# Patient Record
Sex: MALE | Race: BLACK OR AFRICAN AMERICAN | NOT HISPANIC OR LATINO | Employment: OTHER | URBAN - METROPOLITAN AREA
[De-identification: names, ages, dates, MRNs, and addresses within clinical notes are randomized per-mention and may not be internally consistent; named-entity substitution may affect disease eponyms.]

---

## 2018-07-17 DIAGNOSIS — I80.9 PHLEBITIS AND THROMBOPHLEBITIS OF UNSPECIFIED SITE: ICD-10-CM

## 2018-07-17 DIAGNOSIS — I73.9 PERIPHERAL VASCULAR DISEASE, UNSPECIFIED: Primary | ICD-10-CM

## 2018-07-23 ENCOUNTER — HOSPITAL ENCOUNTER (OUTPATIENT)
Dept: RADIOLOGY | Facility: HOSPITAL | Age: 73
Discharge: HOME | End: 2018-07-23
Attending: INTERNAL MEDICINE
Payer: COMMERCIAL

## 2018-07-23 DIAGNOSIS — I73.9 PERIPHERAL VASCULAR DISEASE, UNSPECIFIED: ICD-10-CM

## 2018-07-23 DIAGNOSIS — I80.9 PHLEBITIS AND THROMBOPHLEBITIS OF UNSPECIFIED SITE: ICD-10-CM

## 2018-07-23 PROCEDURE — 93926 LOWER EXTREMITY STUDY: CPT | Mod: TC,PO

## 2018-07-23 PROCEDURE — 93971 EXTREMITY STUDY: CPT | Mod: TC,PO

## 2019-07-09 ENCOUNTER — HOSPITAL ENCOUNTER (INPATIENT)
Facility: HOSPITAL | Age: 74
LOS: 8 days | Discharge: ANOTHER HOSPITAL | DRG: 286 | End: 2019-07-17
Attending: INTERNAL MEDICINE | Admitting: INTERNAL MEDICINE
Payer: MEDICARE

## 2019-07-09 ENCOUNTER — OUTSIDE PLACE OF SERVICE (OUTPATIENT)
Dept: CARDIOLOGY | Facility: CLINIC | Age: 74
End: 2019-07-09
Payer: MEDICARE

## 2019-07-09 ENCOUNTER — TELEPHONE (OUTPATIENT)
Dept: CARDIOLOGY | Facility: CLINIC | Age: 74
End: 2019-07-09

## 2019-07-09 DIAGNOSIS — I44.2 COMPLETE HEART BLOCK: ICD-10-CM

## 2019-07-09 DIAGNOSIS — I50.21 ACUTE SYSTOLIC HEART FAILURE: Primary | ICD-10-CM

## 2019-07-09 DIAGNOSIS — I47.20 VENTRICULAR TACHYCARDIA: ICD-10-CM

## 2019-07-09 LAB
AORTIC ROOT ANNULUS: 3.69 CM
AV INDEX (PROSTH): 0.62
AV MEAN GRADIENT: 3 MMHG
AV PEAK GRADIENT: 6 MMHG
AV VALVE AREA: 2.44 CM2
AV VELOCITY RATIO: 0.71
BSA FOR ECHO PROCEDURE: 2.75 M2
CV ECHO LV RWT: 0.51 CM
DOP CALC AO PEAK VEL: 1.22 M/S
DOP CALC AO VTI: 24.42 CM
DOP CALC LVOT AREA: 3.9 CM2
DOP CALC LVOT DIAMETER: 2.24 CM
DOP CALC LVOT PEAK VEL: 0.87 M/S
DOP CALC LVOT STROKE VOLUME: 59.55 CM3
DOP CALCLVOT PEAK VEL VTI: 15.12 CM
E WAVE DECELERATION TIME: 114.81 MSEC
E/A RATIO: 2.24
ECHO LV POSTERIOR WALL: 1.4 CM (ref 0.6–1.1)
FRACTIONAL SHORTENING: 23 % (ref 28–44)
INTERVENTRICULAR SEPTUM: 1.66 CM (ref 0.6–1.1)
LA MAJOR: 6.46 CM
LA MINOR: 6.9 CM
LA WIDTH: 4.23 CM
LEFT ATRIUM SIZE: 4.67 CM
LEFT ATRIUM VOLUME INDEX: 42.4 ML/M2
LEFT ATRIUM VOLUME: 112.04 CM3
LEFT INTERNAL DIMENSION IN SYSTOLE: 4.2 CM (ref 2.1–4)
LEFT VENTRICLE DIASTOLIC VOLUME INDEX: 55.63 ML/M2
LEFT VENTRICLE DIASTOLIC VOLUME: 147.08 ML
LEFT VENTRICLE MASS INDEX: 145 G/M2
LEFT VENTRICLE SYSTOLIC VOLUME INDEX: 29.7 ML/M2
LEFT VENTRICLE SYSTOLIC VOLUME: 78.58 ML
LEFT VENTRICULAR INTERNAL DIMENSION IN DIASTOLE: 5.49 CM (ref 3.5–6)
LEFT VENTRICULAR MASS: 382.95 G
MV PEAK A VEL: 0.37 M/S
MV PEAK E VEL: 0.83 M/S
POCT GLUCOSE: 213 MG/DL (ref 70–110)
POCT GLUCOSE: 231 MG/DL (ref 70–110)
POCT GLUCOSE: 248 MG/DL (ref 70–110)
PULM VEIN S/D RATIO: 1.25
PV PEAK D VEL: 0.36 M/S
PV PEAK S VEL: 0.45 M/S
PV PEAK VELOCITY: 1.07 CM/S
RA MAJOR: 5.87 CM
RA PRESSURE: 3 MMHG
RIGHT VENTRICULAR END-DIASTOLIC DIMENSION: 3.61 CM

## 2019-07-09 PROCEDURE — C1894 INTRO/SHEATH, NON-LASER: HCPCS | Performed by: INTERNAL MEDICINE

## 2019-07-09 PROCEDURE — 33210 INSERT ELECTRD/PM CATH SNGL: CPT | Mod: ,,, | Performed by: INTERNAL MEDICINE

## 2019-07-09 PROCEDURE — 33210 INSERT ELECTRD/PM CATH SNGL: CPT | Performed by: INTERNAL MEDICINE

## 2019-07-09 PROCEDURE — 27000221 HC OXYGEN, UP TO 24 HOURS

## 2019-07-09 PROCEDURE — 93005 ELECTROCARDIOGRAM TRACING: CPT

## 2019-07-09 PROCEDURE — 99223 PR INITIAL HOSPITAL CARE,LEVL III: ICD-10-PCS | Mod: ,,, | Performed by: INTERNAL MEDICINE

## 2019-07-09 PROCEDURE — 99152 MOD SED SAME PHYS/QHP 5/>YRS: CPT | Performed by: INTERNAL MEDICINE

## 2019-07-09 PROCEDURE — 63600175 PHARM REV CODE 636 W HCPCS: Performed by: INTERNAL MEDICINE

## 2019-07-09 PROCEDURE — 94761 N-INVAS EAR/PLS OXIMETRY MLT: CPT

## 2019-07-09 PROCEDURE — 63600175 PHARM REV CODE 636 W HCPCS: Performed by: NURSE PRACTITIONER

## 2019-07-09 PROCEDURE — 93010 ELECTROCARDIOGRAM REPORT: CPT | Mod: ,,, | Performed by: INTERNAL MEDICINE

## 2019-07-09 PROCEDURE — 93010 PR ELECTROCARDIOGRAM REPORT: ICD-10-PCS | Mod: ,,, | Performed by: INTERNAL MEDICINE

## 2019-07-09 PROCEDURE — 99223 1ST HOSP IP/OBS HIGH 75: CPT | Mod: ,,, | Performed by: INTERNAL MEDICINE

## 2019-07-09 PROCEDURE — 25000003 PHARM REV CODE 250: Performed by: NURSE PRACTITIONER

## 2019-07-09 PROCEDURE — 20000000 HC ICU ROOM

## 2019-07-09 PROCEDURE — 33210 PR INSER HEART TEMP PACER ONE CHMBR: ICD-10-PCS | Mod: ,,, | Performed by: INTERNAL MEDICINE

## 2019-07-09 RX ORDER — ACETAMINOPHEN 325 MG/1
650 TABLET ORAL EVERY 4 HOURS PRN
Status: DISCONTINUED | OUTPATIENT
Start: 2019-07-09 | End: 2019-07-17 | Stop reason: HOSPADM

## 2019-07-09 RX ORDER — COLCHICINE 0.6 MG/1
0.6 TABLET ORAL DAILY
Status: DISCONTINUED | OUTPATIENT
Start: 2019-07-09 | End: 2019-07-17 | Stop reason: HOSPADM

## 2019-07-09 RX ORDER — RAMELTEON 8 MG/1
8 TABLET ORAL NIGHTLY PRN
Status: DISCONTINUED | OUTPATIENT
Start: 2019-07-09 | End: 2019-07-17 | Stop reason: HOSPADM

## 2019-07-09 RX ORDER — ALBUTEROL SULFATE 90 UG/1
2 AEROSOL, METERED RESPIRATORY (INHALATION) EVERY 4 HOURS PRN
Status: DISCONTINUED | OUTPATIENT
Start: 2019-07-09 | End: 2019-07-17 | Stop reason: HOSPADM

## 2019-07-09 RX ORDER — FUROSEMIDE 40 MG/1
40 TABLET ORAL DAILY
Status: DISCONTINUED | OUTPATIENT
Start: 2019-07-09 | End: 2019-07-11

## 2019-07-09 RX ORDER — MAGNESIUM SULFATE HEPTAHYDRATE 40 MG/ML
2 INJECTION, SOLUTION INTRAVENOUS ONCE
Status: COMPLETED | OUTPATIENT
Start: 2019-07-09 | End: 2019-07-09

## 2019-07-09 RX ORDER — ATORVASTATIN CALCIUM 40 MG/1
80 TABLET, FILM COATED ORAL DAILY
Status: DISCONTINUED | OUTPATIENT
Start: 2019-07-09 | End: 2019-07-17 | Stop reason: HOSPADM

## 2019-07-09 RX ORDER — ALLOPURINOL 300 MG/1
300 TABLET ORAL DAILY
Status: DISCONTINUED | OUTPATIENT
Start: 2019-07-09 | End: 2019-07-17 | Stop reason: HOSPADM

## 2019-07-09 RX ORDER — IBUPROFEN 200 MG
16 TABLET ORAL
Status: DISCONTINUED | OUTPATIENT
Start: 2019-07-09 | End: 2019-07-17 | Stop reason: HOSPADM

## 2019-07-09 RX ORDER — DEXTROSE 50 % IN WATER (D50W) INTRAVENOUS SYRINGE
25
Status: DISCONTINUED | OUTPATIENT
Start: 2019-07-09 | End: 2019-07-17 | Stop reason: HOSPADM

## 2019-07-09 RX ORDER — GLUCAGON 1 MG
1 KIT INJECTION
Status: DISCONTINUED | OUTPATIENT
Start: 2019-07-09 | End: 2019-07-17 | Stop reason: HOSPADM

## 2019-07-09 RX ORDER — TRAMADOL HYDROCHLORIDE 50 MG/1
50 TABLET ORAL EVERY 6 HOURS PRN
Status: DISCONTINUED | OUTPATIENT
Start: 2019-07-09 | End: 2019-07-17 | Stop reason: HOSPADM

## 2019-07-09 RX ORDER — LOPERAMIDE HYDROCHLORIDE 2 MG/1
2 CAPSULE ORAL
Status: DISCONTINUED | OUTPATIENT
Start: 2019-07-09 | End: 2019-07-17 | Stop reason: HOSPADM

## 2019-07-09 RX ORDER — HYDRALAZINE HYDROCHLORIDE 20 MG/ML
10 INJECTION INTRAMUSCULAR; INTRAVENOUS EVERY 6 HOURS PRN
Status: DISCONTINUED | OUTPATIENT
Start: 2019-07-09 | End: 2019-07-16 | Stop reason: SDUPTHER

## 2019-07-09 RX ORDER — SODIUM CHLORIDE 0.9 % (FLUSH) 0.9 %
10 SYRINGE (ML) INJECTION
Status: DISCONTINUED | OUTPATIENT
Start: 2019-07-09 | End: 2019-07-17 | Stop reason: HOSPADM

## 2019-07-09 RX ORDER — MIDAZOLAM HYDROCHLORIDE 1 MG/ML
INJECTION INTRAMUSCULAR; INTRAVENOUS
Status: DISCONTINUED | OUTPATIENT
Start: 2019-07-09 | End: 2019-07-09 | Stop reason: HOSPADM

## 2019-07-09 RX ORDER — ONDANSETRON 8 MG/1
8 TABLET, ORALLY DISINTEGRATING ORAL EVERY 8 HOURS PRN
Status: DISCONTINUED | OUTPATIENT
Start: 2019-07-09 | End: 2019-07-17 | Stop reason: HOSPADM

## 2019-07-09 RX ORDER — POLYETHYLENE GLYCOL 3350 17 G/17G
17 POWDER, FOR SOLUTION ORAL 2 TIMES DAILY PRN
Status: DISCONTINUED | OUTPATIENT
Start: 2019-07-09 | End: 2019-07-17 | Stop reason: HOSPADM

## 2019-07-09 RX ORDER — LISINOPRIL 20 MG/1
40 TABLET ORAL DAILY
Status: DISCONTINUED | OUTPATIENT
Start: 2019-07-09 | End: 2019-07-11

## 2019-07-09 RX ORDER — ASPIRIN 81 MG/1
81 TABLET ORAL DAILY
Status: DISCONTINUED | OUTPATIENT
Start: 2019-07-09 | End: 2019-07-17 | Stop reason: HOSPADM

## 2019-07-09 RX ORDER — INSULIN ASPART 100 [IU]/ML
0-5 INJECTION, SOLUTION INTRAVENOUS; SUBCUTANEOUS
Status: DISCONTINUED | OUTPATIENT
Start: 2019-07-09 | End: 2019-07-09

## 2019-07-09 RX ORDER — DEXTROSE 50 % IN WATER (D50W) INTRAVENOUS SYRINGE
12.5
Status: DISCONTINUED | OUTPATIENT
Start: 2019-07-09 | End: 2019-07-17 | Stop reason: HOSPADM

## 2019-07-09 RX ORDER — FENTANYL CITRATE 50 UG/ML
INJECTION, SOLUTION INTRAMUSCULAR; INTRAVENOUS
Status: DISCONTINUED | OUTPATIENT
Start: 2019-07-09 | End: 2019-07-09 | Stop reason: HOSPADM

## 2019-07-09 RX ORDER — PANTOPRAZOLE SODIUM 40 MG/1
40 TABLET, DELAYED RELEASE ORAL DAILY
Status: DISCONTINUED | OUTPATIENT
Start: 2019-07-09 | End: 2019-07-17 | Stop reason: HOSPADM

## 2019-07-09 RX ORDER — HEPARIN SODIUM 200 [USP'U]/100ML
INJECTION, SOLUTION INTRAVENOUS
Status: DISCONTINUED | OUTPATIENT
Start: 2019-07-09 | End: 2019-07-09

## 2019-07-09 RX ORDER — IBUPROFEN 200 MG
24 TABLET ORAL
Status: DISCONTINUED | OUTPATIENT
Start: 2019-07-09 | End: 2019-07-17 | Stop reason: HOSPADM

## 2019-07-09 RX ORDER — INSULIN ASPART 100 [IU]/ML
0-5 INJECTION, SOLUTION INTRAVENOUS; SUBCUTANEOUS
Status: DISCONTINUED | OUTPATIENT
Start: 2019-07-09 | End: 2019-07-17 | Stop reason: HOSPADM

## 2019-07-09 RX ADMIN — ALLOPURINOL 300 MG: 300 TABLET ORAL at 04:07

## 2019-07-09 RX ADMIN — PANTOPRAZOLE SODIUM 40 MG: 40 TABLET, DELAYED RELEASE ORAL at 04:07

## 2019-07-09 RX ADMIN — HYDRALAZINE HYDROCHLORIDE 10 MG: 20 INJECTION INTRAMUSCULAR; INTRAVENOUS at 10:07

## 2019-07-09 RX ADMIN — INSULIN ASPART 2 UNITS: 100 INJECTION, SOLUTION INTRAVENOUS; SUBCUTANEOUS at 04:07

## 2019-07-09 RX ADMIN — LISINOPRIL 40 MG: 20 TABLET ORAL at 04:07

## 2019-07-09 RX ADMIN — FUROSEMIDE 40 MG: 40 TABLET ORAL at 04:07

## 2019-07-09 RX ADMIN — ATORVASTATIN CALCIUM 80 MG: 40 TABLET, FILM COATED ORAL at 04:07

## 2019-07-09 RX ADMIN — ASPIRIN 81 MG: 81 TABLET, COATED ORAL at 04:07

## 2019-07-09 RX ADMIN — COLCHICINE 0.6 MG: 0.6 TABLET, FILM COATED ORAL at 04:07

## 2019-07-09 RX ADMIN — MAGNESIUM SULFATE IN WATER 2 G: 40 INJECTION, SOLUTION INTRAVENOUS at 01:07

## 2019-07-09 RX ADMIN — INSULIN ASPART 1 UNITS: 100 INJECTION, SOLUTION INTRAVENOUS; SUBCUTANEOUS at 10:07

## 2019-07-09 NOTE — LETTER
July 10, 2019         [address] [place] [address] [place]iki TRUJ[place]O [address]  Phone: [phone]  Fax: [phone]       Patient: Houston Benjamin   YOB: 1945  Date of Visit: 7/09/19    To Whom It May Concern:    Renita Benjamin,  of Paulette Benjamin, is currently hospitalized in the Intensive Care Unit at [facility]. Paulette is unable to leave her  at this time. Her reservation ID# is [id number].  If you have any questions or concerns, or if I can be of further assistance, please do not hesitate to contact me.    Sincerely,        Meenakshi Hernandez RN    [facility]

## 2019-07-09 NOTE — INTERVAL H&P NOTE
The patient has been examined and the H&P has been reviewed:        Anesthesia/Surgery risks, benefits and alternative options discussed and understood by patient/family.          Active Hospital Problems    Diagnosis  POA    *Complete heart block [I44.2]  Yes    Morbid obesity [E66.01]  Yes    Hypertension [I10]  Yes    Diastolic dysfunction [I51.9]  Yes    Sleep apnea [G47.30]  Yes    Hypervolemia [E87.70]  Yes      Resolved Hospital Problems   No resolved problems to display.

## 2019-07-09 NOTE — Clinical Note
Catheter is repositioned to the ostium   right coronary artery. Angiography performed of the right coronary arteries in multiple views. Angiography performed via hand injection with 9 mL of contrast.

## 2019-07-09 NOTE — PLAN OF CARE
Pt arrived via ambulance to room 257 HR 27 on monitor. Pt AAOx4 denies any lightheadedness or dizziness, cp.. Pt only complaint at this time is he feels sob sat 97% on RR 22 fine crackles and diminished breath sounds post bases, ap 25,complete heart block  Manual bp 152/46.pt laying comfortably in bed ,  nad noted, pacer pads p[laced on pt,  called and notified. No new orders given at this time. Care is on going will continue to monitor ,pt's insulin  Pump, in place, can not tell me basal rate, told we will  Discontinue pump and start sliding scale,

## 2019-07-09 NOTE — BRIEF OP NOTE
S/p R IJ Temporary pacemaker for CHB          Monitor overnight  Further evaluation and treatment to follow

## 2019-07-09 NOTE — Clinical Note
The sheath is sutured in place. biopatch placed at incision site, site dress with Tegaderm and gauze.

## 2019-07-09 NOTE — TELEPHONE ENCOUNTER
[facility] called requesting call back to get pt transferred here     Pt came in with complaints of feeling lightheaded and has complete heart block     [phone]

## 2019-07-09 NOTE — H&P
[facility]               H&P      Reason for Admission:        Assessment:  1. CHB  2. Chronic Diastolic CHF-stable  3. HTN- controlled        Plan:    Temporary pacemaker emergently  2d echo complete  CMP  Plan for Permanent Pacemaker or AICD when ef known and electrolytes optimize.   Magnesium 2 g IV    Milagros Gross MD  Cardiology Service      HPI: 74 y/o AA male with PMH of HTN, CHF and DM present with weakness, fatigue and dyspnea. He was found to be in CHB. No chest pain. No history of MI. No recent illness.   He last took coreg last night.   Mag 1.2.   HR currently in 20s          Review of Systems -Except for what was mentioned above in HPI  Constitution: Negative.   HENT: Negative.   Eyes: Negative.   Cardiovascular: Negative.   Respiratory: Negative.   Endocrine: Negative.   Hematologic/Lymphatic: Negative.   Skin: Negative.   Musculoskeletal: Negative.   Gastrointestinal: Negative.           No past surgical history on file.    Past Medical History:   Diagnosis Date    CHF (congestive heart failure)     Coronary artery disease     Diabetes mellitus     Hypertension         reports that he has quit smoking. He does not have any smokeless tobacco history on file. He reports that he drinks alcohol. He reports that he does not use drugs.     No family history on file.     Review of patient's allergies indicates:  No Known Allergies    Current Discharge Medication List      CONTINUE these medications which have NOT CHANGED    Details   ALBUTEROL SULFATE (PROVENTIL HFA INHL) Inhale into the lungs.      allopurinol (ZYLOPRIM) 300 MG tablet Take 300 mg by mouth once daily.      aspirin (ECOTRIN) 81 MG EC tablet Take 1 tablet (81 mg total) by mouth once daily.  Refills: 0      atorvastatin (LIPITOR) 80 MG tablet Take 1 tablet (80 mg total) by mouth once daily.  Qty: 90 tablet, Refills: 3      carvedilol (COREG) 12.5 MG tablet Take 1 tablet (12.5 mg total) by mouth 2 (two) times daily.  Qty: 60  tablet, Refills: 11      colchicine 0.6 mg tablet Take 0.6 mg by mouth once daily.      furosemide (LASIX) 40 MG tablet Take 1 tablet (40 mg total) by mouth once daily.  Qty: 30 tablet, Refills: 11      indomethacin (INDOCIN) 50 MG capsule Take 50 mg by mouth 2 (two) times daily with meals.      insulin NPH-insulin regular, 70/30, (NOVOLIN 70/30) 100 unit/mL (70-30) injection Inject into the skin 3 (three) times daily.      lisinopril (PRINIVIL,ZESTRIL) 40 MG tablet Take 1 tablet (40 mg total) by mouth once daily.  Qty: 90 tablet, Refills: 3      spironolactone (ALDACTONE) 25 MG tablet Take 1 tablet (25 mg total) by mouth once daily.  Qty: 30 tablet, Refills: 11                         Vitals:    07/09/19 1245   BP: (!) 152/46   BP Location: Right arm   Pulse: (!) 26   Resp: (!) 36   Temp: 98 °F (36.7 °C)   TempSrc: Oral   SpO2: 97%         Physical Examination:  General Appearance: No acute distress  Mental: Alert to person, time and place  HEENT: Perrl  Chest: No pain with palpitations, no chest deformities  Heart: bradycardia, no murmurs, no gallops or rubs  Lung: CTAB  ABDOMEN: Soft, nontender, nondistended with good bowel sounds heard. No mass or hepatosplenomegaly  EXTREMITIES: Without cyanosis, clubbing or edema.   NEUROLOGICAL: Gross nonfocal. Skin: Warm and dry without any rash. There is no costovertebral angle tenderness.   Skin: no rashes lesions or ulcers    Lab Results   Component Value Date     09/23/2015    K 3.7 09/23/2015    CL 99 09/23/2015    CO2 33 (H) 09/23/2015    BUN 19 09/23/2015    CREATININE 1.4 09/23/2015     (H) 09/23/2015    HGBA1C 6.5 (H) 09/19/2015    MG 1.5 (L) 09/22/2015    AST 34 09/18/2015    AST 34 09/18/2015    ALT 41 09/18/2015    ALT 41 09/18/2015    ALBUMIN 3.2 (L) 09/18/2015    ALBUMIN 3.2 (L) 09/18/2015    PROT 6.5 09/18/2015    PROT 6.5 09/18/2015    BILITOT 0.3 09/18/2015    BILITOT 0.3 09/18/2015    WBC 7.24 09/19/2015    HGB 12.5 (L) 09/19/2015    HCT 38.2  (L) 09/19/2015    MCV 92 09/19/2015     09/19/2015    INR 1.0 09/18/2015    TSH 1.485 09/19/2015       No results for input(s): CPK, CPKMB, TROPONINI, MB in the last 24 hours.    No results for input(s): CPK, CPKMB, TROPONINI, MB in the last 168 hours.      Lab Results   Component Value Date    TSH 1.485 09/19/2015       Lab Results   Component Value Date    HGBA1C 6.5 (H) 09/19/2015               Images and labs reviewed    ECG: CHB

## 2019-07-09 NOTE — Clinical Note
The site was marked. Prepped: right neck. Prepped with: ChloraPrep. The site was clipped. The patient was draped.

## 2019-07-09 NOTE — Clinical Note
Catheter is inserted into the ostium   left main. Angiography performed of the left coronary arteries in multiple views. Angiography performed via hand injection with 20 mL of contrast.

## 2019-07-09 NOTE — Clinical Note
29 ml injected throughout the case. 121 mL total wasted during the case. 150 mL total used in the case.

## 2019-07-09 NOTE — PLAN OF CARE
Back from cath lab,awake and alert, denies chest pain or sob, diminished post bases, fine crackles only in rt post base now, states feeling much better,temp pacemaker to rt ij, rate 80 ma at 20 ma vent 10, 100% vent paced, dressing gauze and tegraderm, dry and intact with biopatch in place, instructed to lay flat, do not move rt arm, excessively,do not move head too much, just lay still, for now,

## 2019-07-09 NOTE — HPI
Assessment:  1. CHB  2. Chronic Diastolic CHF-stable  3. HTN- controlled     Plan:   Temporary pacemaker emergently  2d echo complete  CMP  Plan for Permanent Pacemaker or AICD when ef known and electrolytes optimize.   Magnesium 2 g IV     Milagros Gross MD  Cardiology Service     HPI: 72 y/o AA male with PMH of HTN, CHF and DM present with weakness, fatigue and dyspnea. He was found to be in CHB. No chest pain. No history of MI. No recent illness.   He last took coreg last night.   Mag 1.2.   HR currently in 20s

## 2019-07-10 LAB
ALBUMIN SERPL BCP-MCNC: 2.9 G/DL (ref 3.5–5.2)
ALP SERPL-CCNC: 179 U/L (ref 55–135)
ALT SERPL W/O P-5'-P-CCNC: 52 U/L (ref 10–44)
ANION GAP SERPL CALC-SCNC: 8 MMOL/L (ref 8–16)
AST SERPL-CCNC: 23 U/L (ref 10–40)
BASOPHILS # BLD AUTO: 0 K/UL (ref 0–0.2)
BASOPHILS NFR BLD: 0 % (ref 0–1.9)
BILIRUB SERPL-MCNC: 0.9 MG/DL (ref 0.1–1)
BUN SERPL-MCNC: 37 MG/DL (ref 8–23)
CALCIUM SERPL-MCNC: 8.9 MG/DL (ref 8.7–10.5)
CHLORIDE SERPL-SCNC: 106 MMOL/L (ref 95–110)
CO2 SERPL-SCNC: 25 MMOL/L (ref 23–29)
CREAT SERPL-MCNC: 1.8 MG/DL (ref 0.5–1.4)
DIFFERENTIAL METHOD: ABNORMAL
EOSINOPHIL # BLD AUTO: 0.1 K/UL (ref 0–0.5)
EOSINOPHIL NFR BLD: 0.7 % (ref 0–8)
ERYTHROCYTE [DISTWIDTH] IN BLOOD BY AUTOMATED COUNT: 15.9 % (ref 11.5–14.5)
EST. GFR  (AFRICAN AMERICAN): 43 ML/MIN/1.73 M^2
EST. GFR  (NON AFRICAN AMERICAN): 37 ML/MIN/1.73 M^2
GLUCOSE SERPL-MCNC: 216 MG/DL (ref 70–110)
HCT VFR BLD AUTO: 36.2 % (ref 40–54)
HGB BLD-MCNC: 11.4 G/DL (ref 14–18)
LYMPHOCYTES # BLD AUTO: 2.1 K/UL (ref 1–4.8)
LYMPHOCYTES NFR BLD: 24.7 % (ref 18–48)
MAGNESIUM SERPL-MCNC: 1.1 MG/DL (ref 1.6–2.6)
MCH RBC QN AUTO: 29.6 PG (ref 27–31)
MCHC RBC AUTO-ENTMCNC: 31.5 G/DL (ref 32–36)
MCV RBC AUTO: 94 FL (ref 82–98)
MONOCYTES # BLD AUTO: 0.6 K/UL (ref 0.3–1)
MONOCYTES NFR BLD: 7.4 % (ref 4–15)
NEUTROPHILS # BLD AUTO: 5.7 K/UL (ref 1.8–7.7)
NEUTROPHILS NFR BLD: 67.2 % (ref 38–73)
PLATELET # BLD AUTO: 143 K/UL (ref 150–350)
PMV BLD AUTO: 10.9 FL (ref 9.2–12.9)
POCT GLUCOSE: 223 MG/DL (ref 70–110)
POCT GLUCOSE: 241 MG/DL (ref 70–110)
POCT GLUCOSE: 291 MG/DL (ref 70–110)
POCT GLUCOSE: 303 MG/DL (ref 70–110)
POTASSIUM SERPL-SCNC: 4.2 MMOL/L (ref 3.5–5.1)
PROT SERPL-MCNC: 5.8 G/DL (ref 6–8.4)
RBC # BLD AUTO: 3.85 M/UL (ref 4.6–6.2)
SODIUM SERPL-SCNC: 139 MMOL/L (ref 136–145)
TROPONIN I SERPL DL<=0.01 NG/ML-MCNC: 0.39 NG/ML (ref 0–0.03)
WBC # BLD AUTO: 8.46 K/UL (ref 3.9–12.7)

## 2019-07-10 PROCEDURE — 63600175 PHARM REV CODE 636 W HCPCS: Performed by: INTERNAL MEDICINE

## 2019-07-10 PROCEDURE — 84484 ASSAY OF TROPONIN QUANT: CPT

## 2019-07-10 PROCEDURE — 93005 ELECTROCARDIOGRAM TRACING: CPT

## 2019-07-10 PROCEDURE — 27000221 HC OXYGEN, UP TO 24 HOURS

## 2019-07-10 PROCEDURE — 83735 ASSAY OF MAGNESIUM: CPT

## 2019-07-10 PROCEDURE — 94761 N-INVAS EAR/PLS OXIMETRY MLT: CPT

## 2019-07-10 PROCEDURE — 20000000 HC ICU ROOM

## 2019-07-10 PROCEDURE — 36415 COLL VENOUS BLD VENIPUNCTURE: CPT

## 2019-07-10 PROCEDURE — 85025 COMPLETE CBC W/AUTO DIFF WBC: CPT

## 2019-07-10 PROCEDURE — 63600175 PHARM REV CODE 636 W HCPCS: Performed by: NURSE PRACTITIONER

## 2019-07-10 PROCEDURE — 80053 COMPREHEN METABOLIC PANEL: CPT

## 2019-07-10 PROCEDURE — 25000003 PHARM REV CODE 250: Performed by: NURSE PRACTITIONER

## 2019-07-10 RX ORDER — MAGNESIUM SULFATE HEPTAHYDRATE 40 MG/ML
2 INJECTION, SOLUTION INTRAVENOUS ONCE
Status: COMPLETED | OUTPATIENT
Start: 2019-07-10 | End: 2019-07-10

## 2019-07-10 RX ORDER — DIPHENHYDRAMINE HCL 50 MG
50 CAPSULE ORAL ONCE
Status: DISCONTINUED | OUTPATIENT
Start: 2019-07-11 | End: 2019-07-11

## 2019-07-10 RX ORDER — MAGNESIUM SULFATE HEPTAHYDRATE 40 MG/ML
2 INJECTION, SOLUTION INTRAVENOUS
Status: COMPLETED | OUTPATIENT
Start: 2019-07-10 | End: 2019-07-10

## 2019-07-10 RX ORDER — DIPHENHYDRAMINE HCL 50 MG
50 CAPSULE ORAL ONCE
Status: DISCONTINUED | OUTPATIENT
Start: 2019-07-10 | End: 2019-07-11

## 2019-07-10 RX ADMIN — AMIODARONE HYDROCHLORIDE 0.5 MG/MIN: 1.8 INJECTION, SOLUTION INTRAVENOUS at 12:07

## 2019-07-10 RX ADMIN — AMIODARONE HYDROCHLORIDE 150 MG: 1.5 INJECTION, SOLUTION INTRAVENOUS at 06:07

## 2019-07-10 RX ADMIN — LISINOPRIL 40 MG: 20 TABLET ORAL at 09:07

## 2019-07-10 RX ADMIN — ASPIRIN 81 MG: 81 TABLET, COATED ORAL at 09:07

## 2019-07-10 RX ADMIN — AMIODARONE HYDROCHLORIDE 1 MG/MIN: 1.8 INJECTION, SOLUTION INTRAVENOUS at 06:07

## 2019-07-10 RX ADMIN — INSULIN ASPART 2 UNITS: 100 INJECTION, SOLUTION INTRAVENOUS; SUBCUTANEOUS at 06:07

## 2019-07-10 RX ADMIN — ATORVASTATIN CALCIUM 80 MG: 40 TABLET, FILM COATED ORAL at 09:07

## 2019-07-10 RX ADMIN — INSULIN ASPART 1 UNITS: 100 INJECTION, SOLUTION INTRAVENOUS; SUBCUTANEOUS at 10:07

## 2019-07-10 RX ADMIN — FUROSEMIDE 40 MG: 40 TABLET ORAL at 09:07

## 2019-07-10 RX ADMIN — MAGNESIUM SULFATE IN WATER 2 G: 40 INJECTION, SOLUTION INTRAVENOUS at 06:07

## 2019-07-10 RX ADMIN — INSULIN ASPART 2 UNITS: 100 INJECTION, SOLUTION INTRAVENOUS; SUBCUTANEOUS at 12:07

## 2019-07-10 RX ADMIN — PANTOPRAZOLE SODIUM 40 MG: 40 TABLET, DELAYED RELEASE ORAL at 09:07

## 2019-07-10 RX ADMIN — MAGNESIUM SULFATE IN WATER 2 G: 40 INJECTION, SOLUTION INTRAVENOUS at 10:07

## 2019-07-10 RX ADMIN — INSULIN ASPART 4 UNITS: 100 INJECTION, SOLUTION INTRAVENOUS; SUBCUTANEOUS at 04:07

## 2019-07-10 RX ADMIN — LOPERAMIDE HYDROCHLORIDE 2 MG: 2 CAPSULE ORAL at 05:07

## 2019-07-10 RX ADMIN — ALLOPURINOL 300 MG: 300 TABLET ORAL at 09:07

## 2019-07-10 RX ADMIN — AMIODARONE HYDROCHLORIDE 0.5 MG/MIN: 1.8 INJECTION, SOLUTION INTRAVENOUS at 11:07

## 2019-07-10 RX ADMIN — MAGNESIUM SULFATE IN WATER 2 G: 40 INJECTION, SOLUTION INTRAVENOUS at 12:07

## 2019-07-10 NOTE — PLAN OF CARE
Problem: Adult Inpatient Plan of Care  Goal: Plan of Care Review  Outcome: Ongoing (interventions implemented as appropriate)  Pt had temp pacemaker placed  ,rate 80 ,100% paced, feeling better,less sob, hope that once coreg of out system, his block will be corrected,

## 2019-07-10 NOTE — PLAN OF CARE
Noted order for cath today, but pt ate breakfast, finished about 0830, davey gavin, left a message on phone        1010 spoke with cath lab about his eating, she will check , but procedure will  Need to be done much later this afternoon,         1115 procedure cancelled and will need to be done in am

## 2019-07-10 NOTE — CONSULTS
Food & Nutrition  Education    Diet Education: Diabetic, heart disease  Time Spent: 15 min  Learners: Patient      Nutrition Education provided with handouts:   Heart Healthy Consistent Carbohydrate Nutrition Therapy    Comments:  Educated pt on making healthy choices at meal time. Choosing healthy unsaturated fats and consuming a moderate amount of carbohydrates.  All questions and concerns answered. Dietitian's contact information provided.       Please Re-consult as needed      Thanks!  Charles Mirza RDN

## 2019-07-10 NOTE — PLAN OF CARE
Again co of stomach, discomfort, very very active bowel sounds, placed on bedpan again, passed mucoid yellow green stool,stomach feels gripey

## 2019-07-10 NOTE — NURSING
2121-Notified Dr. Gross of SBP ranging from 160s-200s; see new order  0445- Dr. Gross notified of persistent long runs of VT noted on monitor; Pt asymptomatic, AAOx4, absent from any chest pain/feelings of discomfort; suggested amiodarone; STAT EKG ordered   0515- phlebotomy notified to draw Pt's lab work at this time, as he is an early morning lab draw (due at 0400)

## 2019-07-10 NOTE — PLAN OF CARE
Pt awake and alert, no co of sob, chest pain, although, remains exertional sob,paced rhythm 80, transvenous pacemaker rate 80 ma at 10, ma v 10, diminished post bases

## 2019-07-10 NOTE — PLAN OF CARE
Pt without co, pt's bp up 180's/100  rec lisinopril 40 mgm at 1630,pt without co , talking and laughing ,

## 2019-07-10 NOTE — PLAN OF CARE
Problem: Fall Injury Risk  Goal: Absence of Fall and Fall-Related Injury  Outcome: Ongoing (interventions implemented as appropriate)  Intervention: Promote Injury-Free Environment     07/09/19 2305 07/10/19 0105   Optimize Balance and Safe Activity   Safety Promotion/Fall Prevention  --  assistive device/personal item within reach;bed alarm set;Fall Risk reviewed with patient/family;Fall Risk signage in place;commode/urinal/bedpan at bedside;lighting adjusted;medications reviewed;muscle strengthening facilitated;nonskid shoes/socks when out of bed;instructed to call staff for mobility;side rails raised x 3   Optimize [place] and Functional Mobility   Environmental Safety Modification assistive device/personal items within reach;clutter free environment maintained;lighting adjusted;room organization consistent  --          Problem: Cardiac Output Decreased  Goal: Effective Cardiac Output  Outcome: Ongoing (interventions implemented as appropriate)  Intervention: Optimize Cardiac Output     07/09/19 1905 07/09/19 2305 07/10/19 0105   Prevent or Manage Embolism   VTE Prevention/Management  --  ROM (active) performed;bleeding risk assessed;bleeding precautions maintained  --    Prevent Additional Skin Injury   Head of Bed (HOB)  --   --  HOB at 30-45 degrees   Monitor/Manage Chemotherapy Gastrointestinal Effects   Environmental Support calm environment promoted  --   --          Problem: Fluid Volume Excess  Goal: Fluid Balance  Outcome: Ongoing (interventions implemented as appropriate)  Intervention: Monitor and Manage Hypervolemia     07/09/19 2305   Monitor and Manage Hypervolemia   Skin Protection adhesive use limited;tubing/devices free from skin contact;transparent dressing maintained;incontinence pads utilized

## 2019-07-10 NOTE — PLAN OF CARE
Pt awake and alert, no co of chest pain or sob, although sob with exertion noted, paced rhythm 80 ma at  10 ma vent 10 less edematous, but still with edema legs, and hips josé luis, had diarrhea last pm, may have been colchicine, given at 1600 yest, no abd pain, abd is obese, ajay firm, bowel sounds active , no nausea, feels like he has a lot of gas,

## 2019-07-10 NOTE — HOSPITAL COURSE
07/09/2019 S/p R IJ Temporary pacemaker for CH, rate 80bpm. BB on hold.  07/10/2019 Patient with frequent runs of VT overnight. TNI pending. NPO for LHC today. Amio infusing. LVEF 30% with RV dysfunction and global hypokinesis.  LHC deferred to 07/11 2/2 patient was given a diet and consumed the entire meal.   07/11/2019 LHC cancelled for today, renal function worsening. Holding nephrotoxic agents and Lasix. Remains in CHB with runs of SVT (chronic RBB that resembles VT) and short runs of nonsustained VT. Amio continued. NPO p MN for potential LHC in AM.   07/12/2019 Patient remains in CHB, Hemodynamically stable with TVP. No CV complaints. Amiodarone converted to oral. No further VT. Renal function stable. Defer LHC to Monday to give kidneys time to recover.   7/13/2019: seen with family at the bedside. No acute issues. He will have LHC (7/15/2019) and either PPM or ICD depending on coronary anatomy.   7/14/219: seen this am at the bedside with family. No acute cardiac issues. Pacing as set with TVP. Cr higher at 2.1. + clear stool without abdominal pain.   07/15/2019 Hemodynamically stable, remains pacer dependent. Renal function stable- Cr 2.2, Gentle hydration with IVF. NPO p MN for LHC.  07/16/2019 S/p LHC                Minimal contrast use              Luminal irregularities              LVEDP 12 mmHg   Plan:   Proceed with Bi ventricular ICD  07/17/2019 DVT to LUE despite Lovenox dosed for DVT prophylaxis. GI consulted for persistent abdominal distension and loose stool with possible ileus vs partial obstruction on imaging. +BS. Patient is hemodynamically stable with TVP set at 80 BPM. CHB remains. Patient remains on oral Amiodarone with intermittent VT- asymptomatic.   Patient transferred to [facility] for BiVICD implantation.

## 2019-07-10 NOTE — PLAN OF CARE
Pt continues in paced rhythm, had watery stool, cleaned, and linen changed , voided accidentally in bed,noted with movement inc resp , sob, lungs remain, unchanged, diminished, no crackles

## 2019-07-10 NOTE — PLAN OF CARE
Pt transferred from [facility] for due to CHB and required TVP placement.  Pt state he lives with his spouse and independent with all adls with use of cane; no hh. Pt's spouse at bedside and informed Tn she can provide help at home as needed and transportation upon d/c. DR. Cody is pt;s cardiologist.  TN gave pt d/c brochure and card and encouraged to call for any needs/concerns.     07/10/19 1041   Discharge Assessment   Assessment Type Discharge Planning Assessment   Confirmed/corrected address and phone number on facesheet? Yes   Assessment information obtained from? Patient   Expected Length of Stay (days) 2   Communicated expected length of stay with patient/caregiver yes   Prior to hospitilization cognitive status: Alert/Oriented   Prior to hospitalization functional status: Independent   Current cognitive status: Alert/Oriented   Current Functional Status: Needs Assistance   Facility Arrived From: [facility]   Lives With spouse   Able to Return to Prior Arrangements yes   Is patient able to care for self after discharge? Yes   Who are your caregiver(s) and their phone number(s)? Hue (spouse) [phone]   Patient's perception of discharge disposition home or selfcare   Readmission Within the Last 30 Days no previous admission in last 30 days   Patient currently being followed by outpatient case management? No   Patient currently receives any other outside agency services? No   Equipment Currently Used at Home cane, straight   Do you have any problems affording any of your prescribed medications? No   Is the patient taking medications as prescribed? yes   Does the patient have transportation home? Yes   Transportation Anticipated family or friend will provide   Does the patient receive services at the [facility]mad[facility] Clinic? No   Discharge Plan A Home with family   Discharge Plan B Home Health   DME Needed Upon Discharge  none   Patient/Family in Agreement with Plan yes

## 2019-07-10 NOTE — PLAN OF CARE
Co of abd discomfort, very active bowel sounds,feels uncomfortable, uncomfortable that he has had 2 loose stools today, although very small , abd is semi firm, active, passing gas , gave a immodium, to see if this will help settle gas

## 2019-07-11 PROBLEM — I47.20 VT (VENTRICULAR TACHYCARDIA): Status: ACTIVE | Noted: 2019-07-11

## 2019-07-11 PROBLEM — I50.21 ACUTE SYSTOLIC HEART FAILURE: Status: ACTIVE | Noted: 2019-07-11

## 2019-07-11 PROBLEM — N17.9 AKI (ACUTE KIDNEY INJURY): Status: ACTIVE | Noted: 2019-07-11

## 2019-07-11 LAB
ANION GAP SERPL CALC-SCNC: 7 MMOL/L (ref 8–16)
BASOPHILS # BLD AUTO: 0.01 K/UL (ref 0–0.2)
BASOPHILS NFR BLD: 0.1 % (ref 0–1.9)
BUN SERPL-MCNC: 34 MG/DL (ref 8–23)
CALCIUM SERPL-MCNC: 8.7 MG/DL (ref 8.7–10.5)
CHLORIDE SERPL-SCNC: 105 MMOL/L (ref 95–110)
CO2 SERPL-SCNC: 26 MMOL/L (ref 23–29)
CREAT SERPL-MCNC: 1.9 MG/DL (ref 0.5–1.4)
DIFFERENTIAL METHOD: ABNORMAL
EOSINOPHIL # BLD AUTO: 0.1 K/UL (ref 0–0.5)
EOSINOPHIL NFR BLD: 1.4 % (ref 0–8)
ERYTHROCYTE [DISTWIDTH] IN BLOOD BY AUTOMATED COUNT: 15.7 % (ref 11.5–14.5)
EST. GFR  (AFRICAN AMERICAN): 40 ML/MIN/1.73 M^2
EST. GFR  (NON AFRICAN AMERICAN): 35 ML/MIN/1.73 M^2
GLUCOSE SERPL-MCNC: 246 MG/DL (ref 70–110)
HCT VFR BLD AUTO: 37.4 % (ref 40–54)
HGB BLD-MCNC: 11.9 G/DL (ref 14–18)
LYMPHOCYTES # BLD AUTO: 1.5 K/UL (ref 1–4.8)
LYMPHOCYTES NFR BLD: 19.6 % (ref 18–48)
MAGNESIUM SERPL-MCNC: 1.7 MG/DL (ref 1.6–2.6)
MCH RBC QN AUTO: 30.1 PG (ref 27–31)
MCHC RBC AUTO-ENTMCNC: 31.8 G/DL (ref 32–36)
MCV RBC AUTO: 95 FL (ref 82–98)
MONOCYTES # BLD AUTO: 0.7 K/UL (ref 0.3–1)
MONOCYTES NFR BLD: 9.5 % (ref 4–15)
NEUTROPHILS # BLD AUTO: 5.3 K/UL (ref 1.8–7.7)
NEUTROPHILS NFR BLD: 69.4 % (ref 38–73)
PHOSPHATE SERPL-MCNC: 3.4 MG/DL (ref 2.7–4.5)
PLATELET # BLD AUTO: 140 K/UL (ref 150–350)
PMV BLD AUTO: 11.4 FL (ref 9.2–12.9)
POCT GLUCOSE: 199 MG/DL (ref 70–110)
POCT GLUCOSE: 226 MG/DL (ref 70–110)
POCT GLUCOSE: 233 MG/DL (ref 70–110)
POCT GLUCOSE: 244 MG/DL (ref 70–110)
POCT GLUCOSE: 248 MG/DL (ref 70–110)
POTASSIUM SERPL-SCNC: 4.2 MMOL/L (ref 3.5–5.1)
RBC # BLD AUTO: 3.95 M/UL (ref 4.6–6.2)
SODIUM SERPL-SCNC: 138 MMOL/L (ref 136–145)
WBC # BLD AUTO: 7.67 K/UL (ref 3.9–12.7)

## 2019-07-11 PROCEDURE — 94761 N-INVAS EAR/PLS OXIMETRY MLT: CPT

## 2019-07-11 PROCEDURE — 84100 ASSAY OF PHOSPHORUS: CPT

## 2019-07-11 PROCEDURE — 63600175 PHARM REV CODE 636 W HCPCS: Performed by: NURSE PRACTITIONER

## 2019-07-11 PROCEDURE — 99233 SBSQ HOSP IP/OBS HIGH 50: CPT | Mod: ,,, | Performed by: INTERNAL MEDICINE

## 2019-07-11 PROCEDURE — 25000003 PHARM REV CODE 250: Performed by: NURSE PRACTITIONER

## 2019-07-11 PROCEDURE — 99900035 HC TECH TIME PER 15 MIN (STAT)

## 2019-07-11 PROCEDURE — 20000000 HC ICU ROOM

## 2019-07-11 PROCEDURE — 85025 COMPLETE CBC W/AUTO DIFF WBC: CPT

## 2019-07-11 PROCEDURE — 83735 ASSAY OF MAGNESIUM: CPT

## 2019-07-11 PROCEDURE — 36415 COLL VENOUS BLD VENIPUNCTURE: CPT

## 2019-07-11 PROCEDURE — 99233 PR SUBSEQUENT HOSPITAL CARE,LEVL III: ICD-10-PCS | Mod: ,,, | Performed by: INTERNAL MEDICINE

## 2019-07-11 PROCEDURE — 80048 BASIC METABOLIC PNL TOTAL CA: CPT

## 2019-07-11 PROCEDURE — 63600175 PHARM REV CODE 636 W HCPCS: Performed by: INTERNAL MEDICINE

## 2019-07-11 RX ORDER — DIPHENHYDRAMINE HCL 25 MG
25 CAPSULE ORAL EVERY 6 HOURS PRN
Status: DISCONTINUED | OUTPATIENT
Start: 2019-07-11 | End: 2019-07-17 | Stop reason: HOSPADM

## 2019-07-11 RX ORDER — MAGNESIUM SULFATE HEPTAHYDRATE 40 MG/ML
2 INJECTION, SOLUTION INTRAVENOUS ONCE
Status: COMPLETED | OUTPATIENT
Start: 2019-07-11 | End: 2019-07-11

## 2019-07-11 RX ORDER — DIPHENHYDRAMINE HCL 50 MG
50 CAPSULE ORAL ONCE
Status: DISCONTINUED | OUTPATIENT
Start: 2019-07-11 | End: 2019-07-11

## 2019-07-11 RX ADMIN — MAGNESIUM SULFATE IN WATER 2 G: 40 INJECTION, SOLUTION INTRAVENOUS at 09:07

## 2019-07-11 RX ADMIN — INSULIN ASPART 2 UNITS: 100 INJECTION, SOLUTION INTRAVENOUS; SUBCUTANEOUS at 10:07

## 2019-07-11 RX ADMIN — INSULIN ASPART 2 UNITS: 100 INJECTION, SOLUTION INTRAVENOUS; SUBCUTANEOUS at 06:07

## 2019-07-11 RX ADMIN — COLCHICINE 0.6 MG: 0.6 TABLET, FILM COATED ORAL at 08:07

## 2019-07-11 RX ADMIN — ATORVASTATIN CALCIUM 80 MG: 40 TABLET, FILM COATED ORAL at 08:07

## 2019-07-11 RX ADMIN — ALLOPURINOL 300 MG: 300 TABLET ORAL at 08:07

## 2019-07-11 RX ADMIN — PANTOPRAZOLE SODIUM 40 MG: 40 TABLET, DELAYED RELEASE ORAL at 08:07

## 2019-07-11 RX ADMIN — ASPIRIN 81 MG: 81 TABLET, COATED ORAL at 08:07

## 2019-07-11 RX ADMIN — AMIODARONE HYDROCHLORIDE 0.5 MG/MIN: 1.8 INJECTION, SOLUTION INTRAVENOUS at 11:07

## 2019-07-11 RX ADMIN — AMIODARONE HYDROCHLORIDE 0.5 MG/MIN: 1.8 INJECTION, SOLUTION INTRAVENOUS at 12:07

## 2019-07-11 RX ADMIN — INSULIN ASPART 2 UNITS: 100 INJECTION, SOLUTION INTRAVENOUS; SUBCUTANEOUS at 05:07

## 2019-07-11 NOTE — ASSESSMENT & PLAN NOTE
TVP placed emergently on admission, HR 20   AV silvia blockers on hold  Concerned about ischemic etiology as patient has frequent VT since admission   LVEF is down to 30%   LHC pending stabilization of renal function, NPO p MN for potential LHC   Will likely need ICD

## 2019-07-11 NOTE — PROGRESS NOTES
[facility]  Cardiology  Progress Note    Patient Name: Houston Jc  MRN: [id number]  Admission Date: 7/9/2019  Hospital Length of Stay: 2 days  Code Status: Full Code   Attending Physician: Milagros Gross MD   Primary Care Physician: Zak Hamilton MD  Expected Discharge Date:   Principal Problem:Complete heart block    Subjective:     Hospital Course:   07/09/2019 S/p R IJ Temporary pacemaker for CH, rate 80bpm. BB on hold.  07/10/2019 Patient with frequent runs of VT overnight. TNI pending. NPO for LHC today. Amio infusing. LVEF 30% with RV dysfunction and global hypokinesis.  LHC deferred to 07/11 2/2 patient was given a diet and consumed the entire meal.   07/11/2019 LHC cancelled for today, renal function worsening. Holding nephrotoxic agents and Lasix. Remains in CHB with runs of SVT (chronic RBB that resembles VT) and short runs of nonsustained VT. Amio continued. NPO p MN for potential LHC in AM.       Review of Systems   Constitution: Negative for diaphoresis and malaise/fatigue.   Cardiovascular: Negative for chest pain, irregular heartbeat, leg swelling, near-syncope, orthopnea, palpitations, paroxysmal nocturnal dyspnea and syncope.   Respiratory: Negative for cough, shortness of breath and wheezing.    Neurological: Negative for dizziness and light-headedness.     Objective:     Vital Signs (Most Recent):  Temp: 97.5 °F (36.4 °C) (07/11/19 0703)  Pulse: 79 (07/11/19 0730)  Resp: 18 (07/11/19 0730)  BP: (!) 112/58 (07/11/19 0730)  SpO2: 100 % (07/11/19 0730) Vital Signs (24h Range):  Temp:  [97.5 °F (36.4 °C)-98.1 °F (36.7 °C)] 97.5 °F (36.4 °C)  Pulse:  [79-80] 79  Resp:  [2-36] 18  SpO2:  [88 %-100 %] 100 %  BP: (104-178)/(55-94) 112/58     Weight: (!) 146 kg (321 lb 14 oz)  Body mass index is 42.47 kg/m².     SpO2: 100 %  O2 Device (Oxygen Therapy): nasal cannula      Intake/Output Summary (Last 24 hours) at 7/11/2019 0923  Last data filed at 7/11/2019 0505  Gross per 24 hour    Intake 772.21 ml   Output 1501 ml   Net -728.79 ml       Lines/Drains/Airways     Central Venous Catheter Line                 Introducer 07/09/19 1410 right internal jugular 1 day          Line                 Pacer Wires 07/09/19 1415 1 day          Peripheral Intravenous Line                 Peripheral IV - Single Lumen 07/09/19 20 G Right Antecubital 2 days         Peripheral IV - Single Lumen 07/10/19 1627 20 G Left Antecubital less than 1 day                Physical Exam   Constitutional: He is oriented to person, place, and time. He appears well-developed and well-nourished. No distress.   HENT:   Head: Atraumatic.   Neck: No JVD present.   Cardiovascular: Normal rate and regular rhythm. Frequent extrasystoles are present. Exam reveals no gallop and no friction rub.   No murmur heard.  Pulmonary/Chest: Effort normal and breath sounds normal. He has no rales.   Abdominal: Soft. Bowel sounds are normal.   Musculoskeletal: He exhibits no edema.   Neurological: He is alert and oriented to person, place, and time.   Skin: Skin is warm and dry.       Significant Labs:   BMP:   Recent Labs   Lab 07/10/19  0522 07/11/19  0501   * 246*    138   K 4.2 4.2    105   CO2 25 26   BUN 37* 34*   CREATININE 1.8* 1.9*   CALCIUM 8.9 8.7   MG 1.1* 1.7   , CMP   Recent Labs   Lab 07/10/19  0522 07/11/19  0501    138   K 4.2 4.2    105   CO2 25 26   * 246*   BUN 37* 34*   CREATININE 1.8* 1.9*   CALCIUM 8.9 8.7   PROT 5.8*  --    ALBUMIN 2.9*  --    BILITOT 0.9  --    ALKPHOS 179*  --    AST 23  --    ALT 52*  --    ANIONGAP 8 7*   ESTGFRAFRICA 43* 40*   EGFRNONAA 37* 35*   , CBC   Recent Labs   Lab 07/10/19  0522 07/11/19  0501   WBC 8.46 7.67   HGB 11.4* 11.9*   HCT 36.2* 37.4*   * 140*   , INR No results for input(s): INR, PROTIME in the last 48 hours., Lipid Panel No results for input(s): CHOL, HDL, LDLCALC, TRIG, CHOLHDL in the last 48 hours., Troponin   Recent Labs   Lab  07/10/19  0945   TROPONINI 0.393*    and All pertinent lab results from the last 24 hours have been reviewed.    Significant Imaging: Echocardiogram:   2D echo with color flow doppler:   Results for orders placed or performed during the hospital encounter of 09/18/15   2D echo with color flow doppler   Result Value Ref Range    QEF 50 55 - 65    Diastolic Dysfunction Yes (A)     Est. PA Systolic Pressure 8.57     Mitral Valve Mobility NORMAL     Tricuspid Valve Regurgitation TRIVIAL     Narrative    TEST DESCRIPTION   Technical Quality: This is a technically adequate study.     Aorta: The aortic root is normal in size, measuring 3.9 cm at sinotubular junction.     Left Atrium: The left atrial volume index is severely enlarged, measuring 49.98 cc/m2.     Left Ventricle: The left ventricle is normal in size, with an end-diastolic diameter of 6.2 cm, and an end-systolic diameter of 4.5 cm. LV wall thickness is normal, with the septum measuring 1.5 cm and the posterior wall measuring 1.7 cm across. Relative   wall thickness was increased at 0.55, and the LV mass index was increased at 229.0 g/m2 consistent with concentric left ventricular hypertrophy. Global left ventricular systolic function appears low normal to mildly depressed. Visually estimated   ejection fraction is 50-55%. The LV Doppler derived stroke volume equals 52.0 ccs.   The E/e'(lat) is 9.  This along with the following abnormalities (ERIC = 49.98 and LVMI = 229.00) suggests significant diastolic dysfunction.     Right Atrium: The right atrium is normal in size, measuring 5.3 cm in length in the apical view.     Right Ventricle: The right ventricle is normal in size measuring 3.0 cm at the base in the apical right ventricle-focused view. Global right ventricular systolic function appears normal. The estimated PA systolic pressure is 9 mmHg.     Aortic Valve:  The aortic valve is normal in structure with normal leaflet mobility. The aortic valve is  tri-leaflet in structure.     Mitral Valve:  The mitral valve is normal in structure with normal leaflet mobility.     Tricuspid Valve:  The tricuspid valve is normal in structure with normal leaflet mobility. There is trivial tricuspid regurgitation.     Pulmonary Valve:  The pulmonic valve is not well seen.     IVC: IVC is normal in size and collapses > 50% with a sniff, suggesting normal right atrial pressure of 3 mmHg.     Atrial Septum: The atrial septum is intact.     Intracavitary: There is no evidence of pericardial effusion, intracavity mass, thrombi, or vegetation.         CONCLUSIONS     1 - Low normal to mildly depressed left ventricular systolic function (EF 50-55%).     2 - Concentric hypertrophy.     3 - Severe left atrial enlargement.     4 - Left ventricular diastolic dysfunction.     5 - Normal right ventricular systolic function .     6 - The estimated PA systolic pressure is 9 mmHg.         This document has been electronically    SIGNED BY: Milagros Gross MD On: 09/18/2015 16:15    and Transthoracic echo (TTE) complete (Cupid Only):   Results for orders placed or performed during the hospital encounter of 07/09/19   Transthoracic echo (TTE) 2D with Color Flow   Result Value Ref Range    AV mean gradient 3 mmHg    Ao peak son 1.22 m/s    Ao VTI 24.42 cm    IVS 1.66 (A) 0.6 - 1.1 cm    LA size 4.67 cm    Left Atrium Major Axis 6.46 cm    Left Atrium Minor Axis 6.90 cm    LVIDD 5.49 3.5 - 6.0 cm    LVIDS 4.20 (A) 2.1 - 4.0 cm    LVOT diameter 2.24 cm    LVOT peak VTI 15.12 cm    PW 1.40 (A) 0.6 - 1.1 cm    MV Peak A Son 0.37 m/s    E wave decelartion time 114.81 msec    MV Peak E Son 0.83 m/s    PV Peak D Son 0.36 m/s    PV Peak S Son 0.45 m/s    RA Major Axis 5.87 cm    RVDD 3.61 cm    LA WIDTH 4.23 cm    Ao root annulus 3.69 cm    PV PEAK VELOCITY 1.07 cm/s    LV Diastolic Volume 147.08 mL    LV Systolic Volume 78.58 mL    LVOT peak son 0.87 m/s    FS 23 %    LA volume 112.04 cm3    LV mass 382.95  g    Left Ventricle Relative Wall Thickness 0.51 cm    AV valve area 2.44 cm2    AV Velocity Ratio 0.71     AV index (prosthetic) 0.62     E/A ratio 2.24     Pulm vein S/D ratio 1.25     LVOT area 3.9 cm2    LVOT stroke volume 59.55 cm3    AV peak gradient 6 mmHg    BSA 2.75 m2    LV Systolic Volume Index 29.7 mL/m2    LV Diastolic Volume Index 55.63 mL/m2    LA Volume Index 42.4 mL/m2    LV Mass Index 145 g/m2    Right Atrial Pressure (from IVC) 3 mmHg    Narrative    · Moderately decreased left ventricular systolic function. The estimated   ejection fraction is 30%  · Grade III (severe) left ventricular diastolic dysfunction consistent   with restrictive physiology.  · Moderate left atrial enlargement.  · Low normal right ventricular systolic function.  · Normal central venous pressure (3 mm Hg).        Assessment and Plan:     Brief HPI: Patient seen this morning on rounds without CV complaint. Updated on POC and understanding was verbalized.     * Complete heart block  TVP placed emergently on admission, HR 20   AV silvia blockers on hold  Concerned about ischemic etiology as patient has frequent VT since admission   LVEF is down to 30%   LHC pending stabilization of renal function, NPO p MN for potential LHC   Will likely need ICD     Acute systolic heart failure  · Moderately decreased left ventricular systolic function. The estimated ejection fraction is 30%  · Grade III (severe) left ventricular diastolic dysfunction consistent with restrictive physiology.  · Moderate left atrial enlargement.  · Low normal right ventricular systolic function.  · Normal central venous pressure (3 mm Hg).    Newly depressed LVEF, volume overloaded on admission, diuresed and now euvolemic    BB on hold 2/2 CHB- washout complete 07/12/2019  ACEI and Lasix on hold 2/2 CLEMENCIA      CLEMENCIA (acute kidney injury)  Baseline Cr 1.4-1.5   CLEMENCIA likely 2/2 hypoperfusion with severe bradycardia (20 bpm) CHB  Present Cr 1.9  Holding ACEI, Lasix and  Aldactone    VT (ventricular tachycardia)  Concerning for ischemic etiology, frequent runs up to 30 beats, asymptomatic   BB on hold given CHB  Amiodarone Load and infusion initiated   [facility] on hold 07/11 given worsening renal function      Hypertension  Holding home BB, ACEI, Aldactone given renal function'  Hydralazine PRN SBP >160  BP 100s-120s        VTE Risk Mitigation (From admission, onward)    None          Bala Carvajal, BEBE  Cardiology  [facility]

## 2019-07-11 NOTE — PROGRESS NOTES
[facility]  Cardiology  Progress Note    Patient Name: Houston Jc  MRN: [id number]  Admission Date: 7/9/2019  Hospital Length of Stay: 2 days  Code Status: Full Code   Attending Physician: Milagros Gross MD   Primary Care Physician: Zak Hamilton MD  Expected Discharge Date:   Principal Problem:Complete heart block    Subjective:     Hospital Course:   07/09/2019 S/p R IJ Temporary pacemaker for CH, rate 80bpm. BB on hold.  07/10/2019 Patient with frequent runs of VT overnight. TNI pending. NPO for C today. Amio infusing. LVEF 30% with RV dysfunction and global hypokinesis.  LHC deferred to 07/11 2/2 patient was given a diet and consumed the entire meal.         No new subjective & objective note has been filed under this hospital service since the last note was generated.    Assessment and Plan:     Brief HPI: NPO for [facility] today     * Complete heart block  TVP placed emergently on admission, HR 20   AV silvia blockers on hold  Concerned about ischemic etiology as patient has frequent VT since admission   LVEF is down to 30%   LHC pending   Will likely need ICD     CLEMENCIA (acute kidney injury)  Baseline Cr 1.4-1.5   CLEMENCIA likely 2/2 hypoperfusion   Present Cr 1.9  Holding ACEI, Lasix and Aldactone    VT (ventricular tachycardia)  Concerning for ischemic etiology, frequent runs up to 30 beats, asymptomatic   BB on hold given CHB  Amiodarone Load and infusion initiated   LH today     Hypertension  Holding home BB, ACEI, Aldactone given renal function'  Hydralazine PRN SBP >160  BP 100s-120s        VTE Risk Mitigation (From admission, onward)    None          Bala Carvajal NP  Cardiology  [facility]

## 2019-07-11 NOTE — ASSESSMENT & PLAN NOTE
Concerning for ischemic etiology, frequent runs up to 30 beats, asymptomatic   BB on hold given CHB  Amiodarone Load and infusion initiated   LHC on hold 07/11 given worsening renal function

## 2019-07-11 NOTE — PLAN OF CARE
Problem: Fall Injury Risk  Goal: Absence of Fall and Fall-Related Injury  Outcome: Ongoing (interventions implemented as appropriate)  Intervention: Promote Injury-Free Environment     07/10/19 2305 07/11/19 0105   Optimize Balance and Safe Activity   Safety Promotion/Fall Prevention  --  assistive device/personal item within reach;bed alarm set;Fall Risk reviewed with patient/family;Fall Risk signage in place;commode/urinal/bedpan at bedside;lighting adjusted;medications reviewed;muscle strengthening facilitated;nonskid shoes/socks when out of bed;instructed to call staff for mobility;side rails raised x 3   Optimize [place] and Functional Mobility   Environmental Safety Modification assistive device/personal items within reach;clutter free environment maintained;lighting adjusted;room organization consistent  --          Problem: Diabetes Comorbidity  Goal: Blood Glucose Level Within Desired Range  Outcome: Ongoing (interventions implemented as appropriate)  Intervention: Maintain Glycemic Control     07/10/19 0730   Monitor and Manage Ketoacidosis   Glycemic Management blood glucose monitoring         Problem: Fluid Volume Excess  Goal: Fluid Balance  Outcome: Ongoing (interventions implemented as appropriate)  Intervention: Monitor and Manage Hypervolemia     07/10/19 2305   Monitor and Manage Hypervolemia   Skin Protection adhesive use limited;tubing/devices free from skin contact;transparent dressing maintained;incontinence pads utilized

## 2019-07-11 NOTE — ASSESSMENT & PLAN NOTE
TVP placed emergently on admission, HR 20   AV silvia blockers on hold  Concerned about ischemic etiology as patient has frequent VT since admission   LVEF is down to 30%   C pending   Will likely need ICD

## 2019-07-11 NOTE — ASSESSMENT & PLAN NOTE
· Moderately decreased left ventricular systolic function. The estimated ejection fraction is 30%  · Grade III (severe) left ventricular diastolic dysfunction consistent with restrictive physiology.  · Moderate left atrial enlargement.  · Low normal right ventricular systolic function.  · Normal central venous pressure (3 mm Hg).    Newly depressed LVEF, volume overloaded on admission, diuresed and now euvolemic    BB on hold 2/2 CHB- washout complete 07/12/2019  ACEI and Lasix on hold 2/2 CLEMENCIA

## 2019-07-11 NOTE — SUBJECTIVE & OBJECTIVE
Review of Systems   Constitution: Negative for diaphoresis and malaise/fatigue.   Cardiovascular: Negative for chest pain, irregular heartbeat, leg swelling, near-syncope, orthopnea, palpitations, paroxysmal nocturnal dyspnea and syncope.   Respiratory: Negative for cough, shortness of breath and wheezing.    Neurological: Negative for dizziness and light-headedness.     Objective:     Vital Signs (Most Recent):  Temp: 97.5 °F (36.4 °C) (07/11/19 0703)  Pulse: 79 (07/11/19 0730)  Resp: 18 (07/11/19 0730)  BP: (!) 112/58 (07/11/19 0730)  SpO2: 100 % (07/11/19 0730) Vital Signs (24h Range):  Temp:  [97.5 °F (36.4 °C)-98.1 °F (36.7 °C)] 97.5 °F (36.4 °C)  Pulse:  [79-80] 79  Resp:  [2-36] 18  SpO2:  [88 %-100 %] 100 %  BP: (104-178)/(55-94) 112/58     Weight: (!) 146 kg (321 lb 14 oz)  Body mass index is 42.47 kg/m².     SpO2: 100 %  O2 Device (Oxygen Therapy): nasal cannula      Intake/Output Summary (Last 24 hours) at 7/11/2019 0958  Last data filed at 7/11/2019 0505  Gross per 24 hour   Intake 772.21 ml   Output 1501 ml   Net -728.79 ml       Lines/Drains/Airways     Central Venous Catheter Line                 Introducer 07/09/19 1410 right internal jugular 1 day          Line                 Pacer Wires 07/09/19 1415 1 day          Peripheral Intravenous Line                 Peripheral IV - Single Lumen 07/09/19 20 G Right Antecubital 2 days         Peripheral IV - Single Lumen 07/10/19 1627 20 G Left Antecubital less than 1 day                Physical Exam   Constitutional: He is oriented to person, place, and time. He appears well-developed and well-nourished. No distress.   HENT:   Head: Atraumatic.   Neck: No JVD present.   Cardiovascular: Normal rate and regular rhythm. Frequent extrasystoles are present. Exam reveals no gallop and no friction rub.   No murmur heard.  Pulmonary/Chest: Effort normal and breath sounds normal. He has no rales.   Abdominal: Soft. Bowel sounds are normal.   Musculoskeletal: He  exhibits no edema.   Neurological: He is alert and oriented to person, place, and time.   Skin: Skin is warm and dry.       Significant Labs:   BMP:   Recent Labs   Lab 07/10/19  0522 07/11/19  0501   * 246*    138   K 4.2 4.2    105   CO2 25 26   BUN 37* 34*   CREATININE 1.8* 1.9*   CALCIUM 8.9 8.7   MG 1.1* 1.7   , CMP   Recent Labs   Lab 07/10/19  0522 07/11/19  0501    138   K 4.2 4.2    105   CO2 25 26   * 246*   BUN 37* 34*   CREATININE 1.8* 1.9*   CALCIUM 8.9 8.7   PROT 5.8*  --    ALBUMIN 2.9*  --    BILITOT 0.9  --    ALKPHOS 179*  --    AST 23  --    ALT 52*  --    ANIONGAP 8 7*   ESTGFRAFRICA 43* 40*   EGFRNONAA 37* 35*   , CBC   Recent Labs   Lab 07/10/19  0522 07/11/19  0501   WBC 8.46 7.67   HGB 11.4* 11.9*   HCT 36.2* 37.4*   * 140*   , INR No results for input(s): INR, PROTIME in the last 48 hours., Lipid Panel No results for input(s): CHOL, HDL, LDLCALC, TRIG, CHOLHDL in the last 48 hours., Troponin   Recent Labs   Lab 07/10/19  0945   TROPONINI 0.393*    and All pertinent lab results from the last 24 hours have been reviewed.    Significant Imaging: Echocardiogram:   2D echo with color flow doppler:   Results for orders placed or performed during the hospital encounter of 09/18/15   2D echo with color flow doppler   Result Value Ref Range    QEF 50 55 - 65    Diastolic Dysfunction Yes (A)     Est. PA Systolic Pressure 8.57     Mitral Valve Mobility NORMAL     Tricuspid Valve Regurgitation TRIVIAL     Narrative    TEST DESCRIPTION   Technical Quality: This is a technically adequate study.     Aorta: The aortic root is normal in size, measuring 3.9 cm at sinotubular junction.     Left Atrium: The left atrial volume index is severely enlarged, measuring 49.98 cc/m2.     Left Ventricle: The left ventricle is normal in size, with an end-diastolic diameter of 6.2 cm, and an end-systolic diameter of 4.5 cm. LV wall thickness is normal, with the septum measuring  1.5 cm and the posterior wall measuring 1.7 cm across. Relative   wall thickness was increased at 0.55, and the LV mass index was increased at 229.0 g/m2 consistent with concentric left ventricular hypertrophy. Global left ventricular systolic function appears low normal to mildly depressed. Visually estimated   ejection fraction is 50-55%. The LV Doppler derived stroke volume equals 52.0 ccs.   The E/e'(lat) is 9.  This along with the following abnormalities (ERIC = 49.98 and LVMI = 229.00) suggests significant diastolic dysfunction.     Right Atrium: The right atrium is normal in size, measuring 5.3 cm in length in the apical view.     Right Ventricle: The right ventricle is normal in size measuring 3.0 cm at the base in the apical right ventricle-focused view. Global right ventricular systolic function appears normal. The estimated PA systolic pressure is 9 mmHg.     Aortic Valve:  The aortic valve is normal in structure with normal leaflet mobility. The aortic valve is tri-leaflet in structure.     Mitral Valve:  The mitral valve is normal in structure with normal leaflet mobility.     Tricuspid Valve:  The tricuspid valve is normal in structure with normal leaflet mobility. There is trivial tricuspid regurgitation.     Pulmonary Valve:  The pulmonic valve is not well seen.     IVC: IVC is normal in size and collapses > 50% with a sniff, suggesting normal right atrial pressure of 3 mmHg.     Atrial Septum: The atrial septum is intact.     Intracavitary: There is no evidence of pericardial effusion, intracavity mass, thrombi, or vegetation.         CONCLUSIONS     1 - Low normal to mildly depressed left ventricular systolic function (EF 50-55%).     2 - Concentric hypertrophy.     3 - Severe left atrial enlargement.     4 - Left ventricular diastolic dysfunction.     5 - Normal right ventricular systolic function .     6 - The estimated PA systolic pressure is 9 mmHg.         This document has been electronically     SIGNED BY: Milagros Gross MD On: 09/18/2015 16:15    and Transthoracic echo (TTE) complete (Cupid Only):   Results for orders placed or performed during the hospital encounter of 07/09/19   Transthoracic echo (TTE) 2D with Color Flow   Result Value Ref Range    AV mean gradient 3 mmHg    Ao peak son 1.22 m/s    Ao VTI 24.42 cm    IVS 1.66 (A) 0.6 - 1.1 cm    LA size 4.67 cm    Left Atrium Major Axis 6.46 cm    Left Atrium Minor Axis 6.90 cm    LVIDD 5.49 3.5 - 6.0 cm    LVIDS 4.20 (A) 2.1 - 4.0 cm    LVOT diameter 2.24 cm    LVOT peak VTI 15.12 cm    PW 1.40 (A) 0.6 - 1.1 cm    MV Peak A Son 0.37 m/s    E wave decelartion time 114.81 msec    MV Peak E Son 0.83 m/s    PV Peak D Son 0.36 m/s    PV Peak S Son 0.45 m/s    RA Major Axis 5.87 cm    RVDD 3.61 cm    LA WIDTH 4.23 cm    Ao root annulus 3.69 cm    PV PEAK VELOCITY 1.07 cm/s    LV Diastolic Volume 147.08 mL    LV Systolic Volume 78.58 mL    LVOT peak son 0.87 m/s    FS 23 %    LA volume 112.04 cm3    LV mass 382.95 g    Left Ventricle Relative Wall Thickness 0.51 cm    AV valve area 2.44 cm2    AV Velocity Ratio 0.71     AV index (prosthetic) 0.62     E/A ratio 2.24     Pulm vein S/D ratio 1.25     LVOT area 3.9 cm2    LVOT stroke volume 59.55 cm3    AV peak gradient 6 mmHg    BSA 2.75 m2    LV Systolic Volume Index 29.7 mL/m2    LV Diastolic Volume Index 55.63 mL/m2    LA Volume Index 42.4 mL/m2    LV Mass Index 145 g/m2    Right Atrial Pressure (from IVC) 3 mmHg    Narrative    · Moderately decreased left ventricular systolic function. The estimated   ejection fraction is 30%  · Grade III (severe) left ventricular diastolic dysfunction consistent   with restrictive physiology.  · Moderate left atrial enlargement.  · Low normal right ventricular systolic function.  · Normal central venous pressure (3 mm Hg).

## 2019-07-11 NOTE — NURSING
0306- 15 beat run of VT noted on monitor; Pt asymptomatic  0312- Dr. Gross notified of event; suggested orders for labs, including magnesium and phosphorous  0612- Dr. Gross notified of Magnesium 1.7

## 2019-07-11 NOTE — ASSESSMENT & PLAN NOTE
Concerning for ischemic etiology, frequent runs up to 30 beats, asymptomatic   BB on hold given CHB  Amiodarone Load and infusion initiated   LHC today

## 2019-07-11 NOTE — ASSESSMENT & PLAN NOTE
Baseline Cr 1.4-1.5   CLEMENCIA likely 2/2 hypoperfusion with severe bradycardia (20 bpm) CHB  Present Cr 1.9  Holding ACEI, Lasix and Aldactone

## 2019-07-11 NOTE — ASSESSMENT & PLAN NOTE
Baseline Cr 1.4-1.5   CLEMENCIA likely 2/2 hypoperfusion   Present Cr 1.9  Holding ACEI, Lasix and Aldactone

## 2019-07-12 DIAGNOSIS — R07.9 CHEST PAIN, UNSPECIFIED TYPE: Primary | ICD-10-CM

## 2019-07-12 LAB
ALBUMIN SERPL BCP-MCNC: 2.8 G/DL (ref 3.5–5.2)
ALP SERPL-CCNC: 183 U/L (ref 55–135)
ALT SERPL W/O P-5'-P-CCNC: 31 U/L (ref 10–44)
ANION GAP SERPL CALC-SCNC: 7 MMOL/L (ref 8–16)
AST SERPL-CCNC: 15 U/L (ref 10–40)
BASOPHILS # BLD AUTO: 0.01 K/UL (ref 0–0.2)
BASOPHILS NFR BLD: 0.1 % (ref 0–1.9)
BILIRUB SERPL-MCNC: 0.5 MG/DL (ref 0.1–1)
BUN SERPL-MCNC: 35 MG/DL (ref 8–23)
C DIFF GDH STL QL: NEGATIVE
C DIFF TOX A+B STL QL IA: NEGATIVE
CALCIUM SERPL-MCNC: 8.9 MG/DL (ref 8.7–10.5)
CHLORIDE SERPL-SCNC: 104 MMOL/L (ref 95–110)
CO2 SERPL-SCNC: 27 MMOL/L (ref 23–29)
CREAT SERPL-MCNC: 1.9 MG/DL (ref 0.5–1.4)
DIFFERENTIAL METHOD: ABNORMAL
EOSINOPHIL # BLD AUTO: 0.1 K/UL (ref 0–0.5)
EOSINOPHIL NFR BLD: 1.3 % (ref 0–8)
ERYTHROCYTE [DISTWIDTH] IN BLOOD BY AUTOMATED COUNT: 15.5 % (ref 11.5–14.5)
EST. GFR  (AFRICAN AMERICAN): 40 ML/MIN/1.73 M^2
EST. GFR  (NON AFRICAN AMERICAN): 35 ML/MIN/1.73 M^2
GLUCOSE SERPL-MCNC: 216 MG/DL (ref 70–110)
HCT VFR BLD AUTO: 38.7 % (ref 40–54)
HGB BLD-MCNC: 12.4 G/DL (ref 14–18)
LYMPHOCYTES # BLD AUTO: 1.9 K/UL (ref 1–4.8)
LYMPHOCYTES NFR BLD: 23.3 % (ref 18–48)
MAGNESIUM SERPL-MCNC: 1.6 MG/DL (ref 1.6–2.6)
MCH RBC QN AUTO: 30.1 PG (ref 27–31)
MCHC RBC AUTO-ENTMCNC: 32 G/DL (ref 32–36)
MCV RBC AUTO: 94 FL (ref 82–98)
MONOCYTES # BLD AUTO: 0.5 K/UL (ref 0.3–1)
MONOCYTES NFR BLD: 6.8 % (ref 4–15)
NEUTROPHILS # BLD AUTO: 5.4 K/UL (ref 1.8–7.7)
NEUTROPHILS NFR BLD: 68.5 % (ref 38–73)
PLATELET # BLD AUTO: 136 K/UL (ref 150–350)
PMV BLD AUTO: 11.1 FL (ref 9.2–12.9)
POCT GLUCOSE: 189 MG/DL (ref 70–110)
POCT GLUCOSE: 211 MG/DL (ref 70–110)
POCT GLUCOSE: 224 MG/DL (ref 70–110)
POCT GLUCOSE: 235 MG/DL (ref 70–110)
POTASSIUM SERPL-SCNC: 4.1 MMOL/L (ref 3.5–5.1)
PROT SERPL-MCNC: 6 G/DL (ref 6–8.4)
RBC # BLD AUTO: 4.12 M/UL (ref 4.6–6.2)
SODIUM SERPL-SCNC: 138 MMOL/L (ref 136–145)
WBC # BLD AUTO: 7.94 K/UL (ref 3.9–12.7)

## 2019-07-12 PROCEDURE — 36415 COLL VENOUS BLD VENIPUNCTURE: CPT

## 2019-07-12 PROCEDURE — 85025 COMPLETE CBC W/AUTO DIFF WBC: CPT

## 2019-07-12 PROCEDURE — 94761 N-INVAS EAR/PLS OXIMETRY MLT: CPT

## 2019-07-12 PROCEDURE — 25000003 PHARM REV CODE 250: Performed by: NURSE PRACTITIONER

## 2019-07-12 PROCEDURE — 87449 NOS EACH ORGANISM AG IA: CPT

## 2019-07-12 PROCEDURE — 99291 PR CRITICAL CARE, E/M 30-74 MINUTES: ICD-10-PCS | Mod: ,,, | Performed by: INTERNAL MEDICINE

## 2019-07-12 PROCEDURE — 20000000 HC ICU ROOM

## 2019-07-12 PROCEDURE — 63600175 PHARM REV CODE 636 W HCPCS: Performed by: NURSE PRACTITIONER

## 2019-07-12 PROCEDURE — 83735 ASSAY OF MAGNESIUM: CPT

## 2019-07-12 PROCEDURE — 99291 CRITICAL CARE FIRST HOUR: CPT | Mod: ,,, | Performed by: INTERNAL MEDICINE

## 2019-07-12 PROCEDURE — 27000221 HC OXYGEN, UP TO 24 HOURS

## 2019-07-12 PROCEDURE — 80053 COMPREHEN METABOLIC PANEL: CPT

## 2019-07-12 RX ORDER — ENOXAPARIN SODIUM 100 MG/ML
40 INJECTION SUBCUTANEOUS EVERY 24 HOURS
Status: DISCONTINUED | OUTPATIENT
Start: 2019-07-12 | End: 2019-07-17 | Stop reason: HOSPADM

## 2019-07-12 RX ORDER — AMIODARONE HYDROCHLORIDE 200 MG/1
400 TABLET ORAL 2 TIMES DAILY
Status: DISCONTINUED | OUTPATIENT
Start: 2019-07-12 | End: 2019-07-17 | Stop reason: HOSPADM

## 2019-07-12 RX ADMIN — ALLOPURINOL 300 MG: 300 TABLET ORAL at 08:07

## 2019-07-12 RX ADMIN — AMIODARONE HYDROCHLORIDE 400 MG: 200 TABLET ORAL at 09:07

## 2019-07-12 RX ADMIN — ASPIRIN 81 MG: 81 TABLET, COATED ORAL at 08:07

## 2019-07-12 RX ADMIN — ENOXAPARIN SODIUM 40 MG: 100 INJECTION SUBCUTANEOUS at 09:07

## 2019-07-12 RX ADMIN — INSULIN ASPART 2 UNITS: 100 INJECTION, SOLUTION INTRAVENOUS; SUBCUTANEOUS at 08:07

## 2019-07-12 RX ADMIN — PANTOPRAZOLE SODIUM 40 MG: 40 TABLET, DELAYED RELEASE ORAL at 08:07

## 2019-07-12 RX ADMIN — INSULIN ASPART 2 UNITS: 100 INJECTION, SOLUTION INTRAVENOUS; SUBCUTANEOUS at 11:07

## 2019-07-12 RX ADMIN — ATORVASTATIN CALCIUM 80 MG: 40 TABLET, FILM COATED ORAL at 08:07

## 2019-07-12 RX ADMIN — INSULIN ASPART 1 UNITS: 100 INJECTION, SOLUTION INTRAVENOUS; SUBCUTANEOUS at 09:07

## 2019-07-12 RX ADMIN — COLCHICINE 0.6 MG: 0.6 TABLET, FILM COATED ORAL at 08:07

## 2019-07-12 NOTE — SUBJECTIVE & OBJECTIVE
Review of Systems   Constitution: Negative for diaphoresis and malaise/fatigue.   Cardiovascular: Negative for chest pain, irregular heartbeat, leg swelling, near-syncope, orthopnea, palpitations, paroxysmal nocturnal dyspnea and syncope.   Respiratory: Negative for cough, shortness of breath and wheezing.    Neurological: Negative for dizziness and light-headedness.     Objective:     Vital Signs (Most Recent):  Temp: 98.1 °F (36.7 °C) (07/12/19 0703)  Pulse: 80 (07/12/19 0830)  Resp: (!) 23 (07/12/19 0830)  BP: 136/68 (07/12/19 0830)  SpO2: 96 % (07/12/19 0830) Vital Signs (24h Range):  Temp:  [98.1 °F (36.7 °C)-98.3 °F (36.8 °C)] 98.1 °F (36.7 °C)  Pulse:  [79-80] 80  Resp:  [13-37] 23  SpO2:  [90 %-100 %] 96 %  BP: (102-172)/(56-85) 136/68     Weight: (!) 146 kg (321 lb 14 oz)  Body mass index is 42.47 kg/m².     SpO2: 96 %  O2 Device (Oxygen Therapy): nasal cannula      Intake/Output Summary (Last 24 hours) at 7/12/2019 0854  Last data filed at 7/12/2019 0840  Gross per 24 hour   Intake 1226.2 ml   Output 825 ml   Net 401.2 ml       Lines/Drains/Airways     Central Venous Catheter Line                 Introducer 07/09/19 1410 right internal jugular 2 days          Line                 Pacer Wires 07/09/19 1415 2 days          Peripheral Intravenous Line                 Peripheral IV - Single Lumen 07/09/19 20 G Right Antecubital 3 days         Peripheral IV - Single Lumen 07/10/19 1627 20 G Left Antecubital 1 day                Physical Exam   Constitutional: He is oriented to person, place, and time. He appears well-developed and well-nourished. No distress.   HENT:   Head: Atraumatic.   Neck: No JVD present.   Cardiovascular: Normal rate and regular rhythm. Frequent extrasystoles are present. Exam reveals no gallop and no friction rub.   No murmur heard.  Pulmonary/Chest: Effort normal and breath sounds normal. He has no rales.   Abdominal: Soft. Bowel sounds are normal.   Musculoskeletal: He exhibits no  edema.   Neurological: He is alert and oriented to person, place, and time.   Skin: Skin is warm and dry.       Significant Labs:   BMP:   Recent Labs   Lab 07/11/19  0501 07/12/19  0319   * 216*    138   K 4.2 4.1    104   CO2 26 27   BUN 34* 35*   CREATININE 1.9* 1.9*   CALCIUM 8.7 8.9   MG 1.7 1.6   , CMP   Recent Labs   Lab 07/11/19  0501 07/12/19  0319    138   K 4.2 4.1    104   CO2 26 27   * 216*   BUN 34* 35*   CREATININE 1.9* 1.9*   CALCIUM 8.7 8.9   PROT  --  6.0   ALBUMIN  --  2.8*   BILITOT  --  0.5   ALKPHOS  --  183*   AST  --  15   ALT  --  31   ANIONGAP 7* 7*   ESTGFRAFRICA 40* 40*   EGFRNONAA 35* 35*   , CBC   Recent Labs   Lab 07/11/19  0501 07/12/19  0319   WBC 7.67 7.94   HGB 11.9* 12.4*   HCT 37.4* 38.7*   * 136*   , INR No results for input(s): INR, PROTIME in the last 48 hours., Lipid Panel No results for input(s): CHOL, HDL, LDLCALC, TRIG, CHOLHDL in the last 48 hours., Troponin   Recent Labs   Lab 07/10/19  0945   TROPONINI 0.393*    and All pertinent lab results from the last 24 hours have been reviewed.    Significant Imaging: Echocardiogram:   2D echo with color flow doppler:   Results for orders placed or performed during the hospital encounter of 09/18/15   2D echo with color flow doppler   Result Value Ref Range    QEF 50 55 - 65    Diastolic Dysfunction Yes (A)     Est. PA Systolic Pressure 8.57     Mitral Valve Mobility NORMAL     Tricuspid Valve Regurgitation TRIVIAL     Narrative    TEST DESCRIPTION   Technical Quality: This is a technically adequate study.     Aorta: The aortic root is normal in size, measuring 3.9 cm at sinotubular junction.     Left Atrium: The left atrial volume index is severely enlarged, measuring 49.98 cc/m2.     Left Ventricle: The left ventricle is normal in size, with an end-diastolic diameter of 6.2 cm, and an end-systolic diameter of 4.5 cm. LV wall thickness is normal, with the septum measuring 1.5 cm and  the posterior wall measuring 1.7 cm across. Relative   wall thickness was increased at 0.55, and the LV mass index was increased at 229.0 g/m2 consistent with concentric left ventricular hypertrophy. Global left ventricular systolic function appears low normal to mildly depressed. Visually estimated   ejection fraction is 50-55%. The LV Doppler derived stroke volume equals 52.0 ccs.   The E/e'(lat) is 9.  This along with the following abnormalities (ERIC = 49.98 and LVMI = 229.00) suggests significant diastolic dysfunction.     Right Atrium: The right atrium is normal in size, measuring 5.3 cm in length in the apical view.     Right Ventricle: The right ventricle is normal in size measuring 3.0 cm at the base in the apical right ventricle-focused view. Global right ventricular systolic function appears normal. The estimated PA systolic pressure is 9 mmHg.     Aortic Valve:  The aortic valve is normal in structure with normal leaflet mobility. The aortic valve is tri-leaflet in structure.     Mitral Valve:  The mitral valve is normal in structure with normal leaflet mobility.     Tricuspid Valve:  The tricuspid valve is normal in structure with normal leaflet mobility. There is trivial tricuspid regurgitation.     Pulmonary Valve:  The pulmonic valve is not well seen.     IVC: IVC is normal in size and collapses > 50% with a sniff, suggesting normal right atrial pressure of 3 mmHg.     Atrial Septum: The atrial septum is intact.     Intracavitary: There is no evidence of pericardial effusion, intracavity mass, thrombi, or vegetation.         CONCLUSIONS     1 - Low normal to mildly depressed left ventricular systolic function (EF 50-55%).     2 - Concentric hypertrophy.     3 - Severe left atrial enlargement.     4 - Left ventricular diastolic dysfunction.     5 - Normal right ventricular systolic function .     6 - The estimated PA systolic pressure is 9 mmHg.         This document has been electronically    SIGNED  BY: Milagros Gross MD On: 09/18/2015 16:15    and Transthoracic echo (TTE) complete (Cupid Only):   Results for orders placed or performed during the hospital encounter of 07/09/19   Transthoracic echo (TTE) 2D with Color Flow   Result Value Ref Range    AV mean gradient 3 mmHg    Ao peak son 1.22 m/s    Ao VTI 24.42 cm    IVS 1.66 (A) 0.6 - 1.1 cm    LA size 4.67 cm    Left Atrium Major Axis 6.46 cm    Left Atrium Minor Axis 6.90 cm    LVIDD 5.49 3.5 - 6.0 cm    LVIDS 4.20 (A) 2.1 - 4.0 cm    LVOT diameter 2.24 cm    LVOT peak VTI 15.12 cm    PW 1.40 (A) 0.6 - 1.1 cm    MV Peak A Son 0.37 m/s    E wave decelartion time 114.81 msec    MV Peak E Son 0.83 m/s    PV Peak D Son 0.36 m/s    PV Peak S Son 0.45 m/s    RA Major Axis 5.87 cm    RVDD 3.61 cm    LA WIDTH 4.23 cm    Ao root annulus 3.69 cm    PV PEAK VELOCITY 1.07 cm/s    LV Diastolic Volume 147.08 mL    LV Systolic Volume 78.58 mL    LVOT peak son 0.87 m/s    FS 23 %    LA volume 112.04 cm3    LV mass 382.95 g    Left Ventricle Relative Wall Thickness 0.51 cm    AV valve area 2.44 cm2    AV Velocity Ratio 0.71     AV index (prosthetic) 0.62     E/A ratio 2.24     Pulm vein S/D ratio 1.25     LVOT area 3.9 cm2    LVOT stroke volume 59.55 cm3    AV peak gradient 6 mmHg    BSA 2.75 m2    LV Systolic Volume Index 29.7 mL/m2    LV Diastolic Volume Index 55.63 mL/m2    LA Volume Index 42.4 mL/m2    LV Mass Index 145 g/m2    Right Atrial Pressure (from IVC) 3 mmHg    Narrative    · Moderately decreased left ventricular systolic function. The estimated   ejection fraction is 30%  · Grade III (severe) left ventricular diastolic dysfunction consistent   with restrictive physiology.  · Moderate left atrial enlargement.  · Low normal right ventricular systolic function.  · Normal central venous pressure (3 mm Hg).

## 2019-07-12 NOTE — PLAN OF CARE
Problem: Adult Inpatient Plan of Care  Goal: Plan of Care Review  Pt remains aaox and NAD noted, denies sob/pain/dizziness and n/v. Wife remains at bedside. Pt remains being V-paced; lupe MATHEWL. VSS see flow sheet, No requests at time, safety maintained, will continue to monitor.

## 2019-07-12 NOTE — NURSING
CASE MANAGEMENT NOTE  Patient remains in the Hospital, LOS 3 days in ICU for critical care. Discharge Planning Pending at this time.

## 2019-07-12 NOTE — ASSESSMENT & PLAN NOTE
Concerning for ischemic etiology, frequent runs up to 30 beats, asymptomatic   BB on hold given CHB  Amiodarone converted to oral   LHC planned for 07/15

## 2019-07-12 NOTE — PLAN OF CARE
Problem: Adult Inpatient Plan of Care  Goal: Plan of Care Review  Outcome: Ongoing (interventions implemented as appropriate)  No acute events overnight. VSS. Tele V-paced at 80 with 10mA. No ectopy overnight. Denies SOB and CP. SATS % on 2L per NC. Voiding per urinal. Clear watery stools x2.

## 2019-07-12 NOTE — PROGRESS NOTES
[facility]  Cardiology  Progress Note    Patient Name: Houston Jc  MRN: [id number]  Admission Date: 7/9/2019  Hospital Length of Stay: 3 days  Code Status: Full Code   Attending Physician: Milagros Gross MD   Primary Care Physician: Zak Hamilton MD  Expected Discharge Date:   Principal Problem:Complete heart block    Subjective:     Hospital Course:   07/09/2019 S/p R IJ Temporary pacemaker for CH, rate 80bpm. BB on hold.  07/10/2019 Patient with frequent runs of VT overnight. TNI pending. NPO for LHC today. Amio infusing. LVEF 30% with RV dysfunction and global hypokinesis.  LHC deferred to 07/11 2/2 patient was given a diet and consumed the entire meal.   07/11/2019 LHC cancelled for today, renal function worsening. Holding nephrotoxic agents and Lasix. Remains in CHB with runs of SVT (chronic RBB that resembles VT) and short runs of nonsustained VT. Amio continued. NPO p MN for potential LHC in AM.   07/12/2019 Patient remains in CHB, Hemodynamically stable with TVP. No CV complaints. Amiodarone converted to oral. No further VT. Renal function stable. Defer LHC to Monday to give kidneys time to recover.       Review of Systems   Constitution: Negative for diaphoresis and malaise/fatigue.   Cardiovascular: Negative for chest pain, irregular heartbeat, leg swelling, near-syncope, orthopnea, palpitations, paroxysmal nocturnal dyspnea and syncope.   Respiratory: Negative for cough, shortness of breath and wheezing.    Neurological: Negative for dizziness and light-headedness.     Objective:     Vital Signs (Most Recent):  Temp: 98.1 °F (36.7 °C) (07/12/19 0703)  Pulse: 80 (07/12/19 0830)  Resp: (!) 23 (07/12/19 0830)  BP: 136/68 (07/12/19 0830)  SpO2: 96 % (07/12/19 0830) Vital Signs (24h Range):  Temp:  [98.1 °F (36.7 °C)-98.3 °F (36.8 °C)] 98.1 °F (36.7 °C)  Pulse:  [79-80] 80  Resp:  [13-37] 23  SpO2:  [90 %-100 %] 96 %  BP: (102-172)/(56-85) 136/68     Weight: (!) 146 kg (321 lb 14  oz)  Body mass index is 42.47 kg/m².     SpO2: 96 %  O2 Device (Oxygen Therapy): nasal cannula      Intake/Output Summary (Last 24 hours) at 7/12/2019 0854  Last data filed at 7/12/2019 0840  Gross per 24 hour   Intake 1226.2 ml   Output 825 ml   Net 401.2 ml       Lines/Drains/Airways     Central Venous Catheter Line                 Introducer 07/09/19 1410 right internal jugular 2 days          Line                 Pacer Wires 07/09/19 1415 2 days          Peripheral Intravenous Line                 Peripheral IV - Single Lumen 07/09/19 20 G Right Antecubital 3 days         Peripheral IV - Single Lumen 07/10/19 1627 20 G Left Antecubital 1 day                Physical Exam   Constitutional: He is oriented to person, place, and time. He appears well-developed and well-nourished. No distress.   HENT:   Head: Atraumatic.   Neck: No JVD present.   Cardiovascular: Normal rate and regular rhythm. Frequent extrasystoles are present. Exam reveals no gallop and no friction rub.   No murmur heard.  Pulmonary/Chest: Effort normal and breath sounds normal. He has no rales.   Abdominal: Soft. Bowel sounds are normal.   Musculoskeletal: He exhibits no edema.   Neurological: He is alert and oriented to person, place, and time.   Skin: Skin is warm and dry.       Significant Labs:   BMP:   Recent Labs   Lab 07/11/19  0501 07/12/19 0319   * 216*    138   K 4.2 4.1    104   CO2 26 27   BUN 34* 35*   CREATININE 1.9* 1.9*   CALCIUM 8.7 8.9   MG 1.7 1.6   , CMP   Recent Labs   Lab 07/11/19  0501 07/12/19 0319    138   K 4.2 4.1    104   CO2 26 27   * 216*   BUN 34* 35*   CREATININE 1.9* 1.9*   CALCIUM 8.7 8.9   PROT  --  6.0   ALBUMIN  --  2.8*   BILITOT  --  0.5   ALKPHOS  --  183*   AST  --  15   ALT  --  31   ANIONGAP 7* 7*   ESTGFRAFRICA 40* 40*   EGFRNONAA 35* 35*   , CBC   Recent Labs   Lab 07/11/19  0501 07/12/19 0319   WBC 7.67 7.94   HGB 11.9* 12.4*   HCT 37.4* 38.7*   * 136*   ,  INR No results for input(s): INR, PROTIME in the last 48 hours., Lipid Panel No results for input(s): CHOL, HDL, LDLCALC, TRIG, CHOLHDL in the last 48 hours., Troponin   Recent Labs   Lab 07/10/19  0945   TROPONINI 0.393*    and All pertinent lab results from the last 24 hours have been reviewed.    Significant Imaging: Echocardiogram:   2D echo with color flow doppler:   Results for orders placed or performed during the hospital encounter of 09/18/15   2D echo with color flow doppler   Result Value Ref Range    QEF 50 55 - 65    Diastolic Dysfunction Yes (A)     Est. PA Systolic Pressure 8.57     Mitral Valve Mobility NORMAL     Tricuspid Valve Regurgitation TRIVIAL     Narrative    TEST DESCRIPTION   Technical Quality: This is a technically adequate study.     Aorta: The aortic root is normal in size, measuring 3.9 cm at sinotubular junction.     Left Atrium: The left atrial volume index is severely enlarged, measuring 49.98 cc/m2.     Left Ventricle: The left ventricle is normal in size, with an end-diastolic diameter of 6.2 cm, and an end-systolic diameter of 4.5 cm. LV wall thickness is normal, with the septum measuring 1.5 cm and the posterior wall measuring 1.7 cm across. Relative   wall thickness was increased at 0.55, and the LV mass index was increased at 229.0 g/m2 consistent with concentric left ventricular hypertrophy. Global left ventricular systolic function appears low normal to mildly depressed. Visually estimated   ejection fraction is 50-55%. The LV Doppler derived stroke volume equals 52.0 ccs.   The E/e'(lat) is 9.  This along with the following abnormalities (ERIC = 49.98 and LVMI = 229.00) suggests significant diastolic dysfunction.     Right Atrium: The right atrium is normal in size, measuring 5.3 cm in length in the apical view.     Right Ventricle: The right ventricle is normal in size measuring 3.0 cm at the base in the apical right ventricle-focused view. Global right ventricular  systolic function appears normal. The estimated PA systolic pressure is 9 mmHg.     Aortic Valve:  The aortic valve is normal in structure with normal leaflet mobility. The aortic valve is tri-leaflet in structure.     Mitral Valve:  The mitral valve is normal in structure with normal leaflet mobility.     Tricuspid Valve:  The tricuspid valve is normal in structure with normal leaflet mobility. There is trivial tricuspid regurgitation.     Pulmonary Valve:  The pulmonic valve is not well seen.     IVC: IVC is normal in size and collapses > 50% with a sniff, suggesting normal right atrial pressure of 3 mmHg.     Atrial Septum: The atrial septum is intact.     Intracavitary: There is no evidence of pericardial effusion, intracavity mass, thrombi, or vegetation.         CONCLUSIONS     1 - Low normal to mildly depressed left ventricular systolic function (EF 50-55%).     2 - Concentric hypertrophy.     3 - Severe left atrial enlargement.     4 - Left ventricular diastolic dysfunction.     5 - Normal right ventricular systolic function .     6 - The estimated PA systolic pressure is 9 mmHg.         This document has been electronically    SIGNED BY: Milagros Gross MD On: 09/18/2015 16:15    and Transthoracic echo (TTE) complete (Cupid Only):   Results for orders placed or performed during the hospital encounter of 07/09/19   Transthoracic echo (TTE) 2D with Color Flow   Result Value Ref Range    AV mean gradient 3 mmHg    Ao peak son 1.22 m/s    Ao VTI 24.42 cm    IVS 1.66 (A) 0.6 - 1.1 cm    LA size 4.67 cm    Left Atrium Major Axis 6.46 cm    Left Atrium Minor Axis 6.90 cm    LVIDD 5.49 3.5 - 6.0 cm    LVIDS 4.20 (A) 2.1 - 4.0 cm    LVOT diameter 2.24 cm    LVOT peak VTI 15.12 cm    PW 1.40 (A) 0.6 - 1.1 cm    MV Peak A Son 0.37 m/s    E wave decelartion time 114.81 msec    MV Peak E Son 0.83 m/s    PV Peak D Son 0.36 m/s    PV Peak S Son 0.45 m/s    RA Major Axis 5.87 cm    RVDD 3.61 cm    LA WIDTH 4.23 cm    Ao root  annulus 3.69 cm    PV PEAK VELOCITY 1.07 cm/s    LV Diastolic Volume 147.08 mL    LV Systolic Volume 78.58 mL    LVOT peak nghia 0.87 m/s    FS 23 %    LA volume 112.04 cm3    LV mass 382.95 g    Left Ventricle Relative Wall Thickness 0.51 cm    AV valve area 2.44 cm2    AV Velocity Ratio 0.71     AV index (prosthetic) 0.62     E/A ratio 2.24     Pulm vein S/D ratio 1.25     LVOT area 3.9 cm2    LVOT stroke volume 59.55 cm3    AV peak gradient 6 mmHg    BSA 2.75 m2    LV Systolic Volume Index 29.7 mL/m2    LV Diastolic Volume Index 55.63 mL/m2    LA Volume Index 42.4 mL/m2    LV Mass Index 145 g/m2    Right Atrial Pressure (from IVC) 3 mmHg    Narrative    · Moderately decreased left ventricular systolic function. The estimated   ejection fraction is 30%  · Grade III (severe) left ventricular diastolic dysfunction consistent   with restrictive physiology.  · Moderate left atrial enlargement.  · Low normal right ventricular systolic function.  · Normal central venous pressure (3 mm Hg).        Assessment and Plan:     Brief HPI: Patient seen this morning on rounds without CV complaint. POC discussed with understanding verbalized. Hemodynamically stable with TVP- rate set at 80 bpm    * Complete heart block  TVP placed emergently on admission, HR 20   AV silvia blockers on hold  Concerned about ischemic etiology as patient has frequent VT since admission   LVEF is down to 30%   [facility] pending stabilization of renal function, Planned for 07/15/2019  Will likely need ICD after [facility]     Acute systolic heart failure  · Moderately decreased left ventricular systolic function. The estimated ejection fraction is 30%  · Grade III (severe) left ventricular diastolic dysfunction consistent with restrictive physiology.  · Moderate left atrial enlargement.  · Low normal right ventricular systolic function.  · Normal central venous pressure (3 mm Hg).    Newly depressed LVEF, volume overloaded on admission, diuresed and now  euvolemic    BB on hold 2/2 CHB- washout complete 07/12/2019  ACEI and Lasix on hold 2/2 CLEMENCIA      CLEMENCIA (acute kidney injury)  Baseline Cr 1.4-1.5   CLEMENCIA likely 2/2 hypoperfusion with severe bradycardia (20 bpm) CHB  Present Cr 1.9  Holding ACEI, Lasix and Aldactone    VT (ventricular tachycardia)  Concerning for ischemic etiology, frequent runs up to 30 beats, asymptomatic   BB on hold given CHB  Amiodarone converted to oral   LHC planned for 07/15     Hypertension  Holding home BB, ACEI, Aldactone given renal function  Hydralazine PRN SBP >160  BP 100s-120s        VTE Risk Mitigation (From admission, onward)    None          Bala Carvajal NP  Cardiology  [facility]

## 2019-07-12 NOTE — ASSESSMENT & PLAN NOTE
TVP placed emergently on admission, HR 20   AV silvia blockers on hold  Concerned about ischemic etiology as patient has frequent VT since admission   LVEF is down to 30%   [facility] pending stabilization of renal function, Planned for 07/15/2019  Will likely need ICD after [facility]

## 2019-07-13 LAB
ANION GAP SERPL CALC-SCNC: 12 MMOL/L (ref 8–16)
BUN SERPL-MCNC: 42 MG/DL (ref 8–23)
CALCIUM SERPL-MCNC: 9 MG/DL (ref 8.7–10.5)
CHLORIDE SERPL-SCNC: 105 MMOL/L (ref 95–110)
CHOLEST SERPL-MCNC: 82 MG/DL (ref 120–199)
CHOLEST/HDLC SERPL: 3 {RATIO} (ref 2–5)
CO2 SERPL-SCNC: 21 MMOL/L (ref 23–29)
CREAT SERPL-MCNC: 2.1 MG/DL (ref 0.5–1.4)
EST. GFR  (AFRICAN AMERICAN): 36 ML/MIN/1.73 M^2
EST. GFR  (NON AFRICAN AMERICAN): 31 ML/MIN/1.73 M^2
GLUCOSE SERPL-MCNC: 206 MG/DL (ref 70–110)
HDLC SERPL-MCNC: 27 MG/DL (ref 40–75)
HDLC SERPL: 32.9 % (ref 20–50)
LDLC SERPL CALC-MCNC: 38 MG/DL (ref 63–159)
MAGNESIUM SERPL-MCNC: 1.8 MG/DL (ref 1.6–2.6)
NONHDLC SERPL-MCNC: 55 MG/DL
POCT GLUCOSE: 208 MG/DL (ref 70–110)
POCT GLUCOSE: 214 MG/DL (ref 70–110)
POCT GLUCOSE: 231 MG/DL (ref 70–110)
POCT GLUCOSE: 286 MG/DL (ref 70–110)
POTASSIUM SERPL-SCNC: 4.3 MMOL/L (ref 3.5–5.1)
SODIUM SERPL-SCNC: 138 MMOL/L (ref 136–145)
TRIGL SERPL-MCNC: 85 MG/DL (ref 30–150)

## 2019-07-13 PROCEDURE — 94761 N-INVAS EAR/PLS OXIMETRY MLT: CPT

## 2019-07-13 PROCEDURE — 99291 PR CRITICAL CARE, E/M 30-74 MINUTES: ICD-10-PCS | Mod: ,,, | Performed by: INTERNAL MEDICINE

## 2019-07-13 PROCEDURE — 20000000 HC ICU ROOM

## 2019-07-13 PROCEDURE — 99291 CRITICAL CARE FIRST HOUR: CPT | Mod: ,,, | Performed by: INTERNAL MEDICINE

## 2019-07-13 PROCEDURE — 80061 LIPID PANEL: CPT

## 2019-07-13 PROCEDURE — 63600175 PHARM REV CODE 636 W HCPCS: Performed by: NURSE PRACTITIONER

## 2019-07-13 PROCEDURE — 25000003 PHARM REV CODE 250: Performed by: INTERNAL MEDICINE

## 2019-07-13 PROCEDURE — 25000003 PHARM REV CODE 250: Performed by: NURSE PRACTITIONER

## 2019-07-13 PROCEDURE — 80048 BASIC METABOLIC PNL TOTAL CA: CPT

## 2019-07-13 PROCEDURE — 27000221 HC OXYGEN, UP TO 24 HOURS

## 2019-07-13 PROCEDURE — 83735 ASSAY OF MAGNESIUM: CPT

## 2019-07-13 RX ORDER — HYDRALAZINE HYDROCHLORIDE 20 MG/ML
10 INJECTION INTRAMUSCULAR; INTRAVENOUS EVERY 8 HOURS PRN
Status: DISCONTINUED | OUTPATIENT
Start: 2019-07-13 | End: 2019-07-17 | Stop reason: HOSPADM

## 2019-07-13 RX ORDER — HYDRALAZINE HYDROCHLORIDE 25 MG/1
25 TABLET, FILM COATED ORAL EVERY 8 HOURS
Status: DISCONTINUED | OUTPATIENT
Start: 2019-07-13 | End: 2019-07-17 | Stop reason: HOSPADM

## 2019-07-13 RX ADMIN — INSULIN ASPART 1 UNITS: 100 INJECTION, SOLUTION INTRAVENOUS; SUBCUTANEOUS at 09:07

## 2019-07-13 RX ADMIN — ALLOPURINOL 300 MG: 300 TABLET ORAL at 09:07

## 2019-07-13 RX ADMIN — HYDRALAZINE HYDROCHLORIDE 25 MG: 25 TABLET, FILM COATED ORAL at 09:07

## 2019-07-13 RX ADMIN — ASPIRIN 81 MG: 81 TABLET, COATED ORAL at 09:07

## 2019-07-13 RX ADMIN — HYDRALAZINE HYDROCHLORIDE 25 MG: 25 TABLET, FILM COATED ORAL at 02:07

## 2019-07-13 RX ADMIN — COLCHICINE 0.6 MG: 0.6 TABLET, FILM COATED ORAL at 09:07

## 2019-07-13 RX ADMIN — AMIODARONE HYDROCHLORIDE 400 MG: 200 TABLET ORAL at 09:07

## 2019-07-13 RX ADMIN — INSULIN ASPART 2 UNITS: 100 INJECTION, SOLUTION INTRAVENOUS; SUBCUTANEOUS at 06:07

## 2019-07-13 RX ADMIN — PANTOPRAZOLE SODIUM 40 MG: 40 TABLET, DELAYED RELEASE ORAL at 09:07

## 2019-07-13 RX ADMIN — INSULIN ASPART 2 UNITS: 100 INJECTION, SOLUTION INTRAVENOUS; SUBCUTANEOUS at 04:07

## 2019-07-13 RX ADMIN — HYDRALAZINE HYDROCHLORIDE 25 MG: 25 TABLET, FILM COATED ORAL at 06:07

## 2019-07-13 RX ADMIN — INSULIN ASPART 2 UNITS: 100 INJECTION, SOLUTION INTRAVENOUS; SUBCUTANEOUS at 11:07

## 2019-07-13 RX ADMIN — ATORVASTATIN CALCIUM 80 MG: 40 TABLET, FILM COATED ORAL at 09:07

## 2019-07-13 RX ADMIN — ENOXAPARIN SODIUM 40 MG: 100 INJECTION SUBCUTANEOUS at 05:07

## 2019-07-13 NOTE — PROGRESS NOTES
[facility]  Cardiology  Progress Note    Patient Name: [place] Jc  MRN: [id number]  Admission Date: 7/9/2019  Hospital Length of Stay: 4 days  Code Status: Full Code   Attending Physician: Milagros Gross MD   Primary Care Physician: Zak Hamilton MD  Expected Discharge Date:   Principal Problem:Complete heart block    Subjective:     Hospital Course:   07/09/2019 S/p R IJ Temporary pacemaker for CH, rate 80bpm. BB on hold.  07/10/2019 Patient with frequent runs of VT overnight. TNI pending. NPO for LHC today. Amio infusing. LVEF 30% with RV dysfunction and global hypokinesis.  LHC deferred to 07/11 2/2 patient was given a diet and consumed the entire meal.   07/11/2019 LHC cancelled for today, renal function worsening. Holding nephrotoxic agents and Lasix. Remains in CHB with runs of SVT (chronic RBB that resembles VT) and short runs of nonsustained VT. Amio continued. NPO p MN for potential LHC in AM.   07/12/2019 Patient remains in CHB, Hemodynamically stable with TVP. No CV complaints. Amiodarone converted to oral. No further VT. Renal function stable. Defer LHC to Monday to give kidneys time to recover.   7/13/2019: seen with family at the bedside. No acute issues. He will have LHC (7/15/2019) and either PPM or ICD depending on coronary anatomy.       Interval History:     Review of Systems   Constitution: Negative for diaphoresis, night sweats, weight gain and weight loss.   HENT: Negative for congestion.    Eyes: Negative for blurred vision, discharge and double vision.   Cardiovascular: Negative for chest pain, claudication, cyanosis, dyspnea on exertion, irregular heartbeat, leg swelling, near-syncope, orthopnea, palpitations, paroxysmal nocturnal dyspnea and syncope.   Respiratory: Negative for cough, shortness of breath and wheezing.    Endocrine: Negative for cold intolerance, heat intolerance and polyphagia.   Hematologic/Lymphatic: Negative for adenopathy and bleeding problem.  Does not bruise/bleed easily.   Skin: Negative for dry skin and nail changes.   Musculoskeletal: Negative for arthritis, back pain, falls, joint pain, myalgias and neck pain.   Gastrointestinal: Negative for bloating, abdominal pain, change in bowel habit and constipation.   Genitourinary: Negative for bladder incontinence, dysuria, flank pain, genital sores and missed menses.   Neurological: Negative for aphonia, brief paralysis, difficulty with concentration, dizziness and weakness.   Psychiatric/Behavioral: Negative for altered mental status and memory loss. The patient does not have insomnia.    Allergic/Immunologic: Negative for environmental allergies.     Objective:     Vital Signs (Most Recent):  Temp: 98.3 °F (36.8 °C) (07/12/19 2315)  Pulse: 79 (07/13/19 0100)  Resp: (!) 22 (07/13/19 0100)  BP: (!) 187/82 (07/13/19 0100)  SpO2: (!) 93 % (07/13/19 0100) Vital Signs (24h Range):  Temp:  [97.6 °F (36.4 °C)-98.3 °F (36.8 °C)] 98.3 °F (36.8 °C)  Pulse:  [79-80] 79  Resp:  [15-36] 22  SpO2:  [93 %-100 %] 93 %  BP: (110-187)/() 187/82     Weight: (!) 146 kg (321 lb 14 oz)  Body mass index is 42.47 kg/m².     SpO2: (!) 93 %  O2 Device (Oxygen Therapy): room air      Intake/Output Summary (Last 24 hours) at 7/13/2019 0214  Last data filed at 7/12/2019 1723  Gross per 24 hour   Intake 849.47 ml   Output 726 ml   Net 123.47 ml       Lines/Drains/Airways     Central Venous Catheter Line                 Introducer 07/09/19 1410 right internal jugular 3 days          Line                 Pacer Wires 07/09/19 1415 3 days          Peripheral Intravenous Line                 Peripheral IV - Single Lumen 07/09/19 20 G Right Antecubital 4 days         Peripheral IV - Single Lumen 07/10/19 1627 20 G Left Antecubital 2 days                Physical Exam   Constitutional: He is oriented to person, place, and time. He appears well-developed and well-nourished. He is not intubated.   HENT:   Head: Normocephalic and  atraumatic.   Right Ear: External ear normal.   Left Ear: External ear normal.   Mouth/Throat: Oropharynx is clear and moist.   Eyes: Pupils are equal, round, and reactive to light. Conjunctivae and EOM are normal. Right eye exhibits no discharge. Left eye exhibits no discharge. No scleral icterus.   Neck: Normal range of motion. Neck supple. Normal carotid pulses, no hepatojugular reflux and no JVD present. Carotid bruit is not present. No tracheal deviation present. No thyromegaly present.   Cardiovascular: Normal rate, regular rhythm, S1 normal and S2 normal.  No extrasystoles are present. PMI is not displaced. Exam reveals no gallop, no S3, no distant heart sounds, no friction rub and no midsystolic click.   No murmur heard.  Pulses:       Carotid pulses are 2+ on the right side, and 2+ on the left side.       Radial pulses are 2+ on the right side, and 2+ on the left side.        Femoral pulses are 2+ on the right side, and 2+ on the left side.       Popliteal pulses are 2+ on the right side, and 2+ on the left side.        Dorsalis pedis pulses are 2+ on the right side, and 2+ on the left side.        Posterior tibial pulses are 2+ on the right side, and 2+ on the left side.   Pulmonary/Chest: Effort normal and breath sounds normal. No accessory muscle usage or stridor. No apnea, no tachypnea and no bradypnea. He is not intubated. No respiratory distress. He has no decreased breath sounds. He has no wheezes. He has no rales. He exhibits no tenderness and no bony tenderness.   Abdominal: He exhibits no distension, no pulsatile liver, no abdominal bruit, no ascites, no pulsatile midline mass and no mass. There is no tenderness. There is no rebound and no guarding.   Musculoskeletal: Normal range of motion. He exhibits no edema or tenderness.   Lymphadenopathy:     He has no cervical adenopathy.   Neurological: He is alert and oriented to person, place, and time. He has normal reflexes. No cranial nerve deficit.  Coordination normal.   Skin: Skin is warm. No rash noted. No erythema. No pallor.   Psychiatric: He has a normal mood and affect. His behavior is normal. Judgment and thought content normal.       Significant Labs:    LABS  CBC  Recent Labs   Lab 07/10/19  0522 07/11/19  0501 07/12/19  0319   WBC 8.46 7.67 7.94   RBC 3.85* 3.95* 4.12*   HGB 11.4* 11.9* 12.4*   HCT 36.2* 37.4* 38.7*   * 140* 136*   MCV 94 95 94   MCH 29.6 30.1 30.1   MCHC 31.5* 31.8* 32.0     BMP  Recent Labs   Lab 07/10/19  0522 07/11/19  0501 07/12/19  0319    138 138   K 4.2 4.2 4.1   CO2 25 26 27    105 104   BUN 37* 34* 35*   CREATININE 1.8* 1.9* 1.9*   * 246* 216*       POCT-Glucose  POCT Glucose   Date Value Ref Range Status   07/12/2019 235 (H) 70 - 110 mg/dL Final   07/12/2019 189 (H) 70 - 110 mg/dL Final   07/12/2019 224 (H) 70 - 110 mg/dL Final   07/12/2019 211 (H) 70 - 110 mg/dL Final   07/11/2019 244 (H) 70 - 110 mg/dL Final   07/11/2019 226 (H) 70 - 110 mg/dL Final   07/11/2019 199 (H) 70 - 110 mg/dL Final   07/11/2019 233 (H) 70 - 110 mg/dL Final   07/11/2019 248 (H) 70 - 110 mg/dL Final   07/10/2019 291 (H) 70 - 110 mg/dL Final   07/10/2019 303 (H) 70 - 110 mg/dL Final   07/10/2019 241 (H) 70 - 110 mg/dL Final   07/10/2019 223 (H) 70 - 110 mg/dL Final       Recent Labs   Lab 07/10/19  0522 07/11/19  0501 07/12/19  0319   CALCIUM 8.9 8.7 8.9   MG 1.1* 1.7 1.6   PHOS  --  3.4  --      LFT  Recent Labs   Lab 07/10/19  0522 07/12/19  0319   PROT 5.8* 6.0   ALBUMIN 2.9* 2.8*   BILITOT 0.9 0.5   AST 23 15   ALKPHOS 179* 183*   ALT 52* 31     GFR     COAGS  No results for input(s): PT, INR, APTT in the last 168 hours.  CE  Recent Labs   Lab 07/10/19  0945   TROPONINI 0.393*     ABGs  No results for input(s): PH, PCO2, PO2, HCO3, POCSATURATED, BE in the last 24 hours.  BNP  No results for input(s): BNP in the last 168 hours.    LAST HbA1c  Lab Results   Component Value Date    HGBA1C 6.5 (H) 09/19/2015        Significant Imaging:             Assessment and Plan:     Brief HPI:     * Complete heart block  TVP placed emergently on admission, HR 20   AV silvia blockers on hold  Concerned about ischemic etiology as patient has frequent VT since admission   LVEF is down to 30%   [facility] pending stabilization of renal function, Planned for 07/15/2019  Will likely need ICD after [facility]     Acute systolic heart failure  · Moderately decreased left ventricular systolic function. The estimated ejection fraction is 30%  · Grade III (severe) left ventricular diastolic dysfunction consistent with restrictive physiology.  · Moderate left atrial enlargement.  · Low normal right ventricular systolic function.  · Normal central venous pressure (3 mm Hg).    Newly depressed LVEF, volume overloaded on admission, diuresed and now euvolemic    BB on hold 2/2 CHB- washout complete 07/12/2019  ACEI and Lasix on hold 2/2 CLEMENCIA      CLEMENCIA (acute kidney injury)  Baseline Cr 1.4-1.5   CLEMENCIA likely 2/2 hypoperfusion with severe bradycardia (20 bpm) CHB  Present Cr 1.9  Holding ACEI, Lasix and Aldactone    VT (ventricular tachycardia)  Concerning for ischemic etiology, frequent runs up to 30 beats, asymptomatic   BB on hold given CHB  Amiodarone converted to oral   [facility] planned for 07/15     Morbid obesity      Weight loss      Hypertension  Holding home BB, ACEI, Aldactone given renal function  Hydralazine PRN SBP >160  BP 100s-120s        VTE Risk Mitigation (From admission, onward)        Ordered     enoxaparin injection 40 mg  Daily      07/12/19 5931          Dejuan Cody MD  Cardiology  [facility]

## 2019-07-13 NOTE — ASSESSMENT & PLAN NOTE
TVP placed emergently on admission, HR 20   AV silvia blockers on hold  Concerned about ischemic etiology as patient has frequent VT since admission   LVEF is down to 30%   [facility] pending stabilization of renal function, Planned for 07/15/2019  Will likely need ICD after [facility]

## 2019-07-13 NOTE — PLAN OF CARE
Problem: Adult Inpatient Plan of Care  Goal: Plan of Care Review  Outcome: Ongoing (interventions implemented as appropriate)  Patient on documented O2. No apparent respiratory distress noted.  Will continue to monitor.

## 2019-07-13 NOTE — PLAN OF CARE
Problem: Adult Inpatient Plan of Care  Goal: Plan of Care Review  Outcome: Ongoing (interventions implemented as appropriate)       07/13/19 0310   Plan of Care Review   Plan of Care Reviewed With Patient;spouse  Safety: call light within reach, pt. Oriented to room & instructed how to notify nurse if assistance is needed, current questions/concerns addressed, bed in lowest position with wheels locked & side rails up x3. Pt. Educated regarding fall prevention and taking appropriate action to prevent falls. Activity: patient repositions independently, encouraged activity, encouraged ROM, encouraged independency. Neurological: oriented x4 Respiratory: room air when awake but put on 1L NC when asleep, O2 sat WNL, no acute changes Cardiac: HR stable, BP normotensive, afebrile this shift. Intake/Output: voids per urinal, clear mu urine, x1 BM this shift; stool is loose, clear & watery. Concerned about stool appearance & firm, distended abdomen. Will pass onto day-shift nurse to recommend a possible KUB. Diet: ADA diet, adequate intake Pain: controlled with PRN medication Skin: intact Plan: all questions/concerns were addressed, verbal education provided, meds reviewed, labs and vitals are being monitored.   Progress improving         Problem: Skin Injury Risk Increased  Goal: Skin Health and Integrity  Outcome: Ongoing (interventions implemented as appropriate)  Intervention: Optimize Skin Protection       07/13/19 0310   Prevent Additional Skin Injury   Head of Bed (HOB) HOB at 30-45 degrees   Pressure Reduction Devices specialty bed utilized   Pressure Reduction Techniques frequent weight shift encouraged;heels elevated off bed   Monitor and Manage Hypervolemia   Skin Protection adhesive use limited;incontinence pads utilized;tubing/devices free from skin contact            Problem: Infection  Goal: Infection Symptom Resolution  Outcome: Ongoing (interventions implemented as appropriate)  Intervention: Prevent or  Manage Infection       07/13/19 0310   Manage Diarrhea   Isolation Precautions protective environment maintained   Prevent or Manage Infection   Infection Management aseptic technique maintained            Problem: Fall Injury Risk  Goal: Absence of Fall and Fall-Related Injury  Outcome: Ongoing (interventions implemented as appropriate)  Intervention: Identify and Manage Contributors to Fall Injury Risk       07/13/19 0310   Manage Acute Allergic Reaction   Medication Review/Management medications reviewed   Identify and Manage Contributors to Fall Injury Risk   Self-Care Promotion BADL personal objects within reach      Intervention: Promote Injury-Free Environment       07/12/19 1905 07/13/19 0105   Optimize Balance and Safe Activity   Safety Promotion/Fall Prevention  --  assistive device/personal item within reach;lighting adjusted;medications reviewed;nonskid shoes/socks when out of bed;side rails raised x 3   Optimize [place] and Functional Mobility   Environmental Safety Modification assistive device/personal items within reach;clutter free environment maintained;lighting adjusted;room organization consistent  --             Problem: Breathing Pattern Ineffective  Goal: Effective Breathing Pattern  Outcome: Ongoing (interventions implemented as appropriate)  Intervention: Promote Improved Breathing Pattern       07/13/19 0310   Prevent Additional Skin Injury   Head of Bed (HOB) HOB at 30-45 degrees   Support Asthma Symptom Control   Airway/Ventilation Management airway patency maintained   Promote Airway Secretion Clearance   Breathing Techniques/Airway Clearance deep/controlled cough encouraged   Promote Anxiety Reduction   Supportive Measures active listening utilized            Problem: Cardiac Output Decreased  Goal: Effective Cardiac Output  Outcome: Ongoing (interventions implemented as appropriate)  Intervention: Optimize Cardiac Output       07/13/19 0310   Prevent Additional Skin Injury   Head  of Bed (HOB) HOB at 30-45 degrees   Manage Acute Allergic Reaction   Stabilization Measures legs elevated   Monitor/Manage Chemotherapy Gastrointestinal Effects   Environmental Support calm environment promoted   Prevent or Manage Embolism   VTE Prevention/Management remove, assess skin and reapply sequential compression device;bleeding precautions maintained            Problem: Diabetes Comorbidity  Goal: Blood Glucose Level Within Desired Range  Outcome: Ongoing (interventions implemented as appropriate)  Intervention: Maintain Glycemic Control       07/13/19 0310   Monitor and Manage Ketoacidosis   Glycemic Management blood glucose monitoring            Problem: Fluid Volume Excess  Goal: Fluid Balance  Outcome: Ongoing (interventions implemented as appropriate)  Intervention: Monitor and Manage Hypervolemia       07/13/19 0310   Monitor and Manage Hypervolemia   Skin Protection adhesive use limited;incontinence pads utilized;tubing/devices free from skin contact

## 2019-07-13 NOTE — PLAN OF CARE
Problem: Adult Inpatient Plan of Care  Goal: Plan of Care Review  Outcome: Ongoing (interventions implemented as appropriate)  Mr. Jc, will call for assistance when needed, call light within reach, bed in low position, bed alarm on, wheels locked.skid resistance socks on, he will be offered assistance every hour to prevent an injury from a fall. He will  be encouraged and assisted to turn , heels floated off the mattress, to prevent pressure areas to the skin. Planned pacemaker implant or AICD implant on Monday.

## 2019-07-13 NOTE — SUBJECTIVE & OBJECTIVE
Interval History:     Review of Systems   Constitution: Negative for diaphoresis, night sweats, weight gain and weight loss.   HENT: Negative for congestion.    Eyes: Negative for blurred vision, discharge and double vision.   Cardiovascular: Negative for chest pain, claudication, cyanosis, dyspnea on exertion, irregular heartbeat, leg swelling, near-syncope, orthopnea, palpitations, paroxysmal nocturnal dyspnea and syncope.   Respiratory: Negative for cough, shortness of breath and wheezing.    Endocrine: Negative for cold intolerance, heat intolerance and polyphagia.   Hematologic/Lymphatic: Negative for adenopathy and bleeding problem. Does not bruise/bleed easily.   Skin: Negative for dry skin and nail changes.   Musculoskeletal: Negative for arthritis, back pain, falls, joint pain, myalgias and neck pain.   Gastrointestinal: Negative for bloating, abdominal pain, change in bowel habit and constipation.   Genitourinary: Negative for bladder incontinence, dysuria, flank pain, genital sores and missed menses.   Neurological: Negative for aphonia, brief paralysis, difficulty with concentration, dizziness and weakness.   Psychiatric/Behavioral: Negative for altered mental status and memory loss. The patient does not have insomnia.    Allergic/Immunologic: Negative for environmental allergies.     Objective:     Vital Signs (Most Recent):  Temp: 98.3 °F (36.8 °C) (07/12/19 2315)  Pulse: 79 (07/13/19 0100)  Resp: (!) 22 (07/13/19 0100)  BP: (!) 187/82 (07/13/19 0100)  SpO2: (!) 93 % (07/13/19 0100) Vital Signs (24h Range):  Temp:  [97.6 °F (36.4 °C)-98.3 °F (36.8 °C)] 98.3 °F (36.8 °C)  Pulse:  [79-80] 79  Resp:  [15-36] 22  SpO2:  [93 %-100 %] 93 %  BP: (110-187)/() 187/82     Weight: (!) 146 kg (321 lb 14 oz)  Body mass index is 42.47 kg/m².     SpO2: (!) 93 %  O2 Device (Oxygen Therapy): room air      Intake/Output Summary (Last 24 hours) at 7/13/2019 0214  Last data filed at 7/12/2019 1723  Gross per 24 hour    Intake 849.47 ml   Output 726 ml   Net 123.47 ml       Lines/Drains/Airways     Central Venous Catheter Line                 Introducer 07/09/19 1410 right internal jugular 3 days          Line                 Pacer Wires 07/09/19 1415 3 days          Peripheral Intravenous Line                 Peripheral IV - Single Lumen 07/09/19 20 G Right Antecubital 4 days         Peripheral IV - Single Lumen 07/10/19 1627 20 G Left Antecubital 2 days                Physical Exam   Constitutional: He is oriented to person, place, and time. He appears well-developed and well-nourished. He is not intubated.   HENT:   Head: Normocephalic and atraumatic.   Right Ear: External ear normal.   Left Ear: External ear normal.   Mouth/Throat: Oropharynx is clear and moist.   Eyes: Pupils are equal, round, and reactive to light. Conjunctivae and EOM are normal. Right eye exhibits no discharge. Left eye exhibits no discharge. No scleral icterus.   Neck: Normal range of motion. Neck supple. Normal carotid pulses, no hepatojugular reflux and no JVD present. Carotid bruit is not present. No tracheal deviation present. No thyromegaly present.   Cardiovascular: Normal rate, regular rhythm, S1 normal and S2 normal.  No extrasystoles are present. PMI is not displaced. Exam reveals no gallop, no S3, no distant heart sounds, no friction rub and no midsystolic click.   No murmur heard.  Pulses:       Carotid pulses are 2+ on the right side, and 2+ on the left side.       Radial pulses are 2+ on the right side, and 2+ on the left side.        Femoral pulses are 2+ on the right side, and 2+ on the left side.       Popliteal pulses are 2+ on the right side, and 2+ on the left side.        Dorsalis pedis pulses are 2+ on the right side, and 2+ on the left side.        Posterior tibial pulses are 2+ on the right side, and 2+ on the left side.   Pulmonary/Chest: Effort normal and breath sounds normal. No accessory muscle usage or stridor. No apnea, no  tachypnea and no bradypnea. He is not intubated. No respiratory distress. He has no decreased breath sounds. He has no wheezes. He has no rales. He exhibits no tenderness and no bony tenderness.   Abdominal: He exhibits no distension, no pulsatile liver, no abdominal bruit, no ascites, no pulsatile midline mass and no mass. There is no tenderness. There is no rebound and no guarding.   Musculoskeletal: Normal range of motion. He exhibits no edema or tenderness.   Lymphadenopathy:     He has no cervical adenopathy.   Neurological: He is alert and oriented to person, place, and time. He has normal reflexes. No cranial nerve deficit. Coordination normal.   Skin: Skin is warm. No rash noted. No erythema. No pallor.   Psychiatric: He has a normal mood and affect. His behavior is normal. Judgment and thought content normal.       Significant Labs:    LABS  CBC  Recent Labs   Lab 07/10/19  0522 07/11/19  0501 07/12/19 0319   WBC 8.46 7.67 7.94   RBC 3.85* 3.95* 4.12*   HGB 11.4* 11.9* 12.4*   HCT 36.2* 37.4* 38.7*   * 140* 136*   MCV 94 95 94   MCH 29.6 30.1 30.1   MCHC 31.5* 31.8* 32.0     BMP  Recent Labs   Lab 07/10/19  0522 07/11/19  0501 07/12/19 0319    138 138   K 4.2 4.2 4.1   CO2 25 26 27    105 104   BUN 37* 34* 35*   CREATININE 1.8* 1.9* 1.9*   * 246* 216*       POCT-Glucose  POCT Glucose   Date Value Ref Range Status   07/12/2019 235 (H) 70 - 110 mg/dL Final   07/12/2019 189 (H) 70 - 110 mg/dL Final   07/12/2019 224 (H) 70 - 110 mg/dL Final   07/12/2019 211 (H) 70 - 110 mg/dL Final   07/11/2019 244 (H) 70 - 110 mg/dL Final   07/11/2019 226 (H) 70 - 110 mg/dL Final   07/11/2019 199 (H) 70 - 110 mg/dL Final   07/11/2019 233 (H) 70 - 110 mg/dL Final   07/11/2019 248 (H) 70 - 110 mg/dL Final   07/10/2019 291 (H) 70 - 110 mg/dL Final   07/10/2019 303 (H) 70 - 110 mg/dL Final   07/10/2019 241 (H) 70 - 110 mg/dL Final   07/10/2019 223 (H) 70 - 110 mg/dL Final       Recent Labs   Lab  07/10/19  0522 07/11/19  0501 07/12/19  0319   CALCIUM 8.9 8.7 8.9   MG 1.1* 1.7 1.6   PHOS  --  3.4  --      LFT  Recent Labs   Lab 07/10/19  0522 07/12/19  0319   PROT 5.8* 6.0   ALBUMIN 2.9* 2.8*   BILITOT 0.9 0.5   AST 23 15   ALKPHOS 179* 183*   ALT 52* 31     GFR     COAGS  No results for input(s): PT, INR, APTT in the last 168 hours.  CE  Recent Labs   Lab 07/10/19  0945   TROPONINI 0.393*     ABGs  No results for input(s): PH, PCO2, PO2, HCO3, POCSATURATED, BE in the last 24 hours.  BNP  No results for input(s): BNP in the last 168 hours.    LAST HbA1c  Lab Results   Component Value Date    HGBA1C 6.5 (H) 09/19/2015       Significant Imaging:

## 2019-07-14 LAB
ANION GAP SERPL CALC-SCNC: 9 MMOL/L (ref 8–16)
BUN SERPL-MCNC: 45 MG/DL (ref 8–23)
CALCIUM SERPL-MCNC: 8.8 MG/DL (ref 8.7–10.5)
CHLORIDE SERPL-SCNC: 104 MMOL/L (ref 95–110)
CO2 SERPL-SCNC: 24 MMOL/L (ref 23–29)
CREAT SERPL-MCNC: 2.2 MG/DL (ref 0.5–1.4)
EST. GFR  (AFRICAN AMERICAN): 34 ML/MIN/1.73 M^2
EST. GFR  (NON AFRICAN AMERICAN): 29 ML/MIN/1.73 M^2
GLUCOSE SERPL-MCNC: 218 MG/DL (ref 70–110)
MAGNESIUM SERPL-MCNC: 1.5 MG/DL (ref 1.6–2.6)
POCT GLUCOSE: 232 MG/DL (ref 70–110)
POCT GLUCOSE: 242 MG/DL (ref 70–110)
POCT GLUCOSE: 252 MG/DL (ref 70–110)
POCT GLUCOSE: 336 MG/DL (ref 70–110)
POTASSIUM SERPL-SCNC: 4.3 MMOL/L (ref 3.5–5.1)
SODIUM SERPL-SCNC: 137 MMOL/L (ref 136–145)

## 2019-07-14 PROCEDURE — 27000221 HC OXYGEN, UP TO 24 HOURS

## 2019-07-14 PROCEDURE — 25000003 PHARM REV CODE 250: Performed by: NURSE PRACTITIONER

## 2019-07-14 PROCEDURE — 20000000 HC ICU ROOM

## 2019-07-14 PROCEDURE — 63600175 PHARM REV CODE 636 W HCPCS: Performed by: NURSE PRACTITIONER

## 2019-07-14 PROCEDURE — 99291 CRITICAL CARE FIRST HOUR: CPT | Mod: ,,, | Performed by: INTERNAL MEDICINE

## 2019-07-14 PROCEDURE — 94761 N-INVAS EAR/PLS OXIMETRY MLT: CPT

## 2019-07-14 PROCEDURE — 25000003 PHARM REV CODE 250: Performed by: INTERNAL MEDICINE

## 2019-07-14 PROCEDURE — 99291 PR CRITICAL CARE, E/M 30-74 MINUTES: ICD-10-PCS | Mod: ,,, | Performed by: INTERNAL MEDICINE

## 2019-07-14 PROCEDURE — 83735 ASSAY OF MAGNESIUM: CPT

## 2019-07-14 PROCEDURE — 36415 COLL VENOUS BLD VENIPUNCTURE: CPT

## 2019-07-14 PROCEDURE — 80048 BASIC METABOLIC PNL TOTAL CA: CPT

## 2019-07-14 RX ADMIN — COLCHICINE 0.6 MG: 0.6 TABLET, FILM COATED ORAL at 09:07

## 2019-07-14 RX ADMIN — AMIODARONE HYDROCHLORIDE 400 MG: 200 TABLET ORAL at 09:07

## 2019-07-14 RX ADMIN — HYDRALAZINE HYDROCHLORIDE 25 MG: 25 TABLET, FILM COATED ORAL at 02:07

## 2019-07-14 RX ADMIN — INSULIN ASPART 2 UNITS: 100 INJECTION, SOLUTION INTRAVENOUS; SUBCUTANEOUS at 05:07

## 2019-07-14 RX ADMIN — INSULIN ASPART 4 UNITS: 100 INJECTION, SOLUTION INTRAVENOUS; SUBCUTANEOUS at 11:07

## 2019-07-14 RX ADMIN — ALLOPURINOL 300 MG: 300 TABLET ORAL at 09:07

## 2019-07-14 RX ADMIN — INSULIN ASPART 1 UNITS: 100 INJECTION, SOLUTION INTRAVENOUS; SUBCUTANEOUS at 09:07

## 2019-07-14 RX ADMIN — PANTOPRAZOLE SODIUM 40 MG: 40 TABLET, DELAYED RELEASE ORAL at 09:07

## 2019-07-14 RX ADMIN — ASPIRIN 81 MG: 81 TABLET, COATED ORAL at 09:07

## 2019-07-14 RX ADMIN — HYDRALAZINE HYDROCHLORIDE 25 MG: 25 TABLET, FILM COATED ORAL at 05:07

## 2019-07-14 RX ADMIN — HYDRALAZINE HYDROCHLORIDE 25 MG: 25 TABLET, FILM COATED ORAL at 09:07

## 2019-07-14 RX ADMIN — ATORVASTATIN CALCIUM 80 MG: 40 TABLET, FILM COATED ORAL at 09:07

## 2019-07-14 RX ADMIN — ENOXAPARIN SODIUM 40 MG: 100 INJECTION SUBCUTANEOUS at 05:07

## 2019-07-14 RX ADMIN — INSULIN ASPART 3 UNITS: 100 INJECTION, SOLUTION INTRAVENOUS; SUBCUTANEOUS at 08:07

## 2019-07-14 NOTE — SUBJECTIVE & OBJECTIVE
Interval History:   Review of Systems   Constitution: Negative for diaphoresis, night sweats, weight gain and weight loss.   HENT: Negative for congestion.    Eyes: Negative for blurred vision, discharge and double vision.   Cardiovascular: Negative for chest pain, claudication, cyanosis, dyspnea on exertion, irregular heartbeat, leg swelling, near-syncope, orthopnea, palpitations, paroxysmal nocturnal dyspnea and syncope.   Respiratory: Negative for cough, shortness of breath and wheezing.    Endocrine: Negative for cold intolerance, heat intolerance and polyphagia.   Hematologic/Lymphatic: Negative for adenopathy and bleeding problem. Does not bruise/bleed easily.   Skin: Negative for dry skin and nail changes.   Musculoskeletal: Negative for arthritis, back pain, falls, joint pain, myalgias and neck pain.   Gastrointestinal: Negative for bloating, abdominal pain, change in bowel habit and constipation.   Genitourinary: Negative for bladder incontinence, dysuria, flank pain, genital sores and missed menses.   Neurological: Negative for aphonia, brief paralysis, difficulty with concentration, dizziness and weakness.   Psychiatric/Behavioral: Negative for altered mental status and memory loss. The patient does not have insomnia.    Allergic/Immunologic: Negative for environmental allergies.     Objective:     Vital Signs (Most Recent):  Temp: 97.8 °F (36.6 °C) (07/14/19 1100)  Pulse: 79 (07/14/19 1230)  Resp: (!) 41 (07/14/19 1230)  BP: (!) 146/71 (07/14/19 1230)  SpO2: 97 % (07/14/19 1230) Vital Signs (24h Range):  Temp:  [97.4 °F (36.3 °C)-98.8 °F (37.1 °C)] 97.8 °F (36.6 °C)  Pulse:  [79-80] 79  Resp:  [14-41] 41  SpO2:  [90 %-100 %] 97 %  BP: (108-169)/(56-88) 146/71     Weight: (!) 146 kg (321 lb 14 oz)  Body mass index is 42.47 kg/m².     SpO2: 97 %  O2 Device (Oxygen Therapy): room air      Intake/Output Summary (Last 24 hours) at 7/14/2019 1338  Last data filed at 7/14/2019 0930  Gross per 24 hour   Intake  570 ml   Output 425 ml   Net 145 ml       Lines/Drains/Airways     Central Venous Catheter Line                 Introducer 07/09/19 1410 right internal jugular 4 days          Line                 Pacer Wires 07/09/19 1415 4 days          Peripheral Intravenous Line                 Peripheral IV - Single Lumen 07/09/19 20 G Right Antecubital 5 days         Peripheral IV - Single Lumen 07/10/19 1627 20 G Left Antecubital 3 days                Physical Exam   Constitutional: He is oriented to person, place, and time. He appears well-developed and well-nourished. He is not intubated.   HENT:   Head: Normocephalic and atraumatic.   Right Ear: External ear normal.   Left Ear: External ear normal.   Mouth/Throat: Oropharynx is clear and moist.   Eyes: Pupils are equal, round, and reactive to light. Conjunctivae and EOM are normal. Right eye exhibits no discharge. Left eye exhibits no discharge. No scleral icterus.   Neck: Normal range of motion. Neck supple. Normal carotid pulses, no hepatojugular reflux and no JVD present. Carotid bruit is not present. No tracheal deviation present. No thyromegaly present.   Cardiovascular: Normal rate, regular rhythm, S1 normal and S2 normal.  No extrasystoles are present. PMI is not displaced. Exam reveals no gallop, no S3, no distant heart sounds, no friction rub and no midsystolic click.   No murmur heard.  Pulses:       Carotid pulses are 2+ on the right side, and 2+ on the left side.       Radial pulses are 2+ on the right side, and 2+ on the left side.        Femoral pulses are 2+ on the right side, and 2+ on the left side.       Popliteal pulses are 2+ on the right side, and 2+ on the left side.        Dorsalis pedis pulses are 2+ on the right side, and 2+ on the left side.        Posterior tibial pulses are 2+ on the right side, and 2+ on the left side.   Pulmonary/Chest: Effort normal and breath sounds normal. No accessory muscle usage or stridor. No apnea, no tachypnea and no  bradypnea. He is not intubated. No respiratory distress. He has no decreased breath sounds. He has no wheezes. He has no rales. He exhibits no tenderness and no bony tenderness.   Abdominal: He exhibits no distension, no pulsatile liver, no abdominal bruit, no ascites, no pulsatile midline mass and no mass. There is no tenderness. There is no rebound and no guarding.   Musculoskeletal: Normal range of motion. He exhibits no edema or tenderness.   Lymphadenopathy:     He has no cervical adenopathy.   Neurological: He is alert and oriented to person, place, and time. He has normal reflexes. No cranial nerve deficit. Coordination normal.   Skin: Skin is warm. No rash noted. No erythema. No pallor.   Psychiatric: He has a normal mood and affect. His behavior is normal. Judgment and thought content normal.       Significant Labs:     LABS  CBC  Recent Labs   Lab 07/10/19  0522 07/11/19  0501 07/12/19  0319   WBC 8.46 7.67 7.94   RBC 3.85* 3.95* 4.12*   HGB 11.4* 11.9* 12.4*   HCT 36.2* 37.4* 38.7*   * 140* 136*   MCV 94 95 94   MCH 29.6 30.1 30.1   MCHC 31.5* 31.8* 32.0     BMP  Recent Labs   Lab 07/12/19  0319 07/13/19  0411 07/14/19  0631    138 137   K 4.1 4.3 4.3   CO2 27 21* 24    105 104   BUN 35* 42* 45*   CREATININE 1.9* 2.1* 2.2*   * 206* 218*       POCT-Glucose  POCT Glucose   Date Value Ref Range Status   07/14/2019 336 (H) 70 - 110 mg/dL Final   07/14/2019 252 (H) 70 - 110 mg/dL Final   07/13/2019 286 (H) 70 - 110 mg/dL Final   07/13/2019 208 (H) 70 - 110 mg/dL Final   07/13/2019 231 (H) 70 - 110 mg/dL Final   07/13/2019 214 (H) 70 - 110 mg/dL Final   07/12/2019 235 (H) 70 - 110 mg/dL Final   07/12/2019 189 (H) 70 - 110 mg/dL Final   07/12/2019 224 (H) 70 - 110 mg/dL Final   07/12/2019 211 (H) 70 - 110 mg/dL Final   07/11/2019 244 (H) 70 - 110 mg/dL Final   07/11/2019 226 (H) 70 - 110 mg/dL Final       Recent Labs   Lab 07/11/19  0501 07/12/19  0319 07/13/19  0411 07/14/19  0631    CALCIUM 8.7 8.9 9.0 8.8   MG 1.7 1.6 1.8 1.5*   PHOS 3.4  --   --   --      LFT  Recent Labs   Lab 07/10/19  0522 07/12/19  0319   PROT 5.8* 6.0   ALBUMIN 2.9* 2.8*   BILITOT 0.9 0.5   AST 23 15   ALKPHOS 179* 183*   ALT 52* 31     GFR     COAGS  No results for input(s): PT, INR, APTT in the last 168 hours.  CE  Recent Labs   Lab 07/10/19  0945   TROPONINI 0.393*       LAST HbA1c  Lab Results   Component Value Date    HGBA1C 6.5 (H) 09/19/2015       Lipid panel  Lab Results   Component Value Date    CHOL 82 (L) 07/13/2019     Lab Results   Component Value Date    HDL 27 (L) 07/13/2019     Lab Results   Component Value Date    LDLCALC 38.0 (L) 07/13/2019     Lab Results   Component Value Date    TRIG 85 07/13/2019     Lab Results   Component Value Date    CHOLHDL 32.9 07/13/2019          Significant Imaging:

## 2019-07-14 NOTE — ASSESSMENT & PLAN NOTE
Baseline Cr 1.4-1.5   CLEMENCIA likely 2/2 hypoperfusion with severe bradycardia (20 bpm) CHB  Present Cr 2.1  Holding ACEI, Lasix and Aldactone

## 2019-07-14 NOTE — ASSESSMENT & PLAN NOTE
TVP placed emergently on admission, HR 20   AV silvia blockers on hold  Concerned about ischemic etiology as patient has frequent VT since admission   LVEF is down to 30%   [facility] pending stabilization of renal function,   Will likely need ICD after [facility]

## 2019-07-14 NOTE — PLAN OF CARE
Problem: Adult Inpatient Plan of Care  Goal: Plan of Care Review  Patient on oxygen with documented flow.  Will attempt to wean per O2 order protocol.

## 2019-07-14 NOTE — ASSESSMENT & PLAN NOTE
Concerning for ischemic etiology, frequent runs up to 30 beats, asymptomatic   BB on hold given CHB  Amiodarone converted to oral   LHC planned after his renal function returns to baseline

## 2019-07-14 NOTE — PLAN OF CARE
Pt VSS, afebrile. Wife at bedside. TVP in place and capturing and sensing. No problems identified. Pt comfortable. Pt did have 2 liquid BM over night. Urinating well. Pt awaiting pacemaker on Monday. Call light within reach. Bed in lowest position. Bed alarm set. Pt watching news with wife. Will continue to monitor.

## 2019-07-14 NOTE — PROGRESS NOTES
[facility]  Cardiology  Progress Note    Patient Name: Houston Jc  MRN: [id number]  Admission Date: 7/9/2019  Hospital Length of Stay: 5 days  Code Status: Full Code   Attending Physician: Milagros Gross MD   Primary Care Physician: Zak Hamilton MD  Expected Discharge Date:   Principal Problem:Complete heart block    Subjective:     Hospital Course:   07/09/2019 S/p R IJ Temporary pacemaker for CH, rate 80bpm. BB on hold.  07/10/2019 Patient with frequent runs of VT overnight. TNI pending. NPO for LHC today. Amio infusing. LVEF 30% with RV dysfunction and global hypokinesis.  LHC deferred to 07/11 2/2 patient was given a diet and consumed the entire meal.   07/11/2019 LHC cancelled for today, renal function worsening. Holding nephrotoxic agents and Lasix. Remains in CHB with runs of SVT (chronic RBB that resembles VT) and short runs of nonsustained VT. Amio continued. NPO p MN for potential LHC in AM.   07/12/2019 Patient remains in CHB, Hemodynamically stable with TVP. No CV complaints. Amiodarone converted to oral. No further VT. Renal function stable. Defer LHC to Monday to give kidneys time to recover.   7/13/2019: seen with family at the bedside. No acute issues. He will have LHC (7/15/2019) and either PPM or ICD depending on coronary anatomy.   1/14/219: seen this am at the bedside with family. No acute cardiac issues. Pacing as set with TVP. Cr higher at 2.1. + clear stool without abdominal pain.     Interval History:   Review of Systems   Constitution: Negative for diaphoresis, night sweats, weight gain and weight loss.   HENT: Negative for congestion.    Eyes: Negative for blurred vision, discharge and double vision.   Cardiovascular: Negative for chest pain, claudication, cyanosis, dyspnea on exertion, irregular heartbeat, leg swelling, near-syncope, orthopnea, palpitations, paroxysmal nocturnal dyspnea and syncope.   Respiratory: Negative for cough, shortness of breath and  wheezing.    Endocrine: Negative for cold intolerance, heat intolerance and polyphagia.   Hematologic/Lymphatic: Negative for adenopathy and bleeding problem. Does not bruise/bleed easily.   Skin: Negative for dry skin and nail changes.   Musculoskeletal: Negative for arthritis, back pain, falls, joint pain, myalgias and neck pain.   Gastrointestinal: Negative for bloating, abdominal pain, change in bowel habit and constipation.   Genitourinary: Negative for bladder incontinence, dysuria, flank pain, genital sores and missed menses.   Neurological: Negative for aphonia, brief paralysis, difficulty with concentration, dizziness and weakness.   Psychiatric/Behavioral: Negative for altered mental status and memory loss. The patient does not have insomnia.    Allergic/Immunologic: Negative for environmental allergies.     Objective:     Vital Signs (Most Recent):  Temp: 97.8 °F (36.6 °C) (07/14/19 1100)  Pulse: 79 (07/14/19 1230)  Resp: (!) 41 (07/14/19 1230)  BP: (!) 146/71 (07/14/19 1230)  SpO2: 97 % (07/14/19 1230) Vital Signs (24h Range):  Temp:  [97.4 °F (36.3 °C)-98.8 °F (37.1 °C)] 97.8 °F (36.6 °C)  Pulse:  [79-80] 79  Resp:  [14-41] 41  SpO2:  [90 %-100 %] 97 %  BP: (108-169)/(56-88) 146/71     Weight: (!) 146 kg (321 lb 14 oz)  Body mass index is 42.47 kg/m².     SpO2: 97 %  O2 Device (Oxygen Therapy): room air      Intake/Output Summary (Last 24 hours) at 7/14/2019 1338  Last data filed at 7/14/2019 0930  Gross per 24 hour   Intake 570 ml   Output 425 ml   Net 145 ml       Lines/Drains/Airways     Central Venous Catheter Line                 Introducer 07/09/19 1410 right internal jugular 4 days          Line                 Pacer Wires 07/09/19 1415 4 days          Peripheral Intravenous Line                 Peripheral IV - Single Lumen 07/09/19 20 G Right Antecubital 5 days         Peripheral IV - Single Lumen 07/10/19 1627 20 G Left Antecubital 3 days                Physical Exam   Constitutional: He is  oriented to person, place, and time. He appears well-developed and well-nourished. He is not intubated.   HENT:   Head: Normocephalic and atraumatic.   Right Ear: External ear normal.   Left Ear: External ear normal.   Mouth/Throat: Oropharynx is clear and moist.   Eyes: Pupils are equal, round, and reactive to light. Conjunctivae and EOM are normal. Right eye exhibits no discharge. Left eye exhibits no discharge. No scleral icterus.   Neck: Normal range of motion. Neck supple. Normal carotid pulses, no hepatojugular reflux and no JVD present. Carotid bruit is not present. No tracheal deviation present. No thyromegaly present.   Cardiovascular: Normal rate, regular rhythm, S1 normal and S2 normal.  No extrasystoles are present. PMI is not displaced. Exam reveals no gallop, no S3, no distant heart sounds, no friction rub and no midsystolic click.   No murmur heard.  Pulses:       Carotid pulses are 2+ on the right side, and 2+ on the left side.       Radial pulses are 2+ on the right side, and 2+ on the left side.        Femoral pulses are 2+ on the right side, and 2+ on the left side.       Popliteal pulses are 2+ on the right side, and 2+ on the left side.        Dorsalis pedis pulses are 2+ on the right side, and 2+ on the left side.        Posterior tibial pulses are 2+ on the right side, and 2+ on the left side.   Pulmonary/Chest: Effort normal and breath sounds normal. No accessory muscle usage or stridor. No apnea, no tachypnea and no bradypnea. He is not intubated. No respiratory distress. He has no decreased breath sounds. He has no wheezes. He has no rales. He exhibits no tenderness and no bony tenderness.   Abdominal: He exhibits no distension, no pulsatile liver, no abdominal bruit, no ascites, no pulsatile midline mass and no mass. There is no tenderness. There is no rebound and no guarding.   Musculoskeletal: Normal range of motion. He exhibits no edema or tenderness.   Lymphadenopathy:     He has no  cervical adenopathy.   Neurological: He is alert and oriented to person, place, and time. He has normal reflexes. No cranial nerve deficit. Coordination normal.   Skin: Skin is warm. No rash noted. No erythema. No pallor.   Psychiatric: He has a normal mood and affect. His behavior is normal. Judgment and thought content normal.       Significant Labs:     LABS  CBC  Recent Labs   Lab 07/10/19  0522 07/11/19  0501 07/12/19  0319   WBC 8.46 7.67 7.94   RBC 3.85* 3.95* 4.12*   HGB 11.4* 11.9* 12.4*   HCT 36.2* 37.4* 38.7*   * 140* 136*   MCV 94 95 94   MCH 29.6 30.1 30.1   MCHC 31.5* 31.8* 32.0     BMP  Recent Labs   Lab 07/12/19 0319 07/13/19 0411 07/14/19  0631    138 137   K 4.1 4.3 4.3   CO2 27 21* 24    105 104   BUN 35* 42* 45*   CREATININE 1.9* 2.1* 2.2*   * 206* 218*       POCT-Glucose  POCT Glucose   Date Value Ref Range Status   07/14/2019 336 (H) 70 - 110 mg/dL Final   07/14/2019 252 (H) 70 - 110 mg/dL Final   07/13/2019 286 (H) 70 - 110 mg/dL Final   07/13/2019 208 (H) 70 - 110 mg/dL Final   07/13/2019 231 (H) 70 - 110 mg/dL Final   07/13/2019 214 (H) 70 - 110 mg/dL Final   07/12/2019 235 (H) 70 - 110 mg/dL Final   07/12/2019 189 (H) 70 - 110 mg/dL Final   07/12/2019 224 (H) 70 - 110 mg/dL Final   07/12/2019 211 (H) 70 - 110 mg/dL Final   07/11/2019 244 (H) 70 - 110 mg/dL Final   07/11/2019 226 (H) 70 - 110 mg/dL Final       Recent Labs   Lab 07/11/19  0501 07/12/19 0319 07/13/19 0411 07/14/19  0631   CALCIUM 8.7 8.9 9.0 8.8   MG 1.7 1.6 1.8 1.5*   PHOS 3.4  --   --   --      LFT  Recent Labs   Lab 07/10/19  0522 07/12/19  0319   PROT 5.8* 6.0   ALBUMIN 2.9* 2.8*   BILITOT 0.9 0.5   AST 23 15   ALKPHOS 179* 183*   ALT 52* 31     GFR     COAGS  No results for input(s): PT, INR, APTT in the last 168 hours.  CE  Recent Labs   Lab 07/10/19  0945   TROPONINI 0.393*       LAST HbA1c  Lab Results   Component Value Date    HGBA1C 6.5 (H) 09/19/2015       Lipid panel  Lab Results    Component Value Date    CHOL 82 (L) 07/13/2019     Lab Results   Component Value Date    HDL 27 (L) 07/13/2019     Lab Results   Component Value Date    LDLCALC 38.0 (L) 07/13/2019     Lab Results   Component Value Date    TRIG 85 07/13/2019     Lab Results   Component Value Date    CHOLHDL 32.9 07/13/2019          Significant Imaging:             Assessment and Plan:     Brief HPI:     * Complete heart block  TVP placed emergently on admission, HR 20   AV silvia blockers on hold  Concerned about ischemic etiology as patient has frequent VT since admission   LVEF is down to 30%   [facility] pending stabilization of renal function,   Will likely need ICD after [facility]     Acute systolic heart failure  · Moderately decreased left ventricular systolic function. The estimated ejection fraction is 30%  · Grade III (severe) left ventricular diastolic dysfunction consistent with restrictive physiology.  · Moderate left atrial enlargement.  · Low normal right ventricular systolic function.  · Normal central venous pressure (3 mm Hg).    Newly depressed LVEF, volume overloaded on admission, diuresed and now euvolemic    BB on hold 2/2 CHB- washout complete 07/12/2019  ACEI and Lasix on hold 2/2 CLEMENCIA      CLEMENCIA (acute kidney injury)  Baseline Cr 1.4-1.5   CLEMENCIA likely 2/2 hypoperfusion with severe bradycardia (20 bpm) CHB  Present Cr 2.1  Holding ACEI, Lasix and Aldactone    VT (ventricular tachycardia)  Concerning for ischemic etiology, frequent runs up to 30 beats, asymptomatic   BB on hold given CHB  Amiodarone converted to oral   [facility] planned after his renal function returns to baseline    Morbid obesity      Weight loss      Hypertension  Holding home BB, ACEI, Aldactone given renal function  Hydralazine PRN SBP >160  BP 100s-120s        VTE Risk Mitigation (From admission, onward)        Ordered     enoxaparin injection 40 mg  Daily      07/12/19 0927          Dejuan Cody MD  Cardiology  [facility]

## 2019-07-14 NOTE — PLAN OF CARE
Problem: Adult Inpatient Plan of Care  Goal: Plan of Care Review  Outcome: Ongoing (interventions implemented as appropriate)  Mr. Jc, will call for assistance when needed, bed in low position, wheels locked, call light within reach, skid resistance socks on, bed alarm on, he will be offered assistance every hour to prevent injury from a fall. He will be assisted and encouraged to shift his weight and turn every 2 hours, heels floated off the mattress, foam dressing to sacrum, to prevent pressure areas to the skin, his cardiac cath / pacemaker implant has been postponed for tomorrow due to impaired kidney function. The transvenous pacemaker will continued he will be able to go to the cath lab.

## 2019-07-15 LAB
ANION GAP SERPL CALC-SCNC: 10 MMOL/L (ref 8–16)
ANION GAP SERPL CALC-SCNC: 8 MMOL/L (ref 8–16)
BUN SERPL-MCNC: 41 MG/DL (ref 8–23)
BUN SERPL-MCNC: 44 MG/DL (ref 8–23)
CALCIUM SERPL-MCNC: 8.8 MG/DL (ref 8.7–10.5)
CALCIUM SERPL-MCNC: 8.8 MG/DL (ref 8.7–10.5)
CHLORIDE SERPL-SCNC: 104 MMOL/L (ref 95–110)
CHLORIDE SERPL-SCNC: 104 MMOL/L (ref 95–110)
CO2 SERPL-SCNC: 24 MMOL/L (ref 23–29)
CO2 SERPL-SCNC: 25 MMOL/L (ref 23–29)
CREAT SERPL-MCNC: 2.1 MG/DL (ref 0.5–1.4)
CREAT SERPL-MCNC: 2.2 MG/DL (ref 0.5–1.4)
EST. GFR  (AFRICAN AMERICAN): 34 ML/MIN/1.73 M^2
EST. GFR  (AFRICAN AMERICAN): 36 ML/MIN/1.73 M^2
EST. GFR  (NON AFRICAN AMERICAN): 29 ML/MIN/1.73 M^2
EST. GFR  (NON AFRICAN AMERICAN): 31 ML/MIN/1.73 M^2
GLUCOSE SERPL-MCNC: 224 MG/DL (ref 70–110)
GLUCOSE SERPL-MCNC: 244 MG/DL (ref 70–110)
MAGNESIUM SERPL-MCNC: 1.5 MG/DL (ref 1.6–2.6)
MAGNESIUM SERPL-MCNC: 1.7 MG/DL (ref 1.6–2.6)
POCT GLUCOSE: 184 MG/DL (ref 70–110)
POCT GLUCOSE: 231 MG/DL (ref 70–110)
POCT GLUCOSE: 240 MG/DL (ref 70–110)
POCT GLUCOSE: 247 MG/DL (ref 70–110)
POTASSIUM SERPL-SCNC: 4 MMOL/L (ref 3.5–5.1)
POTASSIUM SERPL-SCNC: 4.2 MMOL/L (ref 3.5–5.1)
SODIUM SERPL-SCNC: 137 MMOL/L (ref 136–145)
SODIUM SERPL-SCNC: 138 MMOL/L (ref 136–145)

## 2019-07-15 PROCEDURE — 25000003 PHARM REV CODE 250: Performed by: NURSE PRACTITIONER

## 2019-07-15 PROCEDURE — 99291 CRITICAL CARE FIRST HOUR: CPT | Mod: ,,, | Performed by: INTERNAL MEDICINE

## 2019-07-15 PROCEDURE — 25000003 PHARM REV CODE 250: Performed by: INTERNAL MEDICINE

## 2019-07-15 PROCEDURE — 63600175 PHARM REV CODE 636 W HCPCS: Performed by: NURSE PRACTITIONER

## 2019-07-15 PROCEDURE — 83735 ASSAY OF MAGNESIUM: CPT

## 2019-07-15 PROCEDURE — 80048 BASIC METABOLIC PNL TOTAL CA: CPT | Mod: 91

## 2019-07-15 PROCEDURE — 20000000 HC ICU ROOM

## 2019-07-15 PROCEDURE — 99291 PR CRITICAL CARE, E/M 30-74 MINUTES: ICD-10-PCS | Mod: ,,, | Performed by: INTERNAL MEDICINE

## 2019-07-15 PROCEDURE — 36415 COLL VENOUS BLD VENIPUNCTURE: CPT

## 2019-07-15 PROCEDURE — 80048 BASIC METABOLIC PNL TOTAL CA: CPT

## 2019-07-15 RX ORDER — MAGNESIUM SULFATE HEPTAHYDRATE 40 MG/ML
2 INJECTION, SOLUTION INTRAVENOUS ONCE
Status: COMPLETED | OUTPATIENT
Start: 2019-07-15 | End: 2019-07-15

## 2019-07-15 RX ORDER — SODIUM CHLORIDE 9 MG/ML
INJECTION, SOLUTION INTRAVENOUS CONTINUOUS
Status: DISCONTINUED | OUTPATIENT
Start: 2019-07-15 | End: 2019-07-17 | Stop reason: HOSPADM

## 2019-07-15 RX ADMIN — ASPIRIN 81 MG: 81 TABLET, COATED ORAL at 08:07

## 2019-07-15 RX ADMIN — AMIODARONE HYDROCHLORIDE 400 MG: 200 TABLET ORAL at 08:07

## 2019-07-15 RX ADMIN — INSULIN ASPART 2 UNITS: 100 INJECTION, SOLUTION INTRAVENOUS; SUBCUTANEOUS at 05:07

## 2019-07-15 RX ADMIN — HYDRALAZINE HYDROCHLORIDE 25 MG: 25 TABLET, FILM COATED ORAL at 05:07

## 2019-07-15 RX ADMIN — ALLOPURINOL 300 MG: 300 TABLET ORAL at 08:07

## 2019-07-15 RX ADMIN — MAGNESIUM SULFATE IN WATER 2 G: 40 INJECTION, SOLUTION INTRAVENOUS at 08:07

## 2019-07-15 RX ADMIN — DIPHENHYDRAMINE HYDROCHLORIDE 25 MG: 25 CAPSULE ORAL at 08:07

## 2019-07-15 RX ADMIN — INSULIN ASPART 1 UNITS: 100 INJECTION, SOLUTION INTRAVENOUS; SUBCUTANEOUS at 11:07

## 2019-07-15 RX ADMIN — POLYETHYLENE GLYCOL 3350 17 G: 17 POWDER, FOR SOLUTION ORAL at 02:07

## 2019-07-15 RX ADMIN — COLCHICINE 0.6 MG: 0.6 TABLET, FILM COATED ORAL at 08:07

## 2019-07-15 RX ADMIN — INSULIN ASPART 2 UNITS: 100 INJECTION, SOLUTION INTRAVENOUS; SUBCUTANEOUS at 08:07

## 2019-07-15 RX ADMIN — HYDRALAZINE HYDROCHLORIDE 25 MG: 25 TABLET, FILM COATED ORAL at 02:07

## 2019-07-15 RX ADMIN — ATORVASTATIN CALCIUM 80 MG: 40 TABLET, FILM COATED ORAL at 08:07

## 2019-07-15 RX ADMIN — ENOXAPARIN SODIUM 40 MG: 100 INJECTION SUBCUTANEOUS at 05:07

## 2019-07-15 RX ADMIN — HYDRALAZINE HYDROCHLORIDE 25 MG: 25 TABLET, FILM COATED ORAL at 08:07

## 2019-07-15 RX ADMIN — PANTOPRAZOLE SODIUM 40 MG: 40 TABLET, DELAYED RELEASE ORAL at 08:07

## 2019-07-15 RX ADMIN — RAMELTEON 8 MG: 8 TABLET, FILM COATED ORAL at 08:07

## 2019-07-15 NOTE — ASSESSMENT & PLAN NOTE
Baseline Cr 1.4-1.5   CLEMENCIA likely 2/2 hypoperfusion with severe bradycardia (20 bpm) CHB  Present Cr 2.2  Holding ACEI, Lasix and Aldactone

## 2019-07-15 NOTE — PLAN OF CARE
Problem: Adult Inpatient Plan of Care  Goal: Plan of Care Review  Outcome: Ongoing (interventions implemented as appropriate)  Pt AAOx4, spirits down more this shift when pacemaker placement was pushed back again. VSS, no acute events. MIVF started. TVP with good capture, additional battery at bedside. Abd more firm than pt's normal, with clear mucusy BMs; KUB done.  Miralax given. Pt had 1 brown watery BM. Refer to flowsheets for vitals, gtts, and assessments. WCTM.

## 2019-07-15 NOTE — PROGRESS NOTES
[facility]  Cardiology  Progress Note    Patient Name: Houston Jc  MRN: [id number]  Admission Date: 7/9/2019  Hospital Length of Stay: 6 days  Code Status: Full Code   Attending Physician: Milagros Gross MD   Primary Care Physician: Zak Hamilton MD  Expected Discharge Date:   Principal Problem:Complete heart block    Subjective:     Hospital Course:   07/09/2019 S/p R IJ Temporary pacemaker for CH, rate 80bpm. BB on hold.  07/10/2019 Patient with frequent runs of VT overnight. TNI pending. NPO for LHC today. Amio infusing. LVEF 30% with RV dysfunction and global hypokinesis.  LHC deferred to 07/11 2/2 patient was given a diet and consumed the entire meal.   07/11/2019 LHC cancelled for today, renal function worsening. Holding nephrotoxic agents and Lasix. Remains in CHB with runs of SVT (chronic RBB that resembles VT) and short runs of nonsustained VT. Amio continued. NPO p MN for potential LHC in AM.   07/12/2019 Patient remains in CHB, Hemodynamically stable with TVP. No CV complaints. Amiodarone converted to oral. No further VT. Renal function stable. Defer LHC to Monday to give kidneys time to recover.   7/13/2019: seen with family at the bedside. No acute issues. He will have LHC (7/15/2019) and either PPM or ICD depending on coronary anatomy.   7/14/219: seen this am at the bedside with family. No acute cardiac issues. Pacing as set with TVP. Cr higher at 2.1. + clear stool without abdominal pain.   07/15/2019 Hemodynamically stable, remains pacer dependent. Renal function stable- Cr 2.2, Gentle hydration with IVF. NPO p MN for LHC.    Interval History:   Review of Systems   Constitution: Negative for diaphoresis, night sweats, weight gain and weight loss.   HENT: Negative for congestion.    Eyes: Negative for blurred vision, discharge and double vision.   Cardiovascular: Negative for chest pain, claudication, cyanosis, dyspnea on exertion, irregular heartbeat, leg swelling,  near-syncope, orthopnea, palpitations, paroxysmal nocturnal dyspnea and syncope.   Respiratory: Negative for cough, shortness of breath and wheezing.    Endocrine: Negative for cold intolerance, heat intolerance and polyphagia.   Hematologic/Lymphatic: Negative for adenopathy and bleeding problem. Does not bruise/bleed easily.   Skin: Negative for dry skin and nail changes.   Musculoskeletal: Negative for arthritis, back pain, falls, joint pain, myalgias and neck pain.   Gastrointestinal: Negative for bloating, abdominal pain, change in bowel habit and constipation.   Genitourinary: Negative for bladder incontinence, dysuria, flank pain, genital sores and missed menses.   Neurological: Negative for aphonia, brief paralysis, difficulty with concentration, dizziness and weakness.   Psychiatric/Behavioral: Negative for altered mental status and memory loss. The patient does not have insomnia.    Allergic/Immunologic: Negative for environmental allergies.     Objective:     Vital Signs (Most Recent):  Temp: 98.1 °F (36.7 °C) (07/15/19 0703)  Pulse: 79 (07/15/19 0830)  Resp: (!) 24 (07/15/19 0830)  BP: (!) 145/81 (07/15/19 0830)  SpO2: 97 % (07/15/19 0830) Vital Signs (24h Range):  Temp:  [97.8 °F (36.6 °C)-98.2 °F (36.8 °C)] 98.1 °F (36.7 °C)  Pulse:  [79-80] 79  Resp:  [13-41] 24  SpO2:  [93 %-100 %] 97 %  BP: (117-169)/() 145/81     Weight: (!) 146 kg (321 lb 14 oz)  Body mass index is 42.47 kg/m².     SpO2: 97 %  O2 Device (Oxygen Therapy): room air      Intake/Output Summary (Last 24 hours) at 7/15/2019 0954  Last data filed at 7/15/2019 0600  Gross per 24 hour   Intake 120 ml   Output 500 ml   Net -380 ml       Lines/Drains/Airways     Central Venous Catheter Line                 Introducer 07/09/19 1410 right internal jugular 5 days          Line                 Pacer Wires 07/09/19 1415 5 days          Peripheral Intravenous Line                 Peripheral IV - Single Lumen 07/09/19 20 G Right Antecubital 6  days         Peripheral IV - Single Lumen 07/10/19 1627 20 G Left Antecubital 4 days                Physical Exam   Constitutional: He is oriented to person, place, and time. He appears well-developed and well-nourished. He is not intubated.   HENT:   Head: Normocephalic and atraumatic.   Right Ear: External ear normal.   Left Ear: External ear normal.   Mouth/Throat: Oropharynx is clear and moist.   Eyes: Pupils are equal, round, and reactive to light. Conjunctivae and EOM are normal. Right eye exhibits no discharge. Left eye exhibits no discharge. No scleral icterus.   Neck: Normal range of motion. Neck supple. Normal carotid pulses, no hepatojugular reflux and no JVD present. Carotid bruit is not present. No tracheal deviation present. No thyromegaly present.   Cardiovascular: Normal rate, regular rhythm, S1 normal and S2 normal.  No extrasystoles are present. PMI is not displaced. Exam reveals no gallop, no S3, no distant heart sounds, no friction rub and no midsystolic click.   No murmur heard.  Pulses:       Carotid pulses are 2+ on the right side, and 2+ on the left side.       Radial pulses are 2+ on the right side, and 2+ on the left side.        Femoral pulses are 2+ on the right side, and 2+ on the left side.       Popliteal pulses are 2+ on the right side, and 2+ on the left side.        Dorsalis pedis pulses are 2+ on the right side, and 2+ on the left side.        Posterior tibial pulses are 2+ on the right side, and 2+ on the left side.   Pulmonary/Chest: Effort normal and breath sounds normal. No accessory muscle usage or stridor. No apnea, no tachypnea and no bradypnea. He is not intubated. No respiratory distress. He has no decreased breath sounds. He has no wheezes. He has no rales. He exhibits no tenderness and no bony tenderness.   Abdominal: He exhibits no distension, no pulsatile liver, no abdominal bruit, no ascites, no pulsatile midline mass and no mass. There is no tenderness. There is no  rebound and no guarding.   Musculoskeletal: Normal range of motion. He exhibits no edema or tenderness.   Lymphadenopathy:     He has no cervical adenopathy.   Neurological: He is alert and oriented to person, place, and time. He has normal reflexes. No cranial nerve deficit. Coordination normal.   Skin: Skin is warm. No rash noted. No erythema. No pallor.   Psychiatric: He has a normal mood and affect. His behavior is normal. Judgment and thought content normal.       Significant Labs:     LABS  CBC  Recent Labs   Lab 07/10/19  0522 07/11/19  0501 07/12/19  0319   WBC 8.46 7.67 7.94   RBC 3.85* 3.95* 4.12*   HGB 11.4* 11.9* 12.4*   HCT 36.2* 37.4* 38.7*   * 140* 136*   MCV 94 95 94   MCH 29.6 30.1 30.1   MCHC 31.5* 31.8* 32.0     BMP  Recent Labs   Lab 07/13/19  0411 07/14/19  0631 07/15/19  0421    137 137   K 4.3 4.3 4.0   CO2 21* 24 25    104 104   BUN 42* 45* 44*   CREATININE 2.1* 2.2* 2.2*   * 218* 224*       POCT-Glucose  POCT Glucose   Date Value Ref Range Status   07/15/2019 240 (H) 70 - 110 mg/dL Final   07/14/2019 242 (H) 70 - 110 mg/dL Final   07/14/2019 232 (H) 70 - 110 mg/dL Final   07/14/2019 336 (H) 70 - 110 mg/dL Final   07/14/2019 252 (H) 70 - 110 mg/dL Final   07/13/2019 286 (H) 70 - 110 mg/dL Final   07/13/2019 208 (H) 70 - 110 mg/dL Final   07/13/2019 231 (H) 70 - 110 mg/dL Final   07/13/2019 214 (H) 70 - 110 mg/dL Final   07/12/2019 235 (H) 70 - 110 mg/dL Final   07/12/2019 189 (H) 70 - 110 mg/dL Final   07/12/2019 224 (H) 70 - 110 mg/dL Final       Recent Labs   Lab 07/11/19  0501  07/13/19  0411 07/14/19  0631 07/15/19  0421   CALCIUM 8.7   < > 9.0 8.8 8.8   MG 1.7   < > 1.8 1.5* 1.5*   PHOS 3.4  --   --   --   --     < > = values in this interval not displayed.     LFT  Recent Labs   Lab 07/10/19  0522 07/12/19  0319   PROT 5.8* 6.0   ALBUMIN 2.9* 2.8*   BILITOT 0.9 0.5   AST 23 15   ALKPHOS 179* 183*   ALT 52* 31     GFR     COAGS  No results for input(s): PT, INR,  APTT in the last 168 hours.  CE  Recent Labs   Lab 07/10/19  0945   TROPONINI 0.393*       LAST HbA1c  Lab Results   Component Value Date    HGBA1C 6.5 (H) 09/19/2015       Lipid panel  Lab Results   Component Value Date    CHOL 82 (L) 07/13/2019     Lab Results   Component Value Date    HDL 27 (L) 07/13/2019     Lab Results   Component Value Date    LDLCALC 38.0 (L) 07/13/2019     Lab Results   Component Value Date    TRIG 85 07/13/2019     Lab Results   Component Value Date    CHOLHDL 32.9 07/13/2019          Significant Imaging:             Assessment and Plan:     Brief HPI: Patient seen this morning on rounds without CV complaint. Remains pacer dependent.     * Complete heart block  TVP placed emergently on admission, HR 20   AV silvia blockers on hold  Concerned about ischemic etiology as patient has frequent VT since admission   LVEF is down to 30%   [facility] pending stabilization of renal function, Will likely need ICD after [facility]     Acute systolic heart failure  · Moderately decreased left ventricular systolic function. The estimated ejection fraction is 30%  · Grade III (severe) left ventricular diastolic dysfunction consistent with restrictive physiology.  · Moderate left atrial enlargement.  · Low normal right ventricular systolic function.  · Normal central venous pressure (3 mm Hg).    Newly depressed LVEF, volume overloaded on admission, diuresed and now euvolemic    BB on hold 2/2 CHB- washout complete 07/12/2019  ACEI and Lasix on hold 2/2 CLEMENCIA      CLEMENCIA (acute kidney injury)  Baseline Cr 1.4-1.5   CLEMENCIA likely 2/2 hypoperfusion with severe bradycardia (20 bpm) CHB  Present Cr 2.2  Holding ACEI, Lasix and Aldactone    VT (ventricular tachycardia)  Concerning for ischemic etiology, frequent runs up to 30 beats, asymptomatic   BB on hold given CHB  Amiodarone  [facility] planned once renal function stabilizes     Morbid obesity  Weight loss      Hypertension  Holding home BB, ACEI, Aldactone given renal  function  Hydralazine PRN SBP >160          VTE Risk Mitigation (From admission, onward)        Ordered     enoxaparin injection 40 mg  Daily      07/12/19 3320          Bala Carvajal NP  Cardiology  [facility]

## 2019-07-15 NOTE — PLAN OF CARE
Problem: Adult Inpatient Plan of Care  Goal: Plan of Care Review  Outcome: Ongoing (interventions implemented as appropriate)     07/15/19 4070   Plan of Care Review   Plan of Care Reviewed With patient;spouse   Progress no change   Patient resting in bed with wife at bedside. No acute events overnight. TVP remains in place. 100% paced at 80 bpm. Site asymptomatic. Vitals stable. Continuous ICU monitoring maintained.

## 2019-07-15 NOTE — SUBJECTIVE & OBJECTIVE
Interval History:   Review of Systems   Constitution: Negative for diaphoresis, night sweats, weight gain and weight loss.   HENT: Negative for congestion.    Eyes: Negative for blurred vision, discharge and double vision.   Cardiovascular: Negative for chest pain, claudication, cyanosis, dyspnea on exertion, irregular heartbeat, leg swelling, near-syncope, orthopnea, palpitations, paroxysmal nocturnal dyspnea and syncope.   Respiratory: Negative for cough, shortness of breath and wheezing.    Endocrine: Negative for cold intolerance, heat intolerance and polyphagia.   Hematologic/Lymphatic: Negative for adenopathy and bleeding problem. Does not bruise/bleed easily.   Skin: Negative for dry skin and nail changes.   Musculoskeletal: Negative for arthritis, back pain, falls, joint pain, myalgias and neck pain.   Gastrointestinal: Negative for bloating, abdominal pain, change in bowel habit and constipation.   Genitourinary: Negative for bladder incontinence, dysuria, flank pain, genital sores and missed menses.   Neurological: Negative for aphonia, brief paralysis, difficulty with concentration, dizziness and weakness.   Psychiatric/Behavioral: Negative for altered mental status and memory loss. The patient does not have insomnia.    Allergic/Immunologic: Negative for environmental allergies.     Objective:     Vital Signs (Most Recent):  Temp: 98.1 °F (36.7 °C) (07/15/19 0703)  Pulse: 79 (07/15/19 0830)  Resp: (!) 24 (07/15/19 0830)  BP: (!) 145/81 (07/15/19 0830)  SpO2: 97 % (07/15/19 0830) Vital Signs (24h Range):  Temp:  [97.8 °F (36.6 °C)-98.2 °F (36.8 °C)] 98.1 °F (36.7 °C)  Pulse:  [79-80] 79  Resp:  [13-41] 24  SpO2:  [93 %-100 %] 97 %  BP: (117-169)/() 145/81     Weight: (!) 146 kg (321 lb 14 oz)  Body mass index is 42.47 kg/m².     SpO2: 97 %  O2 Device (Oxygen Therapy): room air      Intake/Output Summary (Last 24 hours) at 7/15/2019 0954  Last data filed at 7/15/2019 0600  Gross per 24 hour   Intake  120 ml   Output 500 ml   Net -380 ml       Lines/Drains/Airways     Central Venous Catheter Line                 Introducer 07/09/19 1410 right internal jugular 5 days          Line                 Pacer Wires 07/09/19 1415 5 days          Peripheral Intravenous Line                 Peripheral IV - Single Lumen 07/09/19 20 G Right Antecubital 6 days         Peripheral IV - Single Lumen 07/10/19 1627 20 G Left Antecubital 4 days                Physical Exam   Constitutional: He is oriented to person, place, and time. He appears well-developed and well-nourished. He is not intubated.   HENT:   Head: Normocephalic and atraumatic.   Right Ear: External ear normal.   Left Ear: External ear normal.   Mouth/Throat: Oropharynx is clear and moist.   Eyes: Pupils are equal, round, and reactive to light. Conjunctivae and EOM are normal. Right eye exhibits no discharge. Left eye exhibits no discharge. No scleral icterus.   Neck: Normal range of motion. Neck supple. Normal carotid pulses, no hepatojugular reflux and no JVD present. Carotid bruit is not present. No tracheal deviation present. No thyromegaly present.   Cardiovascular: Normal rate, regular rhythm, S1 normal and S2 normal.  No extrasystoles are present. PMI is not displaced. Exam reveals no gallop, no S3, no distant heart sounds, no friction rub and no midsystolic click.   No murmur heard.  Pulses:       Carotid pulses are 2+ on the right side, and 2+ on the left side.       Radial pulses are 2+ on the right side, and 2+ on the left side.        Femoral pulses are 2+ on the right side, and 2+ on the left side.       Popliteal pulses are 2+ on the right side, and 2+ on the left side.        Dorsalis pedis pulses are 2+ on the right side, and 2+ on the left side.        Posterior tibial pulses are 2+ on the right side, and 2+ on the left side.   Pulmonary/Chest: Effort normal and breath sounds normal. No accessory muscle usage or stridor. No apnea, no tachypnea and no  bradypnea. He is not intubated. No respiratory distress. He has no decreased breath sounds. He has no wheezes. He has no rales. He exhibits no tenderness and no bony tenderness.   Abdominal: He exhibits no distension, no pulsatile liver, no abdominal bruit, no ascites, no pulsatile midline mass and no mass. There is no tenderness. There is no rebound and no guarding.   Musculoskeletal: Normal range of motion. He exhibits no edema or tenderness.   Lymphadenopathy:     He has no cervical adenopathy.   Neurological: He is alert and oriented to person, place, and time. He has normal reflexes. No cranial nerve deficit. Coordination normal.   Skin: Skin is warm. No rash noted. No erythema. No pallor.   Psychiatric: He has a normal mood and affect. His behavior is normal. Judgment and thought content normal.       Significant Labs:     LABS  CBC  Recent Labs   Lab 07/10/19  0522 07/11/19  0501 07/12/19  0319   WBC 8.46 7.67 7.94   RBC 3.85* 3.95* 4.12*   HGB 11.4* 11.9* 12.4*   HCT 36.2* 37.4* 38.7*   * 140* 136*   MCV 94 95 94   MCH 29.6 30.1 30.1   MCHC 31.5* 31.8* 32.0     BMP  Recent Labs   Lab 07/13/19  0411 07/14/19  0631 07/15/19  0421    137 137   K 4.3 4.3 4.0   CO2 21* 24 25    104 104   BUN 42* 45* 44*   CREATININE 2.1* 2.2* 2.2*   * 218* 224*       POCT-Glucose  POCT Glucose   Date Value Ref Range Status   07/15/2019 240 (H) 70 - 110 mg/dL Final   07/14/2019 242 (H) 70 - 110 mg/dL Final   07/14/2019 232 (H) 70 - 110 mg/dL Final   07/14/2019 336 (H) 70 - 110 mg/dL Final   07/14/2019 252 (H) 70 - 110 mg/dL Final   07/13/2019 286 (H) 70 - 110 mg/dL Final   07/13/2019 208 (H) 70 - 110 mg/dL Final   07/13/2019 231 (H) 70 - 110 mg/dL Final   07/13/2019 214 (H) 70 - 110 mg/dL Final   07/12/2019 235 (H) 70 - 110 mg/dL Final   07/12/2019 189 (H) 70 - 110 mg/dL Final   07/12/2019 224 (H) 70 - 110 mg/dL Final       Recent Labs   Lab 07/11/19  0501  07/13/19  0411 07/14/19  0631 07/15/19  0421    CALCIUM 8.7   < > 9.0 8.8 8.8   MG 1.7   < > 1.8 1.5* 1.5*   PHOS 3.4  --   --   --   --     < > = values in this interval not displayed.     LFT  Recent Labs   Lab 07/10/19  0522 07/12/19  0319   PROT 5.8* 6.0   ALBUMIN 2.9* 2.8*   BILITOT 0.9 0.5   AST 23 15   ALKPHOS 179* 183*   ALT 52* 31     GFR     COAGS  No results for input(s): PT, INR, APTT in the last 168 hours.  CE  Recent Labs   Lab 07/10/19  0945   TROPONINI 0.393*       LAST HbA1c  Lab Results   Component Value Date    HGBA1C 6.5 (H) 09/19/2015       Lipid panel  Lab Results   Component Value Date    CHOL 82 (L) 07/13/2019     Lab Results   Component Value Date    HDL 27 (L) 07/13/2019     Lab Results   Component Value Date    LDLCALC 38.0 (L) 07/13/2019     Lab Results   Component Value Date    TRIG 85 07/13/2019     Lab Results   Component Value Date    CHOLHDL 32.9 07/13/2019          Significant Imaging:

## 2019-07-15 NOTE — ASSESSMENT & PLAN NOTE
Concerning for ischemic etiology, frequent runs up to 30 beats, asymptomatic   BB on hold given CHB  Amiodarone  LHC planned once renal function stabilizes

## 2019-07-15 NOTE — ASSESSMENT & PLAN NOTE
TVP placed emergently on admission, HR 20   AV silvia blockers on hold  Concerned about ischemic etiology as patient has frequent VT since admission   LVEF is down to 30%   [facility] pending stabilization of renal function, Will likely need ICD after [facility]

## 2019-07-16 LAB
ANION GAP SERPL CALC-SCNC: 9 MMOL/L (ref 8–16)
BUN SERPL-MCNC: 40 MG/DL (ref 8–23)
CALCIUM SERPL-MCNC: 8.4 MG/DL (ref 8.7–10.5)
CHLORIDE SERPL-SCNC: 105 MMOL/L (ref 95–110)
CO2 SERPL-SCNC: 24 MMOL/L (ref 23–29)
CREAT SERPL-MCNC: 2 MG/DL (ref 0.5–1.4)
EST. GFR  (AFRICAN AMERICAN): 38 ML/MIN/1.73 M^2
EST. GFR  (NON AFRICAN AMERICAN): 33 ML/MIN/1.73 M^2
GLUCOSE SERPL-MCNC: 179 MG/DL (ref 70–110)
MAGNESIUM SERPL-MCNC: 1.7 MG/DL (ref 1.6–2.6)
POCT GLUCOSE: 163 MG/DL (ref 70–110)
POCT GLUCOSE: 166 MG/DL (ref 70–110)
POCT GLUCOSE: 171 MG/DL (ref 70–110)
POTASSIUM SERPL-SCNC: 3.9 MMOL/L (ref 3.5–5.1)
SODIUM SERPL-SCNC: 138 MMOL/L (ref 136–145)

## 2019-07-16 PROCEDURE — 93458 L HRT ARTERY/VENTRICLE ANGIO: CPT | Mod: 26,,, | Performed by: INTERNAL MEDICINE

## 2019-07-16 PROCEDURE — 93458 L HRT ARTERY/VENTRICLE ANGIO: CPT | Performed by: INTERNAL MEDICINE

## 2019-07-16 PROCEDURE — C1887 CATHETER, GUIDING: HCPCS | Performed by: INTERNAL MEDICINE

## 2019-07-16 PROCEDURE — 80048 BASIC METABOLIC PNL TOTAL CA: CPT

## 2019-07-16 PROCEDURE — 93458 PR CATH PLACE/CORON ANGIO, IMG SUPER/INTERP,W LEFT HEART VENTRICULOGRAPHY: ICD-10-PCS | Mod: 26,,, | Performed by: INTERNAL MEDICINE

## 2019-07-16 PROCEDURE — 20000000 HC ICU ROOM

## 2019-07-16 PROCEDURE — 25000003 PHARM REV CODE 250: Performed by: INTERNAL MEDICINE

## 2019-07-16 PROCEDURE — 63600175 PHARM REV CODE 636 W HCPCS: Performed by: INTERNAL MEDICINE

## 2019-07-16 PROCEDURE — 99152 PR MOD CONSCIOUS SEDATION, SAME PHYS, 5+ YRS, FIRST 15 MIN: ICD-10-PCS | Mod: ,,, | Performed by: INTERNAL MEDICINE

## 2019-07-16 PROCEDURE — 99152 MOD SED SAME PHYS/QHP 5/>YRS: CPT | Mod: ,,, | Performed by: INTERNAL MEDICINE

## 2019-07-16 PROCEDURE — 25000003 PHARM REV CODE 250: Performed by: NURSE PRACTITIONER

## 2019-07-16 PROCEDURE — 94761 N-INVAS EAR/PLS OXIMETRY MLT: CPT

## 2019-07-16 PROCEDURE — 83735 ASSAY OF MAGNESIUM: CPT

## 2019-07-16 PROCEDURE — C1769 GUIDE WIRE: HCPCS | Performed by: INTERNAL MEDICINE

## 2019-07-16 PROCEDURE — 63600175 PHARM REV CODE 636 W HCPCS: Performed by: NURSE PRACTITIONER

## 2019-07-16 PROCEDURE — 36415 COLL VENOUS BLD VENIPUNCTURE: CPT

## 2019-07-16 PROCEDURE — C1894 INTRO/SHEATH, NON-LASER: HCPCS | Performed by: INTERNAL MEDICINE

## 2019-07-16 PROCEDURE — 25500020 PHARM REV CODE 255: Performed by: INTERNAL MEDICINE

## 2019-07-16 RX ORDER — HEPARIN SODIUM 1000 [USP'U]/ML
INJECTION, SOLUTION INTRAVENOUS; SUBCUTANEOUS
Status: DISCONTINUED | OUTPATIENT
Start: 2019-07-16 | End: 2019-07-16 | Stop reason: HOSPADM

## 2019-07-16 RX ORDER — MIDAZOLAM HYDROCHLORIDE 1 MG/ML
INJECTION, SOLUTION INTRAMUSCULAR; INTRAVENOUS
Status: DISCONTINUED | OUTPATIENT
Start: 2019-07-16 | End: 2019-07-16 | Stop reason: HOSPADM

## 2019-07-16 RX ORDER — IODIXANOL 320 MG/ML
INJECTION, SOLUTION INTRAVASCULAR
Status: DISCONTINUED | OUTPATIENT
Start: 2019-07-16 | End: 2019-07-16 | Stop reason: HOSPADM

## 2019-07-16 RX ORDER — FENTANYL CITRATE 50 UG/ML
INJECTION, SOLUTION INTRAMUSCULAR; INTRAVENOUS
Status: DISCONTINUED | OUTPATIENT
Start: 2019-07-16 | End: 2019-07-16 | Stop reason: HOSPADM

## 2019-07-16 RX ORDER — HEPARIN SODIUM 200 [USP'U]/100ML
INJECTION, SOLUTION INTRAVENOUS
Status: DISCONTINUED | OUTPATIENT
Start: 2019-07-16 | End: 2019-07-17 | Stop reason: HOSPADM

## 2019-07-16 RX ORDER — SODIUM CHLORIDE 9 MG/ML
INJECTION, SOLUTION INTRAVENOUS
Status: DISCONTINUED | OUTPATIENT
Start: 2019-07-16 | End: 2019-07-17 | Stop reason: HOSPADM

## 2019-07-16 RX ORDER — DIPHENHYDRAMINE HCL 50 MG
50 CAPSULE ORAL ONCE
Status: DISCONTINUED | OUTPATIENT
Start: 2019-07-16 | End: 2019-07-16 | Stop reason: HOSPADM

## 2019-07-16 RX ORDER — VERAPAMIL HYDROCHLORIDE 2.5 MG/ML
INJECTION, SOLUTION INTRAVENOUS
Status: DISCONTINUED | OUTPATIENT
Start: 2019-07-16 | End: 2019-07-16 | Stop reason: HOSPADM

## 2019-07-16 RX ADMIN — AMIODARONE HYDROCHLORIDE 400 MG: 200 TABLET ORAL at 09:07

## 2019-07-16 RX ADMIN — HYDRALAZINE HYDROCHLORIDE 25 MG: 25 TABLET, FILM COATED ORAL at 02:07

## 2019-07-16 RX ADMIN — ASPIRIN 81 MG: 81 TABLET, COATED ORAL at 08:07

## 2019-07-16 RX ADMIN — HYDRALAZINE HYDROCHLORIDE 25 MG: 25 TABLET, FILM COATED ORAL at 09:07

## 2019-07-16 RX ADMIN — ALLOPURINOL 300 MG: 300 TABLET ORAL at 08:07

## 2019-07-16 RX ADMIN — PANTOPRAZOLE SODIUM 40 MG: 40 TABLET, DELAYED RELEASE ORAL at 08:07

## 2019-07-16 RX ADMIN — SODIUM CHLORIDE: 0.9 INJECTION, SOLUTION INTRAVENOUS at 09:07

## 2019-07-16 RX ADMIN — ENOXAPARIN SODIUM 40 MG: 100 INJECTION SUBCUTANEOUS at 05:07

## 2019-07-16 RX ADMIN — COLCHICINE 0.6 MG: 0.6 TABLET, FILM COATED ORAL at 08:07

## 2019-07-16 RX ADMIN — AMIODARONE HYDROCHLORIDE 400 MG: 200 TABLET ORAL at 08:07

## 2019-07-16 RX ADMIN — ATORVASTATIN CALCIUM 80 MG: 40 TABLET, FILM COATED ORAL at 08:07

## 2019-07-16 RX ADMIN — SODIUM CHLORIDE 438 ML: 0.9 INJECTION, SOLUTION INTRAVENOUS at 05:07

## 2019-07-16 NOTE — PROGRESS NOTES
Dr. Adam at bedside to address patency issue with TVP catheter. Catheter now patent and fluids infusing. Dr. Adam aware of left arm swelling and pain. IV site removed in cath lab. Warm pack to site and arm elevated. KUB also ordered for abd distension. Pt AAOx4, denies any other pain or complaints. VSS. WCTM.

## 2019-07-16 NOTE — BRIEF OP NOTE
S/p LHC      Minimal contrast use   Luminal irregularities   LVEDP 12 mmHg      Plan:      Proceed with Bi ventricular ICD      Vitals:    07/16/19 1400 07/16/19 1430 07/16/19 1500 07/16/19 1530   BP: (!) 147/82 137/69 (!) 142/76 (!) 148/79   Pulse: 79 79 79 79   Resp: (!) 25 (!) 31 (!) 23 (!) 22   Temp:       TempSrc:       SpO2: 99% 96% 99% 98%   Weight:       Height:             LABS  CBC  Recent Labs   Lab 07/10/19  0522 07/11/19  0501 07/12/19  0319   WBC 8.46 7.67 7.94   RBC 3.85* 3.95* 4.12*   HGB 11.4* 11.9* 12.4*   HCT 36.2* 37.4* 38.7*   * 140* 136*   MCV 94 95 94   MCH 29.6 30.1 30.1   MCHC 31.5* 31.8* 32.0     BMP  Recent Labs   Lab 07/15/19  0421 07/15/19  1742 07/16/19  0357    138 138   K 4.0 4.2 3.9   CO2 25 24 24    104 105   BUN 44* 41* 40*   CREATININE 2.2* 2.1* 2.0*   * 244* 179*       POCT-Glucose  POCT Glucose   Date Value Ref Range Status   07/16/2019 163 (H) 70 - 110 mg/dL Final   07/16/2019 166 (H) 70 - 110 mg/dL Final   07/15/2019 247 (H) 70 - 110 mg/dL Final   07/15/2019 231 (H) 70 - 110 mg/dL Final   07/15/2019 184 (H) 70 - 110 mg/dL Final   07/15/2019 240 (H) 70 - 110 mg/dL Final   07/14/2019 242 (H) 70 - 110 mg/dL Final   07/14/2019 232 (H) 70 - 110 mg/dL Final   07/14/2019 336 (H) 70 - 110 mg/dL Final   07/14/2019 252 (H) 70 - 110 mg/dL Final   07/13/2019 286 (H) 70 - 110 mg/dL Final       Recent Labs   Lab 07/11/19  0501  07/15/19  0421 07/15/19  1742 07/16/19  0357   CALCIUM 8.7   < > 8.8 8.8 8.4*   MG 1.7   < > 1.5* 1.7 1.7   PHOS 3.4  --   --   --   --     < > = values in this interval not displayed.     LFT  Recent Labs   Lab 07/10/19  0522 07/12/19  0319   PROT 5.8* 6.0   ALBUMIN 2.9* 2.8*   BILITOT 0.9 0.5   AST 23 15   ALKPHOS 179* 183*   ALT 52* 31     GFR     COAGS  No results for input(s): PT, INR, APTT in the last 168 hours.  CE  Recent Labs   Lab 07/10/19  0945   TROPONINI 0.393*     ABGs  No results for input(s): PH, PCO2, PO2, HCO3,  POCSATURATED, BE in the last 24 hours.  BNP  No results for input(s): BNP in the last 168 hours.    LAST HbA1c  Lab Results   Component Value Date    HGBA1C 6.5 (H) 09/19/2015       Lipid panel  Lab Results   Component Value Date    CHOL 82 (L) 07/13/2019     Lab Results   Component Value Date    HDL 27 (L) 07/13/2019     Lab Results   Component Value Date    LDLCALC 38.0 (L) 07/13/2019     Lab Results   Component Value Date    TRIG 85 07/13/2019     Lab Results   Component Value Date    CHOLHDL 32.9 07/13/2019            Adjust CHF medications  Statin therapy  Beta-blocker after ICD placement  [facility] after stabilization of renal function

## 2019-07-16 NOTE — PROGRESS NOTES
Dr. Adam notified that [employer] stated test was positive for thrombus of cephalic vein.

## 2019-07-16 NOTE — PLAN OF CARE
Problem: Adult Inpatient Plan of Care  Goal: Plan of Care Review  Outcome: Ongoing (interventions implemented as appropriate)  Pt NPO after midnight for possible Cath lab this a.m.; NS at 125 cc/hr infusing; creat trending down last 2.1; A x O x 4, MAEW; transvenously V paced 100 % at 80/10; gen trace edema;BPs 140s, room air sats > 95 %, lungs clear, voids per urinal, miralax given during the day x 1 - 2 BMs this shift , one liquid brown, the most recent clear liquid; instructed on POC and progression toward goals and d/c planning; continue to monitor.

## 2019-07-16 NOTE — NURSING
Pt received from ICU. AAOx4. Dr. Adam at , consents signed. Right IJ TVP dressing loose, re-enforced with tegaderm. Left AC PIV hard, swollen, painful, with palpable cord noted. IV d/jennifer. Right AC PIV leaking when flushed. IV dressing removed. IV manipulated and IV re-dressed. Flushes appropriately.

## 2019-07-17 ENCOUNTER — HOSPITAL ENCOUNTER (INPATIENT)
Facility: HOSPITAL | Age: 74
LOS: 5 days | Discharge: HOME HEALTH | DRG: 226 | End: 2019-07-22
Attending: INTERNAL MEDICINE | Admitting: INTERNAL MEDICINE
Payer: MEDICARE

## 2019-07-17 VITALS
TEMPERATURE: 98 F | OXYGEN SATURATION: 98 % | BODY MASS INDEX: 41.75 KG/M2 | RESPIRATION RATE: 40 BRPM | HEART RATE: 81 BPM | SYSTOLIC BLOOD PRESSURE: 144 MMHG | DIASTOLIC BLOOD PRESSURE: 81 MMHG | HEIGHT: 73 IN | WEIGHT: 315 LBS

## 2019-07-17 DIAGNOSIS — R53.81 DEBILITY: Primary | ICD-10-CM

## 2019-07-17 DIAGNOSIS — E66.01 MORBID OBESITY: ICD-10-CM

## 2019-07-17 DIAGNOSIS — I44.2 COMPLETE HEART BLOCK: ICD-10-CM

## 2019-07-17 DIAGNOSIS — I44.2 THIRD DEGREE HEART BLOCK: ICD-10-CM

## 2019-07-17 DIAGNOSIS — I49.9 ARRHYTHMIA: ICD-10-CM

## 2019-07-17 DIAGNOSIS — N17.9 AKI (ACUTE KIDNEY INJURY): ICD-10-CM

## 2019-07-17 PROBLEM — I82.622 ACUTE DEEP VEIN THROMBOSIS (DVT) OF LEFT UPPER EXTREMITY: Status: ACTIVE | Noted: 2019-07-17

## 2019-07-17 PROBLEM — R14.0 ABDOMINAL DISTENSION: Status: ACTIVE | Noted: 2019-07-17

## 2019-07-17 LAB
ANION GAP SERPL CALC-SCNC: 8 MMOL/L (ref 8–16)
APTT BLDCRRT: 30.2 SEC (ref 21–32)
APTT BLDCRRT: 48.5 SEC (ref 21–32)
APTT BLDCRRT: >150 SEC (ref 21–32)
BASOPHILS # BLD AUTO: 0.02 K/UL (ref 0–0.2)
BASOPHILS # BLD AUTO: 0.03 K/UL (ref 0–0.2)
BASOPHILS NFR BLD: 0.4 % (ref 0–1.9)
BASOPHILS NFR BLD: 0.5 % (ref 0–1.9)
BNP SERPL-MCNC: 1142 PG/ML (ref 0–99)
BUN SERPL-MCNC: 38 MG/DL (ref 8–23)
CALCIUM SERPL-MCNC: 8.7 MG/DL (ref 8.7–10.5)
CHLORIDE SERPL-SCNC: 108 MMOL/L (ref 95–110)
CO2 SERPL-SCNC: 23 MMOL/L (ref 23–29)
CREAT SERPL-MCNC: 2 MG/DL (ref 0.5–1.4)
DIFFERENTIAL METHOD: ABNORMAL
DIFFERENTIAL METHOD: ABNORMAL
EOSINOPHIL # BLD AUTO: 0.2 K/UL (ref 0–0.5)
EOSINOPHIL # BLD AUTO: 0.2 K/UL (ref 0–0.5)
EOSINOPHIL NFR BLD: 2.5 % (ref 0–8)
EOSINOPHIL NFR BLD: 2.8 % (ref 0–8)
ERYTHROCYTE [DISTWIDTH] IN BLOOD BY AUTOMATED COUNT: 14.8 % (ref 11.5–14.5)
ERYTHROCYTE [DISTWIDTH] IN BLOOD BY AUTOMATED COUNT: 15.1 % (ref 11.5–14.5)
EST. GFR  (AFRICAN AMERICAN): 38 ML/MIN/1.73 M^2
EST. GFR  (NON AFRICAN AMERICAN): 33 ML/MIN/1.73 M^2
ESTIMATED AVG GLUCOSE: 177 MG/DL (ref 68–131)
GLUCOSE SERPL-MCNC: 175 MG/DL (ref 70–110)
HBA1C MFR BLD HPLC: 7.8 % (ref 4–5.6)
HCT VFR BLD AUTO: 36.3 % (ref 40–54)
HCT VFR BLD AUTO: 37.1 % (ref 40–54)
HGB BLD-MCNC: 11.3 G/DL (ref 14–18)
HGB BLD-MCNC: 11.7 G/DL (ref 14–18)
IMM GRANULOCYTES # BLD AUTO: 0.02 K/UL (ref 0–0.04)
IMM GRANULOCYTES NFR BLD AUTO: 0.3 % (ref 0–0.5)
INR PPP: 1 (ref 0.8–1.2)
LYMPHOCYTES # BLD AUTO: 1.1 K/UL (ref 1–4.8)
LYMPHOCYTES # BLD AUTO: 1.2 K/UL (ref 1–4.8)
LYMPHOCYTES NFR BLD: 18.1 % (ref 18–48)
LYMPHOCYTES NFR BLD: 20.4 % (ref 18–48)
MAGNESIUM SERPL-MCNC: 1.6 MG/DL (ref 1.6–2.6)
MCH RBC QN AUTO: 30.1 PG (ref 27–31)
MCH RBC QN AUTO: 30.4 PG (ref 27–31)
MCHC RBC AUTO-ENTMCNC: 31.1 G/DL (ref 32–36)
MCHC RBC AUTO-ENTMCNC: 31.5 G/DL (ref 32–36)
MCV RBC AUTO: 95 FL (ref 82–98)
MCV RBC AUTO: 98 FL (ref 82–98)
MONOCYTES # BLD AUTO: 0.5 K/UL (ref 0.3–1)
MONOCYTES # BLD AUTO: 0.5 K/UL (ref 0.3–1)
MONOCYTES NFR BLD: 8.3 % (ref 4–15)
MONOCYTES NFR BLD: 8.6 % (ref 4–15)
NEUTROPHILS # BLD AUTO: 3.8 K/UL (ref 1.8–7.7)
NEUTROPHILS # BLD AUTO: 4.2 K/UL (ref 1.8–7.7)
NEUTROPHILS NFR BLD: 68.1 % (ref 38–73)
NEUTROPHILS NFR BLD: 70 % (ref 38–73)
NRBC BLD-RTO: 0 /100 WBC
PLATELET # BLD AUTO: 154 K/UL (ref 150–350)
PLATELET # BLD AUTO: 171 K/UL (ref 150–350)
PMV BLD AUTO: 12.4 FL (ref 9.2–12.9)
PMV BLD AUTO: 12.5 FL (ref 9.2–12.9)
POCT GLUCOSE: 178 MG/DL (ref 70–110)
POCT GLUCOSE: 178 MG/DL (ref 70–110)
POCT GLUCOSE: 196 MG/DL (ref 70–110)
POTASSIUM SERPL-SCNC: 4.1 MMOL/L (ref 3.5–5.1)
PROTHROMBIN TIME: 10.6 SEC (ref 9–12.5)
RBC # BLD AUTO: 3.72 M/UL (ref 4.6–6.2)
RBC # BLD AUTO: 3.89 M/UL (ref 4.6–6.2)
SODIUM SERPL-SCNC: 139 MMOL/L (ref 136–145)
WBC # BLD AUTO: 5.65 K/UL (ref 3.9–12.7)
WBC # BLD AUTO: 6.03 K/UL (ref 3.9–12.7)

## 2019-07-17 PROCEDURE — 94761 N-INVAS EAR/PLS OXIMETRY MLT: CPT

## 2019-07-17 PROCEDURE — 93005 ELECTROCARDIOGRAM TRACING: CPT

## 2019-07-17 PROCEDURE — 87205 SMEAR GRAM STAIN: CPT

## 2019-07-17 PROCEDURE — 36415 COLL VENOUS BLD VENIPUNCTURE: CPT

## 2019-07-17 PROCEDURE — 85730 THROMBOPLASTIN TIME PARTIAL: CPT

## 2019-07-17 PROCEDURE — 85730 THROMBOPLASTIN TIME PARTIAL: CPT | Mod: 91

## 2019-07-17 PROCEDURE — 93010 EKG 12-LEAD: ICD-10-PCS | Mod: ,,, | Performed by: INTERNAL MEDICINE

## 2019-07-17 PROCEDURE — 99222 PR INITIAL HOSPITAL CARE,LEVL II: ICD-10-PCS | Mod: ,,, | Performed by: INTERNAL MEDICINE

## 2019-07-17 PROCEDURE — 99222 1ST HOSP IP/OBS MODERATE 55: CPT | Mod: ,,, | Performed by: INTERNAL MEDICINE

## 2019-07-17 PROCEDURE — 63600175 PHARM REV CODE 636 W HCPCS: Performed by: NURSE PRACTITIONER

## 2019-07-17 PROCEDURE — 84100 ASSAY OF PHOSPHORUS: CPT

## 2019-07-17 PROCEDURE — 25000003 PHARM REV CODE 250: Performed by: STUDENT IN AN ORGANIZED HEALTH CARE EDUCATION/TRAINING PROGRAM

## 2019-07-17 PROCEDURE — 85025 COMPLETE CBC W/AUTO DIFF WBC: CPT | Mod: 91

## 2019-07-17 PROCEDURE — 85610 PROTHROMBIN TIME: CPT

## 2019-07-17 PROCEDURE — 83735 ASSAY OF MAGNESIUM: CPT

## 2019-07-17 PROCEDURE — 93010 ELECTROCARDIOGRAM REPORT: CPT | Mod: ,,, | Performed by: INTERNAL MEDICINE

## 2019-07-17 PROCEDURE — 99238 HOSP IP/OBS DSCHRG MGMT 30/<: CPT | Mod: ,,, | Performed by: INTERNAL MEDICINE

## 2019-07-17 PROCEDURE — 20000000 HC ICU ROOM

## 2019-07-17 PROCEDURE — 80053 COMPREHEN METABOLIC PANEL: CPT

## 2019-07-17 PROCEDURE — 83036 HEMOGLOBIN GLYCOSYLATED A1C: CPT

## 2019-07-17 PROCEDURE — 87070 CULTURE OTHR SPECIMN AEROBIC: CPT

## 2019-07-17 PROCEDURE — 80048 BASIC METABOLIC PNL TOTAL CA: CPT

## 2019-07-17 PROCEDURE — 25000003 PHARM REV CODE 250: Performed by: NURSE PRACTITIONER

## 2019-07-17 PROCEDURE — 27000221 HC OXYGEN, UP TO 24 HOURS

## 2019-07-17 PROCEDURE — 25000003 PHARM REV CODE 250: Performed by: INTERNAL MEDICINE

## 2019-07-17 PROCEDURE — 87040 BLOOD CULTURE FOR BACTERIA: CPT | Mod: 59

## 2019-07-17 PROCEDURE — 99238 PR HOSPITAL DISCHARGE DAY,<30 MIN: ICD-10-PCS | Mod: ,,, | Performed by: INTERNAL MEDICINE

## 2019-07-17 PROCEDURE — 87077 CULTURE AEROBIC IDENTIFY: CPT

## 2019-07-17 PROCEDURE — 83880 ASSAY OF NATRIURETIC PEPTIDE: CPT

## 2019-07-17 PROCEDURE — 87186 SC STD MICRODIL/AGAR DIL: CPT

## 2019-07-17 PROCEDURE — 81003 URINALYSIS AUTO W/O SCOPE: CPT

## 2019-07-17 PROCEDURE — 85025 COMPLETE CBC W/AUTO DIFF WBC: CPT

## 2019-07-17 PROCEDURE — 83735 ASSAY OF MAGNESIUM: CPT | Mod: 91

## 2019-07-17 RX ORDER — GLUCAGON 1 MG
1 KIT INJECTION
Status: DISCONTINUED | OUTPATIENT
Start: 2019-07-17 | End: 2019-07-22 | Stop reason: HOSPADM

## 2019-07-17 RX ORDER — ALLOPURINOL 300 MG/1
300 TABLET ORAL DAILY
Status: CANCELLED | OUTPATIENT
Start: 2019-07-18

## 2019-07-17 RX ORDER — GLUCAGON 1 MG
1 KIT INJECTION
Status: CANCELLED | OUTPATIENT
Start: 2019-07-17

## 2019-07-17 RX ORDER — SODIUM CHLORIDE 0.9 % (FLUSH) 0.9 %
3 SYRINGE (ML) INJECTION EVERY 8 HOURS
Status: DISCONTINUED | OUTPATIENT
Start: 2019-07-17 | End: 2019-07-22 | Stop reason: HOSPADM

## 2019-07-17 RX ORDER — ATORVASTATIN CALCIUM 20 MG/1
80 TABLET, FILM COATED ORAL DAILY
Status: DISCONTINUED | OUTPATIENT
Start: 2019-07-18 | End: 2019-07-20

## 2019-07-17 RX ORDER — IBUPROFEN 200 MG
16 TABLET ORAL
Status: DISCONTINUED | OUTPATIENT
Start: 2019-07-17 | End: 2019-07-22 | Stop reason: HOSPADM

## 2019-07-17 RX ORDER — RAMELTEON 8 MG/1
8 TABLET ORAL NIGHTLY PRN
Status: CANCELLED | OUTPATIENT
Start: 2019-07-17

## 2019-07-17 RX ORDER — IBUPROFEN 200 MG
24 TABLET ORAL
Status: CANCELLED | OUTPATIENT
Start: 2019-07-17

## 2019-07-17 RX ORDER — COLCHICINE 0.6 MG/1
0.6 TABLET ORAL DAILY
Status: DISCONTINUED | OUTPATIENT
Start: 2019-07-18 | End: 2019-07-19

## 2019-07-17 RX ORDER — HYDRALAZINE HYDROCHLORIDE 25 MG/1
25 TABLET, FILM COATED ORAL EVERY 8 HOURS
Status: CANCELLED | OUTPATIENT
Start: 2019-07-17

## 2019-07-17 RX ORDER — DIPHENHYDRAMINE HCL 25 MG
25 CAPSULE ORAL EVERY 6 HOURS PRN
Status: DISCONTINUED | OUTPATIENT
Start: 2019-07-17 | End: 2019-07-22 | Stop reason: HOSPADM

## 2019-07-17 RX ORDER — POLYETHYLENE GLYCOL 3350 17 G/17G
17 POWDER, FOR SOLUTION ORAL 2 TIMES DAILY PRN
Status: DISCONTINUED | OUTPATIENT
Start: 2019-07-17 | End: 2019-07-22 | Stop reason: HOSPADM

## 2019-07-17 RX ORDER — DEXTROSE 50 % IN WATER (D50W) INTRAVENOUS SYRINGE
12.5
Status: CANCELLED | OUTPATIENT
Start: 2019-07-17

## 2019-07-17 RX ORDER — INSULIN ASPART 100 [IU]/ML
0-5 INJECTION, SOLUTION INTRAVENOUS; SUBCUTANEOUS
Status: CANCELLED | OUTPATIENT
Start: 2019-07-17

## 2019-07-17 RX ORDER — SODIUM CHLORIDE 0.9 % (FLUSH) 0.9 %
10 SYRINGE (ML) INJECTION
Status: CANCELLED | OUTPATIENT
Start: 2019-07-17

## 2019-07-17 RX ORDER — HEPARIN SODIUM,PORCINE/D5W 25000/250
18 INTRAVENOUS SOLUTION INTRAVENOUS CONTINUOUS
Status: CANCELLED | OUTPATIENT
Start: 2019-07-17

## 2019-07-17 RX ORDER — ALBUTEROL SULFATE 90 UG/1
2 AEROSOL, METERED RESPIRATORY (INHALATION) EVERY 4 HOURS PRN
Status: CANCELLED | OUTPATIENT
Start: 2019-07-17

## 2019-07-17 RX ORDER — ALLOPURINOL 300 MG/1
300 TABLET ORAL DAILY
Status: DISCONTINUED | OUTPATIENT
Start: 2019-07-18 | End: 2019-07-22 | Stop reason: HOSPADM

## 2019-07-17 RX ORDER — SODIUM CHLORIDE 9 MG/ML
INJECTION, SOLUTION INTRAVENOUS CONTINUOUS
Status: DISCONTINUED | OUTPATIENT
Start: 2019-07-17 | End: 2019-07-17

## 2019-07-17 RX ORDER — ASPIRIN 81 MG/1
81 TABLET ORAL DAILY
Status: CANCELLED | OUTPATIENT
Start: 2019-07-18

## 2019-07-17 RX ORDER — HEPARIN SODIUM,PORCINE/D5W 25000/250
18 INTRAVENOUS SOLUTION INTRAVENOUS CONTINUOUS
Status: DISCONTINUED | OUTPATIENT
Start: 2019-07-17 | End: 2019-07-17 | Stop reason: HOSPADM

## 2019-07-17 RX ORDER — AMIODARONE HYDROCHLORIDE 200 MG/1
400 TABLET ORAL 2 TIMES DAILY
Status: CANCELLED | OUTPATIENT
Start: 2019-07-17

## 2019-07-17 RX ORDER — HYDRALAZINE HYDROCHLORIDE 20 MG/ML
10 INJECTION INTRAMUSCULAR; INTRAVENOUS EVERY 8 HOURS PRN
Status: DISCONTINUED | OUTPATIENT
Start: 2019-07-17 | End: 2019-07-22 | Stop reason: HOSPADM

## 2019-07-17 RX ORDER — HEPARIN SODIUM,PORCINE/D5W 25000/250
18 INTRAVENOUS SOLUTION INTRAVENOUS CONTINUOUS
Status: DISCONTINUED | OUTPATIENT
Start: 2019-07-17 | End: 2019-07-18

## 2019-07-17 RX ORDER — DIPHENHYDRAMINE HCL 25 MG
25 CAPSULE ORAL EVERY 6 HOURS PRN
Status: CANCELLED | OUTPATIENT
Start: 2019-07-17

## 2019-07-17 RX ORDER — ONDANSETRON 8 MG/1
8 TABLET, ORALLY DISINTEGRATING ORAL EVERY 8 HOURS PRN
Status: CANCELLED | OUTPATIENT
Start: 2019-07-17

## 2019-07-17 RX ORDER — COLCHICINE 0.6 MG/1
0.6 TABLET ORAL DAILY
Status: CANCELLED | OUTPATIENT
Start: 2019-07-18

## 2019-07-17 RX ORDER — INSULIN ASPART 100 [IU]/ML
0-5 INJECTION, SOLUTION INTRAVENOUS; SUBCUTANEOUS
Status: DISCONTINUED | OUTPATIENT
Start: 2019-07-17 | End: 2019-07-18

## 2019-07-17 RX ORDER — ATORVASTATIN CALCIUM 40 MG/1
80 TABLET, FILM COATED ORAL DAILY
Status: CANCELLED | OUTPATIENT
Start: 2019-07-18

## 2019-07-17 RX ORDER — ACETAMINOPHEN 325 MG/1
650 TABLET ORAL EVERY 4 HOURS PRN
Status: CANCELLED | OUTPATIENT
Start: 2019-07-17

## 2019-07-17 RX ORDER — DEXTROSE 50 % IN WATER (D50W) INTRAVENOUS SYRINGE
12.5
Status: DISCONTINUED | OUTPATIENT
Start: 2019-07-17 | End: 2019-07-17

## 2019-07-17 RX ORDER — IBUPROFEN 200 MG
24 TABLET ORAL
Status: DISCONTINUED | OUTPATIENT
Start: 2019-07-17 | End: 2019-07-22 | Stop reason: HOSPADM

## 2019-07-17 RX ORDER — LOPERAMIDE HYDROCHLORIDE 2 MG/1
2 CAPSULE ORAL
Status: CANCELLED | OUTPATIENT
Start: 2019-07-17

## 2019-07-17 RX ORDER — TRAMADOL HYDROCHLORIDE 50 MG/1
50 TABLET ORAL EVERY 6 HOURS PRN
Status: CANCELLED | OUTPATIENT
Start: 2019-07-17

## 2019-07-17 RX ORDER — ONDANSETRON 8 MG/1
8 TABLET, ORALLY DISINTEGRATING ORAL EVERY 8 HOURS PRN
Status: DISCONTINUED | OUTPATIENT
Start: 2019-07-17 | End: 2019-07-22 | Stop reason: HOSPADM

## 2019-07-17 RX ORDER — DEXTROSE 50 % IN WATER (D50W) INTRAVENOUS SYRINGE
25
Status: CANCELLED | OUTPATIENT
Start: 2019-07-17

## 2019-07-17 RX ORDER — SODIUM CHLORIDE 9 MG/ML
INJECTION, SOLUTION INTRAVENOUS CONTINUOUS
Status: CANCELLED | OUTPATIENT
Start: 2019-07-17

## 2019-07-17 RX ORDER — IBUPROFEN 200 MG
16 TABLET ORAL
Status: CANCELLED | OUTPATIENT
Start: 2019-07-17

## 2019-07-17 RX ORDER — SODIUM CHLORIDE 9 MG/ML
INJECTION, SOLUTION INTRAVENOUS CONTINUOUS
Status: DISCONTINUED | OUTPATIENT
Start: 2019-07-17 | End: 2019-07-18

## 2019-07-17 RX ORDER — ALBUTEROL SULFATE 90 UG/1
2 AEROSOL, METERED RESPIRATORY (INHALATION) EVERY 4 HOURS PRN
Status: DISCONTINUED | OUTPATIENT
Start: 2019-07-17 | End: 2019-07-22 | Stop reason: HOSPADM

## 2019-07-17 RX ORDER — AMOXICILLIN 250 MG
1 CAPSULE ORAL DAILY PRN
Status: DISCONTINUED | OUTPATIENT
Start: 2019-07-17 | End: 2019-07-22 | Stop reason: HOSPADM

## 2019-07-17 RX ORDER — PANTOPRAZOLE SODIUM 40 MG/1
40 TABLET, DELAYED RELEASE ORAL DAILY
Status: CANCELLED | OUTPATIENT
Start: 2019-07-18

## 2019-07-17 RX ORDER — HYDRALAZINE HYDROCHLORIDE 20 MG/ML
10 INJECTION INTRAMUSCULAR; INTRAVENOUS EVERY 8 HOURS PRN
Status: CANCELLED | OUTPATIENT
Start: 2019-07-17

## 2019-07-17 RX ORDER — ACETAMINOPHEN 500 MG
500 TABLET ORAL EVERY 6 HOURS PRN
Status: DISCONTINUED | OUTPATIENT
Start: 2019-07-17 | End: 2019-07-20

## 2019-07-17 RX ORDER — POLYETHYLENE GLYCOL 3350 17 G/17G
17 POWDER, FOR SOLUTION ORAL 2 TIMES DAILY PRN
Status: CANCELLED | OUTPATIENT
Start: 2019-07-17

## 2019-07-17 RX ORDER — DEXTROSE 50 % IN WATER (D50W) INTRAVENOUS SYRINGE
25
Status: DISCONTINUED | OUTPATIENT
Start: 2019-07-17 | End: 2019-07-17

## 2019-07-17 RX ORDER — AMIODARONE HYDROCHLORIDE 200 MG/1
400 TABLET ORAL 2 TIMES DAILY
Status: DISCONTINUED | OUTPATIENT
Start: 2019-07-17 | End: 2019-07-22 | Stop reason: HOSPADM

## 2019-07-17 RX ADMIN — AMIODARONE HYDROCHLORIDE 400 MG: 200 TABLET ORAL at 08:07

## 2019-07-17 RX ADMIN — SODIUM CHLORIDE: 0.9 INJECTION, SOLUTION INTRAVENOUS at 04:07

## 2019-07-17 RX ADMIN — HEPARIN SODIUM AND DEXTROSE 14 UNITS/KG/HR: 10000; 5 INJECTION INTRAVENOUS at 09:07

## 2019-07-17 RX ADMIN — HYDRALAZINE HYDROCHLORIDE 25 MG: 25 TABLET, FILM COATED ORAL at 06:07

## 2019-07-17 RX ADMIN — COLCHICINE 0.6 MG: 0.6 TABLET, FILM COATED ORAL at 08:07

## 2019-07-17 RX ADMIN — ASPIRIN 81 MG: 81 TABLET, COATED ORAL at 08:07

## 2019-07-17 RX ADMIN — SODIUM CHLORIDE: 0.9 INJECTION, SOLUTION INTRAVENOUS at 09:07

## 2019-07-17 RX ADMIN — SODIUM CHLORIDE: 0.9 INJECTION, SOLUTION INTRAVENOUS at 06:07

## 2019-07-17 RX ADMIN — HYDRALAZINE HYDROCHLORIDE 25 MG: 25 TABLET, FILM COATED ORAL at 02:07

## 2019-07-17 RX ADMIN — HYDRALAZINE HYDROCHLORIDE 10 MG: 20 INJECTION INTRAMUSCULAR; INTRAVENOUS at 09:07

## 2019-07-17 RX ADMIN — ALLOPURINOL 300 MG: 300 TABLET ORAL at 08:07

## 2019-07-17 RX ADMIN — HEPARIN SODIUM AND DEXTROSE 18 UNITS/KG/HR: 10000; 5 INJECTION INTRAVENOUS at 09:07

## 2019-07-17 RX ADMIN — AMIODARONE HYDROCHLORIDE 400 MG: 200 TABLET ORAL at 09:07

## 2019-07-17 RX ADMIN — HEPARIN SODIUM AND DEXTROSE 18 UNITS/KG/HR: 10000; 5 INJECTION INTRAVENOUS at 02:07

## 2019-07-17 RX ADMIN — PANTOPRAZOLE SODIUM 40 MG: 40 TABLET, DELAYED RELEASE ORAL at 08:07

## 2019-07-17 RX ADMIN — ATORVASTATIN CALCIUM 80 MG: 40 TABLET, FILM COATED ORAL at 08:07

## 2019-07-17 NOTE — CONSULTS
[facility]  Gastroenterology  Consult Note    Patient Name: Houston Jc  MRN: [id number]  Admission Date: 7/9/2019  Hospital Length of Stay: 8 days  Code Status: Full Code   Attending Provider: Milagros Gross MD   Consulting Provider: jB Sanchez MD  Primary Care Physician: Zak Hamilton MD  Principal Problem:Complete heart block    Inpatient consult to [facility]  Consult performed by: Bj Sanchez MD  Consult ordered by: Bala Carvajal NP  Reason for consult: Abdominal distention, mucous in stool        Subjective:     HPI:  This is a 73-year-old male with a history of hypertension, diabetes, coronary artery disease and CHF here complaining of weakness and shortness of breath noted to have severe bradycardia, workup consistent with complete heart block status post temporary transvenous pacemaker.  He has noted to have abdominal distention in addition to some change in his bowel habits.  He has never had an upper endoscopy or colonoscopy, family is present to give additional history.  He noted some clear, mucoid stools over the last several days.  Some increased abdominal distention.  The patient notes that he is obese but states he feels more distended.  No abdominal pain per se, no nausea or vomiting.  No hematochezia. No other exacerbating or relieving factors or other associated symptoms.    The following portions of the patient's history were reviewed and updated as appropriate: allergies, current medications, past family history, past medical history, past social history, past surgical history and problem list.    (Portions of this note were dictated using voice recognition software and may contain dictation related errors in spelling/grammar/syntax not found on text review)    Past Medical History:   Diagnosis Date    CHF (congestive heart failure)     Coronary artery disease     Diabetes mellitus     Hypertension        Past Surgical History:    Procedure Laterality Date    Insertion, Pacemaker, Temporary Transvenous N/A 7/9/2019    Performed by Dejuan Cody MD at [facility] CATH LAB/EP       Review of patient's allergies indicates:  No Known Allergies  Family History     None        Tobacco Use    Smoking status: Former Smoker   Substance and Sexual Activity    Alcohol use: Yes     Comment: social    Drug use: No    Sexual activity: Not on file     Review of Systems   Constitutional: Positive for activity change and fatigue. Negative for chills and fever.   HENT: Negative for postnasal drip and trouble swallowing.    Eyes: Negative for pain and visual disturbance.   Respiratory: Positive for shortness of breath. Negative for cough and choking.    Cardiovascular: Negative for chest pain and leg swelling.   Gastrointestinal: Positive for abdominal distention. Negative for anal bleeding, blood in stool, constipation, diarrhea, rectal pain and vomiting.   Endocrine: Negative for cold intolerance and heat intolerance.   Genitourinary: Negative for difficulty urinating and hematuria.   Neurological: Positive for dizziness and light-headedness. Negative for numbness.   Hematological: Negative for adenopathy. Does not bruise/bleed easily.   Psychiatric/Behavioral: Negative for agitation and confusion.     Objective:     Vital Signs (Most Recent):  Temp: 98 °F (36.7 °C) (07/17/19 0703)  Pulse: 80 (07/17/19 0703)  Resp: (!) 36 (07/17/19 0703)  BP: (!) 111/53 (07/17/19 0703)  SpO2: 100 % (07/17/19 0703) Vital Signs (24h Range):  Temp:  [97.3 °F (36.3 °C)-98.5 °F (36.9 °C)] 98 °F (36.7 °C)  Pulse:  [79-81] 80  Resp:  [20-37] 36  SpO2:  [90 %-100 %] 100 %  BP: (103-163)/(53-85) 111/53     Weight: (!) 146 kg (321 lb 14 oz) (07/11/19 0505)  Body mass index is 42.47 kg/m².      Intake/Output Summary (Last 24 hours) at 7/17/2019 1040  Last data filed at 7/17/2019 0609  Gross per 24 hour   Intake 4078 ml   Output 575 ml   Net 3503 ml       Lines/Drains/Airways      Central Venous Catheter Line                 Introducer 07/09/19 1410 right internal jugular 7 days          Line                 Pacer Wires 07/09/19 1415 7 days          Peripheral Intravenous Line                 Peripheral IV - Single Lumen 07/09/19 20 G Right Antecubital 8 days                Physical Exam   Constitutional: He is oriented to person, place, and time. He appears well-developed and well-nourished. No distress.   HENT:   Head: Normocephalic and atraumatic.   Eyes: Conjunctivae are normal. No scleral icterus.   Neck: No tracheal deviation present. No thyromegaly present.   Cardiovascular: Normal rate and normal heart sounds. Exam reveals no gallop and no friction rub.   Pulmonary/Chest: Effort normal and breath sounds normal. He has no wheezes. He has no rales.   Abdominal: Soft. Bowel sounds are normal. He exhibits distension. There is no tenderness. There is no rebound and no guarding.   Tympanic   Musculoskeletal: Normal range of motion. He exhibits no edema or tenderness.   Neurological: He is alert and oriented to person, place, and time.   Psychiatric: He has a normal mood and affect. His behavior is normal.   Nursing note and vitals reviewed.      Significant Labs:  CBC:   Recent Labs   Lab 07/17/19  0503   WBC 5.65   HGB 11.7*   HCT 37.1*        CMP:   Recent Labs   Lab 07/17/19  0503   *   CALCIUM 8.7      K 4.1   CO2 23      BUN 38*   CREATININE 2.0*       Significant Imaging:  Imaging results within the past 24 hours have been reviewed.    Assessment/Plan:     Abdominal distension  - suspect ileus, abdominal exam is soft, no acute abdomen  - still with bowel sounds and bowel function  - likely degree of hypoperfusion as well from persistent bradycardia  - monitor abd exam  - d/w family as well  - no contraindication to transfer from GI standpoint        Thank you for your consult. I will follow-up with patient. Please contact us if you have any additional  questions.    Bj Sanchez MD  Gastroenterology  [facility]

## 2019-07-17 NOTE — NURSING
Right radial vas band air taken out as ordered. Vas band completely off at 2130. No signs of bleeding or hematoma noted. Pt instructed to call immediately if any bleeding from site is noted.     Spoke with Dr. Cody about concerns of pt having partial bowel obstruction. Impression from [facility] done on 7/15 and 7/16 read to MD. Pt did have one brown liquid stool tonight so far. Pt reports eating 50% or less of meals. No formed stool since last Tuesday reported per patient and spouse. Abd distended. Bowel sounds hyperactive. MD will consult GI in the morning. Transfer to [facility] for dual pacemaker/defrilator will be on hold till tomorrow. Pt and spouse informed of POC and of DVT in left arm. No more questions from pt and family at this time. Will continue to monitor.

## 2019-07-17 NOTE — ASSESSMENT & PLAN NOTE
TVP placed emergently on admission, HR 20   AV silvia blockers held  frequent VT since admission - Loaded with Amio and transitioned to oral  LVEF is down to 30%   [facility] with LI  Transfer to main campus for BiVICD implantation

## 2019-07-17 NOTE — ASSESSMENT & PLAN NOTE
DVT despite prophylaxis with Lovenox at old IV site  Arm is edematous and tender to palpation    Warm compresses   Heparin gtt for now

## 2019-07-17 NOTE — PROGRESS NOTES
[facility]  Cardiology  Progress Note    Patient Name: Houston Jc  MRN: [id number]  Admission Date: 7/9/2019  Hospital Length of Stay: 8 days  Code Status: Full Code   Attending Physician: Milagros Gross MD   Primary Care Physician: Zak Hamilton MD  Expected Discharge Date:   Principal Problem:Complete heart block    Subjective:     Hospital Course:   07/09/2019 S/p R IJ Temporary pacemaker for CH, rate 80bpm. BB on hold.  07/10/2019 Patient with frequent runs of VT overnight. TNI pending. NPO for LHC today. Amio infusing. LVEF 30% with RV dysfunction and global hypokinesis.  LHC deferred to 07/11 2/2 patient was given a diet and consumed the entire meal.   07/11/2019 LHC cancelled for today, renal function worsening. Holding nephrotoxic agents and Lasix. Remains in CHB with runs of SVT (chronic RBB that resembles VT) and short runs of nonsustained VT. Amio continued. NPO p MN for potential LHC in AM.   07/12/2019 Patient remains in CHB, Hemodynamically stable with TVP. No CV complaints. Amiodarone converted to oral. No further VT. Renal function stable. Defer LHC to Monday to give kidneys time to recover.   7/13/2019: seen with family at the bedside. No acute issues. He will have LHC (7/15/2019) and either PPM or ICD depending on coronary anatomy.   7/14/219: seen this am at the bedside with family. No acute cardiac issues. Pacing as set with TVP. Cr higher at 2.1. + clear stool without abdominal pain.   07/15/2019 Hemodynamically stable, remains pacer dependent. Renal function stable- Cr 2.2, Gentle hydration with IVF. NPO p MN for LHC.  07/16/2019 S/p LHC                Minimal contrast use              Luminal irregularities              LVEDP 12 mmHg   Plan:   Proceed with Bi ventricular ICD  07/18/2019 DVT to LUE despite Lovenox dosed for DVT prophylaxis. GI consulted for persistent abdominal distension and loose stool with possible ileus vs partial obstruction on imaging. +BS.  Patient is hemodynamically stable with TVP set at 80 BPM. CHB remains. Patient remains on oral Amiodarone with intermittent VT- asymptomatic.     Interval History:   Review of Systems   Constitution: Negative for diaphoresis, night sweats, weight gain and weight loss.   HENT: Negative for congestion.    Cardiovascular: Negative for chest pain, claudication, cyanosis, dyspnea on exertion, irregular heartbeat, leg swelling, near-syncope, orthopnea, palpitations, paroxysmal nocturnal dyspnea and syncope.   Respiratory: Negative for cough, shortness of breath and wheezing.    Endocrine: Negative for cold intolerance, heat intolerance and polyphagia.   Hematologic/Lymphatic: Negative for adenopathy and bleeding problem. Does not bruise/bleed easily.   Skin: Negative for dry skin and nail changes.   Musculoskeletal: Negative for arthritis, back pain, falls, joint pain, myalgias and neck pain.   Gastrointestinal: Positive for bloating and change in bowel habit. Negative for abdominal pain.   Genitourinary: Negative for bladder incontinence.   Neurological: Negative for aphonia, brief paralysis, difficulty with concentration, dizziness and weakness.   Psychiatric/Behavioral: Negative for altered mental status and memory loss. The patient does not have insomnia.    Allergic/Immunologic: Negative for environmental allergies.     Objective:     Vital Signs (Most Recent):  Temp: 98 °F (36.7 °C) (07/17/19 0703)  Pulse: 80 (07/17/19 0703)  Resp: (!) 36 (07/17/19 0703)  BP: (!) 111/53 (07/17/19 0703)  SpO2: 100 % (07/17/19 0703) Vital Signs (24h Range):  Temp:  [97.3 °F (36.3 °C)-98.5 °F (36.9 °C)] 98 °F (36.7 °C)  Pulse:  [79-81] 80  Resp:  [20-37] 36  SpO2:  [90 %-100 %] 100 %  BP: (103-163)/(53-85) 111/53     Weight: (!) 146 kg (321 lb 14 oz)  Body mass index is 42.47 kg/m².     SpO2: 100 %  O2 Device (Oxygen Therapy): nasal cannula(applied)      Intake/Output Summary (Last 24 hours) at 7/17/2019 105  Last data filed at  7/17/2019 0609  Gross per 24 hour   Intake 4078 ml   Output 575 ml   Net 3503 ml       Lines/Drains/Airways     Central Venous Catheter Line                 Introducer 07/09/19 1410 right internal jugular 7 days          Line                 Pacer Wires 07/09/19 1415 7 days          Peripheral Intravenous Line                 Peripheral IV - Single Lumen 07/09/19 20 G Right Antecubital 8 days                Physical Exam   Constitutional: He is oriented to person, place, and time. He appears well-developed and well-nourished. He is not intubated.   HENT:   Head: Normocephalic and atraumatic.   Right Ear: External ear normal.   Left Ear: External ear normal.   Mouth/Throat: Oropharynx is clear and moist.   Eyes: Pupils are equal, round, and reactive to light. Conjunctivae and EOM are normal. Right eye exhibits no discharge. Left eye exhibits no discharge. No scleral icterus.   Neck: Normal range of motion. Neck supple. Normal carotid pulses, no hepatojugular reflux and no JVD present. Carotid bruit is not present. No tracheal deviation present. No thyromegaly present.   Cardiovascular: Normal rate, regular rhythm, S1 normal and S2 normal.  No extrasystoles are present. PMI is not displaced. Exam reveals no gallop, no S3, no distant heart sounds, no friction rub and no midsystolic click.   No murmur heard.  Pulses:       Carotid pulses are 2+ on the right side, and 2+ on the left side.       Radial pulses are 2+ on the right side, and 2+ on the left side.        Femoral pulses are 2+ on the right side, and 2+ on the left side.       Popliteal pulses are 2+ on the right side, and 2+ on the left side.        Dorsalis pedis pulses are 2+ on the right side, and 2+ on the left side.        Posterior tibial pulses are 2+ on the right side, and 2+ on the left side.   Pulmonary/Chest: Effort normal and breath sounds normal. No accessory muscle usage or stridor. No apnea, no tachypnea and no bradypnea. He is not intubated. No  respiratory distress. He has no decreased breath sounds. He has no wheezes. He has no rales. He exhibits no tenderness and no bony tenderness.   Abdominal: He exhibits no distension, no pulsatile liver, no abdominal bruit, no ascites, no pulsatile midline mass and no mass. There is no tenderness. There is no rebound and no guarding.   Musculoskeletal: Normal range of motion. He exhibits no edema or tenderness.   Lymphadenopathy:     He has no cervical adenopathy.   Neurological: He is alert and oriented to person, place, and time. He has normal reflexes. No cranial nerve deficit. Coordination normal.   Skin: Skin is warm. No rash noted. No erythema. No pallor.   Psychiatric: He has a normal mood and affect. His behavior is normal. Judgment and thought content normal.       Significant Labs:     LABS  CBC  Recent Labs   Lab 07/11/19  0501 07/12/19  0319 07/17/19  0503   WBC 7.67 7.94 5.65   RBC 3.95* 4.12* 3.89*   HGB 11.9* 12.4* 11.7*   HCT 37.4* 38.7* 37.1*   * 136* 171   MCV 95 94 95   MCH 30.1 30.1 30.1   MCHC 31.8* 32.0 31.5*     BMP  Recent Labs   Lab 07/15/19  1742 07/16/19  0357 07/17/19  0503    138 139   K 4.2 3.9 4.1   CO2 24 24 23    105 108   BUN 41* 40* 38*   CREATININE 2.1* 2.0* 2.0*   * 179* 175*       POCT-Glucose  POCT Glucose   Date Value Ref Range Status   07/17/2019 178 (H) 70 - 110 mg/dL Final   07/16/2019 171 (H) 70 - 110 mg/dL Final   07/16/2019 163 (H) 70 - 110 mg/dL Final   07/16/2019 166 (H) 70 - 110 mg/dL Final   07/15/2019 247 (H) 70 - 110 mg/dL Final   07/15/2019 231 (H) 70 - 110 mg/dL Final   07/15/2019 184 (H) 70 - 110 mg/dL Final   07/15/2019 240 (H) 70 - 110 mg/dL Final   07/14/2019 242 (H) 70 - 110 mg/dL Final   07/14/2019 232 (H) 70 - 110 mg/dL Final   07/14/2019 336 (H) 70 - 110 mg/dL Final       Recent Labs   Lab 07/11/19  0501  07/15/19  1742 07/16/19  0357 07/17/19  0503   CALCIUM 8.7   < > 8.8 8.4* 8.7   MG 1.7   < > 1.7 1.7 1.6   PHOS 3.4  --   --    --   --     < > = values in this interval not displayed.     LFT  Recent Labs   Lab 07/12/19  0319   PROT 6.0   ALBUMIN 2.8*   BILITOT 0.5   AST 15   ALKPHOS 183*   ALT 31     GFR     COAGS  Recent Labs   Lab 07/17/19  0503   INR 1.0   APTT 30.2     CE  No results for input(s): TROPONINI, CKTOTAL, CKMB in the last 168 hours.    LAST HbA1c  Lab Results   Component Value Date    HGBA1C 6.5 (H) 09/19/2015       Lipid panel  Lab Results   Component Value Date    CHOL 82 (L) 07/13/2019     Lab Results   Component Value Date    HDL 27 (L) 07/13/2019     Lab Results   Component Value Date    LDLCALC 38.0 (L) 07/13/2019     Lab Results   Component Value Date    TRIG 85 07/13/2019     Lab Results   Component Value Date    CHOLHDL 32.9 07/13/2019          Significant Imaging:             Assessment and Plan:     Brief HPI: Patient seen this morning on rounds without CV complaint. POC discussed for transfer to  once evaluated by GI. Heparin gtt initiated for LUE DVT.     * Complete heart block  TVP placed emergently on admission, HR 20   AV silvia blockers held  frequent VT since admission - Loaded with Amio and transitioned to oral  LVEF is down to 30%   [facility] with LI  Transfer to [facility] for BiVICD implantation       Acute deep vein thrombosis (DVT) of left upper extremity  DVT despite prophylaxis with Lovenox at old IV site  Arm is edematous and tender to palpation    Warm compresses   Heparin gtt for now     Abdominal distension  Patient with reports of abnormal stooling - clear mucus last week  + BS  Imaging and exam consistent ileus likely due to hypoperfusion with CHB. Patient had recurrent syncope for 8 days PTA and on arrival HR 20s  Tolerating diet and liquids  GI consulted - appreciate recs     Acute systolic heart failure  · Moderately decreased left ventricular systolic function. The estimated ejection fraction is 30%  · Grade III (severe) left ventricular diastolic dysfunction consistent with restrictive  physiology.  · Moderate left atrial enlargement.  · Low normal right ventricular systolic function.  · Normal central venous pressure (3 mm Hg).    Newly depressed LVEF, volume overloaded on admission, diuresed and now euvolemic    BB on hold 2/2 CHB- washout complete 07/12/2019  ACEI and Lasix on hold 2/2 CLEMENCIA      CLEMENCIA (acute kidney injury)  Baseline Cr 1.4-1.5   CLEMENCIA likely 2/2 hypoperfusion with severe bradycardia (20 bpm) CHB  Holding ACEI, Lasix and Aldactone        VT (ventricular tachycardia)  Nonischemic per [facility]- frequent runs up to 30 beats, asymptomatic   BB on hold given CHB  Amiodarone  BiVICD planned     Morbid obesity  Weight loss      Hypertension  Holding home  ACEI, Aldactone given renal function   Holding BB 2/2 [facility]  Hydralazine PRN SBP >160        VTE Risk Mitigation (From admission, onward)        Ordered     heparin 25,000 units in dextrose 5% 250 mL (100 units/mL) infusion HIGH INTENSITY nomogram - [facility]  Continuous      07/17/19 0715     heparin 25,000 units in dextrose 5% (100 units/ml) IV bolus from bag - ADDITIONAL PRN BOLUS - 60 units/kg  As needed (PRN)      07/17/19 0715     heparin 25,000 units in dextrose 5% (100 units/ml) IV bolus from bag - ADDITIONAL PRN BOLUS - 30 units/kg  As needed (PRN)      07/17/19 0715     heparin infusion 1,000 units/500 ml in 0.9% NaCl (pressure line flush)  Intra-op continuous PRN      07/16/19 0288     enoxaparin injection 40 mg  Daily      07/12/19 0954          Bala Carvajal NP  Cardiology  [facility]

## 2019-07-17 NOTE — SUBJECTIVE & OBJECTIVE
Interval History:   Review of Systems   Constitution: Negative for diaphoresis, night sweats, weight gain and weight loss.   HENT: Negative for congestion.    Cardiovascular: Negative for chest pain, claudication, cyanosis, dyspnea on exertion, irregular heartbeat, leg swelling, near-syncope, orthopnea, palpitations, paroxysmal nocturnal dyspnea and syncope.   Respiratory: Negative for cough, shortness of breath and wheezing.    Endocrine: Negative for cold intolerance, heat intolerance and polyphagia.   Hematologic/Lymphatic: Negative for adenopathy and bleeding problem. Does not bruise/bleed easily.   Skin: Negative for dry skin and nail changes.   Musculoskeletal: Negative for arthritis, back pain, falls, joint pain, myalgias and neck pain.   Gastrointestinal: Positive for bloating and change in bowel habit. Negative for abdominal pain.   Genitourinary: Negative for bladder incontinence.   Neurological: Negative for aphonia, brief paralysis, difficulty with concentration, dizziness and weakness.   Psychiatric/Behavioral: Negative for altered mental status and memory loss. The patient does not have insomnia.    Allergic/Immunologic: Negative for environmental allergies.     Objective:     Vital Signs (Most Recent):  Temp: 98 °F (36.7 °C) (07/17/19 0703)  Pulse: 80 (07/17/19 0703)  Resp: (!) 36 (07/17/19 0703)  BP: (!) 111/53 (07/17/19 0703)  SpO2: 100 % (07/17/19 0703) Vital Signs (24h Range):  Temp:  [97.3 °F (36.3 °C)-98.5 °F (36.9 °C)] 98 °F (36.7 °C)  Pulse:  [79-81] 80  Resp:  [20-37] 36  SpO2:  [90 %-100 %] 100 %  BP: (103-163)/(53-85) 111/53     Weight: (!) 146 kg (321 lb 14 oz)  Body mass index is 42.47 kg/m².     SpO2: 100 %  O2 Device (Oxygen Therapy): nasal cannula(applied)      Intake/Output Summary (Last 24 hours) at 7/17/2019 1055  Last data filed at 7/17/2019 0609  Gross per 24 hour   Intake 4078 ml   Output 575 ml   Net 3503 ml       Lines/Drains/Airways     Central Venous Catheter Line                  Introducer 07/09/19 1410 right internal jugular 7 days          Line                 Pacer Wires 07/09/19 1415 7 days          Peripheral Intravenous Line                 Peripheral IV - Single Lumen 07/09/19 20 G Right Antecubital 8 days                Physical Exam   Constitutional: He is oriented to person, place, and time. He appears well-developed and well-nourished. He is not intubated.   HENT:   Head: Normocephalic and atraumatic.   Right Ear: External ear normal.   Left Ear: External ear normal.   Mouth/Throat: Oropharynx is clear and moist.   Eyes: Pupils are equal, round, and reactive to light. Conjunctivae and EOM are normal. Right eye exhibits no discharge. Left eye exhibits no discharge. No scleral icterus.   Neck: Normal range of motion. Neck supple. Normal carotid pulses, no hepatojugular reflux and no JVD present. Carotid bruit is not present. No tracheal deviation present. No thyromegaly present.   Cardiovascular: Normal rate, regular rhythm, S1 normal and S2 normal.  No extrasystoles are present. PMI is not displaced. Exam reveals no gallop, no S3, no distant heart sounds, no friction rub and no midsystolic click.   No murmur heard.  Pulses:       Carotid pulses are 2+ on the right side, and 2+ on the left side.       Radial pulses are 2+ on the right side, and 2+ on the left side.        Femoral pulses are 2+ on the right side, and 2+ on the left side.       Popliteal pulses are 2+ on the right side, and 2+ on the left side.        Dorsalis pedis pulses are 2+ on the right side, and 2+ on the left side.        Posterior tibial pulses are 2+ on the right side, and 2+ on the left side.   Pulmonary/Chest: Effort normal and breath sounds normal. No accessory muscle usage or stridor. No apnea, no tachypnea and no bradypnea. He is not intubated. No respiratory distress. He has no decreased breath sounds. He has no wheezes. He has no rales. He exhibits no tenderness and no bony tenderness.    Abdominal: He exhibits no distension, no pulsatile liver, no abdominal bruit, no ascites, no pulsatile midline mass and no mass. There is no tenderness. There is no rebound and no guarding.   Musculoskeletal: Normal range of motion. He exhibits no edema or tenderness.   Lymphadenopathy:     He has no cervical adenopathy.   Neurological: He is alert and oriented to person, place, and time. He has normal reflexes. No cranial nerve deficit. Coordination normal.   Skin: Skin is warm. No rash noted. No erythema. No pallor.   Psychiatric: He has a normal mood and affect. His behavior is normal. Judgment and thought content normal.       Significant Labs:     LABS  CBC  Recent Labs   Lab 07/11/19  0501 07/12/19  0319 07/17/19  0503   WBC 7.67 7.94 5.65   RBC 3.95* 4.12* 3.89*   HGB 11.9* 12.4* 11.7*   HCT 37.4* 38.7* 37.1*   * 136* 171   MCV 95 94 95   MCH 30.1 30.1 30.1   MCHC 31.8* 32.0 31.5*     BMP  Recent Labs   Lab 07/15/19  1742 07/16/19  0357 07/17/19  0503    138 139   K 4.2 3.9 4.1   CO2 24 24 23    105 108   BUN 41* 40* 38*   CREATININE 2.1* 2.0* 2.0*   * 179* 175*       POCT-Glucose  POCT Glucose   Date Value Ref Range Status   07/17/2019 178 (H) 70 - 110 mg/dL Final   07/16/2019 171 (H) 70 - 110 mg/dL Final   07/16/2019 163 (H) 70 - 110 mg/dL Final   07/16/2019 166 (H) 70 - 110 mg/dL Final   07/15/2019 247 (H) 70 - 110 mg/dL Final   07/15/2019 231 (H) 70 - 110 mg/dL Final   07/15/2019 184 (H) 70 - 110 mg/dL Final   07/15/2019 240 (H) 70 - 110 mg/dL Final   07/14/2019 242 (H) 70 - 110 mg/dL Final   07/14/2019 232 (H) 70 - 110 mg/dL Final   07/14/2019 336 (H) 70 - 110 mg/dL Final       Recent Labs   Lab 07/11/19  0501  07/15/19  1742 07/16/19  0357 07/17/19  0503   CALCIUM 8.7   < > 8.8 8.4* 8.7   MG 1.7   < > 1.7 1.7 1.6   PHOS 3.4  --   --   --   --     < > = values in this interval not displayed.     LFT  Recent Labs   Lab 07/12/19  0319   PROT 6.0   ALBUMIN 2.8*   BILITOT 0.5    AST 15   ALKPHOS 183*   ALT 31     GFR     COAGS  Recent Labs   Lab 07/17/19  0503   INR 1.0   APTT 30.2     CE  No results for input(s): TROPONINI, CKTOTAL, CKMB in the last 168 hours.    LAST HbA1c  Lab Results   Component Value Date    HGBA1C 6.5 (H) 09/19/2015       Lipid panel  Lab Results   Component Value Date    CHOL 82 (L) 07/13/2019     Lab Results   Component Value Date    HDL 27 (L) 07/13/2019     Lab Results   Component Value Date    LDLCALC 38.0 (L) 07/13/2019     Lab Results   Component Value Date    TRIG 85 07/13/2019     Lab Results   Component Value Date    CHOLHDL 32.9 07/13/2019          Significant Imaging:

## 2019-07-17 NOTE — Clinical Note
Heels floated. Head, knees, and arms padded with foam cushion. Extra neck support in place. Safety strap and warming blanket applied.

## 2019-07-17 NOTE — PLAN OF CARE
ptt per lab greater than 150 heparin turned off,for 2 hrs , iv that the heparin and normal saline was in was out, fluid on bed, gown, discontinued the iv, will need new iv

## 2019-07-17 NOTE — Clinical Note
A venogram was performed in the left cephalic vein. The vessel was injected with hand injection  with 10 mL of contrast.

## 2019-07-17 NOTE — ASSESSMENT & PLAN NOTE
Holding home  ACEI, Aldactone given renal function   Holding BB 2/2 CHB  Hydralazine PRN SBP >160

## 2019-07-17 NOTE — ASSESSMENT & PLAN NOTE
- suspect ileus, abdominal exam is soft, no acute abdomen  - still with bowel sounds and bowel function  - likely degree of hypoperfusion as well from persistent bradycardia  - monitor abd exam  - d/w family as well  - no contraindication to transfer from GI standpoint

## 2019-07-17 NOTE — SUBJECTIVE & OBJECTIVE
Past Medical History:   Diagnosis Date    CHF (congestive heart failure)     Coronary artery disease     Diabetes mellitus     Hypertension        Past Surgical History:   Procedure Laterality Date    Insertion, Pacemaker, Temporary Transvenous N/A 7/9/2019    Performed by Dejuan Cody MD at [facility] CATH LAB/EP       Review of patient's allergies indicates:  No Known Allergies  Family History     None        Tobacco Use    Smoking status: Former Smoker   Substance and Sexual Activity    Alcohol use: Yes     Comment: social    Drug use: No    Sexual activity: Not on file     Review of Systems   Constitutional: Positive for activity change and fatigue. Negative for chills and fever.   HENT: Negative for postnasal drip and trouble swallowing.    Eyes: Negative for pain and visual disturbance.   Respiratory: Positive for shortness of breath. Negative for cough and choking.    Cardiovascular: Negative for chest pain and leg swelling.   Gastrointestinal: Positive for abdominal distention. Negative for anal bleeding, blood in stool, constipation, diarrhea, rectal pain and vomiting.   Endocrine: Negative for cold intolerance and heat intolerance.   Genitourinary: Negative for difficulty urinating and hematuria.   Neurological: Positive for dizziness and light-headedness. Negative for numbness.   Hematological: Negative for adenopathy. Does not bruise/bleed easily.   Psychiatric/Behavioral: Negative for agitation and confusion.     Objective:     Vital Signs (Most Recent):  Temp: 98 °F (36.7 °C) (07/17/19 0703)  Pulse: 80 (07/17/19 0703)  Resp: (!) 36 (07/17/19 0703)  BP: (!) 111/53 (07/17/19 0703)  SpO2: 100 % (07/17/19 0703) Vital Signs (24h Range):  Temp:  [97.3 °F (36.3 °C)-98.5 °F (36.9 °C)] 98 °F (36.7 °C)  Pulse:  [79-81] 80  Resp:  [20-37] 36  SpO2:  [90 %-100 %] 100 %  BP: (103-163)/(53-85) 111/53     Weight: (!) 146 kg (321 lb 14 oz) (07/11/19 0505)  Body mass index is 42.47 kg/m².      Intake/Output  Summary (Last 24 hours) at 7/17/2019 1040  Last data filed at 7/17/2019 0609  Gross per 24 hour   Intake 4078 ml   Output 575 ml   Net 3503 ml       Lines/Drains/Airways     Central Venous Catheter Line                 Introducer 07/09/19 1410 right internal jugular 7 days          Line                 Pacer Wires 07/09/19 1415 7 days          Peripheral Intravenous Line                 Peripheral IV - Single Lumen 07/09/19 20 G Right Antecubital 8 days                Physical Exam   Constitutional: He is oriented to person, place, and time. He appears well-developed and well-nourished. No distress.   HENT:   Head: Normocephalic and atraumatic.   Eyes: Conjunctivae are normal. No scleral icterus.   Neck: No tracheal deviation present. No thyromegaly present.   Cardiovascular: Normal rate and normal heart sounds. Exam reveals no gallop and no friction rub.   Pulmonary/Chest: Effort normal and breath sounds normal. He has no wheezes. He has no rales.   Abdominal: Soft. Bowel sounds are normal. He exhibits distension. There is no tenderness. There is no rebound and no guarding.   Tympanic   Musculoskeletal: Normal range of motion. He exhibits no edema or tenderness.   Neurological: He is alert and oriented to person, place, and time.   Psychiatric: He has a normal mood and affect. His behavior is normal.   Nursing note and vitals reviewed.      Significant Labs:  CBC:   Recent Labs   Lab 07/17/19  0503   WBC 5.65   HGB 11.7*   HCT 37.1*        CMP:   Recent Labs   Lab 07/17/19  0503   *   CALCIUM 8.7      K 4.1   CO2 23      BUN 38*   CREATININE 2.0*       Significant Imaging:  Imaging results within the past 24 hours have been reviewed.

## 2019-07-17 NOTE — ASSESSMENT & PLAN NOTE
Nonischemic per LHC- frequent runs up to 30 beats, asymptomatic   BB on hold given CHB  Amiodarone  BiVICD planned

## 2019-07-17 NOTE — Clinical Note
Delay: extended prep time due to ICU patient needs and comfort of pt laying flat, EKG reading issues.

## 2019-07-17 NOTE — ASSESSMENT & PLAN NOTE
Baseline Cr 1.4-1.5   CLEMENCIA likely 2/2 hypoperfusion with severe bradycardia (20 bpm) CHB  Holding ACEI, Lasix and Aldactone

## 2019-07-17 NOTE — HPI
This is a 73-year-old male with a history of hypertension, diabetes, coronary artery disease and CHF here complaining of weakness and shortness of breath noted to have severe bradycardia, workup consistent with complete heart block status post temporary transvenous pacemaker.  He has noted to have abdominal distention in addition to some change in his bowel habits.  He has never had an upper endoscopy or colonoscopy, family is present to give additional history.  He noted some clear, mucoid stools over the last several days.  Some increased abdominal distention.  The patient notes that he is obese but states he feels more distended.  No abdominal pain per se, no nausea or vomiting.  No hematochezia. No other exacerbating or relieving factors or other associated symptoms.    The following portions of the patient's history were reviewed and updated as appropriate: allergies, current medications, past family history, past medical history, past social history, past surgical history and problem list.    (Portions of this note were dictated using voice recognition software and may contain dictation related errors in spelling/grammar/syntax not found on text review)

## 2019-07-17 NOTE — ASSESSMENT & PLAN NOTE
Patient with reports of abnormal stooling - clear mucus last week  + BS  Imaging and exam consistent ileus likely due to hypoperfusion with CHB. Patient had recurrent syncope for 8 days PTA and on arrival HR 20s  Tolerating diet and liquids  GI consulted - appreciate recs

## 2019-07-18 ENCOUNTER — ANESTHESIA EVENT (OUTPATIENT)
Dept: MEDSURG UNIT | Facility: HOSPITAL | Age: 74
DRG: 226 | End: 2019-07-18
Payer: MEDICARE

## 2019-07-18 ENCOUNTER — ANESTHESIA (OUTPATIENT)
Dept: MEDSURG UNIT | Facility: HOSPITAL | Age: 74
DRG: 226 | End: 2019-07-18
Payer: MEDICARE

## 2019-07-18 DIAGNOSIS — Z95.810 ICD (IMPLANTABLE CARDIOVERTER-DEFIBRILLATOR) IN PLACE: Primary | ICD-10-CM

## 2019-07-18 DIAGNOSIS — I44.2 COMPLETE HEART BLOCK: ICD-10-CM

## 2019-07-18 PROBLEM — M10.9 GOUT: Status: ACTIVE | Noted: 2019-07-18

## 2019-07-18 LAB
ABO + RH BLD: NORMAL
ALBUMIN SERPL BCP-MCNC: 2.7 G/DL (ref 3.5–5.2)
ALBUMIN SERPL BCP-MCNC: 2.7 G/DL (ref 3.5–5.2)
ALP SERPL-CCNC: 151 U/L (ref 55–135)
ALP SERPL-CCNC: 156 U/L (ref 55–135)
ALT SERPL W/O P-5'-P-CCNC: 54 U/L (ref 10–44)
ALT SERPL W/O P-5'-P-CCNC: 58 U/L (ref 10–44)
ANION GAP SERPL CALC-SCNC: 11 MMOL/L (ref 8–16)
ANION GAP SERPL CALC-SCNC: 8 MMOL/L (ref 8–16)
APTT BLDCRRT: 51.8 SEC (ref 21–32)
AST SERPL-CCNC: 56 U/L (ref 10–40)
AST SERPL-CCNC: 59 U/L (ref 10–40)
BASOPHILS # BLD AUTO: 0.01 K/UL (ref 0–0.2)
BASOPHILS NFR BLD: 0.1 % (ref 0–1.9)
BILIRUB SERPL-MCNC: 0.5 MG/DL (ref 0.1–1)
BILIRUB SERPL-MCNC: 0.5 MG/DL (ref 0.1–1)
BILIRUB UR QL STRIP: NEGATIVE
BLD GP AB SCN CELLS X3 SERPL QL: NORMAL
BUN SERPL-MCNC: 34 MG/DL (ref 8–23)
BUN SERPL-MCNC: 36 MG/DL (ref 8–23)
CALCIUM SERPL-MCNC: 8.1 MG/DL (ref 8.7–10.5)
CALCIUM SERPL-MCNC: 8.3 MG/DL (ref 8.7–10.5)
CHLORIDE SERPL-SCNC: 108 MMOL/L (ref 95–110)
CHLORIDE SERPL-SCNC: 109 MMOL/L (ref 95–110)
CLARITY UR REFRACT.AUTO: CLEAR
CO2 SERPL-SCNC: 18 MMOL/L (ref 23–29)
CO2 SERPL-SCNC: 22 MMOL/L (ref 23–29)
COLOR UR AUTO: YELLOW
CREAT SERPL-MCNC: 1.8 MG/DL (ref 0.5–1.4)
CREAT SERPL-MCNC: 1.9 MG/DL (ref 0.5–1.4)
DIFFERENTIAL METHOD: ABNORMAL
EOSINOPHIL # BLD AUTO: 0.1 K/UL (ref 0–0.5)
EOSINOPHIL NFR BLD: 1.8 % (ref 0–8)
ERYTHROCYTE [DISTWIDTH] IN BLOOD BY AUTOMATED COUNT: 14.9 % (ref 11.5–14.5)
EST. GFR  (AFRICAN AMERICAN): 40.1 ML/MIN/1.73 M^2
EST. GFR  (AFRICAN AMERICAN): 42.8 ML/MIN/1.73 M^2
EST. GFR  (NON AFRICAN AMERICAN): 34.7 ML/MIN/1.73 M^2
EST. GFR  (NON AFRICAN AMERICAN): 37 ML/MIN/1.73 M^2
GLUCOSE SERPL-MCNC: 196 MG/DL (ref 70–110)
GLUCOSE SERPL-MCNC: 209 MG/DL (ref 70–110)
GLUCOSE UR QL STRIP: ABNORMAL
HCT VFR BLD AUTO: 36 % (ref 40–54)
HGB BLD-MCNC: 11.4 G/DL (ref 14–18)
HGB UR QL STRIP: NEGATIVE
IMM GRANULOCYTES # BLD AUTO: 0.02 K/UL (ref 0–0.04)
IMM GRANULOCYTES NFR BLD AUTO: 0.3 % (ref 0–0.5)
INR PPP: 1 (ref 0.8–1.2)
KETONES UR QL STRIP: NEGATIVE
LEUKOCYTE ESTERASE UR QL STRIP: NEGATIVE
LYMPHOCYTES # BLD AUTO: 1.7 K/UL (ref 1–4.8)
LYMPHOCYTES NFR BLD: 21.5 % (ref 18–48)
MAGNESIUM SERPL-MCNC: 1.7 MG/DL (ref 1.6–2.6)
MAGNESIUM SERPL-MCNC: 1.7 MG/DL (ref 1.6–2.6)
MCH RBC QN AUTO: 30.2 PG (ref 27–31)
MCHC RBC AUTO-ENTMCNC: 31.7 G/DL (ref 32–36)
MCV RBC AUTO: 96 FL (ref 82–98)
MONOCYTES # BLD AUTO: 0.7 K/UL (ref 0.3–1)
MONOCYTES NFR BLD: 9.4 % (ref 4–15)
NEUTROPHILS # BLD AUTO: 5.2 K/UL (ref 1.8–7.7)
NEUTROPHILS NFR BLD: 66.9 % (ref 38–73)
NITRITE UR QL STRIP: NEGATIVE
NRBC BLD-RTO: 0 /100 WBC
PH UR STRIP: 5 [PH] (ref 5–8)
PHOSPHATE SERPL-MCNC: 2.5 MG/DL (ref 2.7–4.5)
PHOSPHATE SERPL-MCNC: 2.6 MG/DL (ref 2.7–4.5)
PLATELET # BLD AUTO: 162 K/UL (ref 150–350)
PMV BLD AUTO: 12.5 FL (ref 9.2–12.9)
POCT GLUCOSE: 175 MG/DL (ref 70–110)
POCT GLUCOSE: 183 MG/DL (ref 70–110)
POCT GLUCOSE: 202 MG/DL (ref 70–110)
POCT GLUCOSE: 206 MG/DL (ref 70–110)
POCT GLUCOSE: 218 MG/DL (ref 70–110)
POTASSIUM SERPL-SCNC: 3.9 MMOL/L (ref 3.5–5.1)
POTASSIUM SERPL-SCNC: 3.9 MMOL/L (ref 3.5–5.1)
PROT SERPL-MCNC: 5.6 G/DL (ref 6–8.4)
PROT SERPL-MCNC: 5.7 G/DL (ref 6–8.4)
PROT UR QL STRIP: NEGATIVE
PROTHROMBIN TIME: 10.5 SEC (ref 9–12.5)
RBC # BLD AUTO: 3.77 M/UL (ref 4.6–6.2)
SODIUM SERPL-SCNC: 137 MMOL/L (ref 136–145)
SODIUM SERPL-SCNC: 139 MMOL/L (ref 136–145)
SP GR UR STRIP: 1.02 (ref 1–1.03)
URN SPEC COLLECT METH UR: ABNORMAL
WBC # BLD AUTO: 7.77 K/UL (ref 3.9–12.7)

## 2019-07-18 PROCEDURE — 80053 COMPREHEN METABOLIC PANEL: CPT

## 2019-07-18 PROCEDURE — D9220A PRA ANESTHESIA: ICD-10-PCS | Mod: CRNA,,, | Performed by: NURSE ANESTHETIST, CERTIFIED REGISTERED

## 2019-07-18 PROCEDURE — 99223 PR INITIAL HOSPITAL CARE,LEVL III: ICD-10-PCS | Mod: AI,GC,, | Performed by: INTERNAL MEDICINE

## 2019-07-18 PROCEDURE — 99223 1ST HOSP IP/OBS HIGH 75: CPT | Mod: ,,, | Performed by: INTERNAL MEDICINE

## 2019-07-18 PROCEDURE — 93010 EKG 12-LEAD: ICD-10-PCS | Mod: ,,, | Performed by: INTERNAL MEDICINE

## 2019-07-18 PROCEDURE — 63600175 PHARM REV CODE 636 W HCPCS: Performed by: NURSE ANESTHETIST, CERTIFIED REGISTERED

## 2019-07-18 PROCEDURE — 25000003 PHARM REV CODE 250: Performed by: INTERNAL MEDICINE

## 2019-07-18 PROCEDURE — 33249 INSJ/RPLCMT DEFIB W/LEAD(S): CPT | Performed by: INTERNAL MEDICINE

## 2019-07-18 PROCEDURE — 63600175 PHARM REV CODE 636 W HCPCS: Performed by: STUDENT IN AN ORGANIZED HEALTH CARE EDUCATION/TRAINING PROGRAM

## 2019-07-18 PROCEDURE — 25000003 PHARM REV CODE 250: Performed by: STUDENT IN AN ORGANIZED HEALTH CARE EDUCATION/TRAINING PROGRAM

## 2019-07-18 PROCEDURE — 25000003 PHARM REV CODE 250: Performed by: NURSE PRACTITIONER

## 2019-07-18 PROCEDURE — 25000003 PHARM REV CODE 250: Performed by: NURSE ANESTHETIST, CERTIFIED REGISTERED

## 2019-07-18 PROCEDURE — 99223 1ST HOSP IP/OBS HIGH 75: CPT | Mod: AI,GC,, | Performed by: INTERNAL MEDICINE

## 2019-07-18 PROCEDURE — 93005 ELECTROCARDIOGRAM TRACING: CPT

## 2019-07-18 PROCEDURE — 25500020 PHARM REV CODE 255: Performed by: NURSE ANESTHETIST, CERTIFIED REGISTERED

## 2019-07-18 PROCEDURE — C1894 INTRO/SHEATH, NON-LASER: HCPCS | Performed by: INTERNAL MEDICINE

## 2019-07-18 PROCEDURE — 37000008 HC ANESTHESIA 1ST 15 MINUTES: Performed by: INTERNAL MEDICINE

## 2019-07-18 PROCEDURE — 83735 ASSAY OF MAGNESIUM: CPT

## 2019-07-18 PROCEDURE — D9220A PRA ANESTHESIA: Mod: ANES,,, | Performed by: ANESTHESIOLOGY

## 2019-07-18 PROCEDURE — 33249 PR ICD INSERT SNGL/DUAL W/LEADS: ICD-10-PCS | Mod: ,,, | Performed by: INTERNAL MEDICINE

## 2019-07-18 PROCEDURE — 63600175 PHARM REV CODE 636 W HCPCS: Performed by: INTERNAL MEDICINE

## 2019-07-18 PROCEDURE — 94761 N-INVAS EAR/PLS OXIMETRY MLT: CPT

## 2019-07-18 PROCEDURE — 84100 ASSAY OF PHOSPHORUS: CPT

## 2019-07-18 PROCEDURE — 33249 INSJ/RPLCMT DEFIB W/LEAD(S): CPT | Mod: ,,, | Performed by: INTERNAL MEDICINE

## 2019-07-18 PROCEDURE — 93010 ELECTROCARDIOGRAM REPORT: CPT | Mod: ,,, | Performed by: INTERNAL MEDICINE

## 2019-07-18 PROCEDURE — 37000009 HC ANESTHESIA EA ADD 15 MINS: Performed by: INTERNAL MEDICINE

## 2019-07-18 PROCEDURE — 25000003 PHARM REV CODE 250

## 2019-07-18 PROCEDURE — C1769 GUIDE WIRE: HCPCS | Performed by: INTERNAL MEDICINE

## 2019-07-18 PROCEDURE — 86901 BLOOD TYPING SEROLOGIC RH(D): CPT

## 2019-07-18 PROCEDURE — 20000000 HC ICU ROOM

## 2019-07-18 PROCEDURE — 99223 PR INITIAL HOSPITAL CARE,LEVL III: ICD-10-PCS | Mod: ,,, | Performed by: INTERNAL MEDICINE

## 2019-07-18 PROCEDURE — C1900 LEAD, CORONARY VENOUS: HCPCS | Performed by: INTERNAL MEDICINE

## 2019-07-18 PROCEDURE — 85610 PROTHROMBIN TIME: CPT

## 2019-07-18 PROCEDURE — C1725 CATH, TRANSLUMIN NON-LASER: HCPCS | Performed by: INTERNAL MEDICINE

## 2019-07-18 PROCEDURE — C1730 CATH, EP, 19 OR FEW ELECT: HCPCS | Performed by: INTERNAL MEDICINE

## 2019-07-18 PROCEDURE — C1893 INTRO/SHEATH, FIXED,NON-PEEL: HCPCS | Performed by: INTERNAL MEDICINE

## 2019-07-18 PROCEDURE — A4216 STERILE WATER/SALINE, 10 ML: HCPCS | Performed by: STUDENT IN AN ORGANIZED HEALTH CARE EDUCATION/TRAINING PROGRAM

## 2019-07-18 PROCEDURE — C1883 ADAPT/EXT, PACING/NEURO LEAD: HCPCS | Performed by: INTERNAL MEDICINE

## 2019-07-18 PROCEDURE — D9220A PRA ANESTHESIA: Mod: CRNA,,, | Performed by: NURSE ANESTHETIST, CERTIFIED REGISTERED

## 2019-07-18 PROCEDURE — D9220A PRA ANESTHESIA: ICD-10-PCS | Mod: ANES,,, | Performed by: ANESTHESIOLOGY

## 2019-07-18 PROCEDURE — C1882 AICD, OTHER THAN SING/DUAL: HCPCS | Performed by: INTERNAL MEDICINE

## 2019-07-18 PROCEDURE — 85025 COMPLETE CBC W/AUTO DIFF WBC: CPT

## 2019-07-18 PROCEDURE — C1777 LEAD, AICD, ENDO SINGLE COIL: HCPCS | Performed by: INTERNAL MEDICINE

## 2019-07-18 PROCEDURE — 85730 THROMBOPLASTIN TIME PARTIAL: CPT

## 2019-07-18 PROCEDURE — 63600175 PHARM REV CODE 636 W HCPCS: Performed by: NURSE PRACTITIONER

## 2019-07-18 DEVICE — PACING LEAD
Type: IMPLANTABLE DEVICE | Site: HEART | Status: FUNCTIONAL
Brand: TENDRIL MRI™

## 2019-07-18 DEVICE — DEFIBRILLATION LEAD
Type: IMPLANTABLE DEVICE | Site: HEART | Status: FUNCTIONAL
Brand: DURATA™

## 2019-07-18 DEVICE — CARDIAC RESYNCHRONIZATION DEVICE, TIERED-THERAPY CARDIOVERTER/DEFIBRILLATOR VVED DDDRV
Type: IMPLANTABLE DEVICE | Site: HEART | Status: FUNCTIONAL
Brand: UNIFY ASSURA™

## 2019-07-18 RX ORDER — FENTANYL CITRATE 50 UG/ML
25 INJECTION, SOLUTION INTRAMUSCULAR; INTRAVENOUS EVERY 5 MIN PRN
Status: CANCELLED | OUTPATIENT
Start: 2019-07-18

## 2019-07-18 RX ORDER — LIDOCAINE HCL/PF 100 MG/5ML
SYRINGE (ML) INTRAVENOUS
Status: DISCONTINUED | OUTPATIENT
Start: 2019-07-18 | End: 2019-07-18

## 2019-07-18 RX ORDER — HYDROMORPHONE HYDROCHLORIDE 1 MG/ML
0.2 INJECTION, SOLUTION INTRAMUSCULAR; INTRAVENOUS; SUBCUTANEOUS EVERY 5 MIN PRN
Status: CANCELLED | OUTPATIENT
Start: 2019-07-18

## 2019-07-18 RX ORDER — SODIUM,POTASSIUM PHOSPHATES 280-250MG
2 POWDER IN PACKET (EA) ORAL ONCE
Status: COMPLETED | OUTPATIENT
Start: 2019-07-18 | End: 2019-07-18

## 2019-07-18 RX ORDER — PROPOFOL 10 MG/ML
VIAL (ML) INTRAVENOUS CONTINUOUS PRN
Status: DISCONTINUED | OUTPATIENT
Start: 2019-07-18 | End: 2019-07-18

## 2019-07-18 RX ORDER — IODIXANOL 320 MG/ML
INJECTION, SOLUTION INTRAVASCULAR
Status: DISCONTINUED | OUTPATIENT
Start: 2019-07-18 | End: 2019-07-18

## 2019-07-18 RX ORDER — LANOLIN ALCOHOL/MO/W.PET/CERES
800 CREAM (GRAM) TOPICAL ONCE
Status: COMPLETED | OUTPATIENT
Start: 2019-07-18 | End: 2019-07-18

## 2019-07-18 RX ORDER — BENZONATATE 100 MG/1
100 CAPSULE ORAL 3 TIMES DAILY PRN
Status: DISCONTINUED | OUTPATIENT
Start: 2019-07-18 | End: 2019-07-22 | Stop reason: HOSPADM

## 2019-07-18 RX ORDER — CEFAZOLIN SODIUM 1 G/3ML
2 INJECTION, POWDER, FOR SOLUTION INTRAMUSCULAR; INTRAVENOUS
Status: COMPLETED | OUTPATIENT
Start: 2019-07-18 | End: 2019-07-19

## 2019-07-18 RX ORDER — FUROSEMIDE 10 MG/ML
40 INJECTION INTRAMUSCULAR; INTRAVENOUS 2 TIMES DAILY
Status: DISCONTINUED | OUTPATIENT
Start: 2019-07-18 | End: 2019-07-20

## 2019-07-18 RX ORDER — PHENYLEPHRINE HYDROCHLORIDE 10 MG/ML
INJECTION INTRAVENOUS
Status: DISCONTINUED | OUTPATIENT
Start: 2019-07-18 | End: 2019-07-18

## 2019-07-18 RX ORDER — INSULIN ASPART 100 [IU]/ML
0-5 INJECTION, SOLUTION INTRAVENOUS; SUBCUTANEOUS EVERY 6 HOURS
Status: DISCONTINUED | OUTPATIENT
Start: 2019-07-18 | End: 2019-07-22 | Stop reason: HOSPADM

## 2019-07-18 RX ORDER — ASPIRIN 81 MG/1
81 TABLET ORAL DAILY
Status: DISCONTINUED | OUTPATIENT
Start: 2019-07-18 | End: 2019-07-22 | Stop reason: HOSPADM

## 2019-07-18 RX ORDER — PROPOFOL 10 MG/ML
VIAL (ML) INTRAVENOUS
Status: DISCONTINUED | OUTPATIENT
Start: 2019-07-18 | End: 2019-07-18

## 2019-07-18 RX ORDER — VANCOMYCIN HYDROCHLORIDE 1 G/20ML
INJECTION, POWDER, LYOPHILIZED, FOR SOLUTION INTRAVENOUS
Status: DISCONTINUED | OUTPATIENT
Start: 2019-07-18 | End: 2019-07-22 | Stop reason: HOSPADM

## 2019-07-18 RX ORDER — SODIUM CHLORIDE 9 MG/ML
INJECTION, SOLUTION INTRAVENOUS CONTINUOUS PRN
Status: DISCONTINUED | OUTPATIENT
Start: 2019-07-18 | End: 2019-07-18

## 2019-07-18 RX ORDER — LIDOCAINE HYDROCHLORIDE 20 MG/ML
INJECTION, SOLUTION INFILTRATION; PERINEURAL
Status: DISCONTINUED | OUTPATIENT
Start: 2019-07-18 | End: 2019-07-22 | Stop reason: HOSPADM

## 2019-07-18 RX ORDER — MIDAZOLAM HYDROCHLORIDE 1 MG/ML
INJECTION, SOLUTION INTRAMUSCULAR; INTRAVENOUS
Status: DISCONTINUED | OUTPATIENT
Start: 2019-07-18 | End: 2019-07-18

## 2019-07-18 RX ORDER — BUPIVACAINE HYDROCHLORIDE 2.5 MG/ML
INJECTION, SOLUTION EPIDURAL; INFILTRATION; INTRACAUDAL
Status: DISCONTINUED | OUTPATIENT
Start: 2019-07-18 | End: 2019-07-22 | Stop reason: HOSPADM

## 2019-07-18 RX ORDER — DIPHENHYDRAMINE HYDROCHLORIDE 50 MG/ML
25 INJECTION INTRAMUSCULAR; INTRAVENOUS EVERY 6 HOURS PRN
Status: CANCELLED | OUTPATIENT
Start: 2019-07-18

## 2019-07-18 RX ORDER — CEFAZOLIN SODIUM 1 G/3ML
2 INJECTION, POWDER, FOR SOLUTION INTRAMUSCULAR; INTRAVENOUS
Status: CANCELLED | OUTPATIENT
Start: 2019-07-18

## 2019-07-18 RX ORDER — KETAMINE HYDROCHLORIDE 10 MG/ML
INJECTION, SOLUTION INTRAMUSCULAR; INTRAVENOUS
Status: DISCONTINUED | OUTPATIENT
Start: 2019-07-18 | End: 2019-07-18

## 2019-07-18 RX ORDER — EPHEDRINE SULFATE 50 MG/ML
INJECTION, SOLUTION INTRAVENOUS
Status: DISCONTINUED | OUTPATIENT
Start: 2019-07-18 | End: 2019-07-18

## 2019-07-18 RX ORDER — CEFAZOLIN SODIUM 1 G/3ML
2 INJECTION, POWDER, FOR SOLUTION INTRAMUSCULAR; INTRAVENOUS
Status: DISCONTINUED | OUTPATIENT
Start: 2019-07-18 | End: 2019-07-22 | Stop reason: HOSPADM

## 2019-07-18 RX ORDER — GLYCOPYRROLATE 0.2 MG/ML
INJECTION INTRAMUSCULAR; INTRAVENOUS
Status: DISCONTINUED | OUTPATIENT
Start: 2019-07-18 | End: 2019-07-18

## 2019-07-18 RX ADMIN — EPHEDRINE SULFATE 10 MG: 50 INJECTION, SOLUTION INTRAMUSCULAR; INTRAVENOUS; SUBCUTANEOUS at 01:07

## 2019-07-18 RX ADMIN — SODIUM CHLORIDE: 0.9 INJECTION, SOLUTION INTRAVENOUS at 12:07

## 2019-07-18 RX ADMIN — GLYCOPYRROLATE 0.1 MG: 0.2 INJECTION, SOLUTION INTRAMUSCULAR; INTRAVENOUS at 01:07

## 2019-07-18 RX ADMIN — PHENYLEPHRINE HYDROCHLORIDE 200 MCG: 10 INJECTION INTRAVENOUS at 01:07

## 2019-07-18 RX ADMIN — PROPOFOL 30 MG: 10 INJECTION, EMULSION INTRAVENOUS at 01:07

## 2019-07-18 RX ADMIN — Medication 3 ML: at 09:07

## 2019-07-18 RX ADMIN — PHENYLEPHRINE HYDROCHLORIDE 200 MCG: 10 INJECTION INTRAVENOUS at 02:07

## 2019-07-18 RX ADMIN — MIDAZOLAM HYDROCHLORIDE 2 MG: 1 INJECTION, SOLUTION INTRAMUSCULAR; INTRAVENOUS at 12:07

## 2019-07-18 RX ADMIN — PHENYLEPHRINE HYDROCHLORIDE 200 MCG: 10 INJECTION INTRAVENOUS at 03:07

## 2019-07-18 RX ADMIN — KETAMINE HYDROCHLORIDE 10 MG: 10 INJECTION, SOLUTION INTRAMUSCULAR; INTRAVENOUS at 01:07

## 2019-07-18 RX ADMIN — POTASSIUM & SODIUM PHOSPHATES POWDER PACK 280-160-250 MG 2 PACKET: 280-160-250 PACK at 11:07

## 2019-07-18 RX ADMIN — COLCHICINE 0.6 MG: 0.6 TABLET, FILM COATED ORAL at 09:07

## 2019-07-18 RX ADMIN — ALLOPURINOL 300 MG: 300 TABLET ORAL at 09:07

## 2019-07-18 RX ADMIN — PROPOFOL 50 MCG/KG/MIN: 10 INJECTION, EMULSION INTRAVENOUS at 12:07

## 2019-07-18 RX ADMIN — PHENYLEPHRINE HYDROCHLORIDE 100 MCG: 10 INJECTION INTRAVENOUS at 01:07

## 2019-07-18 RX ADMIN — LIDOCAINE HYDROCHLORIDE 80 MG: 20 INJECTION, SOLUTION INTRAVENOUS at 12:07

## 2019-07-18 RX ADMIN — EPHEDRINE SULFATE 5 MG: 50 INJECTION, SOLUTION INTRAMUSCULAR; INTRAVENOUS; SUBCUTANEOUS at 01:07

## 2019-07-18 RX ADMIN — INSULIN ASPART 2 UNITS: 100 INJECTION, SOLUTION INTRAVENOUS; SUBCUTANEOUS at 12:07

## 2019-07-18 RX ADMIN — FUROSEMIDE 40 MG: 10 INJECTION, SOLUTION INTRAMUSCULAR; INTRAVENOUS at 05:07

## 2019-07-18 RX ADMIN — EPHEDRINE SULFATE 5 MG: 50 INJECTION, SOLUTION INTRAMUSCULAR; INTRAVENOUS; SUBCUTANEOUS at 02:07

## 2019-07-18 RX ADMIN — CEFAZOLIN 2 G: 1 INJECTION, POWDER, FOR SOLUTION INTRAMUSCULAR; INTRAVENOUS at 09:07

## 2019-07-18 RX ADMIN — CEFAZOLIN 3 G: 330 INJECTION, POWDER, FOR SOLUTION INTRAMUSCULAR; INTRAVENOUS at 12:07

## 2019-07-18 RX ADMIN — INSULIN ASPART 2 UNITS: 100 INJECTION, SOLUTION INTRAVENOUS; SUBCUTANEOUS at 06:07

## 2019-07-18 RX ADMIN — PHENYLEPHRINE HYDROCHLORIDE 100 MCG: 10 INJECTION INTRAVENOUS at 02:07

## 2019-07-18 RX ADMIN — AMIODARONE HYDROCHLORIDE 400 MG: 200 TABLET ORAL at 09:07

## 2019-07-18 RX ADMIN — EPHEDRINE SULFATE 10 MG: 50 INJECTION, SOLUTION INTRAMUSCULAR; INTRAVENOUS; SUBCUTANEOUS at 02:07

## 2019-07-18 RX ADMIN — BENZONATATE 100 MG: 100 CAPSULE ORAL at 11:07

## 2019-07-18 RX ADMIN — MAGNESIUM OXIDE TAB 400 MG (241.3 MG ELEMENTAL MG) 800 MG: 400 (241.3 MG) TAB at 02:07

## 2019-07-18 RX ADMIN — INSULIN ASPART 1 UNITS: 100 INJECTION, SOLUTION INTRAVENOUS; SUBCUTANEOUS at 12:07

## 2019-07-18 RX ADMIN — LOSARTAN POTASSIUM 25 MG: 25 TABLET, FILM COATED ORAL at 11:07

## 2019-07-18 RX ADMIN — IODIXANOL 10 ML: 320 INJECTION, SOLUTION INTRAVASCULAR at 02:07

## 2019-07-18 RX ADMIN — ATORVASTATIN CALCIUM 80 MG: 20 TABLET, FILM COATED ORAL at 09:07

## 2019-07-18 NOTE — CONSULTS
[facility]  Cardiology  Consult Note    Patient Name: Houston Jc  MRN: [id number]  Admission Date: 7/17/2019  Hospital Length of Stay: 1 days  Code Status: Full Code   Attending Provider: Pao Hough MD   Consulting Provider: Della Wells MD  Primary Care Physician: Zak Hamilton MD  Principal Problem:Complete heart block    Patient information was obtained from patient, past medical records and ER records.     Inpatient consult to Electrophysiology  Consult performed by: Della Wells MD  Consult ordered by: Della Wells MD        Subjective:     Chief Complaint:  weakness     HPI:   Houston Jc is a 73 y.o. M with HTN, CHF (EF 30% G3DD) and DM who initially presented to OSH with 3-week history of weakness, fatigue and dyspnea. He was not have to have complete heart block with HR in 20s. A TVP was placed 7/9/19 and LHC done 7/16/19 to rule out ischemic etiology of complete heart block. He is being transferred to [facility] for BiV ICD placement.  Of note, pt had frequent runs of VT on 7/11/19 and is currently on PO amiodarone. On 7/17/19, he was noted to have a DVT to LUE for which heparin gtt was started.     He denies any chest pain, shortness of breath, or palpitations.The 3-week history of light headedness and syncopal episodes results in numerous falls over the last 2 weeks. Finally, his family member urged him to go to the ER.    Past Medical History:   Diagnosis Date    CHF (congestive heart failure)     Coronary artery disease     Diabetes mellitus     Hypertension        Past Surgical History:   Procedure Laterality Date    Insertion, Pacemaker, Temporary Transvenous N/A 7/9/2019    Performed by Dejuan Cody MD at [facility] CATH LAB/EP       Review of patient's allergies indicates:  No Known Allergies    Current Facility-Administered Medications on File Prior to Encounter   Medication    [COMPLETED] heparin 25,000 units in dextrose 5% (100 units/ml) IV bolus from bag  INITIAL BOLUS    [DISCONTINUED] 0.9%  NaCl infusion    [DISCONTINUED] 0.9%  NaCl infusion    [DISCONTINUED] acetaminophen tablet 650 mg    [DISCONTINUED] albuterol inhaler 2 puff    [DISCONTINUED] allopurinol tablet 300 mg    [DISCONTINUED] amiodarone tablet 400 mg    [DISCONTINUED] aspirin EC tablet 81 mg    [DISCONTINUED] atorvastatin tablet 80 mg    [DISCONTINUED] colchicine tablet 0.6 mg    [DISCONTINUED] dextrose 50 % in water (D50W) injection 12.5 g    [DISCONTINUED] dextrose 50 % in water (D50W) injection 25 g    [DISCONTINUED] diphenhydrAMINE capsule 25 mg    [DISCONTINUED] enoxaparin injection 40 mg    [DISCONTINUED] glucagon (human recombinant) injection 1 mg    [DISCONTINUED] glucose chewable tablet 16 g    [DISCONTINUED] glucose chewable tablet 24 g    [DISCONTINUED] heparin 25,000 units in dextrose 5% (100 units/ml) IV bolus from bag - ADDITIONAL PRN BOLUS - 30 units/kg    [DISCONTINUED] heparin 25,000 units in dextrose 5% (100 units/ml) IV bolus from bag - ADDITIONAL PRN BOLUS - 60 units/kg    [DISCONTINUED] heparin 25,000 units in dextrose 5% 250 mL (100 units/mL) infusion HIGH INTENSITY nomogram - [facility]    [DISCONTINUED] heparin infusion 1,000 units/500 ml in 0.9% NaCl (pressure line flush)    [DISCONTINUED] hydrALAZINE injection 10 mg    [DISCONTINUED] hydrALAZINE tablet 25 mg    [DISCONTINUED] insulin aspart U-100 pen 0-5 Units    [DISCONTINUED] loperamide capsule 2 mg    [DISCONTINUED] ondansetron disintegrating tablet 8 mg    [DISCONTINUED] pantoprazole EC tablet 40 mg    [DISCONTINUED] polyethylene glycol packet 17 g    [DISCONTINUED] ramelteon tablet 8 mg    [DISCONTINUED] sodium chloride 0.9% flush 10 mL    [DISCONTINUED] traMADol tablet 50 mg     Current Outpatient Medications on File Prior to Encounter   Medication Sig    ALBUTEROL SULFATE (PROVENTIL HFA INHL) Inhale into the lungs.    allopurinol (ZYLOPRIM) 300 MG tablet Take 300 mg by mouth once daily.     aspirin (ECOTRIN) 81 MG EC tablet Take 1 tablet (81 mg total) by mouth once daily.    atorvastatin (LIPITOR) 80 MG tablet Take 1 tablet (80 mg total) by mouth once daily.    carvedilol (COREG) 12.5 MG tablet Take 1 tablet (12.5 mg total) by mouth 2 (two) times daily.    colchicine 0.6 mg tablet Take 0.6 mg by mouth once daily.    furosemide (LASIX) 40 MG tablet Take 1 tablet (40 mg total) by mouth once daily.    indomethacin (INDOCIN) 50 MG capsule Take 50 mg by mouth 2 (two) times daily with meals.    insulin NPH-insulin regular, 70/30, (NOVOLIN 70/30) 100 unit/mL (70-30) injection Inject into the skin 3 (three) times daily.    lisinopril (PRINIVIL,ZESTRIL) 40 MG tablet Take 1 tablet (40 mg total) by mouth once daily.     Family History     None        Tobacco Use    Smoking status: Former Smoker   Substance and Sexual Activity    Alcohol use: Yes     Comment: social    Drug use: No    Sexual activity: Not on file     Review of Systems   Constitution: Negative for chills, decreased appetite and fever.   Cardiovascular: Negative for chest pain.   Respiratory: Negative for shortness of breath.    Gastrointestinal: Negative for diarrhea.   Neurological: Negative for weakness.   Psychiatric/Behavioral: Negative for altered mental status.     Objective:     Vital Signs (Most Recent):  Temp: 98.1 °F (36.7 °C) (07/18/19 0300)  Pulse: 80 (07/18/19 0500)  Resp: 18 (07/18/19 0500)  BP: (!) 153/69 (07/18/19 0500)  SpO2: 99 % (07/18/19 0500) Vital Signs (24h Range):  Temp:  [98 °F (36.7 °C)-98.9 °F (37.2 °C)] 98.1 °F (36.7 °C)  Pulse:  [79-81] 80  Resp:  [17-48] 18  SpO2:  [94 %-100 %] 99 %  BP: (107-175)/(53-90) 153/69     Weight: (!) 150.7 kg (332 lb 3.7 oz)  Body mass index is 43.83 kg/m².    SpO2: 99 %  O2 Device (Oxygen Therapy): room air      Intake/Output Summary (Last 24 hours) at 7/18/2019 0643  Last data filed at 7/18/2019 0500  Gross per 24 hour   Intake 329.2 ml   Output 200 ml   Net 129.2 ml        Lines/Drains/Airways     Central Venous Catheter Line                 Introducer 07/09/19 1410 right internal jugular 8 days          Line                 Pacer Wires 07/09/19 1415 8 days          Peripheral Intravenous Line                 Peripheral IV - Single Lumen 07/17/19 1830 20 G Right Wrist less than 1 day                Physical Exam   Constitutional: He is oriented to person, place, and time. He appears well-developed and well-nourished.   Exam limited due to body habitus   HENT:   Head: Normocephalic and atraumatic.   Neck: No JVD present.   Cardiovascular: Normal rate and regular rhythm.   Pulmonary/Chest: No respiratory distress.   Abdominal: Soft. Bowel sounds are normal. He exhibits distension. There is no tenderness.   Musculoskeletal: He exhibits edema.   Neurological: He is alert and oriented to person, place, and time.   Skin: Skin is warm and dry.   Psychiatric: He has a normal mood and affect. His behavior is normal. Judgment and thought content normal.   Vitals reviewed.    Significant Labs:   CMP   Recent Labs   Lab 07/17/19  0503 07/17/19 2204 07/18/19  0219    137 139   K 4.1 3.9 3.9    108 109   CO2 23 18* 22*   * 209* 196*   BUN 38* 36* 34*   CREATININE 2.0* 1.9* 1.8*   CALCIUM 8.7 8.3* 8.1*   PROT  --  5.6* 5.7*   ALBUMIN  --  2.7* 2.7*   BILITOT  --  0.5 0.5   ALKPHOS  --  151* 156*   AST  --  56* 59*   ALT  --  54* 58*   ANIONGAP 8 11 8   ESTGFRAFRICA 38* 40.1* 42.8*   EGFRNONAA 33* 34.7* 37.0*    and CBC   Recent Labs   Lab 07/17/19  0503 07/17/19  2204 07/18/19  0219   WBC 5.65 6.03 7.77   HGB 11.7* 11.3* 11.4*   HCT 37.1* 36.3* 36.0*    154 162     Assessment and Plan:     * Complete heart block  H/o VT - continue amiodarone 400 mg BID  - currently TVP in place and capturing appropriately  - plan for BiV ICD placement today  - in the meantime, continue tele monitoring  - monitor electrolytes, replace to keep K > 4, Mg > 2    VTE Risk Mitigation (From  admission, onward)        Ordered     heparin 25,000 units in dextrose 5% 250 mL (100 units/mL) infusion HIGH INTENSITY nomogram - [facility]  Continuous      07/17/19 2110     heparin 25,000 units in dextrose 5% (100 units/ml) IV bolus from bag - ADDITIONAL PRN BOLUS - 30 units/kg  As needed (PRN)      07/17/19 2110     heparin 25,000 units in dextrose 5% (100 units/ml) IV bolus from bag - ADDITIONAL PRN BOLUS - 60 units/kg  As needed (PRN)      07/17/19 2110     Place sequential compression device  Until discontinued      07/17/19 2105     IP VTE HIGH RISK PATIENT  Once      07/17/19 2105        Plan of care discussed with EP fellow, Dr. Redd.    Della Wells MD  Cardiology   [facility]

## 2019-07-18 NOTE — ANESTHESIA PREPROCEDURE EVALUATION
07/18/2019  Houston Jc is a 73 y.o., male.  Patient Active Problem List   Diagnosis    Swelling of lower extremity    Hypervolemia    Hypertension    Diastolic dysfunction    Sleep apnea    Morbid obesity    Complete heart block    VT (ventricular tachycardia)    CLEMENCIA (acute kidney injury)    Acute systolic heart failure    Abdominal distension    Acute deep vein thrombosis (DVT) of left upper extremity    Debility    Gout         Anesthesia Evaluation         Review of Systems      Physical Exam  General:  Well nourished, Obesity    Airway/Jaw/Neck:  Airway Findings: Mouth Opening: Normal Tongue: Normal  General Airway Assessment: Adult  Mallampati: II  Improves to II with phonation.  TM Distance: Normal, at least 6 cm      Dental:  Dental Findings: In tact   Chest/Lungs:  Chest/Lungs Findings: Clear to auscultation     Heart/Vascular:  Heart Findings: Rate: Normal  Rhythm: Regular Rhythm  Sounds: Normal        Mental Status:  Mental Status Findings:  Cooperative, Alert and Oriented         Anesthesia Plan  Type of Anesthesia, risks & benefits discussed:  Anesthesia Type:  MAC  Patient's Preference: Sedation  Intra-op Monitoring Plan: standard ASA monitors  Intra-op Monitoring Plan Comments:   Post Op Pain Control Plan: per primary service following discharge from PACU  Post Op Pain Control Plan Comments:   Induction:   IV  Beta Blocker:  Patient is not currently on a Beta-Blocker (No further documentation required).       Informed Consent: Patient understands risks and agrees with Anesthesia plan.  Questions answered.   ASA Score: 4     Day of Surgery Review of History & Physical:    H&P update referred to the surgeon.     Anesthesia Plan Notes: Sedation plan discussed.          Ready For Surgery From Anesthesia Perspective.

## 2019-07-18 NOTE — NURSING
[facility] Connected to bedside monitor - cardiac monitoring and continuous pulse oximetry applied. Call bell within reach, side rails raised x 2, bed locked and in lowest position. Primary service notified of patient arrival. Patient in no acute distress. Will continue to monitor.     You are doing well.  The current medical regimen is effective;  continue present plan and medications.  Recheck in 4 months.

## 2019-07-18 NOTE — H&P
[facility]  Cardiology  History and Physical     Patient Name: Houston Jc  MRN: [id number]  Admission Date: 7/17/2019  Code Status: Full Code   Attending Provider: Pao Hough MD   Primary Care Physician: Zak Hamilton MD  Principal Problem:Complete heart block    Patient information was obtained from patient, past medical records and ER records.     Subjective:     Chief Complaint:  dizziness    HPI:  Houston Jc is a 73 y.o. M with HTN, CHF (EF 30% G3DD) and DM who initially presented to OSH with 3-week history of weakness, fatigue and dyspnea. He was not have to have complete heart block with HR in 20s. A TVP was placed 7/9/19 and LHC done 7/16/19 to rule out ischemic etiology of complete heart block. He is being transferred to [facility] for BiV ICD placement.  Of note, pt had frequent runs of VT on 7/11/19 and is currently on PO amiodarone. On 7/17/19, he was noted to have a DVT to LUE for which heparin gtt was started.    He denies any chest pain, shortness of breath, or palpitations.The 3-week history of light headedness and syncopal episodes results in numerous falls over the last 2 weeks. Finally, his family member urged him to go to the ER.     Past Medical History:   Diagnosis Date    CHF (congestive heart failure)     Coronary artery disease     Diabetes mellitus     Hypertension        Past Surgical History:   Procedure Laterality Date    Insertion, Pacemaker, Temporary Transvenous N/A 7/9/2019    Performed by Dejuan Cody MD at [facility] CATH LAB/EP       Review of patient's allergies indicates:  No Known Allergies    Current Facility-Administered Medications on File Prior to Encounter   Medication    [COMPLETED] heparin 25,000 units in dextrose 5% (100 units/ml) IV bolus from bag INITIAL BOLUS    [DISCONTINUED] 0.9%  NaCl infusion    [DISCONTINUED] 0.9%  NaCl infusion    [DISCONTINUED] acetaminophen tablet 650 mg    [DISCONTINUED] albuterol inhaler 2 puff     [DISCONTINUED] allopurinol tablet 300 mg    [DISCONTINUED] amiodarone tablet 400 mg    [DISCONTINUED] aspirin EC tablet 81 mg    [DISCONTINUED] atorvastatin tablet 80 mg    [DISCONTINUED] colchicine tablet 0.6 mg    [DISCONTINUED] dextrose 50 % in water (D50W) injection 12.5 g    [DISCONTINUED] dextrose 50 % in water (D50W) injection 25 g    [DISCONTINUED] diphenhydrAMINE capsule 25 mg    [DISCONTINUED] enoxaparin injection 40 mg    [DISCONTINUED] glucagon (human recombinant) injection 1 mg    [DISCONTINUED] glucose chewable tablet 16 g    [DISCONTINUED] glucose chewable tablet 24 g    [DISCONTINUED] heparin 25,000 units in dextrose 5% (100 units/ml) IV bolus from bag - ADDITIONAL PRN BOLUS - 30 units/kg    [DISCONTINUED] heparin 25,000 units in dextrose 5% (100 units/ml) IV bolus from bag - ADDITIONAL PRN BOLUS - 60 units/kg    [DISCONTINUED] heparin 25,000 units in dextrose 5% 250 mL (100 units/mL) infusion HIGH INTENSITY nomogram - [facility]    [DISCONTINUED] heparin infusion 1,000 units/500 ml in 0.9% NaCl (pressure line flush)    [DISCONTINUED] hydrALAZINE injection 10 mg    [DISCONTINUED] hydrALAZINE tablet 25 mg    [DISCONTINUED] insulin aspart U-100 pen 0-5 Units    [DISCONTINUED] loperamide capsule 2 mg    [DISCONTINUED] ondansetron disintegrating tablet 8 mg    [DISCONTINUED] pantoprazole EC tablet 40 mg    [DISCONTINUED] polyethylene glycol packet 17 g    [DISCONTINUED] ramelteon tablet 8 mg    [DISCONTINUED] sodium chloride 0.9% flush 10 mL    [DISCONTINUED] traMADol tablet 50 mg     Current Outpatient Medications on File Prior to Encounter   Medication Sig    ALBUTEROL SULFATE (PROVENTIL HFA INHL) Inhale into the lungs.    allopurinol (ZYLOPRIM) 300 MG tablet Take 300 mg by mouth once daily.    aspirin (ECOTRIN) 81 MG EC tablet Take 1 tablet (81 mg total) by mouth once daily.    atorvastatin (LIPITOR) 80 MG tablet Take 1 tablet (80 mg total) by mouth once daily.    carvedilol  (COREG) 12.5 MG tablet Take 1 tablet (12.5 mg total) by mouth 2 (two) times daily.    colchicine 0.6 mg tablet Take 0.6 mg by mouth once daily.    furosemide (LASIX) 40 MG tablet Take 1 tablet (40 mg total) by mouth once daily.    indomethacin (INDOCIN) 50 MG capsule Take 50 mg by mouth 2 (two) times daily with meals.    insulin NPH-insulin regular, 70/30, (NOVOLIN 70/30) 100 unit/mL (70-30) injection Inject into the skin 3 (three) times daily.    lisinopril (PRINIVIL,ZESTRIL) 40 MG tablet Take 1 tablet (40 mg total) by mouth once daily.     Family History     None        Tobacco Use    Smoking status: Former Smoker   Substance and Sexual Activity    Alcohol use: Yes     Comment: social    Drug use: No    Sexual activity: Not on file     Review of Systems   Constitution: Negative for chills, decreased appetite and fever.   Cardiovascular: Negative for chest pain.   Respiratory: Negative for shortness of breath.    Gastrointestinal: Positive for diarrhea.   Neurological: Negative for weakness.   Psychiatric/Behavioral: Negative for altered mental status.     Objective:     Vital Signs (Most Recent):  Temp: 98.9 °F (37.2 °C) (07/18/19 0019)  Pulse: 80 (07/18/19 0019)  Resp: 18 (07/18/19 0019)  BP: (!) 167/72 (07/18/19 0019)  SpO2: (!) 94 % (07/18/19 0000) Vital Signs (24h Range):  Temp:  [98 °F (36.7 °C)-98.9 °F (37.2 °C)] 98.9 °F (37.2 °C)  Pulse:  [79-81] 80  Resp:  [17-48] 18  SpO2:  [94 %-100 %] 94 %  BP: (107-175)/(53-90) 167/72     Weight: (!) 150.7 kg (332 lb 3.7 oz)  Body mass index is 43.83 kg/m².    SpO2: (!) 94 %  O2 Device (Oxygen Therapy): room air      Intake/Output Summary (Last 24 hours) at 7/18/2019 0336  Last data filed at 7/18/2019 0000  Gross per 24 hour   Intake 204.7 ml   Output 100 ml   Net 104.7 ml       Lines/Drains/Airways     Central Venous Catheter Line                 Introducer 07/09/19 1410 right internal jugular 8 days          Line                 Pacer Wires 07/09/19 1415 8  days          Peripheral Intravenous Line                 Peripheral IV - Single Lumen 07/17/19 1830 20 G Right Wrist less than 1 day                Physical Exam   Constitutional: He is oriented to person, place, and time. He appears well-developed and well-nourished.   Exam limited due to body habitus   HENT:   Head: Normocephalic and atraumatic.   Neck: No JVD present.   Cardiovascular: Normal rate and regular rhythm.   Pulmonary/Chest: No respiratory distress.   Abdominal: Soft. Bowel sounds are normal. He exhibits distension. There is no tenderness.   Neurological: He is alert and oriented to person, place, and time.   Skin: Skin is warm and dry.   Psychiatric: He has a normal mood and affect. His behavior is normal. Judgment and thought content normal.   Vitals reviewed.    Significant Labs:   CMP   Recent Labs   Lab 07/17/19  0503 07/17/19 2204 07/18/19 0219    137 139   K 4.1 3.9 3.9    108 109   CO2 23 18* 22*   * 209* 196*   BUN 38* 36* 34*   CREATININE 2.0* 1.9* 1.8*   CALCIUM 8.7 8.3* 8.1*   PROT  --  5.6* 5.7*   ALBUMIN  --  2.7* 2.7*   BILITOT  --  0.5 0.5   ALKPHOS  --  151* 156*   AST  --  56* 59*   ALT  --  54* 58*   ANIONGAP 8 11 8   ESTGFRAFRICA 38* 40.1* 42.8*   EGFRNONAA 33* 34.7* 37.0*    and CBC   Recent Labs   Lab 07/17/19  0503 07/17/19 2204 07/18/19 0219   WBC 5.65 6.03 7.77   HGB 11.7* 11.3* 11.4*   HCT 37.1* 36.3* 36.0*    154 162       Significant Imaging: Echocardiogram:   Results for orders placed or performed during the hospital encounter of 07/09/19   Transthoracic echo (TTE) 2D with Color Flow   Result Value Ref Range    AV mean gradient 3 mmHg    Ao peak son 1.22 m/s    Ao VTI 24.42 cm    IVS 1.66 (A) 0.6 - 1.1 cm    LA size 4.67 cm    Left Atrium Major Axis 6.46 cm    Left Atrium Minor Axis 6.90 cm    LVIDD 5.49 3.5 - 6.0 cm    LVIDS 4.20 (A) 2.1 - 4.0 cm    LVOT diameter 2.24 cm    LVOT peak VTI 15.12 cm    PW 1.40 (A) 0.6 - 1.1 cm    MV Peak A Son  0.37 m/s    E wave decelartion time 114.81 msec    MV Peak E Son 0.83 m/s    PV Peak D Son 0.36 m/s    PV Peak S Son 0.45 m/s    RA Major Axis 5.87 cm    RVDD 3.61 cm    LA WIDTH 4.23 cm    Ao root annulus 3.69 cm    PV PEAK VELOCITY 1.07 cm/s    LV Diastolic Volume 147.08 mL    LV Systolic Volume 78.58 mL    LVOT peak son 0.87 m/s    FS 23 %    LA volume 112.04 cm3    LV mass 382.95 g    Left Ventricle Relative Wall Thickness 0.51 cm    AV valve area 2.44 cm2    AV Velocity Ratio 0.71     AV index (prosthetic) 0.62     E/A ratio 2.24     Pulm vein S/D ratio 1.25     LVOT area 3.9 cm2    LVOT stroke volume 59.55 cm3    AV peak gradient 6 mmHg    BSA 2.75 m2    LV Systolic Volume Index 29.7 mL/m2    LV Diastolic Volume Index 55.63 mL/m2    LA Volume Index 42.4 mL/m2    LV Mass Index 145 g/m2    Right Atrial Pressure (from IVC) 3 mmHg    Narrative    · Moderately decreased left ventricular systolic function. The estimated   ejection fraction is 30%  · Grade III (severe) left ventricular diastolic dysfunction consistent   with restrictive physiology.  · Moderate left atrial enlargement.  · Low normal right ventricular systolic function.  · Normal central venous pressure (3 mm Hg).        Assessment and Plan:     * Complete heart block     H/o VT - continue amiodarone 400 mg BID  - TVP in place and capturing appropriately  - EP consult in AM for BiV ICD/PPM placemaker placement  - in the meantime, continue tele monitoring  - monitor electrolytes, replace to keep K > 4, Mg > 2    Acute deep vein thrombosis (DVT) of left upper extremity  - on heparin drip; continue    Acute systolic heart failure  Likely NICM given nonobstructive CAD on C 7/16/19  - TTE 7/9/19: EF 30% with grade 3 diastolic dysfunction on prior ECHO  - has elevated BNP (1142) with scrotal edema; would consider lasix once ICD/PPM palced  - strict in & out  - daily weights  - would consider GDMT (BB, ACEi/ARB, aldactone as able)  - aspirin 81mg daily  -  atorvastatin 80mg daily    CLEMENCIA (acute kidney injury)  - presented with Cr 1.8, which may be new baseline   - avoid nephrotoxins and renally dose meds  - monitor UOP    Type 2 Diabetes Mellitus  - A1c 7.1  - hold NPH 70/30 for now; POCT q6h while NPO with LD SSI  - restart diabetic diet and aspart TIDWM when patient is able to eat     Gout  - continue allopurinol and colchicine    Sleep apnea  - will likely need polysomnogram and CPAP at home.    Debility  - PT/OT eval for fall risk assessment  - fall precautions in place      VTE Risk Mitigation (From admission, onward)        Ordered     heparin 25,000 units in dextrose 5% 250 mL (100 units/mL) infusion HIGH INTENSITY nomogram - [facility]  Continuous      07/17/19 2110     heparin 25,000 units in dextrose 5% (100 units/ml) IV bolus from bag - ADDITIONAL PRN BOLUS - 30 units/kg  As needed (PRN)      07/17/19 2110     heparin 25,000 units in dextrose 5% (100 units/ml) IV bolus from bag - ADDITIONAL PRN BOLUS - 60 units/kg  As needed (PRN)      07/17/19 2110     Place sequential compression device  Until discontinued      07/17/19 2105     IP VTE HIGH RISK PATIENT  Once      07/17/19 2105          Della Wells MD  Cardiology   [facility]

## 2019-07-18 NOTE — ASSESSMENT & PLAN NOTE
H/o VT - continue amiodarone 400 mg BID  - TVP in place and capturing appropriately  - plan for BiV ICD placement today  - in the meantime, continue tele monitoring  - monitor electrolytes, replace to keep K > 4, Mg > 2

## 2019-07-18 NOTE — DISCHARGE SUMMARY
[facility]  Cardiology  Discharge Summary      Patient Name: Houston Jc  MRN: [id number]  Admission Date: 7/9/2019  Hospital Length of Stay: 8 days  Discharge Date and Time: 7/17/2019  8:00 PM  Attending Physician: No att. providers found    Discharging Provider: Bala Carvajal NP  Primary Care Physician: Zak Hamilton MD    HPI:   Assessment:  1. CHB  2. Chronic Diastolic CHF-stable  3. HTN- controlled     Plan:   Temporary pacemaker emergently  2d echo complete  CMP  Plan for Permanent Pacemaker or AICD when ef known and electrolytes optimize.   Magnesium 2 g IV     Milagros Gross MD  Cardiology Service     HPI: 74 y/o AA male with PMH of HTN, CHF and DM present with weakness, fatigue and dyspnea. He was found to be in CHB. No chest pain. No history of MI. No recent illness.   He last took coreg last night.   Mag 1.2.   HR currently in 20s       Procedure(s) (LRB):  Left heart cath (N/A)     Indwelling Lines/Drains at time of discharge:  Lines/Drains/Airways     Central Venous Catheter Line                 Introducer 07/09/19 1410 right internal jugular 8 days          Line                 Pacer Wires 07/09/19 1415 8 days                Hospital Course:  07/09/2019 S/p R IJ Temporary pacemaker for CH, rate 80bpm. BB on hold.  07/10/2019 Patient with frequent runs of VT overnight. TNI pending. NPO for LHC today. Amio infusing. LVEF 30% with RV dysfunction and global hypokinesis.  LHC deferred to 07/11 2/2 patient was given a diet and consumed the entire meal.   07/11/2019 LHC cancelled for today, renal function worsening. Holding nephrotoxic agents and Lasix. Remains in CHB with runs of SVT (chronic RBB that resembles VT) and short runs of nonsustained VT. Amio continued. NPO p MN for potential LHC in AM.   07/12/2019 Patient remains in CHB, Hemodynamically stable with TVP. No CV complaints. Amiodarone converted to oral. No further VT. Renal function stable. Defer LHC to Monday to give  kidneys time to recover.   7/13/2019: seen with family at the bedside. No acute issues. He will have LHC (7/15/2019) and either PPM or ICD depending on coronary anatomy.   7/14/219: seen this am at the bedside with family. No acute cardiac issues. Pacing as set with TVP. Cr higher at 2.1. + clear stool without abdominal pain.   07/15/2019 Hemodynamically stable, remains pacer dependent. Renal function stable- Cr 2.2, Gentle hydration with IVF. NPO p MN for LHC.  07/16/2019 S/p LHC                Minimal contrast use              Luminal irregularities              LVEDP 12 mmHg   Plan:   Proceed with Bi ventricular ICD  07/17/2019 DVT to LUE despite Lovenox dosed for DVT prophylaxis. GI consulted for persistent abdominal distension and loose stool with possible ileus vs partial obstruction on imaging. +BS. Patient is hemodynamically stable with TVP set at 80 BPM. CHB remains. Patient remains on oral Amiodarone with intermittent VT- asymptomatic.   Patient transferred to [facility] for BiVICD implantation.     Consults:   Consults (From admission, onward)        Status Ordering Provider     [facility]  Once     Provider:  (Not yet assigned)    Completed EZEQUIEL BHATIA     Inpatient consult to Registered Dietitian/Nutritionist  Once     Provider:  (Not yet assigned)    Completed DEVONTE ALY          Significant Diagnostic Studies: Labs:   BMP:   Recent Labs   Lab 07/17/19  0503 07/17/19  2204 07/18/19 0219   * 209* 196*    137 139   K 4.1 3.9 3.9    108 109   CO2 23 18* 22*   BUN 38* 36* 34*   CREATININE 2.0* 1.9* 1.8*   CALCIUM 8.7 8.3* 8.1*   MG 1.6 1.7 1.7   , CMP   Recent Labs   Lab 07/17/19  0503 07/17/19  2204 07/18/19  0219    137 139   K 4.1 3.9 3.9    108 109   CO2 23 18* 22*   * 209* 196*   BUN 38* 36* 34*   CREATININE 2.0* 1.9* 1.8*   CALCIUM 8.7 8.3* 8.1*   PROT  --  5.6* 5.7*   ALBUMIN  --  2.7* 2.7*   BILITOT  --  0.5  0.5   ALKPHOS  --  151* 156*   AST  --  56* 59*   ALT  --  54* 58*   ANIONGAP 8 11 8   ESTGFRAFRICA 38* 40.1* 42.8*   EGFRNONAA 33* 34.7* 37.0*   , CBC   Recent Labs   Lab 07/17/19  0503 07/17/19  2204 07/18/19  0219   WBC 5.65 6.03 7.77   HGB 11.7* 11.3* 11.4*   HCT 37.1* 36.3* 36.0*    154 162   , INR   Lab Results   Component Value Date    INR 1.0 07/18/2019    INR 1.0 07/17/2019    INR 1.0 09/18/2015   , Lipid Panel   Lab Results   Component Value Date    CHOL 82 (L) 07/13/2019    HDL 27 (L) 07/13/2019    LDLCALC 38.0 (L) 07/13/2019    TRIG 85 07/13/2019    CHOLHDL 32.9 07/13/2019   , Troponin No results for input(s): TROPONINI in the last 168 hours., A1C:   Recent Labs   Lab 07/17/19 2204   HGBA1C 7.8*    and All labs within the past 24 hours have been reviewed  Cardiac Graphics: Echocardiogram:   2D echo with color flow doppler:   Results for orders placed or performed during the hospital encounter of 09/18/15   2D echo with color flow doppler   Result Value Ref Range    QEF 50 55 - 65    Diastolic Dysfunction Yes (A)     Est. PA Systolic Pressure 8.57     Mitral Valve Mobility NORMAL     Tricuspid Valve Regurgitation TRIVIAL     Narrative    TEST DESCRIPTION   Technical Quality: This is a technically adequate study.     Aorta: The aortic root is normal in size, measuring 3.9 cm at sinotubular junction.     Left Atrium: The left atrial volume index is severely enlarged, measuring 49.98 cc/m2.     Left Ventricle: The left ventricle is normal in size, with an end-diastolic diameter of 6.2 cm, and an end-systolic diameter of 4.5 cm. LV wall thickness is normal, with the septum measuring 1.5 cm and the posterior wall measuring 1.7 cm across. Relative   wall thickness was increased at 0.55, and the LV mass index was increased at 229.0 g/m2 consistent with concentric left ventricular hypertrophy. Global left ventricular systolic function appears low normal to mildly depressed. Visually estimated   ejection  fraction is 50-55%. The LV Doppler derived stroke volume equals 52.0 ccs.   The E/e'(lat) is 9.  This along with the following abnormalities (ERIC = 49.98 and LVMI = 229.00) suggests significant diastolic dysfunction.     Right Atrium: The right atrium is normal in size, measuring 5.3 cm in length in the apical view.     Right Ventricle: The right ventricle is normal in size measuring 3.0 cm at the base in the apical right ventricle-focused view. Global right ventricular systolic function appears normal. The estimated PA systolic pressure is 9 mmHg.     Aortic Valve:  The aortic valve is normal in structure with normal leaflet mobility. The aortic valve is tri-leaflet in structure.     Mitral Valve:  The mitral valve is normal in structure with normal leaflet mobility.     Tricuspid Valve:  The tricuspid valve is normal in structure with normal leaflet mobility. There is trivial tricuspid regurgitation.     Pulmonary Valve:  The pulmonic valve is not well seen.     IVC: IVC is normal in size and collapses > 50% with a sniff, suggesting normal right atrial pressure of 3 mmHg.     Atrial Septum: The atrial septum is intact.     Intracavitary: There is no evidence of pericardial effusion, intracavity mass, thrombi, or vegetation.         CONCLUSIONS     1 - Low normal to mildly depressed left ventricular systolic function (EF 50-55%).     2 - Concentric hypertrophy.     3 - Severe left atrial enlargement.     4 - Left ventricular diastolic dysfunction.     5 - Normal right ventricular systolic function .     6 - The estimated PA systolic pressure is 9 mmHg.         This document has been electronically    SIGNED BY: Milagros Gross MD On: 09/18/2015 16:15    and Transthoracic echo (TTE) complete (Cupid Only):   Results for orders placed or performed during the hospital encounter of 07/09/19   Transthoracic echo (TTE) 2D with Color Flow   Result Value Ref Range    AV mean gradient 3 mmHg    Ao peak nghia 1.22 m/s    Ao VTI  24.42 cm    IVS 1.66 (A) 0.6 - 1.1 cm    LA size 4.67 cm    Left Atrium Major Axis 6.46 cm    Left Atrium Minor Axis 6.90 cm    LVIDD 5.49 3.5 - 6.0 cm    LVIDS 4.20 (A) 2.1 - 4.0 cm    LVOT diameter 2.24 cm    LVOT peak VTI 15.12 cm    PW 1.40 (A) 0.6 - 1.1 cm    MV Peak A Son 0.37 m/s    E wave decelartion time 114.81 msec    MV Peak E Son 0.83 m/s    PV Peak D Son 0.36 m/s    PV Peak S Son 0.45 m/s    RA Major Axis 5.87 cm    RVDD 3.61 cm    LA WIDTH 4.23 cm    Ao root annulus 3.69 cm    PV PEAK VELOCITY 1.07 cm/s    LV Diastolic Volume 147.08 mL    LV Systolic Volume 78.58 mL    LVOT peak son 0.87 m/s    FS 23 %    LA volume 112.04 cm3    LV mass 382.95 g    Left Ventricle Relative Wall Thickness 0.51 cm    AV valve area 2.44 cm2    AV Velocity Ratio 0.71     AV index (prosthetic) 0.62     E/A ratio 2.24     Pulm vein S/D ratio 1.25     LVOT area 3.9 cm2    LVOT stroke volume 59.55 cm3    AV peak gradient 6 mmHg    BSA 2.75 m2    LV Systolic Volume Index 29.7 mL/m2    LV Diastolic Volume Index 55.63 mL/m2    LA Volume Index 42.4 mL/m2    LV Mass Index 145 g/m2    Right Atrial Pressure (from IVC) 3 mmHg    Narrative    · Moderately decreased left ventricular systolic function. The estimated   ejection fraction is 30%  · Grade III (severe) left ventricular diastolic dysfunction consistent   with restrictive physiology.  · Moderate left atrial enlargement.  · Low normal right ventricular systolic function.  · Normal central venous pressure (3 mm Hg).          Pending Diagnostic Studies:     None          Final Active Diagnoses:    Diagnosis Date Noted POA    PRINCIPAL PROBLEM:  Complete heart block [I44.2] 07/09/2019 Yes    Abdominal distension [R14.0] 07/17/2019 Yes    Acute deep vein thrombosis (DVT) of left upper extremity [I82.622] 07/17/2019 No    VT (ventricular tachycardia) [I47.2] 07/11/2019 Yes    CLEMENCIA (acute kidney injury) [N17.9] 07/11/2019 Yes    Acute systolic heart failure [I50.21] 07/11/2019  Unknown    Morbid obesity [E66.01] 10/08/2015 Yes    Hypertension [I10] 09/21/2015 Yes      Problems Resolved During this Admission:     No new Assessment & Plan notes have been filed under this hospital service since the last note was generated.  Service: Cardiology      Discharged Condition: good    Disposition: Home or Self Care    Follow Up:    Patient Instructions:   No discharge procedures on file.  Medications:  Reconciled Home Medications:      Medication List      ASK your doctor about these medications    allopurinol 300 MG tablet  Commonly known as:  ZYLOPRIM  Take 300 mg by mouth once daily.     aspirin 81 MG EC tablet  Commonly known as:  ECOTRIN  Take 1 tablet (81 mg total) by mouth once daily.     atorvastatin 80 MG tablet  Commonly known as:  LIPITOR  Take 1 tablet (80 mg total) by mouth once daily.     carvedilol 12.5 MG tablet  Commonly known as:  COREG  Take 1 tablet (12.5 mg total) by mouth 2 (two) times daily.     colchicine 0.6 mg tablet  Commonly known as:  COLCRYS  Take 0.6 mg by mouth once daily.     furosemide 40 MG tablet  Commonly known as:  LASIX  Take 1 tablet (40 mg total) by mouth once daily.     indomethacin 50 MG capsule  Commonly known as:  INDOCIN  Take 50 mg by mouth 2 (two) times daily with meals.     insulin NPH-insulin regular (70/30) 100 unit/mL (70-30) injection  Commonly known as:  NOVOLIN 70/30  Inject into the skin 3 (three) times daily.     lisinopril 40 MG tablet  Commonly known as:  PRINIVIL,ZESTRIL  Take 1 tablet (40 mg total) by mouth once daily.     PROVENTIL HFA INHL  Inhale into the lungs.            Time spent on the discharge of patient: 30 minutes    Bala Carvajal NP  Cardiology  [facility]

## 2019-07-18 NOTE — SUBJECTIVE & OBJECTIVE
Past Medical History:   Diagnosis Date    CHF (congestive heart failure)     Coronary artery disease     Diabetes mellitus     Hypertension        Past Surgical History:   Procedure Laterality Date    Insertion, Pacemaker, Temporary Transvenous N/A 7/9/2019    Performed by Dejuan Cody MD at [facility] CATH LAB/EP       Review of patient's allergies indicates:  No Known Allergies    Current Facility-Administered Medications on File Prior to Encounter   Medication    [COMPLETED] heparin 25,000 units in dextrose 5% (100 units/ml) IV bolus from bag INITIAL BOLUS    [DISCONTINUED] 0.9%  NaCl infusion    [DISCONTINUED] 0.9%  NaCl infusion    [DISCONTINUED] acetaminophen tablet 650 mg    [DISCONTINUED] albuterol inhaler 2 puff    [DISCONTINUED] allopurinol tablet 300 mg    [DISCONTINUED] amiodarone tablet 400 mg    [DISCONTINUED] aspirin EC tablet 81 mg    [DISCONTINUED] atorvastatin tablet 80 mg    [DISCONTINUED] colchicine tablet 0.6 mg    [DISCONTINUED] dextrose 50 % in water (D50W) injection 12.5 g    [DISCONTINUED] dextrose 50 % in water (D50W) injection 25 g    [DISCONTINUED] diphenhydrAMINE capsule 25 mg    [DISCONTINUED] enoxaparin injection 40 mg    [DISCONTINUED] glucagon (human recombinant) injection 1 mg    [DISCONTINUED] glucose chewable tablet 16 g    [DISCONTINUED] glucose chewable tablet 24 g    [DISCONTINUED] heparin 25,000 units in dextrose 5% (100 units/ml) IV bolus from bag - ADDITIONAL PRN BOLUS - 30 units/kg    [DISCONTINUED] heparin 25,000 units in dextrose 5% (100 units/ml) IV bolus from bag - ADDITIONAL PRN BOLUS - 60 units/kg    [DISCONTINUED] heparin 25,000 units in dextrose 5% 250 mL (100 units/mL) infusion HIGH INTENSITY nomogram - [facility]    [DISCONTINUED] heparin infusion 1,000 units/500 ml in 0.9% NaCl (pressure line flush)    [DISCONTINUED] hydrALAZINE injection 10 mg    [DISCONTINUED] hydrALAZINE tablet 25 mg    [DISCONTINUED] insulin aspart U-100 pen 0-5 Units     [DISCONTINUED] loperamide capsule 2 mg    [DISCONTINUED] ondansetron disintegrating tablet 8 mg    [DISCONTINUED] pantoprazole EC tablet 40 mg    [DISCONTINUED] polyethylene glycol packet 17 g    [DISCONTINUED] ramelteon tablet 8 mg    [DISCONTINUED] sodium chloride 0.9% flush 10 mL    [DISCONTINUED] traMADol tablet 50 mg     Current Outpatient Medications on File Prior to Encounter   Medication Sig    ALBUTEROL SULFATE (PROVENTIL HFA INHL) Inhale into the lungs.    allopurinol (ZYLOPRIM) 300 MG tablet Take 300 mg by mouth once daily.    aspirin (ECOTRIN) 81 MG EC tablet Take 1 tablet (81 mg total) by mouth once daily.    atorvastatin (LIPITOR) 80 MG tablet Take 1 tablet (80 mg total) by mouth once daily.    carvedilol (COREG) 12.5 MG tablet Take 1 tablet (12.5 mg total) by mouth 2 (two) times daily.    colchicine 0.6 mg tablet Take 0.6 mg by mouth once daily.    furosemide (LASIX) 40 MG tablet Take 1 tablet (40 mg total) by mouth once daily.    indomethacin (INDOCIN) 50 MG capsule Take 50 mg by mouth 2 (two) times daily with meals.    insulin NPH-insulin regular, 70/30, (NOVOLIN 70/30) 100 unit/mL (70-30) injection Inject into the skin 3 (three) times daily.    lisinopril (PRINIVIL,ZESTRIL) 40 MG tablet Take 1 tablet (40 mg total) by mouth once daily.     Family History     None        Tobacco Use    Smoking status: Former Smoker   Substance and Sexual Activity    Alcohol use: Yes     Comment: social    Drug use: No    Sexual activity: Not on file     Review of Systems   Constitution: Negative for chills, decreased appetite and fever.   Cardiovascular: Negative for chest pain.   Respiratory: Negative for shortness of breath.    Gastrointestinal: Negative for diarrhea.   Neurological: Negative for weakness.   Psychiatric/Behavioral: Negative for altered mental status.     Objective:     Vital Signs (Most Recent):  Temp: 98.1 °F (36.7 °C) (07/18/19 0300)  Pulse: 80 (07/18/19 0500)  Resp: 18  (07/18/19 0500)  BP: (!) 153/69 (07/18/19 0500)  SpO2: 99 % (07/18/19 0500) Vital Signs (24h Range):  Temp:  [98 °F (36.7 °C)-98.9 °F (37.2 °C)] 98.1 °F (36.7 °C)  Pulse:  [79-81] 80  Resp:  [17-48] 18  SpO2:  [94 %-100 %] 99 %  BP: (107-175)/(53-90) 153/69     Weight: (!) 150.7 kg (332 lb 3.7 oz)  Body mass index is 43.83 kg/m².    SpO2: 99 %  O2 Device (Oxygen Therapy): room air      Intake/Output Summary (Last 24 hours) at 7/18/2019 0643  Last data filed at 7/18/2019 0500  Gross per 24 hour   Intake 329.2 ml   Output 200 ml   Net 129.2 ml       Lines/Drains/Airways     Central Venous Catheter Line                 Introducer 07/09/19 1410 right internal jugular 8 days          Line                 Pacer Wires 07/09/19 1415 8 days          Peripheral Intravenous Line                 Peripheral IV - Single Lumen 07/17/19 1830 20 G Right Wrist less than 1 day                Physical Exam   Constitutional: He is oriented to person, place, and time. He appears well-developed and well-nourished.   Exam limited due to body habitus   HENT:   Head: Normocephalic and atraumatic.   Neck: No JVD present.   Cardiovascular: Normal rate and regular rhythm.   Pulmonary/Chest: No respiratory distress.   Abdominal: Soft. Bowel sounds are normal. He exhibits distension. There is no tenderness.   Musculoskeletal: He exhibits edema.   Neurological: He is alert and oriented to person, place, and time.   Skin: Skin is warm and dry.   Psychiatric: He has a normal mood and affect. His behavior is normal. Judgment and thought content normal.   Vitals reviewed.    Significant Labs:   CMP   Recent Labs   Lab 07/17/19  0503 07/17/19  2204 07/18/19  0219    137 139   K 4.1 3.9 3.9    108 109   CO2 23 18* 22*   * 209* 196*   BUN 38* 36* 34*   CREATININE 2.0* 1.9* 1.8*   CALCIUM 8.7 8.3* 8.1*   PROT  --  5.6* 5.7*   ALBUMIN  --  2.7* 2.7*   BILITOT  --  0.5 0.5   ALKPHOS  --  151* 156*   AST  --  56* 59*   ALT  --  54* 58*    ANIONGAP 8 11 8   ESTGFRAFRICA 38* 40.1* 42.8*   EGFRNONAA 33* 34.7* 37.0*    and CBC   Recent Labs   Lab 07/17/19  0503 07/17/19  2204 07/18/19  0219   WBC 5.65 6.03 7.77   HGB 11.7* 11.3* 11.4*   HCT 37.1* 36.3* 36.0*    154 162       Significant Imaging:

## 2019-07-18 NOTE — NURSING TRANSFER
Nursing Transfer Note      7/17/2019     Transfer to [facility] at [facility]    Transfer via ambulance    Transfer with cardiac monitoring    Transported by EMS personnel    Medicines sent: N/A    Chart send with patient: Yes    Notified: spouse at bedside. [facility] updated on pt leaving. Heparin re-started at 14 units/kg/hr per nomogram.     Patient reassessed at: n/a     Upon arrival to floor: n/a

## 2019-07-18 NOTE — BRIEF OP NOTE
: Arturo Bay MD    Post-operative Diagnosis: Cardiomyopathy EF 30% and complete heart block dependent on temporary transvenous pacing.    Procedure Performed: dual chamber ICD implant    Description of Procedure: The patient was brought to the EP lab in the fasting state. Prepped and draped in sterile fashion. Safety timeout was performed. Sedation administered by anesthesia staff. Selective venogram of the left axillary and cephalic veins performed via left ac IV. Lidocaine used for local anesthetic. Fluoroscopic guided axillary access utilized. Guide/J Wire advanced and confirmed in IVC. Incision made. Blunt and electrocautery dissection performed. Pocket made and washed with antibiotic solution. Peel away sheath advanced over J wire. ICD lead advanced into RV after confirming in the IVC first under fluoroscopy. R waves and injury pattern adequate. Lead fixed into place and sutured in place. RV lead advanced with confirmation in the IVC. After p waves confirmed adequate, lead actively fixed into place. Attempt at CS lead placed. However, CS anatomy not favorable with few lateral/anterolateral branches. Inadequate capture and signals when cs lead tested. In order to provide some narrowing of qrs, a high septal RV pacing lead was placed. Leads connected to generator. Generator placed into pocket and washed with antibiotic solution. Deep layer closed with interrupted 3-0 suture. Superficial layer closed with running 4-0 suture. Skin closed with Dermabond. Meplex dressing to be placed after dermabond has dried.     EBL: <10 mL    Specimens Removed: None  Complications: no immediate    1. Ancef 1 gram q8 hours x 2 doses (ordered)  2. Sling to left arm - wear for 48 hours, then only at night for 6 weeks.  3. NO HEPARIN PRODUCTS  4. Keflex 500 mg TID for 5 days at discharge (or after the 2 doses of IV antibiotics if still in the hospital)  5. No lifting left elbow above shoulder height  6. No lifting  over 5 pounds  7. No driving for 1 week and for 4 weeks if patient uses left arm to make turns  8. Dressing will be removed in AM by EP  9. Chest Xray (ordered)  10. Follow up in device clinic in 1 week for device and wound checks.     Dr Bay, the attending electrophysiologist, was present for the entirety of this procedure.    Ashwin Redd MD PGY7

## 2019-07-18 NOTE — NURSING
Patient arrived with introducer dressed with gauze and tegaderm, dated from 8 days prior. Dressing changed with sterile dressing kit and biopatch in place.

## 2019-07-18 NOTE — PLAN OF CARE
Problem: Adult Inpatient Plan of Care  Goal: Plan of Care Review  No acute events throughout shift, VS and assessment per flow sheet, patient progressing towards goals as tolerated, plan of care reviewed with Houston Jc and family, all concerns addressed, will continue to monitor.

## 2019-07-18 NOTE — TRANSFER OF CARE
"Anesthesia Transfer of Care Note    Patient: Houston Jc    Procedure(s) Performed: Procedure(s) (LRB):  INSERTION, ICD, BIVENTRICULAR (Left)    Patient location: ICU    Anesthesia Type: general    Transport from OR: Transported from OR on 2-3 L/min O2 by NC with adequate spontaneous ventilation    Post pain: adequate analgesia    Post assessment: no apparent anesthetic complications    Post vital signs: stable    Level of consciousness: awake and alert    Nausea/Vomiting: no nausea/vomiting    Complications: none    Transfer of care protocol was followedComments: HR 60, NIBP 104/59, O2 sat 97% on 4L/min NC      Last vitals:   Visit Vitals  BP (!) 162/75 (BP Location: Right arm, Patient Position: Lying)   Pulse 80   Temp 36.6 °C (97.9 °F) (Oral)   Resp (!) 22   Ht 6' 1" (1.854 m)   Wt (!) 150.7 kg (332 lb 3.7 oz)   SpO2 99%   BMI 43.83 kg/m²     "

## 2019-07-18 NOTE — PT/OT/SLP PROGRESS
Physical Therapy      Patient Name:  Houston Jc   MRN:  [id number]    Patient not seen today. Pt out of room for procedure. Will follow-up when appropriate.    Shannen Echeverria, PT, DPT  7/18/2019  [phone]

## 2019-07-18 NOTE — ASSESSMENT & PLAN NOTE
Ischemic cardiomyopathy  - h/o  EF 30% with grade 3 diastolic dysfunction on prior ECHO  - appears euvolemic at this time; would reassess need for lasix daily  - strict in & out  - daily weights

## 2019-07-18 NOTE — ASSESSMENT & PLAN NOTE
- TVP in place and capturing appropriately  - EP consult in AM for BiV ICD/PPM placemaker placement  - in the meantime, continue tele monitoring  - monitor electrolytes, replace to keep K > 4, Mg > 2

## 2019-07-18 NOTE — PLAN OF CARE
Problem: Adult Inpatient Plan of Care  Goal: Plan of Care Review  Outcome: Ongoing (interventions implemented as appropriate)  No acute events throughout shift, VS and assessment per flow sheet, patient progressing towards goals as tolerated. Patient AAOX3, remained in 100% paced rhythm. Desaturates some when sleeping, placed on 2L. Voided ~300 cc, 1 small BM. Remained afebrile and NPO. Plan of care reviewed with Houston Jc and family, all concerns addressed, will continue to monitor.

## 2019-07-18 NOTE — SUBJECTIVE & OBJECTIVE
Past Medical History:   Diagnosis Date    CHF (congestive heart failure)     Coronary artery disease     Diabetes mellitus     Hypertension        Past Surgical History:   Procedure Laterality Date    Insertion, Pacemaker, Temporary Transvenous N/A 7/9/2019    Performed by Dejuan Cody MD at [facility] CATH LAB/EP       Review of patient's allergies indicates:  No Known Allergies    Current Facility-Administered Medications on File Prior to Encounter   Medication    [COMPLETED] heparin 25,000 units in dextrose 5% (100 units/ml) IV bolus from bag INITIAL BOLUS    [DISCONTINUED] 0.9%  NaCl infusion    [DISCONTINUED] 0.9%  NaCl infusion    [DISCONTINUED] acetaminophen tablet 650 mg    [DISCONTINUED] albuterol inhaler 2 puff    [DISCONTINUED] allopurinol tablet 300 mg    [DISCONTINUED] amiodarone tablet 400 mg    [DISCONTINUED] aspirin EC tablet 81 mg    [DISCONTINUED] atorvastatin tablet 80 mg    [DISCONTINUED] colchicine tablet 0.6 mg    [DISCONTINUED] dextrose 50 % in water (D50W) injection 12.5 g    [DISCONTINUED] dextrose 50 % in water (D50W) injection 25 g    [DISCONTINUED] diphenhydrAMINE capsule 25 mg    [DISCONTINUED] enoxaparin injection 40 mg    [DISCONTINUED] glucagon (human recombinant) injection 1 mg    [DISCONTINUED] glucose chewable tablet 16 g    [DISCONTINUED] glucose chewable tablet 24 g    [DISCONTINUED] heparin 25,000 units in dextrose 5% (100 units/ml) IV bolus from bag - ADDITIONAL PRN BOLUS - 30 units/kg    [DISCONTINUED] heparin 25,000 units in dextrose 5% (100 units/ml) IV bolus from bag - ADDITIONAL PRN BOLUS - 60 units/kg    [DISCONTINUED] heparin 25,000 units in dextrose 5% 250 mL (100 units/mL) infusion HIGH INTENSITY nomogram - [facility]    [DISCONTINUED] heparin infusion 1,000 units/500 ml in 0.9% NaCl (pressure line flush)    [DISCONTINUED] hydrALAZINE injection 10 mg    [DISCONTINUED] hydrALAZINE tablet 25 mg    [DISCONTINUED] insulin aspart U-100 pen 0-5 Units     [DISCONTINUED] loperamide capsule 2 mg    [DISCONTINUED] ondansetron disintegrating tablet 8 mg    [DISCONTINUED] pantoprazole EC tablet 40 mg    [DISCONTINUED] polyethylene glycol packet 17 g    [DISCONTINUED] ramelteon tablet 8 mg    [DISCONTINUED] sodium chloride 0.9% flush 10 mL    [DISCONTINUED] traMADol tablet 50 mg     Current Outpatient Medications on File Prior to Encounter   Medication Sig    ALBUTEROL SULFATE (PROVENTIL HFA INHL) Inhale into the lungs.    allopurinol (ZYLOPRIM) 300 MG tablet Take 300 mg by mouth once daily.    aspirin (ECOTRIN) 81 MG EC tablet Take 1 tablet (81 mg total) by mouth once daily.    atorvastatin (LIPITOR) 80 MG tablet Take 1 tablet (80 mg total) by mouth once daily.    carvedilol (COREG) 12.5 MG tablet Take 1 tablet (12.5 mg total) by mouth 2 (two) times daily.    colchicine 0.6 mg tablet Take 0.6 mg by mouth once daily.    furosemide (LASIX) 40 MG tablet Take 1 tablet (40 mg total) by mouth once daily.    indomethacin (INDOCIN) 50 MG capsule Take 50 mg by mouth 2 (two) times daily with meals.    insulin NPH-insulin regular, 70/30, (NOVOLIN 70/30) 100 unit/mL (70-30) injection Inject into the skin 3 (three) times daily.    lisinopril (PRINIVIL,ZESTRIL) 40 MG tablet Take 1 tablet (40 mg total) by mouth once daily.     Family History     None        Tobacco Use    Smoking status: Former Smoker   Substance and Sexual Activity    Alcohol use: Yes     Comment: social    Drug use: No    Sexual activity: Not on file     Review of Systems   Constitution: Negative for chills, decreased appetite and fever.   Cardiovascular: Negative for chest pain.   Respiratory: Negative for shortness of breath.    Gastrointestinal: Positive for diarrhea.   Neurological: Negative for weakness.   Psychiatric/Behavioral: Negative for altered mental status.     Objective:     Vital Signs (Most Recent):  Temp: 98.9 °F (37.2 °C) (07/18/19 0019)  Pulse: 80 (07/18/19 0019)  Resp: 18  (07/18/19 0019)  BP: (!) 167/72 (07/18/19 0019)  SpO2: (!) 94 % (07/18/19 0000) Vital Signs (24h Range):  Temp:  [98 °F (36.7 °C)-98.9 °F (37.2 °C)] 98.9 °F (37.2 °C)  Pulse:  [79-81] 80  Resp:  [17-48] 18  SpO2:  [94 %-100 %] 94 %  BP: (107-175)/(53-90) 167/72     Weight: (!) 150.7 kg (332 lb 3.7 oz)  Body mass index is 43.83 kg/m².    SpO2: (!) 94 %  O2 Device (Oxygen Therapy): room air      Intake/Output Summary (Last 24 hours) at 7/18/2019 0336  Last data filed at 7/18/2019 0000  Gross per 24 hour   Intake 204.7 ml   Output 100 ml   Net 104.7 ml       Lines/Drains/Airways     Central Venous Catheter Line                 Introducer 07/09/19 1410 right internal jugular 8 days          Line                 Pacer Wires 07/09/19 1415 8 days          Peripheral Intravenous Line                 Peripheral IV - Single Lumen 07/17/19 1830 20 G Right Wrist less than 1 day                Physical Exam   Constitutional: He is oriented to person, place, and time. He appears well-developed and well-nourished.   Exam limited due to body habitus   HENT:   Head: Normocephalic and atraumatic.   Neck: No JVD present.   Cardiovascular: Normal rate and regular rhythm.   Pulmonary/Chest: No respiratory distress.   Abdominal: Soft. Bowel sounds are normal. He exhibits distension. There is no tenderness.   Neurological: He is alert and oriented to person, place, and time.   Skin: Skin is warm and dry.   Psychiatric: He has a normal mood and affect. His behavior is normal. Judgment and thought content normal.   Vitals reviewed.    Significant Labs:   CMP   Recent Labs   Lab 07/17/19  0503 07/17/19  2204 07/18/19  0219    137 139   K 4.1 3.9 3.9    108 109   CO2 23 18* 22*   * 209* 196*   BUN 38* 36* 34*   CREATININE 2.0* 1.9* 1.8*   CALCIUM 8.7 8.3* 8.1*   PROT  --  5.6* 5.7*   ALBUMIN  --  2.7* 2.7*   BILITOT  --  0.5 0.5   ALKPHOS  --  151* 156*   AST  --  56* 59*   ALT  --  54* 58*   ANIONGAP 8 11 8   ESTGFRAFRICA  38* 40.1* 42.8*   EGFRNONAA 33* 34.7* 37.0*    and CBC   Recent Labs   Lab 07/17/19  0503 07/17/19  2204 07/18/19  0219   WBC 5.65 6.03 7.77   HGB 11.7* 11.3* 11.4*   HCT 37.1* 36.3* 36.0*    154 162       Significant Imaging: Echocardiogram:   Results for orders placed or performed during the hospital encounter of 07/09/19   Transthoracic echo (TTE) 2D with Color Flow   Result Value Ref Range    AV mean gradient 3 mmHg    Ao peak son 1.22 m/s    Ao VTI 24.42 cm    IVS 1.66 (A) 0.6 - 1.1 cm    LA size 4.67 cm    Left Atrium Major Axis 6.46 cm    Left Atrium Minor Axis 6.90 cm    LVIDD 5.49 3.5 - 6.0 cm    LVIDS 4.20 (A) 2.1 - 4.0 cm    LVOT diameter 2.24 cm    LVOT peak VTI 15.12 cm    PW 1.40 (A) 0.6 - 1.1 cm    MV Peak A Son 0.37 m/s    E wave decelartion time 114.81 msec    MV Peak E Son 0.83 m/s    PV Peak D Son 0.36 m/s    PV Peak S Son 0.45 m/s    RA Major Axis 5.87 cm    RVDD 3.61 cm    LA WIDTH 4.23 cm    Ao root annulus 3.69 cm    PV PEAK VELOCITY 1.07 cm/s    LV Diastolic Volume 147.08 mL    LV Systolic Volume 78.58 mL    LVOT peak son 0.87 m/s    FS 23 %    LA volume 112.04 cm3    LV mass 382.95 g    Left Ventricle Relative Wall Thickness 0.51 cm    AV valve area 2.44 cm2    AV Velocity Ratio 0.71     AV index (prosthetic) 0.62     E/A ratio 2.24     Pulm vein S/D ratio 1.25     LVOT area 3.9 cm2    LVOT stroke volume 59.55 cm3    AV peak gradient 6 mmHg    BSA 2.75 m2    LV Systolic Volume Index 29.7 mL/m2    LV Diastolic Volume Index 55.63 mL/m2    LA Volume Index 42.4 mL/m2    LV Mass Index 145 g/m2    Right Atrial Pressure (from IVC) 3 mmHg    Narrative    · Moderately decreased left ventricular systolic function. The estimated   ejection fraction is 30%  · Grade III (severe) left ventricular diastolic dysfunction consistent   with restrictive physiology.  · Moderate left atrial enlargement.  · Low normal right ventricular systolic function.  · Normal central venous pressure (3 mm Hg).

## 2019-07-18 NOTE — HPI
Houston Jc is a 73 y.o. M with HTN, CHF (EF 30% G3DD) and DM who initially presented to OSH with 3-week history of weakness, fatigue and dyspnea. He was not have to have complete heart block with HR in 20s. A TVP was placed 7/9/19 and LHC done 7/16/19 to rule out ischemic etiology of complete heart block. He is being transferred to [facility] for BiV ICD placement.  Of note, pt had frequent runs of VT on 7/11/19 and is currently on PO amiodarone. On 7/17/19, he was noted to have a DVT to E for which heparin gtt was started.     He denies any chest pain, shortness of breath, or palpitations.The 3-week history of light headedness and syncopal episodes results in numerous falls over the last 2 weeks. Finally, his family member urged him to go to the ER.

## 2019-07-19 PROBLEM — Z95.0 STATUS POST BIVENTRICULAR CARDIAC PACEMAKER INSERTION: Status: ACTIVE | Noted: 2019-07-19

## 2019-07-19 PROBLEM — I44.2 THIRD DEGREE HEART BLOCK: Status: ACTIVE | Noted: 2019-07-19

## 2019-07-19 LAB
ALBUMIN SERPL BCP-MCNC: 2.7 G/DL (ref 3.5–5.2)
ALP SERPL-CCNC: 160 U/L (ref 55–135)
ALT SERPL W/O P-5'-P-CCNC: 65 U/L (ref 10–44)
ANION GAP SERPL CALC-SCNC: 11 MMOL/L (ref 8–16)
ANION GAP SERPL CALC-SCNC: 7 MMOL/L (ref 8–16)
APTT BLDCRRT: 22.9 SEC (ref 21–32)
AST SERPL-CCNC: 72 U/L (ref 10–40)
BASOPHILS # BLD AUTO: 0.01 K/UL (ref 0–0.2)
BASOPHILS NFR BLD: 0.1 % (ref 0–1.9)
BILIRUB SERPL-MCNC: 0.3 MG/DL (ref 0.1–1)
BUN SERPL-MCNC: 35 MG/DL (ref 8–23)
BUN SERPL-MCNC: 36 MG/DL (ref 8–23)
CALCIUM SERPL-MCNC: 8.3 MG/DL (ref 8.7–10.5)
CALCIUM SERPL-MCNC: 9 MG/DL (ref 8.7–10.5)
CHLORIDE SERPL-SCNC: 107 MMOL/L (ref 95–110)
CHLORIDE SERPL-SCNC: 108 MMOL/L (ref 95–110)
CO2 SERPL-SCNC: 19 MMOL/L (ref 23–29)
CO2 SERPL-SCNC: 24 MMOL/L (ref 23–29)
CREAT SERPL-MCNC: 2.4 MG/DL (ref 0.5–1.4)
CREAT SERPL-MCNC: 2.5 MG/DL (ref 0.5–1.4)
CREAT UR-MCNC: 168 MG/DL (ref 23–375)
DIFFERENTIAL METHOD: ABNORMAL
EOSINOPHIL # BLD AUTO: 0.2 K/UL (ref 0–0.5)
EOSINOPHIL NFR BLD: 2.3 % (ref 0–8)
ERYTHROCYTE [DISTWIDTH] IN BLOOD BY AUTOMATED COUNT: 14.9 % (ref 11.5–14.5)
EST. GFR  (AFRICAN AMERICAN): 28.8 ML/MIN/1.73 M^2
EST. GFR  (AFRICAN AMERICAN): 30.2 ML/MIN/1.73 M^2
EST. GFR  (NON AFRICAN AMERICAN): 24.9 ML/MIN/1.73 M^2
EST. GFR  (NON AFRICAN AMERICAN): 26.2 ML/MIN/1.73 M^2
GLUCOSE SERPL-MCNC: 161 MG/DL (ref 70–110)
GLUCOSE SERPL-MCNC: 191 MG/DL (ref 70–110)
HCT VFR BLD AUTO: 35.7 % (ref 40–54)
HGB BLD-MCNC: 10.8 G/DL (ref 14–18)
IMM GRANULOCYTES # BLD AUTO: 0.02 K/UL (ref 0–0.04)
IMM GRANULOCYTES NFR BLD AUTO: 0.3 % (ref 0–0.5)
LYMPHOCYTES # BLD AUTO: 0.8 K/UL (ref 1–4.8)
LYMPHOCYTES NFR BLD: 11.8 % (ref 18–48)
MAGNESIUM SERPL-MCNC: 1.5 MG/DL (ref 1.6–2.6)
MAGNESIUM SERPL-MCNC: 1.9 MG/DL (ref 1.6–2.6)
MCH RBC QN AUTO: 29.9 PG (ref 27–31)
MCHC RBC AUTO-ENTMCNC: 30.3 G/DL (ref 32–36)
MCV RBC AUTO: 99 FL (ref 82–98)
MONOCYTES # BLD AUTO: 0.7 K/UL (ref 0.3–1)
MONOCYTES NFR BLD: 9.5 % (ref 4–15)
NEUTROPHILS # BLD AUTO: 5.3 K/UL (ref 1.8–7.7)
NEUTROPHILS NFR BLD: 76 % (ref 38–73)
NRBC BLD-RTO: 0 /100 WBC
PHOSPHATE SERPL-MCNC: 3.9 MG/DL (ref 2.7–4.5)
PLATELET # BLD AUTO: 150 K/UL (ref 150–350)
PMV BLD AUTO: 12.5 FL (ref 9.2–12.9)
POCT GLUCOSE: 173 MG/DL (ref 70–110)
POCT GLUCOSE: 189 MG/DL (ref 70–110)
POCT GLUCOSE: 199 MG/DL (ref 70–110)
POCT GLUCOSE: 224 MG/DL (ref 70–110)
POTASSIUM SERPL-SCNC: 3.8 MMOL/L (ref 3.5–5.1)
POTASSIUM SERPL-SCNC: 4 MMOL/L (ref 3.5–5.1)
PROT SERPL-MCNC: 5.5 G/DL (ref 6–8.4)
RBC # BLD AUTO: 3.61 M/UL (ref 4.6–6.2)
SODIUM SERPL-SCNC: 138 MMOL/L (ref 136–145)
SODIUM SERPL-SCNC: 138 MMOL/L (ref 136–145)
UUN UR-MCNC: 538 MG/DL (ref 140–1050)
WBC # BLD AUTO: 6.94 K/UL (ref 3.9–12.7)

## 2019-07-19 PROCEDURE — 63600175 PHARM REV CODE 636 W HCPCS: Performed by: STUDENT IN AN ORGANIZED HEALTH CARE EDUCATION/TRAINING PROGRAM

## 2019-07-19 PROCEDURE — 80048 BASIC METABOLIC PNL TOTAL CA: CPT

## 2019-07-19 PROCEDURE — 80053 COMPREHEN METABOLIC PANEL: CPT

## 2019-07-19 PROCEDURE — 25000003 PHARM REV CODE 250: Performed by: INTERNAL MEDICINE

## 2019-07-19 PROCEDURE — 84100 ASSAY OF PHOSPHORUS: CPT

## 2019-07-19 PROCEDURE — 83735 ASSAY OF MAGNESIUM: CPT

## 2019-07-19 PROCEDURE — 85025 COMPLETE CBC W/AUTO DIFF WBC: CPT

## 2019-07-19 PROCEDURE — 25000003 PHARM REV CODE 250

## 2019-07-19 PROCEDURE — 85730 THROMBOPLASTIN TIME PARTIAL: CPT

## 2019-07-19 PROCEDURE — 83735 ASSAY OF MAGNESIUM: CPT | Mod: 91

## 2019-07-19 PROCEDURE — 25000003 PHARM REV CODE 250: Performed by: STUDENT IN AN ORGANIZED HEALTH CARE EDUCATION/TRAINING PROGRAM

## 2019-07-19 PROCEDURE — A4216 STERILE WATER/SALINE, 10 ML: HCPCS | Performed by: STUDENT IN AN ORGANIZED HEALTH CARE EDUCATION/TRAINING PROGRAM

## 2019-07-19 PROCEDURE — 82570 ASSAY OF URINE CREATININE: CPT

## 2019-07-19 PROCEDURE — 97161 PT EVAL LOW COMPLEX 20 MIN: CPT

## 2019-07-19 PROCEDURE — 84540 ASSAY OF URINE/UREA-N: CPT

## 2019-07-19 PROCEDURE — 20600001 HC STEP DOWN PRIVATE ROOM

## 2019-07-19 PROCEDURE — 63600175 PHARM REV CODE 636 W HCPCS: Performed by: INTERNAL MEDICINE

## 2019-07-19 PROCEDURE — 25000003 PHARM REV CODE 250: Performed by: NURSE PRACTITIONER

## 2019-07-19 PROCEDURE — 97165 OT EVAL LOW COMPLEX 30 MIN: CPT

## 2019-07-19 PROCEDURE — 99233 SBSQ HOSP IP/OBS HIGH 50: CPT | Mod: GC,,, | Performed by: INTERNAL MEDICINE

## 2019-07-19 PROCEDURE — 99233 PR SUBSEQUENT HOSPITAL CARE,LEVL III: ICD-10-PCS | Mod: GC,,, | Performed by: INTERNAL MEDICINE

## 2019-07-19 PROCEDURE — 94761 N-INVAS EAR/PLS OXIMETRY MLT: CPT

## 2019-07-19 PROCEDURE — 63600175 PHARM REV CODE 636 W HCPCS: Performed by: NURSE PRACTITIONER

## 2019-07-19 RX ORDER — CEPHALEXIN 500 MG/1
500 CAPSULE ORAL EVERY 8 HOURS
Qty: 15 CAPSULE | Refills: 0 | Status: SHIPPED | OUTPATIENT
Start: 2019-07-19 | End: 2019-07-22 | Stop reason: HOSPADM

## 2019-07-19 RX ORDER — CEPHALEXIN 250 MG/1
500 CAPSULE ORAL EVERY 8 HOURS
Status: DISCONTINUED | OUTPATIENT
Start: 2019-07-19 | End: 2019-07-22

## 2019-07-19 RX ORDER — LOSARTAN POTASSIUM 25 MG/1
25 TABLET ORAL DAILY
Qty: 90 TABLET | Refills: 3 | Status: SHIPPED | OUTPATIENT
Start: 2019-07-20 | End: 2020-05-07 | Stop reason: SDUPTHER

## 2019-07-19 RX ORDER — CEPHALEXIN 500 MG/1
500 CAPSULE ORAL EVERY 8 HOURS
Qty: 15 CAPSULE | Refills: 0 | Status: SHIPPED | OUTPATIENT
Start: 2019-07-19 | End: 2019-07-19

## 2019-07-19 RX ADMIN — FUROSEMIDE 40 MG: 10 INJECTION, SOLUTION INTRAMUSCULAR; INTRAVENOUS at 05:07

## 2019-07-19 RX ADMIN — ATORVASTATIN CALCIUM 80 MG: 20 TABLET, FILM COATED ORAL at 09:07

## 2019-07-19 RX ADMIN — Medication 3 ML: at 05:07

## 2019-07-19 RX ADMIN — AMIODARONE HYDROCHLORIDE 400 MG: 200 TABLET ORAL at 09:07

## 2019-07-19 RX ADMIN — INSULIN ASPART 2 UNITS: 100 INJECTION, SOLUTION INTRAVENOUS; SUBCUTANEOUS at 11:07

## 2019-07-19 RX ADMIN — BENZONATATE 100 MG: 100 CAPSULE ORAL at 11:07

## 2019-07-19 RX ADMIN — BACITRACIN ZINC NEOMYCIN SULFATE POLYMYXIN B SULFATE: 400; 3.5; 5 OINTMENT TOPICAL at 02:07

## 2019-07-19 RX ADMIN — ASPIRIN 81 MG: 81 TABLET, COATED ORAL at 09:07

## 2019-07-19 RX ADMIN — ALLOPURINOL 300 MG: 300 TABLET ORAL at 09:07

## 2019-07-19 RX ADMIN — FUROSEMIDE 40 MG: 10 INJECTION, SOLUTION INTRAMUSCULAR; INTRAVENOUS at 09:07

## 2019-07-19 RX ADMIN — LOSARTAN POTASSIUM 25 MG: 25 TABLET, FILM COATED ORAL at 09:07

## 2019-07-19 RX ADMIN — HYDRALAZINE HYDROCHLORIDE 10 MG: 20 INJECTION INTRAMUSCULAR; INTRAVENOUS at 05:07

## 2019-07-19 RX ADMIN — CEPHALEXIN 500 MG: 500 CAPSULE ORAL at 09:07

## 2019-07-19 RX ADMIN — CEFAZOLIN 2 G: 1 INJECTION, POWDER, FOR SOLUTION INTRAMUSCULAR; INTRAVENOUS at 06:07

## 2019-07-19 RX ADMIN — COLCHICINE 0.6 MG: 0.6 TABLET, FILM COATED ORAL at 09:07

## 2019-07-19 RX ADMIN — CEPHALEXIN 500 MG: 500 CAPSULE ORAL at 03:07

## 2019-07-19 RX ADMIN — MAGNESIUM SULFATE HEPTAHYDRATE 3 G: 500 INJECTION, SOLUTION INTRAMUSCULAR; INTRAVENOUS at 05:07

## 2019-07-19 NOTE — PLAN OF CARE
[facility] home [facility] unable to accept pt due to service area.  Referral sent to Prashant garrido via [facility].  Will await hh orders.     07/19/19 9790   Post-Acute Status   Post-Acute Authorization Home Health/Hospice   Home Health/Hospice Status Referrals Sent

## 2019-07-19 NOTE — PT/OT/SLP EVAL
"Occupational Therapy   Evaluation    Name: Houston Jc  MRN: [id number]  Admitting Diagnosis:  Complete heart block 1 Day Post-Op s/p ICD    Recommendations:     Discharge Recommendations:    Discharge Equipment Recommendations:  none  Barriers to discharge:  None    Assessment:     Houston Jc is a 73 y.o. male with a medical diagnosis of Complete heart block.  He presents s/p ICD with L UE sling/swathe and ICD precautions affecting ADL (I). Performance deficits affecting function: weakness, gait instability, decreased upper extremity function, impaired balance, impaired endurance, impaired self care skills, impaired functional mobilty, impaired cardiopulmonary response to activity, decreased ROM.      Rehab Prognosis: Good; patient would benefit from acute skilled OT services to address these deficits and reach maximum level of function.       Plan:     Patient to be seen 2 x/week to address the above listed problems via self-care/home management, therapeutic activities, therapeutic exercises  · Plan of Care Expires: 08/18/19  · Plan of Care Reviewed with: patient    Subjective     Chief Complaint: "I'm not used to this."  Patient/Family Comments/goals: to get better and go home    Occupational Profile:  Living Environment: lives with spouse, son and granddtr in [facility] with no [place]  Previous level of function: (I) with ADL and ambulation  Roles and Routines: retired; drives; enjoys going to the [employer]  Equipment Used at Home:  none  Assistance upon Discharge: spouse can assist    Pain/Comfort:  · Pain Rating 1: 7/10  · Location - Side 1: Left  · Location - Orientation 1: generalized  · Location 1: arm  · Pain Addressed 1: Reposition, Distraction  · Pain Rating Post-Intervention 1: 7/10    Patients cultural, spiritual, Scientology conflicts given the current situation: no    Objective:     Communicated with: RN prior to session.  Patient found supine with blood pressure cuff, peripheral IV, telemetry, pulse ox " (continuous), oxygen upon OT entry to room.    General Precautions: Standard, fall(L UE ICD precautions)   Orthopedic Precautions:    Braces: Sling and swathe     Occupational Performance:    Bed Mobility:    · Minimal A for trunk elevation    Functional Mobility/Transfers:  · Patient completed Sit <> Stand Transfer with contact guard assistance  with  no assistive device   · Patient completed Bed <> Chair Transfer using Step Transfer technique with contact guard assistance with no assistive device  · Functional Mobility: CGA x 6 feet to chair; pt declined further mobility 2* fatigue    Activities of Daily Living:  · Feeding:  pt was being fed by PCT upon OT arrival    · Grooming: moderate assistance    · Upper Body Dressing: maximal assistance    · Lower Body Dressing: total assistance    · Toileting: moderate assistance      Cognitive/Visual Perceptual:  Oriented to: Person, Place, Time and Situation  Follows Commands/attention: Follows multistep  commands  Communication: clear/fluent  Memory:  No Deficits noted  Safety awareness/insight to disability: intact  Coping skills/emotional control: Appropriate to situation    Physical Exam:  Postural examination/scapula alignment:    -       No postural abnormalities identified  Sensation:    -       Intact  Upper Extremity Range of Motion:     -       Right Upper Extremity: WFL  -       Left Upper Extremity: WFL for wrist and hand; NT otherwise 2* immobilized in sling/swathe 2* ICD precautions  Upper Extremity Strength:    -       Right Upper Extremity: WFL  -       Left Upper Extremity: NT   Strength:    -       Right Upper Extremity: WFL  -       Left Upper Extremity: WFL  Fine Motor Coordination:    -       Intact  Gross motor coordination:   WFL      AMPAC 6 Click ADL:  AMPAC Total Score: 11    Treatment & Education:  Pt ed on OT POC  Pt ed on ICD precautions including not removing sling x 24 hours; no pushing/pulling/lifting > 5#; no raising L UE above  shoulder  height  Education:    Patient left up in chair with all lines intact, call button in reach and RN notified    GOALS:   Multidisciplinary Problems     Occupational Therapy Goals     Not on file          Multidisciplinary Problems (Resolved)        Problem: Occupational Therapy Goal    Goal Priority Disciplines Outcome Interventions   Occupational Therapy Goal   (Resolved)     OT, PT/OT Outcome(s) achieved    Description:  Goals to be met by: 7/26/19     Patient will increase functional independence with ADLs by performing:    Feeding with [place].  UE Dressing with Modified [place].  LE Dressing with Contact Guard Assistance.  Grooming while standing at sink with Supervision.  Toileting from toilet with Supervision for hygiene and clothing management.   Toilet transfer to toilet with Supervision.                      History:     Past Medical History:   Diagnosis Date    CHF (congestive heart failure)     Coronary artery disease     Diabetes mellitus     Hypertension        Past Surgical History:   Procedure Laterality Date    INSERTION, ICD, BIVENTRICULAR Left 7/18/2019    Performed by Arturo [facility] MD at [facility] EP LAB    Insertion, Pacemaker, Temporary Transvenous N/A 7/9/2019    Performed by Dejuan Cody [facility] at [facility] CATH LAB/EP    Left heart cath N/A 7/16/2019    Performed by Dejuan Cody MD at [facility] CATH LAB/EP       Time Tracking:     OT Date of Treatment: 07/19/19  OT Start Time: 0818  OT Stop Time: 0832  OT Total Time (min): 14 min    Billable Minutes:Evaluation 14    SHILA Kerr  7/19/2019

## 2019-07-19 NOTE — PLAN OF CARE
Problem: Occupational Therapy Goal  Goal: Occupational Therapy Goal  Goals to be met by: 7/26/19     Patient will increase functional independence with ADLs by performing:    Feeding with [place].  UE Dressing with Modified [place].  LE Dressing with Contact Guard Assistance.  Grooming while standing at sink with Supervision.  Toileting from toilet with Supervision for hygiene and clothing management.   Toilet transfer to toilet with Supervision.    Outcome: Ongoing (interventions implemented as appropriate)  OT eval completed, and above goals established. SHILA Kerr  7/19/2019

## 2019-07-19 NOTE — ASSESSMENT & PLAN NOTE
Ischemic cardiomyopathy  - h/o  EF 30% with grade 3 diastolic dysfunction on prior ECHO  - strict in & out  - daily weights  - furosemide injection 40 mg 40 mg, Intravenous, 2 times daily

## 2019-07-19 NOTE — PLAN OF CARE
Problem: Physical Therapy Goal  Goal: Physical Therapy Goal  Goals to be met by: 2019    Patient will increase functional independence with mobility by performin. Supine to sit with Contact Guard Assistance - not met  2. Sit to stand transfer with Supervision - not met  3. Gait  x 200 feet with Supervision - not met  4. Stand for 3 minutes with Supervision to increase activity tolerance - not met    Outcome: Ongoing (interventions implemented as appropriate)  Eval completed and goals appropriate

## 2019-07-19 NOTE — DISCHARGE INSTRUCTIONS
1. Sling to left arm - wear for 48 hours, then only at night for 6 weeks.  2. NO HEPARIN PRODUCTS  3. Keflex 500 mg TID for 5 days at discharge   4. No lifting left elbow above shoulder height  5. No lifting over 5 pounds  6. No driving for 1 week and for 4 weeks if patient uses left arm to make turns  7. Patient may shower in 24 hours, do not let beam of shower hit site directly and no scrubbing in area  8. Follow up in device clinic in 1 week and with Dr Bay in 4-6 weeks

## 2019-07-19 NOTE — ANESTHESIA POSTPROCEDURE EVALUATION
Anesthesia Post Evaluation    Patient: Houston Jc    Procedure(s) Performed: Procedure(s) (LRB):  INSERTION, ICD, BIVENTRICULAR (Left)    Final Anesthesia Type: general  Patient location during evaluation: PACU  Patient participation: Yes- Able to Participate  Level of consciousness: awake and alert and oriented  Post-procedure vital signs: reviewed and stable  Pain management: adequate  Airway patency: patent  PONV status at discharge: No PONV  Anesthetic complications: no      Cardiovascular status: blood pressure returned to baseline and hemodynamically stable  Respiratory status: unassisted  Hydration status: euvolemic  Follow-up not needed.          Vitals Value Taken Time   /66 7/19/2019  7:03 AM   Temp 36.6 °C (97.9 °F) 7/19/2019  3:00 AM   Pulse 60 7/19/2019  7:42 AM   Resp 28 7/19/2019  7:42 AM   SpO2 95 % 7/19/2019  7:42 AM   Vitals shown include unvalidated device data.      No case tracking events are documented in the log.      Pain/Nelda Score: No data recorded

## 2019-07-19 NOTE — NURSING TRANSFER
Nursing Transfer Note      7/19/2019     Transfer To: [id number] from [id number]    Transfer via wheelchair    Transfer with cardiac monitoring; ambu bag    Transported by [facility]     Medicines sent: rapid acting insulin pen    Chart send with patient: Yes    Notified: spouse    Patient reassessed at: 1710 07/19/2019    Upon arrival to floor: cardiac monitor applied, patient oriented to room, call bell in reach and bed in lowest position

## 2019-07-19 NOTE — ASSESSMENT & PLAN NOTE
Post-op Day 1  -Rhythmia control   - amiodarone tablet 400 mg 400 mg, Oral, 2 times daily  - Abx prophylaxis (total of 5 days):   - cephALEXin capsule 500 mg 500 mg, Oral, Every 8 hours (Day 1/5)   - neomycin-bacitracin-polymyxin ointment Topical (Top), Daily

## 2019-07-19 NOTE — PROGRESS NOTES
Electrophysiology Progress Note     Subjective:  No fever or chills. No significant pain at device site.    Past Medical History:   Diagnosis Date    CHF (congestive heart failure)     Coronary artery disease     Diabetes mellitus     Hypertension         Social History     Socioeconomic History    Marital status:      Spouse name: Not on file    Number of children: Not on file    Years of education: Not on file    Highest education level: Not on file   Occupational History    Not on file   Social Needs    Financial resource strain: Not on file    Food insecurity:     Worry: Not on file     Inability: Not on file    Transportation needs:     Medical: Not on file     Non-medical: Not on file   Tobacco Use    Smoking status: Former Smoker   Substance and Sexual Activity    Alcohol use: Yes     Comment: social    Drug use: No    Sexual activity: Not on file   Lifestyle    Physical activity:     Days per week: Not on file     Minutes per session: Not on file    Stress: Not on file   Relationships    Social connections:     Talks on phone: Not on file     Gets together: Not on file     Attends Sabianist service: Not on file     Active member of club or organization: Not on file     Attends meetings of clubs or organizations: Not on file     Relationship status: Not on file   Other Topics Concern    Not on file   Social History Narrative    Not on file      History reviewed. No pertinent family history.     Current Facility-Administered Medications   Medication Dose Route Frequency Provider Last Rate Last Dose    acetaminophen tablet 500 mg  500 mg Oral Q6H PRN Della Wells MD        albuterol inhaler 2 puff  2 puff Inhalation Q4H PRN Bala Carvajal NP        allopurinol tablet 300 mg  300 mg Oral Daily Bala Carvajal NP   300 mg at 07/19/19 0900    amiodarone tablet 400 mg  400 mg Oral BID Della Wells MD   400 mg at 07/19/19 0900    aspirin EC tablet 81 mg  81 mg Oral Daily  Della Wells MD   81 mg at 07/19/19 0900    atorvastatin tablet 80 mg  80 mg Oral Daily Bala Carvajal NP   80 mg at 07/19/19 0900    benzonatate capsule 100 mg  100 mg Oral TID PRN Masoud Albarado MD   100 mg at 07/19/19 1113    bupivacaine (PF) 0.25% (2.5 mg/ml) injection    PRN Arturo Bay MD   10 mL at 07/18/19 1341    ceFAZolin injection 2 g  2 g Intravenous On Call Procedure Ashwin Redd MD   3 g at 07/18/19 1257    colchicine tablet 0.6 mg  0.6 mg Oral Daily Bala Carvajal NP   0.6 mg at 07/19/19 0900    dextrose 10% (D10W) Bolus  12.5 g Intravenous PRN Pao Hough MD        dextrose 10% (D10W) Bolus  25 g Intravenous PRN Pao Hough MD        diphenhydrAMINE capsule 25 mg  25 mg Oral Q6H PRN Bala Carvajal NP        furosemide injection 40 mg  40 mg Intravenous BID Darwin Ny MD   40 mg at 07/19/19 0901    glucagon (human recombinant) injection 1 mg  1 mg Intramuscular PRN Bala Carvajal NP        glucose chewable tablet 16 g  16 g Oral PRN Bala Carvajal NP        glucose chewable tablet 24 g  24 g Oral PRN Bala Carvajal NP        hydrALAZINE injection 10 mg  10 mg Intravenous Q8H PRN Bala Carvajal NP   10 mg at 07/17/19 2137    insulin aspart U-100 pen 0-5 Units  0-5 Units Subcutaneous Q6H Della Wells MD   2 Units at 07/19/19 1139    lidocaine HCL 20 mg/ml (2%) injection    PRN Arturo Bay MD   10 mL at 07/18/19 1341    losartan tablet 25 mg  25 mg Oral Daily Darwin Ny MD   25 mg at 07/19/19 0900    neomycin-bacitracin-polymyxin ointment   Topical (Top) Daily Ashwin Redd MD        ondansetron disintegrating tablet 8 mg  8 mg Oral Q8H PRN Della Wells MD        polyethylene glycol packet 17 g  17 g Oral BID PRN Bala Carvajal, NP        senna-docusate 8.6-50 mg per tablet 1 tablet  1 tablet Oral Daily PRN Della Wells MD        sodium chloride 0.9% flush 3 mL  3 mL Intravenous Q8H  Della Wells MD   3 mL at 07/19/19 0520    vancomycin injection    PRN Arturo Bay MD   1,000 mg at 07/18/19 1332      Constitutional: (-)fevers, (-)chills, (-)night sweats  HEENT: (-) headaches  Cardiovascular: (-)chest pain  Respiratory: (-)shortness of breath  Gastrointestinal: (-)abdominal pain     Musculoskeletal: (-)arthralgias  Neurologic: (-)parasthesias    Vitals:    07/19/19 1000 07/19/19 1100 07/19/19 1215 07/19/19 1300   BP: (!) 159/67 (!) 165/71 (!) 146/67 (!) 161/70   BP Location: Right arm Right arm Right arm Right arm   Patient Position: Lying Sitting Sitting Sitting   Pulse: 63 63 65 60   Resp: (!) 26 (!) 24 (!) 23 20   Temp:  97.7 °F (36.5 °C)     TempSrc:  Oral     SpO2: (!) 94% 95% 98% 98%   Weight:       Height:         Physical Exam:   Gen: no acute distress, pleasant patient answering questions appropriately  HEENT: extra-ocular muscles intact, normocephalic-atraumatic  CVS: regular rate and rhythm, S1S2 normal,  CHEST: clear to auscultation bilaterally, no wheezes/rales/ronchi  ABD: Soft  EXT: +edema  NEURO: awake, alert, and oriented   Skin: rash on rt chest    Review of Labs:     Recent Labs   Lab 07/19/19  0341   WBC 6.94   RBC 3.61*   HGB 10.8*   HCT 35.7*      MCV 99*   MCH 29.9   MCHC 30.3*     Recent Labs   Lab 07/19/19  0341   CALCIUM 8.3*   PROT 5.5*      K 4.0   CO2 19*      BUN 35*   CREATININE 2.4*   ALKPHOS 160*   ALT 65*   AST 72*   BILITOT 0.3       Most recent ECG  paced    Assessment/Plan:  Houston Jc is a 73 y.o. male with cardiomyopathy EF 30% and complete heart block s/p dual chamber icd on 7/18/19.   Dual chamber ICD ([place]) on 7/18/19. Has RA lead, high voltage/defibrillation RV lead, and high septal RV pacing lead in order to provide more narrow pacing. Unfortunately, he did not have sufficient CS branches to allow for adequate/appropriate LV/CS lead positioning so LV lead could not be placed.  Site on left upper chest without hematoma,  oozing, draining, or bleeding today.     1. Sling to left arm - wear for 48 hours, then only at night for 6 weeks.  2. NO HEPARIN PRODUCTS  3. Keflex 500 mg TID for 5 days at discharge   4. No lifting left elbow above shoulder height  5. No lifting over 5 pounds  6. No driving for 1 week and for 4 weeks if patient uses left arm to make turns  7. Patient may shower in 24 hours, do not let beam of shower hit site directly and no scrubbing in area  8. Follow up in device clinic in 1 week and with Dr Bay in 4-6 weeks    Ashwin Redd MD PGY7

## 2019-07-19 NOTE — PLAN OF CARE
[facility]    HOME HEALTH ORDERS  FACE TO FACE ENCOUNTER    Patient Name: Houston Jc  YOB: 1945    PCP: Zak Hamilton MD   PCP Address: [address] [place] [address] [place] / [place] [address]  PCP Phone Number: [phone]  PCP Fax: [phone]    Encounter Date: 07/19/2019    Admit to Home Health    Diagnoses:  Active Hospital Problems    Diagnosis  POA    *Complete heart block [I44.2]  Yes    Gout [M10.9]  Yes    Acute deep vein thrombosis (DVT) of left upper extremity [I82.622]  Yes    Debility [R53.81]  Yes    CLEMENCIA (acute kidney injury) [N17.9]  Yes    Acute systolic heart failure [I50.21]  Yes    Sleep apnea [G47.30]  Yes      Resolved Hospital Problems   No resolved problems to display.       Future Appointments   Date Time Provider Department Center   7/24/2019 10:20 AM PACEMAKER, ICD [facility]   8/27/2019  8:00 AM HOME MONITOR DEVICE CHECK, [facility] [place] [facility]           I have seen and examined this patient face to face today. My clinical findings that support the need for the home health skilled services and home bound status are the following:  Weakness/numbness causing balance and gait disturbance due to Weakness/Debility and Surgery making it taxing to leave home.  Requiring assistive device to leave home due to unsteady gait caused by  Weakness/Debility and Surgery.  Medical restrictions requiring assistance of another human to leave home due to  Decreased range of motions in extremities, Post surgery monitoring and Weight bearing restricted.    Allergies:Review of patient's allergies indicates:  No Known Allergies    Diet: cardiac diet and diabetic diet: 2000 calorie    Activities: activity as tolerated with noted exceptions:   Sling to left arm - wear for 48 hours, then only at night for 6 weeks.  No lifting left elbow above shoulder height  No lifting over 5 pounds  No driving for 1 week and for 4 weeks if patient uses left arm to make  turns  Patient may shower in 24 hours, do not let beam of shower hit site directly and no scrubbing in area    Nursing:   SN to complete comprehensive assessment including routine vital signs. Instruct on disease process and s/s of complications to report to MD. Review/verify medication list sent home with the patient at time of discharge  and instruct patient/caregiver as needed. Frequency may be adjusted depending on start of care date.    Notify MD if SBP > 160 or < 90; DBP > 90 or < 50; HR > 120 or < 50; Temp > 101      CONSULTS:    Physical Therapy to evaluate and treat. Evaluate for home safety and equipment needs; Establish/upgrade home exercise program. Perform / instruct on therapeutic exercises, gait training, transfer training, and Range of Motion.  Occupational Therapy to evaluate and treat. Evaluate home environment for safety and equipment needs. Perform/Instruct on transfers, ADL training, ROM, and therapeutic exercises.    MISCELLANEOUS CARE:  Diabetic Care:   SN to perform and educate Diabetic management with blood glucose monitoring: and Fingerstick blood sugar AC and HS    WOUND CARE ORDERS  n/a      Medications: Review discharge medications with patient and family and provide education.      Current Discharge Medication List      START taking these medications    Details   cephALEXin (KEFLEX) 500 MG capsule Take 1 capsule (500 mg total) by mouth every 8 (eight) hours. for 5 days  Qty: 15 capsule, Refills: 0      losartan (COZAAR) 25 MG tablet Take 1 tablet (25 mg total) by mouth once daily.  Qty: 90 tablet, Refills: 3         CONTINUE these medications which have NOT CHANGED    Details   ALBUTEROL SULFATE (PROVENTIL HFA INHL) Inhale into the lungs.      allopurinol (ZYLOPRIM) 300 MG tablet Take 300 mg by mouth once daily.      aspirin (ECOTRIN) 81 MG EC tablet Take 1 tablet (81 mg total) by mouth once daily.  Refills: 0      atorvastatin (LIPITOR) 80 MG tablet Take 1 tablet (80 mg total) by mouth  once daily.  Qty: 90 tablet, Refills: 3      carvedilol (COREG) 12.5 MG tablet Take 1 tablet (12.5 mg total) by mouth 2 (two) times daily.  Qty: 60 tablet, Refills: 11      colchicine 0.6 mg tablet Take 0.6 mg by mouth once daily.      furosemide (LASIX) 40 MG tablet Take 1 tablet (40 mg total) by mouth once daily.  Qty: 30 tablet, Refills: 11      insulin NPH-insulin regular, 70/30, (NOVOLIN 70/30) 100 unit/mL (70-30) injection Inject into the skin 3 (three) times daily.         STOP taking these medications       indomethacin (INDOCIN) 50 MG capsule Comments:   Reason for Stopping:         lisinopril (PRINIVIL,ZESTRIL) 40 MG tablet Comments:   Reason for Stopping:               I certify that this patient is confined to his home and needs intermittent skilled nursing care, physical therapy and speech therapy.    Darwin Ny MD  Internal Medicine PGY2  [facility]

## 2019-07-19 NOTE — SUBJECTIVE & OBJECTIVE
Interval History: NAEO. Patient sit bedside in chair comfortably this morning without complaints. Pain well controlled. Denies N/V, CP, SOB. States he has been having 1-2 loose stools per day starting prior to admission. Denies watery, bloody, mucousy, and black/light tan colored stools.     Review of Systems   Constitution: Negative for chills, decreased appetite and fever.   Cardiovascular: Negative for chest pain.   Respiratory: Negative for shortness of breath.    Gastrointestinal: Positive for diarrhea.   Neurological: Negative for weakness.   Psychiatric/Behavioral: Negative for altered mental status.     Objective:     Vital Signs (Most Recent):  Temp: 98.8 °F (37.1 °C) (07/19/19 1500)  Pulse: 60 (07/19/19 1500)  Resp: (!) 22 (07/19/19 1500)  BP: (!) 149/68 (07/19/19 1500)  SpO2: 98 % (07/19/19 1500) Vital Signs (24h Range):  Temp:  [97.5 °F (36.4 °C)-98.8 °F (37.1 °C)] 98.8 °F (37.1 °C)  Pulse:  [60-65] 60  Resp:  [15-37] 22  SpO2:  [94 %-100 %] 98 %  BP: (124-165)/(56-71) 149/68     Weight: (!) 150.9 kg (332 lb 10.8 oz)  Body mass index is 43.89 kg/m².     SpO2: 98 %  O2 Device (Oxygen Therapy): room air      Intake/Output Summary (Last 24 hours) at 7/19/2019 1522  Last data filed at 7/19/2019 1500  Gross per 24 hour   Intake 635 ml   Output 1125 ml   Net -490 ml       Lines/Drains/Airways     Peripheral Intravenous Line                 Peripheral IV - Single Lumen 07/18/19 1031 20 G Distal;Left;Posterior Wrist 1 day                Physical Exam   Constitutional: He is oriented to person, place, and time. He appears well-developed and well-nourished. No distress.   HENT:   Head: Normocephalic and atraumatic.   Eyes: EOM are normal.   Neck: Normal range of motion. No JVD present.   Cardiovascular: Normal rate and regular rhythm.   Pulmonary/Chest: Effort normal and breath sounds normal. No respiratory distress. He has no wheezes. He has no rales.   Abdominal: Soft. Bowel sounds are normal. He exhibits  distension. There is no tenderness. There is no guarding.   Neurological: He is alert and oriented to person, place, and time.   Skin: Skin is warm and dry. He is not diaphoretic.   Psychiatric: He has a normal mood and affect. His behavior is normal. Judgment and thought content normal.   Vitals reviewed.      Significant Labs: All pertinent lab results from the last 24 hours have been reviewed.  Recent Labs   Lab 07/17/19  2204 07/18/19  0219 07/19/19  0341   WBC 6.03 7.77 6.94   HGB 11.3* 11.4* 10.8*   HCT 36.3* 36.0* 35.7*    162 150   MCV 98 96 99*   RDW 14.8* 14.9* 14.9*    139 138   K 3.9 3.9 4.0    109 108   CO2 18* 22* 19*   BUN 36* 34* 35*   CREATININE 1.9* 1.8* 2.4*   * 196* 161*   PROT 5.6* 5.7* 5.5*   ALBUMIN 2.7* 2.7* 2.7*   BILITOT 0.5 0.5 0.3   AST 56* 59* 72*   ALKPHOS 151* 156* 160*   ALT 54* 58* 65*       Significant Imaging: Reviewed   07/18/2019 CXR: Minimal atelectasis.  No edema or pneumothorax.  New cardiac pacing device.

## 2019-07-19 NOTE — ASSESSMENT & PLAN NOTE
Patient with CLEMENCIA likely d/t IVVD vs decreased renal blood flow  Which is currently stable. Labs reviewed- BMP with Estimated Creatinine Clearance: 43.2 mL/min (A) (based on SCr of 2.4 mg/dL (H)). according to latest data. Monitor UOP and serial BMP and adjust therapy as needed. Avoid nephrotoxins and renally dose meds for GFR listed above.

## 2019-07-19 NOTE — PLAN OF CARE
Problem: Adult Inpatient Plan of Care  Goal: Plan of Care Review  Outcome: Ongoing (interventions implemented as appropriate)  No acute events throughout day. Pt AAO x4, follows commands, aefebrile. PPM, 100% paced. -150s, RA, O2 sats >94%. BM x 1, clear liquid diet, voids spontaneously via urinal, total UOP 575cc. Complete CHG bath given 7/19/19 @ 0200. Patient progressing towards goals as tolerated, plan of care communicated and reviewed with Houston Jc and family. All concerns addressed. Will continue to monitor.

## 2019-07-19 NOTE — ASSESSMENT & PLAN NOTE
H/o VT - continue amiodarone 400 mg BID  - BiV ICD placed 7/18  - Remains stable on tele monitoring  - Will continue to monitor electrolytes, replace to keep K > 4, Mg > 2

## 2019-07-19 NOTE — PHYSICIAN QUERY
PT Name: Houston Jc  MR #: [id number]     Physician Query Form - Cardiomyopathy Clarification     CDS/: Iza Pham RN             Contact information: [phone]  This form is a permanent document in the medical record.     Query Date: July 19, 2019    By submitting this query, we are merely seeking further clarification of documentation to reflect the severity of illness of your patient. Please utilize your independent clinical judgment when addressing the question(s) below.    The Medical record reflects the following:     Indicators   Supporting Clinical Findings Location in Medical Record   x Cardiomyopathy, NICM, CM or similar term documented  Acute systolic heart failure    Likely NICM given nonobstructive CAD on [facility] 7/16/19       73M with new cardiomyopathy, complete heart block, and VT. Suspicious for cardiac sarcoidosis.      History of nonischemic cardiomyopathy, complete heart block, and NSVT.   H&P 7/18       Cardiology         Consult 7/18      Cardiology      Electrophysiology Procedure       7/18     x Acute/Chronic Illness Complete heart block    Acute deep vein thrombosis (DVT) of left upper extremity  Acute systolic heart failure    CLEMENCIA (acute kidney injury)    Type 2 Diabetes Mellitus    Gout    H&P 7/8       Cardiology    Echo, Radiology, and/or Heart Cath Findings      BiPAP/Intubation/Supplemental O2     x SOB, GALLARDO, Fatigue, Dizziness, LE Edema, Cyanosis, Chest Pain, Pulmonary HTN, Respiratory Distress, Hypoxia, etc. presented to OSH with 3-week history of weakness, fatigue and dyspnea.   Respiratory: Negative for shortness of breath.       H&P 7/8       Cardiology    Treatment                                 Medication      Other       Doctor, please specify the type of cardiomyopathy:    [xxxx   ] Non-ischemic Dilated (congestive)    [   ] Non-ischemic Restrictive    [   ] Cardiomyopathy, type unspecified or unknown   [   ] Other type of cardiomyopathy (please specify):    [    ]  Clinically Undetermined       Please document in your progress notes daily for the duration of treatment until resolved, and include in your discharge summary.

## 2019-07-19 NOTE — PLAN OF CARE
Home health referral has been sent to [facility] via [facility].     07/19/19 1220   Post-Acute Status   Post-Acute Authorization Home Health/Hospice   Home Health/Hospice Status Referrals Sent

## 2019-07-19 NOTE — PT/OT/SLP EVAL
Physical Therapy Evaluation    Patient Name:  Houston Jc   MRN:  [id number]    Recommendations:     Discharge Recommendations:  home health PT   Discharge Equipment Recommendations: none   Barriers to discharge: None    Assessment:     Houston Jc is a 73 y.o. male admitted with a medical diagnosis of Complete heart block.  He presents with the following impairments/functional limitations:  impaired endurance, impaired cardiopulmonary response to activity, impaired functional mobilty, pain, decreased upper extremity function. Pt with BiV ICD insertion on L side on 7/18. Pt found with sling and swathe already donned. Pt able to sit EOB with min A and transfer to the chair with CGA. Pt SOB but SpO2 remained WFL. IV site at R hand leaking. RN notified and addressed situation.     Pt safe for mobility with nursing. One person assist. Pt does not use an assistive device     Rehab Prognosis: Good; patient would benefit from acute skilled PT services to address these deficits and reach maximum level of function.    Recent Surgery: Procedure(s) (LRB):  INSERTION, ICD, BIVENTRICULAR (Left) 1 Day Post-Op    Plan:     During this hospitalization, patient to be seen 4 x/week to address the identified rehab impairments via gait training, therapeutic activities, therapeutic exercises, neuromuscular re-education and progress toward the following goals:    · Plan of Care Expires:  08/18/19    Subjective     Chief Complaint: pain   Patient/Family Comments/goals: to get better and return home   Pain/Comfort:  · Pain Rating 1: 7/10(L UE)  · Pain Addressed 1: Reposition, Distraction  · Pain Rating Post-Intervention 1: 7/10    Patients cultural, spiritual, Holiness conflicts given the current situation: no    Living Environment:  Pt lives with wife, son, and granddaughter in a [facility] with no MACARIO   Prior to admission, patients level of function was indep with ambulation and ADLs.  Equipment used at home: none.  DME owned (not currently  used): none.  Upon discharge, patient will have assistance from family.    Objective:     Communicated with RN prior to session.  Patient found HOB elevated with telemetry, pulse ox (continuous), blood pressure cuff, peripheral IV  upon PT entry to room.    General Precautions: Standard, fall(ICD insertion L side)   Orthopedic Precautions:N/A   Braces: Sling and swathe     Exams:  · Cognitive Exam:    · AAOx4  · Follows multistep commands  · Communication clear/fluent   · RLE ROM: WFL  · RLE Strength: WFL  · LLE ROM: WFL  · LLE Strength: WFL    Functional Mobility:  · Bed Mobility:     · Scooting: contact guard assistance; increased time   · Supine to Sit: minimum assistance with trunk   · Transfers:     · Sit to Stand:  contact guard assistance with no AD from EOB   · Gait: 6ft with CGA to chair  · No LOB  · SOB present; SpO2 WFL       Therapeutic Activities and Exercises:  Educated pt on PT role/POC  Educated pt on importance of OOB activity and daily ambulation   Educated pt on ICD precautions   Pt verbalized understanding     T/f to chair to increase tolerance to OOB activity and to create optimal positioning for lung expansion     AM-PAC 6 CLICK MOBILITY  Total Score:17     Patient left up in chair with all lines intact, call button in reach and RN notified.    GOALS:   Multidisciplinary Problems     Physical Therapy Goals        Problem: Physical Therapy Goal    Goal Priority Disciplines Outcome Goal Variances Interventions   Physical Therapy Goal     PT, PT/OT Ongoing (interventions implemented as appropriate)     Description:  Goals to be met by: 2019    Patient will increase functional independence with mobility by performin. Supine to sit with Contact Guard Assistance - not met  2. Sit to stand transfer with Supervision - not met  3. Gait  x 200 feet with Supervision - not met  4. Stand for 3 minutes with Supervision to increase activity tolerance - not met                      History:     Past  Medical History:   Diagnosis Date    CHF (congestive heart failure)     Coronary artery disease     Diabetes mellitus     Hypertension        Past Surgical History:   Procedure Laterality Date    INSERTION, ICD, BIVENTRICULAR Left 7/18/2019    Performed by Arturo Bay MD at [facility] EP LAB    Insertion, Pacemaker, Temporary Transvenous N/A 7/9/2019    Performed by Dejuan Cody MD at [facility] CATH LAB/EP       Time Tracking:     PT Received On: 07/19/19  PT Start Time: 0821     PT Stop Time: 0836  PT Total Time (min): 15 min     Billable Minutes: Evaluation 15    Shannen Echeverria, PT, DPT  7/19/2019  [phone]

## 2019-07-19 NOTE — PHYSICIAN QUERY
PT Name: Houston Jc  MR #: [id number]     PHYSICIAN QUERY -  ELECTROLYTE CLARIFICATION      CDS/: Iza Pham               Contact information:  This form is a permanent document in the medical record.     Query Date: July 19, 2019    By submitting this query, we are merely seeking further clarification of documentation to reflect the severity of illness of your patient. Please utilize your independent clinical judgment when addressing the question(s) below.    The Medical record reflects the following:     Indicators   Supporting Clinical Findings Location in Medical Record   x Lab Value(s) Phosphorus  2.5-> 2.6-> 3.9    Magnesium 1.7-> 1.5 Lab 7/17, 7/18, 7/19    Lab 7/18, 7/19     x Treatment                                 Medication Potassium, sodium phosphates 2 packets oral     Magnesium sulfate IVPB  MAR 7/18    MAR 7/19    Other       Doctor, please specify the diagnosis or diagnoses that correspond(s) to the above indicators.     Joey all that apply:    [  xxxx ] Hypomagnesemia   [  xxx ] Hypophosphatemia   [   ] Other electrolyte disturbance (please specify): _______   [   ]  Clinically Undetermined       Please document in your progress notes daily for the duration of treatment until resolved, and include in your discharge summary.

## 2019-07-19 NOTE — PROGRESS NOTES
[facility] [address]  Cardiology  Progress Note    Patient Name: Houston Jc  MRN: [id number]  Admission Date: 7/17/2019  Hospital Length of Stay: 2 days  Code Status: Full Code   Attending Physician: Juan Licea MD   Primary Care Physician: Zak Hamilton MD  Expected Discharge Date: 7/19/2019  Principal Problem:Complete heart block    Subjective:     Hospital Course:   No notes on file    Interval History: NAEO. Patient sit bedside in chair comfortably this morning without complaints. Pain well controlled. Denies N/V, CP, SOB. States he has been having 1-2 loose stools per day starting prior to admission. Denies watery, bloody, mucousy, and black/light tan colored stools.     Review of Systems   Constitution: Negative for chills, decreased appetite and fever.   Cardiovascular: Negative for chest pain.   Respiratory: Negative for shortness of breath.    Gastrointestinal: Positive for diarrhea.   Neurological: Negative for weakness.   Psychiatric/Behavioral: Negative for altered mental status.     Objective:     Vital Signs (Most Recent):  Temp: 98.8 °F (37.1 °C) (07/19/19 1500)  Pulse: 60 (07/19/19 1500)  Resp: (!) 22 (07/19/19 1500)  BP: (!) 149/68 (07/19/19 1500)  SpO2: 98 % (07/19/19 1500) Vital Signs (24h Range):  Temp:  [97.5 °F (36.4 °C)-98.8 °F (37.1 °C)] 98.8 °F (37.1 °C)  Pulse:  [60-65] 60  Resp:  [15-37] 22  SpO2:  [94 %-100 %] 98 %  BP: (124-165)/(56-71) 149/68     Weight: (!) 150.9 kg (332 lb 10.8 oz)  Body mass index is 43.89 kg/m².     SpO2: 98 %  O2 Device (Oxygen Therapy): room air      Intake/Output Summary (Last 24 hours) at 7/19/2019 1522  Last data filed at 7/19/2019 1500  Gross per 24 hour   Intake 635 ml   Output 1125 ml   Net -490 ml       Lines/Drains/Airways     Peripheral Intravenous Line                 Peripheral IV - Single Lumen 07/18/19 1031 20 G Distal;Left;Posterior Wrist 1 day                Physical Exam   Constitutional: He is oriented to person, place, and  time. He appears well-developed and well-nourished. No distress.   HENT:   Head: Normocephalic and atraumatic.   Eyes: EOM are normal.   Neck: Normal range of motion. No JVD present.   Cardiovascular: Normal rate and regular rhythm.   Pulmonary/Chest: Effort normal and breath sounds normal. No respiratory distress. He has no wheezes. He has no rales.   Abdominal: Soft. Bowel sounds are normal. He exhibits distension. There is no tenderness. There is no guarding.   Neurological: He is alert and oriented to person, place, and time.   Skin: Skin is warm and dry. He is not diaphoretic.   Psychiatric: He has a normal mood and affect. His behavior is normal. Judgment and thought content normal.   Vitals reviewed.      Significant Labs: All pertinent lab results from the last 24 hours have been reviewed.  Recent Labs   Lab 07/17/19  2204 07/18/19  0219 07/19/19  0341   WBC 6.03 7.77 6.94   HGB 11.3* 11.4* 10.8*   HCT 36.3* 36.0* 35.7*    162 150   MCV 98 96 99*   RDW 14.8* 14.9* 14.9*    139 138   K 3.9 3.9 4.0    109 108   CO2 18* 22* 19*   BUN 36* 34* 35*   CREATININE 1.9* 1.8* 2.4*   * 196* 161*   PROT 5.6* 5.7* 5.5*   ALBUMIN 2.7* 2.7* 2.7*   BILITOT 0.5 0.5 0.3   AST 56* 59* 72*   ALKPHOS 151* 156* 160*   ALT 54* 58* 65*       Significant Imaging: Reviewed   07/18/2019 CXR: Minimal atelectasis.  No edema or pneumothorax.  New cardiac pacing device.    Assessment and Plan:     * Complete heart block  H/o VT - continue amiodarone 400 mg BID  - BiV ICD placed 7/18  - Remains stable on tele monitoring  - Will continue to monitor electrolytes, replace to keep K > 4, Mg > 2      Status post biventricular cardiac pacemaker insertion  Post-op Day 1  -Rhythmia control   - amiodarone tablet 400 mg 400 mg, Oral, 2 times daily  - Abx prophylaxis (total of 5 days):   - cephALEXin capsule 500 mg 500 mg, Oral, Every 8 hours (Day 1/5)   - neomycin-bacitracin-polymyxin ointment Topical (Top), Daily         Gout  - Continue home allopurinol  - Holding Colchicine    Debility  - Fall precautions in place  - PT/OT evaluated and recommending D/C to home with home health      Acute systolic heart failure  Ischemic cardiomyopathy  - h/o  EF 30% with grade 3 diastolic dysfunction on prior ECHO  - strict in & out  - daily weights  - furosemide injection 40 mg 40 mg, Intravenous, 2 times daily    CLEMENCIA (acute kidney injury)  Patient with CLEMENCIA likely d/t IVVD vs decreased renal blood flow  Which is currently stable. Labs reviewed- BMP with Estimated Creatinine Clearance: 43.2 mL/min (A) (based on SCr of 2.4 mg/dL (H)). according to latest data. Monitor UOP and serial BMP and adjust therapy as needed. Avoid nephrotoxins and renally dose meds for GFR listed above.      Sleep apnea  - Will likely need polysomnogram and CPAP at home.  - Instructed to follow-up with PCP        VTE Risk Mitigation (From admission, onward)        Ordered     Place sequential compression device  Until discontinued      07/17/19 2105     IP VTE HIGH RISK PATIENT  Once      07/17/19 2105          Will Calabrese MD  Cardiology  [facility]

## 2019-07-20 PROBLEM — R74.8 ABNORMAL TRANSAMINASES: Status: ACTIVE | Noted: 2019-07-20

## 2019-07-20 LAB
ALBUMIN SERPL BCP-MCNC: 3 G/DL (ref 3.5–5.2)
ALP SERPL-CCNC: 201 U/L (ref 55–135)
ALT SERPL W/O P-5'-P-CCNC: 69 U/L (ref 10–44)
ANION GAP SERPL CALC-SCNC: 12 MMOL/L (ref 8–16)
AST SERPL-CCNC: 110 U/L (ref 10–40)
BASOPHILS # BLD AUTO: 0.01 K/UL (ref 0–0.2)
BASOPHILS NFR BLD: 0.1 % (ref 0–1.9)
BILIRUB SERPL-MCNC: 0.5 MG/DL (ref 0.1–1)
BILIRUB UR QL STRIP: NEGATIVE
BUN SERPL-MCNC: 37 MG/DL (ref 8–23)
CALCIUM SERPL-MCNC: 8.8 MG/DL (ref 8.7–10.5)
CHLORIDE SERPL-SCNC: 106 MMOL/L (ref 95–110)
CLARITY UR REFRACT.AUTO: CLEAR
CO2 SERPL-SCNC: 22 MMOL/L (ref 23–29)
COLOR UR AUTO: YELLOW
CREAT SERPL-MCNC: 2.4 MG/DL (ref 0.5–1.4)
DIFFERENTIAL METHOD: ABNORMAL
EOSINOPHIL # BLD AUTO: 0.1 K/UL (ref 0–0.5)
EOSINOPHIL NFR BLD: 1.1 % (ref 0–8)
ERYTHROCYTE [DISTWIDTH] IN BLOOD BY AUTOMATED COUNT: 14.8 % (ref 11.5–14.5)
EST. GFR  (AFRICAN AMERICAN): 30.2 ML/MIN/1.73 M^2
EST. GFR  (NON AFRICAN AMERICAN): 26.2 ML/MIN/1.73 M^2
GLUCOSE SERPL-MCNC: 170 MG/DL (ref 70–110)
GLUCOSE UR QL STRIP: NEGATIVE
HCT VFR BLD AUTO: 37.2 % (ref 40–54)
HGB BLD-MCNC: 11.6 G/DL (ref 14–18)
HGB UR QL STRIP: ABNORMAL
HYALINE CASTS UR QL AUTO: 1 /LPF
IMM GRANULOCYTES # BLD AUTO: 0.03 K/UL (ref 0–0.04)
IMM GRANULOCYTES NFR BLD AUTO: 0.4 % (ref 0–0.5)
KETONES UR QL STRIP: NEGATIVE
LEUKOCYTE ESTERASE UR QL STRIP: NEGATIVE
LYMPHOCYTES # BLD AUTO: 1.6 K/UL (ref 1–4.8)
LYMPHOCYTES NFR BLD: 19.4 % (ref 18–48)
MAGNESIUM SERPL-MCNC: 1.7 MG/DL (ref 1.6–2.6)
MCH RBC QN AUTO: 30.1 PG (ref 27–31)
MCHC RBC AUTO-ENTMCNC: 31.2 G/DL (ref 32–36)
MCV RBC AUTO: 96 FL (ref 82–98)
MICROSCOPIC COMMENT: NORMAL
MONOCYTES # BLD AUTO: 0.7 K/UL (ref 0.3–1)
MONOCYTES NFR BLD: 8.8 % (ref 4–15)
NEUTROPHILS # BLD AUTO: 5.7 K/UL (ref 1.8–7.7)
NEUTROPHILS NFR BLD: 70.2 % (ref 38–73)
NITRITE UR QL STRIP: NEGATIVE
NRBC BLD-RTO: 0 /100 WBC
PH UR STRIP: 5 [PH] (ref 5–8)
PHOSPHATE SERPL-MCNC: 3.5 MG/DL (ref 2.7–4.5)
PLATELET # BLD AUTO: 179 K/UL (ref 150–350)
PMV BLD AUTO: 12.7 FL (ref 9.2–12.9)
POCT GLUCOSE: 184 MG/DL (ref 70–110)
POCT GLUCOSE: 192 MG/DL (ref 70–110)
POCT GLUCOSE: 241 MG/DL (ref 70–110)
POCT GLUCOSE: 256 MG/DL (ref 70–110)
POTASSIUM SERPL-SCNC: 3.6 MMOL/L (ref 3.5–5.1)
PROT SERPL-MCNC: 6.2 G/DL (ref 6–8.4)
PROT UR QL STRIP: NEGATIVE
RBC # BLD AUTO: 3.86 M/UL (ref 4.6–6.2)
RBC #/AREA URNS AUTO: 1 /HPF (ref 0–4)
SODIUM SERPL-SCNC: 140 MMOL/L (ref 136–145)
SP GR UR STRIP: 1.02 (ref 1–1.03)
SQUAMOUS #/AREA URNS AUTO: 1 /HPF
URN SPEC COLLECT METH UR: ABNORMAL
WBC # BLD AUTO: 8.06 K/UL (ref 3.9–12.7)
WBC #/AREA URNS AUTO: 2 /HPF (ref 0–5)

## 2019-07-20 PROCEDURE — 84100 ASSAY OF PHOSPHORUS: CPT

## 2019-07-20 PROCEDURE — A4216 STERILE WATER/SALINE, 10 ML: HCPCS | Performed by: STUDENT IN AN ORGANIZED HEALTH CARE EDUCATION/TRAINING PROGRAM

## 2019-07-20 PROCEDURE — 25000003 PHARM REV CODE 250: Performed by: STUDENT IN AN ORGANIZED HEALTH CARE EDUCATION/TRAINING PROGRAM

## 2019-07-20 PROCEDURE — 81001 URINALYSIS AUTO W/SCOPE: CPT

## 2019-07-20 PROCEDURE — 20600001 HC STEP DOWN PRIVATE ROOM

## 2019-07-20 PROCEDURE — 80053 COMPREHEN METABOLIC PANEL: CPT

## 2019-07-20 PROCEDURE — 99233 PR SUBSEQUENT HOSPITAL CARE,LEVL III: ICD-10-PCS | Mod: GC,,, | Performed by: INTERNAL MEDICINE

## 2019-07-20 PROCEDURE — 99233 SBSQ HOSP IP/OBS HIGH 50: CPT | Mod: GC,,, | Performed by: INTERNAL MEDICINE

## 2019-07-20 PROCEDURE — 63600175 PHARM REV CODE 636 W HCPCS: Performed by: STUDENT IN AN ORGANIZED HEALTH CARE EDUCATION/TRAINING PROGRAM

## 2019-07-20 PROCEDURE — 85025 COMPLETE CBC W/AUTO DIFF WBC: CPT

## 2019-07-20 PROCEDURE — 36415 COLL VENOUS BLD VENIPUNCTURE: CPT

## 2019-07-20 PROCEDURE — 83735 ASSAY OF MAGNESIUM: CPT

## 2019-07-20 PROCEDURE — 25000003 PHARM REV CODE 250: Performed by: NURSE PRACTITIONER

## 2019-07-20 RX ORDER — FUROSEMIDE 10 MG/ML
INJECTION INTRAMUSCULAR; INTRAVENOUS
Status: DISPENSED
Start: 2019-07-20 | End: 2019-07-21

## 2019-07-20 RX ORDER — POTASSIUM CHLORIDE 20 MEQ/1
40 TABLET, EXTENDED RELEASE ORAL ONCE
Status: COMPLETED | OUTPATIENT
Start: 2019-07-20 | End: 2019-07-20

## 2019-07-20 RX ORDER — MAGNESIUM SULFATE HEPTAHYDRATE 40 MG/ML
2 INJECTION, SOLUTION INTRAVENOUS ONCE
Status: COMPLETED | OUTPATIENT
Start: 2019-07-20 | End: 2019-07-20

## 2019-07-20 RX ADMIN — MAGNESIUM SULFATE IN WATER 2 G: 40 INJECTION, SOLUTION INTRAVENOUS at 09:07

## 2019-07-20 RX ADMIN — INSULIN ASPART 1 UNITS: 100 INJECTION, SOLUTION INTRAVENOUS; SUBCUTANEOUS at 09:07

## 2019-07-20 RX ADMIN — CEPHALEXIN 500 MG: 500 CAPSULE ORAL at 06:07

## 2019-07-20 RX ADMIN — CEPHALEXIN 500 MG: 500 CAPSULE ORAL at 03:07

## 2019-07-20 RX ADMIN — AMIODARONE HYDROCHLORIDE 400 MG: 200 TABLET ORAL at 09:07

## 2019-07-20 RX ADMIN — ATORVASTATIN CALCIUM 80 MG: 20 TABLET, FILM COATED ORAL at 09:07

## 2019-07-20 RX ADMIN — ASPIRIN 81 MG: 81 TABLET, COATED ORAL at 09:07

## 2019-07-20 RX ADMIN — Medication 3 ML: at 01:07

## 2019-07-20 RX ADMIN — LOSARTAN POTASSIUM 25 MG: 25 TABLET, FILM COATED ORAL at 09:07

## 2019-07-20 RX ADMIN — INSULIN ASPART 2 UNITS: 100 INJECTION, SOLUTION INTRAVENOUS; SUBCUTANEOUS at 03:07

## 2019-07-20 RX ADMIN — POTASSIUM CHLORIDE 40 MEQ: 1500 TABLET, EXTENDED RELEASE ORAL at 09:07

## 2019-07-20 RX ADMIN — ALLOPURINOL 300 MG: 300 TABLET ORAL at 09:07

## 2019-07-20 RX ADMIN — CEPHALEXIN 500 MG: 500 CAPSULE ORAL at 09:07

## 2019-07-20 RX ADMIN — Medication 3 ML: at 09:07

## 2019-07-20 NOTE — PROGRESS NOTES
[facility]  Cardiology  Progress Note    Patient Name: Houston Jc  MRN: [id number]  Admission Date: 7/17/2019  Hospital Length of Stay: 3 days  Code Status: Full Code   Attending Physician: Juan Licea MD   Primary Care Physician: Zak Hamilton MD  Expected Discharge Date: 7/19/2019  Principal Problem:Complete heart block    Subjective:     Hospital Course:   7/19: Discharge held for worsening CLEMENCIA      Interval History: NAEO. Patient without complaints this morning. Desires to go home, but after speaking with him, he understands why his discharge was held up. Denies CP, SOB, N/V, still endorses the same 1-2 loose stools daily.      Review of Systems   Constitution: Negative for chills, decreased appetite and fever.   Cardiovascular: Negative for chest pain.   Respiratory: Negative for shortness of breath.    Gastrointestinal: Positive for diarrhea.   Neurological: Negative for weakness.   Psychiatric/Behavioral: Negative for altered mental status.     Objective:     Vital Signs (Most Recent):  Temp: 97.9 °F (36.6 °C) (07/20/19 1200)  Pulse: 65 (07/20/19 1200)  Resp: 20 (07/20/19 0756)  BP: (!) 150/67 (07/20/19 1200)  SpO2: (!) 93 % (07/20/19 1200) Vital Signs (24h Range):  Temp:  [97.6 °F (36.4 °C)-98.8 °F (37.1 °C)] 97.9 °F (36.6 °C)  Pulse:  [60-76] 65  Resp:  [16-26] 20  SpO2:  [93 %-99 %] 93 %  BP: (145-172)/(65-76) 150/67     Weight: (!) 150.9 kg (332 lb 10.8 oz)  Body mass index is 43.89 kg/m².     SpO2: (!) 93 %  O2 Device (Oxygen Therapy): room air      Intake/Output Summary (Last 24 hours) at 7/20/2019 1255  Last data filed at 7/20/2019 0631  Gross per 24 hour   Intake 15 ml   Output 500 ml   Net -485 ml       Lines/Drains/Airways     Peripheral Intravenous Line                 Peripheral IV - Single Lumen 07/18/19 1031 20 G Distal;Left;Posterior Wrist 2 days                Physical Exam   Constitutional: He is oriented to person, place, and time. He appears well-developed  and well-nourished. No distress.   HENT:   Head: Normocephalic and atraumatic.   Eyes: EOM are normal.   Neck: Normal range of motion. No JVD present.   Cardiovascular: Normal rate and regular rhythm.   Pulmonary/Chest: Effort normal and breath sounds normal. No respiratory distress. He has no wheezes. He has no rales.   Abdominal: Soft. Bowel sounds are normal. He exhibits distension. There is no tenderness. There is no guarding.   Neurological: He is alert and oriented to person, place, and time.   Skin: Skin is warm and dry. He is not diaphoretic.   Psychiatric: He has a normal mood and affect. His behavior is normal. Judgment and thought content normal.   Vitals reviewed.      Significant Labs: All pertinent lab results from the last 24 hours have been reviewed.    Significant Imaging: Reviewed    Assessment and Plan:     * Complete heart block  H/o VT - continue amiodarone 400 mg BID  - BiV ICD placed 7/18  - Remains stable  - Will continue to monitor electrolytes, replace to keep K > 4, Mg > 2      Abnormal transaminases  Likely transient and 2/2 IVVD.   -Limited echo of IVC ordered to assess volume status  -Holding statin  -Will continue to monitor    Status post biventricular cardiac pacemaker insertion  Post-op Day 2  -Rhythmia control   - amiodarone tablet 400 mg 400 mg, Oral, 2 times daily  - Abx prophylaxis (total of 5 days):   - cephALEXin capsule 500 mg 500 mg, Oral, Every 8 hours (Day 1/5)   - neomycin-bacitracin-polymyxin ointment Topical (Top), Daily        Gout  - Continue home allopurinol  - Holding Colchicine    Debility  - Fall precautions in place  - PT/OT evaluated and recommending D/C to home with home health      Acute systolic heart failure  Ischemic cardiomyopathy  - h/o  EF 30% with grade 3 diastolic dysfunction on prior ECHO  - strict in & out  - daily weights      CLEMENICA (acute kidney injury)  Patient with CLEMENCIA likely d/t IVVD vs decreased renal blood flow.  Which is currently stable. Labs  reviewed- BMP with Estimated Creatinine Clearance: 43.2 mL/min (A) (based on SCr of 2.4 mg/dL (H)). according to latest data.   -Holding Losartan and Lasix  -Urine work-up suggest pre-renal cause  -Ordered IVC echo to assess CVP/volume status  -Monitor UOP and serial BMP and adjust therapy as needed.   -Avoid nephrotoxins and renally dose meds for GFR listed above.      Sleep apnea  - Will likely need polysomnogram and CPAP at home.  - Instructed to follow-up with PCP        VTE Risk Mitigation (From admission, onward)        Ordered     Place sequential compression device  Until discontinued      07/17/19 2105     IP VTE HIGH RISK PATIENT  Once      07/17/19 2105          Will Calabrese MD  Cardiology  [facility]

## 2019-07-20 NOTE — PLAN OF CARE
Problem: Adult Inpatient Plan of Care  Goal: Plan of Care Review  Outcome: Ongoing (interventions implemented as appropriate)  PT remained free from falls/trauma/injuries. No complaints of CP/SOB/discomfort. Sling to remain in place until later today from BiV pacemaker placed 7/18. Scrotal swelling noted; assistance urinating provided. BGC every 4 hours in place; no insulin needed. Being diuresed with lasix ivp; diuresing well. Retroperitoneal US completed 7/19; awaiting results. Possible D/C soon home with HH. Monitoring kidney function; BUN 36 and Cr 2.5. PRN Hydralazine for SBP >160. VSS. Fall bundle in place. POC explained, no questions at this time. Pt tolerating care.

## 2019-07-20 NOTE — ASSESSMENT & PLAN NOTE
H/o VT - continue amiodarone 400 mg BID  - BiV ICD placed 7/18  - Remains stable  - Will continue to monitor electrolytes, replace to keep K > 4, Mg > 2

## 2019-07-20 NOTE — ASSESSMENT & PLAN NOTE
Ischemic cardiomyopathy  - h/o  EF 30% with grade 3 diastolic dysfunction on prior ECHO  - strict in & out  - daily weights

## 2019-07-20 NOTE — SUBJECTIVE & OBJECTIVE
Interval History: NAEO. Patient without complaints this morning. Desires to go home, but after speaking with him, he understands why his discharge was held up. Denies CP, SOB, N/V, still endorses the same 1-2 loose stools daily.      Review of Systems   Constitution: Negative for chills, decreased appetite and fever.   Cardiovascular: Negative for chest pain.   Respiratory: Negative for shortness of breath.    Gastrointestinal: Positive for diarrhea.   Neurological: Negative for weakness.   Psychiatric/Behavioral: Negative for altered mental status.     Objective:     Vital Signs (Most Recent):  Temp: 97.9 °F (36.6 °C) (07/20/19 1200)  Pulse: 65 (07/20/19 1200)  Resp: 20 (07/20/19 0756)  BP: (!) 150/67 (07/20/19 1200)  SpO2: (!) 93 % (07/20/19 1200) Vital Signs (24h Range):  Temp:  [97.6 °F (36.4 °C)-98.8 °F (37.1 °C)] 97.9 °F (36.6 °C)  Pulse:  [60-76] 65  Resp:  [16-26] 20  SpO2:  [93 %-99 %] 93 %  BP: (145-172)/(65-76) 150/67     Weight: (!) 150.9 kg (332 lb 10.8 oz)  Body mass index is 43.89 kg/m².     SpO2: (!) 93 %  O2 Device (Oxygen Therapy): room air      Intake/Output Summary (Last 24 hours) at 7/20/2019 1255  Last data filed at 7/20/2019 0631  Gross per 24 hour   Intake 15 ml   Output 500 ml   Net -485 ml       Lines/Drains/Airways     Peripheral Intravenous Line                 Peripheral IV - Single Lumen 07/18/19 1031 20 G Distal;Left;Posterior Wrist 2 days                Physical Exam   Constitutional: He is oriented to person, place, and time. He appears well-developed and well-nourished. No distress.   HENT:   Head: Normocephalic and atraumatic.   Eyes: EOM are normal.   Neck: Normal range of motion. No JVD present.   Cardiovascular: Normal rate and regular rhythm.   Pulmonary/Chest: Effort normal and breath sounds normal. No respiratory distress. He has no wheezes. He has no rales.   Abdominal: Soft. Bowel sounds are normal. He exhibits distension. There is no tenderness. There is no guarding.    Neurological: He is alert and oriented to person, place, and time.   Skin: Skin is warm and dry. He is not diaphoretic.   Psychiatric: He has a normal mood and affect. His behavior is normal. Judgment and thought content normal.   Vitals reviewed.      Significant Labs: All pertinent lab results from the last 24 hours have been reviewed.    Significant Imaging: Reviewed

## 2019-07-20 NOTE — NURSING TRANSFER
Nursing Transfer Note      7/19/2019     Transfer To: Ultrasound    Transfer via stretcher    Transfer with cardiac monitoring    Transported by Joey Carter sent: none    Fall risk and Limb alert placed.

## 2019-07-20 NOTE — ASSESSMENT & PLAN NOTE
Patient with CLEMENCIA likely d/t IVVD vs decreased renal blood flow.  Which is currently stable. Labs reviewed- BMP with Estimated Creatinine Clearance: 43.2 mL/min (A) (based on SCr of 2.4 mg/dL (H)). according to latest data.   -Holding Losartan and Lasix  -Urine work-up suggest pre-renal cause  -Ordered IVC echo to assess CVP/volume status  -Monitor UOP and serial BMP and adjust therapy as needed.   -Avoid nephrotoxins and renally dose meds for GFR listed above.

## 2019-07-20 NOTE — ASSESSMENT & PLAN NOTE
Post-op Day 2  -Rhythmia control   - amiodarone tablet 400 mg 400 mg, Oral, 2 times daily  - Abx prophylaxis (total of 5 days):   - cephALEXin capsule 500 mg 500 mg, Oral, Every 8 hours (Day 1/5)   - neomycin-bacitracin-polymyxin ointment Topical (Top), Daily

## 2019-07-20 NOTE — ASSESSMENT & PLAN NOTE
Likely transient and 2/2 IVVD.   -Limited echo of IVC ordered to assess volume status  -Holding statin  -Will continue to monitor

## 2019-07-20 NOTE — HOSPITAL COURSE
Patient admitted to CCU for monitoring with TVP placed pending ICD placement. Patient s/p Dual chamber ICD ([employer]) on 7/18/19, healing well. Presented with Cr 1.8, however trended up to 2.4 on 7/19/19. Renal U/S notable for bilateral CKD, FEUrea showing prerenal etiology, hyaline casts in urine. Diureses started on 7/21/19 as patient overloaded and possibly cardiorenal syndrome.

## 2019-07-21 PROBLEM — I13.10 CARDIORENAL SYNDROME: Status: ACTIVE | Noted: 2019-07-11

## 2019-07-21 LAB
ALBUMIN SERPL BCP-MCNC: 2.8 G/DL (ref 3.5–5.2)
ALP SERPL-CCNC: 198 U/L (ref 55–135)
ALT SERPL W/O P-5'-P-CCNC: 76 U/L (ref 10–44)
ANION GAP SERPL CALC-SCNC: 10 MMOL/L (ref 8–16)
ANION GAP SERPL CALC-SCNC: 11 MMOL/L (ref 8–16)
ASCENDING AORTA: 3.65 CM
AST SERPL-CCNC: 104 U/L (ref 10–40)
AV INDEX (PROSTH): 0.72
AV MEAN GRADIENT: 4 MMHG
AV PEAK GRADIENT: 6 MMHG
AV VALVE AREA: 2.9 CM2
AV VELOCITY RATIO: 0.71
BASOPHILS # BLD AUTO: 0.02 K/UL (ref 0–0.2)
BASOPHILS NFR BLD: 0.3 % (ref 0–1.9)
BILIRUB SERPL-MCNC: 0.5 MG/DL (ref 0.1–1)
BSA FOR ECHO PROCEDURE: 2.79 M2
BUN SERPL-MCNC: 37 MG/DL (ref 8–23)
BUN SERPL-MCNC: 37 MG/DL (ref 8–23)
CALCIUM SERPL-MCNC: 8.8 MG/DL (ref 8.7–10.5)
CALCIUM SERPL-MCNC: 8.9 MG/DL (ref 8.7–10.5)
CHLORIDE SERPL-SCNC: 106 MMOL/L (ref 95–110)
CHLORIDE SERPL-SCNC: 107 MMOL/L (ref 95–110)
CO2 SERPL-SCNC: 20 MMOL/L (ref 23–29)
CO2 SERPL-SCNC: 21 MMOL/L (ref 23–29)
CREAT SERPL-MCNC: 2.3 MG/DL (ref 0.5–1.4)
CREAT SERPL-MCNC: 2.3 MG/DL (ref 0.5–1.4)
CV ECHO LV RWT: 0.36 CM
DIFFERENTIAL METHOD: ABNORMAL
DOP CALC AO PEAK VEL: 1.18 M/S
DOP CALC AO VTI: 25.46 CM
DOP CALC LVOT AREA: 4 CM2
DOP CALC LVOT DIAMETER: 2.27 CM
DOP CALC LVOT PEAK VEL: 0.84 M/S
DOP CALC LVOT STROKE VOLUME: 73.82 CM3
DOP CALCLVOT PEAK VEL VTI: 18.25 CM
E WAVE DECELERATION TIME: 173.33 MSEC
E/A RATIO: 1.42
E/E' RATIO: 10.57 M/S
ECHO LV POSTERIOR WALL: 1.25 CM (ref 0.6–1.1)
EOSINOPHIL # BLD AUTO: 0.2 K/UL (ref 0–0.5)
EOSINOPHIL NFR BLD: 2.6 % (ref 0–8)
ERYTHROCYTE [DISTWIDTH] IN BLOOD BY AUTOMATED COUNT: 14.8 % (ref 11.5–14.5)
EST. GFR  (AFRICAN AMERICAN): 31.8 ML/MIN/1.73 M^2
EST. GFR  (AFRICAN AMERICAN): 31.8 ML/MIN/1.73 M^2
EST. GFR  (NON AFRICAN AMERICAN): 27.5 ML/MIN/1.73 M^2
EST. GFR  (NON AFRICAN AMERICAN): 27.5 ML/MIN/1.73 M^2
FRACTIONAL SHORTENING: 14 % (ref 28–44)
GLUCOSE SERPL-MCNC: 201 MG/DL (ref 70–110)
GLUCOSE SERPL-MCNC: 277 MG/DL (ref 70–110)
HCT VFR BLD AUTO: 35 % (ref 40–54)
HGB BLD-MCNC: 11.4 G/DL (ref 14–18)
IMM GRANULOCYTES # BLD AUTO: 0.04 K/UL (ref 0–0.04)
IMM GRANULOCYTES NFR BLD AUTO: 0.6 % (ref 0–0.5)
INTERVENTRICULAR SEPTUM: 1.27 CM (ref 0.6–1.1)
IVRT: 0.11 MSEC
LA MAJOR: 7.33 CM
LA MINOR: 7.36 CM
LA WIDTH: 5.56 CM
LEFT ATRIUM SIZE: 5.09 CM
LEFT ATRIUM VOLUME INDEX: 66.5 ML/M2
LEFT ATRIUM VOLUME: 176.69 CM3
LEFT INTERNAL DIMENSION IN SYSTOLE: 6 CM (ref 2.1–4)
LEFT VENTRICLE DIASTOLIC VOLUME INDEX: 71.69 ML/M2
LEFT VENTRICLE DIASTOLIC VOLUME: 190.35 ML
LEFT VENTRICLE MASS INDEX: 163 G/M2
LEFT VENTRICLE SYSTOLIC VOLUME INDEX: 34.5 ML/M2
LEFT VENTRICLE SYSTOLIC VOLUME: 91.48 ML
LEFT VENTRICULAR INTERNAL DIMENSION IN DIASTOLE: 7 CM (ref 3.5–6)
LEFT VENTRICULAR MASS: 433.07 G
LV LATERAL E/E' RATIO: 8.22 M/S
LV SEPTAL E/E' RATIO: 14.8 M/S
LYMPHOCYTES # BLD AUTO: 1.3 K/UL (ref 1–4.8)
LYMPHOCYTES NFR BLD: 18.9 % (ref 18–48)
MAGNESIUM SERPL-MCNC: 2.1 MG/DL (ref 1.6–2.6)
MCH RBC QN AUTO: 30.7 PG (ref 27–31)
MCHC RBC AUTO-ENTMCNC: 32.6 G/DL (ref 32–36)
MCV RBC AUTO: 94 FL (ref 82–98)
MONOCYTES # BLD AUTO: 0.8 K/UL (ref 0.3–1)
MONOCYTES NFR BLD: 11.3 % (ref 4–15)
MV PEAK A VEL: 0.52 M/S
MV PEAK E VEL: 0.74 M/S
NEUTROPHILS # BLD AUTO: 4.6 K/UL (ref 1.8–7.7)
NEUTROPHILS NFR BLD: 66.3 % (ref 38–73)
NRBC BLD-RTO: 0 /100 WBC
PHOSPHATE SERPL-MCNC: 3 MG/DL (ref 2.7–4.5)
PISA TR MAX VEL: 3.42 M/S
PLATELET # BLD AUTO: 161 K/UL (ref 150–350)
PMV BLD AUTO: 12.9 FL (ref 9.2–12.9)
POCT GLUCOSE: 202 MG/DL (ref 70–110)
POCT GLUCOSE: 224 MG/DL (ref 70–110)
POCT GLUCOSE: 234 MG/DL (ref 70–110)
POCT GLUCOSE: 252 MG/DL (ref 70–110)
POCT GLUCOSE: 260 MG/DL (ref 70–110)
POTASSIUM SERPL-SCNC: 3.7 MMOL/L (ref 3.5–5.1)
POTASSIUM SERPL-SCNC: 4 MMOL/L (ref 3.5–5.1)
PROT SERPL-MCNC: 6.1 G/DL (ref 6–8.4)
RA MAJOR: 6.32 CM
RA PRESSURE: 3 MMHG
RA WIDTH: 4.27 CM
RBC # BLD AUTO: 3.71 M/UL (ref 4.6–6.2)
RIGHT VENTRICULAR END-DIASTOLIC DIMENSION: 4.13 CM
RV TISSUE DOPPLER FREE WALL SYSTOLIC VELOCITY 1 (APICAL 4 CHAMBER VIEW): 10.12 CM/S
SINUS: 3.71 CM
SODIUM SERPL-SCNC: 137 MMOL/L (ref 136–145)
SODIUM SERPL-SCNC: 138 MMOL/L (ref 136–145)
STJ: 3.22 CM
TDI LATERAL: 0.09 M/S
TDI SEPTAL: 0.05 M/S
TDI: 0.07 M/S
TR MAX PG: 47 MMHG
TRICUSPID ANNULAR PLANE SYSTOLIC EXCURSION: 1.75 CM
TV REST PULMONARY ARTERY PRESSURE: 50 MMHG
WBC # BLD AUTO: 6.99 K/UL (ref 3.9–12.7)

## 2019-07-21 PROCEDURE — 25000003 PHARM REV CODE 250: Performed by: STUDENT IN AN ORGANIZED HEALTH CARE EDUCATION/TRAINING PROGRAM

## 2019-07-21 PROCEDURE — 20600001 HC STEP DOWN PRIVATE ROOM

## 2019-07-21 PROCEDURE — A4216 STERILE WATER/SALINE, 10 ML: HCPCS | Performed by: STUDENT IN AN ORGANIZED HEALTH CARE EDUCATION/TRAINING PROGRAM

## 2019-07-21 PROCEDURE — 63600175 PHARM REV CODE 636 W HCPCS: Performed by: INTERNAL MEDICINE

## 2019-07-21 PROCEDURE — 99233 PR SUBSEQUENT HOSPITAL CARE,LEVL III: ICD-10-PCS | Mod: GC,,, | Performed by: INTERNAL MEDICINE

## 2019-07-21 PROCEDURE — 84100 ASSAY OF PHOSPHORUS: CPT

## 2019-07-21 PROCEDURE — 85025 COMPLETE CBC W/AUTO DIFF WBC: CPT

## 2019-07-21 PROCEDURE — 25000003 PHARM REV CODE 250: Performed by: NURSE PRACTITIONER

## 2019-07-21 PROCEDURE — 83735 ASSAY OF MAGNESIUM: CPT

## 2019-07-21 PROCEDURE — 36415 COLL VENOUS BLD VENIPUNCTURE: CPT

## 2019-07-21 PROCEDURE — 80053 COMPREHEN METABOLIC PANEL: CPT

## 2019-07-21 PROCEDURE — 63600175 PHARM REV CODE 636 W HCPCS: Performed by: NURSE PRACTITIONER

## 2019-07-21 PROCEDURE — 80048 BASIC METABOLIC PNL TOTAL CA: CPT

## 2019-07-21 PROCEDURE — 99233 SBSQ HOSP IP/OBS HIGH 50: CPT | Mod: GC,,, | Performed by: INTERNAL MEDICINE

## 2019-07-21 RX ORDER — POTASSIUM CHLORIDE 20 MEQ/1
40 TABLET, EXTENDED RELEASE ORAL ONCE
Status: COMPLETED | OUTPATIENT
Start: 2019-07-21 | End: 2019-07-21

## 2019-07-21 RX ORDER — FUROSEMIDE 40 MG/1
40 TABLET ORAL DAILY
Status: DISCONTINUED | OUTPATIENT
Start: 2019-07-21 | End: 2019-07-21

## 2019-07-21 RX ORDER — FUROSEMIDE 10 MG/ML
40 INJECTION INTRAMUSCULAR; INTRAVENOUS 2 TIMES DAILY
Status: DISCONTINUED | OUTPATIENT
Start: 2019-07-21 | End: 2019-07-22

## 2019-07-21 RX ADMIN — FUROSEMIDE 40 MG: 10 INJECTION, SOLUTION INTRAMUSCULAR; INTRAVENOUS at 11:07

## 2019-07-21 RX ADMIN — INSULIN ASPART 1 UNITS: 100 INJECTION, SOLUTION INTRAVENOUS; SUBCUTANEOUS at 08:07

## 2019-07-21 RX ADMIN — INSULIN ASPART 3 UNITS: 100 INJECTION, SOLUTION INTRAVENOUS; SUBCUTANEOUS at 11:07

## 2019-07-21 RX ADMIN — HYDRALAZINE HYDROCHLORIDE 10 MG: 20 INJECTION INTRAMUSCULAR; INTRAVENOUS at 07:07

## 2019-07-21 RX ADMIN — INSULIN ASPART 2 UNITS: 100 INJECTION, SOLUTION INTRAVENOUS; SUBCUTANEOUS at 08:07

## 2019-07-21 RX ADMIN — INSULIN ASPART 2 UNITS: 100 INJECTION, SOLUTION INTRAVENOUS; SUBCUTANEOUS at 04:07

## 2019-07-21 RX ADMIN — CEPHALEXIN 500 MG: 500 CAPSULE ORAL at 06:07

## 2019-07-21 RX ADMIN — ASPIRIN 81 MG: 81 TABLET, COATED ORAL at 08:07

## 2019-07-21 RX ADMIN — FUROSEMIDE 40 MG: 10 INJECTION, SOLUTION INTRAMUSCULAR; INTRAVENOUS at 05:07

## 2019-07-21 RX ADMIN — CEPHALEXIN 500 MG: 500 CAPSULE ORAL at 08:07

## 2019-07-21 RX ADMIN — BACITRACIN ZINC NEOMYCIN SULFATE POLYMYXIN B SULFATE: 400; 3.5; 5 OINTMENT TOPICAL at 01:07

## 2019-07-21 RX ADMIN — INSULIN ASPART 2 UNITS: 100 INJECTION, SOLUTION INTRAVENOUS; SUBCUTANEOUS at 06:07

## 2019-07-21 RX ADMIN — AMIODARONE HYDROCHLORIDE 400 MG: 200 TABLET ORAL at 08:07

## 2019-07-21 RX ADMIN — POTASSIUM CHLORIDE 40 MEQ: 1500 TABLET, EXTENDED RELEASE ORAL at 04:07

## 2019-07-21 RX ADMIN — ALLOPURINOL 300 MG: 300 TABLET ORAL at 08:07

## 2019-07-21 RX ADMIN — Medication 3 ML: at 04:07

## 2019-07-21 RX ADMIN — CEPHALEXIN 500 MG: 500 CAPSULE ORAL at 01:07

## 2019-07-21 RX ADMIN — Medication 3 ML: at 06:07

## 2019-07-21 NOTE — NURSING
Removed left chest dressing, applied Triple Antibiotic Ointment to skin tear, and reapplied foam dressing.  No drainage, no odor noted.  Edges are approximated.

## 2019-07-21 NOTE — PROGRESS NOTES
07/21/19 0735   Vital Signs   Temp 98 °F (36.7 °C)   Temp src Oral   Pulse 61   Heart Rate Source Monitor   Resp 20   SpO2 (!) 94 %   Pulse Oximetry Type Intermittent   O2 Device (Oxygen Therapy) room air   BP (!) 186/80   MAP (mmHg) 115   BP Location Right arm   BP Method Automatic   Patient Position Lying   administered Hydralazine 10 mg IVP at 0754 hours per orders SBP > 160.  Patient is asymptomatic, denies chest pain and SOB.

## 2019-07-21 NOTE — ASSESSMENT & PLAN NOTE
Post-op Day 2  -Arhythmia control   - amiodarone tablet 400 mg 400 mg, Oral, 2 times daily  - Abx prophylaxis (total of 5 days):   - cephALEXin capsule 500 mg 500 mg, Oral, Every 8 hours (Day 1/5)   - neomycin-bacitracin-polymyxin ointment Topical (Top), Daily

## 2019-07-21 NOTE — ASSESSMENT & PLAN NOTE
H/o VT - continue amiodarone 400 mg BID  - BiV ICD placed 7/18  - Will continue to monitor electrolytes, replace to keep K > 4, Mg > 2  - Patient ok to d/c from EP perspective, follow up arranged in EP clinic  - Instruction given on weight and activity restrictions

## 2019-07-21 NOTE — ASSESSMENT & PLAN NOTE
Ischemic cardiomyopathy  - h/o  EF 30% with grade 3 diastolic dysfunction on prior ECHO  - strict in & out  - daily weights  - Lasix 40mg IV bid at this time

## 2019-07-21 NOTE — ASSESSMENT & PLAN NOTE
Likely transient and 2/2 IVVD vs. Congestive hepatopathy  -trending down, will diurese today and assess for improvement  -Holding statin  -Will continue to monitor  - Will consider RUQ U/S

## 2019-07-21 NOTE — SUBJECTIVE & OBJECTIVE
Interval History: No acute events overnight, patient breathing well on room air however with peripheral edema. No pain at pacer site, healing well. Good urine output, not other complaints at this time.    ROS  Objective:     Vital Signs (Most Recent):  Temp: 97.9 °F (36.6 °C) (07/21/19 1108)  Pulse: (ECHO at bedside) (07/21/19 1200)  Resp: 20 (07/21/19 1108)  BP: (!) 150/66 (07/21/19 1108)  SpO2: 95 % (07/21/19 1108) Vital Signs (24h Range):  Temp:  [97.9 °F (36.6 °C)-98.5 °F (36.9 °C)] 97.9 °F (36.6 °C)  Pulse:  [60-73] 60  Resp:  [20] 20  SpO2:  [91 %-95 %] 95 %  BP: (134-186)/(62-80) 150/66     Weight: (!) 149.1 kg (328 lb 9.6 oz)  Body mass index is 43.35 kg/m².     SpO2: 95 %  O2 Device (Oxygen Therapy): room air      Intake/Output Summary (Last 24 hours) at 7/21/2019 1318  Last data filed at 7/21/2019 1203  Gross per 24 hour   Intake 423 ml   Output 976 ml   Net -553 ml       Lines/Drains/Airways     Peripheral Intravenous Line                 Peripheral IV - Single Lumen 07/21/19 1241 22 G Anterior;Distal;Right Upper Arm less than 1 day                Physical Exam   Constitutional: He is oriented to person, place, and time. He appears well-developed and well-nourished. No distress.   HENT:   Head: Normocephalic and atraumatic.   Eyes: EOM are normal.   Neck: Normal range of motion. No JVD present.   Cardiovascular: Normal rate and regular rhythm.   Pulmonary/Chest: Effort normal and breath sounds normal. No respiratory distress. He has no wheezes. He has no rales.   Abdominal: Soft. Bowel sounds are normal. He exhibits no distension. There is no tenderness. There is no guarding.   Musculoskeletal: He exhibits edema (2+ pitting to the knee bilaterally).   Neurological: He is alert and oriented to person, place, and time.   Skin: Skin is warm and dry. He is not diaphoretic.   Psychiatric: He has a normal mood and affect. His behavior is normal. Judgment and thought content normal.   Vitals  reviewed.      Significant Labs:   CMP   Recent Labs   Lab 07/19/19  1640 07/20/19  0414 07/21/19  0407    140 137   K 3.8 3.6 4.0    106 106   CO2 24 22* 21*   * 170* 201*   BUN 36* 37* 37*   CREATININE 2.5* 2.4* 2.3*   CALCIUM 9.0 8.8 8.8   PROT  --  6.2 6.1   ALBUMIN  --  3.0* 2.8*   BILITOT  --  0.5 0.5   ALKPHOS  --  201* 198*   AST  --  110* 104*   ALT  --  69* 76*   ANIONGAP 7* 12 10   ESTGFRAFRICA 28.8* 30.2* 31.8*   EGFRNONAA 24.9* 26.2* 27.5*   , CBC   Recent Labs   Lab 07/20/19  0414 07/21/19  0407   WBC 8.06 6.99   HGB 11.6* 11.4*   HCT 37.2* 35.0*    161    and All pertinent lab results from the last 24 hours have been reviewed.    Significant Imaging: I have reviewed all pertinent imaging from the past 24 hours     EXAMINATION:  US RETROPERITONEAL COMPLETE    CLINICAL HISTORY:  paul;    TECHNIQUE:  Ultrasound of the kidneys and urinary bladder was performed including color flow and Doppler evaluation of the kidneys.    COMPARISON:  None.    FINDINGS:  Limited exam due to patient body habitus.    Right kidney: Normal in length, measuring 10.8 cm. Mild diffuse cortical thinning with increased cortical echogenicity and slight loss of corticomedullary distinction. Resistive index measures 0.86.  1.8 x 1 4 x 1.9 cyst in the upper pole.  No renal stone. No hydronephrosis.    Left kidney: Normal in length, measuring 10.3 cm. Mild diffuse cortical thinning with increased cortical echogenicity and slight loss of corticomedullary distinction. Resistive index measures 1.0.  No mass. No renal stone. No hydronephrosis.    The bladder is distended at the time of scanning and has an unremarkable appearance.      Impression       Bilateral chronic kidney disease without hydronephrosis.    Simple cyst in the right kidney.    Electronically signed by resident: Gregor Leroy  Date: 07/19/2019  Time: 19:30    Electronically signed by: Rodolfo Bliss MD  Date: 07/19/2019  Time: 19:50

## 2019-07-21 NOTE — PLAN OF CARE
Problem: Adult Inpatient Plan of Care  Goal: Plan of Care Review  Outcome: Ongoing (interventions implemented as appropriate)  Reviewed plan of care with patient.  Patient is alert, oriented x 4, assist x 1, and runs Paced on the monitor.  Cephalexin 500 mg oral tab q8h infectious prophylaxis.  Hydralazine 10 mg IVP SBP > 160, last given at 0754 hours.  BUN/Cr+ 37/2.3.  TTE, 2D in progress.  Blood glucose monitoring will be completed 4 times daily before meals and at bedtime.  Sling in place S/P Biv ICD implant.  Incision chest left transverse, open to air, secured dermabond.  Scrotal swelling noted.  Daily weights.  Patient has remained free of falls/trauma/injury by using appropriate lighting, nonskid socks, by keeping area free of debris, call light within reach, and frequent rounding of staff.  Patient and family verbalized understanding of all instructions.

## 2019-07-21 NOTE — PROGRESS NOTES
[facility]  Cardiology  Progress Note    Patient Name: Houston Jc  MRN: [id number]  Admission Date: 7/17/2019  Hospital Length of Stay: 4 days  Code Status: Full Code   Attending Physician: Juan Licea MD   Primary Care Physician: Zak Hamilton MD  Expected Discharge Date: 7/19/2019  Principal Problem:Complete heart block    Subjective:     Hospital Course:   Patient admitted to CCU for monitoring with TVP placed pending ICD placement. Patient s/p Dual chamber ICD ([employer]) on 7/18/19, healing well. Presented with Cr 1.8, however trended up to 2.4 on 7/19/19. Renal U/S notable for bilateral CKD, FEUrea showing prerenal etiology, hyaline casts in urine. Diureses started on 7/21/19 as patient overloaded and possibly cardiorenal syndrome.    Interval History: No acute events overnight, patient breathing well on room air however with peripheral edema. No pain at pacer site, healing well. Good urine output, not other complaints at this time.    ROS  Objective:     Vital Signs (Most Recent):  Temp: 97.9 °F (36.6 °C) (07/21/19 1108)  Pulse: (ECHO at bedside) (07/21/19 1200)  Resp: 20 (07/21/19 1108)  BP: (!) 150/66 (07/21/19 1108)  SpO2: 95 % (07/21/19 1108) Vital Signs (24h Range):  Temp:  [97.9 °F (36.6 °C)-98.5 °F (36.9 °C)] 97.9 °F (36.6 °C)  Pulse:  [60-73] 60  Resp:  [20] 20  SpO2:  [91 %-95 %] 95 %  BP: (134-186)/(62-80) 150/66     Weight: (!) 149.1 kg (328 lb 9.6 oz)  Body mass index is 43.35 kg/m².     SpO2: 95 %  O2 Device (Oxygen Therapy): room air      Intake/Output Summary (Last 24 hours) at 7/21/2019 1318  Last data filed at 7/21/2019 1203  Gross per 24 hour   Intake 423 ml   Output 976 ml   Net -553 ml       Lines/Drains/Airways     Peripheral Intravenous Line                 Peripheral IV - Single Lumen 07/21/19 1241 22 G Anterior;Distal;Right Upper Arm less than 1 day                Physical Exam   Constitutional: He is oriented to person, place, and time. He appears  well-developed and well-nourished. No distress.   HENT:   Head: Normocephalic and atraumatic.   Eyes: EOM are normal.   Neck: Normal range of motion. No JVD present.   Cardiovascular: Normal rate and regular rhythm.   Pulmonary/Chest: Effort normal and breath sounds normal. No respiratory distress. He has no wheezes. He has no rales.   Abdominal: Soft. Bowel sounds are normal. He exhibits no distension. There is no tenderness. There is no guarding.   Musculoskeletal: He exhibits edema (2+ pitting to the knee bilaterally).   Neurological: He is alert and oriented to person, place, and time.   Skin: Skin is warm and dry. He is not diaphoretic.   Psychiatric: He has a normal mood and affect. His behavior is normal. Judgment and thought content normal.   Vitals reviewed.      Significant Labs:   CMP   Recent Labs   Lab 07/19/19  1640 07/20/19 0414 07/21/19 0407    140 137   K 3.8 3.6 4.0    106 106   CO2 24 22* 21*   * 170* 201*   BUN 36* 37* 37*   CREATININE 2.5* 2.4* 2.3*   CALCIUM 9.0 8.8 8.8   PROT  --  6.2 6.1   ALBUMIN  --  3.0* 2.8*   BILITOT  --  0.5 0.5   ALKPHOS  --  201* 198*   AST  --  110* 104*   ALT  --  69* 76*   ANIONGAP 7* 12 10   ESTGFRAFRICA 28.8* 30.2* 31.8*   EGFRNONAA 24.9* 26.2* 27.5*   , CBC   Recent Labs   Lab 07/20/19  0414 07/21/19  0407   WBC 8.06 6.99   HGB 11.6* 11.4*   HCT 37.2* 35.0*    161    and All pertinent lab results from the last 24 hours have been reviewed.    Significant Imaging: I have reviewed all pertinent imaging from the past 24 hours     EXAMINATION:  US RETROPERITONEAL COMPLETE    CLINICAL HISTORY:  paul;    TECHNIQUE:  Ultrasound of the kidneys and urinary bladder was performed including color flow and Doppler evaluation of the kidneys.    COMPARISON:  None.    FINDINGS:  Limited exam due to patient body habitus.    Right kidney: Normal in length, measuring 10.8 cm. Mild diffuse cortical thinning with increased cortical echogenicity and slight  loss of corticomedullary distinction. Resistive index measures 0.86.  1.8 x 1 4 x 1.9 cyst in the upper pole.  No renal stone. No hydronephrosis.    Left kidney: Normal in length, measuring 10.3 cm. Mild diffuse cortical thinning with increased cortical echogenicity and slight loss of corticomedullary distinction. Resistive index measures 1.0.  No mass. No renal stone. No hydronephrosis.    The bladder is distended at the time of scanning and has an unremarkable appearance.      Impression       Bilateral chronic kidney disease without hydronephrosis.    Simple cyst in the right kidney.    Electronically signed by resident: Gregor Leroy  Date: 07/19/2019  Time: 19:30    Electronically signed by: Rodolfo Bliss MD  Date: 07/19/2019  Time: 19:50         Assessment and Plan:     * Complete heart block  H/o VT - continue amiodarone 400 mg BID  - BiV ICD placed 7/18  - Will continue to monitor electrolytes, replace to keep K > 4, Mg > 2  - Patient ok to d/c from EP perspective, follow up arranged in EP clinic  - Instruction given on weight and activity restrictions    Abnormal transaminases  Likely transient and 2/2 IVVD vs. Congestive hepatopathy  -trending down, will diurese today and assess for improvement  -Holding statin  -Will continue to monitor  - Will consider RUQ U/S    Status post biventricular cardiac pacemaker insertion  Post-op Day 2  -Arhythmia control   - amiodarone tablet 400 mg 400 mg, Oral, 2 times daily  - Abx prophylaxis (total of 5 days):   - cephALEXin capsule 500 mg 500 mg, Oral, Every 8 hours (Day 1/5)   - neomycin-bacitracin-polymyxin ointment Topical (Top), Daily        Gout  - Continue home allopurinol  - Holding Colchicine    Debility  - Fall precautions in place  - PT/OT evaluated and recommending D/C to home with home health      Acute systolic heart failure  Ischemic cardiomyopathy  - h/o  EF 30% with grade 3 diastolic dysfunction on prior ECHO  - strict in & out  - daily weights  - Lasix  40mg IV bid at this time      CLEMENCIA (acute kidney injury)  Patient with CLEMENCIA likely d/t IVVD vs decreased renal blood flow. FEUrea consistent with pre-renal etiology. Renal ultrasound showing bilateral chronic kidney disease, unknown baseline, however appears 1.8 may in fact be the patient's baseline and this is a superimposed CLEMENCIA on that. Still with good urine output. UA only with hyaline casts.    Overloaded on exam, also with elevation in transaminases on background of HFpEF, suggesting possible congestion.     - Giving lasix as could be cardiorenal, if Cr does not improve with diuresis (had fluid challenge on admit) will consult nephrology in AM  -Holding Losartan  -Monitor UOP and serial BMP and adjust therapy as needed.   -Avoid nephrotoxins and renally dose meds     Sleep apnea  - Will likely need polysomnogram and CPAP at home.  - Instructed to follow-up with PCP        VTE Risk Mitigation (From admission, onward)        Ordered     Place sequential compression device  Until discontinued      07/17/19 2105     IP VTE HIGH RISK PATIENT  Once      07/17/19 2105          Darwin Ny MD  Cardiology  [facility]

## 2019-07-21 NOTE — ASSESSMENT & PLAN NOTE
Patient with CLEMENCIA likely d/t IVVD vs decreased renal blood flow. FEUrea consistent with pre-renal etiology. Renal ultrasound showing bilateral chronic kidney disease, unknown baseline, however appears 1.8 may in fact be the patient's baseline and this is a superimposed CLEMENCIA on that. Still with good urine output. UA only with hyaline casts.    Overloaded on exam, also with elevation in transaminases on background of HFpEF, suggesting possible congestion.     - Giving lasix as could be cardiorenal, if Cr does not improve with diuresis (had fluid challenge on admit) will consult nephrology in AM  -Holding Losartan  -Monitor UOP and serial BMP and adjust therapy as needed.   -Avoid nephrotoxins and renally dose meds

## 2019-07-22 VITALS
HEART RATE: 60 BPM | RESPIRATION RATE: 17 BRPM | TEMPERATURE: 98 F | BODY MASS INDEX: 41.75 KG/M2 | HEIGHT: 73 IN | SYSTOLIC BLOOD PRESSURE: 149 MMHG | WEIGHT: 315 LBS | OXYGEN SATURATION: 96 % | DIASTOLIC BLOOD PRESSURE: 70 MMHG

## 2019-07-22 LAB
ALBUMIN SERPL BCP-MCNC: 2.8 G/DL (ref 3.5–5.2)
ALP SERPL-CCNC: 184 U/L (ref 55–135)
ALT SERPL W/O P-5'-P-CCNC: 60 U/L (ref 10–44)
ANION GAP SERPL CALC-SCNC: 11 MMOL/L (ref 8–16)
AST SERPL-CCNC: 60 U/L (ref 10–40)
BACTERIA BLD CULT: NORMAL
BACTERIA BLD CULT: NORMAL
BACTERIA SPEC AEROBE CULT: ABNORMAL
BASOPHILS # BLD AUTO: 0.02 K/UL (ref 0–0.2)
BASOPHILS NFR BLD: 0.3 % (ref 0–1.9)
BILIRUB SERPL-MCNC: 0.5 MG/DL (ref 0.1–1)
BUN SERPL-MCNC: 37 MG/DL (ref 8–23)
CALCIUM SERPL-MCNC: 9.1 MG/DL (ref 8.7–10.5)
CHLORIDE SERPL-SCNC: 108 MMOL/L (ref 95–110)
CO2 SERPL-SCNC: 22 MMOL/L (ref 23–29)
CREAT SERPL-MCNC: 2.3 MG/DL (ref 0.5–1.4)
DIFFERENTIAL METHOD: ABNORMAL
EOSINOPHIL # BLD AUTO: 0.2 K/UL (ref 0–0.5)
EOSINOPHIL NFR BLD: 2.3 % (ref 0–8)
ERYTHROCYTE [DISTWIDTH] IN BLOOD BY AUTOMATED COUNT: 14.9 % (ref 11.5–14.5)
EST. GFR  (AFRICAN AMERICAN): 31.8 ML/MIN/1.73 M^2
EST. GFR  (NON AFRICAN AMERICAN): 27.5 ML/MIN/1.73 M^2
GLUCOSE SERPL-MCNC: 228 MG/DL (ref 70–110)
GRAM STN SPEC: ABNORMAL
HCT VFR BLD AUTO: 34.5 % (ref 40–54)
HGB BLD-MCNC: 10.8 G/DL (ref 14–18)
IMM GRANULOCYTES # BLD AUTO: 0.02 K/UL (ref 0–0.04)
IMM GRANULOCYTES NFR BLD AUTO: 0.3 % (ref 0–0.5)
LYMPHOCYTES # BLD AUTO: 1.1 K/UL (ref 1–4.8)
LYMPHOCYTES NFR BLD: 15.3 % (ref 18–48)
MAGNESIUM SERPL-MCNC: 1.8 MG/DL (ref 1.6–2.6)
MCH RBC QN AUTO: 29.9 PG (ref 27–31)
MCHC RBC AUTO-ENTMCNC: 31.3 G/DL (ref 32–36)
MCV RBC AUTO: 96 FL (ref 82–98)
MONOCYTES # BLD AUTO: 0.8 K/UL (ref 0.3–1)
MONOCYTES NFR BLD: 11.3 % (ref 4–15)
NEUTROPHILS # BLD AUTO: 4.9 K/UL (ref 1.8–7.7)
NEUTROPHILS NFR BLD: 70.5 % (ref 38–73)
NRBC BLD-RTO: 0 /100 WBC
PHOSPHATE SERPL-MCNC: 2.7 MG/DL (ref 2.7–4.5)
PLATELET # BLD AUTO: 162 K/UL (ref 150–350)
PMV BLD AUTO: 12.6 FL (ref 9.2–12.9)
POCT GLUCOSE: 244 MG/DL (ref 70–110)
POCT GLUCOSE: 287 MG/DL (ref 70–110)
POTASSIUM SERPL-SCNC: 4.1 MMOL/L (ref 3.5–5.1)
PROT SERPL-MCNC: 5.9 G/DL (ref 6–8.4)
RBC # BLD AUTO: 3.61 M/UL (ref 4.6–6.2)
SODIUM SERPL-SCNC: 141 MMOL/L (ref 136–145)
WBC # BLD AUTO: 6.92 K/UL (ref 3.9–12.7)

## 2019-07-22 PROCEDURE — 80053 COMPREHEN METABOLIC PANEL: CPT

## 2019-07-22 PROCEDURE — 63600175 PHARM REV CODE 636 W HCPCS: Performed by: STUDENT IN AN ORGANIZED HEALTH CARE EDUCATION/TRAINING PROGRAM

## 2019-07-22 PROCEDURE — 85025 COMPLETE CBC W/AUTO DIFF WBC: CPT

## 2019-07-22 PROCEDURE — 25000003 PHARM REV CODE 250: Performed by: STUDENT IN AN ORGANIZED HEALTH CARE EDUCATION/TRAINING PROGRAM

## 2019-07-22 PROCEDURE — 84100 ASSAY OF PHOSPHORUS: CPT

## 2019-07-22 PROCEDURE — 99233 SBSQ HOSP IP/OBS HIGH 50: CPT | Mod: GC,,, | Performed by: INTERNAL MEDICINE

## 2019-07-22 PROCEDURE — 99233 PR SUBSEQUENT HOSPITAL CARE,LEVL III: ICD-10-PCS | Mod: GC,,, | Performed by: INTERNAL MEDICINE

## 2019-07-22 PROCEDURE — 25000003 PHARM REV CODE 250: Performed by: NURSE PRACTITIONER

## 2019-07-22 PROCEDURE — 83735 ASSAY OF MAGNESIUM: CPT

## 2019-07-22 PROCEDURE — 36415 COLL VENOUS BLD VENIPUNCTURE: CPT

## 2019-07-22 RX ORDER — FUROSEMIDE 40 MG/1
40 TABLET ORAL 2 TIMES DAILY
Qty: 60 TABLET | Refills: 2 | Status: SHIPPED | OUTPATIENT
Start: 2019-07-22 | End: 2019-08-07

## 2019-07-22 RX ORDER — CEPHALEXIN 250 MG/1
500 CAPSULE ORAL EVERY 12 HOURS
Status: DISCONTINUED | OUTPATIENT
Start: 2019-07-22 | End: 2019-07-22 | Stop reason: HOSPADM

## 2019-07-22 RX ORDER — AMIODARONE HYDROCHLORIDE 400 MG/1
400 TABLET ORAL 2 TIMES DAILY
Qty: 30 TABLET | Refills: 1 | Status: SHIPPED | OUTPATIENT
Start: 2019-07-22 | End: 2019-07-22

## 2019-07-22 RX ORDER — CEPHALEXIN 500 MG/1
500 CAPSULE ORAL EVERY 8 HOURS
Qty: 6 CAPSULE | Refills: 0 | Status: SHIPPED | OUTPATIENT
Start: 2019-07-22 | End: 2019-07-24

## 2019-07-22 RX ORDER — CEPHALEXIN 500 MG/1
500 CAPSULE ORAL EVERY 12 HOURS
Qty: 6 CAPSULE | Refills: 0 | Status: SHIPPED | OUTPATIENT
Start: 2019-07-22 | End: 2019-07-22

## 2019-07-22 RX ORDER — FUROSEMIDE 40 MG/1
40 TABLET ORAL DAILY
Status: DISCONTINUED | OUTPATIENT
Start: 2019-07-22 | End: 2019-07-22 | Stop reason: HOSPADM

## 2019-07-22 RX ORDER — MAGNESIUM SULFATE HEPTAHYDRATE 40 MG/ML
2 INJECTION, SOLUTION INTRAVENOUS ONCE
Status: COMPLETED | OUTPATIENT
Start: 2019-07-22 | End: 2019-07-22

## 2019-07-22 RX ORDER — CEPHALEXIN 250 MG/1
500 CAPSULE ORAL EVERY 12 HOURS
Status: DISCONTINUED | OUTPATIENT
Start: 2019-07-22 | End: 2019-07-22

## 2019-07-22 RX ORDER — AMIODARONE HYDROCHLORIDE 200 MG/1
400 TABLET ORAL 2 TIMES DAILY
Qty: 80 TABLET | Refills: 1 | Status: SHIPPED | OUTPATIENT
Start: 2019-07-22 | End: 2019-11-08

## 2019-07-22 RX ADMIN — MAGNESIUM SULFATE IN WATER 2 G: 40 INJECTION, SOLUTION INTRAVENOUS at 10:07

## 2019-07-22 RX ADMIN — INSULIN ASPART 3 UNITS: 100 INJECTION, SOLUTION INTRAVENOUS; SUBCUTANEOUS at 12:07

## 2019-07-22 RX ADMIN — ALLOPURINOL 300 MG: 300 TABLET ORAL at 10:07

## 2019-07-22 RX ADMIN — INSULIN ASPART 1 UNITS: 100 INJECTION, SOLUTION INTRAVENOUS; SUBCUTANEOUS at 05:07

## 2019-07-22 RX ADMIN — ASPIRIN 81 MG: 81 TABLET, COATED ORAL at 10:07

## 2019-07-22 RX ADMIN — CEPHALEXIN 500 MG: 500 CAPSULE ORAL at 05:07

## 2019-07-22 RX ADMIN — BACITRACIN ZINC NEOMYCIN SULFATE POLYMYXIN B SULFATE: 400; 3.5; 5 OINTMENT TOPICAL at 10:07

## 2019-07-22 RX ADMIN — AMIODARONE HYDROCHLORIDE 400 MG: 200 TABLET ORAL at 10:07

## 2019-07-22 NOTE — PLAN OF CARE
Referral sent via [facility] to [facility] at [phone].     07/22/19 112   Post-Acute Status   Post-Acute Authorization Home Health/Hospice   Home Health/Hospice Status Referrals Sent

## 2019-07-22 NOTE — DISCHARGE SUMMARY
[facility]  Cardiology  Discharge Summary      Patient Name: Houston Jc  MRN: [id number]  Admission Date: 7/17/2019  Hospital Length of Stay: 5 days  Discharge Date and Time: 7/22/2019  2:32 PM  Attending Physician: Dr. Licea  Discharging Provider: Darwin Ny MD  Primary Care Physician: Zak Hamilton MD    HPI:   Houston Jc is a 73 y.o. M with HTN, CHF (EF 30% G3DD) and DM who initially presented to OSH with 3-week history of weakness, fatigue and dyspnea. He was not have to have complete heart block with HR in 20s. A TVP was placed 7/9/19 and LHC done 7/16/19 to rule out ischemic etiology of complete heart block. He is being transferred to [facility] for BiV ICD placement.  Of note, pt had frequent runs of VT on 7/11/19 and is currently on PO amiodarone. On 7/17/19, he was noted to have a DVT to LUE for which heparin gtt was started.     He denies any chest pain, shortness of breath, or palpitations.The 3-week history of light headedness and syncopal episodes results in numerous falls over the last 2 weeks. Finally, his family member urged him to go to the ER.    Procedure(s) (LRB):  INSERTION, ICD, BIVENTRICULAR (Left)     Indwelling Lines/Drains at time of discharge:  Lines/Drains/Airways          None          Hospital Course:  Patient admitted to CCU for monitoring with TVP placed pending ICD placement. Patient s/p Dual chamber ICD ([employer]) on 7/18/19, healing well. Presented with Cr 1.8, however trended up to 2.4 on 7/19/19. Renal U/S notable for bilateral CKD, FEUrea showing prerenal etiology, hyaline casts in urine. Diureses started on 7/21/19 as patient overloaded and possibly cardiorenal syndrome however patient's Cr did not improve with diuresis, stable at 2.3. Patient likely with CKD given U/S findings, no sediment on UA however still possible this is a lagging ATN secondary to hypoperfusion following prolonged bradycardia with his 3rd degree heart block. Patient making  adequate urine, breathing room air. Will discharge patient with follow-up with nephrology (patient to be called to make an appointment) as well as close follow up in cardiology clinic. Patient also following up in device clinic, d/c'ed with amiodarone which can be further managed by EP. All instructions given to patient and patient voiced understanding with instructions. Discharged in stable condition with follow-up scheduled.    Physical Exam   Constitutional: He is oriented to person, place, and time. He appears well-developed and well-nourished. No distress.   HENT:   Head: Normocephalic and atraumatic.   Eyes: EOM are normal.   Neck: Normal range of motion. No JVD present.   Cardiovascular: Normal rate and regular rhythm.   Pulmonary/Chest: Effort normal and breath sounds normal. No respiratory distress. He has no wheezes. He has no rales.   Abdominal: Soft. Bowel sounds are normal. He exhibits no distension. There is no tenderness. There is no guarding.   Musculoskeletal: He exhibits edema (1+ pitting to the mid shin bilaterally).   Neurological: He is alert and oriented to person, place, and time.   Skin: Skin is warm and dry. He is not diaphoretic.   Psychiatric: He has a normal mood and affect. His behavior is normal. Judgment and thought content normal.   Vitals reviewed.    Consults:   Consults (From admission, onward)        Status Ordering Provider     Inpatient consult to Electrophysiology  Once     Provider:  (Not yet assigned)    Completed NENA ASHLEY          Significant Diagnostic Studies: Labs:   CMP   Recent Labs   Lab 07/21/19  0407 07/21/19  1338 07/22/19  0421    138 141   K 4.0 3.7 4.1    107 108   CO2 21* 20* 22*   * 277* 228*   BUN 37* 37* 37*   CREATININE 2.3* 2.3* 2.3*   CALCIUM 8.8 8.9 9.1   PROT 6.1  --  5.9*   ALBUMIN 2.8*  --  2.8*   BILITOT 0.5  --  0.5   ALKPHOS 198*  --  184*   *  --  60*   ALT 76*  --  60*   ANIONGAP 10 11 11   ESTGFRAFRICA 31.8* 31.8*  31.8*   EGFRNONAA 27.5* 27.5* 27.5*   , CBC   Recent Labs   Lab 07/21/19  0407 07/22/19  0421   WBC 6.99 6.92   HGB 11.4* 10.8*   HCT 35.0* 34.5*    162    and All labs within the past 24 hours have been reviewed    Pending Diagnostic Studies:     None          Final Active Diagnoses:    Diagnosis Date Noted POA    PRINCIPAL PROBLEM:  Complete heart block [I44.2] 07/09/2019 Yes    Abnormal transaminases [R74.8] 07/20/2019 No    Status post biventricular cardiac pacemaker insertion [Z95.0] 07/19/2019 No    Third degree heart block [I44.2] 07/19/2019 Yes    Gout [M10.9] 07/18/2019 Yes    Debility [R53.81] 07/17/2019 Yes    Cardiorenal syndrome [I13.10] 07/11/2019 Yes    Acute systolic heart failure [I50.21] 07/11/2019 Yes    Sleep apnea [G47.30] 09/21/2015 Yes      Problems Resolved During this Admission:     * Complete heart block  H/o VT - continue amiodarone 400 mg BID for 10 days then transition to 400mg QD  - BiV ICD placed 7/18  - Will continue to monitor electrolytes, replace to keep K > 4, Mg > 2  - Patient ok to d/c from EP perspective, follow up arranged in EP clinic  - Instruction given on weight and activity restrictions    Abnormal transaminases  Likely transient and 2/2 IVVD vs. Congestive hepatopathy  - trended down, possible congestion vs. Mild ischemia following prolonged bradycardia with 3rd degree heart block  - to follow up with PCP    Debility  D/c'ed home with home health      Cardiorenal syndrome  Patient with CLEMENCIA likely d/t IVVD vs decreased renal blood flow. FEUrea consistent with pre-renal etiology. Renal ultrasound showing bilateral chronic kidney disease, unknown baseline, however appears 1.8 may in fact be the patient's baseline and this is a superimposed CLEMENCIA on that. Still with good urine output. UA only with hyaline casts.    Diuresed with lasix however no improvement in CLEMENCIA. Likely this is acute worsening on chronic CKD with baseline close to 2.0. Has been stable now for  "past 48 hours. Also possibly lagging ATN from initial hypoperfusion from very low escape rhythm and heart block.    - Patient discharging with home health, to have repeat BMP within 1 week of discharged faxed to PCP  -Discharging on home Losartan  - Referral to nephrology on discharge, to be called to schedule appointment    Sleep apnea  - Will likely need polysomnogram and CPAP at home.  - Instructed to follow-up with PCP        Discharged Condition: fair    Disposition: Home-Health Care c    Follow Up:    Patient Instructions:      WHEELCHAIR FOR HOME USE     Order Specific Question Answer Comments   Hours in W/C per day: 6    Type of Wheelchair: Standard    Size(Width): 24"    Leg Support: STD footrests    Lap Belt: Velcro    Cushion: Basic    Height: 6' 1" (1.854 m)    Weight: 150.9 kg (332 lb 10.8 oz)    Does patient have medical equipment at home? none    Length of need (1-99 months): 99    Please check all that apply: Caregiver is capable and willing to operate wheelchair safely.    Please check all that apply: Patient mobility limitations cannot be sufficiently resolved by the use of other ambulatory therapies.    Vendor: [facility] deliver today    Expected Date of Delivery: 7/19/2019      Medications:  Reconciled Home Medications:      Medication List      START taking these medications    amiodarone 200 MG Tab  Commonly known as:  PACERONE  Take 2 tablet (400 mg total) by mouth 2 (two) times daily for 10 days. Afterwards take 2 tablet by mouth (400mg total) until you follow up with your cardiologist.     cephALEXin 500 MG capsule  Commonly known as:  KEFLEX  Take 1 capsule (500 mg total) by mouth every 8 (eight) hours. for 2 days     losartan 25 MG tablet  Commonly known as:  COZAAR  Take 1 tablet (25 mg total) by mouth once daily.        CHANGE how you take these medications    furosemide 40 MG tablet  Commonly known as:  LASIX  Take 1 tablet (40 mg total) by mouth 2 (two) times daily. Until follow up " with cardiology or PCP  What changed:    · when to take this  · additional instructions        CONTINUE taking these medications    allopurinol 300 MG tablet  Commonly known as:  ZYLOPRIM  Take 300 mg by mouth once daily.     aspirin 81 MG EC tablet  Commonly known as:  ECOTRIN  Take 1 tablet (81 mg total) by mouth once daily.     atorvastatin 80 MG tablet  Commonly known as:  LIPITOR  Take 1 tablet (80 mg total) by mouth once daily.     carvedilol 12.5 MG tablet  Commonly known as:  COREG  Take 1 tablet (12.5 mg total) by mouth 2 (two) times daily.     colchicine 0.6 mg tablet  Commonly known as:  COLCRYS  Take 0.6 mg by mouth once daily.     insulin NPH-insulin regular (70/30) 100 unit/mL (70-30) injection  Commonly known as:  NOVOLIN 70/30  Inject into the skin 3 (three) times daily.     PROVENTIL HFA INHL  Inhale into the lungs.        STOP taking these medications    indomethacin 50 MG capsule  Commonly known as:  INDOCIN     lisinopril 40 MG tablet  Commonly known as:  DENEEN BENNETT            Time spent on the discharge of patient: 45 minutes    Darwin Ny MD  Cardiology  [facility]

## 2019-07-22 NOTE — PLAN OF CARE
[facility]    HOME HEALTH ORDERS  FACE TO FACE ENCOUNTER    Patient Name: [place] Jc  YOB: 1945    PCP: Zak Hamilton MD   PCP Address: [address] [place] [address] [place] / ALAN [id number]  PCP Phone Number: [phone]  PCP Fax: [phone]    Encounter Date: 07/22/2019    Admit to Home Health    Diagnoses:  Active Hospital Problems    Diagnosis  POA    *Complete heart block [I44.2]  Yes    Abnormal transaminases [R74.8]  No    Status post biventricular cardiac pacemaker insertion [Z95.0]  No    Third degree heart block [I44.2]  Yes    Gout [M10.9]  Yes    Debility [R53.81]  Yes    Cardiorenal syndrome [I13.10]  Yes    Acute systolic heart failure [I50.21]  Yes    Sleep apnea [G47.30]  Yes      Resolved Hospital Problems   No resolved problems to display.       Future Appointments   Date Time Provider Department Center   7/24/2019 10:20 AM PACEMAKER, ICD [facility]   8/27/2019  8:00 [facility]           I have seen and examined this patient face to face today. My clinical findings that support the need for the home health skilled services and home bound status are the following:  Weakness/numbness causing balance and gait disturbance due to Heart Failure, Weakness/Debility and Surgery making it taxing to leave home.    Allergies:Review of patient's allergies indicates:  No Known Allergies    Diet: cardiac diet, diabetic diet: 1800 calorie and renal diet    Activities: activity as tolerated with following restrictions  1. Sling to left arm - wear for 48 hours, then only at night for 6 weeks.  2. NO HEPARIN PRODUCTS  3. Keflex 500 mg TID for 5 days at discharge   4. No lifting left elbow above shoulder height  5. No lifting over 5 pounds  6. No driving for 1 week and for 4 weeks if patient uses left arm to make turns  7. Patient may shower in 24 hours, do not let beam of shower hit site directly and no scrubbing in  area  8. Follow up in device clinic in 1 week and with Dr Bay in 4-6 weeks    Nursing:   SN to complete comprehensive assessment including routine vital signs. Instruct on disease process and s/s of complications to report to MD. Review/verify medication list sent home with the patient at time of discharge  and instruct patient/caregiver as needed. Frequency may be adjusted depending on start of care date.    Notify MD if SBP > 160 or < 90; DBP > 90 or < 50; HR > 120 or < 50; Temp > 101    Labs:  BMP Friday 7/26/19 to be faxed to patient's PCP    CONSULTS:    Physical Therapy to evaluate and treat. Evaluate for home safety and equipment needs; Establish/upgrade home exercise program. Perform / instruct on therapeutic exercises, gait training, transfer training, and Range of Motion.  Occupational Therapy to evaluate and treat. Evaluate home environment for safety and equipment needs. Perform/Instruct on transfers, ADL training, ROM, and therapeutic exercises.   to evaluate for community resources/long-range planning.    MISCELLANEOUS CARE:  Heart Failure:      SN to instruct on the following:    Instruct on the definition of CHF.   Instruct on the signs/sympoms of CHF to be reported.   Instruct on and monitor daily weights.   Instruct on factors that cause exacerbation.   Instruct on action, dose, schedule, and side effects of medications.   Instruct on diet as prescribed.   Instruct on activity allowed.   Instruct on life-style modifications for life long management of CHF   SN to assess compliance with daily weights, diet, medications, fluid retention,    safety precautions, activities permitted and life-style modifications.   Additional 1-2 SN visits per week as needed for signs and symptoms     of CHF exacerbation.      For Weight Gain > 2-3 lbs in 1 day or 4-6 lbs over 1 week notify PCP:  Increase Lasix (oral diuretic) dose to 1 pill twice a day for 5 days temporarily  Obtain BMP lab test in 3  days  If weight does not decrease by 5 lbs after 5 days of increased diuretic usage: Notify PCP.    WOUND CARE ORDERS  None      Medications: Review discharge medications with patient and family and provide education.      Current Discharge Medication List      START taking these medications    Details   amiodarone (PACERONE) 400 MG tablet Take 1 tablet (400 mg total) by mouth 2 (two) times daily. Take 1 tablet (400 mg total) by mouth 2 (two) times daily for 10 days. Afterwards take 1 tablet by mouth (400mg total) until you follow up with your cardiologist.  Qty: 30 tablet, Refills: 1      cephALEXin (KEFLEX) 500 MG capsule Take 1 capsule (500 mg total) by mouth every 8 (eight) hours. for 2 days  Qty: 6 capsule, Refills: 0      losartan (COZAAR) 25 MG tablet Take 1 tablet (25 mg total) by mouth once daily.  Qty: 90 tablet, Refills: 3         CONTINUE these medications which have CHANGED    Details   furosemide (LASIX) 40 MG tablet Take 1 tablet (40 mg total) by mouth 2 (two) times daily. Until follow up with cardiology or PCP  Qty: 60 tablet, Refills: 2         CONTINUE these medications which have NOT CHANGED    Details   ALBUTEROL SULFATE (PROVENTIL HFA INHL) Inhale into the lungs.      allopurinol (ZYLOPRIM) 300 MG tablet Take 300 mg by mouth once daily.      aspirin (ECOTRIN) 81 MG EC tablet Take 1 tablet (81 mg total) by mouth once daily.  Refills: 0      atorvastatin (LIPITOR) 80 MG tablet Take 1 tablet (80 mg total) by mouth once daily.  Qty: 90 tablet, Refills: 3      carvedilol (COREG) 12.5 MG tablet Take 1 tablet (12.5 mg total) by mouth 2 (two) times daily.  Qty: 60 tablet, Refills: 11      colchicine 0.6 mg tablet Take 0.6 mg by mouth once daily.      insulin NPH-insulin regular, 70/30, (NOVOLIN 70/30) 100 unit/mL (70-30) injection Inject into the skin 3 (three) times daily.         STOP taking these medications       indomethacin (INDOCIN) 50 MG capsule Comments:   Reason for Stopping:          lisinopril (PRINIVIL,ZESTRIL) 40 MG tablet Comments:   Reason for Stopping:               I certify that this patient is confined to his home and needs intermittent skilled nursing care, physical therapy and occupational therapy.

## 2019-07-22 NOTE — ASSESSMENT & PLAN NOTE
Likely transient and 2/2 IVVD vs. Congestive hepatopathy  - trended down, possible congestion vs. Mild ischemia following prolonged bradycardia with 3rd degree heart block  - to follow up with PCP

## 2019-07-22 NOTE — PROGRESS NOTES
Patient is ready for discharge. Patient stable alert and oriented. IV removed. No complaints of pain. Discussed discharge plan. Reviewed medications and side effects, appointments, and answered questions with patient and family.Pt received meds from pharmacy before D/C. Educated pt and pt's wife how to apply arm sling.

## 2019-07-22 NOTE — ASSESSMENT & PLAN NOTE
Patient with CLEMENCIA likely d/t IVVD vs decreased renal blood flow. FEUrea consistent with pre-renal etiology. Renal ultrasound showing bilateral chronic kidney disease, unknown baseline, however appears 1.8 may in fact be the patient's baseline and this is a superimposed CLEMENCIA on that. Still with good urine output. UA only with hyaline casts.    Diuresed with lasix however no improvement in CLEMENCIA. Likely this is acute worsening on chronic CKD with baseline close to 2.0. Has been stable now for past 48 hours. Also possibly lagging ATN from initial hypoperfusion from very low escape rhythm and heart block.    - Patient discharging with home health, to have repeat BMP within 1 week of discharged faxed to PCP  -Discharging on home Losartan  - Referral to nephrology on discharge, to be called to schedule appointment

## 2019-07-22 NOTE — ASSESSMENT & PLAN NOTE
H/o VT - continue amiodarone 400 mg BID for 10 days then transition to 400mg QD  - BiV ICD placed 7/18  - Will continue to monitor electrolytes, replace to keep K > 4, Mg > 2  - Patient ok to d/c from EP perspective, follow up arranged in EP clinic  - Instruction given on weight and activity restrictions

## 2019-07-22 NOTE — PLAN OF CARE
Problem: Adult Inpatient Plan of Care  Goal: Plan of Care Review  Outcome: Ongoing (interventions implemented as appropriate)  Pt has call bell in reach, non slip socks on, and bedrails up x2. Pt using urinal and bedpan. Pt on tele monitoring. Pt encouraged to wash hands. Pt has accuchecks ac/hs.

## 2019-07-22 NOTE — PHYSICIAN QUERY
PT Name: [place] Jc  MR #: [id number]  Physician Query Form - Renal Condition Clarification     CDS/: Iza Pham RN              Contact information: [phone]  This form is a permanent document in the medical record.     QueryDate: July 22, 2019    By submitting this query, we are merely seeking further clarification of documentation. Please utilize your independent clinical judgment when addressing the question(s) below.    The Medical record contains the following:   Indicator Supporting Clinical Findings Location in Medical Record    Kidney (Renal) Insufficiency     x Kidney (Renal) Failure / Injury  7/19: Discharge held for worsening CLEMENCIA        Progress Note 7/20      Cardiology    Nephrotoxic Agents     x BUN/Creatinine GFR BUN           34 ->36 -> 37-> 37  -> 37  Creatinine 1.8-> 2.5-> 2.4-> 2.3-> 2.3  GFR        42.8->28.8->30.2->27.5->27.5    Lab 7/18, 7/19, 7/20, 7/21, 7/22     x Urine: Casts         Eosinophils Hyaline Casts, UA    1 Urinalysis 7/20    Dehydration      Nausea/Vomiting      Dialysis/CRRT      Treatment:     x Other:   Patient with CLEMENCIA likely d/t IVVD vs decreased renal blood flow  Which is currently stable. Labs reviewed- BMP with Estimated Creatinine Clearance: 43.2 mL/min (A) (based on  SCr of 2.4 mg/dL (H)). according to latest data. Monitor UOP and serial BMP and adjust therapy as needed. Avoid nephrotoxins and renally dose meds for GFR listed above   Progress Note 7/19       Cardiology   Acute Kidney Injury / Acute Renal Failure has different defining criteria. A generally accepted guideline  is:   A greater than 100% (2X) rise in serum creatinine from baseline* occurring during the course of a single hospital stay.   *Baseline as determined by the providers judgment and consideration of previous lab values and other documentation, if available.    A diagnosis of Acute Kidney Injury/ Acute Renal Failure should incorporate abnormal labs and clinical findings that are  clinically significant      References: 1. Saundra et al. Acute renal failure-definition, outcome measures, animal models, fluid therapy and information technology needs: the Second International Consensus Conference of the Acute Dialysis Quality Initiative (ADQI) Group. Crit Care 2004; 8:B204; 2. Kaitlyn et al. Acute Kidney Injury Network: report of an initiative to improve outcomes in acute kidney injury. Crit Care 2007; 11:R31; 3. Kidney Disease: Improving Global Outcomes (KDIGO). Acute Kidney Injury Work Group. KDIGO clinical practice guidelines for acute kidney injury. Kidney Int Suppl 2012; 2:1.    The clinical guidelines noted below is only a system guideline, it does not replace the providers clinical judgment.    Provider, please specify the diagnosis or diagnoses associated with above clinical findings.        Doctor, please further specify the CLEMENCIA.    [   ] Acute Kidney Failure/Injury with Tubular Necrosis  Damage to the tubule cells of the kidney. Common triggers: shock, hypotension, IV contrast, rhabdomyolysis, medications   [   ] Other Acute Kidney Failure/Injury (please specify): ____________     [   ] Unspecified Acute Kidney Failure/Injury      [   ] Other (please specify): _________________________________   [ x  ]  Clinically Undetermined  No clear evidence of acute tubular necrosis.  Hyaline casts non-specific.  Increased creatinine may be due to HF with no intrinsic injury to the kidney.       Please document in your progress notes daily for the duration of treatment until resolved and include in your discharge summary.

## 2019-07-22 NOTE — PLAN OF CARE
Problem: Adult Inpatient Plan of Care  Goal: Patient-Specific Goal (Individualization)  Outcome: Ongoing (interventions implemented as appropriate)  Plan of care discussed with patient. Patient is free of fall/trauma/injury. Denies CP, SOB, or pain/discomfort. BG monitored per order. Mg replaced. Pt to D/C once received meds from pharmacy. LCW PPm site CDI, no hematoma. All questions addressed. Will continue to monitor.

## 2019-07-23 PROCEDURE — G0180 MD CERTIFICATION HHA PATIENT: HCPCS | Mod: ,,, | Performed by: INTERNAL MEDICINE

## 2019-07-23 PROCEDURE — G0180 PR HOME HEALTH MD CERTIFICATION: ICD-10-PCS | Mod: ,,, | Performed by: INTERNAL MEDICINE

## 2019-07-23 NOTE — PHYSICIAN QUERY
PT Name: [place] Jc  MR #: [id number]  Physician Query Form - CKD Clarification     CDS/: Iza Pham RN             Contact information: [phone]  This form is a permanent document in the medical record.     Query Date: July 23, 2019    By submitting this query, we are merely seeking further clarification of documentation. Please utilize your independent clinical judgment when addressing the question(s) below.    The Medical record contains the following:     Indicators   Supporting Clinical Findings   Location in Medical Record   x CKD or Chronic Kidney (Renal) Failure / Disease    Cardiorenal syndrome    Patient with CLEMENCIA likely d/t IVVD vs decreased renal blood flow. FEUrea consistent with pre-renal etiology. Renal ultrasound showing bilateral chronic kidney disease, unknown  baseline, however appears 1.8 may in fact be the patient's baseline and this is a superimposed CLEMENCIA on that. Still with good urine output. UA only with hyaline casts.        Diuresed with lasix however no improvement in CLEMENCIA. Likely this is acute worsening on chronic CKD with baseline close to 2.0. Has been stable now for past 48 hours. Also possibly lagging ATN from initial hypoperfusion from very low escape rhythm and heart  block.    Discharge Summary 7/22   x BUN/Creatinine                          GFR BUN      38--> 34--> 36--> 37--> 37--> 37    Cr        2.0-> 1.8-> 2.5-> 2.4-> 2.3-> 2.3    GFR    38-->42.8->28.8->30.2->31.8->31.8   Lab 7/17, 7/18, 7/19, 7/20, 7/21, 7/22      Dehydration      Nausea / Vomiting      Dialysis / CRRT      Medication      Treatment      Other Chronic Conditions      Other       Doctorr, please further specify the stage of CKD.    [   ] Chronic Kidney Disease (CKD) (please specify stage* below)      National Kidney foundation Definitions     Stage Description eGFR (mL/min)   [   ]    I Slight kidney damage with normal or increased filtration 90+   [   ]   II Mildly reduced kidney function 60-89    [ XXX  ]    III Moderately reduced kidney function 30-59   [   ] Other (please specify): ____________    [   ]  Clinically Undetermined          Please document in your progress notes daily for the duration of treatment until resolved and include in your discharge summary.

## 2019-07-23 NOTE — PT/OT/SLP DISCHARGE
Occupational Therapy Discharge Summary    [place] Jc  MRN: [id number]   Principal Problem: Complete heart block      Patient Discharged from acute Occupational Therapy on 7/23/19.      Assessment:      Patient was discharged unexpectedly.  Information required to complete an accurate discharge summary is unknown.  Refer to therapy initial evaluation and last progress note for initial and most recent functional status and goal achievement.  Recommendations made may be found in medical record.    Objective:     GOALS:   Multidisciplinary Problems     Occupational Therapy Goals     Not on file          Multidisciplinary Problems (Resolved)        Problem: Occupational Therapy Goal    Goal Priority Disciplines Outcome Interventions   Occupational Therapy Goal   (Resolved)     OT, PT/OT Outcome(s) achieved    Description:  Goals to be met by: 7/26/19     Patient will increase functional independence with ADLs by performing:    Feeding with [place].  UE Dressing with Modified [place]man.  LE Dressing with Contact Guard Assistance.  Grooming while standing at sink with Supervision.  Toileting from toilet with Supervision for hygiene and clothing management.   Toilet transfer to toilet with Supervision.                      Reasons for Discontinuation of Therapy Services  Transfer to alternate level of care.      Plan:     Patient Discharged to: Home with Home Health Service    SHILA Kerr  7/23/2019

## 2019-07-24 ENCOUNTER — PATIENT OUTREACH (OUTPATIENT)
Dept: ADMINISTRATIVE | Facility: CLINIC | Age: 74
End: 2019-07-24

## 2019-07-24 ENCOUNTER — CLINICAL SUPPORT (OUTPATIENT)
Dept: CARDIOLOGY | Facility: HOSPITAL | Age: 74
End: 2019-07-24
Attending: INTERNAL MEDICINE
Payer: MEDICARE

## 2019-07-24 DIAGNOSIS — I44.2 COMPLETE HEART BLOCK: ICD-10-CM

## 2019-07-24 DIAGNOSIS — Z95.810 ICD (IMPLANTABLE CARDIOVERTER-DEFIBRILLATOR) IN PLACE: ICD-10-CM

## 2019-07-24 PROCEDURE — 93284 PRGRMG EVAL IMPLANTABLE DFB: CPT

## 2019-07-24 NOTE — PATIENT INSTRUCTIONS
Understanding Third-Degree Heart Block    Heart block is a condition in which the electrical wiring system of the heart does not work properly.  Sometimes it can result in a slow heartbeat that is either regular or irregular. This may cause symptoms.     With third-degree heart block, electrical signals are not relayed from the upper chambers of the heart (atria) to the lower chambers (ventricles). The signaling system in the lower chambers may take over as a backup, but this does not work well. People with third-degree heart block usually have a very slow heartbeat. Their heart does not do a good job of sending blood throughout the body. They usually have symptoms.  Third-degree heart block is sometimes called complete heart block.  What causes third-degree heart block?  Third-degree heart block may be caused by:  · Damage to the heart from surgery  · Damage to the heart muscle from a heart attack  · Other types of heart disease that result in heart muscle damage  · Heart valve disease  · Other diseases, including rheumatic fever and sarcoidosis  · Some medicines  In addition, some babies are born with heart block. Heart block may also run in families.  What are the symptoms of third-degree heart block?  Symptoms of third-degree heart block may include:  · Chest pain  · Lightheadedness, faintness, or dizziness  · Feeling tired  · Shortness of breath  How is third-degree heart block treated?  Third-degree heart block is a serious condition that needs to be treated right away. Treatments for third-degree heart block include:  · Taking medicines to increase the heart rate for the short-term (acutely)  · Stopping medicines, if they are causing the heart block  · Getting a pacemaker  What are the complications of third-degree heart block?  Third-degree heart block may cause a sudden loss of consciousness. It also may cause the heart to suddenly stop beating (sudden cardiac arrest).  When should I call my healthcare  provider?  Call your healthcare provider right away if you have any of these:  · Unusual tiredness  · Shortness of breath  · Chest pain  · Weakness, dizziness, or fainting  · Unusual drowsiness or confusion  · Pain that gets worse  · Symptoms that dont get better with treatment, or symptoms that get worse  · New symptoms   Date Last Reviewed: 5/1/2016  © 0868-8925 [employer]. [address] [place] [address], [place] [address]. All rights reserved. This information is not intended as a substitute for professional medical care. Always follow your healthcare professional's instructions.

## 2019-07-24 NOTE — PROGRESS NOTES
C3 nurse attempted to contact patient. The following occurred:   C3 nurse attempted to contact [place] Jc for a TCC post hospital discharge follow up call. The patient is unable to conduct the call @ this time. The patient requested a callback.    The patient does not have a scheduled HOSFU appointment within 7-14 days post hospital discharge date 07/24/19. Message sent to Physician staff to assist with HOSFU appointment scheduling.

## 2019-08-02 ENCOUNTER — EXTERNAL HOME HEALTH (OUTPATIENT)
Dept: HOME HEALTH SERVICES | Facility: HOSPITAL | Age: 74
End: 2019-08-02
Payer: MEDICARE

## 2019-08-06 ENCOUNTER — TELEPHONE (OUTPATIENT)
Dept: CARDIOLOGY | Facility: CLINIC | Age: 74
End: 2019-08-06

## 2019-08-06 NOTE — TELEPHONE ENCOUNTER
As per Dr. Albarado patient to be evaluated at follow up tomorrow. Continue with current regimen. Advised to call back if having symptoms of shortness of breath or chest discomfort. Message left with directives to nurse Tanja Almaguer

## 2019-08-06 NOTE — TELEPHONE ENCOUNTER
Discharged from hospital 7/22. First cardiology appt post hospital f/u scheduled for tomorrow with Dr. Albarado.    Tanja Almaguer home nurse calling to report 5lb weight gain  In last week with weight today 334.6 with LE edema +2/3 bilat, denies shortness of breath or chest discomfort, reports lungs clear upon auscultation.   Currently taking Lasix 40mg BID  [phone] call back#    Message routed for review to Dr. Albarado

## 2019-08-07 ENCOUNTER — LAB VISIT (OUTPATIENT)
Dept: LAB | Facility: HOSPITAL | Age: 74
End: 2019-08-07
Payer: MEDICARE

## 2019-08-07 ENCOUNTER — OFFICE VISIT (OUTPATIENT)
Dept: CARDIOLOGY | Facility: CLINIC | Age: 74
End: 2019-08-07
Payer: MEDICARE

## 2019-08-07 VITALS
WEIGHT: 315 LBS | HEART RATE: 60 BPM | HEIGHT: 74 IN | SYSTOLIC BLOOD PRESSURE: 131 MMHG | BODY MASS INDEX: 40.43 KG/M2 | OXYGEN SATURATION: 97 % | DIASTOLIC BLOOD PRESSURE: 62 MMHG

## 2019-08-07 DIAGNOSIS — N18.30 STAGE 3 CHRONIC KIDNEY DISEASE: ICD-10-CM

## 2019-08-07 DIAGNOSIS — I50.43 ACUTE ON CHRONIC HEART FAILURE WITH REDUCED EJECTION FRACTION AND DIASTOLIC DYSFUNCTION: ICD-10-CM

## 2019-08-07 DIAGNOSIS — I50.43 ACUTE ON CHRONIC HEART FAILURE WITH REDUCED EJECTION FRACTION AND DIASTOLIC DYSFUNCTION: Primary | ICD-10-CM

## 2019-08-07 DIAGNOSIS — R26.81 GAIT INSTABILITY: ICD-10-CM

## 2019-08-07 DIAGNOSIS — R33.9 URINARY RETENTION: ICD-10-CM

## 2019-08-07 LAB
ANION GAP SERPL CALC-SCNC: 10 MMOL/L (ref 8–16)
BUN SERPL-MCNC: 29 MG/DL (ref 8–23)
CALCIUM SERPL-MCNC: 8.4 MG/DL (ref 8.7–10.5)
CHLORIDE SERPL-SCNC: 104 MMOL/L (ref 95–110)
CO2 SERPL-SCNC: 32 MMOL/L (ref 23–29)
CREAT SERPL-MCNC: 2.2 MG/DL (ref 0.5–1.4)
EST. GFR  (AFRICAN AMERICAN): 33.6 ML/MIN/1.73 M^2
EST. GFR  (NON AFRICAN AMERICAN): 29.1 ML/MIN/1.73 M^2
GLUCOSE SERPL-MCNC: 88 MG/DL (ref 70–110)
POTASSIUM SERPL-SCNC: 4 MMOL/L (ref 3.5–5.1)
SODIUM SERPL-SCNC: 146 MMOL/L (ref 136–145)

## 2019-08-07 PROCEDURE — 36415 COLL VENOUS BLD VENIPUNCTURE: CPT

## 2019-08-07 PROCEDURE — 3075F SYST BP GE 130 - 139MM HG: CPT | Mod: CPTII,GC,S$GLB, | Performed by: INTERNAL MEDICINE

## 2019-08-07 PROCEDURE — 99999 PR PBB SHADOW E&M-EST. PATIENT-LVL V: CPT | Mod: PBBFAC,GC,,

## 2019-08-07 PROCEDURE — 3078F DIAST BP <80 MM HG: CPT | Mod: CPTII,GC,S$GLB, | Performed by: INTERNAL MEDICINE

## 2019-08-07 PROCEDURE — 99999 PR PBB SHADOW E&M-EST. PATIENT-LVL V: ICD-10-PCS | Mod: PBBFAC,GC,,

## 2019-08-07 PROCEDURE — 96372 THER/PROPH/DIAG INJ SC/IM: CPT | Mod: S$GLB,,, | Performed by: INTERNAL MEDICINE

## 2019-08-07 PROCEDURE — 3078F PR MOST RECENT DIASTOLIC BLOOD PRESSURE < 80 MM HG: ICD-10-PCS | Mod: CPTII,GC,S$GLB, | Performed by: INTERNAL MEDICINE

## 2019-08-07 PROCEDURE — 99215 OFFICE O/P EST HI 40 MIN: CPT | Mod: 25,GC,S$GLB, | Performed by: INTERNAL MEDICINE

## 2019-08-07 PROCEDURE — 99215 PR OFFICE/OUTPT VISIT, EST, LEVL V, 40-54 MIN: ICD-10-PCS | Mod: 25,GC,S$GLB, | Performed by: INTERNAL MEDICINE

## 2019-08-07 PROCEDURE — 96372 PR INJECTION,THERAP/PROPH/DIAG2ST, IM OR SUBCUT: ICD-10-PCS | Mod: S$GLB,,, | Performed by: INTERNAL MEDICINE

## 2019-08-07 PROCEDURE — 3075F PR MOST RECENT SYSTOLIC BLOOD PRESS GE 130-139MM HG: ICD-10-PCS | Mod: CPTII,GC,S$GLB, | Performed by: INTERNAL MEDICINE

## 2019-08-07 PROCEDURE — 80048 BASIC METABOLIC PNL TOTAL CA: CPT

## 2019-08-07 RX ORDER — FUROSEMIDE 40 MG/1
120 TABLET ORAL 2 TIMES DAILY
Qty: 60 TABLET | Refills: 2 | Status: SHIPPED | OUTPATIENT
Start: 2019-08-07 | End: 2019-08-07

## 2019-08-07 RX ORDER — TAMSULOSIN HYDROCHLORIDE 0.4 MG/1
0.4 CAPSULE ORAL DAILY
Qty: 30 CAPSULE | Refills: 11 | Status: SHIPPED | OUTPATIENT
Start: 2019-08-07 | End: 2020-09-04 | Stop reason: SDUPTHER

## 2019-08-07 RX ORDER — ALBUTEROL SULFATE 90 UG/1
1 AEROSOL, METERED RESPIRATORY (INHALATION) EVERY 4 HOURS PRN
COMMUNITY
Start: 2019-06-25

## 2019-08-07 RX ORDER — TAMSULOSIN HYDROCHLORIDE 0.4 MG/1
0.4 CAPSULE ORAL DAILY
Status: DISCONTINUED | OUTPATIENT
Start: 2019-08-07 | End: 2019-08-07

## 2019-08-07 RX ORDER — FINASTERIDE 5 MG/1
5 TABLET, FILM COATED ORAL DAILY
Qty: 30 TABLET | Refills: 11 | Status: SHIPPED | OUTPATIENT
Start: 2019-08-07 | End: 2020-09-04 | Stop reason: SDUPTHER

## 2019-08-07 RX ORDER — TORSEMIDE 20 MG/1
40 TABLET ORAL 2 TIMES DAILY
Qty: 120 TABLET | Refills: 11 | Status: SHIPPED | OUTPATIENT
Start: 2019-08-07 | End: 2019-09-18

## 2019-08-07 RX ORDER — FUROSEMIDE 10 MG/ML
80 INJECTION INTRAMUSCULAR; INTRAVENOUS
Status: COMPLETED | OUTPATIENT
Start: 2019-08-07 | End: 2019-08-07

## 2019-08-07 RX ADMIN — FUROSEMIDE 80 MG: 10 INJECTION INTRAMUSCULAR; INTRAVENOUS at 02:08

## 2019-08-07 NOTE — PATIENT INSTRUCTIONS
FOR YOUR HEART DYSFUNCTION: This is the result of the heart not working efficiently, and fluid building up in the body (legs, belly, and lungs)  1. To control symptoms of leg swelling, the goal will be to get to your DRY WEIGHT (~309 lbs).    2. After, check your weight every morning, first thing.         a. If your weight is increasing take a double dose of torsemide prescribed, until it is dropping gradually        b. if the higher dose does not reduce your weight (you are not peeing a lot after taking), then please call the clinic at [phone]    3. Eat a LOW-SALT diet, less than 2000-3000mg of salt per day. Check any food packaging to keep track of your salt, avoid eating fast food whenever possible    4. Keep track of fluid intake, try not to exceed 1500mL, or approximately 2 quarts of fluid daily, if you are gaining weight, reduce this to 1000mL or 1-1.5 quarts. Remember that fluids include water, soda, tea, coffee, soups.    5. Please exercise at least 30 minutes per day 5 times per week, this makes your heart stronger, and follow up with cardiac rehabilitation    6. Follow up with me in 2-3 weeks, if you have not received a call, please call the clinic with an update    7. Please get labs today and have your home nurse draw them next Tuesday

## 2019-08-07 NOTE — PROGRESS NOTES
Cardiology Clinic Note  Reason for Visit: Hospital follow-up    HPI:   Houston Jc is a 73 y.o. M with HTN, CHF (EF 30% G3DD), LUE DVT, and DM who was admitted with HF in the setting of complete heart block with HR in 20s in mid-July at OSH and transferred to [facility] for further management. He received a dual chamber ICD ([employer]) on 7/18/19. During the admission he had a post-CHF ATN vs CKD and was discharged with nephrology and cardiology follow-up.    As an outpatient he was doing well until the last few days when he noticed a 5-lb weight hieu. Since his discharge the weight gain is actually 14 lbs, and from his dry weight (309 lbs at home) he is up 35lbs. He has been poorly compliant with fluid restriction. He has a weak stream and is not peeing within 1 hour of taking his diuretic, suggesting that it is not working well.     He has dyspnea on exertion, but no orthopnea.    ROS:    Constitution: Negative for fever, chills, weight loss or gain.   HENT: Negative for sore throat, rhinorrhea, or headache.  Eyes: Negative for blurred or double vision.   Cardiovascular: See above  Pulmonary: Negative for SOB   Gastrointestinal: Negative for abdominal pain, nausea, vomiting, or diarrhea.   : Negative for dysuria.   Neurological: Negative for focal weakness or sensory changes.  PMH:     Past Medical History:   Diagnosis Date    CHF (congestive heart failure)     Coronary artery disease     Diabetes mellitus     Hypertension      Past Surgical History:   Procedure Laterality Date    INSERTION, ICD, BIVENTRICULAR Left 7/18/2019    [facility] EP [facility]    Insertion, Pacemaker, Temporary Transvenous N/A 7/9/2019    [facility] CATH [facility]/EP    Left heart cath N/A 7/16/2019    Performed by [facility] at [facility] CATH LAB/EP     Allergies:   Review of patient's allergies indicates:  No Known Allergies  Medications:     Current Outpatient Medications on File  "Prior to Visit   Medication Sig Dispense Refill    allopurinol (ZYLOPRIM) 300 MG tablet Take 300 mg by mouth once daily.      amiodarone (PACERONE) 200 MG Tab Take 2 tablet (400 mg total) by mouth 2 (two) times daily for 10 days. Afterwards take 2 tablet by mouth (400mg total) until you follow up with your cardiologist. 80 tablet 1    furosemide (LASIX) 40 MG tablet Take 1 tablet (40 mg total) by mouth 2 (two) times daily. Until follow up with cardiology or PCP 60 tablet 2    insulin NPH-insulin regular, 70/30, (NOVOLIN 70/30) 100 unit/mL (70-30) injection Inject into the skin 3 (three) times daily.      losartan (COZAAR) 25 MG tablet Take 1 tablet (25 mg total) by mouth once daily. 90 tablet 3    VENTOLIN HFA 90 mcg/actuation inhaler Inhale 1 puff into the lungs every 4 (four) hours as needed.       aspirin (ECOTRIN) 81 MG EC tablet Take 1 tablet (81 mg total) by mouth once daily.  0    atorvastatin (LIPITOR) 80 MG tablet Take 1 tablet (80 mg total) by mouth once daily. 90 tablet 3    carvedilol (COREG) 12.5 MG tablet Take 1 tablet (12.5 mg total) by mouth 2 (two) times daily. 60 tablet 11    [DISCONTINUED] ALBUTEROL SULFATE (PROVENTIL HFA INHL) Inhale into the lungs.       No current facility-administered medications on file prior to visit.      Social History:     Social History     Tobacco Use    Smoking status: Former Smoker    Smokeless tobacco: Never Used   Substance Use Topics    Alcohol use: Yes     Comment: social     Family History:     Family History   Problem Relation Age of Onset    Heart attack Father      Physical Exam:   /62 (BP Location: Left arm, Patient Position: Sitting, BP Method: X-Large (Automatic))   Pulse 60   Ht 6' 1.5" (1.867 m)   Wt (!) 154.4 kg (340 lb 6.2 oz)   SpO2 97%   BMI 44.30 kg/m²    Constitutional: No distress, obese, conversant  HEENT: Sclera anicteric, PERRLA, EOMI  Neck: JVP at bottom 1/3 of neck when sitting up, no masses, good movement  CV: RRR, S1 " and S2 normal, no additional heart sounds or murmurs. Pulses 2+ and equal bilaterally in radial arteries.  Pulm: Clear to auscultation bilaterally with symmetrical expansion.  Extremities: Both extremities intact and grossly normal, skin is warm. Tense edema, 4+ below the knees  Skin: No ecchymosis, erythema, or ulcers  Psych: AOx3, appropriate affect  Neuro: CNII-XII intact, no focal deficits      Labs:     Lab Results   Component Value Date     07/22/2019    K 4.1 07/22/2019     07/22/2019    CO2 22 (L) 07/22/2019    BUN 37 (H) 07/22/2019    CREATININE 2.3 (H) 07/22/2019    ANIONGAP 11 07/22/2019     Lab Results   Component Value Date    HGBA1C 7.8 (H) 07/17/2019     Lab Results   Component Value Date    BNP 1,142 (H) 07/17/2019     (H) 09/18/2015    Lab Results   Component Value Date    WBC 6.92 07/22/2019    HGB 10.8 (L) 07/22/2019    HCT 34.5 (L) 07/22/2019     07/22/2019    GRAN 4.9 07/22/2019    GRAN 70.5 07/22/2019     Lab Results   Component Value Date    CHOL 82 (L) 07/13/2019    HDL 27 (L) 07/13/2019    LDLCALC 38.0 (L) 07/13/2019    TRIG 85 07/13/2019          Imaging:    ECHO:  · Moderately decreased left ventricular systolic function. The estimated ejection fraction is 30%  · Eccentric left ventricular hypertrophy.  · Global hypokinetic wall motion.  · Grade II (moderate) left ventricular diastolic dysfunction consistent with pseudonormalization.  · Elevated left atrial pressure.  · Mildly reduced right ventricular systolic function.  · Biatrial enlargement.  · Mild right ventricular enlargement.  · Mild tricuspid regurgitation.  · The estimated PA systolic pressure is 50 mm Hg  · Pulmonary hypertension present.  Normal central venous pressure (3 mm Hg).     Assessment:   1. Acute on chronic heart failure in the setting of NICM EF 30%, now s/p [employer] DC ICD on 07/18/19  2. Urinary retention - may be contributing to diuretic ineffectiveness  3. CKD 3 (?obstructive)  4.  Bilateral lower extremity edema -- due to above  5. History of VT -- controlled on amio    Plan:   1. Lasix 80mg IV in the clinic  2. Change lasix to 40mg PO BID torsemide with BMP today and in a week  3. Started flomax and finasteride  4. Urology referral for urinary retention  5. Close follow-up for response, if not improving, will need inpatient diuresis    Signed:  Masoud Albarado MD  Cardiology Fellow  Pager - [phone]  8/7/2019 1:05 PM

## 2019-08-08 DIAGNOSIS — I48.0 PAF (PAROXYSMAL ATRIAL FIBRILLATION): Primary | ICD-10-CM

## 2019-08-14 ENCOUNTER — TELEPHONE (OUTPATIENT)
Dept: CARDIOLOGY | Facility: CLINIC | Age: 74
End: 2019-08-14

## 2019-08-14 NOTE — TELEPHONE ENCOUNTER
08/14/2019   3:44 PM    Called to follow-up and see how he was doing. His weight is down to 324, he is urinating less frequently and with stronger stream with the flomax, and his leg swelling has improved.     His nurse has not drawn repeat labs yet, will look for those results, based on his response to therapy, no need to go to the hospital. At this time

## 2019-08-19 ENCOUNTER — OFFICE VISIT (OUTPATIENT)
Dept: UROLOGY | Facility: CLINIC | Age: 74
End: 2019-08-19
Payer: MEDICARE

## 2019-08-19 VITALS
HEIGHT: 74 IN | DIASTOLIC BLOOD PRESSURE: 62 MMHG | BODY MASS INDEX: 40.43 KG/M2 | HEART RATE: 60 BPM | SYSTOLIC BLOOD PRESSURE: 122 MMHG | WEIGHT: 315 LBS

## 2019-08-19 DIAGNOSIS — R35.0 URINARY FREQUENCY: ICD-10-CM

## 2019-08-19 DIAGNOSIS — N40.1 BPH WITH URINARY OBSTRUCTION: Primary | ICD-10-CM

## 2019-08-19 DIAGNOSIS — N13.8 BPH WITH URINARY OBSTRUCTION: Primary | ICD-10-CM

## 2019-08-19 DIAGNOSIS — M79.89 SWELLING OF BOTH LOWER EXTREMITIES: ICD-10-CM

## 2019-08-19 DIAGNOSIS — R35.1 NOCTURIA: ICD-10-CM

## 2019-08-19 DIAGNOSIS — R39.198 DIFFICULTY URINATING: ICD-10-CM

## 2019-08-19 LAB
BILIRUB SERPL-MCNC: NORMAL MG/DL
BLOOD URINE, POC: NORMAL
COLOR, POC UA: YELLOW
GLUCOSE UR QL STRIP: NORMAL
KETONES UR QL STRIP: NORMAL
LEUKOCYTE ESTERASE URINE, POC: NORMAL
NITRITE, POC UA: NORMAL
PH, POC UA: 6
POC RESIDUAL URINE VOLUME: 17 ML (ref 0–100)
PROTEIN, POC: NORMAL
SPECIFIC GRAVITY, POC UA: 1.01
UROBILINOGEN, POC UA: NORMAL

## 2019-08-19 PROCEDURE — 51798 US URINE CAPACITY MEASURE: CPT | Mod: S$GLB,,, | Performed by: NURSE PRACTITIONER

## 2019-08-19 PROCEDURE — 81002 URINALYSIS NONAUTO W/O SCOPE: CPT | Mod: S$GLB,,, | Performed by: NURSE PRACTITIONER

## 2019-08-19 PROCEDURE — 3074F PR MOST RECENT SYSTOLIC BLOOD PRESSURE < 130 MM HG: ICD-10-PCS | Mod: CPTII,S$GLB,, | Performed by: NURSE PRACTITIONER

## 2019-08-19 PROCEDURE — 99203 OFFICE O/P NEW LOW 30 MIN: CPT | Mod: 25,S$GLB,, | Performed by: NURSE PRACTITIONER

## 2019-08-19 PROCEDURE — 1101F PR PT FALLS ASSESS DOC 0-1 FALLS W/OUT INJ PAST YR: ICD-10-PCS | Mod: CPTII,S$GLB,, | Performed by: NURSE PRACTITIONER

## 2019-08-19 PROCEDURE — 99999 PR PBB SHADOW E&M-EST. PATIENT-LVL V: ICD-10-PCS | Mod: PBBFAC,,, | Performed by: NURSE PRACTITIONER

## 2019-08-19 PROCEDURE — 1101F PT FALLS ASSESS-DOCD LE1/YR: CPT | Mod: CPTII,S$GLB,, | Performed by: NURSE PRACTITIONER

## 2019-08-19 PROCEDURE — 3074F SYST BP LT 130 MM HG: CPT | Mod: CPTII,S$GLB,, | Performed by: NURSE PRACTITIONER

## 2019-08-19 PROCEDURE — 99999 PR PBB SHADOW E&M-EST. PATIENT-LVL V: CPT | Mod: PBBFAC,,, | Performed by: NURSE PRACTITIONER

## 2019-08-19 PROCEDURE — 81002 POCT URINE DIPSTICK WITHOUT MICROSCOPE: ICD-10-PCS | Mod: S$GLB,,, | Performed by: NURSE PRACTITIONER

## 2019-08-19 PROCEDURE — 3078F DIAST BP <80 MM HG: CPT | Mod: CPTII,S$GLB,, | Performed by: NURSE PRACTITIONER

## 2019-08-19 PROCEDURE — 99203 PR OFFICE/OUTPT VISIT, NEW, LEVL III, 30-44 MIN: ICD-10-PCS | Mod: 25,S$GLB,, | Performed by: NURSE PRACTITIONER

## 2019-08-19 PROCEDURE — 51798 PR MEAS,POST-VOID RES,US,NON-IMAGING: ICD-10-PCS | Mod: S$GLB,,, | Performed by: NURSE PRACTITIONER

## 2019-08-19 PROCEDURE — 3078F PR MOST RECENT DIASTOLIC BLOOD PRESSURE < 80 MM HG: ICD-10-PCS | Mod: CPTII,S$GLB,, | Performed by: NURSE PRACTITIONER

## 2019-08-19 RX ORDER — ERGOCALCIFEROL 1.25 MG/1
50000 CAPSULE ORAL
COMMUNITY
End: 2019-09-18

## 2019-08-19 RX ORDER — SILDENAFIL 100 MG/1
100 TABLET, FILM COATED ORAL WEEKLY
COMMUNITY
End: 2021-07-07

## 2019-08-19 NOTE — PATIENT INSTRUCTIONS
BPH (Enlarged Prostate)  The prostate is a gland at the base of the bladder. As some men get older, the prostate may get bigger in size. This problem is called benign prostatic hyperplasia (BPH). BPH puts pressure on the urethra. This is the tube that carries urine from the bladder to the penis. It may interfere with the flow of urine. It may also keep the bladder from emptying fully.    Symptoms of BPH include trouble starting urination and feeling as though the bladder isnt emptying all the way. It also includes a weak urine stream, dribbling and leaking of urine, and frequent and urgent urination (especially at night). BPH can increase the risk of urinary infections. It can also block off urine flow completely. If this occurs, a thin tube (catheter) may be passed into the bladder to help drain urine.  If symptoms are mild, no treatment may be needed right now. If symptoms are more severe, treatment is likely needed. The goal of treatment is to improve urine flow and reduce symptoms. Treatments can include medicine and procedures. Your healthcare provider will discuss treatment options with you as needed.  Home care  The following guidelines will help you care for yourself at home:  · Urinate as soon as you feel the urge. Don't try to hold your urine.  · Don't limit your fluid intake during the day. Drink 6 to 8 glasses of water or liquids a day. This prevents bacteria from building up in the bladder.  · Avoid drinking fluids after dinner to help reduce urination during the night.  · Avoid medicines that can worsen your symptoms. These include certain cold and allergy medicines and antidepressants. Diuretics used for high blood pressure can also worsen symptoms. Talk to your doctor about the medicines you take. Other choices may work better for you.  Prostate cancer screening  BPH does not increase the risk of prostate cancer. But because prostate cancer is a common cancer in men, screening is sometimes  recommended. This may help detect the cancer in its early stages when treatment is most effective. Factors that can increase the risk of prostate cancer include being -American or having a father or brother who had prostate cancer. A high-fat diet may also increase the risk of prostate cancer. Talk to your healthcare provider to see whether you should be screened for prostate cancer.  Follow-up care  Follow up with your healthcare provider, or as advised  To learn more, go to:  · National Kidney & Urologic Diseases Information Clearinghouse  kidney.niddk.nih.gov, 547.859.6886  When to seek medical advice  Call your healthcare provider right away if any of these occur:  · Fever of 100.4°F (38.0°C) or higher, or as advised  · Unable to pass urine for 8 hours  · Increasing pressure or pain in your bladder (lower abdomen)  · Blood in the urine  · Increasing low back pain, not related to injury  · Symptoms of urinary infection (increased urge to urinate, burning when passing urine, foul-smelling urine)  Date Last Reviewed: 7/1/2016  © 8891-6515 [employer]. [address] [place] [address]. All rights reserved. This information is not intended as a substitute for professional medical care. Always follow your healthcare professional's instructions.        Prostate Problems and Related Urinary Symptoms    Many men have problems with the prostate at some time in their lives. The prostate gland is part of the male reproductive system. Its located just below the bladder. The prostate surrounds the urethra (the tube that carries urine and semen out of the body). The main function of the prostate gland is to add fluid to the semen. When problems occur in the prostate, the bladder and urethra are often affected as well. The most common prostate problems are described below.  BPH  BPH (benign prostatic hyperplasia) develops when changing hormone levels cause the prostate to grow larger. This often  starts around age 50. Excess tissue can block the urethra, making it harder for urine to flow. The enlarged prostate can also press on the bladder, so you may need to urinate more often. Other symptoms include straining during urination, a weak urine stream, urinating more at night, incontinence, dribbling at the end of urination, and feeling that the bladder isnt emptying all the way. Note that BPH is not cancer and does not cause cancer.  How BPH affects the bladder  Pushing to urinate through a narrowed urethra can cause the bladder walls to thicken or stretch out of shape. A stretched bladder may have problems emptying all the way. If urine stays in the bladder longer than it should, you may develop an infection or bladder stones. Also, the kidneys cant drain properly into a bladder that doesnt empty completely. This can lead to kidney failure. Pressure from urine buildup can also cause leaking of urine (called overflow incontinence).  Other prostate problems  · Prostatitis is an infection or inflammation that causes the prostate to become painful and swollen. The swelling narrows the urethra and can block the bladder neck. Prostatitis can cause a burning sensation during urination. You may also feel pressure or pain in the genital area. In some cases, prostatitis can cause fever and chills, and can make you very sick.  · Cancer occurs when abnormal cells form a tumor (a lump of cells that grow uncontrolled). Some tumors can be felt during a physical exam, others cant. Prostate cancer often causes no symptoms at all, especially in its early stages. Prostate symptoms are more likely to be caused by a problem that is NOT cancer.   Date Last Reviewed: 1/1/2017 © 2000-2017 [employer]. [address] [place] [address], [place] [address]. All rights reserved. This information is not intended as a substitute for professional medical care. Always follow your healthcare professional's instructions.

## 2019-08-19 NOTE — LETTER
August 19, 2019      Masoud Albarado MD  [address]  [facility] [address]           [facility] - Urology 4th Floor  [address]  [facility] [address]  Phone: [phone]          Patient: Houston Jc   MR Number: [id number]   YOB: 1945   Date of Visit: 8/19/2019       Dear Dr. Masoud Albarado:    Thank you for referring Houston Jc to me for evaluation. Attached you will find relevant portions of my assessment and plan of care.    If you have questions, please do not hesitate to call me. I look forward to following Houston Jc along with you.    Sincerely,    Sirena Jordan, NP    Enclosure  CC:  No Recipients    If you would like to receive this communication electronically, please contact [email or web address] or [phone] to request more information on [email or web address] Link access.    For providers and/or their staff who would like to refer a patient to [facility], please contact us through our one-stop-shop provider referral line, [facility] , at [phone].    If you feel you have received this communication in error or would no longer like to receive these types of communications, please e-mail [email or web address]

## 2019-08-20 ENCOUNTER — PATIENT MESSAGE (OUTPATIENT)
Dept: CARDIOLOGY | Facility: HOSPITAL | Age: 74
End: 2019-08-20

## 2019-08-21 ENCOUNTER — PATIENT MESSAGE (OUTPATIENT)
Dept: CARDIOLOGY | Facility: CLINIC | Age: 74
End: 2019-08-21

## 2019-08-22 ENCOUNTER — TELEPHONE (OUTPATIENT)
Dept: CARDIOLOGY | Facility: CLINIC | Age: 74
End: 2019-08-22

## 2019-08-22 NOTE — TELEPHONE ENCOUNTER
I called Mr. Jc back and explain to him that I faxed his Rx to the DME department so it can be delivered to his home as he wanted.    Cristina

## 2019-08-28 ENCOUNTER — TELEPHONE (OUTPATIENT)
Dept: CARDIOLOGY | Facility: HOSPITAL | Age: 74
End: 2019-08-28

## 2019-08-28 NOTE — TELEPHONE ENCOUNTER
Returned a call to pt's wife Hue this afternoon who stated she received a message stating the pt's remote ICD home monitor was not connected.  Unfortunately, at the time I returned the call their Electricity had just gone out.  Patient's monitor can not work without being connected in a workable electrical outlet.  Patient's wife was instructed to contact [employer] for assistance once the electricity has come back on as to the wife was unsuccessful in sending a manual transmission.  Patient's wife verbalized understanding to all of the above and acknowledged she has the # for [employer].

## 2019-09-18 ENCOUNTER — OFFICE VISIT (OUTPATIENT)
Dept: CARDIOLOGY | Facility: CLINIC | Age: 74
End: 2019-09-18
Payer: MEDICARE

## 2019-09-18 VITALS
WEIGHT: 315 LBS | HEART RATE: 60 BPM | DIASTOLIC BLOOD PRESSURE: 55 MMHG | BODY MASS INDEX: 40.43 KG/M2 | HEIGHT: 74 IN | SYSTOLIC BLOOD PRESSURE: 114 MMHG

## 2019-09-18 DIAGNOSIS — I50.43 ACUTE ON CHRONIC HEART FAILURE WITH REDUCED EJECTION FRACTION AND DIASTOLIC DYSFUNCTION: Primary | ICD-10-CM

## 2019-09-18 PROCEDURE — 1101F PR PT FALLS ASSESS DOC 0-1 FALLS W/OUT INJ PAST YR: ICD-10-PCS | Mod: CPTII,S$GLB,, | Performed by: INTERNAL MEDICINE

## 2019-09-18 PROCEDURE — 1159F MED LIST DOCD IN RCRD: CPT | Mod: S$GLB,,, | Performed by: INTERNAL MEDICINE

## 2019-09-18 PROCEDURE — 3078F DIAST BP <80 MM HG: CPT | Mod: CPTII,S$GLB,, | Performed by: INTERNAL MEDICINE

## 2019-09-18 PROCEDURE — 1126F AMNT PAIN NOTED NONE PRSNT: CPT | Mod: S$GLB,,, | Performed by: INTERNAL MEDICINE

## 2019-09-18 PROCEDURE — 3074F SYST BP LT 130 MM HG: CPT | Mod: CPTII,S$GLB,, | Performed by: INTERNAL MEDICINE

## 2019-09-18 PROCEDURE — 3074F PR MOST RECENT SYSTOLIC BLOOD PRESSURE < 130 MM HG: ICD-10-PCS | Mod: CPTII,S$GLB,, | Performed by: INTERNAL MEDICINE

## 2019-09-18 PROCEDURE — 99999 PR PBB SHADOW E&M-EST. PATIENT-LVL IV: ICD-10-PCS | Mod: PBBFAC,,, | Performed by: INTERNAL MEDICINE

## 2019-09-18 PROCEDURE — 99213 PR OFFICE/OUTPT VISIT, EST, LEVL III, 20-29 MIN: ICD-10-PCS | Mod: S$GLB,,, | Performed by: INTERNAL MEDICINE

## 2019-09-18 PROCEDURE — 1126F PR PAIN SEVERITY QUANTIFIED, NO PAIN PRESENT: ICD-10-PCS | Mod: S$GLB,,, | Performed by: INTERNAL MEDICINE

## 2019-09-18 PROCEDURE — 1159F PR MEDICATION LIST DOCUMENTED IN MEDICAL RECORD: ICD-10-PCS | Mod: S$GLB,,, | Performed by: INTERNAL MEDICINE

## 2019-09-18 PROCEDURE — 99213 OFFICE O/P EST LOW 20 MIN: CPT | Mod: S$GLB,,, | Performed by: INTERNAL MEDICINE

## 2019-09-18 PROCEDURE — 3078F PR MOST RECENT DIASTOLIC BLOOD PRESSURE < 80 MM HG: ICD-10-PCS | Mod: CPTII,S$GLB,, | Performed by: INTERNAL MEDICINE

## 2019-09-18 PROCEDURE — 1101F PT FALLS ASSESS-DOCD LE1/YR: CPT | Mod: CPTII,S$GLB,, | Performed by: INTERNAL MEDICINE

## 2019-09-18 PROCEDURE — 99999 PR PBB SHADOW E&M-EST. PATIENT-LVL IV: CPT | Mod: PBBFAC,,, | Performed by: INTERNAL MEDICINE

## 2019-09-18 RX ORDER — ACETAMINOPHEN 500 MG
1 TABLET ORAL DAILY
COMMUNITY

## 2019-09-18 RX ORDER — INSULIN ASPART 100 [IU]/ML
4-8 INJECTION, SOLUTION INTRAVENOUS; SUBCUTANEOUS
COMMUNITY

## 2019-09-18 RX ORDER — TORSEMIDE 20 MG/1
40 TABLET ORAL DAILY
Qty: 60 TABLET | Refills: 11 | Status: SHIPPED | OUTPATIENT
Start: 2019-09-18 | End: 2021-05-07 | Stop reason: SDUPTHER

## 2019-09-18 NOTE — PROGRESS NOTES
Cardiology Clinic Note  Reason for Visit: Follow up    HPI:   Mr. Houston Jc is a 74 y.o. gentleman with history of HTN, Non ischemic Cardiomyopathy, CHF (EF 30% grade 3 diastolic dysfunction), Complete heart block, CRT-D placement 7/18/2019, LUE DVT, and DM who presents for follow up, last seen in clinic 8/7/2019 when he was found to have volume overload and his diuretic regimen was changed to Torsemide 40 mg BID from lasix 40 mg po BID. He was also started on flomax and finasteride for BPH symptoms and referred to urology. He reports improvement in his breathing, denies any PND, sleeps on 2 pillows for comfort. He gets short of breath when he tries to do something really fast but day to day activities give him no issues.     Outside labs done on 9/16 show BUN/crt 62/3.5 and potassium 4.1. He denies any dizziness,syncope, chest pain.       ROS:    Review of Systems   Constitution: Negative for decreased appetite and fever.   HENT: Negative for hoarse voice and nosebleeds.    Eyes: Negative for blurred vision and photophobia.   Cardiovascular: Negative for chest pain, dyspnea on exertion, irregular heartbeat, orthopnea and palpitations.   Respiratory: Negative for cough and shortness of breath.    Hematologic/Lymphatic: Negative for bleeding problem. Does not bruise/bleed easily.   Skin: Negative for itching and rash.   Musculoskeletal: Negative for joint swelling and neck pain.   Gastrointestinal: Negative for abdominal pain, hematemesis, hematochezia, nausea and vomiting.   Genitourinary: Negative for hematuria.   Neurological: Negative for light-headedness and seizures.   Psychiatric/Behavioral: Negative for altered mental status. The patient is not nervous/anxious.      PMH:     Past Medical History:   Diagnosis Date    CHF (congestive heart failure)     Coronary artery disease     Diabetes mellitus     Hypertension      Past Surgical History:   Procedure Laterality Date    INSERTION, ICD,  BIVENTRICULAR Left 7/18/2019    Performed by [facility] at [facility] EP LAB    Insertion, Pacemaker, Temporary Transvenous N/A 7/9/2019    Performed by [facility] at [facility] CATH LAB/EP    Left heart cath N/A 7/16/2019    Performed by Dejuan Cody MD at [facility] CATH LAB/EP     Allergies:   Review of patient's allergies indicates:  No Known Allergies  Medications:     Current Outpatient Medications on File Prior to Visit   Medication Sig Dispense Refill    allopurinol (ZYLOPRIM) 300 MG tablet Take 300 mg by mouth once daily.      alogliptin benzoate (ALOGLIPTIN ORAL) Take 25 mg by mouth once daily.      amiodarone (PACERONE) 200 MG Tab Take 2 tablet (400 mg total) by mouth 2 (two) times daily for 10 days. Afterwards take 2 tablet by mouth (400mg total) until you follow up with your cardiologist. 80 tablet 1    aspirin (ECOTRIN) 81 MG EC tablet Take 1 tablet (81 mg total) by mouth once daily.  0    atorvastatin (LIPITOR) 80 MG tablet Take 1 tablet (80 mg total) by mouth once daily. 90 tablet 3    carvedilol (COREG) 12.5 MG tablet Take 1 tablet (12.5 mg total) by mouth 2 (two) times daily. 60 tablet 11    ergocalciferol (ERGOCALCIFEROL) 50,000 unit Cap Take 50,000 Units by mouth every 7 days.      finasteride (PROSCAR) 5 mg tablet Take 1 tablet (5 mg total) by mouth once daily. 30 tablet 11    insulin NPH-insulin regular, 70/30, (NOVOLIN 70/30) 100 unit/mL (70-30) injection Inject into the skin 3 (three) times daily.      losartan (COZAAR) 25 MG tablet Take 1 tablet (25 mg total) by mouth once daily. 90 tablet 3    magnesium oxide (MAG-OXIDE ORAL) Take 420 mg by mouth.      sildenafil (VIAGRA) 100 MG tablet Take 100 mg by mouth once a week.      spironolactone (ALDACTONE) 12.5 MG tablet Take 12.5 mg by mouth once daily.      tamsulosin (FLOMAX) 0.4 mg Cap Take 1 capsule (0.4 mg total) by mouth once daily. 30 capsule 11    torsemide (DEMADEX) 20 MG Tab Take 2 tablets (40 mg total) by mouth 2  "(two) times daily. 120 tablet 11    VENTOLIN HFA 90 mcg/actuation inhaler Inhale 1 puff into the lungs every 4 (four) hours as needed.       walker Misc 5" wheel rolling walker 1 each 1     No current facility-administered medications on file prior to visit.      Social History:     Social History     Tobacco Use    Smoking status: Former Smoker    Smokeless tobacco: Never Used   Substance Use Topics    Alcohol use: Yes     Comment: social     Family History:     Family History   Problem Relation Age of Onset    Heart attack Father      Physical Exam:   There were no vitals taken for this visit.     Physical Exam  General: alert, awake and oriented x 3  Eyes:PERRL.   Neck:no JVD   Lungs:  clear to auscultation bilaterally   Cardiovascular: Heart: regular rate and rhythm, S1, S2 normal, no murmur, click, rub or gallop.   Chest Wall: no tenderness.   Pulses-2+ radial, 1 +  DP, PT b/l  Extremities: no cyanosis trace edema.   Abdomen/Rectal: Abdomen: soft, non-tender non-distented; bowel sounds normal  Neurologic: Normal strength and tone. No focal numbness or weakness  Labs:     Lab Results   Component Value Date     (H) 08/07/2019    K 4.0 08/07/2019     08/07/2019    CO2 32 (H) 08/07/2019    BUN 29 (H) 08/07/2019    CREATININE 2.2 (H) 08/07/2019    ANIONGAP 10 08/07/2019     Lab Results   Component Value Date    HGBA1C 7.8 (H) 07/17/2019     Lab Results   Component Value Date    BNP 1,142 (H) 07/17/2019     (H) 09/18/2015    Lab Results   Component Value Date    WBC 6.92 07/22/2019    HGB 10.8 (L) 07/22/2019    HCT 34.5 (L) 07/22/2019     07/22/2019    GRAN 4.9 07/22/2019    GRAN 70.5 07/22/2019     Lab Results   Component Value Date    CHOL 82 (L) 07/13/2019    HDL 27 (L) 07/13/2019    LDLCALC 38.0 (L) 07/13/2019    TRIG 85 07/13/2019            Assessment/plan:   1. Acute on chronic heart failure in the setting of NICM EF 30%, now s/p [employer] CRT-D on 07/18/19  Non ischemic " cardiomyopathy  Decrease Torsemide to 40 mg po Daily  BMP in 1 week  Continue ARB, spironolactone and Coreg   Diet and fluid restriction emphasized  Daily weights    2. CLEMENCIA on CKD 3 likely pre-renal due to over diuresis   BMP in 1 week  Decrease Torsemide as above    3. Urinary retention  Appreciate urology's assistance  Continue finasteride and flomax    4. History of VT -- controlled on amio  Next appointment with arrhythmia clinic 9/8        Follow up in 2 months    ROCIO ASHLEY  CARDIOLOGY FELLOW, PGY 5  [phone]

## 2019-09-26 ENCOUNTER — TELEPHONE (OUTPATIENT)
Dept: CARDIOLOGY | Facility: CLINIC | Age: 74
End: 2019-09-26

## 2019-09-26 NOTE — TELEPHONE ENCOUNTER
Called patient and informed him the result of his bloodwork which shows improvement in his creatinine from an outside lab from 3.5 to 2.. However still not at baseline-[facility]-asked patient to fax reports here and continue current medical regimen.

## 2019-11-06 PROBLEM — Z95.810 CARDIAC RESYNCHRONIZATION THERAPY DEFIBRILLATOR (CRT-D) IN PLACE: Status: ACTIVE | Noted: 2019-07-19

## 2019-11-06 PROBLEM — I42.8 NICM (NONISCHEMIC CARDIOMYOPATHY): Status: ACTIVE | Noted: 2019-11-06

## 2019-11-06 NOTE — PROGRESS NOTES
Mr. Jc is a patient of Dr. Bay      Subjective:   Patient ID:  Houston Jc is a 74 y.o. male who presents for follow-up of Cardiomyopathy  .     HPI:    Mr. Jc is a 74 y.o. male with HTN, CHF, DM, CHB, NICM here for follow up after CRT-D implantation.     Background:    Cardiologist: Dejuan Cody MD    75 y/o AA male with PMH of HTN, CHF and DM presented to hospital with weakness, fatigue and dyspnea. He was found to be in CHB. No chest pain. No history of MI. No recent illness.   07/09/2019 S/p R IJ Temporary pacemaker for CH, rate 80bpm. BB on hold.  07/10/2019 Patient with frequent runs of VT overnight. TNI pending. NPO for LHC today. Amio infusing. LVEF 30% with RV dysfunction and global hypokinesis.  07/11/2019 Remains in CHB with runs of SVT (chronic RBB that resembles VT) and short runs of nonsustained VT. Amio continued.   07/12/2019 Patient remains in CHB, Hemodynamically stable with TVP. No CV complaints. Amiodarone converted to oral. No further VT.   07/16/2019 S/p LHC, which found luminal irregularities. Plan: Proceed with Bi ventricular ICD  07/17/2019 DVT to LUE despite Lovenox dosed for DVT prophylaxis. Patient transferred to [facility] for BiVICD implantation.   7/18/2019: Dr. Bay successfully implanted CRT-D device, with RV septal lead placed in LV lead port. Was discharged on PO amiodarone.     Update (11/08/2019):    Today he says he feels better since getting his device  - weakness has resolved. Blurry vision has resolved. Less GALLARDO, although he does have this some days. Denies CP, palpitations, LH, syncope. He says he has significantly more energy. He has some soreness around his device site when he sleeps on it at night.    He is currently on amiodarone 200mg AM and 400mg PM. Creatinine 2.9 9/2019. LFTs are mildly elevated 7/2019. Needs updated TSH. No PFTs on file. He gets annual eye exams.     Device Interrogation (11/8/2019) reveals an intrinsic SR with CHB, no escape, with  stable lead and device function. No arrhythmias or treated episodes were noted. He paces 97% in the RA and 100% in the BiV. RV septal lead in LV port. Estimated battery longevity 6.5-6.9 years.     I have personally reviewed the patient's EKG today, which shows APVP at 60bpm. AL interval is 190. QRS is 162. QT is 558. No significant change from prior EKGs during hospitalization.    Recent Cardiac Tests:    2D Echo (7/21/2019):  · Moderately decreased left ventricular systolic function. The estimated ejection fraction is 30%  · Eccentric left ventricular hypertrophy.  · Global hypokinetic wall motion.  · Grade II (moderate) left ventricular diastolic dysfunction consistent with pseudonormalization.  · Elevated left atrial pressure.  · Mildly reduced right ventricular systolic function.  · Biatrial enlargement.  · Mild right ventricular enlargement.  · Mild tricuspid regurgitation.  · The estimated PA systolic pressure is 50 mm Hg  · Pulmonary hypertension present.  · Normal central venous pressure (3 mm Hg).    Current Outpatient Medications   Medication Sig    allopurinol (ZYLOPRIM) 300 MG tablet Take 300 mg by mouth once daily.    alogliptin benzoate (ALOGLIPTIN ORAL) Take 25 mg by mouth once daily.    amiodarone (PACERONE) 200 MG Tab Take 2 tablet (400 mg total) by mouth 2 (two) times daily for 10 days. Afterwards take 2 tablet by mouth (400mg total) until you follow up with your cardiologist. Patient taking 200mg/400mg    aspirin (ECOTRIN) 81 MG EC tablet Take 1 tablet (81 mg total) by mouth once daily.    atorvastatin (LIPITOR) 80 MG tablet Take 1 tablet (80 mg total) by mouth once daily.    carvedilol (COREG) 12.5 MG tablet Take 1 tablet (12.5 mg total) by mouth 2 (two) times daily.    cholecalciferol, vitamin D3, (VITAMIN D3) 2,000 unit Cap Take 1 capsule by mouth once daily.    finasteride (PROSCAR) 5 mg tablet Take 1 tablet (5 mg total) by mouth once daily.    insulin aspart U-100 (NOVOLOG) 100  "unit/mL injection Inject into the skin 3 (three) times daily before meals. PUMP    losartan (COZAAR) 25 MG tablet Take 1 tablet (25 mg total) by mouth once daily.    magnesium oxide (MAG-OXIDE ORAL) Take 420 mg by mouth.    potassium chloride (K-TAB) 20 mEq Take by mouth once daily.    sildenafil (VIAGRA) 100 MG tablet Take 100 mg by mouth once a week.    spironolactone (ALDACTONE) 12.5 MG tablet Take 12.5 mg by mouth once daily.    tamsulosin (FLOMAX) 0.4 mg Cap Take 1 capsule (0.4 mg total) by mouth once daily.    torsemide (DEMADEX) 20 MG Tab Take 2 tablets (40 mg total) by mouth once daily. Take additional 2 tablets if weight gain of more than 3 pounds in 1 day    VENTOLIN HFA 90 mcg/actuation inhaler Inhale 1 puff into the lungs every 4 (four) hours as needed.     walker Misc 5" wheel rolling walker     No current facility-administered medications for this visit.      Review of Systems   Constitution: Negative for malaise/fatigue.   Cardiovascular: Negative for chest pain, dyspnea on exertion, irregular heartbeat, leg swelling and palpitations.   Respiratory: Negative for shortness of breath.    Hematologic/Lymphatic: Negative for bleeding problem.   Skin: Negative for rash.   Musculoskeletal: Negative for myalgias.   Gastrointestinal: Negative for hematemesis, hematochezia and nausea.   Genitourinary: Negative for hematuria.   Neurological: Negative for light-headedness.   Psychiatric/Behavioral: Negative for altered mental status.   Allergic/Immunologic: Negative for persistent infections.     Objective:        /60   Pulse 60   Ht 6' 2" (1.88 m)   Wt (!) 141 kg (310 lb 13.6 oz)   BMI 39.91 kg/m²     Physical Exam   Constitutional: He is oriented to person, place, and time. He appears well-developed and well-nourished.   HENT:   Head: Normocephalic.   Nose: Nose normal.   Eyes: Pupils are equal, round, and reactive to light.   Cardiovascular: Normal rate, regular rhythm, S1 normal and S2 " normal.   No murmur heard.  Pulses:       Radial pulses are 2+ on the right side, and 2+ on the left side.   Pulmonary/Chest: Breath sounds normal. No respiratory distress.   Device to LUCW. Incision well healed and pocket in good repair.   Abdominal: Normal appearance.   Musculoskeletal: Normal range of motion. He exhibits no edema.   Neurological: He is alert and oriented to person, place, and time.   Skin: Skin is warm and dry. No erythema.   Psychiatric: He has a normal mood and affect. His speech is normal and behavior is normal.   Nursing note and vitals reviewed.    Lab Results   Component Value Date     09/25/2019    K 4.4 09/25/2019    MG 1.8 07/22/2019    BUN 56 (H) 09/25/2019    CREATININE 2.92 (H) 09/25/2019    ALT 60 (H) 07/22/2019    AST 60 (H) 07/22/2019    HGB 10.8 (L) 07/22/2019    HCT 34.5 (L) 07/22/2019    TSH 1.485 09/19/2015    LDLCALC 38.0 (L) 07/13/2019       Recent Labs   Lab 07/17/19  0503 07/18/19  0219   INR 1.0 1.0       Assessment:     1. Cardiac resynchronization therapy defibrillator (CRT-D) in place    2. Complete heart block    3. VT (ventricular tachycardia)    4. Morbid obesity    5. Obstructive sleep apnea syndrome    6. NICM (nonischemic cardiomyopathy)    7. At risk for amiodarone toxicity with long term use      Plan:     In summary, Mr. Jc is a 74 y.o. male with HTN, CHF, DM, CHB, NICM here for follow up after CRT-D implantation.   He is now ~3.5 months since device implantation. Mr. Jc is doing well from a device perspective with stable lead and device function. 100% V pacing (RV septal lead in LV port). No arrhythmia noted. On amiodarone for VT observed on telemetry while in hospital. No arrhythmias on device. He is currently taking 200mg AM and 400mg PM. Will reduce to 200mg BID. Echo in 6 months to evaluate LV function.    Reduce amiodarone to 200mg BID.  Needs TSH. Baseline PFTs. Repeat LFTs  Repeat echo in 6 mo.   Continue current medication regimen and  device settings.   Follow up in device clinic as scheduled.   Follow up in EP clinic in 6 months, sooner as needed.     *A copy of this note has been sent to Dr. Bay*    Follow up in about 6 months (around 5/8/2020).    ------------------------------------------------------------------    LOUISA Preston, NP-C  Cardiac Electrophysiology

## 2019-11-08 ENCOUNTER — CLINICAL SUPPORT (OUTPATIENT)
Dept: CARDIOLOGY | Facility: HOSPITAL | Age: 74
End: 2019-11-08
Attending: INTERNAL MEDICINE
Payer: MEDICARE

## 2019-11-08 ENCOUNTER — OFFICE VISIT (OUTPATIENT)
Dept: CARDIOLOGY | Facility: CLINIC | Age: 74
End: 2019-11-08
Payer: MEDICARE

## 2019-11-08 ENCOUNTER — HOSPITAL ENCOUNTER (OUTPATIENT)
Dept: CARDIOLOGY | Facility: CLINIC | Age: 74
Discharge: HOME | End: 2019-11-08
Attending: INTERNAL MEDICINE
Payer: MEDICARE

## 2019-11-08 ENCOUNTER — OFFICE VISIT (OUTPATIENT)
Dept: ELECTROPHYSIOLOGY | Facility: CLINIC | Age: 74
End: 2019-11-08
Attending: INTERNAL MEDICINE
Payer: MEDICARE

## 2019-11-08 VITALS
WEIGHT: 315 LBS | HEART RATE: 60 BPM | OXYGEN SATURATION: 98 % | SYSTOLIC BLOOD PRESSURE: 143 MMHG | HEIGHT: 72 IN | BODY MASS INDEX: 42.66 KG/M2 | DIASTOLIC BLOOD PRESSURE: 71 MMHG

## 2019-11-08 VITALS
DIASTOLIC BLOOD PRESSURE: 60 MMHG | WEIGHT: 310.88 LBS | SYSTOLIC BLOOD PRESSURE: 112 MMHG | BODY MASS INDEX: 39.9 KG/M2 | HEIGHT: 74 IN | HEART RATE: 60 BPM

## 2019-11-08 DIAGNOSIS — I44.2 COMPLETE HEART BLOCK: ICD-10-CM

## 2019-11-08 DIAGNOSIS — I47.20 VT (VENTRICULAR TACHYCARDIA): ICD-10-CM

## 2019-11-08 DIAGNOSIS — G47.33 OBSTRUCTIVE SLEEP APNEA SYNDROME: ICD-10-CM

## 2019-11-08 DIAGNOSIS — I50.43 ACUTE ON CHRONIC HEART FAILURE WITH REDUCED EJECTION FRACTION AND DIASTOLIC DYSFUNCTION: Primary | ICD-10-CM

## 2019-11-08 DIAGNOSIS — Z91.89 AT RISK FOR AMIODARONE TOXICITY WITH LONG TERM USE: ICD-10-CM

## 2019-11-08 DIAGNOSIS — I42.8 NICM (NONISCHEMIC CARDIOMYOPATHY): ICD-10-CM

## 2019-11-08 DIAGNOSIS — Z79.899 AT RISK FOR AMIODARONE TOXICITY WITH LONG TERM USE: ICD-10-CM

## 2019-11-08 DIAGNOSIS — I48.0 PAF (PAROXYSMAL ATRIAL FIBRILLATION): ICD-10-CM

## 2019-11-08 DIAGNOSIS — Z95.810 CARDIAC RESYNCHRONIZATION THERAPY DEFIBRILLATOR (CRT-D) IN PLACE: Primary | ICD-10-CM

## 2019-11-08 DIAGNOSIS — E66.01 MORBID OBESITY: ICD-10-CM

## 2019-11-08 PROCEDURE — 3074F SYST BP LT 130 MM HG: CPT | Mod: CPTII,S$GLB,, | Performed by: NURSE PRACTITIONER

## 2019-11-08 PROCEDURE — 1101F PR PT FALLS ASSESS DOC 0-1 FALLS W/OUT INJ PAST YR: ICD-10-PCS | Mod: CPTII,S$GLB,, | Performed by: NURSE PRACTITIONER

## 2019-11-08 PROCEDURE — 99999 PR PBB SHADOW E&M-EST. PATIENT-LVL III: CPT | Mod: PBBFAC,GC,, | Performed by: STUDENT IN AN ORGANIZED HEALTH CARE EDUCATION/TRAINING PROGRAM

## 2019-11-08 PROCEDURE — 99214 OFFICE O/P EST MOD 30 MIN: CPT | Mod: S$GLB,,, | Performed by: NURSE PRACTITIONER

## 2019-11-08 PROCEDURE — 99213 OFFICE O/P EST LOW 20 MIN: CPT | Mod: PBBFAC,25 | Performed by: STUDENT IN AN ORGANIZED HEALTH CARE EDUCATION/TRAINING PROGRAM

## 2019-11-08 PROCEDURE — 3078F PR MOST RECENT DIASTOLIC BLOOD PRESSURE < 80 MM HG: ICD-10-PCS | Mod: CPTII,S$GLB,, | Performed by: NURSE PRACTITIONER

## 2019-11-08 PROCEDURE — 93005 ELECTROCARDIOGRAM TRACING: CPT | Mod: S$GLB,,, | Performed by: INTERNAL MEDICINE

## 2019-11-08 PROCEDURE — 93005 ELECTROCARDIOGRAM TRACING: CPT | Mod: PBBFAC | Performed by: INTERNAL MEDICINE

## 2019-11-08 PROCEDURE — 99213 PR OFFICE/OUTPT VISIT, EST, LEVL III, 20-29 MIN: ICD-10-PCS | Mod: 25,GC,S$GLB, | Performed by: STUDENT IN AN ORGANIZED HEALTH CARE EDUCATION/TRAINING PROGRAM

## 2019-11-08 PROCEDURE — 99999 PR PBB SHADOW E&M-EST. PATIENT-LVL III: CPT | Mod: PBBFAC,,, | Performed by: NURSE PRACTITIONER

## 2019-11-08 PROCEDURE — 93010 ELECTROCARDIOGRAM REPORT: CPT | Mod: S$GLB,,, | Performed by: INTERNAL MEDICINE

## 2019-11-08 PROCEDURE — 99999 PR PBB SHADOW E&M-EST. PATIENT-LVL III: ICD-10-PCS | Mod: PBBFAC,,, | Performed by: NURSE PRACTITIONER

## 2019-11-08 PROCEDURE — 3078F DIAST BP <80 MM HG: CPT | Mod: CPTII,S$GLB,, | Performed by: NURSE PRACTITIONER

## 2019-11-08 PROCEDURE — 93010 RHYTHM STRIP: ICD-10-PCS | Mod: S$GLB,,, | Performed by: INTERNAL MEDICINE

## 2019-11-08 PROCEDURE — 3074F PR MOST RECENT SYSTOLIC BLOOD PRESSURE < 130 MM HG: ICD-10-PCS | Mod: CPTII,S$GLB,, | Performed by: NURSE PRACTITIONER

## 2019-11-08 PROCEDURE — 99213 OFFICE O/P EST LOW 20 MIN: CPT | Mod: 25,GC,S$GLB, | Performed by: STUDENT IN AN ORGANIZED HEALTH CARE EDUCATION/TRAINING PROGRAM

## 2019-11-08 PROCEDURE — 99999 PR PBB SHADOW E&M-EST. PATIENT-LVL III: ICD-10-PCS | Mod: PBBFAC,GC,, | Performed by: STUDENT IN AN ORGANIZED HEALTH CARE EDUCATION/TRAINING PROGRAM

## 2019-11-08 PROCEDURE — 99214 PR OFFICE/OUTPT VISIT, EST, LEVL IV, 30-39 MIN: ICD-10-PCS | Mod: S$GLB,,, | Performed by: NURSE PRACTITIONER

## 2019-11-08 PROCEDURE — 1101F PT FALLS ASSESS-DOCD LE1/YR: CPT | Mod: CPTII,S$GLB,, | Performed by: NURSE PRACTITIONER

## 2019-11-08 PROCEDURE — 93005 RHYTHM STRIP: ICD-10-PCS | Mod: S$GLB,,, | Performed by: INTERNAL MEDICINE

## 2019-11-08 PROCEDURE — 93284 PRGRMG EVAL IMPLANTABLE DFB: CPT

## 2019-11-08 RX ORDER — POTASSIUM CHLORIDE 1500 MG/1
TABLET, EXTENDED RELEASE ORAL DAILY
COMMUNITY
End: 2021-07-07

## 2019-11-08 RX ORDER — AMIODARONE HYDROCHLORIDE 200 MG/1
200 TABLET ORAL 2 TIMES DAILY
Qty: 60 TABLET | Refills: 11 | Status: SHIPPED | OUTPATIENT
Start: 2019-11-08 | End: 2020-05-01 | Stop reason: SDUPTHER

## 2019-11-08 NOTE — PROGRESS NOTES
Cardiology Clinic Note  Reason for Visit: Follow up    HPI:   Mr. Houston Jc is a 74 y.o. gentleman with history of HTN, Non ischemic Cardiomyopathy, CHF (EF 30% grade 3 diastolic dysfunction), Complete heart block, CRT-D placement 7/18/2019, LUE DVT, and DM who presents for follow up, last seen in clinic 9/25/2019 when his  diuretic regimen was changed to Torsemide 40 mg BID -> torsemide 40 mg daily.    He reports improvement in his breathing, denies any PND, sleeps on 2 pillows for comfort. Does sometimes get short of breath in the morning when he gets up and moves from the bedroom to the TV, but reports improvement from prior. Weight was previously 340lbs, but stable 310s-320s. Has not required taking an extra dose of Torsemide.        ROS:    Review of Systems   Constitution: Positive for weight loss. Negative for decreased appetite, fever and weight gain.   HENT: Negative for hoarse voice and nosebleeds.    Eyes: Negative for blurred vision and photophobia.   Cardiovascular: Negative for chest pain, dyspnea on exertion, irregular heartbeat, orthopnea and palpitations.   Respiratory: Negative for cough and shortness of breath.    Hematologic/Lymphatic: Negative for bleeding problem. Does not bruise/bleed easily.   Skin: Negative for itching and rash.   Musculoskeletal: Negative for joint swelling and neck pain.   Gastrointestinal: Negative for abdominal pain, hematemesis, hematochezia, nausea and vomiting.   Genitourinary: Negative for hematuria.   Neurological: Negative for light-headedness and seizures.   Psychiatric/Behavioral: Negative for altered mental status. The patient is not nervous/anxious.      PMH:     Past Medical History:   Diagnosis Date    CHF (congestive heart failure)     Coronary artery disease     Diabetes mellitus     Hypertension      Past Surgical History:   Procedure Laterality Date    INSERTION OF BIVENTRICULAR IMPLANTABLE CARDIOVERTER-DEFIBRILLATOR (ICD) Left 7/18/2019     Procedure: INSERTION, ICD, BIVENTRICULAR;  Surgeon: Arturo Bay MD;  Location: [facility] EP LAB;  Service: Cardiology;  Laterality: Left;  CHB, CRTD, SJM, anes, GP, [id number]    LEFT HEART CATHETERIZATION N/A 7/16/2019    Procedure: Left heart cath;  Surgeon: Dejuan Cody MD;  Location: [facility] CATH LAB/EP;  Service: Cardiology;  Laterality: N/A;     Allergies:   Review of patient's allergies indicates:  No Known Allergies  Medications:     Current Outpatient Medications on File Prior to Visit   Medication Sig Dispense Refill    allopurinol (ZYLOPRIM) 300 MG tablet Take 300 mg by mouth once daily.      alogliptin benzoate (ALOGLIPTIN ORAL) Take 25 mg by mouth once daily.      amiodarone (PACERONE) 200 MG Tab Take 1 tablet (200 mg total) by mouth 2 (two) times daily. 60 tablet 11    aspirin (ECOTRIN) 81 MG EC tablet Take 1 tablet (81 mg total) by mouth once daily.  0    atorvastatin (LIPITOR) 80 MG tablet Take 1 tablet (80 mg total) by mouth once daily. 90 tablet 3    carvedilol (COREG) 12.5 MG tablet Take 1 tablet (12.5 mg total) by mouth 2 (two) times daily. 60 tablet 11    cholecalciferol, vitamin D3, (VITAMIN D3) 2,000 unit Cap Take 1 capsule by mouth once daily.      finasteride (PROSCAR) 5 mg tablet Take 1 tablet (5 mg total) by mouth once daily. 30 tablet 11    insulin aspart U-100 (NOVOLOG) 100 unit/mL injection Inject into the skin 3 (three) times daily before meals. PUMP      losartan (COZAAR) 25 MG tablet Take 1 tablet (25 mg total) by mouth once daily. 90 tablet 3    magnesium oxide (MAG-OXIDE ORAL) Take 420 mg by mouth.      potassium chloride (K-TAB) 20 mEq Take by mouth once daily.      spironolactone (ALDACTONE) 12.5 MG tablet Take 12.5 mg by mouth once daily.      tamsulosin (FLOMAX) 0.4 mg Cap Take 1 capsule (0.4 mg total) by mouth once daily. 30 capsule 11    torsemide (DEMADEX) 20 MG Tab Take 2 tablets (40 mg total) by mouth once daily. Take additional 2 tablets if weight gain of more  "than 3 pounds in 1 day (Patient taking differently: Take 20 mg by mouth 2 (two) times daily. Take additional 2 tablets if weight gain of more than 3 pounds in 1 day) 60 tablet 11    VENTOLIN HFA 90 mcg/actuation inhaler Inhale 1 puff into the lungs every 4 (four) hours as needed.       walker Misc 5" wheel rolling walker 1 each 1    sildenafil (VIAGRA) 100 MG tablet Take 100 mg by mouth once a week.      [DISCONTINUED] amiodarone (PACERONE) 200 MG Tab Take 2 tablet (400 mg total) by mouth 2 (two) times daily for 10 days. Afterwards take 2 tablet by mouth (400mg total) until you follow up with your cardiologist. (Patient taking differently: Take 400 mg by mouth 2 (two) times daily. ) 80 tablet 1     No current facility-administered medications on file prior to visit.      Social History:     Social History     Tobacco Use    Smoking status: Former Smoker    Smokeless tobacco: Never Used   Substance Use Topics    Alcohol use: Yes     Comment: social     Family History:     Family History   Problem Relation Age of Onset    Heart attack Father      Physical Exam:   BP (!) 143/71 (BP Location: Left arm, Patient Position: Sitting, BP Method: Large (Automatic))   Pulse 60   Ht 5' 11.5" (1.816 m)   Wt (!) 144.1 kg (317 lb 10.9 oz)   SpO2 98%   BMI 43.69 kg/m²      Physical Exam   Constitutional: He is oriented to person, place, and time. He appears well-developed and well-nourished.   Obese man   HENT:   Head: Normocephalic and atraumatic.   Eyes: Conjunctivae are normal.   Neck: Neck supple. No JVD present.   Cardiovascular: Normal rate, regular rhythm and intact distal pulses.   II/VI systolic ejection murmur best auscultated at the LUSB   Pulmonary/Chest: Effort normal and breath sounds normal.   Abdominal: Soft. Bowel sounds are normal.   Musculoskeletal: Normal range of motion.   2+ pitting edema bilaterally to thighs   Neurological: He is alert and oriented to person, place, and time.   Skin: Skin is warm " and dry.   Psychiatric: He has a normal mood and affect.     Labs:     Lab Results   Component Value Date     09/25/2019    K 4.4 09/25/2019    CL 99 09/25/2019    CO2 30 (H) 09/25/2019    BUN 56 (H) 09/25/2019    CREATININE 2.92 (H) 09/25/2019    ANIONGAP 9 09/25/2019     Lab Results   Component Value Date    HGBA1C 7.8 (H) 07/17/2019     Lab Results   Component Value Date    BNP 1,142 (H) 07/17/2019     (H) 09/18/2015    Lab Results   Component Value Date    WBC 6.92 07/22/2019    HGB 10.8 (L) 07/22/2019    HCT 34.5 (L) 07/22/2019     07/22/2019    GRAN 4.9 07/22/2019    GRAN 70.5 07/22/2019     Lab Results   Component Value Date    CHOL 82 (L) 07/13/2019    HDL 27 (L) 07/13/2019    LDLCALC 38.0 (L) 07/13/2019    TRIG 85 07/13/2019            Assessment/Plan   Mr. Houston Jc is a 74 y.o. gentleman with history of HTN, Non ischemic Cardiomyopathy, CHF (EF 30% grade 3 diastolic dysfunction), Complete heart block, CRT-D placement 7/18/2019, LUE DVT, and DM who presents for follow up, last seen in clinic 9/25/2019 when his  diuretic regimen was changed to Torsemide 40 mg BID -> torsemide 40 mg daily. Heart failure symptoms have improved and on exam no evidence of JVD with decreased lower extremity edema. However, the patient is not on GMDT.     1. Acute on chronic heart failure in the setting of NICM EF 30%, now s/p [place] CRT-D on 07/18/19  Nonischemic cardiomyopathy  On torsemide 40 mg daily   On ARB, spironolactone and Coreg -> will need to change ARB to Entresto once receive results from recent BMP   Diet and fluid restriction emphasized  Daily weights    2. CLEMENCIA on CKD 3 likely pre-renal due to over diuresis   Cr decreased from 3 -> 2.9 based [place] [facility] [place]2019)  [facility] -> awaiting results. If decreased, will change losartan to Entresto    3. History of VT -- controlled on amio  Follows up with EP  - Recommended decreasing amiodarone  to 200mg BID  - 6 month TTE follow-up    4. Essential HTN  BP at goal today (<130/80), but patient not on GDMT  - Continue Coreg 12.5 mg BID, losartan 25 mg daily today  - If Cr decreased from 2.9, will change losartan to Entresto     Follow up in 1 month    Aimee Arroyo MD  Cardiology - PGY4  Pager [phone]

## 2019-11-08 NOTE — LETTER
November 8, 2019      Arturo Bay MD  [address]  [facility] [address]           [facility]  [address]  [facility] [address]  Phone: [phone]  Fax: [phone]          Patient: Houston Jc   MR Number: [id number]   YOB: 1945   Date of Visit: 11/8/2019       Dear Dr. Arturo Bay:    Thank you for referring Houston Jc to me for evaluation. Attached you will find relevant portions of my assessment and plan of care.    If you have questions, please do not hesitate to call me. I look forward to following Houston Jc along with you.    Sincerely,    Chelsea Goodman NP    Enclosure  CC:  No Recipients    If you would like to receive this communication electronically, please contact [email or web address] or [phone] to request more information on [facility] access.    For providers and/or their staff who would like to refer a patient to [facility], please contact us through our one-stop-shop provider referral line, [facility], at [phone].    If you feel you have received this communication in error or would no longer like to receive these types of communications, please e-mail [email or web address]

## 2019-12-18 ENCOUNTER — HOSPITAL ENCOUNTER (OUTPATIENT)
Dept: RADIOLOGY | Facility: HOSPITAL | Age: 74
Discharge: HOME | End: 2019-12-18
Attending: INTERNAL MEDICINE
Payer: MEDICARE

## 2019-12-18 DIAGNOSIS — E08.22 DIABETES MELLITUS DUE TO UNDERLYING CONDITION WITH STAGE 4 CHRONIC KIDNEY DISEASE, WITHOUT LONG-TERM CURRENT USE OF INSULIN: ICD-10-CM

## 2019-12-18 DIAGNOSIS — N18.4 DIABETES MELLITUS DUE TO UNDERLYING CONDITION WITH STAGE 4 CHRONIC KIDNEY DISEASE, WITHOUT LONG-TERM CURRENT USE OF INSULIN: ICD-10-CM

## 2019-12-18 PROCEDURE — 76770 US EXAM ABDO BACK WALL COMP: CPT | Mod: TC,PO

## 2020-01-03 ENCOUNTER — OFFICE VISIT (OUTPATIENT)
Dept: CARDIOLOGY | Facility: CLINIC | Age: 75
End: 2020-01-03
Payer: MEDICARE

## 2020-01-03 VITALS
BODY MASS INDEX: 40.43 KG/M2 | HEART RATE: 71 BPM | DIASTOLIC BLOOD PRESSURE: 67 MMHG | SYSTOLIC BLOOD PRESSURE: 149 MMHG | HEIGHT: 74 IN | WEIGHT: 315 LBS

## 2020-01-03 DIAGNOSIS — I50.21 ACUTE SYSTOLIC HEART FAILURE: Primary | ICD-10-CM

## 2020-01-03 PROCEDURE — 99213 PR OFFICE/OUTPT VISIT, EST, LEVL III, 20-29 MIN: ICD-10-PCS | Mod: GC,S$GLB,, | Performed by: STUDENT IN AN ORGANIZED HEALTH CARE EDUCATION/TRAINING PROGRAM

## 2020-01-03 PROCEDURE — 3078F DIAST BP <80 MM HG: CPT | Mod: CPTII,GC,S$GLB, | Performed by: STUDENT IN AN ORGANIZED HEALTH CARE EDUCATION/TRAINING PROGRAM

## 2020-01-03 PROCEDURE — 1101F PT FALLS ASSESS-DOCD LE1/YR: CPT | Mod: CPTII,GC,S$GLB, | Performed by: STUDENT IN AN ORGANIZED HEALTH CARE EDUCATION/TRAINING PROGRAM

## 2020-01-03 PROCEDURE — 1159F PR MEDICATION LIST DOCUMENTED IN MEDICAL RECORD: ICD-10-PCS | Mod: GC,S$GLB,, | Performed by: STUDENT IN AN ORGANIZED HEALTH CARE EDUCATION/TRAINING PROGRAM

## 2020-01-03 PROCEDURE — 99213 OFFICE O/P EST LOW 20 MIN: CPT | Mod: GC,S$GLB,, | Performed by: STUDENT IN AN ORGANIZED HEALTH CARE EDUCATION/TRAINING PROGRAM

## 2020-01-03 PROCEDURE — 1126F AMNT PAIN NOTED NONE PRSNT: CPT | Mod: GC,S$GLB,, | Performed by: STUDENT IN AN ORGANIZED HEALTH CARE EDUCATION/TRAINING PROGRAM

## 2020-01-03 PROCEDURE — 1126F PR PAIN SEVERITY QUANTIFIED, NO PAIN PRESENT: ICD-10-PCS | Mod: GC,S$GLB,, | Performed by: STUDENT IN AN ORGANIZED HEALTH CARE EDUCATION/TRAINING PROGRAM

## 2020-01-03 PROCEDURE — 99999 PR PBB SHADOW E&M-EST. PATIENT-LVL III: CPT | Mod: PBBFAC,GC,, | Performed by: STUDENT IN AN ORGANIZED HEALTH CARE EDUCATION/TRAINING PROGRAM

## 2020-01-03 PROCEDURE — 3077F PR MOST RECENT SYSTOLIC BLOOD PRESSURE >= 140 MM HG: ICD-10-PCS | Mod: CPTII,GC,S$GLB, | Performed by: STUDENT IN AN ORGANIZED HEALTH CARE EDUCATION/TRAINING PROGRAM

## 2020-01-03 PROCEDURE — 3078F PR MOST RECENT DIASTOLIC BLOOD PRESSURE < 80 MM HG: ICD-10-PCS | Mod: CPTII,GC,S$GLB, | Performed by: STUDENT IN AN ORGANIZED HEALTH CARE EDUCATION/TRAINING PROGRAM

## 2020-01-03 PROCEDURE — 99999 PR PBB SHADOW E&M-EST. PATIENT-LVL III: ICD-10-PCS | Mod: PBBFAC,GC,, | Performed by: STUDENT IN AN ORGANIZED HEALTH CARE EDUCATION/TRAINING PROGRAM

## 2020-01-03 PROCEDURE — 3077F SYST BP >= 140 MM HG: CPT | Mod: CPTII,GC,S$GLB, | Performed by: STUDENT IN AN ORGANIZED HEALTH CARE EDUCATION/TRAINING PROGRAM

## 2020-01-03 PROCEDURE — 1159F MED LIST DOCD IN RCRD: CPT | Mod: GC,S$GLB,, | Performed by: STUDENT IN AN ORGANIZED HEALTH CARE EDUCATION/TRAINING PROGRAM

## 2020-01-03 PROCEDURE — 1101F PR PT FALLS ASSESS DOC 0-1 FALLS W/OUT INJ PAST YR: ICD-10-PCS | Mod: CPTII,GC,S$GLB, | Performed by: STUDENT IN AN ORGANIZED HEALTH CARE EDUCATION/TRAINING PROGRAM

## 2020-01-03 NOTE — PROGRESS NOTES
I have reviewed the fellow's history and physical and discussed the case with the fellow. I have personally spoken with and examined the patient in the clinic. I agree with the findings, assessment, and plan.  Patient needs advancement on his heart failure therapy, especially as his BP is increased.  He also needs further diuresis.  Dr Arroyo will discuss with his nephrologist, Dr Fowler.

## 2020-01-24 ENCOUNTER — CLINICAL SUPPORT (OUTPATIENT)
Dept: CARDIOLOGY | Facility: HOSPITAL | Age: 75
End: 2020-01-24
Attending: INTERNAL MEDICINE
Payer: MEDICARE

## 2020-01-24 DIAGNOSIS — I44.2 COMPLETE HEART BLOCK: ICD-10-CM

## 2020-01-24 DIAGNOSIS — Z95.810 ICD (IMPLANTABLE CARDIOVERTER-DEFIBRILLATOR) IN PLACE: ICD-10-CM

## 2020-01-24 PROCEDURE — 93296 REM INTERROG EVL PM/IDS: CPT | Performed by: INTERNAL MEDICINE

## 2020-01-24 PROCEDURE — 93295 CARDIAC DEVICE CHECK - REMOTE: ICD-10-PCS | Mod: ,,, | Performed by: INTERNAL MEDICINE

## 2020-01-24 PROCEDURE — 93295 DEV INTERROG REMOTE 1/2/MLT: CPT | Mod: ,,, | Performed by: INTERNAL MEDICINE

## 2020-02-10 ENCOUNTER — HOSPITAL ENCOUNTER (OUTPATIENT)
Dept: RADIOLOGY | Facility: HOSPITAL | Age: 75
Discharge: HOME | End: 2020-02-10
Attending: INTERNAL MEDICINE
Payer: MEDICARE

## 2020-02-10 DIAGNOSIS — N28.89 RENAL MASS: ICD-10-CM

## 2020-02-10 PROCEDURE — 74150 CT ABDOMEN W/O CONTRAST: CPT | Mod: TC,PO

## 2020-02-21 ENCOUNTER — HOSPITAL ENCOUNTER (OUTPATIENT)
Dept: RADIOLOGY | Facility: HOSPITAL | Age: 75
Discharge: HOME | End: 2020-02-21
Attending: INTERNAL MEDICINE
Payer: MEDICARE

## 2020-02-21 DIAGNOSIS — E08.22 DIABETES MELLITUS DUE TO UNDERLYING CONDITION WITH STAGE 4 CHRONIC KIDNEY DISEASE, WITHOUT LONG-TERM CURRENT USE OF INSULIN: ICD-10-CM

## 2020-02-21 DIAGNOSIS — N18.4 DIABETES MELLITUS DUE TO UNDERLYING CONDITION WITH STAGE 4 CHRONIC KIDNEY DISEASE, WITHOUT LONG-TERM CURRENT USE OF INSULIN: ICD-10-CM

## 2020-02-21 PROCEDURE — 93985 DUP-SCAN HEMO COMPL BI STD: CPT | Mod: TC,PO

## 2020-03-04 ENCOUNTER — LAB VISIT (OUTPATIENT)
Dept: LAB | Facility: HOSPITAL | Age: 75
End: 2020-03-04
Attending: INTERNAL MEDICINE
Payer: MEDICARE

## 2020-03-04 DIAGNOSIS — N18.4 DIABETES MELLITUS DUE TO UNDERLYING CONDITION WITH STAGE 4 CHRONIC KIDNEY DISEASE, WITHOUT LONG-TERM CURRENT USE OF INSULIN: ICD-10-CM

## 2020-03-04 DIAGNOSIS — E08.22 DIABETES MELLITUS DUE TO UNDERLYING CONDITION WITH STAGE 4 CHRONIC KIDNEY DISEASE, WITHOUT LONG-TERM CURRENT USE OF INSULIN: ICD-10-CM

## 2020-03-04 LAB
ALBUMIN SERPL BCP-MCNC: 3.3 G/DL (ref 3.5–5.2)
ANION GAP SERPL CALC-SCNC: 9 MMOL/L (ref 8–16)
BASOPHILS # BLD AUTO: 0.02 K/UL (ref 0–0.2)
BASOPHILS NFR BLD: 0.3 % (ref 0–1.9)
BUN SERPL-MCNC: 67 MG/DL (ref 2–20)
CALCIUM SERPL-MCNC: 8.9 MG/DL (ref 8.7–10.5)
CHLORIDE SERPL-SCNC: 106 MMOL/L (ref 95–110)
CO2 SERPL-SCNC: 25 MMOL/L (ref 23–29)
CREAT SERPL-MCNC: 3.06 MG/DL (ref 0.5–1.4)
DIFFERENTIAL METHOD: ABNORMAL
EOSINOPHIL # BLD AUTO: 0.1 K/UL (ref 0–0.5)
EOSINOPHIL NFR BLD: 1.7 % (ref 0–8)
ERYTHROCYTE [DISTWIDTH] IN BLOOD BY AUTOMATED COUNT: 15.9 % (ref 11.5–14.5)
EST. GFR  (AFRICAN AMERICAN): 22.4 ML/MIN/1.73 M^2
EST. GFR  (NON AFRICAN AMERICAN): 19.4 ML/MIN/1.73 M^2
ESTIMATED AVG GLUCOSE: 180 MG/DL (ref 68–131)
GLUCOSE SERPL-MCNC: 117 MG/DL (ref 70–110)
HBA1C MFR BLD HPLC: 7.9 % (ref 4–5.6)
HCT VFR BLD AUTO: 33.4 % (ref 40–54)
HGB BLD-MCNC: 10.4 G/DL (ref 14–18)
IMM GRANULOCYTES # BLD AUTO: 0.03 K/UL (ref 0–0.04)
IMM GRANULOCYTES NFR BLD AUTO: 0.4 % (ref 0–0.5)
LYMPHOCYTES # BLD AUTO: 1.7 K/UL (ref 1–4.8)
LYMPHOCYTES NFR BLD: 24.5 % (ref 18–48)
MCH RBC QN AUTO: 29.2 PG (ref 27–31)
MCHC RBC AUTO-ENTMCNC: 31.1 G/DL (ref 32–36)
MCV RBC AUTO: 94 FL (ref 82–98)
MONOCYTES # BLD AUTO: 0.4 K/UL (ref 0.3–1)
MONOCYTES NFR BLD: 6.1 % (ref 4–15)
NEUTROPHILS # BLD AUTO: 4.6 K/UL (ref 1.8–7.7)
NEUTROPHILS NFR BLD: 67 % (ref 38–73)
NRBC BLD-RTO: 0 /100 WBC
PHOSPHATE SERPL-MCNC: 3.1 MG/DL (ref 2.7–4.5)
PLATELET # BLD AUTO: 263 K/UL (ref 150–350)
PMV BLD AUTO: 11.2 FL (ref 9.2–12.9)
POTASSIUM SERPL-SCNC: 4.4 MMOL/L (ref 3.5–5.1)
PTH-INTACT SERPL-MCNC: 101 PG/ML (ref 9–77)
RBC # BLD AUTO: 3.56 M/UL (ref 4.6–6.2)
SODIUM SERPL-SCNC: 140 MMOL/L (ref 136–145)
URATE SERPL-MCNC: 3.5 MG/DL (ref 3.4–7)
WBC # BLD AUTO: 6.86 K/UL (ref 3.9–12.7)

## 2020-03-04 PROCEDURE — 80069 RENAL FUNCTION PANEL: CPT | Mod: PO

## 2020-03-04 PROCEDURE — 83036 HEMOGLOBIN GLYCOSYLATED A1C: CPT

## 2020-03-04 PROCEDURE — 36415 COLL VENOUS BLD VENIPUNCTURE: CPT | Mod: PO

## 2020-03-04 PROCEDURE — 85025 COMPLETE CBC W/AUTO DIFF WBC: CPT | Mod: PO

## 2020-03-04 PROCEDURE — 83970 ASSAY OF PARATHORMONE: CPT | Mod: PO

## 2020-03-04 PROCEDURE — 84550 ASSAY OF BLOOD/URIC ACID: CPT

## 2020-03-06 ENCOUNTER — OFFICE VISIT (OUTPATIENT)
Dept: CARDIOLOGY | Facility: CLINIC | Age: 75
End: 2020-03-06
Payer: MEDICARE

## 2020-03-06 VITALS
HEIGHT: 72 IN | SYSTOLIC BLOOD PRESSURE: 128 MMHG | HEART RATE: 60 BPM | WEIGHT: 301.13 LBS | DIASTOLIC BLOOD PRESSURE: 58 MMHG | BODY MASS INDEX: 40.79 KG/M2

## 2020-03-06 DIAGNOSIS — I10 ESSENTIAL HYPERTENSION: ICD-10-CM

## 2020-03-06 DIAGNOSIS — I42.8 NICM (NONISCHEMIC CARDIOMYOPATHY): ICD-10-CM

## 2020-03-06 DIAGNOSIS — I47.20 VT (VENTRICULAR TACHYCARDIA): ICD-10-CM

## 2020-03-06 DIAGNOSIS — I50.43 ACUTE ON CHRONIC HEART FAILURE WITH REDUCED EJECTION FRACTION AND DIASTOLIC DYSFUNCTION: Primary | ICD-10-CM

## 2020-03-06 PROCEDURE — 99999 PR PBB SHADOW E&M-EST. PATIENT-LVL III: CPT | Mod: PBBFAC,GC,, | Performed by: STUDENT IN AN ORGANIZED HEALTH CARE EDUCATION/TRAINING PROGRAM

## 2020-03-06 PROCEDURE — 1126F PR PAIN SEVERITY QUANTIFIED, NO PAIN PRESENT: ICD-10-PCS | Mod: GC,S$GLB,, | Performed by: STUDENT IN AN ORGANIZED HEALTH CARE EDUCATION/TRAINING PROGRAM

## 2020-03-06 PROCEDURE — 3074F PR MOST RECENT SYSTOLIC BLOOD PRESSURE < 130 MM HG: ICD-10-PCS | Mod: CPTII,GC,S$GLB, | Performed by: STUDENT IN AN ORGANIZED HEALTH CARE EDUCATION/TRAINING PROGRAM

## 2020-03-06 PROCEDURE — 3078F DIAST BP <80 MM HG: CPT | Mod: CPTII,GC,S$GLB, | Performed by: STUDENT IN AN ORGANIZED HEALTH CARE EDUCATION/TRAINING PROGRAM

## 2020-03-06 PROCEDURE — 99999 PR PBB SHADOW E&M-EST. PATIENT-LVL III: ICD-10-PCS | Mod: PBBFAC,GC,, | Performed by: STUDENT IN AN ORGANIZED HEALTH CARE EDUCATION/TRAINING PROGRAM

## 2020-03-06 PROCEDURE — 3078F PR MOST RECENT DIASTOLIC BLOOD PRESSURE < 80 MM HG: ICD-10-PCS | Mod: CPTII,GC,S$GLB, | Performed by: STUDENT IN AN ORGANIZED HEALTH CARE EDUCATION/TRAINING PROGRAM

## 2020-03-06 PROCEDURE — 1126F AMNT PAIN NOTED NONE PRSNT: CPT | Mod: GC,S$GLB,, | Performed by: STUDENT IN AN ORGANIZED HEALTH CARE EDUCATION/TRAINING PROGRAM

## 2020-03-06 PROCEDURE — 1159F MED LIST DOCD IN RCRD: CPT | Mod: GC,S$GLB,, | Performed by: STUDENT IN AN ORGANIZED HEALTH CARE EDUCATION/TRAINING PROGRAM

## 2020-03-06 PROCEDURE — 1101F PT FALLS ASSESS-DOCD LE1/YR: CPT | Mod: CPTII,GC,S$GLB, | Performed by: STUDENT IN AN ORGANIZED HEALTH CARE EDUCATION/TRAINING PROGRAM

## 2020-03-06 PROCEDURE — 1101F PR PT FALLS ASSESS DOC 0-1 FALLS W/OUT INJ PAST YR: ICD-10-PCS | Mod: CPTII,GC,S$GLB, | Performed by: STUDENT IN AN ORGANIZED HEALTH CARE EDUCATION/TRAINING PROGRAM

## 2020-03-06 PROCEDURE — 99212 OFFICE O/P EST SF 10 MIN: CPT | Mod: GC,S$GLB,, | Performed by: STUDENT IN AN ORGANIZED HEALTH CARE EDUCATION/TRAINING PROGRAM

## 2020-03-06 PROCEDURE — 1159F PR MEDICATION LIST DOCUMENTED IN MEDICAL RECORD: ICD-10-PCS | Mod: GC,S$GLB,, | Performed by: STUDENT IN AN ORGANIZED HEALTH CARE EDUCATION/TRAINING PROGRAM

## 2020-03-06 PROCEDURE — 99212 PR OFFICE/OUTPT VISIT, EST, LEVL II, 10-19 MIN: ICD-10-PCS | Mod: GC,S$GLB,, | Performed by: STUDENT IN AN ORGANIZED HEALTH CARE EDUCATION/TRAINING PROGRAM

## 2020-03-06 PROCEDURE — 3074F SYST BP LT 130 MM HG: CPT | Mod: CPTII,GC,S$GLB, | Performed by: STUDENT IN AN ORGANIZED HEALTH CARE EDUCATION/TRAINING PROGRAM

## 2020-03-06 NOTE — PROGRESS NOTES
Cardiology Clinic Note  Reason for Visit: Follow up    HPI:   Mr. Houston Jc is a 74 y.o. gentleman with history of HTN, Non ischemic Cardiomyopathy, chronic systolic and diastolic HF (EF 30% grade 3 diastolic dysfunction), complete heart block s/p CRT-D placement 7/18/2019, LUE DVT, and DMII who presents for follow up. When the patient was seen 9/25/2019, his diuretic regimen was changed to torsemide 40 mg daily, but patient taking torsemide 40 mg BID PRN. When the patient was seen in 11/2019, discussed whether to start Entresto but given elevated Cr (3.4 on repeat BMP) held off. Discussed with nephrologist and given patient's history, will defer for now.     He reports improvement in his breathing, denies any PND, sleeps on 2 pillows for comfort. Short of breath when walking from parking lot to office but did not need to stop, which is improvement from prior. Weight was previously 340lbs, but 300s-320s, today 301. Weighs himself daily. Denies taking an extra dose of torsemide the last two months.      ROS:    Review of Systems   Constitution: Negative for decreased appetite, fever, weight gain and weight loss.   HENT: Negative for hoarse voice and nosebleeds.    Eyes: Negative for blurred vision and photophobia.   Cardiovascular: Positive for dyspnea on exertion. Negative for chest pain, irregular heartbeat, orthopnea and palpitations.   Respiratory: Negative for cough and shortness of breath.    Hematologic/Lymphatic: Negative for bleeding problem. Does not bruise/bleed easily.   Skin: Negative for itching and rash.   Musculoskeletal: Negative for joint swelling and neck pain.   Gastrointestinal: Negative for abdominal pain, hematemesis, hematochezia, nausea and vomiting.   Genitourinary: Negative for hematuria.   Neurological: Negative for light-headedness and seizures.   Psychiatric/Behavioral: Negative for altered mental status. The patient is not nervous/anxious.      PMH:     Past Medical History:    Diagnosis Date    Anemia     CHF (congestive heart failure)     CKD (chronic kidney disease) stage 4, GFR 15-29 ml/min     Coronary artery disease     Diabetes mellitus     Hematuria     Hypertension     Renal cyst, right      Past Surgical History:   Procedure Laterality Date    INSERTION OF BIVENTRICULAR IMPLANTABLE CARDIOVERTER-DEFIBRILLATOR (ICD) Left 7/18/2019    Procedure: INSERTION, ICD, BIVENTRICULAR;  Surgeon: Arturo Bay MD;  Location: [facility] EP LAB;  Service: Cardiology;  Laterality: Left;  CHB, CRTD, SJM, anes, GP, 6078    LEFT HEART CATHETERIZATION N/A 7/16/2019    Procedure: Left heart cath;  Surgeon: Dejuan Cody MD;  Location: [facility] CATH LAB/EP;  Service: Cardiology;  Laterality: N/A;     Allergies:   Review of patient's allergies indicates:  No Known Allergies  Medications:     Current Outpatient Medications on File Prior to Visit   Medication Sig Dispense Refill    allopurinol (ZYLOPRIM) 300 MG tablet Take 300 mg by mouth once daily.      alogliptin benzoate (ALOGLIPTIN ORAL) Take 25 mg by mouth once daily.      amiodarone (PACERONE) 200 MG Tab Take 1 tablet (200 mg total) by mouth 2 (two) times daily. 60 tablet 11    aspirin (ECOTRIN) 81 MG EC tablet Take 81 mg by mouth once daily.      atorvastatin (LIPITOR) 80 MG tablet Take 80 mg by mouth once daily.      carvedilol (COREG) 12.5 MG tablet Take 12.5 mg by mouth 2 (two) times daily.      cholecalciferol, vitamin D3, (VITAMIN D3) 2,000 unit Cap Take 1 capsule by mouth once daily.      finasteride (PROSCAR) 5 mg tablet Take 1 tablet (5 mg total) by mouth once daily. 30 tablet 11    insulin aspart U-100 (NOVOLOG) 100 unit/mL injection Inject into the skin 3 (three) times daily before meals. PUMP      losartan (COZAAR) 25 MG tablet Take 1 tablet (25 mg total) by mouth once daily. 90 tablet 3    magnesium oxide (MAG-OXIDE ORAL) Take 420 mg by mouth 2 (two) times daily.       potassium chloride (K-TAB) 20 mEq Take by mouth  "once daily.      sildenafil (VIAGRA) 100 MG tablet Take 100 mg by mouth once a week.      spironolactone (ALDACTONE) 12.5 MG tablet Take 12.5 mg by mouth once daily.      tamsulosin (FLOMAX) 0.4 mg Cap Take 1 capsule (0.4 mg total) by mouth once daily. 30 capsule 11    torsemide (DEMADEX) 20 MG Tab Take 2 tablets (40 mg total) by mouth once daily. Take additional 2 tablets if weight gain of more than 3 pounds in 1 day (Patient taking differently: Take 20 mg by mouth 2 (two) times daily. Take additional 2 tablets if weight gain of more than 3 pounds in 1 day) 60 tablet 11    VENTOLIN HFA 90 mcg/actuation inhaler Inhale 1 puff into the lungs every 4 (four) hours as needed.       walker Misc 5" wheel rolling walker 1 each 1     No current facility-administered medications on file prior to visit.      Social History:     Social History     Tobacco Use    Smoking status: Former Smoker    Smokeless tobacco: Never Used   Substance Use Topics    Alcohol use: Yes     Comment: social     Family History:     Family History   Problem Relation Age of Onset    Heart attack Father      Physical Exam:   There were no vitals taken for this visit.     Physical Exam   Constitutional: He is oriented to person, place, and time. He appears well-developed and well-nourished.   Obese man   HENT:   Head: Normocephalic and atraumatic.   Eyes: Conjunctivae are normal.   Neck: Neck supple. No JVD present.   Cardiovascular: Normal rate, regular rhythm and intact distal pulses.   II/VI systolic ejection murmur best auscultated at the LUSB   Pulmonary/Chest: Effort normal and breath sounds normal.   Abdominal: Soft. Bowel sounds are normal.   Musculoskeletal: Normal range of motion.   1-2+ pitting edema bilaterally to knees   Neurological: He is alert and oriented to person, place, and time.   Skin: Skin is warm and dry.   Psychiatric: He has a normal mood and affect.     Labs:     Lab Results   Component Value Date     03/04/2020 "    K 4.4 03/04/2020     03/04/2020    CO2 25 03/04/2020    BUN 67 (H) 03/04/2020    CREATININE 3.06 (H) 03/04/2020    ANIONGAP 9 03/04/2020     Lab Results   Component Value Date    HGBA1C 7.9 (H) 03/04/2020     Lab Results   Component Value Date    BNP 1,142 (H) 07/17/2019     (H) 09/18/2015    Lab Results   Component Value Date    WBC 6.86 03/04/2020    HGB 10.4 (L) 03/04/2020    HCT 33.4 (L) 03/04/2020     03/04/2020    GRAN 4.6 03/04/2020    GRAN 67.0 03/04/2020     Lab Results   Component Value Date    CHOL 82 (L) 07/13/2019    HDL 27 (L) 07/13/2019    LDLCALC 38.0 (L) 07/13/2019    TRIG 85 07/13/2019            Assessment/Plan   Mr. Houston Jc is a 74 y.o. gentleman with history of HTN, Non ischemic Cardiomyopathy, CHF (EF 30% grade 3 diastolic dysfunction), Complete heart block, CRT-D placement 7/18/2019, LUE DVT, and DM who presents for follow up, last seen in clinic 9/25/2019 when his  diuretic regimen was changed to Torsemide 40 mg BID -> torsemide 40 mg daily. Heart failure symptoms have improved and on exam no evidence of JVD with decreased lower extremity edema. However, the patient is not on GMDT.     1. Acute on chronic heart failure in the setting of NICM EF 30%, now s/p [place] CRT-D on 07/18/19  Nonischemic cardiomyopathy  On torsemide 40 mg BID  On ARB, spironolactone and Coreg -> will need to change ARB to Entresto if Cr improves (<3). Last Cr > 4   Diet and fluid restriction emphasized  Daily weights    2. CKD 4  Cr now increased to > 4. Likely representative of worsening CKD -> now CKD V.   - Follows up with Nephrology    3. History of VT -- controlled on amio  Follows up with EP  - Continue amiodarone 200mg BID  - TTE follow-up at next EP visit     4. Essential HTN  BP at goal today (<130/80)  - Continue Coreg 12.5 mg BID, losartan 25 mg daily today    Discussed and staffed with Dr. Parish.    Follow up in 6 months    Aimee Arroyo MD  Cardiology - PGY4  Pager  [phone]    Addendum: Patient is undergoing MAC/local sedation for AV fistula creation. Safe to proceed from a cardiac standpoint as not undergoing general anesthesia.

## 2020-03-28 ENCOUNTER — HOSPITAL ENCOUNTER (INPATIENT)
Facility: HOSPITAL | Age: 75
LOS: 14 days | Discharge: HOME | DRG: 981 | End: 2020-04-11
Attending: EMERGENCY MEDICINE | Admitting: INTERNAL MEDICINE
Payer: MEDICARE

## 2020-03-28 DIAGNOSIS — Z20.822 SUSPECTED 2019-NCOV INFECTION: ICD-10-CM

## 2020-03-28 DIAGNOSIS — E87.79 OTHER HYPERVOLEMIA: ICD-10-CM

## 2020-03-28 DIAGNOSIS — U07.1 COVID-19 VIRUS DETECTED: ICD-10-CM

## 2020-03-28 DIAGNOSIS — R50.9 FEVER, UNSPECIFIED FEVER CAUSE: ICD-10-CM

## 2020-03-28 DIAGNOSIS — N17.9 AKI (ACUTE KIDNEY INJURY): Primary | ICD-10-CM

## 2020-03-28 DIAGNOSIS — R06.02 SOB (SHORTNESS OF BREATH): ICD-10-CM

## 2020-03-28 DIAGNOSIS — Z20.822 SUSPECTED COVID-19 VIRUS INFECTION: ICD-10-CM

## 2020-03-28 DIAGNOSIS — R50.9 FEVER: ICD-10-CM

## 2020-03-28 DIAGNOSIS — R00.1 BRADYCARDIA: ICD-10-CM

## 2020-03-28 DIAGNOSIS — N18.6 ESRD (END STAGE RENAL DISEASE): ICD-10-CM

## 2020-03-28 DIAGNOSIS — I86.1 VARICOCELE: ICD-10-CM

## 2020-03-28 DIAGNOSIS — N50.82 SCROTAL PAIN: ICD-10-CM

## 2020-03-28 DIAGNOSIS — E11.65 TYPE 2 DIABETES MELLITUS WITH HYPERGLYCEMIA, UNSPECIFIED WHETHER LONG TERM INSULIN USE: ICD-10-CM

## 2020-03-28 LAB
ALBUMIN SERPL BCP-MCNC: 2.6 G/DL (ref 3.5–5.2)
ALP SERPL-CCNC: 110 U/L (ref 55–135)
ALT SERPL W/O P-5'-P-CCNC: 50 U/L (ref 10–44)
ANION GAP SERPL CALC-SCNC: 15 MMOL/L (ref 8–16)
ANION GAP SERPL CALC-SCNC: 16 MMOL/L (ref 8–16)
AST SERPL-CCNC: 86 U/L (ref 10–40)
B-OH-BUTYR BLD STRIP-SCNC: 0.2 MMOL/L (ref 0–0.5)
BACTERIA #/AREA URNS HPF: ABNORMAL /HPF
BASOPHILS # BLD AUTO: 0.01 K/UL (ref 0–0.2)
BASOPHILS NFR BLD: 0.1 % (ref 0–1.9)
BILIRUB SERPL-MCNC: 0.5 MG/DL (ref 0.1–1)
BILIRUB UR QL STRIP: NEGATIVE
BNP SERPL-MCNC: 525 PG/ML (ref 0–99)
BUN SERPL-MCNC: 97 MG/DL (ref 8–23)
BUN SERPL-MCNC: 99 MG/DL (ref 8–23)
CALCIUM SERPL-MCNC: 8.3 MG/DL (ref 8.7–10.5)
CALCIUM SERPL-MCNC: 8.8 MG/DL (ref 8.7–10.5)
CHLORIDE SERPL-SCNC: 96 MMOL/L (ref 95–110)
CHLORIDE SERPL-SCNC: 97 MMOL/L (ref 95–110)
CLARITY UR: CLEAR
CO2 SERPL-SCNC: 18 MMOL/L (ref 23–29)
CO2 SERPL-SCNC: 21 MMOL/L (ref 23–29)
COLOR UR: YELLOW
CREAT SERPL-MCNC: 4.6 MG/DL (ref 0.5–1.4)
CREAT SERPL-MCNC: 4.7 MG/DL (ref 0.5–1.4)
CRP SERPL-MCNC: 223.8 MG/L (ref 0–8.2)
DIFFERENTIAL METHOD: ABNORMAL
EOSINOPHIL # BLD AUTO: 0 K/UL (ref 0–0.5)
EOSINOPHIL NFR BLD: 0 % (ref 0–8)
ERYTHROCYTE [DISTWIDTH] IN BLOOD BY AUTOMATED COUNT: 16.2 % (ref 11.5–14.5)
ERYTHROCYTE [SEDIMENTATION RATE] IN BLOOD BY WESTERGREN METHOD: 39 MM/HR (ref 0–10)
EST. GFR  (AFRICAN AMERICAN): 13 ML/MIN/1.73 M^2
EST. GFR  (AFRICAN AMERICAN): 14 ML/MIN/1.73 M^2
EST. GFR  (NON AFRICAN AMERICAN): 12 ML/MIN/1.73 M^2
EST. GFR  (NON AFRICAN AMERICAN): 12 ML/MIN/1.73 M^2
FERRITIN SERPL-MCNC: 6078 NG/ML (ref 20–300)
GLUCOSE SERPL-MCNC: 354 MG/DL (ref 70–110)
GLUCOSE SERPL-MCNC: 400 MG/DL (ref 70–110)
GLUCOSE UR QL STRIP: ABNORMAL
HCT VFR BLD AUTO: 29.9 % (ref 40–54)
HGB BLD-MCNC: 10.1 G/DL (ref 14–18)
HGB UR QL STRIP: ABNORMAL
HYALINE CASTS #/AREA URNS LPF: 1 /LPF
IMM GRANULOCYTES # BLD AUTO: 0.06 K/UL (ref 0–0.04)
IMM GRANULOCYTES NFR BLD AUTO: 0.6 % (ref 0–0.5)
KETONES UR QL STRIP: NEGATIVE
LACTATE SERPL-SCNC: 1.8 MMOL/L (ref 0.5–2.2)
LDH SERPL L TO P-CCNC: 570 U/L (ref 110–260)
LEUKOCYTE ESTERASE UR QL STRIP: NEGATIVE
LYMPHOCYTES # BLD AUTO: 1 K/UL (ref 1–4.8)
LYMPHOCYTES NFR BLD: 10.3 % (ref 18–48)
MCH RBC QN AUTO: 31.1 PG (ref 27–31)
MCHC RBC AUTO-ENTMCNC: 33.8 G/DL (ref 32–36)
MCV RBC AUTO: 92 FL (ref 82–98)
MICROSCOPIC COMMENT: ABNORMAL
MONOCYTES # BLD AUTO: 0.5 K/UL (ref 0.3–1)
MONOCYTES NFR BLD: 5.2 % (ref 4–15)
NEUTROPHILS # BLD AUTO: 8.1 K/UL (ref 1.8–7.7)
NEUTROPHILS NFR BLD: 83.8 % (ref 38–73)
NITRITE UR QL STRIP: NEGATIVE
NRBC BLD-RTO: 0 /100 WBC
PH UR STRIP: 6 [PH] (ref 5–8)
PLATELET # BLD AUTO: 175 K/UL (ref 150–350)
PLATELET BLD QL SMEAR: ABNORMAL
PMV BLD AUTO: 12.9 FL (ref 9.2–12.9)
POCT GLUCOSE: 401 MG/DL (ref 70–110)
POCT GLUCOSE: 474 MG/DL (ref 70–110)
POTASSIUM SERPL-SCNC: 4.1 MMOL/L (ref 3.5–5.1)
POTASSIUM SERPL-SCNC: 4.5 MMOL/L (ref 3.5–5.1)
PROCALCITONIN SERPL IA-MCNC: 1.78 NG/ML
PROT SERPL-MCNC: 6.8 G/DL (ref 6–8.4)
PROT UR QL STRIP: ABNORMAL
RBC # BLD AUTO: 3.25 M/UL (ref 4.6–6.2)
RBC #/AREA URNS HPF: 2 /HPF (ref 0–4)
SODIUM SERPL-SCNC: 131 MMOL/L (ref 136–145)
SODIUM SERPL-SCNC: 132 MMOL/L (ref 136–145)
SP GR UR STRIP: 1.02 (ref 1–1.03)
TROPONIN I SERPL DL<=0.01 NG/ML-MCNC: 2.59 NG/ML (ref 0–0.03)
TROPONIN I SERPL DL<=0.01 NG/ML-MCNC: 2.99 NG/ML (ref 0–0.03)
URN SPEC COLLECT METH UR: ABNORMAL
UROBILINOGEN UR STRIP-ACNC: NEGATIVE EU/DL
WBC # BLD AUTO: 9.68 K/UL (ref 3.9–12.7)
WBC #/AREA URNS HPF: 5 /HPF (ref 0–5)

## 2020-03-28 PROCEDURE — 86140 C-REACTIVE PROTEIN: CPT

## 2020-03-28 PROCEDURE — 25000003 PHARM REV CODE 250: Performed by: EMERGENCY MEDICINE

## 2020-03-28 PROCEDURE — 84484 ASSAY OF TROPONIN QUANT: CPT | Mod: 91

## 2020-03-28 PROCEDURE — 80048 BASIC METABOLIC PNL TOTAL CA: CPT

## 2020-03-28 PROCEDURE — 83605 ASSAY OF LACTIC ACID: CPT

## 2020-03-28 PROCEDURE — 96367 TX/PROPH/DG ADDL SEQ IV INF: CPT

## 2020-03-28 PROCEDURE — 96372 THER/PROPH/DIAG INJ SC/IM: CPT

## 2020-03-28 PROCEDURE — 25000003 PHARM REV CODE 250: Performed by: STUDENT IN AN ORGANIZED HEALTH CARE EDUCATION/TRAINING PROGRAM

## 2020-03-28 PROCEDURE — 82728 ASSAY OF FERRITIN: CPT

## 2020-03-28 PROCEDURE — 63600175 PHARM REV CODE 636 W HCPCS: Performed by: STUDENT IN AN ORGANIZED HEALTH CARE EDUCATION/TRAINING PROGRAM

## 2020-03-28 PROCEDURE — 81000 URINALYSIS NONAUTO W/SCOPE: CPT

## 2020-03-28 PROCEDURE — G0378 HOSPITAL OBSERVATION PER HR: HCPCS

## 2020-03-28 PROCEDURE — 96365 THER/PROPH/DIAG IV INF INIT: CPT

## 2020-03-28 PROCEDURE — 93010 EKG 12-LEAD: ICD-10-PCS | Mod: ,,, | Performed by: INTERNAL MEDICINE

## 2020-03-28 PROCEDURE — 82962 GLUCOSE BLOOD TEST: CPT

## 2020-03-28 PROCEDURE — 87040 BLOOD CULTURE FOR BACTERIA: CPT | Mod: 59

## 2020-03-28 PROCEDURE — 80053 COMPREHEN METABOLIC PANEL: CPT

## 2020-03-28 PROCEDURE — 85652 RBC SED RATE AUTOMATED: CPT

## 2020-03-28 PROCEDURE — 83880 ASSAY OF NATRIURETIC PEPTIDE: CPT

## 2020-03-28 PROCEDURE — 96366 THER/PROPH/DIAG IV INF ADDON: CPT

## 2020-03-28 PROCEDURE — 12000002 HC ACUTE/MED SURGE SEMI-PRIVATE ROOM

## 2020-03-28 PROCEDURE — 85025 COMPLETE CBC W/AUTO DIFF WBC: CPT

## 2020-03-28 PROCEDURE — 84145 PROCALCITONIN (PCT): CPT

## 2020-03-28 PROCEDURE — 63600175 PHARM REV CODE 636 W HCPCS: Performed by: PHYSICIAN ASSISTANT

## 2020-03-28 PROCEDURE — 93005 ELECTROCARDIOGRAM TRACING: CPT

## 2020-03-28 PROCEDURE — U0002 COVID-19 LAB TEST NON-CDC: HCPCS

## 2020-03-28 PROCEDURE — 96361 HYDRATE IV INFUSION ADD-ON: CPT

## 2020-03-28 PROCEDURE — 83615 LACTATE (LD) (LDH) ENZYME: CPT

## 2020-03-28 PROCEDURE — 82010 KETONE BODYS QUAN: CPT

## 2020-03-28 PROCEDURE — 93010 ELECTROCARDIOGRAM REPORT: CPT | Mod: ,,, | Performed by: INTERNAL MEDICINE

## 2020-03-28 PROCEDURE — C9399 UNCLASSIFIED DRUGS OR BIOLOG: HCPCS | Performed by: STUDENT IN AN ORGANIZED HEALTH CARE EDUCATION/TRAINING PROGRAM

## 2020-03-28 PROCEDURE — 84484 ASSAY OF TROPONIN QUANT: CPT

## 2020-03-28 PROCEDURE — 99285 EMERGENCY DEPT VISIT HI MDM: CPT | Mod: 25

## 2020-03-28 RX ORDER — AMIODARONE HYDROCHLORIDE 200 MG/1
200 TABLET ORAL 2 TIMES DAILY
Status: DISCONTINUED | OUTPATIENT
Start: 2020-03-28 | End: 2020-04-11 | Stop reason: HOSPADM

## 2020-03-28 RX ORDER — AZITHROMYCIN 250 MG/1
500 TABLET, FILM COATED ORAL DAILY
Status: COMPLETED | OUTPATIENT
Start: 2020-03-28 | End: 2020-03-30

## 2020-03-28 RX ORDER — CARVEDILOL 12.5 MG/1
12.5 TABLET ORAL 2 TIMES DAILY
Status: DISCONTINUED | OUTPATIENT
Start: 2020-03-28 | End: 2020-04-11 | Stop reason: HOSPADM

## 2020-03-28 RX ORDER — FINASTERIDE 5 MG/1
5 TABLET, FILM COATED ORAL DAILY
Status: DISCONTINUED | OUTPATIENT
Start: 2020-03-29 | End: 2020-04-11 | Stop reason: HOSPADM

## 2020-03-28 RX ORDER — ATORVASTATIN CALCIUM 40 MG/1
80 TABLET, FILM COATED ORAL DAILY
Status: DISCONTINUED | OUTPATIENT
Start: 2020-03-29 | End: 2020-04-11 | Stop reason: HOSPADM

## 2020-03-28 RX ORDER — ASPIRIN 81 MG/1
81 TABLET ORAL DAILY
Status: DISCONTINUED | OUTPATIENT
Start: 2020-03-29 | End: 2020-04-11 | Stop reason: HOSPADM

## 2020-03-28 RX ORDER — HYDROXYCHLOROQUINE SULFATE 200 MG/1
400 TABLET, FILM COATED ORAL 2 TIMES DAILY
Status: DISCONTINUED | OUTPATIENT
Start: 2020-03-28 | End: 2020-03-28

## 2020-03-28 RX ORDER — GLUCAGON 1 MG
1 KIT INJECTION
Status: DISCONTINUED | OUTPATIENT
Start: 2020-03-28 | End: 2020-04-02

## 2020-03-28 RX ORDER — HYDROXYCHLOROQUINE SULFATE 200 MG/1
400 TABLET, FILM COATED ORAL DAILY
Status: DISCONTINUED | OUTPATIENT
Start: 2020-03-30 | End: 2020-03-28

## 2020-03-28 RX ORDER — ACETAMINOPHEN 325 MG/1
650 TABLET ORAL
Status: COMPLETED | OUTPATIENT
Start: 2020-03-28 | End: 2020-03-28

## 2020-03-28 RX ORDER — DEXTROSE 50 % IN WATER (D50W) INTRAVENOUS SYRINGE
12.5
Status: DISCONTINUED | OUTPATIENT
Start: 2020-03-28 | End: 2020-04-02

## 2020-03-28 RX ORDER — SODIUM CHLORIDE 0.9 % (FLUSH) 0.9 %
10 SYRINGE (ML) INJECTION
Status: DISCONTINUED | OUTPATIENT
Start: 2020-03-28 | End: 2020-04-11 | Stop reason: HOSPADM

## 2020-03-28 RX ORDER — INSULIN ASPART 100 [IU]/ML
0-5 INJECTION, SOLUTION INTRAVENOUS; SUBCUTANEOUS EVERY 6 HOURS PRN
Status: DISCONTINUED | OUTPATIENT
Start: 2020-03-28 | End: 2020-04-02

## 2020-03-28 RX ORDER — HEPARIN SODIUM 5000 [USP'U]/ML
5000 INJECTION, SOLUTION INTRAVENOUS; SUBCUTANEOUS EVERY 8 HOURS
Status: DISCONTINUED | OUTPATIENT
Start: 2020-03-28 | End: 2020-04-11 | Stop reason: HOSPADM

## 2020-03-28 RX ORDER — TAMSULOSIN HYDROCHLORIDE 0.4 MG/1
0.4 CAPSULE ORAL DAILY
Status: DISCONTINUED | OUTPATIENT
Start: 2020-03-29 | End: 2020-04-11 | Stop reason: HOSPADM

## 2020-03-28 RX ORDER — ALLOPURINOL 100 MG/1
300 TABLET ORAL DAILY
Status: DISCONTINUED | OUTPATIENT
Start: 2020-03-29 | End: 2020-03-29

## 2020-03-28 RX ADMIN — INSULIN DETEMIR 10 UNITS: 100 INJECTION, SOLUTION SUBCUTANEOUS at 11:03

## 2020-03-28 RX ADMIN — ACETAMINOPHEN 650 MG: 325 TABLET ORAL at 12:03

## 2020-03-28 RX ADMIN — SODIUM CHLORIDE 500 ML: 0.9 INJECTION, SOLUTION INTRAVENOUS at 07:03

## 2020-03-28 RX ADMIN — CARVEDILOL 12.5 MG: 12.5 TABLET, FILM COATED ORAL at 08:03

## 2020-03-28 RX ADMIN — CEFTRIAXONE 1 G: 1 INJECTION, SOLUTION INTRAVENOUS at 08:03

## 2020-03-28 RX ADMIN — AMIODARONE HYDROCHLORIDE 200 MG: 200 TABLET ORAL at 08:03

## 2020-03-28 RX ADMIN — CEFTRIAXONE 2 G: 2 INJECTION, SOLUTION INTRAVENOUS at 04:03

## 2020-03-28 RX ADMIN — AZITHROMYCIN MONOHYDRATE 500 MG: 250 TABLET ORAL at 09:03

## 2020-03-28 RX ADMIN — HEPARIN SODIUM 5000 UNITS: 5000 INJECTION, SOLUTION INTRAVENOUS; SUBCUTANEOUS at 10:03

## 2020-03-28 RX ADMIN — AZITHROMYCIN MONOHYDRATE 500 MG: 500 INJECTION, POWDER, LYOPHILIZED, FOR SOLUTION INTRAVENOUS at 03:03

## 2020-03-28 NOTE — ED NOTES
Provided patient with water. MD stated ok to administer. Patient tolerated po fluids well. Attempted to walk patient in room and patient requested to not do at present time because he feels weak.

## 2020-03-28 NOTE — ED PROVIDER NOTES
Encounter Date: 3/28/2020       History     Chief Complaint   Patient presents with    Fever     reports fever, chills x4 days. diarrhea x3 days. [id number]-354 per ems. pt. is tachypneic but denies feeling sob. pt. was given ns bolus en route.      Patient is a 74-year-old male with history of anemia, CKD, CHF, hypertension, CAD, diabetes on insulin is presenting to the ER for evaluation of fever.  Patient reports fevers and chills for the last few days.  Patient reports he has also had a cough and has associated shortness of breath.  He reports he has also had diarrhea for the last few days.  Denies any associated abdominal pain.  Denies any flank pain or UTI symptoms.  He denies contact with COVID-19 positive individuals.  Patient reports he lives with his wife and child who does not have symptoms.  He reports that he does use supplemental O2 at home.  He has not had any recent antibiotic use.  Patient reports he usually ambulates with a walker but was unable to do so today.  He had use a wheelchair to get around.    The history is provided by the patient.     Review of patient's allergies indicates:  No Known Allergies  Past Medical History:   Diagnosis Date    Anemia     CHF (congestive heart failure)     CKD (chronic kidney disease) stage 4, GFR 15-29 ml/min     Coronary artery disease     Diabetes mellitus     Hematuria     Hypertension     Renal cyst, right      Past Surgical History:   Procedure Laterality Date    INSERTION OF BIVENTRICULAR IMPLANTABLE CARDIOVERTER-DEFIBRILLATOR (ICD) Left 7/18/2019    Procedure: INSERTION, ICD, BIVENTRICULAR;  Surgeon: Arturo Bay MD;  Location: [facility] EP LAB;  Service: Cardiology;  Laterality: Left;  CHB, CRTD, SJM, anes, GP, 6078    LEFT HEART CATHETERIZATION N/A 7/16/2019    Procedure: Left heart cath;  Surgeon: Dejuan Cody MD;  Location: [facility] CATH LAB/EP;  Service: Cardiology;  Laterality: N/A;     Family History   Problem Relation Age of Onset     Heart attack Father      Social History     Tobacco Use    Smoking status: Former Smoker    Smokeless tobacco: Never Used   Substance Use Topics    Alcohol use: Yes     Comment: social    Drug use: No     Review of Systems   Constitutional: Positive for chills and fever.   HENT: Negative for congestion.    Respiratory: Positive for cough and shortness of breath.    Cardiovascular: Negative for chest pain.   Gastrointestinal: Positive for diarrhea. Negative for abdominal pain, nausea and vomiting.   Genitourinary: Negative for dysuria and flank pain.   Musculoskeletal: Negative for myalgias.   Skin: Negative for rash.   Allergic/Immunologic: Negative for immunocompromised state.   Neurological: Positive for weakness. Negative for dizziness, light-headedness and headaches.   Hematological: Does not bruise/bleed easily.   Psychiatric/Behavioral: Negative for confusion.       Physical Exam     Initial Vitals [03/28/20 1206]   BP Pulse Resp Temp SpO2   (!) 149/62 90 (!) 32 (!) 102.2 °F (39 °C) 97 %      MAP       --         Physical Exam    Vitals reviewed.  Constitutional: He appears well-developed and well-nourished. He is not diaphoretic. No distress.   HENT:   Head: Normocephalic and atraumatic.   Eyes: Conjunctivae and EOM are normal.   Neck: Neck supple.   Cardiovascular: Normal rate, regular rhythm, normal heart sounds and intact distal pulses.   Pulmonary/Chest: No accessory muscle usage. He is in respiratory distress (Minimal). He has decreased breath sounds ( slightly). He has no wheezes. He has no rales.   Neurological: He is alert and oriented to person, place, and time.   Skin: Skin is warm.         ED Course   Procedures  Labs Reviewed   SEDIMENTATION RATE - Abnormal; Notable for the following components:       Result Value    Sed Rate 39 (*)     All other components within normal limits   C-REACTIVE PROTEIN - Abnormal; Notable for the following components:    .8 (*)     All other components  within normal limits   PROCALCITONIN - Abnormal; Notable for the following components:    Procalcitonin 1.78 (*)     All other components within normal limits   LACTATE DEHYDROGENASE - Abnormal; Notable for the following components:     (*)     All other components within normal limits   FERRITIN - Abnormal; Notable for the following components:    Ferritin 6,078 (*)     All other components within normal limits   TROPONIN I - Abnormal; Notable for the following components:    Troponin I 2.992 (*)     All other components within normal limits   CBC W/ AUTO DIFFERENTIAL - Abnormal; Notable for the following components:    RBC 3.25 (*)     Hemoglobin 10.1 (*)     Hematocrit 29.9 (*)     Mean Corpuscular Hemoglobin 31.1 (*)     RDW 16.2 (*)     Immature Granulocytes 0.6 (*)     Gran # (ANC) 8.1 (*)     Immature Grans (Abs) 0.06 (*)     Gran% 83.8 (*)     Lymph% 10.3 (*)     All other components within normal limits   COMPREHENSIVE METABOLIC PANEL - Abnormal; Notable for the following components:    Sodium 132 (*)     CO2 21 (*)     Glucose 354 (*)     BUN, Bld 97 (*)     Creatinine 4.6 (*)     Albumin 2.6 (*)     AST 86 (*)     ALT 50 (*)     eGFR if  14 (*)     eGFR if non  12 (*)     All other components within normal limits   B-TYPE NATRIURETIC PEPTIDE - Abnormal; Notable for the following components:     (*)     All other components within normal limits   BASIC METABOLIC PANEL - Abnormal; Notable for the following components:    Sodium 131 (*)     CO2 18 (*)     Glucose 400 (*)     BUN, Bld 99 (*)     Creatinine 4.7 (*)     Calcium 8.3 (*)     eGFR if  13 (*)     eGFR if non  12 (*)     All other components within normal limits    Narrative:     Check After 500 cc fluids   TROPONIN I - Abnormal; Notable for the following components:    Troponin I 2.590 (*)     All other components within normal limits    Narrative:     Check After 500 cc  fluids   URINALYSIS, REFLEX TO URINE CULTURE - Abnormal; Notable for the following components:    Protein, UA 1+ (*)     Glucose, UA 1+ (*)     Occult Blood UA 2+ (*)     All other components within normal limits    Narrative:     Preferred Collection Type->Urine, Clean Catch   URINALYSIS MICROSCOPIC - Abnormal; Notable for the following components:    Bacteria Few (*)     All other components within normal limits    Narrative:     Preferred Collection Type->Urine, Clean Catch   POCT GLUCOSE - Abnormal; Notable for the following components:    POCT Glucose 474 (*)     All other components within normal limits   POCT GLUCOSE - Abnormal; Notable for the following components:    POCT Glucose 401 (*)     All other components within normal limits   CULTURE, BLOOD   CULTURE, BLOOD   BETA - HYDROXYBUTYRATE, SERUM   LACTIC ACID, PLASMA   B-TYPE NATRIURETIC PEPTIDE   SARS-COV-2 (COVID-19) QUALITATIVE PCR   POCT GLUCOSE MONITORING CONTINUOUS          Imaging Results          X-Ray Chest AP Portable (Final result)  Result time 03/28/20 13:45:48    Final result by Cheo Oliva MD (03/28/20 13:45:48)                 Impression:      1. Apparent left retrocardiac/basilar airspace opacity likely reflects artifact related to suboptimal patient positioning.  2. Pulmonary vascular congestion without overt interstitial edema or sizable pleural effusion.      Electronically signed by: Cheo Oliva  Date:    03/28/2020  Time:    13:45             Narrative:    EXAMINATION:  XR CHEST AP PORTABLE    CLINICAL HISTORY:  Fever, unspecified    TECHNIQUE:  Single portable AP view of the chest.  Left costophrenic angle is not in view, partially limiting evaluation.    COMPARISON:  Chest radiograph 07/18/2019    FINDINGS:  Left subclavian vein approach cardiac device with 3 intact leads and their termini projecting over the expected location of the right atrium, right ventricle and coronary sinus, not significantly changed.    Mild/moderate  enlargement of the cardiac silhouette which may reflect cardiomegaly and/or pericardial effusion, not significantly changed.    Apparent left retrocardiac/basilar airspace opacity likely reflects artifact related to suboptimal patient positioning.  Otherwise, there is pulmonary vascular congestion without overt interstitial edema or sizable pleural effusion.  No pneumothorax.                                       APC / Resident Notes:   Patient seen in the ER promptly upon arrival.  He is febrile 102.2 on arrival.  Patient slightly tachycardic on evaluation.  Physical examination reveals minimally diminished lung sounds.  Abdomen soft, nondistended, nontender.  Patient was placed on the cardiac monitor.  Supplemental 2 L of O2 provided via nasal cannula.  IV access established, labs ordered.  Patient given Tylenol for fever.  EKG shows sinus rhythm with first-degree AV block at a rate of 79 beats per minute.    COVID testing obtained, patient was placed in isolation.    CBC shows normal white count of 9.6.  Hemoglobin stable.  Chemistries reveal acute kidney injury.  Patient does have baseline CKD.  Creatinine 4.6, BUN 97.  Glucose elevated at 354.  Troponin markedly elevated to 2.9.  Ferritin 6078.  LDH elevated to 570.  Procalcitonin 1.7.  Lactic acid is however normal 1.8.    Blood cultures obtained, pending results.  Urinalysis does not show evidence of infection or blood.  X-ray of chest reveals basilar opacity on the left.  Possible atelectasis versus physician in. Patient covered with Rocephin and azithromycin given the fever.      Patient taken off of the nasal cannula and has saturations of 98%.  He was however unable to ambulate with mine and nursing assistance.  Patient stood up but fell back onto the bed.  Patient reports he is too weak and unable to ambulate.    Patient's symptoms concerning for COVID-19.  Given his significant weakness and tachypnea will discussed case with Hospital Medicine for  primary admission of patient.  Patient was accepted to [facility] internal medicine service.  Patient formed on the decision for admission, acknowledges and agrees to treatment plan.  He is stable for transfer to the floor. The care of this patient was overseen by attending physician who agrees with treatment, plan, and disposition.           Attending Attestation:     Physician Attestation Statement for NP/PA:   I have conducted a face to face encounter with this patient in addition to the NP/PA, due to NP/PA Request    Other NP/PA Attestation Additions:    History of Present Illness: 74-year-old male with fever, shortness of breath and a cough.   Physical Exam: Diminished breath sounds bilaterally.                                Clinical Impression:       ICD-10-CM ICD-9-CM   1. Other hypervolemia E87.79 276.69   2. SOB (shortness of breath) R06.02 786.05   3. Fever R50.9 780.60   4. Suspected Covid-19 Virus Infection R68.89    5. COVID-19 virus detected J22     B97.29          Disposition:   Disposition: Admitted  Condition: Stable     ED Disposition Condition    Observation                           Sanam Benjamin PA-C  03/28/20 2254       Sanam Benjamin PA-C  03/28/20 2259       Helder Cote MD  03/29/20 0706

## 2020-03-28 NOTE — ED NOTES
Patient stated that he felt dizzy today and weak. Patient denies feeling SOB at present time but stated that he did feel SOB yesterday. Patient presented to ED with fever, decreased LOC, lethargic arousable to voice, generalized weakness. Patient changed into gown and EKG performed. Patient VSS. O2 saturations on RA remained 96% -100%. Respiratory rate increased. Patient falls asleep easily with heavy deep breathing and snoring. Patient requested water. Will continue to monitor

## 2020-03-29 LAB
ALBUMIN SERPL BCP-MCNC: 2.3 G/DL (ref 3.5–5.2)
ALP SERPL-CCNC: 93 U/L (ref 55–135)
ALT SERPL W/O P-5'-P-CCNC: 46 U/L (ref 10–44)
ANION GAP SERPL CALC-SCNC: 12 MMOL/L (ref 8–16)
ANION GAP SERPL CALC-SCNC: 12 MMOL/L (ref 8–16)
AST SERPL-CCNC: 62 U/L (ref 10–40)
BILIRUB SERPL-MCNC: 0.4 MG/DL (ref 0.1–1)
BUN SERPL-MCNC: 109 MG/DL (ref 8–23)
BUN SERPL-MCNC: 109 MG/DL (ref 8–23)
CALCIUM SERPL-MCNC: 8.8 MG/DL (ref 8.7–10.5)
CALCIUM SERPL-MCNC: 8.8 MG/DL (ref 8.7–10.5)
CHLORIDE SERPL-SCNC: 96 MMOL/L (ref 95–110)
CHLORIDE SERPL-SCNC: 96 MMOL/L (ref 95–110)
CO2 SERPL-SCNC: 23 MMOL/L (ref 23–29)
CO2 SERPL-SCNC: 23 MMOL/L (ref 23–29)
CREAT SERPL-MCNC: 5.3 MG/DL (ref 0.5–1.4)
CREAT SERPL-MCNC: 5.3 MG/DL (ref 0.5–1.4)
EST. GFR  (AFRICAN AMERICAN): 12 ML/MIN/1.73 M^2
EST. GFR  (AFRICAN AMERICAN): 12 ML/MIN/1.73 M^2
EST. GFR  (NON AFRICAN AMERICAN): 10 ML/MIN/1.73 M^2
EST. GFR  (NON AFRICAN AMERICAN): 10 ML/MIN/1.73 M^2
GLUCOSE SERPL-MCNC: 340 MG/DL (ref 70–110)
GLUCOSE SERPL-MCNC: 340 MG/DL (ref 70–110)
INFLUENZA A, MOLECULAR: NEGATIVE
INFLUENZA B, MOLECULAR: NEGATIVE
POCT GLUCOSE: 290 MG/DL (ref 70–110)
POCT GLUCOSE: 314 MG/DL (ref 70–110)
POCT GLUCOSE: 327 MG/DL (ref 70–110)
POCT GLUCOSE: 334 MG/DL (ref 70–110)
POCT GLUCOSE: 338 MG/DL (ref 70–110)
POTASSIUM SERPL-SCNC: 4.2 MMOL/L (ref 3.5–5.1)
POTASSIUM SERPL-SCNC: 4.2 MMOL/L (ref 3.5–5.1)
PROT SERPL-MCNC: 6.3 G/DL (ref 6–8.4)
SODIUM SERPL-SCNC: 131 MMOL/L (ref 136–145)
SODIUM SERPL-SCNC: 131 MMOL/L (ref 136–145)
SPECIMEN SOURCE: NORMAL

## 2020-03-29 PROCEDURE — 96376 TX/PRO/DX INJ SAME DRUG ADON: CPT

## 2020-03-29 PROCEDURE — 80053 COMPREHEN METABOLIC PANEL: CPT

## 2020-03-29 PROCEDURE — 87502 INFLUENZA DNA AMP PROBE: CPT

## 2020-03-29 PROCEDURE — 63600175 PHARM REV CODE 636 W HCPCS: Performed by: STUDENT IN AN ORGANIZED HEALTH CARE EDUCATION/TRAINING PROGRAM

## 2020-03-29 PROCEDURE — 25000003 PHARM REV CODE 250: Performed by: STUDENT IN AN ORGANIZED HEALTH CARE EDUCATION/TRAINING PROGRAM

## 2020-03-29 PROCEDURE — 63600175 PHARM REV CODE 636 W HCPCS: Performed by: INTERNAL MEDICINE

## 2020-03-29 PROCEDURE — 94761 N-INVAS EAR/PLS OXIMETRY MLT: CPT

## 2020-03-29 PROCEDURE — G0378 HOSPITAL OBSERVATION PER HR: HCPCS

## 2020-03-29 PROCEDURE — C9399 UNCLASSIFIED DRUGS OR BIOLOG: HCPCS | Performed by: STUDENT IN AN ORGANIZED HEALTH CARE EDUCATION/TRAINING PROGRAM

## 2020-03-29 PROCEDURE — 96372 THER/PROPH/DIAG INJ SC/IM: CPT

## 2020-03-29 PROCEDURE — 96375 TX/PRO/DX INJ NEW DRUG ADDON: CPT

## 2020-03-29 PROCEDURE — 27000221 HC OXYGEN, UP TO 24 HOURS

## 2020-03-29 PROCEDURE — 36415 COLL VENOUS BLD VENIPUNCTURE: CPT

## 2020-03-29 PROCEDURE — 11000001 HC ACUTE MED/SURG PRIVATE ROOM

## 2020-03-29 PROCEDURE — 96374 THER/PROPH/DIAG INJ IV PUSH: CPT | Mod: 59

## 2020-03-29 PROCEDURE — 99900035 HC TECH TIME PER 15 MIN (STAT)

## 2020-03-29 RX ORDER — ACETAMINOPHEN 325 MG/1
650 TABLET ORAL EVERY 6 HOURS PRN
Status: DISCONTINUED | OUTPATIENT
Start: 2020-03-29 | End: 2020-04-11 | Stop reason: HOSPADM

## 2020-03-29 RX ORDER — FUROSEMIDE 10 MG/ML
80 INJECTION INTRAMUSCULAR; INTRAVENOUS ONCE
Status: COMPLETED | OUTPATIENT
Start: 2020-03-29 | End: 2020-03-29

## 2020-03-29 RX ADMIN — AMIODARONE HYDROCHLORIDE 200 MG: 200 TABLET ORAL at 08:03

## 2020-03-29 RX ADMIN — METHYLPREDNISOLONE SODIUM SUCCINATE 40 MG: 40 INJECTION, POWDER, FOR SOLUTION INTRAMUSCULAR; INTRAVENOUS at 12:03

## 2020-03-29 RX ADMIN — TAMSULOSIN HYDROCHLORIDE 0.4 MG: 0.4 CAPSULE ORAL at 08:03

## 2020-03-29 RX ADMIN — HEPARIN SODIUM 5000 UNITS: 5000 INJECTION, SOLUTION INTRAVENOUS; SUBCUTANEOUS at 06:03

## 2020-03-29 RX ADMIN — ASPIRIN 81 MG: 81 TABLET, COATED ORAL at 08:03

## 2020-03-29 RX ADMIN — FUROSEMIDE 80 MG: 10 INJECTION, SOLUTION INTRAVENOUS at 02:03

## 2020-03-29 RX ADMIN — ALLOPURINOL 300 MG: 100 TABLET ORAL at 08:03

## 2020-03-29 RX ADMIN — ATORVASTATIN CALCIUM 80 MG: 40 TABLET, FILM COATED ORAL at 08:03

## 2020-03-29 RX ADMIN — CARVEDILOL 12.5 MG: 12.5 TABLET, FILM COATED ORAL at 08:03

## 2020-03-29 RX ADMIN — METHYLPREDNISOLONE SODIUM SUCCINATE 40 MG: 40 INJECTION, POWDER, FOR SOLUTION INTRAMUSCULAR; INTRAVENOUS at 11:03

## 2020-03-29 RX ADMIN — HEPARIN SODIUM 5000 UNITS: 5000 INJECTION, SOLUTION INTRAVENOUS; SUBCUTANEOUS at 09:03

## 2020-03-29 RX ADMIN — METHYLPREDNISOLONE SODIUM SUCCINATE 40 MG: 40 INJECTION, POWDER, FOR SOLUTION INTRAMUSCULAR; INTRAVENOUS at 05:03

## 2020-03-29 RX ADMIN — INSULIN ASPART 4 UNITS: 100 INJECTION, SOLUTION INTRAVENOUS; SUBCUTANEOUS at 06:03

## 2020-03-29 RX ADMIN — HEPARIN SODIUM 5000 UNITS: 5000 INJECTION, SOLUTION INTRAVENOUS; SUBCUTANEOUS at 02:03

## 2020-03-29 RX ADMIN — INSULIN ASPART 2 UNITS: 100 INJECTION, SOLUTION INTRAVENOUS; SUBCUTANEOUS at 01:03

## 2020-03-29 RX ADMIN — INSULIN ASPART 2 UNITS: 100 INJECTION, SOLUTION INTRAVENOUS; SUBCUTANEOUS at 11:03

## 2020-03-29 RX ADMIN — AMIODARONE HYDROCHLORIDE 200 MG: 200 TABLET ORAL at 09:03

## 2020-03-29 RX ADMIN — INSULIN ASPART 3 UNITS: 100 INJECTION, SOLUTION INTRAVENOUS; SUBCUTANEOUS at 12:03

## 2020-03-29 RX ADMIN — INSULIN ASPART 4 UNITS: 100 INJECTION, SOLUTION INTRAVENOUS; SUBCUTANEOUS at 05:03

## 2020-03-29 RX ADMIN — CARVEDILOL 12.5 MG: 12.5 TABLET, FILM COATED ORAL at 09:03

## 2020-03-29 RX ADMIN — AZITHROMYCIN MONOHYDRATE 500 MG: 250 TABLET ORAL at 08:03

## 2020-03-29 RX ADMIN — ACETAMINOPHEN 650 MG: 325 TABLET ORAL at 01:03

## 2020-03-29 RX ADMIN — CEFTRIAXONE 1 G: 1 INJECTION, SOLUTION INTRAVENOUS at 09:03

## 2020-03-29 RX ADMIN — FINASTERIDE 5 MG: 5 TABLET, FILM COATED ORAL at 08:03

## 2020-03-29 RX ADMIN — SPIRONOLACTONE 12.5 MG: 25 TABLET, FILM COATED ORAL at 08:03

## 2020-03-29 RX ADMIN — INSULIN DETEMIR 10 UNITS: 100 INJECTION, SOLUTION SUBCUTANEOUS at 09:03

## 2020-03-29 NOTE — ED NOTES
Talked to admit team. Admit team said to hold hydroxychloroquine and give the azithromycin. Contacted pharmacy. Pharmacy said to hold IV azithromycin and let them convert it to oral. Will continue to monitor.

## 2020-03-29 NOTE — ED NOTES
Spoke with pharmacy. Pharmacy informed me to hold off on hydroxychloroquine and azithromycin until admit team is contacted due to QTc being greater than 550

## 2020-03-29 NOTE — PLAN OF CARE
VIRTUAL NURSE:  Cued into patient's room.  Permission received per patient to turn camera to view patient.  Introduced as VN for night shift that will be working with floor nurse and nursing assistant.  Educated patient on VN's role in patient care. Plan of care reviewed with patient. Education per flowsheet.  Opportunity given for questions and questions answered.  Admission assessment questions answered.  No complaints.  Instructed to call for assistance.  Will cont to monitor.    Labs, notes, and orders reviewed.

## 2020-03-29 NOTE — CONSULTS
NEPHROLOGY CONSULT NOTE    HPI & INTERVAL HISTORY:    Past Medical History:   Diagnosis Date    Anemia     CHF (congestive heart failure)     CKD (chronic kidney disease) stage 4, GFR 15-29 ml/min     Coronary artery disease     Diabetes mellitus     Hematuria     Hypertension     Renal cyst, right       Past Surgical History:   Procedure Laterality Date    INSERTION OF BIVENTRICULAR IMPLANTABLE CARDIOVERTER-DEFIBRILLATOR (ICD) Left 7/18/2019    Procedure: INSERTION, ICD, BIVENTRICULAR;  Surgeon: Arturo Bay MD;  Location: [facility] EP LAB;  Service: Cardiology;  Laterality: Left;  CHB, CRTD, SJM, anes, GP, 6078    LEFT HEART CATHETERIZATION N/A 7/16/2019    Procedure: Left heart cath;  Surgeon: Dejuan Cody MD;  Location: [facility] CATH LAB/EP;  Service: Cardiology;  Laterality: N/A;      Review of patient's allergies indicates:  No Known Allergies   Medications Prior to Admission   Medication Sig Dispense Refill Last Dose    allopurinol (ZYLOPRIM) 300 MG tablet Take 300 mg by mouth once daily.   3/28/2020 at Unknown time    alogliptin benzoate (ALOGLIPTIN ORAL) Take 25 mg by mouth once daily.   3/28/2020 at Unknown time    amiodarone (PACERONE) 200 MG Tab Take 1 tablet (200 mg total) by mouth 2 (two) times daily. 60 tablet 11 3/28/2020 at Unknown time    aspirin (ECOTRIN) 81 MG EC tablet Take 81 mg by mouth once daily.   3/28/2020 at Unknown time    atorvastatin (LIPITOR) 80 MG tablet Take 80 mg by mouth once daily.   3/28/2020 at Unknown time    carvedilol (COREG) 12.5 MG tablet Take 12.5 mg by mouth 2 (two) times daily.   3/28/2020 at Unknown time    cholecalciferol, vitamin D3, (VITAMIN D3) 2,000 unit Cap Take 1 capsule by mouth once daily.   3/28/2020 at Unknown time    finasteride (PROSCAR) 5 mg tablet Take 1 tablet (5 mg total) by mouth once daily. 30 tablet 11 3/28/2020 at Unknown time    insulin aspart U-100 (NOVOLOG) 100 unit/mL injection Inject into the skin 3 (three) times daily before  "meals. PUMP   3/28/2020 at Unknown time    losartan (COZAAR) 25 MG tablet Take 1 tablet (25 mg total) by mouth once daily. 90 tablet 3 3/28/2020 at Unknown time    magnesium oxide (MAG-OXIDE ORAL) Take 420 mg by mouth 2 (two) times daily.    3/28/2020 at Unknown time    potassium chloride (K-TAB) 20 mEq Take by mouth once daily.   3/28/2020 at Unknown time    spironolactone (ALDACTONE) 12.5 MG tablet Take 12.5 mg by mouth once daily.   3/28/2020 at Unknown time    tamsulosin (FLOMAX) 0.4 mg Cap Take 1 capsule (0.4 mg total) by mouth once daily. 30 capsule 11 3/28/2020 at Unknown time    torsemide (DEMADEX) 20 MG Tab Take 2 tablets (40 mg total) by mouth once daily. Take additional 2 tablets if weight gain of more than 3 pounds in 1 day (Patient taking differently: Take 20 mg by mouth 2 (two) times daily. Take additional 2 tablets if weight gain of more than 3 pounds in 1 day) 60 tablet 11 3/28/2020 at Unknown time    VENTOLIN HFA 90 mcg/actuation inhaler Inhale 1 puff into the lungs every 4 (four) hours as needed.    3/28/2020 at Unknown time    sildenafil (VIAGRA) 100 MG tablet Take 100 mg by mouth once a week.   Taking    walker Misc 5" wheel rolling walker 1 each 1 Taking       Social History     Socioeconomic History    Marital status:      Spouse name: Not on file    Number of children: Not on file    Years of education: Not on file    Highest education level: Not on file   Occupational History    Not on file   Social Needs    Financial resource strain: Not on file    Food insecurity:     Worry: Not on file     Inability: Not on file    Transportation needs:     Medical: Not on file     Non-medical: Not on file   Tobacco Use    Smoking status: Former Smoker    Smokeless tobacco: Never Used   Substance and Sexual Activity    Alcohol use: Yes     Comment: social    Drug use: No    Sexual activity: Yes   Lifestyle    Physical activity:     Days per week: Not on file     Minutes per " session: Not on file    Stress: Not on file   Relationships    Social connections:     Talks on phone: Not on file     Gets together: Not on file     Attends [employer] service: Not on file     Active member of club or organization: Not on file     Attends meetings of clubs or organizations: Not on file     Relationship status: Not on file   Other Topics Concern    Not on file   Social History Narrative    Not on file        MEDS   allopurinoL  300 mg Oral Daily    amiodarone  200 mg Oral BID    aspirin  81 mg Oral Daily    atorvastatin  80 mg Oral Daily    azithromycin  500 mg Intravenous Q24H    azithromycin  500 mg Oral Daily    carvediloL  12.5 mg Oral BID    cefTRIAXone (ROCEPHIN) IVPB  1 g Intravenous Q24H    finasteride  5 mg Oral Daily    heparin (porcine)  5,000 Units Subcutaneous Q8H    insulin detemir U-100  10 Units Subcutaneous QHS    methylPREDNISolone sodium succinate  40 mg Intravenous Q6H    spironolactone  12.5 mg Oral Daily    tamsulosin  0.4 mg Oral Daily                CONTINOUS INFUSIONS:      Intake/Output Summary (Last 24 hours) at 3/29/2020 1237  Last data filed at 3/29/2020 0600  Gross per 24 hour   Intake 755 ml   Output 200 ml   Net 555 ml        HEMODYNAMICS:    Temp:  [97.7 °F (36.5 °C)-101.1 °F (38.4 °C)] 98.6 °F (37 °C)  Pulse:  [60-85] 62  Resp:  [17-38] 22  SpO2:  [88 %-100 %] 99 %  BP: ()/(47-62) 95/57   Admit with -  fever, chills, SOB, cough, diarrhea - 3 days,   decreased intake .  Today-  Diarrhea   Cough  No SOB  No nausea  Cards: pulse 62  Pulmonary: RR 18  Poxy 99%   Extremities :edema   Scrotal edema  LABS   Lab Results   Component Value Date    WBC 9.68 03/28/2020    HGB 10.1 (L) 03/28/2020    HCT 29.9 (L) 03/28/2020    MCV 92 03/28/2020     03/28/2020        Recent Labs   Lab 03/29/20  0640   *  340*   CALCIUM 8.8  8.8   ALBUMIN 2.3*   PROT 6.3   *  131*   K 4.2  4.2   CO2 23  23   CL 96  96   *  109*   CREATININE  5.3*  5.3*   ALKPHOS 93   ALT 46*   AST 62*   BILITOT 0.4      Lab Results   Component Value Date    .0 (H) 03/04/2020    CALCIUM 8.8 03/29/2020    CALCIUM 8.8 03/29/2020    PHOS 3.1 03/04/2020      Lab Results   Component Value Date    IRON 63 12/18/2019    TIBC 263 12/18/2019    FERRITIN 6,078 (H) 03/28/2020        ABG  No results for input(s): PH, PO2, PCO2, HCO3, BE in the last 168 hours.      IMAGING:  CXR    ASSESSMENT / PLAN  Fever, chills, SOB, cough, diarrhea - 3 days,   decreased intake .  T max 102.2  Poxy 99 %  CXR-  Pulmonary vascular congestion without overt   interstitial edema or sizable pleural effusion.    CLEMENCIA/CKD 4   Diabetes Mellitus  Echo 2019  · Moderately decreased left ventricular systolic function. The estimated ejection fraction is 30%  · Eccentric left ventricular hypertrophy.  · Global hypokinetic wall motion.  · Grade II (moderate) left ventricular diastolic dysfunction consistent with pseudonormalization.  · Elevated left atrial pressure.  · Mildly reduced right ventricular systolic function.  · Biatrial enlargement.  · Mild right ventricular enlargement.  · Mild tricuspid regurgitation.  · The estimated PA systolic pressure is 50 mm Hg  · Pulmonary hypertension present.  · Normal central venous pressure (3 mm Hg).   Hypotension  BP 97/57     +  UA protein 1+, blood 2+, RBC 2, WBC 5  Hyponatremia  Corrected Na 134 approximately  Blood sugar 340 mg/dl  Azotemia    Creatinine 5.3  Metabolic bone disease  Poor nutrition  Albumin 2.3  Anemia Hb 10.1  Weight daily  Decrease allopurinol  I and O  Avoid nephrotoxic agents, hypotension, hypovolemia  Bladder scan  Lasix  Will need HD soon   English

## 2020-03-29 NOTE — CONSULTS
[facility] Infectious Disease Consult     Primary Team: Lonnie Mcdaniels MD  Consultant Attending: Dr. Yesi MONTE  Date of Admit: 3/28/2020    Reason for Consult     COVID 19 rule out & hydroxychlorquine recommendations     History of Present Illness     Per admit H&P:  Houston Jc is a 74 y.o. AA male who  has a past medical history of HFrEF (EF 30%), CKD stage 4, CAD, Diabetes mellitus, Hematuria, HTN, and Renal cyst, right. The patient presented to [facility] on 3/28/2020 with a primary complaint of fever, chills, SOB, decreased PO, and diarrhea x 3 days.     The patient was in their usual state of health until approx 1 week ago when he started feeling fatigued and has had worsening viral symptoms since. As per wife, he has been extremely weak, requiring a wheelchair, and unable to transfer/toilet without assistance. At baseline, walks with a walker and is able to complete ADLs. Pt has supplemental oxygen at home, uses 2L at night only. Wife also reports worsening scrotal pain and swelling for several weeks, however pt denies complaints. Denies sick contacts, travel, abd pain, blood or mucus in stool.     In ED, pt with CLEMENCIA, troponemia. Febrile, tachypneic, saturating low 90's on RA. Will admit to floors for continued monitoring.     Review of Systems (positives in bold):  ROS  Defer to primary team ROS due to COVID19 testing    Allergies:  Review of patient's allergies indicates:  No Known Allergies    Medications:   In-Hospital Scheduled Medications:   allopurinoL  300 mg Oral Daily    amiodarone  200 mg Oral BID    aspirin  81 mg Oral Daily    atorvastatin  80 mg Oral Daily    azithromycin  500 mg Intravenous Q24H    azithromycin  500 mg Oral Daily    carvediloL  12.5 mg Oral BID    cefTRIAXone (ROCEPHIN) IVPB  1 g Intravenous Q24H    finasteride  5 mg Oral Daily    heparin (porcine)  5,000 Units Subcutaneous Q8H    insulin detemir U-100  10 Units Subcutaneous QHS    spironolactone   "12.5 mg Oral Daily    tamsulosin  0.4 mg Oral Daily      In-Hospital PRN Medications:  acetaminophen, dextrose 50 % in water (D50W), glucagon (human recombinant), insulin aspart U-100, sodium chloride 0.9%      Past Medical History:  Past Medical History:   Diagnosis Date    Anemia     CHF (congestive heart failure)     CKD (chronic kidney disease) stage 4, GFR 15-29 ml/min     Coronary artery disease     Diabetes mellitus     Hematuria     Hypertension     Renal cyst, right      Past Surgical History/ObGyn Hx if gender appropriate:  Past Surgical History:   Procedure Laterality Date    INSERTION OF BIVENTRICULAR IMPLANTABLE CARDIOVERTER-DEFIBRILLATOR (ICD) Left 2019    Procedure: INSERTION, ICD, BIVENTRICULAR;  Surgeon: Arturo Bay MD;  Location: [facility] EP LAB;  Service: Cardiology;  Laterality: Left;  CHB, CRTD, SJM, anes, GP, 6078    LEFT HEART CATHETERIZATION N/A 2019    Procedure: Left heart cath;  Surgeon: Dejuan Cody MD;  Location: [facility] CATH LAB/EP;  Service: Cardiology;  Laterality: N/A;     Family History:  Family History   Problem Relation Age of Onset    Heart attack Father      Social History:  Social History     Tobacco Use    Smoking status: Former Smoker    Smokeless tobacco: Never Used   Substance Use Topics    Alcohol use: Yes     Comment: social    Drug use: No         Objective   Last 24 Hour Vital Signs:  BP  Min: 102/47  Max: 149/62  Temp  Av.3 °F (37.4 °C)  Min: 97.7 °F (36.5 °C)  Max: 102.2 °F (39 °C)  Pulse  Av.1  Min: 60  Max: 90  Resp  Av.9  Min: 17  Max: 38  SpO2  Av.1 %  Min: 95 %  Max: 100 %  Height  Av' 2" (188 cm)  Min: 6' 2" (188 cm)  Max: 6' 2" (188 cm)  Weight  Av.2 kg (302 lb 8.1 oz)  Min: 132.1 kg (291 lb 3.6 oz)  Max: 145.2 kg (320 lb)        Physical Examination:  Physical Exam  Defer to primary team ROS due to COVID19 testing    Laboratory:  CBC:   Lab Results   Component Value Date    WBC 9.68 2020    HGB 10.1 " (L) 03/28/2020     03/28/2020    MCV 92 03/28/2020    RDW 16.2 (H) 03/28/2020       Estimated Creatinine Clearance: 18.4 mL/min (A) (based on SCr of 5.3 mg/dL (H)).        Assessment     Per admit H&P:  [place] Jc is a 74 y.o. AA male who  has a past medical history of HFrEF (EF 30%), CKD stage 4, CAD, Diabetes mellitus. Presented with SOB, fever and diarrhea. Admitted with acute hypoxic respiratory failure. CXR concerning for COVID, test pending     Recommendations   Pneumonia Secondary Suspected COVID 19 Rule Out  - Date of admission: 3/28/20  - Upon presentation, 102.2F, HR 90, RR 32, spo2 97% on room air, otherwise vital signs stable  - Physical exam: tachypnea  - elevated: ESR 39, .8, troponin 2.992, ferritin  6078, , procal 1.78, Cr 4.6, BUN 97  - without abnormality: flu, wbc 9.68, lactic acid 1.8   - UA not concerning for UTI  - following blood cultures from 3/28/20 - no growth to date  - Chest x-ray on 3/29/20: Pulmonary vascular congestion without overt interstitial edema or sizable pleural effusion.  - Clinical course: started on rocephin and azithromycin, now requiring supplemental O2 with 2L nasal cannula    - Highly suspicious for COVID19; recommend holding plaquenil because of prolonged QTc 571  - recommend stopping azithromycin because of QTc, start doxycycline instead  - Consider Kaletra, spoke to pharmacy and medicine is available, spoke to pharmacy  - continue abx for now, if COVID PCR positive can stop abx  - follow up COVID 19 PCR from 3/28/20  - will not see patient daily. Call ID as needed.        Thank you for this consult. We will follow.      Sujata Crook MD PGY-1  [employer] Infectious Disease

## 2020-03-29 NOTE — PLAN OF CARE
Pt vitals were maintained, pt was SOB when sliding from one bed to another. Pt has a umbilical bulge, stated its been there since he was a kid. Pt BG was elevated and insulin was given. Pt HR drop once to the 40s. Pt does have a pacemaker.

## 2020-03-29 NOTE — PLAN OF CARE
Pt received on nasal cannula at  2  lpm.  SPO2   97%.  Pt in no apparent respiratory distress.  Will continue to monitor.

## 2020-03-29 NOTE — DISCHARGE INSTRUCTIONS
Instructions for Patients Awaiting COVID-19 Test Results    You will either be called with your test result or it will be released to the patient portal.  If you have any questions about your test, please visit [email or web address] or call our COVID-19 information line at [phone].    Prevention steps for patients with confirmed or suspected COVID-19     Stay home except to get medical care.   Separate yourself from other people and animals in your home   Call ahead before visiting your doctor.   Wear a facemask.   Cover your coughs and sneezes.   Clean your hands often.   Avoid sharing personal household items.   Clean all high-touch surfaces every day.   Monitor your symptoms. Seek prompt medical attention if your illness is worsening (e.g., difficulty breathing).    Before seeking care, call your healthcare provider.   If you have a medical emergency and need to call 911, notify the dispatch personnel that you have, or are being evaluated for COVID-19. If possible, put on a facemask before emergency medical services arrive.   Please stay home and self-quarantine. Per CDC guidance, you can leave home after these three things have happened: You have had no fever for at least 72 hours (that is three full days of no fever without the use ofmedicine that reduces fevers) AND other symptoms have improved (for example, when your cough or shortness of breath have improved) AND at least 7 days have passed since your symptoms first appeared.      Recommended precautions for household members, intimate partners, and caregivers in a nonhealthcare setting of a patient with symptomatic laboratory-confirmed COVID-19 or a patient under investigation.  Household members, intimate partners, and caregivers in a nonhealthcare setting may have close contact with a person with symptomatic, laboratory-confirmed COVID-19 or a person under investigation. Close contacts should monitor their health; they should  call their healthcare provider right away if they develop symptoms suggestive of COVID-19 (e.g., fever, cough, shortness of breath).    Close contacts should also follow these recommendations:   Make sure that you understand and can help the patient follow their healthcare provider's instructions for medication(s) and care. You should help the patient with basic needs in the home and provide support for getting groceries, prescriptions, and other personal needs.   Monitor the patient's symptoms. If the patient is getting sicker, call his or her healthcare provider and tell them that the patient has laboratory-confirmed COVID-19. This will help the healthcare provider's office take steps to keep other people in the office or waiting room from getting infected. Ask the healthcare provider to call the local or state health department for additional guidance. If the patient has a medical emergency and you need to call 911, notify the dispatch personnel that the patient has, or is being evaluated for COVID-19.   Household members should stay in another room or be  from the patient as much as possible. Household members should use a separate bedroom and bathroom, if available.   Prohibit visitors who do not have an essential need to be in the home.   Household members should care for any pets in the home. Do not handle pets or other animals while sick.   Make sure that shared spaces in the home have good air flow, such as by an air conditioner or an opened window, weather permitting.   Perform hand hygiene frequently. Wash your hands often with soap and water for at least 20 seconds or use an alcohol-based hand  that contains 60 to 95% alcohol, covering all surfaces of your hands and rubbing them together until they feel dry. Soap and water should be used preferentially if hands are visibly dirty.   Avoid touching your eyes, nose, and mouth with unwashed hands.   The patient should wear a  facemask when you are around other people. If the patient is not able to wear a facemask (for example, because it causes trouble breathing), you, as the caregiver should wear a mask when you are in the same room as the patient.   Wear a disposable facemask and gloves when you touch or have contact with the patient's blood, stool, or body fluids, such as saliva, sputum, nasal mucus, vomit, urine.  o Throw out disposable facemasks and gloves after using them. Do not reuse.  o When removing personal protective equipment, first remove and dispose of gloves. Then, immediately clean your hands with soap and water or alcohol-based hand . Next, remove and dispose of facemask, and immediately clean your hands again with soap and water or alcohol-based hand .   Avoid sharing household items with the patient. You should not share dishes, drinking glasses, cups, eating utensils, towels, bedding, or other items. After the patient uses these items, you should wash them thoroughly (see below Wash laundry thoroughly).   Clean all high-touch surfaces, such as counters, tabletops, doorknobs, bathroom fixtures, toilets, phones, keyboards, tablets, and bedside tables, every day. Also, clean any surfaces that may have blood, stool, or body fluids on them.   Use a household cleaning spray or wipe, according to the label instructions. Labels contain instructions for safe and effective use of the cleaning product including precautions you should take when applying the product, such as wearing gloves and making sure you have good ventilation during use of the product.   Wash laundry thoroughly.  o Immediately remove and wash clothes or bedding that have blood, stool, or body fluids on them.  o Wear disposable gloves while handling soiled items and keep soiled items away from your body. Clean your hands (with soap and water or an alcohol-based hand ) immediately after removing your gloves.  o Read and  follow directions on labels of laundry or clothing items and detergent. In general, using a normal laundry detergent according to washing machine instructions and dry thoroughly using the warmest temperatures recommended on the clothing label.   Place all used disposable gloves, facemasks, and other contaminated items in a lined container before disposing of them with other household waste. Clean your hands (with soap and water or an alcohol-based hand ) immediately after handling these items. Soap and water should be used preferentially if hands are visibly dirty.   Discuss any additional questions with your state or local health department or healthcare provider. Check available hours when contacting your local health department.    For more information see CDC link below.      https://www.cdc.gov/coronavirus/2019-ncov/hcp/guidance-prevent-spread.html#precautions        Sources:  [facility], [facility] and hospitals

## 2020-03-29 NOTE — ED NOTES
Talked to admit team about elevated blood sugar. Admit team stated they will put orders in for long acting insulin will continue to monitor. And to hold short acting dose

## 2020-03-29 NOTE — H&P
[facility] Medicine H&P Note     Admitting Team: [facility] Hospitalist Team B  Attending Physician: Lonnie Mcdaniels MD  Resident: Sugar  Intern: Bhavesh     Date of Admit: 3/28/2020    Chief Complaint     Fever, chills, SOB, productive cough, decreased PO, diarrhea x 3 days     Subjective:      History of Present Illness:  Houston Jc is a 74 y.o. AA male who  has a past medical history of HFrEF (EF 30%), CKD stage 4, CAD, Diabetes mellitus, Hematuria, HTN, and Renal cyst, right. The patient presented to [facility] on 3/28/2020 with a primary complaint of fever, chills, SOB, decreased PO, and diarrhea x 3 days.    The patient was in their usual state of health until approx 1 week ago when he started feeling fatigued and has had worsening viral symptoms since. As per wife, he has been extremely weak, requiring a wheelchair, and unable to transfer/toilet without assistance. At baseline, walks with a walker and is able to complete ADLs. Pt has supplemental oxygen at home, uses 2L at night only. Wife also reports worsening scrotal pain and swelling for several weeks, however pt denies complaints. Denies sick contacts, travel, abd pain, blood or mucus in stool.    In ED, pt with CLEMENCIA, troponemia. Febrile, tachypneic, saturating low 90's on RA. Will admit to floors for continued monitoring.    Past Medical History:  Past Medical History:   Diagnosis Date    Anemia     CHF (congestive heart failure)     CKD (chronic kidney disease) stage 4, GFR 15-29 ml/min     Coronary artery disease     Diabetes mellitus     Hematuria     Hypertension     Renal cyst, right        Past Surgical History:  Past Surgical History:   Procedure Laterality Date    INSERTION OF BIVENTRICULAR IMPLANTABLE CARDIOVERTER-DEFIBRILLATOR (ICD) Left 7/18/2019    Procedure: INSERTION, ICD, BIVENTRICULAR;  Surgeon: Arturo Bay MD;  Location: [facility];  Service: Cardiology;  Laterality: Left;  CHB, CRTD, SJM, anes,  GP, 6078    LEFT HEART CATHETERIZATION N/A 7/16/2019    Procedure: Left heart cath;  Surgeon: Dejuan Cody MD;  Location: [facility] CATH LAB/EP;  Service: Cardiology;  Laterality: N/A;       Allergies:  Review of patient's allergies indicates:  No Known Allergies    Home Medications:  Prior to Admission medications    Medication Sig Start Date End Date Taking? Authorizing Provider   allopurinol (ZYLOPRIM) 300 MG tablet Take 300 mg by mouth once daily.    Historical Provider, MD   alogliptin benzoate (ALOGLIPTIN ORAL) Take 25 mg by mouth once daily.    Historical Provider, MD   amiodarone (PACERONE) 200 MG Tab Take 1 tablet (200 mg total) by mouth 2 (two) times daily. 11/8/19   Chelsea Goodman NP   aspirin (ECOTRIN) 81 MG EC tablet Take 81 mg by mouth once daily.    Historical Provider, MD   atorvastatin (LIPITOR) 80 MG tablet Take 80 mg by mouth once daily.    Historical Provider, MD   carvedilol (COREG) 12.5 MG tablet Take 12.5 mg by mouth 2 (two) times daily.    Historical Provider, MD   cholecalciferol, vitamin D3, (VITAMIN D3) 2,000 unit Cap Take 1 capsule by mouth once daily.    Historical Provider, MD   finasteride (PROSCAR) 5 mg tablet Take 1 tablet (5 mg total) by mouth once daily. 8/7/19 8/6/20  Masoud Albarado MD   insulin aspart U-100 (NOVOLOG) 100 unit/mL injection Inject into the skin 3 (three) times daily before meals. PUMP    Historical Provider, MD   losartan (COZAAR) 25 MG tablet Take 1 tablet (25 mg total) by mouth once daily. 7/20/19 7/19/20  Darwin Ny MD   magnesium oxide (MAG-OXIDE ORAL) Take 420 mg by mouth 2 (two) times daily.     Historical Provider, MD   potassium chloride (K-TAB) 20 mEq Take by mouth once daily.    Historical Provider, MD   sildenafil (VIAGRA) 100 MG tablet Take 100 mg by mouth once a week.    Historical Provider, MD   spironolactone (ALDACTONE) 12.5 MG tablet Take 12.5 mg by mouth once daily.    Historical Provider, MD   tamsulosin (FLOMAX) 0.4 mg  "Cap Take 1 capsule (0.4 mg total) by mouth once daily. 19  Masoud Albarado MD   torsemide (DEMADEX) 20 MG Tab Take 2 tablets (40 mg total) by mouth once daily. Take additional 2 tablets if weight gain of more than 3 pounds in 1 day  Patient taking differently: Take 20 mg by mouth 2 (two) times daily. Take additional 2 tablets if weight gain of more than 3 pounds in 1 day 19  Sathya Wells MD   VENTOLIN HFA 90 mcg/actuation inhaler Inhale 1 puff into the lungs every 4 (four) hours as needed.  19   Historical Provider, MD   walker Misc 5" wheel rolling walker 19   Masoud Albarado MD       Family History:  Family History   Problem Relation Age of Onset    Heart attack Father        Social History:  Social History     Tobacco Use    Smoking status: Former Smoker    Smokeless tobacco: Never Used   Substance Use Topics    Alcohol use: Yes     Comment: social    Drug use: No       Review of Systems:  Review of Systems   Constitutional: Positive for chills, fever and malaise/fatigue. Negative for diaphoresis.   HENT: Positive for congestion. Negative for sore throat.    Eyes: Negative for blurred vision.   Respiratory: Positive for cough, sputum production and shortness of breath. Negative for hemoptysis and wheezing.    Cardiovascular: Negative for chest pain, palpitations and leg swelling.   Gastrointestinal: Positive for diarrhea. Negative for abdominal pain, blood in stool, constipation, nausea and vomiting.   Musculoskeletal: Negative for joint pain and myalgias.   Skin: Negative for rash.   Neurological: Positive for dizziness and weakness. Negative for seizures, loss of consciousness and headaches.   Psychiatric/Behavioral: The patient is not nervous/anxious.       All other systems are reviewed and are negative.    Health Maintaince :   Primary Care Physician: Alfonso     Objective:   Last 24 Hour Vital Signs:  BP  Min: 102/47  Max: 149/62  Temp  Av.3 °F " "(37.4 °C)  Min: 98.2 °F (36.8 °C)  Max: 102.2 °F (39 °C)  Pulse  Av.6  Min: 70  Max: 90  Resp  Av.5  Min: 26  Max: 38  SpO2  Av %  Min: 95 %  Max: 100 %  Height  Av' 2" (188 cm)  Min: 6' 2" (188 cm)  Max: 6' 2" (188 cm)  Weight  Av.2 kg (320 lb)  Min: 145.2 kg (320 lb)  Max: 145.2 kg (320 lb)  Body mass index is 41.09 kg/m².  No intake/output data recorded.    Physical Examination:  General: Alert, Awake, Oriented x 3, No Acute Distress, on RA  Head: normocephalic, atraumatic  Eyes: PERRL, EOMI, no scleral icterus, no conjunctival pallor  Mouth and Throat: no lesions noted, tachy mucus membranes  Neck: no lymphadenopathy appreciated, No carotid bruits, no JVD  Respiratory: lungs clear to ascultation bilaterally, tachypnea, no wheezes or crackles  Cardiovascular: regular rate with regular rhythm, 2/6 systolic murmur  Gastrointestinal: soft, nontender, nondistended,no rebound or guarding, hyperactive bowel sounds.   Extremities: pulses 2+ in all extremities, 1+ pitting edema b/l LE to mid shin, normal ROM   Neuro:  full strength even in all extremities, no sensory deficits.   Skin: no lesions, rashes, or breakdown.     Laboratory:  Most Recent Data:  CBC:   Lab Results   Component Value Date    WBC 9.68 2020    HGB 10.1 (L) 2020    HCT 29.9 (L) 2020     2020    MCV 92 2020    RDW 16.2 (H) 2020     WBC Differential: 84 % N, 0 % Bands, 10 % L, 5 % M, 0 % Eo, 0 % Baso, 0 additional cells seen  BMP:   Lab Results   Component Value Date     (L) 2020    K 4.5 2020    CL 97 2020    CO2 18 (L) 2020    BUN 99 (H) 2020    CREATININE 4.7 (H) 2020     (H) 2020    CALCIUM 8.3 (L) 2020    MG 1.8 2019    PHOS 3.1 2020     LFTs:   Lab Results   Component Value Date    PROT 6.8 2020    ALBUMIN 2.6 (L) 2020    BILITOT 0.5 2020    AST 86 (H) 2020    ALKPHOS 110 2020    " ALT 50 (H) 03/28/2020     Coags:   Lab Results   Component Value Date    INR 1.0 07/18/2019     FLP:   Lab Results   Component Value Date    CHOL 82 (L) 07/13/2019    HDL 27 (L) 07/13/2019    LDLCALC 38.0 (L) 07/13/2019    TRIG 85 07/13/2019    CHOLHDL 32.9 07/13/2019     DM:   Lab Results   Component Value Date    HGBA1C 7.9 (H) 03/04/2020    HGBA1C 7.8 (H) 02/10/2020    HGBA1C 8.5 (H) 12/18/2019    LDLCALC 38.0 (L) 07/13/2019    CREATININE 4.7 (H) 03/28/2020     Thyroid:   Lab Results   Component Value Date    TSH 1.523 11/08/2019     Anemia:   Lab Results   Component Value Date    IRON 63 12/18/2019    TIBC 263 12/18/2019    FERRITIN 6,078 (H) 03/28/2020     Cardiac:   Lab Results   Component Value Date    TROPONINI 2.590 (H) 03/28/2020     (H) 03/28/2020     Urinalysis:   Lab Results   Component Value Date    COLORU Yellow 03/28/2020    SPECGRAV 1.025 03/28/2020    NITRITE Negative 03/28/2020    PROTEINUR n 08/19/2019    GLUCOSEU NEGATIVE 12/18/2019    KETONESU Negative 03/28/2020    UROBILINOGEN Negative 03/28/2020    BILIRUBINUR NEGATIVE 12/18/2019    WBCUA 5 03/28/2020       Trended Lab Data:  Recent Labs   Lab 03/28/20  1304 03/28/20 2005   WBC 9.68  --    HGB 10.1*  --    HCT 29.9*  --      --    MCV 92  --    RDW 16.2*  --    * 131*   K 4.1 4.5   CL 96 97   CO2 21* 18*   BUN 97* 99*   CREATININE 4.6* 4.7*   * 400*   PROT 6.8  --    ALBUMIN 2.6*  --    BILITOT 0.5  --    AST 86*  --    ALKPHOS 110  --    ALT 50*  --        Trended Cardiac Data:  Recent Labs   Lab 03/28/20  1304 03/28/20  1415 03/28/20 2005   TROPONINI 2.992*  --  2.590*   BNP  --  525*  --        Microbiology Data:  3/28 blood cx, pending  COVID PENDING  FLU PENDING    Other Results:  EKG (my interpretation): NSR, qTc 571    Radiology:  Imaging Results          X-Ray Chest AP Portable (Final result)  Result time 03/28/20 13:45:48    Final result by Cheo Oliva MD (03/28/20 13:45:48)                  Impression:      1. Apparent left retrocardiac/basilar airspace opacity likely reflects artifact related to suboptimal patient positioning.  2. Pulmonary vascular congestion without overt interstitial edema or sizable pleural effusion.      Electronically signed by: Cheo Oliva  Date:    03/28/2020  Time:    13:45             Narrative:    EXAMINATION:  XR CHEST AP PORTABLE    CLINICAL HISTORY:  Fever, unspecified    TECHNIQUE:  Single portable AP view of the chest.  Left costophrenic angle is not in view, partially limiting evaluation.    COMPARISON:  Chest radiograph 07/18/2019    FINDINGS:  Left subclavian vein approach cardiac device with 3 intact leads and their termini projecting over the expected location of the right atrium, right ventricle and coronary sinus, not significantly changed.    Mild/moderate enlargement of the cardiac silhouette which may reflect cardiomegaly and/or pericardial effusion, not significantly changed.    Apparent left retrocardiac/basilar airspace opacity likely reflects artifact related to suboptimal patient positioning.  Otherwise, there is pulmonary vascular congestion without overt interstitial edema or sizable pleural effusion.  No pneumothorax.                                 Assessment:     Houston Jc is a 74 y.o. male with:  Patient Active Problem List    Diagnosis Date Noted    SOB (shortness of breath) 03/28/2020    NICM (nonischemic cardiomyopathy) 11/06/2019    Abnormal transaminases 07/20/2019    Cardiac resynchronization therapy defibrillator (CRT-D) in place 07/19/2019    Third degree heart block 07/19/2019    Gout 07/18/2019    Abdominal distension 07/17/2019    Debility 07/17/2019    VT (ventricular tachycardia) 07/11/2019    Cardiorenal syndrome 07/11/2019    Acute systolic heart failure 07/11/2019    Complete heart block 07/09/2019    Morbid obesity 10/08/2015    Hypertension 09/21/2015    Diastolic dysfunction 09/21/2015    Sleep apnea  09/21/2015    Swelling of lower extremity 09/18/2015    Hypervolemia 09/18/2015        Plan:     Mr Benjamin presents to [place] ED for 3 days of fever, SOB, diarrhea. Covid r/o. Admitted for CLEMENCIA, troponemia, and hypoxia w/ exertion.    Hypoxic respiratory failure 2/2 PNA vs Covid-19  - pt with fever, SOB, diarrhea and labs indicative of likely Covid-19 positivity  - flu and Covid pending  - pt w/ SOB, desatting to high 80s/low 90s on RA, tachypneic however appears comfortable  - CXR w/ L sided opacity  - high suspicion for Covid, however holding plaquenil 2/2 prolonged qTc on EKG  - started rocephin and azithro for CAP coverage  - will consult ID, appreciate recs  - will provide supplemental O2 to maintain sats >88%, currently stable on RA    CLEMENCIA on CKD 4  - pt w/ baseline Cr 3, 4.6 on admit  - s/p 500mL NS bolus in ED with symptomatic improvement, pt makes urine  - f/u am BMP  - as pts w/ Covid and underlying kidney disease have been going into renal failure rapidly, will consult Nephrology  - pending Nephrology recs, will consult Surgery for line placement should he need emergent CRRT  - follows w/ Dr Fowler    Troponemia, improving  - initial trop 2.9>>2.59 (baseline 0.08)  - EKG w/ NSR and 1st degree block, pt denies CP  - pt w/ hx of non-ischemic cardiomyopathy and complete heart block s/p CRT-D 2019  - likely 2/2 to Covid, now downtrending    HFrEF  - 7/2019 echo with EF 30%, global hypokinesis, and grade 2 diastolic dysfunction  - judiciously hydrate in setting of CLEMENCIA  - repeat echo pending Covid status as pt with hx of cardio-renal syndrome  - follows w/ Dr Mancia    Scrotal swelling and pain  - as per wife, pt with >1m worsening scrotal pain and swelling  - has outpt Urology appt scheduled  - exam deferred overnight as per pt, will assess this morning  - consider inpt Urology consult as outpt appts being cancelled 2/2 Covid    Diet: cardiac  PPX: heparin  Dispo: improvement in CLEMENCIA, stable O2  requirment    Code Status:     FULL    Shruthi Wooten MD  [facility] Internal Medicine HO-2    [facility] Medicine Hospitalist Pager numbers:   [facility] Hospitalist Medicine Team A (Abhinav/Jovani): [phone]  [facility] Hospitalist Medicine Team B (Robert/Enedelia):  [phone]

## 2020-03-29 NOTE — ED NOTES
Report given to 5th floor nurse. Waiting for admit team to put in insulin order and get blood sugar down before bring patient up.

## 2020-03-29 NOTE — PLAN OF CARE
Patient A&Ox4.  2L of oxygen.  NSR-SB on tele.  Cardiac diet. ACHS accuchecks. SSI given.  Bedrest. Too weak to stand to BSC.   Loose epidosdes of diarrhea. Bladder scan PRN.   Scotal swelling. lasix given.   IV solumedrol given.   No complaints of pain.   All questions answered.  Will continue to monitor.    Iza Wheatley RN

## 2020-03-29 NOTE — PROGRESS NOTES
[facility] Hospital Medicine Progress Note    Primary Team: [facility] Hospitalist Team B  Attending Physician: Lonnie Mcdaniels MD  Resident: Sugar  Intern: Lizzy    Subjective:      Mr Hosea endorses feeling better this morning. Satting well on 2L NC, denies SOB. Did not initiate plaquenil on arrival despite high suspicion for Covid 2/2 prolonged qTc, started solumedrol today. Long standing scrotal pain and swelling, does not appear infected.     Objective:     Last 24 Hour Vital Signs:  BP  Min: 95/57  Max: 142/62  Temp  Av °F (37.2 °C)  Min: 97.7 °F (36.5 °C)  Max: 101.1 °F (38.4 °C)  Pulse  Av.8  Min: 60  Max: 85  Resp  Av.6  Min: 17  Max: 38  SpO2  Av.6 %  Min: 88 %  Max: 100 %  Weight  Av.2 kg (293 lb 12.2 oz)  Min: 132.1 kg (291 lb 3.6 oz)  Max: 134.4 kg (296 lb 4.8 oz)  I/O last 3 completed shifts:  In: 755 [P.O.:205; IV Piggyback:550]  Out: 200 [Urine:200]    Physical Examination:  Deferred 2/2 covid r/o    Laboratory:  Laboratory Data Reviewed: yes  Pertinent Findings:  Cr 4.7>>5.3    Microbiology Data Reviewed: yes  Pertinent Findings:  Flu negative  COVID PENDING  3/28 blood cx, pending    Other Results:  EKG (my interpretation): NSR     Radiology Data Reviewed: yes  Pertinent Findings:  No new    Current Medications:     Infusions:       Scheduled:   [START ON 3/30/2020] allopurinoL  50 mg Oral Daily    amiodarone  200 mg Oral BID    aspirin  81 mg Oral Daily    atorvastatin  80 mg Oral Daily    azithromycin  500 mg Intravenous Q24H    azithromycin  500 mg Oral Daily    carvediloL  12.5 mg Oral BID    cefTRIAXone (ROCEPHIN) IVPB  1 g Intravenous Q24H    finasteride  5 mg Oral Daily    heparin (porcine)  5,000 Units Subcutaneous Q8H    insulin detemir U-100  10 Units Subcutaneous QHS    methylPREDNISolone sodium succinate  40 mg Intravenous Q6H    spironolactone  12.5 mg Oral Daily    tamsulosin  0.4 mg Oral Daily        PRN:  acetaminophen, dextrose 50 % in water (D50W),  glucagon (human recombinant), insulin aspart U-100, sodium chloride 0.9%    Antibiotics and Day Number of Therapy:  Rocephin, 3/28-present  Azithro, 3/28 -present (no plaquenil 2/2 long qTc)  IV Solumedrol 40mg q6, 3/29-    Lines and Day Number of Therapy:  PIV    Assessment:     Houston Jc is a 74 y.o.male with  Patient Active Problem List    Diagnosis Date Noted    COVID-19 virus detected     Fever     SOB (shortness of breath) 03/28/2020    NICM (nonischemic cardiomyopathy) 11/06/2019    Abnormal transaminases 07/20/2019    Cardiac resynchronization therapy defibrillator (CRT-D) in place 07/19/2019    Third degree heart block 07/19/2019    Gout 07/18/2019    Abdominal distension 07/17/2019    Debility 07/17/2019    VT (ventricular tachycardia) 07/11/2019    Cardiorenal syndrome 07/11/2019    Acute systolic heart failure 07/11/2019    Complete heart block 07/09/2019    Morbid obesity 10/08/2015    Hypertension 09/21/2015    Diastolic dysfunction 09/21/2015    Sleep apnea 09/21/2015    Swelling of lower extremity 09/18/2015    Hypervolemia 09/18/2015        Plan:     Mr Benjamin presents to [place] ED for 3 days of fever, SOB, diarrhea. Covid r/o. Admitted for CLEMECNIA, troponemia, and hypoxia w/ exertion.     Hypoxic respiratory failure 2/2 PNA vs Covid-19  - pt with fever, SOB, diarrhea and labs indicative of likely Covid-19 positivity  - flu negative, Covid pending  - pt w/ SOB, desatting to high 80s/low 90s on RA, tachypneic however appears comfortable  - CXR w/ L sided opacity  - high suspicion for Covid, however holding plaquenil 2/2 prolonged qTc on EKG  - started rocephin and azithro for CAP coverage  - started IV solumedrol 1mg/kg/day divided q6  - will consult ID, appreciate recs  - will provide supplemental O2 to maintain sats >88%, currently stable on RA     CLEMENCIA on CKD 4  - pt w/ baseline Cr 3, 4.6 on admit>>5.3 today  - s/p 500mL NS bolus in ED with symptomatic improvement, pt makes  urine  - daily BMP  - follows w/ Dr Fowler  - consulted Nephrology, appreciate recs  - pending Nephrology recs, will consult Surgery for line placement should he need emergent CRRT       Troponemia, stable  - initial trop 2.9>>2.59 (baseline 0.08)  - EKG w/ NSR and 1st degree block, pt denies CP  - pt w/ hx of non-ischemic cardiomyopathy and complete heart block s/p CRT-D 2019  - likely 2/2 to Covid, now downtrending     HFrEF  - 7/2019 echo with EF 30%, global hypokinesis, and grade 2 diastolic dysfunction  - judiciously hydrate in setting of CLEMENCIA  - repeat echo pending Covid status as pt with hx of cardio-renal syndrome  - follows w/ Dr Mancia     Scrotal swelling and pain  - as per wife, pt with >1m worsening scrotal pain and swelling  - has outpt Urology appt scheduled  - exam deferred overnight as per pt, will assess this morning  - consider inpt Urology consult as outpt appts being cancelled 2/2 Covid  - scrotal swelling with R varicocele, f/u scrotal US     Diet: cardiac  PPX: heparin  Dispo: improvement in CLEMENCIA, stable O2 requirment       Shruthi Wooten MD  [facility] HO-2    [facility] Medicine Hospitalist Pager numbers:   [facility] Hospitalist Medicine Team A (Abhinav/Jovani): [phone]  [facility] Hospitalist Medicine Team B (Robert/Enedelia):  [phone]

## 2020-03-30 LAB
ALBUMIN SERPL BCP-MCNC: 2.3 G/DL (ref 3.5–5.2)
ALP SERPL-CCNC: 94 U/L (ref 55–135)
ALT SERPL W/O P-5'-P-CCNC: 41 U/L (ref 10–44)
ANION GAP SERPL CALC-SCNC: 12 MMOL/L (ref 8–16)
ANION GAP SERPL CALC-SCNC: 12 MMOL/L (ref 8–16)
AST SERPL-CCNC: 44 U/L (ref 10–40)
BILIRUB SERPL-MCNC: 0.4 MG/DL (ref 0.1–1)
BUN SERPL-MCNC: 106 MG/DL (ref 8–23)
BUN SERPL-MCNC: 106 MG/DL (ref 8–23)
CALCIUM SERPL-MCNC: 9.2 MG/DL (ref 8.7–10.5)
CALCIUM SERPL-MCNC: 9.2 MG/DL (ref 8.7–10.5)
CHLORIDE SERPL-SCNC: 92 MMOL/L (ref 95–110)
CHLORIDE SERPL-SCNC: 92 MMOL/L (ref 95–110)
CO2 SERPL-SCNC: 23 MMOL/L (ref 23–29)
CO2 SERPL-SCNC: 23 MMOL/L (ref 23–29)
CREAT SERPL-MCNC: 5.4 MG/DL (ref 0.5–1.4)
CREAT SERPL-MCNC: 5.4 MG/DL (ref 0.5–1.4)
EST. GFR  (AFRICAN AMERICAN): 11 ML/MIN/1.73 M^2
EST. GFR  (AFRICAN AMERICAN): 11 ML/MIN/1.73 M^2
EST. GFR  (NON AFRICAN AMERICAN): 10 ML/MIN/1.73 M^2
EST. GFR  (NON AFRICAN AMERICAN): 10 ML/MIN/1.73 M^2
GLUCOSE SERPL-MCNC: 441 MG/DL (ref 70–110)
GLUCOSE SERPL-MCNC: 441 MG/DL (ref 70–110)
POCT GLUCOSE: 400 MG/DL (ref 70–110)
POTASSIUM SERPL-SCNC: 4.4 MMOL/L (ref 3.5–5.1)
POTASSIUM SERPL-SCNC: 4.4 MMOL/L (ref 3.5–5.1)
PROT SERPL-MCNC: 6.6 G/DL (ref 6–8.4)
SARS-COV-2 RNA RESP QL NAA+PROBE: DETECTED
SODIUM SERPL-SCNC: 127 MMOL/L (ref 136–145)
SODIUM SERPL-SCNC: 127 MMOL/L (ref 136–145)

## 2020-03-30 PROCEDURE — 27000221 HC OXYGEN, UP TO 24 HOURS

## 2020-03-30 PROCEDURE — 96376 TX/PRO/DX INJ SAME DRUG ADON: CPT

## 2020-03-30 PROCEDURE — 36556 INSERT NON-TUNNEL CV CATH: CPT | Mod: RT,,, | Performed by: SURGERY

## 2020-03-30 PROCEDURE — 99232 SBSQ HOSP IP/OBS MODERATE 35: CPT | Mod: 25,,, | Performed by: SURGERY

## 2020-03-30 PROCEDURE — 25000003 PHARM REV CODE 250: Performed by: INTERNAL MEDICINE

## 2020-03-30 PROCEDURE — 25000003 PHARM REV CODE 250: Performed by: STUDENT IN AN ORGANIZED HEALTH CARE EDUCATION/TRAINING PROGRAM

## 2020-03-30 PROCEDURE — 96365 THER/PROPH/DIAG IV INF INIT: CPT | Mod: 59

## 2020-03-30 PROCEDURE — 86706 HEP B SURFACE ANTIBODY: CPT

## 2020-03-30 PROCEDURE — 86704 HEP B CORE ANTIBODY TOTAL: CPT

## 2020-03-30 PROCEDURE — 63600175 PHARM REV CODE 636 W HCPCS: Performed by: STUDENT IN AN ORGANIZED HEALTH CARE EDUCATION/TRAINING PROGRAM

## 2020-03-30 PROCEDURE — 63600175 PHARM REV CODE 636 W HCPCS: Performed by: INTERNAL MEDICINE

## 2020-03-30 PROCEDURE — 87340 HEPATITIS B SURFACE AG IA: CPT

## 2020-03-30 PROCEDURE — G0378 HOSPITAL OBSERVATION PER HR: HCPCS

## 2020-03-30 PROCEDURE — 36556 PR INSERT NON-TUNNEL CV CATH 5+ YRS OLD: ICD-10-PCS | Mod: RT,,, | Performed by: SURGERY

## 2020-03-30 PROCEDURE — 80053 COMPREHEN METABOLIC PANEL: CPT

## 2020-03-30 PROCEDURE — 99232 PR SUBSEQUENT HOSPITAL CARE,LEVL II: ICD-10-PCS | Mod: 25,,, | Performed by: SURGERY

## 2020-03-30 PROCEDURE — 11000001 HC ACUTE MED/SURG PRIVATE ROOM

## 2020-03-30 PROCEDURE — 36415 COLL VENOUS BLD VENIPUNCTURE: CPT

## 2020-03-30 PROCEDURE — 90935 HEMODIALYSIS ONE EVALUATION: CPT

## 2020-03-30 PROCEDURE — 96372 THER/PROPH/DIAG INJ SC/IM: CPT

## 2020-03-30 PROCEDURE — 94761 N-INVAS EAR/PLS OXIMETRY MLT: CPT

## 2020-03-30 RX ORDER — INSULIN ASPART 100 [IU]/ML
3 INJECTION, SOLUTION INTRAVENOUS; SUBCUTANEOUS
Status: DISCONTINUED | OUTPATIENT
Start: 2020-03-30 | End: 2020-03-30

## 2020-03-30 RX ORDER — FUROSEMIDE 10 MG/ML
80 INJECTION INTRAMUSCULAR; INTRAVENOUS ONCE
Status: COMPLETED | OUTPATIENT
Start: 2020-03-30 | End: 2020-03-30

## 2020-03-30 RX ORDER — MUPIROCIN 20 MG/G
OINTMENT TOPICAL 2 TIMES DAILY
Status: COMPLETED | OUTPATIENT
Start: 2020-03-30 | End: 2020-04-04

## 2020-03-30 RX ORDER — INSULIN ASPART 100 [IU]/ML
5 INJECTION, SOLUTION INTRAVENOUS; SUBCUTANEOUS
Status: DISCONTINUED | OUTPATIENT
Start: 2020-03-30 | End: 2020-03-31

## 2020-03-30 RX ADMIN — INSULIN ASPART 5 UNITS: 100 INJECTION, SOLUTION INTRAVENOUS; SUBCUTANEOUS at 02:03

## 2020-03-30 RX ADMIN — ATORVASTATIN CALCIUM 80 MG: 40 TABLET, FILM COATED ORAL at 09:03

## 2020-03-30 RX ADMIN — CARVEDILOL 12.5 MG: 12.5 TABLET, FILM COATED ORAL at 09:03

## 2020-03-30 RX ADMIN — ASPIRIN 81 MG: 81 TABLET, COATED ORAL at 09:03

## 2020-03-30 RX ADMIN — MUPIROCIN: 20 OINTMENT TOPICAL at 09:03

## 2020-03-30 RX ADMIN — FINASTERIDE 5 MG: 5 TABLET, FILM COATED ORAL at 09:03

## 2020-03-30 RX ADMIN — SPIRONOLACTONE 12.5 MG: 25 TABLET, FILM COATED ORAL at 09:03

## 2020-03-30 RX ADMIN — HEPARIN SODIUM 5000 UNITS: 5000 INJECTION, SOLUTION INTRAVENOUS; SUBCUTANEOUS at 06:03

## 2020-03-30 RX ADMIN — INSULIN ASPART 5 UNITS: 100 INJECTION, SOLUTION INTRAVENOUS; SUBCUTANEOUS at 05:03

## 2020-03-30 RX ADMIN — FUROSEMIDE 80 MG: 10 INJECTION, SOLUTION INTRAVENOUS at 09:03

## 2020-03-30 RX ADMIN — METHYLPREDNISOLONE SODIUM SUCCINATE 40 MG: 40 INJECTION, POWDER, FOR SOLUTION INTRAMUSCULAR; INTRAVENOUS at 06:03

## 2020-03-30 RX ADMIN — INSULIN ASPART 5 UNITS: 100 INJECTION, SOLUTION INTRAVENOUS; SUBCUTANEOUS at 06:03

## 2020-03-30 RX ADMIN — TAMSULOSIN HYDROCHLORIDE 0.4 MG: 0.4 CAPSULE ORAL at 09:03

## 2020-03-30 RX ADMIN — AMIODARONE HYDROCHLORIDE 200 MG: 200 TABLET ORAL at 09:03

## 2020-03-30 RX ADMIN — METHYLPREDNISOLONE SODIUM SUCCINATE 40 MG: 40 INJECTION, POWDER, FOR SOLUTION INTRAMUSCULAR; INTRAVENOUS at 12:03

## 2020-03-30 RX ADMIN — HEPARIN SODIUM 5000 UNITS: 5000 INJECTION, SOLUTION INTRAVENOUS; SUBCUTANEOUS at 09:03

## 2020-03-30 RX ADMIN — HEPARIN SODIUM 5000 UNITS: 5000 INJECTION, SOLUTION INTRAVENOUS; SUBCUTANEOUS at 12:03

## 2020-03-30 RX ADMIN — AZITHROMYCIN MONOHYDRATE 500 MG: 250 TABLET ORAL at 09:03

## 2020-03-30 RX ADMIN — CEFTRIAXONE 1 G: 1 INJECTION, SOLUTION INTRAVENOUS at 09:03

## 2020-03-30 NOTE — PLAN OF CARE
VIRTUAL NURSE:  Cued into patient's room.  Patient not responding to introduction and permission inquiry.  Patient resting comfortably in bed with eyes closed; respirations even and unlabored.  No distress noted.    Labs, notes, and orders reviewed.

## 2020-03-30 NOTE — PLAN OF CARE
[facility] ID Plan of Care    Confirmed positive COVID-19 test. Fever curve improved. Oxygenating well on 2L NC.    Ok to stop all antibiotics as bacterial coinfection is unlikely at this point. If QTc <550 can start hydroxychloroquine, otherwise recommend starting Kaletra per [facility] COVID-19 algorithm (confirmed that med is available in our inpatient pharmacy).    ID will follow from afar. Call with questions.    Carroll Person MD  [facility] HO-II  [facility]

## 2020-03-30 NOTE — CONSULTS
[facility]  INPATIENT H&P    REASON FOR CONSULT/ADMISSION: HD catheter placement    HPI: Houston Jc is a 74 y.o. male AAM, COVID+, with acute on chronic kidney disease.  Needs HD catheter placement.      I have reviewed the patient's chart including prior progress notes, procedures and testing.     ROS:   Review of Systems   Constitutional: Positive for activity change, chills, fatigue and fever. Negative for unexpected weight change.   HENT: Negative for facial swelling and trouble swallowing.    Eyes: Negative for visual disturbance.   Respiratory: Positive for cough and shortness of breath. Negative for chest tightness and wheezing.    Cardiovascular: Negative for chest pain, palpitations and leg swelling.   Gastrointestinal: Positive for diarrhea. Negative for abdominal distention, abdominal pain, anal bleeding, blood in stool, constipation, nausea and vomiting.   Endocrine: Negative for polydipsia and polyuria.   Genitourinary: Negative for dysuria and hematuria.   Musculoskeletal: Negative for arthralgias and myalgias.   Skin: Negative for color change, pallor and wound.   Allergic/Immunologic: Negative.    Neurological: Negative for syncope and weakness.   Hematological: Negative for adenopathy.   Psychiatric/Behavioral: Negative for agitation, behavioral problems and dysphoric mood.       PROBLEM LIST:  Patient Active Problem List   Diagnosis    Swelling of lower extremity    Hypervolemia    Hypertension    Diastolic dysfunction    Sleep apnea    Morbid obesity    Complete heart block    VT (ventricular tachycardia)    Cardiorenal syndrome    Acute systolic heart failure    Abdominal distension    Debility    Gout    Cardiac resynchronization therapy defibrillator (CRT-D) in place    Third degree heart block    Abnormal transaminases    NICM (nonischemic cardiomyopathy)    SOB (shortness of breath)    COVID-19 virus detected    Fever    Suspected 2019-Ncov Infection          HISTORY  Past Medical History:   Diagnosis Date    Anemia     CHF (congestive heart failure)     CKD (chronic kidney disease) stage 4, GFR 15-29 ml/min     Coronary artery disease     Diabetes mellitus     Hematuria     Hypertension     Renal cyst, right        Past Surgical History:   Procedure Laterality Date    INSERTION OF BIVENTRICULAR IMPLANTABLE CARDIOVERTER-DEFIBRILLATOR (ICD) Left 7/18/2019    Procedure: INSERTION, ICD, BIVENTRICULAR;  Surgeon: Arturo Bay MD;  Location: [facility] EP LAB;  Service: Cardiology;  Laterality: Left;  CHB, CRTD, SJM, anes, GP, [id number]    LEFT HEART CATHETERIZATION N/A 7/16/2019    Procedure: Left heart cath;  Surgeon: Dejuan Cody MD;  Location: [facility] CATH LAB/EP;  Service: Cardiology;  Laterality: N/A;       Social History     Tobacco Use    Smoking status: Former Smoker    Smokeless tobacco: Never Used   Substance Use Topics    Alcohol use: Yes     Comment: social    Drug use: No       Family History   Problem Relation Age of Onset    Heart attack Father          MEDS:  No current facility-administered medications on file prior to encounter.      Current Outpatient Medications on File Prior to Encounter   Medication Sig Dispense Refill    allopurinol (ZYLOPRIM) 300 MG tablet Take 300 mg by mouth once daily.      alogliptin benzoate (ALOGLIPTIN ORAL) Take 25 mg by mouth once daily.      amiodarone (PACERONE) 200 MG Tab Take 1 tablet (200 mg total) by mouth 2 (two) times daily. 60 tablet 11    aspirin (ECOTRIN) 81 MG EC tablet Take 81 mg by mouth once daily.      atorvastatin (LIPITOR) 80 MG tablet Take 80 mg by mouth once daily.      carvedilol (COREG) 12.5 MG tablet Take 12.5 mg by mouth 2 (two) times daily.      cholecalciferol, vitamin D3, (VITAMIN D3) 2,000 unit Cap Take 1 capsule by mouth once daily.      finasteride (PROSCAR) 5 mg tablet Take 1 tablet (5 mg total) by mouth once daily. 30 tablet 11    insulin aspart U-100 (NOVOLOG) 100 unit/mL  "injection Inject into the skin 3 (three) times daily before meals. PUMP      losartan (COZAAR) 25 MG tablet Take 1 tablet (25 mg total) by mouth once daily. 90 tablet 3    magnesium oxide (MAG-OXIDE ORAL) Take 420 mg by mouth 2 (two) times daily.       potassium chloride (K-TAB) 20 mEq Take by mouth once daily.      spironolactone (ALDACTONE) 12.5 MG tablet Take 12.5 mg by mouth once daily.      tamsulosin (FLOMAX) 0.4 mg Cap Take 1 capsule (0.4 mg total) by mouth once daily. 30 capsule 11    torsemide (DEMADEX) 20 MG Tab Take 2 tablets (40 mg total) by mouth once daily. Take additional 2 tablets if weight gain of more than 3 pounds in 1 day (Patient taking differently: Take 20 mg by mouth 2 (two) times daily. Take additional 2 tablets if weight gain of more than 3 pounds in 1 day) 60 tablet 11    VENTOLIN HFA 90 mcg/actuation inhaler Inhale 1 puff into the lungs every 4 (four) hours as needed.       sildenafil (VIAGRA) 100 MG tablet Take 100 mg by mouth once a week.      walker Misc 5" wheel rolling walker 1 each 1       ALLERGIES:  Review of patient's allergies indicates:  No Known Allergies      VITALS:  Temp:  [97.4 °F (36.3 °C)-98.2 °F (36.8 °C)] 98.1 °F (36.7 °C)  Pulse:  [60-70] 60  Resp:  [17-20] 20  SpO2:  [96 %-99 %] 98 %  BP: ()/(46-60) 137/60    I/O last 3 completed shifts:  In: 1235 [P.O.:635; IV Piggyback:600]  Out: 1100 [Urine:1100]      PHYSICAL EXAM:  Physical Exam   Constitutional: He is oriented to person, place, and time. He appears well-developed and well-nourished. No distress.   HENT:   Head: Normocephalic and atraumatic.   Eyes: Pupils are equal, round, and reactive to light. Conjunctivae are normal. No scleral icterus.   Neck: Neck supple.   Cardiovascular: Normal rate.   Pulmonary/Chest: No respiratory distress.   Abdominal: Soft.   Musculoskeletal: Normal range of motion. He exhibits no edema.   Neurological: He is alert and oriented to person, place, and time.   Skin: Skin " is warm and dry. No rash noted.   Psychiatric: He has a normal mood and affect. His behavior is normal.         LABS:  Lab Results   Component Value Date    WBC 9.68 03/28/2020    RBC 3.25 (L) 03/28/2020    HGB 10.1 (L) 03/28/2020    HCT 29.9 (L) 03/28/2020     03/28/2020     Lab Results   Component Value Date     (H) 03/30/2020     (H) 03/30/2020     (L) 03/30/2020     (L) 03/30/2020    K 4.4 03/30/2020    K 4.4 03/30/2020    CL 92 (L) 03/30/2020    CL 92 (L) 03/30/2020    CO2 23 03/30/2020    CO2 23 03/30/2020     (H) 03/30/2020     (H) 03/30/2020    CREATININE 5.4 (H) 03/30/2020    CREATININE 5.4 (H) 03/30/2020    CALCIUM 9.2 03/30/2020    CALCIUM 9.2 03/30/2020     Lab Results   Component Value Date    ALT 41 03/30/2020    AST 44 (H) 03/30/2020    ALKPHOS 94 03/30/2020    BILITOT 0.4 03/30/2020     Lab Results   Component Value Date    MG 1.8 07/22/2019    PHOS 3.1 03/04/2020           ASSESSMENT & PLAN:  74 y.o. male with COVID+, renal failure in need of HD catheter.  Will place at bedside.  R/B/A explained to the patient.      Kvng Mccurdy M.D., F.A.C.S.  [facility]  [facility] [place] [facility] [place]

## 2020-03-30 NOTE — PROGRESS NOTES
Progress Note  Nephrology      Consult Requested By: Lonnie Mcdaniels MD  Reason for Consult: CLEMENCIA    SUBJECTIVE:     Review of Systems   Constitutional: Negative for chills and fever.   Respiratory: Negative for shortness of breath.    Cardiovascular: Negative for chest pain.   Gastrointestinal: Negative for nausea and vomiting.     Patient Active Problem List   Diagnosis    Swelling of lower extremity    Hypervolemia    Hypertension    Diastolic dysfunction    Sleep apnea    Morbid obesity    Complete heart block    VT (ventricular tachycardia)    Cardiorenal syndrome    Acute systolic heart failure    Abdominal distension    Debility    Gout    Cardiac resynchronization therapy defibrillator (CRT-D) in place    Third degree heart block    Abnormal transaminases    NICM (nonischemic cardiomyopathy)    SOB (shortness of breath)    COVID-19 virus detected    Fever    Suspected 2019-Ncov Infection    CLEMENCIA (acute kidney injury)       OBJECTIVE:     Medications:   amiodarone  200 mg Oral BID    aspirin  81 mg Oral Daily    atorvastatin  80 mg Oral Daily    carvediloL  12.5 mg Oral BID    cefTRIAXone (ROCEPHIN) IVPB  1 g Intravenous Q24H    finasteride  5 mg Oral Daily    heparin (porcine)  5,000 Units Subcutaneous Q8H    insulin aspart U-100  5 Units Subcutaneous TIDWM    insulin detemir U-100  15 Units Subcutaneous BID    mupirocin   Nasal BID    tamsulosin  0.4 mg Oral Daily       Vitals:    03/30/20 1608   BP: (!) 115/56   Pulse: (!) 59   Resp: 20   Temp: 97.4 °F (36.3 °C)     I/O last 3 completed shifts:  In: 1235 [P.O.:635; IV Piggyback:600]  Out: 1100 [Urine:1100]     Physical exam is limited 2/2 covid pandemic and need to preserve PPE    Laboratory:  Recent Labs   Lab 03/28/20  1304   WBC 9.68   HGB 10.1*   HCT 29.9*      MONO 5.2  0.5     Recent Labs   Lab 03/28/20 2005 03/29/20  0640 03/30/20  0747   * 131*  131* 127*  127*   K 4.5 4.2  4.2 4.4  4.4   CL 97 96   96 92*  92*   CO2 18* 23  23 23  23   BUN 99* 109*  109* 106*  106*   CREATININE 4.7* 5.3*  5.3* 5.4*  5.4*   CALCIUM 8.3* 8.8  8.8 9.2  9.2     Labs reviewed  Diagnostic Results:  X-Ray: Reviewed  US: Reviewed  Echo: Reviewed      ASSESSMENT/PLAN:     ARF on CKD4  -trialysis catheter in place  -HD today    Hypervolemia+ hyponatremia  -holding Lasix  -Goal UF 1L with HD  -fluid restrict 1L    CHF  -EF 30%  -bnp 525    Thank you for allowing me to participate in the care of your patients  With any question please call [phone]  Ximena Mccarthy NP    [facility]  ANA MARIA Burns MD, FACP,   KEREN Woo MD,   MD JOSH Gill NP H. Mitchell, NP  [address]  [place], [address]  [phone]

## 2020-03-30 NOTE — PLAN OF CARE
VN attempted to cue into room.  Monitor currently busy.  VN will continue to follow and be available as needed.

## 2020-03-30 NOTE — PLAN OF CARE
Patient on telemetry in a paced rhythm. On Oxygen 2 l NC saturations 95-96%. Blood glucose monitored.Patient remains on bed rest. Bed I low position, call light in reach. Remains free from fall, bed alarm in use.Will continue to monitor.

## 2020-03-30 NOTE — PLAN OF CARE
VN cued into room for q2h rounding.  Pt resting comfortably in bed.  No needs or complaints at this time.  Staff in room assisting patient.  Call light within reach, fall precautions maintained.  VN will continue to follow and be available as needed.

## 2020-03-30 NOTE — PROGRESS NOTES
No new orders given for more insulin or a moderate or high dose sliding scale. Aspart insulin,  5 Units given subcutaneously  As per current  Sliding scale orders.

## 2020-03-30 NOTE — PROGRESS NOTES
Notified  On call for Dr Donato's team the patient's blood glucose is 400 and patient  is on a low dose sliding scale. He has only 5 units of aspart due for a CBG of >350. The doctor said he would call me back.

## 2020-03-30 NOTE — PLAN OF CARE
TN spoke with patient and wife via phone to complete discharge assessment. Currently, patient lives at home with his wife Paulette, [phone]. Patient uses a walker and wheelchair assistive equipment. No other DME or current HH noted . Per patient's wife, patient has received HH services in the past. Upon discharge, patient wife states that either she or other family members will provide transportation home. Patient's wife will be avavaible to help at home as needed. Patient's PCP is Dr. Hamilton.      TN gave patient's wife TN's contact information and instructed her to contact TN if she has any further questions or concerns. TN will continue to follow throughout transitions of care.            03/30/20 1154   Discharge Assessment   Assessment Type Discharge Planning Assessment   Confirmed/corrected address and phone number on facesheet? Yes   Assessment information obtained from? Patient;Medical Record   Communicated expected length of stay with patient/caregiver yes   Prior to hospitilization cognitive status: Alert/Oriented   Prior to hospitalization functional status: Assistive Equipment   Current Functional Status: Needs Assistance   Lives With spouse  (Wife Paulette, [phone])   Able to Return to Prior Arrangements yes   Is patient able to care for self after discharge? Yes   Patient's perception of discharge disposition home or selfcare   Readmission Within the Last 30 Days no previous admission in last 30 days   Patient currently being followed by outpatient case management? No   Patient currently receives any other outside agency services? No   Equipment Currently Used at Home walker, rolling;wheelchair   Do you have any problems affording any of your prescribed medications? No   Is the patient taking medications as prescribed? yes   Does the patient have transportation home? Yes   Transportation Anticipated family or friend will provide   Does the patient receive services at the [facility]? No    Discharge Plan A Home with family   Discharge Plan B Home Health   DME Needed Upon Discharge  none   Patient/Family in Agreement with Plan yes   Does the patient have transportation to healthcare appointments? Yes

## 2020-03-30 NOTE — PROGRESS NOTES
Patient's BS >500, insulin given    MD notified. MD to put in new sliding scale order. Will continue to monitor.

## 2020-03-30 NOTE — PROGRESS NOTES
[facility] Hospital Medicine Progress Note    Primary Team: [facility] Hospitalist Team B  Attending Physician: Lonnie Mcdaniels MD  Resident: Sugar  Intern: Lizzy    Subjective:      Mr Jc endorses feeling alright this morning. Satting well on 2L NC, denies SOB. Did not initiate plaquenil on arrival despite high suspicion for Covid 2/2 prolonged qTc, continuing solumedrol and CAP abx. Long standing scrotal pain and swelling, does not appear infected.     Objective:     Last 24 Hour Vital Signs:  BP  Min: 93/46  Max: 137/60  Temp  Av.1 °F (36.7 °C)  Min: 97.4 °F (36.3 °C)  Max: 98.6 °F (37 °C)  Pulse  Av  Min: 60  Max: 70  Resp  Av.2  Min: 17  Max: 22  SpO2  Av.4 %  Min: 88 %  Max: 99 %  Weight  Av.1 kg (291 lb 3.6 oz)  Min: 132.1 kg (291 lb 3.6 oz)  Max: 132.1 kg (291 lb 3.6 oz)  I/O last 3 completed shifts:  In: 1235 [P.O.:635; IV Piggyback:600]  Out: 1100 [Urine:1100]    Physical Examination:  Deferred 2/2 covid r/o    Laboratory:  Laboratory Data Reviewed: yes  Pertinent Findings:  Cr 4.7>>5.3>>5.4    Microbiology Data Reviewed: yes  Pertinent Findings:  Flu negative  COVID POSITIVE  3/28 blood cx, NGTD    Other Results:  EKG (my interpretation): NSR     Radiology Data Reviewed: yes  Pertinent Findings:  No new    Current Medications:     Infusions:       Scheduled:   allopurinoL  50 mg Oral Daily    amiodarone  200 mg Oral BID    aspirin  81 mg Oral Daily    atorvastatin  80 mg Oral Daily    azithromycin  500 mg Oral Daily    carvediloL  12.5 mg Oral BID    cefTRIAXone (ROCEPHIN) IVPB  1 g Intravenous Q24H    finasteride  5 mg Oral Daily    furosemide (LASIX) IV  80 mg Intravenous Once    heparin (porcine)  5,000 Units Subcutaneous Q8H    insulin detemir U-100  10 Units Subcutaneous QHS    methylPREDNISolone sodium succinate  40 mg Intravenous Q6H    spironolactone  12.5 mg Oral Daily    tamsulosin  0.4 mg Oral Daily        PRN:  acetaminophen, dextrose 50 % in water (D50W),  glucagon (human recombinant), insulin aspart U-100, sodium chloride 0.9%    Antibiotics and Day Number of Therapy:  Rocephin, 3/28-present  Azithro, 3/28 -present   (no plaquenil 2/2 long qTc)  IV Solumedrol 40mg q6, 3/29-present    Lines and Day Number of Therapy:  PIV    Assessment:     Houston Jc is a 74 y.o.male with  Patient Active Problem List    Diagnosis Date Noted    COVID-19 virus detected     Fever     Suspected 2019-Ncov Infection     SOB (shortness of breath) 03/28/2020    NICM (nonischemic cardiomyopathy) 11/06/2019    Abnormal transaminases 07/20/2019    Cardiac resynchronization therapy defibrillator (CRT-D) in place 07/19/2019    Third degree heart block 07/19/2019    Gout 07/18/2019    Abdominal distension 07/17/2019    Debility 07/17/2019    VT (ventricular tachycardia) 07/11/2019    Cardiorenal syndrome 07/11/2019    Acute systolic heart failure 07/11/2019    Complete heart block 07/09/2019    Morbid obesity 10/08/2015    Hypertension 09/21/2015    Diastolic dysfunction 09/21/2015    Sleep apnea 09/21/2015    Swelling of lower extremity 09/18/2015    Hypervolemia 09/18/2015        Plan:     Mr Benjamin presents to [place] ED for 3 days of fever, SOB, diarrhea. Covid r/o. Admitted for CLEMENCIA, troponemia, and hypoxia w/ exertion. Stable on 2L NC, will attempt to wean. Troponemia and CLEMENCIA stable today.     Hypoxic respiratory failure 2/2 Covid-19  - pt with fever, SOB, diarrhea and labs indicative of  Covid-19 illness script  - flu negative, Covid positive  - pt w/ SOB, currently satting high 80s/low 90s on 2L NC, tachypnea resolved  - CXR w/ L sided opacity, continuing CAP abx (azithro, rocephin)  - holding plaquenil 2/2 prolonged qTc on EKG  - continuing IV solumedrol 1mg/kg/day divided q6     CLEMENCIA on CKD 4  - pt w/ baseline Cr 3, 4.6 on admit>>5.3>>5.4 today  - s/p 500mL NS bolus in ED with symptomatic improvement, pt makes urine  - daily BMP  - follows w/ Dr Fowler  - consulted  Nephrology, appreciate recs  - discontinued allopurinol, avoid nephrotoxic meds  - pending Nephrology recs, will consult Surgery for line placement should he need emergent CRRT     Troponemia, stable  - initial trop 2.9>>2.59 (baseline 0.08)  - EKG w/ NSR and 1st degree block, pt denies CP  - pt w/ hx of non-ischemic cardiomyopathy and complete heart block s/p CRT-D 2019  - likely 2/2 to Covid, now downtrending     HFrEF  - 7/2019 echo with EF 30%, global hypokinesis, and grade 2 diastolic dysfunction  - judiciously hydrate in setting of CLEMENCIA  - repeat echo pending Covid as pt with hx of cardio-renal syndrome  - follows w/ Dr Mancia     Scrotal swelling and pain 2/2 varicocele  - as per wife, pt with >1m worsening scrotal pain and swelling  - has outpt Urology appt scheduled  - consider inpt Urology consult as outpt appts being cancelled 2/2 Covid  - scrotal swelling with R varicocele, f/u scrotal US  - low concern for obstructive component of CLEMENCIA     Diet: cardiac  PPX: heparin  Dispo: improvement in CLEMENCIA, stable O2 requirment       Shruthi Wooten MD  [facility] HO-2    [facility] Hospitalist Pager numbers:   [facility] Hospitalist Medicine Team A (Abhinav/Jovani): [phone]  [facility] Hospitalist Medicine Team B (Robert/Enedelia):  [phone]

## 2020-03-30 NOTE — PROCEDURES
Procedures:    Central Line Procedure Note    Patient:  Houston Jc  MRM:  [id number]     Date:  03/30/2020     Location: right upper internal jugular vein     Indication: Need central venous access for dialysis     Preforming Physician: Kvng Mccurdy MD, FACS     EBL: 5 cc     After informed consent was obtained a time-out was completed verifying correct patient, procedure and site. The patient was supine and placed in trendelenburg  position. The patient's neck was prepped and draped in typical sterile fashion. An ultrasound was used to identify the internal jugular vein and the area was infiltrated with lidocaine. A needle was introduced into the vein under ultrasound guidance while pulling negative pressure. Dark red, non-pulsatile blood was easily returned into the syringe.The guidewire was then advanced through the needle into the vein without resistance. The needle was then removed and a small skin incision was made at the exit site. The dilator was then passed over the wire and the path was gently dilated. The catheter was then introduced over the wire. Once positioned, the wire was removed. All of the ports easily aspirated blood and were then flushed with saline. The catheter was secured in place with sutures, then a Biopatch and central line dressing were applied. A post procedure CXR was ordered to confirm location and rule out pneumothorax.      The patient tolerated the procedure well without complications.

## 2020-03-31 LAB
ALBUMIN SERPL BCP-MCNC: 2.5 G/DL (ref 3.5–5.2)
ALP SERPL-CCNC: 96 U/L (ref 55–135)
ALT SERPL W/O P-5'-P-CCNC: 37 U/L (ref 10–44)
ANION GAP SERPL CALC-SCNC: 14 MMOL/L (ref 8–16)
AST SERPL-CCNC: 35 U/L (ref 10–40)
BILIRUB SERPL-MCNC: 0.4 MG/DL (ref 0.1–1)
BUN SERPL-MCNC: 98 MG/DL (ref 8–23)
CALCIUM SERPL-MCNC: 8.9 MG/DL (ref 8.7–10.5)
CHLORIDE SERPL-SCNC: 96 MMOL/L (ref 95–110)
CO2 SERPL-SCNC: 22 MMOL/L (ref 23–29)
CREAT SERPL-MCNC: 5.1 MG/DL (ref 0.5–1.4)
EST. GFR  (AFRICAN AMERICAN): 12 ML/MIN/1.73 M^2
EST. GFR  (NON AFRICAN AMERICAN): 10 ML/MIN/1.73 M^2
GLUCOSE SERPL-MCNC: 454 MG/DL (ref 70–110)
HBV CORE AB SERPL QL IA: NEGATIVE
HBV SURFACE AB SER-ACNC: NEGATIVE M[IU]/ML
HBV SURFACE AG SERPL QL IA: NEGATIVE
MAGNESIUM SERPL-MCNC: 2.2 MG/DL (ref 1.6–2.6)
PHOSPHATE SERPL-MCNC: 4.2 MG/DL (ref 2.7–4.5)
POCT GLUCOSE: 321 MG/DL (ref 70–110)
POCT GLUCOSE: 355 MG/DL (ref 70–110)
POCT GLUCOSE: 361 MG/DL (ref 70–110)
POCT GLUCOSE: 411 MG/DL (ref 70–110)
POCT GLUCOSE: 450 MG/DL (ref 70–110)
POTASSIUM SERPL-SCNC: 3.9 MMOL/L (ref 3.5–5.1)
PROT SERPL-MCNC: 6.8 G/DL (ref 6–8.4)
SODIUM SERPL-SCNC: 132 MMOL/L (ref 136–145)

## 2020-03-31 PROCEDURE — 90935 HEMODIALYSIS ONE EVALUATION: CPT

## 2020-03-31 PROCEDURE — 96376 TX/PRO/DX INJ SAME DRUG ADON: CPT

## 2020-03-31 PROCEDURE — 63600175 PHARM REV CODE 636 W HCPCS: Performed by: STUDENT IN AN ORGANIZED HEALTH CARE EDUCATION/TRAINING PROGRAM

## 2020-03-31 PROCEDURE — C9399 UNCLASSIFIED DRUGS OR BIOLOG: HCPCS | Performed by: STUDENT IN AN ORGANIZED HEALTH CARE EDUCATION/TRAINING PROGRAM

## 2020-03-31 PROCEDURE — 83735 ASSAY OF MAGNESIUM: CPT

## 2020-03-31 PROCEDURE — 25000003 PHARM REV CODE 250: Performed by: STUDENT IN AN ORGANIZED HEALTH CARE EDUCATION/TRAINING PROGRAM

## 2020-03-31 PROCEDURE — 63600175 PHARM REV CODE 636 W HCPCS: Performed by: INTERNAL MEDICINE

## 2020-03-31 PROCEDURE — 94761 N-INVAS EAR/PLS OXIMETRY MLT: CPT

## 2020-03-31 PROCEDURE — 25000003 PHARM REV CODE 250: Performed by: INTERNAL MEDICINE

## 2020-03-31 PROCEDURE — 36415 COLL VENOUS BLD VENIPUNCTURE: CPT

## 2020-03-31 PROCEDURE — 11000001 HC ACUTE MED/SURG PRIVATE ROOM

## 2020-03-31 PROCEDURE — 96372 THER/PROPH/DIAG INJ SC/IM: CPT

## 2020-03-31 PROCEDURE — 80053 COMPREHEN METABOLIC PANEL: CPT

## 2020-03-31 PROCEDURE — 84100 ASSAY OF PHOSPHORUS: CPT

## 2020-03-31 RX ORDER — INSULIN ASPART 100 [IU]/ML
9 INJECTION, SOLUTION INTRAVENOUS; SUBCUTANEOUS
Status: DISCONTINUED | OUTPATIENT
Start: 2020-03-31 | End: 2020-03-31

## 2020-03-31 RX ORDER — INSULIN ASPART 100 [IU]/ML
6 INJECTION, SOLUTION INTRAVENOUS; SUBCUTANEOUS
Status: DISCONTINUED | OUTPATIENT
Start: 2020-03-31 | End: 2020-04-01

## 2020-03-31 RX ORDER — HEPARIN SODIUM 1000 [USP'U]/ML
2300 INJECTION, SOLUTION INTRAVENOUS; SUBCUTANEOUS
Status: DISCONTINUED | OUTPATIENT
Start: 2020-03-31 | End: 2020-04-11 | Stop reason: HOSPADM

## 2020-03-31 RX ADMIN — INSULIN ASPART 5 UNITS: 100 INJECTION, SOLUTION INTRAVENOUS; SUBCUTANEOUS at 06:03

## 2020-03-31 RX ADMIN — CARVEDILOL 12.5 MG: 12.5 TABLET, FILM COATED ORAL at 08:03

## 2020-03-31 RX ADMIN — AMIODARONE HYDROCHLORIDE 200 MG: 200 TABLET ORAL at 11:03

## 2020-03-31 RX ADMIN — FINASTERIDE 5 MG: 5 TABLET, FILM COATED ORAL at 11:03

## 2020-03-31 RX ADMIN — HEPARIN SODIUM 5000 UNITS: 5000 INJECTION, SOLUTION INTRAVENOUS; SUBCUTANEOUS at 09:03

## 2020-03-31 RX ADMIN — TAMSULOSIN HYDROCHLORIDE 0.4 MG: 0.4 CAPSULE ORAL at 11:03

## 2020-03-31 RX ADMIN — INSULIN ASPART 5 UNITS: 100 INJECTION, SOLUTION INTRAVENOUS; SUBCUTANEOUS at 12:03

## 2020-03-31 RX ADMIN — ATORVASTATIN CALCIUM 80 MG: 40 TABLET, FILM COATED ORAL at 11:03

## 2020-03-31 RX ADMIN — HEPARIN SODIUM 5000 UNITS: 5000 INJECTION, SOLUTION INTRAVENOUS; SUBCUTANEOUS at 06:03

## 2020-03-31 RX ADMIN — ASPIRIN 81 MG: 81 TABLET, COATED ORAL at 11:03

## 2020-03-31 RX ADMIN — AMIODARONE HYDROCHLORIDE 200 MG: 200 TABLET ORAL at 08:03

## 2020-03-31 RX ADMIN — MUPIROCIN: 20 OINTMENT TOPICAL at 08:03

## 2020-03-31 RX ADMIN — HEPARIN SODIUM 5000 UNITS: 5000 INJECTION, SOLUTION INTRAVENOUS; SUBCUTANEOUS at 04:03

## 2020-03-31 RX ADMIN — METHYLPREDNISOLONE SODIUM SUCCINATE 40 MG: 40 INJECTION, POWDER, FOR SOLUTION INTRAMUSCULAR; INTRAVENOUS at 05:03

## 2020-03-31 RX ADMIN — HEPARIN SODIUM 2300 UNITS: 1000 INJECTION, SOLUTION INTRAVENOUS; SUBCUTANEOUS at 05:03

## 2020-03-31 RX ADMIN — METHYLPREDNISOLONE SODIUM SUCCINATE 40 MG: 40 INJECTION, POWDER, FOR SOLUTION INTRAMUSCULAR; INTRAVENOUS at 12:03

## 2020-03-31 RX ADMIN — INSULIN ASPART 3 UNITS: 100 INJECTION, SOLUTION INTRAVENOUS; SUBCUTANEOUS at 12:03

## 2020-03-31 RX ADMIN — INSULIN ASPART 4 UNITS: 100 INJECTION, SOLUTION INTRAVENOUS; SUBCUTANEOUS at 04:03

## 2020-03-31 RX ADMIN — INSULIN ASPART 3 UNITS: 100 INJECTION, SOLUTION INTRAVENOUS; SUBCUTANEOUS at 11:03

## 2020-03-31 RX ADMIN — INSULIN ASPART 6 UNITS: 100 INJECTION, SOLUTION INTRAVENOUS; SUBCUTANEOUS at 12:03

## 2020-03-31 RX ADMIN — METHYLPREDNISOLONE SODIUM SUCCINATE 40 MG: 40 INJECTION, POWDER, FOR SOLUTION INTRAMUSCULAR; INTRAVENOUS at 11:03

## 2020-03-31 RX ADMIN — CARVEDILOL 12.5 MG: 12.5 TABLET, FILM COATED ORAL at 11:03

## 2020-03-31 RX ADMIN — INSULIN DETEMIR 17 UNITS: 100 INJECTION, SOLUTION SUBCUTANEOUS at 12:03

## 2020-03-31 RX ADMIN — INSULIN ASPART 6 UNITS: 100 INJECTION, SOLUTION INTRAVENOUS; SUBCUTANEOUS at 04:03

## 2020-03-31 RX ADMIN — MUPIROCIN: 20 OINTMENT TOPICAL at 11:03

## 2020-03-31 NOTE — PROGRESS NOTES
"[facility] Hospital Medicine Progress Note    Primary Team: [facility] Hospitalist Team B  Attending Physician: Lonnie Mcdaniels MD  Resident: Sugar  Intern: Lizzy    Subjective:      Pt seen via tele at 0840. Patient resting quietly in bed, NAD. Remains afebrile, VSS, and tolerating room air as normal. States feeling "great." HD yesterday, tolerated well with 1L removed. No acute complaints.  Denies any CP, SOB, N/V, F/C, HA/dizziness.      Objective:     Last 24 Hour Vital Signs:  BP  Min: 115/56  Max: 142/74  Temp  Av °F (36.1 °C)  Min: 96.1 °F (35.6 °C)  Max: 97.5 °F (36.4 °C)  Pulse  Av.9  Min: 59  Max: 60  Resp  Av  Min: 16  Max: 20  SpO2  Av %  Min: 96 %  Max: 98 %  Weight  Av.1 kg (289 lb 0.4 oz)  Min: 131.1 kg (289 lb 0.4 oz)  Max: 131.1 kg (289 lb 0.4 oz)  I/O last 3 completed shifts:  In: 775 [P.O.:675; IV Piggyback:100]  Out: 1250 [Urine:1250]    Physical Examination:  Deferred  covid r/o    Laboratory:  Laboratory Data Reviewed: yes  Pertinent Findings:  Cr 4.7>>5.3>>5.4    Microbiology Data Reviewed: yes  Pertinent Findings:  Flu negative  COVID POSITIVE  3/28 blood cx, NGTD    Other Results:  EKG (my interpretation): NSR     Radiology Data Reviewed: yes  Pertinent Findings:  No new    Current Medications:     Infusions:       Scheduled:   amiodarone  200 mg Oral BID    aspirin  81 mg Oral Daily    atorvastatin  80 mg Oral Daily    carvediloL  12.5 mg Oral BID    cefTRIAXone (ROCEPHIN) IVPB  1 g Intravenous Q24H    finasteride  5 mg Oral Daily    heparin (porcine)  5,000 Units Subcutaneous Q8H    insulin aspart U-100  9 Units Subcutaneous TIDWM    insulin detemir U-100  17 Units Subcutaneous BID    mupirocin   Nasal BID    tamsulosin  0.4 mg Oral Daily        PRN:  acetaminophen, dextrose 50 % in water (D50W), glucagon (human recombinant), insulin aspart U-100, sodium chloride 0.9%    Antibiotics and Day Number of Therapy:  Rocephin, 3/28-present  Abel, 3/28 -present "   (no plaquenil 2/2 long qTc)  IV Solumedrol 40mg q6, 3/29-present     Lines and Day Number of Therapy:  PIV    Assessment:     Houston Jc is a 74 y.o.male with  Patient Active Problem List    Diagnosis Date Noted    CLEMENCIA (acute kidney injury)     COVID-19 virus detected     Fever     Suspected 2019-Ncov Infection     SOB (shortness of breath) 03/28/2020    NICM (nonischemic cardiomyopathy) 11/06/2019    Abnormal transaminases 07/20/2019    Cardiac resynchronization therapy defibrillator (CRT-D) in place 07/19/2019    Third degree heart block 07/19/2019    Gout 07/18/2019    Abdominal distension 07/17/2019    Debility 07/17/2019    VT (ventricular tachycardia) 07/11/2019    Cardiorenal syndrome 07/11/2019    Acute systolic heart failure 07/11/2019    Complete heart block 07/09/2019    Morbid obesity 10/08/2015    Hypertension 09/21/2015    Diastolic dysfunction 09/21/2015    Sleep apnea 09/21/2015    Swelling of lower extremity 09/18/2015    Hypervolemia 09/18/2015     Mr. Benjamin presents to [facility] ED for 3 days of fever, SOB, diarrhea. Covid r/o. Admitted for CLEMENCIA, troponemia, and hypoxia w/ exertion.   Plan:     Hypoxic respiratory failure 2/2 Covid-19  - pt with fever, SOB, diarrhea and labs indicative of  Covid-19 illness script  - flu negative, Covid positive  - pt w/ SOB, currently satting high 80s/low 90s on 2L NC, tachypnea resolved  - CXR w/ L sided opacity, continuing CAP abx (azithro, rocephin)  - (3/31) Patient afebrile and comfortable on room air.  - Has completed 4 days of azithro and rocephin. Per ID, can discontinue as bacterial coinfection highly unlikely.   - holding plaquenil 2/2 prolonged qTc on EKG. Will check rhythm strip and start Kaletra if QTc remains >550   - continuing IV solumedrol 1mg/kg/day divided q6 for now     CLEMENCIA on CKD 4  - pt w/ baseline Cr 3, 4.6 on admit>>5.3>>5.4 today  - s/p 500mL NS bolus in ED with symptomatic improvement, pt makes urine  - daily BMP  -  follows w/ Dr Fowler  - consulted Nephrology, appreciate recs  - discontinued allopurinol, avoid nephrotoxic meds  - Patient received HD yesterday with 1L removed, tolerated well  - Repeat labs pending, will followup  - Followup Nephrology recs     Troponemia, stable  - initial trop 2.9>>2.59 (baseline 0.08)  - EKG w/ NSR and 1st degree block, pt denies CP  - pt w/ hx of non-ischemic cardiomyopathy and complete heart block s/p CRT-D 2019  - likely 2/2 to Covid, now downtrending. No longer following.      HFrEF  - 7/2019 echo with EF 30%, global hypokinesis, and grade 2 diastolic dysfunction  - judiciously hydrate in setting of CLEMENCIA  - repeat echo pending Covid as pt with hx of cardio-renal syndrome  - follows w/ Dr Mancia     Scrotal swelling and pain 2/2 varicocele  - as per wife, pt with >1m worsening scrotal pain and swelling  - has outpt Urology appt scheduled  - consider inpt Urology consult as outpt appts being cancelled 2/2 Covid  - scrotal swelling with R varicocele, f/u scrotal US  - low concern for obstructive component of CLEMENCIA    T2DM  - Continue long-acting 17U bid, aspart 9U with meals     Diet: cardiac  PPX: heparin  Dispo: Pending need for further HD, further Nephrology recs       Francisco Ball Jr, MD  [facility] Internal Medicine HO-1    [facility] Medicine Hospitalist Pager numbers:   [facility] Hospitalist Medicine Team A (Abhinav/Jovani): [phone]  [facility] Hospitalist Medicine Team B (Robert/Enedelia):  [phone]

## 2020-03-31 NOTE — PROGRESS NOTES
Progress Note  Nephrology      Consult Requested By: Lonnie Mcdaniels MD  Reason for Consult: CLEMENCIA    SUBJECTIVE:     Review of Systems   Constitutional: Negative for chills and fever.   Respiratory: Negative for shortness of breath.    Cardiovascular: Negative for chest pain.   Gastrointestinal: Negative for nausea and vomiting.     Patient Active Problem List   Diagnosis    Swelling of lower extremity    Hypervolemia    Hypertension    Diastolic dysfunction    Sleep apnea    Morbid obesity    Complete heart block    VT (ventricular tachycardia)    Cardiorenal syndrome    Acute systolic heart failure    Abdominal distension    Debility    Gout    Cardiac resynchronization therapy defibrillator (CRT-D) in place    Third degree heart block    Abnormal transaminases    NICM (nonischemic cardiomyopathy)    SOB (shortness of breath)    COVID-19 virus detected    Fever    Suspected 2019-Ncov Infection    CLEMENCIA (acute kidney injury)       OBJECTIVE:     Medications:   amiodarone  200 mg Oral BID    aspirin  81 mg Oral Daily    atorvastatin  80 mg Oral Daily    carvediloL  12.5 mg Oral BID    finasteride  5 mg Oral Daily    heparin (porcine)  5,000 Units Subcutaneous Q8H    insulin aspart U-100  6 Units Subcutaneous TIDWM    insulin detemir U-100  15 Units Subcutaneous BID    methylPREDNISolone sodium succinate  40 mg Intravenous Q6H    mupirocin   Nasal BID    tamsulosin  0.4 mg Oral Daily       Vitals:    03/31/20 1203   BP: 128/62   Pulse: 61   Resp:    Temp: 97.6 °F (36.4 °C)     I/O last 3 completed shifts:  In: 775 [P.O.:675; IV Piggyback:100]  Out: 1250 [Urine:1250]     Physical exam is limited 2/2 covid pandemic and need to preserve PPE    Laboratory:  Recent Labs   Lab 03/28/20  1304   WBC 9.68   HGB 10.1*   HCT 29.9*      MONO 5.2  0.5     Recent Labs   Lab 03/29/20  0640 03/30/20  0747 03/31/20  0830   *  131* 127*  127* 132*   K 4.2  4.2 4.4  4.4 3.9   CL 96  96  92*  92* 96   CO2 23  23 23  23 22*   *  109* 106*  106* 98*   CREATININE 5.3*  5.3* 5.4*  5.4* 5.1*   CALCIUM 8.8  8.8 9.2  9.2 8.9   PHOS  --   --  4.2     Labs reviewed  Diagnostic Results:  X-Ray: Reviewed  US: Reviewed  Echo: Reviewed      ASSESSMENT/PLAN:     ARF on CKD4  -trialysis catheter placed 3/30  -tolerated HD yesterday, plan to run again today    Hypervolemia+ hyponatremia  -holding Lasix  -Goal UF 1L with HD  -fluid restrict 1L    CHF  -EF 30%  -bnp 525    Hypoxic respiratory failure 2/2 Covid-19  -ID following, holding plaquenil 2/2 prolonged QTc  -room air    Thank you for allowing me to participate in the care of your patients  With any question please call [phone]  Ximena Mccarthy NP    [facility]  ANA MARIA Burns MD, FACP,   KEREN Woo MD,   MD JOSH Gill NP H. Mitchell, NP  [address]  [place], [address]  [phone]

## 2020-03-31 NOTE — PLAN OF CARE
vn Q 2 hour rounds: vn cued into patients room. Patient denies any needs at this time. Pt receiving dialysis at bedside. VN will continue to follow.

## 2020-03-31 NOTE — PLAN OF CARE
Problem: Adult Inpatient Plan of Care  Goal: Plan of Care Review  Outcome: Ongoing, Progressing  Mr. Jc is resting and medications were given per orders. 1 Liter of fluid taken off during dialysis last night. Cardiac monitor, Pace makers, and Blood glucose monitoring in place. No complaints of NV/SOB/ PAIN noted. Bowel movement noted this morning. R. IJ dressing CDI. Antibiotics infused. Safety maintained.

## 2020-03-31 NOTE — PLAN OF CARE
.VN Q2 hour rounds: VN cued into patients room Patient denies any needs at this time. Nurse at bedside. VN will continue to follow.

## 2020-03-31 NOTE — PLAN OF CARE
VIRTUAL NURSE 2 HOUR ROUNDS:  Cued into patient's room.  Patient resting comfortably in bed with eyes closed; respirations even and unlabored.  No distress noted.  HD in process.Will cont to monitor.

## 2020-03-31 NOTE — PLAN OF CARE
VN Note: VN cued into patient's room for Q2 hr rounds. Patient denies any needs at this time. VN will continue to follow.    Dialysis nurse Santos in room.

## 2020-03-31 NOTE — NURSING
1 L fluid removed during HD. Tolerated well. VSS. NAD. CVC dressing changed, lines locked with heparin and lines capped.

## 2020-03-31 NOTE — PLAN OF CARE
Patient will require outpatient HD at discharge for CLEMENCIA. Clinical information faxed to [facility] at [phone].        03/31/20 6326   Post-Acute Status   Post-Acute Authorization Dialysis   Diaylsis Status Referrals Sent

## 2020-04-01 LAB
ALBUMIN SERPL BCP-MCNC: 2.5 G/DL (ref 3.5–5.2)
ALP SERPL-CCNC: 92 U/L (ref 55–135)
ALT SERPL W/O P-5'-P-CCNC: 35 U/L (ref 10–44)
ANION GAP SERPL CALC-SCNC: 13 MMOL/L (ref 8–16)
AST SERPL-CCNC: 33 U/L (ref 10–40)
BACTERIA BLD CULT: NORMAL
BACTERIA BLD CULT: NORMAL
BILIRUB SERPL-MCNC: 0.4 MG/DL (ref 0.1–1)
BUN SERPL-MCNC: 92 MG/DL (ref 8–23)
CALCIUM SERPL-MCNC: 8.8 MG/DL (ref 8.7–10.5)
CHLORIDE SERPL-SCNC: 101 MMOL/L (ref 95–110)
CO2 SERPL-SCNC: 20 MMOL/L (ref 23–29)
CREAT SERPL-MCNC: 5.4 MG/DL (ref 0.5–1.4)
EST. GFR  (AFRICAN AMERICAN): 11 ML/MIN/1.73 M^2
EST. GFR  (NON AFRICAN AMERICAN): 10 ML/MIN/1.73 M^2
GLUCOSE SERPL-MCNC: 357 MG/DL (ref 70–110)
MAGNESIUM SERPL-MCNC: 2.2 MG/DL (ref 1.6–2.6)
PHOSPHATE SERPL-MCNC: 4.1 MG/DL (ref 2.7–4.5)
POCT GLUCOSE: 334 MG/DL (ref 70–110)
POCT GLUCOSE: 353 MG/DL (ref 70–110)
POTASSIUM SERPL-SCNC: 4.5 MMOL/L (ref 3.5–5.1)
PROT SERPL-MCNC: 6.6 G/DL (ref 6–8.4)
SODIUM SERPL-SCNC: 134 MMOL/L (ref 136–145)

## 2020-04-01 PROCEDURE — 94761 N-INVAS EAR/PLS OXIMETRY MLT: CPT

## 2020-04-01 PROCEDURE — 80053 COMPREHEN METABOLIC PANEL: CPT

## 2020-04-01 PROCEDURE — 99222 PR INITIAL HOSPITAL CARE,LEVL II: ICD-10-PCS | Mod: ,,, | Performed by: SURGERY

## 2020-04-01 PROCEDURE — 99222 1ST HOSP IP/OBS MODERATE 55: CPT | Mod: ,,, | Performed by: SURGERY

## 2020-04-01 PROCEDURE — 25000003 PHARM REV CODE 250: Performed by: STUDENT IN AN ORGANIZED HEALTH CARE EDUCATION/TRAINING PROGRAM

## 2020-04-01 PROCEDURE — 63600175 PHARM REV CODE 636 W HCPCS: Performed by: STUDENT IN AN ORGANIZED HEALTH CARE EDUCATION/TRAINING PROGRAM

## 2020-04-01 PROCEDURE — 84100 ASSAY OF PHOSPHORUS: CPT

## 2020-04-01 PROCEDURE — 83735 ASSAY OF MAGNESIUM: CPT

## 2020-04-01 PROCEDURE — 11000001 HC ACUTE MED/SURG PRIVATE ROOM

## 2020-04-01 PROCEDURE — 90935 HEMODIALYSIS ONE EVALUATION: CPT

## 2020-04-01 RX ORDER — INSULIN ASPART 100 [IU]/ML
9 INJECTION, SOLUTION INTRAVENOUS; SUBCUTANEOUS
Status: DISCONTINUED | OUTPATIENT
Start: 2020-04-01 | End: 2020-04-02

## 2020-04-01 RX ORDER — OXYCODONE AND ACETAMINOPHEN 10; 325 MG/1; MG/1
1 TABLET ORAL EVERY 4 HOURS PRN
Status: DISCONTINUED | OUTPATIENT
Start: 2020-04-01 | End: 2020-04-11 | Stop reason: HOSPADM

## 2020-04-01 RX ORDER — OXYCODONE AND ACETAMINOPHEN 5; 325 MG/1; MG/1
2 TABLET ORAL EVERY 4 HOURS PRN
Status: DISCONTINUED | OUTPATIENT
Start: 2020-04-01 | End: 2020-04-01

## 2020-04-01 RX ADMIN — ATORVASTATIN CALCIUM 80 MG: 40 TABLET, FILM COATED ORAL at 08:04

## 2020-04-01 RX ADMIN — HEPARIN SODIUM 5000 UNITS: 5000 INJECTION, SOLUTION INTRAVENOUS; SUBCUTANEOUS at 10:04

## 2020-04-01 RX ADMIN — INSULIN ASPART 5 UNITS: 100 INJECTION, SOLUTION INTRAVENOUS; SUBCUTANEOUS at 12:04

## 2020-04-01 RX ADMIN — METHYLPREDNISOLONE SODIUM SUCCINATE 40 MG: 40 INJECTION, POWDER, FOR SOLUTION INTRAMUSCULAR; INTRAVENOUS at 07:04

## 2020-04-01 RX ADMIN — CARVEDILOL 12.5 MG: 12.5 TABLET, FILM COATED ORAL at 08:04

## 2020-04-01 RX ADMIN — INSULIN ASPART 9 UNITS: 100 INJECTION, SOLUTION INTRAVENOUS; SUBCUTANEOUS at 11:04

## 2020-04-01 RX ADMIN — METHYLPREDNISOLONE SODIUM SUCCINATE 40 MG: 40 INJECTION, POWDER, FOR SOLUTION INTRAMUSCULAR; INTRAVENOUS at 06:04

## 2020-04-01 RX ADMIN — MUPIROCIN: 20 OINTMENT TOPICAL at 09:04

## 2020-04-01 RX ADMIN — INSULIN ASPART 6 UNITS: 100 INJECTION, SOLUTION INTRAVENOUS; SUBCUTANEOUS at 06:04

## 2020-04-01 RX ADMIN — HEPARIN SODIUM 5000 UNITS: 5000 INJECTION, SOLUTION INTRAVENOUS; SUBCUTANEOUS at 01:04

## 2020-04-01 RX ADMIN — AMIODARONE HYDROCHLORIDE 200 MG: 200 TABLET ORAL at 08:04

## 2020-04-01 RX ADMIN — INSULIN ASPART 5 UNITS: 100 INJECTION, SOLUTION INTRAVENOUS; SUBCUTANEOUS at 07:04

## 2020-04-01 RX ADMIN — TAMSULOSIN HYDROCHLORIDE 0.4 MG: 0.4 CAPSULE ORAL at 08:04

## 2020-04-01 RX ADMIN — METHYLPREDNISOLONE SODIUM SUCCINATE 40 MG: 40 INJECTION, POWDER, FOR SOLUTION INTRAMUSCULAR; INTRAVENOUS at 12:04

## 2020-04-01 RX ADMIN — METHYLPREDNISOLONE SODIUM SUCCINATE 40 MG: 40 INJECTION, POWDER, FOR SOLUTION INTRAMUSCULAR; INTRAVENOUS at 11:04

## 2020-04-01 RX ADMIN — MUPIROCIN: 20 OINTMENT TOPICAL at 08:04

## 2020-04-01 RX ADMIN — FINASTERIDE 5 MG: 5 TABLET, FILM COATED ORAL at 08:04

## 2020-04-01 RX ADMIN — HEPARIN SODIUM 5000 UNITS: 5000 INJECTION, SOLUTION INTRAVENOUS; SUBCUTANEOUS at 06:04

## 2020-04-01 RX ADMIN — ASPIRIN 81 MG: 81 TABLET, COATED ORAL at 08:04

## 2020-04-01 NOTE — PLAN OF CARE
All progress notes reviewed. VN cued into room. No response to VN's verbal cues x 2 attempts. Pt not visualized in room. Pt off unit. Will attempt again as time allows.

## 2020-04-01 NOTE — PROGRESS NOTES
[facility]  INPATIENT H&P    REASON FOR CONSULT:  Peritoneal dialysis catheter    HPI: Houston Jc is a 74 y.o. male admitted for hypoxia related to COVID-19 infection.  Has had improvement in respiratory status is currently on room air.  Developed acute on chronic kidney disease requiring hemodialysis through a right internal jugular Trialysis catheter.  Patient is pending discharge however has continuous need for dialysis.  Due to inability to obtain dialysis bed due to COVID-19 status home peritoneal dialysis has been discussed.  General surgery has been re-consulted for placement of peritoneal dialysis catheter.    I have reviewed the patient's chart including prior progress notes, procedures and testing. The patient has CHF with an ejection fraction of 30% and grade 2 diastolic dysfunction.  Also has pacemaker in place.    ROS:   Review of Systems   Constitutional: Negative for chills and fever.   Respiratory: Negative for apnea, chest tightness and shortness of breath.    Gastrointestinal: Negative for abdominal distention, abdominal pain, nausea and vomiting.       PROBLEM LIST:  Patient Active Problem List   Diagnosis    Swelling of lower extremity    Hypervolemia    Hypertension    Diastolic dysfunction    Sleep apnea    Morbid obesity    Complete heart block    VT (ventricular tachycardia)    Cardiorenal syndrome    Acute systolic heart failure    Abdominal distension    Debility    Gout    Cardiac resynchronization therapy defibrillator (CRT-D) in place    Third degree heart block    Abnormal transaminases    NICM (nonischemic cardiomyopathy)    SOB (shortness of breath)    COVID-19 virus detected    Fever    Suspected 2019-Ncov Infection    CLEMENCIA (acute kidney injury)         HISTORY  Past Medical History:   Diagnosis Date    Anemia     CHF (congestive heart failure)     CKD (chronic kidney disease) stage 4, GFR 15-29 ml/min     Coronary artery disease      Diabetes mellitus     Hematuria     Hypertension     Renal cyst, right        Past Surgical History:   Procedure Laterality Date    INSERTION OF BIVENTRICULAR IMPLANTABLE CARDIOVERTER-DEFIBRILLATOR (ICD) Left 7/18/2019    Procedure: INSERTION, ICD, BIVENTRICULAR;  Surgeon: Arturo Bay MD;  Location: [facility] EP LAB;  Service: Cardiology;  Laterality: Left;  CHB, CRTD, SJM, anes, GP, 6078    LEFT HEART CATHETERIZATION N/A 7/16/2019    Procedure: Left heart cath;  Surgeon: Dejuan Cody MD;  Location: [facility] CATH LAB/EP;  Service: Cardiology;  Laterality: N/A;       Social History     Tobacco Use    Smoking status: Former Smoker    Smokeless tobacco: Never Used   Substance Use Topics    Alcohol use: Yes     Comment: social    Drug use: No       Family History   Problem Relation Age of Onset    Heart attack Father          MEDS:  No current facility-administered medications on file prior to encounter.      Current Outpatient Medications on File Prior to Encounter   Medication Sig Dispense Refill    allopurinol (ZYLOPRIM) 300 MG tablet Take 300 mg by mouth once daily.      alogliptin benzoate (ALOGLIPTIN ORAL) Take 25 mg by mouth once daily.      amiodarone (PACERONE) 200 MG Tab Take 1 tablet (200 mg total) by mouth 2 (two) times daily. 60 tablet 11    aspirin (ECOTRIN) 81 MG EC tablet Take 81 mg by mouth once daily.      atorvastatin (LIPITOR) 80 MG tablet Take 80 mg by mouth once daily.      carvedilol (COREG) 12.5 MG tablet Take 12.5 mg by mouth 2 (two) times daily.      cholecalciferol, vitamin D3, (VITAMIN D3) 2,000 unit Cap Take 1 capsule by mouth once daily.      finasteride (PROSCAR) 5 mg tablet Take 1 tablet (5 mg total) by mouth once daily. 30 tablet 11    insulin aspart U-100 (NOVOLOG) 100 unit/mL injection Inject into the skin 3 (three) times daily before meals. PUMP      losartan (COZAAR) 25 MG tablet Take 1 tablet (25 mg total) by mouth once daily. 90 tablet 3    magnesium oxide  "(MAG-OXIDE ORAL) Take 420 mg by mouth 2 (two) times daily.       potassium chloride (K-TAB) 20 mEq Take by mouth once daily.      spironolactone (ALDACTONE) 12.5 MG tablet Take 12.5 mg by mouth once daily.      tamsulosin (FLOMAX) 0.4 mg Cap Take 1 capsule (0.4 mg total) by mouth once daily. 30 capsule 11    torsemide (DEMADEX) 20 MG Tab Take 2 tablets (40 mg total) by mouth once daily. Take additional 2 tablets if weight gain of more than 3 pounds in 1 day (Patient taking differently: Take 20 mg by mouth 2 (two) times daily. Take additional 2 tablets if weight gain of more than 3 pounds in 1 day) 60 tablet 11    VENTOLIN HFA 90 mcg/actuation inhaler Inhale 1 puff into the lungs every 4 (four) hours as needed.       sildenafil (VIAGRA) 100 MG tablet Take 100 mg by mouth once a week.      walker Misc 5" wheel rolling walker 1 each 1       ALLERGIES:  Review of patient's allergies indicates:  No Known Allergies      VITALS:  Temp:  [96.1 °F (35.6 °C)-97.7 °F (36.5 °C)] 97.7 °F (36.5 °C)  Pulse:  [59-62] 60  Resp:  [16-20] 19  SpO2:  [95 %-99 %] 95 %  BP: (105-161)/(56-77) 117/56    I/O last 3 completed shifts:  In: 905 [P.O.:355; Other:500; IV Piggyback:50]  Out: 1700 [Urine:200; Other:1500]      PHYSICAL EXAM:  Physical Exam   Constitutional: He is oriented to person, place, and time. He appears well-developed and well-nourished. No distress.   HENT:   Head: Normocephalic and atraumatic.   Eyes: Conjunctivae are normal. No scleral icterus.   Neck: Normal range of motion. Neck supple. No tracheal deviation present.   Cardiovascular: Normal rate and regular rhythm.   Pulmonary/Chest: Effort normal. No respiratory distress.   Abdominal: Soft. He exhibits no distension. There is no tenderness.   Obese   Musculoskeletal: Normal range of motion. He exhibits no tenderness or deformity.   Neurological: He is oriented to person, place, and time.   Skin: Skin is warm and dry. He is not diaphoretic. No erythema. "   Psychiatric: He has a normal mood and affect. Thought content normal.   Vitals reviewed.        LABS:  Lab Results   Component Value Date    WBC 9.68 03/28/2020    RBC 3.25 (L) 03/28/2020    HGB 10.1 (L) 03/28/2020    HCT 29.9 (L) 03/28/2020     03/28/2020     Lab Results   Component Value Date     (H) 04/01/2020     (L) 04/01/2020    K 4.5 04/01/2020     04/01/2020    CO2 20 (L) 04/01/2020    BUN 92 (H) 04/01/2020    CREATININE 5.4 (H) 04/01/2020    CALCIUM 8.8 04/01/2020     Lab Results   Component Value Date    ALT 35 04/01/2020    AST 33 04/01/2020    ALKPHOS 92 04/01/2020    BILITOT 0.4 04/01/2020     Lab Results   Component Value Date    MG 2.2 04/01/2020    PHOS 4.1 04/01/2020         ASSESSMENT & PLAN:  74 y.o. male with COVID-19 infection, acute on chronic kidney disease requiring dialysis  - risks and benefits discussed with the patient  - patient is agreeable with laparoscopic peritoneal dialysis catheter placement  - will plan for procedure on 04/02/2020  - made NPO midnight

## 2020-04-01 NOTE — PLAN OF CARE
Problem: Adult Inpatient Plan of Care  Goal: Plan of Care Review  Outcome: Ongoing, Progressing  Mr. Jc is resting and medications were given per order. No complaints of NV/PAIN/SOB. Cardiac monitoring in place, PACED. Isolation precaution maintained. Call light within reach. Blood sugar monitoring in place, scheduled and PRN medication given. Safety maintained.

## 2020-04-01 NOTE — PLAN OF CARE
spoke with Raphael with [facility] and confirmed receipt of requested fax clinicals.    Raphael will send info to [facility] and call  back with update.    Call placed to patient's wife and updated her OP HD efforts. Patient's wife thanked  for the update.    Case management will continue to follow.

## 2020-04-01 NOTE — PROGRESS NOTES
[facility] Medicine Progress Note    Primary Team: \[facility]\"" Hospitalist Team B  Attending Physician: Dr. Jones  Resident: [place]  Intern: Gaby    Subjective:      Patient was seen via tele this morning.  Patient is doing good this morning and feels better overall.  He has no complaints.  He had HD yesterday and tolerated it well.     Objective:     Last 24 Hour Vital Signs:  BP  Min: 105/61  Max: 161/72  Temp  Av.9 °F (36.1 °C)  Min: 96.1 °F (35.6 °C)  Max: 97.6 °F (36.4 °C)  Pulse  Av.7  Min: 59  Max: 67  Resp  Av.2  Min: 16  Max: 20  SpO2  Av.2 %  Min: 91 %  Max: 99 %  Weight  Av.4 kg (287 lb 7.7 oz)  Min: 130.4 kg (287 lb 7.7 oz)  Max: 130.4 kg (287 lb 7.7 oz)  I/O last 3 completed shifts:  In: 905 [P.O.:355; Other:500; IV Piggyback:50]  Out: 1700 [Urine:200; Other:1500]    Physical Examination:  Deferred  covid+  Laboratory:  Laboratory Data Reviewed: yes  Pertinent Findings:  Cr 4.7>>5.3>>5.4>>5.1    Microbiology Data Reviewed: yes  Pertinent Findings:  Flu negative  COVID POSITIVE  3/28 blood cx, NGTD    Other Results:  EKG (my interpretation): NSR     Radiology Data Reviewed: yes  Pertinent Findings:  No new    Current Medications:     Infusions:       Scheduled:   amiodarone  200 mg Oral BID    aspirin  81 mg Oral Daily    atorvastatin  80 mg Oral Daily    carvediloL  12.5 mg Oral BID    finasteride  5 mg Oral Daily    heparin (porcine)  5,000 Units Subcutaneous Q8H    insulin aspart U-100  6 Units Subcutaneous TIDWM    insulin detemir U-100  15 Units Subcutaneous BID    methylPREDNISolone sodium succinate  40 mg Intravenous Q6H    mupirocin   Nasal BID    tamsulosin  0.4 mg Oral Daily        PRN:  acetaminophen, dextrose 50 % in water (D50W), glucagon (human recombinant), heparin (porcine), insulin aspart U-100, sodium chloride 0.9%    Antibiotics and Day Number of Therapy:  Rocephin, 3/28-present  Azithro, 3/28 -present   (no plaquenil 2/2 long qTc)  IV Solumedrol  40mg q6, 3/29-present     Lines and Day Number of Therapy:  PIV    Assessment:     Houston Jc is a 74 y.o.male with  Patient Active Problem List    Diagnosis Date Noted    CLEMENCIA (acute kidney injury)     COVID-19 virus detected     Fever     Suspected 2019-Ncov Infection     SOB (shortness of breath) 03/28/2020    NICM (nonischemic cardiomyopathy) 11/06/2019    Abnormal transaminases 07/20/2019    Cardiac resynchronization therapy defibrillator (CRT-D) in place 07/19/2019    Third degree heart block 07/19/2019    Gout 07/18/2019    Abdominal distension 07/17/2019    Debility 07/17/2019    VT (ventricular tachycardia) 07/11/2019    Cardiorenal syndrome 07/11/2019    Acute systolic heart failure 07/11/2019    Complete heart block 07/09/2019    Morbid obesity 10/08/2015    Hypertension 09/21/2015    Diastolic dysfunction 09/21/2015    Sleep apnea 09/21/2015    Swelling of lower extremity 09/18/2015    Hypervolemia 09/18/2015     Mr. Benjamin presents to [place] ED for 3 days of fever, SOB, diarrhea. Covid positive. Admitted for CLEMENCIA, troponemia, and hypoxia w/ exertion.   Plan:     Hypoxic respiratory failure 2/2 Covid-19  - pt with fever, SOB, diarrhea and labs indicative of  Covid-19 illness script  - flu negative, Covid positive  - pt w/ SOB, currently satting high 80s/low 90s on 2L NC, tachypnea resolved  - CXR w/ L sided opacity, continuing CAP abx (azithro, rocephin)  - (3/31) Patient afebrile and comfortable on room air.  - Has completed 4 days of azithro and rocephin. Per ID, can discontinue as bacterial coinfection highly unlikely.   - holding plaquenil 2/2 prolonged qTc on EKG. Will check rhythm strip and start Kaletra if QTc remains >550   - continuing IV solumedrol 1mg/kg/day divided q6 for now  - Now on room air.     CLEMENCIA on CKD 4  - pt w/ baseline Cr 3, 4.6 on admit>>5.3>>5.4 today  - s/p 500mL NS bolus in ED with symptomatic improvement, pt makes urine  - daily BMP  - follows w/   Janeth  - consulted Nephrology, appreciate recs  - discontinued allopurinol, avoid nephrotoxic meds  - Patient received HD yesterday with 1L removed, tolerated well  - Repeat labs pending, will followup  - Followup Nephrology recs     Troponemia, stable  - initial trop 2.9>>2.59 (baseline 0.08)  - EKG w/ NSR and 1st degree block, pt denies CP  - pt w/ hx of non-ischemic cardiomyopathy and complete heart block s/p CRT-D 2019  - likely 2/2 to Covid, now downtrending. No longer following.      HFrEF  - 7/2019 echo with EF 30%, global hypokinesis, and grade 2 diastolic dysfunction  - Had pacemaker placed in 2019.  - judiciously hydrate in setting of CLEMENCIA  - repeat echo pending Covid as pt with hx of cardio-renal syndrome  - follows w/ Dr Mancia     Scrotal swelling and pain 2/2 varicocele  - as per wife, pt with >1m worsening scrotal pain and swelling  - has outpt Urology appt scheduled  - scrotal swelling with R varicocele, f/u scrotal US  - low concern for obstructive component of CLEMENCIA  - Will consult Urology as outpt appts being cancelled 2/2 Covid and patient is having worsening pain.    T2DM  - Continue long-acting 17U bid, aspart 9U with meals     Diet: cardiac  PPX: heparin  Dispo: Pending need for further HD, further Nephrology recs       Fransisco Griffin MD  [facility] Internal Medicine HO-1    [facility] Medicine Hospitalist Pager numbers:   [facility] Hospitalist Medicine Team A (Abhinav/Jovani): [phone]  [facility] Hospitalist Medicine Team B (Robert/Enedelia):  [phone]

## 2020-04-01 NOTE — PROGRESS NOTES
Progress Note  Nephrology      Consult Requested By: Lonnie Mcdaniels MD  Reason for Consult: CLEMENCIA    SUBJECTIVE:     Review of Systems   Constitutional: Negative for chills and fever.   Respiratory: Negative for shortness of breath.    Cardiovascular: Negative for chest pain.   Gastrointestinal: Negative for nausea and vomiting.     Patient Active Problem List   Diagnosis    Swelling of lower extremity    Hypervolemia    Hypertension    Diastolic dysfunction    Sleep apnea    Morbid obesity    Complete heart block    VT (ventricular tachycardia)    Cardiorenal syndrome    Acute systolic heart failure    Abdominal distension    Debility    Gout    Cardiac resynchronization therapy defibrillator (CRT-D) in place    Third degree heart block    Abnormal transaminases    NICM (nonischemic cardiomyopathy)    SOB (shortness of breath)    COVID-19 virus detected    Fever    Suspected 2019-Ncov Infection    CLEMENCIA (acute kidney injury)       OBJECTIVE:     Medications:   amiodarone  200 mg Oral BID    aspirin  81 mg Oral Daily    atorvastatin  80 mg Oral Daily    carvediloL  12.5 mg Oral BID    finasteride  5 mg Oral Daily    heparin (porcine)  5,000 Units Subcutaneous Q8H    insulin aspart U-100  9 Units Subcutaneous TIDWM    insulin detemir U-100  17 Units Subcutaneous BID    methylPREDNISolone sodium succinate  40 mg Intravenous Q6H    mupirocin   Nasal BID    tamsulosin  0.4 mg Oral Daily       Vitals:    04/01/20 1528   BP: (!) 114/56   Pulse: 61   Resp: 19   Temp: 98.1 °F (36.7 °C)     I/O last 3 completed shifts:  In: 905 [P.O.:355; Other:500; IV Piggyback:50]  Out: 1700 [Urine:200; Other:1500]     Physical exam is limited 2/2 covid pandemic and need to preserve PPE    Laboratory:  Recent Labs   Lab 03/28/20  1304   WBC 9.68   HGB 10.1*   HCT 29.9*      MONO 5.2  0.5     Recent Labs   Lab 03/30/20  0747 03/31/20  0830 04/01/20  0708   *  127* 132* 134*   K 4.4  4.4 3.9  4.5   CL 92*  92* 96 101   CO2 23  23 22* 20*   *  106* 98* 92*   CREATININE 5.4*  5.4* 5.1* 5.4*   CALCIUM 9.2  9.2 8.9 8.8   PHOS  --  4.2 4.1     Labs reviewed  Diagnostic Results:  X-Ray: Reviewed  US: Reviewed  Echo: Reviewed      ASSESSMENT/PLAN:     ARF on CKD4  -plan for PD cath placement on 4/2 so patient can dialyze at home and minimize possible COVID exposure  -will need PD education prior to discharge    Hypervolemia+ hyponatremia  -holding Lasix  -fluid restrict 1L    CHF  -EF 30%  -bnp 525    Hypoxic respiratory failure 2/2 Covid-19  -ID following, holding plaquenil 2/2 prolonged QTc  -room air    Thank you for allowing me to participate in the care of your patients  With any question please call [phone]  Ximena Mccarthy NP    [facility]  ANA MARIA Burns MD, FACP,   KEREN Woo MD,   MD JOSH Gill NP H. Mitchell, NP  [address]  [place], [address]  [phone]

## 2020-04-01 NOTE — PLAN OF CARE
received call from [facility] with [facility] ext [phone] requesting additional info for this patient's outpatient HD.    [facility] requeseted:  Face sheet, OPHD order, Hd flow sheets, and Covid-19 results to [phone].     faxed her the requested information.       04/01/20 1437   Post-Acute Status   Post-Acute Authorization Dialysis   Diaylsis Status Additional Clinical Requested   Discharge Delays None known at this time

## 2020-04-02 ENCOUNTER — ANESTHESIA EVENT (OUTPATIENT)
Dept: SURGERY | Facility: HOSPITAL | Age: 75
DRG: 981 | End: 2020-04-02
Payer: MEDICARE

## 2020-04-02 ENCOUNTER — ANESTHESIA (OUTPATIENT)
Dept: SURGERY | Facility: HOSPITAL | Age: 75
DRG: 981 | End: 2020-04-02
Payer: MEDICARE

## 2020-04-02 LAB
ALBUMIN SERPL BCP-MCNC: 2.5 G/DL (ref 3.5–5.2)
ALP SERPL-CCNC: 91 U/L (ref 55–135)
ALT SERPL W/O P-5'-P-CCNC: 37 U/L (ref 10–44)
ANION GAP SERPL CALC-SCNC: 19 MMOL/L (ref 8–16)
AST SERPL-CCNC: 31 U/L (ref 10–40)
BILIRUB SERPL-MCNC: 0.5 MG/DL (ref 0.1–1)
BUN SERPL-MCNC: 73 MG/DL (ref 8–23)
CALCIUM SERPL-MCNC: 8.6 MG/DL (ref 8.7–10.5)
CHLORIDE SERPL-SCNC: 96 MMOL/L (ref 95–110)
CO2 SERPL-SCNC: 19 MMOL/L (ref 23–29)
CREAT SERPL-MCNC: 5.3 MG/DL (ref 0.5–1.4)
EST. GFR  (AFRICAN AMERICAN): 12 ML/MIN/1.73 M^2
EST. GFR  (NON AFRICAN AMERICAN): 10 ML/MIN/1.73 M^2
GLUCOSE SERPL-MCNC: 535 MG/DL (ref 70–110)
MAGNESIUM SERPL-MCNC: 2.1 MG/DL (ref 1.6–2.6)
PHOSPHATE SERPL-MCNC: 5 MG/DL (ref 2.7–4.5)
POCT GLUCOSE: 311 MG/DL (ref 70–110)
POCT GLUCOSE: 430 MG/DL (ref 70–110)
POCT GLUCOSE: 453 MG/DL (ref 70–110)
POCT GLUCOSE: 465 MG/DL (ref 70–110)
POCT GLUCOSE: 475 MG/DL (ref 70–110)
POTASSIUM SERPL-SCNC: 4.7 MMOL/L (ref 3.5–5.1)
PROT SERPL-MCNC: 6.3 G/DL (ref 6–8.4)
SODIUM SERPL-SCNC: 134 MMOL/L (ref 136–145)

## 2020-04-02 PROCEDURE — 63600175 PHARM REV CODE 636 W HCPCS: Performed by: STUDENT IN AN ORGANIZED HEALTH CARE EDUCATION/TRAINING PROGRAM

## 2020-04-02 PROCEDURE — 11000001 HC ACUTE MED/SURG PRIVATE ROOM

## 2020-04-02 PROCEDURE — 36415 COLL VENOUS BLD VENIPUNCTURE: CPT

## 2020-04-02 PROCEDURE — 80053 COMPREHEN METABOLIC PANEL: CPT

## 2020-04-02 PROCEDURE — C9399 UNCLASSIFIED DRUGS OR BIOLOG: HCPCS

## 2020-04-02 PROCEDURE — 83735 ASSAY OF MAGNESIUM: CPT

## 2020-04-02 PROCEDURE — 84100 ASSAY OF PHOSPHORUS: CPT

## 2020-04-02 PROCEDURE — 25000003 PHARM REV CODE 250: Performed by: STUDENT IN AN ORGANIZED HEALTH CARE EDUCATION/TRAINING PROGRAM

## 2020-04-02 PROCEDURE — 94761 N-INVAS EAR/PLS OXIMETRY MLT: CPT

## 2020-04-02 PROCEDURE — 25000003 PHARM REV CODE 250: Performed by: INTERNAL MEDICINE

## 2020-04-02 PROCEDURE — 63600175 PHARM REV CODE 636 W HCPCS

## 2020-04-02 PROCEDURE — 27000221 HC OXYGEN, UP TO 24 HOURS

## 2020-04-02 RX ORDER — SODIUM CHLORIDE 9 MG/ML
INJECTION, SOLUTION INTRAVENOUS
Status: DISCONTINUED | OUTPATIENT
Start: 2020-04-02 | End: 2020-04-11 | Stop reason: HOSPADM

## 2020-04-02 RX ORDER — GLUCAGON 1 MG
1 KIT INJECTION
Status: DISCONTINUED | OUTPATIENT
Start: 2020-04-02 | End: 2020-04-11 | Stop reason: HOSPADM

## 2020-04-02 RX ORDER — DOXYCYCLINE HYCLATE 100 MG
100 TABLET ORAL EVERY 12 HOURS
Status: COMPLETED | OUTPATIENT
Start: 2020-04-02 | End: 2020-04-11

## 2020-04-02 RX ORDER — DEXTROSE 50 % IN WATER (D50W) INTRAVENOUS SYRINGE
25
Status: DISCONTINUED | OUTPATIENT
Start: 2020-04-02 | End: 2020-04-11 | Stop reason: HOSPADM

## 2020-04-02 RX ORDER — IBUPROFEN 200 MG
24 TABLET ORAL
Status: DISCONTINUED | OUTPATIENT
Start: 2020-04-02 | End: 2020-04-11 | Stop reason: HOSPADM

## 2020-04-02 RX ORDER — DEXTROSE 50 % IN WATER (D50W) INTRAVENOUS SYRINGE
12.5
Status: DISCONTINUED | OUTPATIENT
Start: 2020-04-02 | End: 2020-04-11 | Stop reason: HOSPADM

## 2020-04-02 RX ORDER — SODIUM CHLORIDE 9 MG/ML
INJECTION, SOLUTION INTRAVENOUS ONCE
Status: DISCONTINUED | OUTPATIENT
Start: 2020-04-03 | End: 2020-04-11 | Stop reason: HOSPADM

## 2020-04-02 RX ORDER — IBUPROFEN 200 MG
16 TABLET ORAL
Status: DISCONTINUED | OUTPATIENT
Start: 2020-04-02 | End: 2020-04-11 | Stop reason: HOSPADM

## 2020-04-02 RX ORDER — INSULIN ASPART 100 [IU]/ML
12 INJECTION, SOLUTION INTRAVENOUS; SUBCUTANEOUS
Status: DISCONTINUED | OUTPATIENT
Start: 2020-04-02 | End: 2020-04-07

## 2020-04-02 RX ORDER — INSULIN ASPART 100 [IU]/ML
1-10 INJECTION, SOLUTION INTRAVENOUS; SUBCUTANEOUS
Status: DISCONTINUED | OUTPATIENT
Start: 2020-04-02 | End: 2020-04-03

## 2020-04-02 RX ADMIN — HEPARIN SODIUM 5000 UNITS: 5000 INJECTION, SOLUTION INTRAVENOUS; SUBCUTANEOUS at 05:04

## 2020-04-02 RX ADMIN — CARVEDILOL 12.5 MG: 12.5 TABLET, FILM COATED ORAL at 09:04

## 2020-04-02 RX ADMIN — INSULIN ASPART 10 UNITS: 100 INJECTION, SOLUTION INTRAVENOUS; SUBCUTANEOUS at 01:04

## 2020-04-02 RX ADMIN — INSULIN ASPART 9 UNITS: 100 INJECTION, SOLUTION INTRAVENOUS; SUBCUTANEOUS at 07:04

## 2020-04-02 RX ADMIN — INSULIN ASPART 10 UNITS: 100 INJECTION, SOLUTION INTRAVENOUS; SUBCUTANEOUS at 04:04

## 2020-04-02 RX ADMIN — DOXYCYCLINE HYCLATE 100 MG: 100 TABLET, COATED ORAL at 09:04

## 2020-04-02 RX ADMIN — METHYLPREDNISOLONE SODIUM SUCCINATE 40 MG: 40 INJECTION, POWDER, FOR SOLUTION INTRAMUSCULAR; INTRAVENOUS at 05:04

## 2020-04-02 RX ADMIN — ATORVASTATIN CALCIUM 80 MG: 40 TABLET, FILM COATED ORAL at 01:04

## 2020-04-02 RX ADMIN — MUPIROCIN: 20 OINTMENT TOPICAL at 09:04

## 2020-04-02 RX ADMIN — METHYLPREDNISOLONE SODIUM SUCCINATE 40 MG: 40 INJECTION, POWDER, FOR SOLUTION INTRAMUSCULAR; INTRAVENOUS at 01:04

## 2020-04-02 RX ADMIN — INSULIN ASPART 4 UNITS: 100 INJECTION, SOLUTION INTRAVENOUS; SUBCUTANEOUS at 08:04

## 2020-04-02 RX ADMIN — HEPARIN SODIUM 5000 UNITS: 5000 INJECTION, SOLUTION INTRAVENOUS; SUBCUTANEOUS at 01:04

## 2020-04-02 RX ADMIN — AMIODARONE HYDROCHLORIDE 200 MG: 200 TABLET ORAL at 09:04

## 2020-04-02 RX ADMIN — INSULIN ASPART 12 UNITS: 100 INJECTION, SOLUTION INTRAVENOUS; SUBCUTANEOUS at 04:04

## 2020-04-02 RX ADMIN — INSULIN DETEMIR 30 UNITS: 100 INJECTION, SOLUTION SUBCUTANEOUS at 09:04

## 2020-04-02 RX ADMIN — HEPARIN SODIUM 5000 UNITS: 5000 INJECTION, SOLUTION INTRAVENOUS; SUBCUTANEOUS at 10:04

## 2020-04-02 RX ADMIN — FINASTERIDE 5 MG: 5 TABLET, FILM COATED ORAL at 01:04

## 2020-04-02 RX ADMIN — INSULIN ASPART 12 UNITS: 100 INJECTION, SOLUTION INTRAVENOUS; SUBCUTANEOUS at 01:04

## 2020-04-02 RX ADMIN — ASPIRIN 81 MG: 81 TABLET, COATED ORAL at 01:04

## 2020-04-02 RX ADMIN — DOXYCYCLINE HYCLATE 100 MG: 100 TABLET, COATED ORAL at 01:04

## 2020-04-02 RX ADMIN — AMIODARONE HYDROCHLORIDE 200 MG: 200 TABLET ORAL at 01:04

## 2020-04-02 RX ADMIN — TAMSULOSIN HYDROCHLORIDE 0.4 MG: 0.4 CAPSULE ORAL at 01:04

## 2020-04-02 RX ADMIN — CARVEDILOL 12.5 MG: 12.5 TABLET, FILM COATED ORAL at 01:04

## 2020-04-02 RX ADMIN — INSULIN DETEMIR 10 UNITS: 100 INJECTION, SOLUTION SUBCUTANEOUS at 04:04

## 2020-04-02 NOTE — PLAN OF CARE
VN cued into patients room for q2h rounding - unable to visualize pt. Pt states he is doing okay and does not need anything. Notified bedside nurse, mason, that VN could not visualize pt and respirations

## 2020-04-02 NOTE — PLAN OF CARE
[facility] contacted  and [facility] Nephrology notes.   faxed the requested info to [phone].       04/02/20 1142   Post-Acute Status   Post-Acute Authorization Dialysis   Post-Acute Placement Status Additional Clinical Requested

## 2020-04-02 NOTE — PROGRESS NOTES
[facility] Medicine Progress Note    Primary Team: [facility] Hospitalist Team B  Attending Physician: Dr. Jones  Resident: [place]  Intern: Gaby    Subjective:      Patient was seen via tele medicine this morning. He feels worse than he did yesterday. He is still having scrotal pain.  He is currently on room air and feels his breathing is better.     Objective:     Last 24 Hour Vital Signs:  BP  Min: 110/51  Max: 144/66  Temp  Av.5 °F (36.9 °C)  Min: 97.7 °F (36.5 °C)  Max: 99.5 °F (37.5 °C)  Pulse  Av.1  Min: 59  Max: 61  Resp  Av.8  Min: 19  Max: 20  SpO2  Av %  Min: 94 %  Max: 96 %  Weight  Av kg (286 lb 9.6 oz)  Min: 130 kg (286 lb 9.6 oz)  Max: 130 kg (286 lb 9.6 oz)  I/O last 3 completed shifts:  In: 605 [P.O.:105; Other:500]  Out: 1550 [Urine:50; Other:1500]    Physical Examination:  Deferred  covid+  Laboratory:  Laboratory Data Reviewed: yes  Pertinent Findings:  Cr 4.7>>5.3>>5.4>>5.1>>5.3    Microbiology Data Reviewed: yes  Pertinent Findings:  Flu negative  COVID POSITIVE  3/28 blood cx, NGTD    Other Results:  EKG (my interpretation): NSR     Radiology Data Reviewed: yes  Pertinent Findings:  No new    Current Medications:     Infusions:       Scheduled:   amiodarone  200 mg Oral BID    aspirin  81 mg Oral Daily    atorvastatin  80 mg Oral Daily    carvediloL  12.5 mg Oral BID    finasteride  5 mg Oral Daily    heparin (porcine)  5,000 Units Subcutaneous Q8H    insulin aspart U-100  9 Units Subcutaneous TIDWM    insulin detemir U-100  20 Units Subcutaneous BID    methylPREDNISolone sodium succinate  40 mg Intravenous Q6H    mupirocin   Nasal BID    tamsulosin  0.4 mg Oral Daily        PRN:  acetaminophen, dextrose 50 % in water (D50W), glucagon (human recombinant), heparin (porcine), insulin aspart U-100, oxyCODONE-acetaminophen, sodium chloride 0.9%    Antibiotics and Day Number of Therapy:  Rocephin, 3/28-present  Azithro, 3/28 -present   (no plaquenil  long  qTc)  IV Solumedrol 40mg q6, 3/29-present     Lines and Day Number of Therapy:  PIV    Assessment:     Houston Jc is a 74 y.o.male with  Patient Active Problem List    Diagnosis Date Noted    CLEMENCIA (acute kidney injury)     COVID-19 virus detected     Fever     Suspected 2019-Ncov Infection     SOB (shortness of breath) 03/28/2020    NICM (nonischemic cardiomyopathy) 11/06/2019    Abnormal transaminases 07/20/2019    Cardiac resynchronization therapy defibrillator (CRT-D) in place 07/19/2019    Third degree heart block 07/19/2019    Gout 07/18/2019    Abdominal distension 07/17/2019    Debility 07/17/2019    VT (ventricular tachycardia) 07/11/2019    Cardiorenal syndrome 07/11/2019    Acute systolic heart failure 07/11/2019    Complete heart block 07/09/2019    Morbid obesity 10/08/2015    Hypertension 09/21/2015    Diastolic dysfunction 09/21/2015    Sleep apnea 09/21/2015    Swelling of lower extremity 09/18/2015    Hypervolemia 09/18/2015     Mr. Benjamin presents to [place] ED for 3 days of fever, SOB, diarrhea. Covid positive. Admitted for CLEMENCIA, troponemia, and hypoxia w/ exertion.   Plan:     Hypoxic respiratory failure 2/2 Covid-19  - pt with fever, SOB, diarrhea and labs indicative of  Covid-19 illness script  - flu negative, Covid positive  - pt w/ SOB, currently satting high 80s/low 90s on 2L NC, tachypnea resolved  - CXR w/ L sided opacity, continuing CAP abx (azithro, rocephin)  - (3/31) Patient afebrile and comfortable on room air.  - Has completed 4 days of azithro and rocephin. Per ID, can discontinue as bacterial coinfection highly unlikely.   - holding plaquenil 2/2 prolonged qTc on EKG. Will check rhythm strip and start Kaletra if QTc remains >550   - continuing IV solumedrol 1mg/kg/day divided q6 for now  - Now on room air.     CLEMENCIA on CKD 4  - pt w/ baseline Cr 3, 4.6 on admit>>5.3>>5.4 today  - s/p 500mL NS bolus in ED with symptomatic improvement, pt makes urine  - daily BMP  -  follows w/ Dr Fowler  - consulted Nephrology, appreciate recs  - discontinued allopurinol, avoid nephrotoxic meds  - Patient received HD on 3/31 with 1L removed, tolerated well  - OR today for peritoneal dialysis catheter placement     Troponemia, stable  - initial trop 2.9>>2.59 (baseline 0.08)  - EKG w/ NSR and 1st degree block, pt denies CP  - pt w/ hx of non-ischemic cardiomyopathy and complete heart block s/p CRT-D 2019  - likely 2/2 to Covid, now downtrending. No longer following.      HFrEF  - 7/2019 echo with EF 30%, global hypokinesis, and grade 2 diastolic dysfunction  - Had pacemaker placed in 2019.  - judiciously hydrate in setting of CLEMENCIA  - repeat echo pending Covid as pt with hx of cardio-renal syndrome  - follows w/ Dr Mancia     Scrotal swelling and pain 2/2 varicocele  - as per wife, pt with >1m worsening scrotal pain and swelling  - has outpt Urology appt scheduled  - scrotal swelling with R varicocele, f/u scrotal US  - low concern for obstructive component of CLEMENCIA  - Consulted Urology as outpt appts being cancelled 2/2 Covid and patient is having worsening pain.  - US scrotum and testicles is pending.    T2DM  - Hyperglycermia likely 2/2 current steroid use  - Increase long-acting 20 U bid, aspart 9 U with meals     Diet: cardiac  PPX: heparin  Dispo: Pending peritoneal catheter placement, further Nephrology recs, Urology recs.       Fransisco Griffin MD  [facility] Internal Medicine HO-1    [facility] Medicine Hospitalist Pager numbers:   [facility] Hospitalist Medicine Team A (Abhinav/Jovani): [phone]  [facility] Hospitalist Medicine Team B (Robert/Enedelia):  [phone]

## 2020-04-02 NOTE — NURSING
Pt's surgery has been cancelled today as his blood sugars are failing to lower and more insulin has been ordered. Renal diet served to pt. VSS. Will continue to monitor blood glucose levels.

## 2020-04-02 NOTE — NURSING
Pr resting in bed at this time. Compliant with HS medications. No c/o pain or SOB. Pt on room air at this time. Educated on NPO status after midnight. Pt verbalized understanding. Rt IJ dressing C/D/I. Safety precautions in place. Bed bath given by PCT. Call light in reach. Instructed to call for assistance. Will continue to monitor.

## 2020-04-02 NOTE — ANESTHESIA PREPROCEDURE EVALUATION
04/02/2020  Houston Jc is a 74 y.o., male.    PRIOR ANES (Epic) [id number] AICD-biventricular MAC    [id number]    Patient Active Problem List   Diagnosis    Swelling of lower extremity    Hypervolemia    Hypertension    Diastolic dysfunction    Sleep apnea    Morbid obesity    Complete heart block    VT (ventricular tachycardia)    Cardiorenal syndrome    Acute systolic heart failure    Abdominal distension    Debility    Gout    Cardiac resynchronization therapy defibrillator (CRT-D) in place    Third degree heart block    Abnormal transaminases    NICM (nonischemic cardiomyopathy)    SOB (shortness of breath)    COVID-19 virus detected    Fever    Suspected 2019-Ncov Infection    CLEMENCIA (acute kidney injury)     Past Medical History:   Diagnosis Date    Anemia     CHF (congestive heart failure)     CKD (chronic kidney disease) stage 4, GFR 15-29 ml/min     Coronary artery disease     Diabetes mellitus     Hematuria     Hypertension     Renal cyst, right      Past Surgical History:   Procedure Laterality Date    INSERTION OF BIVENTRICULAR IMPLANTABLE CARDIOVERTER-DEFIBRILLATOR (ICD) Left 7/18/2019    Procedure: INSERTION, ICD, BIVENTRICULAR;  Surgeon: Arturo Bay MD;  Location: [facility] EP LAB;  Service: Cardiology;  Laterality: Left;  CHB, CRTD, SJM, anes, GP, [id number]    LEFT HEART CATHETERIZATION N/A 7/16/2019    Procedure: Left heart cath;  Surgeon: Dejuan Cody MD;  Location: [facility] CATH LAB/EP;  Service: Cardiology;  Laterality: N/A;     Review of patient's allergies indicates:  No Known Allergies    ANES-RELATED MAR [id number]  Carvedilol-PO  Insulin-aspartSQss  Amiodarone-PO Insulin-[id number]  Heparin-[id number]      Pre-op Assessment         Review of Systems    Physical Exam  General:  Well nourished, Obesity    Airway/Jaw/Neck:  Airway  Findings: Mouth Opening: Normal Tongue: Normal  General Airway Assessment: Adult  Mallampati: II  Improves to II with phonation.  TM Distance: Normal, at least 6 cm      Dental:  Dental Findings: In tact   Chest/Lungs:  Chest/Lungs Findings: Clear to auscultation     Heart/Vascular:  Heart Findings: Rate: Normal  Rhythm: Regular Rhythm  Sounds: Normal        Mental Status:  Mental Status Findings:  Cooperative, Alert and Oriented       Wt Readings from Last 3 Encounters:   04/02/20 130 kg (286 lb 9.6 oz)   03/11/20 136.1 kg (300 lb)   03/06/20 (!) 136.6 kg (301 lb 2.4 oz)     Temp Readings from Last 3 Encounters:   04/02/20 36.9 °C (98.5 °F) (Oral)   03/11/20 36.7 °C (98.1 °F) (Oral)   02/19/20 36.7 °C (98.1 °F) (Oral)     BP Readings from Last 3 Encounters:   04/02/20 (!) 110/51   03/11/20 (!) 90/40   03/06/20 (!) 128/58     Pulse Readings from Last 3 Encounters:   04/02/20 (!) 59   03/11/20 63   03/06/20 60     Lab Results   Component Value Date    WBC 9.68 03/28/2020    HGB 10.1 (L) 03/28/2020    HCT 29.9 (L) 03/28/2020    MCV 92 03/28/2020     03/28/2020         Chemistry        Component Value Date/Time     (L) 04/02/2020 0531    K 4.7 04/02/2020 0531    CL 96 04/02/2020 0531    CO2 19 (L) 04/02/2020 0531    BUN 73 (H) 04/02/2020 0531    CREATININE 5.3 (H) 04/02/2020 0531     (HH) 04/02/2020 0531        Component Value Date/Time    CALCIUM 8.6 (L) 04/02/2020 0531    ALKPHOS 91 04/02/2020 0531    AST 31 04/02/2020 0531    ALT 37 04/02/2020 0531    BILITOT 0.5 04/02/2020 0531    ESTGFRAFRICA 12 (A) 04/02/2020 0531    EGFRNONAA 10 (A) 04/02/2020 0531        Lab Results   Component Value Date    ALBUMIN 2.5 (L) 04/02/2020           Anesthesia Plan  Type of Anesthesia, risks & benefits discussed:  Anesthesia Type:  MAC  Patient's Preference: Sedation  Intra-op Monitoring Plan: standard ASA monitors  Intra-op Monitoring Plan Comments:   Post Op Pain Control Plan: per primary service following  discharge from PACU  Post Op Pain Control Plan Comments:   Induction:   IV  Beta Blocker:  Patient is not currently on a Beta-Blocker (No further documentation required).       Informed Consent: Patient understands risks and agrees with Anesthesia plan.  Questions answered.   ASA Score: 4     Day of Surgery Review of History & Physical:    H&P update referred to the surgeon.     Anesthesia Plan Notes: Sedation plan discussed.          Ready For Surgery From Anesthesia Perspective.

## 2020-04-02 NOTE — PLAN OF CARE
Plan of Care    Discussed case with Urology, Dr. Mccarthy.  As the patient's scrotal pain is better today than it was yesterday, we will not get an scrotal US at this time.  We will treat him with doxycyline to empirically treat for epididymoorchitis.  If he starts to clinically worsen, we will get a scrotal US and re-consult Urology at that time.    Fransisco rGiffin MD  [facility] Internal Medicine HO-1

## 2020-04-02 NOTE — CARE UPDATE
Discussed with Dr. Griffin of the patient's primary team. He has had longstanding (at least 1 month) of scrotal pain. During his hospitalization for COVID the patient has noticed that it worsened and expressed to the primary team.   Today actually the patient has expressed to the team that his scrotal pain improved.  To avoid unnecessary exposure to the patient who is + for COVID 19 the team has graciously rescinded the formal consult for now. We did discuss his case via Epic chat messaging. He can use scrotal supportive underwear (central supply has the mesh underwear) to help elevate the scrotum which usually helps alleviate pain. Typically would recommend NSAIDs however due to his renal function this is not an option. The primary team does believe that empiric abx for epididymo-orchitis treatment is reasonable and will proceed with doxycycline.  [facility] Likely no intervention needed. They will reconsult tomorrow 4/3/20 if patient's scrotal pain acutely worsens.

## 2020-04-02 NOTE — PLAN OF CARE
Patient is scheduled to get a PD catheter placed while he is inpatient during this admission, so he will not need outpatient HD. I confirmed this with nephrology.    Fransisco Griffin MD  [facility] Internal Medicine, [facility]

## 2020-04-02 NOTE — PLAN OF CARE
VN cued into room for q2h rounding.  Pt resting comfortably in bed.  NADN.  Oxygen in place. VN will continue to follow and be available as needed.

## 2020-04-02 NOTE — PLAN OF CARE
VN cued into patients room for q2h rounding - Patient is getting a bath right now by staff. Unable to visualize pt at this time.

## 2020-04-02 NOTE — NURSING
1 L fluid removed during HD. Tolerated well. VSS. NAD. CVC lines locked with heparin and lines capped.

## 2020-04-02 NOTE — PROGRESS NOTES
General Surgery    Patient currently with hyperglycemia.  After discussing with anesthesiology, we will plan to defer surgery until tomorrow to allow time for blood sugars to get better controlled.  Renal diet ordered, NPO at midnight.

## 2020-04-02 NOTE — PLAN OF CARE
spoke with [facility]  and inquired about possible transportation resources for the patient.    Naomi stated she did not get notified the  was in house and requiring dialysis. Naomi requested  fax her the dialysis order and HD flow sheets.     faxed Naomi the requested info to [phone].   provided Shiv Amaro Admissions coordinator with Naomi's contact number to coordinate patient's outpatient dialysis authorization.    Naomi informed  there was no available transportation resources for the member.      paged the attending team to get an update on patient's plan of care. Awaiting call back.

## 2020-04-02 NOTE — PLAN OF CARE
VN cued into room for q2h rounding.  Pt resting comfortably in bed.  NADN.   VN will continue to follow and be available as needed.

## 2020-04-02 NOTE — PROGRESS NOTES
Progress Note  Nephrology      Consult Requested By: Lonnie Mcdaniels MD  Reason for Consult: CLEMENCIA    SUBJECTIVE:     Review of Systems   Constitutional: Negative for chills and fever.   Respiratory: Negative for shortness of breath.    Cardiovascular: Negative for chest pain.   Gastrointestinal: Negative for nausea and vomiting.     Patient Active Problem List   Diagnosis    Swelling of lower extremity    Hypervolemia    Hypertension    Diastolic dysfunction    Sleep apnea    Morbid obesity    Complete heart block    VT (ventricular tachycardia)    Cardiorenal syndrome    Acute systolic heart failure    Abdominal distension    Debility    Gout    Cardiac resynchronization therapy defibrillator (CRT-D) in place    Third degree heart block    Abnormal transaminases    NICM (nonischemic cardiomyopathy)    SOB (shortness of breath)    COVID-19 virus detected    Fever    Suspected 2019-Ncov Infection    CLEMENCIA (acute kidney injury)       OBJECTIVE:     Medications:   amiodarone  200 mg Oral BID    aspirin  81 mg Oral Daily    atorvastatin  80 mg Oral Daily    carvediloL  12.5 mg Oral BID    doxycycline  100 mg Oral Q12H    finasteride  5 mg Oral Daily    heparin (porcine)  5,000 Units Subcutaneous Q8H    insulin aspart U-100  9 Units Subcutaneous TIDWM    insulin detemir U-100  20 Units Subcutaneous BID    methylPREDNISolone sodium succinate  40 mg Intravenous Q6H    mupirocin   Nasal BID    tamsulosin  0.4 mg Oral Daily       Vitals:    04/02/20 0754   BP:    Pulse: (!) 59   Resp:    Temp:      I/O last 3 completed shifts:  In: 605 [P.O.:105; Other:500]  Out: 1550 [Urine:50; Other:1500]     Physical exam is limited 2/2 covid pandemic and need to preserve PPE    Laboratory:  Recent Labs   Lab 03/28/20  1304   WBC 9.68   HGB 10.1*   HCT 29.9*      MONO 5.2  0.5     Recent Labs   Lab 03/31/20  0830 04/01/20  0708 04/02/20  0531   * 134* 134*   K 3.9 4.5 4.7   CL 96 101 96   CO2  22* 20* 19*   BUN 98* 92* 73*   CREATININE 5.1* 5.4* 5.3*   CALCIUM 8.9 8.8 8.6*   PHOS 4.2 4.1 5.0*     Labs reviewed  Diagnostic Results:  X-Ray: Reviewed  US: Reviewed  Echo: Reviewed      ASSESSMENT/PLAN:     ARF on CKD4  -holding PD placement 2/2 hyperglycemia this am, surgery deferred to tomorrow  -labs stable, no need for HD today  -will need PD education prior to discharge    Hypervolemia+ hyponatremia  -improving to 134 today  -fluid restrict 1L    CHF  -EF 30%  -bnp 525    Hypoxic respiratory failure 2/2 Covid-19  -ID following, holding plaquenil 2/2 prolonged QTc  -room air    Thank you for allowing me to participate in the care of your patients  With any question please call [phone]  Ximena Mccarthy NP    [facility]  ANA MARIA Burns MD, FACP,   KEREN Woo MD,   MD JOSH Gill NP H. Mitchell, NP  [address]  [place], [id number] [phone]

## 2020-04-03 LAB
ALBUMIN SERPL BCP-MCNC: 2.6 G/DL (ref 3.5–5.2)
ALP SERPL-CCNC: 93 U/L (ref 55–135)
ALT SERPL W/O P-5'-P-CCNC: 37 U/L (ref 10–44)
ANION GAP SERPL CALC-SCNC: 14 MMOL/L (ref 8–16)
AST SERPL-CCNC: 28 U/L (ref 10–40)
BILIRUB SERPL-MCNC: 0.4 MG/DL (ref 0.1–1)
BUN SERPL-MCNC: 108 MG/DL (ref 8–23)
CALCIUM SERPL-MCNC: 8.7 MG/DL (ref 8.7–10.5)
CHLORIDE SERPL-SCNC: 98 MMOL/L (ref 95–110)
CO2 SERPL-SCNC: 22 MMOL/L (ref 23–29)
CREAT SERPL-MCNC: 6.6 MG/DL (ref 0.5–1.4)
EST. GFR  (AFRICAN AMERICAN): 9 ML/MIN/1.73 M^2
EST. GFR  (NON AFRICAN AMERICAN): 8 ML/MIN/1.73 M^2
GLUCOSE SERPL-MCNC: 437 MG/DL (ref 70–110)
MAGNESIUM SERPL-MCNC: 2.3 MG/DL (ref 1.6–2.6)
PHOSPHATE SERPL-MCNC: 4.4 MG/DL (ref 2.7–4.5)
POCT GLUCOSE: 250 MG/DL (ref 70–110)
POCT GLUCOSE: 283 MG/DL (ref 70–110)
POCT GLUCOSE: 382 MG/DL (ref 70–110)
POCT GLUCOSE: 401 MG/DL (ref 70–110)
POCT GLUCOSE: 413 MG/DL (ref 70–110)
POCT GLUCOSE: 470 MG/DL (ref 70–110)
POTASSIUM SERPL-SCNC: 4.5 MMOL/L (ref 3.5–5.1)
PROT SERPL-MCNC: 6.2 G/DL (ref 6–8.4)
SODIUM SERPL-SCNC: 134 MMOL/L (ref 136–145)

## 2020-04-03 PROCEDURE — 63600175 PHARM REV CODE 636 W HCPCS: Performed by: NURSE ANESTHETIST, CERTIFIED REGISTERED

## 2020-04-03 PROCEDURE — 27201423 OPTIME MED/SURG SUP & DEVICES STERILE SUPPLY: Performed by: SURGERY

## 2020-04-03 PROCEDURE — 84100 ASSAY OF PHOSPHORUS: CPT

## 2020-04-03 PROCEDURE — 49326 LAP W/OMENTOPEXY ADD-ON: CPT | Mod: ,,, | Performed by: SURGERY

## 2020-04-03 PROCEDURE — 90945 DIALYSIS ONE EVALUATION: CPT

## 2020-04-03 PROCEDURE — 49324 LAP INSERT TUNNEL IP CATH: CPT | Mod: 51,,, | Performed by: SURGERY

## 2020-04-03 PROCEDURE — 25000003 PHARM REV CODE 250: Performed by: STUDENT IN AN ORGANIZED HEALTH CARE EDUCATION/TRAINING PROGRAM

## 2020-04-03 PROCEDURE — 63600175 PHARM REV CODE 636 W HCPCS: Performed by: SURGERY

## 2020-04-03 PROCEDURE — 63600175 PHARM REV CODE 636 W HCPCS: Performed by: STUDENT IN AN ORGANIZED HEALTH CARE EDUCATION/TRAINING PROGRAM

## 2020-04-03 PROCEDURE — C1750 CATH, HEMODIALYSIS,LONG-TERM: HCPCS | Performed by: SURGERY

## 2020-04-03 PROCEDURE — 49653 PR LAP VENTRAL/UMBILICAL HERNIA; INCARCERATED OR STRANGULATED: CPT | Mod: ,,, | Performed by: SURGERY

## 2020-04-03 PROCEDURE — 25000003 PHARM REV CODE 250: Performed by: NURSE ANESTHETIST, CERTIFIED REGISTERED

## 2020-04-03 PROCEDURE — 25000003 PHARM REV CODE 250: Performed by: SURGERY

## 2020-04-03 PROCEDURE — 49653 PR LAP VENTRAL/UMBILICAL HERNIA; INCARCERATED OR STRANGULATED: CPT | Mod: 81,,, | Performed by: STUDENT IN AN ORGANIZED HEALTH CARE EDUCATION/TRAINING PROGRAM

## 2020-04-03 PROCEDURE — 49324 PR LAP INSERTION TUNNELED INTRAPERITONEAL CATHETER: ICD-10-PCS | Mod: 51,,, | Performed by: SURGERY

## 2020-04-03 PROCEDURE — 11000001 HC ACUTE MED/SURG PRIVATE ROOM

## 2020-04-03 PROCEDURE — 49324 LAP INSERT TUNNEL IP CATH: CPT | Mod: 81,51,, | Performed by: STUDENT IN AN ORGANIZED HEALTH CARE EDUCATION/TRAINING PROGRAM

## 2020-04-03 PROCEDURE — 49326 PR LAP OMENTOPEXY ADD-ON: ICD-10-PCS | Mod: ,,, | Performed by: SURGERY

## 2020-04-03 PROCEDURE — 36415 COLL VENOUS BLD VENIPUNCTURE: CPT

## 2020-04-03 PROCEDURE — 49326 PR LAP OMENTOPEXY ADD-ON: ICD-10-PCS | Mod: 81,,, | Performed by: STUDENT IN AN ORGANIZED HEALTH CARE EDUCATION/TRAINING PROGRAM

## 2020-04-03 PROCEDURE — 49326 LAP W/OMENTOPEXY ADD-ON: CPT | Mod: 81,,, | Performed by: STUDENT IN AN ORGANIZED HEALTH CARE EDUCATION/TRAINING PROGRAM

## 2020-04-03 PROCEDURE — 37000008 HC ANESTHESIA 1ST 15 MINUTES: Performed by: SURGERY

## 2020-04-03 PROCEDURE — 49324 PR LAP INSERTION TUNNELED INTRAPERITONEAL CATHETER: ICD-10-PCS | Mod: 81,51,, | Performed by: STUDENT IN AN ORGANIZED HEALTH CARE EDUCATION/TRAINING PROGRAM

## 2020-04-03 PROCEDURE — 36000708 HC OR TIME LEV III 1ST 15 MIN: Performed by: SURGERY

## 2020-04-03 PROCEDURE — 83735 ASSAY OF MAGNESIUM: CPT

## 2020-04-03 PROCEDURE — 36000709 HC OR TIME LEV III EA ADD 15 MIN: Performed by: SURGERY

## 2020-04-03 PROCEDURE — 49653 PR LAP VENTRAL/UMBILICAL HERNIA; INCARCERATED OR STRANGULATED: ICD-10-PCS | Mod: 81,,, | Performed by: STUDENT IN AN ORGANIZED HEALTH CARE EDUCATION/TRAINING PROGRAM

## 2020-04-03 PROCEDURE — 94761 N-INVAS EAR/PLS OXIMETRY MLT: CPT

## 2020-04-03 PROCEDURE — 80053 COMPREHEN METABOLIC PANEL: CPT

## 2020-04-03 PROCEDURE — 37000009 HC ANESTHESIA EA ADD 15 MINS: Performed by: SURGERY

## 2020-04-03 PROCEDURE — 71000033 HC RECOVERY, INTIAL HOUR: Performed by: SURGERY

## 2020-04-03 PROCEDURE — 49653 PR LAP VENTRAL/UMBILICAL HERNIA; INCARCERATED OR STRANGULATED: ICD-10-PCS | Mod: ,,, | Performed by: SURGERY

## 2020-04-03 DEVICE — IMPLANTABLE DEVICE: Type: IMPLANTABLE DEVICE | Site: ABDOMEN | Status: FUNCTIONAL

## 2020-04-03 RX ORDER — LIDOCAINE HCL/PF 100 MG/5ML
SYRINGE (ML) INTRAVENOUS
Status: DISCONTINUED | OUTPATIENT
Start: 2020-04-03 | End: 2020-04-03

## 2020-04-03 RX ORDER — VASOPRESSIN 20 [USP'U]/ML
INJECTION, SOLUTION INTRAMUSCULAR; SUBCUTANEOUS
Status: DISCONTINUED | OUTPATIENT
Start: 2020-04-03 | End: 2020-04-03

## 2020-04-03 RX ORDER — HYDROMORPHONE HYDROCHLORIDE 2 MG/ML
0.5 INJECTION, SOLUTION INTRAMUSCULAR; INTRAVENOUS; SUBCUTANEOUS EVERY 5 MIN PRN
Status: DISCONTINUED | OUTPATIENT
Start: 2020-04-03 | End: 2020-04-03

## 2020-04-03 RX ORDER — HEPARIN SODIUM 5000 [USP'U]/ML
INJECTION, SOLUTION INTRAVENOUS; SUBCUTANEOUS
Status: DISCONTINUED | OUTPATIENT
Start: 2020-04-03 | End: 2020-04-03 | Stop reason: HOSPADM

## 2020-04-03 RX ORDER — ONDANSETRON 2 MG/ML
4 INJECTION INTRAMUSCULAR; INTRAVENOUS DAILY PRN
Status: DISCONTINUED | OUTPATIENT
Start: 2020-04-03 | End: 2020-04-03

## 2020-04-03 RX ORDER — NEOSTIGMINE METHYLSULFATE 1 MG/ML
INJECTION, SOLUTION INTRAVENOUS
Status: DISCONTINUED | OUTPATIENT
Start: 2020-04-03 | End: 2020-04-03

## 2020-04-03 RX ORDER — SODIUM CHLORIDE 0.9 % (FLUSH) 0.9 %
3 SYRINGE (ML) INJECTION
Status: DISCONTINUED | OUTPATIENT
Start: 2020-04-03 | End: 2020-04-03

## 2020-04-03 RX ORDER — CISATRACURIUM BESYLATE 2 MG/ML
INJECTION, SOLUTION INTRAVENOUS
Status: DISCONTINUED | OUTPATIENT
Start: 2020-04-03 | End: 2020-04-03

## 2020-04-03 RX ORDER — SUCCINYLCHOLINE CHLORIDE 20 MG/ML
INJECTION INTRAMUSCULAR; INTRAVENOUS
Status: DISCONTINUED | OUTPATIENT
Start: 2020-04-03 | End: 2020-04-03

## 2020-04-03 RX ORDER — ONDANSETRON HYDROCHLORIDE 2 MG/ML
INJECTION, SOLUTION INTRAMUSCULAR; INTRAVENOUS
Status: DISCONTINUED | OUTPATIENT
Start: 2020-04-03 | End: 2020-04-03

## 2020-04-03 RX ORDER — BUPIVACAINE HYDROCHLORIDE 2.5 MG/ML
INJECTION, SOLUTION EPIDURAL; INFILTRATION; INTRACAUDAL
Status: DISCONTINUED | OUTPATIENT
Start: 2020-04-03 | End: 2020-04-03 | Stop reason: HOSPADM

## 2020-04-03 RX ORDER — LIDOCAINE HYDROCHLORIDE 10 MG/ML
INJECTION INFILTRATION; PERINEURAL
Status: DISCONTINUED | OUTPATIENT
Start: 2020-04-03 | End: 2020-04-03 | Stop reason: HOSPADM

## 2020-04-03 RX ORDER — DIPHENHYDRAMINE HYDROCHLORIDE 50 MG/ML
12.5 INJECTION INTRAMUSCULAR; INTRAVENOUS EVERY 6 HOURS PRN
Status: DISCONTINUED | OUTPATIENT
Start: 2020-04-03 | End: 2020-04-03

## 2020-04-03 RX ORDER — FENTANYL CITRATE 50 UG/ML
INJECTION, SOLUTION INTRAMUSCULAR; INTRAVENOUS
Status: DISCONTINUED | OUTPATIENT
Start: 2020-04-03 | End: 2020-04-03

## 2020-04-03 RX ORDER — GLYCOPYRROLATE 0.2 MG/ML
INJECTION INTRAMUSCULAR; INTRAVENOUS
Status: DISCONTINUED | OUTPATIENT
Start: 2020-04-03 | End: 2020-04-03

## 2020-04-03 RX ORDER — ETOMIDATE 2 MG/ML
INJECTION INTRAVENOUS
Status: DISCONTINUED | OUTPATIENT
Start: 2020-04-03 | End: 2020-04-03

## 2020-04-03 RX ORDER — INSULIN ASPART 100 [IU]/ML
1-10 INJECTION, SOLUTION INTRAVENOUS; SUBCUTANEOUS EVERY 6 HOURS PRN
Status: DISCONTINUED | OUTPATIENT
Start: 2020-04-03 | End: 2020-04-11 | Stop reason: HOSPADM

## 2020-04-03 RX ORDER — MIDAZOLAM HYDROCHLORIDE 1 MG/ML
INJECTION INTRAMUSCULAR; INTRAVENOUS
Status: DISCONTINUED | OUTPATIENT
Start: 2020-04-03 | End: 2020-04-03

## 2020-04-03 RX ORDER — SODIUM CHLORIDE 9 MG/ML
INJECTION, SOLUTION INTRAVENOUS CONTINUOUS PRN
Status: DISCONTINUED | OUTPATIENT
Start: 2020-04-03 | End: 2020-04-03

## 2020-04-03 RX ADMIN — GLYCOPYRROLATE 0.6 MG: 0.2 INJECTION, SOLUTION INTRAMUSCULAR; INTRAVENOUS at 02:04

## 2020-04-03 RX ADMIN — VASOPRESSIN 1 UNITS: 20 INJECTION, SOLUTION INTRAMUSCULAR; SUBCUTANEOUS at 01:04

## 2020-04-03 RX ADMIN — INSULIN ASPART 2 UNITS: 100 INJECTION, SOLUTION INTRAVENOUS; SUBCUTANEOUS at 11:04

## 2020-04-03 RX ADMIN — MUPIROCIN: 20 OINTMENT TOPICAL at 09:04

## 2020-04-03 RX ADMIN — ONDANSETRON 8 MG: 2 INJECTION, SOLUTION INTRAMUSCULAR; INTRAVENOUS at 02:04

## 2020-04-03 RX ADMIN — ATORVASTATIN CALCIUM 80 MG: 40 TABLET, FILM COATED ORAL at 08:04

## 2020-04-03 RX ADMIN — AMIODARONE HYDROCHLORIDE 200 MG: 200 TABLET ORAL at 08:04

## 2020-04-03 RX ADMIN — SUCCINYLCHOLINE CHLORIDE 120 MG: 20 INJECTION, SOLUTION INTRAMUSCULAR; INTRAVENOUS at 01:04

## 2020-04-03 RX ADMIN — LIDOCAINE HYDROCHLORIDE 100 MG: 20 INJECTION, SOLUTION INTRAVENOUS at 01:04

## 2020-04-03 RX ADMIN — SODIUM CHLORIDE: 0.9 INJECTION, SOLUTION INTRAVENOUS at 01:04

## 2020-04-03 RX ADMIN — MIDAZOLAM HYDROCHLORIDE 2 MG: 1 INJECTION, SOLUTION INTRAMUSCULAR; INTRAVENOUS at 01:04

## 2020-04-03 RX ADMIN — ETOMIDATE 18 MG: 2 INJECTION INTRAVENOUS at 01:04

## 2020-04-03 RX ADMIN — INSULIN ASPART 12 UNITS: 100 INJECTION, SOLUTION INTRAVENOUS; SUBCUTANEOUS at 08:04

## 2020-04-03 RX ADMIN — NEOSTIGMINE METHYLSULFATE 3 MG: 1 INJECTION INTRAVENOUS at 02:04

## 2020-04-03 RX ADMIN — HEPARIN SODIUM 5000 UNITS: 5000 INJECTION, SOLUTION INTRAVENOUS; SUBCUTANEOUS at 05:04

## 2020-04-03 RX ADMIN — INSULIN ASPART 10 UNITS: 100 INJECTION, SOLUTION INTRAVENOUS; SUBCUTANEOUS at 05:04

## 2020-04-03 RX ADMIN — FENTANYL CITRATE 100 MCG: 50 INJECTION, SOLUTION INTRAMUSCULAR; INTRAVENOUS at 01:04

## 2020-04-03 RX ADMIN — HEPARIN SODIUM 5000 UNITS: 5000 INJECTION, SOLUTION INTRAVENOUS; SUBCUTANEOUS at 10:04

## 2020-04-03 RX ADMIN — INSULIN ASPART 10 UNITS: 100 INJECTION, SOLUTION INTRAVENOUS; SUBCUTANEOUS at 09:04

## 2020-04-03 RX ADMIN — DOXYCYCLINE HYCLATE 100 MG: 100 TABLET, COATED ORAL at 08:04

## 2020-04-03 RX ADMIN — FINASTERIDE 5 MG: 5 TABLET, FILM COATED ORAL at 08:04

## 2020-04-03 RX ADMIN — ASPIRIN 81 MG: 81 TABLET, COATED ORAL at 08:04

## 2020-04-03 RX ADMIN — INSULIN ASPART 12 UNITS: 100 INJECTION, SOLUTION INTRAVENOUS; SUBCUTANEOUS at 05:04

## 2020-04-03 RX ADMIN — MUPIROCIN: 20 OINTMENT TOPICAL at 08:04

## 2020-04-03 RX ADMIN — TAMSULOSIN HYDROCHLORIDE 0.4 MG: 0.4 CAPSULE ORAL at 08:04

## 2020-04-03 RX ADMIN — CARVEDILOL 12.5 MG: 12.5 TABLET, FILM COATED ORAL at 08:04

## 2020-04-03 RX ADMIN — INSULIN ASPART 12 UNITS: 100 INJECTION, SOLUTION INTRAVENOUS; SUBCUTANEOUS at 12:04

## 2020-04-03 RX ADMIN — CISATRACURIUM BESYLATE 4 MG: 2 INJECTION INTRAVENOUS at 01:04

## 2020-04-03 RX ADMIN — INSULIN ASPART 6 UNITS: 100 INJECTION, SOLUTION INTRAVENOUS; SUBCUTANEOUS at 06:04

## 2020-04-03 NOTE — TRANSFER OF CARE
"Anesthesia Transfer of Care Note    Patient: Houston Jc    Procedure(s) Performed: Procedure(s) (LRB):  INSERTION, CATHETER, DIALYSIS, PERITONEAL, LAPAROSCOPIC (N/A)    Patient location: PACU (PACU IN OR due to covid)    Anesthesia Type: general    Transport from OR: Transported from OR on 6-10 L/min O2 by face mask with adequate spontaneous ventilation    Post pain: adequate analgesia    Post assessment: no apparent anesthetic complications and tolerated procedure well    Post vital signs: stable    Level of consciousness: awake and oriented    Nausea/Vomiting: no nausea/vomiting    Complications: none    Transfer of care protocol was followed      Last vitals:   Visit Vitals  /62 (BP Location: Left arm, Patient Position: Lying)   Pulse 60   Temp 36.6 °C (97.9 °F) (Oral)   Resp 18   Ht 6' 2" (1.88 m)   Wt 130 kg (286 lb 9.6 oz)   SpO2 98%   BMI 36.80 kg/m²     "

## 2020-04-03 NOTE — PLAN OF CARE
noted consult for peritoneal dialysis placed by Nephrology.     faxed new dialysis order to [facility]  and to Raphael with [facility] admissions.   spoke with Raphael and Naomi and made her aware of the change.   spoke with Didi,  for [facility] location, Didi was aware of this patient and stated it should not be a problem for this patient to get his PD education at her location since all Covid positive patients were being dialyzed there.    Didi stated she had in her records for this patient start date of 4/9 T/Th/Sat 5:45am. Since the modality is now changed she will confirm all of this and call  back.    [facility] will [facility] with the [facility] and get a new [facility] for PD.       04/03/20 1204   Post-Acute Status   Post-Acute Authorization Dialysis   Diaylsis Status Additional Clinical Requested   Discharge Delays (!) Change in Medical Condition   Discharge Plan   Discharge Plan A Home

## 2020-04-03 NOTE — PLAN OF CARE
VN cued into patients room for q2h rounding - Unable to visualize pt or respirations. Bedside nurse, juan carlos, notified and vn requested light be left on t/o night so that pt can be visualized on rounds. Pt denies he needs anything at this time. Instructed to call for any needs/assist.

## 2020-04-03 NOTE — ANESTHESIA POSTPROCEDURE EVALUATION
Anesthesia Post Evaluation    Patient: Houston Jc    Procedure(s) Performed: Procedure(s) (LRB):  INSERTION, CATHETER, DIALYSIS, PERITONEAL, LAPAROSCOPIC (N/A)    Final Anesthesia Type: general    Patient location during evaluation: PACU  Patient participation: Yes- Able to Participate  Level of consciousness: awake and alert, oriented and awake  Post-procedure vital signs: reviewed and stable  Pain management: adequate  Airway patency: patent  MACRINA mitigation strategies: Multimodal analgesia and Extubation while patient is awake  PONV status at discharge: No PONV  Anesthetic complications: no      Cardiovascular status: blood pressure returned to baseline  Respiratory status: unassisted and room air  Hydration status: euvolemic  Follow-up not needed.          Vitals Value Taken Time   /62 4/3/2020  3:50 PM   Temp 36.4 °C (97.6 °F) 4/3/2020  3:50 PM   Pulse 60 4/3/2020  3:50 PM   Resp 18 4/3/2020 11:49 AM   SpO2 98 % 4/3/2020  3:50 PM         Event Time     Out of Recovery 04/03/2020 15:55:00          Pain/Nelda Score: Nelda Score: 9 (4/3/2020  3:50 PM)

## 2020-04-03 NOTE — PHYSICIAN QUERY
"PT Name: Houston Jc  MR #: [id number]    Physician Query Form - Heart  Condition Clarification     CDS: Zeeshan Hill RN CCDS               Contact information: [email or web address]   This form is a permanent document in the medical record.     Query Date: April 3, 2020    By submitting this query, we are merely seeking further clarification of documentation. Please utilize your independent clinical judgment when addressing the question(s) below.    The medical record contains the following   Indicators     Supporting Clinical Findings Location in Medical Record     x BNP  3/28/2020 14:15    (H)    Lab value     x EF EF 30% 3/28/2020 H&P Shruthi Wooten MD/Lonnie Mcdaniels MD     x Radiology findings Heart size is mildly enlarged.  Calcification present along the wall of the aorta.  There is no pleural effusion.  There is some minor atelectasis versus developing infiltrate in the right lung. 3/30/2020 Chest x-ray      x Echo Results 7/2019 echo with EF 30%, global hypokinesis, and grade 2 diastolic dysfunction 3/28/2020 H&P Shruthi Wooten MD/Lonnie Mcdaniels MD    "Ascites" documented       x "SOB" or "GALLARDO" documented SOB 3/28/2020 H&P Shruthi Wooten MD/Lonnie Mcdaniels MD     x "Hypoxia" documented Hypoxic respiratory failure  3/28/2020 H&P Shruthi Wooten MD/Lonnie Mcdaniels MD     x Heart Failure documented HFrEF 3/28/2020 H&P Shruthi Wooten MD/Lonnie Mcdaniels MD     x "Edema" documented  1+ pitting edema b/l LE to mid shin 3/28/2020 H&P Shruthi Wooten MD/Lonnie Mcdaniels MD     x Diuretics/Meds carvediloL tablet 12.5 mg PO BID  furosemide injection 80 mg IV daily 3/28/2020-Current Medication  3/29-3/30/2020 Medication     x Treatment: -Goal UF 1L with HD  -fluid restrict 1L 3/30/2020 Nephrology progress notes Ximena Mccarthy NP/Lou Fowler MD     x Other:  Hypervolemia 3/30/2020 Nephrology progress notes Ximena Mccarthy NP/Lou Fowler MD   Heart failure (HF) can be " acute, chronic or both. It is generally further specificed as systolic, diastolic, or combined. Lastly, it is important to identify an underlying etiology if known or suspected.     Common clues to acute exacerbation:  Rapidly progressive symptoms (w/in 2 weeks of presentation), using IV diuretics to treat, using supplemental O2, pulmonary edema on Xray, MI w/in 4 weeks, and/or BNP >500    Systolic Heart Failure: is defined as chart documentation of a left ventricular ejection fraction (LVEF) less than 40%     Diastolic Heart Failure: is defined as a left ventricular ejection fraction (LVEF) greater than 40%   +      Evidence of diastolic dysfunction on echocardiography OR    Right heart catheterization wedge pressure above 12 mm Hg OR    Left heart catheterization left ventricular end diastolic pressure 18 mm Hg or above.    References: *American Heart Association    The clinical guidelines noted below are only system guidelines, and do not replace the providers clinical judgment.     Provider, please specify the diagnosis associated with above clinical findings    Provider, please further specify the Type and Acuity of the Heart Failure associated with above clinical findings  [   ] Acute on Chronic Systolic Heart Failure- Pre-existing systolic HF diagnosis.  EF < 40%  and acute HF symptoms documented   [ x ] Chronic Systolic Heart Failure - Pre-existing systolic HF diagnosis.  EF < 40%  without  acute HF symptoms documented    [   ] Acute on Chronic Combined Systolic and Diastolic Heart Failure      [   ] Chronic Combined Systolic and Diastolic Heart Failure   [   ] Other (please specify):   [   ] Clinically Undetermined                           Please document in your progress notes daily for the duration of treatment until resolved and include in your discharge summary.

## 2020-04-03 NOTE — PROGRESS NOTES
[facility] Medicine Progress Note    Primary Team: [facility] Hospitalist Team B  Attending Physician: Dr. Jones  Resident: [place]  Intern: Gaby    Subjective:      Patient was seen this morning via tele medicine. He was feeling good this morning and had no problems overnight.  He feels that his breathing continues to improve. His scrotal pain is better that is was yesterday. He does admit to having some dizziness when he sits up.  He denies having any other symptoms this morning.       Objective:     Last 24 Hour Vital Signs:  BP  Min: 118/57  Max: 127/63  Temp  Av.6 °F (36.4 °C)  Min: 96.1 °F (35.6 °C)  Max: 98.7 °F (37.1 °C)  Pulse  Av.4  Min: 59  Max: 123  Resp  Av.3  Min: 16  Max: 19  SpO2  Av %  Min: 96 %  Max: 98 %  I/O last 3 completed shifts:  In: 560 [P.O.:60; Other:500]  Out: 1650 [Urine:150; Other:1500]    Physical Examination:  Deferred  covid+  Laboratory:  Laboratory Data Reviewed: yes  Pertinent Findings:  Cr 4.7>>5.3>>5.4>>5.1>>5.3    Microbiology Data Reviewed: yes  Pertinent Findings:  Flu negative  COVID POSITIVE  3/28 blood cx, NGTD    Other Results:  EKG (my interpretation): NSR     Radiology Data Reviewed: yes  Pertinent Findings:  No new    Current Medications:     Infusions:       Scheduled:   sodium chloride 0.9%   Intravenous Once    amiodarone  200 mg Oral BID    aspirin  81 mg Oral Daily    atorvastatin  80 mg Oral Daily    carvediloL  12.5 mg Oral BID    doxycycline  100 mg Oral Q12H    finasteride  5 mg Oral Daily    heparin (porcine)  5,000 Units Subcutaneous Q8H    insulin aspart U-100  12 Units Subcutaneous TIDWM    insulin detemir U-100  30 Units Subcutaneous BID    mupirocin   Nasal BID    tamsulosin  0.4 mg Oral Daily        PRN:  sodium chloride 0.9%, acetaminophen, dextrose 50 % in water (D50W), dextrose 50 % in water (D50W), glucagon (human recombinant), glucose, glucose, heparin (porcine), insulin aspart U-100, insulin aspart U-100,  oxyCODONE-acetaminophen, sodium chloride 0.9%    Antibiotics and Day Number of Therapy:  Rocephin, 3/28 - 3/30  Azithro, 3/28 - 3/30   (no plaquenil 2/2 long qTc)  Doxy 4/2 - present    Lines and Day Number of Therapy:  PIV    Assessment:     Houston Jc is a 74 y.o.male with  Patient Active Problem List    Diagnosis Date Noted    CLEMENCIA (acute kidney injury)     COVID-19 virus detected     Fever     Suspected 2019-Ncov Infection     SOB (shortness of breath) 03/28/2020    NICM (nonischemic cardiomyopathy) 11/06/2019    Abnormal transaminases 07/20/2019    Cardiac resynchronization therapy defibrillator (CRT-D) in place 07/19/2019    Third degree heart block 07/19/2019    Gout 07/18/2019    Abdominal distension 07/17/2019    Debility 07/17/2019    VT (ventricular tachycardia) 07/11/2019    Cardiorenal syndrome 07/11/2019    Acute systolic heart failure 07/11/2019    Complete heart block 07/09/2019    Morbid obesity 10/08/2015    Hypertension 09/21/2015    Diastolic dysfunction 09/21/2015    Sleep apnea 09/21/2015    Swelling of lower extremity 09/18/2015    Hypervolemia 09/18/2015     Mr. Benjamin presents to [place] ED for 3 days of fever, SOB, diarrhea. Covid positive. Admitted for CLEMENCIA, troponemia, and hypoxia w/ exertion.   Plan:     Hypoxic respiratory failure 2/2 Covid-19  - pt with fever, SOB, diarrhea and labs indicative of  Covid-19 illness script  - flu negative, Covid positive  - pt w/ SOB, currently satting high 80s/low 90s on 2L NC, tachypnea resolved  - CXR w/ L sided opacity, continuing CAP abx (azithro, rocephin)  - (3/31) Patient afebrile and comfortable on room air.  - Has completed 4 days of azithro and rocephin. Per ID, can discontinue as bacterial coinfection highly unlikely.   - holding plaquenil 2/2 prolonged qTc on EKG. Will check rhythm strip and start Kaletra if QTc remains >550   - Completed course of solumedrol.  - Now on room air.     CLEMENCIA on CKD 4  - pt w/ baseline Cr 3,  4.6 on admit.  - s/p 500mL NS bolus in ED with symptomatic improvement, pt makes urine  - daily BMP  - follows w/ Dr Fowler  - consulted Nephrology, appreciate recs  - discontinued allopurinol, avoid nephrotoxic meds  - Patient received HD on 3/31 with 1L removed, tolerated well  - OR today for peritoneal dialysis catheter placement.     Troponemia, stable  - initial trop 2.9>>2.59 (baseline 0.08)  - EKG w/ NSR and 1st degree block, pt denies CP  - pt w/ hx of non-ischemic cardiomyopathy and complete heart block s/p CRT-D 2019  - likely 2/2 to Covid, now downtrending. No longer following.      HFrEF  - 7/2019 echo with EF 30%, global hypokinesis, and grade 2 diastolic dysfunction  - Had pacemaker placed in 2019.  - judiciously hydrate in setting of CLEMENCIA  - repeat echo pending Covid as pt with hx of cardio-renal syndrome  - follows w/ Dr Mancia     Scrotal swelling and pain 2/2 varicocele  - as per wife, pt with >1m worsening scrotal pain and swelling  - has outpt Urology appt scheduled  - scrotal swelling with R varicocele, f/u scrotal US  - low concern for obstructive component of CLEMENCIA  - Consulted Urology and appreciate their recs  - Started on doxy for epididymoorchitis.   - His scrotal pain is improving. If it worsens will re-consult urology and obtain a US at that time.    T2DM  - Hyperglycemia likely 2/2 current steroid use  - Increase long-acting 30 U bid, aspart 12 U with meals  - Added SSI for better control of his hyperglycemia.      Diet: NPO  PPX: heparin  Dispo: Pending peritoneal catheter placement.      Fransisco Griffin MD  [facility] Internal Medicine HO-1    [facility] Medicine Hospitalist Pager numbers:   [facility] Hospitalist Medicine Team A (Abhinav/Jovani): [phone]  [facility] Hospitalist Medicine Team B (Robert/Enedelia):  [phone]

## 2020-04-03 NOTE — OP NOTE
DATE OF PROCEDURE: 04/03/2020    PREOPERATIVE DIAGNOSIS:  COVID-19 infection, acute on chronic kidney disease    POSTOPERATIVE DIAGNOSIS:  Same    PROCEDURE:    1.  Laparoscopic omentopexy  2.  Laparoscopic assisted peritoneal tunneled dialysis catheter placement    SURGEON: Edis Snell M.D    ASSISTANT: Casper Nolan MD    ANESTHESIA:  General    ESTIMATED BLOOD LOSS:  Minimal    SPECIMEN:  None    CONDITION:  Stable    COMPLICATIONS: None    FINDINGS:   1.  Umbilical hernia containing omentum present  2.  Omentum present in the pelvis, omental pexy performed  3.  Coiled catheter positioned in the pelvis and tunneled obliquely through the rectus muscle    INDICATIONS: The patient is a 72-year-old male admitted for COVID-19 infection.  Developed acute on chronic kidney injury requiring hemodialysis through a right internal jugular Trialysis catheter.  Unable to be placed in hemodialysis chair as an outpatient due to COVID-19 infection therefore home peritoneal dialysis was requested.    PROCEDURE IN DETAIL:  Informed consent was obtained.  The patient taken operating room where an arterial line was placed by Anesthesia.  General endotracheal intubation was then achieved.  Patient received 3 g of Ancef.  His abdomen was prepped and draped typical sterile fashion.  Time-out performed by members of the operative team.  Of note, the patient had a reducible umbilical hernia approximately 3 x 3 cm in size.  We began by injecting local anesthetic at bartlett's point and making a stab incision.  A Veress needle was inserted and appropriate initial pressures were present.  We achieved pneumoperitoneum.  A 5 mm Optiview trocar was inserted under direct visualization into the peritoneal cavity.  Our Veress insertion site was inspected and no injury was identified.  It was noted that the umbilical hernia contained incarcerated omentum but no bowel.  Patient was placed in site Trendelenburg positioning.  Was noted that he  also had omentum present within the pelvis.  We inserted a 5 mm AirSeal trocar in the left mid lateral abdomen.  Were then able to grab the omentum in the pelvis and elevated towards the anterior abdominal wall.  We then used a Con Suarez device to perform an omentopexy, tacking the omentum near the incarcerated omentum at the umbilicus using a 0 Vicryl suture.  We then measured our catheter and chose an insertion site through the left rectus muscle a few cm inferior to the umbilicus.  Local anesthetic was injected then a 12 mm trocar was inserted at an oblique angle directed towards the pelvis.  We then inserted our coiled catheter position the coil in the pelvis.  The silicone hub was positioned at the level of the peritoneum with the cuff within the rectus muscles.  The abdomen was then desufflated using the air seal.  We then tunneled a swan-neck catheter extension from in the catheter insertion site up to the trocar site at bartlett's point after cutting the catheter to length.  We then used the sharp tunneler to tunnel from the bartlett's point site to a permanent catheter exit site in the left upper lateral abdomen.  We reinsufflated the abdomen using our 5 mm AirSeal trocar and examined the catheter which still remained in appropriate position with a silicone cuff at the level of peritoneum.  The abdomen was then again desufflated.  The catheter was attached to 500 cc of normal saline which easily infused and then was drained.  All the skin incision sites were closed with 4 Monocryl subcuticular stitches and glue as well as the small stab incision used to perform the omental pexy.  A Biopatch and sterile dressing were placed around the catheter exit site.  Patient was aroused from sedation extubated.  He was recovered in the operating room given is COVID positive status.  He will return to the floor.  All counts correct x2 the case.  I was present scrubbed throughout the case.    Dr. Nolan assisted  throughout the case providing assistance with camera driving, catheter manipulation and tunneling. No residents were available to assist.    Throughout the laparoscopic portion of the case, the air seal device was used for pneumoperitoneum filtration.    DISPO:  Recover in OR then to floor

## 2020-04-03 NOTE — PHYSICIAN QUERY
PT Name: Houston Jc  MR #: [id number]    Physician Query Form - Cause and Effect Relationship Clarification      CDS: Zeeshan Hill RN CCDS               Contact information: [email or web address]    This form is a permanent document in the medical record.     Query Date: April 3, 2020    By submitting this query, we are merely seeking further clarification of documentation. Please utilize your independent clinical judgment when addressing the question(s) below.    The Medical record contains the following:  Supporting Clinical Findings   Location in record   started rocephin and azithro for CAP coverage                                                                                                                                                                                        3/28/2020 H&P Shruthi Wooten MD/Lonnie Mcdaniels MD   Mr. Jc's presentation and CXR compatible with COVID-19 pneumonia.   Pneumonia Secondary Suspected COVID 19 Rule Out 3/29/2020 ID consult Sujata Crook MD/Elaina Key MD   Ok to stop all antibiotics as bacterial coinfection is unlikely at this point. If QTc <550 can start hydroxychloroquine, otherwise [facility] Kaletra [facility] (confirmed that med is available in our inpatient pharmacy). 3/30/2020 ID Progress notes Carroll Person MD/Elaina Key MD    3/28/2020 13:04   SARS-CoV2 (COVID-19) Qualitative PCR Detected (A)    Lab value   azithromycin 500 mg IVPB  cefTRIAXone (ROCEPHIN) 2 g IVPB  azithromycin tablet 500 mg PO daily  cefTRIAXone (ROCEPHIN) 1 g IVPB daily                                                                                                                                            3/28/2020 Medications    3/28-3/30/2020 Medications         Provider, please clarify if there is any correlation between Pneumonia and COVID-19.           Are the conditions:      [ x ] Due to or associated with each other    [   ] Unrelated to each other   [   ] Other (Please Specify): _________________________   [   ] Clinically Undetermined

## 2020-04-03 NOTE — PLAN OF CARE
Per Dr. Fowler- patient start dialysis at [facility] location on Tuesday 4/7/20- 7:00am.     placed call to  Didi [phone] and confirmed this. Didi stated the patient was all set for Tuesday start date should he discharge over the weekend.    Call placed to patient's wife and provided her with appt info. Wife verbalized understanding and requested to speak with the attending team for an update on her .    CARLEE paged the team and requested someone call patient's wife.

## 2020-04-03 NOTE — PROGRESS NOTES
Progress Note  Nephrology      Consult Requested By: Cullen Jones MD  Reason for Consult: CLEMENCIA    SUBJECTIVE:     Review of Systems   Constitutional: Negative for chills and fever.   Respiratory: Negative for shortness of breath.    Cardiovascular: Negative for chest pain.   Gastrointestinal: Negative for nausea and vomiting.     Patient Active Problem List   Diagnosis    Swelling of lower extremity    Hypervolemia    Hypertension    Diastolic dysfunction    Sleep apnea    Morbid obesity    Complete heart block    VT (ventricular tachycardia)    Cardiorenal syndrome    Acute systolic heart failure    Abdominal distension    Debility    Gout    Cardiac resynchronization therapy defibrillator (CRT-D) in place    Third degree heart block    Abnormal transaminases    NICM (nonischemic cardiomyopathy)    SOB (shortness of breath)    COVID-19 virus detected    Fever    Suspected 2019-Ncov Infection    CLEMENCIA (acute kidney injury)       OBJECTIVE:     Medications:   sodium chloride 0.9%   Intravenous Once    amiodarone  200 mg Oral BID    aspirin  81 mg Oral Daily    atorvastatin  80 mg Oral Daily    carvediloL  12.5 mg Oral BID    doxycycline  100 mg Oral Q12H    finasteride  5 mg Oral Daily    heparin (porcine)  5,000 Units Subcutaneous Q8H    insulin aspart U-100  12 Units Subcutaneous TIDWM    insulin detemir U-100  30 Units Subcutaneous BID    mupirocin   Nasal BID    tamsulosin  0.4 mg Oral Daily       Vitals:    04/03/20 1550   BP: (!) 142/62   Pulse: 60   Resp:    Temp: 97.6 °F (36.4 °C)     I/O last 3 completed shifts:  In: 560 [P.O.:60; Other:500]  Out: 1650 [Urine:150; Other:1500]     Physical exam is limited 2/2 covid pandemic and need to preserve PPE    Laboratory:  Recent Labs   Lab 03/28/20  1304   WBC 9.68   HGB 10.1*   HCT 29.9*      MONO 5.2  0.5     Recent Labs   Lab 04/01/20  0708 04/02/20  0531 04/03/20  1021   * 134* 134*   K 4.5 4.7 4.5    96 98    CO2 20* 19* 22*   BUN 92* 73* 108*   CREATININE 5.4* 5.3* 6.6*   CALCIUM 8.8 8.6* 8.7   PHOS 4.1 5.0* 4.4     Labs reviewed  Diagnostic Results:  X-Ray: Reviewed  US: Reviewed  Echo: Reviewed      ASSESSMENT/PLAN:     ESRD  -plan for PD cath placement today  -PD tonight  -will need PD education prior to discharge    Hypervolemia+ hyponatremia  -stable, 134 today  -fluid restrict 1L    CHF  -EF 30%  -bnp 525    Hypoxic respiratory failure 2/2 Covid-19  -ID following, holding plaquenil 2/2 prolonged QTc  -room air    Thank you for allowing me to participate in the care of your patients  With any question please call [phone]  Ximena Mccarthy NP    [facility]  ANA MARIA Burns MD, FACKEREN BUCKLEY MD,   MD JOSH Gill NP H. Mitchell, NP  [address]  [place], [address]  [phone]

## 2020-04-04 PROBLEM — R00.1 BRADYCARDIA: Status: ACTIVE | Noted: 2020-04-04

## 2020-04-04 LAB
ALBUMIN SERPL BCP-MCNC: 2.6 G/DL (ref 3.5–5.2)
ALP SERPL-CCNC: 97 U/L (ref 55–135)
ALT SERPL W/O P-5'-P-CCNC: 42 U/L (ref 10–44)
ANION GAP SERPL CALC-SCNC: 14 MMOL/L (ref 8–16)
AST SERPL-CCNC: 42 U/L (ref 10–40)
BILIRUB SERPL-MCNC: 0.3 MG/DL (ref 0.1–1)
BUN SERPL-MCNC: 113 MG/DL (ref 8–23)
CALCIUM SERPL-MCNC: 8.6 MG/DL (ref 8.7–10.5)
CHLORIDE SERPL-SCNC: 102 MMOL/L (ref 95–110)
CO2 SERPL-SCNC: 24 MMOL/L (ref 23–29)
CREAT SERPL-MCNC: 6.9 MG/DL (ref 0.5–1.4)
EST. GFR  (AFRICAN AMERICAN): 8 ML/MIN/1.73 M^2
EST. GFR  (NON AFRICAN AMERICAN): 7 ML/MIN/1.73 M^2
GLUCOSE SERPL-MCNC: 112 MG/DL (ref 70–110)
MAGNESIUM SERPL-MCNC: 2.3 MG/DL (ref 1.6–2.6)
PHOSPHATE SERPL-MCNC: 4.8 MG/DL (ref 2.7–4.5)
POCT GLUCOSE: 116 MG/DL (ref 70–110)
POCT GLUCOSE: 75 MG/DL (ref 70–110)
POCT GLUCOSE: 77 MG/DL (ref 70–110)
POCT GLUCOSE: 81 MG/DL (ref 70–110)
POTASSIUM SERPL-SCNC: 4.2 MMOL/L (ref 3.5–5.1)
PROT SERPL-MCNC: 6.2 G/DL (ref 6–8.4)
SODIUM SERPL-SCNC: 140 MMOL/L (ref 136–145)

## 2020-04-04 PROCEDURE — 99233 PR SUBSEQUENT HOSPITAL CARE,LEVL III: ICD-10-PCS | Mod: ,,, | Performed by: INTERNAL MEDICINE

## 2020-04-04 PROCEDURE — 84100 ASSAY OF PHOSPHORUS: CPT

## 2020-04-04 PROCEDURE — 25000003 PHARM REV CODE 250: Performed by: SURGERY

## 2020-04-04 PROCEDURE — 83735 ASSAY OF MAGNESIUM: CPT

## 2020-04-04 PROCEDURE — 80053 COMPREHEN METABOLIC PANEL: CPT

## 2020-04-04 PROCEDURE — 63600175 PHARM REV CODE 636 W HCPCS: Mod: JG | Performed by: INTERNAL MEDICINE

## 2020-04-04 PROCEDURE — 90945 DIALYSIS ONE EVALUATION: CPT

## 2020-04-04 PROCEDURE — 63600175 PHARM REV CODE 636 W HCPCS: Performed by: SURGERY

## 2020-04-04 PROCEDURE — 99233 SBSQ HOSP IP/OBS HIGH 50: CPT | Mod: ,,, | Performed by: INTERNAL MEDICINE

## 2020-04-04 PROCEDURE — 11000001 HC ACUTE MED/SURG PRIVATE ROOM

## 2020-04-04 RX ADMIN — HEPARIN SODIUM 5000 UNITS: 5000 INJECTION, SOLUTION INTRAVENOUS; SUBCUTANEOUS at 09:04

## 2020-04-04 RX ADMIN — CARVEDILOL 12.5 MG: 12.5 TABLET, FILM COATED ORAL at 09:04

## 2020-04-04 RX ADMIN — DOXYCYCLINE HYCLATE 100 MG: 100 TABLET, COATED ORAL at 09:04

## 2020-04-04 RX ADMIN — AMIODARONE HYDROCHLORIDE 200 MG: 200 TABLET ORAL at 09:04

## 2020-04-04 RX ADMIN — FINASTERIDE 5 MG: 5 TABLET, FILM COATED ORAL at 09:04

## 2020-04-04 RX ADMIN — ALTEPLASE 2 MG: 2.2 INJECTION, POWDER, LYOPHILIZED, FOR SOLUTION INTRAVENOUS at 11:04

## 2020-04-04 RX ADMIN — ATORVASTATIN CALCIUM 80 MG: 40 TABLET, FILM COATED ORAL at 09:04

## 2020-04-04 RX ADMIN — HEPARIN SODIUM 5000 UNITS: 5000 INJECTION, SOLUTION INTRAVENOUS; SUBCUTANEOUS at 06:04

## 2020-04-04 RX ADMIN — ASPIRIN 81 MG: 81 TABLET, COATED ORAL at 09:04

## 2020-04-04 RX ADMIN — MUPIROCIN: 20 OINTMENT TOPICAL at 09:04

## 2020-04-04 RX ADMIN — HEPARIN SODIUM 5000 UNITS: 5000 INJECTION, SOLUTION INTRAVENOUS; SUBCUTANEOUS at 01:04

## 2020-04-04 RX ADMIN — TAMSULOSIN HYDROCHLORIDE 0.4 MG: 0.4 CAPSULE ORAL at 09:04

## 2020-04-04 NOTE — NURSING
MD notified about glucose readings (116,77,75) and the holding of 12 units with meals. Apple juice at bedside. Patient stated he has no appetite.

## 2020-04-04 NOTE — SUBJECTIVE & OBJECTIVE
Past Medical History:   Diagnosis Date    Anemia     CHF (congestive heart failure)     CKD (chronic kidney disease) stage 4, GFR 15-29 ml/min     Coronary artery disease     Diabetes mellitus     Hematuria     Hypertension     Renal cyst, right        Past Surgical History:   Procedure Laterality Date    INSERTION OF BIVENTRICULAR IMPLANTABLE CARDIOVERTER-DEFIBRILLATOR (ICD) Left 7/18/2019    Procedure: INSERTION, ICD, BIVENTRICULAR;  Surgeon: Arturo Bay MD;  Location: [facility] EP LAB;  Service: Cardiology;  Laterality: Left;  CHB, CRTD, SJM, anes, GP, [id number]    LEFT HEART CATHETERIZATION N/A 7/16/2019    Procedure: Left heart cath;  Surgeon: Dejuan Cody MD;  Location: [facility] CATH LAB/EP;  Service: Cardiology;  Laterality: N/A;       Review of patient's allergies indicates:  No Known Allergies    No current facility-administered medications on file prior to encounter.      Current Outpatient Medications on File Prior to Encounter   Medication Sig    allopurinol (ZYLOPRIM) 300 MG tablet Take 300 mg by mouth once daily.    alogliptin benzoate (ALOGLIPTIN ORAL) Take 25 mg by mouth once daily.    amiodarone (PACERONE) 200 MG Tab Take 1 tablet (200 mg total) by mouth 2 (two) times daily.    aspirin (ECOTRIN) 81 MG EC tablet Take 81 mg by mouth once daily.    atorvastatin (LIPITOR) 80 MG tablet Take 80 mg by mouth once daily.    carvedilol (COREG) 12.5 MG tablet Take 12.5 mg by mouth 2 (two) times daily.    cholecalciferol, vitamin D3, (VITAMIN D3) 2,000 unit Cap Take 1 capsule by mouth once daily.    finasteride (PROSCAR) 5 mg tablet Take 1 tablet (5 mg total) by mouth once daily.    insulin aspart U-100 (NOVOLOG) 100 unit/mL injection Inject into the skin 3 (three) times daily before meals. PUMP    losartan (COZAAR) 25 MG tablet Take 1 tablet (25 mg total) by mouth once daily.    magnesium oxide (MAG-OXIDE ORAL) Take 420 mg by mouth 2 (two) times daily.     potassium chloride (K-TAB) 20 mEq Take  "by mouth once daily.    spironolactone (ALDACTONE) 12.5 MG tablet Take 12.5 mg by mouth once daily.    tamsulosin (FLOMAX) 0.4 mg Cap Take 1 capsule (0.4 mg total) by mouth once daily.    torsemide (DEMADEX) 20 MG Tab Take 2 tablets (40 mg total) by mouth once daily. Take additional 2 tablets if weight gain of more than 3 pounds in 1 day (Patient taking differently: Take 20 mg by mouth 2 (two) times daily. Take additional 2 tablets if weight gain of more than 3 pounds in 1 day)    VENTOLIN HFA 90 mcg/actuation inhaler Inhale 1 puff into the lungs every 4 (four) hours as needed.     sildenafil (VIAGRA) 100 MG tablet Take 100 mg by mouth once a week.    walker Misc 5" wheel rolling walker     Family History     Problem Relation (Age of Onset)    Heart attack Father        Tobacco Use    Smoking status: Former Smoker    Smokeless tobacco: Never Used   Substance and Sexual Activity    Alcohol use: Yes     Comment: social    Drug use: No    Sexual activity: Yes     Review of Systems   Constitution: Positive for malaise/fatigue.   HENT: Negative for hearing loss and nosebleeds.    Eyes: Negative for blurred vision.   Cardiovascular: Negative for chest pain and palpitations.   Respiratory: Positive for shortness of breath. Negative for hemoptysis.    Hematologic/Lymphatic: Negative for bleeding problem.   Skin: Negative for itching.   Musculoskeletal: Negative for falls.   Gastrointestinal: Negative for abdominal pain and hematochezia.   Genitourinary: Negative for hematuria.   Neurological: Negative for dizziness and loss of balance.   Psychiatric/Behavioral: Negative for altered mental status and depression.     Objective:     Vital Signs (Most Recent):  Temp: 97.6 °F (36.4 °C) (04/04/20 1235)  Pulse: 60 (04/04/20 1235)  Resp: 20 (04/04/20 1235)  BP: (!) 110/55 (04/04/20 1235)  SpO2: (!) 93 % (04/04/20 1235) Vital Signs (24h Range):  Temp:  [97.1 °F (36.2 °C)-98.3 °F (36.8 °C)] 97.6 °F (36.4 °C)  Pulse:  " [60-81] 60  Resp:  [17-20] 20  SpO2:  [88 %-99 %] 93 %  BP: (110-222)/() 110/55     Weight: 131.1 kg (289 lb 0.4 oz)  Body mass index is 37.11 kg/m².    SpO2: (!) 93 %  O2 Device (Oxygen Therapy): room air      Intake/Output Summary (Last 24 hours) at 4/4/2020 1320  Last data filed at 4/4/2020 0956  Gross per 24 hour   Intake 520 ml   Output 100 ml   Net 420 ml       Lines/Drains/Airways     Central Venous Catheter Line                 Hemodialysis Catheter 03/30/20 right internal jugular 5 days          Peripheral Intravenous Line                 Peripheral IV - Single Lumen 03/29/20 2225 22 G Anterior;Proximal;Right Forearm 5 days                Physical Exam   Nursing note and vitals reviewed.  Physical exam was deferred due to COVID pandemic and to minimize the use of PPE. Notes from primary team and nursing staff reviewed.    Significant Labs:   BMP:   Recent Labs   Lab 04/03/20  1021 04/04/20  0557 04/04/20  0558   *  --  112*   *  --  140   K 4.5  --  4.2   CL 98  --  102   CO2 22*  --  24   *  --  113*   CREATININE 6.6*  --  6.9*   CALCIUM 8.7  --  8.6*   MG 2.3 2.3  --    , CMP   Recent Labs   Lab 04/03/20  1021 04/04/20  0558   * 140   K 4.5 4.2   CL 98 102   CO2 22* 24   * 112*   * 113*   CREATININE 6.6* 6.9*   CALCIUM 8.7 8.6*   PROT 6.2 6.2   ALBUMIN 2.6* 2.6*   BILITOT 0.4 0.3   ALKPHOS 93 97   AST 28 42*   ALT 37 42   ANIONGAP 14 14   ESTGFRAFRICA 9* 8*   EGFRNONAA 8* 7*    and CBC No results for input(s): WBC, HGB, HCT, PLT in the last 48 hours.    Significant Imaging: Echocardiogram:   2D echo with color flow doppler:   Results for orders placed or performed during the hospital encounter of 09/18/15   2D echo with color flow doppler   Result Value Ref Range    QEF 50 55 - 65    Diastolic Dysfunction Yes (A)     Est. PA Systolic Pressure 8.57     Mitral Valve Mobility NORMAL     Tricuspid Valve Regurgitation TRIVIAL     Narrative    TEST DESCRIPTION    Technical Quality: This is a technically adequate study.     Aorta: The aortic root is normal in size, measuring 3.9 cm at sinotubular junction.     Left Atrium: The left atrial volume index is severely enlarged, measuring 49.98 cc/m2.     Left Ventricle: The left ventricle is normal in size, with an end-diastolic diameter of 6.2 cm, and an end-systolic diameter of 4.5 cm. LV wall thickness is normal, with the septum measuring 1.5 cm and the posterior wall measuring 1.7 cm across. Relative   wall thickness was increased at 0.55, and the LV mass index was increased at 229.0 g/m2 consistent with concentric left ventricular hypertrophy. Global left ventricular systolic function appears low normal to mildly depressed. Visually estimated   ejection fraction is 50-55%. The LV Doppler derived stroke volume equals 52.0 ccs.   The E/e'(lat) is 9.  This along with the following abnormalities (ERIC = 49.98 and LVMI = 229.00) suggests significant diastolic dysfunction.     Right Atrium: The right atrium is normal in size, measuring 5.3 cm in length in the apical view.     Right Ventricle: The right ventricle is normal in size measuring 3.0 cm at the base in the apical right ventricle-focused view. Global right ventricular systolic function appears normal. The estimated PA systolic pressure is 9 mmHg.     Aortic Valve:  The aortic valve is normal in structure with normal leaflet mobility. The aortic valve is tri-leaflet in structure.     Mitral Valve:  The mitral valve is normal in structure with normal leaflet mobility.     Tricuspid Valve:  The tricuspid valve is normal in structure with normal leaflet mobility. There is trivial tricuspid regurgitation.     Pulmonary Valve:  The pulmonic valve is not well seen.     IVC: IVC is normal in size and collapses > 50% with a sniff, suggesting normal right atrial pressure of 3 mmHg.     Atrial Septum: The atrial septum is intact.     Intracavitary: There is no evidence of  pericardial effusion, intracavity mass, thrombi, or vegetation.         CONCLUSIONS     1 - Low normal to mildly depressed left ventricular systolic function (EF 50-55%).     2 - Concentric hypertrophy.     3 - Severe left atrial enlargement.     4 - Left ventricular diastolic dysfunction.     5 - Normal right ventricular systolic function .     6 - The estimated PA systolic pressure is 9 mmHg.         This document has been electronically    SIGNED BY: Milagros Gross MD On: 09/18/2015 16:15    and Transthoracic echo (TTE) complete (Cupid Only):   Results for orders placed or performed during the hospital encounter of 07/17/19   Transthoracic echo (TTE) 2D with Color Flow   Result Value Ref Range    Ascending aorta 3.65 cm    STJ 3.22 cm    AV mean gradient 4 mmHg    Ao peak son 1.18 m/s    Ao VTI 25.46 cm    IVRT 0.11 msec    IVS 1.27 (A) 0.6 - 1.1 cm    LA size 5.09 cm    Left Atrium Major Axis 7.33 cm    Left Atrium Minor Axis 7.36 cm    LVIDD 7.00 (A) 3.5 - 6.0 cm    LVIDS 6.00 (A) 2.1 - 4.0 cm    LVOT diameter 2.27 cm    LVOT peak VTI 18.25 cm    PW 1.25 (A) 0.6 - 1.1 cm    MV Peak A Son 0.52 m/s    E wave decelartion time 173.33 msec    MV Peak E Son 0.74 m/s    RA Major Axis 6.32 cm    RA Width 4.27 cm    RVDD 4.13 cm    Sinus 3.71 cm    TAPSE 1.75 cm    TR Max Son 3.42 m/s    TDI LATERAL 0.09 m/s    TDI SEPTAL 0.05 m/s    LA WIDTH 5.56 cm    LV Diastolic Volume 190.35 mL    LV Systolic Volume 91.48 mL    RV S' 10.12 cm/s    LVOT peak son 0.84 m/s    LV LATERAL E/E' RATIO 8.22 m/s    LV SEPTAL E/E' RATIO 14.80 m/s    FS 14 %    LA volume 176.69 cm3    LV mass 433.07 g    Left Ventricle Relative Wall Thickness 0.36 cm    AV valve area 2.90 cm2    AV Velocity Ratio 0.71     AV index (prosthetic) 0.72     E/A ratio 1.42     Mean e' 0.07 m/s    LVOT area 4.0 cm2    LVOT stroke volume 73.82 cm3    AV peak gradient 6 mmHg    E/E' ratio 10.57 m/s    LV Systolic Volume Index 34.5 mL/m2    LV Diastolic Volume Index  71.69 mL/m2    LA Volume Index 66.5 mL/m2    LV Mass Index 163 g/m2    Triscuspid Valve Regurgitation Peak Gradient 47 mmHg    BSA 2.79 m2    Right Atrial Pressure (from IVC) 3 mmHg    TV rest pulmonary artery pressure 50 mmHg    Narrative    · Moderately decreased left ventricular systolic function. The estimated   ejection fraction is 30%  · Eccentric left ventricular hypertrophy.  · Global hypokinetic wall motion.  · Grade II (moderate) left ventricular diastolic dysfunction consistent   with pseudonormalization.  · Elevated left atrial pressure.  · Mildly reduced right ventricular systolic function.  · Biatrial enlargement.  · Mild right ventricular enlargement.  · Mild tricuspid regurgitation.  · The estimated PA systolic pressure is 50 mm Hg  · Pulmonary hypertension present.  · Normal central venous pressure (3 mm Hg).

## 2020-04-04 NOTE — PROGRESS NOTES
[facility] Medicine Progress Note    Primary Team: [facility] Hospitalist Team B  Attending Physician: Dr. Jones  Resident: [place]  Intern: Gaby    Subjective:      Patient doing well this morning.  States he feels some generalized weakness.  Breathing doing well.  No abdominal pain and tolerating PD well. States his scrotal pain/swelling continues to improve. He denies having any other symptoms this morning.       Objective:     Last 24 Hour Vital Signs:  BP  Min: 116/62  Max: 222/100  Temp  Av.8 °F (36.6 °C)  Min: 97.1 °F (36.2 °C)  Max: 98.3 °F (36.8 °C)  Pulse  Av.9  Min: 60  Max: 81  Resp  Av.7  Min: 17  Max: 18  SpO2  Av.7 %  Min: 88 %  Max: 99 %  Weight  Av.1 kg (289 lb 0.4 oz)  Min: 131.1 kg (289 lb 0.4 oz)  Max: 131.1 kg (289 lb 0.4 oz)  I/O last 3 completed shifts:  In: 520 [P.O.:520]  Out: 250 [Urine:250]    Physical Examination:  Deferred / covid+  Appears comfortable via telemed    Laboratory:  Laboratory Data Reviewed: yes  Pertinent Findings:  Cr 4.7>>5.3>>5.4>>5.1>>5.3    Microbiology Data Reviewed: yes  Pertinent Findings:  Flu negative  COVID POSITIVE  3/28 blood cx, NGTD    Other Results:  EKG (my interpretation): NSR     Radiology Data Reviewed: yes  Pertinent Findings:  No new    Current Medications:     Infusions:       Scheduled:   sodium chloride 0.9%   Intravenous Once    amiodarone  200 mg Oral BID    aspirin  81 mg Oral Daily    atorvastatin  80 mg Oral Daily    carvediloL  12.5 mg Oral BID    doxycycline  100 mg Oral Q12H    finasteride  5 mg Oral Daily    heparin (porcine)  5,000 Units Subcutaneous Q8H    insulin aspart U-100  12 Units Subcutaneous TIDWM    insulin detemir U-100  30 Units Subcutaneous BID    tamsulosin  0.4 mg Oral Daily        PRN:  sodium chloride 0.9%, acetaminophen, dextrose 50 % in water (D50W), dextrose 50 % in water (D50W), glucagon (human recombinant), glucose, glucose, heparin (porcine), insulin aspart U-100,  oxyCODONE-acetaminophen, sodium chloride 0.9%    Antibiotics and Day Number of Therapy:  Rocephin, 3/28 - 3/30  Azithro, 3/28 - 3/30   (no plaquenil 2/2 long qTc)  Doxy 4/2 - present    Lines and Day Number of Therapy:  PIV    Assessment:     Houston Jc is a 74 y.o.male with  Patient Active Problem List    Diagnosis Date Noted    Bradycardia 04/04/2020    CLEMENCIA (acute kidney injury)     COVID-19 virus detected     Fever     Suspected 2019-Ncov Infection     SOB (shortness of breath) 03/28/2020    NICM (nonischemic cardiomyopathy) 11/06/2019    Abnormal transaminases 07/20/2019    Cardiac resynchronization therapy defibrillator (CRT-D) in place 07/19/2019    Third degree heart block 07/19/2019    Gout 07/18/2019    Abdominal distension 07/17/2019    Debility 07/17/2019    VT (ventricular tachycardia) 07/11/2019    Cardiorenal syndrome 07/11/2019    Acute systolic heart failure 07/11/2019    Complete heart block 07/09/2019    Morbid obesity 10/08/2015    Hypertension 09/21/2015    Diastolic dysfunction 09/21/2015    Sleep apnea 09/21/2015    Swelling of lower extremity 09/18/2015    Hypervolemia 09/18/2015     Mr. Benjamin presents to [place] ED for 3 days of fever, SOB, diarrhea. Covid positive. Admitted for CLEMENCIA, troponemia, and hypoxia w/ exertion.     Plan:     Hypoxic respiratory failure 2/2 Covid-19  - pt with fever, SOB, diarrhea and labs indicative of  Covid-19 illness script  - flu negative, Covid positive  - pt w/ SOB, currently satting high 80s/low 90s on 2L NC, tachypnea resolved  - CXR w/ L sided opacity, continuing CAP abx (azithro, rocephin)  - (3/31) Patient afebrile and comfortable on room air.  - Has completed 4 days of azithro and rocephin. Per ID, can discontinue as bacterial coinfection highly unlikely.   - holding plaquenil 2/2 prolonged qTc on EKG.  - Completed course of solumedrol.  - Now on room air.     CLEMENCIA on CKD 4  - pt w/ baseline Cr 3, 4.6 on admit.  - s/p 500mL NS  bolus in ED with symptomatic improvement, pt makes urine  - daily BMP  - follows w/ Dr Fowler  - consulted Nephrology, appreciate recs  - discontinued allopurinol, avoid nephrotoxic meds  - Patient received HD on 3/31 with 1L removed, tolerated well  - Had PD catheter placed 4/3  Tolerating PD well     Troponemia, stable  - initial trop 2.9>>2.59 (baseline 0.08)  - EKG w/ NSR and 1st degree block, pt denies CP  - pt w/ hx of non-ischemic cardiomyopathy and complete heart block s/p CRT-D 2019  - likely 2/2 to Covid, now downtrending. No longer following.      HFrEF  - 7/2019 echo with EF 30%, global hypokinesis, and grade 2 diastolic dysfunction  - Had pacemaker placed in 2019.  - judiciously hydrate in setting of CLEMENCIA  - repeat echo pending Covid as pt with hx of cardio-renal syndrome  - follows w/ Dr Mancia     Scrotal swelling and pain 2/2 varicocele  - as per wife, pt with >1m worsening scrotal pain and swelling  - has outpt Urology appt scheduled  - scrotal swelling with R varicocele, f/u scrotal US  - low concern for obstructive component of CLEMENCIA  - Consulted Urology and appreciate their recs  - Started on doxy for epididymoorchitis.   - His scrotal pain is improving. If it worsens will re-consult urology and obtain a US at that time.    T2DM  - Hyperglycemia likely 2/2 current steroid use  - Increased long-acting 30 U bid, aspart 12 U with meals  - Added SSI for better control of his hyperglycemia.      Diet: Diabetic  PPX: heparin  Dispo: Pending tolerating PD and nephrology dispo planning      Naldo Navarro MD, PGY-3  [facility] Internal Medicine      [facility] Medicine Hospitalist Pager numbers:   [facility] Hospitalist Medicine Team A (Abhinav/Jovani): [phone]  [facility] Hospitalist Medicine Team B (Robert/Enedelia):  [phone]

## 2020-04-04 NOTE — PLAN OF CARE
Peritoneal dialysis in progress overnight. Blood glucose checks done overnight. Remains free from falls, bed alarm I use.

## 2020-04-04 NOTE — NURSING
Notified Dr. Jones about patient's HR going down to the 30's and 40's. Patient is asymptomatic and states he doesn't feel any different than normal. Patient has a pace maker paced at 60. O2 sats checked: 96% on RA. Dr. Jones stated he wants to consult [facility]. Verbal order taken and entered in.

## 2020-04-04 NOTE — PLAN OF CARE
VN Note: VN cued into patient's room for Q2 hr rounds. Patient asleep at this time, no distress noted. VN will continue to follow.

## 2020-04-04 NOTE — PLAN OF CARE
VN Note: VN cued into patient's room for Q2 hr rounds. Patient denies any needs at this time. Patient reports his dinner was just delivered. VN will continue to follow.

## 2020-04-04 NOTE — PROCEDURES
Pt seen and examined on PD. Low volume drained, only 3/5 cycles. TPA PD cath x 1 hours and repeat 5 cycles, 750cc exchange volume.

## 2020-04-04 NOTE — CONSULTS
[facility]  Cardiology  Consult Note    Patient Name: Houston Jc  MRN: [id number]  Admission Date: 3/28/2020  Hospital Length of Stay: 4 days  Code Status: Full Code   Attending Provider: Cullen Jones MD   Consulting Provider: Pawel Palmer MD  Primary Care Physician: Zak Hamilton MD  Principal Problem:COVID-19 virus detected    Patient information was obtained from patient, past medical records and ER records.     Inpatient consult to [facility]  Consult performed by: Pawel Palmer MD  Consult ordered by: Naldo Navarro MD        Subjective:     Chief Complaint:  Fatigue/shortness of breath       HPI:   Mr. Benjamin is 74 y.o. AA male who  has a past medical history of HFrEF (EF 30%), CKD stage 4, CAD, Diabetes mellitus, Hematuria, HTN, and Renal cyst, right. The patient presented to [facility] on 3/28/2020 with a primary complaint of fever, chills, SOB, decreased PO, and diarrhea    Patient was admitted and COVID test came back +ve. He is currently getting treated for COVID pneumonia.    Cardiology consulted due to concern about bradycardia, patient has CRT-D in place. Concern about function.     During the reported episodes of bradycardia patient was completely asymptomatic. The TELE reviewed. The episodes that was alarming HR in 20-30 clearly artifactual. As the HR was in 60s at that time and was paced. The lowest heart rate was around 60. Currently v.paced appropriately at rate of 60.       Patient  No notes on file    Past Medical History:   Diagnosis Date    Anemia     CHF (congestive heart failure)     CKD (chronic kidney disease) stage 4, GFR 15-29 ml/min     Coronary artery disease     Diabetes mellitus     Hematuria     Hypertension     Renal cyst, right        Past Surgical History:   Procedure Laterality Date    INSERTION OF BIVENTRICULAR IMPLANTABLE CARDIOVERTER-DEFIBRILLATOR (ICD) Left 7/18/2019    Procedure: INSERTION, ICD,  BIVENTRICULAR;  Surgeon: Arturo Bay MD;  Location: [facility] EP LAB;  Service: Cardiology;  Laterality: Left;  CHB, CRTD, SJM, anes, GP, 6078    LEFT HEART CATHETERIZATION N/A 7/16/2019    Procedure: Left heart cath;  Surgeon: Dejuan Cody MD;  Location: [facility] CATH LAB/EP;  Service: Cardiology;  Laterality: N/A;       Review of patient's allergies indicates:  No Known Allergies    No current facility-administered medications on file prior to encounter.      Current Outpatient Medications on File Prior to Encounter   Medication Sig    allopurinol (ZYLOPRIM) 300 MG tablet Take 300 mg by mouth once daily.    alogliptin benzoate (ALOGLIPTIN ORAL) Take 25 mg by mouth once daily.    amiodarone (PACERONE) 200 MG Tab Take 1 tablet (200 mg total) by mouth 2 (two) times daily.    aspirin (ECOTRIN) 81 MG EC tablet Take 81 mg by mouth once daily.    atorvastatin (LIPITOR) 80 MG tablet Take 80 mg by mouth once daily.    carvedilol (COREG) 12.5 MG tablet Take 12.5 mg by mouth 2 (two) times daily.    cholecalciferol, vitamin D3, (VITAMIN D3) 2,000 unit Cap Take 1 capsule by mouth once daily.    finasteride (PROSCAR) 5 mg tablet Take 1 tablet (5 mg total) by mouth once daily.    insulin aspart U-100 (NOVOLOG) 100 unit/mL injection Inject into the skin 3 (three) times daily before meals. PUMP    losartan (COZAAR) 25 MG tablet Take 1 tablet (25 mg total) by mouth once daily.    magnesium oxide (MAG-OXIDE ORAL) Take 420 mg by mouth 2 (two) times daily.     potassium chloride (K-TAB) 20 mEq Take by mouth once daily.    spironolactone (ALDACTONE) 12.5 MG tablet Take 12.5 mg by mouth once daily.    tamsulosin (FLOMAX) 0.4 mg Cap Take 1 capsule (0.4 mg total) by mouth once daily.    torsemide (DEMADEX) 20 MG Tab Take 2 tablets (40 mg total) by mouth once daily. Take additional 2 tablets if weight gain of more than 3 pounds in 1 day (Patient taking differently: Take 20 mg by mouth 2 (two) times daily. Take additional 2  "tablets if weight gain of more than 3 pounds in 1 day)    VENTOLIN HFA 90 mcg/actuation inhaler Inhale 1 puff into the lungs every 4 (four) hours as needed.     sildenafil (VIAGRA) 100 MG tablet Take 100 mg by mouth once a week.    walker Misc 5" wheel rolling walker     Family History     Problem Relation (Age of Onset)    Heart attack Father        Tobacco Use    Smoking status: Former Smoker    Smokeless tobacco: Never Used   Substance and Sexual Activity    Alcohol use: Yes     Comment: social    Drug use: No    Sexual activity: Yes     Review of Systems   Constitution: Positive for malaise/fatigue.   HENT: Negative for hearing loss and nosebleeds.    Eyes: Negative for blurred vision.   Cardiovascular: Negative for chest pain and palpitations.   Respiratory: Positive for shortness of breath. Negative for hemoptysis.    Hematologic/Lymphatic: Negative for bleeding problem.   Skin: Negative for itching.   Musculoskeletal: Negative for falls.   Gastrointestinal: Negative for abdominal pain and hematochezia.   Genitourinary: Negative for hematuria.   Neurological: Negative for dizziness and loss of balance.   Psychiatric/Behavioral: Negative for altered mental status and depression.     Objective:     Vital Signs (Most Recent):  Temp: 97.6 °F (36.4 °C) (04/04/20 1235)  Pulse: 60 (04/04/20 1235)  Resp: 20 (04/04/20 1235)  BP: (!) 110/55 (04/04/20 1235)  SpO2: (!) 93 % (04/04/20 1235) Vital Signs (24h Range):  Temp:  [97.1 °F (36.2 °C)-98.3 °F (36.8 °C)] 97.6 °F (36.4 °C)  Pulse:  [60-81] 60  Resp:  [17-20] 20  SpO2:  [88 %-99 %] 93 %  BP: (110-222)/() 110/55     Weight: 131.1 kg (289 lb 0.4 oz)  Body mass index is 37.11 kg/m².    SpO2: (!) 93 %  O2 Device (Oxygen Therapy): room air      Intake/Output Summary (Last 24 hours) at 4/4/2020 1320  Last data filed at 4/4/2020 0956  Gross per 24 hour   Intake 520 ml   Output 100 ml   Net 420 ml       Lines/Drains/Airways     Central Venous Catheter Line        "          Hemodialysis Catheter 03/30/20 right internal jugular 5 days          Peripheral Intravenous Line                 Peripheral IV - Single Lumen 03/29/20 2225 22 G Anterior;Proximal;Right Forearm 5 days                Physical Exam   Nursing note and vitals reviewed.  Physical exam was deferred due to COVID pandemic and to minimize the use of PPE. Notes from primary team and nursing staff reviewed.    Significant Labs:   BMP:   Recent Labs   Lab 04/03/20  1021 04/04/20  0557 04/04/20  0558   *  --  112*   *  --  140   K 4.5  --  4.2   CL 98  --  102   CO2 22*  --  24   *  --  113*   CREATININE 6.6*  --  6.9*   CALCIUM 8.7  --  8.6*   MG 2.3 2.3  --    , CMP   Recent Labs   Lab 04/03/20  1021 04/04/20  0558   * 140   K 4.5 4.2   CL 98 102   CO2 22* 24   * 112*   * 113*   CREATININE 6.6* 6.9*   CALCIUM 8.7 8.6*   PROT 6.2 6.2   ALBUMIN 2.6* 2.6*   BILITOT 0.4 0.3   ALKPHOS 93 97   AST 28 42*   ALT 37 42   ANIONGAP 14 14   ESTGFRAFRICA 9* 8*   EGFRNONAA 8* 7*    and CBC No results for input(s): WBC, HGB, HCT, PLT in the last 48 hours.    Significant Imaging: Echocardiogram:   2D echo with color flow doppler:   Results for orders placed or performed during the hospital encounter of 09/18/15   2D echo with color flow doppler   Result Value Ref Range    QEF 50 55 - 65    Diastolic Dysfunction Yes (A)     Est. PA Systolic Pressure 8.57     Mitral Valve Mobility NORMAL     Tricuspid Valve Regurgitation TRIVIAL     Narrative    TEST DESCRIPTION   Technical Quality: This is a technically adequate study.     Aorta: The aortic root is normal in size, measuring 3.9 cm at sinotubular junction.     Left Atrium: The left atrial volume index is severely enlarged, measuring 49.98 cc/m2.     Left Ventricle: The left ventricle is normal in size, with an end-diastolic diameter of 6.2 cm, and an end-systolic diameter of 4.5 cm. LV wall thickness is normal, with the septum measuring 1.5 cm  and the posterior wall measuring 1.7 cm across. Relative   wall thickness was increased at 0.55, and the LV mass index was increased at 229.0 g/m2 consistent with concentric left ventricular hypertrophy. Global left ventricular systolic function appears low normal to mildly depressed. Visually estimated   ejection fraction is 50-55%. The LV Doppler derived stroke volume equals 52.0 ccs.   The E/e'(lat) is 9.  This along with the following abnormalities (ERIC = 49.98 and LVMI = 229.00) suggests significant diastolic dysfunction.     Right Atrium: The right atrium is normal in size, measuring 5.3 cm in length in the apical view.     Right Ventricle: The right ventricle is normal in size measuring 3.0 cm at the base in the apical right ventricle-focused view. Global right ventricular systolic function appears normal. The estimated PA systolic pressure is 9 mmHg.     Aortic Valve:  The aortic valve is normal in structure with normal leaflet mobility. The aortic valve is tri-leaflet in structure.     Mitral Valve:  The mitral valve is normal in structure with normal leaflet mobility.     Tricuspid Valve:  The tricuspid valve is normal in structure with normal leaflet mobility. There is trivial tricuspid regurgitation.     Pulmonary Valve:  The pulmonic valve is not well seen.     IVC: IVC is normal in size and collapses > 50% with a sniff, suggesting normal right atrial pressure of 3 mmHg.     Atrial Septum: The atrial septum is intact.     Intracavitary: There is no evidence of pericardial effusion, intracavity mass, thrombi, or vegetation.         CONCLUSIONS     1 - Low normal to mildly depressed left ventricular systolic function (EF 50-55%).     2 - Concentric hypertrophy.     3 - Severe left atrial enlargement.     4 - Left ventricular diastolic dysfunction.     5 - Normal right ventricular systolic function .     6 - The estimated PA systolic pressure is 9 mmHg.         This document has been electronically     SIGNED BY: Milagros Gross MD On: 09/18/2015 16:15    and Transthoracic echo (TTE) complete (Cupid Only):   Results for orders placed or performed during the hospital encounter of 07/17/19   Transthoracic echo (TTE) 2D with Color Flow   Result Value Ref Range    Ascending aorta 3.65 cm    STJ 3.22 cm    AV mean gradient 4 mmHg    Ao peak son 1.18 m/s    Ao VTI 25.46 cm    IVRT 0.11 msec    IVS 1.27 (A) 0.6 - 1.1 cm    LA size 5.09 cm    Left Atrium Major Axis 7.33 cm    Left Atrium Minor Axis 7.36 cm    LVIDD 7.00 (A) 3.5 - 6.0 cm    LVIDS 6.00 (A) 2.1 - 4.0 cm    LVOT diameter 2.27 cm    LVOT peak VTI 18.25 cm    PW 1.25 (A) 0.6 - 1.1 cm    MV Peak A Son 0.52 m/s    E wave decelartion time 173.33 msec    MV Peak E Son 0.74 m/s    RA Major Axis 6.32 cm    RA Width 4.27 cm    RVDD 4.13 cm    Sinus 3.71 cm    TAPSE 1.75 cm    TR Max Son 3.42 m/s    TDI LATERAL 0.09 m/s    TDI SEPTAL 0.05 m/s    LA WIDTH 5.56 cm    LV Diastolic Volume 190.35 mL    LV Systolic Volume 91.48 mL    RV S' 10.12 cm/s    LVOT peak son 0.84 m/s    LV LATERAL E/E' RATIO 8.22 m/s    LV SEPTAL E/E' RATIO 14.80 m/s    FS 14 %    LA volume 176.69 cm3    LV mass 433.07 g    Left Ventricle Relative Wall Thickness 0.36 cm    AV valve area 2.90 cm2    AV Velocity Ratio 0.71     AV index (prosthetic) 0.72     E/A ratio 1.42     Mean e' 0.07 m/s    LVOT area 4.0 cm2    LVOT stroke volume 73.82 cm3    AV peak gradient 6 mmHg    E/E' ratio 10.57 m/s    LV Systolic Volume Index 34.5 mL/m2    LV Diastolic Volume Index 71.69 mL/m2    LA Volume Index 66.5 mL/m2    LV Mass Index 163 g/m2    Triscuspid Valve Regurgitation Peak Gradient 47 mmHg    BSA 2.79 m2    Right Atrial Pressure (from IVC) 3 mmHg    TV rest pulmonary artery pressure 50 mmHg    Narrative    · Moderately decreased left ventricular systolic function. The estimated   ejection fraction is 30%  · Eccentric left ventricular hypertrophy.  · Global hypokinetic wall motion.  · Grade II (moderate) left  ventricular diastolic dysfunction consistent   with pseudonormalization.  · Elevated left atrial pressure.  · Mildly reduced right ventricular systolic function.  · Biatrial enlargement.  · Mild right ventricular enlargement.  · Mild tricuspid regurgitation.  · The estimated PA systolic pressure is 50 mm Hg  · Pulmonary hypertension present.  · Normal central venous pressure (3 mm Hg).        Assessment and Plan:     1. Non ischemic cardiomyopathy  2. S/p CRT-D [facility] 7/2019   3. Non obstructive CAD per [facility] in 7/2019  4. Hx of VT on Amio  5. ESRD on HD    Plan:    - Monitor reviewed. Episodes of the reported bradycardia appears to be artifactual and HR was at 60. Most likely related to positional changes from the leads. The CRT-D appears to be functioning well on the tele and caputrng well.  Will hold on device interrogation especially the patient is COVID-19 +ve to minimize the risk for cross contamination. Unless true device malfunction and sig true bradycardia noted on the monitor.     - Continue GDMT      VTE Risk Mitigation (From admission, onward)         Ordered     heparin (porcine) injection 2,300 Units  As needed (PRN)      03/31/20 1656     heparin (porcine) injection 5,000 Units  Every 8 hours      03/28/20 2023     IP VTE HIGH RISK PATIENT  Once      03/28/20 2023                Thank you for your consult. I will follow-up with patient. Please contact us if you have any additional questions.    Pawel Palmer MD  Cardiology   [facility]

## 2020-04-04 NOTE — PLAN OF CARE
VN Note: VN cued into patient's room for Q2 hr rounds. Patient denies any needs at this time. His dialysis machine is beeping in room. Will notify bedside nurse. VN will continue to follow.

## 2020-04-04 NOTE — PLAN OF CARE
VIRTUAL NURSE:  Cued into patient's room.  Permission received per patient to turn camera to view patient.  Introduced as VN for night shift that will be working with floor nurse and nursing assistant.  Educated patient on VN's role in patient care. Plan of care reviewed with patient. Education per flowsheet.  Opportunity given for questions and questions answered.  Explained frequent rounding by VN to decrease virus exposure and to monitor for distress; verbalized understanding.  No complaints.  Instructed to call for assistance.  Will cont to monitor.    Labs, notes, and orders reviewed.

## 2020-04-05 LAB
ALBUMIN SERPL BCP-MCNC: 2.3 G/DL (ref 3.5–5.2)
ALP SERPL-CCNC: 92 U/L (ref 55–135)
ALT SERPL W/O P-5'-P-CCNC: 24 U/L (ref 10–44)
ANION GAP SERPL CALC-SCNC: 12 MMOL/L (ref 8–16)
AST SERPL-CCNC: 36 U/L (ref 10–40)
BASOPHILS # BLD AUTO: 0.02 K/UL (ref 0–0.2)
BASOPHILS NFR BLD: 0.2 % (ref 0–1.9)
BILIRUB SERPL-MCNC: 0.4 MG/DL (ref 0.1–1)
BUN SERPL-MCNC: 113 MG/DL (ref 8–23)
CALCIUM SERPL-MCNC: 8.6 MG/DL (ref 8.7–10.5)
CHLORIDE SERPL-SCNC: 104 MMOL/L (ref 95–110)
CO2 SERPL-SCNC: 23 MMOL/L (ref 23–29)
CREAT SERPL-MCNC: 7 MG/DL (ref 0.5–1.4)
DIFFERENTIAL METHOD: ABNORMAL
EOSINOPHIL # BLD AUTO: 0.1 K/UL (ref 0–0.5)
EOSINOPHIL NFR BLD: 0.8 % (ref 0–8)
ERYTHROCYTE [DISTWIDTH] IN BLOOD BY AUTOMATED COUNT: 16.4 % (ref 11.5–14.5)
EST. GFR  (AFRICAN AMERICAN): 8.2 ML/MIN/1.73 M^2
EST. GFR  (NON AFRICAN AMERICAN): 7.1 ML/MIN/1.73 M^2
GLUCOSE SERPL-MCNC: 97 MG/DL (ref 70–110)
HCT VFR BLD AUTO: 31.1 % (ref 40–54)
HGB BLD-MCNC: 10.1 G/DL (ref 14–18)
IMM GRANULOCYTES # BLD AUTO: 0.22 K/UL (ref 0–0.04)
IMM GRANULOCYTES NFR BLD AUTO: 1.7 % (ref 0–0.5)
LYMPHOCYTES # BLD AUTO: 1.4 K/UL (ref 1–4.8)
LYMPHOCYTES NFR BLD: 10.4 % (ref 18–48)
MAGNESIUM SERPL-MCNC: 1.8 MG/DL (ref 1.6–2.6)
MCH RBC QN AUTO: 29.9 PG (ref 27–31)
MCHC RBC AUTO-ENTMCNC: 32.5 G/DL (ref 32–36)
MCV RBC AUTO: 92 FL (ref 82–98)
MONOCYTES # BLD AUTO: 0.9 K/UL (ref 0.3–1)
MONOCYTES NFR BLD: 7.1 % (ref 4–15)
NEUTROPHILS # BLD AUTO: 10.7 K/UL (ref 1.8–7.7)
NEUTROPHILS NFR BLD: 79.8 % (ref 38–73)
NRBC BLD-RTO: 1 /100 WBC
PHOSPHATE SERPL-MCNC: 4.7 MG/DL (ref 2.7–4.5)
PLATELET # BLD AUTO: 184 K/UL (ref 150–350)
PMV BLD AUTO: 12.6 FL (ref 9.2–12.9)
POCT GLUCOSE: 108 MG/DL (ref 70–110)
POCT GLUCOSE: 128 MG/DL (ref 70–110)
POCT GLUCOSE: 161 MG/DL (ref 70–110)
POCT GLUCOSE: 182 MG/DL (ref 70–110)
POCT GLUCOSE: 184 MG/DL (ref 70–110)
POCT GLUCOSE: 63 MG/DL (ref 70–110)
POTASSIUM SERPL-SCNC: 4.5 MMOL/L (ref 3.5–5.1)
PROT SERPL-MCNC: 6 G/DL (ref 6–8.4)
RBC # BLD AUTO: 3.38 M/UL (ref 4.6–6.2)
SODIUM SERPL-SCNC: 139 MMOL/L (ref 136–145)
WBC # BLD AUTO: 13.33 K/UL (ref 3.9–12.7)

## 2020-04-05 PROCEDURE — 25000003 PHARM REV CODE 250: Performed by: SURGERY

## 2020-04-05 PROCEDURE — 83735 ASSAY OF MAGNESIUM: CPT

## 2020-04-05 PROCEDURE — 85025 COMPLETE CBC W/AUTO DIFF WBC: CPT

## 2020-04-05 PROCEDURE — 25000003 PHARM REV CODE 250: Performed by: STUDENT IN AN ORGANIZED HEALTH CARE EDUCATION/TRAINING PROGRAM

## 2020-04-05 PROCEDURE — 80053 COMPREHEN METABOLIC PANEL: CPT

## 2020-04-05 PROCEDURE — 63600175 PHARM REV CODE 636 W HCPCS: Mod: JG | Performed by: INTERNAL MEDICINE

## 2020-04-05 PROCEDURE — 90945 DIALYSIS ONE EVALUATION: CPT

## 2020-04-05 PROCEDURE — 36415 COLL VENOUS BLD VENIPUNCTURE: CPT

## 2020-04-05 PROCEDURE — 11000001 HC ACUTE MED/SURG PRIVATE ROOM

## 2020-04-05 PROCEDURE — 84100 ASSAY OF PHOSPHORUS: CPT

## 2020-04-05 PROCEDURE — 63600175 PHARM REV CODE 636 W HCPCS: Performed by: SURGERY

## 2020-04-05 RX ORDER — CALCIUM CARBONATE 200(500)MG
500 TABLET,CHEWABLE ORAL DAILY PRN
Status: DISCONTINUED | OUTPATIENT
Start: 2020-04-05 | End: 2020-04-11 | Stop reason: HOSPADM

## 2020-04-05 RX ORDER — PANTOPRAZOLE SODIUM 40 MG/1
40 TABLET, DELAYED RELEASE ORAL DAILY
Status: DISCONTINUED | OUTPATIENT
Start: 2020-04-06 | End: 2020-04-11 | Stop reason: HOSPADM

## 2020-04-05 RX ADMIN — ATORVASTATIN CALCIUM 80 MG: 40 TABLET, FILM COATED ORAL at 09:04

## 2020-04-05 RX ADMIN — TAMSULOSIN HYDROCHLORIDE 0.4 MG: 0.4 CAPSULE ORAL at 09:04

## 2020-04-05 RX ADMIN — CARVEDILOL 12.5 MG: 12.5 TABLET, FILM COATED ORAL at 09:04

## 2020-04-05 RX ADMIN — AMIODARONE HYDROCHLORIDE 200 MG: 200 TABLET ORAL at 09:04

## 2020-04-05 RX ADMIN — CALCIUM CARBONATE (ANTACID) CHEW TAB 500 MG 500 MG: 500 CHEW TAB at 10:04

## 2020-04-05 RX ADMIN — FINASTERIDE 5 MG: 5 TABLET, FILM COATED ORAL at 09:04

## 2020-04-05 RX ADMIN — HEPARIN SODIUM 5000 UNITS: 5000 INJECTION, SOLUTION INTRAVENOUS; SUBCUTANEOUS at 02:04

## 2020-04-05 RX ADMIN — ASPIRIN 81 MG: 81 TABLET, COATED ORAL at 09:04

## 2020-04-05 RX ADMIN — ALTEPLASE 2 MG: 2.2 INJECTION, POWDER, LYOPHILIZED, FOR SOLUTION INTRAVENOUS at 11:04

## 2020-04-05 RX ADMIN — Medication 16 G: at 05:04

## 2020-04-05 RX ADMIN — DOXYCYCLINE HYCLATE 100 MG: 100 TABLET, COATED ORAL at 09:04

## 2020-04-05 RX ADMIN — HEPARIN SODIUM 5000 UNITS: 5000 INJECTION, SOLUTION INTRAVENOUS; SUBCUTANEOUS at 05:04

## 2020-04-05 RX ADMIN — HEPARIN SODIUM 5000 UNITS: 5000 INJECTION, SOLUTION INTRAVENOUS; SUBCUTANEOUS at 10:04

## 2020-04-05 NOTE — PLAN OF CARE
VIRTUAL NURSE:  Cued into patient's room.  Permission received per patient to turn camera to view patient.  Introduced as VN for night shift that will be working with floor nurse and nursing assistant.  Educated patient on VN's role in patient care. Plan of care reviewed with patient. Education per flowsheet.  Opportunity given for questions and questions answered.  Explained frequent rounding by VN to decrease virus exposure and to monitor for distress; verbalized understanding.  Denies pain, n/v, and sob.  States feeling sluggish and tired.  Instructed to call for assistance.  Will cont to monitor.    Labs, notes, and orders reviewed.

## 2020-04-05 NOTE — PLAN OF CARE
VN Q2 hour round:VN cued into patients room.Patient denies any needs at this time.Will continue to monitor.

## 2020-04-05 NOTE — PLAN OF CARE
VN Note: VN cued into patient's room for Q2 hr rounds. Patient denies any needs at this time. PCT at bedside. VN will continue to follow.

## 2020-04-05 NOTE — NURSING
VIRTUAL NURSE 2 HOUR ROUNDS:  Cued into patient's room.  Patient resting comfortably in bed with eyes closed; respirations even and unlabored.  No distress noted.  Will cont to monitor.    PD machine beeping; [facility] notified via text.

## 2020-04-05 NOTE — NURSING
Spoke with on call dialysis nurse, Santos Moore, pd machine continuously beeping low volume, stated to turn machine off, catheter may be bad. Will continue to monitor.

## 2020-04-05 NOTE — NURSING
Patient is alert, responsive, in NAD. Patient maintained on RA, O2 sat 97%. Tele monitoring intact, pt. Is paced at 60b/m. No complications noted. Will continue to monitor.

## 2020-04-05 NOTE — PROGRESS NOTES
[facility] Hospital Medicine Progress Note    Primary Team: [facility] Hospitalist Team B  Attending Physician: Dr. Jones  Resident: [place]  Intern: Gaby    Subjective:      Patient was seen this morning via tele medicine.  He wants to go home.  He has not been out of bed since he was admitted. He doesn't believe he is having new weakness.  He uses a walker to aid in ambulation at home.  He wants to know how long he will be on PD.     Objective:     Last 24 Hour Vital Signs:  BP  Min: 110/55  Max: 133/61  Temp  Av.6 °F (36.4 °C)  Min: 97.5 °F (36.4 °C)  Max: 97.6 °F (36.4 °C)  Pulse  Av.2  Min: 60  Max: 62  Resp  Av  Min: 18  Max: 20  SpO2  Av.6 %  Min: 93 %  Max: 97 %  I/O last 3 completed shifts:  In: 520 [P.O.:520]  Out: 450 [Urine:450]    Physical Examination:  Deferred  covid+  Appears comfortable via telemed    Laboratory:  Laboratory Data Reviewed: yes  Pertinent Findings:  Cr 4.7>>5.3>>5.4>>5.1>>5.3    Microbiology Data Reviewed: yes  Pertinent Findings:  Flu negative  COVID POSITIVE  3/28 blood cx, NGTD    Other Results:  EKG (my interpretation): NSR     Radiology Data Reviewed: yes  Pertinent Findings:  No new    Current Medications:     Infusions:       Scheduled:   sodium chloride 0.9%   Intravenous Once    amiodarone  200 mg Oral BID    aspirin  81 mg Oral Daily    atorvastatin  80 mg Oral Daily    carvediloL  12.5 mg Oral BID    doxycycline  100 mg Oral Q12H    finasteride  5 mg Oral Daily    heparin (porcine)  5,000 Units Subcutaneous Q8H    insulin aspart U-100  12 Units Subcutaneous TIDWM    insulin detemir U-100  30 Units Subcutaneous BID    tamsulosin  0.4 mg Oral Daily        PRN:  sodium chloride 0.9%, acetaminophen, dextrose 50 % in water (D50W), dextrose 50 % in water (D50W), glucagon (human recombinant), glucose, glucose, heparin (porcine), insulin aspart U-100, oxyCODONE-acetaminophen, sodium chloride 0.9%    Antibiotics and Day Number of Therapy:  Rocephin, 3/28 -  3/30  Azithro, 3/28 - 3/30   (no plaquenil 2/2 long qTc)  Doxy 4/2 - present    Lines and Day Number of Therapy:  PIV    Assessment:     Houston Jc is a 74 y.o.male with  Patient Active Problem List    Diagnosis Date Noted    Bradycardia 04/04/2020    CLEMENCIA (acute kidney injury)     COVID-19 virus detected     Fever     Suspected 2019-Ncov Infection     SOB (shortness of breath) 03/28/2020    NICM (nonischemic cardiomyopathy) 11/06/2019    Abnormal transaminases 07/20/2019    Cardiac resynchronization therapy defibrillator (CRT-D) in place 07/19/2019    Third degree heart block 07/19/2019    Gout 07/18/2019    Abdominal distension 07/17/2019    Debility 07/17/2019    VT (ventricular tachycardia) 07/11/2019    Cardiorenal syndrome 07/11/2019    Acute systolic heart failure 07/11/2019    Complete heart block 07/09/2019    Morbid obesity 10/08/2015    Hypertension 09/21/2015    Diastolic dysfunction 09/21/2015    Sleep apnea 09/21/2015    Swelling of lower extremity 09/18/2015    Hypervolemia 09/18/2015     Mr. Benjamin presents to [place] ED for 3 days of fever, SOB, diarrhea. Covid positive. Admitted for CLEMENCIA, troponemia, and hypoxia w/ exertion.     Plan:     Hypoxic respiratory failure 2/2 Covid-19  - pt with fever, SOB, diarrhea and labs indicative of  Covid-19 illness script  - flu negative, Covid positive  - pt w/ SOB, currently satting high 80s/low 90s on 2L NC, tachypnea resolved  - CXR w/ L sided opacity, continuing CAP abx (azithro, rocephin)  - (3/31) Patient afebrile and comfortable on room air.  - Has completed 4 days of azithro and rocephin. Per ID, can discontinue as bacterial coinfection highly unlikely.   - holding plaquenil 2/2 prolonged qTc on EKG.  - Completed course of solumedrol.  - Now on room air.     CLEMENCIA on CKD 4  - pt w/ baseline Cr 3, 4.6 on admit.  - s/p 500mL NS bolus in ED with symptomatic improvement, pt makes urine  - daily BMP  - follows w/ Dr Fowler  - consulted  Nephrology, appreciate recs  - discontinued allopurinol, avoid nephrotoxic meds  - Patient received HD on 3/31 with 1L removed, tolerated well  - Had PD catheter placed 4/3  Tolerating PD well     Troponemia, stable  - initial trop 2.9>>2.59 (baseline 0.08)  - EKG w/ NSR and 1st degree block, pt denies CP  - pt w/ hx of non-ischemic cardiomyopathy and complete heart block s/p CRT-D 2019  - likely 2/2 to Covid, now downtrending. No longer following.      HFrEF  - 7/2019 echo with EF 30%, global hypokinesis, and grade 2 diastolic dysfunction  - Had pacemaker placed in 2019.  - judiciously hydrate in setting of CLEMENCIA  - repeat echo pending Covid as pt with hx of cardio-renal syndrome  - follows w/ Dr Mancia     Scrotal swelling and pain 2/2 varicocele  - as per wife, pt with >1m worsening scrotal pain and swelling  - has outpt Urology appt scheduled  - scrotal swelling with R varicocele, f/u scrotal US  - low concern for obstructive component of CLEMENCIA  - Consulted Urology and appreciate their recs  - Started on doxy for epididymoorchitis.   - His scrotal pain is improving. If it worsens will re-consult urology and obtain a US at that time.    T2DM  - Hyperglycemia likely 2/2 current steroid use  - Increased long-acting 30 U bid, aspart 12 U with meals  - Added SSI for better control of his hyperglycemia.      Diet: Diabetic  PPX: heparin  Dispo: Likely discharge today      Fransisco Griffin MD, PGY-1  [facility] Internal Medicine      [facility] Medicine Hospitalist Pager numbers:   [facility] Hospitalist Medicine Team A (Abhinav/Jovani): [phone]  [facility] Hospitalist Medicine Team B (Robert/Enedelia):  [phone]

## 2020-04-05 NOTE — PROGRESS NOTES
Progress Note  Nephrology      Consult Requested By: Cullen Jones MD  Reason for Consult: CLEMENCIA    SUBJECTIVE:     Review of Systems   Constitutional: Positive for malaise/fatigue. Negative for chills and fever.   Respiratory: Negative for shortness of breath.    Cardiovascular: Negative for chest pain.   Gastrointestinal: Positive for nausea. Negative for vomiting.     Patient Active Problem List   Diagnosis    Swelling of lower extremity    Hypervolemia    Hypertension    Diastolic dysfunction    Sleep apnea    Morbid obesity    Complete heart block    VT (ventricular tachycardia)    Cardiorenal syndrome    Acute systolic heart failure    Abdominal distension    Debility    Gout    Cardiac resynchronization therapy defibrillator (CRT-D) in place    Third degree heart block    Abnormal transaminases    NICM (nonischemic cardiomyopathy)    SOB (shortness of breath)    COVID-19 virus detected    Fever    Suspected 2019-Ncov Infection    CLEMENCIA (acute kidney injury)    Bradycardia       OBJECTIVE:     Medications:   sodium chloride 0.9%   Intravenous Once    amiodarone  200 mg Oral BID    aspirin  81 mg Oral Daily    atorvastatin  80 mg Oral Daily    carvediloL  12.5 mg Oral BID    doxycycline  100 mg Oral Q12H    finasteride  5 mg Oral Daily    heparin (porcine)  5,000 Units Subcutaneous Q8H    insulin aspart U-100  12 Units Subcutaneous TIDWM    insulin detemir U-100  20 Units Subcutaneous BID    tamsulosin  0.4 mg Oral Daily       Vitals:    04/05/20 1200   BP:    Pulse: 60   Resp:    Temp:      I/O last 3 completed shifts:  In: 520 [P.O.:520]  Out: 450 [Urine:450]     Physical exam is limited 2/2 covid pandemic and need to preserve PPE    Laboratory:  No results for input(s): WBC, HGB, HCT, PLT, NEUTOPHILPCT, MONO in the last 168 hours.    Invalid input(s):  EOSINOPHIL  Recent Labs   Lab 04/03/20  1021 04/04/20  0557 04/04/20  0558 04/05/20  0617   *  --  140 139   K 4.5  --   4.2 4.5   CL 98  --  102 104   CO2 22*  --  24 23   *  --  113* 113*   CREATININE 6.6*  --  6.9* 7.0*   CALCIUM 8.7  --  8.6* 8.6*   PHOS 4.4 4.8*  --  4.7*     Labs reviewed  Diagnostic Results:  X-Ray: Reviewed  US: Reviewed  Echo: Reviewed      ASSESSMENT/PLAN:     ESRD  -PD with low volumes, still with low volumes, tpa instilled overnight, will attempt manual drain today    Hypervolemia+ hyponatremia  -Na 139 today, continue fluid restriction    CHF  -EF 30%  -bnp 525    Hypoxic respiratory failure 2/2 Covid-19  -ID following, holding plaquenil 2/2 prolonged QTc  -room air    Thank you for allowing me to participate in the care of your patients  With any question please call [phone]  Ximena Mccarthy NP    [facility]  ANA MARIA Burns MD, FACP,   MAlize Woo MD,   MD JOSH Gill NP H. Mitchell, NP  [address]  [place], [id number]  [phone]

## 2020-04-05 NOTE — PLAN OF CARE
VN Q2 hour round:VN cued into patients room.Patient appears to be asleep. nad noted.Will continue to monitor.

## 2020-04-06 LAB
ALBUMIN SERPL BCP-MCNC: 2.4 G/DL (ref 3.5–5.2)
ALP SERPL-CCNC: 94 U/L (ref 55–135)
ALT SERPL W/O P-5'-P-CCNC: 17 U/L (ref 10–44)
ANION GAP SERPL CALC-SCNC: 12 MMOL/L (ref 8–16)
AST SERPL-CCNC: 27 U/L (ref 10–40)
BASOPHILS # BLD AUTO: 0.01 K/UL (ref 0–0.2)
BASOPHILS NFR BLD: 0.1 % (ref 0–1.9)
BILIRUB SERPL-MCNC: 0.4 MG/DL (ref 0.1–1)
BUN SERPL-MCNC: 108 MG/DL (ref 8–23)
CALCIUM SERPL-MCNC: 8.7 MG/DL (ref 8.7–10.5)
CHLORIDE SERPL-SCNC: 101 MMOL/L (ref 95–110)
CO2 SERPL-SCNC: 26 MMOL/L (ref 23–29)
CREAT SERPL-MCNC: 6.9 MG/DL (ref 0.5–1.4)
DIFFERENTIAL METHOD: ABNORMAL
EOSINOPHIL # BLD AUTO: 0.1 K/UL (ref 0–0.5)
EOSINOPHIL NFR BLD: 1 % (ref 0–8)
ERYTHROCYTE [DISTWIDTH] IN BLOOD BY AUTOMATED COUNT: 16.2 % (ref 11.5–14.5)
EST. GFR  (AFRICAN AMERICAN): 8 ML/MIN/1.73 M^2
EST. GFR  (NON AFRICAN AMERICAN): 7 ML/MIN/1.73 M^2
GLUCOSE SERPL-MCNC: 129 MG/DL (ref 70–110)
HCT VFR BLD AUTO: 29.5 % (ref 40–54)
HGB BLD-MCNC: 9.6 G/DL (ref 14–18)
IMM GRANULOCYTES # BLD AUTO: 0.21 K/UL (ref 0–0.04)
IMM GRANULOCYTES NFR BLD AUTO: 1.9 % (ref 0–0.5)
LYMPHOCYTES # BLD AUTO: 1.1 K/UL (ref 1–4.8)
LYMPHOCYTES NFR BLD: 9.5 % (ref 18–48)
MAGNESIUM SERPL-MCNC: 2.1 MG/DL (ref 1.6–2.6)
MCH RBC QN AUTO: 29.3 PG (ref 27–31)
MCHC RBC AUTO-ENTMCNC: 32.5 G/DL (ref 32–36)
MCV RBC AUTO: 90 FL (ref 82–98)
MONOCYTES # BLD AUTO: 0.8 K/UL (ref 0.3–1)
MONOCYTES NFR BLD: 7 % (ref 4–15)
NEUTROPHILS # BLD AUTO: 9.1 K/UL (ref 1.8–7.7)
NEUTROPHILS NFR BLD: 80.5 % (ref 38–73)
NRBC BLD-RTO: 1 /100 WBC
PHOSPHATE SERPL-MCNC: 4.6 MG/DL (ref 2.7–4.5)
PLATELET # BLD AUTO: 180 K/UL (ref 150–350)
PMV BLD AUTO: 13 FL (ref 9.2–12.9)
POCT GLUCOSE: 119 MG/DL (ref 70–110)
POCT GLUCOSE: 68 MG/DL (ref 70–110)
POCT GLUCOSE: 88 MG/DL (ref 70–110)
POCT GLUCOSE: 95 MG/DL (ref 70–110)
POTASSIUM SERPL-SCNC: 4.2 MMOL/L (ref 3.5–5.1)
PROT SERPL-MCNC: 5.9 G/DL (ref 6–8.4)
RBC # BLD AUTO: 3.28 M/UL (ref 4.6–6.2)
SODIUM SERPL-SCNC: 139 MMOL/L (ref 136–145)
WBC # BLD AUTO: 11.29 K/UL (ref 3.9–12.7)

## 2020-04-06 PROCEDURE — 85025 COMPLETE CBC W/AUTO DIFF WBC: CPT

## 2020-04-06 PROCEDURE — 94761 N-INVAS EAR/PLS OXIMETRY MLT: CPT

## 2020-04-06 PROCEDURE — 80053 COMPREHEN METABOLIC PANEL: CPT

## 2020-04-06 PROCEDURE — 25000003 PHARM REV CODE 250: Performed by: STUDENT IN AN ORGANIZED HEALTH CARE EDUCATION/TRAINING PROGRAM

## 2020-04-06 PROCEDURE — 25000003 PHARM REV CODE 250: Performed by: SURGERY

## 2020-04-06 PROCEDURE — 90945 DIALYSIS ONE EVALUATION: CPT

## 2020-04-06 PROCEDURE — 83735 ASSAY OF MAGNESIUM: CPT

## 2020-04-06 PROCEDURE — 11000001 HC ACUTE MED/SURG PRIVATE ROOM

## 2020-04-06 PROCEDURE — 63600175 PHARM REV CODE 636 W HCPCS: Performed by: SURGERY

## 2020-04-06 PROCEDURE — 84100 ASSAY OF PHOSPHORUS: CPT

## 2020-04-06 RX ORDER — FUROSEMIDE 10 MG/ML
80 INJECTION INTRAMUSCULAR; INTRAVENOUS DAILY
Status: DISCONTINUED | OUTPATIENT
Start: 2020-04-07 | End: 2020-04-11 | Stop reason: HOSPADM

## 2020-04-06 RX ADMIN — PANTOPRAZOLE SODIUM 40 MG: 40 TABLET, DELAYED RELEASE ORAL at 09:04

## 2020-04-06 RX ADMIN — Medication 16 G: at 10:04

## 2020-04-06 RX ADMIN — AMIODARONE HYDROCHLORIDE 200 MG: 200 TABLET ORAL at 10:04

## 2020-04-06 RX ADMIN — ATORVASTATIN CALCIUM 80 MG: 40 TABLET, FILM COATED ORAL at 09:04

## 2020-04-06 RX ADMIN — HEPARIN SODIUM 5000 UNITS: 5000 INJECTION, SOLUTION INTRAVENOUS; SUBCUTANEOUS at 04:04

## 2020-04-06 RX ADMIN — TAMSULOSIN HYDROCHLORIDE 0.4 MG: 0.4 CAPSULE ORAL at 09:04

## 2020-04-06 RX ADMIN — AMIODARONE HYDROCHLORIDE 200 MG: 200 TABLET ORAL at 09:04

## 2020-04-06 RX ADMIN — ASPIRIN 81 MG: 81 TABLET, COATED ORAL at 09:04

## 2020-04-06 RX ADMIN — OXYCODONE HYDROCHLORIDE AND ACETAMINOPHEN 1 TABLET: 10; 325 TABLET ORAL at 10:04

## 2020-04-06 RX ADMIN — DOXYCYCLINE HYCLATE 100 MG: 100 TABLET, COATED ORAL at 10:04

## 2020-04-06 RX ADMIN — HEPARIN SODIUM 5000 UNITS: 5000 INJECTION, SOLUTION INTRAVENOUS; SUBCUTANEOUS at 10:04

## 2020-04-06 RX ADMIN — DOXYCYCLINE HYCLATE 100 MG: 100 TABLET, COATED ORAL at 09:04

## 2020-04-06 RX ADMIN — FINASTERIDE 5 MG: 5 TABLET, FILM COATED ORAL at 09:04

## 2020-04-06 RX ADMIN — CARVEDILOL 12.5 MG: 12.5 TABLET, FILM COATED ORAL at 09:04

## 2020-04-06 RX ADMIN — CARVEDILOL 12.5 MG: 12.5 TABLET, FILM COATED ORAL at 10:04

## 2020-04-06 RX ADMIN — HEPARIN SODIUM 5000 UNITS: 5000 INJECTION, SOLUTION INTRAVENOUS; SUBCUTANEOUS at 03:04

## 2020-04-06 NOTE — NURSING
Dr. Griffin notified of patient's HR 60 and to receive amiodarone 200mg tonight; OK to give tonight.

## 2020-04-06 NOTE — NURSING
VIRTUAL NURSE 2 HOUR ROUNDS:  Cued into patient's room.  Room too dark to see patient.  Can hear him snoring rhythmically.  CUCO Louie notified of inability to view patient.  Will cont to monitor.

## 2020-04-06 NOTE — PLAN OF CARE
VN cued into room for q2h rounding.  Pt resting comfortably in bed, pt on room air, states no difficulty breathing or SOB.  Pt states no needs or complaints at this time.  Call light within reach, fall precautions maintained.  VN will continue to follow and be available as needed.

## 2020-04-06 NOTE — PROGRESS NOTES
[facility] Hospital Medicine Progress Note    Primary Team: [facility] Hospitalist Team B  Attending Physician: Dr. Jones  Resident: [place]  Intern: Gaby    Subjective:     Patient was seen this morning via tele medicine.  PD was attempted yesterday but his catheter was draining slow.  He is not having any difficulty breathing. He denies any CP, N/V, or abdominal pain.     Objective:     Last 24 Hour Vital Signs:  BP  Min: 125/64  Max: 130/61  Temp  Av.9 °F (36.6 °C)  Min: 97.6 °F (36.4 °C)  Max: 98.4 °F (36.9 °C)  Pulse  Av.7  Min: 60  Max: 64  Resp  Av.7  Min: 18  Max: 20  SpO2  Av.5 %  Min: 95 %  Max: 96 %  I/O last 3 completed shifts:  In: 290 [P.O.:290]  Out: 2750 [Urine:1000; Other:1750]    Physical Examination:  Deferred / covid+  Appears comfortable via telemed    Laboratory:  Laboratory Data Reviewed: yes  Pertinent Findings:  Cr 4.7>>5.3>>5.4>>5.1>>5.3    Microbiology Data Reviewed: yes  Pertinent Findings:  Flu negative  COVID POSITIVE  3/28 blood cx, NGTD    Other Results:  EKG (my interpretation): NSR     Radiology Data Reviewed: yes  Pertinent Findings:  No new    Current Medications:     Infusions:       Scheduled:   sodium chloride 0.9%   Intravenous Once    amiodarone  200 mg Oral BID    aspirin  81 mg Oral Daily    atorvastatin  80 mg Oral Daily    carvediloL  12.5 mg Oral BID    doxycycline  100 mg Oral Q12H    finasteride  5 mg Oral Daily    heparin (porcine)  5,000 Units Subcutaneous Q8H    insulin aspart U-100  12 Units Subcutaneous TIDWM    insulin detemir U-100  20 Units Subcutaneous BID    pantoprazole  40 mg Oral Daily    tamsulosin  0.4 mg Oral Daily        PRN:  sodium chloride 0.9%, acetaminophen, calcium carbonate, dextrose 50 % in water (D50W), dextrose 50 % in water (D50W), glucagon (human recombinant), glucose, glucose, heparin (porcine), insulin aspart U-100, oxyCODONE-acetaminophen, sodium chloride 0.9%    Antibiotics and Day Number of Therapy:  Rocephin,  3/28 - 3/30  Azithro, 3/28 - 3/30   (no plaquenil 2/2 long qTc)  Doxy 4/2 - present    Lines and Day Number of Therapy:  PIV    Assessment:     Houston Jc is a 74 y.o.male with  Patient Active Problem List    Diagnosis Date Noted    Bradycardia 04/04/2020    CLEMENCIA (acute kidney injury)     COVID-19 virus detected     Fever     Suspected 2019-Ncov Infection     SOB (shortness of breath) 03/28/2020    NICM (nonischemic cardiomyopathy) 11/06/2019    Abnormal transaminases 07/20/2019    Cardiac resynchronization therapy defibrillator (CRT-D) in place 07/19/2019    Third degree heart block 07/19/2019    Gout 07/18/2019    Abdominal distension 07/17/2019    Debility 07/17/2019    VT (ventricular tachycardia) 07/11/2019    Cardiorenal syndrome 07/11/2019    Acute systolic heart failure 07/11/2019    Complete heart block 07/09/2019    Morbid obesity 10/08/2015    Hypertension 09/21/2015    Diastolic dysfunction 09/21/2015    Sleep apnea 09/21/2015    Swelling of lower extremity 09/18/2015    Hypervolemia 09/18/2015     Mr. Benjamin presents to [place] ED for 3 days of fever, SOB, diarrhea. Covid positive. Admitted for CLEMENCIA, troponemia, and hypoxia w/ exertion.     Plan:     Hypoxic respiratory failure 2/2 Covid-19  - pt with fever, SOB, diarrhea and labs indicative of  Covid-19 illness script  - flu negative, Covid positive  - pt w/ SOB, currently satting high 80s/low 90s on 2L NC, tachypnea resolved  - CXR w/ L sided opacity, continuing CAP abx (azithro, rocephin)  - (3/31) Patient afebrile and comfortable on room air.  - Has completed 4 days of azithro and rocephin. Per ID, can discontinue as bacterial coinfection highly unlikely.   - holding plaquenil 2/2 prolonged qTc on EKG.  - Completed course of solumedrol.  - Now on room air.     CLEMENCIA on CKD 4  - pt w/ baseline Cr 3, 4.6 on admit.  - s/p 500mL NS bolus in ED with symptomatic improvement, pt makes urine  - daily BMP  - follows w/ Dr Fowler  -  consulted Nephrology, appreciate recs  - discontinued allopurinol, avoid nephrotoxic meds  - Patient received HD on 3/31 with 1L removed, tolerated well  - Had PD catheter placed 4/3  PD catheter is draining slowly. Altepase was given to clear the catheter and will re-attempt PD.  If it is still draining slowly, patient will be taken back to OR for a revision of his PD catheter.     Troponemia, stable  - initial trop 2.9>>2.59 (baseline 0.08)  - EKG w/ NSR and 1st degree block, pt denies CP  - pt w/ hx of non-ischemic cardiomyopathy and complete heart block s/p CRT-D 2019  - likely 2/2 to Covid, now downtrending. No longer following.      HFrEF  - 7/2019 echo with EF 30%, global hypokinesis, and grade 2 diastolic dysfunction  - Had pacemaker placed in 2019.  - judiciously hydrate in setting of CLEMENCIA  - repeat echo pending Covid as pt with hx of cardio-renal syndrome  - follows w/ Dr Arroyo     Scrotal swelling and pain 2/2 varicocele  - as per wife, pt with >1m worsening scrotal pain and swelling  - has outpt Urology appt scheduled  - scrotal swelling with R varicocele, f/u scrotal US  - low concern for obstructive component of CLEMENCIA  - Consulted Urology and appreciate their recs  - Started on doxy for epididymoorchitis.   - His scrotal pain is improving. If it worsens will re-consult urology and obtain a US at that time.    T2DM  - Hyperglycemia likely 2/2 current steroid use  - Increased long-acting 30 U bid, aspart 12 U with meals  - Added SSI for better control of his hyperglycemia.      Diet: Diabetic  PPX: heparin  Dispo: Pending PD catheter functioning properly      Fransisco Griffin MD, PGY-1  [facility] Internal Medicine      [facility] Medicine Hospitalist Pager numbers:   [facility] Hospitalist Medicine Team A (Abhinav/Jovani): [phone]  [facility] Hospitalist Medicine Team B (Robert/Enedelia):  [phone]

## 2020-04-06 NOTE — PLAN OF CARE
received call from Peritoneal nurse, [phone] inquiring if this patient was being discharged today.     paged attending team and asked if this patient was being discharged. Dr. Zamora stated patient was not discharging today.  Fdee informed of dispo and will call her on tomorrow with an update.       04/06/20 1895   Post-Acute Status   Post-Acute Authorization Dialysis   Diaylsis Status Awaiting Internal Medical Clearance   Discharge Delays None known at this time

## 2020-04-06 NOTE — PROGRESS NOTES
Progress Note  Nephrology      Consult Requested By: Cullen Jones MD      SUBJECTIVE:     Overnight events  Patient is a 74 y.o. male     Patient Active Problem List   Diagnosis    Swelling of lower extremity    Hypervolemia    Hypertension    Diastolic dysfunction    Sleep apnea    Morbid obesity    Complete heart block    VT (ventricular tachycardia)    Cardiorenal syndrome    Acute systolic heart failure    Abdominal distension    Debility    Gout    Cardiac resynchronization therapy defibrillator (CRT-D) in place    Third degree heart block    Abnormal transaminases    NICM (nonischemic cardiomyopathy)    SOB (shortness of breath)    COVID-19 virus detected    Fever    Suspected 2019-Ncov Infection    CLEMENCIA (acute kidney injury)    Bradycardia     Past Medical History:   Diagnosis Date    Anemia     CHF (congestive heart failure)     CKD (chronic kidney disease) stage 4, GFR 15-29 ml/min     Coronary artery disease     Diabetes mellitus     Hematuria     Hypertension     Renal cyst, right               OBJECTIVE:     Vitals:    04/06/20 0914 04/06/20 0953 04/06/20 1157 04/06/20 1238   BP:    (!) 112/58   BP Location:    Right arm   Patient Position:    Sitting   Pulse:  60 60 60   Resp:    19   Temp:    97.4 °F (36.3 °C)   TempSrc:    Oral   SpO2: 97%   96%   Weight:       Height:           Temp: 97.4 °F (36.3 °C) (04/06/20 1238)  Pulse: 60 (04/06/20 1238)  Resp: 19 (04/06/20 1238)  BP: (!) 112/58 (04/06/20 1238)  SpO2: 96 % (04/06/20 1238)              Medications:   sodium chloride 0.9%   Intravenous Once    amiodarone  200 mg Oral BID    aspirin  81 mg Oral Daily    atorvastatin  80 mg Oral Daily    carvediloL  12.5 mg Oral BID    doxycycline  100 mg Oral Q12H    finasteride  5 mg Oral Daily    heparin (porcine)  5,000 Units Subcutaneous Q8H    insulin aspart U-100  12 Units Subcutaneous TIDWM    insulin detemir U-100  20 Units Subcutaneous BID    pantoprazole  40 mg  Oral Daily    tamsulosin  0.4 mg Oral Daily                 Physical Exam:  Lungs: RR 16  Heart: Pulse 60    Laboratory:  ABG  Labs reviewed  Recent Results (from the past 336 hour(s))   Basic metabolic panel    Collection Time: 03/30/20  7:47 AM   Result Value Ref Range    Sodium 127 (L) 136 - 145 mmol/L    Potassium 4.4 3.5 - 5.1 mmol/L    Chloride 92 (L) 95 - 110 mmol/L    CO2 23 23 - 29 mmol/L    BUN, Bld 106 (H) 8 - 23 mg/dL    Creatinine 5.4 (H) 0.5 - 1.4 mg/dL    Calcium 9.2 8.7 - 10.5 mg/dL    Anion Gap 12 8 - 16 mmol/L   Basic metabolic panel    Collection Time: 03/29/20  6:40 AM   Result Value Ref Range    Sodium 131 (L) 136 - 145 mmol/L    Potassium 4.2 3.5 - 5.1 mmol/L    Chloride 96 95 - 110 mmol/L    CO2 23 23 - 29 mmol/L    BUN, Bld 109 (H) 8 - 23 mg/dL    Creatinine 5.3 (H) 0.5 - 1.4 mg/dL    Calcium 8.8 8.7 - 10.5 mg/dL    Anion Gap 12 8 - 16 mmol/L   Basic metabolic panel    Collection Time: 03/28/20  8:05 PM   Result Value Ref Range    Sodium 131 (L) 136 - 145 mmol/L    Potassium 4.5 3.5 - 5.1 mmol/L    Chloride 97 95 - 110 mmol/L    CO2 18 (L) 23 - 29 mmol/L    BUN, Bld 99 (H) 8 - 23 mg/dL    Creatinine 4.7 (H) 0.5 - 1.4 mg/dL    Calcium 8.3 (L) 8.7 - 10.5 mg/dL    Anion Gap 16 8 - 16 mmol/L     Recent Results (from the past 336 hour(s))   CBC auto differential    Collection Time: 04/06/20  4:50 AM   Result Value Ref Range    WBC 11.29 3.90 - 12.70 K/uL    Hemoglobin 9.6 (L) 14.0 - 18.0 g/dL    Hematocrit 29.5 (L) 40.0 - 54.0 %    Platelets 180 150 - 350 K/uL   CBC auto differential    Collection Time: 04/05/20  1:45 PM   Result Value Ref Range    WBC 13.33 (H) 3.90 - 12.70 K/uL    Hemoglobin 10.1 (L) 14.0 - 18.0 g/dL    Hematocrit 31.1 (L) 40.0 - 54.0 %    Platelets 184 150 - 350 K/uL   CBC auto differential    Collection Time: 03/28/20  1:04 PM   Result Value Ref Range    WBC 9.68 3.90 - 12.70 K/uL    Hemoglobin 10.1 (L) 14.0 - 18.0 g/dL    Hematocrit 29.9 (L) 40.0 - 54.0 %    Platelets 175  150 - 350 K/uL     Urinalysis  No results for input(s): COLORU, CLARITYU, SPECGRAV, PHUR, PROTEINUA, GLUCOSEU, BILIRUBINCON, BLOODU, WBCU, RBCU, BACTERIA, MUCUS, NITRITE, LEUKOCYTESUR, UROBILINOGEN, HYALINECASTS in the last 24 hours.    Diagnostic Results:  X-Ray: Reviewed  US: Reviewed  Echo: Reviewed  ACCESS    ASSESSMENT/PLAN:   ESRD  Azotemia    Creatinine 6.9  Metabolic bone disease  Anemia Hb 9.6  Poor nutrition   Albumin 2.4  /58  EF 30 %   Weight daily  I and O  PD

## 2020-04-06 NOTE — PLAN OF CARE
received [facility]'s Acceptance Letter indicating patient's Peritoneal dialysis is complete.  Copy of Acceptance letter retrieved to place in patient's chart.      Dialysis Treatment Center Information                     Dialysis Treatment for Houston Jc (Request #[id number])   Thank you for choosing [facility] as your dialysis provider.  If you have any questions regarding treatment at [facility] or your first visit, please call [phone].    On your first day of treatment, please arrive 30 minutes prior to your scheduled time so that you can fill out necessary paperwork      Patient Information   Patients Name Houston Jc   Date of Birth 1945   [facility] Facility Information   Facility Name [facility]   Facility Address ?[address] [place] [address]? ??  [place] [address] [phone] (Phone #)    [phone] (Fax #)   Facility  ,   Phone:  () -   Notes    Treatment Schedule Information   1st Treatment Date & Time  4/7/2020 7:00 AM   Regular Treatment Days & Time Days:  TTS, 7:00 AM   If Days are Other, please specify schedule:           04/06/20 1618   Post-Acute Status   Post-Acute Authorization Dialysis   Diaylsis Status Set-up Complete   Discharge Delays None known at this time   Discharge Plan   Discharge Plan A Home with family

## 2020-04-06 NOTE — NURSING
VIRTUAL NURSE 2 HOUR ROUNDS:  Cued into patient's room.  CUCO Louie at bedside with night meds.  Patient resting comfortably in bed; respirations even and unlabored.  No distress noted.  Will cont to monitor.

## 2020-04-06 NOTE — PLAN OF CARE
Medical records reviewed.  Per chart, patient unable to undergo PD. Cath draining slow.    Case management will continue to follow.       04/06/20 1308   Discharge Assessment   Assessment Type Discharge Planning Reassessment   Confirmed/corrected address and phone number on facesheet? Yes   Assessment information obtained from? Medical Record   Expected Length of Stay (days) 7   Communicated expected length of stay with patient/caregiver yes   Prior to hospitilization cognitive status: Alert/Oriented   Prior to hospitalization functional status: Assistive Equipment   Current cognitive status: Alert/Oriented   Current Functional Status: Assistive Equipment   Lives With spouse   Able to Return to Prior Arrangements yes   Is patient able to care for self after discharge? Unable to determine at this time (comments)   Who are your caregiver(s) and their phone number(s)?   (Wife: Hue Jc [phone])   Patient's perception of discharge disposition admitted as an inpatient   Readmission Within the Last 30 Days no previous admission in last 30 days   Patient currently being followed by outpatient case management? No   Patient currently receives any other outside agency services? No   Equipment Currently Used at Home walker, rolling;wheelchair   Do you have any problems affording any of your prescribed medications? No   Is the patient taking medications as prescribed? yes   Does the patient have transportation home? Yes   Transportation Anticipated family or friend will provide   Does the patient receive services at the [facility]mad[facility] Clinic? No   Discharge Plan A Home with family   DME Needed Upon Discharge  none   Patient/Family in Agreement with Plan yes   Does the patient have transportation to healthcare appointments? Yes

## 2020-04-06 NOTE — PLAN OF CARE
Problem: Fall Injury Risk  Goal: Absence of Fall and Fall-Related Injury  Outcome: Ongoing, Progressing     Problem: Adult Inpatient Plan of Care  Goal: Plan of Care Review  Outcome: Ongoing, Progressing  Flowsheets (Taken 4/6/2020 0247)  Plan of Care Reviewed With: patient  Goal: Patient-Specific Goal (Individualization)  Outcome: Ongoing, Progressing  Goal: Absence of Hospital-Acquired Illness or Injury  Outcome: Ongoing, Progressing  Goal: Optimal Comfort and Wellbeing  Outcome: Ongoing, Progressing  Goal: Readiness for Transition of Care  Outcome: Ongoing, Progressing  Goal: Rounds/Family Conference  Outcome: Ongoing, Progressing     Problem: Heart Failure Comorbidity  Goal: Maintenance of Heart Failure Symptom Control  Outcome: Ongoing, Progressing     Problem: Hypertension Comorbidity  Goal: Blood Pressure in Desired Range  Outcome: Ongoing, Progressing     Problem: Adjustment to Illness (Chronic Kidney Disease)  Goal: Optimal Coping with Chronic Illness  Outcome: Ongoing, Progressing     Problem: Electrolyte Imbalance (Chronic Kidney Disease)  Goal: Electrolyte Balance  Outcome: Ongoing, Progressing     Problem: Fluid Volume Excess (Chronic Kidney Disease)  Goal: Fluid Balance  Outcome: Ongoing, Progressing     Problem: Functional Decline (Chronic Kidney Disease)  Goal: Optimal Functional Ability  Outcome: Ongoing, Progressing     Problem: Hematologic Alteration (Chronic Kidney Disease)  Goal: Absence of Anemia Signs/Symptoms  Outcome: Ongoing, Progressing     Problem: Oral Intake Inadequate (Chronic Kidney Disease)  Goal: Optimal Oral Intake  Outcome: Ongoing, Progressing     Problem: Renal Function Impairment (Chronic Kidney Disease)  Goal: Laboratory Values and Blood Pressure Within Desired Range  Outcome: Ongoing, Progressing     Problem: Diabetes Comorbidity  Goal: Blood Glucose Level Within Desired Range  Outcome: Ongoing, Progressing     Problem: ARDS (Acute Respiratory Distress Syndrome)  Goal:  Effective Oxygenation  Outcome: Ongoing, Progressing     Problem: Gas Exchange Impaired  Goal: Optimal Gas Exchange  Outcome: Ongoing, Progressing     Problem: Airway Clearance Ineffective  Goal: Effective Airway Clearance  Outcome: Ongoing, Progressing     Problem: Skin Injury Risk Increased  Goal: Skin Health and Integrity  Outcome: Ongoing, Progressing     Problem: Device-Related Complication Risk (Hemodialysis)  Goal: Safe, Effective Therapy Delivery  Outcome: Ongoing, Progressing     Problem: Hemodynamic Instability (Hemodialysis)  Goal: Vital Signs Remain in Desired Range  Outcome: Ongoing, Progressing     Problem: Infection (Hemodialysis)  Goal: Absence of Infection Signs/Symptoms  Outcome: Ongoing, Progressing     Problem: Infection  Goal: Infection Symptom Resolution  Outcome: Ongoing, Progressing     Problem: Electrolyte Imbalance (Acute Kidney Injury/Impairment)  Goal: Serum Electrolyte Balance  Outcome: Ongoing, Progressing     Problem: Fluid Imbalance (Acute Kidney Injury/Impairment)  Goal: Optimal Fluid Balance  Outcome: Ongoing, Progressing     Problem: Hematologic Alteration (Acute Kidney Injury/Impairment)  Goal: Hemoglobin, Hematocrit and Platelets Within Normal Range  Outcome: Ongoing, Progressing     Problem: Oral Intake Inadequate (Acute Kidney Injury/Impairment)  Goal: Optimal Nutrition Intake  Outcome: Ongoing, Progressing     Problem: Renal Function Impairment (Acute Kidney Injury/Impairment)  Goal: Effective Renal Function  Outcome: Ongoing, Progressing     Problem: Obstructive Sleep Apnea Risk or Actual (Comorbidity Management)  Goal: Unobstructed Breathing During Sleep  Outcome: Ongoing, Progressing     Problem: Fatigue  Goal: Improved Activity Tolerance  Outcome: Ongoing, Progressing     Problem: Fluid Imbalance (Pneumonia)  Goal: Fluid Balance  Outcome: Ongoing, Progressing     Problem: Infection (Pneumonia)  Goal: Resolution of Infection Signs/Symptoms  Outcome: Ongoing, Progressing      Problem: Respiratory Compromise (Pneumonia)  Goal: Effective Oxygenation and Ventilation  Outcome: Ongoing, Progressing

## 2020-04-06 NOTE — PLAN OF CARE
VN cued into room for q2h rounding.  Pt resting comfortably in bed.  NADN.  Pt on room air, respirations even and unlabored. VN will continue to follow and be available as needed.

## 2020-04-06 NOTE — NURSING
CAPD set up . Manual fill of 1L of dianeal. VERY slow fill- 1L drained- blood tinged- PD cath capped and secured.

## 2020-04-07 LAB
ALBUMIN SERPL BCP-MCNC: 2.3 G/DL (ref 3.5–5.2)
ALP SERPL-CCNC: 86 U/L (ref 55–135)
ALT SERPL W/O P-5'-P-CCNC: 12 U/L (ref 10–44)
ANION GAP SERPL CALC-SCNC: 13 MMOL/L (ref 8–16)
AST SERPL-CCNC: 30 U/L (ref 10–40)
BASOPHILS # BLD AUTO: 0.01 K/UL (ref 0–0.2)
BASOPHILS NFR BLD: 0.1 % (ref 0–1.9)
BILIRUB SERPL-MCNC: 0.4 MG/DL (ref 0.1–1)
BUN SERPL-MCNC: 112 MG/DL (ref 8–23)
CALCIUM SERPL-MCNC: 8.5 MG/DL (ref 8.7–10.5)
CHLORIDE SERPL-SCNC: 105 MMOL/L (ref 95–110)
CO2 SERPL-SCNC: 22 MMOL/L (ref 23–29)
CREAT SERPL-MCNC: 6.5 MG/DL (ref 0.5–1.4)
DIFFERENTIAL METHOD: ABNORMAL
EOSINOPHIL # BLD AUTO: 0.1 K/UL (ref 0–0.5)
EOSINOPHIL NFR BLD: 1 % (ref 0–8)
ERYTHROCYTE [DISTWIDTH] IN BLOOD BY AUTOMATED COUNT: 16.8 % (ref 11.5–14.5)
EST. GFR  (AFRICAN AMERICAN): 9 ML/MIN/1.73 M^2
EST. GFR  (NON AFRICAN AMERICAN): 8 ML/MIN/1.73 M^2
GLUCOSE SERPL-MCNC: 85 MG/DL (ref 70–110)
HCT VFR BLD AUTO: 28.5 % (ref 40–54)
HGB BLD-MCNC: 9.2 G/DL (ref 14–18)
IMM GRANULOCYTES # BLD AUTO: 0.16 K/UL (ref 0–0.04)
IMM GRANULOCYTES NFR BLD AUTO: 1.8 % (ref 0–0.5)
LYMPHOCYTES # BLD AUTO: 1.2 K/UL (ref 1–4.8)
LYMPHOCYTES NFR BLD: 13.8 % (ref 18–48)
MAGNESIUM SERPL-MCNC: 2.2 MG/DL (ref 1.6–2.6)
MCH RBC QN AUTO: 29.3 PG (ref 27–31)
MCHC RBC AUTO-ENTMCNC: 32.3 G/DL (ref 32–36)
MCV RBC AUTO: 91 FL (ref 82–98)
MONOCYTES # BLD AUTO: 0.7 K/UL (ref 0.3–1)
MONOCYTES NFR BLD: 7.4 % (ref 4–15)
NEUTROPHILS # BLD AUTO: 6.7 K/UL (ref 1.8–7.7)
NEUTROPHILS NFR BLD: 75.9 % (ref 38–73)
NRBC BLD-RTO: 0 /100 WBC
PHOSPHATE SERPL-MCNC: 4.9 MG/DL (ref 2.7–4.5)
PLATELET # BLD AUTO: 133 K/UL (ref 150–350)
PMV BLD AUTO: 13 FL (ref 9.2–12.9)
POCT GLUCOSE: 104 MG/DL (ref 70–110)
POCT GLUCOSE: 112 MG/DL (ref 70–110)
POCT GLUCOSE: 116 MG/DL (ref 70–110)
POCT GLUCOSE: 89 MG/DL (ref 70–110)
POCT GLUCOSE: 91 MG/DL (ref 70–110)
POTASSIUM SERPL-SCNC: 4 MMOL/L (ref 3.5–5.1)
PROT SERPL-MCNC: 5.5 G/DL (ref 6–8.4)
RBC # BLD AUTO: 3.14 M/UL (ref 4.6–6.2)
SODIUM SERPL-SCNC: 140 MMOL/L (ref 136–145)
WBC # BLD AUTO: 8.89 K/UL (ref 3.9–12.7)

## 2020-04-07 PROCEDURE — 25000003 PHARM REV CODE 250: Performed by: STUDENT IN AN ORGANIZED HEALTH CARE EDUCATION/TRAINING PROGRAM

## 2020-04-07 PROCEDURE — 63600175 PHARM REV CODE 636 W HCPCS: Performed by: SURGERY

## 2020-04-07 PROCEDURE — 84100 ASSAY OF PHOSPHORUS: CPT

## 2020-04-07 PROCEDURE — 85025 COMPLETE CBC W/AUTO DIFF WBC: CPT

## 2020-04-07 PROCEDURE — 80053 COMPREHEN METABOLIC PANEL: CPT

## 2020-04-07 PROCEDURE — 11000001 HC ACUTE MED/SURG PRIVATE ROOM

## 2020-04-07 PROCEDURE — 83735 ASSAY OF MAGNESIUM: CPT

## 2020-04-07 PROCEDURE — 25000003 PHARM REV CODE 250: Performed by: SURGERY

## 2020-04-07 PROCEDURE — 80100016 HC MAINTENANCE HEMODIALYSIS

## 2020-04-07 PROCEDURE — 63600175 PHARM REV CODE 636 W HCPCS: Performed by: INTERNAL MEDICINE

## 2020-04-07 RX ORDER — INSULIN ASPART 100 [IU]/ML
7 INJECTION, SOLUTION INTRAVENOUS; SUBCUTANEOUS
Status: DISCONTINUED | OUTPATIENT
Start: 2020-04-07 | End: 2020-04-08

## 2020-04-07 RX ADMIN — AMIODARONE HYDROCHLORIDE 200 MG: 200 TABLET ORAL at 09:04

## 2020-04-07 RX ADMIN — HEPARIN SODIUM 5000 UNITS: 5000 INJECTION, SOLUTION INTRAVENOUS; SUBCUTANEOUS at 09:04

## 2020-04-07 RX ADMIN — HEPARIN SODIUM 5000 UNITS: 5000 INJECTION, SOLUTION INTRAVENOUS; SUBCUTANEOUS at 03:04

## 2020-04-07 RX ADMIN — TAMSULOSIN HYDROCHLORIDE 0.4 MG: 0.4 CAPSULE ORAL at 08:04

## 2020-04-07 RX ADMIN — FUROSEMIDE 80 MG: 10 INJECTION, SOLUTION INTRAVENOUS at 08:04

## 2020-04-07 RX ADMIN — ATORVASTATIN CALCIUM 80 MG: 40 TABLET, FILM COATED ORAL at 08:04

## 2020-04-07 RX ADMIN — PANTOPRAZOLE SODIUM 40 MG: 40 TABLET, DELAYED RELEASE ORAL at 08:04

## 2020-04-07 RX ADMIN — FINASTERIDE 5 MG: 5 TABLET, FILM COATED ORAL at 08:04

## 2020-04-07 RX ADMIN — DOXYCYCLINE HYCLATE 100 MG: 100 TABLET, COATED ORAL at 09:04

## 2020-04-07 RX ADMIN — CARVEDILOL 12.5 MG: 12.5 TABLET, FILM COATED ORAL at 08:04

## 2020-04-07 RX ADMIN — DOXYCYCLINE HYCLATE 100 MG: 100 TABLET, COATED ORAL at 08:04

## 2020-04-07 RX ADMIN — AMIODARONE HYDROCHLORIDE 200 MG: 200 TABLET ORAL at 08:04

## 2020-04-07 RX ADMIN — ASPIRIN 81 MG: 81 TABLET, COATED ORAL at 08:04

## 2020-04-07 RX ADMIN — HEPARIN SODIUM 5000 UNITS: 5000 INJECTION, SOLUTION INTRAVENOUS; SUBCUTANEOUS at 05:04

## 2020-04-07 NOTE — PLAN OF CARE
VN cued into room for q2h rounding.  Pt resting comfortably in bed, pt currently sleeping, respirations even and unlabored.  Pt on room air.  VN will continue to follow and be available as needed.

## 2020-04-07 NOTE — PROGRESS NOTES
[facility] Medicine Progress Note    Primary Team: [facility] [place] Hospitalist Team B  Attending Physician: Dr. Jones  Resident: [place]  Intern: Gaby    Subjective:     Patient was seen this morning via tele medicine.  Patient it doing good this morning.  He feels his breathing is fine.  He denies having any CP, HA, N/V/D, abdominal pain or scrotal pain.     Objective:     Last 24 Hour Vital Signs:  BP  Min: 112/58  Max: 134/64  Temp  Av.9 °F (36.6 °C)  Min: 97.4 °F (36.3 °C)  Max: 98.4 °F (36.9 °C)  Pulse  Av.8  Min: 59  Max: 60  Resp  Av.8  Min: 17  Max: 19  SpO2  Av.5 %  Min: 96 %  Max: 97 %  I/O last 3 completed shifts:  In: -   Out: 2450 [Urine:950; Other:1500]    Physical Examination:  Deferred  covid+  Appears comfortable via telemed    Laboratory:  Laboratory Data Reviewed: yes  Pertinent Findings:  Cr 4.7>>5.3>>5.4>>5.1>>5.3    Microbiology Data Reviewed: yes  Pertinent Findings:  Flu negative  COVID POSITIVE  3/28 blood cx, NGTD    Other Results:  EKG (my interpretation): NSR     Radiology Data Reviewed: yes  Pertinent Findings:  No new    Current Medications:     Infusions:       Scheduled:   sodium chloride 0.9%   Intravenous Once    amiodarone  200 mg Oral BID    aspirin  81 mg Oral Daily    atorvastatin  80 mg Oral Daily    carvediloL  12.5 mg Oral BID    doxycycline  100 mg Oral Q12H    finasteride  5 mg Oral Daily    furosemide (LASIX) IV  80 mg Intravenous Daily    heparin (porcine)  5,000 Units Subcutaneous Q8H    insulin aspart U-100  12 Units Subcutaneous TIDWM    insulin detemir U-100  20 Units Subcutaneous BID    pantoprazole  40 mg Oral Daily    tamsulosin  0.4 mg Oral Daily        PRN:  sodium chloride 0.9%, acetaminophen, calcium carbonate, dextrose 50 % in water (D50W), dextrose 50 % in water (D50W), glucagon (human recombinant), glucose, glucose, heparin (porcine), insulin aspart U-100, oxyCODONE-acetaminophen, sodium chloride 0.9%    Antibiotics and Day  Number of Therapy:  Rocephin, 3/28 - 3/30  Azithro, 3/28 - 3/30   (no plaquenil 2/2 long qTc)  Doxy 4/2 - present    Lines and Day Number of Therapy:  PIV    Assessment:     Houston Jc is a 74 y.o.male with  Patient Active Problem List    Diagnosis Date Noted    Bradycardia 04/04/2020    CLEMENCIA (acute kidney injury)     COVID-19 virus detected     Fever     Suspected 2019-Ncov Infection     SOB (shortness of breath) 03/28/2020    NICM (nonischemic cardiomyopathy) 11/06/2019    Abnormal transaminases 07/20/2019    Cardiac resynchronization therapy defibrillator (CRT-D) in place 07/19/2019    Third degree heart block 07/19/2019    Gout 07/18/2019    Abdominal distension 07/17/2019    Debility 07/17/2019    VT (ventricular tachycardia) 07/11/2019    Cardiorenal syndrome 07/11/2019    Acute systolic heart failure 07/11/2019    Complete heart block 07/09/2019    Morbid obesity 10/08/2015    Hypertension 09/21/2015    Diastolic dysfunction 09/21/2015    Sleep apnea 09/21/2015    Swelling of lower extremity 09/18/2015    Hypervolemia 09/18/2015     Mr. Benjamin presents to [facility] ED for 3 days of fever, SOB, diarrhea. Covid positive. Admitted for CLEMENCIA, troponemia, and hypoxia w/ exertion.     Plan:     Hypoxic respiratory failure 2/2 Covid-19  - pt with fever, SOB, diarrhea and labs indicative of  Covid-19 illness script  - flu negative, Covid positive  - pt w/ SOB, currently satting high 80s/low 90s on 2L NC, tachypnea resolved  - CXR w/ L sided opacity, continuing CAP abx (azithro, rocephin)  - (3/31) Patient afebrile and comfortable on room air.  - Has completed 4 days of azithro and rocephin. Per ID, can discontinue as bacterial coinfection highly unlikely.   - holding plaquenil 2/2 prolonged qTc on EKG.  - Completed course of solumedrol.  - Now on room air.     CLEMENCIA on CKD 4  - pt w/ baseline Cr 3, 4.6 on admit.  - s/p 500mL NS bolus in ED with symptomatic improvement, pt makes urine  - daily BMP  -  follows w/ Dr Fowler  - consulted Nephrology, appreciate recs  - discontinued allopurinol, avoid nephrotoxic meds  - Patient received HD on 3/31 with 1L removed, tolerated well  - Had PD catheter placed 4/3  PD catheter is draining slowly. Altepase was given to clear the catheter and will re-attempt PD.  If it is still draining slowly, patient will be taken back to OR for a revision of his PD catheter.     Troponemia, stable  - initial trop 2.9>>2.59 (baseline 0.08)  - EKG w/ NSR and 1st degree block, pt denies CP  - pt w/ hx of non-ischemic cardiomyopathy and complete heart block s/p CRT-D 2019  - likely 2/2 to Covid, now downtrending. No longer following.      HFrEF  - 7/2019 echo with EF 30%, global hypokinesis, and grade 2 diastolic dysfunction  - Had pacemaker placed in 2019.  - judiciously hydrate in setting of CLEMENCIA  - repeat echo pending Covid as pt with hx of cardio-renal syndrome  - follows w/ Dr Arroyo     Scrotal swelling and pain 2/2 varicocele  - as per wife, pt with >1m worsening scrotal pain and swelling  - has outpt Urology appt scheduled  - scrotal swelling with R varicocele, f/u scrotal US  - low concern for obstructive component of CLEMENCIA  - Consulted Urology and appreciate their recs  - Started on doxy for epididymoorchitis.   - His scrotal pain is improving. If it worsens will re-consult urology and obtain a US at that time.    T2DM  - Hyperglycemia likely 2/2 current steroid use.  - Increased long-acting 20 U bid, aspart 7 U with meals  - Added SSI for better control of his hyperglycemia.      Diet: Diabetic  PPX: heparin  Dispo: Pending evaluation of PD catheter.      Fransisco Griffin MD, PGY-1  \[facility] [place] [facility]\"" Internal Medicine      \[facility] [place] [facility]\"" Medicine Hospitalist Pager numbers:   \[facility] [place] [facility]\"" Hospitalist Medicine Team A (Abhinav/Jovani): [phone]  \[facility] [place] [facility]\"" Hospitalist Medicine Team B (Robert/Enedelia):  [phone]

## 2020-04-07 NOTE — PLAN OF CARE
VIRTUAL NURSE:  Cued into patient's room.  Permission received per patient to turn camera to view patient.  Pt in bed on phone at this time. Introduced as VN for night shift that will be working with floor nurse and nursing assistant.  Educated patient on VN's role in patient care. Plan of care reviewed with patient. Education on isolation precautions and virtual nurse rounding.  Opportunity given for questions and questions answered.  Instructed to call for assistance.  Will cont to monitor.    Labs, notes, and orders reviewed.

## 2020-04-07 NOTE — PLAN OF CARE
VN cued into room for q2h rounding.  Pt resting comfortably in bed with eyes closed.  NADN.  Pt on room air, no SOB noted with talking.  VN will continue to follow and be available as needed.

## 2020-04-07 NOTE — PLAN OF CARE
Pt resting quietly through the night. VS stable. No complaints of pain or nausea. No respiratory distress noted. Meds given per MAR. Pt safety maintained. Will continue to monitor.

## 2020-04-07 NOTE — PLAN OF CARE
Frequent check and two hour rounding completed.  Patient awake but relaxed and comfortable. Patient reports abdominal pain 8/10 at this time.  Roxanne, floor nurse notified of patients request.  All safety measures maintained.

## 2020-04-07 NOTE — PHYSICIAN QUERY
PT Name: Houston Jc  MR #: [id number]    Physician Query Form - Cause and Effect Relationship Clarification      CDS: Zeeshan Hill RN CCDS               Contact information: [email or web address]     This form is a permanent document in the medical record.     Query Date: April 7, 2020    By submitting this query, we are merely seeking further clarification of documentation. Please utilize your independent clinical judgment when addressing the question(s) below.    The Medical record contains the following:  Supporting Clinical Findings   Location in record   ESRD                                                                                                                                                         4/6/2020 Nephrology progress notes Smita Woo MD   CLEMENCIA on CKD 4  - pt w/ baseline Cr 3, 4.6 on admit  - s/p 500mL NS bolus in ED with symptomatic improvement, pt makes urine  - f/u am BMP  - as pts w/ Covid and underlying kidney disease have been going into renal failure rapidly, will consult Nephrology  - pending Nephrology recs, will consult Surgery for line placement should he need emergent CRRT 3/28/2020 H&P Shruthi Wooten MD/ Lonnie Mcdaniels MD   decreased intake .  CLEMENCIA/CKD 4   Diabetes Mellitus  Hypotension 3/29/2020 Nephrology consult Smita Woo MD   ARF on CKD4  -trialysis catheter in place  -HD today    CHF  -EF 30%  -bnp 525 3/30/2020 Nephrology progress notes Ximena Mccarthy NP/ Lou Fowler MD      3/28/2020 13:04   SARS-CoV2 (COVID-19) Qualitative PCR Detected (A)    Lab value         Provider, please clarify if there is any correlation between CLEMENCIA and COVID-19.           Are the conditions:      [ x  ] Due to or associated with each other   [   ] Unrelated to each other   [   ] Other (Please Specify): _________________________   [  ] Clinically Undetermined

## 2020-04-07 NOTE — PLAN OF CARE
VN cued into room for q2h rounding.  Pt resting comfortably in bed, with eyes closed.  NADN.  Pt on room air, RR 16.  Pt stated no needs at this time. VN will continue to follow and be available as needed.

## 2020-04-07 NOTE — NURSING
VIRTUAL NURSE 2 HOUR ROUNDS:  Cued into patient's room.  Patient resting comfortably in bed with eyes closed; respirations even and unlabored.  No distress noted.  Will cont to monitor.

## 2020-04-07 NOTE — PLAN OF CARE
VN cued into room for q2h rounding.  Pt resting comfortably in bed.  NADN.  Pt on room air.  Pt states no needs or complaints at this time.  VN will continue to follow and be available as needed.

## 2020-04-08 LAB
ALBUMIN SERPL BCP-MCNC: 2.3 G/DL (ref 3.5–5.2)
ALP SERPL-CCNC: 90 U/L (ref 55–135)
ALT SERPL W/O P-5'-P-CCNC: 12 U/L (ref 10–44)
ANION GAP SERPL CALC-SCNC: 13 MMOL/L (ref 8–16)
AST SERPL-CCNC: 38 U/L (ref 10–40)
BASOPHILS # BLD AUTO: 0.01 K/UL (ref 0–0.2)
BASOPHILS NFR BLD: 0.1 % (ref 0–1.9)
BILIRUB SERPL-MCNC: 0.5 MG/DL (ref 0.1–1)
BUN SERPL-MCNC: 99 MG/DL (ref 8–23)
CALCIUM SERPL-MCNC: 8.5 MG/DL (ref 8.7–10.5)
CHLORIDE SERPL-SCNC: 104 MMOL/L (ref 95–110)
CO2 SERPL-SCNC: 21 MMOL/L (ref 23–29)
CREAT SERPL-MCNC: 5.6 MG/DL (ref 0.5–1.4)
DIFFERENTIAL METHOD: ABNORMAL
EOSINOPHIL # BLD AUTO: 0.1 K/UL (ref 0–0.5)
EOSINOPHIL NFR BLD: 0.7 % (ref 0–8)
ERYTHROCYTE [DISTWIDTH] IN BLOOD BY AUTOMATED COUNT: 17.1 % (ref 11.5–14.5)
EST. GFR  (AFRICAN AMERICAN): 11 ML/MIN/1.73 M^2
EST. GFR  (NON AFRICAN AMERICAN): 9 ML/MIN/1.73 M^2
GLUCOSE SERPL-MCNC: 109 MG/DL (ref 70–110)
HCT VFR BLD AUTO: 27.4 % (ref 40–54)
HGB BLD-MCNC: 9 G/DL (ref 14–18)
IMM GRANULOCYTES # BLD AUTO: 0.11 K/UL (ref 0–0.04)
IMM GRANULOCYTES NFR BLD AUTO: 1.3 % (ref 0–0.5)
LYMPHOCYTES # BLD AUTO: 1.1 K/UL (ref 1–4.8)
LYMPHOCYTES NFR BLD: 12.7 % (ref 18–48)
MAGNESIUM SERPL-MCNC: 2 MG/DL (ref 1.6–2.6)
MCH RBC QN AUTO: 30.8 PG (ref 27–31)
MCHC RBC AUTO-ENTMCNC: 32.8 G/DL (ref 32–36)
MCV RBC AUTO: 94 FL (ref 82–98)
MONOCYTES # BLD AUTO: 0.7 K/UL (ref 0.3–1)
MONOCYTES NFR BLD: 7.4 % (ref 4–15)
NEUTROPHILS # BLD AUTO: 6.8 K/UL (ref 1.8–7.7)
NEUTROPHILS NFR BLD: 77.8 % (ref 38–73)
NRBC BLD-RTO: 0 /100 WBC
PHOSPHATE SERPL-MCNC: 4.6 MG/DL (ref 2.7–4.5)
PLATELET # BLD AUTO: 125 K/UL (ref 150–350)
PMV BLD AUTO: ABNORMAL FL (ref 9.2–12.9)
POCT GLUCOSE: 117 MG/DL (ref 70–110)
POCT GLUCOSE: 127 MG/DL (ref 70–110)
POCT GLUCOSE: 138 MG/DL (ref 70–110)
POCT GLUCOSE: 145 MG/DL (ref 70–110)
POTASSIUM SERPL-SCNC: 4.4 MMOL/L (ref 3.5–5.1)
PROT SERPL-MCNC: 5.6 G/DL (ref 6–8.4)
RBC # BLD AUTO: 2.92 M/UL (ref 4.6–6.2)
SODIUM SERPL-SCNC: 138 MMOL/L (ref 136–145)
WBC # BLD AUTO: 8.79 K/UL (ref 3.9–12.7)

## 2020-04-08 PROCEDURE — 25000003 PHARM REV CODE 250: Performed by: STUDENT IN AN ORGANIZED HEALTH CARE EDUCATION/TRAINING PROGRAM

## 2020-04-08 PROCEDURE — 84100 ASSAY OF PHOSPHORUS: CPT

## 2020-04-08 PROCEDURE — 36415 COLL VENOUS BLD VENIPUNCTURE: CPT

## 2020-04-08 PROCEDURE — 80053 COMPREHEN METABOLIC PANEL: CPT

## 2020-04-08 PROCEDURE — 63600175 PHARM REV CODE 636 W HCPCS: Performed by: SURGERY

## 2020-04-08 PROCEDURE — 25000003 PHARM REV CODE 250: Performed by: SURGERY

## 2020-04-08 PROCEDURE — 11000001 HC ACUTE MED/SURG PRIVATE ROOM

## 2020-04-08 PROCEDURE — 63600175 PHARM REV CODE 636 W HCPCS: Mod: JG | Performed by: INTERNAL MEDICINE

## 2020-04-08 PROCEDURE — 83735 ASSAY OF MAGNESIUM: CPT

## 2020-04-08 PROCEDURE — 85025 COMPLETE CBC W/AUTO DIFF WBC: CPT

## 2020-04-08 RX ADMIN — DOXYCYCLINE HYCLATE 100 MG: 100 TABLET, COATED ORAL at 08:04

## 2020-04-08 RX ADMIN — DOXYCYCLINE HYCLATE 100 MG: 100 TABLET, COATED ORAL at 09:04

## 2020-04-08 RX ADMIN — ASPIRIN 81 MG: 81 TABLET, COATED ORAL at 08:04

## 2020-04-08 RX ADMIN — AMIODARONE HYDROCHLORIDE 200 MG: 200 TABLET ORAL at 09:04

## 2020-04-08 RX ADMIN — HEPARIN SODIUM 5000 UNITS: 5000 INJECTION, SOLUTION INTRAVENOUS; SUBCUTANEOUS at 09:04

## 2020-04-08 RX ADMIN — ALTEPLASE 8 MG: 2.2 INJECTION, POWDER, LYOPHILIZED, FOR SOLUTION INTRAVENOUS at 05:04

## 2020-04-08 RX ADMIN — CARVEDILOL 12.5 MG: 12.5 TABLET, FILM COATED ORAL at 09:04

## 2020-04-08 RX ADMIN — HEPARIN SODIUM 5000 UNITS: 5000 INJECTION, SOLUTION INTRAVENOUS; SUBCUTANEOUS at 06:04

## 2020-04-08 RX ADMIN — FINASTERIDE 5 MG: 5 TABLET, FILM COATED ORAL at 08:04

## 2020-04-08 RX ADMIN — FUROSEMIDE 80 MG: 10 INJECTION, SOLUTION INTRAVENOUS at 08:04

## 2020-04-08 RX ADMIN — AMIODARONE HYDROCHLORIDE 200 MG: 200 TABLET ORAL at 08:04

## 2020-04-08 RX ADMIN — CARVEDILOL 12.5 MG: 12.5 TABLET, FILM COATED ORAL at 08:04

## 2020-04-08 RX ADMIN — ATORVASTATIN CALCIUM 80 MG: 40 TABLET, FILM COATED ORAL at 08:04

## 2020-04-08 RX ADMIN — PANTOPRAZOLE SODIUM 40 MG: 40 TABLET, DELAYED RELEASE ORAL at 08:04

## 2020-04-08 RX ADMIN — HEPARIN SODIUM 5000 UNITS: 5000 INJECTION, SOLUTION INTRAVENOUS; SUBCUTANEOUS at 02:04

## 2020-04-08 RX ADMIN — TAMSULOSIN HYDROCHLORIDE 0.4 MG: 0.4 CAPSULE ORAL at 08:04

## 2020-04-08 NOTE — PLAN OF CARE
VIRTUAL NURSE:  Cued into patient's room.  Permission received per patient to turn camera to view patient. Pt resting quietly in bed at this time. States he is tired right now. Introduced as VN for night shift that will be working with floor nurse and nursing assistant.  Educated patient on VN's role in patient care. Plan of care reviewed with patient. Education isolation precautions and virtual nurse rounding. Opportunity given for questions and questions answered.  Instructed to call for assistance.  Will cont to monitor.    Labs, notes, and orders reviewed.

## 2020-04-08 NOTE — NURSING
VIRTUAL NURSE:  Cued into patient's room.  Patient not responding to introduction and permission inquiry.  Patient resting comfortably in bed with eyes closed; respirations even and unlabored.  No distress noted.

## 2020-04-08 NOTE — PLAN OF CARE
VN cued into patients room for   rounding - Patient is in no acute distress at this time.  On room air   Call light within reach. Will continue to monitor closely.  Pt instructed to call staff for any respiratory distress

## 2020-04-08 NOTE — PROGRESS NOTES
Progress Note  Nephrology      Consult Requested By: Cullen Jones MD      SUBJECTIVE:     Overnight events  Patient is a 74 y.o. male     Patient Active Problem List   Diagnosis    Swelling of lower extremity    Hypervolemia    Hypertension    Diastolic dysfunction    Sleep apnea    Morbid obesity    Complete heart block    VT (ventricular tachycardia)    Cardiorenal syndrome    Acute systolic heart failure    Abdominal distension    Debility    Gout    Cardiac resynchronization therapy defibrillator (CRT-D) in place    Third degree heart block    Abnormal transaminases    NICM (nonischemic cardiomyopathy)    SOB (shortness of breath)    COVID-19 virus detected    Fever    Suspected 2019-Ncov Infection    CLEMENCIA (acute kidney injury)    Bradycardia     Past Medical History:   Diagnosis Date    Anemia     CHF (congestive heart failure)     CKD (chronic kidney disease) stage 4, GFR 15-29 ml/min     Coronary artery disease     Diabetes mellitus     Hematuria     Hypertension     Renal cyst, right               OBJECTIVE:     Vitals:    04/08/20 0417 04/08/20 0750 04/08/20 1301 04/08/20 1545   BP:   (!) 93/54    BP Location:       Patient Position:   Lying    Pulse: 60 60 61 60   Resp:   17    Temp:   97.4 °F (36.3 °C)    TempSrc:   Oral    SpO2:       Weight:       Height:           Temp: 97.4 °F (36.3 °C) (04/08/20 1301)  Pulse: 60 (04/08/20 1545)  Resp: 17 (04/08/20 1301)  BP: (!) 93/54 (04/08/20 1301)  SpO2: 96 % (04/08/20 0415)              Medications:   sodium chloride 0.9%   Intravenous Once    amiodarone  200 mg Oral BID    aspirin  81 mg Oral Daily    atorvastatin  80 mg Oral Daily    carvediloL  12.5 mg Oral BID    doxycycline  100 mg Oral Q12H    finasteride  5 mg Oral Daily    furosemide (LASIX) IV  80 mg Intravenous Daily    heparin (porcine)  5,000 Units Subcutaneous Q8H    pantoprazole  40 mg Oral Daily    tamsulosin  0.4 mg Oral Daily                 Physical  Exam:  Lungs: RR 17  P oxy 96 %  Heart: Pulse 60  BP 93/54  Laboratory:  ABG  Labs reviewed  Recent Results (from the past 336 hour(s))   Basic metabolic panel    Collection Time: 03/30/20  7:47 AM   Result Value Ref Range    Sodium 127 (L) 136 - 145 mmol/L    Potassium 4.4 3.5 - 5.1 mmol/L    Chloride 92 (L) 95 - 110 mmol/L    CO2 23 23 - 29 mmol/L    BUN, Bld 106 (H) 8 - 23 mg/dL    Creatinine 5.4 (H) 0.5 - 1.4 mg/dL    Calcium 9.2 8.7 - 10.5 mg/dL    Anion Gap 12 8 - 16 mmol/L   Basic metabolic panel    Collection Time: 03/29/20  6:40 AM   Result Value Ref Range    Sodium 131 (L) 136 - 145 mmol/L    Potassium 4.2 3.5 - 5.1 mmol/L    Chloride 96 95 - 110 mmol/L    CO2 23 23 - 29 mmol/L    BUN, Bld 109 (H) 8 - 23 mg/dL    Creatinine 5.3 (H) 0.5 - 1.4 mg/dL    Calcium 8.8 8.7 - 10.5 mg/dL    Anion Gap 12 8 - 16 mmol/L   Basic metabolic panel    Collection Time: 03/28/20  8:05 PM   Result Value Ref Range    Sodium 131 (L) 136 - 145 mmol/L    Potassium 4.5 3.5 - 5.1 mmol/L    Chloride 97 95 - 110 mmol/L    CO2 18 (L) 23 - 29 mmol/L    BUN, Bld 99 (H) 8 - 23 mg/dL    Creatinine 4.7 (H) 0.5 - 1.4 mg/dL    Calcium 8.3 (L) 8.7 - 10.5 mg/dL    Anion Gap 16 8 - 16 mmol/L     Recent Results (from the past 336 hour(s))   CBC auto differential    Collection Time: 04/08/20  6:04 AM   Result Value Ref Range    WBC 8.79 3.90 - 12.70 K/uL    Hemoglobin 9.0 (L) 14.0 - 18.0 g/dL    Hematocrit 27.4 (L) 40.0 - 54.0 %    Platelets 125 (L) 150 - 350 K/uL   CBC auto differential    Collection Time: 04/07/20  5:54 AM   Result Value Ref Range    WBC 8.89 3.90 - 12.70 K/uL    Hemoglobin 9.2 (L) 14.0 - 18.0 g/dL    Hematocrit 28.5 (L) 40.0 - 54.0 %    Platelets 133 (L) 150 - 350 K/uL   CBC auto differential    Collection Time: 04/06/20  4:50 AM   Result Value Ref Range    WBC 11.29 3.90 - 12.70 K/uL    Hemoglobin 9.6 (L) 14.0 - 18.0 g/dL    Hematocrit 29.5 (L) 40.0 - 54.0 %    Platelets 180 150 - 350 K/uL     Urinalysis  No results for  input(s): COLORU, CLARITYU, SPECGRAV, PHUR, PROTEINUA, GLUCOSEU, BILIRUBINCON, BLOODU, WBCU, RBCU, BACTERIA, MUCUS, NITRITE, LEUKOCYTESUR, UROBILINOGEN, HYALINECASTS in the last 24 hours.    Diagnostic Results:  X-Ray: Reviewed  US: Reviewed  Echo: Reviewed  ACCESS    ASSESSMENT/PLAN:   CLEMENCIA/ CKD 4  Metabolic acidosis  Metabolic alkalosis  Poor nutrition  Anemia  Hemodialysis yesterday  Net UF 1000 cc  BP 93/54    Malfunctioning PD catheter.  Cath flow PD catheter today.  May need tunneled catheter for HD.

## 2020-04-08 NOTE — PLAN OF CARE
VN cued into patients room for rounding - Patient is in no acute distress at this time.  Call light within reach. Will continue to monitor closely.   On room air    Appears to be sleeping   resp effort without any  Difficulty

## 2020-04-08 NOTE — PLAN OF CARE
Patient AAOx3, some confusion with time, reorientation provided.  Tolerating room air.  No SOB noted.

## 2020-04-08 NOTE — PROGRESS NOTES
[facility] Medicine Progress Note    Primary Team: [facility] [place] Hospitalist Team B  Attending Physician: Dr. Jones  Resident: [place]  Intern: Gaby    Subjective:     Patient was seen this morning via tele medicine.  Patient is doing fine this morning and he had no problems overnight. He was having abdominal pain overnight which was relieved with medications.  He had a BM yesterday.  He is not having any SOB and his scrotal pain is better than when he was admitted.  He denies any HA, dizziness, CP, N/V.     Objective:     Last 24 Hour Vital Signs:  BP  Min: 92/56  Max: 123/68  Temp  Av.8 °F (36.6 °C)  Min: 97.4 °F (36.3 °C)  Max: 98.4 °F (36.9 °C)  Pulse  Av.2  Min: 60  Max: 62  Resp  Av  Min: 18  Max: 18  SpO2  Av.3 %  Min: 93 %  Max: 96 %  Weight  Av.8 kg (286 lb 2.5 oz)  Min: 129.8 kg (286 lb 2.5 oz)  Max: 129.8 kg (286 lb 2.5 oz)  I/O last 3 completed shifts:  In: 625 [P.O.:125; Other:500]  Out: 2760 [Urine:1260; Other:1500]    Physical Examination:  Deferred  covid+  Appears comfortable via telemed    Laboratory:  Laboratory Data Reviewed: yes  Pertinent Findings:  Cr 4.7>>5.3>>5.4>>5.1>>5.3    Microbiology Data Reviewed: yes  Pertinent Findings:  Flu negative  COVID POSITIVE  3/28 blood cx, NGTD    Other Results:  EKG (my interpretation): NSR     Radiology Data Reviewed: yes  Pertinent Findings:  No new    Current Medications:     Infusions:       Scheduled:   sodium chloride 0.9%   Intravenous Once    amiodarone  200 mg Oral BID    aspirin  81 mg Oral Daily    atorvastatin  80 mg Oral Daily    carvediloL  12.5 mg Oral BID    doxycycline  100 mg Oral Q12H    finasteride  5 mg Oral Daily    furosemide (LASIX) IV  80 mg Intravenous Daily    heparin (porcine)  5,000 Units Subcutaneous Q8H    pantoprazole  40 mg Oral Daily    tamsulosin  0.4 mg Oral Daily        PRN:  sodium chloride 0.9%, acetaminophen, calcium carbonate, dextrose 50 % in water (D50W), dextrose 50 % in water  (D50W), glucagon (human recombinant), glucose, glucose, heparin (porcine), insulin aspart U-100, oxyCODONE-acetaminophen, sodium chloride 0.9%    Antibiotics and Day Number of Therapy:  Rocephin, 3/28 - 3/30  Azithro, 3/28 - 3/30   (no plaquenil 2/2 long qTc)  Doxy 4/2 - present    Lines and Day Number of Therapy:  PIV    Assessment:     Houston Jc is a 74 y.o.male with  Patient Active Problem List    Diagnosis Date Noted    Bradycardia 04/04/2020    CLEMENCIA (acute kidney injury)     COVID-19 virus detected     Fever     Suspected 2019-Ncov Infection     SOB (shortness of breath) 03/28/2020    NICM (nonischemic cardiomyopathy) 11/06/2019    Abnormal transaminases 07/20/2019    Cardiac resynchronization therapy defibrillator (CRT-D) in place 07/19/2019    Third degree heart block 07/19/2019    Gout 07/18/2019    Abdominal distension 07/17/2019    Debility 07/17/2019    VT (ventricular tachycardia) 07/11/2019    Cardiorenal syndrome 07/11/2019    Acute systolic heart failure 07/11/2019    Complete heart block 07/09/2019    Morbid obesity 10/08/2015    Hypertension 09/21/2015    Diastolic dysfunction 09/21/2015    Sleep apnea 09/21/2015    Swelling of lower extremity 09/18/2015    Hypervolemia 09/18/2015     Mr. Benjamin presents to [facility] ED for 3 days of fever, SOB, diarrhea. Covid positive. Admitted for CLEMENCIA, troponemia, and hypoxia w/ exertion.     Plan:     Hypoxic respiratory failure 2/2 Covid-19  - pt with fever, SOB, diarrhea and labs indicative of  Covid-19 illness script  - flu negative, Covid positive  - pt w/ SOB, currently satting high 80s/low 90s on 2L NC, tachypnea resolved  - CXR w/ L sided opacity, continuing CAP abx (azithro, rocephin)  - (3/31) Patient afebrile and comfortable on room air.  - Has completed 4 days of azithro and rocephin. Per ID, can discontinue as bacterial coinfection highly unlikely.   - holding plaquenil 2/2 prolonged qTc on EKG.  - Completed course of  solumedrol.  - Now on room air.     CLEMENCIA on CKD 4  - pt w/ baseline Cr 3, 4.6 on admit.  - s/p 500mL NS bolus in ED with symptomatic improvement, pt makes urine  - daily BMP  - follows w/ Dr Fowler  - consulted Nephrology, appreciate recs  - discontinued allopurinol, avoid nephrotoxic meds  - Patient received HD on 3/31 with 1L removed, tolerated well  - Had PD catheter placed 4/3  PD catheter is draining slowly. Altepase was given to clear the catheter.  Catheter since not working.  Patient had HD due to PD catheter not working.  The malfunctioning PD catheter was discussed with surgery.  Altepase will be tried again.     Troponemia, stable  - initial trop 2.9>>2.59 (baseline 0.08)  - EKG w/ NSR and 1st degree block, pt denies CP  - pt w/ hx of non-ischemic cardiomyopathy and complete heart block s/p CRT-D 2019  - likely 2/2 to Covid, now downtrending. No longer following.      HFrEF  - 7/2019 echo with EF 30%, global hypokinesis, and grade 2 diastolic dysfunction  - Had pacemaker placed in 2019.  - judiciously hydrate in setting of CLEMENCIA  - repeat echo pending Covid as pt with hx of cardio-renal syndrome  - follows w/ Dr Arroyo     Scrotal swelling and pain 2/2 varicocele  - as per wife, pt with >1m worsening scrotal pain and swelling  - has outpt Urology appt scheduled  - scrotal swelling with R varicocele, f/u scrotal US  - low concern for obstructive component of CLEMENCIA  - Consulted Urology and appreciate their recs  - Started on doxy for epididymoorchitis.   - His scrotal pain is improving. If it worsens will re-consult urology and obtain a US at that time.    T2DM  - Hyperglycemia likely 2/2 current steroid use.  - Stopped long-acting 20 U daily, aspart 7 U with meals due to patient not being hyperglycemic.  - Continue SSI with accu checks.      Diet: Diabetic  PPX: heparin  Dispo: Pending evaluation of PD catheter.      Fransisco Griffin MD, PGY-1  [facility]      [facility] Hospitalist Pager  numbers:   [facility] Hospitalist Medicine Team A (Abhinav/Jovani): [phone]  [facility] Hospitalist Medicine Team B (Robert/Enedelia):  [phone]

## 2020-04-09 LAB
ALBUMIN SERPL BCP-MCNC: 2.3 G/DL (ref 3.5–5.2)
ALP SERPL-CCNC: 90 U/L (ref 55–135)
ALT SERPL W/O P-5'-P-CCNC: 11 U/L (ref 10–44)
ANION GAP SERPL CALC-SCNC: 13 MMOL/L (ref 8–16)
AST SERPL-CCNC: 34 U/L (ref 10–40)
BASOPHILS # BLD AUTO: 0 K/UL (ref 0–0.2)
BASOPHILS NFR BLD: 0 % (ref 0–1.9)
BILIRUB SERPL-MCNC: 0.5 MG/DL (ref 0.1–1)
BUN SERPL-MCNC: 114 MG/DL (ref 8–23)
CALCIUM SERPL-MCNC: 8.5 MG/DL (ref 8.7–10.5)
CHLORIDE SERPL-SCNC: 105 MMOL/L (ref 95–110)
CO2 SERPL-SCNC: 21 MMOL/L (ref 23–29)
CREAT SERPL-MCNC: 6.1 MG/DL (ref 0.5–1.4)
DIFFERENTIAL METHOD: ABNORMAL
EOSINOPHIL # BLD AUTO: 0 K/UL (ref 0–0.5)
EOSINOPHIL NFR BLD: 0.4 % (ref 0–8)
ERYTHROCYTE [DISTWIDTH] IN BLOOD BY AUTOMATED COUNT: 17.1 % (ref 11.5–14.5)
EST. GFR  (AFRICAN AMERICAN): 10 ML/MIN/1.73 M^2
EST. GFR  (NON AFRICAN AMERICAN): 8 ML/MIN/1.73 M^2
GLUCOSE SERPL-MCNC: 153 MG/DL (ref 70–110)
HCT VFR BLD AUTO: 27.2 % (ref 40–54)
HGB BLD-MCNC: 8.8 G/DL (ref 14–18)
IMM GRANULOCYTES # BLD AUTO: 0.05 K/UL (ref 0–0.04)
IMM GRANULOCYTES NFR BLD AUTO: 0.6 % (ref 0–0.5)
LYMPHOCYTES # BLD AUTO: 1.2 K/UL (ref 1–4.8)
LYMPHOCYTES NFR BLD: 14.8 % (ref 18–48)
MAGNESIUM SERPL-MCNC: 2.1 MG/DL (ref 1.6–2.6)
MCH RBC QN AUTO: 29.7 PG (ref 27–31)
MCHC RBC AUTO-ENTMCNC: 32.4 G/DL (ref 32–36)
MCV RBC AUTO: 92 FL (ref 82–98)
MONOCYTES # BLD AUTO: 0.7 K/UL (ref 0.3–1)
MONOCYTES NFR BLD: 8.7 % (ref 4–15)
NEUTROPHILS # BLD AUTO: 5.9 K/UL (ref 1.8–7.7)
NEUTROPHILS NFR BLD: 75.5 % (ref 38–73)
NRBC BLD-RTO: 0 /100 WBC
PHOSPHATE SERPL-MCNC: 5.4 MG/DL (ref 2.7–4.5)
PLATELET # BLD AUTO: 117 K/UL (ref 150–350)
PLATELET BLD QL SMEAR: ABNORMAL
PMV BLD AUTO: 13.4 FL (ref 9.2–12.9)
POCT GLUCOSE: 130 MG/DL (ref 70–110)
POCT GLUCOSE: 136 MG/DL (ref 70–110)
POCT GLUCOSE: 153 MG/DL (ref 70–110)
POCT GLUCOSE: 195 MG/DL (ref 70–110)
POTASSIUM SERPL-SCNC: 4.6 MMOL/L (ref 3.5–5.1)
PROT SERPL-MCNC: 5.5 G/DL (ref 6–8.4)
RBC # BLD AUTO: 2.96 M/UL (ref 4.6–6.2)
SODIUM SERPL-SCNC: 139 MMOL/L (ref 136–145)
WBC # BLD AUTO: 7.78 K/UL (ref 3.9–12.7)

## 2020-04-09 PROCEDURE — 25000003 PHARM REV CODE 250: Performed by: SURGERY

## 2020-04-09 PROCEDURE — 80100016 HC MAINTENANCE HEMODIALYSIS

## 2020-04-09 PROCEDURE — 94761 N-INVAS EAR/PLS OXIMETRY MLT: CPT

## 2020-04-09 PROCEDURE — 63600175 PHARM REV CODE 636 W HCPCS: Performed by: SURGERY

## 2020-04-09 PROCEDURE — 11000001 HC ACUTE MED/SURG PRIVATE ROOM

## 2020-04-09 PROCEDURE — 25000003 PHARM REV CODE 250: Performed by: STUDENT IN AN ORGANIZED HEALTH CARE EDUCATION/TRAINING PROGRAM

## 2020-04-09 PROCEDURE — 80053 COMPREHEN METABOLIC PANEL: CPT

## 2020-04-09 PROCEDURE — 85025 COMPLETE CBC W/AUTO DIFF WBC: CPT

## 2020-04-09 PROCEDURE — 97802 MEDICAL NUTRITION INDIV IN: CPT

## 2020-04-09 PROCEDURE — 83735 ASSAY OF MAGNESIUM: CPT

## 2020-04-09 PROCEDURE — 25000003 PHARM REV CODE 250: Performed by: INTERNAL MEDICINE

## 2020-04-09 PROCEDURE — 84100 ASSAY OF PHOSPHORUS: CPT

## 2020-04-09 PROCEDURE — 63600175 PHARM REV CODE 636 W HCPCS: Performed by: INTERNAL MEDICINE

## 2020-04-09 RX ORDER — SODIUM CHLORIDE 9 MG/ML
INJECTION, SOLUTION INTRAVENOUS ONCE
Status: COMPLETED | OUTPATIENT
Start: 2020-04-09 | End: 2020-04-09

## 2020-04-09 RX ORDER — MUPIROCIN 20 MG/G
OINTMENT TOPICAL 2 TIMES DAILY
Status: DISCONTINUED | OUTPATIENT
Start: 2020-04-09 | End: 2020-04-11 | Stop reason: HOSPADM

## 2020-04-09 RX ORDER — SODIUM CHLORIDE 9 MG/ML
INJECTION, SOLUTION INTRAVENOUS
Status: DISCONTINUED | OUTPATIENT
Start: 2020-04-09 | End: 2020-04-11 | Stop reason: HOSPADM

## 2020-04-09 RX ADMIN — CARVEDILOL 12.5 MG: 12.5 TABLET, FILM COATED ORAL at 09:04

## 2020-04-09 RX ADMIN — DOXYCYCLINE HYCLATE 100 MG: 100 TABLET, COATED ORAL at 09:04

## 2020-04-09 RX ADMIN — ASPIRIN 81 MG: 81 TABLET, COATED ORAL at 09:04

## 2020-04-09 RX ADMIN — MUPIROCIN: 20 OINTMENT TOPICAL at 02:04

## 2020-04-09 RX ADMIN — PANTOPRAZOLE SODIUM 40 MG: 40 TABLET, DELAYED RELEASE ORAL at 09:04

## 2020-04-09 RX ADMIN — AMIODARONE HYDROCHLORIDE 200 MG: 200 TABLET ORAL at 09:04

## 2020-04-09 RX ADMIN — AMIODARONE HYDROCHLORIDE 200 MG: 200 TABLET ORAL at 10:04

## 2020-04-09 RX ADMIN — HEPARIN SODIUM 2300 UNITS: 1000 INJECTION, SOLUTION INTRAVENOUS; SUBCUTANEOUS at 04:04

## 2020-04-09 RX ADMIN — SODIUM CHLORIDE: 0.9 INJECTION, SOLUTION INTRAVENOUS at 04:04

## 2020-04-09 RX ADMIN — TAMSULOSIN HYDROCHLORIDE 0.4 MG: 0.4 CAPSULE ORAL at 09:04

## 2020-04-09 RX ADMIN — FINASTERIDE 5 MG: 5 TABLET, FILM COATED ORAL at 09:04

## 2020-04-09 RX ADMIN — ATORVASTATIN CALCIUM 80 MG: 40 TABLET, FILM COATED ORAL at 09:04

## 2020-04-09 RX ADMIN — HEPARIN SODIUM 5000 UNITS: 5000 INJECTION, SOLUTION INTRAVENOUS; SUBCUTANEOUS at 02:04

## 2020-04-09 RX ADMIN — MUPIROCIN: 20 OINTMENT TOPICAL at 10:04

## 2020-04-09 RX ADMIN — HEPARIN SODIUM 5000 UNITS: 5000 INJECTION, SOLUTION INTRAVENOUS; SUBCUTANEOUS at 10:04

## 2020-04-09 RX ADMIN — FUROSEMIDE 80 MG: 10 INJECTION, SOLUTION INTRAVENOUS at 09:04

## 2020-04-09 RX ADMIN — CALCIUM CARBONATE (ANTACID) CHEW TAB 500 MG 500 MG: 500 CHEW TAB at 10:04

## 2020-04-09 RX ADMIN — HEPARIN SODIUM 5000 UNITS: 5000 INJECTION, SOLUTION INTRAVENOUS; SUBCUTANEOUS at 05:04

## 2020-04-09 RX ADMIN — DOXYCYCLINE HYCLATE 100 MG: 100 TABLET, COATED ORAL at 10:04

## 2020-04-09 RX ADMIN — CARVEDILOL 12.5 MG: 12.5 TABLET, FILM COATED ORAL at 10:04

## 2020-04-09 NOTE — PROGRESS NOTES
"  [facility]  Adult Nutrition  Consult Note    SUMMARY     Recommendations    Recommendation:   1. Consider adding Renal diet restricitions or phosphorus binder.   2. Rec'd Novasource Renal TID to meet nutrient needs.     Goals: PO intake > 50% EEN, EPN by next RD visit   Nutrition Goal Status: new  Communication of RD Recs: other (comment)(POC)    Reason for Assessment    Reason For Assessment: length of stay  Diagnosis: other (see comments)(COVID-19 Virus)  Relevant Medical History: CHF, CAD, HTN, DM2, CKDIV, Renal Cyst, Anemia   Interdisciplinary Rounds: did not attend  General Information Comments:   4/9/20: RD reporting remotely; called pt and RN with no answer, obtained information via chart. Pt with poor appetite noted from 4/4 to 4/7; PO intake 0 - 25%. Pt likely has continued with poor PO intake. No N/V/D/C noted. Pt with 19# wt loss x 1 month. NFPE not performed, Patient is noted as being positive for COVID-19.  Nutrition Discharge Planning: Adequate PO intake on DM2/Renal diet     Nutrition Risk Screen    Nutrition Risk Screen: no indicators present    Nutrition/Diet History    Spiritual, Cultural Beliefs, Latter-day Practices, Values that Affect Care: no  Food Allergies: NKFA  Factors Affecting Nutritional Intake: decreased appetite    Anthropometrics    Temp: 97.5 °F (36.4 °C)  Height Method: Stated  Height: 6' 2" (188 cm)  Height (inches): 74 in  Weight Method: Bed Scale  Weight: 128.3 kg (282 lb 13.6 oz)  Weight (lb): 282.85 lb  Ideal Body Weight (IBW), Male: 190 lb  % Ideal Body Weight, Male (lb): 148.87 %  BMI (Calculated): 36.3  BMI Grade: 35 - 39.9 - obesity - grade II  Weight Loss: unintentional  Usual Body Weight (UBW), kg: (!) 136.82 kg(3/6/20)  % Usual Body Weight: 93.97  % Weight Change From Usual Weight: -6.23 %     Lab/Procedures/Meds    Pertinent Labs Reviewed: reviewed  Pertinent Labs Comments: , Crt 6.1, eGFR 10, Glu 153, Ca 8.5, P 5.4, Alb 2.3, A1C 7.9 " (3/4)  Pertinent Medications Reviewed: reviewed  Pertinent Medications Comments: amiodarone, aspirin, statin, carvedilol, doxycycline, finasteride, furosemide, pantoprazole, tamsulosin    Physical Findings/Assessment    Anand Score 18  Incision - abdomen     Estimated/Assessed Needs    Weight Used For Calorie Calculations: 128.3 kg (282 lb 13.6 oz)  Energy Calorie Requirements (kcal): 2629 kcal/day  Energy Need Method: [place]-St Jeor(PAL 1.25)  Protein Requirements: 103 g/day(0.8 g/kg)  Weight Used For Protein Calculations: 128.3 kg (282 lb 13.6 oz)  Fluid Requirements (mL): 1 mL/kcal or per  MD   Estimated Fluid Requirement Method: RDA Method  RDA Method (mL): 2629  CHO Requirement: 306 g/day    Nutrition Prescription Ordered    Current Diet Order: DM 2000kcal     Evaluation of Received Nutrient/Fluid Intake    I/O: +75   Energy Calories Required: not meeting needs  Protein Required: not meeting needs  Fluid Required: meeting needs  Comments: LBM 4/8  Tolerance: not tolerating  % Intake of Estimated Energy Needs: 0 - 25 %   % Meal Intake: 0 - 25 %     Nutrition Risk    Level of Risk/Frequency of Follow-up: high(2x/week)     Assessment and Plan    Nutrition Problem   Inadequate energy intake     Related to (etiology):   Decreased appetite     Signs and Symptoms (as evidenced by):   PO intake < 25% EEN, EPN x 5 days    Interventions/Recommendations (treatment strategy):  Collaboration with other providers  [employer] [facility] Renal BID to provide calories and protein     Nutrition Diagnosis Status:   New     Monitor and Evaluation    Food and Nutrient Intake: energy intake, food and beverage intake  Food and Nutrient Adminstration: diet order  Physical Activity and Function: nutrition-related ADLs and IADLs  Anthropometric Measurements: weight, weight change, body mass index  Biochemical Data, Medical Tests and Procedures: electrolyte and renal panel, inflammatory profile, lipid profile,  glucose/endocrine profile     Nutrition Follow-Up    RD Follow-up?: Yes

## 2020-04-09 NOTE — PLAN OF CARE
VN cued into patients room for rounding - Patient is in no acute distress at this time.  Call light within reach. Will continue to monitor closely.  Pt instructed to contact nurse/ nurse station immediately for any decline in ability to breathe or  increasing shortness of breath

## 2020-04-09 NOTE — PLAN OF CARE
Recommendation:   1. Consider adding Renal diet restricitions or phosphorus binder.   2. Rec'd Novasource Renal TID to meet nutrient needs.     Goals: PO intake > 50% EEN, EPN by next RD visit   Nutrition Goal Status: new  Communication of RD Recs: other (comment)(POC)    Problem: Adult Inpatient Plan of Care  Goal: Plan of Care Review  Outcome: Ongoing, Progressing

## 2020-04-09 NOTE — PLAN OF CARE
Attempted to round on pt. Lights are off , unable to visualize pt. Nurse notified to turn on lights for visualization.

## 2020-04-09 NOTE — PLAN OF CARE
VN cued into room for q2h rounding.  Pt resting comfortably in bed.  Pt states no needs or complaints at this time.  Call light within reach, fall precautions maintained.  VN will continue to follow and be available as needed.

## 2020-04-09 NOTE — PLAN OF CARE
VN cued into patients room for rounding - Patient is in no acute distress at this time.  Call light within reach. Will continue to monitor closely.     Pt appears to be sleeping on his left side .  On room air   No visible signs of any dyspnea

## 2020-04-09 NOTE — PLAN OF CARE
left message for admissions coordinator at [facility] requesting a call back.    Patient was previously set up for Peritoneal dialysis and now needs to have set up changed to Hemodialysis.     faxed nephrology notes to Raphael with [facility] admissions.

## 2020-04-09 NOTE — PROGRESS NOTES
PD cath not functioning appropriately despite laparoscopic guided placement. Will need to convert RIJ Trialysis to tunneled cath and work on finding HD chair that will accept Covid 19+ patients. NPO at midnight. To OR for procedure tomorrow.     Edis Snell Jr

## 2020-04-09 NOTE — PROGRESS NOTES
[facility] Medicine Progress Note    Primary Team: [facility] Hospitalist Team B  Attending Physician: Dr. Jones  Resident: [place]  Intern: Gaby    Subjective:     Patient was seen this morning via tele medicine. He is feeling okay this morning.  He had a BM yesterday.  He denies having CP, SOB, N/V or abdominal pain.     Objective:     Last 24 Hour Vital Signs:  BP  Min: 93/54  Max: 128/58  Temp  Av.5 °F (36.4 °C)  Min: 97.1 °F (36.2 °C)  Max: 97.8 °F (36.6 °C)  Pulse  Av  Min: 59  Max: 61  Resp  Av  Min: 17  Max: 17  SpO2  Av.3 %  Min: 93 %  Max: 100 %  Weight  Av.3 kg (282 lb 13.6 oz)  Min: 128.3 kg (282 lb 13.6 oz)  Max: 128.3 kg (282 lb 13.6 oz)  I/O last 3 completed shifts:  In: 975 [P.O.:435; I.V.:40; Other:500]  Out:  [Urine:560; Other:1500]    Physical Examination:  Deferred  covid+  Appears comfortable via telemed    Laboratory:  Laboratory Data Reviewed: yes  Pertinent Findings:  Cr 4.7>>5.3>>5.4>>5.1>>5.3    Microbiology Data Reviewed: yes  Pertinent Findings:  Flu negative  COVID POSITIVE  3/28 blood cx, NGTD    Other Results:  EKG (my interpretation): NSR     Radiology Data Reviewed: yes  Pertinent Findings:  No new    Current Medications:     Infusions:       Scheduled:   sodium chloride 0.9%   Intravenous Once    amiodarone  200 mg Oral BID    aspirin  81 mg Oral Daily    atorvastatin  80 mg Oral Daily    carvediloL  12.5 mg Oral BID    doxycycline  100 mg Oral Q12H    finasteride  5 mg Oral Daily    furosemide (LASIX) IV  80 mg Intravenous Daily    heparin (porcine)  5,000 Units Subcutaneous Q8H    pantoprazole  40 mg Oral Daily    tamsulosin  0.4 mg Oral Daily        PRN:  sodium chloride 0.9%, acetaminophen, calcium carbonate, dextrose 50 % in water (D50W), dextrose 50 % in water (D50W), glucagon (human recombinant), glucose, glucose, heparin (porcine), insulin aspart U-100, oxyCODONE-acetaminophen, sodium chloride 0.9%    Antibiotics and Day Number of  Therapy:  Rocephin, 3/28 - 3/30  Azithro, 3/28 - 3/30   (no plaquenil 2/2 long qTc)  Doxy 4/2 - present    Lines and Day Number of Therapy:  PIV    Assessment:     Houston Jc is a 74 y.o.male with  Patient Active Problem List    Diagnosis Date Noted    Bradycardia 04/04/2020    CLEMENCIA (acute kidney injury)     COVID-19 virus detected     Fever     Suspected 2019-Ncov Infection     SOB (shortness of breath) 03/28/2020    NICM (nonischemic cardiomyopathy) 11/06/2019    Abnormal transaminases 07/20/2019    Cardiac resynchronization therapy defibrillator (CRT-D) in place 07/19/2019    Third degree heart block 07/19/2019    Gout 07/18/2019    Abdominal distension 07/17/2019    Debility 07/17/2019    VT (ventricular tachycardia) 07/11/2019    Cardiorenal syndrome 07/11/2019    Acute systolic heart failure 07/11/2019    Complete heart block 07/09/2019    Morbid obesity 10/08/2015    Hypertension 09/21/2015    Diastolic dysfunction 09/21/2015    Sleep apnea 09/21/2015    Swelling of lower extremity 09/18/2015    Hypervolemia 09/18/2015     Mr. Benjamin presents to [place] ED for 3 days of fever, SOB, diarrhea. Covid positive. Admitted for CLEMENCIA, troponemia, and hypoxia w/ exertion.     Plan:     Hypoxic respiratory failure 2/2 Covid-19  - pt with fever, SOB, diarrhea and labs indicative of  Covid-19 illness script  - flu negative, Covid positive  - pt w/ SOB, currently satting high 80s/low 90s on 2L NC, tachypnea resolved  - CXR w/ L sided opacity, continuing CAP abx (azithro, rocephin)  - (3/31) Patient afebrile and comfortable on room air.  - Has completed 4 days of azithro and rocephin. Per ID, can discontinue as bacterial coinfection highly unlikely.   - holding plaquenil 2/2 prolonged qTc on EKG.  - Completed course of solumedrol.  - Now on room air.     CLEMENCIA on CKD 4  - pt w/ baseline Cr 3, 4.6 on admit.  - s/p 500mL NS bolus in ED with symptomatic improvement, pt makes urine  - daily BMP  - follows w/  Dr Fowler  - consulted Nephrology, appreciate recs  - discontinued allopurinol, avoid nephrotoxic meds  - Patient received HD on 3/31 with 1L removed, tolerated well  - Had PD catheter placed 4/3  PD catheter is malfunctioning. S/p altepase x2. If catheter continues to malfunction, will switch to HD.  If switched to HD, patient will need a tunnel catheter.     Troponemia, stable  - initial trop 2.9>>2.59 (baseline 0.08)  - EKG w/ NSR and 1st degree block, pt denies CP  - pt w/ hx of non-ischemic cardiomyopathy and complete heart block s/p CRT-D 2019  - likely 2/2 to Covid, now downtrending. No longer following.      HFrEF  - 7/2019 echo with EF 30%, global hypokinesis, and grade 2 diastolic dysfunction  - Had pacemaker placed in 2019.  - judiciously hydrate in setting of CLEMENCIA  - repeat echo pending Covid as pt with hx of cardio-renal syndrome  - follows w/ Dr Arroyo     Scrotal swelling and pain 2/2 varicocele  - as per wife, pt with >1m worsening scrotal pain and swelling  - has outpt Urology appt scheduled  - scrotal swelling with R varicocele, f/u scrotal US  - low concern for obstructive component of CLEMENCIA  - Consulted Urology and appreciate their recs  - Started on doxy for epididymoorchitis.   - His scrotal pain is improving. If it worsens will re-consult urology and obtain a US at that time.    T2DM  - Hyperglycemia likely 2/2 current steroid use.  - Stopped long-acting 20 U daily, aspart 7 U with meals due to patient not being hyperglycemic. Will adjust insulin as needed.  - Continue SSI with accu checks.      Diet: Diabetic  PPX: heparin  Dispo: Pending evaluation of PD catheter.      Fransisco Griffin MD, PGY-1  [facility] Internal Medicine    [facility] Medicine Hospitalist Pager numbers:   [facility] Hospitalist Medicine Team A (Abhinav/Jovani): [phone]  [facility] Hospitalist Medicine Team B (Robert/Enedelia):  [phone]

## 2020-04-09 NOTE — PLAN OF CARE
Problem: Respiratory Compromise (Pneumonia)  Goal: Effective Oxygenation and Ventilation  Outcome: Ongoing, Progressing     Problem: Infection (Pneumonia)  Goal: Resolution of Infection Signs/Symptoms  Outcome: Ongoing, Progressing     Problem: Renal Function Impairment (Acute Kidney Injury/Impairment)  Goal: Effective Renal Function  Outcome: Ongoing, Progressing     PT is AAOX4, no acute changes noted during shift, pt is on bedrest and is repositioned with asst, no skin breakdown noted, VN hourly rounds made during shift, VSS. No falls noted. Fall precautions remain. Pain assessed. No pain noted. Pt resting comfortably in bed. Call light in reach. Will continue to monitor.

## 2020-04-09 NOTE — PLAN OF CARE
VN cued into patients room for rounding - Patient is in no acute distress at this time.  Call light within reach. Will continue to monitor closely.  Pt instructed to contact nurse/ nurse station immediately for any decline in ability to breathe or  increasing shortness of breath .

## 2020-04-10 ENCOUNTER — ANESTHESIA EVENT (OUTPATIENT)
Dept: SURGERY | Facility: HOSPITAL | Age: 75
DRG: 981 | End: 2020-04-10
Payer: MEDICARE

## 2020-04-10 LAB
ALBUMIN SERPL BCP-MCNC: 2.3 G/DL (ref 3.5–5.2)
ALP SERPL-CCNC: 91 U/L (ref 55–135)
ALT SERPL W/O P-5'-P-CCNC: 15 U/L (ref 10–44)
ANION GAP SERPL CALC-SCNC: 11 MMOL/L (ref 8–16)
AST SERPL-CCNC: 35 U/L (ref 10–40)
BASOPHILS # BLD AUTO: 0 K/UL (ref 0–0.2)
BASOPHILS NFR BLD: 0 % (ref 0–1.9)
BILIRUB SERPL-MCNC: 0.5 MG/DL (ref 0.1–1)
BUN SERPL-MCNC: 79 MG/DL (ref 8–23)
CALCIUM SERPL-MCNC: 8.4 MG/DL (ref 8.7–10.5)
CHLORIDE SERPL-SCNC: 102 MMOL/L (ref 95–110)
CO2 SERPL-SCNC: 24 MMOL/L (ref 23–29)
CREAT SERPL-MCNC: 5 MG/DL (ref 0.5–1.4)
DIFFERENTIAL METHOD: ABNORMAL
EOSINOPHIL # BLD AUTO: 0 K/UL (ref 0–0.5)
EOSINOPHIL NFR BLD: 0.6 % (ref 0–8)
ERYTHROCYTE [DISTWIDTH] IN BLOOD BY AUTOMATED COUNT: 17.1 % (ref 11.5–14.5)
EST. GFR  (AFRICAN AMERICAN): 12 ML/MIN/1.73 M^2
EST. GFR  (NON AFRICAN AMERICAN): 11 ML/MIN/1.73 M^2
GLUCOSE SERPL-MCNC: 138 MG/DL (ref 70–110)
HCT VFR BLD AUTO: 27.6 % (ref 40–54)
HGB BLD-MCNC: 9 G/DL (ref 14–18)
IMM GRANULOCYTES # BLD AUTO: 0.08 K/UL (ref 0–0.04)
IMM GRANULOCYTES NFR BLD AUTO: 1.1 % (ref 0–0.5)
LYMPHOCYTES # BLD AUTO: 1.1 K/UL (ref 1–4.8)
LYMPHOCYTES NFR BLD: 15.2 % (ref 18–48)
MAGNESIUM SERPL-MCNC: 1.9 MG/DL (ref 1.6–2.6)
MCH RBC QN AUTO: 29.6 PG (ref 27–31)
MCHC RBC AUTO-ENTMCNC: 32.6 G/DL (ref 32–36)
MCV RBC AUTO: 91 FL (ref 82–98)
MONOCYTES # BLD AUTO: 0.7 K/UL (ref 0.3–1)
MONOCYTES NFR BLD: 10 % (ref 4–15)
NEUTROPHILS # BLD AUTO: 5.3 K/UL (ref 1.8–7.7)
NEUTROPHILS NFR BLD: 73.1 % (ref 38–73)
NRBC BLD-RTO: 0 /100 WBC
PHOSPHATE SERPL-MCNC: 4.7 MG/DL (ref 2.7–4.5)
PLATELET # BLD AUTO: 121 K/UL (ref 150–350)
PMV BLD AUTO: 12.1 FL (ref 9.2–12.9)
POCT GLUCOSE: 110 MG/DL (ref 70–110)
POCT GLUCOSE: 124 MG/DL (ref 70–110)
POCT GLUCOSE: 133 MG/DL (ref 70–110)
POCT GLUCOSE: 159 MG/DL (ref 70–110)
POTASSIUM SERPL-SCNC: 4.2 MMOL/L (ref 3.5–5.1)
PROT SERPL-MCNC: 5.6 G/DL (ref 6–8.4)
RBC # BLD AUTO: 3.04 M/UL (ref 4.6–6.2)
SODIUM SERPL-SCNC: 137 MMOL/L (ref 136–145)
WBC # BLD AUTO: 7.23 K/UL (ref 3.9–12.7)

## 2020-04-10 PROCEDURE — 80100016 HC MAINTENANCE HEMODIALYSIS

## 2020-04-10 PROCEDURE — 63600175 PHARM REV CODE 636 W HCPCS: Performed by: SURGERY

## 2020-04-10 PROCEDURE — 25000003 PHARM REV CODE 250: Performed by: STUDENT IN AN ORGANIZED HEALTH CARE EDUCATION/TRAINING PROGRAM

## 2020-04-10 PROCEDURE — 80053 COMPREHEN METABOLIC PANEL: CPT

## 2020-04-10 PROCEDURE — 84100 ASSAY OF PHOSPHORUS: CPT

## 2020-04-10 PROCEDURE — 25000003 PHARM REV CODE 250: Performed by: SURGERY

## 2020-04-10 PROCEDURE — 63600175 PHARM REV CODE 636 W HCPCS: Performed by: INTERNAL MEDICINE

## 2020-04-10 PROCEDURE — 11000001 HC ACUTE MED/SURG PRIVATE ROOM

## 2020-04-10 PROCEDURE — 94761 N-INVAS EAR/PLS OXIMETRY MLT: CPT

## 2020-04-10 PROCEDURE — 85025 COMPLETE CBC W/AUTO DIFF WBC: CPT

## 2020-04-10 PROCEDURE — 83735 ASSAY OF MAGNESIUM: CPT

## 2020-04-10 RX ORDER — SODIUM CHLORIDE 9 MG/ML
INJECTION, SOLUTION INTRAVENOUS ONCE
Status: DISCONTINUED | OUTPATIENT
Start: 2020-04-10 | End: 2020-04-11 | Stop reason: HOSPADM

## 2020-04-10 RX ORDER — SODIUM CHLORIDE 9 MG/ML
INJECTION, SOLUTION INTRAVENOUS
Status: DISCONTINUED | OUTPATIENT
Start: 2020-04-10 | End: 2020-04-11 | Stop reason: HOSPADM

## 2020-04-10 RX ADMIN — MUPIROCIN: 20 OINTMENT TOPICAL at 09:04

## 2020-04-10 RX ADMIN — DOXYCYCLINE HYCLATE 100 MG: 100 TABLET, COATED ORAL at 09:04

## 2020-04-10 RX ADMIN — PANTOPRAZOLE SODIUM 40 MG: 40 TABLET, DELAYED RELEASE ORAL at 09:04

## 2020-04-10 RX ADMIN — TAMSULOSIN HYDROCHLORIDE 0.4 MG: 0.4 CAPSULE ORAL at 09:04

## 2020-04-10 RX ADMIN — FINASTERIDE 5 MG: 5 TABLET, FILM COATED ORAL at 09:04

## 2020-04-10 RX ADMIN — AMIODARONE HYDROCHLORIDE 200 MG: 200 TABLET ORAL at 09:04

## 2020-04-10 RX ADMIN — EPOETIN ALFA-EPBX 5000 UNITS: 3000 INJECTION, SOLUTION INTRAVENOUS; SUBCUTANEOUS at 03:04

## 2020-04-10 RX ADMIN — HEPARIN SODIUM 5000 UNITS: 5000 INJECTION, SOLUTION INTRAVENOUS; SUBCUTANEOUS at 01:04

## 2020-04-10 RX ADMIN — ATORVASTATIN CALCIUM 80 MG: 40 TABLET, FILM COATED ORAL at 09:04

## 2020-04-10 RX ADMIN — ASPIRIN 81 MG: 81 TABLET, COATED ORAL at 09:04

## 2020-04-10 RX ADMIN — CALCIUM CARBONATE (ANTACID) CHEW TAB 500 MG 500 MG: 500 CHEW TAB at 05:04

## 2020-04-10 RX ADMIN — CARVEDILOL 12.5 MG: 12.5 TABLET, FILM COATED ORAL at 09:04

## 2020-04-10 RX ADMIN — HEPARIN SODIUM 2300 UNITS: 1000 INJECTION, SOLUTION INTRAVENOUS; SUBCUTANEOUS at 05:04

## 2020-04-10 RX ADMIN — HEPARIN SODIUM 5000 UNITS: 5000 INJECTION, SOLUTION INTRAVENOUS; SUBCUTANEOUS at 09:04

## 2020-04-10 RX ADMIN — FUROSEMIDE 80 MG: 10 INJECTION, SOLUTION INTRAVENOUS at 09:04

## 2020-04-10 NOTE — PLAN OF CARE
Problem: Adult Inpatient Plan of Care  Goal: Plan of Care Review  Outcome: Ongoing, Progressing  Mr. Jc is resting and medications were given per orders. No complaints of PAIN,NV,SOB. Prepped for possible surgery this morning. Cardiac and blood glucose monitoring in place. RA noted. Safety maintained.

## 2020-04-10 NOTE — PLAN OF CARE
Frequent check completed. Patient back from dialysis and is now resting in bed. Patient is on RA. No distress noted at this time

## 2020-04-10 NOTE — PROGRESS NOTES
"[facility] Hospital Medicine Progress Note    Primary Team: [facility] Hospitalist Team B  Attending Physician: Dr. Jones  Resident: [place]  Intern: Gaby    Subjective:     Patient was seen this morning via tele medicine.  Patient is feeling tired this morning.  He is not having any SOB. He had a BM yesterday. His scrotal pain is better.  He denies any CP, N/V, or abdominal pain.     He is scheduled to go to the OR today to get a tunnel catheter.     Objective:     Last 24 Hour Vital Signs:  BP  Min: 86/52  Max: 122/78  Temp  Av.6 °F (36.4 °C)  Min: 97.5 °F (36.4 °C)  Max: 97.7 °F (36.5 °C)  Pulse  Av.2  Min: 58  Max: 65  Resp  Av  Min: 18  Max: 18  SpO2  Av.3 %  Min: 94 %  Max: 100 %  Height  Av' 2" (188 cm)  Min: 6' 2" (188 cm)  Max: 6' 2" (188 cm)  Weight  Av kg (282 lb 1.3 oz)  Min: 127.6 kg (281 lb 4.9 oz)  Max: 128.3 kg (282 lb 13.6 oz)  I/O last 3 completed shifts:  In: 625 [P.O.:125; Other:500]  Out: 1125 [Urine:625; Other:500]    Physical Examination:  Deferred  covid+  Appears comfortable via telemed    Laboratory:  Laboratory Data Reviewed: yes  Pertinent Findings:  Cr 4.7>>5.3>>5.4>>5.1>>5.3    Microbiology Data Reviewed: yes  Pertinent Findings:  Flu negative  COVID POSITIVE  3/28 blood cx, NGTD    Other Results:  EKG (my interpretation): NSR     Radiology Data Reviewed: yes  Pertinent Findings:  No new    Current Medications:     Infusions:       Scheduled:   sodium chloride 0.9%   Intravenous Once    amiodarone  200 mg Oral BID    aspirin  81 mg Oral Daily    atorvastatin  80 mg Oral Daily    carvediloL  12.5 mg Oral BID    doxycycline  100 mg Oral Q12H    finasteride  5 mg Oral Daily    furosemide (LASIX) IV  80 mg Intravenous Daily    heparin (porcine)  5,000 Units Subcutaneous Q8H    mupirocin   Nasal BID    pantoprazole  40 mg Oral Daily    tamsulosin  0.4 mg Oral Daily        PRN:  sodium chloride 0.9%, sodium chloride 0.9%, acetaminophen, calcium carbonate, " dextrose 50 % in water (D50W), dextrose 50 % in water (D50W), glucagon (human recombinant), glucose, glucose, heparin (porcine), insulin aspart U-100, oxyCODONE-acetaminophen, sodium chloride 0.9%    Antibiotics and Day Number of Therapy:  Rocephin, 3/28 - 3/30  Azithro, 3/28 - 3/30   (no plaquenil 2/2 long qTc)  Doxy 4/2 - present    Lines and Day Number of Therapy:  PIV    Assessment:     Houston Jc is a 74 y.o.male with  Patient Active Problem List    Diagnosis Date Noted    Bradycardia 04/04/2020    CLEMENCIA (acute kidney injury)     COVID-19 virus detected     Fever     Suspected 2019-Ncov Infection     SOB (shortness of breath) 03/28/2020    NICM (nonischemic cardiomyopathy) 11/06/2019    Abnormal transaminases 07/20/2019    Cardiac resynchronization therapy defibrillator (CRT-D) in place 07/19/2019    Third degree heart block 07/19/2019    Gout 07/18/2019    Abdominal distension 07/17/2019    Debility 07/17/2019    VT (ventricular tachycardia) 07/11/2019    Cardiorenal syndrome 07/11/2019    Acute systolic heart failure 07/11/2019    Complete heart block 07/09/2019    Morbid obesity 10/08/2015    Hypertension 09/21/2015    Diastolic dysfunction 09/21/2015    Sleep apnea 09/21/2015    Swelling of lower extremity 09/18/2015    Hypervolemia 09/18/2015     Mr. Benjamin presents to [place] ED for 3 days of fever, SOB, diarrhea. Covid positive. Admitted for CLEMENCIA, troponemia, and hypoxia w/ exertion.     Plan:     Hypoxic respiratory failure 2/2 Covid-19  - pt with fever, SOB, diarrhea and labs indicative of  Covid-19 illness script  - flu negative, Covid positive  - pt w/ SOB, currently satting high 80s/low 90s on 2L NC, tachypnea resolved  - CXR w/ L sided opacity, continuing CAP abx (azithro, rocephin)  - (3/31) Patient afebrile and comfortable on room air.  - Has completed 4 days of azithro and rocephin. Per ID, can discontinue as bacterial coinfection highly unlikely.   - holding plaquenil 2/2  prolonged qTc on EKG.  - Completed course of solumedrol.  - Now on room air.     CLEMENCIA on CKD 4  - pt w/ baseline Cr 3, 4.6 on admit.  - s/p 500mL NS bolus in ED with symptomatic improvement, pt makes urine  - daily BMP  - follows w/ Dr Fowler  - consulted Nephrology, appreciate recs  - discontinued allopurinol, avoid nephrotoxic meds  - Patient received HD on 3/31 with 1L removed, tolerated well  - Had PD catheter placed 4/3  PD catheter is malfunctioning s/p altepase x2.    - Plan for patient to go to OR for a tunnel catheter.  - Will need outpatient HD chair.     Troponemia, stable  - initial trop 2.9>>2.59 (baseline 0.08)  - EKG w/ NSR and 1st degree block, pt denies CP  - pt w/ hx of non-ischemic cardiomyopathy and complete heart block s/p CRT-D 2019  - likely 2/2 to Covid, now downtrending. No longer following.      HFrEF  - 7/2019 echo with EF 30%, global hypokinesis, and grade 2 diastolic dysfunction  - Had pacemaker placed in 2019.  - judiciously hydrate in setting of CLEMENCIA  - repeat echo pending Covid as pt with hx of cardio-renal syndrome  - follows w/ Dr Arroyo     Scrotal swelling and pain 2/2 varicocele  - as per wife, pt with >1m worsening scrotal pain and swelling  - has outpt Urology appt scheduled  - scrotal swelling with R varicocele, f/u scrotal US  - low concern for obstructive component of CLEMENCIA  - Consulted Urology and appreciate their recs  - Started on doxy for epididymoorchitis.   - His scrotal pain is improving. If it worsens will re-consult urology and obtain a US at that time.    T2DM  - Hyperglycemia likely 2/2 current steroid use.  - Stopped long-acting 20 U daily, aspart 7 U with meals due to patient not being hyperglycemic. Will adjust insulin as needed.  - Continue SSI with accu checks.      Diet: Diabetic  PPX: heparin  Dispo: Pending tunnel catheter and HD chair placement.      Fransisco Griffin MD, PGY-1  [facility] Internal Medicine    [facility] Medicine Hospitalist Pager numbers:   [facility]  Hospitalist Medicine Team A (Abhinav/Jovani): [phone]  [facility] Hospitalist Medicine Team B (Robert/Enedelia):  [phone]

## 2020-04-10 NOTE — PLAN OF CARE
VN cued into room.  Pt in bed, eyes close, room air.  Pt stated no needs at this time.  VN will continue to follow and be available as needed.

## 2020-04-10 NOTE — NURSING
Discharge planning update  Patient has a schedule for Dialysis at [facility] for 6am every  Tues, Thursday and Saturday, please call [phone], when discharged and also if patient needs transportation this has  To be done by Dustin.

## 2020-04-10 NOTE — PLAN OF CARE
Pt remains A+Ox4. PD unable to be accessed for dialysis--undewent HD this PM. Plan for PD cath removal in AM. Voiding via urinal. Poor appetite. Skin remains intact. Denies pain, n/v. Airborne, contact and droplet isolation maintained. Safety precautions maintained.

## 2020-04-10 NOTE — PLAN OF CARE
Frequent check completed. Patient resting in bed. Patient is on room air. CINTHIA at the bedside. Patient answered phone call while in room. No needs from nurse at this time. No distress noted at this time

## 2020-04-10 NOTE — PLAN OF CARE
VN Q2 Round: Pt alerted to voice. Patient in bed with eyes closed.  Resp even and unlabored.  NAD. On room air.VN to continue to monitor.

## 2020-04-10 NOTE — NURSING
This RN spoke with Dr Snell via telephone--attempting to reach pt (currently in dialysis). States will take pt for procedure in AM. States will call pt back on this PM and update as to POC.

## 2020-04-10 NOTE — PLAN OF CARE
Talked to pharmacist at patient's [facility]- patient's previous insulin prescription was 4 vials 100u/mL novolog to be used with insulin pump. Will re-prescribe for patient upon discharge on Monday after tunneled cath placement.     Tatyana Eng MD  Anesthesiology PGY1  [facility] Internal Medicine Team B

## 2020-04-11 ENCOUNTER — ANESTHESIA (OUTPATIENT)
Dept: SURGERY | Facility: HOSPITAL | Age: 75
DRG: 981 | End: 2020-04-11
Payer: MEDICARE

## 2020-04-11 VITALS
TEMPERATURE: 98 F | WEIGHT: 276.25 LBS | RESPIRATION RATE: 17 BRPM | HEART RATE: 60 BPM | HEIGHT: 74 IN | SYSTOLIC BLOOD PRESSURE: 108 MMHG | DIASTOLIC BLOOD PRESSURE: 56 MMHG | BODY MASS INDEX: 35.45 KG/M2 | OXYGEN SATURATION: 99 %

## 2020-04-11 LAB
ALBUMIN SERPL BCP-MCNC: 2.3 G/DL (ref 3.5–5.2)
ANION GAP SERPL CALC-SCNC: 13 MMOL/L (ref 8–16)
BASOPHILS # BLD AUTO: 0.01 K/UL (ref 0–0.2)
BASOPHILS NFR BLD: 0.1 % (ref 0–1.9)
BUN SERPL-MCNC: 58 MG/DL (ref 8–23)
CALCIUM SERPL-MCNC: 8.3 MG/DL (ref 8.7–10.5)
CHLORIDE SERPL-SCNC: 102 MMOL/L (ref 95–110)
CO2 SERPL-SCNC: 22 MMOL/L (ref 23–29)
CREAT SERPL-MCNC: 4.7 MG/DL (ref 0.5–1.4)
DIFFERENTIAL METHOD: ABNORMAL
EOSINOPHIL # BLD AUTO: 0 K/UL (ref 0–0.5)
EOSINOPHIL NFR BLD: 0.4 % (ref 0–8)
ERYTHROCYTE [DISTWIDTH] IN BLOOD BY AUTOMATED COUNT: 17.2 % (ref 11.5–14.5)
EST. GFR  (AFRICAN AMERICAN): 13 ML/MIN/1.73 M^2
EST. GFR  (NON AFRICAN AMERICAN): 12 ML/MIN/1.73 M^2
GLUCOSE SERPL-MCNC: 128 MG/DL (ref 70–110)
HCT VFR BLD AUTO: 29.3 % (ref 40–54)
HGB BLD-MCNC: 9.4 G/DL (ref 14–18)
IMM GRANULOCYTES # BLD AUTO: 0.11 K/UL (ref 0–0.04)
IMM GRANULOCYTES NFR BLD AUTO: 1.4 % (ref 0–0.5)
LYMPHOCYTES # BLD AUTO: 1.1 K/UL (ref 1–4.8)
LYMPHOCYTES NFR BLD: 14.8 % (ref 18–48)
MAGNESIUM SERPL-MCNC: 1.9 MG/DL (ref 1.6–2.6)
MCH RBC QN AUTO: 29.6 PG (ref 27–31)
MCHC RBC AUTO-ENTMCNC: 32.1 G/DL (ref 32–36)
MCV RBC AUTO: 92 FL (ref 82–98)
MONOCYTES # BLD AUTO: 0.8 K/UL (ref 0.3–1)
MONOCYTES NFR BLD: 10.5 % (ref 4–15)
NEUTROPHILS # BLD AUTO: 5.6 K/UL (ref 1.8–7.7)
NEUTROPHILS NFR BLD: 72.8 % (ref 38–73)
NRBC BLD-RTO: 0 /100 WBC
PHOSPHATE SERPL-MCNC: 4.7 MG/DL (ref 2.7–4.5)
PLATELET # BLD AUTO: 113 K/UL (ref 150–350)
PLATELET BLD QL SMEAR: ABNORMAL
PMV BLD AUTO: ABNORMAL FL (ref 9.2–12.9)
POCT GLUCOSE: 122 MG/DL (ref 70–110)
POCT GLUCOSE: 169 MG/DL (ref 70–110)
POTASSIUM SERPL-SCNC: 4.4 MMOL/L (ref 3.5–5.1)
RBC # BLD AUTO: 3.18 M/UL (ref 4.6–6.2)
SODIUM SERPL-SCNC: 137 MMOL/L (ref 136–145)
WBC # BLD AUTO: 7.64 K/UL (ref 3.9–12.7)

## 2020-04-11 PROCEDURE — 25000003 PHARM REV CODE 250: Performed by: SURGERY

## 2020-04-11 PROCEDURE — 94761 N-INVAS EAR/PLS OXIMETRY MLT: CPT

## 2020-04-11 PROCEDURE — 37000009 HC ANESTHESIA EA ADD 15 MINS: Performed by: SURGERY

## 2020-04-11 PROCEDURE — 49422 REMOVE TUNNELED IP CATH: CPT | Mod: 58,51,, | Performed by: SURGERY

## 2020-04-11 PROCEDURE — 36000707: Performed by: SURGERY

## 2020-04-11 PROCEDURE — 37000008 HC ANESTHESIA 1ST 15 MINUTES: Performed by: SURGERY

## 2020-04-11 PROCEDURE — 63600175 PHARM REV CODE 636 W HCPCS: Performed by: STUDENT IN AN ORGANIZED HEALTH CARE EDUCATION/TRAINING PROGRAM

## 2020-04-11 PROCEDURE — 25000003 PHARM REV CODE 250: Performed by: STUDENT IN AN ORGANIZED HEALTH CARE EDUCATION/TRAINING PROGRAM

## 2020-04-11 PROCEDURE — C1750 CATH, HEMODIALYSIS,LONG-TERM: HCPCS | Performed by: SURGERY

## 2020-04-11 PROCEDURE — 63600175 PHARM REV CODE 636 W HCPCS: Performed by: INTERNAL MEDICINE

## 2020-04-11 PROCEDURE — 77001 CHG FLUOROGUIDE CNTRL VEN ACCESS,PLACE,REPLACE,REMOVE: ICD-10-PCS | Mod: 26,,, | Performed by: SURGERY

## 2020-04-11 PROCEDURE — 77001 FLUOROGUIDE FOR VEIN DEVICE: CPT | Mod: 26,,, | Performed by: SURGERY

## 2020-04-11 PROCEDURE — 36000706: Performed by: SURGERY

## 2020-04-11 PROCEDURE — 80069 RENAL FUNCTION PANEL: CPT

## 2020-04-11 PROCEDURE — 36558 INSERT TUNNELED CV CATH: CPT | Mod: 58,RT,, | Performed by: SURGERY

## 2020-04-11 PROCEDURE — 85025 COMPLETE CBC W/AUTO DIFF WBC: CPT

## 2020-04-11 PROCEDURE — 63600175 PHARM REV CODE 636 W HCPCS: Performed by: SURGERY

## 2020-04-11 PROCEDURE — 83735 ASSAY OF MAGNESIUM: CPT

## 2020-04-11 PROCEDURE — 71000033 HC RECOVERY, INTIAL HOUR: Performed by: SURGERY

## 2020-04-11 PROCEDURE — 36558 PR INSERT TUNNELED CV CATH W/O PORT OR PUMP: ICD-10-PCS | Mod: 58,RT,, | Performed by: SURGERY

## 2020-04-11 PROCEDURE — 36415 COLL VENOUS BLD VENIPUNCTURE: CPT

## 2020-04-11 PROCEDURE — 90935 HEMODIALYSIS ONE EVALUATION: CPT

## 2020-04-11 PROCEDURE — 49422 PR REMOVAL TUNNELED INTRAPERITONEAL CATHETER: ICD-10-PCS | Mod: 58,51,, | Performed by: SURGERY

## 2020-04-11 DEVICE — SET CATH HEMODIALYSIS 15.5FR: Type: IMPLANTABLE DEVICE | Site: NECK | Status: FUNCTIONAL

## 2020-04-11 RX ORDER — SUCCINYLCHOLINE CHLORIDE 20 MG/ML
INJECTION INTRAMUSCULAR; INTRAVENOUS
Status: DISCONTINUED | OUTPATIENT
Start: 2020-04-11 | End: 2020-04-11

## 2020-04-11 RX ORDER — INSULIN ASPART 100 [IU]/ML
INJECTION, SOLUTION INTRAVENOUS; SUBCUTANEOUS
Qty: 4 VIAL | Refills: 10 | Status: SHIPPED | OUTPATIENT
Start: 2020-04-11 | End: 2021-01-01

## 2020-04-11 RX ORDER — CEFAZOLIN SODIUM 1 G/3ML
INJECTION, POWDER, FOR SOLUTION INTRAMUSCULAR; INTRAVENOUS
Status: DISCONTINUED | OUTPATIENT
Start: 2020-04-11 | End: 2020-04-11

## 2020-04-11 RX ORDER — DEXAMETHASONE SODIUM PHOSPHATE 4 MG/ML
INJECTION, SOLUTION INTRA-ARTICULAR; INTRALESIONAL; INTRAMUSCULAR; INTRAVENOUS; SOFT TISSUE
Status: DISCONTINUED | OUTPATIENT
Start: 2020-04-11 | End: 2020-04-11

## 2020-04-11 RX ORDER — VASOPRESSIN 20 [USP'U]/ML
INJECTION, SOLUTION INTRAMUSCULAR; SUBCUTANEOUS
Status: DISCONTINUED | OUTPATIENT
Start: 2020-04-11 | End: 2020-04-11

## 2020-04-11 RX ORDER — ROCURONIUM BROMIDE 10 MG/ML
INJECTION, SOLUTION INTRAVENOUS
Status: DISCONTINUED | OUTPATIENT
Start: 2020-04-11 | End: 2020-04-11

## 2020-04-11 RX ORDER — LIDOCAINE HYDROCHLORIDE 10 MG/ML
INJECTION INFILTRATION; PERINEURAL
Status: DISCONTINUED | OUTPATIENT
Start: 2020-04-11 | End: 2020-04-11 | Stop reason: HOSPADM

## 2020-04-11 RX ORDER — BUPIVACAINE HYDROCHLORIDE 2.5 MG/ML
INJECTION, SOLUTION EPIDURAL; INFILTRATION; INTRACAUDAL
Status: DISCONTINUED | OUTPATIENT
Start: 2020-04-11 | End: 2020-04-11 | Stop reason: HOSPADM

## 2020-04-11 RX ORDER — PHENYLEPHRINE HYDROCHLORIDE 10 MG/ML
INJECTION INTRAVENOUS
Status: DISCONTINUED | OUTPATIENT
Start: 2020-04-11 | End: 2020-04-11

## 2020-04-11 RX ORDER — LIDOCAINE HCL/PF 100 MG/5ML
SYRINGE (ML) INTRAVENOUS
Status: DISCONTINUED | OUTPATIENT
Start: 2020-04-11 | End: 2020-04-11

## 2020-04-11 RX ORDER — SODIUM CHLORIDE 0.9 % (FLUSH) 0.9 %
10 SYRINGE (ML) INJECTION
Status: DISCONTINUED | OUTPATIENT
Start: 2020-04-11 | End: 2020-04-11 | Stop reason: HOSPADM

## 2020-04-11 RX ORDER — ETOMIDATE 2 MG/ML
INJECTION INTRAVENOUS
Status: DISCONTINUED | OUTPATIENT
Start: 2020-04-11 | End: 2020-04-11

## 2020-04-11 RX ORDER — HEPARIN SODIUM 1000 [USP'U]/ML
INJECTION, SOLUTION INTRAVENOUS; SUBCUTANEOUS
Status: DISCONTINUED | OUTPATIENT
Start: 2020-04-11 | End: 2020-04-11 | Stop reason: HOSPADM

## 2020-04-11 RX ORDER — FENTANYL CITRATE 50 UG/ML
INJECTION, SOLUTION INTRAMUSCULAR; INTRAVENOUS
Status: DISCONTINUED | OUTPATIENT
Start: 2020-04-11 | End: 2020-04-11

## 2020-04-11 RX ADMIN — CEFAZOLIN 2 G: 330 INJECTION, POWDER, FOR SOLUTION INTRAMUSCULAR; INTRAVENOUS at 08:04

## 2020-04-11 RX ADMIN — VASOPRESSIN 1 UNITS: 20 INJECTION, SOLUTION INTRAMUSCULAR; SUBCUTANEOUS at 08:04

## 2020-04-11 RX ADMIN — LIDOCAINE HYDROCHLORIDE 90 MG: 20 INJECTION, SOLUTION INTRAVENOUS at 07:04

## 2020-04-11 RX ADMIN — DOXYCYCLINE HYCLATE 100 MG: 100 TABLET, COATED ORAL at 10:04

## 2020-04-11 RX ADMIN — TAMSULOSIN HYDROCHLORIDE 0.4 MG: 0.4 CAPSULE ORAL at 09:04

## 2020-04-11 RX ADMIN — SODIUM CHLORIDE: 0.9 INJECTION, SOLUTION INTRAVENOUS at 07:04

## 2020-04-11 RX ADMIN — FENTANYL CITRATE 100 MCG: 50 INJECTION, SOLUTION INTRAMUSCULAR; INTRAVENOUS at 07:04

## 2020-04-11 RX ADMIN — FUROSEMIDE 80 MG: 10 INJECTION, SOLUTION INTRAVENOUS at 10:04

## 2020-04-11 RX ADMIN — CARVEDILOL 12.5 MG: 12.5 TABLET, FILM COATED ORAL at 10:04

## 2020-04-11 RX ADMIN — ASPIRIN 81 MG: 81 TABLET, COATED ORAL at 10:04

## 2020-04-11 RX ADMIN — ATORVASTATIN CALCIUM 80 MG: 40 TABLET, FILM COATED ORAL at 10:04

## 2020-04-11 RX ADMIN — ROCURONIUM BROMIDE 5 MG: 10 INJECTION, SOLUTION INTRAVENOUS at 07:04

## 2020-04-11 RX ADMIN — ETOMIDATE 20 MG: 2 INJECTION INTRAVENOUS at 07:04

## 2020-04-11 RX ADMIN — FINASTERIDE 5 MG: 5 TABLET, FILM COATED ORAL at 10:04

## 2020-04-11 RX ADMIN — PHENYLEPHRINE HYDROCHLORIDE 100 MCG: 10 INJECTION INTRAVENOUS at 08:04

## 2020-04-11 RX ADMIN — AMIODARONE HYDROCHLORIDE 200 MG: 200 TABLET ORAL at 10:04

## 2020-04-11 RX ADMIN — INSULIN ASPART 2 UNITS: 100 INJECTION, SOLUTION INTRAVENOUS; SUBCUTANEOUS at 01:04

## 2020-04-11 RX ADMIN — DEXAMETHASONE SODIUM PHOSPHATE 4 MG: 4 INJECTION, SOLUTION INTRA-ARTICULAR; INTRALESIONAL; INTRAMUSCULAR; INTRAVENOUS; SOFT TISSUE at 08:04

## 2020-04-11 RX ADMIN — SUCCINYLCHOLINE CHLORIDE 140 MG: 20 INJECTION, SOLUTION INTRAMUSCULAR; INTRAVENOUS at 07:04

## 2020-04-11 RX ADMIN — CARVEDILOL 12.5 MG: 12.5 TABLET, FILM COATED ORAL at 01:04

## 2020-04-11 RX ADMIN — PANTOPRAZOLE SODIUM 40 MG: 40 TABLET, DELAYED RELEASE ORAL at 10:04

## 2020-04-11 NOTE — PLAN OF CARE
Discharge orders noted. Additional clinical references attached. Patient's discharge instructions given by bedside RN and reviewed via this VN.  Education provided on new and previous medications, diagnosis, and follow-up appointments. Patient was given hard copy of new medications. Patient verbalized understanding. Patient's ride/transportation home set up by [facility]. All questions answered. Floor nurse notified.

## 2020-04-11 NOTE — NURSING
Pt lying supine in bed, AAOX3. Respirations even and unlabored. Paced rhythm 70 on telemetry. Pt NPO for OR today. Denies pain or discomfort.

## 2020-04-11 NOTE — PROGRESS NOTES
[facility] Medicine Progress Note    Primary Team: [facility] Hospitalist Team B  Attending Physician: Dr. Jones  Resident: [place]  Intern: Gaby    Subjective:     Was initially scheduled to get tunneled catheter placement yesterday, patient approved of HD chair placement at [facility]. Rescheduled initially to Monday, now on schedule for today. [facility]     Discussed plan for Monday surgery with wife yesterday, will update today. [facility] will [facility]      Objective:     Last 24 Hour Vital Signs:  BP  Min: 89/70  Max: 122/56  Temp  Av.5 °F (36.4 °C)  Min: 97.1 °F (36.2 °C)  Max: 97.7 °F (36.5 °C)  Pulse  Av.8  Min: 60  Max: 85  Resp  Av.2  Min: 18  Max: 24  SpO2  Av.7 %  Min: 96 %  Max: 98 %  Weight  Av.3 kg (276 lb 3.8 oz)  Min: 125.3 kg (276 lb 3.8 oz)  Max: 125.3 kg (276 lb 3.8 oz)  I/O last 3 completed shifts:  In: 625 [P.O.:225; Other:400]  Out: 2150 [Urine:250; Other:1900]    Physical Examination:  Deferred  covid+  Appears comfortable via telemed    Laboratory:  Laboratory Data Reviewed: yes  Pertinent Findings:  Cr 4.7>>5.3>>5.4>>5.1>>5.3    Microbiology Data Reviewed: yes  Pertinent Findings:  Flu negative  COVID POSITIVE  3/28 blood cx, NGTD    Other Results:  EKG (my interpretation): NSR     Radiology Data Reviewed: yes  Pertinent Findings:  No new    Current Medications:     Infusions:       Scheduled:   sodium chloride 0.9%   Intravenous Once    sodium chloride 0.9%   Intravenous Once    amiodarone  200 mg Oral BID    aspirin  81 mg Oral Daily    atorvastatin  80 mg Oral Daily    carvediloL  12.5 mg Oral BID    doxycycline  100 mg Oral Q12H    finasteride  5 mg Oral Daily    furosemide (LASIX) IV  80 mg Intravenous Daily    heparin (porcine)  5,000 Units Subcutaneous Q8H    mupirocin   Nasal BID    pantoprazole  40 mg Oral Daily    tamsulosin  0.4  mg Oral Daily        PRN:  sodium chloride 0.9%, sodium chloride 0.9%, sodium chloride 0.9%, acetaminophen, calcium carbonate, dextrose 50 % in water (D50W), dextrose 50 % in water (D50W), glucagon (human recombinant), glucose, glucose, heparin (porcine), insulin aspart U-100, oxyCODONE-acetaminophen, sodium chloride 0.9%    Antibiotics and Day Number of Therapy:  Rocephin, 3/28 - 3/30  Azithro, 3/28 - 3/30   (no plaquenil 2/2 long qTc)  Doxy 4/2 - present    Lines and Day Number of Therapy:  PIV    Assessment:     Houston Jc is a 74 y.o.male with  Patient Active Problem List    Diagnosis Date Noted    Bradycardia 04/04/2020    CLEMENCIA (acute kidney injury)     COVID-19 virus detected     Fever     Suspected 2019-Ncov Infection     SOB (shortness of breath) 03/28/2020    NICM (nonischemic cardiomyopathy) 11/06/2019    Abnormal transaminases 07/20/2019    Cardiac resynchronization therapy defibrillator (CRT-D) in place 07/19/2019    Third degree heart block 07/19/2019    Gout 07/18/2019    Abdominal distension 07/17/2019    Debility 07/17/2019    VT (ventricular tachycardia) 07/11/2019    Cardiorenal syndrome 07/11/2019    Acute systolic heart failure 07/11/2019    Complete heart block 07/09/2019    Morbid obesity 10/08/2015    Hypertension 09/21/2015    Diastolic dysfunction 09/21/2015    Sleep apnea 09/21/2015    Swelling of lower extremity 09/18/2015    Hypervolemia 09/18/2015     Mr. Benjamin presents to [place] ED for 3 days of fever, SOB, diarrhea. Covid positive. Admitted for CLEMENCIA, troponemia, and hypoxia w/ exertion.     Plan:     Hypoxic respiratory failure 2/2 Covid-19  - pt with fever, SOB, diarrhea and labs indicative of  Covid-19 illness script  - flu negative, Covid positive  - pt w/ SOB, currently satting high 80s/low 90s on 2L NC, tachypnea resolved  - CXR w/ L sided opacity, continuing CAP abx (azithro, rocephin)  - (3/31) Patient afebrile and comfortable on room air.  - Has  completed 4 days of azithro and rocephin. Per ID, can discontinue as bacterial coinfection highly unlikely.   - holding plaquenil 2/2 prolonged qTc on EKG.  - Completed course of solumedrol.  - Now on room air.     CLEMENCIA on CKD 4  - pt w/ baseline Cr 3, 4.6 on admit.  - s/p 500mL NS bolus in ED with symptomatic improvement, pt makes urine  - daily BMP  - follows w/ Dr Fowler  - consulted Nephrology, appreciate recs  - discontinued allopurinol, avoid nephrotoxic meds  - Patient received HD on 3/31 with 1L removed, tolerated well  - Had PD catheter placed 4/3  PD catheter is malfunctioning s/p altepase x2.    - Plan for patient to go to OR for a tunnel catheter.  - Will need outpatient HD chair.     Troponemia, stable  - initial trop 2.9>>2.59 (baseline 0.08)  - EKG w/ NSR and 1st degree block, pt denies CP  - pt w/ hx of non-ischemic cardiomyopathy and complete heart block s/p CRT-D 2019  - likely 2/2 to Covid, now downtrending. No longer following.      HFrEF  - 7/2019 echo with EF 30%, global hypokinesis, and grade 2 diastolic dysfunction  - Had pacemaker placed in 2019.  - judiciously hydrate in setting of CLEMENCIA  - repeat echo pending Covid as pt with hx of cardio-renal syndrome  - follows w/ Dr Arroyo     Scrotal swelling and pain 2/2 varicocele  - as per wife, pt with >1m worsening scrotal pain and swelling  - has outpt Urology appt scheduled  - scrotal swelling with R varicocele, f/u scrotal US  - low concern for obstructive component of CLEMENCIA  - Consulted Urology and appreciate their recs  - Started on doxy for epididymoorchitis.   - His scrotal pain is improving. If it worsens will re-consult urology and obtain a US at that time.    T2DM  - Hyperglycemia likely 2/2 current steroid use.  - Stopped long-acting 20 U daily, aspart 7 U with meals due to patient not being hyperglycemic. Will adjust insulin as needed.  - Continue SSI with accu checks.      Diet: Diabetic  PPX: heparin  Dispo: Pending tunneled  catheter placement      Tatyana Eng MD, PGY-1  [facility] Internal Medicine    [facility] Medicine Hospitalist Pager numbers:   [facility] Hospitalist Medicine Team A (Abhinav/Jovani): [phone]  [facility] Hospitalist Medicine Team B (Robert/Enedelia):  [phone]

## 2020-04-11 NOTE — NURSING
Pt to be discharged home. AAOX3. IV to left Forearm dc'd intact, telemetry monitor removed. AVS given to pt, instructions to be given per VN. Pt denies pain or discomfort.

## 2020-04-11 NOTE — TRANSFER OF CARE
"Anesthesia Transfer of Care Note    Patient: Houston Jc    Procedure(s) Performed: Procedure(s) (LRB):  Insertion,catheter,tunneled (Right)  REMOVAL, CATHETER, DIALYSIS, PERITONEAL (Left)    Patient location: PACU    Anesthesia Type: general    Transport from OR: Transported from OR on 6-10 L/min O2 by face mask with adequate spontaneous ventilation    Post pain: adequate analgesia    Post assessment: no apparent anesthetic complications and tolerated procedure well    Post vital signs: stable    Level of consciousness: awake, alert and oriented    Nausea/Vomiting: no nausea/vomiting    Complications: none    Transfer of care protocol was followed      Last vitals:   Visit Vitals  BP (!) 100/52 (Patient Position: Lying)   Pulse 62   Temp 36.4 °C (97.5 °F) (Oral)   Resp 18   Ht 6' 2" (1.88 m)   Wt 125.3 kg (276 lb 3.8 oz)   SpO2 96%   BMI 35.47 kg/m²     "

## 2020-04-11 NOTE — PROGRESS NOTES
General Surgery    75 y/o M who developed ESRD while hospitalized for Covid 19. Had RIJ Trialysis placed however initially unable to obtain HD chair due to Covid + status. Underwent PD catheter placement so he could be discharged home on peritoneal dialysis however PD cath never functioned appropriately. Has since been approved for a Covid 19+ HD chair and awaiting conversion of Trialysis to tunneled line for discharge. Since discharge is pending surgery, his case should be considered appropriate for scheduling on holiday/weekend and during Covid 19 outbreak.     Will plan for RIJ tunneled HD catheter placement and PD catheter removal this morning.

## 2020-04-11 NOTE — PLAN OF CARE
Patient sleeping when VN qued into room. Eyes closed. Respirations even and unlabored.  All safety measures maintained including bed locked, in lowest position with alarm on.  Call light within reach.

## 2020-04-11 NOTE — PLAN OF CARE
Pt vitals were maintained. Pt has been NPO since midnight, pt denied any pain. Pt maintained O2 sats. Pt is getting prep for future surgery.

## 2020-04-11 NOTE — PLAN OF CARE
Frequent check and q2 hourly rounding completed. Patient noted to be resting.  Respirations even in unlabored.  No signs of distress noted at this time. Telemetry showing a paced HR.

## 2020-04-11 NOTE — PLAN OF CARE
Per MD, pt will require ambulance transportation home, pt is COVID (+)    [facility] contacted pt's wife and confirmed address, she will be home to accept pt    TN placed order with [facility], for ambulance transport,  time requested for 3:30 pm    Discharge orders noted, no HH or HME ordered.    Future Appointments   Date Time Provider Department Center   4/23/2020 10:00 AM HOME MONITOR DEVICE CHECK, [facility] SHARIFRDGUADALUPE Mo [facility]   5/27/2020  9:15 AM Lou Fowler [facility]       Pt's nurse will go over medications/signs and symptoms prior to discharge       04/11/20 1408   Final Note   Assessment Type Final Discharge Note   Anticipated Discharge Disposition Home   What phone number can be called within the next 1-3 days to see how you are doing after discharge? [phone]   Hospital Follow Up  Appt(s) scheduled? No  (Offices closed for Weekend. Patient to schedule own follow up appointment)   Right Care Referral Info   Post Acute Recommendation No Care     Ranjana Gridre, RN Transitional Navigator  [phone]

## 2020-04-11 NOTE — PLAN OF CARE
Recovery complete. Report to Rika NORWOOD 5a.  Pt to room with Aishwarya NORWOOD and Shruthi NORWOOD OR in good condition. Pt mask in place.

## 2020-04-11 NOTE — ANESTHESIA PREPROCEDURE EVALUATION
"                                                                                                             04/10/2020  Houston Jc is a 74 y.o., male for insertion of tunneled HD catheter and PD catheter removal    Currently receiving HD through RIJ trialysis (not tunneled)     AICD  -Device Model: 3357-40Q Un[employer] ([employer])   -last interrogation 2/26/2020   "normal device function,   Lead impedances WNL  Presenting rhythm, Ap/Bp,   Atrial Arrythmias: none  Ventricular arrhythmias: none"    Per discussion with representative from [employer]. -- if magnet is placed on the device, it will deactivate the tachy-arythmia function, but WILL NOT affect underlying pacing function.   If magnet is placed, only needs to be re-interrogated if electrocautery is used within 6 INCHES of the device.  (MD Talia 04/11/2020 7:07 AM)      Patient Active Problem List   Diagnosis    Swelling of lower extremity    Hypervolemia    Hypertension    Diastolic dysfunction    Sleep apnea    Morbid obesity    Complete heart block    VT (ventricular tachycardia)    Cardiorenal syndrome    Acute systolic heart failure    Abdominal distension    Debility    Gout    Cardiac resynchronization therapy defibrillator (CRT-D) in place    Third degree heart block    Abnormal transaminases    NICM (nonischemic cardiomyopathy)    SOB (shortness of breath)    COVID-19 virus detected    Fever    Suspected 2019-Ncov Infection    CLEMENCIA (acute kidney injury)    Bradycardia     Past Medical History:   Diagnosis Date    Anemia     CHF (congestive heart failure)     CKD (chronic kidney disease) stage 4, GFR 15-29 ml/min     Coronary artery disease     Diabetes mellitus     Hematuria     Hypertension     Renal cyst, right      Past Surgical History:   Procedure Laterality Date    INSERTION OF BIVENTRICULAR IMPLANTABLE CARDIOVERTER-DEFIBRILLATOR (ICD) Left 7/18/2019    Procedure: INSERTION, ICD, BIVENTRICULAR;  Surgeon: Arturo aBy, " MD;  Location: [facility] EP LAB;  Service: Cardiology;  Laterality: Left;  CHB, CRTD, SJM, anes, GP, [id number]    LEFT HEART CATHETERIZATION N/A 7/16/2019    Procedure: Left heart cath;  Surgeon: Dejuan Cody MD;  Location: [facility] CATH LAB/EP;  Service: Cardiology;  Laterality: N/A;     Review of patient's allergies indicates:  No Known Allergies    ANES-RELATED MAR [id number]  Carvedilol-PO  Insulin-aspartSQss  Amiodarone-PO   Furosemide IV  Heparin subq q8  protonix      Pre-op Assessment    I have reviewed the Patient Summary Reports.     I have reviewed the Nursing Notes.   I have reviewed the Medications.     Review of Systems      Physical Exam  General:  Well nourished, Obesity    Airway/Jaw/Neck:  Airway Findings: Mouth Opening: Normal Tongue: Normal  General Airway Assessment: Adult  Mallampati: II  Improves to II with phonation.  TM Distance: Normal, at least 6 cm      Dental:  Dental Findings: In tact   Chest/Lungs:  Chest/Lungs Findings: Clear to auscultation     Heart/Vascular:  Heart Findings: Rate: Normal  Rhythm: Regular Rhythm  Sounds: Normal        Mental Status:  Mental Status Findings:  Cooperative, Alert and Oriented       Wt Readings from Last 3 Encounters:   04/10/20 127.6 kg (281 lb 4.9 oz)   03/11/20 136.1 kg (300 lb)   03/06/20 (!) 136.6 kg (301 lb 2.4 oz)     Temp Readings from Last 3 Encounters:   04/10/20 36.2 °C (97.1 °F) (Oral)   03/11/20 36.7 °C (98.1 °F) (Oral)   02/19/20 36.7 °C (98.1 °F) (Oral)     BP Readings from Last 3 Encounters:   04/10/20 (!) 120/52   03/11/20 (!) 90/40   03/06/20 (!) 128/58     Pulse Readings from Last 3 Encounters:   04/10/20 60   03/11/20 63   03/06/20 60     Lab Results   Component Value Date    WBC 9.68 03/28/2020    HGB 10.1 (L) 03/28/2020    HCT 29.9 (L) 03/28/2020    MCV 92 03/28/2020     03/28/2020         Chemistry        Component Value Date/Time     04/11/2020 0534    K 4.4 04/11/2020 0534     04/11/2020 0534    CO2 22 (L) 04/11/2020 0534     BUN 58 (H) 04/11/2020 0534    CREATININE 4.7 (H) 04/11/2020 0534     (H) 04/11/2020 0534        Component Value Date/Time    CALCIUM 8.3 (L) 04/11/2020 0534    ALKPHOS 91 04/10/2020 0723    AST 35 04/10/2020 0723    ALT 15 04/10/2020 0723    BILITOT 0.5 04/10/2020 0723    ESTGFRAFRICA 13 (A) 04/11/2020 0534    EGFRNONAA 12 (A) 04/11/2020 0534        Lab Results   Component Value Date    ALBUMIN 2.3 (L) 04/11/2020           Anesthesia Plan  Type of Anesthesia, risks & benefits discussed:  Anesthesia Type:  regional  Patient's Preference: Sedation  Intra-op Monitoring Plan: standard ASA monitors  Intra-op Monitoring Plan Comments:   Post Op Pain Control Plan: per primary service following discharge from PACU  Post Op Pain Control Plan Comments:   Induction:   IV  Beta Blocker:  Patient is not currently on a Beta-Blocker (No further documentation required).       Informed Consent: Patient understands risks and agrees with Anesthesia plan.  Questions answered.   ASA Score: 4     Day of Surgery Review of History & Physical:    H&P update referred to the surgeon.     Anesthesia Plan Notes: Sedation plan discussed.          Ready For Surgery From Anesthesia Perspective.

## 2020-04-11 NOTE — PLAN OF CARE
Received pt in OR 1, Monitors and O2 in place. (Pt COVID pos) Report received from Shruthi NORWOOD, Dr Larry.  Shruthi to remain in room.  VSS see flow sheets.

## 2020-04-11 NOTE — PLAN OF CARE
VN cued into patients room for rounding - Patient is in no acute distress at this time.  Call light within reach. Will continue to monitor closely.

## 2020-04-11 NOTE — ANESTHESIA POSTPROCEDURE EVALUATION
Anesthesia Post Evaluation    Patient: Houston Jc    Procedure(s) Performed: Procedure(s) (LRB):  Insertion,catheter,tunneled (Right)  REMOVAL, CATHETER, DIALYSIS, PERITONEAL (Left)    Final Anesthesia Type: general    Patient location during evaluation: PACU  Patient participation: Yes- Able to Participate  Level of consciousness: awake and alert  Post-procedure vital signs: reviewed and stable  Pain management: adequate  Airway patency: patent  MACRINA mitigation strategies: Multimodal analgesia and Extubation while patient is awake  PONV status at discharge: No PONV  Anesthetic complications: no      Cardiovascular status: blood pressure returned to baseline  Respiratory status: unassisted, room air and spontaneous ventilation  Hydration status: euvolemic  Follow-up not needed.          Vitals Value Taken Time   /56 4/11/2020  9:30 AM   Temp 36.4 °C (97.5 °F) 4/11/2020  4:48 AM   Pulse 60 4/11/2020  9:30 AM   Resp 18 4/11/2020  9:30 AM   SpO2 99 % 4/11/2020  9:30 AM         Event Time     Out of Recovery 04/11/2020 09:40:01          Pain/Nelda Score: Nelda Score: 9 (4/11/2020  9:31 AM)

## 2020-04-11 NOTE — PLAN OF CARE
Patient was able to get tunneled catheter placed today. Was able to undergo HD through catheter without issue. Okay to be discharged. Will attempt to get ambulance to return patient to home.    Tatyana Eng MD  Anesthesiology PGY1  [employer] Internal Medicine Team B

## 2020-04-11 NOTE — NURSING
Received pt to room from PACU. Drowsy, easily aroused. VSS. Pt denies pain or discomfort. Right jugular tunneled catheter in place.

## 2020-04-11 NOTE — DISCHARGE SUMMARY
[facility] Hospital Medicine Discharge Summary    Primary Team: [facility] Hospitalist Team B  Attending Physician: Dr. Jones  Resident: Melanie Millard  Intern: Velasquez Griffin    Date of Admit: 3/28/2020  Date of Discharge: 4/11/2020    Discharge to: Home  Condition: Stable    Discharge Diagnoses     Patient Active Problem List   Diagnosis    Swelling of lower extremity    Hypervolemia    Hypertension    Diastolic dysfunction    Sleep apnea    Morbid obesity    Complete heart block    VT (ventricular tachycardia)    Cardiorenal syndrome    Acute systolic heart failure    Abdominal distension    Debility    Gout    Cardiac resynchronization therapy defibrillator (CRT-D) in place    Third degree heart block    Abnormal transaminases    NICM (nonischemic cardiomyopathy)    SOB (shortness of breath)    COVID-19 virus detected    Fever    Suspected 2019-Ncov Infection    CLEMENCIA (acute kidney injury)    Bradycardia       Consultants and Procedures     Consultants:  Nephrology   General Surgery  Cardiology  Infectious Diseases    Procedures:   Insertion PD catheter  Insertion trialysis catheter  Insertion tunneled HD catheter     Brief History of Present Illness      Houston Jc is a 74 y.o. AA male who  has a past medical history of HFrEF (EF 30%), CKD stage 4, CAD, Diabetes mellitus, Hematuria, HTN, and Renal cyst, right. The patient presented to [facility] on 3/28/2020 with a primary complaint of fever, chills, SOB, decreased PO, and diarrhea x 3 days.     The patient was in their usual state of health until approx 1 week ago when he started feeling fatigued and has had worsening viral symptoms since. As per wife, he has been extremely weak, requiring a wheelchair, and unable to transfer/toilet without assistance. At baseline, walks with a walker and is able to complete ADLs. Pt has supplemental oxygen at home, uses 2L at night only. Wife also reports worsening scrotal pain and swelling for  several weeks, however pt denies complaints. Denies sick contacts, travel, abd pain, blood or mucus in stool.     In ED, pt with CLEMENCIA, troponemia. Febrile, tachypneic, saturating low 90's on RA. Will admit to floor for continued monitoring.    For the full HPI please refer to the History & Physical from this admission.    Hospital Course By Problem with Pertinent Findings     Hypoxic respiratory failure 2/2 Covid-19  - pt with fever, SOB, diarrhea and labs indicative of  Covid-19 illness script  - flu negative, Covid positive  - pt w/ SOB, currently satting high 80s/low 90s on 2L NC, tachypnea resolved  - CXR w/ L sided opacity, continuing CAP abx (azithro, rocephin)  - (3/31) Patient afebrile and comfortable on room air.  - Has completed 4 days of azithro and rocephin. Per ID, can discontinue as bacterial coinfection highly unlikely.   - holding plaquenil 2/2 prolonged qTc on EKG.  - Completed course of solumedrol.  - Now on room air.      CLEMENCIA on CKD 4  - pt w/ baseline Cr 3, 4.6 on admit.  - s/p 500mL NS bolus in ED with symptomatic improvement, pt makes urine  - daily BMP  - follows w/ Dr Fowler  - consulted Nephrology, appreciate recs  - discontinued allopurinol, avoid nephrotoxic meds  - Patient received HD on 3/31 with 1L removed, tolerated well  - Had PD catheter placed 4/3  PD catheter is malfunctioning s/p altepase x2.    - Tunneled cath placed and PD catheter removed on 4/11; able to dialyze without issue through new catheter  - Outpt HD chair Shiv Goldstein (COVID+ UNIT) TThSat 6AM      Troponemia, stable  - initial trop 2.9>>2.59 (baseline 0.08)  - EKG w/ NSR and 1st degree block, pt denies CP  - pt w/ hx of non-ischemic cardiomyopathy and complete heart block s/p CRT-D 2019  - likely 2/2 to Covid, now downtrending. No longer following.      HFrEF  - 7/2019 echo with EF 30%, global hypokinesis, and grade 2 diastolic dysfunction  - Had pacemaker placed in 2019.  - judiciously hydrate in setting of  CLEMENCIA  - repeat echo pending Covid as pt with hx of cardio-renal syndrome  - follows w/ Dr Arroyo     Scrotal swelling and pain 2/2 varicocele  - as per wife, pt with >1m worsening scrotal pain and swelling  - has outpt Urology appt scheduled  - scrotal swelling with R varicocele, f/u scrotal US  - low concern for obstructive component of CLEMENCIA  - Consulted Urology and appreciate their recs  - Started on doxy for epididymoorchitis. Completed 10 day course while inpatient.   - His scrotal pain is improving. If it worsens will re-consult urology and obtain a US at that time.     T2DM  - Hyperglycemia likely 2/2 current steroid use.  - Stopped long-acting 20 U daily, aspart 7 U with meals due to patient not being hyperglycemic. Will adjust insulin as needed.  - Continue SSI with accu checks.     Discharge Medications      Houston Jc   Home Medication Instructions MARIBEL:[id number]    Printed on:04/11/20 0051   Medication Information                      allopurinol (ZYLOPRIM) 300 MG tablet  Take 300 mg by mouth once daily.             alogliptin benzoate (ALOGLIPTIN ORAL)  Take 25 mg by mouth once daily.             amiodarone (PACERONE) 200 MG Tab  Take 1 tablet (200 mg total) by mouth 2 (two) times daily.             aspirin (ECOTRIN) 81 MG EC tablet  Take 81 mg by mouth once daily.             atorvastatin (LIPITOR) 80 MG tablet  Take 80 mg by mouth once daily.             carvedilol (COREG) 12.5 MG tablet  Take 12.5 mg by mouth 2 (two) times daily.             cholecalciferol, vitamin D3, (VITAMIN D3) 2,000 unit Cap  Take 1 capsule by mouth once daily.             finasteride (PROSCAR) 5 mg tablet  Take 1 tablet (5 mg total) by mouth once daily.             insulin aspart U-100 (NOVOLOG) 100 unit/mL injection  Inject into the skin 3 (three) times daily before meals. PUMP             losartan (COZAAR) 25 MG tablet  Take 1 tablet (25 mg total) by mouth once daily.             magnesium oxide (MAG-OXIDE ORAL)  Take  "420 mg by mouth 2 (two) times daily.              potassium chloride (K-TAB) 20 mEq  Take by mouth once daily.             sildenafil (VIAGRA) 100 MG tablet  Take 100 mg by mouth once a week.             spironolactone (ALDACTONE) 12.5 MG tablet  Take 12.5 mg by mouth once daily.             tamsulosin (FLOMAX) 0.4 mg Cap  Take 1 capsule (0.4 mg total) by mouth once daily.             torsemide (DEMADEX) 20 MG Tab  Take 2 tablets (40 mg total) by mouth once daily. Take additional 2 tablets if weight gain of more than 3 pounds in 1 day             VENTOLIN HFA 90 mcg/actuation inhaler  Inhale 1 puff into the lungs every 4 (four) hours as needed.              walker Misc  5" wheel rolling walker                 Discharge Information:   Diet:  Renal    Physical Activity:  As tolerated             Instructions:  1. Take all medications as prescribed  2. Keep all follow-up appointments  3. Return to the hospital or call your primary care physicians if any worsening symptoms such as fever, chest pain, shortness of breath, return of symptoms, or any other concerns.    Follow-Up Appointments:  Nephrology  Cardiology  Urology     Tatyana Eng MD  [facility] Internal Medicine, [facility]              "

## 2020-04-11 NOTE — OP NOTE
DATE OF PROCEDURE: 04/11/2020    PREOPERATIVE DIAGNOSIS:   1.  COVID-19 infection  2.  End-stage renal disease    POSTOPERATIVE DIAGNOSIS:  Same    PROCEDURE:   1.  Right internal jugular vein tunneled dialysis catheter placement  2.  Removal peritoneal dialysis catheter    SURGEON: Edis Snell M.D    ASSISTANT: None    ANESTHESIA:  General    ESTIMATED BLOOD LOSS:  5 cc    SPECIMEN:  None    CONDITION:  Stable    COMPLICATIONS: None    FINDINGS:   Tunneled dialysis catheter  1.  Access to right internal jugular vein obtained through previously placed Trialysis catheter, wire confirmed appropriate position on fluoroscopy  2.  28 cm tunneled dialysis catheter placed under fluoroscopic guidance, ports aspirated and flushed with ease    Peritoneal dialysis catheter removal  1.  Catheter removed in totality      INDICATIONS: The patient is a 72-year-old male previously admitted for COVID 19 infection who developed acute on chronic kidney failure which led to end-stage renal disease.  Had a Trialysis catheter placed.  Was ready for discharge home however no dialysis units at that time were accepting COVID positive patients.  Therefore peritoneal dialysis catheter was placed laparoscopically with a plan for him to perform dialysis at home.  The peritoneal dialysis catheter never functioned appropriately despite manipulation and Cathflo.  He was able to be approved for a dialysis chair at the outpatient dialysis unit therefore we planned to replace the Trialysis catheter with a tunneled catheter remove the peritoneal dialysis catheter.    PROCEDURE IN DETAIL:   Informed consent was obtained.  Patient taken the operating room where he was intubated per anesthesia protocol for COVID-19 patient's.  He received 2 g of Ancef.  Time-out performed by all members of the operative team.    Tunneled dialysis catheter  His right neck and chest including the current Trialysis catheter were prepped and draped in typical sterile  fashion.  Local anesthetic was injected at the catheter insertion site in the neck.  A guidewire was placed through the accessory port and confirmed in the appropriate location on fluoroscopy.  All images were interpretive by me intraoperatively and stored.  The Trialysis catheter was removed and discarded.  Gloves were changed and the area was re-prepped.  A dilator and sheath were placed over the wire and the dilator wire removed.  We then measured from the insertion site approximately 9-10 cm below the clavicle on the right chest.  Local anesthetic was injected and a small skin nick was made in the right chest exit site.  The tunneler and catheter were then passed from the chest incision site to the neck incision site.  The cuff was buried underneath the subcutaneous tissue.  The catheter was then fed through the sheath into the venous system and the sheath was peeled away.  Catheter tip was found be in appropriate position near the atrial caval junction on fluoroscopy.  A small kink at the curvature of the catheter was worked out.  Both ports were then aspirated and flushed with ease.  Hep-Lock was performed.  The catheter was secured to the chest wall with nylon sutures.  A Biopatch and sterile dressing were placed over this.  The neck incision site was closed with a 4 Monocryl subcuticular stitch and Dermabond.    Peritoneal dialysis catheter removal  We then turned our attention to the removal of peritoneal dialysis catheter.  The abdomen including the peritoneal catheter were prepped and draped in typical sterile fashion.  Local anesthetic was injected over the incision site where the catheter was inserted through the rectus muscle into the peritoneal cavity and at the left upper abdomen incision site which was used to bury the cuffs of the swan-neck portion of the catheter.  The catheter was identified at each incision site.  Were able to free the proximal distal cuffed at the bend in the catheter at the  upper incision in this portion of the catheter was removed in totality.  Were then able to free up the proximal end of the catheter through the periumbilical incision site.  With gentle traction were able to remove the most distal cuff and silicone bulb from the rectus sheath incompletely removed the remainder of the catheter in totality.  The incisions were then closed with 3 0 Vicryl deep dermal sutures in 4 Monocryl subcuticular stitches.  Dermabond was also applied.    The patient was then aroused from sedation and extubated.  He was recovered in the operating room.  All counts correct x2 the case.  I was present scrubbed throughout the case.    DISPO:  Return to floor, disposition per primary team

## 2020-04-13 ENCOUNTER — PATIENT OUTREACH (OUTPATIENT)
Dept: ADMINISTRATIVE | Facility: CLINIC | Age: 75
End: 2020-04-13

## 2020-04-13 NOTE — PLAN OF CARE
Patient discharged over Weekend.  Called Shiv Long and informed  Didi patient will arrive on Tuesday because he discharged on Saturday.     called patient's wife Hue Benjamin and reminded her of patient's schedule and arrival time of 6:00am Tuesday.

## 2020-04-14 ENCOUNTER — PATIENT OUTREACH (OUTPATIENT)
Dept: ADMINISTRATIVE | Facility: CLINIC | Age: 75
End: 2020-04-14

## 2020-04-14 NOTE — PROGRESS NOTES
Please forward this important TCC information to your provider in order to maximize the post discharge care delivery of this patient.    C3 nurse spoke with Hue Jc for a TCC post hospital discharge follow up call. The patient has a scheduled HOSFU appointment with  on 4/15/20.    Respectfully,  Jewels Purdy, RN  [facility]    [email or web address]       Please do not reply to this message, as this inbox is not routinely monitored.

## 2020-04-16 ENCOUNTER — HOSPITAL ENCOUNTER (EMERGENCY)
Facility: HOSPITAL | Age: 75
Discharge: HOME | End: 2020-04-16
Attending: EMERGENCY MEDICINE
Payer: MEDICARE

## 2020-04-16 VITALS
OXYGEN SATURATION: 100 % | RESPIRATION RATE: 20 BRPM | WEIGHT: 276 LBS | HEART RATE: 60 BPM | BODY MASS INDEX: 35.44 KG/M2 | TEMPERATURE: 98 F | SYSTOLIC BLOOD PRESSURE: 117 MMHG | DIASTOLIC BLOOD PRESSURE: 58 MMHG

## 2020-04-16 DIAGNOSIS — I95.9 HYPOTENSION, UNSPECIFIED HYPOTENSION TYPE: Primary | ICD-10-CM

## 2020-04-16 DIAGNOSIS — N18.6 ESRD (END STAGE RENAL DISEASE): ICD-10-CM

## 2020-04-16 PROCEDURE — 93005 ELECTROCARDIOGRAM TRACING: CPT

## 2020-04-16 PROCEDURE — 99285 EMERGENCY DEPT VISIT HI MDM: CPT | Mod: 25

## 2020-04-16 NOTE — ED NOTES
Pt presents to the ED w/ c/ of hypotension. Pt reports that he receives dialysis T,TH,S. Reports last time had dialysis was T. Reports went to have dialysis today and did not receive dialysis due to his BP being in the 80's. Pt denies chest pain or SOB. Pt reports recently discharged from hospital on Sunday and is +covid-19. Pt reports infrequent cough but reports improvement in cough since being admitted and discharged last week. Pt arrives to ED with BP in the high 90's. Pt is awake, alert, orientedx4. Denies any complaints.

## 2020-04-16 NOTE — ED NOTES
Denies lightheadedness or weakness on lying and sitting. Dr. Lefort informed of patient denying symptoms.

## 2020-04-16 NOTE — ED PROVIDER NOTES
Encounter Date: 4/16/2020    SCRIBE #1 NOTE: I, Kassidy Christianson, am scribing for, and in the presence of,  Dr. Lefort. I have scribed the entire note.       History     Chief Complaint   Patient presents with    Hypotension     dialysis patient T, TH,S. last dialysis was on tuesday. did not receive dialysis due to hypotension BP in the 80's. pt arrives awake, alert. denies chest pain, SOB, or fatigue. pt had +covid-19 test on 3/28     Time seen by provider: 8:22 AM    This is a 74 y.o. male who presents with complaint of hypotension. Patient notes he was at his dialysis appointment this morning where they reported his blood pressure in the 80s. He was not able to receive dialysis due to hypotension and was sent to the ED. He has no complaints at this time. Patient denies CP, SOB or fatigue. He notes he recently started dialysis following a positive COVID-19 test on 3/28.     The history is provided by the patient.     Review of patient's allergies indicates:  No Known Allergies  Past Medical History:   Diagnosis Date    Anemia     CHF (congestive heart failure)     CKD (chronic kidney disease) stage 4, GFR 15-29 ml/min     Coronary artery disease     Diabetes mellitus     Hematuria     Hypertension     Renal cyst, right      Past Surgical History:   Procedure Laterality Date    INSERTION OF BIVENTRICULAR IMPLANTABLE CARDIOVERTER-DEFIBRILLATOR (ICD) Left 7/18/2019    Procedure: INSERTION, ICD, BIVENTRICULAR;  Surgeon: Arturo Bay MD;  Location: [facility] EP LAB;  Service: Cardiology;  Laterality: Left;  CHB, CRTD, SJM, anes, GP, 6078    LEFT HEART CATHETERIZATION N/A 7/16/2019    Procedure: Left heart cath;  Surgeon: Dejuan Cody MD;  Location: [facility] CATH LAB/EP;  Service: Cardiology;  Laterality: N/A;    PERITONEAL CATHETER REMOVAL Left 4/11/2020    Procedure: REMOVAL, CATHETER, DIALYSIS, PERITONEAL;  Surgeon: Edis Snell Jr., MD;  Location: [facility] OR;  Service: General;  Laterality: Left;      Family History   Problem Relation Age of Onset    Heart attack Father      Social History     Tobacco Use    Smoking status: Former Smoker    Smokeless tobacco: Never Used   Substance Use Topics    Alcohol use: Yes     Comment: social    Drug use: No     Review of Systems   Constitutional: Negative for chills and fever.   HENT: Negative for sore throat.    Eyes: Negative for redness.   Respiratory: Negative for shortness of breath.    Cardiovascular: Negative for chest pain.   Gastrointestinal: Negative for abdominal pain, diarrhea, nausea and vomiting.   Genitourinary: Negative for dysuria and hematuria.   Musculoskeletal: Negative for back pain.   Skin: Negative for rash.   Neurological: Negative for headaches.       Physical Exam     Initial Vitals [04/16/20 0818]   BP Pulse Resp Temp SpO2   (!) 98/48 60 18 98.5 °F (36.9 °C) 97 %      MAP       --         Physical Exam    Nursing note and vitals reviewed.  Constitutional: He appears well-developed and well-nourished. He is not diaphoretic. No distress.   HENT:   Head: Normocephalic and atraumatic.   Eyes: Conjunctivae and EOM are normal.   Neck: Normal range of motion.   Cardiovascular: Normal rate and regular rhythm.   Vascular catheter R chest   Pulmonary/Chest: No respiratory distress.   Abdominal: He exhibits no distension.   Umbilical hernia    Musculoskeletal: He exhibits no edema.   Neurological: He is alert and oriented to person, place, and time. He has normal strength.   Skin: Skin is warm and dry.         ED Course   Procedures  Labs Reviewed - No data to display  EKG Readings: (Independently Interpreted)   Normal sinus rhythm at 60 bpm   Left bundle branch block   No STEMI        Imaging Results    None          Medical Decision Making:   Independently Interpreted Test(s):   I have ordered and independently interpreted EKG Reading(s) - see prior notes  Clinical Tests:   Medical Tests: Ordered and Reviewed  ED Management:  Pt with no  complaints in ED.  Able to participate in orthostatics sitting, reports being generally bedridden from COVID and hasnt ambulated per instructions.  Multiple BP readings in ED without hypotension.  Will DC to dialysis clinic.  Discussed ED return precautions.    Differential Diagnosis includes, but is not limited to:  CVA/TIA, vertigo, anemia/blood loss, cardiogenic shock, arrhythmia, orthostatic hypotension, dehydration, medication side effect, vitamin deficiency, liver disease, hypothyroidism, drug intoxication/withdrawal, metabolic derangement.                                     Clinical Impression:       ICD-10-CM ICD-9-CM   1. Hypotension, unspecified hypotension type I95.9 458.9   2. ESRD (end stage renal disease) N18.6 585.6                   Scribe attestation: I, Dr. Guy Lefort, personally performed the services described in this documentation. All medical record entries made by the scribe were at my direction and in my presence. I have reviewed the chart and agree that the record reflects my personal performance and is accurate and complete. Guy Lefort, MD.  10:13 PM 04/16/2020               Guy J. Lefort, MD  04/16/20 6995

## 2020-04-16 NOTE — ED NOTES
Spoke to Didi at [facility] and informed that patient will be coming back to them for dialysis per Dr. Lefort.

## 2020-04-16 NOTE — ED NOTES
Spoke to Jennifer at [facility] about transporting patient back to dialysis per Dr. Lefort. Jennifer will cotact Dr. Fowler for orders and will call back about plan of care Updated pt on plan of care. Pt verbalized understanding.

## 2020-04-16 NOTE — ED NOTES
Pt provided with pillow. Pt is NAD. Pt is connected to cardiac monitor, BP cuff, and pulse ox. Will continue to monitor.

## 2020-04-23 ENCOUNTER — CLINICAL SUPPORT (OUTPATIENT)
Dept: CARDIOLOGY | Facility: HOSPITAL | Age: 75
End: 2020-04-23
Payer: MEDICARE

## 2020-04-23 PROCEDURE — 93295 CARDIAC DEVICE CHECK - REMOTE: ICD-10-PCS | Mod: ,,, | Performed by: INTERNAL MEDICINE

## 2020-04-23 PROCEDURE — 93296 REM INTERROG EVL PM/IDS: CPT | Performed by: INTERNAL MEDICINE

## 2020-04-23 PROCEDURE — 93295 DEV INTERROG REMOTE 1/2/MLT: CPT | Mod: ,,, | Performed by: INTERNAL MEDICINE

## 2020-05-01 DIAGNOSIS — I47.20 VT (VENTRICULAR TACHYCARDIA): ICD-10-CM

## 2020-05-04 RX ORDER — AMIODARONE HYDROCHLORIDE 200 MG/1
200 TABLET ORAL 2 TIMES DAILY
Qty: 60 TABLET | Refills: 11 | Status: SHIPPED | OUTPATIENT
Start: 2020-05-04 | End: 2021-02-22

## 2020-05-06 ENCOUNTER — OUTPATIENT CASE MANAGEMENT (OUTPATIENT)
Dept: ADMINISTRATIVE | Facility: OTHER | Age: 75
End: 2020-05-06

## 2020-05-06 NOTE — PROGRESS NOTES
05/06/20-COVID 19 on 03/28/20 detected. Admitted to [facility] hospital on 03/28/20 and discharged on 04/11/20.  Attempt COVID 19 assessment with patient for outpatient case management. No answer. Left message requesting call back. RN OPCM 1'st assessment attempt.   05/07/20-Called and spoke to wife. Patient is lying down and napping after dialysis. Before COVID 19 patient was not on dialysis. On 04/30/20- COVID 19 was negative. He needs another negative test to go to regular dialysis. Patient with no sign or symptoms of COVID 19 at this time. Will continue COVID 19 education with wife and patient.       PLAN-  Message to cardiology, Dr. Escudero for refill on Losartan 25 mg daily. Only has 4 pills left per wife. [facility] stating that they need a prior authorization per wife.    Follow up next week     Outpatient Care Management  COVID-19 Patient Assessment    Patient: Houston Jc  MRN: [id number]  Date of Service: 05/06/2020  Completed by: Kami Gomez RN  Program: COVID-19    Reason for Visit   Patient presents with    COVID-19 Concerns    OPCM Enrollment Call    OPCM RN First Assessment Attempt     05/06/20    Initial Assessment    PHQ-9    Plan Of Care       Brief Summary: see note     Assessment Documentation     COVID-19 Assessment    Nurse Assessment via Chart Review:  Was patient on a ventilator while hospitalized?:  No  Nurse Assessment with Patient:  When did you test positive for COVID-19?:  3/28/20  For Fever:  For Coughing:  For Difficulty Breathing:  Psycho/Social Assessment:  Do you have a support person/caregiver?:  Yes  Are you and/or your caregiver able to access food?:  Yes  Are you and/or your caregiver able to access medications?:  No  Are you able to isolate yourself from other family members?:  Yes  Is anyone else in your home ill with COVID-19?:  No  Does patient need any mental health/emotional support resources?:  No  Were you working prior to the COVID-19 illness?:  No  Are  you still employed?:  No  Are you able to work from home?:  No  Are you experiencing financial difficulties related to the COVID-19 pandemic?:  No  Do you need access to Community Resources?:  No  COVID-19 Patient Assessment Complete (suresh Yes only if assessment is finished):  Yes         Problem List and History     Patient Active Problem List   Diagnosis    Swelling of lower extremity    Hypervolemia    Hypertension    Diastolic dysfunction    Sleep apnea    Morbid obesity    Complete heart block    VT (ventricular tachycardia)    Cardiorenal syndrome    Acute systolic heart failure    Abdominal distension    Debility    Gout    Cardiac resynchronization therapy defibrillator (CRT-D) in place    Third degree heart block    Abnormal transaminases    NICM (nonischemic cardiomyopathy)    SOB (shortness of breath)    COVID-19 virus detected    Fever    Suspected 2019-Ncov Infection    CLEMENCIA (acute kidney injury)    Bradycardia       Reviewed Active Problem List with patient and/or Caregiver. The following were identified as areas of need: COVID 19     Medical History:  Reviewed medical history with patient and/or caregiver    Social History:  Reviewed social history with patient and/or caregiver    Complex Care Plan    Care plan was discussed and completed today with input from patient and/or caregiver.    Patient Instructions     Instructions were provided via the [employer] patient resources and are available for the patient to view on the patient portal, if active.    Next steps: see note     Follow up in about 1 week (around 5/13/2020) for RN Follow up call.    Todays OPCM Self-Management Care Plan was developed with the patients/caregivers input and was based on identified barriers from todays assessment.  Goals were written today with the patient/caregiver and the patient has agreed to work towards these goals to improve his/her overall well-being. Patient verbalized understanding of the  care plan, goals, and all of today's instructions. Encouraged patient/caregiver to communicate with his/her physician and health care team about health conditions and the treatment plan.  Provided my contact information today and encouraged patient/caregiver to call me with any questions as needed.

## 2020-05-07 NOTE — TELEPHONE ENCOUNTER
----- Message from Meenakshi Mtz RN sent at 5/7/2020  4:16 PM CDT -----  Contact: Kami Gomez RN [facility] Care Management EXT. [id number]      ----- Message -----  From: Kami Gomez RN  Sent: 5/7/2020   4:03 PM CDT  To: D[facility] Yu Staff    Patient has 4 pills left on his Losartan 25 mg daily. [facility] needs a prior authorization per his wife for the refill of this medication.   Could you please assist patient and wife with this.  Please call and advise wife/patient.    Thank you for assistance,   Kami Gomez RN [facility] OPCM

## 2020-05-08 RX ORDER — LOSARTAN POTASSIUM 25 MG/1
25 TABLET ORAL DAILY
Qty: 90 TABLET | Refills: 3 | Status: SHIPPED | OUTPATIENT
Start: 2020-05-08 | End: 2021-06-10

## 2020-05-12 ENCOUNTER — OUTPATIENT CASE MANAGEMENT (OUTPATIENT)
Dept: ADMINISTRATIVE | Facility: OTHER | Age: 75
End: 2020-05-12

## 2020-05-12 NOTE — PROGRESS NOTES
05/12/20-Attempt COVID 19 follow up with patient/caregiver  for outpatient case management. No answer. Left message requesting call back.   05/13/20-Called and spoke to wife with patient in the room. His last test for covid at the dialysis center was negative so he is at the regular dialysis unit. He has an appointment with the nephrologist in one month. He is on 32 ounce fluid restriction daily. He received his losartan from the pharmacy. Reviewed COVID 19 care plan, verbalized understanding.     PLAN=  Follow up   Call home phone number         Outpatient Care Management  COVID-19 Patient Assessment    Patient: Houston Jc  MRN: [id number]  Date of Service: 05/12/2020  Completed by: Kami Gomez RN  Program: COVID-19    Reason for Visit   Patient presents with    COVID-19 Concerns    OPCM RN First Follow-Up Attempt     05/12/20    Update Plan Of Care     05/13/20       Brief Summary: see note     Assessment Documentation     COVID-19 Assessment    Nurse Assessment via Chart Review:  Nurse Assessment with Patient:  For Fever:  For Coughing:  For Difficulty Breathing:  Psycho/Social Assessment:         Problem List and History     Patient Active Problem List   Diagnosis    Swelling of lower extremity    Hypervolemia    Hypertension    Diastolic dysfunction    Sleep apnea    Morbid obesity    Complete heart block    VT (ventricular tachycardia)    Cardiorenal syndrome    Acute systolic heart failure    Abdominal distension    Debility    Gout    Cardiac resynchronization therapy defibrillator (CRT-D) in place    Third degree heart block    Abnormal transaminases    NICM (nonischemic cardiomyopathy)    SOB (shortness of breath)    COVID-19 virus detected    Fever    Suspected 2019-Ncov Infection    CLEMENCIA (acute kidney injury)    Bradycardia    Scrotal pain       Reviewed Active Problem List with patient and/or Caregiver. The following were identified as areas of need: COVID 10    Medical  History:  Reviewed medical history with patient and/or caregiver    Social History:  Reviewed social history with patient and/or caregiver    Complex Care Plan    Care plan was discussed and completed today with input from patient and/or caregiver.    Patient Instructions     Instructions were provided via the [employer] patient resources and are available for the patient to view on the patient portal, if active.    Next steps: see note     Follow up in about 1 week (around 5/19/2020) for RN Follow up call.    Todays OPCM Self-Management Care Plan was developed with the patients/caregivers input and was based on identified barriers from todays assessment.  Goals were written today with the patient/caregiver and the patient has agreed to work towards these goals to improve his/her overall well-being. Patient verbalized understanding of the care plan, goals, and all of today's instructions. Encouraged patient/caregiver to communicate with his/her physician and health care team about health conditions and the treatment plan.  Provided my contact information today and encouraged patient/caregiver to call me with any questions as needed.

## 2020-05-19 ENCOUNTER — OUTPATIENT CASE MANAGEMENT (OUTPATIENT)
Dept: ADMINISTRATIVE | Facility: OTHER | Age: 75
End: 2020-05-19

## 2020-05-19 NOTE — PROGRESS NOTES
Outpatient Care Management  COVID-19 Patient Assessment    Patient: Houston Jc  MRN: [id number]  Date of Service: 05/19/2020  Completed by: Kami Gomez RN  Program: COVID-19    Reason for Visit   Patient presents with    COVID-19 Concerns    Update Plan Of Care       Brief Summary: Called and spoke to wife with patient at her side. Patient back from regular dialysis. No complaints voiced. Covid 19 care plan reviewed, verbalized understanding.     Assessment Documentation     COVID-19 Assessment    Nurse Assessment via Chart Review:  Nurse Assessment with Patient:  For Fever:  For Coughing:  For Difficulty Breathing:  Psycho/Social Assessment:         Problem List and History     Patient Active Problem List   Diagnosis    Swelling of lower extremity    Hypervolemia    Hypertension    Diastolic dysfunction    Sleep apnea    Morbid obesity    Complete heart block    VT (ventricular tachycardia)    Cardiorenal syndrome    Acute systolic heart failure    Abdominal distension    Debility    Gout    Cardiac resynchronization therapy defibrillator (CRT-D) in place    Third degree heart block    Abnormal transaminases    NICM (nonischemic cardiomyopathy)    SOB (shortness of breath)    COVID-19 virus detected    Fever    Suspected 2019-Ncov Infection    CLEMENCIA (acute kidney injury)    Bradycardia    Scrotal pain       Reviewed Active Problem List with patient and/or Caregiver. The following were identified as areas of need: COVID 19    Medical History:  Reviewed medical history with patient and/or caregiver    Social History:  Reviewed social history with patient and/or caregiver    Complex Care Plan    Care plan was discussed and completed today with input from patient and/or caregiver.    Patient Instructions     Instructions were provided via the [employer] patient resources and are available for the patient to view on the patient portal, if active.    Next steps: follow up next week and if no new  needs will close case     Follow up in about 1 week (around 5/26/2020) for RN Follow up call.    Todays OPCM Self-Management Care Plan was developed with the patients/caregivers input and was based on identified barriers from todays assessment.  Goals were written today with the patient/caregiver and the patient has agreed to work towards these goals to improve his/her overall well-being. Patient verbalized understanding of the care plan, goals, and all of today's instructions. Encouraged patient/caregiver to communicate with his/her physician and health care team about health conditions and the treatment plan.  Provided my contact information today and encouraged patient/caregiver to call me with any questions as needed.

## 2020-05-27 ENCOUNTER — OUTPATIENT CASE MANAGEMENT (OUTPATIENT)
Dept: ADMINISTRATIVE | Facility: OTHER | Age: 75
End: 2020-05-27

## 2020-05-27 NOTE — PROGRESS NOTES
Outpatient Care Management  COVID-19 Patient Assessment    Patient: Houston Jc  MRN: [id number]  Date of Service: 05/27/2020  Completed by: Kami Gomez RN  Program: COVID-19    Reason for Visit   Patient presents with    COVID-19 Concerns    Update Plan Of Care       Brief Summary: 05/27/20-Called and spoke to wife who asked for a call back on the home phone. Called and spoke to patient. He is doing well. He is using a walker. He still does not have all his strength back or his appetite. Reviewed COVID 19 care plan with patient, verbalized understanding.     Assessment Documentation     COVID-19 Assessment    Nurse Assessment via Chart Review:  Nurse Assessment with Patient:  For Fever:  For Coughing:  For Difficulty Breathing:  Psycho/Social Assessment:         Problem List and History     Patient Active Problem List   Diagnosis    Swelling of lower extremity    Hypervolemia    Hypertension    Diastolic dysfunction    Sleep apnea    Morbid obesity    Complete heart block    VT (ventricular tachycardia)    Cardiorenal syndrome    Acute systolic heart failure    Abdominal distension    Debility    Gout    Cardiac resynchronization therapy defibrillator (CRT-D) in place    Third degree heart block    Abnormal transaminases    NICM (nonischemic cardiomyopathy)    SOB (shortness of breath)    COVID-19 virus detected    Fever    Suspected 2019-Ncov Infection    CLEMENCIA (acute kidney injury)    Bradycardia    Scrotal pain       Reviewed Active Problem List with patient and/or Caregiver. The following were identified as areas of need: COVID 19    Medical History:  Reviewed medical history with patient and/or caregiver    Social History:  Reviewed social history with patient and/or caregiver    Complex Care Plan    Care plan was discussed and completed today with input from patient and/or caregiver.    Patient Instructions     Instructions were provided via the [employer] patient resources and are  available for the patient to view on the patient portal, if active.    Next steps: follow up next week -how is his appetite and energy level     Follow up in about 1 week (around 6/3/2020) for RN Follow up call.    Todays OPCM Self-Management Care Plan was developed with the patients/caregivers input and was based on identified barriers from todays assessment.  Goals were written today with the patient/caregiver and the patient has agreed to work towards these goals to improve his/her overall well-being. Patient verbalized understanding of the care plan, goals, and all of today's instructions. Encouraged patient/caregiver to communicate with his/her physician and health care team about health conditions and the treatment plan.  Provided my contact information today and encouraged patient/caregiver to call me with any questions as needed.

## 2020-06-03 ENCOUNTER — OUTPATIENT CASE MANAGEMENT (OUTPATIENT)
Dept: ADMINISTRATIVE | Facility: OTHER | Age: 75
End: 2020-06-03
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
## 2020-06-08 NOTE — PLAN OF CARE
VN cued into room for q2h rounding.  Pt resting comfortably in bed.  NADN.  Pt awake and alert, but did not respond to VN verbal cues. VN will continue to follow and be available as needed.     Length To Time In Minutes Device Was In Place: 10

## 2020-06-10 ENCOUNTER — OUTPATIENT CASE MANAGEMENT (OUTPATIENT)
Dept: ADMINISTRATIVE | Facility: OTHER | Age: 75
End: 2020-06-10
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
## 2020-06-18 ENCOUNTER — OUTPATIENT CASE MANAGEMENT (OUTPATIENT)
Dept: ADMINISTRATIVE | Facility: OTHER | Age: 75
End: 2020-06-18

## 2020-06-18 NOTE — PROGRESS NOTES
06/18/20-     Outpatient Care Management  COVID-19 Patient Assessment    Patient: Houston Jc  MRN: [id number]  Date of Service: 06/18/2020  Completed by: Kami Gomez RN  Program: COVID-19    Reason for Visit   Patient presents with    COVID-19 Concerns    Update Plan Of Care       Brief Summary: Called and spoke to wife. Patient went to dialysis and is sleeping at this time. Patient is still having some weakness and is using his walker. He received the senior resource guide. Reviewed COVID 19 care plan. Will call on Friday next week at noon to speak to patient. If no further needs will close case.    Assessment Documentation     COVID-19 Assessment    Nurse Assessment via Chart Review:  Nurse Assessment with Patient:  For Fever:  For Coughing:  For Difficulty Breathing:  Psycho/Social Assessment:         Problem List and History     Patient Active Problem List   Diagnosis    Swelling of lower extremity    Hypervolemia    Hypertension    Diastolic dysfunction    Sleep apnea    Morbid obesity    Complete heart block    VT (ventricular tachycardia)    Cardiorenal syndrome    Acute systolic heart failure    Abdominal distension    Debility    Gout    Cardiac resynchronization therapy defibrillator (CRT-D) in place    Third degree heart block    Abnormal transaminases    NICM (nonischemic cardiomyopathy)    SOB (shortness of breath)    COVID-19 virus detected    Fever    Suspected 2019-Ncov Infection    CLEMENCIA (acute kidney injury)    Bradycardia    Scrotal pain       Reviewed Active Problem List with patient and/or Caregiver. The following were identified as areas of need: COVID 19    Medical History:  Reviewed medical history with patient and/or caregiver    Social History:  Reviewed social history with patient and/or caregiver    Complex Care Plan    Care plan was discussed and completed today with input from patient and/or caregiver.    Patient Instructions     Instructions were provided  via the [employer] patient resources and are available for the patient to view on the patient portal, if active.    Next steps: Follow on on Friday at noon per wife request to talk to patient. Close case if no further needs and care plan goals met.     Follow up in about 8 days (around 6/26/2020) for RN Follow up call.    Todays OPCM Self-Management Care Plan was developed with the patients/caregivers input and was based on identified barriers from todays assessment.  Goals were written today with the patient/caregiver and the patient has agreed to work towards these goals to improve his/her overall well-being. Patient verbalized understanding of the care plan, goals, and all of today's instructions. Encouraged patient/caregiver to communicate with his/her physician and health care team about health conditions and the treatment plan.  Provided my contact information today and encouraged patient/caregiver to call me with any questions as needed.

## 2020-06-26 ENCOUNTER — OUTPATIENT CASE MANAGEMENT (OUTPATIENT)
Dept: ADMINISTRATIVE | Facility: OTHER | Age: 75
End: 2020-06-26

## 2020-06-26 NOTE — PROGRESS NOTES
06/26/20-Patient had an appointment this morning to have his dialysis fistula de-clogged. He is having no s/so of coronavirus. He is driving  to his dialysis appointments. Reviewed COVID 19 care plan with him. He is avoiding crowds for July 4th. He is wearing his mask, social distancing, using alcohol  and washing his hands. Answered all his questions. OPCM will close case. Care plan goals met.     
Abdomen soft, nontender, nondistended, bowel sounds present in all 4 quadrants.

## 2020-07-22 ENCOUNTER — CLINICAL SUPPORT (OUTPATIENT)
Dept: CARDIOLOGY | Facility: HOSPITAL | Age: 75
End: 2020-07-22
Payer: MEDICARE

## 2020-07-22 DIAGNOSIS — Z95.810 PRESENCE OF AUTOMATIC (IMPLANTABLE) CARDIAC DEFIBRILLATOR: ICD-10-CM

## 2020-07-22 DIAGNOSIS — I50.42 CHRONIC COMBINED SYSTOLIC (CONGESTIVE) AND DIASTOLIC (CONGESTIVE) HEART FAILURE: ICD-10-CM

## 2020-07-22 PROCEDURE — 93295 DEV INTERROG REMOTE 1/2/MLT: CPT | Mod: ,,, | Performed by: INTERNAL MEDICINE

## 2020-07-22 PROCEDURE — 93296 REM INTERROG EVL PM/IDS: CPT | Performed by: INTERNAL MEDICINE

## 2020-07-22 PROCEDURE — 93295 CARDIAC DEVICE CHECK - REMOTE: ICD-10-PCS | Mod: ,,, | Performed by: INTERNAL MEDICINE

## 2020-07-23 ENCOUNTER — TELEPHONE (OUTPATIENT)
Dept: CARDIOLOGY | Facility: CLINIC | Age: 75
End: 2020-07-23

## 2020-07-23 NOTE — TELEPHONE ENCOUNTER
----- Message from Milvia Potts sent at 7/23/2020  4:27 PM CDT -----  Regarding: Clearance  Kristen    Winter [phone] with vascular access center calling in ref to the clearance that was sent over for the pt. She says the clearance says he has a left upper extremity DVT and she needs to know what vein it's in because they are going to place a permanent access in the left arm.     Thanks

## 2020-09-04 DIAGNOSIS — R33.9 URINARY RETENTION: ICD-10-CM

## 2020-09-04 RX ORDER — FINASTERIDE 5 MG/1
5 TABLET, FILM COATED ORAL DAILY
Qty: 30 TABLET | Refills: 11 | Status: SHIPPED | OUTPATIENT
Start: 2020-09-04 | End: 2021-01-01

## 2020-09-04 RX ORDER — TAMSULOSIN HYDROCHLORIDE 0.4 MG/1
0.4 CAPSULE ORAL DAILY
Qty: 30 CAPSULE | Refills: 11 | Status: SHIPPED | OUTPATIENT
Start: 2020-09-04 | End: 2021-07-07

## 2020-10-20 ENCOUNTER — CLINICAL SUPPORT (OUTPATIENT)
Dept: CARDIOLOGY | Facility: HOSPITAL | Age: 75
End: 2020-10-20
Payer: MEDICARE

## 2020-10-20 DIAGNOSIS — Z95.810 PRESENCE OF AUTOMATIC (IMPLANTABLE) CARDIAC DEFIBRILLATOR: ICD-10-CM

## 2020-10-20 PROCEDURE — 93296 REM INTERROG EVL PM/IDS: CPT | Performed by: INTERNAL MEDICINE

## 2020-10-20 PROCEDURE — 93295 DEV INTERROG REMOTE 1/2/MLT: CPT | Mod: ,,, | Performed by: INTERNAL MEDICINE

## 2020-10-20 PROCEDURE — 93295 CARDIAC DEVICE CHECK - REMOTE: ICD-10-PCS | Mod: ,,, | Performed by: INTERNAL MEDICINE

## 2020-12-14 ENCOUNTER — TELEPHONE (OUTPATIENT)
Dept: ELECTROPHYSIOLOGY | Facility: CLINIC | Age: 75
End: 2020-12-14

## 2020-12-14 DIAGNOSIS — I49.8 OTHER SPECIFIED CARDIAC ARRHYTHMIAS: Primary | ICD-10-CM

## 2020-12-14 NOTE — TELEPHONE ENCOUNTER
Pt now scheduled ----- Message from Jeff Partida MA sent at 12/14/2020 11:17 AM CST -----  Regarding: FW: Appt  Good morning Reina  See below  please advise.  Thanks   ----- Message -----  From: Gracie Vyas  Sent: 12/14/2020  11:11 AM CST  To: Shanique Morris Staff  Subject: Appt                                             Pt returning call. Thanks     [phone]        
9

## 2021-01-01 ENCOUNTER — TELEPHONE (OUTPATIENT)
Dept: CARDIOLOGY | Facility: CLINIC | Age: 76
End: 2021-01-01

## 2021-01-01 ENCOUNTER — CLINICAL SUPPORT (OUTPATIENT)
Dept: CARDIOLOGY | Facility: HOSPITAL | Age: 76
End: 2021-01-01
Payer: MEDICARE

## 2021-01-01 ENCOUNTER — HOSPITAL ENCOUNTER (OUTPATIENT)
Dept: RADIOLOGY | Facility: HOSPITAL | Age: 76
Discharge: HOME | End: 2021-10-11
Attending: INTERNAL MEDICINE
Payer: MEDICARE

## 2021-01-01 ENCOUNTER — OFFICE VISIT (OUTPATIENT)
Dept: CARDIOLOGY | Facility: CLINIC | Age: 76
End: 2021-01-01
Payer: MEDICARE

## 2021-01-01 ENCOUNTER — TELEPHONE (OUTPATIENT)
Dept: ADMINISTRATIVE | Facility: OTHER | Age: 76
End: 2021-01-01
Payer: OTHER GOVERNMENT

## 2021-01-01 ENCOUNTER — PATIENT OUTREACH (OUTPATIENT)
Dept: ADMINISTRATIVE | Facility: OTHER | Age: 76
End: 2021-01-01
Payer: OTHER GOVERNMENT

## 2021-01-01 ENCOUNTER — HOSPITAL ENCOUNTER (EMERGENCY)
Facility: HOSPITAL | Age: 76
Discharge: HOME | End: 2021-12-20
Attending: EMERGENCY MEDICINE
Payer: OTHER GOVERNMENT

## 2021-01-01 ENCOUNTER — OFFICE VISIT (OUTPATIENT)
Dept: PODIATRY | Facility: CLINIC | Age: 76
End: 2021-01-01
Payer: MEDICARE

## 2021-01-01 ENCOUNTER — HOSPITAL ENCOUNTER (INPATIENT)
Facility: HOSPITAL | Age: 76
LOS: 8 days | Discharge: HOME HEALTH | DRG: 252 | End: 2021-12-29
Attending: EMERGENCY MEDICINE | Admitting: INTERNAL MEDICINE
Payer: MEDICARE

## 2021-01-01 ENCOUNTER — ANESTHESIA EVENT (OUTPATIENT)
Dept: SURGERY | Facility: HOSPITAL | Age: 76
DRG: 252 | End: 2021-01-01
Payer: MEDICARE

## 2021-01-01 ENCOUNTER — HOSPITAL ENCOUNTER (OUTPATIENT)
Dept: CARDIOLOGY | Facility: HOSPITAL | Age: 76
Discharge: HOME | End: 2021-10-11
Attending: INTERNAL MEDICINE
Payer: MEDICARE

## 2021-01-01 ENCOUNTER — CLINICAL SUPPORT (OUTPATIENT)
Dept: CARDIOLOGY | Facility: HOSPITAL | Age: 76
End: 2021-01-01
Payer: OTHER GOVERNMENT

## 2021-01-01 ENCOUNTER — ANESTHESIA (OUTPATIENT)
Dept: SURGERY | Facility: HOSPITAL | Age: 76
DRG: 252 | End: 2021-01-01
Payer: MEDICARE

## 2021-01-01 ENCOUNTER — TELEPHONE (OUTPATIENT)
Dept: ELECTROPHYSIOLOGY | Facility: CLINIC | Age: 76
End: 2021-01-01
Payer: OTHER GOVERNMENT

## 2021-01-01 ENCOUNTER — PATIENT OUTREACH (OUTPATIENT)
Dept: ADMINISTRATIVE | Facility: CLINIC | Age: 76
End: 2021-01-01
Payer: OTHER GOVERNMENT

## 2021-01-01 VITALS
HEIGHT: 73 IN | HEART RATE: 60 BPM | DIASTOLIC BLOOD PRESSURE: 57 MMHG | WEIGHT: 278.88 LBS | SYSTOLIC BLOOD PRESSURE: 110 MMHG | BODY MASS INDEX: 36.96 KG/M2

## 2021-01-01 VITALS
TEMPERATURE: 99 F | SYSTOLIC BLOOD PRESSURE: 141 MMHG | OXYGEN SATURATION: 99 % | HEIGHT: 73 IN | SYSTOLIC BLOOD PRESSURE: 143 MMHG | HEIGHT: 73 IN | HEART RATE: 99 BPM | WEIGHT: 259.06 LBS | BODY MASS INDEX: 34.33 KG/M2 | DIASTOLIC BLOOD PRESSURE: 85 MMHG | WEIGHT: 273 LBS | HEART RATE: 79 BPM | RESPIRATION RATE: 18 BRPM | OXYGEN SATURATION: 96 % | BODY MASS INDEX: 36.18 KG/M2 | DIASTOLIC BLOOD PRESSURE: 62 MMHG | TEMPERATURE: 99 F | RESPIRATION RATE: 24 BRPM

## 2021-01-01 VITALS — WEIGHT: 279 LBS | HEIGHT: 73 IN | BODY MASS INDEX: 36.98 KG/M2

## 2021-01-01 VITALS — HEART RATE: 85 BPM | SYSTOLIC BLOOD PRESSURE: 108 MMHG | DIASTOLIC BLOOD PRESSURE: 50 MMHG

## 2021-01-01 DIAGNOSIS — I50.42 CHRONIC COMBINED SYSTOLIC AND DIASTOLIC CONGESTIVE HEART FAILURE: ICD-10-CM

## 2021-01-01 DIAGNOSIS — I44.2 COMPLETE HEART BLOCK: ICD-10-CM

## 2021-01-01 DIAGNOSIS — R78.81 BACTEREMIA DUE TO ENTEROCOCCUS: ICD-10-CM

## 2021-01-01 DIAGNOSIS — R07.9 CHEST PAIN: ICD-10-CM

## 2021-01-01 DIAGNOSIS — I73.9 PAD (PERIPHERAL ARTERY DISEASE): ICD-10-CM

## 2021-01-01 DIAGNOSIS — N18.6 TYPE 2 DIABETES MELLITUS WITH CHRONIC KIDNEY DISEASE ON CHRONIC DIALYSIS, WITH LONG-TERM CURRENT USE OF INSULIN: ICD-10-CM

## 2021-01-01 DIAGNOSIS — I42.8 NICM (NONISCHEMIC CARDIOMYOPATHY): ICD-10-CM

## 2021-01-01 DIAGNOSIS — E11.51 TYPE 2 DIABETES MELLITUS WITH DIABETIC PERIPHERAL ANGIOPATHY WITHOUT GANGRENE, UNSPECIFIED WHETHER LONG TERM INSULIN USE: ICD-10-CM

## 2021-01-01 DIAGNOSIS — N18.6 ESRD (END STAGE RENAL DISEASE) ON DIALYSIS: ICD-10-CM

## 2021-01-01 DIAGNOSIS — I82.431 DEEP VEIN THROMBOSIS (DVT) OF POPLITEAL VEIN OF RIGHT LOWER EXTREMITY, UNSPECIFIED CHRONICITY: Primary | ICD-10-CM

## 2021-01-01 DIAGNOSIS — R06.02 SOB (SHORTNESS OF BREATH): ICD-10-CM

## 2021-01-01 DIAGNOSIS — Z95.810 CARDIAC RESYNCHRONIZATION THERAPY DEFIBRILLATOR (CRT-D) IN PLACE: ICD-10-CM

## 2021-01-01 DIAGNOSIS — Z99.2 ESRD (END STAGE RENAL DISEASE) ON DIALYSIS: ICD-10-CM

## 2021-01-01 DIAGNOSIS — Z95.810 PRESENCE OF AUTOMATIC (IMPLANTABLE) CARDIAC DEFIBRILLATOR: ICD-10-CM

## 2021-01-01 DIAGNOSIS — I47.20 V TACH: ICD-10-CM

## 2021-01-01 DIAGNOSIS — D63.1 ANEMIA, CHRONIC RENAL FAILURE, STAGE 5: ICD-10-CM

## 2021-01-01 DIAGNOSIS — R05.9 COUGH: ICD-10-CM

## 2021-01-01 DIAGNOSIS — I10 PRIMARY HYPERTENSION: ICD-10-CM

## 2021-01-01 DIAGNOSIS — M25.511 CHRONIC RIGHT SHOULDER PAIN: ICD-10-CM

## 2021-01-01 DIAGNOSIS — N30.00 ACUTE CYSTITIS WITHOUT HEMATURIA: ICD-10-CM

## 2021-01-01 DIAGNOSIS — I47.29 NSVT (NONSUSTAINED VENTRICULAR TACHYCARDIA): ICD-10-CM

## 2021-01-01 DIAGNOSIS — E11.22 TYPE 2 DIABETES MELLITUS WITH CHRONIC KIDNEY DISEASE ON CHRONIC DIALYSIS, WITH LONG-TERM CURRENT USE OF INSULIN: ICD-10-CM

## 2021-01-01 DIAGNOSIS — E66.01 MORBID OBESITY: ICD-10-CM

## 2021-01-01 DIAGNOSIS — N18.5 ANEMIA, CHRONIC RENAL FAILURE, STAGE 5: ICD-10-CM

## 2021-01-01 DIAGNOSIS — G89.29 CHRONIC RIGHT SHOULDER PAIN: ICD-10-CM

## 2021-01-01 DIAGNOSIS — R33.9 URINARY RETENTION: ICD-10-CM

## 2021-01-01 DIAGNOSIS — R78.81 BACTEREMIA: Primary | ICD-10-CM

## 2021-01-01 DIAGNOSIS — Z79.4 TYPE 2 DIABETES MELLITUS WITH CHRONIC KIDNEY DISEASE ON CHRONIC DIALYSIS, WITH LONG-TERM CURRENT USE OF INSULIN: ICD-10-CM

## 2021-01-01 DIAGNOSIS — T82.49XA CLOTTED DIALYSIS ACCESS, INITIAL ENCOUNTER: ICD-10-CM

## 2021-01-01 DIAGNOSIS — B34.9 VIRAL SYNDROME: Primary | ICD-10-CM

## 2021-01-01 DIAGNOSIS — I82.431 ACUTE DEEP VEIN THROMBOSIS (DVT) OF POPLITEAL VEIN OF RIGHT LOWER EXTREMITY: Primary | ICD-10-CM

## 2021-01-01 DIAGNOSIS — B95.2 BACTEREMIA DUE TO ENTEROCOCCUS: ICD-10-CM

## 2021-01-01 DIAGNOSIS — L60.9 DISEASE OF NAIL: Primary | ICD-10-CM

## 2021-01-01 DIAGNOSIS — I82.431 ACUTE DEEP VEIN THROMBOSIS (DVT) OF POPLITEAL VEIN OF RIGHT LOWER EXTREMITY: ICD-10-CM

## 2021-01-01 DIAGNOSIS — Z99.2 TYPE 2 DIABETES MELLITUS WITH CHRONIC KIDNEY DISEASE ON CHRONIC DIALYSIS, WITH LONG-TERM CURRENT USE OF INSULIN: ICD-10-CM

## 2021-01-01 DIAGNOSIS — N17.9 AKI (ACUTE KIDNEY INJURY): ICD-10-CM

## 2021-01-01 LAB
ALBUMIN SERPL BCP-MCNC: 2.3 G/DL (ref 3.5–5.2)
ALBUMIN SERPL BCP-MCNC: 2.4 G/DL (ref 3.5–5.2)
ALBUMIN SERPL BCP-MCNC: 2.4 G/DL (ref 3.5–5.2)
ALBUMIN SERPL BCP-MCNC: 2.5 G/DL (ref 3.5–5.2)
ALBUMIN SERPL BCP-MCNC: 2.6 G/DL (ref 3.5–5.2)
ALBUMIN SERPL BCP-MCNC: 2.6 G/DL (ref 3.5–5.2)
ALBUMIN SERPL BCP-MCNC: 2.8 G/DL (ref 3.5–5.2)
ALBUMIN SERPL BCP-MCNC: 3.5 G/DL (ref 3.5–5.2)
ALP SERPL-CCNC: 64 U/L (ref 55–135)
ALP SERPL-CCNC: 71 U/L (ref 55–135)
ALP SERPL-CCNC: 75 U/L (ref 55–135)
ALP SERPL-CCNC: 75 U/L (ref 55–135)
ALP SERPL-CCNC: 79 U/L (ref 55–135)
ALP SERPL-CCNC: 82 U/L (ref 38–126)
ALP SERPL-CCNC: 82 U/L (ref 55–135)
ALP SERPL-CCNC: 90 U/L (ref 55–135)
ALT SERPL W/O P-5'-P-CCNC: 10 U/L (ref 10–44)
ALT SERPL W/O P-5'-P-CCNC: 5 U/L (ref 10–44)
ALT SERPL W/O P-5'-P-CCNC: 7 U/L (ref 10–44)
ALT SERPL W/O P-5'-P-CCNC: 8 U/L (ref 10–44)
ALT SERPL W/O P-5'-P-CCNC: 8 U/L (ref 10–44)
ALT SERPL W/O P-5'-P-CCNC: <5 U/L (ref 10–44)
ALT SERPL W/O P-5'-P-CCNC: <5 U/L (ref 10–44)
ANION GAP SERPL CALC-SCNC: 12 MMOL/L (ref 8–16)
ANION GAP SERPL CALC-SCNC: 13 MMOL/L (ref 8–16)
ANION GAP SERPL CALC-SCNC: 14 MMOL/L (ref 8–16)
ANION GAP SERPL CALC-SCNC: 15 MMOL/L (ref 8–16)
ANION GAP SERPL CALC-SCNC: 16 MMOL/L (ref 8–16)
ANION GAP SERPL CALC-SCNC: 16 MMOL/L (ref 8–16)
ANION GAP SERPL CALC-SCNC: 17 MMOL/L (ref 8–16)
ANION GAP SERPL CALC-SCNC: 17 MMOL/L (ref 8–16)
ANION GAP SERPL CALC-SCNC: 18 MMOL/L (ref 8–16)
AORTIC ROOT ANNULUS: 3.45 CM
AORTIC ROOT ANNULUS: 4.28 CM
ASCENDING AORTA: 3.3 CM
AST SERPL-CCNC: 11 U/L (ref 10–40)
AST SERPL-CCNC: 12 U/L (ref 15–46)
AST SERPL-CCNC: 13 U/L (ref 10–40)
AST SERPL-CCNC: 13 U/L (ref 10–40)
AST SERPL-CCNC: 14 U/L (ref 10–40)
AST SERPL-CCNC: 14 U/L (ref 10–40)
AST SERPL-CCNC: 16 U/L (ref 10–40)
AST SERPL-CCNC: 17 U/L (ref 10–40)
AST SERPL-CCNC: 18 U/L (ref 10–40)
AST SERPL-CCNC: 8 U/L (ref 10–40)
AV INDEX (PROSTH): 0.49
AV INDEX (PROSTH): 0.68
AV MEAN GRADIENT: 3 MMHG
AV MEAN GRADIENT: 4 MMHG
AV PEAK GRADIENT: 4 MMHG
AV PEAK GRADIENT: 5 MMHG
AV VALVE AREA: 2.23 CM2
AV VALVE AREA: 4.46 CM2
AV VELOCITY RATIO: 0.64
AV VELOCITY RATIO: 0.68
BACTERIA #/AREA URNS AUTO: ABNORMAL /HPF
BACTERIA #/AREA URNS HPF: ABNORMAL /HPF
BACTERIA BLD CULT: ABNORMAL
BACTERIA BLD CULT: NORMAL
BACTERIA BLD CULT: NORMAL
BACTERIA UR CULT: ABNORMAL
BACTERIA UR CULT: NO GROWTH
BASOPHILS # BLD AUTO: 0.02 K/UL (ref 0–0.2)
BASOPHILS # BLD AUTO: 0.02 K/UL (ref 0–0.2)
BASOPHILS # BLD AUTO: 0.03 K/UL (ref 0–0.2)
BASOPHILS # BLD AUTO: 0.05 K/UL (ref 0–0.2)
BASOPHILS NFR BLD: 0.2 % (ref 0–1.9)
BASOPHILS NFR BLD: 0.2 % (ref 0–1.9)
BASOPHILS NFR BLD: 0.3 % (ref 0–1.9)
BASOPHILS NFR BLD: 0.4 % (ref 0–1.9)
BASOPHILS NFR BLD: 0.5 % (ref 0–1.9)
BASOPHILS NFR BLD: 0.6 % (ref 0–1.9)
BILIRUB SERPL-MCNC: 0.2 MG/DL (ref 0.1–1)
BILIRUB SERPL-MCNC: 0.3 MG/DL (ref 0.1–1)
BILIRUB SERPL-MCNC: 0.4 MG/DL (ref 0.1–1)
BILIRUB SERPL-MCNC: 0.5 MG/DL (ref 0.1–1)
BILIRUB SERPL-MCNC: 0.6 MG/DL (ref 0.1–1)
BILIRUB UR QL STRIP: NEGATIVE
BILIRUB UR QL STRIP: NEGATIVE
BNP SERPL-MCNC: 508 PG/ML (ref 0–99)
BSA FOR ECHO PROCEDURE: 2.53 M2
BSA FOR ECHO PROCEDURE: 2.55 M2
BUN SERPL-MCNC: 127 MG/DL (ref 8–23)
BUN SERPL-MCNC: 130 MG/DL (ref 8–23)
BUN SERPL-MCNC: 37 MG/DL (ref 8–23)
BUN SERPL-MCNC: 39 MG/DL (ref 8–23)
BUN SERPL-MCNC: 44 MG/DL (ref 8–23)
BUN SERPL-MCNC: 45 MG/DL (ref 8–23)
BUN SERPL-MCNC: 49 MG/DL (ref 8–23)
BUN SERPL-MCNC: 51 MG/DL (ref 8–23)
BUN SERPL-MCNC: 73 MG/DL (ref 8–23)
BUN SERPL-MCNC: 75 MG/DL (ref 8–23)
CALCIUM SERPL-MCNC: 8.1 MG/DL (ref 8.7–10.5)
CALCIUM SERPL-MCNC: 8.3 MG/DL (ref 8.7–10.5)
CALCIUM SERPL-MCNC: 8.4 MG/DL (ref 8.7–10.5)
CALCIUM SERPL-MCNC: 8.5 MG/DL (ref 8.7–10.5)
CALCIUM SERPL-MCNC: 8.6 MG/DL (ref 8.7–10.5)
CALCIUM SERPL-MCNC: 8.7 MG/DL (ref 8.7–10.5)
CALCIUM SERPL-MCNC: 8.7 MG/DL (ref 8.7–10.5)
CALCIUM SERPL-MCNC: 9.1 MG/DL (ref 8.7–10.5)
CHLORIDE SERPL-SCNC: 100 MMOL/L (ref 95–110)
CHLORIDE SERPL-SCNC: 101 MMOL/L (ref 95–110)
CHLORIDE SERPL-SCNC: 101 MMOL/L (ref 95–110)
CHLORIDE SERPL-SCNC: 102 MMOL/L (ref 95–110)
CHLORIDE SERPL-SCNC: 97 MMOL/L (ref 95–110)
CHLORIDE SERPL-SCNC: 98 MMOL/L (ref 95–110)
CHLORIDE SERPL-SCNC: 99 MMOL/L (ref 95–110)
CLARITY UR REFRACT.AUTO: ABNORMAL
CLARITY UR: ABNORMAL
CO2 SERPL-SCNC: 14 MMOL/L (ref 23–29)
CO2 SERPL-SCNC: 18 MMOL/L (ref 23–29)
CO2 SERPL-SCNC: 19 MMOL/L (ref 23–29)
CO2 SERPL-SCNC: 20 MMOL/L (ref 23–29)
CO2 SERPL-SCNC: 21 MMOL/L (ref 23–29)
CO2 SERPL-SCNC: 21 MMOL/L (ref 23–29)
CO2 SERPL-SCNC: 22 MMOL/L (ref 23–29)
CO2 SERPL-SCNC: 24 MMOL/L (ref 23–29)
CO2 SERPL-SCNC: 24 MMOL/L (ref 23–29)
COLOR UR AUTO: YELLOW
COLOR UR: YELLOW
CREAT SERPL-MCNC: 11.1 MG/DL (ref 0.5–1.4)
CREAT SERPL-MCNC: 11.1 MG/DL (ref 0.5–1.4)
CREAT SERPL-MCNC: 11.3 MG/DL (ref 0.5–1.4)
CREAT SERPL-MCNC: 11.3 MG/DL (ref 0.5–1.4)
CREAT SERPL-MCNC: 17.37 MG/DL (ref 0.5–1.4)
CREAT SERPL-MCNC: 17.6 MG/DL (ref 0.5–1.4)
CREAT SERPL-MCNC: 18.2 MG/DL (ref 0.5–1.4)
CREAT SERPL-MCNC: 7.6 MG/DL (ref 0.5–1.4)
CREAT SERPL-MCNC: 9.2 MG/DL (ref 0.5–1.4)
CREAT SERPL-MCNC: 9.4 MG/DL (ref 0.5–1.4)
CREAT SERPL-MCNC: 9.7 MG/DL (ref 0.5–1.4)
CV ECHO LV RWT: 0.24 CM
CV ECHO LV RWT: 0.5 CM
DIFFERENTIAL METHOD: ABNORMAL
DOP CALC AO PEAK VEL: 1.06 M/S
DOP CALC AO PEAK VEL: 1.17 M/S
DOP CALC AO VTI: 25.29 CM
DOP CALC AO VTI: 27.32 CM
DOP CALC LVOT AREA: 4.5 CM2
DOP CALC LVOT AREA: 6.6 CM2
DOP CALC LVOT DIAMETER: 2.4 CM
DOP CALC LVOT DIAMETER: 2.89 CM
DOP CALC LVOT PEAK VEL: 0.68 M/S
DOP CALC LVOT PEAK VEL: 0.79 M/S
DOP CALC LVOT STROKE VOLUME: 112.9 CM3
DOP CALC LVOT STROKE VOLUME: 60.82 CM3
DOP CALC MV VTI: 18.13 CM
DOP CALC MV VTI: 24.99 CM
DOP CALCLVOT PEAK VEL VTI: 13.45 CM
DOP CALCLVOT PEAK VEL VTI: 17.22 CM
E WAVE DECELERATION TIME: 128 MSEC
E WAVE DECELERATION TIME: 148.54 MSEC
E/A RATIO: 0.73
E/A RATIO: 2.61
ECHO LV POSTERIOR WALL: 0.9 CM (ref 0.6–1.1)
ECHO LV POSTERIOR WALL: 1.66 CM (ref 0.6–1.1)
EJECTION FRACTION: 35 %
EOSINOPHIL # BLD AUTO: 0 K/UL (ref 0–0.5)
EOSINOPHIL # BLD AUTO: 0.1 K/UL (ref 0–0.5)
EOSINOPHIL # BLD AUTO: 0.2 K/UL (ref 0–0.5)
EOSINOPHIL # BLD AUTO: 0.3 K/UL (ref 0–0.5)
EOSINOPHIL NFR BLD: 0 % (ref 0–8)
EOSINOPHIL NFR BLD: 0.7 % (ref 0–8)
EOSINOPHIL NFR BLD: 1.3 % (ref 0–8)
EOSINOPHIL NFR BLD: 1.4 % (ref 0–8)
EOSINOPHIL NFR BLD: 1.7 % (ref 0–8)
EOSINOPHIL NFR BLD: 2 % (ref 0–8)
EOSINOPHIL NFR BLD: 3 % (ref 0–8)
ERYTHROCYTE [DISTWIDTH] IN BLOOD BY AUTOMATED COUNT: 13.4 % (ref 11.5–14.5)
ERYTHROCYTE [DISTWIDTH] IN BLOOD BY AUTOMATED COUNT: 13.5 % (ref 11.5–14.5)
ERYTHROCYTE [DISTWIDTH] IN BLOOD BY AUTOMATED COUNT: 13.5 % (ref 11.5–14.5)
ERYTHROCYTE [DISTWIDTH] IN BLOOD BY AUTOMATED COUNT: 13.6 % (ref 11.5–14.5)
ERYTHROCYTE [DISTWIDTH] IN BLOOD BY AUTOMATED COUNT: 13.7 % (ref 11.5–14.5)
ERYTHROCYTE [DISTWIDTH] IN BLOOD BY AUTOMATED COUNT: 13.9 % (ref 11.5–14.5)
EST. GFR  (AFRICAN AMERICAN): 2.7 ML/MIN/1.73 M^2
EST. GFR  (AFRICAN AMERICAN): 3 ML/MIN/1.73 M^2
EST. GFR  (AFRICAN AMERICAN): 3 ML/MIN/1.73 M^2
EST. GFR  (AFRICAN AMERICAN): 5 ML/MIN/1.73 M^2
EST. GFR  (AFRICAN AMERICAN): 6 ML/MIN/1.73 M^2
EST. GFR  (AFRICAN AMERICAN): 6 ML/MIN/1.73 M^2
EST. GFR  (AFRICAN AMERICAN): 7 ML/MIN/1.73 M^2
EST. GFR  (NON AFRICAN AMERICAN): 2 ML/MIN/1.73 M^2
EST. GFR  (NON AFRICAN AMERICAN): 2 ML/MIN/1.73 M^2
EST. GFR  (NON AFRICAN AMERICAN): 2.3 ML/MIN/1.73 M^2
EST. GFR  (NON AFRICAN AMERICAN): 4 ML/MIN/1.73 M^2
EST. GFR  (NON AFRICAN AMERICAN): 5 ML/MIN/1.73 M^2
EST. GFR  (NON AFRICAN AMERICAN): 6 ML/MIN/1.73 M^2
ESTIMATED AVG GLUCOSE: 148 MG/DL (ref 68–131)
FRACTIONAL SHORTENING: 21 % (ref 28–44)
FRACTIONAL SHORTENING: 22 % (ref 28–44)
GLUCOSE SERPL-MCNC: 129 MG/DL (ref 70–110)
GLUCOSE SERPL-MCNC: 131 MG/DL (ref 70–110)
GLUCOSE SERPL-MCNC: 132 MG/DL (ref 70–110)
GLUCOSE SERPL-MCNC: 158 MG/DL (ref 70–110)
GLUCOSE SERPL-MCNC: 158 MG/DL (ref 70–110)
GLUCOSE SERPL-MCNC: 159 MG/DL (ref 70–110)
GLUCOSE SERPL-MCNC: 162 MG/DL (ref 70–110)
GLUCOSE SERPL-MCNC: 164 MG/DL (ref 70–110)
GLUCOSE SERPL-MCNC: 185 MG/DL (ref 70–110)
GLUCOSE SERPL-MCNC: 195 MG/DL (ref 70–110)
GLUCOSE SERPL-MCNC: 288 MG/DL (ref 70–110)
GLUCOSE UR QL STRIP: NEGATIVE
GLUCOSE UR QL STRIP: NEGATIVE
HBA1C MFR BLD: 6.8 % (ref 4–5.6)
HBV CORE AB SERPL QL IA: NEGATIVE
HBV SURFACE AB SER QL IA: NEGATIVE
HBV SURFACE AB SERPL IA-ACNC: <3 MIU/ML
HBV SURFACE AG SERPL QL IA: NEGATIVE
HCT VFR BLD AUTO: 25.4 % (ref 40–54)
HCT VFR BLD AUTO: 26 % (ref 40–54)
HCT VFR BLD AUTO: 27.5 % (ref 40–54)
HCT VFR BLD AUTO: 28.1 % (ref 40–54)
HCT VFR BLD AUTO: 28.3 % (ref 40–54)
HCT VFR BLD AUTO: 28.6 % (ref 40–54)
HCT VFR BLD AUTO: 29.6 % (ref 40–54)
HCT VFR BLD AUTO: 31 % (ref 40–54)
HCT VFR BLD AUTO: 31.4 % (ref 40–54)
HGB BLD-MCNC: 10.2 G/DL (ref 14–18)
HGB BLD-MCNC: 10.2 G/DL (ref 14–18)
HGB BLD-MCNC: 8.1 G/DL (ref 14–18)
HGB BLD-MCNC: 8.6 G/DL (ref 14–18)
HGB BLD-MCNC: 8.8 G/DL (ref 14–18)
HGB BLD-MCNC: 9 G/DL (ref 14–18)
HGB BLD-MCNC: 9.1 G/DL (ref 14–18)
HGB BLD-MCNC: 9.3 G/DL (ref 14–18)
HGB BLD-MCNC: 9.4 G/DL (ref 14–18)
HGB UR QL STRIP: ABNORMAL
HGB UR QL STRIP: ABNORMAL
HYALINE CASTS #/AREA URNS LPF: 0 /LPF
HYALINE CASTS UR QL AUTO: 0 /LPF
IMM GRANULOCYTES # BLD AUTO: 0.03 K/UL (ref 0–0.04)
IMM GRANULOCYTES # BLD AUTO: 0.04 K/UL (ref 0–0.04)
IMM GRANULOCYTES # BLD AUTO: 0.04 K/UL (ref 0–0.04)
IMM GRANULOCYTES # BLD AUTO: 0.06 K/UL (ref 0–0.04)
IMM GRANULOCYTES # BLD AUTO: 0.09 K/UL (ref 0–0.04)
IMM GRANULOCYTES NFR BLD AUTO: 0.4 % (ref 0–0.5)
IMM GRANULOCYTES NFR BLD AUTO: 0.5 % (ref 0–0.5)
IMM GRANULOCYTES NFR BLD AUTO: 0.5 % (ref 0–0.5)
IMM GRANULOCYTES NFR BLD AUTO: 0.6 % (ref 0–0.5)
IMM GRANULOCYTES NFR BLD AUTO: 0.6 % (ref 0–0.5)
INFLUENZA A, MOLECULAR: NEGATIVE
INFLUENZA B, MOLECULAR: NEGATIVE
INTERVENTRICULAR SEPTUM: 0.76 CM (ref 0.6–1.1)
INTERVENTRICULAR SEPTUM: 1.61 CM (ref 0.6–1.1)
KETONES UR QL STRIP: NEGATIVE
KETONES UR QL STRIP: NEGATIVE
LA MAJOR: 7.34 CM
LA MAJOR: 7.52 CM
LA MINOR: 6.11 CM
LA MINOR: 7.49 CM
LA WIDTH: 4.32 CM
LA WIDTH: 5.77 CM
LACTATE SERPL-SCNC: 1.1 MMOL/L (ref 0.5–2.2)
LACTATE SERPL-SCNC: 1.6 MMOL/L (ref 0.5–2.2)
LEFT ABI: 1.84
LEFT ARM BP: 138 MMHG
LEFT ATRIUM SIZE: 5.11 CM
LEFT ATRIUM SIZE: 5.3 CM
LEFT ATRIUM VOLUME INDEX MOD: 34.8 ML/M2
LEFT ATRIUM VOLUME INDEX MOD: 63.4 ML/M2
LEFT ATRIUM VOLUME INDEX: 50.9 ML/M2
LEFT ATRIUM VOLUME INDEX: 78.7 ML/M2
LEFT ATRIUM VOLUME MOD: 157.18 CM3
LEFT ATRIUM VOLUME MOD: 85.57 CM3
LEFT ATRIUM VOLUME: 125.13 CM3
LEFT ATRIUM VOLUME: 195.08 CM3
LEFT DORSALIS PEDIS: 254 MMHG
LEFT INTERNAL DIMENSION IN SYSTOLE: 5.24 CM (ref 2.1–4)
LEFT INTERNAL DIMENSION IN SYSTOLE: 5.85 CM (ref 2.1–4)
LEFT POSTERIOR TIBIAL: 0 MMHG
LEFT TBI: 0.5
LEFT TOE PRESSURE: 69 MMHG
LEFT VENTRICLE DIASTOLIC VOLUME INDEX: 121.17 ML/M2
LEFT VENTRICLE DIASTOLIC VOLUME INDEX: 90.98 ML/M2
LEFT VENTRICLE DIASTOLIC VOLUME: 223.81 ML
LEFT VENTRICLE DIASTOLIC VOLUME: 300.5 ML
LEFT VENTRICLE MASS INDEX: 117 G/M2
LEFT VENTRICLE MASS INDEX: 228 G/M2
LEFT VENTRICLE SYSTOLIC VOLUME INDEX: 53.5 ML/M2
LEFT VENTRICLE SYSTOLIC VOLUME INDEX: 68.5 ML/M2
LEFT VENTRICLE SYSTOLIC VOLUME: 131.64 ML
LEFT VENTRICLE SYSTOLIC VOLUME: 169.83 ML
LEFT VENTRICULAR INTERNAL DIMENSION IN DIASTOLE: 6.6 CM (ref 3.5–6)
LEFT VENTRICULAR INTERNAL DIMENSION IN DIASTOLE: 7.52 CM (ref 3.5–6)
LEFT VENTRICULAR MASS: 290.44 G
LEFT VENTRICULAR MASS: 561.38 G
LEUKOCYTE ESTERASE UR QL STRIP: ABNORMAL
LEUKOCYTE ESTERASE UR QL STRIP: ABNORMAL
LV LATERAL E/E' RATIO: 18.75 M/S
LV SEPTAL E/E' RATIO: 33 M/S
LYMPHOCYTES # BLD AUTO: 0.7 K/UL (ref 1–4.8)
LYMPHOCYTES # BLD AUTO: 0.8 K/UL (ref 1–4.8)
LYMPHOCYTES # BLD AUTO: 1 K/UL (ref 1–4.8)
LYMPHOCYTES # BLD AUTO: 1.1 K/UL (ref 1–4.8)
LYMPHOCYTES # BLD AUTO: 1.2 K/UL (ref 1–4.8)
LYMPHOCYTES # BLD AUTO: 1.3 K/UL (ref 1–4.8)
LYMPHOCYTES # BLD AUTO: 1.4 K/UL (ref 1–4.8)
LYMPHOCYTES # BLD AUTO: 1.5 K/UL (ref 1–4.8)
LYMPHOCYTES # BLD AUTO: 1.5 K/UL (ref 1–4.8)
LYMPHOCYTES NFR BLD: 14.2 % (ref 18–48)
LYMPHOCYTES NFR BLD: 16 % (ref 18–48)
LYMPHOCYTES NFR BLD: 16.3 % (ref 18–48)
LYMPHOCYTES NFR BLD: 16.8 % (ref 18–48)
LYMPHOCYTES NFR BLD: 17.6 % (ref 18–48)
LYMPHOCYTES NFR BLD: 20.4 % (ref 18–48)
LYMPHOCYTES NFR BLD: 21.4 % (ref 18–48)
LYMPHOCYTES NFR BLD: 4.5 % (ref 18–48)
LYMPHOCYTES NFR BLD: 6.8 % (ref 18–48)
MAGNESIUM SERPL-MCNC: 1.7 MG/DL (ref 1.6–2.6)
MAGNESIUM SERPL-MCNC: 2 MG/DL (ref 1.6–2.6)
MAGNESIUM SERPL-MCNC: 2 MG/DL (ref 1.6–2.6)
MCH RBC QN AUTO: 30.5 PG (ref 27–31)
MCH RBC QN AUTO: 30.6 PG (ref 27–31)
MCH RBC QN AUTO: 30.7 PG (ref 27–31)
MCH RBC QN AUTO: 31 PG (ref 27–31)
MCH RBC QN AUTO: 31.5 PG (ref 27–31)
MCH RBC QN AUTO: 31.5 PG (ref 27–31)
MCH RBC QN AUTO: 31.8 PG (ref 27–31)
MCHC RBC AUTO-ENTMCNC: 31.3 G/DL (ref 32–36)
MCHC RBC AUTO-ENTMCNC: 31.8 G/DL (ref 32–36)
MCHC RBC AUTO-ENTMCNC: 31.8 G/DL (ref 32–36)
MCHC RBC AUTO-ENTMCNC: 31.9 G/DL (ref 32–36)
MCHC RBC AUTO-ENTMCNC: 32.5 G/DL (ref 32–36)
MCHC RBC AUTO-ENTMCNC: 32.7 G/DL (ref 32–36)
MCHC RBC AUTO-ENTMCNC: 32.9 G/DL (ref 32–36)
MCHC RBC AUTO-ENTMCNC: 32.9 G/DL (ref 32–36)
MCHC RBC AUTO-ENTMCNC: 33.1 G/DL (ref 32–36)
MCV RBC AUTO: 93 FL (ref 82–98)
MCV RBC AUTO: 95 FL (ref 82–98)
MCV RBC AUTO: 95 FL (ref 82–98)
MCV RBC AUTO: 96 FL (ref 82–98)
MCV RBC AUTO: 96 FL (ref 82–98)
MCV RBC AUTO: 97 FL (ref 82–98)
MCV RBC AUTO: 98 FL (ref 82–98)
MICROSCOPIC COMMENT: ABNORMAL
MICROSCOPIC COMMENT: ABNORMAL
MONOCYTES # BLD AUTO: 0.5 K/UL (ref 0.3–1)
MONOCYTES # BLD AUTO: 0.5 K/UL (ref 0.3–1)
MONOCYTES # BLD AUTO: 0.6 K/UL (ref 0.3–1)
MONOCYTES # BLD AUTO: 0.6 K/UL (ref 0.3–1)
MONOCYTES # BLD AUTO: 0.7 K/UL (ref 0.3–1)
MONOCYTES # BLD AUTO: 0.8 K/UL (ref 0.3–1)
MONOCYTES # BLD AUTO: 1 K/UL (ref 0.3–1)
MONOCYTES NFR BLD: 10.5 % (ref 4–15)
MONOCYTES NFR BLD: 6 % (ref 4–15)
MONOCYTES NFR BLD: 6.6 % (ref 4–15)
MONOCYTES NFR BLD: 7.3 % (ref 4–15)
MONOCYTES NFR BLD: 7.4 % (ref 4–15)
MONOCYTES NFR BLD: 7.4 % (ref 4–15)
MONOCYTES NFR BLD: 8.6 % (ref 4–15)
MONOCYTES NFR BLD: 8.9 % (ref 4–15)
MONOCYTES NFR BLD: 9.5 % (ref 4–15)
MV MEAN GRADIENT: 1 MMHG
MV PEAK A VEL: 0.38 M/S
MV PEAK A VEL: 1.03 M/S
MV PEAK E VEL: 0.75 M/S
MV PEAK E VEL: 0.99 M/S
MV PEAK GRADIENT: 4 MMHG
MV PEAK GRADIENT: 4 MMHG
MV STENOSIS PRESSURE HALF TIME: 37.12 MS
MV STENOSIS PRESSURE HALF TIME: 43.08 MS
MV VALVE AREA BY CONTINUITY EQUATION: 2.43 CM2
MV VALVE AREA BY CONTINUITY EQUATION: 6.23 CM2
MV VALVE AREA P 1/2 METHOD: 5.11 CM2
MV VALVE AREA P 1/2 METHOD: 5.93 CM2
NEUTROPHILS # BLD AUTO: 10.7 K/UL (ref 1.8–7.7)
NEUTROPHILS # BLD AUTO: 13.3 K/UL (ref 1.8–7.7)
NEUTROPHILS # BLD AUTO: 4.7 K/UL (ref 1.8–7.7)
NEUTROPHILS # BLD AUTO: 4.8 K/UL (ref 1.8–7.7)
NEUTROPHILS # BLD AUTO: 4.8 K/UL (ref 1.8–7.7)
NEUTROPHILS # BLD AUTO: 5.5 K/UL (ref 1.8–7.7)
NEUTROPHILS # BLD AUTO: 5.6 K/UL (ref 1.8–7.7)
NEUTROPHILS # BLD AUTO: 5.6 K/UL (ref 1.8–7.7)
NEUTROPHILS # BLD AUTO: 5.7 K/UL (ref 1.8–7.7)
NEUTROPHILS NFR BLD: 68.4 % (ref 38–73)
NEUTROPHILS NFR BLD: 68.8 % (ref 38–73)
NEUTROPHILS NFR BLD: 69.5 % (ref 38–73)
NEUTROPHILS NFR BLD: 70.6 % (ref 38–73)
NEUTROPHILS NFR BLD: 73.5 % (ref 38–73)
NEUTROPHILS NFR BLD: 73.6 % (ref 38–73)
NEUTROPHILS NFR BLD: 73.8 % (ref 38–73)
NEUTROPHILS NFR BLD: 85.8 % (ref 38–73)
NEUTROPHILS NFR BLD: 88.1 % (ref 38–73)
NITRITE UR QL STRIP: NEGATIVE
NITRITE UR QL STRIP: NEGATIVE
NRBC BLD-RTO: 0 /100 WBC
PH UR STRIP: 5 [PH] (ref 5–8)
PH UR STRIP: 5 [PH] (ref 5–8)
PHOSPHATE SERPL-MCNC: 5.8 MG/DL (ref 2.7–4.5)
PHOSPHATE SERPL-MCNC: 6.7 MG/DL (ref 2.7–4.5)
PHOSPHATE SERPL-MCNC: 8.5 MG/DL (ref 2.7–4.5)
PISA TR MAX VEL: 2.14 M/S
PLATELET # BLD AUTO: 132 K/UL (ref 150–450)
PLATELET # BLD AUTO: 147 K/UL (ref 150–450)
PLATELET # BLD AUTO: 152 K/UL (ref 150–450)
PLATELET # BLD AUTO: 164 K/UL (ref 150–450)
PLATELET # BLD AUTO: 183 K/UL (ref 150–450)
PLATELET # BLD AUTO: 184 K/UL (ref 150–450)
PLATELET # BLD AUTO: 217 K/UL (ref 150–450)
PLATELET # BLD AUTO: 217 K/UL (ref 150–450)
PLATELET # BLD AUTO: 229 K/UL (ref 150–450)
PMV BLD AUTO: 10.6 FL (ref 9.2–12.9)
PMV BLD AUTO: 10.8 FL (ref 9.2–12.9)
PMV BLD AUTO: 11 FL (ref 9.2–12.9)
PMV BLD AUTO: 11.1 FL (ref 9.2–12.9)
PMV BLD AUTO: 11.2 FL (ref 9.2–12.9)
PMV BLD AUTO: 11.4 FL (ref 9.2–12.9)
PMV BLD AUTO: 11.6 FL (ref 9.2–12.9)
PMV BLD AUTO: 11.7 FL (ref 9.2–12.9)
PMV BLD AUTO: 11.7 FL (ref 9.2–12.9)
POCT GLUCOSE: 131 MG/DL (ref 70–110)
POCT GLUCOSE: 135 MG/DL (ref 70–110)
POCT GLUCOSE: 135 MG/DL (ref 70–110)
POCT GLUCOSE: 138 MG/DL (ref 70–110)
POCT GLUCOSE: 139 MG/DL (ref 70–110)
POCT GLUCOSE: 154 MG/DL (ref 70–110)
POCT GLUCOSE: 158 MG/DL (ref 70–110)
POCT GLUCOSE: 164 MG/DL (ref 70–110)
POCT GLUCOSE: 174 MG/DL (ref 70–110)
POCT GLUCOSE: 176 MG/DL (ref 70–110)
POCT GLUCOSE: 181 MG/DL (ref 70–110)
POCT GLUCOSE: 184 MG/DL (ref 70–110)
POCT GLUCOSE: 185 MG/DL (ref 70–110)
POCT GLUCOSE: 194 MG/DL (ref 70–110)
POCT GLUCOSE: 198 MG/DL (ref 70–110)
POCT GLUCOSE: 202 MG/DL (ref 70–110)
POCT GLUCOSE: 206 MG/DL (ref 70–110)
POCT GLUCOSE: 208 MG/DL (ref 70–110)
POCT GLUCOSE: 212 MG/DL (ref 70–110)
POCT GLUCOSE: 212 MG/DL (ref 70–110)
POCT GLUCOSE: 228 MG/DL (ref 70–110)
POCT GLUCOSE: 239 MG/DL (ref 70–110)
POCT GLUCOSE: 263 MG/DL (ref 70–110)
POTASSIUM SERPL-SCNC: 3.3 MMOL/L (ref 3.5–5.1)
POTASSIUM SERPL-SCNC: 3.3 MMOL/L (ref 3.5–5.1)
POTASSIUM SERPL-SCNC: 3.4 MMOL/L (ref 3.5–5.1)
POTASSIUM SERPL-SCNC: 3.5 MMOL/L (ref 3.5–5.1)
POTASSIUM SERPL-SCNC: 3.5 MMOL/L (ref 3.5–5.1)
POTASSIUM SERPL-SCNC: 3.6 MMOL/L (ref 3.5–5.1)
POTASSIUM SERPL-SCNC: 3.7 MMOL/L (ref 3.5–5.1)
POTASSIUM SERPL-SCNC: 3.7 MMOL/L (ref 3.5–5.1)
POTASSIUM SERPL-SCNC: 4.5 MMOL/L (ref 3.5–5.1)
POTASSIUM SERPL-SCNC: 4.8 MMOL/L (ref 3.5–5.1)
POTASSIUM SERPL-SCNC: 5.4 MMOL/L (ref 3.5–5.1)
PROT SERPL-MCNC: 5.5 G/DL (ref 6–8.4)
PROT SERPL-MCNC: 5.6 G/DL (ref 6–8.4)
PROT SERPL-MCNC: 5.8 G/DL (ref 6–8.4)
PROT SERPL-MCNC: 6 G/DL (ref 6–8.4)
PROT SERPL-MCNC: 6 G/DL (ref 6–8.4)
PROT SERPL-MCNC: 6.1 G/DL (ref 6–8.4)
PROT SERPL-MCNC: 6.2 G/DL (ref 6–8.4)
PROT SERPL-MCNC: 6.3 G/DL (ref 6–8.4)
PROT SERPL-MCNC: 6.5 G/DL (ref 6–8.4)
PROT SERPL-MCNC: 6.8 G/DL (ref 6–8.4)
PROT UR QL STRIP: ABNORMAL
PROT UR QL STRIP: ABNORMAL
PV PEAK VELOCITY: 1.04 CM/S
QEF: 34 %
RA MAJOR: 5.71 CM
RA MAJOR: 5.72 CM
RA PRESSURE: 3 MMHG
RA PRESSURE: 8 MMHG
RA WIDTH: 3.45 CM
RA WIDTH: 4.19 CM
RBC # BLD AUTO: 2.61 M/UL (ref 4.6–6.2)
RBC # BLD AUTO: 2.73 M/UL (ref 4.6–6.2)
RBC # BLD AUTO: 2.86 M/UL (ref 4.6–6.2)
RBC # BLD AUTO: 2.88 M/UL (ref 4.6–6.2)
RBC # BLD AUTO: 2.94 M/UL (ref 4.6–6.2)
RBC # BLD AUTO: 3.03 M/UL (ref 4.6–6.2)
RBC # BLD AUTO: 3.08 M/UL (ref 4.6–6.2)
RBC # BLD AUTO: 3.21 M/UL (ref 4.6–6.2)
RBC # BLD AUTO: 3.29 M/UL (ref 4.6–6.2)
RBC #/AREA URNS AUTO: 30 /HPF (ref 0–4)
RBC #/AREA URNS HPF: >100 /HPF (ref 0–4)
RIGHT ABI: 1.85
RIGHT DORSALIS PEDIS: 255 MMHG
RIGHT POSTERIOR TIBIAL: 255 MMHG
RIGHT TBI: 0.26
RIGHT TOE PRESSURE: 36 MMHG
RIGHT VENTRICULAR END-DIASTOLIC DIMENSION: 3.28 CM
RV TISSUE DOPPLER FREE WALL SYSTOLIC VELOCITY 1 (APICAL 4 CHAMBER VIEW): 9.54 CM/S
SARS-COV-2 RDRP RESP QL NAA+PROBE: NEGATIVE
SINUS: 3.07 CM
SODIUM SERPL-SCNC: 132 MMOL/L (ref 136–145)
SODIUM SERPL-SCNC: 134 MMOL/L (ref 136–145)
SODIUM SERPL-SCNC: 134 MMOL/L (ref 136–145)
SODIUM SERPL-SCNC: 135 MMOL/L (ref 136–145)
SODIUM SERPL-SCNC: 136 MMOL/L (ref 136–145)
SP GR UR STRIP: 1.01 (ref 1–1.03)
SP GR UR STRIP: 1.02 (ref 1–1.03)
SPECIMEN SOURCE: NORMAL
SQUAMOUS #/AREA URNS HPF: 1 /HPF
STJ: 3.27 CM
TDI LATERAL: 0.04 M/S
TDI SEPTAL: 0.03 M/S
TR MAX PG: 18 MMHG
TRICUSPID ANNULAR PLANE SYSTOLIC EXCURSION: 1.72 CM
TROPONIN I SERPL DL<=0.01 NG/ML-MCNC: 0.51 NG/ML (ref 0–0.03)
TROPONIN I SERPL DL<=0.01 NG/ML-MCNC: 0.52 NG/ML (ref 0–0.03)
TROPONIN I SERPL DL<=0.01 NG/ML-MCNC: 0.79 NG/ML (ref 0–0.03)
TV REST PULMONARY ARTERY PRESSURE: 21 MMHG
URN SPEC COLLECT METH UR: ABNORMAL
URN SPEC COLLECT METH UR: ABNORMAL
UROBILINOGEN UR STRIP-ACNC: NEGATIVE EU/DL
UROBILINOGEN UR STRIP-ACNC: NEGATIVE EU/DL
UUN UR-MCNC: 117 MG/DL (ref 2–20)
VANCOMYCIN SERPL-MCNC: 16.4 UG/ML
VANCOMYCIN SERPL-MCNC: 17.8 UG/ML
VANCOMYCIN SERPL-MCNC: 18.5 UG/ML
VANCOMYCIN SERPL-MCNC: 20.4 UG/ML
VANCOMYCIN SERPL-MCNC: 21.8 UG/ML
VANCOMYCIN SERPL-MCNC: 23.1 UG/ML
VANCOMYCIN SERPL-MCNC: 24 UG/ML
VANCOMYCIN SERPL-MCNC: 24.6 UG/ML
WBC # BLD AUTO: 12.4 K/UL (ref 3.9–12.7)
WBC # BLD AUTO: 15.04 K/UL (ref 3.9–12.7)
WBC # BLD AUTO: 6.45 K/UL (ref 3.9–12.7)
WBC # BLD AUTO: 6.91 K/UL (ref 3.9–12.7)
WBC # BLD AUTO: 6.97 K/UL (ref 3.9–12.7)
WBC # BLD AUTO: 7.55 K/UL (ref 3.9–12.7)
WBC # BLD AUTO: 7.55 K/UL (ref 3.9–12.7)
WBC # BLD AUTO: 7.82 K/UL (ref 3.9–12.7)
WBC # BLD AUTO: 8.22 K/UL (ref 3.9–12.7)
WBC #/AREA URNS AUTO: >100 /HPF (ref 0–5)
WBC #/AREA URNS HPF: 32 /HPF (ref 0–5)

## 2021-01-01 PROCEDURE — 87086 URINE CULTURE/COLONY COUNT: CPT | Performed by: EMERGENCY MEDICINE

## 2021-01-01 PROCEDURE — 21400001 HC TELEMETRY ROOM

## 2021-01-01 PROCEDURE — 25000003 PHARM REV CODE 250: Performed by: STUDENT IN AN ORGANIZED HEALTH CARE EDUCATION/TRAINING PROGRAM

## 2021-01-01 PROCEDURE — 71000033 HC RECOVERY, INTIAL HOUR: Performed by: SURGERY

## 2021-01-01 PROCEDURE — 87040 BLOOD CULTURE FOR BACTERIA: CPT | Mod: 59 | Performed by: STUDENT IN AN ORGANIZED HEALTH CARE EDUCATION/TRAINING PROGRAM

## 2021-01-01 PROCEDURE — 85025 COMPLETE CBC W/AUTO DIFF WBC: CPT | Performed by: STUDENT IN AN ORGANIZED HEALTH CARE EDUCATION/TRAINING PROGRAM

## 2021-01-01 PROCEDURE — 25000003 PHARM REV CODE 250: Performed by: SURGERY

## 2021-01-01 PROCEDURE — 87502 INFLUENZA DNA AMP PROBE: CPT | Mod: ER | Performed by: EMERGENCY MEDICINE

## 2021-01-01 PROCEDURE — 87077 CULTURE AEROBIC IDENTIFY: CPT | Mod: 59,ER | Performed by: EMERGENCY MEDICINE

## 2021-01-01 PROCEDURE — 81000 URINALYSIS NONAUTO W/SCOPE: CPT | Mod: ER | Performed by: EMERGENCY MEDICINE

## 2021-01-01 PROCEDURE — 93005 ELECTROCARDIOGRAM TRACING: CPT

## 2021-01-01 PROCEDURE — 93922 ANKLE BRACHIAL INDICES (ABI): ICD-10-PCS | Mod: 26,,, | Performed by: INTERNAL MEDICINE

## 2021-01-01 PROCEDURE — 93295 DEV INTERROG REMOTE 1/2/MLT: CPT | Mod: ,,, | Performed by: INTERNAL MEDICINE

## 2021-01-01 PROCEDURE — 63600175 PHARM REV CODE 636 W HCPCS: Performed by: STUDENT IN AN ORGANIZED HEALTH CARE EDUCATION/TRAINING PROGRAM

## 2021-01-01 PROCEDURE — 1101F PT FALLS ASSESS-DOCD LE1/YR: CPT | Mod: CPTII,S$GLB,, | Performed by: INTERNAL MEDICINE

## 2021-01-01 PROCEDURE — 80202 ASSAY OF VANCOMYCIN: CPT | Performed by: INTERNAL MEDICINE

## 2021-01-01 PROCEDURE — 3078F DIAST BP <80 MM HG: CPT | Mod: CPTII,S$GLB,, | Performed by: INTERNAL MEDICINE

## 2021-01-01 PROCEDURE — 3078F PR MOST RECENT DIASTOLIC BLOOD PRESSURE < 80 MM HG: ICD-10-PCS | Mod: CPTII,S$GLB,, | Performed by: INTERNAL MEDICINE

## 2021-01-01 PROCEDURE — 37000008 HC ANESTHESIA 1ST 15 MINUTES: Performed by: SURGERY

## 2021-01-01 PROCEDURE — 71000039 HC RECOVERY, EACH ADD'L HOUR: Performed by: SURGERY

## 2021-01-01 PROCEDURE — C9399 UNCLASSIFIED DRUGS OR BIOLOG: HCPCS | Performed by: SURGERY

## 2021-01-01 PROCEDURE — 36415 COLL VENOUS BLD VENIPUNCTURE: CPT | Performed by: STUDENT IN AN ORGANIZED HEALTH CARE EDUCATION/TRAINING PROGRAM

## 2021-01-01 PROCEDURE — 36000706: Performed by: SURGERY

## 2021-01-01 PROCEDURE — 63600175 PHARM REV CODE 636 W HCPCS: Mod: ER | Performed by: EMERGENCY MEDICINE

## 2021-01-01 PROCEDURE — 99215 OFFICE O/P EST HI 40 MIN: CPT | Mod: S$GLB,,, | Performed by: INTERNAL MEDICINE

## 2021-01-01 PROCEDURE — 11721 ROUTINE FOOT CARE: ICD-10-PCS | Mod: Q8,S$GLB,ICN, | Performed by: STUDENT IN AN ORGANIZED HEALTH CARE EDUCATION/TRAINING PROGRAM

## 2021-01-01 PROCEDURE — 3074F SYST BP LT 130 MM HG: CPT | Mod: CPTII,S$GLB,, | Performed by: INTERNAL MEDICINE

## 2021-01-01 PROCEDURE — 83605 ASSAY OF LACTIC ACID: CPT | Mod: ER | Performed by: EMERGENCY MEDICINE

## 2021-01-01 PROCEDURE — 83605 ASSAY OF LACTIC ACID: CPT | Performed by: EMERGENCY MEDICINE

## 2021-01-01 PROCEDURE — 84484 ASSAY OF TROPONIN QUANT: CPT | Performed by: STUDENT IN AN ORGANIZED HEALTH CARE EDUCATION/TRAINING PROGRAM

## 2021-01-01 PROCEDURE — 3288F FALL RISK ASSESSMENT DOCD: CPT | Mod: CPTII,S$GLB,, | Performed by: INTERNAL MEDICINE

## 2021-01-01 PROCEDURE — 93306 TTE W/DOPPLER COMPLETE: CPT | Mod: 26,,, | Performed by: INTERNAL MEDICINE

## 2021-01-01 PROCEDURE — 4010F ACE/ARB THERAPY RXD/TAKEN: CPT | Mod: CPTII,S$GLB,, | Performed by: INTERNAL MEDICINE

## 2021-01-01 PROCEDURE — 80053 COMPREHEN METABOLIC PANEL: CPT | Performed by: STUDENT IN AN ORGANIZED HEALTH CARE EDUCATION/TRAINING PROGRAM

## 2021-01-01 PROCEDURE — 3008F BODY MASS INDEX DOCD: CPT | Mod: CPTII,S$GLB,, | Performed by: INTERNAL MEDICINE

## 2021-01-01 PROCEDURE — 99999 PR PBB SHADOW E&M-EST. PATIENT-LVL III: ICD-10-PCS | Mod: PBBFAC,,, | Performed by: STUDENT IN AN ORGANIZED HEALTH CARE EDUCATION/TRAINING PROGRAM

## 2021-01-01 PROCEDURE — 93922 UPR/L XTREMITY ART 2 LEVELS: CPT | Mod: PO

## 2021-01-01 PROCEDURE — 93010 EKG 12-LEAD: ICD-10-PCS | Mod: ,,, | Performed by: INTERNAL MEDICINE

## 2021-01-01 PROCEDURE — 36000707: Performed by: SURGERY

## 2021-01-01 PROCEDURE — 63600175 PHARM REV CODE 636 W HCPCS: Performed by: INTERNAL MEDICINE

## 2021-01-01 PROCEDURE — 63600175 PHARM REV CODE 636 W HCPCS: Mod: JG | Performed by: INTERNAL MEDICINE

## 2021-01-01 PROCEDURE — 37000009 HC ANESTHESIA EA ADD 15 MINS: Performed by: SURGERY

## 2021-01-01 PROCEDURE — 80100016 HC MAINTENANCE HEMODIALYSIS

## 2021-01-01 PROCEDURE — 84100 ASSAY OF PHOSPHORUS: CPT | Performed by: STUDENT IN AN ORGANIZED HEALTH CARE EDUCATION/TRAINING PROGRAM

## 2021-01-01 PROCEDURE — 25000003 PHARM REV CODE 250: Performed by: INTERNAL MEDICINE

## 2021-01-01 PROCEDURE — 96365 THER/PROPH/DIAG IV INF INIT: CPT

## 2021-01-01 PROCEDURE — 87040 BLOOD CULTURE FOR BACTERIA: CPT | Mod: ER | Performed by: EMERGENCY MEDICINE

## 2021-01-01 PROCEDURE — 84484 ASSAY OF TROPONIN QUANT: CPT | Performed by: EMERGENCY MEDICINE

## 2021-01-01 PROCEDURE — 80202 ASSAY OF VANCOMYCIN: CPT | Performed by: SURGERY

## 2021-01-01 PROCEDURE — 1159F PR MEDICATION LIST DOCUMENTED IN MEDICAL RECORD: ICD-10-PCS | Mod: CPTII,S$GLB,, | Performed by: INTERNAL MEDICINE

## 2021-01-01 PROCEDURE — 85025 COMPLETE CBC W/AUTO DIFF WBC: CPT | Mod: ER | Performed by: EMERGENCY MEDICINE

## 2021-01-01 PROCEDURE — 11721 DEBRIDE NAIL 6 OR MORE: CPT | Mod: Q8,PBBFAC,PN | Performed by: STUDENT IN AN ORGANIZED HEALTH CARE EDUCATION/TRAINING PROGRAM

## 2021-01-01 PROCEDURE — 63600175 PHARM REV CODE 636 W HCPCS: Performed by: NURSE ANESTHETIST, CERTIFIED REGISTERED

## 2021-01-01 PROCEDURE — 83735 ASSAY OF MAGNESIUM: CPT | Performed by: STUDENT IN AN ORGANIZED HEALTH CARE EDUCATION/TRAINING PROGRAM

## 2021-01-01 PROCEDURE — 80202 ASSAY OF VANCOMYCIN: CPT | Performed by: STUDENT IN AN ORGANIZED HEALTH CARE EDUCATION/TRAINING PROGRAM

## 2021-01-01 PROCEDURE — 93295 CARDIAC DEVICE CHECK - REMOTE: ICD-10-PCS | Mod: ,,, | Performed by: INTERNAL MEDICINE

## 2021-01-01 PROCEDURE — 87088 URINE BACTERIA CULTURE: CPT | Mod: ER | Performed by: EMERGENCY MEDICINE

## 2021-01-01 PROCEDURE — 93010 ELECTROCARDIOGRAM REPORT: CPT | Mod: 76,,, | Performed by: INTERNAL MEDICINE

## 2021-01-01 PROCEDURE — 93010 ELECTROCARDIOGRAM REPORT: CPT | Mod: ,,, | Performed by: INTERNAL MEDICINE

## 2021-01-01 PROCEDURE — 99203 OFFICE O/P NEW LOW 30 MIN: CPT | Mod: 25,S$GLB,ICN, | Performed by: STUDENT IN AN ORGANIZED HEALTH CARE EDUCATION/TRAINING PROGRAM

## 2021-01-01 PROCEDURE — 63600175 PHARM REV CODE 636 W HCPCS: Performed by: SURGERY

## 2021-01-01 PROCEDURE — 25000003 PHARM REV CODE 250: Performed by: NURSE ANESTHETIST, CERTIFIED REGISTERED

## 2021-01-01 PROCEDURE — 80069 RENAL FUNCTION PANEL: CPT | Performed by: STUDENT IN AN ORGANIZED HEALTH CARE EDUCATION/TRAINING PROGRAM

## 2021-01-01 PROCEDURE — 3074F PR MOST RECENT SYSTOLIC BLOOD PRESSURE < 130 MM HG: ICD-10-PCS | Mod: CPTII,S$GLB,, | Performed by: INTERNAL MEDICINE

## 2021-01-01 PROCEDURE — 1101F PR PT FALLS ASSESS DOC 0-1 FALLS W/OUT INJ PAST YR: ICD-10-PCS | Mod: CPTII,S$GLB,, | Performed by: INTERNAL MEDICINE

## 2021-01-01 PROCEDURE — 99213 OFFICE O/P EST LOW 20 MIN: CPT | Mod: PBBFAC,PN | Performed by: STUDENT IN AN ORGANIZED HEALTH CARE EDUCATION/TRAINING PROGRAM

## 2021-01-01 PROCEDURE — 81000 URINALYSIS NONAUTO W/SCOPE: CPT | Performed by: EMERGENCY MEDICINE

## 2021-01-01 PROCEDURE — 96365 THER/PROPH/DIAG IV INF INIT: CPT | Mod: ER

## 2021-01-01 PROCEDURE — 93922 UPR/L XTREMITY ART 2 LEVELS: CPT | Mod: 26,,, | Performed by: INTERNAL MEDICINE

## 2021-01-01 PROCEDURE — 90935 HEMODIALYSIS ONE EVALUATION: CPT

## 2021-01-01 PROCEDURE — 99999 PR PBB SHADOW E&M-EST. PATIENT-LVL IV: CPT | Mod: PBBFAC,,, | Performed by: INTERNAL MEDICINE

## 2021-01-01 PROCEDURE — 83880 ASSAY OF NATRIURETIC PEPTIDE: CPT | Performed by: EMERGENCY MEDICINE

## 2021-01-01 PROCEDURE — 85025 COMPLETE CBC W/AUTO DIFF WBC: CPT | Performed by: EMERGENCY MEDICINE

## 2021-01-01 PROCEDURE — 1126F PR PAIN SEVERITY QUANTIFIED, NO PAIN PRESENT: ICD-10-PCS | Mod: CPTII,S$GLB,, | Performed by: INTERNAL MEDICINE

## 2021-01-01 PROCEDURE — 86704 HEP B CORE ANTIBODY TOTAL: CPT | Performed by: INTERNAL MEDICINE

## 2021-01-01 PROCEDURE — 3288F PR FALLS RISK ASSESSMENT DOCUMENTED: ICD-10-PCS | Mod: CPTII,S$GLB,, | Performed by: INTERNAL MEDICINE

## 2021-01-01 PROCEDURE — 99203 PR OFFICE/OUTPT VISIT, NEW, LEVL III, 30-44 MIN: ICD-10-PCS | Mod: 25,S$GLB,ICN, | Performed by: STUDENT IN AN ORGANIZED HEALTH CARE EDUCATION/TRAINING PROGRAM

## 2021-01-01 PROCEDURE — 87086 URINE CULTURE/COLONY COUNT: CPT | Mod: ER | Performed by: EMERGENCY MEDICINE

## 2021-01-01 PROCEDURE — 93306 TTE W/DOPPLER COMPLETE: CPT | Mod: PO

## 2021-01-01 PROCEDURE — 25000003 PHARM REV CODE 250: Mod: ER | Performed by: EMERGENCY MEDICINE

## 2021-01-01 PROCEDURE — 36415 COLL VENOUS BLD VENIPUNCTURE: CPT | Performed by: INTERNAL MEDICINE

## 2021-01-01 PROCEDURE — 99999 PR PBB SHADOW E&M-EST. PATIENT-LVL IV: ICD-10-PCS | Mod: PBBFAC,,, | Performed by: INTERNAL MEDICINE

## 2021-01-01 PROCEDURE — 99999 PR PBB SHADOW E&M-EST. PATIENT-LVL III: CPT | Mod: PBBFAC,,, | Performed by: STUDENT IN AN ORGANIZED HEALTH CARE EDUCATION/TRAINING PROGRAM

## 2021-01-01 PROCEDURE — 1126F AMNT PAIN NOTED NONE PRSNT: CPT | Mod: CPTII,S$GLB,, | Performed by: INTERNAL MEDICINE

## 2021-01-01 PROCEDURE — 93306 ECHO (CUPID ONLY): ICD-10-PCS | Mod: 26,,, | Performed by: INTERNAL MEDICINE

## 2021-01-01 PROCEDURE — 80053 COMPREHEN METABOLIC PANEL: CPT | Performed by: EMERGENCY MEDICINE

## 2021-01-01 PROCEDURE — 96367 TX/PROPH/DG ADDL SEQ IV INF: CPT | Mod: ER

## 2021-01-01 PROCEDURE — 4010F PR ACE/ARB THEARPY RXD/TAKEN: ICD-10-PCS | Mod: CPTII,S$GLB,, | Performed by: INTERNAL MEDICINE

## 2021-01-01 PROCEDURE — 80053 COMPREHEN METABOLIC PANEL: CPT | Mod: ER | Performed by: EMERGENCY MEDICINE

## 2021-01-01 PROCEDURE — 87040 BLOOD CULTURE FOR BACTERIA: CPT | Performed by: EMERGENCY MEDICINE

## 2021-01-01 PROCEDURE — 99285 EMERGENCY DEPT VISIT HI MDM: CPT | Mod: 25,ER

## 2021-01-01 PROCEDURE — 96367 TX/PROPH/DG ADDL SEQ IV INF: CPT

## 2021-01-01 PROCEDURE — C1757 CATH, THROMBECTOMY/EMBOLECT: HCPCS | Performed by: SURGERY

## 2021-01-01 PROCEDURE — 93925 LOWER EXTREMITY STUDY: CPT | Mod: TC,PO

## 2021-01-01 PROCEDURE — 25500020 PHARM REV CODE 255: Performed by: INTERNAL MEDICINE

## 2021-01-01 PROCEDURE — 99285 EMERGENCY DEPT VISIT HI MDM: CPT | Mod: 25

## 2021-01-01 PROCEDURE — 93005 ELECTROCARDIOGRAM TRACING: CPT | Mod: ER

## 2021-01-01 PROCEDURE — 87040 BLOOD CULTURE FOR BACTERIA: CPT | Performed by: STUDENT IN AN ORGANIZED HEALTH CARE EDUCATION/TRAINING PROGRAM

## 2021-01-01 PROCEDURE — 87340 HEPATITIS B SURFACE AG IA: CPT | Performed by: INTERNAL MEDICINE

## 2021-01-01 PROCEDURE — 3008F PR BODY MASS INDEX (BMI) DOCUMENTED: ICD-10-PCS | Mod: CPTII,S$GLB,, | Performed by: INTERNAL MEDICINE

## 2021-01-01 PROCEDURE — 93296 REM INTERROG EVL PM/IDS: CPT | Performed by: INTERNAL MEDICINE

## 2021-01-01 PROCEDURE — 25000003 PHARM REV CODE 250: Performed by: PHYSICIAN ASSISTANT

## 2021-01-01 PROCEDURE — U0002 COVID-19 LAB TEST NON-CDC: HCPCS | Mod: ER | Performed by: EMERGENCY MEDICINE

## 2021-01-01 PROCEDURE — 99215 PR OFFICE/OUTPT VISIT, EST, LEVL V, 40-54 MIN: ICD-10-PCS | Mod: S$GLB,,, | Performed by: INTERNAL MEDICINE

## 2021-01-01 PROCEDURE — 11000001 HC ACUTE MED/SURG PRIVATE ROOM

## 2021-01-01 PROCEDURE — 86706 HEP B SURFACE ANTIBODY: CPT | Performed by: INTERNAL MEDICINE

## 2021-01-01 PROCEDURE — 36415 COLL VENOUS BLD VENIPUNCTURE: CPT | Performed by: SURGERY

## 2021-01-01 PROCEDURE — 1159F MED LIST DOCD IN RCRD: CPT | Mod: CPTII,S$GLB,, | Performed by: INTERNAL MEDICINE

## 2021-01-01 PROCEDURE — 80053 COMPREHEN METABOLIC PANEL: CPT | Mod: 91 | Performed by: STUDENT IN AN ORGANIZED HEALTH CARE EDUCATION/TRAINING PROGRAM

## 2021-01-01 PROCEDURE — 63600175 PHARM REV CODE 636 W HCPCS: Performed by: PHYSICIAN ASSISTANT

## 2021-01-01 PROCEDURE — 87186 SC STD MICRODIL/AGAR DIL: CPT | Mod: ER | Performed by: EMERGENCY MEDICINE

## 2021-01-01 PROCEDURE — 83036 HEMOGLOBIN GLYCOSYLATED A1C: CPT | Performed by: STUDENT IN AN ORGANIZED HEALTH CARE EDUCATION/TRAINING PROGRAM

## 2021-01-01 RX ORDER — PHENYLEPHRINE HYDROCHLORIDE 10 MG/ML
INJECTION INTRAVENOUS
Status: DISCONTINUED | OUTPATIENT
Start: 2021-01-01 | End: 2021-01-01

## 2021-01-01 RX ORDER — BENZONATATE 100 MG/1
100 CAPSULE ORAL 3 TIMES DAILY PRN
Status: DISCONTINUED | OUTPATIENT
Start: 2021-01-01 | End: 2021-01-01 | Stop reason: HOSPADM

## 2021-01-01 RX ORDER — FINASTERIDE 5 MG/1
5 TABLET, FILM COATED ORAL DAILY
Qty: 30 TABLET | Refills: 11 | Status: CANCELLED | OUTPATIENT
Start: 2021-01-01 | End: 2022-10-12

## 2021-01-01 RX ORDER — LIDOCAINE HYDROCHLORIDE 10 MG/ML
INJECTION, SOLUTION EPIDURAL; INFILTRATION; INTRACAUDAL; PERINEURAL
Status: DISCONTINUED | OUTPATIENT
Start: 2021-01-01 | End: 2021-01-01 | Stop reason: HOSPADM

## 2021-01-01 RX ORDER — CARVEDILOL 6.25 MG/1
6.25 TABLET ORAL 2 TIMES DAILY
Status: DISCONTINUED | OUTPATIENT
Start: 2021-01-01 | End: 2021-01-01 | Stop reason: HOSPADM

## 2021-01-01 RX ORDER — GLUCAGON 1 MG
1 KIT INJECTION
Status: DISCONTINUED | OUTPATIENT
Start: 2021-01-01 | End: 2021-01-01 | Stop reason: HOSPADM

## 2021-01-01 RX ORDER — CAPSAICIN 0.75 MG/G
CREAM TOPICAL 2 TIMES DAILY PRN
Status: DISCONTINUED | OUTPATIENT
Start: 2021-01-01 | End: 2021-01-01 | Stop reason: HOSPADM

## 2021-01-01 RX ORDER — IBUPROFEN 200 MG
24 TABLET ORAL
Status: DISCONTINUED | OUTPATIENT
Start: 2021-01-01 | End: 2021-01-01

## 2021-01-01 RX ORDER — CARVEDILOL 12.5 MG/1
12.5 TABLET ORAL 2 TIMES DAILY
Status: DISCONTINUED | OUTPATIENT
Start: 2021-01-01 | End: 2021-01-01

## 2021-01-01 RX ORDER — FENTANYL CITRATE 50 UG/ML
INJECTION, SOLUTION INTRAMUSCULAR; INTRAVENOUS
Status: DISCONTINUED | OUTPATIENT
Start: 2021-01-01 | End: 2021-01-01

## 2021-01-01 RX ORDER — HYDROCODONE BITARTRATE AND ACETAMINOPHEN 5; 325 MG/1; MG/1
1 TABLET ORAL
Status: COMPLETED | OUTPATIENT
Start: 2021-01-01 | End: 2021-01-01

## 2021-01-01 RX ORDER — BENZONATATE 100 MG/1
100 CAPSULE ORAL 3 TIMES DAILY PRN
Qty: 30 CAPSULE | Refills: 0 | Status: ON HOLD | OUTPATIENT
Start: 2021-01-01 | End: 2021-01-01

## 2021-01-01 RX ORDER — SODIUM CHLORIDE 9 MG/ML
INJECTION, SOLUTION INTRAVENOUS
Status: DISCONTINUED | OUTPATIENT
Start: 2021-01-01 | End: 2021-01-01 | Stop reason: HOSPADM

## 2021-01-01 RX ORDER — IBUPROFEN 200 MG
24 TABLET ORAL
Status: DISCONTINUED | OUTPATIENT
Start: 2021-01-01 | End: 2021-01-01 | Stop reason: HOSPADM

## 2021-01-01 RX ORDER — MUPIROCIN 20 MG/G
OINTMENT TOPICAL 2 TIMES DAILY
Status: DISPENSED | OUTPATIENT
Start: 2021-01-01 | End: 2021-01-01

## 2021-01-01 RX ORDER — MUPIROCIN 20 MG/G
OINTMENT TOPICAL
Status: CANCELLED | OUTPATIENT
Start: 2021-01-01

## 2021-01-01 RX ORDER — DOXYCYCLINE 100 MG/1
100 CAPSULE ORAL 2 TIMES DAILY
Qty: 20 CAPSULE | Refills: 0 | Status: SHIPPED | OUTPATIENT
Start: 2021-01-01 | End: 2021-01-01 | Stop reason: SDUPTHER

## 2021-01-01 RX ORDER — CLOPIDOGREL BISULFATE 75 MG/1
75 TABLET ORAL DAILY
COMMUNITY

## 2021-01-01 RX ORDER — VANCOMYCIN HYDROCHLORIDE 1 G/20ML
INJECTION, POWDER, LYOPHILIZED, FOR SOLUTION INTRAVENOUS
Status: DISCONTINUED | OUTPATIENT
Start: 2021-01-01 | End: 2021-01-01 | Stop reason: HOSPADM

## 2021-01-01 RX ORDER — MAGNESIUM SULFATE 1 G/100ML
1 INJECTION INTRAVENOUS ONCE
Status: COMPLETED | OUTPATIENT
Start: 2021-01-01 | End: 2021-01-01

## 2021-01-01 RX ORDER — HEPARIN SODIUM 10000 [USP'U]/ML
INJECTION, SOLUTION INTRAVENOUS; SUBCUTANEOUS
Status: DISCONTINUED | OUTPATIENT
Start: 2021-01-01 | End: 2021-01-01 | Stop reason: HOSPADM

## 2021-01-01 RX ORDER — LIDOCAINE HYDROCHLORIDE 20 MG/ML
INJECTION INTRAVENOUS
Status: DISCONTINUED | OUTPATIENT
Start: 2021-01-01 | End: 2021-01-01

## 2021-01-01 RX ORDER — CEPHALEXIN 500 MG/1
500 CAPSULE ORAL EVERY 6 HOURS
Status: ON HOLD | COMMUNITY
End: 2021-01-01 | Stop reason: HOSPADM

## 2021-01-01 RX ORDER — PROPOFOL 10 MG/ML
VIAL (ML) INTRAVENOUS CONTINUOUS PRN
Status: DISCONTINUED | OUTPATIENT
Start: 2021-01-01 | End: 2021-01-01

## 2021-01-01 RX ORDER — HEPARIN SODIUM 1000 [USP'U]/ML
INJECTION, SOLUTION INTRAVENOUS; SUBCUTANEOUS
Status: DISCONTINUED | OUTPATIENT
Start: 2021-01-01 | End: 2021-01-01

## 2021-01-01 RX ORDER — CEPHALEXIN 500 MG/1
500 CAPSULE ORAL 4 TIMES DAILY
Qty: 40 CAPSULE | Refills: 0 | Status: CANCELLED | OUTPATIENT
Start: 2021-01-01 | End: 2021-01-01

## 2021-01-01 RX ORDER — SODIUM CHLORIDE 0.9 % (FLUSH) 0.9 %
10 SYRINGE (ML) INJECTION EVERY 12 HOURS PRN
Status: DISCONTINUED | OUTPATIENT
Start: 2021-01-01 | End: 2021-01-01 | Stop reason: HOSPADM

## 2021-01-01 RX ORDER — TORSEMIDE 20 MG/1
40 TABLET ORAL DAILY
Status: DISCONTINUED | OUTPATIENT
Start: 2021-01-01 | End: 2021-01-01

## 2021-01-01 RX ORDER — ONDANSETRON 2 MG/ML
4 INJECTION INTRAMUSCULAR; INTRAVENOUS DAILY PRN
Status: DISCONTINUED | OUTPATIENT
Start: 2021-01-01 | End: 2021-01-01 | Stop reason: HOSPADM

## 2021-01-01 RX ORDER — INSULIN ASPART 100 [IU]/ML
0-5 INJECTION, SOLUTION INTRAVENOUS; SUBCUTANEOUS
Status: DISCONTINUED | OUTPATIENT
Start: 2021-01-01 | End: 2021-01-01 | Stop reason: HOSPADM

## 2021-01-01 RX ORDER — VANCOMYCIN HCL IN 5 % DEXTROSE 1G/250ML
1000 PLASTIC BAG, INJECTION (ML) INTRAVENOUS ONCE
Status: COMPLETED | OUTPATIENT
Start: 2021-01-01 | End: 2021-01-01

## 2021-01-01 RX ORDER — IBUPROFEN 200 MG
16 TABLET ORAL
Status: DISCONTINUED | OUTPATIENT
Start: 2021-01-01 | End: 2021-01-01

## 2021-01-01 RX ORDER — IBUPROFEN 200 MG
16 TABLET ORAL
Status: DISCONTINUED | OUTPATIENT
Start: 2021-01-01 | End: 2021-01-01 | Stop reason: HOSPADM

## 2021-01-01 RX ORDER — CEFEPIME HYDROCHLORIDE 1 G/50ML
1 INJECTION, SOLUTION INTRAVENOUS ONCE
Status: COMPLETED | OUTPATIENT
Start: 2021-01-01 | End: 2021-01-01

## 2021-01-01 RX ORDER — CLOPIDOGREL BISULFATE 75 MG/1
75 TABLET ORAL DAILY
Status: DISCONTINUED | OUTPATIENT
Start: 2021-01-01 | End: 2021-01-01 | Stop reason: HOSPADM

## 2021-01-01 RX ORDER — PROPOFOL 10 MG/ML
VIAL (ML) INTRAVENOUS
Status: DISCONTINUED | OUTPATIENT
Start: 2021-01-01 | End: 2021-01-01

## 2021-01-01 RX ORDER — DOXYCYCLINE 100 MG/1
100 CAPSULE ORAL 2 TIMES DAILY
Qty: 20 CAPSULE | Refills: 0 | Status: ON HOLD | OUTPATIENT
Start: 2021-01-01 | End: 2021-01-01 | Stop reason: HOSPADM

## 2021-01-01 RX ORDER — SODIUM CHLORIDE 9 MG/ML
INJECTION, SOLUTION INTRAVENOUS ONCE
Status: COMPLETED | OUTPATIENT
Start: 2021-01-01 | End: 2021-01-01

## 2021-01-01 RX ORDER — INSULIN ASPART 100 [IU]/ML
0-5 INJECTION, SOLUTION INTRAVENOUS; SUBCUTANEOUS
Status: DISCONTINUED | OUTPATIENT
Start: 2021-01-01 | End: 2021-01-01

## 2021-01-01 RX ORDER — BENZONATATE 100 MG/1
100 CAPSULE ORAL 3 TIMES DAILY PRN
Qty: 30 CAPSULE | Refills: 0 | Status: SHIPPED | OUTPATIENT
Start: 2021-01-01 | End: 2022-01-01

## 2021-01-01 RX ORDER — ACETAMINOPHEN 325 MG/1
650 TABLET ORAL EVERY 6 HOURS PRN
Status: DISCONTINUED | OUTPATIENT
Start: 2021-01-01 | End: 2021-01-01 | Stop reason: HOSPADM

## 2021-01-01 RX ORDER — CEFEPIME HYDROCHLORIDE 1 G/50ML
1 INJECTION, SOLUTION INTRAVENOUS
Status: DISCONTINUED | OUTPATIENT
Start: 2021-01-01 | End: 2021-01-01

## 2021-01-01 RX ORDER — CAPSAICIN 0.75 MG/G
CREAM TOPICAL 2 TIMES DAILY PRN
Qty: 57 G | Refills: 0 | Status: SHIPPED | OUTPATIENT
Start: 2021-01-01 | End: 2022-01-01

## 2021-01-01 RX ORDER — CARVEDILOL 6.25 MG/1
6.25 TABLET ORAL 2 TIMES DAILY
Qty: 60 TABLET | Refills: 11 | Status: SHIPPED | OUTPATIENT
Start: 2021-01-01 | End: 2022-12-28

## 2021-01-01 RX ADMIN — CARVEDILOL 6.25 MG: 6.25 TABLET, FILM COATED ORAL at 03:12

## 2021-01-01 RX ADMIN — APIXABAN 5 MG: 5 TABLET, FILM COATED ORAL at 09:12

## 2021-01-01 RX ADMIN — CEFEPIME 500 MG: 1 INJECTION, POWDER, FOR SOLUTION INTRAMUSCULAR; INTRAVENOUS at 08:12

## 2021-01-01 RX ADMIN — MUPIROCIN: 20 OINTMENT TOPICAL at 08:12

## 2021-01-01 RX ADMIN — INSULIN ASPART 2 UNITS: 100 INJECTION, SOLUTION INTRAVENOUS; SUBCUTANEOUS at 12:12

## 2021-01-01 RX ADMIN — CLOPIDOGREL 75 MG: 75 TABLET, FILM COATED ORAL at 08:12

## 2021-01-01 RX ADMIN — VANCOMYCIN HYDROCHLORIDE 500 MG: 500 INJECTION, POWDER, LYOPHILIZED, FOR SOLUTION INTRAVENOUS at 06:12

## 2021-01-01 RX ADMIN — ACETAMINOPHEN 650 MG: 325 TABLET, FILM COATED ORAL at 08:12

## 2021-01-01 RX ADMIN — INSULIN DETEMIR 5 UNITS: 100 INJECTION, SOLUTION SUBCUTANEOUS at 09:12

## 2021-01-01 RX ADMIN — PHENYLEPHRINE HYDROCHLORIDE 200 MCG: 10 INJECTION INTRAVENOUS at 05:12

## 2021-01-01 RX ADMIN — CEFEPIME 500 MG: 1 INJECTION, POWDER, FOR SOLUTION INTRAMUSCULAR; INTRAVENOUS at 12:12

## 2021-01-01 RX ADMIN — CAPSAICIN: 0.75 CREAM TOPICAL at 10:12

## 2021-01-01 RX ADMIN — FENTANYL CITRATE 25 MCG: 50 INJECTION, SOLUTION INTRAMUSCULAR; INTRAVENOUS at 04:12

## 2021-01-01 RX ADMIN — CARVEDILOL 6.25 MG: 6.25 TABLET, FILM COATED ORAL at 08:12

## 2021-01-01 RX ADMIN — INSULIN DETEMIR 5 UNITS: 100 INJECTION, SOLUTION SUBCUTANEOUS at 10:12

## 2021-01-01 RX ADMIN — MUPIROCIN: 20 OINTMENT TOPICAL at 09:12

## 2021-01-01 RX ADMIN — ACETAMINOPHEN 650 MG: 325 TABLET, FILM COATED ORAL at 12:12

## 2021-01-01 RX ADMIN — PHENYLEPHRINE HYDROCHLORIDE 100 MCG: 10 INJECTION INTRAVENOUS at 04:12

## 2021-01-01 RX ADMIN — MAGNESIUM SULFATE 1 G: 1 INJECTION INTRAVENOUS at 05:12

## 2021-01-01 RX ADMIN — CLOPIDOGREL 75 MG: 75 TABLET, FILM COATED ORAL at 02:12

## 2021-01-01 RX ADMIN — APIXABAN 5 MG: 5 TABLET, FILM COATED ORAL at 08:12

## 2021-01-01 RX ADMIN — INSULIN DETEMIR 5 UNITS: 100 INJECTION, SOLUTION SUBCUTANEOUS at 08:12

## 2021-01-01 RX ADMIN — PHENYLEPHRINE HYDROCHLORIDE 200 MCG: 10 INJECTION INTRAVENOUS at 04:12

## 2021-01-01 RX ADMIN — PROPOFOL 40 MG: 10 INJECTION, EMULSION INTRAVENOUS at 05:12

## 2021-01-01 RX ADMIN — ACETAMINOPHEN 650 MG: 325 TABLET, FILM COATED ORAL at 03:12

## 2021-01-01 RX ADMIN — CARVEDILOL 6.25 MG: 6.25 TABLET, FILM COATED ORAL at 09:12

## 2021-01-01 RX ADMIN — HYDROCODONE BITARTRATE AND ACETAMINOPHEN 1 TABLET: 5; 325 TABLET ORAL at 11:12

## 2021-01-01 RX ADMIN — FENTANYL CITRATE 25 MCG: 50 INJECTION, SOLUTION INTRAMUSCULAR; INTRAVENOUS at 05:12

## 2021-01-01 RX ADMIN — LIDOCAINE HYDROCHLORIDE 100 MG: 20 INJECTION, SOLUTION INTRAVENOUS at 04:12

## 2021-01-01 RX ADMIN — CEFTRIAXONE 1 G: 1 INJECTION, SOLUTION INTRAVENOUS at 11:12

## 2021-01-01 RX ADMIN — ACETAMINOPHEN 650 MG: 325 TABLET, FILM COATED ORAL at 07:12

## 2021-01-01 RX ADMIN — PROPOFOL 150 MCG/KG/MIN: 10 INJECTION, EMULSION INTRAVENOUS at 04:12

## 2021-01-01 RX ADMIN — ACETAMINOPHEN 650 MG: 325 TABLET, FILM COATED ORAL at 10:12

## 2021-01-01 RX ADMIN — CAPSAICIN: 0.75 CREAM TOPICAL at 09:12

## 2021-01-01 RX ADMIN — SODIUM CHLORIDE: 0.9 INJECTION, SOLUTION INTRAVENOUS at 06:12

## 2021-01-01 RX ADMIN — CLOPIDOGREL 75 MG: 75 TABLET, FILM COATED ORAL at 09:12

## 2021-01-01 RX ADMIN — EPOETIN ALFA-EPBX 10000 UNITS: 10000 INJECTION, SOLUTION INTRAVENOUS; SUBCUTANEOUS at 10:12

## 2021-01-01 RX ADMIN — HUMAN ALBUMIN MICROSPHERES AND PERFLUTREN 0.11 MG: 10; .22 INJECTION, SOLUTION INTRAVENOUS at 04:12

## 2021-01-01 RX ADMIN — PIPERACILLIN AND TAZOBACTAM 4.5 G: 4; .5 INJECTION, POWDER, LYOPHILIZED, FOR SOLUTION INTRAVENOUS; PARENTERAL at 09:12

## 2021-01-01 RX ADMIN — TORSEMIDE 40 MG: 20 TABLET ORAL at 02:12

## 2021-01-01 RX ADMIN — CEFTRIAXONE SODIUM 2 G: 2 INJECTION, POWDER, FOR SOLUTION INTRAMUSCULAR; INTRAVENOUS at 02:12

## 2021-01-01 RX ADMIN — CEFEPIME 500 MG: 1 INJECTION, POWDER, FOR SOLUTION INTRAMUSCULAR; INTRAVENOUS at 11:12

## 2021-01-01 RX ADMIN — FENTANYL CITRATE 50 MCG: 50 INJECTION, SOLUTION INTRAMUSCULAR; INTRAVENOUS at 04:12

## 2021-01-01 RX ADMIN — CARVEDILOL 6.25 MG: 6.25 TABLET, FILM COATED ORAL at 11:12

## 2021-01-01 RX ADMIN — BENZONATATE 100 MG: 100 CAPSULE ORAL at 08:12

## 2021-01-01 RX ADMIN — ACETAMINOPHEN 650 MG: 325 TABLET, FILM COATED ORAL at 06:12

## 2021-01-01 RX ADMIN — CARVEDILOL 6.25 MG: 6.25 TABLET, FILM COATED ORAL at 10:12

## 2021-01-01 RX ADMIN — APIXABAN 5 MG: 5 TABLET, FILM COATED ORAL at 10:12

## 2021-01-01 RX ADMIN — CARVEDILOL 12.5 MG: 12.5 TABLET, FILM COATED ORAL at 09:12

## 2021-01-01 RX ADMIN — VANCOMYCIN HYDROCHLORIDE 1000 MG: 1 INJECTION, POWDER, LYOPHILIZED, FOR SOLUTION INTRAVENOUS at 11:12

## 2021-01-01 RX ADMIN — CEFTRIAXONE SODIUM 2 G: 2 INJECTION, POWDER, FOR SOLUTION INTRAMUSCULAR; INTRAVENOUS at 08:12

## 2021-01-01 RX ADMIN — BENZONATATE 100 MG: 100 CAPSULE ORAL at 09:12

## 2021-01-01 RX ADMIN — CAPSAICIN: 0.75 CREAM TOPICAL at 06:12

## 2021-01-01 RX ADMIN — CAPSAICIN: 0.75 CREAM TOPICAL at 11:12

## 2021-01-01 RX ADMIN — CEFEPIME HYDROCHLORIDE 1 G: 1 INJECTION, SOLUTION INTRAVENOUS at 09:12

## 2021-01-01 RX ADMIN — CEFEPIME 1 G: 1 INJECTION, POWDER, FOR SOLUTION INTRAMUSCULAR; INTRAVENOUS at 04:12

## 2021-01-01 RX ADMIN — VANCOMYCIN HYDROCHLORIDE 500 MG: 500 INJECTION, POWDER, LYOPHILIZED, FOR SOLUTION INTRAVENOUS at 09:12

## 2021-01-01 RX ADMIN — CAPSAICIN: 0.75 CREAM TOPICAL at 12:12

## 2021-01-01 RX ADMIN — VANCOMYCIN HYDROCHLORIDE 500 MG: 500 INJECTION, POWDER, LYOPHILIZED, FOR SOLUTION INTRAVENOUS at 10:12

## 2021-01-01 RX ADMIN — HEPARIN SODIUM 3000 UNITS: 1000 INJECTION, SOLUTION INTRAVENOUS; SUBCUTANEOUS at 04:12

## 2021-01-01 RX ADMIN — VANCOMYCIN HYDROCHLORIDE 2000 MG: 10 INJECTION, POWDER, LYOPHILIZED, FOR SOLUTION INTRAVENOUS at 10:12

## 2021-02-19 ENCOUNTER — TELEPHONE (OUTPATIENT)
Dept: ELECTROPHYSIOLOGY | Facility: CLINIC | Age: 76
End: 2021-02-19

## 2021-02-22 ENCOUNTER — HOSPITAL ENCOUNTER (OUTPATIENT)
Dept: CARDIOLOGY | Facility: CLINIC | Age: 76
Discharge: HOME | End: 2021-02-22
Payer: OTHER GOVERNMENT

## 2021-02-22 ENCOUNTER — OFFICE VISIT (OUTPATIENT)
Dept: ELECTROPHYSIOLOGY | Facility: CLINIC | Age: 76
End: 2021-02-22
Payer: OTHER GOVERNMENT

## 2021-02-22 ENCOUNTER — CLINICAL SUPPORT (OUTPATIENT)
Dept: CARDIOLOGY | Facility: HOSPITAL | Age: 76
End: 2021-02-22
Attending: INTERNAL MEDICINE
Payer: OTHER GOVERNMENT

## 2021-02-22 VITALS — HEART RATE: 60 BPM | DIASTOLIC BLOOD PRESSURE: 65 MMHG | SYSTOLIC BLOOD PRESSURE: 139 MMHG

## 2021-02-22 DIAGNOSIS — I44.2 COMPLETE HEART BLOCK: ICD-10-CM

## 2021-02-22 DIAGNOSIS — I10 ESSENTIAL HYPERTENSION: ICD-10-CM

## 2021-02-22 DIAGNOSIS — R00.1 BRADYCARDIA: ICD-10-CM

## 2021-02-22 DIAGNOSIS — I13.0 CARDIORENAL SYNDROME WITH RENAL FAILURE, STAGE 1-4 OR UNSPECIFIED CHRONIC KIDNEY DISEASE, WITH HEART FAILURE: ICD-10-CM

## 2021-02-22 DIAGNOSIS — I42.8 NICM (NONISCHEMIC CARDIOMYOPATHY): ICD-10-CM

## 2021-02-22 DIAGNOSIS — I49.8 OTHER SPECIFIED CARDIAC ARRHYTHMIAS: ICD-10-CM

## 2021-02-22 DIAGNOSIS — I47.20 VT (VENTRICULAR TACHYCARDIA): Primary | ICD-10-CM

## 2021-02-22 DIAGNOSIS — I51.89 DIASTOLIC DYSFUNCTION: ICD-10-CM

## 2021-02-22 DIAGNOSIS — Z95.810 CARDIAC RESYNCHRONIZATION THERAPY DEFIBRILLATOR (CRT-D) IN PLACE: ICD-10-CM

## 2021-02-22 PROCEDURE — 93284 PRGRMG EVAL IMPLANTABLE DFB: CPT

## 2021-02-22 PROCEDURE — 93005 ELECTROCARDIOGRAM TRACING: CPT | Mod: PBBFAC | Performed by: INTERNAL MEDICINE

## 2021-02-22 PROCEDURE — 93284 PRGRMG EVAL IMPLANTABLE DFB: CPT | Mod: 26,,, | Performed by: INTERNAL MEDICINE

## 2021-02-22 PROCEDURE — 99213 OFFICE O/P EST LOW 20 MIN: CPT | Mod: PBBFAC,25 | Performed by: INTERNAL MEDICINE

## 2021-02-22 PROCEDURE — 93284 CARDIAC DEVICE CHECK - IN CLINIC & HOSPITAL: ICD-10-PCS | Mod: 26,,, | Performed by: INTERNAL MEDICINE

## 2021-02-22 PROCEDURE — 99999 PR PBB SHADOW E&M-EST. PATIENT-LVL III: ICD-10-PCS | Mod: PBBFAC,,, | Performed by: INTERNAL MEDICINE

## 2021-02-22 PROCEDURE — 99214 PR OFFICE/OUTPT VISIT, EST, LEVL IV, 30-39 MIN: ICD-10-PCS | Mod: S$PBB,,, | Performed by: INTERNAL MEDICINE

## 2021-02-22 PROCEDURE — 93010 ELECTROCARDIOGRAM REPORT: CPT | Mod: S$PBB,,, | Performed by: INTERNAL MEDICINE

## 2021-02-22 PROCEDURE — 99999 PR PBB SHADOW E&M-EST. PATIENT-LVL III: CPT | Mod: PBBFAC,,, | Performed by: INTERNAL MEDICINE

## 2021-02-22 PROCEDURE — 99214 OFFICE O/P EST MOD 30 MIN: CPT | Mod: S$PBB,,, | Performed by: INTERNAL MEDICINE

## 2021-02-22 PROCEDURE — 93010 RHYTHM STRIP: ICD-10-PCS | Mod: S$PBB,,, | Performed by: INTERNAL MEDICINE

## 2021-03-05 NOTE — PLAN OF CARE
04/07/20 1624   Post-Acute Status   Post-Acute Authorization Placement   Post-Acute Placement Status Awaiting Internal Medical Clearance   Discharge Delays None known at this time   Discharge Plan   Discharge Plan A Home with family      all other ROS negative except as per HPI

## 2021-03-08 ENCOUNTER — TELEPHONE (OUTPATIENT)
Dept: ELECTROPHYSIOLOGY | Facility: CLINIC | Age: 76
End: 2021-03-08

## 2021-04-18 ENCOUNTER — CLINICAL SUPPORT (OUTPATIENT)
Dept: CARDIOLOGY | Facility: HOSPITAL | Age: 76
End: 2021-04-18
Payer: MEDICARE

## 2021-04-18 DIAGNOSIS — I47.20 VENTRICULAR TACHYCARDIA: ICD-10-CM

## 2021-04-18 DIAGNOSIS — I44.2 ATRIOVENTRICULAR BLOCK, COMPLETE: ICD-10-CM

## 2021-04-18 DIAGNOSIS — Z95.810 PRESENCE OF AUTOMATIC (IMPLANTABLE) CARDIAC DEFIBRILLATOR: ICD-10-CM

## 2021-04-18 PROCEDURE — 93296 REM INTERROG EVL PM/IDS: CPT | Performed by: INTERNAL MEDICINE

## 2021-04-18 PROCEDURE — 93295 CARDIAC DEVICE CHECK - REMOTE: ICD-10-PCS | Mod: ,,, | Performed by: INTERNAL MEDICINE

## 2021-04-18 PROCEDURE — 93295 DEV INTERROG REMOTE 1/2/MLT: CPT | Mod: ,,, | Performed by: INTERNAL MEDICINE

## 2021-05-07 DIAGNOSIS — I50.43 ACUTE ON CHRONIC HEART FAILURE WITH REDUCED EJECTION FRACTION AND DIASTOLIC DYSFUNCTION: ICD-10-CM

## 2021-05-10 RX ORDER — TORSEMIDE 20 MG/1
20 TABLET ORAL 2 TIMES DAILY
Qty: 60 TABLET | Refills: 11 | Status: SHIPPED | OUTPATIENT
Start: 2021-05-10 | End: 2022-01-01

## 2021-07-07 ENCOUNTER — OFFICE VISIT (OUTPATIENT)
Dept: CARDIOLOGY | Facility: CLINIC | Age: 76
End: 2021-07-07
Payer: MEDICARE

## 2021-07-07 VITALS
WEIGHT: 279 LBS | HEART RATE: 59 BPM | BODY MASS INDEX: 36.98 KG/M2 | HEIGHT: 73 IN | DIASTOLIC BLOOD PRESSURE: 53 MMHG | SYSTOLIC BLOOD PRESSURE: 120 MMHG

## 2021-07-07 DIAGNOSIS — E78.2 MIXED HYPERLIPIDEMIA: ICD-10-CM

## 2021-07-07 DIAGNOSIS — I73.9 PAD (PERIPHERAL ARTERY DISEASE): ICD-10-CM

## 2021-07-07 DIAGNOSIS — G47.30 SLEEP APNEA, UNSPECIFIED TYPE: ICD-10-CM

## 2021-07-07 DIAGNOSIS — I42.8 NICM (NONISCHEMIC CARDIOMYOPATHY): ICD-10-CM

## 2021-07-07 DIAGNOSIS — I10 ESSENTIAL HYPERTENSION: Primary | ICD-10-CM

## 2021-07-07 DIAGNOSIS — N18.6 ESRD (END STAGE RENAL DISEASE) ON DIALYSIS: ICD-10-CM

## 2021-07-07 DIAGNOSIS — E11.51 TYPE 2 DIABETES MELLITUS WITH DIABETIC PERIPHERAL ANGIOPATHY WITHOUT GANGRENE, UNSPECIFIED WHETHER LONG TERM INSULIN USE: ICD-10-CM

## 2021-07-07 DIAGNOSIS — I44.2 COMPLETE HEART BLOCK: ICD-10-CM

## 2021-07-07 DIAGNOSIS — Z99.2 ESRD (END STAGE RENAL DISEASE) ON DIALYSIS: ICD-10-CM

## 2021-07-07 DIAGNOSIS — I50.42 CHRONIC COMBINED SYSTOLIC AND DIASTOLIC CONGESTIVE HEART FAILURE: ICD-10-CM

## 2021-07-07 DIAGNOSIS — R53.81 PHYSICAL DECONDITIONING: ICD-10-CM

## 2021-07-07 DIAGNOSIS — Z95.810 CARDIAC RESYNCHRONIZATION THERAPY DEFIBRILLATOR (CRT-D) IN PLACE: ICD-10-CM

## 2021-07-07 DIAGNOSIS — I47.20 VT (VENTRICULAR TACHYCARDIA): ICD-10-CM

## 2021-07-07 DIAGNOSIS — E66.01 MORBID OBESITY: ICD-10-CM

## 2021-07-07 PROCEDURE — 3008F BODY MASS INDEX DOCD: CPT | Mod: CPTII,S$GLB,, | Performed by: INTERNAL MEDICINE

## 2021-07-07 PROCEDURE — 99214 PR OFFICE/OUTPT VISIT, EST, LEVL IV, 30-39 MIN: ICD-10-PCS | Mod: S$GLB,,, | Performed by: INTERNAL MEDICINE

## 2021-07-07 PROCEDURE — 1101F PR PT FALLS ASSESS DOC 0-1 FALLS W/OUT INJ PAST YR: ICD-10-PCS | Mod: CPTII,S$GLB,, | Performed by: INTERNAL MEDICINE

## 2021-07-07 PROCEDURE — 3074F SYST BP LT 130 MM HG: CPT | Mod: CPTII,S$GLB,, | Performed by: INTERNAL MEDICINE

## 2021-07-07 PROCEDURE — 1159F MED LIST DOCD IN RCRD: CPT | Mod: S$GLB,,, | Performed by: INTERNAL MEDICINE

## 2021-07-07 PROCEDURE — 1126F PR PAIN SEVERITY QUANTIFIED, NO PAIN PRESENT: ICD-10-PCS | Mod: S$GLB,,, | Performed by: INTERNAL MEDICINE

## 2021-07-07 PROCEDURE — 3078F DIAST BP <80 MM HG: CPT | Mod: CPTII,S$GLB,, | Performed by: INTERNAL MEDICINE

## 2021-07-07 PROCEDURE — 3078F PR MOST RECENT DIASTOLIC BLOOD PRESSURE < 80 MM HG: ICD-10-PCS | Mod: CPTII,S$GLB,, | Performed by: INTERNAL MEDICINE

## 2021-07-07 PROCEDURE — 1126F AMNT PAIN NOTED NONE PRSNT: CPT | Mod: S$GLB,,, | Performed by: INTERNAL MEDICINE

## 2021-07-07 PROCEDURE — 3008F PR BODY MASS INDEX (BMI) DOCUMENTED: ICD-10-PCS | Mod: CPTII,S$GLB,, | Performed by: INTERNAL MEDICINE

## 2021-07-07 PROCEDURE — 3288F PR FALLS RISK ASSESSMENT DOCUMENTED: ICD-10-PCS | Mod: CPTII,S$GLB,, | Performed by: INTERNAL MEDICINE

## 2021-07-07 PROCEDURE — 99999 PR PBB SHADOW E&M-EST. PATIENT-LVL V: CPT | Mod: PBBFAC,,, | Performed by: INTERNAL MEDICINE

## 2021-07-07 PROCEDURE — 3074F PR MOST RECENT SYSTOLIC BLOOD PRESSURE < 130 MM HG: ICD-10-PCS | Mod: CPTII,S$GLB,, | Performed by: INTERNAL MEDICINE

## 2021-07-07 PROCEDURE — 3288F FALL RISK ASSESSMENT DOCD: CPT | Mod: CPTII,S$GLB,, | Performed by: INTERNAL MEDICINE

## 2021-07-07 PROCEDURE — 1101F PT FALLS ASSESS-DOCD LE1/YR: CPT | Mod: CPTII,S$GLB,, | Performed by: INTERNAL MEDICINE

## 2021-07-07 PROCEDURE — 99999 PR PBB SHADOW E&M-EST. PATIENT-LVL V: ICD-10-PCS | Mod: PBBFAC,,, | Performed by: INTERNAL MEDICINE

## 2021-07-07 PROCEDURE — 1159F PR MEDICATION LIST DOCUMENTED IN MEDICAL RECORD: ICD-10-PCS | Mod: S$GLB,,, | Performed by: INTERNAL MEDICINE

## 2021-07-07 PROCEDURE — 99214 OFFICE O/P EST MOD 30 MIN: CPT | Mod: S$GLB,,, | Performed by: INTERNAL MEDICINE

## 2021-07-07 RX ORDER — ATORVASTATIN CALCIUM 80 MG/1
80 TABLET, FILM COATED ORAL NIGHTLY
Qty: 90 TABLET | Refills: 3 | Status: SHIPPED | OUTPATIENT
Start: 2021-07-07 | End: 2022-01-01

## 2021-07-07 RX ORDER — LOSARTAN POTASSIUM 25 MG/1
25 TABLET ORAL DAILY
Qty: 90 TABLET | Refills: 3 | Status: SHIPPED | OUTPATIENT
Start: 2021-07-07 | End: 2022-01-01

## 2021-10-13 PROBLEM — I73.9 PAD (PERIPHERAL ARTERY DISEASE): Status: ACTIVE | Noted: 2021-01-01

## 2021-10-13 PROBLEM — I82.431 ACUTE DEEP VEIN THROMBOSIS (DVT) OF POPLITEAL VEIN OF RIGHT LOWER EXTREMITY: Status: ACTIVE | Noted: 2021-01-01

## 2021-12-20 NOTE — PLAN OF CARE
Insulin pump discontinued and given to wife   Azithromycin Counseling:  I discussed with the patient the risks of azithromycin including but not limited to GI upset, allergic reaction, drug rash, diarrhea, and yeast infections.

## 2021-12-21 PROBLEM — R78.81 BACTEREMIA: Status: ACTIVE | Noted: 2021-01-01

## 2021-12-21 PROBLEM — T82.49XA CLOTTED DIALYSIS ACCESS: Status: ACTIVE | Noted: 2021-01-01

## 2021-12-21 NOTE — ED NOTES
Report called to Tia on the 5th floor. Pt transported to dialysis via stretcher by hospital transport staff. Pt has belongings bag with clothes. Family has the rest of pt belongings. Santos in dialysis updated by phone.

## 2021-12-21 NOTE — CONSULTS
Today`s Date: 12/21/2021     Admit Date: 12/21/2021    Admitting Physician: Meenakshi Garcia MD    Patient`s Name: Houston Jc , 76 y.o. male    Reason for consultation  Clotted access   Patient Active Problem List:     Swelling of lower extremity     Hypervolemia     Hypertension     Sleep apnea     Morbid obesity     Complete heart block     VT (ventricular tachycardia)     Cardiorenal syndrome     Acute systolic heart failure     Abdominal distension     Debility     Gout     Cardiac resynchronization therapy defibrillator (CRT-D) in place     Third degree heart block     Abnormal transaminases     NICM (nonischemic cardiomyopathy)     SOB (shortness of breath)     COVID-19 virus detected     Fever     Suspected 2019-nCoV infection     CLEMENCIA (acute kidney injury)     Bradycardia     Scrotal pain     ESRD (end stage renal disease) on dialysis     Chronic combined systolic and diastolic congestive heart failure     PAD (peripheral artery disease)     Acute deep vein thrombosis (DVT) of popliteal vein of right lower extremity     Bacteremia      Past Medical History:  No date: Anemia  No date: CHF (congestive heart failure)  No date: CKD (chronic kidney disease) stage 4, GFR 15-29 ml/min  No date: Coronary artery disease  No date: Diabetes mellitus  No date: Hematuria  No date: Hypertension  No date: Renal cyst, right    Past Surgical History:  7/18/2019: INSERTION OF BIVENTRICULAR IMPLANTABLE CARDIOVERTER-  DEFIBRILLATOR (ICD); Left      Comment:  Procedure: INSERTION, ICD, BIVENTRICULAR;  Surgeon:                Arturo Bay MD;  Location: [facility] EP LAB;  Service:                Cardiology;  Laterality: Left;  CHB, CRTD, SJM, anes, GP,               [id number]  7/16/2019: LEFT HEART CATHETERIZATION; N/A      Comment:  Procedure: Left heart cath;  Surgeon: Dejuan Cody MD;  Location: [facility] CATH LAB/EP;  Service: Cardiology;                 Laterality: N/A;  4/11/2020: PERITONEAL CATHETER REMOVAL;  Left      Comment:  Procedure: REMOVAL, CATHETER, DIALYSIS, PERITONEAL;                 Surgeon: Edis Snell Jr., MD;  Location: [facility]                OR;  Service: General;  Laterality: Left;    Prior to Admission medications :  Medication allopurinol (ZYLOPRIM) 300 MG tablet, Sig Take 300 mg by mouth once daily., Start Date , End Date , Taking? Yes, Authorizing Provider Historical Provider    Medication apixaban (ELIQUIS) 5 mg Tab, Sig Take 1 tablet (5 mg total) by mouth 2 (two) times daily., Start Date 10/19/21, End Date , Taking? Yes, Authorizing Provider Pawel Palmer MD    Medication benzonatate (TESSALON) 100 MG capsule, Sig Take 1 capsule (100 mg total) by mouth 3 (three) times daily as needed for Cough., Start Date 12/14/21, End Date 12/24/21, Taking? Yes, Authorizing Provider Angelina Teran NP    Medication carvedilol (COREG) 12.5 MG tablet, Sig Take 12.5 mg by mouth 2 (two) times daily., Start Date , End Date , Taking? Yes, Authorizing Provider Historical Provider    Medication cephALEXin (KEFLEX) 500 MG capsule, Sig Take 500 mg by mouth every 6 (six) hours., Start Date , End Date , Taking? Yes, Authorizing Provider Historical Provider    Medication cholecalciferol, vitamin D3, (VITAMIN D3) 50 mcg (2,000 unit) Cap, Sig Take 1 capsule by mouth once daily., Start Date , End Date , Taking? Yes, Authorizing Provider Historical Provider    Medication clopidogreL (PLAVIX) 75 mg tablet, Sig Take 75 mg by mouth once daily., Start Date , End Date , Taking? Yes, Authorizing Provider Historical Provider    Medication doxycycline (VIBRAMYCIN) 100 MG Cap, Sig Take 1 capsule (100 mg total) by mouth 2 (two) times daily. for 10 days, Start Date 12/20/21, End Date 12/30/21, Taking? Yes, Authorizing Provider Steven Swift MD    Medication insulin aspart U-100 (NOVOLOG) 100 unit/mL injection, Sig Inject 4-8 Units into the skin 3 (three) times daily before meals. Per sliding scale, Start Date , End Date ,  "Taking? Yes, Authorizing Provider Historical Provider    Medication losartan (COZAAR) 25 MG tablet, Sig Take 1 tablet (25 mg total) by mouth once daily., Start Date 7/7/21, End Date 7/7/22, Taking? Yes, Authorizing Provider Dejuan Cody MD    Medication torsemide (DEMADEX) 20 MG Tab, Sig Take 1 tablet (20 mg total) by mouth 2 (two) times daily. Take additional 2 tablets if weight gain of more than 3 pounds in 1 dayPatient taking differently: Take 40 mg by mouth 2 (two) times daily. Take additional 2 tablets if weight gain of more than 3 pounds in 1 day, Start Date 5/10/21, End Date 5/10/22, Taking? Yes, Authorizing Provider Aimee Arroyo MD    Medication VENTOLIN HFA 90 mcg/actuation inhaler, Sig Inhale 1 puff into the lungs every 4 (four) hours as needed. As NEEDED, Start Date 6/25/19, End Date , Taking? Yes, Authorizing Provider Historical Provider    Medication atorvastatin (LIPITOR) 80 MG tablet, Sig Take 1 tablet (80 mg total) by mouth every evening.Patient not taking: No sig reported, Start Date 7/7/21, End Date , Taking? , Authorizing Provider Dejuan Cody MD    Medication walker Misc, Sig 5" wheel rolling walker, Start Date 8/7/19, End Date , Taking? , Authorizing Provider Masoud Albarado MD    Medication insulin aspart U-100 (NOVOLOG U-100 INSULIN ASPART) 100 unit/mL injection, Sig INJECT 120 U SUBCUTANEOUS FOR 90 DAYS USE WITH INSULIN PUMP, Start Date 4/11/20, End Date 12/21/21, Taking? , Authorizing Provider Tatyana Eng MD      No current facility-administered medications on file prior to encounter.  Current Outpatient Medications on File Prior to Encounter:  allopurinol (ZYLOPRIM) 300 MG tablet, Take 300 mg by mouth once daily., Disp: , Rfl:   apixaban (ELIQUIS) 5 mg Tab, Take 1 tablet (5 mg total) by mouth 2 (two) times daily., Disp: 60 tablet, Rfl: 6  benzonatate (TESSALON) 100 MG capsule, Take 1 capsule (100 mg total) by mouth 3 (three) times daily as needed for Cough., Disp: 30 " "capsule, Rfl: 0  carvedilol (COREG) 12.5 MG tablet, Take 12.5 mg by mouth 2 (two) times daily., Disp: , Rfl:   cephALEXin (KEFLEX) 500 MG capsule, Take 500 mg by mouth every 6 (six) hours., Disp: , Rfl:   cholecalciferol, vitamin D3, (VITAMIN D3) 50 mcg (2,000 unit) Cap, Take 1 capsule by mouth once daily., Disp: , Rfl:   clopidogreL (PLAVIX) 75 mg tablet, Take 75 mg by mouth once daily., Disp: , Rfl:   doxycycline (VIBRAMYCIN) 100 MG Cap, Take 1 capsule (100 mg total) by mouth 2 (two) times daily. for 10 days, Disp: 20 capsule, Rfl: 0  insulin aspart U-100 (NOVOLOG) 100 unit/mL injection, Inject 4-8 Units into the skin 3 (three) times daily before meals. Per sliding scale, Disp: , Rfl:   losartan (COZAAR) 25 MG tablet, Take 1 tablet (25 mg total) by mouth once daily., Disp: 90 tablet, Rfl: 3  torsemide (DEMADEX) 20 MG Tab, Take 1 tablet (20 mg total) by mouth 2 (two) times daily. Take additional 2 tablets if weight gain of more than 3 pounds in 1 day (Patient taking differently: Take 40 mg by mouth 2 (two) times daily. Take additional 2 tablets if weight gain of more than 3 pounds in 1 day), Disp: 60 tablet, Rfl: 11  VENTOLIN HFA 90 mcg/actuation inhaler, Inhale 1 puff into the lungs every 4 (four) hours as needed. As NEEDED, Disp: , Rfl:   atorvastatin (LIPITOR) 80 MG tablet, Take 1 tablet (80 mg total) by mouth every evening. (Patient not taking: No sig reported), Disp: 90 tablet, Rfl: 3  walker Misc, 5" wheel rolling walker, Disp: 1 each, Rfl: 1  [DISCONTINUED] insulin aspart U-100 (NOVOLOG U-100 INSULIN ASPART) 100 unit/mL injection, INJECT 120 U SUBCUTANEOUS FOR 90 DAYS USE WITH INSULIN PUMP, Disp: 4 vial, Rfl: 10         Review of patient's allergies indicates:  No Known Allergies    Social History:   reports that he has quit smoking. He has never used smokeless tobacco. He reports current alcohol use. He reports that he does not use drugs.     Review of patient's family history indicates:  Problem: Heart " attack      Relation: Father          Age of Onset: (Not Specified)      PHYSICAL EXAMINATION  Temp:  [97.6 °F (36.4 °C)] 97.6 °F (36.4 °C)  Pulse:  [80-99] 80  Resp:  [16-22] 16  SpO2:  [97 %-100 %] 100 %  BP: (131-143)/(63-75) 131/63    General Condition:   alert x 3    Head & Neck  Anemia: None  Jaundice: None  Neck vein: Not distended  Carotid Bruits: none  Lymph nodes: none palpable  Thyroid: normal    Chest: normal    Heart: normal    Rt. Breast: not examined  Lt. Breast: not examined  Axillary lymph nodes: none    Abdomen: Soft,  None tender with no palpable mass or organ  Hernia: none    Rectal: Defered    Extremities: normal    Vascular: normal    Specific focus Examination      Imp: clotted dialysis access, crf, dm, htn    Plan: declotting today

## 2021-12-21 NOTE — ED NOTES
Secure chat message sent from dialysis RN. Transport will be here shortly to bring pt to dialysis for approximately 3 hours. Attempted to call report to floor, no answer.

## 2021-12-21 NOTE — ED PROVIDER NOTES
"Encounter Date: 12/21/2021       History     Chief Complaint   Patient presents with    Blood Infection     Pt's wife states that pt was seen yesterday at another facility and a provider called to notify of infection in the blood. Wife was advised to bring pt in : "For an IV because he thought the medicine he's on wouldn't work."     76-year-old male presents to the emergency department for evaluation of 4 day history of cough, nasal congestion, right-sided chest pain, urinary tract infection and positive blood cultures.  He reports that he was evaluated at [facility] emergency department yesterday for similar symptoms and was diagnosed with urinary tract infection.  He reports that he was called this morning and informed that his blood cultures were positive.  He reports that he was informed to come to this facility for further evaluation, IV antibiotics and possible admission.  He reports continued intermittently productive cough and right-sided chest pain.  He denies any fever, headache, dizziness, vision changes, neck pain, palpitations, abdominal pain, nausea, vomiting, flank pain or dysuria.  He reports that he is due to have his dialysis today.  Last dialysis was on 12/16/2021.         Review of patient's allergies indicates:  No Known Allergies  Past Medical History:   Diagnosis Date    Anemia     CHF (congestive heart failure)     CKD (chronic kidney disease) stage 4, GFR 15-29 ml/min     Coronary artery disease     Diabetes mellitus     Hematuria     Hypertension     Renal cyst, right      Past Surgical History:   Procedure Laterality Date    INSERTION OF BIVENTRICULAR IMPLANTABLE CARDIOVERTER-DEFIBRILLATOR (ICD) Left 7/18/2019    Procedure: INSERTION, ICD, BIVENTRICULAR;  Surgeon: Arturo Bay MD;  Location: [facility];  Service: Cardiology;  Laterality: Left;  CHB, CRTD, SJM, anes, GP, 6092    LEFT HEART CATHETERIZATION N/A 7/16/2019    Procedure: Left heart cath;  Surgeon: Dejuan GARDUNO" MD Glo;  Location: [facility] CATH LAB/EP;  Service: Cardiology;  Laterality: N/A;    PERITONEAL CATHETER REMOVAL Left 4/11/2020    Procedure: REMOVAL, CATHETER, DIALYSIS, PERITONEAL;  Surgeon: Edis Snell Jr., MD;  Location: [facility] OR;  Service: General;  Laterality: Left;     Family History   Problem Relation Age of Onset    Heart attack Father      Social History     Tobacco Use    Smoking status: Former Smoker    Smokeless tobacco: Never Used   Substance Use Topics    Alcohol use: Yes     Comment: social    Drug use: No     Review of Systems   Constitutional: Negative for activity change, appetite change and fever.   HENT: Positive for congestion and rhinorrhea. Negative for sore throat.    Eyes: Negative for photophobia, redness and visual disturbance.   Respiratory: Positive for cough. Negative for shortness of breath.    Cardiovascular: Positive for chest pain. Negative for palpitations and leg swelling.   Gastrointestinal: Negative for abdominal pain, constipation, diarrhea, nausea and vomiting.   Genitourinary: Negative for decreased urine volume, dysuria and frequency.   Musculoskeletal: Negative for back pain, joint swelling, neck pain and neck stiffness.   Skin: Negative for rash.   Neurological: Negative for dizziness, syncope, weakness, light-headedness, numbness and headaches.   Hematological: Does not bruise/bleed easily.       Physical Exam     Initial Vitals [12/21/21 0759]   BP Pulse Resp Temp SpO2   (!) 143/75 99 20 97.6 °F (36.4 °C) 97 %      MAP       --         Physical Exam    Nursing note and vitals reviewed.  Constitutional: He appears well-developed and well-nourished. He is not diaphoretic. No distress.   HENT:   Head: Normocephalic and atraumatic.   Right Ear: External ear normal.   Left Ear: External ear normal.   Mouth/Throat: Oropharynx is clear and moist. No oropharyngeal exudate.   Eyes: Conjunctivae and EOM are normal. Pupils are equal, round, and reactive to light.    Neck: Neck supple.   Normal range of motion.  Cardiovascular: Normal rate, regular rhythm and normal heart sounds.   Pulmonary/Chest: Breath sounds normal. No respiratory distress. He has no decreased breath sounds. He has no wheezes. He has no rhonchi. He has no rales. He exhibits tenderness. He exhibits no bony tenderness, no edema, no deformity and no swelling.     Abdominal: Abdomen is soft. Bowel sounds are normal. He exhibits no distension. There is no abdominal tenderness. There is no guarding.   Musculoskeletal:      Cervical back: Normal range of motion and neck supple.     Lymphadenopathy:     He has no cervical adenopathy.   Neurological: He is alert and oriented to person, place, and time.   Skin: Skin is warm and dry.   Psychiatric: He has a normal mood and affect.         ED Course   Procedures  Labs Reviewed   CBC W/ AUTO DIFFERENTIAL - Abnormal; Notable for the following components:       Result Value    RBC 3.29 (*)     Hemoglobin 10.2 (*)     Hematocrit 31.4 (*)     Gran # (ANC) 10.7 (*)     Immature Grans (Abs) 0.06 (*)     Lymph # 0.8 (*)     Gran % 85.8 (*)     Lymph % 6.8 (*)     All other components within normal limits   COMPREHENSIVE METABOLIC PANEL - Abnormal; Notable for the following components:    Sodium 135 (*)     CO2 18 (*)     Glucose 195 (*)      (*)     Creatinine 17.6 (*)     Calcium 8.5 (*)     Albumin 2.8 (*)     AST 8 (*)     ALT <5 (*)     Anion Gap 18 (*)     eGFR if  3 (*)     eGFR if non  2 (*)     All other components within normal limits   URINALYSIS, REFLEX TO URINE CULTURE - Abnormal; Notable for the following components:    Appearance, UA Hazy (*)     Protein, UA 1+ (*)     Occult Blood UA 3+ (*)     Leukocytes, UA 3+ (*)     All other components within normal limits    Narrative:     Specimen Source->Urine   TROPONIN I - Abnormal; Notable for the following components:    Troponin I 0.793 (*)     All other components within  normal limits   B-TYPE NATRIURETIC PEPTIDE - Abnormal; Notable for the following components:     (*)     All other components within normal limits   URINALYSIS MICROSCOPIC - Abnormal; Notable for the following components:    RBC, UA >100 (*)     WBC, UA 32 (*)     All other components within normal limits    Narrative:     Specimen Source->Urine   CULTURE, BLOOD   CULTURE, BLOOD   CULTURE, STREP A,  THROAT   CULTURE, URINE   LACTIC ACID, PLASMA   URINALYSIS, REFLEX TO URINE CULTURE   STREP PNEUMO AG URINE        ECG Results          EKG 12-lead (Final result)  Result time 12/21/21 12:51:47    Final result by Interface, Lab In [facility] (12/21/21 12:51:47)                 Narrative:    Test Reason : R07.9,    Vent. Rate : 087 BPM     Atrial Rate : 087 BPM     P-R Int : 174 ms          QRS Dur : 156 ms      QT Int : 470 ms       P-R-T Axes : 078 202 091 degrees     QTc Int : 565 ms    Atrial-sensed ventricular-paced rhythm    Confirmed by Spencer MONTE, Pawel VELIZ ([id number]) on 12/21/2021 12:51:37 PM    Referred By: [email or web address]   SELF           Confirmed By:Pawel Palmer MD                            Imaging Results          X-Ray Chest AP Portable (Final result)  Result time 12/21/21 08:32:46    Final result by Wallace Boone MD (12/21/21 08:32:46)                 Impression:      No significant detrimental interval change in the appearance of the chest since 12/20/2021 is appreciated.      Electronically signed by: Wallace Boone MD  Date:    12/21/2021  Time:    08:32             Narrative:    EXAMINATION:  XR CHEST AP PORTABLE    COMPARISON:  Comparison is made to 12/20/2021.    FINDINGS:  Cardioverter/defibrillator is again noted.  Heart is minimally enlarged, but the appearance of the cardiomediastinal silhouette is unchanged since the examination referenced above.  Pulmonary vascularity is normal, and there are no findings indicating current cardiac decompensation.  Lung zones are stable as well, free of significant  superimposed airspace consolidation or volume loss.  No pleural fluid of any substantial volume is seen on either side.  No pneumothorax.  Calcification in the wall of the aortic arch is an incidental observation.                                 Medications   apixaban tablet 5 mg (has no administration in time range)   carvediloL tablet 12.5 mg (has no administration in time range)   clopidogreL tablet 75 mg (has no administration in time range)   torsemide tablet 40 mg (has no administration in time range)   sodium chloride 0.9% flush 10 mL (has no administration in time range)   glucose chewable tablet 16 g (has no administration in time range)   glucose chewable tablet 24 g (has no administration in time range)   dextrose 50% injection 12.5 g (has no administration in time range)   dextrose 50% injection 25 g (has no administration in time range)   glucagon (human recombinant) injection 1 mg (has no administration in time range)   vancomycin - pharmacy to dose (has no administration in time range)   cefTRIAXone (ROCEPHIN) 2 g/50 mL D5W IVPB (has no administration in time range)   vancomycin (VANCOCIN) 2,000 mg in dextrose 5 % 500 mL IVPB (2,000 mg Intravenous New Bag 12/21/21 1024)   piperacillin-tazobactam 4.5 g in dextrose 5 % 100 mL IVPB (ready to mix system) (0 g Intravenous Stopped 12/21/21 0935)   HYDROcodone-acetaminophen 5-325 mg per tablet 1 tablet (1 tablet Oral Given 12/21/21 1124)     Medical Decision Making:   Initial Assessment:   76-year-old male presents emergency department for evaluation of continued cough, nasal congestion, right-sided chest pain, bladder infection and positive blood culture results.  Physical exam reveals a nontoxic-appearing male in no acute distress.  Patient is afebrile vital signs within normal limits.  Neurological exam reveals an alert and oriented patient.  TMs reveal no erythema.  Posterior pharynx reveals erythema, edema or tonsillar exudate.  Neck is supple, no  meningeal signs noted.  Lungs clear to auscultation bilaterally.  No respiratory distress or accessory muscle use noted.  Mild tenderness to palpation noted over the right-sided anterior aspect of the chest wall.  No bony instability or crepitus noted.  No erythema , edema or ecchymosis noted.  No rash noted.  Abdominal exam reveals soft abdomen, nontender palpation.  No CVA tenderness noted.  No peripheral edema noted.  Differential Diagnosis:   Bacteremia  Urinary tract infection  Repeat chest x-ray was ordered to assess possible pneumonia or consolidation.  ACS  CHF  Electrolyte abnormality  ED Management:  CBC reveals no acute leukocytosis, hemoglobin 10.2 and hematocrit 31.4.  EKG reveal no acute ST changes.  Troponin 0.793.  . Lactic acid 1.1.  Repeat blood cultures pending.  Chest x-ray report reveals no significant detrimental interval change in the appearance of the chest since 12/20/2021.  Patient given Norco for chest wall pain.  Urinalysis reveals evidence of urinary tract infection and hematuria.  Urine culture pending.  CMP was pending.  Discussed this patient with [facility] who will accept this patient under the care Dr. Hahn for IV antibiotics.  Discussed this patient at length with Dr. Garcia who is in agreement course of treatment.  CMP reveals sodium 135, glucose 195, , creatinine 17.6, CO2 18, calcium 8.5, AST 8.                        Clinical Impression:   Final diagnoses:  [R05.9] Cough  [R07.9] Chest pain  [R78.81] Bacteremia (Primary)  [N30.00] Acute cystitis without hematuria          ED Disposition Condition    Admit               Kristi Esposito PA-C  12/21/21 9110

## 2021-12-21 NOTE — CONSULTS
Nephrology Consult  H&P      Consult Requested By: Meenakshi Garcia MD  Reason for Consult: ESRD on HD     SUBJECTIVE:     History of Present Illness:  Houston Jc is a 76 y.o.   male who  has a past medical history of Anemia, CHF (congestive heart failure), CKD (chronic kidney disease) stage 4, GFR 15-29 ml/min, Coronary artery disease, Diabetes mellitus, Hematuria, Hypertension, and Renal cyst, right.. The patient presented to the [facility] on 12/21/2021  For  positive BCx called back for re evaluation.   ?    Review of Systems   Constitutional: Negative for chills and fever.   HENT: Negative for congestion and sore throat.    Eyes: Negative for blurred vision, double vision and photophobia.   Respiratory: Positive for cough and shortness of breath.    Cardiovascular: Negative for chest pain, palpitations and leg swelling.   Gastrointestinal: Negative for abdominal pain, diarrhea, nausea and vomiting.   Genitourinary: Negative for dysuria and urgency.   Musculoskeletal: Negative for joint pain and myalgias.   Skin: Negative for itching and rash.   Neurological: Negative for dizziness, sensory change, weakness and headaches.   Endo/Heme/Allergies: Negative for polydipsia. Does not bruise/bleed easily.   Psychiatric/Behavioral: Negative for depression.       Past Medical History:   Diagnosis Date    Anemia     CHF (congestive heart failure)     CKD (chronic kidney disease) stage 4, GFR 15-29 ml/min     Coronary artery disease     Diabetes mellitus     Hematuria     Hypertension     Renal cyst, right      Past Surgical History:   Procedure Laterality Date    INSERTION OF BIVENTRICULAR IMPLANTABLE CARDIOVERTER-DEFIBRILLATOR (ICD) Left 7/18/2019    Procedure: INSERTION, ICD, BIVENTRICULAR;  Surgeon: Arturo Bay MD;  Location: [facility] EP LAB;  Service: Cardiology;  Laterality: Left;  CHB, CRTD, SJM, anes, GP, [id number]    LEFT HEART CATHETERIZATION N/A 7/16/2019    Procedure: Left heart cath;  Surgeon:  "Dejuan Cody MD;  Location: [facility] CATH LAB/EP;  Service: Cardiology;  Laterality: N/A;    PERITONEAL CATHETER REMOVAL Left 4/11/2020    Procedure: REMOVAL, CATHETER, DIALYSIS, PERITONEAL;  Surgeon: Edis Snell Jr., MD;  Location: [facility] OR;  Service: General;  Laterality: Left;     Family History   Problem Relation Age of Onset    Heart attack Father      Social History     Tobacco Use    Smoking status: Former Smoker    Smokeless tobacco: Never Used   Substance Use Topics    Alcohol use: Yes     Comment: social    Drug use: No       Review of patient's allergies indicates:  No Known Allergies         OBJECTIVE:     Vital Signs (Most Recent)  Vitals:    12/21/21 0759 12/21/21 1115 12/21/21 1124   BP: (!) 143/75 131/63    BP Location: Right arm     Patient Position: Sitting     Pulse: 99 80    Resp: 20 (!) 22 16   Temp: 97.6 °F (36.4 °C)     TempSrc: Oral     SpO2: 97% 100%    Weight: 123.8 kg (273 lb)     Height: 6' 1" (1.854 m)           Date 12/21/21 0700 - 12/22/21 0659   Shift 4922-4621 3819-3243 6443-6989 24 Hour Total   INTAKE   IV Piggyback 100   100   Shift Total(mL/kg) 100(0.8)   100(0.8)   OUTPUT   Shift Total(mL/kg)       Weight (kg) 123.8 123.8 123.8 123.8           Medications:   apixaban  5 mg Oral BID    carvediloL  12.5 mg Oral BID    cefTRIAXone (ROCEPHIN) IVPB  2 g Intravenous Q24H    clopidogreL  75 mg Oral Daily    torsemide  40 mg Oral Daily           Physical Exam  Vitals and nursing note reviewed.   Constitutional:       General: He is not in acute distress.     Appearance: He is not diaphoretic.   HENT:      Head: Normocephalic and atraumatic.      Mouth/Throat:      Pharynx: No oropharyngeal exudate.   Eyes:      General: No scleral icterus.     Extraocular Movements: EOM normal.      Conjunctiva/sclera: Conjunctivae normal.      Pupils: Pupils are equal, round, and reactive to light.   Cardiovascular:      Rate and Rhythm: Normal rate and regular rhythm.      Heart " sounds: Normal heart sounds. No murmur heard.      Pulmonary:      Effort: Pulmonary effort is normal. No respiratory distress.      Breath sounds: Normal breath sounds.   Abdominal:      General: Bowel sounds are normal. There is no distension.      Palpations: Abdomen is soft.      Tenderness: There is no abdominal tenderness.   Musculoskeletal:         General: No edema. Normal range of motion.      Cervical back: Normal range of motion and neck supple.      Comments: RUE AVG no thrill    Skin:     General: Skin is warm and dry.      Findings: No erythema.   Neurological:      Mental Status: He is alert and oriented to person, place, and time.      Cranial Nerves: No cranial nerve deficit.   Psychiatric:         Mood and Affect: Affect normal.         Cognition and Memory: Memory normal.         Judgment: Judgment normal.         Laboratory:  Recent Labs   Lab 12/20/21  0959 12/21/21  0839   WBC 15.04* 12.40   HGB 10.2* 10.2*   HCT 31.0* 31.4*   * 164   MONO 6.6  1.0 6.0  0.7     Recent Labs   Lab 12/20/21  0959 12/21/21  0839   * 135*   K 4.8 4.5   CL 99 99   CO2 20* 18*   * 127*   CREATININE 17.37* 17.6*   CALCIUM 8.6* 8.5*       Diagnostic Results:  X-Ray: Reviewed  US: Reviewed  Echo: Reviewed  ASSESSMENT/PLAN:     1. ESRD on HD TTS with Dr Decker  -- Last HD Thursday missed Saturday didn't feel well today here with clotted AVG   -- Consult Vascular Surgery  Order US AVG   -- Hopefully declot and HD after.  -- Daily Renal Function Panel  -- Avoid Hypotension.  -- Renally dose all meds    Bacteremia   -- BC  X+ for G+ cocci strep  -- Given IV  abx  In ED Zosyn and  Vanc   -- F/U blood culture     2. HTN (I10) -    3. Anemia of chronic kidney disease treated with ROSALVA (N18.9 D63.1)    EPogen  with each HD - hold for now Hb >10   Recent Labs   Lab 12/20/21  0959 12/21/21  0839   HGB 10.2* 10.2*   HCT 31.0* 31.4*   * 164       Iron   Lab Results   Component Value Date    IRON 63  12/18/2019    TIBC 263 12/18/2019    FERRITIN 6,078 (H) 03/28/2020       4. MBD (E88.9 M90.80) -  Recent Labs   Lab 12/21/21  0839   CALCIUM 8.5*     No results for input(s): MG in the last 168 hours.    Lab Results   Component Value Date    .0 (H) 03/04/2020    CALCIUM 8.5 (L) 12/21/2021    PHOS 4.7 (H) 04/11/2020     No results found for: [id number]    Lab Results   Component Value Date    CO2 18 (L) 12/21/2021       5. Nutrition/Hypoalbuminemia (E88.09) -    Recent Labs   Lab 12/20/21  0959 12/21/21  0839   ALBUMIN 3.5 2.8*     Nepro with meals TID. Renal vitamins daily      With any question please call answering service [phone]  Laura Ortiz MD    [facility]  ANA MARIA Burns MD, FACP,   KEREN Woo MD,   MD DAMION Gill MD E. V. Harmon, NP    [address]  [place], [id number]

## 2021-12-21 NOTE — PROGRESS NOTES
Pharmacokinetic Initial Assessment: IV Vancomycin    Assessment/Plan:    Initiate intravenous vancomycin with loading dose of 2000 mg once with subsequent doses when random concentrations are less than 20 mcg/mL  Desired empiric serum trough concentration is 15 to 20 mcg/mL  Draw vancomycin random level on 12/22 at 0400.  Pharmacy will continue to follow and monitor vancomycin.      Please contact pharmacy at extension [phone] with any questions regarding this assessment.     Thank you for the consult,   Betsy Gusman       Patient brief summary:  Houston Jc is a 76 y.o. male initiated on antimicrobial therapy with IV Vancomycin for treatment of suspected bacteremia    Drug Allergies:   Review of patient's allergies indicates:  No Known Allergies    Actual Body Weight:   123.8 kg    Renal Function:   Estimated Creatinine Clearance: 5.1 mL/min (A) (based on SCr of 17.37 mg/dL (H)).,     Dialysis Method (if applicable):  intermittent HD    CBC (last 72 hours):  Recent Labs   Lab Result Units 12/20/21  0959 12/21/21  0839   WBC K/uL 15.04* 12.40   Hemoglobin g/dL 10.2* 10.2*   Hematocrit % 31.0* 31.4*   Platelets K/uL 147* 164   Gran % % 88.1* 85.8*   Lymph % % 4.5* 6.8*   Mono % % 6.6 6.0   Eosinophil % % 0.0 0.7   Basophil % % 0.2 0.2   Differential Method  Automated Automated       Metabolic Panel (last 72 hours):  Recent Labs   Lab Result Units 12/20/21  0959 12/20/21  1048 12/21/21  1117   Sodium mmol/L 134*  --   --    Potassium mmol/L 4.8  --   --    Chloride mmol/L 99  --   --    CO2 mmol/L 20*  --   --    Glucose mg/dL 288*  --   --    Glucose, UA   --  Negative Negative   BUN mg/dL 117*  --   --    Creatinine mg/dL 17.37*  --   --    Albumin g/dL 3.5  --   --    Total Bilirubin mg/dL 0.6  --   --    Alkaline Phosphatase U/L 82  --   --    AST U/L 12*  --   --    ALT U/L 8*  --   --        Drug levels (last 3 results):  No results for input(s): VANCOMYCINRA, VANCOMYCINPE, VANCOMYCINTR in the last 72  hours.    Microbiologic Results:  Microbiology Results (last 7 days)       Procedure Component Value Units Date/Time    Influenza A & B by Molecular [[id number]]     Order Status: Canceled Specimen: Nasopharyngeal Swab     Urine culture [[id number]] Collected: 12/21/21 1117    Order Status: No result Specimen: Urine Updated: 12/21/21 1150    Strep A culture, throat [[id number]]     Order Status: No result Specimen: Throat     Blood Culture #2 **CANNOT BE ORDERED STAT** [[id number]] Collected: 12/21/21 1049    Order Status: Sent Specimen: Blood Updated: 12/21/21 1049    Blood Culture #2 **CANNOT BE ORDERED STAT** [[id number]] Collected: 12/21/21 0839    Order Status: Sent Specimen: Blood from Peripheral, Antecubital, Left Updated: 12/21/21 0840

## 2021-12-21 NOTE — NURSING
Arrived from ED for dialysis. Upon assessment noted right AVG to have a +thrill and very faint bruit. Cannulated arterial side of AVG without difficulty. When cannulating venous side of AVG, flashback obtained with notable clots and darkened blood. Unable to aspirate. Nephrology made aware. [place] pulled, manual pressure held until hemostasis achieved.

## 2021-12-21 NOTE — PHARMACY MED REC
"  Admission Medication History     The home medication history was taken by Mahi Brewer CPhT.    Medication history obtained from,Verified per patient and patients medication bottles    You may go to "Admission" then "Reconcile Home Medications" tabs to review and/or act upon these items.      The home medication list has been updated by the Pharmacy department.    Please read ALL comments highlighted in yellow.    Please address this information as you see fit.     Feel free to contact us if you have any questions or require assistance.          Mahi Brewer CPhT.  Ext [phone]                .          "

## 2021-12-21 NOTE — CONSULTS
Consult order acknowledged.    ELY.    Will see tomorrow and put in a formal consult note.    In the interim, continue current abx.    Jt Donohue MD  [facility], PGY-4  Cell: [phone]

## 2021-12-21 NOTE — H&P
[facility] Medicine H&P Note     Admitting Team: [facility] Hospitalist Team A  Attending Physician: Sanjuana Hahn MD  Resident: Christian  Intern: Boyd     Date of Admit: 12/21/2021    Chief Complaint     Positive blood culture    Subjective:      History of Present Illness:  Houston Jc is a 76 y.o. AA male w/ PMH ESRD (TTS), CHF, CAD, HTN, complete heart block s/p ICD, DM, DVT, and PAD. The patient presented to [facility] on 12/21/2021 with a primary complaint of bacteremia. Pt was seen at another outside facility yesterday in [facility] for workup of cough and a provider called this AM to notify pt of positive Gram + cocci in blood cultures.    The patient was in their usual state of health until 4 days ago when he started to have cough with occasional production associated with right chest pain where pt has his old HD access (switched to RUE 1 yr ago). Pt also reported fever and chills from Friday to Sunday (12/17-12/19). Pt denies headaches, left sided chest pain, SOB, N/V, abdominal pain, and dysuria.    Pt reports that he is due to have dialysis today with his last session being last Thursday 12/16.    Past Medical History:  Past Medical History:   Diagnosis Date    Anemia     CHF (congestive heart failure)     CKD (chronic kidney disease) stage 4, GFR 15-29 ml/min     Coronary artery disease     Diabetes mellitus     Hematuria     Hypertension     Renal cyst, right    ESRD on HD TTS    Past Surgical History:  Past Surgical History:   Procedure Laterality Date    INSERTION OF BIVENTRICULAR IMPLANTABLE CARDIOVERTER-DEFIBRILLATOR (ICD) Left 7/18/2019    Procedure: INSERTION, ICD, BIVENTRICULAR;  Surgeon: Arturo Bay MD;  Location: [facility] EP LAB;  Service: Cardiology;  Laterality: Left;  CHB, CRTD, SJM, anes, GP, 6078    LEFT HEART CATHETERIZATION N/A 7/16/2019    Procedure: Left heart cath;  Surgeon: Dejuan Cody MD;  Location: [facility] CATH LAB/EP;  Service: Cardiology;   Laterality: N/A;    PERITONEAL CATHETER REMOVAL Left 4/11/2020    Procedure: REMOVAL, CATHETER, DIALYSIS, PERITONEAL;  Surgeon: Edis Snell Jr., MD;  Location: [facility];  Service: General;  Laterality: Left;       Allergies:  Review of patient's allergies indicates:  No Known Allergies    Home Medications:  Prior to Admission medications    Medication Sig Start Date End Date Taking? Authorizing Provider   allopurinol (ZYLOPRIM) 300 MG tablet Take 300 mg by mouth once daily.   Yes Historical Provider   apixaban (ELIQUIS) 5 mg Tab Take 1 tablet (5 mg total) by mouth 2 (two) times daily. 10/19/21  Yes Pawel Palmer MD   benzonatate (TESSALON) 100 MG capsule Take 1 capsule (100 mg total) by mouth 3 (three) times daily as needed for Cough. 12/14/21 12/24/21 Yes Angelina Teran NP   carvedilol (COREG) 12.5 MG tablet Take 12.5 mg by mouth 2 (two) times daily.   Yes Historical Provider   cephALEXin (KEFLEX) 500 MG capsule Take 500 mg by mouth every 6 (six) hours.   Yes Historical Provider   cholecalciferol, vitamin D3, (VITAMIN D3) 50 mcg (2,000 unit) Cap Take 1 capsule by mouth once daily.   Yes Historical Provider   clopidogreL (PLAVIX) 75 mg tablet Take 75 mg by mouth once daily.   Yes Historical Provider   doxycycline (VIBRAMYCIN) 100 MG Cap Take 1 capsule (100 mg total) by mouth 2 (two) times daily. for 10 days 12/20/21 12/30/21 Yes Steven Swift MD   insulin aspart U-100 (NOVOLOG) 100 unit/mL injection Inject 4-8 Units into the skin 3 (three) times daily before meals. Per sliding scale   Yes Historical Provider   losartan (COZAAR) 25 MG tablet Take 1 tablet (25 mg total) by mouth once daily. 7/7/21 7/7/22 Yes Dejuan Cody MD   torsemide (DEMADEX) 20 MG Tab Take 1 tablet (20 mg total) by mouth 2 (two) times daily. Take additional 2 tablets if weight gain of more than 3 pounds in 1 day  Patient taking differently: Take 40 mg by mouth 2 (two) times daily. Take additional 2 tablets if weight gain of  "more than 3 pounds in 1 day 5/10/21 5/10/22 Yes Aimee Arroyo MD   VENTOLIN HFA 90 mcg/actuation inhaler Inhale 1 puff into the lungs every 4 (four) hours as needed. As NEEDED 19  Yes Historical Provider   atorvastatin (LIPITOR) 80 MG tablet Take 1 tablet (80 mg total) by mouth every evening.  Patient not taking: No sig reported 21   Dejuan Cody MD   walker Misc 5" wheel rolling walker 19   Masoud Albarado MD   insulin aspart U-100 (NOVOLOG U-100 INSULIN ASPART) 100 unit/mL injection INJECT 120 U SUBCUTANEOUS FOR 90 DAYS USE WITH INSULIN PUMP 20  Tatyana Eng MD       Family History:  Family History   Problem Relation Age of Onset    Heart attack Father        Social History:  Social History     Tobacco Use    Smoking status: Former Smoker    Smokeless tobacco: Never Used   Substance Use Topics    Alcohol use: Yes     Comment: social    Drug use: No       Review of Systems:  Pertinent items are noted in HPI. All other systems are reviewed and are negative.    Health Maintaince :   Primary Care Physician: Zak Hamilton MD    Immunizations:   TDap UTD  (per pt)  Flu UTD  (per pt)  Pna UTD (per pt)    Cancer Screening:  Colonoscopy: UTD (per pt)     Objective:   Last 24 Hour Vital Signs:  BP  Min: 131/63  Max: 143/62  Temp  Av.1 °F (36.7 °C)  Min: 97.6 °F (36.4 °C)  Max: 98.6 °F (37 °C)  Pulse  Av.3  Min: 79  Max: 99  Resp  Av.3  Min: 16  Max: 22  SpO2  Av %  Min: 97 %  Max: 100 %  Height  Av' 1" (185.4 cm)  Min: 6' 1" (185.4 cm)  Max: 6' 1" (185.4 cm)  Weight  Av.8 kg (273 lb)  Min: 123.8 kg (273 lb)  Max: 123.8 kg (273 lb)  Body mass index is 36.02 kg/m².  No intake/output data recorded.    Physical Examination:  Const: Well nourished, well developed  Eyes: PERRL, no conjunctival injection  HENT: NCAT, Neck supple  CV: RRR, no murmurs, rubs, or gallops. 2+ distal pulses  RESP: CTAB, Unlabored respiratory effort  GI: + BS, soft, " non-tender, non-distended, no masses. Umbilical hernia present  Ext: No gross deformities appreciated. No edema. RUE with fistula. Right chest with scar from old HD access appears non infected  Skin: Warm, dry. No rashes   Neuro: Alert and oriented. Sensation and motor function of extremities grossly intact.  Psych: Appropriate mood and affect.      Laboratory:  Most Recent Data:  CBC:   Lab Results   Component Value Date    WBC 12.40 12/21/2021    HGB 10.2 (L) 12/21/2021    HCT 31.4 (L) 12/21/2021     12/21/2021    MCV 95 12/21/2021    RDW 13.9 12/21/2021     WBC Differential: 85.8 % N,  6.8 % L, 6 % M, 0.7 % Eo, 0.2 % Baso,   BMP:   Lab Results   Component Value Date     (L) 12/20/2021    K 4.8 12/20/2021    CL 99 12/20/2021    CO2 20 (L) 12/20/2021     (H) 12/20/2021    CREATININE 17.37 (H) 12/20/2021     (H) 12/20/2021    CALCIUM 8.6 (L) 12/20/2021    MG 1.9 04/11/2020    PHOS 4.7 (H) 04/11/2020     LFTs:   Lab Results   Component Value Date    PROT 6.5 12/20/2021    ALBUMIN 3.5 12/20/2021    BILITOT 0.6 12/20/2021    AST 12 (L) 12/20/2021    ALKPHOS 82 12/20/2021    ALT 8 (L) 12/20/2021     Coags:   Lab Results   Component Value Date    INR 1.0 07/18/2019     FLP:   Lab Results   Component Value Date    CHOL 82 (L) 07/13/2019    HDL 27 (L) 07/13/2019    LDLCALC 38.0 (L) 07/13/2019    TRIG 85 07/13/2019    CHOLHDL 32.9 07/13/2019     DM:   Lab Results   Component Value Date    HGBA1C 7.9 (H) 03/04/2020    HGBA1C 7.8 (H) 02/10/2020    HGBA1C 8.5 (H) 12/18/2019    LDLCALC 38.0 (L) 07/13/2019    CREATININE 17.37 (H) 12/20/2021     Thyroid:   Lab Results   Component Value Date    TSH 1.523 11/08/2019     Anemia:   Lab Results   Component Value Date    IRON 63 12/18/2019    TIBC 263 12/18/2019    FERRITIN 6,078 (H) 03/28/2020     Cardiac:   Lab Results   Component Value Date    TROPONINI 0.793 (H) 12/21/2021     (H) 12/21/2021     Urinalysis:   Lab Results   Component Value Date     COLORU Yellow 12/21/2021    SPECGRAV 1.015 12/21/2021    NITRITE Negative 12/21/2021    PROTEINUR n 08/19/2019    GLUCOSEU NEGATIVE 12/18/2019    KETONESU Negative 12/21/2021    UROBILINOGEN Negative 12/21/2021    BILIRUBINUR NEGATIVE 12/18/2019    WBCUA 32 (H) 12/21/2021       Trended Lab Data:  Recent Labs   Lab 12/20/21  0959 12/21/21  0839   WBC 15.04* 12.40   HGB 10.2* 10.2*   HCT 31.0* 31.4*   * 164   MCV 97 95   RDW 13.6 13.9   *  --    K 4.8  --    CL 99  --    CO2 20*  --    *  --    CREATININE 17.37*  --    *  --    PROT 6.5  --    ALBUMIN 3.5  --    BILITOT 0.6  --    AST 12*  --    ALKPHOS 82  --    ALT 8*  --        Trended Cardiac Data:  Recent Labs   Lab 12/21/21  0839   TROPONINI 0.793*   *       Microbiology Data:  Microbiology Results (last 7 days)     Procedure Component Value Units Date/Time    Blood Culture #2 **CANNOT BE ORDERED STAT** [[id number]] Collected: 12/21/21 1049    Order Status: Sent Specimen: Blood Updated: 12/21/21 1329    Blood Culture #2 **CANNOT BE ORDERED STAT** [[id number]] Collected: 12/21/21 0839    Order Status: Sent Specimen: Blood from Peripheral, Antecubital, Left Updated: 12/21/21 1329    Influenza A & B by Molecular [[id number]]     Order Status: Canceled Specimen: Nasopharyngeal Swab     Urine culture [[id number]] Collected: 12/21/21 1117    Order Status: No result Specimen: Urine Updated: 12/21/21 1150    Strep A culture, throat [[id number]]     Order Status: No result Specimen: Throat             Other Results:  EKG (my interpretation): atrial sensed ventricular paced rhythm    Radiology:  Imaging Results          X-Ray Chest AP Portable (Final result)  Result time 12/21/21 08:32:46    Final result by Wallace Boone MD (12/21/21 08:32:46)                 Impression:      No significant detrimental interval change in the appearance of the chest since 12/20/2021 is appreciated.      Electronically signed by: Wallace Boone  MD  Date:    12/21/2021  Time:    08:32             Narrative:    EXAMINATION:  XR CHEST AP PORTABLE    COMPARISON:  Comparison is made to 12/20/2021.    FINDINGS:  Cardioverter/defibrillator is again noted.  Heart is minimally enlarged, but the appearance of the cardiomediastinal silhouette is unchanged since the examination referenced above.  Pulmonary vascularity is normal, and there are no findings indicating current cardiac decompensation.  Lung zones are stable as well, free of significant superimposed airspace consolidation or volume loss.  No pleural fluid of any substantial volume is seen on either side.  No pneumothorax.  Calcification in the wall of the aortic arch is an incidental observation.                                     Assessment:     Houston Jc is a 76 y.o. male with PMH of ESRD (TTS), CHF, CAD, HTN, complete heart block s/p ICD, DM, DVT, and PAD presents for gram + bacteremia after his blood cultures came back positive yesterday from an outside facility. Pt admitted for dialysis (last session 5 days ago, missed 1) and further work up of bacteremia.    Plan:     Gram Positive bacteremia  -pt had cough with intermittent sputum for 4 days  -Blood culture (12/20)-positive for Gram positive cocci resembling Strep  -UA with 3+ leukocytes, >100 RBC, 32 WBC  -lactic acid WNL  -CXR-no focal infiltrate  -TTE-no vegetations  -Vanc, zosyn given in ED  Started rocephin, vanc (12/21)  ID consulted  FU blood culture    ESRD on HD (TTS)  -missed last session on Saturday (12/18)  -Nephro consulted-clotted AVG, US AVG  -consulted vascular surgery-plan for declotting today  Daily CMP  Renally dose all meds    Acute on Chronic HFrEF  -TTE with EF 34%, no vegetations on surface echo  -, Trop 0.793  -continue torsemide    Cough  -continue tessalon perle    DVT (RLE popliteal vein)  Continue home eliquis    Hypertension  143/75 on presentation  Continue home carvedilol    HLD  -pt taken off lipitor by  PCP  -ordered lipid panel    Diabetes Mellitus Type II  Glucose 195 on presentation  Pending A1c  Start Levemir 5U    Normocytic Anemia  -10.2/31.4 on presentation  -monitor and trend    Code Status:     Code: Full  Diet: Renal  PPx: apixaban  Dispo: Admit to floor    Nikolai Nix MD  [facility]    [facility] Medicine Hospitalist Pager numbers:   [facility] Hospitalist Medicine Team A (Abhinav/Jovani): [phone]  [facility] Hospitalist Medicine Team B (Robert/Enedelia):  [phone]

## 2021-12-21 NOTE — OP NOTE
[place] - Surgery ([facility])  Brief Operative Note    SUMMARY     Surgery Date: 12/21/2021     Surgeon(s) and Role:     * Jeison Hunt MD - Primary    Assisting Surgeon: None    Pre-op Diagnosis:  Clotted dialysis access, initial encounter [T82.49XA]    Post-op Diagnosis:  Post-Op Diagnosis Codes:     * Clotted dialysis access, initial encounter [T82.49XA]    Procedure(s) (LRB):  [id number] (Right)    Anesthesia: Local MAC    Operative Findings:   Operative Note       Surgery Date: 12/21/2021     Surgeon(s) and Role:     * Jeison Hunt MD - Primary    Pre-op Diagnosis:  Clotted dialysis access, initial encounter [T82.49XA]    Post-op Diagnosis: Post-Op Diagnosis Codes:     * Clotted dialysis access, initial encounter [T82.49XA]    Procedure(s) (LRB):  [id number] (Right)    Anesthesia: Local MAC    Procedure in Detail/Findings:    After satisfactory IV sedation, the patient in supine   position, right  upper arm and forearm was prepped and draped in normal sterile   manner using ChloraPrep.  A stockinette was applied.  A timeout was called and   extremity was confirmed.  The area at the takeoff of the AV fistula, left upper   arm was infiltrated using 1% Xylocaine solution.  Incision was carried down to   the deep subcutaneous tissues.  Subcutaneous bleeders clamped and bovied and   further taken down.  Short segment of graft was isolated.  Proximal and distal   control was obtained.  Graft incision was made.  Davion catheter first induced   on the venous side.  Good backward flow was obtained.  The patient was   heparinized using 3000 units of heparin.  Davion catheter was then introduced   on the arterial side and excellent forward flow was obtained.  The graft   incision was closed using running 5-0 Prolene suture.  The patient had good   bruit after completion of procedure.  Hemostasis satisfactorily maintained.    Wound was irrigated with antibiotic solution and then  approximated using 3-0   Vicryl for subcutaneous tissue.  The skin was closed using running 4-0 nylon   suture.  Sterile gauze dressing was applied.  Instrument count, sponge count and   needle count was correct.  The patient tolerated it well.  Estimated blood loss   was 50 mL.  Specimen removed was thrombus.  No intraoperative complications.    Intraoperative finding is clotted left upper arm access.  The patient was sent   to the Recovery Room in stable condition.          Estimated Blood Loss:50 cc        Specimens (From admission, onward)    None        Implants: * No implants in log *           Disposition: PACU - hemodynamically stable.           Condition: Good    Attestation:  I performed the procedure.

## 2021-12-21 NOTE — PROGRESS NOTES
12/21/21 1446   Admission   Initial VN Admission Questions Complete   Communication Issues? Patient Vision  (reading glasses)   Shift   Virtual Nurse - Rounding Complete   Virtual Nurse - Patient Verbalized Approval Of Camera Use;VN Rounding   Type of Frequent Check   Type Other (see comments);Patient Rounds  (new admission)   Safety/Activity   Patient Rounds bed in low position;bed wheels locked;call light in patient/parent reach;visualized patient;placement of personal items at bedside;clutter free environment maintained   Safety Promotion/Fall Prevention assistive device/personal item within reach;Fall Risk reviewed with patient/family;side rails raised x 2;instructed to call staff for mobility   Activity Management Rolling - L1   Positioning   Body Position supine   Head of Bed (HOB) Positioning HOB at 30 degrees   VN cued into patient's room for introduction with patient's permission.  VN role explained and informed patient that VN would be working with bedside nurse and rest of care team.  Fall risk and bed alarm protocol education provided.  Instructed patient to call for assistance.  Patient aware and agreeable.  Patient's chart, labs and vital signs reviewed.  Allowed time for questions.  No acute distress noted.  Will continue to be available as needed.

## 2021-12-21 NOTE — ED NOTES
Pt is a/o x 4. Arrives with family at bedside. Pt was seen at another ED yesterday where he had labs and cultures drawn. He was d/c'ed home with prescriptions of abx. Pt received a phone all from physician around 4 am today telling him to return to this ED for positive blood culture results and further tx with IV abx. Pt reports intermittent fever and dry cough since this past Friday. Cough causes right reproducible cp. Pt reports diarrhea this am. Denies sob, n/v. Pt normally gets around in a w/c at home. He is a [facility] dialysis pt who is due for dialysis today. He missed his last dialysis tx d/t feeling bad. Dialysis access in upper right arm, positive thrill and bruit.

## 2021-12-21 NOTE — ED NOTES
Lab contacted for second set of cultures. Spoke with Vanesa. First set drawn by Francisco GARDUNO RN

## 2021-12-22 PROBLEM — N18.6 TYPE 2 DIABETES MELLITUS WITH CHRONIC KIDNEY DISEASE ON CHRONIC DIALYSIS, WITH LONG-TERM CURRENT USE OF INSULIN: Status: ACTIVE | Noted: 2021-01-01

## 2021-12-22 PROBLEM — Z79.4 TYPE 2 DIABETES MELLITUS WITH CHRONIC KIDNEY DISEASE ON CHRONIC DIALYSIS, WITH LONG-TERM CURRENT USE OF INSULIN: Status: ACTIVE | Noted: 2021-01-01

## 2021-12-22 PROBLEM — E11.22 TYPE 2 DIABETES MELLITUS WITH CHRONIC KIDNEY DISEASE ON CHRONIC DIALYSIS, WITH LONG-TERM CURRENT USE OF INSULIN: Status: ACTIVE | Noted: 2021-01-01

## 2021-12-22 PROBLEM — Z99.2 TYPE 2 DIABETES MELLITUS WITH CHRONIC KIDNEY DISEASE ON CHRONIC DIALYSIS, WITH LONG-TERM CURRENT USE OF INSULIN: Status: ACTIVE | Noted: 2021-01-01

## 2021-12-22 PROBLEM — B95.2 BACTEREMIA DUE TO ENTEROCOCCUS: Status: ACTIVE | Noted: 2021-01-01

## 2021-12-22 NOTE — PROGRESS NOTES
Pharmacokinetic Assessment Follow Up: IV Vancomycin    Vancomycin serum concentration assessment(s):    The random level was drawn correctly and can be used to guide therapy at this time. The measurement is within the desired definitive target range of 15 to 20 mcg/mL.    Vancomycin Regimen Plan:    Re-dose when the random level is less than 20 mcg/mL, next level to be drawn at 04:00 on 12-23    Drug levels (last 3 results):  Recent Labs   Lab Result Units 12/22/21  0335   Vancomycin, Random ug/mL 20.4       Pharmacy will continue to follow and monitor vancomycin.    Please contact pharmacy at extension [phone] for questions regarding this assessment.    Thank you for the consult,   Siva Vasquez       Patient brief summary:  Hosuton Jc is a 76 y.o. male initiated on antimicrobial therapy with IV Vancomycin for treatment of bacteremia      Drug Allergies:   Review of patient's allergies indicates:  No Known Allergies    Actual Body Weight:   131.9 kg    Renal Function:   Estimated Creatinine Clearance: 5.2 mL/min (A) (based on SCr of 17.6 mg/dL (H)).,     Dialysis Method (if applicable):  intermittent HD    CBC (last 72 hours):  Recent Labs   Lab Result Units 12/20/21  0959 12/21/21  0839   WBC K/uL 15.04* 12.40   Hemoglobin g/dL 10.2* 10.2*   Hematocrit % 31.0* 31.4*   Platelets K/uL 147* 164   Gran % % 88.1* 85.8*   Lymph % % 4.5* 6.8*   Mono % % 6.6 6.0   Eosinophil % % 0.0 0.7   Basophil % % 0.2 0.2   Differential Method  Automated Automated       Metabolic Panel (last 72 hours):  Recent Labs   Lab Result Units 12/20/21  0959 12/20/21  1048 12/21/21  0839 12/21/21  1117   Sodium mmol/L 134*  --  135*  --    Potassium mmol/L 4.8  --  4.5  --    Chloride mmol/L 99  --  99  --    CO2 mmol/L 20*  --  18*  --    Glucose mg/dL 288*  --  195*  --    Glucose, UA   --  Negative  --  Negative   BUN mg/dL 117*  --  127*  --    Creatinine mg/dL 17.37*  --  17.6*  --    Albumin g/dL 3.5  --  2.8*  --    Total Bilirubin  mg/dL 0.6  --  0.5  --    Alkaline Phosphatase U/L 82  --  90  --    AST U/L 12*  --  8*  --    ALT U/L 8*  --  <5*  --        Vancomycin Administrations:  vancomycin given in the last 96 hours                     vancomycin injection (g) 1 g Given 12/21/21 1639    vancomycin (VANCOCIN) 2,000 mg in dextrose 5 % 500 mL IVPB (mg) 2,000 mg New Bag 12/21/21 1024    vancomycin in dextrose 5 % 1 gram/250 mL IVPB 1,000 mg (mg) 1,000 mg New Bag 12/20/21 1138                    Microbiologic Results:  Microbiology Results (last 7 days)       Procedure Component Value Units Date/Time    Blood Culture #2 **CANNOT BE ORDERED STAT** [[id number]] Collected: 12/21/21 1049    Order Status: Completed Specimen: Blood Updated: 12/21/21 2145     Blood Culture, Routine No Growth to date    Blood Culture #2 **CANNOT BE ORDERED STAT** [[id number]] Collected: 12/21/21 0839    Order Status: Completed Specimen: Blood from Peripheral, Antecubital, Left Updated: 12/21/21 2145     Blood Culture, Routine No Growth to date    Influenza A & B by Molecular [[id number]]     Order Status: Canceled Specimen: Nasopharyngeal Swab     Urine culture [[id number]] Collected: 12/21/21 1117    Order Status: No result Specimen: Urine Updated: 12/21/21 1150    Strep A culture, throat [[id number]]     Order Status: No result Specimen: Throat

## 2021-12-22 NOTE — PLAN OF CARE
SW visited pt's room to complete assessment. Pt was not in room.      SW will return to complete assessment.

## 2021-12-22 NOTE — PLAN OF CARE
Recommendation:  1. Add ADA restrictions to diet.   2. Add Novaosurce Renal bid.   3. Encourage intake at meals as tolerated.   4. RD to follow to monitor po intake    Goals:   Pt will consume at least 75% intake at meals by RD follow up  Nutrition Goal Status: new

## 2021-12-22 NOTE — PROGRESS NOTES
Nephrology Progress Note       Consult Requested By: Sanjuana Hahn MD  Reason for Consult: ESRD on HD     SUBJECTIVE:        ?    Review of Systems   Constitutional: Negative for chills and fever.   HENT: Negative for congestion and sore throat.    Eyes: Negative for blurred vision, double vision and photophobia.   Respiratory: Positive for cough and shortness of breath.    Cardiovascular: Negative for chest pain, palpitations and leg swelling.   Gastrointestinal: Negative for abdominal pain, diarrhea, nausea and vomiting.   Genitourinary: Negative for dysuria and urgency.   Musculoskeletal: Negative for joint pain and myalgias.   Skin: Negative for itching and rash.   Neurological: Negative for dizziness, sensory change, weakness and headaches.   Endo/Heme/Allergies: Negative for polydipsia. Does not bruise/bleed easily.   Psychiatric/Behavioral: Negative for depression.       Past Medical History:   Diagnosis Date    Anemia     CHF (congestive heart failure)     CKD (chronic kidney disease) stage 4, GFR 15-29 ml/min     Coronary artery disease     Diabetes mellitus     Hematuria     Hypertension     Renal cyst, right      Past Surgical History:   Procedure Laterality Date    DECLOTTING OF ARTERIOVENOUS GRAFT Right 12/21/2021    Procedure: [id number];  Surgeon: Jeison Hunt MD;  Location: [facility] OR;  Service: Vascular;  Laterality: Right;    INSERTION OF BIVENTRICULAR IMPLANTABLE CARDIOVERTER-DEFIBRILLATOR (ICD) Left 7/18/2019    Procedure: INSERTION, ICD, BIVENTRICULAR;  Surgeon: Arturo Bay MD;  Location: [facility] EP LAB;  Service: Cardiology;  Laterality: Left;  CHB, CRTD, SJM, anes, GP, 6078    LEFT HEART CATHETERIZATION N/A 7/16/2019    Procedure: Left heart cath;  Surgeon: Dejuan Cody MD;  Location: [facility] CATH LAB/EP;  Service: Cardiology;  Laterality: N/A;    PERITONEAL CATHETER REMOVAL Left 4/11/2020    Procedure: REMOVAL, CATHETER, DIALYSIS, PERITONEAL;  Surgeon: Edis ORANTES  Alis Saini MD;  Location: [facility] OR;  Service: General;  Laterality: Left;     Family History   Problem Relation Age of Onset    Heart attack Father      Social History     Tobacco Use    Smoking status: Former Smoker    Smokeless tobacco: Never Used   Substance Use Topics    Alcohol use: Yes     Comment: social    Drug use: No       Review of patient's allergies indicates:  No Known Allergies         OBJECTIVE:     Vital Signs (Most Recent)  Vitals:    12/22/21 0434 12/22/21 0510 12/22/21 0737 12/22/21 0826   BP: (!) 87/42 (!) 98/52 (!) 84/46    BP Location:  Left arm     Patient Position: Lying Lying     Pulse: 63  (!) 56 72   Resp: 19  18    Temp: 97.9 °F (36.6 °C)  98.3 °F (36.8 °C)    TempSrc: Oral      SpO2: (!) 92%  95%    Weight:       Height:                     Medications:   sodium chloride 0.9%   Intravenous Once    apixaban  5 mg Oral BID    cefTRIAXone (ROCEPHIN) IVPB  2 g Intravenous Q24H    clopidogreL  75 mg Oral Daily    insulin detemir U-100  5 Units Subcutaneous QHS    mupirocin   Nasal BID    vancomycin (VANCOCIN) IVPB  500 mg Intravenous Once           Physical Exam  Vitals and nursing note reviewed.   Constitutional:       General: He is not in acute distress.     Appearance: He is not diaphoretic.   HENT:      Head: Normocephalic and atraumatic.      Mouth/Throat:      Pharynx: No oropharyngeal exudate.   Eyes:      General: No scleral icterus.     Conjunctiva/sclera: Conjunctivae normal.      Pupils: Pupils are equal, round, and reactive to light.   Cardiovascular:      Rate and Rhythm: Normal rate and regular rhythm.      Heart sounds: Normal heart sounds. No murmur heard.      Pulmonary:      Effort: Pulmonary effort is normal. No respiratory distress.      Breath sounds: Normal breath sounds.   Abdominal:      General: Bowel sounds are normal. There is no distension.      Palpations: Abdomen is soft.      Tenderness: There is no abdominal tenderness.   Musculoskeletal:          General: Normal range of motion.      Cervical back: Normal range of motion and neck supple.      Comments: RUE AVG   thrill    Skin:     General: Skin is warm and dry.      Findings: No erythema.   Neurological:      Mental Status: He is alert and oriented to person, place, and time.      Cranial Nerves: No cranial nerve deficit.   Psychiatric:         Mood and Affect: Affect normal.         Cognition and Memory: Memory normal.         Judgment: Judgment normal.         Laboratory:  Recent Labs   Lab 12/20/21  0959 12/21/21  0839   WBC 15.04* 12.40   HGB 10.2* 10.2*   HCT 31.0* 31.4*   * 164   MONO 6.6  1.0 6.0  0.7     Recent Labs   Lab 12/20/21  0959 12/21/21  0839   * 135*   K 4.8 4.5   CL 99 99   CO2 20* 18*   * 127*   CREATININE 17.37* 17.6*   CALCIUM 8.6* 8.5*       Diagnostic Results:  X-Ray: Reviewed  US: Reviewed  Echo: Reviewed  ASSESSMENT/PLAN:     1. ESRD on HD TTS with Dr Decker  -- Last HD Thursday missed Saturday didn't feel well  -- here with clotted AVGs/p declot  HD today .  -- Daily Renal Function Panel  -- Avoid Hypotension.  -- Renally dose all meds    Bacteremia   -- BC  X+ for G+ cocci strep  -- Given IV  abx  In ED Zosyn and  Vanc   -- F/U blood culture     2. HTN (I10) -    3. Anemia of chronic kidney disease treated with ROSALVA (N18.9 D63.1)    EPogen  with each HD - hold for now Hb >10   Recent Labs   Lab 12/20/21  0959 12/21/21  0839   HGB 10.2* 10.2*   HCT 31.0* 31.4*   * 164       Iron   Lab Results   Component Value Date    IRON 63 12/18/2019    TIBC 263 12/18/2019    FERRITIN 6,078 (H) 03/28/2020       4. MBD (E88.9 M90.80) -  Recent Labs   Lab 12/21/21  0839   CALCIUM 8.5*     No results for input(s): MG in the last 168 hours.    Lab Results   Component Value Date    .0 (H) 03/04/2020    CALCIUM 8.5 (L) 12/21/2021    PHOS 4.7 (H) 04/11/2020     No results found for: [id number]    Lab Results   Component Value Date    CO2 18 (L) 12/21/2021        5. Nutrition/Hypoalbuminemia (E88.09) -    Recent Labs   Lab 12/20/21  0959 12/21/21  0839   ALBUMIN 3.5 2.8*     Nepro with meals TID. Renal vitamins daily      With any question please call answering service [phone]  Laura Ortiz MD    [facility]  ANA MARIA Burns MD, FACP,   KEREN Woo MD,   MD DAMION Gill MD E. V. Harmon, NP    [address]  [place], [address]

## 2021-12-22 NOTE — PROGRESS NOTES
"[facility] Hospital Medicine Progress Note    Primary Team: [facility] Hospitalist Team A  Attending Physician: Sanjuana Hahn MD  Resident: Christian  Intern: Boyd    Subjective:      NAEON. Pt reports feeling fine but has no appetite for food. Denies F/C, HA, CP, SOB, N/V, and AP.      Objective:     Last 24 Hour Vital Signs:  BP  Min: 80/48  Max: 133/60  Temp  Av.7 °F (36.5 °C)  Min: 97.3 °F (36.3 °C)  Max: 98.3 °F (36.8 °C)  Pulse  Av.1  Min: 56  Max: 86  Resp  Av  Min: 16  Max: 25  SpO2  Av %  Min: 92 %  Max: 100 %  Height  Av' 1" (185.4 cm)  Min: 6' 1" (185.4 cm)  Max: 6' 1" (185.4 cm)  Weight  Av.9 kg (279 lb 12.1 oz)  Min: 124.3 kg (274 lb)  Max: 131.9 kg (290 lb 12.6 oz)  I/O last 3 completed shifts:  In: 375 [P.O.:75; IV Piggyback:300]  Out: 50 [Urine:50]    Physical Examination:  Const: Well nourished, well developed  Eyes: PERRL, no conjunctival injection  HENT: NCAT, Neck supple  CV: RRR, no murmurs, rubs, or gallops. 2+ distal pulses  RESP: CTAB, Unlabored respiratory effort  GI: + BS, soft, non-tender, non-distended, no masses. Umbilical hernia present  Ext: No gross deformities appreciated. No edema. RUE graft (wrapped today after declotting). Right chest with scar from old HD access appears non infected  Skin: Warm, dry. No rashes   Neuro: Alert and oriented. Sensation and motor function of extremities grossly intact.  Psych: Appropriate mood and affect.    Laboratory:  Laboratory Data Reviewed: yes  Pertinent Findings:  Recent Labs   Lab 21  0959 21  0839   WBC 15.04* 12.40   HGB 10.2* 10.2*   HCT 31.0* 31.4*   * 164   * 135*   K 4.8 4.5   CL 99 99   CREATININE 17.37* 17.6*   * 127*   CO2 20* 18*   ALT 8* <5*   AST 12* 8*         Microbiology Data Reviewed: yes  Pertinent Findings:  Microbiology Results (last 7 days)     Procedure Component Value Units Date/Time    Blood culture [[id number]] Collected: 21 0904    Order Status: Sent Specimen: " Blood from Antecubital, Left Arm Updated: 12/22/21 0904    Blood Culture #2 **CANNOT BE ORDERED STAT** [[id number]] Collected: 12/21/21 1049    Order Status: Completed Specimen: Blood Updated: 12/22/21 0600     Blood Culture, Routine Gram stain aer bottle:  Gram positive cocci in chains resembling Strep      Results called to and read back by: SUSAN BELLAMY RN  12/22/2021        05:58    Blood Culture #2 **CANNOT BE ORDERED STAT** [[id number]] Collected: 12/21/21 0839    Order Status: Completed Specimen: Blood from Peripheral, Antecubital, Left Updated: 12/21/21 2145     Blood Culture, Routine No Growth to date    Influenza A & B by Molecular [[id number]]     Order Status: Canceled Specimen: Nasopharyngeal Swab     Urine culture [[id number]] Collected: 12/21/21 1117    Order Status: No result Specimen: Urine Updated: 12/21/21 1150    Strep A culture, throat [[id number]]     Order Status: No result Specimen: Throat         Outside blood cx 12/20: enterococcus    Other Results:  EKG (my interpretation): atrial sensed ventricular paced rhythm    Radiology Data Reviewed: yes  Pertinent Findings:  X-Ray Chest AP Portable   Final Result      No significant detrimental interval change in the appearance of the chest since 12/20/2021 is appreciated.         Electronically signed by: Wallace Boone MD   Date:    12/21/2021   Time:    08:32       Hemodialysis Access    (Results Pending)         Current Medications:     Infusions:       Scheduled:   apixaban  5 mg Oral BID    carvediloL  12.5 mg Oral BID    cefTRIAXone (ROCEPHIN) IVPB  2 g Intravenous Q24H    clopidogreL  75 mg Oral Daily    insulin detemir U-100  5 Units Subcutaneous QHS    torsemide  40 mg Oral Daily        PRN:  dextrose 50%, dextrose 50%, glucagon (human recombinant), glucose, glucose, sodium chloride 0.9%, Pharmacy to dose Vancomycin consult **AND** vancomycin - pharmacy to dose    Antibiotics and Day Number of Therapy:  Rocephin 12/21-  Vanc  12/21-    Lines and Day Number of Therapy:  PIV 12/21  HD AV graft right forearm    Assessment:     [place] Jc is a 76 y.o. male with PMH of ESRD (TTS), CHF, CAD, HTN, complete heart block s/p ICD, DM, DVT, and PAD presents for bacteremia after his blood cultures came back positive yesterday from an outside facility. Pt admitted for dialysis (last session 5 days ago, missed 1 session) and further work up of bacteremia.     Plan:     Gram Positive bacteremia  -pt had cough with intermittent sputum for 4 days  -Blood culture (12/20)-positive for Enterococcus, pending sensitivities  -UA with 3+ leukocytes, >100 RBC, 32 WBC on admission  -lactic acid WNL  -CXR-no focal infiltrate  -TTE-no vegetations  -Vanc, zosyn given in ED  Started rocephin, vanc (12/21)  Blood cx 12/21-positive for gram + cocci resembling Strep  ID consulted     ESRD on HD (TTS)  -missed last session on Saturday (12/18)  -Nephro consulted-clotted AVG, US AVG  -surgery declotted AVG 12/21  HD today  Daily CMP  Renally dose all meds     Acute on Chronic HFrEF  -TTE with EF 34%, no vegetations on surface echo  -, Trop 0.793  -trop downtrending  -continue torsemide, hold today due to low BP     Cough  -continue tessalon perle     DVT (RLE popliteal vein)  Continue home eliquis     Hypertension  143/75 on presentation  Continue home carvedilol, hold today due to low BP     HLD  -pt taken off lipitor by PCP  -ordered lipid panel     Diabetes Mellitus Type II  Glucose 195 on presentation  Pending A1c  Start Levemir 5U     Normocytic Anemia  -10.2/31.4 on presentation  -monitor and trend      Code: Full  Diet: Renal  PPx: apixaban  Dispo: Admit to floor for workup of bacteremia    Nikolai Nix MD  [facility]I    [facility] Medicine Hospitalist Pager numbers:   [facility] Hospitalist Medicine Team A (Abhinav/Jovani): [phone]  [facility] Hospitalist Medicine Team B (Robert/Enedelia):  [phone]

## 2021-12-22 NOTE — PLAN OF CARE
Patient has met PACU discharge criteria, VSS, pain well controlled. Family updated by phone. Released from PACU by Dr. Cui

## 2021-12-22 NOTE — NURSING
Patient returned from ultrasound with dressing removed this nurse replaced dressing now clean dry intact to right upper arm

## 2021-12-22 NOTE — PLAN OF CARE
Patient post op declotting of fistula RUE. Dressing intact, no drainage. Medications given per mar. Telemetry in progress. Blood glucose checked and insulin given as scheduled. Patient denies any pain overnight. Safety maintained, call light in reach, bed alarm in use. Family remains at bedside.

## 2021-12-22 NOTE — PROGRESS NOTES
Pharmacokinetic Assessment Follow Up: IV Vancomycin    Vancomycin serum concentration assessment(s):    The random level was drawn correctly and can be used to guide therapy at this time. The measurement is above the desired definitive target range of 15 to 20 mcg/mL.    Vancomycin Regimen Plan:    Will give a one time dose of vancomycin 500 mg post HD today and recheck vancomycin random with AM labs on 12/23. Will follow up daily HD plan.    Drug levels (last 3 results):  Recent Labs   Lab Result Units 12/22/21  0335   Vancomycin, Random ug/mL 20.4       Pharmacy will continue to follow and monitor vancomycin.    Please contact pharmacy at extension [phone] for questions regarding this assessment.    Thank you for the consult,   Betsy Gusman       Patient brief summary:  Houston Jc is a 76 y.o. male initiated on antimicrobial therapy with IV Vancomycin for treatment of lower respiratory infection    The patient's current regimen is pulse dose    Drug Allergies:   Review of patient's allergies indicates:  No Known Allergies    Actual Body Weight:   131.9 kg    Renal Function:   Estimated Creatinine Clearance: 5.2 mL/min (A) (based on SCr of 17.6 mg/dL (H)).,     Dialysis Method (if applicable):  intermittent HD    CBC (last 72 hours):  Recent Labs   Lab Result Units 12/20/21  0959 12/21/21  0839   WBC K/uL 15.04* 12.40   Hemoglobin g/dL 10.2* 10.2*   Hematocrit % 31.0* 31.4*   Platelets K/uL 147* 164   Gran % % 88.1* 85.8*   Lymph % % 4.5* 6.8*   Mono % % 6.6 6.0   Eosinophil % % 0.0 0.7   Basophil % % 0.2 0.2   Differential Method  Automated Automated       Metabolic Panel (last 72 hours):  Recent Labs   Lab Result Units 12/20/21  0959 12/20/21  1048 12/21/21  0839 12/21/21  1117   Sodium mmol/L 134*  --  135*  --    Potassium mmol/L 4.8  --  4.5  --    Chloride mmol/L 99  --  99  --    CO2 mmol/L 20*  --  18*  --    Glucose mg/dL 288*  --  195*  --    Glucose, UA   --  Negative  --  Negative   BUN mg/dL 117*   --  127*  --    Creatinine mg/dL 17.37*  --  17.6*  --    Albumin g/dL 3.5  --  2.8*  --    Total Bilirubin mg/dL 0.6  --  0.5  --    Alkaline Phosphatase U/L 82  --  90  --    AST U/L 12*  --  8*  --    ALT U/L 8*  --  <5*  --        Vancomycin Administrations:  vancomycin given in the last 96 hours                     vancomycin injection (g) 1 g Given 12/21/21 1639    vancomycin (VANCOCIN) 2,000 mg in dextrose 5 % 500 mL IVPB (mg) 2,000 mg New Bag 12/21/21 1024    vancomycin in dextrose 5 % 1 gram/250 mL IVPB 1,000 mg (mg) 1,000 mg New Bag 12/20/21 1138                    Microbiologic Results:  Microbiology Results (last 7 days)       Procedure Component Value Units Date/Time    Blood culture [[id number]] Collected: 12/22/21 0904    Order Status: Sent Specimen: Blood from Antecubital, Left Arm Updated: 12/22/21 0904    Blood Culture #2 **CANNOT BE ORDERED STAT** [[id number]] Collected: 12/21/21 1049    Order Status: Completed Specimen: Blood Updated: 12/22/21 0600     Blood Culture, Routine Gram stain aer bottle:  Gram positive cocci in chains resembling Strep      Results called to and read back by: SUSAN BELLAMY RN  12/22/2021        05:58    Blood Culture #2 **CANNOT BE ORDERED STAT** [[id number]] Collected: 12/21/21 0839    Order Status: Completed Specimen: Blood from Peripheral, Antecubital, Left Updated: 12/21/21 2145     Blood Culture, Routine No Growth to date    Influenza A & B by Molecular [[id number]]     Order Status: Canceled Specimen: Nasopharyngeal Swab     Urine culture [[id number]] Collected: 12/21/21 1117    Order Status: No result Specimen: Urine Updated: 12/21/21 1150    Strep A culture, throat [[id number]]     Order Status: No result Specimen: Throat

## 2021-12-22 NOTE — CONSULTS
[facility] Infectious Diseases Consult Note    Primary Attending Physician: Sanjuana Hahn MD  Consultant Attending: Jose Shannon MD  Consultant Fellow: Jt Donohue MD    Assessment/Plan:     Houston Jc is a 76 y.o. male PMH ESRD (TTS), CHF, CAD, HTN, complete heart block s/p ICD, DM, DVT, and PAD.     12/21/2021 admit for cough and not feeling well, right chest discomfort where pt has his old HD access.    Enterococcus bacteremia  -Continue vancomycin 15-20 goal trough  -12/20 BCx E. Faecalis ID DIALLO pending  -12/21 BCx still positive for bacteria  -12/22 BCx pending  -Recommend obtaining a soft tissue ultrasound of R-upper chest area where the patient endorses discomfort    UTI  -Continue CTX    Thank you for allowing us to participate in the care of this patient. Please contact me if you have any questions regarding this consult.    Jt Donohue MD  \[facility]\"" Infectious Diseases, PGY-4  Cell: [phone]    Reason for Consult:     Enterococcus bacteremia and UTI    Subjective:      History of Present Illness:  Houston Jc is a 76 y.o. male PMH ESRD (TTS), CHF, CAD, HTN, complete heart block s/p ICD, DM, DVT, and PAD. The patient presented to [facility] on 12/21/2021 with a primary complaint of bacteremia. Pt was seen at another outside facility yesterday in [facility] for workup of cough and a provider called this AM to notify pt of positive Gram + cocci in blood cultures.     The patient was in their usual state of health until 4 days ago when he started to have cough with occasional production associated with right chest pain where pt has his old HD access (switched to RUE 1 yr ago). Pt also reported fever and chills from Friday to Sunday (12/17-12/19). Pt denies headaches, left sided chest pain, SOB, N/V, abdominal pain, and dysuria.    Past Medical History:  Past Medical History:   Diagnosis Date    Anemia     CHF (congestive heart failure)     CKD (chronic kidney disease) stage 4,  GFR 15-29 ml/min     Coronary artery disease     Diabetes mellitus     Hematuria     Hypertension     Renal cyst, right        Past Surgical History:  Past Surgical History:   Procedure Laterality Date    DECLOTTING OF ARTERIOVENOUS GRAFT Right 12/21/2021    Procedure: [id number];  Surgeon: Jeison Hunt MD;  Location: [facility] OR;  Service: Vascular;  Laterality: Right;    INSERTION OF BIVENTRICULAR IMPLANTABLE CARDIOVERTER-DEFIBRILLATOR (ICD) Left 7/18/2019    Procedure: INSERTION, ICD, BIVENTRICULAR;  Surgeon: Arturo Bay MD;  Location: [facility] EP LAB;  Service: Cardiology;  Laterality: Left;  CHB, CRTD, SJM, anes, GP, 6078    LEFT HEART CATHETERIZATION N/A 7/16/2019    Procedure: Left heart cath;  Surgeon: Dejuan Cody MD;  Location: [facility] CATH LAB/EP;  Service: Cardiology;  Laterality: N/A;    PERITONEAL CATHETER REMOVAL Left 4/11/2020    Procedure: REMOVAL, CATHETER, DIALYSIS, PERITONEAL;  Surgeon: Edis Snell Jr., MD;  Location: [facility] OR;  Service: General;  Laterality: Left;       Allergies:  Review of patient's allergies indicates:  No Known Allergies    Medications:   In-Hospital Scheduled Medications:   sodium chloride 0.9%   Intravenous Once    apixaban  5 mg Oral BID    cefTRIAXone (ROCEPHIN) IVPB  2 g Intravenous Q24H    clopidogreL  75 mg Oral Daily    insulin detemir U-100  5 Units Subcutaneous QHS    mupirocin   Nasal BID    vancomycin (VANCOCIN) IVPB  500 mg Intravenous Once      In-Hospital PRN Medications:  sodium chloride 0.9%, acetaminophen, dextrose 50%, dextrose 50%, glucagon (human recombinant), glucose, glucose, sodium chloride 0.9%, sodium chloride 0.9%, Pharmacy to dose Vancomycin consult **AND** vancomycin - pharmacy to dose   In-Hospital IV Infusion Medications:     Home Medications:  Prior to Admission medications    Medication Sig Start Date End Date Taking? Authorizing Provider   allopurinol (ZYLOPRIM) 300 MG tablet Take 300 mg by mouth once  "daily.   Yes Historical Provider   apixaban (ELIQUIS) 5 mg Tab Take 1 tablet (5 mg total) by mouth 2 (two) times daily. 10/19/21  Yes Pawel Palmer MD   benzonatate (TESSALON) 100 MG capsule Take 1 capsule (100 mg total) by mouth 3 (three) times daily as needed for Cough. 12/14/21 12/24/21 Yes Angelina Teran NP   carvedilol (COREG) 12.5 MG tablet Take 12.5 mg by mouth 2 (two) times daily.   Yes Historical Provider   cephALEXin (KEFLEX) 500 MG capsule Take 500 mg by mouth every 6 (six) hours.   Yes Historical Provider   cholecalciferol, vitamin D3, (VITAMIN D3) 50 mcg (2,000 unit) Cap Take 1 capsule by mouth once daily.   Yes Historical Provider   clopidogreL (PLAVIX) 75 mg tablet Take 75 mg by mouth once daily.   Yes Historical Provider   doxycycline (VIBRAMYCIN) 100 MG Cap Take 1 capsule (100 mg total) by mouth 2 (two) times daily. for 10 days 12/20/21 12/30/21 Yes Steven Swift MD   insulin aspart U-100 (NOVOLOG) 100 unit/mL injection Inject 4-8 Units into the skin 3 (three) times daily before meals. Per sliding scale   Yes Historical Provider   losartan (COZAAR) 25 MG tablet Take 1 tablet (25 mg total) by mouth once daily. 7/7/21 7/7/22 Yes Dejuan Cody MD   torsemide (DEMADEX) 20 MG Tab Take 1 tablet (20 mg total) by mouth 2 (two) times daily. Take additional 2 tablets if weight gain of more than 3 pounds in 1 day  Patient taking differently: Take 40 mg by mouth 2 (two) times daily. Take additional 2 tablets if weight gain of more than 3 pounds in 1 day 5/10/21 5/10/22 Yes Aimee Arroyo MD   VENTOLIN HFA 90 mcg/actuation inhaler Inhale 1 puff into the lungs every 4 (four) hours as needed. As NEEDED 6/25/19  Yes Historical Provider   atorvastatin (LIPITOR) 80 MG tablet Take 1 tablet (80 mg total) by mouth every evening.  Patient not taking: No sig reported 7/7/21   Dejuan Cody MD   walker [facility] 5" wheel rolling walker 8/7/19   Masoud Albarado MD       Family History:  Family History " "  Problem Relation Age of Onset    Heart attack Father        Social History:  Social History     Tobacco Use    Smoking status: Former Smoker    Smokeless tobacco: Never Used   Substance Use Topics    Alcohol use: Yes     Comment: social    Drug use: No     ROS  Some discomfort at R-upper chest area, feeling better, no fevers, no chills, no NS, no AP, no changes in BM, no headache, no skin changes, no dizziness, no changes in vision     Objective:   Last 24 Hour Vital Signs:  BP  Min: 80/48  Max: 133/60  Temp  Av.7 °F (36.5 °C)  Min: 97.3 °F (36.3 °C)  Max: 98.3 °F (36.8 °C)  Pulse  Av.1  Min: 56  Max: 81  Resp  Av  Min: 17  Max: 25  SpO2  Av.9 %  Min: 92 %  Max: 100 %  Height  Av' 1" (185.4 cm)  Min: 6' 1" (185.4 cm)  Max: 6' 1" (185.4 cm)  Weight  Av.1 kg (282 lb 6.3 oz)  Min: 124.3 kg (274 lb)  Max: 131.9 kg (290 lb 12.6 oz)  I/O last 3 completed shifts:  In: 375 [P.O.:75; IV Piggyback:300]  Out: 50 [Urine:50]    Physical Exam  Const: Well nourished, well developed  Eyes: PERRL, no conjunctival injection  HENT: NCAT, Neck supple  CV: RRR, no murmurs, rubs, or gallops. 2+ distal pulses  RESP: CTAB, Unlabored respiratory effort  GI: + BS, soft, non-tender, non-distended, no masses. Umbilical hernia present  Ext: No gross deformities appreciated. No edema. RUE graft (wrapped today after declotting). Right chest with scar from old HD access appears non infected  Skin: Warm, dry. No rashes   Neuro: Alert and oriented. Sensation and motor function of extremities grossly intact.  Psych: Appropriate mood and affect.    Laboratory Results:  Most Recent Data:  CBC:   Lab Results   Component Value Date    WBC 12.40 2021    HGB 10.2 (L) 2021    HCT 31.4 (L) 2021     2021    MCV 95 2021    RDW 13.9 2021     BMP:   Lab Results   Component Value Date     (L) 2021    K 5.4 (H) 2021     2021    CO2 14 (L) 2021    BUN " 130 (H) 12/22/2021     (H) 12/22/2021    CALCIUM 8.6 (L) 12/22/2021    MG 1.9 04/11/2020    PHOS 8.5 (H) 12/22/2021     LFTs:   Lab Results   Component Value Date    PROT 6.1 12/21/2021    ALBUMIN 2.3 (L) 12/22/2021    BILITOT 0.5 12/21/2021    AST 8 (L) 12/21/2021    ALKPHOS 90 12/21/2021    ALT <5 (L) 12/21/2021     Coags:   Lab Results   Component Value Date    INR 1.0 07/18/2019     FLP:   Lab Results   Component Value Date    CHOL 82 (L) 07/13/2019    HDL 27 (L) 07/13/2019    LDLCALC 38.0 (L) 07/13/2019    TRIG 85 07/13/2019    CHOLHDL 32.9 07/13/2019     DM:   Lab Results   Component Value Date    HGBA1C 7.9 (H) 03/04/2020    HGBA1C 7.8 (H) 02/10/2020    HGBA1C 8.5 (H) 12/18/2019    LDLCALC 38.0 (L) 07/13/2019    CREATININE 18.2 (H) 12/22/2021     Thyroid:   Lab Results   Component Value Date    TSH 1.523 11/08/2019     Anemia:   Lab Results   Component Value Date    IRON 63 12/18/2019    TIBC 263 12/18/2019    FERRITIN 6,078 (H) 03/28/2020     Cardiac:   Lab Results   Component Value Date    TROPONINI 0.512 (H) 12/22/2021     (H) 12/21/2021     Urinalysis:   Lab Results   Component Value Date    LABURIN (A) 12/20/2021     GRAM NEGATIVE GENI  >100,000 cfu/ml  Identification and susceptibility pending      COLORU Yellow 12/21/2021    SPECGRAV 1.015 12/21/2021    NITRITE Negative 12/21/2021    PROTEINUR n 08/19/2019    GLUCOSEU NEGATIVE 12/18/2019    KETONESU Negative 12/21/2021    UROBILINOGEN Negative 12/21/2021    BILIRUBINUR NEGATIVE 12/18/2019       Trended Lab Data:  Recent Labs   Lab 12/20/21  0959 12/21/21  0839 12/22/21  1223   WBC 15.04* 12.40  --    HGB 10.2* 10.2*  --    HCT 31.0* 31.4*  --    * 164  --    MCV 97 95  --    RDW 13.6 13.9  --    * 135* 132*   K 4.8 4.5 5.4*   CL 99 99 101   CO2 20* 18* 14*   * 127* 130*   * 195* 185*   PROT 6.5 6.1  --    ALBUMIN 3.5 2.8* 2.3*   BILITOT 0.6 0.5  --    AST 12* 8*  --    ALKPHOS 82 90  --    ALT 8* <5*  --   "        Microbiology Data:  Microbiology Results (last 7 days)     Procedure Component Value Units Date/Time    Blood culture [[id number]] Collected: 12/22/21 0904    Order Status: Sent Specimen: Blood from Antecubital, Left Arm Updated: 12/22/21 1639    Blood Culture #2 **CANNOT BE ORDERED STAT** [[id number]] Collected: 12/21/21 0839    Order Status: Completed Specimen: Blood from Peripheral, Antecubital, Left Updated: 12/22/21 1052     Blood Culture, Routine Gram stain aer bottle: Gram positive cocci in chains resembling Strep       12/22/2021  10:51       Gram stain francisco javier bottle: Gram positive cocci in chains resembling Strep       12/22/2021  10:51        Positive results previously called    Blood Culture #2 **CANNOT BE ORDERED STAT** [[id number]] Collected: 12/21/21 1049    Order Status: Completed Specimen: Blood Updated: 12/22/21 0600     Blood Culture, Routine Gram stain aer bottle:  Gram positive cocci in chains resembling Strep      Results called to and read back by: SUSAN BELLAMY RN  12/22/2021        05:58    Influenza A & B by Molecular [[id number]]     Order Status: Canceled Specimen: Nasopharyngeal Swab     Urine culture [[id number]] Collected: 12/21/21 1117    Order Status: No result Specimen: Urine Updated: 12/21/21 1150    Strep A culture, throat [[id number]]     Order Status: No result Specimen: Throat             Antimicrobials:  Antibiotics (From admission, onward)            Start     Stop Route Frequency Ordered    12/22/21 2000  cefTRIAXone (ROCEPHIN) 2 g/50 mL D5W IVPB         -- IV Every 24 hours (non-standard times) 12/22/21 1710 12/22/21 2000  vancomycin 500 mg in dextrose 5 % 100 mL IVPB (ready to mix system)         -- IV Once 12/22/21 1713    12/22/21 1100  mupirocin 2 % ointment         12/27 0859 Nasl 2 times daily 12/22/21 0951    12/21/21 1313  vancomycin - pharmacy to dose  (vancomycin IVPB)        "And" Linked Group Details    -- IV pharmacy to manage frequency 12/21/21 1214    "     Antifungals (From admission, onward)            None        Antivirals (From admission, onward)    None          Other Results:    Radiology:  X-Ray Chest AP Portable    Result Date: 12/21/2021  EXAMINATION: XR CHEST AP PORTABLE COMPARISON: Comparison is made to 12/20/2021. FINDINGS: Cardioverter/defibrillator is again noted.  Heart is minimally enlarged, but the appearance of the cardiomediastinal silhouette is unchanged since the examination referenced above.  Pulmonary vascularity is normal, and there are no findings indicating current cardiac decompensation.  Lung zones are stable as well, free of significant superimposed airspace consolidation or volume loss.  No pleural fluid of any substantial volume is seen on either side.  No pneumothorax.  Calcification in the wall of the aortic arch is an incidental observation.     No significant detrimental interval change in the appearance of the chest since 12/20/2021 is appreciated. Electronically signed by: Wallace Boone MD Date:    12/21/2021 Time:    08:32    X-Ray Chest AP Portable    Result Date: 12/20/2021  EXAMINATION: XR CHEST AP PORTABLE CLINICAL HISTORY: Shortness of breath COMPARISON: 04/11/2020 FINDINGS: A cardiac pacemaker remains in place.  Its leads appear to be intact.  The size of the heart is prominent.  There is no focal pulmonary infiltrate visualized.  There is no pneumothorax.  The right costophrenic angle is sharp.  The left costophrenic angle is not well seen secondary to overlying ribs.     1. There is no focal pulmonary infiltrate visualized. 2. The size of the heart is prominent.  This may be secondary to magnification. 3. A cardiac pacemaker remains in place. Its leads appear to be intact. 4. The left costophrenic angle is not well seen secondary to overlying ribs. . Electronically signed by: Rajinder Fry MD Date:    12/20/2021 Time:    09:58    US Hemodialysis Access    Result Date: 12/22/2021  EXAMINATION: US HEMODIALYSIS ACCESS  CLINICAL HISTORY: not able to performe dialysis; TECHNIQUE: Grayscale, color Doppler and spectral analysis of right upper extremity AV fistula was obtained. COMPARISON: 02/21/2020 FINDINGS: There is a right upper extremity AV fistula.  There is residual partial thrombus within the AV fistula through the midportion.  A stent is seen also within the mid AV fistula with proximal stenosis.  There is good flow throughout all the fistula.  Flow volume however appears low at 75 cc/second.  There is a partially visualized hypoechoic collection distal to the AV fistula measuring approximately 3.8 x 1.8 cm which may represent a seroma or hematoma.  Approximately the AV fistula measures 0.4 cm in diameter is approximately 0.4 cm from the skin.  Its midportion the fistula measures 0.8 cm in is 0.5 cm from the skin.  Distally the AV fistula measures 0.8 cm and is approximately 0.9 cm from the skin.     Partial nonocclusive thrombus in the midportion of the AV fistula with apparent stenosis proximal to the stent.  There are low flow volumes.  Recommend repeat fistulagram and thrombectomy. Partially visualized complex collection deep to the distal aspect of the fistula that could represent a small hematoma or seroma.  Correlation advised. Electronically signed by: Remington Pratt MD Date:    12/22/2021 Time:    11:00    Echo    Result Date: 12/21/2021  · The left ventricle is moderately enlarged with mild concentric hypertrophy and · The quantitatively derived ejection fraction is 34%. · Severe left atrial enlargement. · Normal right ventricular size with normal right ventricular systolic function. · Normal central venous pressure (3 mmHg). · The estimated PA systolic pressure is 21 mmHg. · No vegetations per surface echo.      Cardiac device check - Remote    Result Date: 12/10/2021  Additional Comments 90-day Remote Interrogation Report CRTD, DDDR  Presenting egram demonstrates ASBP Device function WNL Autocapture algorithms  are On RA pace 60%, BiV pacing 98% Atrial arrhythmias:  NONE Anticoagulation status:  Eliquis Ventricular arrhythmias:  Non sustained event x 1, 4 seconds, c/w nsVT. Battery status/longevity:  5 yrs. Return to clinic in February 2022 F/u via remote interrogation in 3 months. Report prepared by SAMIA Alejo RN

## 2021-12-22 NOTE — PROGRESS NOTES
"[facility] - Med Surg  Adult Nutrition  Progress Note    SUMMARY       Recommendations    Recommendation:  1. Add ADA restrictions to diet.   2. Add Novaosurce Renal bid.   3. Encourage intake at meals as tolerated.   4. RD to follow to monitor po intake    Goals:   Pt will consume at least 75% intake at meals by RD follow up  Nutrition Goal Status: new  Communication of RD Recs: reviewed with RN    Assessment and Plan  Nutrition Problem  Inadequate energy intake    Related to (etiology):   bacteremia    Signs and Symptoms (as evidenced by):   Decreased appetite    Interventions:  Commercial beverage  Collaboration with other providers    Nutrition Diagnosis Status:   New     Malnutrition Assessment  Subcutaneous Fat Loss (Final Summary): well nourished  Muscle Loss Evaluation (Final Summary): well nourished       Reason for Assessment  Reason For Assessment: identified at risk by screening criteria (MST)  Diagnosis:  (bacteremia)  Relevant Medical History: CHF, CAD, HTN, DM, CKD, anemia, renal cyst, L heart cath, ICD  General Information Comments: Pt on Renal diet. Reports poor po intake 2/2 no appetite. Receives HD. Anand 14- R arm incision. No weight loss noted x 6 months. NFPE completed today 12/22-nourished at this time.  Nutrition Discharge Planning: pt to d/c on Renal ADA diet    Nutrition Risk Screen  Nutrition Risk Screen: no indicators present    Nutrition/Diet History  Food Preferences: no Zoroastrianism or cultural food prefs identified  Spiritual, Cultural Beliefs, Episcopal Practices, Values that Affect Care: no  Factors Affecting Nutritional Intake: decreased appetite    Anthropometrics  Temp: 97.6 °F (36.4 °C)  Height Method: Stated  Height: 6' 1" (185.4 cm)  Height (inches): 73 in  Weight Method: Bed Scale  Weight: 131.9 kg (290 lb 12.6 oz)  Weight (lb): 290.79 lb  Ideal Body Weight (IBW), Male: 184 lb  % Ideal Body Weight, Male (lb): 158.04 %  BMI (Calculated): 38.4  BMI Grade: 35 - 39.9 - obesity - grade " II  Usual Body Weight (UBW), k.6 kg (21)  % Usual Body Weight: 104.4  % Weight Change From Usual Weight: 4.19 %     Lab/Procedures/Meds  Pertinent Labs Reviewed: reviewed  Pertinent Labs Comments: Na 135L, BUN 127H, Crea 17.6H, Glu 195H, Ca 8.5L, Alb 2.8L  Pertinent Medications Reviewed: reviewed  Pertinent Medications Comments: rocephin, insulin    Estimated/Assessed Needs  Weight Used For Calorie Calculations: 131.9 kg (290 lb 12.6 oz)  Energy Calorie Requirements (kcal): 2323  Energy Need Method: Harlan-St Jeor (1.1)  Protein Requirements: 105g (0.8g/kg)  Weight Used For Protein Calculations: 131.9 kg (290 lb 12.6 oz)  Estimated Fluid Requirement Method: RDA Method  RDA Method (mL): 2323  CHO Requirement: 250g    Nutrition Prescription Ordered  Current Diet Order: Renal    Evaluation of Received Nutrient/Fluid Intake  I/O: 375/50  Energy Calories Required: not meeting needs  Protein Required: not meeting needs  Fluid Required: not meeting needs  Comments: LBM   % Intake of Estimated Energy Needs: 25 - 50 %  % Meal Intake: 25 - 50 %    Nutrition Risk  Level of Risk/Frequency of Follow-up:  (2xweekly)     Monitor and Evaluation  Food and Nutrient Intake: food and beverage intake  Food and Nutrient Adminstration: diet order  Physical Activity and Function: nutrition-related ADLs and IADLs  Anthropometric Measurements: weight  Biochemical Data, Medical Tests and Procedures: electrolyte and renal panel  Nutrition-Focused Physical Findings: overall appearance     Nutrition Follow-Up    RD Follow-up?: Yes

## 2021-12-23 NOTE — PROGRESS NOTES
12/23/21 0409   Nurse Notification   Charge Nurse Notified? Yes   Name of Charge Nurse Margret   Bedside Nurse Notified? Yes   Name of Bedside Nurse juan carlos   Nurse Notfication Method Secure Chat   Provider Notification   Provider Notified? Yes   Name of Provider Dr Wu   Provider Notification Method Telephone   Provider Notified Of AI Deterioration Alert

## 2021-12-23 NOTE — PLAN OF CARE
Pt is currently in HD. [place] spoke with spouse Hue via phone for discharge planning.    Pt lives with spouse who is primary care giver. Pt requires assistance with ADLS. Pt able to self transfer himself into . Pt also has RW, BSC, and built in walk in shower. Pt's spouse requesting Therapy referral on discharge for patient. Pt is a HD patient at [facility].  is his nephrologist. Pt's spouse will provide transportation on discharge.     Pharmacy preference: [facility] in [place], [place].    Future Appointments   Date Time Provider Department Center   1/13/2022 10:00 AM HOME MONITOR DEVICE CHECK, [facility] [place] Pending sale to [facility]   1/24/2022  9:00 AM Tara Padilla DPM [facility] POD [place]        12/23/21 1122   Discharge Assessment   Assessment Type Discharge Planning Assessment   Confirmed/corrected address, phone number and insurance Yes   Confirmed Demographics Correct on Facesheet   Source of Information patient   Communicated ANDERSON with patient/caregiver Date not available/Unable to determine   Reason For Admission Bacteremia due to Enterococcus   Lives With spouse   Do you expect to return to your current living situation? Yes   Do you have help at home or someone to help you manage your care at home? No   Prior to hospitilization cognitive status: Unable to Assess   Current cognitive status: Unable to Assess   Walking or Climbing Stairs Difficulty ambulation difficulty, requires equipment   Mobility Management    Dressing/Bathing Difficulty bathing difficulty, assistance 1 person   Home Accessibility wheelchair accessible   Equipment Currently Used at Home walker, rolling;wheelchair;bedside commode   Readmission within 30 days? No   Patient currently being followed by outpatient case management? No   Do you currently have service(s) that help you manage your care at home? No   Do you take prescription medications? Yes   Do you have prescription coverage? Yes   Coverage PHN   Do you have any  problems affording any of your prescribed medications? No   How do you get to doctors appointments? family or friend will provide   Are you on dialysis? Yes   Dialysis Name and Scheduled days [facility]   Do you take coumadin? No   Discharge Plan A Home with family   Discharge Plan B Home Health   DME Needed Upon Discharge  none   Discharge Plan discussed with: Spouse/sig other   Name(s) and Number(s) Hue Jc (Spouse)   [phone] (Mobile)   Discharge Barriers Identified None   Relationship/Environment   Name(s) of Who Lives With Patient Hue Jc (Spouse)   [phone] ([place])

## 2021-12-23 NOTE — PROGRESS NOTES
"[facility] Hospital Medicine Progress Note    Primary Team: [facility] Hospitalist Team A  Attending Physician: Sanjuana Hahn MD  Resident: Christian / Melodie  Intern: Boyd / Juan Luis    Subjective:      Patient denied chest pain, shortness of breath, nausea, vomiting, fever, or chills overnight. He denied any palpatitations, headaches, or dizziness. He tolerated the full dialysis session and said he felt tired from that and from the activity overnight: 2 short runs of V-tach (6 and 9 beats) that set an alert by the AI system.      Objective:     Last 24 Hour Vital Signs:  BP  Min: 84/46  Max: 132/63  Temp  Av.2 °F (36.8 °C)  Min: 97.6 °F (36.4 °C)  Max: 98.7 °F (37.1 °C)  Pulse  Av.7  Min: 54  Max: 84  Resp  Av.6  Min: 14  Max: 24  SpO2  Av %  Min: 95 %  Max: 100 %  Height  Av' 1" (185.4 cm)  Min: 6' 1" (185.4 cm)  Max: 6' 1" (185.4 cm)  Weight  Av.9 kg (284 lb 4.5 oz)  Min: 126 kg (277 lb 12.5 oz)  Max: 131.9 kg (290 lb 12.6 oz)  I/O last 3 completed shifts:  In: 1232.6 [P.O.:355; I.V.:182.4; Other:500; IV Piggyback:195.3]  Out: 600 [Urine:100; Other:500]    Physical Examination:  Const: Well nourished, well developed  Eyes: PERRL, no conjunctival injection  HENT: NCAT, Neck supple  CV: RRR, no murmurs, rubs, or gallops. 2+ distal pulses  RESP: CTAB, Unlabored respiratory effort  GI: + BS, soft, non-tender, non-distended, no masses. Umbilical hernia present  Ext: No gross deformities appreciated. No edema. RUE graft (wrapped today after declotting). Right chest with scar from old HD access appears non infected  Skin: Warm, dry. No rashes   Neuro: Alert and oriented. Sensation and motor function of extremities grossly intact.  Psych: Appropriate mood and affect.    Laboratory:  Laboratory Data Reviewed: yes  Pertinent Findings:  Recent Labs   Lab 21  0959 21  0959 21  0839 21  1223 21  0418   WBC 15.04*  --  12.40  --  8.22   HGB 10.2*  --  10.2*  --  9.3*   HCT 31.0*  " --  31.4*  --  28.3*   *  --  164  --  152   *   < > 135* 132* 135*  134*   K 4.8   < > 4.5 5.4* 3.7  3.7   CL 99   < > 99 101 98  98   CREATININE 17.37*   < > 17.6* 18.2* 11.3*  11.3*   *   < > 127* 130* 75*  73*   CO2 20*   < > 18* 14* 24  24   ALT 8*  --  <5*  --  5*  <5*   AST 12*  --  8*  --  14  13    < > = values in this interval not displayed.         Microbiology Data Reviewed: yes  Pertinent Findings:  Microbiology Results (last 7 days)     Procedure Component Value Units Date/Time    Blood culture [[id number]] Collected: 12/22/21 0904    Order Status: Completed Specimen: Blood from Antecubital, Left Arm Updated: 12/23/21 0603     Blood Culture, Routine Gram stain aer bottle: Gram positive cocci in chains resembling Strep       Results called to and read back by: NIXON CAMACHO 12/23/2021  06:02    Urine culture [[id number]] Collected: 12/21/21 1117    Order Status: Completed Specimen: Urine Updated: 12/22/21 2057     Urine Culture, Routine No growth    Narrative:      Specimen Source->Urine    Blood Culture #2 **CANNOT BE ORDERED STAT** [[id number]] Collected: 12/21/21 0839    Order Status: Completed Specimen: Blood from Peripheral, Antecubital, Left Updated: 12/22/21 1052     Blood Culture, Routine Gram stain aer bottle: Gram positive cocci in chains resembling Strep       12/22/2021  10:51       Gram stain francisco javier bottle: Gram positive cocci in chains resembling Strep       12/22/2021  10:51        Positive results previously called    Blood Culture #2 **CANNOT BE ORDERED STAT** [[id number]] Collected: 12/21/21 1049    Order Status: Completed Specimen: Blood Updated: 12/22/21 0600     Blood Culture, Routine Gram stain aer bottle:  Gram positive cocci in chains resembling Strep      Results called to and read back by: SUSAN BELLAMY RN  12/22/2021        05:58    Influenza A & B by Molecular [[id number]]     Order Status: Canceled Specimen: Nasopharyngeal Swab     Strep A  culture, throat [[id number]]     Order Status: No result Specimen: Throat         Outside blood cx 12/20: enterococcus    Other Results:  EKG (my interpretation): atrial sensed ventricular paced rhythm    Radiology Data Reviewed: yes  Pertinent Findings:  US Hemodialysis Access   Final Result      Partial nonocclusive thrombus in the midportion of the AV fistula with apparent stenosis proximal to the stent.  There are low flow volumes.  Recommend repeat fistulagram and thrombectomy.      Partially visualized complex collection deep to the distal aspect of the fistula that could represent a small hematoma or seroma.  Correlation advised.         Electronically signed by: Remington Pratt MD   Date:    12/22/2021   Time:    11:00      X-Ray Chest AP Portable   Final Result      No significant detrimental interval change in the appearance of the chest since 12/20/2021 is appreciated.         Electronically signed by: Wallace Boone MD   Date:    12/21/2021   Time:    08:32            Current Medications:     Infusions:       Scheduled:   apixaban  5 mg Oral BID    cefTRIAXone (ROCEPHIN) IVPB  2 g Intravenous Q24H    clopidogreL  75 mg Oral Daily    insulin detemir U-100  5 Units Subcutaneous QHS    mupirocin   Nasal BID        PRN:  sodium chloride 0.9%, acetaminophen, benzonatate, dextrose 50%, dextrose 50%, glucagon (human recombinant), glucose, glucose, sodium chloride 0.9%, sodium chloride 0.9%, Pharmacy to dose Vancomycin consult **AND** vancomycin - pharmacy to dose    Antibiotics and Day Number of Therapy:  Rocephin 12/21-  Vanc 12/21-    Lines and Day Number of Therapy:  PIV 12/21  HD AV graft right forearm    Assessment:     Houston Jc is a 76 y.o. male with PMH of ESRD (TTS), CHF, CAD, HTN, complete heart block s/p ICD, DM, DVT, and PAD presents for bacteremia after his blood cultures came back positive yesterday from an outside facility. Pt admitted for dialysis (last session 5 days ago, missed 1 session) and  further work up of bacteremia. He has vancomycin on board for the enterococcus bacteremia and is on ceftriaxone for his UTI per ID recs.      Plan:     Enterococcus bacteremia  -pt had cough with intermittent sputum for 4 days  -Blood culture (12/20)-positive for Enterococcus, pending sensitivities  -UA with 3+ leukocytes, >100 RBC, 32 WBC on admission  -lactic acid WNL  -CXR-no focal infiltrate  - Started rocephin, vanc (12/21)   - Blood cx 12/21-positive for gram + cocci resembling Strep  - ID consulted, recommend continue vanc (trough 15-20)   - Echo without vegetations  - Present in 1/4 bottles  - Sensitivities pending  - ID recommends soft tissue US of R upper chest in discomfort area  - Repeat blood cx ordered, pending    ESRD on HD (TTS)  -missed last session on Saturday (12/18)  -Nephro consulted-clotted AVG, US AVG  -surgery declotted AVG 12/21  - s/p HD 12/22  - Daily CMP  - Renally dose all meds; avoiding hypotension  - S/p declotting HD AVF    Gram Negative Frankie UTI  - Positive urine culture 12/21  - Per ID continue ceftriaxone  - Sensitivities pending     Acute on Chronic HFrEF  -TTE with EF 34%, no vegetations on surface echo  -, Trop 0.793  -trop downtrending  -continue torsemide, hold today due to low BP     Acute V-tach Runs  - per AI deterioration alert: 2 runs of V-tach on 12/23 (6 beat and 9 beat)  - Device check 11/24/2021 with short runs of V-tach  - Troponin ordered and unchanged from prior  - EKG similar to prior  - Will continue to monitor     Cough  -continue tessalon perle     DVT (RLE popliteal vein)  Continue home eliquis     Hypertension  143/75 on presentation  Continue home carvedilol, hold today due to low BP     HLD  -pt taken off lipitor by PCP  -ordered lipid panel     Diabetes Mellitus Type II  Glucose 195 on presentation  Pending A1c  Start Levemir 5U     Normocytic Anemia  -10.2/31.4 on presentation  -monitor and trend      Code: Full  Diet: Renal  PPx: apixaban  Dispo:  Admit to floor for workup of bacteremia    Francisco Mcknight MD  \[facility]\"" Internal Medicine HO-I    \[facility]\"" Medicine Hospitalist Pager numbers:   \[facility]\"" Hospitalist Medicine Team A (Abhinav/Jovani): [phone]  \[facility]\"" Hospitalist Medicine Team B (Robert/Enedelia):  [phone]

## 2021-12-23 NOTE — NURSING
Patient takes tessalon perls at home and has a dry cough. Patient is requesting this medication. Dr. Francois notified and orders placed. Will continue to monitor.

## 2021-12-23 NOTE — PLAN OF CARE
Artificial Intelligence Notification  Liz       Admit Date: 2021  LOS: 2  Code Status: Full Code   Date of Consult: 2021  : 1945  Age: 76 y.o.  Weight:       Wt Readings from Last 1 Encounters:   21 126 kg (277 lb 12.5 oz)      Sex: male  Bed: K517/K517 A:   MRN: [id number]  Attending Physician: Sanjuana Hahn MD  Primary Service: Networked reference to record PCT   Time AI Alert Received: 4:08 AM  Time at Bedside: 4:32 AM            Received AI Deterioration Alert around 4:08 AM. Acute events overnight: patient had 6 beat run of vtach followed by 9 beat run of vtach about 1.5-2 hours apart. Patient asymptomatic during events and vital signs stable.     Patient does have history of complete heart block with HR in 20s and intermittent VT in 2019. Echo at that time showed depressed EF 30%. Started on amiodarone then and had LHC with luminal irregularities. [facility] [place] CRT-D implanted prior to discharge. Was on amiodarone after discharge until 2021. Follows with cardiology, last visit in 2021. Last device check 21.     Vital Signs (Most Recent):  Temp: 98.7 °F (37.1 °C) (21 0405)  Pulse: 84 (21 0405)  Resp: (!) 24 (21 0405)  BP: 121/78 (21 0405)  SpO2: 98 % (21 0405) Vital Signs (24h Range):  Temp:  [97.6 °F (36.4 °C)-98.7 °F (37.1 °C)] 98.7 °F (37.1 °C)  Pulse:  [54-84] 84  Resp:  [14-24] 24  SpO2:  [92 %-100 %] 98 %  BP: ()/(42-78) 121/78            This encounter was triggered by an Artificial Intelligence Notification.      Artificial Intelligence alert discussed with Primary team:  Gadiel Wu MD     Evaluation: Patient denied any chest pain, palpitations, nausea, vomiting, headache, or any symptoms at all. Asleep during events. EKG with sinus rhythm and occasional PVC, abnormal EKG     Disposition: Follow up labs    Gadiel Wu MD

## 2021-12-23 NOTE — PROCEDURES
"Patient seen and examined on HD tolerating well. No complains. Plan for HD tomorrow as well   /65 (BP Location: Left arm, Patient Position: Lying)   Pulse 80   Temp 97.4 °F (36.3 °C) (Temporal)   Resp 18   Ht 6' 1" (1.854 m)   Wt 126 kg (277 lb 12.5 oz)   SpO2 (!) 94%   BMI 36.65 kg/m²     With any question please call answering service [phone]  Laura Ortiz MD    [facility]  ANA MARIA Burns MD, FACP,   MAlize Woo MD,   MD DAMION Gill MD E. V. Harmon, NP    [address]  [place], [address]  "

## 2021-12-23 NOTE — PLAN OF CARE
12/23/21 1335   Post-Acute Status   Post-Acute Authorization Other   Other Status Awaiting f/u Appts  (Appt request sent to [facility] scheduling staff for PCP and Cards appointment)   Discharge Plan   Discharge Plan A Home with family

## 2021-12-23 NOTE — PROGRESS NOTES
Pharmacokinetic Assessment Follow Up: IV Vancomycin    Vancomycin serum concentration assessment(s):    The random level was drawn correctly and can be used to guide therapy at this time. The measurement is above the desired definitive target range of 15 to 20 mcg/mL.    Vancomycin Regimen Plan:    Will give a one time dose of vancomycin 500 mg IV and recheck vancomycin random with AM labs on 12/24    Drug levels (last 3 results):  Recent Labs   Lab Result Units 12/22/21  0335 12/23/21  0418   Vancomycin, Random ug/mL 20.4 24.0       Pharmacy will continue to follow and monitor vancomycin.    Please contact pharmacy at extension [phone] for questions regarding this assessment.    Thank you for the consult,   Betsy Gusman       Patient brief summary:  Houston Jc is a 76 y.o. male initiated on antimicrobial therapy with IV Vancomycin for treatment of bacteremia    The patient's current regimen is pulse dosed    Drug Allergies:   Review of patient's allergies indicates:  No Known Allergies    Actual Body Weight:   126 kg    Renal Function:   Estimated Creatinine Clearance: 8 mL/min (A) (based on SCr of 11.3 mg/dL (H)).,     Dialysis Method (if applicable):  intermittent HD    CBC (last 72 hours):  Recent Labs   Lab Result Units 12/21/21  0839 12/23/21  0418   WBC K/uL 12.40 8.22   Hemoglobin g/dL 10.2* 9.3*   Hematocrit % 31.4* 28.3*   Platelets K/uL 164 152   Gran % % 85.8* 69.5   Lymph % % 6.8* 17.6*   Mono % % 6.0 8.9   Eosinophil % % 0.7 3.0   Basophil % % 0.2 0.6   Differential Method  Automated Automated       Metabolic Panel (last 72 hours):  Recent Labs   Lab Result Units 12/20/21  1048 12/21/21  0839 12/21/21  1117 12/22/21  1223 12/23/21  0418 12/23/21  0501   Sodium mmol/L  --  135*  --  132* 135*  134*  --    Potassium mmol/L  --  4.5  --  5.4* 3.7  3.7  --    Chloride mmol/L  --  99  --  101 98  98  --    CO2 mmol/L  --  18*  --  14* 24  24  --    Glucose mg/dL  --  195*  --  185* 158*  158*  --     Glucose, UA  Negative  --  Negative  --   --   --    BUN mg/dL  --  127*  --  130* 75*  73*  --    Creatinine mg/dL  --  17.6*  --  18.2* 11.3*  11.3*  --    Albumin g/dL  --  2.8*  --  2.3* 2.5*  2.5*  --    Total Bilirubin mg/dL  --  0.5  --   --  0.5  0.5  --    Alkaline Phosphatase U/L  --  90  --   --  71  71  --    AST U/L  --  8*  --   --  14  13  --    ALT U/L  --  <5*  --   --  5*  <5*  --    Magnesium mg/dL  --   --   --   --   --  1.7   Phosphorus mg/dL  --   --   --  8.5*  --   --        Vancomycin Administrations:  vancomycin given in the last 96 hours                     vancomycin 500 mg in dextrose 5 % 100 mL IVPB (ready to mix system) (mg) 500 mg New Bag 12/22/21 2138    vancomycin injection (g) 1 g Given 12/21/21 1639    vancomycin (VANCOCIN) 2,000 mg in dextrose 5 % 500 mL IVPB (mg) 2,000 mg New Bag 12/21/21 1024    vancomycin in dextrose 5 % 1 gram/250 mL IVPB 1,000 mg (mg) 1,000 mg New Bag 12/20/21 1138                    Microbiologic Results:  Microbiology Results (last 7 days)       Procedure Component Value Units Date/Time    Blood culture [[id number]]     Order Status: Sent Specimen: Blood     Blood culture [[id number]]     Order Status: Sent Specimen: Blood     Blood Culture #2 **CANNOT BE ORDERED STAT** [[id number]]  (Abnormal) Collected: 12/21/21 0839    Order Status: Completed Specimen: Blood from Peripheral, Antecubital, Left Updated: 12/23/21 0900     Blood Culture, Routine Gram stain aer bottle: Gram positive cocci in chains resembling Strep       12/22/2021  10:51       Gram stain francisco javier bottle: Gram positive cocci in chains resembling Strep       12/22/2021  10:51        Positive results previously called      ENTEROCOCCUS SPECIES  Identification pending  For susceptibility see order #[id number]      Blood Culture #2 **CANNOT BE ORDERED STAT** [[id number]]  (Abnormal) Collected: 12/21/21 1049    Order Status: Completed Specimen: Blood Updated: 12/23/21 0858     Blood Culture,  Routine Gram stain aer bottle:  Gram positive cocci in chains resembling Strep      Results called to and read back by: SUSAN BELLAMY RN  12/22/2021        05:58      ENTEROCOCCUS SPECIES  Identification pending  For susceptibility see order #[id number]      Blood culture [[id number]] Collected: 12/22/21 0904    Order Status: Completed Specimen: Blood from Antecubital, Left Arm Updated: 12/23/21 0603     Blood Culture, Routine Gram stain aer bottle: Gram positive cocci in chains resembling Strep       Results called to and read back by: NIXON CAMACHO 12/23/2021  06:02    Urine culture [[id number]] Collected: 12/21/21 1117    Order Status: Completed Specimen: Urine Updated: 12/22/21 2057     Urine Culture, Routine No growth    Narrative:      Specimen Source->Urine    Influenza A & B by Molecular [[id number]]     Order Status: Canceled Specimen: Nasopharyngeal Swab     Strep A culture, throat [[id number]]     Order Status: No result Specimen: Throat

## 2021-12-23 NOTE — PROGRESS NOTES
"[facility] Infectious Diseases Progress Note    Assessment/Plan:     Houston Jc is a 76 y.o. male PMH ESRD (TTS), CHF, CAD, HTN, complete heart block s/p ICD, DM, DVT, and PAD.      2021 admit for cough and not feeling well, right chest discomfort where pt has his old HD access.      Soft tissue ultrasound of R-upper chest area where the patient endorses discomfort WNL    Enterococcus bacteremia  -Continue vancomycin 15-20 goal trough  - TTE neg veg  - BCx E. Faecalis vanc sensitive  - BCx E. Faecalis  - BCx GS GPC in chains  -Repeat BCx pending, if the patient is still bacteremic on these next set of blood cultures then the patient will need a YOLIS     UTI  - UCx Klebsiella aerogenes  -Discontinue ceftriaxone  -Recommend cefepime as per HD dosing:  First dose of cefepime 1gm  Subsequent doses 500mg q24h after dialysis    Jt Donohue MD  [facility] Infectious Diseases, PGY-4  Cell: [phone]    Thank you for allowing us to participate in the care of this patient. We will continue to follow along. Case has been discussed with consult staff, who is in agreement with assessment and plan.    Subjective:      No acute events overnight. Denies any fevers, chills, NS, NV.     Objective:   Last 24 Hour Vital Signs:  BP  Min: 109/59  Max: 132/63  Temp  Av.2 °F (36.8 °C)  Min: 97.6 °F (36.4 °C)  Max: 98.7 °F (37.1 °C)  Pulse  Av.7  Min: 54  Max: 86  Resp  Av.9  Min: 14  Max: 24  SpO2  Av.8 %  Min: 94 %  Max: 100 %  Height  Av' 1" (185.4 cm)  Min: 6' 1" (185.4 cm)  Max: 6' 1" (185.4 cm)  Weight  Av.9 kg (284 lb 4.5 oz)  Min: 126 kg (277 lb 12.5 oz)  Max: 131.9 kg (290 lb 12.6 oz)  I/O last 3 completed shifts:  In: 1232.6 [P.O.:355; I.V.:182.4; Other:500; IV Piggyback:195.3]  Out: 600 [Urine:100; Other:500]    Physical Exam  Const: Well nourished, well developed  Eyes: PERRL, no conjunctival injection  HENT: NCAT, Neck supple  CV: RRR, no murmurs, rubs, or gallops. 2+ " distal pulses  RESP: CTAB, Unlabored respiratory effort  GI: + BS, soft, non-tender, non-distended, no masses. Umbilical hernia present  Ext: No gross deformities appreciated. No edema. RUE graft (wrapped today after declotting). Right chest with scar from old HD access appears non infected  Skin: Warm, dry. No rashes   Neuro: Alert and oriented. Sensation and motor function of extremities grossly intact.  Psych: Appropriate mood and affect.    Laboratory:  Laboratory Data Reviewed: yes  Pertinent Findings:  Recent Labs   Lab 12/20/21  0959 12/20/21  0959 12/21/21  0839 12/22/21  1223 12/23/21  0418   WBC 15.04*  --  12.40  --  8.22   HGB 10.2*  --  10.2*  --  9.3*   HCT 31.0*  --  31.4*  --  28.3*   *  --  164  --  152   MCV 97  --  95  --  93   RDW 13.6  --  13.9  --  13.5   *   < > 135* 132* 135*  134*   K 4.8   < > 4.5 5.4* 3.7  3.7   CL 99   < > 99 101 98  98   CO2 20*   < > 18* 14* 24  24   *   < > 127* 130* 75*  73*   CREATININE 17.37*   < > 17.6* 18.2* 11.3*  11.3*   *   < > 195* 185* 158*  158*   PROT 6.5  --  6.1  --  6.3  6.2   ALBUMIN 3.5   < > 2.8* 2.3* 2.5*  2.5*   BILITOT 0.6  --  0.5  --  0.5  0.5   AST 12*  --  8*  --  14  13   ALKPHOS 82  --  90  --  71  71   ALT 8*  --  <5*  --  5*  <5*    < > = values in this interval not displayed.         Microbiology Data:  Microbiology Results (last 7 days)     Procedure Component Value Units Date/Time    Blood culture [[id number]]     Order Status: Sent Specimen: Blood     Blood culture [[id number]]     Order Status: Sent Specimen: Blood     Blood Culture #2 **CANNOT BE ORDERED STAT** [[id number]]  (Abnormal) Collected: 12/21/21 0839    Order Status: Completed Specimen: Blood from Peripheral, Antecubital, Left Updated: 12/23/21 0900     Blood Culture, Routine Gram stain aer bottle: Gram positive cocci in chains resembling Strep       12/22/2021  10:51       Gram stain francisco javier bottle: Gram positive cocci in chains resembling  "Strep       12/22/2021  10:51        Positive results previously called      ENTEROCOCCUS SPECIES  Identification pending  For susceptibility see order #[id number]      Blood Culture #2 **CANNOT BE ORDERED STAT** [[id number]]  (Abnormal) Collected: 12/21/21 1049    Order Status: Completed Specimen: Blood Updated: 12/23/21 0858     Blood Culture, Routine Gram stain aer bottle:  Gram positive cocci in chains resembling Strep      Results called to and read back by: SUSAN BELLAMY RN  12/22/2021        05:58      ENTEROCOCCUS SPECIES  Identification pending  For susceptibility see order #[id number]      Blood culture [[id number]] Collected: 12/22/21 0904    Order Status: Completed Specimen: Blood from Antecubital, Left Arm Updated: 12/23/21 0603     Blood Culture, Routine Gram stain aer bottle: Gram positive cocci in chains resembling Strep       Results called to and read back by: NIXON CAMACHO 12/23/2021  06:02    Urine culture [[id number]] Collected: 12/21/21 1117    Order Status: Completed Specimen: Urine Updated: 12/22/21 2057     Urine Culture, Routine No growth    Narrative:      Specimen Source->Urine    Influenza A & B by Molecular [[id number]]     Order Status: Canceled Specimen: Nasopharyngeal Swab     Strep A culture, throat [[id number]]     Order Status: No result Specimen: Throat             Antimicrobials:  Antibiotics (From admission, onward)            Start     Stop Route Frequency Ordered    12/22/21 2000  cefTRIAXone (ROCEPHIN) 2 g/50 mL D5W IVPB         -- IV Every 24 hours (non-standard times) 12/22/21 1710    12/22/21 1100  mupirocin 2 % ointment         12/27 0859 Nasl 2 times daily 12/22/21 0951    12/21/21 1313  vancomycin - pharmacy to dose  (vancomycin IVPB)        "And" Linked Group Details    -- IV pharmacy to manage frequency 12/21/21 1214        Antivirals (From admission, onward)    None        Antifungals (From admission, onward)            None              Other " Results:  Radiology Results:  X-Ray Chest AP Portable    Result Date: 12/21/2021  EXAMINATION: XR CHEST AP PORTABLE COMPARISON: Comparison is made to 12/20/2021. FINDINGS: Cardioverter/defibrillator is again noted.  Heart is minimally enlarged, but the appearance of the cardiomediastinal silhouette is unchanged since the examination referenced above.  Pulmonary vascularity is normal, and there are no findings indicating current cardiac decompensation.  Lung zones are stable as well, free of significant superimposed airspace consolidation or volume loss.  No pleural fluid of any substantial volume is seen on either side.  No pneumothorax.  Calcification in the wall of the aortic arch is an incidental observation.     No significant detrimental interval change in the appearance of the chest since 12/20/2021 is appreciated. Electronically signed by: Wallace Boone MD Date:    12/21/2021 Time:    08:32    X-Ray Chest AP Portable    Result Date: 12/20/2021  EXAMINATION: XR CHEST AP PORTABLE CLINICAL HISTORY: Shortness of breath COMPARISON: 04/11/2020 FINDINGS: A cardiac pacemaker remains in place.  Its leads appear to be intact.  The size of the heart is prominent.  There is no focal pulmonary infiltrate visualized.  There is no pneumothorax.  The right costophrenic angle is sharp.  The left costophrenic angle is not well seen secondary to overlying ribs.     1. There is no focal pulmonary infiltrate visualized. 2. The size of the heart is prominent.  This may be secondary to magnification. 3. A cardiac pacemaker remains in place. Its leads appear to be intact. 4. The left costophrenic angle is not well seen secondary to overlying ribs. . Electronically signed by: Rajinder Fry MD Date:    12/20/2021 Time:    09:58    US Hemodialysis Access    Result Date: 12/22/2021  EXAMINATION: US HEMODIALYSIS ACCESS CLINICAL HISTORY: not able to performe dialysis; TECHNIQUE: Grayscale, color Doppler and spectral analysis of right upper  extremity AV fistula was obtained. COMPARISON: 02/21/2020 FINDINGS: There is a right upper extremity AV fistula.  There is residual partial thrombus within the AV fistula through the midportion.  A stent is seen also within the mid AV fistula with proximal stenosis.  There is good flow throughout all the fistula.  Flow volume however appears low at 75 cc/second.  There is a partially visualized hypoechoic collection distal to the AV fistula measuring approximately 3.8 x 1.8 cm which may represent a seroma or hematoma.  Approximately the AV fistula measures 0.4 cm in diameter is approximately 0.4 cm from the skin.  Its midportion the fistula measures 0.8 cm in is 0.5 cm from the skin.  Distally the AV fistula measures 0.8 cm and is approximately 0.9 cm from the skin.     Partial nonocclusive thrombus in the midportion of the AV fistula with apparent stenosis proximal to the stent.  There are low flow volumes.  Recommend repeat fistulagram and thrombectomy. Partially visualized complex collection deep to the distal aspect of the fistula that could represent a small hematoma or seroma.  Correlation advised. Electronically signed by: Remington Pratt MD Date:    12/22/2021 Time:    11:00    Echo    Result Date: 12/21/2021  · The left ventricle is moderately enlarged with mild concentric hypertrophy and · The quantitatively derived ejection fraction is 34%. · Severe left atrial enlargement. · Normal right ventricular size with normal right ventricular systolic function. · Normal central venous pressure (3 mmHg). · The estimated PA systolic pressure is 21 mmHg. · No vegetations per surface echo.      Cardiac device check - Remote    Result Date: 12/10/2021  Additional Comments 90-day Remote Interrogation Report CRTD, DDDR  Presenting egram demonstrates ASBP Device function WNL Autocapture algorithms are On RA pace 60%, BiV pacing 98% Atrial arrhythmias:  NONE Anticoagulation status:  Eliquis Ventricular arrhythmias:   Non sustained event x 1, 4 seconds, c/w nsVT. Battery status/longevity:  5 yrs. Return to clinic in February 2022 F/u via remote interrogation in 3 months. Report prepared by SAIMA Alejo RN      Current Medications:     Infusions:       Scheduled:   apixaban  5 mg Oral BID    cefTRIAXone (ROCEPHIN) IVPB  2 g Intravenous Q24H    clopidogreL  75 mg Oral Daily    insulin detemir U-100  5 Units Subcutaneous QHS    mupirocin   Nasal BID        PRN:  sodium chloride 0.9%, acetaminophen, benzonatate, dextrose 50%, dextrose 50%, glucagon (human recombinant), glucose, glucose, sodium chloride 0.9%, sodium chloride 0.9%, Pharmacy to dose Vancomycin consult **AND** vancomycin - pharmacy to dose

## 2021-12-23 NOTE — NURSING
Patient had a 9 beat run of V-Tach patient denies chest pain or tightness or any other problems. /63 hr 74. Notified Dr. Ramesh and orders given for EKG and Troponin level. Will continue to monitor.

## 2021-12-23 NOTE — PLAN OF CARE
Future Appointments   Date Time Provider Department Center   1/11/2022  2:00 PM Dejuan Cody MD [facility]   1/13/2022 10:00 AM HOME MONITOR DEVICE CHECK, [facility]   1/24/2022  9:00 AM Tara Padilla DPM [facility] POD [place]        12/23/21 1546   Post-Acute Status   Post-Acute Authorization Other   Other Status Awaiting f/u Appts  (Awaiting PCP appointment)   Discharge Plan   Discharge Plan A Home with family

## 2021-12-23 NOTE — AI DETERIORATION ALERT
Artificial Intelligence Notification  Liz      Admit Date: 2021  LOS: 2  Code Status: Full Code   Date of Consult: 2021  : 1945  Age: 76 y.o.  Weight:   Wt Readings from Last 1 Encounters:   21 126 kg (277 lb 12.5 oz)     Sex: male  Bed: 17/[facility] A:   MRN: [id number]  Attending Physician: Sanjuana Hahn MD  Primary Service: Networked reference to record PCT   Time AI Alert Received: ***  Time at Bedside: ***           ***      Vital Signs (Most Recent):  Temp: 98.7 °F (37.1 °C) (21 040)  Pulse: 84 (21)  Resp: (!) 24 (21)  BP: 121/78 (21)  SpO2: 98 % (21) Vital Signs (24h Range):  Temp:  [97.6 °F (36.4 °C)-98.7 °F (37.1 °C)] 98.7 °F (37.1 °C)  Pulse:  [54-84] 84  Resp:  [14-24] 24  SpO2:  [92 %-100 %] 98 %  BP: ()/(42-78) 121/78         This encounter was triggered by an Artificial Intelligence Notification.     Artificial Intelligence alert discussed with Primary team:  Name ***      Evaluation: ***    Disposition: ***

## 2021-12-23 NOTE — PROGRESS NOTES
Pt completed Hd. Going to ultrasound post HD. Floor nurse given report and made aware of pts location.    12/23/21 1345   Vital Signs   Temp 97.6 °F (36.4 °C)   Temp src Temporal   Pulse 80   Heart Rate Source Brachial   Resp 18   O2 Device (Oxygen Therapy) room air   /62   BP Location Left arm   BP Method Automatic   Patient Position Lying   Post-Hemodialysis Assessment   Rinseback Volume (mL) 200 mL   Blood Volume Processed (Liters) 52.3 L   Dialyzer Clearance Moderately streaked   Duration of Treatment (minutes) 180 minutes   Hemodialysis Intake (mL) 400 mL   Total UF (mL) 2400 mL   Net Fluid Removal 2000   Patient Response to Treatment Tolerated well   Post-Treatment Weight 121.9 kg (268 lb 11.9 oz)   Treatment Weight Change -2   Arterial bleeding stop time (min) 5 min   Venous bleeding stop time (min) 5 min   Post-Hemodialysis Comments Blood rinsed back with 200ml NS. Lines pulled. Manual pressure held until hemostasis achieved

## 2021-12-23 NOTE — NURSING
Patient had a 6 beat run of V-tach, patient asymptomatic, denies any chest pain or any other problems. Notified Dr. Ramesh stat EKG ordered and completed. Will continue to monitor.

## 2021-12-23 NOTE — PLAN OF CARE
Medications given per mar. Blood glucose checked and scheduled insulin given. Telemetry in progress. Patient had several runs of V-Tach overnight doctor notified. Lab call in positive blood cultures doctor notified. Safety maintained call light in reach, bed alarm in use.

## 2021-12-23 NOTE — NURSING
Arrived to HD unit for scheduled HD. Pts chronic HD days are TTHSAT- POC discussed with patient. Faint thrill and bruit noted to AVF. With venous bruit less audible. Treatment initiated at this time.

## 2021-12-23 NOTE — PLAN OF CARE
[facility]      HOME HEALTH ORDERS  FACE TO FACE ENCOUNTER    Patient Name: Houston Jc  YOB: 1945    PCP: Zak Hamilton MD   PCP Address: [address] [place] [address] / [id number]  PCP Phone Number: [phone]  PCP Fax: [phone]    Encounter Date: 12/21/21    Admit to Home Health    Diagnoses:  Active Hospital Problems    Diagnosis  POA    *Bacteremia due to Enterococcus [R78.81, B95.2]  Yes     Gram positive      Acute cystitis without hematuria [N30.00]  Yes    NSVT (nonsustained ventricular tachycardia) [I47.2]  Yes    Type 2 diabetes mellitus with chronic kidney disease on chronic dialysis, with long-term current use of insulin [E11.22, N18.6, Z99.2, Z79.4]  Not Applicable    Anemia, chronic renal failure, stage 5 [N18.5, D63.1]  Yes    Clotted dialysis access [T82.49XA]  Yes    Acute deep vein thrombosis (DVT) of popliteal vein of right lower extremity [I82.431]  Yes    ESRD (end stage renal disease) on dialysis [N18.6, Z99.2]  Not Applicable     Tu Th Sat        Chronic combined systolic and diastolic congestive heart failure [I50.42]  Yes    Hypertension [I10]  Yes      Resolved Hospital Problems   No resolved problems to display.       Follow Up Appointments:  Future Appointments   Date Time Provider Department Center   1/13/2022 10:00 AM HOME MONITOR DEVICE CHECK, [facility]   1/24/2022  9:00 [facility] [place]       Allergies:Review of patient's allergies indicates:  No Known Allergies    Medications: Review discharge medications with patient and family and provide education.    Current Facility-Administered Medications   Medication Dose Route Frequency Provider Last Rate Last Admin    0.9%  NaCl infusion   Intravenous PRN Sanjuana Hahn MD        acetaminophen tablet 650 mg  650 mg Oral Q6H PRN Nikolai Nix MD   650 mg at 12/23/21 0833    apixaban tablet 5 mg  5 mg Oral BID Jeison Hunt MD   5 mg at  12/23/21 0834    benzonatate capsule 100 mg  100 mg Oral TID PRN Sandra Francois MD   100 mg at 12/22/21 2146    carvediloL tablet 6.25 mg  6.25 mg Oral BID Francisco Mcknight MD        cefTRIAXone (ROCEPHIN) 2 g/50 mL D5W IVPB  2 g Intravenous Q24H Sanjuana Hahn MD   Stopped at 12/22/21 2125    clopidogreL tablet 75 mg  75 mg Oral Daily Jeison Hunt MD   75 mg at 12/23/21 0834    dextrose 50% injection 12.5 g  12.5 g Intravenous PRN Jeison Hunt MD        dextrose 50% injection 25 g  25 g Intravenous PRN Jeison Hunt MD        glucagon (human recombinant) injection 1 mg  1 mg Intramuscular PRN Jeison Hunt MD        glucose chewable tablet 16 g  16 g Oral PRN Jeison Hunt MD        glucose chewable tablet 24 g  24 g Oral PRN Jeison Hunt MD        insulin detemir U-100 pen 5 Units  5 Units Subcutaneous QHS Jeison Hunt MD   5 Units at 12/22/21 2026    mupirocin 2 % ointment   Nasal BID Sanjuana Hahn MD   Given at 12/23/21 0834    sodium chloride 0.9% bolus 250 mL  250 mL Intravenous PRN Sanjuana Hahn MD        sodium chloride 0.9% flush 10 mL  10 mL Intravenous Q12H PRN Jeison Hunt MD        vancomycin - pharmacy to dose   Intravenous pharmacy to manage frequency Jeison Hunt MD        vancomycin 500 mg in dextrose 5 % 100 mL IVPB (ready to mix system)  500 mg Intravenous Once Sanjuana Hahn MD         Current Discharge Medication List      CONTINUE these medications which have NOT CHANGED    Details   allopurinol (ZYLOPRIM) 300 MG tablet Take 300 mg by mouth once daily.      apixaban (ELIQUIS) 5 mg Tab Take 1 tablet (5 mg total) by mouth 2 (two) times daily.  Qty: 60 tablet, Refills: 6    Associated Diagnoses: Deep vein thrombosis (DVT) of popliteal vein of right lower extremity, unspecified chronicity      benzonatate (TESSALON) 100 MG capsule Take 1 capsule (100 mg total) by mouth 3 (three) times daily as needed for  "Cough.  Qty: 30 capsule, Refills: 0      carvedilol (COREG) 12.5 MG tablet Take 12.5 mg by mouth 2 (two) times daily.      cephALEXin (KEFLEX) 500 MG capsule Take 500 mg by mouth every 6 (six) hours.      cholecalciferol, vitamin D3, (VITAMIN D3) 50 mcg (2,000 unit) Cap Take 1 capsule by mouth once daily.      clopidogreL (PLAVIX) 75 mg tablet Take 75 mg by mouth once daily.      doxycycline (VIBRAMYCIN) 100 MG Cap Take 1 capsule (100 mg total) by mouth 2 (two) times daily. for 10 days  Qty: 20 capsule, Refills: 0      insulin aspart U-100 (NOVOLOG) 100 unit/mL injection Inject 4-8 Units into the skin 3 (three) times daily before meals. Per sliding scale      losartan (COZAAR) 25 MG tablet Take 1 tablet (25 mg total) by mouth once daily.  Qty: 90 tablet, Refills: 3    Comments: .      torsemide (DEMADEX) 20 MG Tab Take 1 tablet (20 mg total) by mouth 2 (two) times daily. Take additional 2 tablets if weight gain of more than 3 pounds in 1 day  Qty: 60 tablet, Refills: 11    Comments: .  Associated Diagnoses: Acute on chronic heart failure with reduced ejection fraction and diastolic dysfunction      VENTOLIN HFA 90 mcg/actuation inhaler Inhale 1 puff into the lungs every 4 (four) hours as needed. As NEEDED      atorvastatin (LIPITOR) 80 MG tablet Take 1 tablet (80 mg total) by mouth every evening.  Qty: 90 tablet, Refills: 3      walker Misc 5" wheel rolling walker  Qty: 1 each, Refills: 1    Associated Diagnoses: Gait instability               I have seen and examined this patient within the last 30 days. My clinical findings that support the need for the home health skilled services and home bound status are the following:no   Weakness/numbness causing balance and gait disturbance due to Heart Failure and Weakness/Debility making it taxing to leave home.  Requiring assistive device to leave home due to unsteady gait caused by  Heart Failure and Weakness/Debility.     Diet:   cardiac diet, diabetic diet 2000 calorie " and renal diet    Labs:  None    Referrals/ Consults  Physical Therapy to evaluate and treat. Evaluate for home safety and equipment needs; Establish/upgrade home exercise program. Perform / instruct on therapeutic exercises, gait training, transfer training, and Range of Motion.  Occupational Therapy to evaluate and treat. Evaluate home environment for safety and equipment needs. Perform/Instruct on transfers, ADL training, ROM, and therapeutic exercises.    Activities:   activity as tolerated    Nursing:   Agency to admit patient within 24 hours of hospital discharge unless specified on physician order or at patient request    SN to complete comprehensive assessment including routine vital signs. Instruct on disease process and s/s of complications to report to MD. Review/verify medication list sent home with the patient at time of discharge  and instruct patient/caregiver as needed. Frequency may be adjusted depending on start of care date.     Skilled nurse to perform up to 3 visits PRN for symptoms related to diagnosis    Notify MD if SBP > 160 or < 90; DBP > 90 or < 50; HR > 120 or < 50; Temp > 101; O2 < 88%; Other:   N/A    Ok to schedule additional visits based on staff availability and patient request on consecutive days within the home health episode.    When multiple disciplines ordered:    Start of Care occurs on Sunday - Wednesday schedule remaining discipline evaluations as ordered on separate consecutive days following the start of care.    Thursday SOC -schedule subsequent evaluations Friday and Monday the following week.     Friday - Saturday SOC - schedule subsequent discipline evaluations on consecutive days starting Monday of the following week.    For all post-discharge communication and subsequent orders please contact patient's primary care physician. If unable to reach primary care physician or do not receive response within 30 minutes, please contact [phone] for clinical staff order  clarification    Miscellaneous   Diabetic Care:   SN to perform and educate Diabetic management with blood glucose monitoring: and Report CBG < 60 or > 350 to physician.    Home Health Aide:  Nursing Three times weekly, Physical Therapy Three times weekly and Occupational Therapy Three times weekly    Wound Care Orders  n/a    I certify that this patient is confined to his home and needs intermittent skilled nursing care, physical therapy and occupational therapy   .

## 2021-12-23 NOTE — PROGRESS NOTES
12/22/21 1630   Post-Hemodialysis Assessment   Post-Hemodialysis Comments pt rinsed back, needles deaccessed per p and p, pressure held until hemostasis acheived, pressure dressing in place, report called to floor RN. Net removed 0 for BP stability.

## 2021-12-24 NOTE — PROGRESS NOTES
[facility] Progress Note    Assessment/Plan:     Houston Jc is a 76 y.o. male PMH ESRD (TTS), CHF, CAD, HTN, complete heart block s/p ICD, DM, DVT, and PAD.      2021 admit for cough and not feeling well, right chest discomfort where pt has his old HD access.      Soft tissue ultrasound of R-upper chest area where the patient endorses discomfort WNL    Enterococcus bacteremia  -Continue vancomycin 15-20 goal trough  - TTE neg veg  - BCx E. Faecalis vanc sensitive  - BCx E. Faecalis  - BCx GS GPC in chains  -Repeat BCx pending, if the patient is still bacteremic on these next set of blood cultures then the patient will need a YOLIS  -Also discussed with the primary team that there may be a potential concern for a clot at the AV fistula area and that there may be a concern that that area may consist of an area of foci of infection, further imaging studies may be warranted of that area, recommend discussing with radiology for the best imaging option of that area given clinical context; with that said await repeat BCx     UTI  - UCx Klebsiella aerogenes  -Continue cefepime as per HD dosing:  First dose of cefepime 1gm  Subsequent doses 500mg q24h after dialysis    tJ Donohue MD  [facility], PGY-4  Cell: [phone]    Thank you for allowing us to participate in the care of this patient. We will continue to follow along. Case has been discussed with consult staff, who is in agreement with assessment and plan.    Subjective:      No acute events overnight. Denies any fevers, chills, NS, NV.     Objective:   Last 24 Hour Vital Signs:  BP  Min: 100/50  Max: 138/61  Temp  Av.9 °F (36.6 °C)  Min: 97.4 °F (36.3 °C)  Max: 98.5 °F (36.9 °C)  Pulse  Av.5  Min: 60  Max: 84  Resp  Av.3  Min: 18  Max: 20  SpO2  Av %  Min: 97 %  Max: 99 %  I/O last 3 completed shifts:  In: 1057.6 [P.O.:280; I.V.:182.4; Other:400; IV Piggyback:195.3]  Out: 4990  [Urine:50; Other:2400]    Physical Exam  Const: Well nourished, well developed  Eyes: PERRL, no conjunctival injection  HENT: NCAT, Neck supple  CV: RRR, no murmurs, rubs, or gallops. 2+ distal pulses  RESP: CTAB, Unlabored respiratory effort  GI: + BS, soft, non-tender, non-distended, no masses. Umbilical hernia present  Ext: No gross deformities appreciated. No edema. RUE graft (wrapped today after declotting). Right chest with scar from old HD access appears non infected  Skin: Warm, dry. No rashes   Neuro: Alert and oriented. Sensation and motor function of extremities grossly intact.  Psych: Appropriate mood and affect.    Laboratory:  Laboratory Data Reviewed: yes  Pertinent Findings:  Recent Labs   Lab 12/21/21  0839 12/21/21  0839 12/22/21  1223 12/23/21  0418 12/24/21  0426   WBC 12.40  --   --  8.22 7.82   HGB 10.2*  --   --  9.3* 8.6*   HCT 31.4*  --   --  28.3* 26.0*     --   --  152 132*   MCV 95  --   --  93 95   RDW 13.9  --   --  13.5 13.6   *   < > 132* 135*  134* 135*   K 4.5   < > 5.4* 3.7  3.7 3.6   CL 99   < > 101 98  98 102   CO2 18*   < > 14* 24  24 19*   *   < > 130* 75*  73* 51*   CREATININE 17.6*   < > 18.2* 11.3*  11.3* 9.2*   *   < > 185* 158*  158* 164*   PROT 6.1  --   --  6.3  6.2 6.0   ALBUMIN 2.8*   < > 2.3* 2.5*  2.5* 2.4*   BILITOT 0.5  --   --  0.5  0.5 0.4   AST 8*  --   --  14  13 11   ALKPHOS 90  --   --  71  71 64   ALT <5*  --   --  5*  <5* 5*    < > = values in this interval not displayed.         Microbiology Data:  Microbiology Results (last 7 days)     Procedure Component Value Units Date/Time    Blood Culture #2 **CANNOT BE ORDERED STAT** [[id number]]  (Abnormal) Collected: 12/21/21 0839    Order Status: Completed Specimen: Blood from Peripheral, Antecubital, Left Updated: 12/24/21 0830     Blood Culture, Routine Gram stain aer bottle: Gram positive cocci in chains resembling Strep       12/22/2021  10:51       Gram stain francisco javier  bottle: Gram positive cocci in chains resembling Strep       12/22/2021  10:51        Positive results previously called      ENTEROCOCCUS FAECALIS  For susceptibility see order #[id number]      Blood Culture #2 **CANNOT BE ORDERED STAT** [[id number]]  (Abnormal) Collected: 12/21/21 1049    Order Status: Completed Specimen: Blood Updated: 12/24/21 0829     Blood Culture, Routine Gram stain aer bottle:  Gram positive cocci in chains resembling Strep      Results called to and read back by: SUSAN BELLAMY RN  12/22/2021        05:58      ENTEROCOCCUS FAECALIS  For susceptibility see order #[id number]      Blood culture [[id number]]  (Abnormal) Collected: 12/22/21 0904    Order Status: Completed Specimen: Blood from Antecubital, Left Arm Updated: 12/24/21 0755     Blood Culture, Routine Gram stain aer bottle: Gram positive cocci in chains resembling Strep       Results called to and read back by: NIXON CAMACHO 12/23/2021  06:02      Gram stain francisco javier bottle: Gram positive cocci in chains resembling Strep       Positive results previously called      ENTEROCOCCUS SPECIES  Identification pending  For susceptibility see order #[id number]      Blood culture [[id number]]     Order Status: Sent Specimen: Blood     Blood culture [[id number]]     Order Status: Sent Specimen: Blood     Urine culture [[id number]] Collected: 12/21/21 1117    Order Status: Completed Specimen: Urine Updated: 12/22/21 2057     Urine Culture, Routine No growth    Narrative:      Specimen Source->Urine    Influenza A & B by Molecular [[id number]]     Order Status: Canceled Specimen: Nasopharyngeal Swab     Strep A culture, throat [[id number]]     Order Status: No result Specimen: Throat             Antimicrobials:  Antibiotics (From admission, onward)            Start     Stop Route Frequency Ordered    12/24/21 1700  vancomycin 500 mg in dextrose 5 % 100 mL IVPB (ready to mix system)         -- IV Once in dialysis 12/24/21 0532    12/24/21 0000   "ceFEPIme (MAXIPIME) 500 mg in dextrose 5 % 50 mL IVPB         -- IV Every Mon, Tues, Thurs, Sat 12/23/21 1533    12/22/21 1100  mupirocin 2 % ointment         12/27 0859 Nasl 2 times daily 12/22/21 0951    12/21/21 1313  vancomycin - pharmacy to dose  (vancomycin IVPB)        "And" Linked Group Details    -- IV pharmacy to manage frequency 12/21/21 1214        Antivirals (From admission, onward)    None        Antifungals (From admission, onward)            None              Other Results:  Radiology Results:  X-Ray Chest AP Portable    Result Date: 12/21/2021  EXAMINATION: XR CHEST AP PORTABLE COMPARISON: Comparison is made to 12/20/2021. FINDINGS: Cardioverter/defibrillator is again noted.  Heart is minimally enlarged, but the appearance of the cardiomediastinal silhouette is unchanged since the examination referenced above.  Pulmonary vascularity is normal, and there are no findings indicating current cardiac decompensation.  Lung zones are stable as well, free of significant superimposed airspace consolidation or volume loss.  No pleural fluid of any substantial volume is seen on either side.  No pneumothorax.  Calcification in the wall of the aortic arch is an incidental observation.     No significant detrimental interval change in the appearance of the chest since 12/20/2021 is appreciated. Electronically signed by: Wallace Boone MD Date:    12/21/2021 Time:    08:32    X-Ray Chest AP Portable    Result Date: 12/20/2021  EXAMINATION: XR CHEST AP PORTABLE CLINICAL HISTORY: Shortness of breath COMPARISON: 04/11/2020 FINDINGS: A cardiac pacemaker remains in place.  Its leads appear to be intact.  The size of the heart is prominent.  There is no focal pulmonary infiltrate visualized.  There is no pneumothorax.  The right costophrenic angle is sharp.  The left costophrenic angle is not well seen secondary to overlying ribs.     1. There is no focal pulmonary infiltrate visualized. 2. The size of the heart is " prominent.  This may be secondary to magnification. 3. A cardiac pacemaker remains in place. Its leads appear to be intact. 4. The left costophrenic angle is not well seen secondary to overlying ribs. . Electronically signed by: Rajinder Fry MD Date:    12/20/2021 Time:    09:58    US Hemodialysis Access    Result Date: 12/22/2021  EXAMINATION: US HEMODIALYSIS ACCESS CLINICAL HISTORY: not able to performe dialysis; TECHNIQUE: Grayscale, color Doppler and spectral analysis of right upper extremity AV fistula was obtained. COMPARISON: 02/21/2020 FINDINGS: There is a right upper extremity AV fistula.  There is residual partial thrombus within the AV fistula through the midportion.  A stent is seen also within the mid AV fistula with proximal stenosis.  There is good flow throughout all the fistula.  Flow volume however appears low at 75 cc/second.  There is a partially visualized hypoechoic collection distal to the AV fistula measuring approximately 3.8 x 1.8 cm which may represent a seroma or hematoma.  Approximately the AV fistula measures 0.4 cm in diameter is approximately 0.4 cm from the skin.  Its midportion the fistula measures 0.8 cm in is 0.5 cm from the skin.  Distally the AV fistula measures 0.8 cm and is approximately 0.9 cm from the skin.     Partial nonocclusive thrombus in the midportion of the AV fistula with apparent stenosis proximal to the stent.  There are low flow volumes.  Recommend repeat fistulagram and thrombectomy. Partially visualized complex collection deep to the distal aspect of the fistula that could represent a small hematoma or seroma.  Correlation advised. Electronically signed by: Remington Pratt MD Date:    12/22/2021 Time:    11:00    Echo    Result Date: 12/21/2021  · The left ventricle is moderately enlarged with mild concentric hypertrophy and · The quantitatively derived ejection fraction is 34%. · Severe left atrial enlargement. · Normal right ventricular size with normal  right ventricular systolic function. · Normal central venous pressure (3 mmHg). · The estimated PA systolic pressure is 21 mmHg. · No vegetations per surface echo.      Cardiac device check - Remote    Result Date: 12/10/2021  Additional Comments 90-day Remote Interrogation Report CRTD, DDDR  Presenting egram demonstrates ASBP Device function WNL Autocapture algorithms are On RA pace 60%, BiV pacing 98% Atrial arrhythmias:  NONE Anticoagulation status:  Eliquis Ventricular arrhythmias:  Non sustained event x 1, 4 seconds, c/w nsVT. Battery status/longevity:  5 yrs. Return to clinic in February 2022 F/u via remote interrogation in 3 months. Report prepared by SAMIA Alejo RN      Current Medications:     Infusions:       Scheduled:   apixaban  5 mg Oral BID    carvediloL  6.25 mg Oral BID    ceFEPime (MAXIPIME) IVPB  500 mg Intravenous Every Mon, Tues, Thurs, Sat    clopidogreL  75 mg Oral Daily    insulin detemir U-100  5 Units Subcutaneous QHS    mupirocin   Nasal BID    vancomycin (VANCOCIN) IVPB  500 mg Intravenous Once in dialysis        PRN:  sodium chloride 0.9%, acetaminophen, benzonatate, dextrose 50%, dextrose 50%, glucagon (human recombinant), glucose, glucose, sodium chloride 0.9%, sodium chloride 0.9%, Pharmacy to dose Vancomycin consult **AND** vancomycin - pharmacy to dose

## 2021-12-24 NOTE — PROGRESS NOTES
[facility] Hospital Medicine Progress Note    Primary Team: [facility] Hospitalist Team A  Attending Physician: Lonnie Mcdaniels MD  Resident: Christian / Melodie  Intern: Boyd / Juan Luis    Subjective:      Mr. Figueroa reported feeling OK today with no new complaints. He reported no shortness of breath, fever, chills, nausea, vomiting. He did endorse chest discomfort at the same area as prior (old access site that was ultrasounded without findings)    Objective:     Last 24 Hour Vital Signs:  BP  Min: 100/57  Max: 138/61  Temp  Av °F (36.7 °C)  Min: 97.4 °F (36.3 °C)  Max: 98.6 °F (37 °C)  Pulse  Av.4  Min: 60  Max: 86  Resp  Av.6  Min: 18  Max: 20  SpO2  Av.2 %  Min: 94 %  Max: 99 %  I/O last 3 completed shifts:  In: 1677.6 [P.O.:400; I.V.:182.4; Other:900; IV Piggyback:195.3]  Out: 2950 [Urine:50; Other:2900]    Physical Examination:  Const: Well nourished, well developed  Eyes: PERRL, no conjunctival injection  HENT: NCAT, Neck supple  CV: RRR, no murmurs, rubs, or gallops. 2+ distal pulses  RESP: CTAB, Unlabored respiratory effort  GI: + BS, soft, non-tender, non-distended, no masses. Umbilical hernia present  Ext: No gross deformities appreciated. No edema. RUE graft.  Right chest with scar from old HD access appears non infected  Skin: Warm, dry. No rashes   Neuro: Alert and oriented. Sensation and motor function of extremities grossly intact.  Psych: Appropriate mood and affect.    Laboratory:  Laboratory Data Reviewed: yes  Pertinent Findings:  Recent Labs   Lab 21  0839 21  0839 21  1223 21  0418 21  0426   WBC 12.40  --   --  8.22 7.82   HGB 10.2*  --   --  9.3* 8.6*   HCT 31.4*  --   --  28.3* 26.0*     --   --  152 132*   *   < > 132* 135*  134* 135*   K 4.5   < > 5.4* 3.7  3.7 3.6   CL 99   < > 101 98  98 102   CREATININE 17.6*   < > 18.2* 11.3*  11.3* 9.2*   *   < > 130* 75*  73* 51*   CO2 18*   < > 14* 24  24 19*   ALT <5*  --   --  5*  <5* 5*    AST 8*  --   --  14  13 11    < > = values in this interval not displayed.         Microbiology Data Reviewed: yes  Pertinent Findings:  Microbiology Results (last 7 days)     Procedure Component Value Units Date/Time    Blood culture [[id number]] Collected: 12/22/21 0904    Order Status: Completed Specimen: Blood from Antecubital, Left Arm Updated: 12/23/21 1459     Blood Culture, Routine Gram stain aer bottle: Gram positive cocci in chains resembling Strep       Results called to and read back by: NIXON CAMACHO 12/23/2021  06:02      Gram stain francisco javier bottle: Gram positive cocci in chains resembling Strep       Positive results previously called    Blood Culture #2 **CANNOT BE ORDERED STAT** [[id number]]  (Abnormal) Collected: 12/21/21 0839    Order Status: Completed Specimen: Blood from Peripheral, Antecubital, Left Updated: 12/23/21 1235     Blood Culture, Routine Gram stain aer bottle: Gram positive cocci in chains resembling Strep       12/22/2021  10:51       Gram stain francisco javier bottle: Gram positive cocci in chains resembling Strep       12/22/2021  10:51        Positive results previously called      ENTEROCOCCUS FAECALIS  Identification pending  For susceptibility see order #[id number]      Blood Culture #2 **CANNOT BE ORDERED STAT** [[id number]]  (Abnormal) Collected: 12/21/21 1049    Order Status: Completed Specimen: Blood Updated: 12/23/21 1234     Blood Culture, Routine Gram stain aer bottle:  Gram positive cocci in chains resembling Strep      Results called to and read back by: SUSAN BELLAMY RN  12/22/2021        05:58      ENTEROCOCCUS FAECALIS  For susceptibility see order #[id number]      Blood culture [[id number]]     Order Status: Sent Specimen: Blood     Blood culture [[id number]]     Order Status: Sent Specimen: Blood     Urine culture [[id number]] Collected: 12/21/21 1117    Order Status: Completed Specimen: Urine Updated: 12/22/21 2057     Urine Culture, Routine No growth    Narrative:       Specimen Source->Urine    Influenza A & B by Molecular [[id number]]     Order Status: Canceled Specimen: Nasopharyngeal Swab     Strep A culture, throat [[id number]]     Order Status: No result Specimen: Throat         Outside blood cx 12/20: enterococcus    Other Results:  EKG (my interpretation): atrial sensed ventricular paced rhythm    Radiology Data Reviewed: yes  Pertinent Findings:  US Soft Tissue Chest_Upper Back   Final Result      No abnormal sonographic findings identified.  Clinical management suggested.         Electronically signed by: Adrian Jeronimo MD   Date:    12/23/2021   Time:    14:38      US Hemodialysis Access   Final Result      Partial nonocclusive thrombus in the midportion of the AV fistula with apparent stenosis proximal to the stent.  There are low flow volumes.  Recommend repeat fistulagram and thrombectomy.      Partially visualized complex collection deep to the distal aspect of the fistula that could represent a small hematoma or seroma.  Correlation advised.         Electronically signed by: Remington Pratt MD   Date:    12/22/2021   Time:    11:00      X-Ray Chest AP Portable   Final Result      No significant detrimental interval change in the appearance of the chest since 12/20/2021 is appreciated.         Electronically signed by: Wallace Boone MD   Date:    12/21/2021   Time:    08:32            Current Medications:     Infusions:       Scheduled:   apixaban  5 mg Oral BID    carvediloL  6.25 mg Oral BID    ceFEPime (MAXIPIME) IVPB  500 mg Intravenous Every Mon, Tues, Thurs, Sat    clopidogreL  75 mg Oral Daily    insulin detemir U-100  5 Units Subcutaneous QHS    mupirocin   Nasal BID    vancomycin (VANCOCIN) IVPB  500 mg Intravenous Once in dialysis        PRN:  sodium chloride 0.9%, acetaminophen, benzonatate, dextrose 50%, dextrose 50%, glucagon (human recombinant), glucose, glucose, sodium chloride 0.9%, sodium chloride 0.9%, Pharmacy to dose Vancomycin consult **AND**  vancomycin - pharmacy to dose    Antibiotics and Day Number of Therapy:  Rocephin 12/21-  Vanc 12/21-    Lines and Day Number of Therapy:  PIV 12/21  HD AV graft right forearm    Assessment:     Houston Jc is a 76 y.o. male with PMH of ESRD (TTS), CHF, CAD, HTN, complete heart block s/p ICD, DM, DVT, and PAD presents for bacteremia after his blood cultures came back positive yesterday from an outside facility. Pt admitted for dialysis (last session 5 days ago, missed 1 session) and further work up of bacteremia. He has vancomycin on board for the enterococcus bacteremia and is on ceftriaxone for his UTI per ID recs.      Plan:     Enterococcus bacteremia  -pt had cough with intermittent sputum for 4 days  -Blood culture (12/20)-positive for Enterococcus, pending sensitivities  -UA with 3+ leukocytes, >100 RBC, 32 WBC on admission  -lactic acid WNL  -CXR-no focal infiltrate  - Started rocephin, vanc (12/21)   - Blood cx 12/21-positive for gram + cocci resembling Strep  - ID consulted, recommend continue vanc (trough 15-20)   - Echo without vegetations  - Present in 1/4 bottles  - Sensitive to vanc, gent, ampicillin  - US chest negative for abscess/fluid collection  - Repeat blood cx ordered, pending    ESRD on HD (TTS)  -missed last session on Saturday (12/18)  -Nephro consulted-clotted AVG, US AVG  -surgery declotted AVG 12/21  - Daily CMP  - Renally dose all meds; avoiding hypotension  - S/p declotting HD AVF  - HD today    Klebsiella UTI  - Positive urine culture 12/21  - Resistant to ceftriaxone and NFT  - Continue cefepime     Acute on Chronic HFrEF  -TTE with EF 34%, no vegetations on surface echo  -, Trop 0.793  -trop downtrending     Acute V-tach Runs  - per AI deterioration alert: 2 runs of V-tach on 12/23 (6 beat and 9 beat)  - Device check 11/24/2021 with short runs of V-tach  - Troponin ordered and unchanged from prior  - EKG similar to prior  - Will continue to monitor   - Continue coreg  6.25    Cough  -continue tessalon perle     DVT (RLE popliteal vein)  Continue home eliquis     Hypertension  143/75 on presentation  - Continue coreg 6.25     HLD  -pt taken off lipitor by PCP  -ordered lipid panel     Diabetes Mellitus Type II  Glucose 195 on presentation  Pending A1c  Start Levemir 5U     Normocytic Anemia  -10.2/31.4 on presentation  -monitor and trend      Code: Full  Diet: Renal  PPx: apixaban  Dispo: Admit to floor for workup of bacteremia    Francisco Mcknight MD  [facility] Internal Medicine HO-I    [facility] Medicine Hospitalist Pager numbers:   [facility] Hospitalist Medicine Team A (Abhinav/Jovani): [phone]  [facility] Hospitalist Medicine Team B (Robert/Enedelia):  [phone]

## 2021-12-24 NOTE — PROCEDURES
"Patient seen and examined on HD tolerating well. No complains.   BP (!) 100/50 (Patient Position: Lying)   Pulse 84   Temp 97.5 °F (36.4 °C) (Oral)   Resp 18   Ht 6' 1" (1.854 m)   Wt 126 kg (277 lb 12.5 oz)   SpO2 98%   BMI 36.65 kg/m²     With any question please call answering service [phone]  Laura Ortiz MD    [facility]  ANA MARIA Burns MD, KEREN GAYTAN MD,   MD DAMION Gill MD E. V. Harmon, NP    [address]  [place], [address]  "

## 2021-12-24 NOTE — PLAN OF CARE
PT A/O X 4. Plan of care reviewed with patient. Patient verbalized understanding. PT was dialyzed today. Medicated per MAR. Safety precautions maintained. Instructed to use call light for assistance. Call light in reach.

## 2021-12-24 NOTE — PROGRESS NOTES
12/24/21 1245   Post-Hemodialysis Assessment   Post-Hemodialysis Comments pt rinsed back, deaccessed, pressure held until hemostasis acheived, pressure dressing in place, report called to RN on floor. net 1500 removed.

## 2021-12-24 NOTE — PROGRESS NOTES
Pharmacokinetic Assessment Follow Up: IV Vancomycin    Vancomycin serum concentration assessment(s):    The random level was drawn correctly and can be used to guide therapy at this time. The measurement is within the desired definitive target range of 15 to 20 mcg/mL.    Vancomycin Regimen Plan:    Re-dose when the random level is less than 25 mcg/mL, next level to be drawn at 12/25/21 on 0400    Drug levels (last 3 results):  Recent Labs   Lab Result Units 12/22/21  0335 12/23/21  0418 12/24/21  0426   Vancomycin, Random ug/mL 20.4 24.0 23.1       Pharmacy will continue to follow and monitor vancomycin.    Please contact pharmacy at extension [phone] for questions regarding this assessment.    Thank you for the consult,   Remy Mcdonough       Patient brief summary:  Houston Jc is a 76 y.o. male initiated on antimicrobial therapy with IV Vancomycin for treatment of bacteremia    The patient's current regimen is pulse dosing after dialysis give vanco 500mg after dialysis     Drug Allergies:   Review of patient's allergies indicates:  No Known Allergies    Actual Body Weight:   126kg    Renal Function:   Estimated Creatinine Clearance: 9.8 mL/min (A) (based on SCr of 9.2 mg/dL (H)).,     Dialysis Method (if applicable):  intermittent HD    CBC (last 72 hours):  Recent Labs   Lab Result Units 12/21/21  0839 12/23/21  0418 12/24/21  0426   WBC K/uL 12.40 8.22 7.82   Hemoglobin g/dL 10.2* 9.3* 8.6*   Hematocrit % 31.4* 28.3* 26.0*   Platelets K/uL 164 152 132*   Gran % % 85.8* 69.5 70.6   Lymph % % 6.8* 17.6* 16.8*   Mono % % 6.0 8.9 10.5   Eosinophil % % 0.7 3.0 1.3   Basophil % % 0.2 0.6 0.4   Differential Method  Automated Automated Automated       Metabolic Panel (last 72 hours):  Recent Labs   Lab Result Units 12/21/21  0839 12/21/21  1117 12/22/21  1223 12/23/21  0418 12/23/21  0501 12/24/21  0426   Sodium mmol/L 135*  --  132* 135*  134*  --  135*   Potassium mmol/L 4.5  --  5.4* 3.7  3.7  --  3.6   Chloride  mmol/L 99  --  101 98  98  --  102   CO2 mmol/L 18*  --  14* 24  24  --  19*   Glucose mg/dL 195*  --  185* 158*  158*  --  164*   Glucose, UA   --  Negative  --   --   --   --    BUN mg/dL 127*  --  130* 75*  73*  --  51*   Creatinine mg/dL 17.6*  --  18.2* 11.3*  11.3*  --  9.2*   Albumin g/dL 2.8*  --  2.3* 2.5*  2.5*  --  2.4*   Total Bilirubin mg/dL 0.5  --   --  0.5  0.5  --  0.4   Alkaline Phosphatase U/L 90  --   --  71  71  --  64   AST U/L 8*  --   --  14  13  --  11   ALT U/L <5*  --   --  5*  <5*  --  5*   Magnesium mg/dL  --   --   --   --  1.7  --    Phosphorus mg/dL  --   --  8.5*  --   --   --        Vancomycin Administrations:  vancomycin given in the last 96 hours                     vancomycin 500 mg in dextrose 5 % 100 mL IVPB (ready to mix system) (mg) 500 mg New Bag 12/23/21 1837    vancomycin 500 mg in dextrose 5 % 100 mL IVPB (ready to mix system) (mg) 500 mg New Bag 12/22/21 2138    vancomycin injection (g) 1 g Given 12/21/21 1639    vancomycin (VANCOCIN) 2,000 mg in dextrose 5 % 500 mL IVPB (mg) 2,000 mg New Bag 12/21/21 1024    vancomycin in dextrose 5 % 1 gram/250 mL IVPB 1,000 mg (mg) 1,000 mg New Bag 12/20/21 1138                    Microbiologic Results:  Microbiology Results (last 7 days)       Procedure Component Value Units Date/Time    Blood culture [[id number]] Collected: 12/22/21 0904    Order Status: Completed Specimen: Blood from Antecubital, Left Arm Updated: 12/23/21 1459     Blood Culture, Routine Gram stain aer bottle: Gram positive cocci in chains resembling Strep       Results called to and read back by: NIXON CAMACHO 12/23/2021  06:02      Gram stain francisco javier bottle: Gram positive cocci in chains resembling Strep       Positive results previously called    Blood Culture #2 **CANNOT BE ORDERED STAT** [[id number]]  (Abnormal) Collected: 12/21/21 0842    Order Status: Completed Specimen: Blood from Peripheral, Antecubital, Left Updated: 12/23/21 1235     Blood  Culture, Routine Gram stain aer bottle: Gram positive cocci in chains resembling Strep       12/22/2021  10:51       Gram stain francisco javier bottle: Gram positive cocci in chains resembling Strep       12/22/2021  10:51        Positive results previously called      ENTEROCOCCUS FAECALIS  Identification pending  For susceptibility see order #[id number]      Blood Culture #2 **CANNOT BE ORDERED STAT** [[id number]]  (Abnormal) Collected: 12/21/21 1049    Order Status: Completed Specimen: Blood Updated: 12/23/21 1234     Blood Culture, Routine Gram stain aer bottle:  Gram positive cocci in chains resembling Strep      Results called to and read back by: SUSAN BELLAMY RN  12/22/2021        05:58      ENTEROCOCCUS FAECALIS  For susceptibility see order #[id number]      Blood culture [[id number]]     Order Status: Sent Specimen: Blood     Blood culture [[id number]]     Order Status: Sent Specimen: Blood     Urine culture [[id number]] Collected: 12/21/21 1117    Order Status: Completed Specimen: Urine Updated: 12/22/21 2057     Urine Culture, Routine No growth    Narrative:      Specimen Source->Urine    Influenza A & B by Molecular [[id number]]     Order Status: Canceled Specimen: Nasopharyngeal Swab     Strep A culture, throat [[id number]]     Order Status: No result Specimen: Throat

## 2021-12-25 NOTE — PLAN OF CARE
PT A/OX 4. Plan of care reviewed with patient. Pt verbalized understanding. Medicated per MAR, medications reviewed with patient. Safety precautions maintained. Instructed to use call light for assistance. Call light in reach.

## 2021-12-25 NOTE — PLAN OF CARE
Problem: Adult Inpatient Plan of Care  Goal: Plan of Care Review  Outcome: Ongoing   VIRTUAL NURSE:  Labs, notes, orders, and careplan reviewed.

## 2021-12-25 NOTE — PLAN OF CARE
Scheduled medications administered per MD order. Afebrile. Blood glucose monitored. Safety maintained.

## 2021-12-25 NOTE — PROGRESS NOTES
Nephrology Progress Note       Consult Requested By: Lonnie Mcdaniels MD  Reason for Consult: ESRD on HD     SUBJECTIVE:        ?    Review of Systems   Constitutional: Negative for chills and fever.   HENT: Negative for congestion and sore throat.    Eyes: Negative for blurred vision, double vision and photophobia.   Respiratory: Positive for cough and shortness of breath.    Cardiovascular: Negative for chest pain, palpitations and leg swelling.   Gastrointestinal: Negative for abdominal pain, diarrhea, nausea and vomiting.   Genitourinary: Negative for dysuria and urgency.   Musculoskeletal: Negative for joint pain and myalgias.   Skin: Negative for itching and rash.   Neurological: Negative for dizziness, sensory change, weakness and headaches.   Endo/Heme/Allergies: Negative for polydipsia. Does not bruise/bleed easily.   Psychiatric/Behavioral: Negative for depression.       Past Medical History:   Diagnosis Date    Anemia     CHF (congestive heart failure)     CKD (chronic kidney disease) stage 4, GFR 15-29 ml/min     Coronary artery disease     Diabetes mellitus     Hematuria     Hypertension     Renal cyst, right      Past Surgical History:   Procedure Laterality Date    DECLOTTING OF ARTERIOVENOUS GRAFT Right 12/21/2021    Procedure: [id number];  Surgeon: Jeison Hunt MD;  Location: [facility] OR;  Service: Vascular;  Laterality: Right;    INSERTION OF BIVENTRICULAR IMPLANTABLE CARDIOVERTER-DEFIBRILLATOR (ICD) Left 7/18/2019    Procedure: INSERTION, ICD, BIVENTRICULAR;  Surgeon: Arturo Bay MD;  Location: [facility] EP LAB;  Service: Cardiology;  Laterality: Left;  CHB, CRTD, SJM, anes, GP, 6078    LEFT HEART CATHETERIZATION N/A 7/16/2019    Procedure: Left heart cath;  Surgeon: Dejuan Cody MD;  Location: [facility] CATH LAB/EP;  Service: Cardiology;  Laterality: N/A;    PERITONEAL CATHETER REMOVAL Left 4/11/2020    Procedure: REMOVAL, CATHETER, DIALYSIS, PERITONEAL;  Surgeon: Edis ORANTES  Alis Saini MD;  Location: [facility] OR;  Service: General;  Laterality: Left;     Family History   Problem Relation Age of Onset    Heart attack Father      Social History     Tobacco Use    Smoking status: Former Smoker    Smokeless tobacco: Never Used   Substance Use Topics    Alcohol use: Yes     Comment: social    Drug use: No       Review of patient's allergies indicates:  No Known Allergies         OBJECTIVE:     Vital Signs (Most Recent)  Vitals:    12/25/21 0344 12/25/21 0400 12/25/21 0747 12/25/21 1126   BP: (!) 115/57  (!) 121/56 (!) 108/54   Pulse: 72 66 77 61   Resp: 18  18 18   Temp: 98.4 °F (36.9 °C)  97.7 °F (36.5 °C) 97.9 °F (36.6 °C)   TempSrc:       SpO2:   97% 97%   Weight:       Height:             Date 12/25/21 0700 - 12/26/21 0659   Shift 3317-5443 3492-6410 0764-1180 24 Hour Total   INTAKE   P.O. 120   120   Shift Total(mL/kg) 120(1)   120(1)   OUTPUT   Shift Total(mL/kg)       Weight (kg) 126 126 126 126           Medications:   apixaban  5 mg Oral BID    carvediloL  6.25 mg Oral BID    ceFEPime (MAXIPIME) IVPB  500 mg Intravenous Every Mon, Tues, Thurs, Sat    clopidogreL  75 mg Oral Daily    insulin detemir U-100  5 Units Subcutaneous QHS    mupirocin   Nasal BID           Physical Exam  Vitals and nursing note reviewed.   Constitutional:       General: He is not in acute distress.     Appearance: He is not diaphoretic.   HENT:      Head: Normocephalic and atraumatic.      Mouth/Throat:      Pharynx: No oropharyngeal exudate.   Eyes:      General: No scleral icterus.     Conjunctiva/sclera: Conjunctivae normal.      Pupils: Pupils are equal, round, and reactive to light.   Cardiovascular:      Rate and Rhythm: Normal rate and regular rhythm.      Heart sounds: Normal heart sounds. No murmur heard.      Pulmonary:      Effort: Pulmonary effort is normal. No respiratory distress.      Breath sounds: Normal breath sounds.   Abdominal:      General: Bowel sounds are normal. There is  no distension.      Palpations: Abdomen is soft.      Tenderness: There is no abdominal tenderness.   Musculoskeletal:         General: Normal range of motion.      Cervical back: Normal range of motion and neck supple.      Comments: RUE AVG   thrill    Skin:     General: Skin is warm and dry.      Findings: No erythema.   Neurological:      Mental Status: He is alert and oriented to person, place, and time.      Cranial Nerves: No cranial nerve deficit.   Psychiatric:         Mood and Affect: Affect normal.         Cognition and Memory: Memory normal.         Judgment: Judgment normal.         Laboratory:  Recent Labs   Lab 12/23/21  0418 12/23/21  0418 12/24/21  0426 12/25/21  0325   WBC 8.22  --  7.82 7.55   HGB 9.3*  --  8.6* 9.4*   HCT 28.3*  --  26.0* 29.6*     --  132* 183   MONO 8.9  0.7   < > 10.5  0.8 9.5  0.7    < > = values in this interval not displayed.     Recent Labs   Lab 12/22/21  1223 12/22/21  1223 12/23/21 0418 12/24/21  0426 12/25/21  0325   *   < > 135*  134* 135* 135*   K 5.4*   < > 3.7  3.7 3.6 3.5      < > 98  98 102 97   CO2 14*   < > 24  24 19* 22*   *   < > 75*  73* 51* 37*   CREATININE 18.2*   < > 11.3*  11.3* 9.2* 7.6*   CALCIUM 8.6*   < > 8.7  8.6* 8.6* 9.1   PHOS 8.5*  --   --   --   --     < > = values in this interval not displayed.       Diagnostic Results:  X-Ray: Reviewed  US: Reviewed  Echo: Reviewed  ASSESSMENT/PLAN:     1. ESRD on HD TTS with Dr Decker  -- Last HD Thursday missed Saturday didn't feel well  -- here with clotted AVGs/p declot  HD  No issues  .  -- Daily Renal Function Panel  -- Avoid Hypotension.  -- Renally dose all meds  -- HD Monday     Bacteremia   -- BC  X+ for G+ cocci strep  -- Given IV  abx  In ED Zosyn and  Vanc   -- F/U blood culture     2. HTN (I10) -    3. Anemia of chronic kidney disease treated with ROSALVA (N18.9 D63.1)    EPogen  with each HD - hold for now Hb >10   Recent Labs   Lab 12/23/21  0833  12/24/21  0426 12/25/21  0325   HGB 9.3* 8.6* 9.4*   HCT 28.3* 26.0* 29.6*    132* 183       Iron   Lab Results   Component Value Date    IRON 63 12/18/2019    TIBC 263 12/18/2019    FERRITIN 6,078 (H) 03/28/2020       4. MBD (E88.9 M90.80) -  Recent Labs   Lab 12/22/21  1223 12/23/21  0418 12/25/21  0325   CALCIUM 8.6*   < > 9.1   PHOS 8.5*  --   --     < > = values in this interval not displayed.     Recent Labs   Lab 12/23/21  0501   MG 1.7       Lab Results   Component Value Date    .0 (H) 03/04/2020    CALCIUM 9.1 12/25/2021    PHOS 8.5 (H) 12/22/2021     No results found for: [id number]    Lab Results   Component Value Date    CO2 22 (L) 12/25/2021       5. Nutrition/Hypoalbuminemia (E88.09) -    Recent Labs   Lab 12/24/21  0426 12/25/21  0325   ALBUMIN 2.4* 2.6*     Nepro with meals TID. Renal vitamins daily      With any question please call answering service [phone]  Laura Ortiz MD    [facility]  ANA MARIA Burns MD, FACPKEREN MD,   MD DAMION Gill MD E. V. Harmon, NP    [address]  [place], [address]

## 2021-12-25 NOTE — PROGRESS NOTES
[facility] Hospital Medicine Progress Note    Primary Team: [facility] Hospitalist Team A  Attending Physician: Lonnie Mcdaniels MD  Resident: Christian / Melodie  Intern: Boyd / Juan Luis    Subjective:      NAEON. No new complaints. Reports less frequent cough but is still productive occasionally. Denies shortness of breath, fever, chills, nausea, vomiting, HA, and AP. He did endorse chest discomfort at the same area as prior (old access site that was ultrasounded without findings) but its getting better.    Objective:     Last 24 Hour Vital Signs:  BP  Min: 95/52  Max: 129/60  Temp  Av.9 °F (36.6 °C)  Min: 97.5 °F (36.4 °C)  Max: 98.4 °F (36.9 °C)  Pulse  Av.1  Min: 59  Max: 84  Resp  Av  Min: 16  Max: 20  SpO2  Av.3 %  Min: 95 %  Max: 99 %  I/O last 3 completed shifts:  In: 1056.5 [P.O.:360; I.V.:99.6; Other:500; IV Piggyback:96.9]  Out:  [Other:]    Physical Examination:  Const: Well nourished, well developed  Eyes: PERRL, no conjunctival injection  HENT: NCAT, Neck supple  CV: RRR, no murmurs, rubs, or gallops. 2+ distal pulses  RESP: CTAB, Unlabored respiratory effort  GI: + BS, soft, non-tender, non-distended, no masses. Umbilical hernia present  Ext: No gross deformities appreciated. No edema. RUE graft.  Right chest with scar from old HD access appears non infected  Skin: Warm, dry. No rashes   Neuro: Alert and oriented. Sensation and motor function of extremities grossly intact.  Psych: Appropriate mood and affect.    Laboratory:  Laboratory Data Reviewed: yes  Pertinent Findings:  Recent Labs   Lab 21  0418 21  0426 21  0325   WBC 8.22 7.82 7.55   HGB 9.3* 8.6* 9.4*   HCT 28.3* 26.0* 29.6*    132* 183   *  134* 135* 135*   K 3.7  3.7 3.6 3.5   CL 98  98 102 97   CREATININE 11.3*  11.3* 9.2* 7.6*   BUN 75*  73* 51* 37*   CO2 24  24 19* 22*   ALT 5*  <5* 5* 7*   AST 14  13 11 17         Microbiology Data Reviewed: yes  Pertinent Findings:  Microbiology Results  (last 7 days)     Procedure Component Value Units Date/Time    Blood culture [[id number]] Collected: 12/24/21 2012    Order Status: Completed Specimen: Blood Updated: 12/25/21 0515     Blood Culture, Routine No Growth to date    Narrative:      Collection has been rescheduled by [email or web address] at 12/23/2021 10:10 Reason:   Patient unavailable going todia  Collection has been rescheduled by [email or web address] at 12/23/2021 12:49 Reason:   Patient unavailable not in room   Collection has been rescheduled by [email or web address] at 12/23/2021 14:18   Collection has been rescheduled by [email or web address] at 12/23/2021 10:10 Reason:   Patient unavailable going todia  Collection has been rescheduled by [email or web address] at 12/23/2021 12:49 Reason:   Patient unavailable not in room   Collection has been rescheduled by [email or web address] at 12/23/2021 14:18     Blood culture [[id number]] Collected: 12/24/21 2118    Order Status: Completed Specimen: Blood Updated: 12/25/21 0515     Blood Culture, Routine No Growth to date    Narrative:      Collection has been rescheduled by [email or web address] at 12/23/2021 10:10 Reason:   Patient unavailable going todia  Collection has been rescheduled by [email or web address] at 12/23/2021 12:49 Reason:   Patient unavailable not in room   Collection has been rescheduled by [email or web address] at 12/23/2021 14:18   Collection has been rescheduled by [email or web address] at 12/23/2021 10:10 Reason:   Patient unavailable going todia  Collection has been rescheduled by [email or web address] at 12/23/2021 12:49 Reason:   Patient unavailable not in room   Collection has been rescheduled by [email or web address] at 12/23/2021 14:18     Blood culture [[id number]]  (Abnormal) Collected: 12/22/21 0904    Order Status: Completed Specimen: Blood from Antecubital, Left Arm Updated: 12/24/21 1049     Blood Culture, Routine Gram stain aer bottle: Gram positive cocci in chains resembling Strep       Results called to and read back by: NIXON CAMACHO 12/23/2021  06:02      Gram stain francisco javier bottle: Gram positive cocci in chains resembling Strep       Positive results previously  called      ENTEROCOCCUS FAECALIS  For susceptibility see order #[id number]      Blood Culture #2 **CANNOT BE ORDERED STAT** [[id number]]  (Abnormal) Collected: 12/21/21 0839    Order Status: Completed Specimen: Blood from Peripheral, Antecubital, Left Updated: 12/24/21 0830     Blood Culture, Routine Gram stain aer bottle: Gram positive cocci in chains resembling Strep       12/22/2021  10:51       Gram stain francisco javier bottle: Gram positive cocci in chains resembling Strep       12/22/2021  10:51        Positive results previously called      ENTEROCOCCUS FAECALIS  For susceptibility see order #[id number]      Blood Culture #2 **CANNOT BE ORDERED STAT** [[id number]]  (Abnormal) Collected: 12/21/21 1049    Order Status: Completed Specimen: Blood Updated: 12/24/21 0829     Blood Culture, Routine Gram stain aer bottle:  Gram positive cocci in chains resembling Strep      Results called to and read back by: SUSAN BELLAMY RN  12/22/2021        05:58      ENTEROCOCCUS FAECALIS  For susceptibility see order #[id number]      Urine culture [[id number]] Collected: 12/21/21 1117    Order Status: Completed Specimen: Urine Updated: 12/22/21 2057     Urine Culture, Routine No growth    Narrative:      Specimen Source->Urine    Influenza A & B by Molecular [[id number]]     Order Status: Canceled Specimen: Nasopharyngeal Swab     Strep A culture, throat [[id number]]     Order Status: No result Specimen: Throat         Outside blood cx 12/20: enterococcus    Other Results:  EKG (my interpretation): atrial sensed ventricular paced rhythm    Radiology Data Reviewed: yes  Pertinent Findings:  US Soft Tissue Chest_Upper Back   Final Result      No abnormal sonographic findings identified.  Clinical management suggested.         Electronically signed by: Adrian Jeronimo MD   Date:    12/23/2021   Time:    14:38      US Hemodialysis Access   Final Result      Partial nonocclusive thrombus in the midportion of the AV fistula with apparent  stenosis proximal to the stent.  There are low flow volumes.  Recommend repeat fistulagram and thrombectomy.      Partially visualized complex collection deep to the distal aspect of the fistula that could represent a small hematoma or seroma.  Correlation advised.         Electronically signed by: Remington Pratt MD   Date:    12/22/2021   Time:    11:00      X-Ray Chest AP Portable   Final Result      No significant detrimental interval change in the appearance of the chest since 12/20/2021 is appreciated.         Electronically signed by: Wallace Boone MD   Date:    12/21/2021   Time:    08:32            Current Medications:     Infusions:       Scheduled:   apixaban  5 mg Oral BID    carvediloL  6.25 mg Oral BID    ceFEPime (MAXIPIME) IVPB  500 mg Intravenous Every Mon, Tues, Thurs, Sat    clopidogreL  75 mg Oral Daily    insulin detemir U-100  5 Units Subcutaneous QHS    mupirocin   Nasal BID        PRN:  sodium chloride 0.9%, acetaminophen, benzonatate, dextrose 50%, dextrose 50%, glucagon (human recombinant), glucose, glucose, sodium chloride 0.9%, sodium chloride 0.9%, Pharmacy to dose Vancomycin consult **AND** vancomycin - pharmacy to dose    Antibiotics and Day Number of Therapy:  Rocephin 12/21-12/22  Vanc 12/21-  Cefepime 12/23, 12.25-  Lines and Day Number of Therapy:  PIV 12/21  HD AV graft right forearm    Assessment:     Houston Jc is a 76 y.o. male with PMH of ESRD (TTS), CHF, CAD, HTN, complete heart block s/p ICD, DM, DVT, and PAD presents for bacteremia after his blood cultures came back positive from an outside facility. Pt admitted for dialysis and further work up of bacteremia. He has vancomycin on board for the enterococcus bacteremia and is now on cefepime for his UTI per ID recs.      Plan:     Enterococcus bacteremia  -pt had cough with intermittent sputum for 4 days  -Blood culture (12/20)-positive for Enterococcus, pending sensitivities  -UA with 3+ leukocytes, >100 RBC, 32 WBC on  admission  -lactic acid WNL  -CXR-no focal infiltrate  - Started rocephin, vanc (12/21)   - Blood cx 12/21-positive for gram + cocci resembling Strep  - ID consulted, recommend continue vanc (trough 15-20)   - Echo without vegetations  - Present in 1/4 bottles  - Sensitive to vanc, gent, ampicillin  - US chest negative for abscess/fluid collection  - Repeat blood cx ordered-NGTD    ESRD on HD (TTS)  -missed session on Saturday (12/18)  -Nephro consulted-clotted AVG, US AVG  -surgery declotted AVG 12/21  - Daily CMP  - Renally dose all meds; avoiding hypotension  - S/p declotting HD AVF  - HD yesterday, tolerated well    Klebsiella UTI  - Positive urine culture 12/21  - Resistant to ceftriaxone and NFT  - Continue cefepime     Acute on Chronic HFrEF  -TTE with EF 34%, no vegetations on surface echo  -, Trop 0.793  -trop downtrending     Acute V-tach Runs  - per AI deterioration alert: 2 runs of V-tach on 12/23 (6 beat and 9 beat)  - Device check 11/24/2021 with short runs of V-tach  - Troponin ordered and unchanged from prior  - EKG similar to prior  - Will continue to monitor   - Continue coreg 6.25    Cough  -continue tessalon perle     DVT (RLE popliteal vein)  Continue home eliquis     Hypertension  143/75 on presentation  - Continue coreg 6.25     HLD  -pt taken off lipitor by PCP  -ordered lipid panel     Diabetes Mellitus Type II  Glucose 195 on presentation  Pending A1c  Start Levemir 5U     Normocytic Anemia  -10.2/31.4 on presentation  -monitor and trend      Code: Full  Diet: Renal  PPx: apixaban  Dispo: Continue tx of bacteremia    Nikolai Nix MD  [facility] Internal Medicine HO-I    [facility] Medicine Hospitalist Pager numbers:   [facility] Hospitalist Medicine Team A (Abhinav/Jovani): [phone]  [facility] Hospitalist Medicine Team B (Robert/Enedelia):  [phone]

## 2021-12-26 NOTE — ASSESSMENT & PLAN NOTE
Houston Jc is a 76 y.o. male PMH ESRD (TTS), CHF, CAD, HTN, complete heart block s/p ICD, DM, DVT, and PAD.      12/21/2021 admit for cough and not feeling well, right chest discomfort where pt has his old HD access.     12/23 Soft tissue ultrasound of R-upper chest area where the patient endorses discomfort WNL     Enterococcus bacteremia  -Continue vancomycin 15-20 goal trough  -12/20 TTE neg veg  -12/20 BCx E. Faecalis vanc sensitive  -12/21 BCx E. Faecalis  -12/22 BCx GS GPC in chains  -Repeat BCx pending, if the patient is still bacteremic on these next set of blood cultures then the patient will need a YOLIS  -Also discussed with the primary team that there may be a potential concern for a clot at the AV fistula area and that there may be a concern that that area may consist of an area of foci of infection, further imaging studies may be warranted of that area, recommend discussing with radiology for the best imaging option of that area given clinical context; with that said await repeat BCx     UTI  -12/20 UCx Klebsiella aerogenes  -Continue cefepime as per HD dosing:  First dose of cefepime 1gm  Subsequent doses 500mg q24h after dialysis    Patient will need 14 days for bacteremia - keep on cefepime for now

## 2021-12-26 NOTE — SUBJECTIVE & OBJECTIVE
Interval History: Doing OK tonight  - no new issues     Review of Systems   Constitutional: Negative.    HENT: Negative.    Eyes: Negative.    Cardiovascular: Negative.    Gastrointestinal: Negative.    Endocrine: Negative.    Genitourinary: Negative.    Musculoskeletal: Negative.    Neurological: Negative.    Hematological: Negative.    Psychiatric/Behavioral: Negative.      Objective:     Vital Signs (Most Recent):  Temp: 97.9 °F (36.6 °C) (12/25/21 2027)  Pulse: 71 (12/25/21 2027)  Resp: 19 (12/25/21 2027)  BP: (!) 127/59 (12/25/21 2027)  SpO2: 98 % (12/25/21 2027) Vital Signs (24h Range):  Temp:  [97.3 °F (36.3 °C)-98.4 °F (36.9 °C)] 97.9 °F (36.6 °C)  Pulse:  [61-78] 71  Resp:  [16-19] 19  SpO2:  [97 %-98 %] 98 %  BP: (108-129)/(54-60) 127/59     Weight: 126 kg (277 lb 12.5 oz)  Body mass index is 36.65 kg/m².    Estimated Creatinine Clearance: 11.9 mL/min (A) (based on SCr of 7.6 mg/dL (H)).    Physical Exam  Constitutional:       Appearance: Normal appearance.   Cardiovascular:      Rate and Rhythm: Normal rate and regular rhythm.      Pulses: Normal pulses.      Heart sounds: Normal heart sounds.   Pulmonary:      Effort: Pulmonary effort is normal.      Breath sounds: Normal breath sounds.   Abdominal:      Palpations: Abdomen is soft.   Musculoskeletal:      Cervical back: Normal range of motion and neck supple.   Skin:     General: Skin is warm.   Neurological:      Mental Status: He is alert and oriented to person, place, and time.         Significant Labs: All pertinent labs within the past 24 hours have been reviewed.    Significant Imaging: I have reviewed all pertinent imaging results/findings within the past 24 hours.

## 2021-12-26 NOTE — HPI
Houston Jc is a 76 y.o. male PMH ESRD (TTS), CHF, CAD, HTN, complete heart block s/p ICD, DM, DVT, and PAD.      12/21/2021 admit for cough and not feeling well, right chest discomfort where pt has his old HD access.     12/23 Soft tissue ultrasound of R-upper chest area where the patient endorses discomfort WNL     Enterococcus bacteremia  -Continue vancomycin 15-20 goal trough  -12/20 TTE neg veg  -12/20 BCx E. Faecalis vanc sensitive  -12/21 BCx E. Faecalis  -12/22 BCx GS GPC in chains  -Repeat BCx pending, if the patient is still bacteremic on these next set of blood cultures then the patient will need a YOLIS  -Also discussed with the primary team that there may be a potential concern for a clot at the AV fistula area and that there may be a concern that that area may consist of an area of foci of infection, further imaging studies may be warranted of that area, recommend discussing with radiology for the best imaging option of that area given clinical context; with that said await repeat BCx     UTI  -12/20 UCx Klebsiella aerogenes  -Continue cefepime as per HD dosing:  First dose of cefepime 1gm  Subsequent doses 500mg q24h after dialysis

## 2021-12-26 NOTE — PLAN OF CARE
Scheduled medications administered per MD order. Denies any pain or nausea. Afebrile. Blood glucose monitored. Safety maintained.

## 2021-12-26 NOTE — PROGRESS NOTES
[facility] - Med Surg  Infectious Disease  Progress Note    Patient Name: Houston Jc  MRN: [id number]  Admission Date: 12/21/2021  Length of Stay: 4 days  Attending Physician: Lonnie Mcdaniels MD  Primary Care Provider: Zak Hamilton MD    Isolation Status: No active isolations  Assessment/Plan:      * Bacteremia due to Enterococcus  Houston Jc is a 76 y.o. male PMH ESRD (TTS), CHF, CAD, HTN, complete heart block s/p ICD, DM, DVT, and PAD.      12/21/2021 admit for cough and not feeling well, right chest discomfort where pt has his old HD access.     12/23 Soft tissue ultrasound of R-upper chest area where the patient endorses discomfort WNL     Enterococcus bacteremia  -Continue vancomycin 15-20 goal trough  -12/20 TTE neg veg  -12/20 BCx E. Faecalis vanc sensitive  -12/21 BCx E. Faecalis  -12/22 BCx GS GPC in chains  -Repeat BCx pending, if the patient is still bacteremic on these next set of blood cultures then the patient will need a YOLIS  -Also discussed with the primary team that there may be a potential concern for a clot at the AV fistula area and that there may be a concern that that area may consist of an area of foci of infection, further imaging studies may be warranted of that area, recommend discussing with radiology for the best imaging option of that area given clinical context; with that said await repeat BCx     UTI  -12/20 UCx Klebsiella aerogenes  -Continue cefepime as per HD dosing:  First dose of cefepime 1gm  Subsequent doses 500mg q24h after dialysis    Patient will need 14 days for bacteremia - keep on cefepime for now         Anticipated Disposition:     Thank you for your consult. I will follow-up with patient. Please contact us if you have any additional questions.    Jose Shannon MD  Infectious Disease  [facility] - Med Surg    Subjective:     Principal Problem:Bacteremia due to Enterococcus    HPI: Houston Jc is a 76 y.o. male PMH ESRD (TTS), CHF, CAD, HTN, complete heart block s/p  ICD, DM, DVT, and PAD.      12/21/2021 admit for cough and not feeling well, right chest discomfort where pt has his old HD access.     12/23 Soft tissue ultrasound of R-upper chest area where the patient endorses discomfort WNL     Enterococcus bacteremia  -Continue vancomycin 15-20 goal trough  -12/20 TTE neg veg  -12/20 BCx E. Faecalis vanc sensitive  -12/21 BCx E. Faecalis  -12/22 BCx GS GPC in chains  -Repeat BCx pending, if the patient is still bacteremic on these next set of blood cultures then the patient will need a YOLIS  -Also discussed with the primary team that there may be a potential concern for a clot at the AV fistula area and that there may be a concern that that area may consist of an area of foci of infection, further imaging studies may be warranted of that area, recommend discussing with radiology for the best imaging option of that area given clinical context; with that said await repeat BCx     UTI  -12/20 UCx Klebsiella aerogenes  -Continue cefepime as per HD dosing:  First dose of cefepime 1gm  Subsequent doses 500mg q24h after dialysis    Interval History: Doing OK tonight  - no new issues     Review of Systems   Constitutional: Negative.    HENT: Negative.    Eyes: Negative.    Cardiovascular: Negative.    Gastrointestinal: Negative.    Endocrine: Negative.    Genitourinary: Negative.    Musculoskeletal: Negative.    Neurological: Negative.    Hematological: Negative.    Psychiatric/Behavioral: Negative.      Objective:     Vital Signs (Most Recent):  Temp: 97.9 °F (36.6 °C) (12/25/21 2027)  Pulse: 71 (12/25/21 2027)  Resp: 19 (12/25/21 2027)  BP: (!) 127/59 (12/25/21 2027)  SpO2: 98 % (12/25/21 2027) Vital Signs (24h Range):  Temp:  [97.3 °F (36.3 °C)-98.4 °F (36.9 °C)] 97.9 °F (36.6 °C)  Pulse:  [61-78] 71  Resp:  [16-19] 19  SpO2:  [97 %-98 %] 98 %  BP: (108-129)/(54-60) 127/59     Weight: 126 kg (277 lb 12.5 oz)  Body mass index is 36.65 kg/m².    Estimated Creatinine Clearance: 11.9  mL/min (A) (based on SCr of 7.6 mg/dL (H)).    Physical Exam  Constitutional:       Appearance: Normal appearance.   Cardiovascular:      Rate and Rhythm: Normal rate and regular rhythm.      Pulses: Normal pulses.      Heart sounds: Normal heart sounds.   Pulmonary:      Effort: Pulmonary effort is normal.      Breath sounds: Normal breath sounds.   Abdominal:      Palpations: Abdomen is soft.   Musculoskeletal:      Cervical back: Normal range of motion and neck supple.   Skin:     General: Skin is warm.   Neurological:      Mental Status: He is alert and oriented to person, place, and time.         Significant Labs: All pertinent labs within the past 24 hours have been reviewed.    Significant Imaging: I have reviewed all pertinent imaging results/findings within the past 24 hours.

## 2021-12-26 NOTE — PROGRESS NOTES
[facility] Hospital Medicine Progress Note    Primary Team: [facility] Hospitalist Team A  Attending Physician: Lonnie Mcdaniels MD  Resident: Christian / Melodie  Intern: Boyd / Juan Luis    Subjective:      NAEON. No new complaints. Denies shortness of breath, fever, chills, nausea, vomiting, HA, and AP. He did endorse chest discomfort at the same area as prior (old access site that was ultrasounded without findings).    Objective:     Last 24 Hour Vital Signs:  BP  Min: 108/54  Max: 131/60  Temp  Av.7 °F (36.5 °C)  Min: 97.3 °F (36.3 °C)  Max: 97.9 °F (36.6 °C)  Pulse  Av.5  Min: 61  Max: 78  Resp  Av.2  Min: 18  Max: 19  SpO2  Av.5 %  Min: 97 %  Max: 98 %  Weight  Av.1 kg (262 lb 9.1 oz)  Min: 119.1 kg (262 lb 9.1 oz)  Max: 119.1 kg (262 lb 9.1 oz)  I/O last 3 completed shifts:  In: 708.5 [P.O.:360; I.V.:200; IV Piggyback:148.5]  Out: -     Physical Examination:  Const: Well nourished, well developed  Eyes: PERRL, no conjunctival injection  HENT: NCAT, Neck supple  CV: RRR, no murmurs, rubs, or gallops. 2+ distal pulses  RESP: CTAB, Unlabored respiratory effort  GI: + BS, soft, non-tender, non-distended, no masses. Umbilical hernia present  Ext: No gross deformities appreciated. No edema. RUE graft.  Right chest with scar from old HD access appears non infected  Skin: Warm, dry. No rashes   Neuro: Alert and oriented. Sensation and motor function of extremities grossly intact.  Psych: Appropriate mood and affect.    Laboratory:  Laboratory Data Reviewed: yes  Pertinent Findings:  Recent Labs   Lab 21  0426 21  0325 21  0456   WBC 7.82 7.55 7.55   HGB 8.6* 9.4* 9.1*   HCT 26.0* 29.6* 28.6*   * 183 184   * 135* 135*   K 3.6 3.5 3.4*    97 99   CREATININE 9.2* 7.6* 9.4*   BUN 51* 37* 45*   CO2 19* 22* 21*   ALT 5* 7* 8*   AST 11 17 16         Microbiology Data Reviewed: yes  Pertinent Findings:  Microbiology Results (last 7 days)     Procedure Component Value Units  Date/Time    Blood culture [[id number]] Collected: 12/24/21 2012    Order Status: Completed Specimen: Blood Updated: 12/25/21 2312     Blood Culture, Routine No Growth to date      No Growth to date    Narrative:      Collection has been rescheduled by [email or web address] at 12/23/2021 10:10 Reason:   Patient unavailable going todia  Collection has been rescheduled by [email or web address] at 12/23/2021 12:49 Reason:   Patient unavailable not in room   Collection has been rescheduled by [email or web address] at 12/23/2021 14:18   Collection has been rescheduled by [email or web address] at 12/23/2021 10:10 Reason:   Patient unavailable going todia  Collection has been rescheduled by [email or web address] at 12/23/2021 12:49 Reason:   Patient unavailable not in room   Collection has been rescheduled by [email or web address] at 12/23/2021 14:18     Blood culture [[id number]] Collected: 12/24/21 2118    Order Status: Completed Specimen: Blood Updated: 12/25/21 2312     Blood Culture, Routine No Growth to date      No Growth to date    Narrative:      Collection has been rescheduled by [email or web address] at 12/23/2021 10:10 Reason:   Patient unavailable going todia  Collection has been rescheduled by [email or web address] at 12/23/2021 12:49 Reason:   Patient unavailable not in room   Collection has been rescheduled by [email or web address] at 12/23/2021 14:18   Collection has been rescheduled by [email or web address] at 12/23/2021 10:10 Reason:   Patient unavailable going todia  Collection has been rescheduled by [email or web address] at 12/23/2021 12:49 Reason:   Patient unavailable not in room   Collection has been rescheduled by [email or web address] at 12/23/2021 14:18     Blood culture [[id number]]  (Abnormal) Collected: 12/22/21 0904    Order Status: Completed Specimen: Blood from Antecubital, Left Arm Updated: 12/25/21 1015     Blood Culture, Routine Gram stain aer bottle: Gram positive cocci in chains resembling Strep       Results called to and read back by: NIXON CAMACHO 12/23/2021  06:02      Gram stain francisco javier bottle: Gram positive cocci in chains resembling Strep       Positive results previously called       ENTEROCOCCUS FAECALIS  For susceptibility see order #[id number]      Blood Culture #2 **CANNOT BE ORDERED STAT** [[id number]]  (Abnormal) Collected: 12/21/21 0839    Order Status: Completed Specimen: Blood from Peripheral, Antecubital, Left Updated: 12/24/21 0830     Blood Culture, Routine Gram stain aer bottle: Gram positive cocci in chains resembling Strep       12/22/2021  10:51       Gram stain francisco javier bottle: Gram positive cocci in chains resembling Strep       12/22/2021  10:51        Positive results previously called      ENTEROCOCCUS FAECALIS  For susceptibility see order #[id number]      Blood Culture #2 **CANNOT BE ORDERED STAT** [[id number]]  (Abnormal) Collected: 12/21/21 1049    Order Status: Completed Specimen: Blood Updated: 12/24/21 0829     Blood Culture, Routine Gram stain aer bottle:  Gram positive cocci in chains resembling Strep      Results called to and read back by: SUSAN BELLAMY RN  12/22/2021        05:58      ENTEROCOCCUS FAECALIS  For susceptibility see order #[id number]      Urine culture [[id number]] Collected: 12/21/21 1117    Order Status: Completed Specimen: Urine Updated: 12/22/21 2057     Urine Culture, Routine No growth    Narrative:      Specimen Source->Urine    Influenza A & B by Molecular [[id number]]     Order Status: Canceled Specimen: Nasopharyngeal Swab     Strep A culture, throat [[id number]]     Order Status: No result Specimen: Throat         Outside blood cx 12/20: enterococcus    Other Results:  EKG (my interpretation): atrial sensed ventricular paced rhythm    Radiology Data Reviewed: yes  Pertinent Findings:  US Soft Tissue Chest_Upper Back   Final Result      No abnormal sonographic findings identified.  Clinical management suggested.         Electronically signed by: Adrian Jeronimo MD   Date:    12/23/2021   Time:    14:38      US Hemodialysis Access   Final Result      Partial nonocclusive thrombus in the midportion of the AV fistula with apparent stenosis  proximal to the stent.  There are low flow volumes.  Recommend repeat fistulagram and thrombectomy.      Partially visualized complex collection deep to the distal aspect of the fistula that could represent a small hematoma or seroma.  Correlation advised.         Electronically signed by: Remington Pratt MD   Date:    12/22/2021   Time:    11:00      X-Ray Chest AP Portable   Final Result      No significant detrimental interval change in the appearance of the chest since 12/20/2021 is appreciated.         Electronically signed by: Wallace Boone MD   Date:    12/21/2021   Time:    08:32            Current Medications:     Infusions:       Scheduled:   apixaban  5 mg Oral BID    carvediloL  6.25 mg Oral BID    ceFEPime (MAXIPIME) IVPB  500 mg Intravenous Daily    clopidogreL  75 mg Oral Daily    insulin detemir U-100  5 Units Subcutaneous QHS    mupirocin   Nasal BID        PRN:  sodium chloride 0.9%, acetaminophen, benzonatate, dextrose 50%, dextrose 50%, glucagon (human recombinant), glucose, glucose, sodium chloride 0.9%, sodium chloride 0.9%, Pharmacy to dose Vancomycin consult **AND** vancomycin - pharmacy to dose    Antibiotics and Day Number of Therapy:  Rocephin 12/21-12/22  Vanc 12/21-  Cefepime 12/23, 12.25-  Lines and Day Number of Therapy:  PIV 12/21  HD AV graft right forearm    Assessment:     Houston Jc is a 76 y.o. male with PMH of ESRD (TTS), CHF, CAD, HTN, complete heart block s/p ICD, DM, DVT, and PAD presents for bacteremia after his blood cultures came back positive from an outside facility. Pt admitted for dialysis and further work up of bacteremia. He has vancomycin on board for the enterococcus bacteremia and is now on cefepime for his UTI per ID recs.      Plan:     Enterococcus bacteremia  -pt had cough with intermittent sputum for 4 days  -Blood culture (12/20)-positive for Enterococcus faecalis, sensitive to amp, gentamicin, and vanc  -UA with 3+ leukocytes, >100 RBC, 32 WBC on  admission  -lactic acid WNL  -CXR-no focal infiltrate  - Started rocephin, vanc (12/21)   - Blood cx 12/21-positive for E faecalis  - ID consulted, recommend continue vanc (trough 15-20)   - Echo without vegetation  - US chest negative for abscess/fluid collection  - Repeat blood cx (12/24)-NGTD    ESRD on HD (TTS)  -missed session on Saturday (12/18)  -Nephro consulted-clotted AVG, US AVG  -surgery declotted AVG 12/21  - Daily CMP  - Renally dose all meds; avoiding hypotension  - S/p declotting HD AVF  - tolerating HD well    Klebsiella UTI  - Positive urine culture 12/21  - Resistant to ceftriaxone and NFT  - Continue cefepime     Acute on Chronic HFrEF  -TTE with EF 34%, no vegetations on surface echo  -, Trop 0.793  -trop downtrending     Acute V-tach Runs  - per AI deterioration alert: 2 runs of V-tach on 12/23 (6 beat and 9 beat)  - Device check 11/24/2021 with short runs of V-tach  - Troponin ordered and unchanged from prior  - EKG similar to prior  - Will continue to monitor   - Continue coreg 6.25    Cough  -continue tessalon perle     DVT (RLE popliteal vein)  Continue home eliquis     Hypertension  143/75 on presentation  - Continue coreg 6.25     HLD  -pt taken off lipitor by PCP  -ordered lipid panel     Diabetes Mellitus Type II  Glucose 195 on presentation  Pending A1c  Start Levemir 5U     Normocytic Anemia  -10.2/31.4 on presentation  -monitor and trend      Code: Full  Diet: Renal  PPx: apixaban  Dispo: Continue tx of bacteremia    Nikolai Nix MD  [facility]    \[facility]\"" Medicine Hospitalist Pager numbers:   \[facility]\"" Hospitalist Medicine Team A (Abhinav/Jovani): [phone]  \[facility]\"" Hospitalist Medicine Team B (Robert/Enedelia):  [phone]

## 2021-12-26 NOTE — PROGRESS NOTES
Nephrology Progress Note       Consult Requested By: Lonnie Mcdaniels MD  Reason for Consult: ESRD on HD     SUBJECTIVE:        ?    Review of Systems   Constitutional: Negative for chills and fever.   HENT: Negative for congestion and sore throat.    Eyes: Negative for blurred vision, double vision and photophobia.   Respiratory: Positive for cough and shortness of breath.    Cardiovascular: Negative for chest pain, palpitations and leg swelling.   Gastrointestinal: Negative for abdominal pain, diarrhea, nausea and vomiting.   Genitourinary: Negative for dysuria and urgency.   Musculoskeletal: Negative for joint pain and myalgias.   Skin: Negative for itching and rash.   Neurological: Negative for dizziness, sensory change, weakness and headaches.   Endo/Heme/Allergies: Negative for polydipsia. Does not bruise/bleed easily.   Psychiatric/Behavioral: Negative for depression.       Past Medical History:   Diagnosis Date    Anemia     CHF (congestive heart failure)     CKD (chronic kidney disease) stage 4, GFR 15-29 ml/min     Coronary artery disease     Diabetes mellitus     Hematuria     Hypertension     Renal cyst, right      Past Surgical History:   Procedure Laterality Date    DECLOTTING OF ARTERIOVENOUS GRAFT Right 12/21/2021    Procedure: [id number];  Surgeon: Jeison Hunt MD;  Location: [facility] OR;  Service: Vascular;  Laterality: Right;    INSERTION OF BIVENTRICULAR IMPLANTABLE CARDIOVERTER-DEFIBRILLATOR (ICD) Left 7/18/2019    Procedure: INSERTION, ICD, BIVENTRICULAR;  Surgeon: Arturo Bay MD;  Location: [facility] EP LAB;  Service: Cardiology;  Laterality: Left;  CHB, CRTD, SJM, anes, GP, 6078    LEFT HEART CATHETERIZATION N/A 7/16/2019    Procedure: Left heart cath;  Surgeon: Dejuan Cody MD;  Location: [facility] CATH LAB/EP;  Service: Cardiology;  Laterality: N/A;    PERITONEAL CATHETER REMOVAL Left 4/11/2020    Procedure: REMOVAL, CATHETER, DIALYSIS, PERITONEAL;  Surgeon: Edis ORANTES  Alis Saini MD;  Location: [facility] OR;  Service: General;  Laterality: Left;     Family History   Problem Relation Age of Onset    Heart attack Father      Social History     Tobacco Use    Smoking status: Former Smoker    Smokeless tobacco: Never Used   Substance Use Topics    Alcohol use: Yes     Comment: social    Drug use: No       Review of patient's allergies indicates:  No Known Allergies         OBJECTIVE:     Vital Signs (Most Recent)  Vitals:    12/26/21 0401 12/26/21 0624 12/26/21 0746 12/26/21 0956   BP: (!) 117/56 (!) 123/58 (!) 104/51    Patient Position: Lying      Pulse: 65 64 72 65   Resp: 18  18    Temp: 97.6 °F (36.4 °C)  98.4 °F (36.9 °C)    TempSrc: Oral      SpO2: 98% 98% 97%    Weight:       Height:                     Medications:   apixaban  5 mg Oral BID    carvediloL  6.25 mg Oral BID    ceFEPime (MAXIPIME) IVPB  500 mg Intravenous Daily    clopidogreL  75 mg Oral Daily    insulin detemir U-100  5 Units Subcutaneous QHS    mupirocin   Nasal BID           Physical Exam  Vitals and nursing note reviewed.   Constitutional:       General: He is not in acute distress.     Appearance: He is not diaphoretic.   HENT:      Head: Normocephalic and atraumatic.      Mouth/Throat:      Pharynx: No oropharyngeal exudate.   Eyes:      General: No scleral icterus.     Conjunctiva/sclera: Conjunctivae normal.      Pupils: Pupils are equal, round, and reactive to light.   Cardiovascular:      Rate and Rhythm: Normal rate and regular rhythm.      Heart sounds: Normal heart sounds. No murmur heard.      Pulmonary:      Effort: Pulmonary effort is normal. No respiratory distress.      Breath sounds: Normal breath sounds.   Abdominal:      General: Bowel sounds are normal. There is no distension.      Palpations: Abdomen is soft.      Tenderness: There is no abdominal tenderness.   Musculoskeletal:         General: Normal range of motion.      Cervical back: Normal range of motion and neck supple.       Comments: RUE AVG   thrill    Skin:     General: Skin is warm and dry.      Findings: No erythema.   Neurological:      Mental Status: He is alert and oriented to person, place, and time.      Cranial Nerves: No cranial nerve deficit.   Psychiatric:         Mood and Affect: Affect normal.         Cognition and Memory: Memory normal.         Judgment: Judgment normal.         Laboratory:  Recent Labs   Lab 12/24/21  0426 12/24/21  0426 12/25/21  0325 12/26/21  0456   WBC 7.82  --  7.55 7.55   HGB 8.6*  --  9.4* 9.1*   HCT 26.0*  --  29.6* 28.6*   *  --  183 184   MONO 10.5  0.8   < > 9.5  0.7 7.4  0.6    < > = values in this interval not displayed.     Recent Labs   Lab 12/22/21  1223 12/23/21  0418 12/24/21  0426 12/25/21  0325 12/26/21  0456   *   < > 135* 135* 135*   K 5.4*   < > 3.6 3.5 3.4*      < > 102 97 99   CO2 14*   < > 19* 22* 21*   *   < > 51* 37* 45*   CREATININE 18.2*   < > 9.2* 7.6* 9.4*   CALCIUM 8.6*   < > 8.6* 9.1 8.4*   PHOS 8.5*  --   --   --   --     < > = values in this interval not displayed.       Diagnostic Results:  X-Ray: Reviewed  US: Reviewed  Echo: Reviewed  ASSESSMENT/PLAN:     1. ESRD on HD TTS with Dr Decker  -- Last HD Thursday missed Saturday didn't feel well  -- clotted AVGs/p declot  HD  No issues  after.  -- Daily Renal Function Panel  -- Avoid Hypotension.  -- Renally dose all meds  -- HD Monday     Bacteremia   -- BC + for ENTEROCOCCUS FAECALIS   -- s/p graft revision.  -- repeat Blood Cultures - NGTD monitor   -- Given IV  abx  In ED Zosyn and  Vanc now on Cefepime        2. HTN (I10) -    3. Anemia of chronic kidney disease treated with ROSALVA (N18.9 D63.1)    EPogen  with each HD - hold for now Hb >10   Recent Labs   Lab 12/24/21  0426 12/25/21  0325 12/26/21  0456   HGB 8.6* 9.4* 9.1*   HCT 26.0* 29.6* 28.6*   * 183 184       Iron   Lab Results   Component Value Date    IRON 63 12/18/2019    TIBC 263 12/18/2019    FERRITIN 6,078 (H)  03/28/2020       4. MBD (E88.9 M90.80) -  Recent Labs   Lab 12/22/21  1223 12/23/21  0418 12/26/21  0456   CALCIUM 8.6*   < > 8.4*   PHOS 8.5*  --   --     < > = values in this interval not displayed.     Recent Labs   Lab 12/23/21  0501   MG 1.7       Lab Results   Component Value Date    .0 (H) 03/04/2020    CALCIUM 8.4 (L) 12/26/2021    PHOS 8.5 (H) 12/22/2021     No results found for: [id number]    Lab Results   Component Value Date    CO2 21 (L) 12/26/2021       5. Nutrition/Hypoalbuminemia (E88.09) -    Recent Labs   Lab 12/25/21  0325 12/26/21  0456   ALBUMIN 2.6* 2.5*     Nepro with meals TID. Renal vitamins daily      With any question please call answering service [phone]  Laura Ortiz MD    [facility]  ANA MARIA Burns MD, FACP,   KEREN Woo MD,   MD DAMION Gill MD E. V. Harmon, NP    [id number]  [place], [id number]

## 2021-12-26 NOTE — PROGRESS NOTES
[facility] Progress Note    Assessment/Plan:     Houston Jc is a 76 y.o. male PMH ESRD (TTS), CHF, CAD, HTN, complete heart block s/p ICD, DM, DVT, and PAD.      2021 admit for cough and not feeling well, right chest discomfort where pt has his old HD access.      Soft tissue ultrasound of R-upper chest area where the patient endorses discomfort WNL    Enterococcus bacteremia  -Continue vancomycin 15- goal trough  - TTE neg veg  - BCx E. Faecalis vanc sensitive  - BCx E. Faecalis  - BCx E. Faecalis  - BCx NGTD, if the patient is still bacteremic on these next set of blood cultures then the patient will need a YOLIS  -Also discussed with the primary team that there may be a potential concern for a clot at the AV fistula area and that there may be a concern that that area may consist of an area of foci of infection, further imaging studies may be warranted of that area if BCx remain persistently +, would discuss with radiology for the best imaging study should that scenario arise, repeat BCx so far NGTD  -Tentative duration of vanc 14 days from negative BCx date ()  -If blood cultures remain negative by , then ok to place PICC line    UTI  - UCx Klebsiella aerogenes  -Continue cefepime as per HD dosing:  First dose of cefepime 1gm  Subsequent doses 500mg q24h after dialysis  -Ok to discontinue cefepime (course of  to  as per dialysis dosing, by  the patient would have received 5 day course)    Jt Donohue MD  [facility], PGY-4  Cell: [phone]    Thank you for allowing us to participate in the care of this patient. We will continue to follow along. Case has been discussed with consult staff, who is in agreement with assessment and plan.    Subjective:      No acute events overnight. Denies any fevers, chills, NS, NV.     Objective:   Last 24 Hour Vital Signs:  BP  Min: 104/51  Max: 131/60  Temp  Av.8 °F (36.6 °C)   Min: 97.3 °F (36.3 °C)  Max: 98.4 °F (36.9 °C)  Pulse  Av.1  Min: 61  Max: 78  Resp  Av.2  Min: 18  Max: 19  SpO2  Av.5 %  Min: 97 %  Max: 98 %  Weight  Av.1 kg (262 lb 9.1 oz)  Min: 119.1 kg (262 lb 9.1 oz)  Max: 119.1 kg (262 lb 9.1 oz)  I/O last 3 completed shifts:  In: 708.5 [P.O.:360; I.V.:200; IV Piggyback:148.5]  Out: -     Physical Exam  Const: Well nourished, well developed  Eyes: PERRL, no conjunctival injection  HENT: NCAT, Neck supple  CV: RRR, no murmurs, rubs, or gallops. 2+ distal pulses  RESP: CTAB, Unlabored respiratory effort  GI: + BS, soft, non-tender, non-distended, no masses. Umbilical hernia present  Ext: No gross deformities appreciated. No edema. RUE graft (wrapped today after declotting). Right chest with scar from old HD access appears non infected  Skin: Warm, dry. No rashes   Neuro: Alert and oriented. Sensation and motor function of extremities grossly intact.  Psych: Appropriate mood and affect.    Laboratory:  Laboratory Data Reviewed: yes  Pertinent Findings:  Recent Labs   Lab 21  0426 21  0325 21  0456   WBC 7.82 7.55 7.55   HGB 8.6* 9.4* 9.1*   HCT 26.0* 29.6* 28.6*   * 183 184   MCV 95 96 97   RDW 13.6 13.7 13.5   * 135* 135*   K 3.6 3.5 3.4*    97 99   CO2 19* 22* 21*   BUN 51* 37* 45*   CREATININE 9.2* 7.6* 9.4*   * 131* 159*   PROT 6.0 6.8 5.6*   ALBUMIN 2.4* 2.6* 2.5*   BILITOT 0.4 0.5 0.3   AST 11 17 16   ALKPHOS 64 71 79   ALT 5* 7* 8*         Microbiology Data:  Microbiology Results (last 7 days)     Procedure Component Value Units Date/Time    Blood culture [[id number]] Collected: 21    Order Status: Completed Specimen: Blood Updated: 21     Blood Culture, Routine No Growth to date      No Growth to date    Narrative:      Collection has been rescheduled by [email or web address] at 2021 10:10 Reason:   Patient unavailable going toa  Collection has been rescheduled by [email or web address] at 2021 12:49  Reason:   Patient unavailable not in room   Collection has been rescheduled by SDB1 at 12/23/2021 14:18   Collection has been rescheduled by [email or web address] at 12/23/2021 10:10 Reason:   Patient unavailable going todia  Collection has been rescheduled by [email or web address] at 12/23/2021 12:49 Reason:   Patient unavailable not in room   Collection has been rescheduled by [email or web address] at 12/23/2021 14:18     Blood culture [[id number]] Collected: 12/24/21 2118    Order Status: Completed Specimen: Blood Updated: 12/25/21 2312     Blood Culture, Routine No Growth to date      No Growth to date    Narrative:      Collection has been rescheduled by [email or web address] at 12/23/2021 10:10 Reason:   Patient unavailable going todia  Collection has been rescheduled by [email or web address] at 12/23/2021 12:49 Reason:   Patient unavailable not in room   Collection has been rescheduled by SDB1 at 12/23/2021 14:18   Collection has been rescheduled by [email or web address] at 12/23/2021 10:10 Reason:   Patient unavailable going todia  Collection has been rescheduled by [email or web address] at 12/23/2021 12:49 Reason:   Patient unavailable not in room   Collection has been rescheduled by [email or web address] at 12/23/2021 14:18     Blood culture [[id number]]  (Abnormal) Collected: 12/22/21 0904    Order Status: Completed Specimen: Blood from Antecubital, Left Arm Updated: 12/25/21 1015     Blood Culture, Routine Gram stain aer bottle: Gram positive cocci in chains resembling Strep       Results called to and read back by: NIXON CAMACHO 12/23/2021  06:02      Gram stain francisco javier bottle: Gram positive cocci in chains resembling Strep       Positive results previously called      ENTEROCOCCUS FAECALIS  For susceptibility see order #[id number]      Blood Culture #2 **CANNOT BE ORDERED STAT** [[id number]]  (Abnormal) Collected: 12/21/21 0839    Order Status: Completed Specimen: Blood from Peripheral, Antecubital, Left Updated: 12/24/21 0830     Blood Culture, Routine Gram stain aer bottle: Gram positive cocci in chains resembling Strep        "12/22/2021  10:51       Gram stain francisco javier bottle: Gram positive cocci in chains resembling Strep       12/22/2021  10:51        Positive results previously called      ENTEROCOCCUS FAECALIS  For susceptibility see order #[id number]      Blood Culture #2 **CANNOT BE ORDERED STAT** [[id number]]  (Abnormal) Collected: 12/21/21 1049    Order Status: Completed Specimen: Blood Updated: 12/24/21 0829     Blood Culture, Routine Gram stain aer bottle:  Gram positive cocci in chains resembling Strep      Results called to and read back by: SUSAN BELLAMY RN  12/22/2021        05:58      ENTEROCOCCUS FAECALIS  For susceptibility see order #[id number]      Urine culture [[id number]] Collected: 12/21/21 1117    Order Status: Completed Specimen: Urine Updated: 12/22/21 2057     Urine Culture, Routine No growth    Narrative:      Specimen Source->Urine    Influenza A & B by Molecular [[id number]]     Order Status: Canceled Specimen: Nasopharyngeal Swab     Strep A culture, throat [[id number]]     Order Status: No result Specimen: Throat             Antimicrobials:  Antibiotics (From admission, onward)            Start     Stop Route Frequency Ordered    12/25/21 1900  ceFEPIme (MAXIPIME) 500 mg in dextrose 5 % 50 mL IVPB         -- IV Daily 12/25/21 1853    12/22/21 1100  mupirocin 2 % ointment         12/27 0859 Nasl 2 times daily 12/22/21 0951    12/21/21 1313  vancomycin - pharmacy to dose  (vancomycin IVPB)        "And" Linked Group Details    -- IV pharmacy to manage frequency 12/21/21 1214        Antivirals (From admission, onward)    None        Antifungals (From admission, onward)            None              Other Results:  Radiology Results:  X-Ray Chest AP Portable    Result Date: 12/21/2021  EXAMINATION: XR CHEST AP PORTABLE COMPARISON: Comparison is made to 12/20/2021. FINDINGS: Cardioverter/defibrillator is again noted.  Heart is minimally enlarged, but the appearance of the cardiomediastinal silhouette is unchanged " since the examination referenced above.  Pulmonary vascularity is normal, and there are no findings indicating current cardiac decompensation.  Lung zones are stable as well, free of significant superimposed airspace consolidation or volume loss.  No pleural fluid of any substantial volume is seen on either side.  No pneumothorax.  Calcification in the wall of the aortic arch is an incidental observation.     No significant detrimental interval change in the appearance of the chest since 12/20/2021 is appreciated. Electronically signed by: Wallace Boone MD Date:    12/21/2021 Time:    08:32    X-Ray Chest AP Portable    Result Date: 12/20/2021  EXAMINATION: XR CHEST AP PORTABLE CLINICAL HISTORY: Shortness of breath COMPARISON: 04/11/2020 FINDINGS: A cardiac pacemaker remains in place.  Its leads appear to be intact.  The size of the heart is prominent.  There is no focal pulmonary infiltrate visualized.  There is no pneumothorax.  The right costophrenic angle is sharp.  The left costophrenic angle is not well seen secondary to overlying ribs.     1. There is no focal pulmonary infiltrate visualized. 2. The size of the heart is prominent.  This may be secondary to magnification. 3. A cardiac pacemaker remains in place. Its leads appear to be intact. 4. The left costophrenic angle is not well seen secondary to overlying ribs. . Electronically signed by: Rajinder Fry MD Date:    12/20/2021 Time:    09:58    US Hemodialysis Access    Result Date: 12/22/2021  EXAMINATION: US HEMODIALYSIS ACCESS CLINICAL HISTORY: not able to performe dialysis; TECHNIQUE: Grayscale, color Doppler and spectral analysis of right upper extremity AV fistula was obtained. COMPARISON: 02/21/2020 FINDINGS: There is a right upper extremity AV fistula.  There is residual partial thrombus within the AV fistula through the midportion.  A stent is seen also within the mid AV fistula with proximal stenosis.  There is good flow throughout all the  fistula.  Flow volume however appears low at 75 cc/second.  There is a partially visualized hypoechoic collection distal to the AV fistula measuring approximately 3.8 x 1.8 cm which may represent a seroma or hematoma.  Approximately the AV fistula measures 0.4 cm in diameter is approximately 0.4 cm from the skin.  Its midportion the fistula measures 0.8 cm in is 0.5 cm from the skin.  Distally the AV fistula measures 0.8 cm and is approximately 0.9 cm from the skin.     Partial nonocclusive thrombus in the midportion of the AV fistula with apparent stenosis proximal to the stent.  There are low flow volumes.  Recommend repeat fistulagram and thrombectomy. Partially visualized complex collection deep to the distal aspect of the fistula that could represent a small hematoma or seroma.  Correlation advised. Electronically signed by: Remington Pratt MD Date:    12/22/2021 Time:    11:00    Echo    Result Date: 12/21/2021  · The left ventricle is moderately enlarged with mild concentric hypertrophy and · The quantitatively derived ejection fraction is 34%. · Severe left atrial enlargement. · Normal right ventricular size with normal right ventricular systolic function. · Normal central venous pressure (3 mmHg). · The estimated PA systolic pressure is 21 mmHg. · No vegetations per surface echo.      Cardiac device check - Remote    Result Date: 12/10/2021  Additional Comments 90-day Remote Interrogation Report CRTD, DDDR  Presenting egram demonstrates ASBP Device function WNL Aut[employer] algorithms are On RA pace 60%, BiV pacing 98% Atrial arrhythmias:  NONE Anticoagulation status:  Eliquis Ventricular arrhythmias:  Non sustained event x 1, 4 seconds, c/w nsVT. Battery status/longevity:  5 yrs. Return to clinic in February 2022 F/u via remote interrogation in 3 months. Report prepared by SAMIA Alejo RN      Current Medications:     Infusions:       Scheduled:   apixaban  5 mg Oral BID    carvediloL  6.25 mg Oral BID     ceFEPime (MAXIPIME) IVPB  500 mg Intravenous Daily    clopidogreL  75 mg Oral Daily    insulin detemir U-100  5 Units Subcutaneous QHS    mupirocin   Nasal BID        PRN:  sodium chloride 0.9%, acetaminophen, benzonatate, dextrose 50%, dextrose 50%, glucagon (human recombinant), glucose, glucose, sodium chloride 0.9%, sodium chloride 0.9%, Pharmacy to dose Vancomycin consult **AND** vancomycin - pharmacy to dose

## 2021-12-27 NOTE — PROGRESS NOTES
Pharmacist Renal Dose Adjustment Note    Houston Jc is a 76 y.o. male being treated with the medication cefepime    Patient Data:    Vital Signs (Most Recent):  Temp: 97.4 °F (36.3 °C) (12/27/21 0814)  Pulse: 74 (12/27/21 0814)  Resp: 18 (12/27/21 0814)  BP: (!) 142/65 (12/27/21 0814)  SpO2: 96 % (12/27/21 0814)   Vital Signs (72h Range):  Temp:  [97.3 °F (36.3 °C)-98.4 °F (36.9 °C)]   Pulse:  [59-78]   Resp:  [16-20]   BP: ()/(51-80)   SpO2:  [94 %-100 %]      Recent Labs   Lab 12/25/21  0325 12/26/21  0456 12/27/21  0523   CREATININE 7.6* 9.4* 11.1*     Serum creatinine: 11.1 mg/dL (H) 12/27/21 0523  Estimated creatinine clearance: 8 mL/min (A)    Medication:Cefepime dose: 500 mg frequency q 24 hours will be changed to medication:cefepime dose:1g frequency:q 24 hours post HD    Pharmacist's Name: Betsy Gusman  Pharmacist's Extension: [phone]

## 2021-12-27 NOTE — PROGRESS NOTES
Pharmacokinetic Assessment Follow Up: IV Vancomycin    Vancomycin serum concentration assessment(s):    The random level was drawn correctly and can be used to guide therapy at this time. The measurement is below the desired definitive target range of 15 to 20 mcg/mL.    Vancomycin Regimen Plan:    Will give a one time dose of vancomycin 500 mg today post HD. Will order vancomycin random with AM labs on 12/28. Will follow up with HD plan daily.     Drug levels (last 3 results):  Recent Labs   Lab Result Units 12/25/21 0325 12/26/21 0456 12/27/21  0523   Vancomycin, Random ug/mL 24.6 21.8 18.5       Pharmacy will continue to follow and monitor vancomycin.    Please contact pharmacy at extension [phone] for questions regarding this assessment.    Thank you for the consult,   Betsy Gusman       Patient brief summary:  Houston Jc is a 76 y.o. male initiated on antimicrobial therapy with IV Vancomycin for treatment of bacteremia    The patient's current regimen is pulse dose    Drug Allergies:   Review of patient's allergies indicates:  No Known Allergies    Actual Body Weight:   123.7 kg    Renal Function:   Estimated Creatinine Clearance: 8 mL/min (A) (based on SCr of 11.1 mg/dL (H)).,     Dialysis Method (if applicable):  intermittent HD    CBC (last 72 hours):  Recent Labs   Lab Result Units 12/25/21 0325 12/26/21 0456 12/27/21  0523   WBC K/uL 7.55 7.55 6.45   Hemoglobin g/dL 9.4* 9.1* 9.0*   Hematocrit % 29.6* 28.6* 27.5*   Platelets K/uL 183 184 217   Gran % % 73.5* 73.8* 73.6*   Lymph % % 14.2* 16.3* 16.0*   Mono % % 9.5 7.4 7.3   Eosinophil % % 2.0 1.7 2.0   Basophil % % 0.4 0.3 0.5   Differential Method  Automated Automated Automated       Metabolic Panel (last 72 hours):  Recent Labs   Lab Result Units 12/25/21 0325 12/26/21 0456 12/27/21  0523   Sodium mmol/L 135* 135* 136   Potassium mmol/L 3.5 3.4* 3.5   Chloride mmol/L 97 99 98   CO2 mmol/L 22* 21* 21*   Glucose mg/dL 131* 159* 162*   BUN mg/dL  37* 45* 49*   Creatinine mg/dL 7.6* 9.4* 11.1*   Albumin g/dL 2.6* 2.5* 2.5*   Total Bilirubin mg/dL 0.5 0.3 0.3   Alkaline Phosphatase U/L 71 79 75   AST U/L 17 16 14   ALT U/L 7* 8* 10       Vancomycin Administrations:  vancomycin given in the last 96 hours                     vancomycin 500 mg in dextrose 5 % 100 mL IVPB (ready to mix system) (mg) 500 mg New Bag 12/24/21 1854    vancomycin 500 mg in dextrose 5 % 100 mL IVPB (ready to mix system) (mg) 500 mg New Bag 12/23/21 1837                    Microbiologic Results:  Microbiology Results (last 7 days)       Procedure Component Value Units Date/Time    Blood culture [[id number]] Collected: 12/24/21 2012    Order Status: Completed Specimen: Blood Updated: 12/26/21 2312     Blood Culture, Routine No Growth to date      No Growth to date      No Growth to date    Narrative:      Collection has been rescheduled by [email or web address] at 12/23/2021 10:10 Reason:   Patient unavailable going todia  Collection has been rescheduled by [email or web address] at 12/23/2021 12:49 Reason:   Patient unavailable not in room   Collection has been rescheduled by [email or web address] at 12/23/2021 14:18   Collection has been rescheduled by [email or web address] at 12/23/2021 10:10 Reason:   Patient unavailable going todia  Collection has been rescheduled by [email or web address] at 12/23/2021 12:49 Reason:   Patient unavailable not in room   Collection has been rescheduled by [email or web address] at 12/23/2021 14:18     Blood culture [[id number]] Collected: 12/24/21 2118    Order Status: Completed Specimen: Blood Updated: 12/26/21 2312     Blood Culture, Routine No Growth to date      No Growth to date      No Growth to date    Narrative:      Collection has been rescheduled by [email or web address] at 12/23/2021 10:10 Reason:   Patient unavailable going todia  Collection has been rescheduled by [email or web address] at 12/23/2021 12:49 Reason:   Patient unavailable not in room   Collection has been rescheduled by [email or web address] at 12/23/2021 14:18   Collection has been rescheduled by [email or web address] at 12/23/2021 10:10 Reason:    Patient unavailable going todia  Collection has been rescheduled by [email or web address] at 12/23/2021 12:49 Reason:   Patient unavailable not in room   Collection has been rescheduled by [email or web address] at 12/23/2021 14:18     Blood culture [[id number]]  (Abnormal) Collected: 12/22/21 0904    Order Status: Completed Specimen: Blood from Antecubital, Left Arm Updated: 12/25/21 1015     Blood Culture, Routine Gram stain aer bottle: Gram positive cocci in chains resembling Strep       Results called to and read back by: NIXON CAMACHO 12/23/2021  06:02      Gram stain francisco javier bottle: Gram positive cocci in chains resembling Strep       Positive results previously called      ENTEROCOCCUS FAECALIS  For susceptibility see order #[id number]      Blood Culture #2 **CANNOT BE ORDERED STAT** [[id number]]  (Abnormal) Collected: 12/21/21 0839    Order Status: Completed Specimen: Blood from Peripheral, Antecubital, Left Updated: 12/24/21 0830     Blood Culture, Routine Gram stain aer bottle: Gram positive cocci in chains resembling Strep       12/22/2021  10:51       Gram stain francisco javier bottle: Gram positive cocci in chains resembling Strep       12/22/2021  10:51        Positive results previously called      ENTEROCOCCUS FAECALIS  For susceptibility see order #[id number]      Blood Culture #2 **CANNOT BE ORDERED STAT** [[id number]]  (Abnormal) Collected: 12/21/21 1049    Order Status: Completed Specimen: Blood Updated: 12/24/21 0829     Blood Culture, Routine Gram stain aer bottle:  Gram positive cocci in chains resembling Strep      Results called to and read back by: SUSAN BELLAMY RN  12/22/2021        05:58      ENTEROCOCCUS FAECALIS  For susceptibility see order #[id number]      Urine culture [[id number]] Collected: 12/21/21 1117    Order Status: Completed Specimen: Urine Updated: 12/22/21 2057     Urine Culture, Routine No growth    Narrative:      Specimen Source->Urine    Influenza A & B by Molecular [[id number]]     Order Status: Canceled  Specimen: Nasopharyngeal Swab     Strep A culture, throat [[id number]]     Order Status: No result Specimen: Throat

## 2021-12-27 NOTE — PLAN OF CARE
TN attempted to contact pt's home HD unit regarding pt needing IV abx with HH- [facility] [phone]. unable to get through to center due to number not being in service.    TN also attempted to call Shiv Castillo for possible alternate number to [facility]-no answer noted.

## 2021-12-27 NOTE — PLAN OF CARE
VN note: Patient chart, labs, and vitals reviewed. VN to continue to be available as needed.     Problem: Adult Inpatient Plan of Care  Goal: Plan of Care Review  Outcome: Ongoing, Progressing  Goal: Patient-Specific Goal (Individualized)  Outcome: Ongoing, Progressing  Goal: Absence of Hospital-Acquired Illness or Injury  Outcome: Ongoing, Progressing  Goal: Optimal Comfort and Wellbeing  Outcome: Ongoing, Progressing  Goal: Readiness for Transition of Care  Outcome: Ongoing, Progressing     Problem: Fluid and Electrolyte Imbalance (Acute Kidney Injury/Impairment)  Goal: Fluid and Electrolyte Balance  Outcome: Ongoing, Progressing     Problem: Oral Intake Inadequate (Acute Kidney Injury/Impairment)  Goal: Optimal Nutrition Intake  Outcome: Ongoing, Progressing     Problem: Renal Function Impairment (Acute Kidney Injury/Impairment)  Goal: Effective Renal Function  Outcome: Ongoing, Progressing     Problem: Fall Injury Risk  Goal: Absence of Fall and Fall-Related Injury  Outcome: Ongoing, Progressing     Problem: Infection  Goal: Absence of Infection Signs and Symptoms  Outcome: Ongoing, Progressing     Problem: Skin Injury Risk Increased  Goal: Skin Health and Integrity  Outcome: Ongoing, Progressing     Problem: Diabetes Comorbidity  Goal: Blood Glucose Level Within Targeted Range  Outcome: Ongoing, Progressing     Problem: Device-Related Complication Risk (Hemodialysis)  Goal: Safe, Effective Therapy Delivery  Outcome: Ongoing, Progressing     Problem: Hemodynamic Instability (Hemodialysis)  Goal: Effective Tissue Perfusion  Outcome: Ongoing, Progressing     Problem: Infection (Hemodialysis)  Goal: Absence of Infection Signs and Symptoms  Outcome: Ongoing, Progressing

## 2021-12-27 NOTE — PROGRESS NOTES
[facility] Hospital Medicine Progress Note    Primary Team: [facility] Hospitalist Team A  Attending Physician: Lonnie Mcdaniels MD  Resident: Christian Sewell  Intern: Boyd / Juan Luis    Subjective:      NAEON. No new complaints.     Objective:     Last 24 Hour Vital Signs:  BP  Min: 119/54  Max: 142/65  Temp  Av.9 °F (36.6 °C)  Min: 97.4 °F (36.3 °C)  Max: 98.3 °F (36.8 °C)  Pulse  Av.4  Min: 62  Max: 74  Resp  Av.7  Min: 18  Max: 20  SpO2  Av.5 %  Min: 94 %  Max: 100 %  Weight  Av.7 kg (272 lb 11.3 oz)  Min: 123.7 kg (272 lb 11.3 oz)  Max: 123.7 kg (272 lb 11.3 oz)  I/O last 3 completed shifts:  In: 411.6 [P.O.:360; IV Piggyback:51.6]  Out: 800 [Urine:800]    Physical Examination:  Const: Well nourished, well developed  Eyes: PERRL, no conjunctival injection  HENT: NCAT, Neck supple  CV: RRR, no murmurs, rubs, or gallops. 2+ distal pulses  RESP: CTAB, Unlabored respiratory effort  GI: + BS, soft, non-tender, non-distended, no masses. Umbilical hernia present  Ext: No gross deformities appreciated. No edema. RUE graft.  Right chest with scar from old HD access appears non infected  Skin: Warm, dry. No rashes   Neuro: Alert and oriented. Sensation and motor function of extremities grossly intact.  Psych: Appropriate mood and affect.    Laboratory:  Laboratory Data Reviewed: yes  Pertinent Findings:  Recent Labs   Lab 21  0325 21  0456 21  0523   WBC 7.55 7.55 6.45   HGB 9.4* 9.1* 9.0*   HCT 29.6* 28.6* 27.5*    184 217   * 135* 136   K 3.5 3.4* 3.5   CL 97 99 98   CREATININE 7.6* 9.4* 11.1*   BUN 37* 45* 49*   CO2 22* 21* 21*   ALT 7* 8* 10   AST 17 16 14     Microbiology Data Reviewed: yes  Pertinent Findings:  Microbiology Results (last 7 days)     Procedure Component Value Units Date/Time    Blood culture [[id number]] Collected: 21    Order Status: Completed Specimen: Blood Updated: 21     Blood Culture, Routine No Growth to date      No Growth to  date      No Growth to date    Narrative:      Collection has been rescheduled by [email or web address] at 12/23/2021 10:10 Reason:   Patient unavailable going todia  Collection has been rescheduled by [email or web address] at 12/23/2021 12:49 Reason:   Patient unavailable not in room   Collection has been rescheduled by [email or web address] at 12/23/2021 14:18   Collection has been rescheduled by [email or web address] at 12/23/2021 10:10 Reason:   Patient unavailable going todia  Collection has been rescheduled by [email or web address] at 12/23/2021 12:49 Reason:   Patient unavailable not in room   Collection has been rescheduled by [email or web address] at 12/23/2021 14:18     Blood culture [[id number]] Collected: 12/24/21 2118    Order Status: Completed Specimen: Blood Updated: 12/26/21 2312     Blood Culture, Routine No Growth to date      No Growth to date      No Growth to date    Narrative:      Collection has been rescheduled by [email or web address] at 12/23/2021 10:10 Reason:   Patient unavailable going todia  Collection has been rescheduled by [email or web address] at 12/23/2021 12:49 Reason:   Patient unavailable not in room   Collection has been rescheduled by [email or web address] at 12/23/2021 14:18   Collection has been rescheduled by [email or web address] at 12/23/2021 10:10 Reason:   Patient unavailable going todia  Collection has been rescheduled by [email or web address] at 12/23/2021 12:49 Reason:   Patient unavailable not in room   Collection has been rescheduled by [email or web address] at 12/23/2021 14:18     Blood culture [[id number]]  (Abnormal) Collected: 12/22/21 0904    Order Status: Completed Specimen: Blood from Antecubital, Left Arm Updated: 12/25/21 1015     Blood Culture, Routine Gram stain aer bottle: Gram positive cocci in chains resembling Strep       Results called to and read back by: NIXON CAMACHO 12/23/2021  06:02      Gram stain francisco javier bottle: Gram positive cocci in chains resembling Strep       Positive results previously called      ENTEROCOCCUS FAECALIS  For susceptibility see order #[id number]      Blood Culture #2 **CANNOT BE ORDERED STAT** [[id number]]  (Abnormal)  Collected: 12/21/21 0839    Order Status: Completed Specimen: Blood from Peripheral, Antecubital, Left Updated: 12/24/21 0830     Blood Culture, Routine Gram stain aer bottle: Gram positive cocci in chains resembling Strep       12/22/2021  10:51       Gram stain francisco javier bottle: Gram positive cocci in chains resembling Strep       12/22/2021  10:51        Positive results previously called      ENTEROCOCCUS FAECALIS  For susceptibility see order #[id number]      Blood Culture #2 **CANNOT BE ORDERED STAT** [[id number]]  (Abnormal) Collected: 12/21/21 1049    Order Status: Completed Specimen: Blood Updated: 12/24/21 0829     Blood Culture, Routine Gram stain aer bottle:  Gram positive cocci in chains resembling Strep      Results called to and read back by: SUSAN BELLAMY RN  12/22/2021        05:58      ENTEROCOCCUS FAECALIS  For susceptibility see order #[id number]      Urine culture [[id number]] Collected: 12/21/21 1117    Order Status: Completed Specimen: Urine Updated: 12/22/21 2057     Urine Culture, Routine No growth    Narrative:      Specimen Source->Urine    Influenza A & B by Molecular [[id number]]     Order Status: Canceled Specimen: Nasopharyngeal Swab     Strep A culture, throat [[id number]]     Order Status: No result Specimen: Throat         Outside blood cx 12/20: enterococcus    Other Results:  EKG (my interpretation): atrial sensed ventricular paced rhythm    Radiology Data Reviewed: yes  Pertinent Findings:  US Soft Tissue Chest_Upper Back   Final Result      No abnormal sonographic findings identified.  Clinical management suggested.         Electronically signed by: Adrian Jeronimo MD   Date:    12/23/2021   Time:    14:38      US Hemodialysis Access   Final Result      Partial nonocclusive thrombus in the midportion of the AV fistula with apparent stenosis proximal to the stent.  There are low flow volumes.  Recommend repeat fistulagram and thrombectomy.      Partially visualized complex collection  deep to the distal aspect of the fistula that could represent a small hematoma or seroma.  Correlation advised.         Electronically signed by: Remington Pratt MD   Date:    12/22/2021   Time:    11:00      X-Ray Chest AP Portable   Final Result      No significant detrimental interval change in the appearance of the chest since 12/20/2021 is appreciated.         Electronically signed by: Wallace Boone MD   Date:    12/21/2021   Time:    08:32        Current Medications:     Infusions:       Scheduled:   apixaban  5 mg Oral BID    carvediloL  6.25 mg Oral BID    ceFEPime (MAXIPIME) IVPB  500 mg Intravenous Daily    clopidogreL  75 mg Oral Daily    insulin detemir U-100  5 Units Subcutaneous QHS    mupirocin   Nasal BID        PRN:  sodium chloride 0.9%, acetaminophen, benzonatate, capsicum 0.075%, dextrose 50%, dextrose 50%, glucagon (human recombinant), glucose, glucose, sodium chloride 0.9%, sodium chloride 0.9%, Pharmacy to dose Vancomycin consult **AND** vancomycin - pharmacy to dose    Antibiotics and Day Number of Therapy:  Rocephin 12/21-12/22  Vanc 12/21-  Cefepime 12/23-  Lines and Day Number of Therapy:  PIV 12/21  HD AV graft right forearm    Assessment:     Houston Jc is a 76 y.o. male with PMH of ESRD (TTS), CHF, CAD, HTN, complete heart block s/p ICD, DM, DVT, and PAD presents for bacteremia after his blood cultures came back positive from an outside facility. Pt admitted for dialysis and further work up of bacteremia. He has vancomycin on board for the enterococcus bacteremia and is now on cefepime for his UTI per ID recs.      Plan:     Enterococcus bacteremia  -pt had cough with intermittent sputum for 4 days  -UA with 3+ leukocytes, >100 RBC, 32 WBC on admission  -lactic acid WNL  -CXR-no focal infiltrate  - Started rocephin, vanc (12/21)   - Blood cx 12/21-positive for E faecalis  - Echo without vegetation  - US chest negative for abscess/fluid collection  - Repeat blood cx (12/24)-NGTD  - ID  following, recommend continue Vanc x14 days from last negative culture (12/24), consider placing PICC if BCx remains negative, keep Cefepime on also    ESRD on HD (TTS)  -missed session on Saturday (12/18)  -Nephro consulted-clotted AVG, US AVG  -surgery declotted AVG 12/21, awaiting surgery recs if further intervention needed    Klebsiella UTI  - Positive urine culture 12/21  - Resistant to ceftriaxone and NFT  - Continue cefepime     Acute on Chronic HFrEF  -TTE with EF 34%, no vegetations on surface echo  -, Trop 0.793->downtrended     Acute V-tach Runs  - per AI deterioration alert: 2 runs of V-tach on 12/23 (6 beat and 9 beat)  - Device check 11/24/2021 with short runs of V-tach  - Troponin ordered and unchanged from prior  - EKG similar to prior  - Will continue to monitor   - Continue coreg 6.25    Cough  -continue tessalon perles     DVT (RLE popliteal vein)  -Continue home eliquis     Hypertension  -Continue coreg 6.25     HLD  -pt taken off lipitor by PCP  -ordered lipid panel     Diabetes Mellitus Type II  -Glucose 195 on presentation  -Pending A1c  -Started Levemir 5U     Normocytic Anemia  -10.2/31.4 on presentation  -monitor and trend    Code: Full  Diet: Renal  PPx: apixaban  Dispo: Continue tx of bacteremia    Leslie Bullock MD  [facility]    [facility] Hospitalist Pager numbers:   [facility] Hospitalist Medicine Team A (Abhinav/Jovani): [phone]  [facility] Hospitalist Medicine Team B (Robert/Enedelia):  [phone]

## 2021-12-27 NOTE — PLAN OF CARE
12/27/21 1716   Discharge Reassessment   Assessment Type Discharge Planning Reassessment   Did the patient's condition or plan change since previous assessment? Yes  (pt will need IV abx with HD per ID)   Communicated ANDERSON with patient/caregiver Date not available/Unable to determine   Discharge Plan A Home with family  (outpatient therapy)   Discharge Plan B Home Health   DME Needed Upon Discharge    (TBD)   Discharge Barriers Identified None   Why the patient remains in the hospital Requires continued medical care

## 2021-12-27 NOTE — PLAN OF CARE
Pt AAOx4. VSS. Pt has c/o pain to his right shoulder and says it hurts him to raise his arm. Pt denies any activity that could have caused injury. Pt experiences a 10-12 beat run of VTACH while sleeping. Pt assessed and new vitals taken. MD consulted and POC discussed. No new orders at this time. Pt assessment completed and documented. Medication given per MAR. Rounding completed per protocol. Will continue to monitor.           Problem: Adult Inpatient Plan of Care  Goal: Plan of Care Review  Outcome: Ongoing, Progressing  Goal: Patient-Specific Goal (Individualized)  Outcome: Ongoing, Progressing  Goal: Absence of Hospital-Acquired Illness or Injury  Outcome: Ongoing, Progressing  Goal: Optimal Comfort and Wellbeing  Outcome: Ongoing, Progressing  Goal: Readiness for Transition of Care  Outcome: Ongoing, Progressing     Problem: Fluid and Electrolyte Imbalance (Acute Kidney Injury/Impairment)  Goal: Fluid and Electrolyte Balance  Outcome: Ongoing, Progressing     Problem: Oral Intake Inadequate (Acute Kidney Injury/Impairment)  Goal: Optimal Nutrition Intake  Outcome: Ongoing, Progressing     Problem: Renal Function Impairment (Acute Kidney Injury/Impairment)  Goal: Effective Renal Function  Outcome: Ongoing, Progressing     Problem: Fall Injury Risk  Goal: Absence of Fall and Fall-Related Injury  Outcome: Ongoing, Progressing     Problem: Infection  Goal: Absence of Infection Signs and Symptoms  Outcome: Ongoing, Progressing     Problem: Skin Injury Risk Increased  Goal: Skin Health and Integrity  Outcome: Ongoing, Progressing     Problem: Diabetes Comorbidity  Goal: Blood Glucose Level Within Targeted Range  Outcome: Ongoing, Progressing     Problem: Device-Related Complication Risk (Hemodialysis)  Goal: Safe, Effective Therapy Delivery  Outcome: Ongoing, Progressing     Problem: Hemodynamic Instability (Hemodialysis)  Goal: Effective Tissue Perfusion  Outcome: Ongoing, Progressing     Problem: Infection  (Hemodialysis)  Goal: Absence of Infection Signs and Symptoms  Outcome: Ongoing, Progressing

## 2021-12-27 NOTE — PROGRESS NOTES
\[facility]\"" Infectious Diseases Progress Note    Assessment/Plan:     Houston Jc is a 76 y.o. male PMH ESRD (TTS), CHF, CAD, HTN, complete heart block s/p ICD, DM, DVT, and PAD.      2021 admit for cough and not feeling well, right chest discomfort where pt has his old HD access.      Soft tissue ultrasound of R-upper chest area where the patient endorses discomfort WNL    - TTE neg veg  - BCx E. Faecalis vanc sensitive  - BCx E. Faecalis  - BCx E. Faecalis  - BCx NGTD    FINAL RECOMMENDATIONS    Enterococcus bacteremia  -If blood cultures remain negative by , continue vancomycin 15-20 goal trough with duration for 2 weeks, dosing as per HD  -(Abx start 21, stop 22)    UTI  - UCx Klebsiella aerogenes  -Continue cefepime as per HD dosing:  First dose of cefepime 1gm  Subsequent doses 500mg q24h after dialysis  -Recommend discontinuing cefepime, treatment course complete    -ID signing off  -No need for outpt ID f/u  -If the  repeat BCx returns positive, call back ID service    Jt Donohue MD  \[facility]\"" Infectious Diseases, PGY-4  Cell: [phone]    Thank you for allowing us to participate in the care of this patient.    Subjective:      No acute events overnight. Denies any fevers, chills, NS, NV.     Objective:   Last 24 Hour Vital Signs:  BP  Min: 119/54  Max: 142/65  Temp  Av.9 °F (36.6 °C)  Min: 97.4 °F (36.3 °C)  Max: 98.3 °F (36.8 °C)  Pulse  Av.7  Min: 62  Max: 74  Resp  Av.7  Min: 18  Max: 20  SpO2  Av.5 %  Min: 94 %  Max: 100 %  Weight  Av.7 kg (272 lb 11.3 oz)  Min: 123.7 kg (272 lb 11.3 oz)  Max: 123.7 kg (272 lb 11.3 oz)  I/O last 3 completed shifts:  In: 411.6 [P.O.:360; IV Piggyback:51.6]  Out: 800 [Urine:800]    Physical Exam  Const: Well nourished, well developed  Eyes: PERRL, no conjunctival injection  HENT: NCAT, Neck supple  CV: RRR, no murmurs, rubs, or gallops. 2+ distal pulses  RESP: CTAB, Unlabored respiratory  effort  GI: + BS, soft, non-tender, non-distended, no masses. Umbilical hernia present  Ext: No gross deformities appreciated. No edema. RUE graft (wrapped today after declotting). Right chest with scar from old HD access appears non infected  Skin: Warm, dry. No rashes   Neuro: Alert and oriented. Sensation and motor function of extremities grossly intact.  Psych: Appropriate mood and affect.    Laboratory:  Laboratory Data Reviewed: yes  Pertinent Findings:  Recent Labs   Lab 12/25/21  0325 12/26/21  0456 12/27/21  0523   WBC 7.55 7.55 6.45   HGB 9.4* 9.1* 9.0*   HCT 29.6* 28.6* 27.5*    184 217   MCV 96 97 96   RDW 13.7 13.5 13.6   * 135* 136   K 3.5 3.4* 3.5   CL 97 99 98   CO2 22* 21* 21*   BUN 37* 45* 49*   CREATININE 7.6* 9.4* 11.1*   * 159* 162*   PROT 6.8 5.6* 5.5*   ALBUMIN 2.6* 2.5* 2.5*   BILITOT 0.5 0.3 0.3   AST 17 16 14   ALKPHOS 71 79 75   ALT 7* 8* 10         Microbiology Data:  Microbiology Results (last 7 days)     Procedure Component Value Units Date/Time    Blood culture [[id number]] Collected: 12/24/21 2012    Order Status: Completed Specimen: Blood Updated: 12/26/21 2312     Blood Culture, Routine No Growth to date      No Growth to date      No Growth to date    Narrative:      Collection has been rescheduled by [email or web address] at 12/23/2021 10:10 Reason:   Patient unavailable going todia  Collection has been rescheduled by [email or web address] at 12/23/2021 12:49 Reason:   Patient unavailable not in room   Collection has been rescheduled by [email or web address] at 12/23/2021 14:18   Collection has been rescheduled by [email or web address] at 12/23/2021 10:10 Reason:   Patient unavailable going todia  Collection has been rescheduled by [email or web address] at 12/23/2021 12:49 Reason:   Patient unavailable not in room   Collection has been rescheduled by [email or web address] at 12/23/2021 14:18     Blood culture [[id number]] Collected: 12/24/21 2118    Order Status: Completed Specimen: Blood Updated: 12/26/21 2312     Blood Culture, Routine No Growth to date      No  Growth to date      No Growth to date    Narrative:      Collection has been rescheduled by [email or web address] at 12/23/2021 10:10 Reason:   Patient unavailable going todia  Collection has been rescheduled by [email or web address] at 12/23/2021 12:49 Reason:   Patient unavailable not in room   Collection has been rescheduled by [email or web address] at 12/23/2021 14:18   Collection has been rescheduled by [email or web address] at 12/23/2021 10:10 Reason:   Patient unavailable going todia  Collection has been rescheduled by [email or web address] at 12/23/2021 12:49 Reason:   Patient unavailable not in room   Collection has been rescheduled by [email or web address] at 12/23/2021 14:18     Blood culture [[id number]]  (Abnormal) Collected: 12/22/21 0904    Order Status: Completed Specimen: Blood from Antecubital, Left Arm Updated: 12/25/21 1015     Blood Culture, Routine Gram stain aer bottle: Gram positive cocci in chains resembling Strep       Results called to and read back by: NIXON CAMACHO 12/23/2021  06:02      Gram stain francisco javier bottle: Gram positive cocci in chains resembling Strep       Positive results previously called      ENTEROCOCCUS FAECALIS  For susceptibility see order #[id number]      Blood Culture #2 **CANNOT BE ORDERED STAT** [[id number]]  (Abnormal) Collected: 12/21/21 0839    Order Status: Completed Specimen: Blood from Peripheral, Antecubital, Left Updated: 12/24/21 0830     Blood Culture, Routine Gram stain aer bottle: Gram positive cocci in chains resembling Strep       12/22/2021  10:51       Gram stain francisco javier bottle: Gram positive cocci in chains resembling Strep       12/22/2021  10:51        Positive results previously called      ENTEROCOCCUS FAECALIS  For susceptibility see order #[id number]      Blood Culture #2 **CANNOT BE ORDERED STAT** [[id number]]  (Abnormal) Collected: 12/21/21 1049    Order Status: Completed Specimen: Blood Updated: 12/24/21 0829     Blood Culture, Routine Gram stain aer bottle:  Gram positive cocci in chains resembling Strep      Results called to and read back by:  "SUSAN BELLAMY RN  12/22/2021        05:58      ENTEROCOCCUS FAECALIS  For susceptibility see order #[id number]      Urine culture [[id number]] Collected: 12/21/21 1117    Order Status: Completed Specimen: Urine Updated: 12/22/21 2057     Urine Culture, Routine No growth    Narrative:      Specimen Source->Urine    Influenza A & B by Molecular [[id number]]     Order Status: Canceled Specimen: Nasopharyngeal Swab     Strep A culture, throat [[id number]]     Order Status: No result Specimen: Throat             Antimicrobials:  Antibiotics (From admission, onward)            Start     Stop Route Frequency Ordered    12/27/21 1700  vancomycin 500 mg in dextrose 5 % 100 mL IVPB (ready to mix system)         -- IV Once 12/27/21 1016    12/27/21 1600  cefepime in dextrose 5 % 1 gram/50 mL IVPB 1 g         -- IV Every 24 hours (non-standard times) 12/27/21 1022    12/22/21 1100  mupirocin 2 % ointment         12/27 0859 Nasl 2 times daily 12/22/21 0951    12/21/21 1313  vancomycin - pharmacy to dose  (vancomycin IVPB)        "And" Linked Group Details    -- IV pharmacy to manage frequency 12/21/21 1214        Antivirals (From admission, onward)    None        Antifungals (From admission, onward)            None              Other Results:  Radiology Results:  X-Ray Chest AP Portable    Result Date: 12/21/2021  EXAMINATION: XR CHEST AP PORTABLE COMPARISON: Comparison is made to 12/20/2021. FINDINGS: Cardioverter/defibrillator is again noted.  Heart is minimally enlarged, but the appearance of the cardiomediastinal silhouette is unchanged since the examination referenced above.  Pulmonary vascularity is normal, and there are no findings indicating current cardiac decompensation.  Lung zones are stable as well, free of significant superimposed airspace consolidation or volume loss.  No pleural fluid of any substantial volume is seen on either side.  No pneumothorax.  Calcification in the wall of the aortic arch is an " incidental observation.     No significant detrimental interval change in the appearance of the chest since 12/20/2021 is appreciated. Electronically signed by: Wallace Boone MD Date:    12/21/2021 Time:    08:32    X-Ray Chest AP Portable    Result Date: 12/20/2021  EXAMINATION: XR CHEST AP PORTABLE CLINICAL HISTORY: Shortness of breath COMPARISON: 04/11/2020 FINDINGS: A cardiac pacemaker remains in place.  Its leads appear to be intact.  The size of the heart is prominent.  There is no focal pulmonary infiltrate visualized.  There is no pneumothorax.  The right costophrenic angle is sharp.  The left costophrenic angle is not well seen secondary to overlying ribs.     1. There is no focal pulmonary infiltrate visualized. 2. The size of the heart is prominent.  This may be secondary to magnification. 3. A cardiac pacemaker remains in place. Its leads appear to be intact. 4. The left costophrenic angle is not well seen secondary to overlying ribs. . Electronically signed by: Rajinder Fry MD Date:    12/20/2021 Time:    09:58    US Hemodialysis Access    Result Date: 12/22/2021  EXAMINATION: US HEMODIALYSIS ACCESS CLINICAL HISTORY: not able to performe dialysis; TECHNIQUE: Grayscale, color Doppler and spectral analysis of right upper extremity AV fistula was obtained. COMPARISON: 02/21/2020 FINDINGS: There is a right upper extremity AV fistula.  There is residual partial thrombus within the AV fistula through the midportion.  A stent is seen also within the mid AV fistula with proximal stenosis.  There is good flow throughout all the fistula.  Flow volume however appears low at 75 cc/second.  There is a partially visualized hypoechoic collection distal to the AV fistula measuring approximately 3.8 x 1.8 cm which may represent a seroma or hematoma.  Approximately the AV fistula measures 0.4 cm in diameter is approximately 0.4 cm from the skin.  Its midportion the fistula measures 0.8 cm in is 0.5 cm from the skin.   Distally the AV fistula measures 0.8 cm and is approximately 0.9 cm from the skin.     Partial nonocclusive thrombus in the midportion of the AV fistula with apparent stenosis proximal to the stent.  There are low flow volumes.  Recommend repeat fistulagram and thrombectomy. Partially visualized complex collection deep to the distal aspect of the fistula that could represent a small hematoma or seroma.  Correlation advised. Electronically signed by: Remington Pratt MD Date:    12/22/2021 Time:    11:00    Echo    Result Date: 12/21/2021  · The left ventricle is moderately enlarged with mild concentric hypertrophy and · The quantitatively derived ejection fraction is 34%. · Severe left atrial enlargement. · Normal right ventricular size with normal right ventricular systolic function. · Normal central venous pressure (3 mmHg). · The estimated PA systolic pressure is 21 mmHg. · No vegetations per surface echo.      Cardiac device check - Remote    Result Date: 12/10/2021  Additional Comments 90-day Remote Interrogation Report CRTD, DDDR  Presenting egram demonstrates ASBP Device function WNL Autocapture algorithms are On RA pace 60%, BiV pacing 98% Atrial arrhythmias:  NONE Anticoagulation status:  Eliquis Ventricular arrhythmias:  Non sustained event x 1, 4 seconds, c/w nsVT. Battery status/longevity:  5 yrs. Return to clinic in February 2022 F/u via remote interrogation in 3 months. Report prepared by SAMIA Alejo RN      Current Medications:     Infusions:       Scheduled:   apixaban  5 mg Oral BID    carvediloL  6.25 mg Oral BID    ceFEPime (MAXIPIME) IVPB  1 g Intravenous Q24H    clopidogreL  75 mg Oral Daily    insulin detemir U-100  5 Units Subcutaneous QHS    vancomycin (VANCOCIN) IVPB  500 mg Intravenous Once        PRN:  sodium chloride 0.9%, acetaminophen, benzonatate, capsicum 0.075%, dextrose 50%, dextrose 50%, glucagon (human recombinant), glucose, glucose, sodium chloride 0.9%, sodium chloride  0.9%, Pharmacy to dose Vancomycin consult **AND** vancomycin - pharmacy to dose

## 2021-12-27 NOTE — PHYSICIAN QUERY
PT Name: Houston Jc  MR #: [id number]     DOCUMENTATION CLARIFICATION      CDS: Brandy Capley, RN  Email: [email or web address]    This form is a permanent document in the medical record.    Query Date: December 27, 2021    By submitting this query, we are merely seeking further clarification of documentation to reflect the severity of illness of your patient. Please utilize your independent clinical judgment when addressing the question(s) below.     The Medical Record contains the following:   Indicators   Supporting Clinical Findings Location in Medical Record   X Documentation/History of condition   DVT (RLE popliteal vein)  Continue home eliquis     H&P 12/21, filed 12/22    Lab Value(s)     X Radiology Findings Incidental note is made of some nonocclusive DVT right popliteal vein   US 10/11/2021 (prior to current encounter)   X Treatment/Medication Apixaban tablet 5mg oral 2 times daily   MAR 12/21    Other        Provider, please specify the acuity/chronicity of __right popliteal DVT___:    [   ] Chronic     [   ] Other/ unspecified     [ x  ] Clinically Undetermined       Please document in your progress notes daily for the duration of treatment until resolved, and include in your discharge summary.  Form No. 97881

## 2021-12-28 NOTE — PROGRESS NOTES
Pharmacokinetic Assessment Follow Up: IV Vancomycin    Vancomycin serum concentration assessment(s):    The random level was drawn correctly and can be used to guide therapy at this time. The measurement is within the desired definitive target range of 15 to 20 mcg/mL.    Vancomycin Regimen Plan:    No HD today. Will HOLD vancomycin and recheck vancomycin random with AM labs on 12/29.     Drug levels (last 3 results):  Recent Labs   Lab Result Units 12/26/21  0456 12/27/21  0523 12/28/21  0511   Vancomycin, Random ug/mL 21.8 18.5 17.8       Pharmacy will continue to follow and monitor vancomycin.    Please contact pharmacy at extension [phone] for questions regarding this assessment.    Thank you for the consult,   Betsy Gusman       Patient brief summary:  Houston Jc is a 76 y.o. male initiated on antimicrobial therapy with IV Vancomycin for treatment of bacteremia    The patient's current regimen is pulse dosed    Drug Allergies:   Review of patient's allergies indicates:  No Known Allergies    Actual Body Weight:   117.5 kg    Renal Function:   Estimated Creatinine Clearance: 9 mL/min (A) (based on SCr of 9.7 mg/dL (H)).,     Dialysis Method (if applicable):  intermittent HD    CBC (last 72 hours):  Recent Labs   Lab Result Units 12/26/21  0456 12/27/21  0523 12/27/21  1259 12/28/21  0511   WBC K/uL 7.55 6.45  --  6.91   Hemoglobin g/dL 9.1* 9.0*  --  8.8*   Hemoglobin A1C %  --   --  6.8*  --    Hematocrit % 28.6* 27.5*  --  28.1*   Platelets K/uL 184 217  --  229   Gran % % 73.8* 73.6*  --  68.4   Lymph % % 16.3* 16.0*  --  21.4   Mono % % 7.4 7.3  --  7.4   Eosinophil % % 1.7 2.0  --  2.0   Basophil % % 0.3 0.5  --  0.4   Differential Method  Automated Automated  --  Automated       Metabolic Panel (last 72 hours):  Recent Labs   Lab Result Units 12/26/21  0456 12/27/21  0523 12/28/21  0511   Sodium mmol/L 135* 136 135*   Potassium mmol/L 3.4* 3.5 3.3*   Chloride mmol/L 99 98 100   CO2 mmol/L 21* 21* 19*    Glucose mg/dL 159* 162* 132*   BUN mg/dL 45* 49* 39*   Creatinine mg/dL 9.4* 11.1* 9.7*   Albumin g/dL 2.5* 2.5* 2.6*   Total Bilirubin mg/dL 0.3 0.3 0.3   Alkaline Phosphatase U/L 79 75 82   AST U/L 16 14 13   ALT U/L 8* 10 5*   Magnesium mg/dL  --   --  2.0   Phosphorus mg/dL  --   --  5.8*       Vancomycin Administrations:  vancomycin given in the last 96 hours                     vancomycin 500 mg in dextrose 5 % 100 mL IVPB (ready to mix system) (mg) 500 mg New Bag 12/27/21 2235    vancomycin 500 mg in dextrose 5 % 100 mL IVPB (ready to mix system) (mg) 500 mg New Bag 12/24/21 1854                    Microbiologic Results:  Microbiology Results (last 7 days)       Procedure Component Value Units Date/Time    Blood culture [[id number]] Collected: 12/24/21 2118    Order Status: Completed Specimen: Blood Updated: 12/27/21 2312     Blood Culture, Routine No Growth to date      No Growth to date      No Growth to date      No Growth to date    Narrative:      Collection has been rescheduled by [email or web address] at 12/23/2021 10:10 Reason:   Patient unavailable going todia  Collection has been rescheduled by [email or web address] at 12/23/2021 12:49 Reason:   Patient unavailable not in room   Collection has been rescheduled by [email or web address] at 12/23/2021 14:18   Collection has been rescheduled by [email or web address] at 12/23/2021 10:10 Reason:   Patient unavailable going todia  Collection has been rescheduled by [email or web address] at 12/23/2021 12:49 Reason:   Patient unavailable not in room   Collection has been rescheduled by [email or web address] at 12/23/2021 14:18     Blood culture [[id number]] Collected: 12/24/21 2012    Order Status: Completed Specimen: Blood Updated: 12/27/21 2312     Blood Culture, Routine No Growth to date      No Growth to date      No Growth to date      No Growth to date    Narrative:      Collection has been rescheduled by [email or web address] at 12/23/2021 10:10 Reason:   Patient unavailable going todia  Collection has been rescheduled by [email or web address] at 12/23/2021 12:49 Reason:   Patient  unavailable not in room   Collection has been rescheduled by [email or web address] at 12/23/2021 14:18   Collection has been rescheduled by [email or web address] at 12/23/2021 10:10 Reason:   Patient unavailable going todia  Collection has been rescheduled by [email or web address] at 12/23/2021 12:49 Reason:   Patient unavailable not in room   Collection has been rescheduled by [email or web address] at 12/23/2021 14:18     Blood culture [[id number]]  (Abnormal) Collected: 12/22/21 0904    Order Status: Completed Specimen: Blood from Antecubital, Left Arm Updated: 12/25/21 1015     Blood Culture, Routine Gram stain aer bottle: Gram positive cocci in chains resembling Strep       Results called to and read back by: NIXON CAMACHO 12/23/2021  06:02      Gram stain francisco javier bottle: Gram positive cocci in chains resembling Strep       Positive results previously called      ENTEROCOCCUS FAECALIS  For susceptibility see order #[id number]      Blood Culture #2 **CANNOT BE ORDERED STAT** [[id number]]  (Abnormal) Collected: 12/21/21 0839    Order Status: Completed Specimen: Blood from Peripheral, Antecubital, Left Updated: 12/24/21 0830     Blood Culture, Routine Gram stain aer bottle: Gram positive cocci in chains resembling Strep       12/22/2021  10:51       Gram stain francisco javier bottle: Gram positive cocci in chains resembling Strep       12/22/2021  10:51        Positive results previously called      ENTEROCOCCUS FAECALIS  For susceptibility see order #[id number]      Blood Culture #2 **CANNOT BE ORDERED STAT** [[id number]]  (Abnormal) Collected: 12/21/21 1049    Order Status: Completed Specimen: Blood Updated: 12/24/21 0829     Blood Culture, Routine Gram stain aer bottle:  Gram positive cocci in chains resembling Strep      Results called to and read back by: SUSAN BELLAMY RN  12/22/2021        05:58      ENTEROCOCCUS FAECALIS  For susceptibility see order #[id number]      Urine culture [[id number]] Collected: 12/21/21 1117    Order Status: Completed Specimen: Urine Updated: 12/22/21 2057      Urine Culture, Routine No growth    Narrative:      Specimen Source->Urine

## 2021-12-28 NOTE — PROGRESS NOTES
12/27/21 1930   Post-Hemodialysis Assessment   Post-Hemodialysis Comments pt rinsed back, needles deaccessed per p and p, pressure held until hemostasis acheived, pressure dressing in place, report to RN on floor. net 1000 removed.

## 2021-12-28 NOTE — PLAN OF CARE
TN met with pt and pt's spouse via [employer]. TN faxed IV abx script to [facility] [phone]; TN confirmed with Marlyn at [facility] that script was received. Pt's spouse requesting home health on discharge for patient. TN informed patient and pt's wife of difficulty of reaching 's office to schedule hospital follow up appointment. PCP appt still pending at this time/        12/28/21 1249   Discharge Reassessment   Assessment Type Discharge Planning Reassessment   Did the patient's condition or plan change since previous assessment? No   Discharge Plan discussed with: Spouse/sig other;Patient   Name(s) and Number(s) Hue Jc (Spouse) [phone] (Mobile   Communicated ANDERSON with patient/caregiver Yes   Discharge Plan A Home Health   DME Needed Upon Discharge  none   Discharge Barriers Identified None   Why the patient remains in the hospital Requires continued medical care   Post-Acute Status   Post-Acute Authorization Dialysis   Diaylsis Status   (IV abx script sent to [facility]-per Vilma, script received.)

## 2021-12-28 NOTE — PLAN OF CARE
Patient is a patient of [facility] in [place]. TN spoke with Marlyn at [facility] regarding pt will need IV Vanc with HD session. Namrata awaiting orders for IV abx. TN to fax once orders received from attending team]    [facility] Fax [phone]       12/28/21 3095   Post-Acute Status   Post-Acute Authorization Dialysis

## 2021-12-28 NOTE — NURSING
Patient had 5 beats of vtach while being transported to ultrasound. Patient is asymptomatic. Dr Bullock informed, No new orders.

## 2021-12-28 NOTE — PLAN OF CARE
AAOX4. Medications given per MAR. Safety maintained. Call bell within reach. Patient encouraged to call for assistance. IV abx infusing. Pt denies N/V, SOB, distress. Pt had continued c/o pain in right shoulder, some relief with cream, heat pack and tylenol. Vital signs stable throughout the night. Afebrile. Will continue to monitor.

## 2021-12-28 NOTE — PROGRESS NOTES
[facility] Hospital Medicine Progress Note    Primary Team: [facility] Hospitalist Team A    Subjective:      NAEON. No new complaints.     Objective:     Last 24 Hour Vital Signs:  BP  Min: 110/61  Max: 142/65  Temp  Av.7 °F (36.5 °C)  Min: 97.4 °F (36.3 °C)  Max: 97.9 °F (36.6 °C)  Pulse  Av.3  Min: 59  Max: 77  Resp  Av.8  Min: 16  Max: 18  SpO2  Av.7 %  Min: 96 %  Max: 97 %  I/O last 3 completed shifts:  In: 550 [Other:400; IV Piggyback:150]  Out: 1700 [Urine:300; Other:1400]    Physical Examination:  Const: Well nourished, well developed  Eyes: PERRL, no conjunctival injection  HENT: NCAT, Neck supple  CV: RRR, no murmurs, rubs, or gallops. 2+ distal pulses  RESP: CTAB, Unlabored respiratory effort  GI: + BS, soft, non-tender, non-distended, no masses. Umbilical hernia present  Ext: No gross deformities appreciated. No edema. RUE graft.  Right chest with scar from old HD access appears non infected  Skin: Warm, dry. No rashes   Neuro: Alert and oriented. Sensation and motor function of extremities grossly intact.  Psych: Appropriate mood and affect.    Laboratory:  Laboratory Data Reviewed: yes  Pertinent Findings:  Recent Labs   Lab 21  0456 21  0523 21  0511   WBC 7.55 6.45 6.91   HGB 9.1* 9.0* 8.8*   HCT 28.6* 27.5* 28.1*    217 229   * 136 135*   K 3.4* 3.5 3.3*   CL 99 98 100   CREATININE 9.4* 11.1* 9.7*   BUN 45* 49* 39*   CO2 21* 21* 19*   ALT 8* 10 5*   AST 16 14 13     Microbiology Data Reviewed: yes  Pertinent Findings:  Microbiology Results (last 7 days)     Procedure Component Value Units Date/Time    Blood culture [[id number]] Collected: 21    Order Status: Completed Specimen: Blood Updated: 21     Blood Culture, Routine No Growth to date      No Growth to date      No Growth to date      No Growth to date    Narrative:      Collection has been rescheduled by JOSE A at 2021 10:10 Reason:   Patient unavailable going todia  Collection has  been rescheduled by [email or web address] at 12/23/2021 12:49 Reason:   Patient unavailable not in room   Collection has been rescheduled by [email or web address] at 12/23/2021 14:18   Collection has been rescheduled by [email or web address] at 12/23/2021 10:10 Reason:   Patient unavailable going todia  Collection has been rescheduled by [email or web address] at 12/23/2021 12:49 Reason:   Patient unavailable not in room   Collection has been rescheduled by [email or web address] at 12/23/2021 14:18     Blood culture [[id number]] Collected: 12/24/21 2012    Order Status: Completed Specimen: Blood Updated: 12/27/21 2312     Blood Culture, Routine No Growth to date      No Growth to date      No Growth to date      No Growth to date    Narrative:      Collection has been rescheduled by [email or web address] at 12/23/2021 10:10 Reason:   Patient unavailable going todia  Collection has been rescheduled by [email or web address] at 12/23/2021 12:49 Reason:   Patient unavailable not in room   Collection has been rescheduled by [email or web address] at 12/23/2021 14:18   Collection has been rescheduled by [email or web address] at 12/23/2021 10:10 Reason:   Patient unavailable going todia  Collection has been rescheduled by [email or web address] at 12/23/2021 12:49 Reason:   Patient unavailable not in room   Collection has been rescheduled by [email or web address] at 12/23/2021 14:18     Blood culture [[id number]]  (Abnormal) Collected: 12/22/21 0904    Order Status: Completed Specimen: Blood from Antecubital, Left Arm Updated: 12/25/21 1015     Blood Culture, Routine Gram stain aer bottle: Gram positive cocci in chains resembling Strep       Results called to and read back by: NIXON CAMACHO 12/23/2021  06:02      Gram stain francisco javier bottle: Gram positive cocci in chains resembling Strep       Positive results previously called      ENTEROCOCCUS FAECALIS  For susceptibility see order #[id number]      Blood Culture #2 **CANNOT BE ORDERED STAT** [[id number]]  (Abnormal) Collected: 12/21/21 0839    Order Status: Completed Specimen: Blood from Peripheral, Antecubital, Left Updated: 12/24/21 0830     Blood Culture,  Routine Gram stain aer bottle: Gram positive cocci in chains resembling Strep       12/22/2021  10:51       Gram stain francisco javier bottle: Gram positive cocci in chains resembling Strep       12/22/2021  10:51        Positive results previously called      ENTEROCOCCUS FAECALIS  For susceptibility see order #[id number]      Blood Culture #2 **CANNOT BE ORDERED STAT** [[id number]]  (Abnormal) Collected: 12/21/21 1049    Order Status: Completed Specimen: Blood Updated: 12/24/21 0829     Blood Culture, Routine Gram stain aer bottle:  Gram positive cocci in chains resembling Strep      Results called to and read back by: SUSAN BELLAMY RN  12/22/2021        05:58      ENTEROCOCCUS FAECALIS  For susceptibility see order #[id number]      Urine culture [[id number]] Collected: 12/21/21 1117    Order Status: Completed Specimen: Urine Updated: 12/22/21 2057     Urine Culture, Routine No growth    Narrative:      Specimen Source->Urine    Influenza A & B by Molecular [[id number]]     Order Status: Canceled Specimen: Nasopharyngeal Swab     Strep A culture, throat [[id number]]     Order Status: No result Specimen: Throat         Outside blood cx 12/20: enterococcus    Other Results:  EKG (my interpretation): atrial sensed ventricular paced rhythm    Radiology Data Reviewed: yes  Pertinent Findings:  US Soft Tissue Chest_Upper Back   Final Result      No abnormal sonographic findings identified.  Clinical management suggested.         Electronically signed by: Adrian Jeronimo MD   Date:    12/23/2021   Time:    14:38      US Hemodialysis Access   Final Result      Partial nonocclusive thrombus in the midportion of the AV fistula with apparent stenosis proximal to the stent.  There are low flow volumes.  Recommend repeat fistulagram and thrombectomy.      Partially visualized complex collection deep to the distal aspect of the fistula that could represent a small hematoma or seroma.  Correlation advised.         Electronically signed  by: Remington Pratt MD   Date:    12/22/2021   Time:    11:00      X-Ray Chest AP Portable   Final Result      No significant detrimental interval change in the appearance of the chest since 12/20/2021 is appreciated.         Electronically signed by: Wallace Boone MD   Date:    12/21/2021   Time:    08:32        Current Medications:     Infusions:       Scheduled:   apixaban  5 mg Oral BID    carvediloL  6.25 mg Oral BID    clopidogreL  75 mg Oral Daily    [START ON 12/29/2021] epoetin hayden-epbx  5,000 Units Intravenous Every Mon, Wed, Fri    insulin detemir U-100  5 Units Subcutaneous QHS        PRN:  sodium chloride 0.9%, acetaminophen, benzonatate, capsicum 0.075%, dextrose 50%, dextrose 50%, glucagon (human recombinant), glucose, glucose, insulin aspart U-100, sodium chloride 0.9%, sodium chloride 0.9%, Pharmacy to dose Vancomycin consult **AND** vancomycin - pharmacy to dose    Antibiotics and Day Number of Therapy:  Rocephin 12/21-12/22  Vanc 12/21-  Cefepime 12/23-12/27    Lines and Day Number of Therapy:  PIV 12/21  HD AV graft right forearm    Assessment:     Houston Jc is a 76 y.o. male with PMH of ESRD (TTS), CHF, CAD, HTN, complete heart block s/p ICD, DM, DVT, and PAD presents for bacteremia after his blood cultures came back positive from an outside facility. Pt admitted for dialysis and further work up of bacteremia. He has vancomycin on board for the enterococcus bacteremia and is now on cefepime for his UTI per ID recs.      Plan:     Enterococcus bacteremia  -pt had cough with intermittent sputum for 4 days  -UA with 3+ leukocytes, >100 RBC, 32 WBC on admission  -lactic acid WNL  -CXR-no focal infiltrate  - Started rocephin, vanc (12/21)   - Blood cx 12/21-positive for E faecalis  - Echo without vegetation  - US chest negative for abscess/fluid collection  - Repeat blood cx (12/24)-NGTD  - ID following, recommend continue IV Vanc with HD x14 days from last negative culture (12/24), end date  1/7/22    ESRD on HD (TTS)  -missed session on Saturday (12/18)  -Nephro consulted-clotted AVF  -surgery declotted AVF on 12/21    Klebsiella UTI  - Positive urine culture 12/21  - Resistant to ceftriaxone and NFT  - Completed cefepime course     Acute on Chronic HFrEF  -TTE with EF 34%, no vegetations on surface echo  -, Trop 0.793->downtrended     Acute V-tach Runs  - per AI deterioration alert: 2 runs of V-tach on 12/23 (6 beat and 9 beat)  - Device check 11/24/2021 with short runs of V-tach  - Troponin ordered and unchanged from prior  - EKG similar to prior  - Will continue to monitor   - Continue coreg 6.25    Cough  -continue tessalon perles     DVT (RLE popliteal vein)  -Continue home eliquis     Hypertension  -Continue coreg 6.25     HLD  -pt taken off lipitor by PCP  -ordered lipid panel     Diabetes Mellitus Type II  -Glucose 195 on presentation  -Pending A1c  -Started Levemir 5U     Normocytic Anemia  -10.2/31.4 on presentation  -monitor and trend    Code: Full  Diet: Renal  PPx: apixaban  Dispo: discharge home pending arrangement of IV abx with HD    Leslie Bullock MD  [facility]    [facility] Hospitalist Pager numbers:   [facility] Hospitalist Medicine Team A (Abhinav/Jovani): [phone]  [facility] Hospitalist Medicine Team B (Robert/Enedelia):  [phone]

## 2021-12-29 NOTE — DISCHARGE INSTRUCTIONS
Urinary Tract Infection Discharge Instructions, Adult (English) View Edit Remove  Dialysis Catheter Discharge Instructions (English) View Edit Remove  Dialysis and Diet (English) View Edit Remove  Chronic Kidney Disease Discharge Instructions (English) View Edit Remove

## 2021-12-29 NOTE — PLAN OF CARE
Ambulatory referral/consult to Home Health [[id number]] (Order [id number])  Outpatient Referral  Date and Time: 12/28/2021 12:26 PM Department: [facility] Rel By/Authorizing: Leslie Bullock MD       Linked Results    Procedure Abnormality Status   Ambulatory referral/consult to Home Health       Dept Order Information    Date Department   12/28/2021 [facility]     Order Information    Order Date/Time Release Date/Time Start Date/Time End Date/Time   12/28/21 12:26 PM None 12/28/2021 None     Order Details    Frequency Duration Priority Order Class   None None Routine Internal Referral     Quantity    Ordering Quantity   1     Quantity    Ordering Quantity Ordering Quantity   1 1     Order Details    Order ID   [id number]       Comments    General Outpatient  [facility]       HOME HEALTH ORDERS   FACE TO FACE ENCOUNTER     Patient Name: Houston Jc   Date of Birth:  1945     PCP: Zak Hamilton MD   PCP Address: [address] [place] [address] / [facility] [address]   PCP Phone Number: [phone]   PCP Fax: [phone]     Encounter Date: 12/21/21     Admit to Home Health     Diagnoses:    Bacteremia  (primary encounter diagnosis)    SNOMED CT(R): Bacteremia      Cough    SNOMED CT(R): Cough      Chest pain    SNOMED CT(R): Chest pain      Acute cystitis without hematuria    SNOMED CT(R): Acute cystitis      ESRD (end stage renal disease) on dialysis    SNOMED CT(R): End stage renal failure on dialysis      Clotted dialysis access, initial encounter    SNOMED CT(R): Mechanical complication of dialysis catheter      Acute deep vein thrombosis (DVT) of popliteal vein of right lower extremity     SNOMED CT(R): Acute deep venous thrombosis of politeal vein of right leg      PAD (peripheral artery disease)    SNOMED CT(R): Peripheral arterial disease      Type 2 diabetes mellitus with chronic kidney disease on chronic dialysis, with long-term current use of insulin     SNOMED CT(R): Type 2 diabetes mellitus      Primary hypertension    SNOMED CT(R): Essential hypertension      Bacteremia due to Enterococcus    SNOMED CT(R): Bacteremia caused by Gram-positive bacteria      Anemia, chronic renal failure, stage 5    SNOMED CT(R): Anemia of chronic renal failure      Chronic combined systolic and diastolic congestive heart failure    SNOMED CT(R): Chronic combined systolic and diastolic heart failure      V tach    SNOMED CT(R): Ventricular tachycardia      Complete heart block    SNOMED CT(R): Complete atrioventricular block      NSVT (nonsustained ventricular tachycardia)    SNOMED CT(R): Nonsustained ventricular tachycardia      CLEMENCIA (acute kidney injury)    SNOMED CT(R): Acute injury of kidney       Follow Up Appointments:   Future Appointments   1/11/2022  2:00 PM    Dejuan Cody MD         [facility] CARDIO         [facility]   1/13/2022  10:00 AM   HOME MONITOR DEVICE CHECK* [facility]   1/24/2022  9:00 AM    Tara Padilla DPM      [facility] POD            [place]     Allergies:Review of patient's allergies indicates:   No Known Allergies     Medications: Review discharge medications with patient and family and provide education.     Current Facility-Administered Medications:   0.9%  NaCl infusion, , Intravenous, PRN, Lonnie Mcdaniels MD   acetaminophen tablet 650 mg, 650 mg, Oral, Q6H PRN, Nikolai Nix MD, 650 mg at 12/28/21 0850   apixaban tablet 5 mg, 5 mg, Oral, BID, Jeison Hunt MD, 5 mg at 12/28/21 0850   benzonatate capsule 100 mg, 100 mg, Oral, TID PRN, Sandra Francois MD, 100 mg at 12/28/21 0850   capsicum 0.075% topical cream, , Topical (Top), BID PRN, Sandra Francois MD, Given at 12/27/21 2115   carvediloL tablet 6.25 mg, 6.25 mg, Oral, BID, Francisco Mcknight MD, 6.25 mg at 12/28/21 0850   clopidogreL tablet 75 mg, 75 mg, Oral, Daily, Jeison Hunt MD, 75 mg at 12/28/21 0850   dextrose 50% injection 12.5 g, 12.5 g, Intravenous, PRN,  Jeison Hunt MD   dextrose 50% injection 25 g, 25 g, Intravenous, PRN, Jeison Hunt MD    [START ON 12/29/2021] epoetin hayden-epbx injection 5,000 Units, 5,000 Units, Intravenous, Every Mon, Wed, Fri, Smita Woo MD   glucagon (human recombinant) injection 1 mg, 1 mg, Intramuscular, PRN, Jeison Hunt MD   glucose chewable tablet 16 g, 16 g, Oral, PRN, Jeison Hunt MD   glucose chewable tablet 24 g, 24 g, Oral, PRN, Jeison Hunt MD   insulin aspart U-100 pen 0-5 Units, 0-5 Units, Subcutaneous, QID (AC + HS) PRN, Leslie Bullock MD   insulin detemir U-100 pen 5 Units, 5 Units, Subcutaneous, QHS, Jeison Hunt MD, 5 Units at 12/27/21 2127   sodium chloride 0.9% bolus 250 mL, 250 mL, Intravenous, PRN, Lonnie Mcdaniels MD   sodium chloride 0.9% flush 10 mL, 10 mL, Intravenous, Q12H PRN, Jeison Hunt MD   vancomycin - pharmacy to dose, , Intravenous, pharmacy to manage frequency, Jeison Hunt MD       Current Discharge Medication List     START taking these medications     vancomycin HCl (VANCOMYCIN 1 G/250 ML D5W, READY TO MIX SYSTEM,)   Inject 125 mLs (500 mg total) into the vein once daily. To be given with HD on TTS, end date 1/7/22 for 10 days   Qty: 1250 mL Refills: 0       CONTINUE these medications which have NOT CHANGED     allopurinol (ZYLOPRIM) 300 MG tablet   Take 300 mg by mouth once daily.     apixaban (ELIQUIS) 5 mg Tab   Take 1 tablet (5 mg total) by mouth 2 (two) times daily.   Qty: 60 tablet Refills: 6   Associated Diagnoses:Deep vein thrombosis (DVT) of popliteal vein of right lower extremity, unspecified chronicity     carvedilol (COREG) 12.5 MG tablet   Take 12.5 mg by mouth 2 (two) times daily.     cephALEXin (KEFLEX) 500 MG capsule   Take 500 mg by mouth every 6 (six) hours.     cholecalciferol, vitamin D3, (VITAMIN D3) 50 mcg (2,000 unit) Cap   Take 1 capsule by mouth once daily.     clopidogreL (PLAVIX) 75 mg tablet   Take 75 mg  "by mouth once daily.     doxycycline (VIBRAMYCIN) 100 MG Cap   Take 1 capsule (100 mg total) by mouth 2 (two) times daily. for 10 days   Qty: 20 capsule Refills: 0     insulin aspart U-100 (NOVOLOG) 100 unit/mL injection   Inject 4-8 Units into the skin 3 (three) times daily before meals. Per sliding scale     losartan (COZAAR) 25 MG tablet   Take 1 tablet (25 mg total) by mouth once daily.   Qty: 90 tablet Refills: 3   Comments: .     torsemide (DEMADEX) 20 MG Tab   Take 1 tablet (20 mg total) by mouth 2 (two) times daily. Take additional 2 tablets if weight gain of more than 3 pounds in 1 day   Qty: 60 tablet Refills: 11   Comments: .   Associated Diagnoses:Acute on chronic heart failure with reduced ejection fraction and diastolic dysfunction     VENTOLIN HFA 90 mcg/actuation inhaler   Inhale 1 puff into the lungs every 4 (four) hours as needed. As NEEDED     atorvastatin (LIPITOR) 80 MG tablet   Take 1 tablet (80 mg total) by mouth every evening.   Qty: 90 tablet Refills: 3     walker Misc   5" wheel rolling walker   Qty: 1 each Refills: 1   Associated Diagnoses:Gait instability       STOP taking these medications     benzonatate (TESSALON) 100 MG capsule   Comments:   Reason for Stopping:             I have seen and examined this patient within the last 30 days. My clinical findings that support the need for the home health skilled services and home bound status are the following:no   Weakness/numbness causing balance and gait disturbance due to Weakness/Debility making it taxing to leave home.     Diet:   cardiac diet, diabetic diet 2000 calorie, and renal diet     Referrals/ Consults   Aide to provide assistance with personal care, ADLs, and vital signs.     Activities:   activity as tolerated     Nursing:   Agency to admit patient within 24 hours of hospital discharge unless specified on physician order or at patient request     SN to complete comprehensive assessment including routine vital signs. Instruct " "on disease process and s/s of complications to report to MD. Review/verify medication list sent home with the patient at time of discharge  and instruct patient/caregiver as needed. Frequency may be adjusted depending on start of care date.     Skilled nurse to perform up to 3 visits PRN for symptoms related to diagnosis     Notify MD if SBP > 160 or < 90; DBP > 90 or < 50; HR > 120 or < 50; Temp > 101; O2 < 88%     Ok to schedule additional visits based on staff availability and patient request on consecutive days within the home health episode.     Home Health Aide:   Home Health Aide Three times weekly     I certify that this patient is confined to his home and needs intermittent skilled nursing care.            Reprint Order Requisition    Ambulatory referral/consult to Home Health (Order #[id number]) on 21     Future Order Information    Expected Expires      2021               Associated Diagnoses     ICD-10-CM ICD-9-CM   Bacteremia  - Primary     R78.81 790.7     Ambulatory referral/consult to Home Health: Patient Communication    Not Released Not seen     Order Questions               Process Instructions    Patient should be seen by the "Expected Date".      Collection Information        Acknowledgement Info    For At Acknowledged By Acknowledged On   Placing Order 21 1226 John Bolden RN 21 1321     Patient Details  MRN:[id number]  Name Legal Sex:  SSHouston Joy Male 1945          Address Phone Email [place]   [address]   [place] [address]  Home: [phone]   Work:    Cell: [phone] [email or web address]          Inpatient Unit Inpatient Room Inpatient Bed    [facility] MEDICAL SURGICAL UNIT ACUTE K517 K517 A           PCP Allergies     Zak Hamilton MD No Known Allergies              Primary Visit Coverage    Payer Plan Sponsor Code Group Number Group Name   [employer] CHOICES 65  [id number]  "       Primary Visit Coverage Subscriber    Subscriber ID Subscriber Name Subscriber Address   [id number] HOUSTON JC [address]     [place] [address]       Secondary Visit Coverage    Payer Plan Sponsor Code Group Number Group Name   [facility]          Secondary Visit Coverage Subscriber    Subscriber ID Subscriber Name Subscriber Address   [id number] HOUSTON JC [address]     [place] [address]       Additional Information    Associated Reports   Priority and Order Details   Collection Information     ADT-Related Order Information       Encounter    View Encounter               Order Provider Info        Office phone Pager E-mail   Ordering User Leslie Bullock [phone] -- [email or web address]   Authorizing Provider Leslie Bullock [phone]   Attending Provider Cullen Jones [phone] -- [email or web address]     Ambulatory referral/consult to Home Health [[id number]]    Electronically signed by: Leslie Bullock MD on 12/28/21 1226 Status: Active   Ordering user: Leslie Bullock MD 12/28/21 1226 Ordering provider: Leslie Bullock MD   Authorized by: Leslie Bullock MD Ordering mode: Standard   Frequency:  12/28/21 -     Acknowledged: John Bolden RN 12/28/21 1321 for Placing Order   Diagnoses   Bacteremia [R78.81]   Order comments: General Outpatient  [facility] HOME HEALTH ORDERS FACE TO FACE ENCOUNTER Patient Name: Houston Jc Date of Birth:  1945 PCP: Zak Hamilton MD PCP Address: [address] [place] [address] / [place] [address] PCP Phone Number: [phone] PCP Fax: [phone] Encounter Date: 12/21/21 Admit to Home Health Diagnoses:  Bacteremia  (primary encounter diagnosis)  SNOMED CT(R): Bacteremia  Cough  SNOMED CT(R): Cough  Chest pain  SNOMED CT(R): Chest pain  Acute cystitis without hematuria  SNOMED CT(R): Acute cystitis  ESRD (end stage renal disease) on dialysis  SNOMED CT(R): End stage renal failure on dialysis   Clotted dialysis access, initial encounter  SNOMED CT(R): Mechanical complication of dialysis catheter  Acute deep vein thrombosis (DVT) of popliteal vein of right lower extremity   SNOMED CT(R): Acute deep venous thrombosis of politeal vein of right leg  PAD (peripheral artery disease)  SNOMED CT(R): Peripheral arterial disease  Type 2 diabetes mellitus with chronic kidney disease on chronic dialysis, with long-term current use of insulin  SNOMED CT(R): Type 2 diabetes mellitus  Primary hypertension  SNOMED CT(R): Essential hypertension  Bacteremia due to Enterococcus  SNOMED CT(R): Bacteremia caused by Gram-positive bacteria  Anemia, chronic renal failure, stage 5  SNOMED CT(R): Anemia of chronic renal failure  Chronic combined systolic and diastolic congestive heart failure  SNOMED CT(R): Chronic combined systolic and diastolic heart failure  V tach  SNOMED CT(R): Ventricular tachycardia  Complete heart block  SNOMED CT(R): Complete atrioventricular block  NSVT (nonsustained ventricular tachycardia)  SNOMED CT(R): Nonsustained ventricular tachycardia  CLEMENCIA (acute kidney injury)  SNOMED CT(R): Acute injury of kidney Follow Up Appointments: Future Appointments 1/11/2022  2:00 PM    Dejuan Cody MD         [facility] CARDIO         [place] [facility] 1/13/2022  10:00 AM   HOME MONITOR DEVICE CHECK* [facility] [place] Mo marguerite 1/24/2022  9:00 AM    Tara Padilla DPM      [facility] POD            [place] Allergies:Review of patient's allergies indicates: No Known Allergies Medications: Review discharge medications with patient and family and provide education. Current Facility-Administered Medications: 0.9%  NaCl infusion, , Intravenous, PRN, Lonnie Mcdaniels MD acetaminophen tablet 650 mg, 650 mg, Oral, Q6H PRN, Nikolai Nix MD, 650 mg at 12/28/21 0850 apixaban tablet 5 mg, 5 mg, Oral, BID, Jeison Hunt MD, 5 mg at 12/28/21 0850 benzonatate capsule 100 mg, 100 mg, Oral, TID PRN, Sandra Francois MD, 100 mg at  12/28/21 0850 capsicum 0.075% topical cream, , Topical (Top), BID PRN, Sandra Francois MD, Given at 12/27/21 2115 carvediloL tablet 6.25 mg, 6.25 mg, Oral, BID, Francisco Mcknight MD, 6.25 mg at 12/28/21 0850 clopidogreL tablet 75 mg, 75 mg, Oral, Daily, Jeison Hunt MD, 75 mg at 12/28/21 0850 dextrose 50% injection 12.5 g, 12.5 g, Intravenous, PRN, Jeison Hunt MD dextrose 50% injection 25 g, 25 g, Intravenous, PRN, Jeison Hunt MD  [START ON 12/29/2021] epoetin hayden-epbx injection 5,000 Units, 5,000 Units, Intravenous, Every Mon, Wed, Fri, Smita Woo MD glucagon (human recombinant) injection 1 mg, 1 mg, Intramuscular, PRN, Jeison Hunt MD glucose chewable tablet 16 g, 16 g, Oral, PRN, Jeison Hunt MD glucose chewable tablet 24 g, 24 g, Oral, PRN, Jeison Hunt MD insulin aspart U-100 pen 0-5 Units, 0-5 Units, Subcutaneous, QID (AC + HS) PRN, Leslie Bullock MD insulin detemir U-100 pen 5 Units, 5 Units, Subcutaneous, QHS, Jeison Hunt MD, 5 Units at 12/27/21 2127 sodium chloride 0.9% bolus 250 mL, 250 mL, Intravenous, PRN, Lonnie Mcdaniels MD sodium chloride 0.9% flush 10 mL, 10 mL, Intravenous, Q12H PRN, Jeison Hunt MD vancomycin - pharmacy to dose, , Intravenous, pharmacy to manage frequency, Jeison Hunt MD Current Discharge Medication List START taking these medications vancomycin HCl (VANCOMYCIN 1 G/250 ML D5W, READY TO MIX SYSTEM,) Inject 125 mLs (500 mg total) into the vein once daily. To be given with HD on TTS, end date 1/7/22 for 10 days Qty: 1250 mL Refills: 0 CONTINUE these medications which have NOT CHANGED allopurinol (ZYLOPRIM) 300 MG tablet Take 300 mg by mouth once daily. apixaban (ELIQUIS) 5 mg Tab Take 1 tablet (5 mg total) by mouth 2 (two) times daily. Qty: 60 tablet Refills: 6 Associated Diagnoses:Deep vein thrombosis (DVT) of popliteal vein of right lower extremity, unspecified chronicity carvedilol (COREG) 12.5 MG tablet  "Take 12.5 mg by mouth 2 (two) times daily. cephALEXin (KEFLEX) 500 MG capsule Take 500 mg by mouth every 6 (six) hours. cholecalciferol, vitamin D3, (VITAMIN D3) 50 mcg (2,000 unit) Cap Take 1 capsule by mouth once daily. clopidogreL (PLAVIX) 75 mg tablet Take 75 mg by mouth once daily. doxycycline (VIBRAMYCIN) 100 MG Cap Take 1 capsule (100 mg total) by mouth 2 (two) times daily. for 10 days Qty: 20 capsule Refills: 0 insulin aspart U-100 (NOVOLOG) 100 unit/mL injection Inject 4-8 Units into the skin 3 (three) times daily before meals. Per sliding scale losartan (COZAAR) 25 MG tablet Take 1 tablet (25 mg total) by mouth once daily. Qty: 90 tablet Refills: 3 Comments: . torsemide (DEMADEX) 20 MG Tab Take 1 tablet (20 mg total) by mouth 2 (two) times daily. Take additional 2 tablets if weight gain of more than 3 pounds in 1 day Qty: 60 tablet Refills: 11 Comments: . Associated Diagnoses:Acute on chronic heart failure with reduced ejection fraction and diastolic dysfunction VENTOLIN HFA 90 mcg/actuation inhaler Inhale 1 puff into the lungs every 4 (four) hours as needed. As NEEDED atorvastatin (LIPITOR) 80 MG tablet Take 1 tablet (80 mg total) by mouth every evening. Qty: 90 tablet Refills: 3 walker Misc 5" wheel rolling walker Qty: 1 each Refills: 1 Associated Diagnoses:Gait instability STOP taking these medications benzonatate (TESSALON) 100 MG capsule Comments: Reason for Stopping: I have seen and examined this patient within the last 30 days. My clinical findings that support the need for the home health skilled services and home bound status are the following:no Weakness/numbness causing balance and gait disturbance due to Weakness/Debility making it taxing to leave home. Diet: cardiac diet, diabetic diet 2000 calorie, and renal diet Referrals/ Consults Aide to provide assistance with personal care, ADLs, and vital signs. Activities: activity as tolerated Nursing: Agency to admit patient within 24 hours of " hospital discharge unless specified on physician order or at patient request SN to complete comprehensive assessment including routine vital signs. Instruct on disease process and s/s of complications to report to MD. Review/verify medication list sent home with the patient at time of discharge  and instruct patient/caregiver as needed. Frequency may be adjusted depending on start of care date. Skilled nurse to perform up to 3 visits PRN for symptoms related to diagnosis Notify MD if SBP > 160 or < 90; DBP > 90 or < 50; HR > 120 or < 50; Temp > 101; O2 < 88% Ok to schedule additional visits based on staff availability and patient request on consecutive days within the home health episode. Home Health Aide: Home Health Aide Three times weekly I certify that this patient is confined to his home and needs intermittent skilled nursing care.     Order Diagnosis and CPT Display    Order Diagnosis: Bacteremia [R78.81 (ICD-10-CM)] CPT:               Electronically signed by: Leslie Bullock MD Lic # [id number] NPI: [id number]

## 2021-12-29 NOTE — DISCHARGE SUMMARY
[facility] Hospital Medicine Discharge Summary    Primary Team: [facility] Hospitalist Team A  Attending Physician: Cullen Jones MD  Resident: Matias  Intern: Cleo    Date of Admit: 2021  Date of Discharge: 2021    Discharge to: home  Condition: good    Discharge Diagnoses     Patient Active Problem List   Diagnosis    Swelling of lower extremity    Hypervolemia    Hypertension    Sleep apnea    Morbid obesity    Complete heart block    VT (ventricular tachycardia)    Cardiorenal syndrome    Acute systolic heart failure    Abdominal distension    Debility    Gout    Cardiac resynchronization therapy defibrillator (CRT-D) in place    Third degree heart block    Abnormal transaminases    NICM (nonischemic cardiomyopathy)    SOB (shortness of breath)    COVID-19 virus detected    Fever    Suspected -nCoV infection    CLEMENCIA (acute kidney injury)    Bradycardia    Scrotal pain    ESRD (end stage renal disease) on dialysis    Chronic combined systolic and diastolic congestive heart failure    PAD (peripheral artery disease)    Acute deep vein thrombosis (DVT) of popliteal vein of right lower extremity    Bacteremia due to Enterococcus    Clotted dialysis access    Type 2 diabetes mellitus with chronic kidney disease on chronic dialysis, with long-term current use of insulin    Anemia, chronic renal failure, stage 5    Acute cystitis without hematuria    NSVT (nonsustained ventricular tachycardia)       Consultants and Procedures     Consultants:  Nephrology  ID  General Surgery    Procedures:    Fistula declotting with Dr. Hunt    Imagin/22 US Right Arm Hemodialysis Fistula:  Partial nonocclusive thrombus in the midportion of the AV fistula with apparent stenosis proximal to the stent.  There are low flow volumes.  Recommend repeat fistulagram and thrombectomy.     Partially visualized complex collection deep to the distal aspect of the fistula that could  represent a small hematoma or seroma.  Correlation advised.    12/28 US Right Arm HD Fistula:  There is a right upper extremity AV fistula.  Redemonstrated is a nonocclusive thrombus within the midportion of the fistula   There is a stent within the distal portion of the fistula.  Flow is visualized throughout the fistula, with flow volume 407 mL/minute, previously 75 mL/minute.  Proximally, the fistula measures 1.0 cm in diameter and is 0.6 cm on the scan.  At the midportion, the fistula measures 1.5 cm in diameter and is 0.7 cm from the skin.  Distally, fistula measures 0.8 cm and is 1.1 cm from the skin.  There is a 3.8 x 2.3 x 0.7 cm ill-defined region of subcutaneous fluid adjacent to the distal aspect of the fistula, which could represent a seroma, hematoma, or phlegmon.     Brief History of Present Illness      Houston Jc is a 76 y.o. AA male w/ PMH ESRD (TTS), CHF, CAD, HTN, complete heart block s/p ICD, DM, DVT, and PAD. The patient presented to [facility] on 12/21/2021 with a primary complaint of bacteremia. Pt was seen at another outside facility yesterday in [facility] for workup of cough and a provider called this AM to notify pt of positive Gram + cocci in blood cultures.     The patient was in their usual state of health until 4 days ago when he started to have cough with occasional production associated with right chest pain where pt has his old HD access (switched to RUE 1 yr ago). Pt also reported fever and chills from Friday to Sunday (12/17-12/19). Pt denies headaches, left sided chest pain, SOB, N/V, abdominal pain, and dysuria.     Pt reports that he is due to have dialysis today with his last session being last Thursday 12/16.    Hospital Course By Problem with Pertinent Findings     Enterococcus bacteremia  - Blood cx 12/21-positive for E faecalis  - Echo without vegetation  - US chest negative for abscess/fluid collection  - Repeat blood cx (12/24)-NGTD  - On vanc  (12/21-present)  - ID following, recommend continue IV Vanc with HD x14 days from last negative culture (12/24), end date 1/7/22     ESRD on HD (TTS)  -missed session on Saturday (12/18)  -Nephro consulted-clotted AVF  -surgery declotted AVF on 12/21  -continue IV vanc with HD sessions  -repeat AV U/S 12/28- findings indicate non-occlusive thrombus with possible subcutaneous seroma, hematoma, or phlegmon; appreciate recs from nephrology and vasc surgery   -can continue use of AVF at outpatient dialysis; Dr. Woo to advice dialysis RN to avoid accessing where stent is located     Klebsiella UTI  - Positive urine culture 12/21  - Resistant to ceftriaxone and NFT  - Completed cefepime course     Acute on Chronic HFrEF  -TTE with EF 34%, no vegetations on surface echo  -, Trop 0.793->downtrended  -continue coreg, home losartan     Acute V-tach Runs  - per AI deterioration alert: 2 runs of V-tach on 12/23 (6 beat and 9 beat); reported 5 runs of V-tach on 12/28  - Device check 11/24/2021 with short runs of V-tach  - Troponin ordered and unchanged from prior  - EKG similar to prior  - Will continue to monitor   - Continue coreg 6.25     Cough  -continue tessalon perles     DVT (RLE popliteal vein)  -Continue home eliquis     Hypertension  -Continue coreg 6.25     History of HLD  -pt taken off lipitor by PCP     Diabetes Mellitus Type II  -Glucose 195 on presentation  -A1C 6.8  -Started Levemir 5U while inpatient; continue home SSI and follow up with endocrinologist outpatient     Normocytic Anemia  -10.2/31.4 on presentation  -likely 2/2 to ESRD  -epogen with HD    Discharge Medications        Medication List      START taking these medications    capsicum 0.075% 0.075 % topical cream  Commonly known as: ZOSTRIX  Apply topically 2 (two) times daily as needed (shoulder pain).     insulin detemir U-100 100 unit/mL (3 mL) Inpn pen  Commonly known as: Levemir FLEXTOUCH  Inject 5 Units into the skin every evening.    "  VANCOMYCIN 1 G/250 ML D5W (READY TO MIX SYSTEM)  Inject 125 mLs (500 mg total) into the vein once daily. To be given with HD on TTS, end date 1/7/22 for 10 days        CHANGE how you take these medications    carvediloL 6.25 MG tablet  Commonly known as: COREG  Take 1 tablet (6.25 mg total) by mouth 2 (two) times daily.  What changed:   · medication strength  · how much to take     torsemide 20 MG Tab  Commonly known as: DEMADEX  Take 1 tablet (20 mg total) by mouth 2 (two) times daily. Take additional 2 tablets if weight gain of more than 3 pounds in 1 day  What changed: how much to take        CONTINUE taking these medications    allopurinoL 300 MG tablet  Commonly known as: ZYLOPRIM     apixaban 5 mg Tab  Commonly known as: ELIQUIS  Take 1 tablet (5 mg total) by mouth 2 (two) times daily.     atorvastatin 80 MG tablet  Commonly known as: LIPITOR  Take 1 tablet (80 mg total) by mouth every evening.     benzonatate 100 MG capsule  Commonly known as: TESSALON  Take 1 capsule (100 mg total) by mouth 3 (three) times daily as needed for Cough.     cholecalciferol (vitamin D3) 50 mcg (2,000 unit) Cap  Commonly known as: VITAMIN D3     clopidogreL 75 mg tablet  Commonly known as: PLAVIX     insulin aspart U-100 100 unit/mL injection  Commonly known as: NovoLOG     losartan 25 MG tablet  Commonly known as: COZAAR  Take 1 tablet (25 mg total) by mouth once daily.     VENTOLIN HFA 90 mcg/actuation inhaler  Generic drug: albuterol     walker Misc  5" wheel rolling walker        STOP taking these medications    cephALEXin 500 MG capsule  Commonly known as: KEFLEX     doxycycline 100 MG Cap  Commonly known as: VIBRAMYCIN           Where to Get Your Medications      These medications were sent to [facility]  [address], [place] [address]    Hours: Mon-Fri, 8a-5:30p Phone: [phone]   · benzonatate 100 MG capsule  · capsicum 0.075% 0.075 % topical cream  · carvediloL 6.25 MG tablet  · insulin " detemir U-100 100 unit/mL (3 mL) Inpn pen     You can get these medications from any pharmacy    Bring a paper prescription for each of these medications  · VANCOMYCIN 1 G/250 ML D5W (READY TO MIX SYSTEM)          Discharge Information:   Diet:  Renal, cardiac, diabetic    Physical Activity:  As tolerated             Instructions:  1. Take all medications as prescribed  2. Keep all follow-up appointments  3. Return to the hospital or call your primary care physicians if any worsening symptoms such as fever, chest pain, shortness of breath, return of symptoms, or any other concerns.    Follow-Up Appointments:  Shiv martino HD 12/30  PCP - will contact  Orthopedics - will contact  1/11/22 Cardiology  1/24/22 Podiatry        Didi Green MD  [facility] Internal Medicine, [facility]

## 2021-12-29 NOTE — PROGRESS NOTES
Progress Note  Nephrology      Consult Requested By: Cullen Jones MD      SUBJECTIVE:     Overnight events  Patient is a 76 y.o. male     Patient Active Problem List   Diagnosis    Swelling of lower extremity    Hypervolemia    Hypertension    Sleep apnea    Morbid obesity    Complete heart block    VT (ventricular tachycardia)    Cardiorenal syndrome    Acute systolic heart failure    Abdominal distension    Debility    Gout    Cardiac resynchronization therapy defibrillator (CRT-D) in place    Third degree heart block    Abnormal transaminases    NICM (nonischemic cardiomyopathy)    SOB (shortness of breath)    COVID-19 virus detected    Fever    Suspected 2019-nCoV infection    CLEMENCIA (acute kidney injury)    Bradycardia    Scrotal pain    ESRD (end stage renal disease) on dialysis    Chronic combined systolic and diastolic congestive heart failure    PAD (peripheral artery disease)    Acute deep vein thrombosis (DVT) of popliteal vein of right lower extremity    Bacteremia due to Enterococcus    Clotted dialysis access    Type 2 diabetes mellitus with chronic kidney disease on chronic dialysis, with long-term current use of insulin    Anemia, chronic renal failure, stage 5    Acute cystitis without hematuria    NSVT (nonsustained ventricular tachycardia)     Past Medical History:   Diagnosis Date    Anemia     CHF (congestive heart failure)     CKD (chronic kidney disease) stage 4, GFR 15-29 ml/min     Coronary artery disease     Diabetes mellitus     Hematuria     Hypertension     Renal cyst, right               OBJECTIVE:     Vitals:    12/28/21 1200 12/28/21 1235 12/28/21 1547 12/28/21 1630   BP:  (!) 97/53 (!) 117/55    BP Location:       Patient Position:       Pulse: 68 62 70 65   Resp:  18 20    Temp:  98 °F (36.7 °C) 98 °F (36.7 °C)    TempSrc:       SpO2:  98% 98%    Weight:       Height:           Temp: 98 °F (36.7 °C) (12/28/21 1547)  Pulse: 65 (12/28/21  1630)  Resp: 20 (12/28/21 1547)  BP: (!) 117/55 (12/28/21 1547)  SpO2: 98 % (12/28/21 1547)    Date 12/28/21 0700 - 12/29/21 0659   Shift 9321-2836 6701-8048 8540-8874 24 Hour Total   INTAKE   P.O. 120 120  240   Shift Total(mL/kg) 120(1) 120(1)  240(2)   OUTPUT   Shift Total(mL/kg)       Weight (kg) 117.5 117.5 117.5 117.5             Medications:   apixaban  5 mg Oral BID    carvediloL  6.25 mg Oral BID    clopidogreL  75 mg Oral Daily    [START ON 12/29/2021] epoetin hayden-epbx  5,000 Units Intravenous Every Mon, Wed, Fri    insulin detemir U-100  5 Units Subcutaneous QHS                 Physical Exam:  General appearance:NAD  Lungs: diminished breath sounds  Heart: Pulse 65  Abdomen: soft  Extremities: edema  Skin: dry  Laboratory:  ABG  Labs reviewed  No results found for this or any previous visit (from the past 336 hour(s)).  Recent Results (from the past 336 hour(s))   CBC Auto Differential    Collection Time: 12/28/21  5:11 AM   Result Value Ref Range    WBC 6.91 3.90 - 12.70 K/uL    Hemoglobin 8.8 (L) 14.0 - 18.0 g/dL    Hematocrit 28.1 (L) 40.0 - 54.0 %    Platelets 229 150 - 450 K/uL   CBC Auto Differential    Collection Time: 12/27/21  5:23 AM   Result Value Ref Range    WBC 6.45 3.90 - 12.70 K/uL    Hemoglobin 9.0 (L) 14.0 - 18.0 g/dL    Hematocrit 27.5 (L) 40.0 - 54.0 %    Platelets 217 150 - 450 K/uL   CBC Auto Differential    Collection Time: 12/26/21  4:56 AM   Result Value Ref Range    WBC 7.55 3.90 - 12.70 K/uL    Hemoglobin 9.1 (L) 14.0 - 18.0 g/dL    Hematocrit 28.6 (L) 40.0 - 54.0 %    Platelets 184 150 - 450 K/uL     Urinalysis  No results for input(s): COLORU, CLARITYU, SPECGRAV, PHUR, PROTEINUA, GLUCOSEU, BILIRUBINCON, BLOODU, WBCU, RBCU, BACTERIA, MUCUS, NITRITE, LEUKOCYTESUR, UROBILINOGEN, HYALINECASTS in the last 24 hours.    Diagnostic Results:  X-Ray: Reviewed  US: Reviewed  Echo: Reviewed  ACCESS    ASSESSMENT/PLAN:     ESRD  Metabolic bone disease  Anemia  Epogen  Poor  nutrition  Renal diet  /55  US 12/22- AV access stenosis.  Repeat US.  HD in am.

## 2021-12-29 NOTE — NURSING
Patient for d/c today. Home meds delivered at bedside. PIV and tele removed. AVS printed and handed out to patient. Awaiting transport.

## 2021-12-29 NOTE — PLAN OF CARE
AAOX4. Medications given per MAR. Safety maintained. Call bell within reach. Patient encouraged to call for assistance. Pt denies N/V, SOB, distress. Pt had continued c/o pain in right shoulder/neck, some relief with cream, heat pack and tylenol. Vital signs stable throughout the night. Afebrile. Will continue to monitor.

## 2021-12-29 NOTE — NURSING
Patient back from dialysis. Vitals taken. No c/o pain or discomfort. Call light within reach. Bed alarm on.

## 2021-12-29 NOTE — PLAN OF CARE
TN spoke with Charge nurse Varsha at [facility] regarding pt possible d/c today. Per Varsha, pt next HD treatment would be tomorrow and then Sunday due to the holiday weekend.       TN spoke  in regards to IV vancomycin administration after HD due to holiday schedule. MD to follow up with TN     12/29/21 1126   Post-Acute Status   Post-Acute Authorization Dialysis

## 2021-12-29 NOTE — PLAN OF CARE
"  Houston Jc #[id number] (CSN: [id number]) (76 y.o. M) (Adm: 12/21/21)  [facility] [id number]    PCP    CONNOR ESCOBEDO    Date of Birth    1945     Demographics    Address:   [place] [address] [place] [address]    Home Phone:   [phone]    Work Phone:       Mobile Phone:   [phone]              SSN:       Insurance:   [employer]ICARE    Marital Status:       Mandaen:   Orthodox                Admission Dx    R05.9 Cough   R78.81 Bacteremia   I73.9 PAD (peripheral artery disease)   N18.6, Z99.2 ESRD (end stage renal disease) on dialysis   N30.00 Acute cystitis without hematuria   R07.9 Chest pain   T82.49XA Clotted dialysis access, initial encounter   I82.431 Acute deep vein thrombosis (DVT) of popliteal vein of right lower extremity     Chief Complaint    Complaint Comment   Blood Infection Pt's wife states that pt was seen yesterday at another facility and a provider called to notify of infection in the blood. Wife was advised to bring pt in : "For an IV because he thought the medicine he's on wouldn't work."     Documents Filed to Patient    Power of  Living Will Clinical Unknown Study Attachment Consent Form ABN Waiver After Visit Summary Lab Result Scan Code Status Patient Portal Status    Not on File  Not on File  Not on File  Not on File  Filed  Not on File  Filed  Not on File  FULL [Updated on 12/21/21 1208]  Active       Admission Information      Current Information    Attending Provider Admitting Provider Admission Type Admission Status   MD Sanjuana Hoyos MD Emergency Confirmed Admission          Admission Date/Time Discharge Date Hospital Service Auth/Cert Status   12/21/21  08:00 AM  Surgery Incomplete          Hospital Area Unit Room/Bed    \[facility] [place] [facility]\"" MEDICAL SURGICAL UNIT ACUTE K517/K517 A            Discharge Disposition Discharge Destination   Home or Self Care        Hospital Account    Name Acct ID Class Status " Primary Coverage   Houston Jc [id number] IP- Inpatient Open [facility] HEALTH MANAGED MEDICARE - [employer] 65            Guarantor Account (for Hospital Account #[id number])    Name Relation to Pt Service Area Active? Acct Type   Houston Jc Self OHSSA Yes Personal/Family   Address Phone     [address]   [place] [address] [phone](H)              Coverage Information (for Hospital Account #[id number])      1. [facility] MANAGED MEDIC[employer] 65    F/O Payor/Plan Precert #   [employer] 65    Subscriber Subscriber #   Houston Jc [id number]   Address Phone   [address]   [place] [address] [phone]     2. [employer]    F/O Payor/Plan Precert #   [employer]    Subscriber Subscriber #   Houston Jc [id number]   Address Phone   [address] [address]   [place] [address]               Emergency Contact Information    Name: Hue Jc Relationship: Spouse   Address:     City:  State:  Zip:  Phone: [phone]    Business phone:

## 2021-12-29 NOTE — PROGRESS NOTES
Pharmacokinetic Assessment Follow Up: IV Vancomycin    Vancomycin serum concentration assessment(s):    The random level was drawn correctly and can be used to guide therapy at this time. The measurement is within the desired definitive target range of 15 to 20 mcg/mL.    Vancomycin Regimen Plan:    Will give one time dose of vancomycin 500 mg IV and recheck vancomycin random with AM Labs on 12/21 prior to HD.    Drug levels (last 3 results):  Recent Labs   Lab Result Units 12/27/21  0523 12/28/21  0511 12/29/21  0352   Vancomycin, Random ug/mL 18.5 17.8 16.4       Pharmacy will continue to follow and monitor vancomycin.    Please contact pharmacy at extension [phone] for questions regarding this assessment.    Thank you for the consult,   Betsy Gusman       Patient brief summary:  Houston Jc is a 76 y.o. male initiated on antimicrobial therapy with IV Vancomycin for treatment of bacteremia    The patient's current regimen is pulse dose    Drug Allergies:   Review of patient's allergies indicates:  No Known Allergies    Actual Body Weight:   117.5 kg    Renal Function:   Estimated Creatinine Clearance: 7.8 mL/min (A) (based on SCr of 11.1 mg/dL (H)).,     Dialysis Method (if applicable):  intermittent HD    CBC (last 72 hours):  Recent Labs   Lab Result Units 12/27/21  0523 12/27/21  1259 12/28/21  0511 12/29/21  0352   WBC K/uL 6.45  --  6.91 6.97   Hemoglobin g/dL 9.0*  --  8.8* 8.1*   Hemoglobin A1C %  --  6.8*  --   --    Hematocrit % 27.5*  --  28.1* 25.4*   Platelets K/uL 217  --  229 217   Gran % % 73.6*  --  68.4 68.8   Lymph % % 16.0*  --  21.4 20.4   Mono % % 7.3  --  7.4 8.6   Eosinophil % % 2.0  --  2.0 1.4   Basophil % % 0.5  --  0.4 0.4   Differential Method  Automated  --  Automated Automated       Metabolic Panel (last 72 hours):  Recent Labs   Lab Result Units 12/27/21  0523 12/28/21  0511 12/29/21  0352   Sodium mmol/L 136 135* 135*   Potassium mmol/L 3.5 3.3* 3.3*   Chloride mmol/L 98 100 101    CO2 mmol/L 21* 19* 19*   Glucose mg/dL 162* 132* 129*   BUN mg/dL 49* 39* 44*   Creatinine mg/dL 11.1* 9.7* 11.1*   Albumin g/dL 2.5* 2.6* 2.4*   Total Bilirubin mg/dL 0.3 0.3 0.2   Alkaline Phosphatase U/L 75 82 75   AST U/L 14 13 18   ALT U/L 10 5* 5*   Magnesium mg/dL  --  2.0 2.0   Phosphorus mg/dL  --  5.8* 6.7*       Vancomycin Administrations:  vancomycin given in the last 96 hours                     vancomycin 500 mg in dextrose 5 % 100 mL IVPB (ready to mix system) (mg) 500 mg New Bag 12/27/21 2235                    Microbiologic Results:  Microbiology Results (last 7 days)       Procedure Component Value Units Date/Time    Blood culture [[id number]] Collected: 12/24/21 2012    Order Status: Completed Specimen: Blood Updated: 12/28/21 2312     Blood Culture, Routine No Growth to date      No Growth to date      No Growth to date      No Growth to date      No Growth to date    Narrative:      Collection has been rescheduled by [email or web address] at 12/23/2021 10:10 Reason:   Patient unavailable going todia  Collection has been rescheduled by [email or web address] at 12/23/2021 12:49 Reason:   Patient unavailable not in room   Collection has been rescheduled by [email or web address] at 12/23/2021 14:18   Collection has been rescheduled by [email or web address] at 12/23/2021 10:10 Reason:   Patient unavailable going todia  Collection has been rescheduled by [email or web address] at 12/23/2021 12:49 Reason:   Patient unavailable not in room   Collection has been rescheduled by [email or web address] at 12/23/2021 14:18     Blood culture [[id number]] Collected: 12/24/21 2118    Order Status: Completed Specimen: Blood Updated: 12/28/21 2312     Blood Culture, Routine No Growth to date      No Growth to date      No Growth to date      No Growth to date      No Growth to date    Narrative:      Collection has been rescheduled by [email or web address] at 12/23/2021 10:10 Reason:   Patient unavailable going todia  Collection has been rescheduled by [email or web address] at 12/23/2021 12:49 Reason:   Patient unavailable not in room   Collection  has been rescheduled by [email or web address] at 12/23/2021 14:18   Collection has been rescheduled by [email or web address] at 12/23/2021 10:10 Reason:   Patient unavailable going todia  Collection has been rescheduled by [email or web address] at 12/23/2021 12:49 Reason:   Patient unavailable not in room   Collection has been rescheduled by [email or web address] at 12/23/2021 14:18     Blood culture [[id number]]  (Abnormal) Collected: 12/22/21 0904    Order Status: Completed Specimen: Blood from Antecubital, Left Arm Updated: 12/25/21 1015     Blood Culture, Routine Gram stain aer bottle: Gram positive cocci in chains resembling Strep       Results called to and read back by: NIXON CAMACHO 12/23/2021  06:02      Gram stain francisco javier bottle: Gram positive cocci in chains resembling Strep       Positive results previously called      ENTEROCOCCUS FAECALIS  For susceptibility see order #[id number]      Blood Culture #2 **CANNOT BE ORDERED STAT** [[id number]]  (Abnormal) Collected: 12/21/21 0839    Order Status: Completed Specimen: Blood from Peripheral, Antecubital, Left Updated: 12/24/21 0830     Blood Culture, Routine Gram stain aer bottle: Gram positive cocci in chains resembling Strep       12/22/2021  10:51       Gram stain francisco javier bottle: Gram positive cocci in chains resembling Strep       12/22/2021  10:51        Positive results previously called      ENTEROCOCCUS FAECALIS  For susceptibility see order #[id number]      Blood Culture #2 **CANNOT BE ORDERED STAT** [[id number]]  (Abnormal) Collected: 12/21/21 1049    Order Status: Completed Specimen: Blood Updated: 12/24/21 0829     Blood Culture, Routine Gram stain aer bottle:  Gram positive cocci in chains resembling Strep      Results called to and read back by: SUSAN BELLAMY RN  12/22/2021        05:58      ENTEROCOCCUS FAECALIS  For susceptibility see order #[id number]      Urine culture [[id number]] Collected: 12/21/21 1117    Order Status: Completed Specimen: Urine Updated: 12/22/21 2057     Urine Culture, Routine No growth     Narrative:      Specimen Source->Urine

## 2021-12-29 NOTE — PROGRESS NOTES
Seen on dialysis  BP 88/63  Pulse 69      T 35  Discussed with Dr Hunt. Follow up with Dr Hunt for evaluation - possible new access placement.

## 2021-12-29 NOTE — PLAN OF CARE
VN note: VN completed AVS and attachments and notified bedside nurse, John. Will cont to be available and intervene prn.

## 2021-12-29 NOTE — PLAN OF CARE
12/29/21 1509   Post-Acute Status   Post-Acute Authorization Home Health;Dialysis   Home Health Status Set-up Complete/Auth obtained  (per Joie with PHN, pt set up with [facility])   Diaylsis Status   (per  with attending team, patient will need IV Vancomycin with HD dose today, tomorrow and Sunday per pharmacy. TN informed Varsha-Charge Nurse at [facility] of this.)

## 2021-12-29 NOTE — PROGRESS NOTES
12/29/21 1230   Vital Signs   Pulse 60   /61   BP Location Left arm   BP Method Automatic   Patient Position Lying   During Hemodialysis Assessment   Blood Flow Rate (mL/min) 300 mL/min   Dialysate Flow Rate (mL/min) 600 ml/min   Ultrafiltration Rate (mL/Hr) 1215 mL/Hr   Arteriovenous Lines Secure Yes   Arterial Pressure (mmHg) -145 mmHg   Venous Pressure (mmHg) 187   UF Removed (mL) 2500 mL   TMP 12   Venous Line in Air Detector Yes   Intake (mL) 250 mL   Transducer Dry Yes   Access Visible Yes    notified of access issue? N/A   Heparin given? N/A   Intra-Hemodialysis Comments tx completed   Post-Hemodialysis Assessment   Rinseback Volume (mL) 250 mL   Blood Volume Processed (Liters) 57 L   Dialyzer Clearance Lightly streaked   Duration of Treatment (minutes) 180 minutes   Hemodialysis Intake (mL) 500 mL   Total UF (mL) 2500 mL   Net Fluid Removal 2000   Patient Response to Treatment well   Post-Hemodialysis Comments blood rinsed back with 250 ml of ns. lines pulled and secured using aseptic technique. manual pressure held until hemostatis was achieved.

## 2021-12-29 NOTE — PLAN OF CARE
AVS and educational attachments shared with patient and wife via [employer]. Discussed thoroughly. Patient and wife verbalized clear understanding using teach back method. Notified bedside nurse of completion of education. At present no distress noted. Patient to be discharged via w/c with escort service and family with all of patient's belonings. Will cont to be available to patient and family and intervene prn.

## 2021-12-29 NOTE — PROGRESS NOTES
[facility] Hospital Medicine Progress Note    Primary Team: U Hospitalist Team A    Subjective:     Pt noted to have 5 beats of V-tach on tele during transport to ultrasound-however, asx. Denies HA, N/V, chest pain, SOB, abdominal pain.      Objective:     Last 24 Hour Vital Signs:  BP  Min: 97/53  Max: 137/60  Temp  Av.4 °F (36.3 °C)  Min: 96.5 °F (35.8 °C)  Max: 98 °F (36.7 °C)  Pulse  Av.1  Min: 60  Max: 86  Resp  Av.3  Min: 18  Max: 20  SpO2  Av %  Min: 96 %  Max: 100 %  I/O last 3 completed shifts:  In: 790 [P.O.:240; Other:400; IV Piggyback:150]  Out: 1400 [Other:1400]    Physical Examination:  Const: Well nourished, well developed  Eyes: PERRL, no conjunctival injection  HENT: NCAT, Neck supple  CV: RRR, no murmurs, rubs, or gallops. 2+ distal pulses  RESP: CTAB, Unlabored respiratory effort  GI: + BS, soft, non-tender, non-distended, no masses. Umbilical hernia present  Ext: No gross deformities appreciated. No edema. RUE graft.  Right chest with scar from old HD access appears non infected  Skin: Warm, dry. No rashes   Neuro: Alert and oriented. Sensation and motor function of extremities grossly intact.  Psych: Appropriate mood and affect.    Laboratory:  Laboratory Data Reviewed: yes  Pertinent Findings:  Recent Labs   Lab 21  0523 21  0511 21  0352   WBC 6.45 6.91 6.97   HGB 9.0* 8.8* 8.1*   HCT 27.5* 28.1* 25.4*    229 217    135* 135*   K 3.5 3.3* 3.3*   CL 98 100 101   CREATININE 11.1* 9.7* 11.1*   BUN 49* 39* 44*   CO2 21* 19* 19*   ALT 10 5* 5*   AST 14 13 18     Microbiology Data Reviewed: yes  Pertinent Findings:  Microbiology Results (last 7 days)     Procedure Component Value Units Date/Time    Blood culture [[id number]] Collected: 21    Order Status: Completed Specimen: Blood Updated: 21 0363     Blood Culture, Routine No Growth to date      No Growth to date      No Growth to date      No Growth to date      No Growth to date     Narrative:      Collection has been rescheduled by [email or web address] at 12/23/2021 10:10 Reason:   Patient unavailable going todia  Collection has been rescheduled by [email or web address] at 12/23/2021 12:49 Reason:   Patient unavailable not in room   Collection has been rescheduled by [email or web address] at 12/23/2021 14:18   Collection has been rescheduled by [email or web address] at 12/23/2021 10:10 Reason:   Patient unavailable going todia  Collection has been rescheduled by [email or web address] at 12/23/2021 12:49 Reason:   Patient unavailable not in room   Collection has been rescheduled by [email or web address] at 12/23/2021 14:18     Blood culture [[id number]] Collected: 12/24/21 2118    Order Status: Completed Specimen: Blood Updated: 12/28/21 2312     Blood Culture, Routine No Growth to date      No Growth to date      No Growth to date      No Growth to date      No Growth to date    Narrative:      Collection has been rescheduled by [email or web address] at 12/23/2021 10:10 Reason:   Patient unavailable going todia  Collection has been rescheduled by [email or web address] at 12/23/2021 12:49 Reason:   Patient unavailable not in room   Collection has been rescheduled by [email or web address] at 12/23/2021 14:18   Collection has been rescheduled by [email or web address] at 12/23/2021 10:10 Reason:   Patient unavailable going todia  Collection has been rescheduled by [email or web address] at 12/23/2021 12:49 Reason:   Patient unavailable not in room   Collection has been rescheduled by [email or web address] at 12/23/2021 14:18     Blood culture [[id number]]  (Abnormal) Collected: 12/22/21 0904    Order Status: Completed Specimen: Blood from Antecubital, Left Arm Updated: 12/25/21 1015     Blood Culture, Routine Gram stain aer bottle: Gram positive cocci in chains resembling Strep       Results called to and read back by: NIXON CAMACHO 12/23/2021  06:02      Gram stain francisco javier bottle: Gram positive cocci in chains resembling Strep       Positive results previously called      ENTEROCOCCUS FAECALIS  For susceptibility see order #[id number]      Blood Culture #2 **CANNOT BE ORDERED STAT** [[id number]]   (Abnormal) Collected: 12/21/21 0839    Order Status: Completed Specimen: Blood from Peripheral, Antecubital, Left Updated: 12/24/21 0830     Blood Culture, Routine Gram stain aer bottle: Gram positive cocci in chains resembling Strep       12/22/2021  10:51       Gram stain francisco javier bottle: Gram positive cocci in chains resembling Strep       12/22/2021  10:51        Positive results previously called      ENTEROCOCCUS FAECALIS  For susceptibility see order #[id number]      Blood Culture #2 **CANNOT BE ORDERED STAT** [[id number]]  (Abnormal) Collected: 12/21/21 1049    Order Status: Completed Specimen: Blood Updated: 12/24/21 0829     Blood Culture, Routine Gram stain aer bottle:  Gram positive cocci in chains resembling Strep      Results called to and read back by: SUSAN BELLAMY RN  12/22/2021        05:58      ENTEROCOCCUS FAECALIS  For susceptibility see order #[id number]      Urine culture [[id number]] Collected: 12/21/21 1117    Order Status: Completed Specimen: Urine Updated: 12/22/21 2057     Urine Culture, Routine No growth    Narrative:      Specimen Source->Urine        Outside blood cx 12/20: enterococcus       Other Results:  EKG (my interpretation): no new    Radiology Data Reviewed: yes  Pertinent Findings:  US Soft Tissue Chest_Upper Back   Final Result      No abnormal sonographic findings identified.  Clinical management suggested.         Electronically signed by: Adrian Jeronimo MD   Date:    12/23/2021   Time:    14:38      US Hemodialysis Access   Final Result      Partial nonocclusive thrombus in the midportion of the AV fistula with apparent stenosis proximal to the stent.  There are low flow volumes.  Recommend repeat fistulagram and thrombectomy.      Partially visualized complex collection deep to the distal aspect of the fistula that could represent a small hematoma or seroma.  Correlation advised.         Electronically signed by: Remington Pratt MD   Date:    12/22/2021   Time:    11:00       X-Ray Chest AP Portable   Final Result      No significant detrimental interval change in the appearance of the chest since 12/20/2021 is appreciated.         Electronically signed by: Wallace Boone MD   Date:    12/21/2021   Time:    08:32       Hemodialysis Access    (Results Pending)     Current Medications:     Infusions:       Scheduled:   apixaban  5 mg Oral BID    carvediloL  6.25 mg Oral BID    clopidogreL  75 mg Oral Daily    epoetin hayden-epbx  5,000 Units Intravenous Every Mon, Wed, Fri    insulin detemir U-100  5 Units Subcutaneous QHS        PRN:  sodium chloride 0.9%, acetaminophen, benzonatate, capsicum 0.075%, dextrose 50%, dextrose 50%, glucagon (human recombinant), glucose, glucose, insulin aspart U-100, sodium chloride 0.9%, sodium chloride 0.9%, Pharmacy to dose Vancomycin consult **AND** vancomycin - pharmacy to dose    Antibiotics and Day Number of Therapy:  Rocephin 12/21-12/22  Cefepime 12/23-12/27  Vanc 12/21-    Lines and Day Number of Therapy:  PIV 12/21  HD AV graft right forearm    Assessment:     Houston Jc is a 76 y.o. male with PMH of ESRD (TTS), CHF, CAD, HTN, complete heart block s/p ICD, DM, DVT, and PAD presents for bacteremia after his blood cultures came back positive from an outside facility. Pt admitted for dialysis and further work up of bacteremia. He has completed cefepime for his UTI and is currently completing vancomycin for enterococcus bacteremia.      Plan:     Enterococcus bacteremia  -pt had cough with intermittent sputum for 4 days  -UA with 3+ leukocytes, >100 RBC, 32 WBC on admission  -lactic acid WNL  -CXR-no focal infiltrate  - On vanc (12/21-)  - Blood cx 12/21-positive for E faecalis  - Echo without vegetation  - US chest negative for abscess/fluid collection  - Repeat blood cx (12/24)-NGTD  - ID following, recommend continue IV Vanc with HD x14 days from last negative culture (12/24), end date 1/7/22    ESRD on HD (TTS)  -missed session on Saturday  (12/18)  -Nephro consulted-clotted AVF  -surgery declotted AVF on 12/21  -will need IV vanc with HD sessions  -repeat AV U/S 12/28- findings indicate non-occlusive thrombus with possible subcutaneous seroma, hematoma, or phlegmon; appreciate recs from nephrology and vasc surgery   -HD planned for today    Klebsiella UTI  - Positive urine culture 12/21  - Resistant to ceftriaxone and NFT  - Completed cefepime course     Acute on Chronic HFrEF  -TTE with EF 34%, no vegetations on surface echo  -, Trop 0.793->downtrended     Acute V-tach Runs  - per AI deterioration alert: 2 runs of V-tach on 12/23 (6 beat and 9 beat); reported 5 runs of V-tach on 12/28  - Device check 11/24/2021 with short runs of V-tach  - Troponin ordered and unchanged from prior  - EKG similar to prior  - Will continue to monitor   - Continue coreg 6.25    Cough  -continue tessalon perles     DVT (RLE popliteal vein)  -Continue home eliquis     Hypertension  -Continue coreg 6.25     HLD  -pt taken off lipitor by PCP  -ordered lipid panel     Diabetes Mellitus Type II  -Glucose 195 on presentation  -Pending A1c  -Started Levemir 5U     Normocytic Anemia  -10.2/31.4 on presentation  -likely 2/2 to ESRD  -epogen with HD  -CTM    Code: Full  Diet: Renal  PPx: apixaban  Dispo: discharge home pending arrangement of IV abx with HD; per CM, IV abx script received at Shiv Thomas MD  [facility] Internal Medicine HO-III    [facility] Medicine Hospitalist Pager numbers:   [facility] Hospitalist Medicine Team A (Abhinav/Jovani): [phone]  [facility] Hospitalist Medicine Team B (Robert/Enedelia):  [phone]

## 2021-12-30 NOTE — PHYSICIAN QUERY
PT Name: Houston Jc  MR #: [id number]     DOCUMENTATION CLARIFICATION      CDS: Brandy Capley, RN  Email: [email or web address]    This form is a permanent document in the medical record.     Query Date: December 30, 2021    By submitting this query, we are merely seeking further clarification of documentation.  Please utilize your independent clinical judgment when addressing the question(s) below.     The Medical Record contains the following:    Clinical Information Location in Medical Record   The patient presented to [facility] on 12/21/2021 with a primary complaint of bacteremia. Pt was seen at another outside facility yesterday in [facility] for workup of cough and a provider called this AM to notify pt of positive Gram + cocci in blood cultures.    The patient was in their usual state of health until 4 days ago when he started to have cough with occasional production associated with right chest pain where pt has his old HD access (switched to RUE 1 yr ago). Pt also reported fever and chills from Friday to Sunday (12/17-12/19). Pt denies headaches, left sided chest pain, SOB, N/V, abdominal pain, and dysuria.    Enterococcus bacteremia  - Blood cx 12/21-positive for E faecalis  - Echo without vegetation  - US chest negative for abscess/fluid collection  - Repeat blood cx (12/24)-NGTD  - On vanc (12/21-present)  - ID following, recommend continue IV Vanc with HD x14 days from last negative culture (12/24), end date 1/7/22    -Nephro consulted-clotted AVF  -surgery declotted AVF on 12/21  -continue IV vanc with HD sessions  -repeat AV U/S 12/28- findings indicate non-occlusive thrombus with possible subcutaneous seroma, hematoma, or phlegmon; appreciate recs from nephrology and vasc surgery     -Also discussed with the primary team that there may be a potential concern for a clot at the AV fistula area and that there may be a concern that that area may consist of an area of foci of  infection, further imaging studies may be warranted of that area if BCx remain persistently +, would discuss with radiology for the best imaging study should that scenario arise, repeat BCx so far NGTD    12/21/2021 admit for cough and not feeling well, right chest discomfort where pt has his old HD access.     12/23 Soft tissue ultrasound of R-upper chest area where the patient endorses discomfort WNL     -12/20 TTE neg veg  -12/20 BCx E. Faecalis vanc sensitive  -12/21 BCx E. Faecalis  -12/22 BCx E. Faecalis  -12/24 BCx NGTD     FINAL RECOMMENDATIONS     Enterococcus bacteremia  -If blood cultures remain negative by 12/27, continue vancomycin 15-20 goal trough with duration for 2 weeks, dosing as per HD  -(Abx start 12/24/21, stop 1/7/22)     UTI  -12/20 UCx Klebsiella aerogenes  -Continue cefepime as per HD dosing:  First dose of cefepime 1gm  Subsequent doses 500mg q24h after dialysis  -Recommend discontinuing cefepime, treatment course complete    -pt had cough with intermittent sputum for 4 days    -CXR-no focal infiltrate  - Started rocephin, vanc (12/21)     Cough  -continue tessalon perles    Discussed with Dr Hunt. Follow up with Dr Hunt for evaluation - possible new access placement.   Discharge summary 12/29                                            ID progress notes 12/26          ID progress notes 12/27                                              Internal medicine progress notes 12/27                  Dialysis progress notes 12/29   Cardioverter/defibrillator is again noted.  Heart is minimally enlarged, but the appearance of the cardiomediastinal silhouette is unchanged since the examination referenced above.  Pulmonary vascularity is normal, and there are no findings indicating current cardiac decompensation.  Lung zones are stable as well, free of significant superimposed airspace consolidation or volume loss.  No pleural fluid of any substantial volume is seen on either side.  No  pneumothorax.  Calcification in the wall of the aortic arch is an incidental observation.    Partial nonocclusive thrombus in the midportion of the AV fistula with apparent stenosis proximal to the stent.  There are low flow volumes.  Recommend repeat fistulagram and thrombectomy.    Redemonstrated is a nonocclusive thrombus within the midportion of the fistula.     There is a stent within the distal portion of the fistula.     Flow is visualized throughout the fistula, with flow volume 407 mL/minute, previously 75 mL/minute.     Proximally, the fistula measures 1.0 cm in diameter and is 0.6 cm on the scan.  At the midportion, the fistula measures 1.5 cm in diameter and is 0.7 cm from the skin.  Distally, fistula measures 0.8 cm and is 1.1 cm from the skin.     There is a 3.8 x 2.3 x 0.7 cm ill-defined region of subcutaneous fluid adjacent to the distal aspect of the fistula, which could represent a seroma, hematoma, or phlegmon.   CXR 12/21            US 12/22      US 12/28     Please document suspected or known etiology of bacteremia?       [   ] AV fistula infection     [   ] Other etiology (please specify):___________________     [  ] Clinically Undetermined       Likely AV fistula infection but remains clinically undetermined       Please document in your progress notes daily for the duration of treatment, until resolved, and include in your discharge summary.

## 2022-01-01 ENCOUNTER — OFFICE VISIT (OUTPATIENT)
Dept: PODIATRY | Facility: CLINIC | Age: 77
End: 2022-01-01
Payer: OTHER GOVERNMENT

## 2022-01-01 ENCOUNTER — TELEPHONE (OUTPATIENT)
Dept: ORTHOPEDICS | Facility: CLINIC | Age: 77
End: 2022-01-01
Payer: OTHER GOVERNMENT

## 2022-01-01 ENCOUNTER — ANESTHESIA EVENT (OUTPATIENT)
Dept: SURGERY | Facility: HOSPITAL | Age: 77
End: 2022-01-01
Payer: MEDICARE

## 2022-01-01 ENCOUNTER — PATIENT OUTREACH (OUTPATIENT)
Dept: ADMINISTRATIVE | Facility: OTHER | Age: 77
End: 2022-01-01
Payer: OTHER GOVERNMENT

## 2022-01-01 ENCOUNTER — TELEPHONE (OUTPATIENT)
Dept: PODIATRY | Facility: CLINIC | Age: 77
End: 2022-01-01
Payer: OTHER GOVERNMENT

## 2022-01-01 ENCOUNTER — HOSPITAL ENCOUNTER (OUTPATIENT)
Facility: HOSPITAL | Age: 77
Discharge: HOME | End: 2022-02-18
Attending: EMERGENCY MEDICINE | Admitting: SURGERY
Payer: OTHER GOVERNMENT

## 2022-01-01 ENCOUNTER — OFFICE VISIT (OUTPATIENT)
Dept: PODIATRY | Facility: CLINIC | Age: 77
End: 2022-01-01
Payer: MEDICARE

## 2022-01-01 ENCOUNTER — TELEPHONE (OUTPATIENT)
Dept: CARDIOLOGY | Facility: HOSPITAL | Age: 77
End: 2022-01-01
Payer: OTHER GOVERNMENT

## 2022-01-01 ENCOUNTER — HOSPITAL ENCOUNTER (OUTPATIENT)
Dept: RADIOLOGY | Facility: HOSPITAL | Age: 77
Discharge: HOME | End: 2022-01-10
Attending: ORTHOPAEDIC SURGERY
Payer: OTHER GOVERNMENT

## 2022-01-01 ENCOUNTER — OFFICE VISIT (OUTPATIENT)
Dept: CARDIOLOGY | Facility: CLINIC | Age: 77
End: 2022-01-01
Payer: MEDICARE

## 2022-01-01 ENCOUNTER — ANESTHESIA (OUTPATIENT)
Dept: SURGERY | Facility: HOSPITAL | Age: 77
End: 2022-01-01
Payer: MEDICARE

## 2022-01-01 ENCOUNTER — HOSPITAL ENCOUNTER (OUTPATIENT)
Facility: HOSPITAL | Age: 77
Discharge: HOME | End: 2022-03-09
Attending: EMERGENCY MEDICINE | Admitting: SURGERY
Payer: OTHER GOVERNMENT

## 2022-01-01 ENCOUNTER — HOSPITAL ENCOUNTER (OUTPATIENT)
Dept: RADIOLOGY | Facility: HOSPITAL | Age: 77
Discharge: HOME | End: 2022-02-08
Attending: SURGERY
Payer: MEDICARE

## 2022-01-01 ENCOUNTER — EXTERNAL HOME HEALTH (OUTPATIENT)
Dept: HOME HEALTH SERVICES | Facility: HOSPITAL | Age: 77
End: 2022-01-01
Payer: OTHER GOVERNMENT

## 2022-01-01 ENCOUNTER — HOSPITAL ENCOUNTER (OUTPATIENT)
Dept: RADIOLOGY | Facility: HOSPITAL | Age: 77
Discharge: HOME | End: 2022-05-18
Attending: INTERNAL MEDICINE
Payer: MEDICARE

## 2022-01-01 ENCOUNTER — CLINICAL SUPPORT (OUTPATIENT)
Dept: CARDIOLOGY | Facility: HOSPITAL | Age: 77
End: 2022-01-01
Payer: OTHER GOVERNMENT

## 2022-01-01 ENCOUNTER — ANESTHESIA (OUTPATIENT)
Dept: SURGERY | Facility: HOSPITAL | Age: 77
DRG: 252 | End: 2022-01-01
Payer: MEDICARE

## 2022-01-01 ENCOUNTER — HOSPITAL ENCOUNTER (OUTPATIENT)
Facility: HOSPITAL | Age: 77
Discharge: HOME | End: 2022-06-07
Attending: INTERNAL MEDICINE | Admitting: INTERNAL MEDICINE
Payer: MEDICARE

## 2022-01-01 ENCOUNTER — PATIENT OUTREACH (OUTPATIENT)
Dept: ADMINISTRATIVE | Facility: CLINIC | Age: 77
End: 2022-01-01
Payer: OTHER GOVERNMENT

## 2022-01-01 ENCOUNTER — TELEPHONE (OUTPATIENT)
Dept: CARDIOLOGY | Facility: CLINIC | Age: 77
End: 2022-01-01
Payer: OTHER GOVERNMENT

## 2022-01-01 ENCOUNTER — HOSPITAL ENCOUNTER (OUTPATIENT)
Dept: RADIOLOGY | Facility: HOSPITAL | Age: 77
Discharge: HOME | End: 2022-05-17
Attending: STUDENT IN AN ORGANIZED HEALTH CARE EDUCATION/TRAINING PROGRAM
Payer: OTHER GOVERNMENT

## 2022-01-01 ENCOUNTER — OFFICE VISIT (OUTPATIENT)
Dept: HOME HEALTH SERVICES | Facility: CLINIC | Age: 77
End: 2022-01-01
Payer: MEDICARE

## 2022-01-01 ENCOUNTER — TELEPHONE (OUTPATIENT)
Dept: ELECTROPHYSIOLOGY | Facility: CLINIC | Age: 77
End: 2022-01-01
Payer: OTHER GOVERNMENT

## 2022-01-01 ENCOUNTER — HOSPITAL ENCOUNTER (OUTPATIENT)
Facility: HOSPITAL | Age: 77
Discharge: HOME | End: 2022-02-11
Attending: INTERNAL MEDICINE | Admitting: INTERNAL MEDICINE
Payer: MEDICARE

## 2022-01-01 ENCOUNTER — HOSPITAL ENCOUNTER (OUTPATIENT)
Dept: CARDIOLOGY | Facility: HOSPITAL | Age: 77
Discharge: HOME | End: 2022-05-18
Attending: INTERNAL MEDICINE
Payer: MEDICARE

## 2022-01-01 ENCOUNTER — DOCUMENT SCAN (OUTPATIENT)
Dept: HOME HEALTH SERVICES | Facility: HOSPITAL | Age: 77
End: 2022-01-01
Payer: OTHER GOVERNMENT

## 2022-01-01 ENCOUNTER — HOSPITAL ENCOUNTER (INPATIENT)
Facility: HOSPITAL | Age: 77
LOS: 15 days | Discharge: HOME HEALTH | DRG: 252 | End: 2022-06-28
Attending: EMERGENCY MEDICINE | Admitting: SURGERY
Payer: MEDICARE

## 2022-01-01 ENCOUNTER — OFFICE VISIT (OUTPATIENT)
Dept: ORTHOPEDICS | Facility: CLINIC | Age: 77
End: 2022-01-01
Payer: MEDICARE

## 2022-01-01 ENCOUNTER — ANESTHESIA EVENT (OUTPATIENT)
Dept: SURGERY | Facility: HOSPITAL | Age: 77
DRG: 252 | End: 2022-01-01
Payer: MEDICARE

## 2022-01-01 ENCOUNTER — HOSPITAL ENCOUNTER (INPATIENT)
Facility: HOSPITAL | Age: 77
LOS: 9 days | DRG: 299 | End: 2022-07-10
Attending: EMERGENCY MEDICINE | Admitting: FAMILY MEDICINE
Payer: OTHER GOVERNMENT

## 2022-01-01 ENCOUNTER — CLINICAL SUPPORT (OUTPATIENT)
Dept: CARDIOLOGY | Facility: HOSPITAL | Age: 77
End: 2022-01-01
Payer: MEDICARE

## 2022-01-01 VITALS
HEIGHT: 72 IN | DIASTOLIC BLOOD PRESSURE: 56 MMHG | BODY MASS INDEX: 35.35 KG/M2 | HEART RATE: 67 BPM | SYSTOLIC BLOOD PRESSURE: 104 MMHG | WEIGHT: 261 LBS

## 2022-01-01 VITALS
DIASTOLIC BLOOD PRESSURE: 71 MMHG | HEART RATE: 76 BPM | BODY MASS INDEX: 35.08 KG/M2 | OXYGEN SATURATION: 99 % | HEIGHT: 72 IN | TEMPERATURE: 98 F | SYSTOLIC BLOOD PRESSURE: 143 MMHG | RESPIRATION RATE: 20 BRPM | WEIGHT: 259 LBS

## 2022-01-01 VITALS
HEART RATE: 90 BPM | BODY MASS INDEX: 34.45 KG/M2 | DIASTOLIC BLOOD PRESSURE: 64 MMHG | OXYGEN SATURATION: 96 % | RESPIRATION RATE: 17 BRPM | WEIGHT: 259.94 LBS | HEIGHT: 73 IN | TEMPERATURE: 98 F | SYSTOLIC BLOOD PRESSURE: 119 MMHG

## 2022-01-01 VITALS
BODY MASS INDEX: 34.85 KG/M2 | TEMPERATURE: 97 F | WEIGHT: 263 LBS | RESPIRATION RATE: 18 BRPM | OXYGEN SATURATION: 97 % | DIASTOLIC BLOOD PRESSURE: 58 MMHG | SYSTOLIC BLOOD PRESSURE: 129 MMHG | HEIGHT: 73 IN | HEART RATE: 83 BPM

## 2022-01-01 VITALS
BODY MASS INDEX: 36.16 KG/M2 | HEART RATE: 57 BPM | HEIGHT: 72 IN | WEIGHT: 267 LBS | SYSTOLIC BLOOD PRESSURE: 100 MMHG | DIASTOLIC BLOOD PRESSURE: 95 MMHG

## 2022-01-01 VITALS
HEIGHT: 73 IN | BODY MASS INDEX: 36.38 KG/M2 | HEART RATE: 80 BPM | SYSTOLIC BLOOD PRESSURE: 82 MMHG | DIASTOLIC BLOOD PRESSURE: 52 MMHG | OXYGEN SATURATION: 99 % | RESPIRATION RATE: 20 BRPM | WEIGHT: 274.5 LBS | TEMPERATURE: 97 F

## 2022-01-01 VITALS
HEIGHT: 72 IN | OXYGEN SATURATION: 98 % | TEMPERATURE: 98 F | DIASTOLIC BLOOD PRESSURE: 65 MMHG | HEART RATE: 76 BPM | RESPIRATION RATE: 16 BRPM | WEIGHT: 255.75 LBS | BODY MASS INDEX: 34.64 KG/M2 | SYSTOLIC BLOOD PRESSURE: 113 MMHG

## 2022-01-01 VITALS
HEART RATE: 74 BPM | DIASTOLIC BLOOD PRESSURE: 71 MMHG | RESPIRATION RATE: 18 BRPM | BODY MASS INDEX: 34.99 KG/M2 | OXYGEN SATURATION: 97 % | WEIGHT: 258 LBS | SYSTOLIC BLOOD PRESSURE: 135 MMHG | TEMPERATURE: 98 F

## 2022-01-01 VITALS — SYSTOLIC BLOOD PRESSURE: 120 MMHG | HEART RATE: 78 BPM | OXYGEN SATURATION: 98 % | DIASTOLIC BLOOD PRESSURE: 51 MMHG

## 2022-01-01 VITALS
HEIGHT: 73 IN | HEART RATE: 86 BPM | BODY MASS INDEX: 34.33 KG/M2 | WEIGHT: 259 LBS | SYSTOLIC BLOOD PRESSURE: 123 MMHG | DIASTOLIC BLOOD PRESSURE: 7 MMHG

## 2022-01-01 VITALS — BODY MASS INDEX: 34.68 KG/M2 | HEIGHT: 72 IN

## 2022-01-01 VITALS
SYSTOLIC BLOOD PRESSURE: 102 MMHG | DIASTOLIC BLOOD PRESSURE: 56 MMHG | HEART RATE: 60 BPM | BODY MASS INDEX: 34.99 KG/M2 | HEIGHT: 72 IN

## 2022-01-01 VITALS — HEIGHT: 72 IN | BODY MASS INDEX: 36.21 KG/M2

## 2022-01-01 DIAGNOSIS — I73.9 PERIPHERAL VASCULAR DISEASE: ICD-10-CM

## 2022-01-01 DIAGNOSIS — T82.868A CLOTTED RENAL DIALYSIS AV GRAFT: ICD-10-CM

## 2022-01-01 DIAGNOSIS — M79.89 SWELLING OF LOWER EXTREMITY: ICD-10-CM

## 2022-01-01 DIAGNOSIS — I44.2 COMPLETE HEART BLOCK: ICD-10-CM

## 2022-01-01 DIAGNOSIS — Z99.2 ESRD (END STAGE RENAL DISEASE) ON DIALYSIS: ICD-10-CM

## 2022-01-01 DIAGNOSIS — N18.6 TYPE 2 DIABETES MELLITUS WITH CHRONIC KIDNEY DISEASE ON CHRONIC DIALYSIS, WITH LONG-TERM CURRENT USE OF INSULIN: ICD-10-CM

## 2022-01-01 DIAGNOSIS — L97.502 SKIN ULCER OF TOE WITH FAT LAYER EXPOSED, UNSPECIFIED LATERALITY: ICD-10-CM

## 2022-01-01 DIAGNOSIS — G89.29 CHRONIC RIGHT SHOULDER PAIN: ICD-10-CM

## 2022-01-01 DIAGNOSIS — I82.431 ACUTE DEEP VEIN THROMBOSIS (DVT) OF POPLITEAL VEIN OF RIGHT LOWER EXTREMITY: ICD-10-CM

## 2022-01-01 DIAGNOSIS — Z95.810 CARDIAC RESYNCHRONIZATION THERAPY DEFIBRILLATOR (CRT-D) IN PLACE: ICD-10-CM

## 2022-01-01 DIAGNOSIS — M25.511 CHRONIC RIGHT SHOULDER PAIN: Primary | ICD-10-CM

## 2022-01-01 DIAGNOSIS — I50.42 CHRONIC COMBINED SYSTOLIC AND DIASTOLIC CONGESTIVE HEART FAILURE: ICD-10-CM

## 2022-01-01 DIAGNOSIS — E11.22 TYPE 2 DIABETES MELLITUS WITH CHRONIC KIDNEY DISEASE ON CHRONIC DIALYSIS, WITH LONG-TERM CURRENT USE OF INSULIN: ICD-10-CM

## 2022-01-01 DIAGNOSIS — G47.30 SLEEP APNEA, UNSPECIFIED TYPE: ICD-10-CM

## 2022-01-01 DIAGNOSIS — T82.590D MALFUNCTION OF ARTERIOVENOUS DIALYSIS FISTULA, SUBSEQUENT ENCOUNTER: ICD-10-CM

## 2022-01-01 DIAGNOSIS — I70.229 CRITICAL LOWER LIMB ISCHEMIA: Primary | ICD-10-CM

## 2022-01-01 DIAGNOSIS — E87.79 OTHER HYPERVOLEMIA: ICD-10-CM

## 2022-01-01 DIAGNOSIS — Z01.810 PRE-OPERATIVE CARDIOVASCULAR EXAMINATION: Primary | ICD-10-CM

## 2022-01-01 DIAGNOSIS — T82.49XD CLOTTED DIALYSIS ACCESS, SUBSEQUENT ENCOUNTER: ICD-10-CM

## 2022-01-01 DIAGNOSIS — Z99.2 TYPE 2 DIABETES MELLITUS WITH CHRONIC KIDNEY DISEASE ON CHRONIC DIALYSIS, WITH LONG-TERM CURRENT USE OF INSULIN: ICD-10-CM

## 2022-01-01 DIAGNOSIS — R78.81 BACTEREMIA DUE TO ENTEROCOCCUS: Primary | ICD-10-CM

## 2022-01-01 DIAGNOSIS — L81.9 DISCOLORATION OF SKIN OF TOE: ICD-10-CM

## 2022-01-01 DIAGNOSIS — M20.41 HAMMER TOE OF RIGHT FOOT: ICD-10-CM

## 2022-01-01 DIAGNOSIS — I73.9 PAD (PERIPHERAL ARTERY DISEASE): ICD-10-CM

## 2022-01-01 DIAGNOSIS — N18.5 ANEMIA, CHRONIC RENAL FAILURE, STAGE 5: ICD-10-CM

## 2022-01-01 DIAGNOSIS — I70.229 CRITICAL LIMB ISCHEMIA WITH HISTORY OF REVASCULARIZATION OF SAME EXTREMITY: ICD-10-CM

## 2022-01-01 DIAGNOSIS — E66.01 MORBID OBESITY: ICD-10-CM

## 2022-01-01 DIAGNOSIS — N18.6 ESRD (END STAGE RENAL DISEASE): ICD-10-CM

## 2022-01-01 DIAGNOSIS — I10 PRIMARY HYPERTENSION: ICD-10-CM

## 2022-01-01 DIAGNOSIS — Z98.890 CRITICAL LIMB ISCHEMIA WITH HISTORY OF REVASCULARIZATION OF SAME EXTREMITY: ICD-10-CM

## 2022-01-01 DIAGNOSIS — L60.9 DISEASE OF NAIL: ICD-10-CM

## 2022-01-01 DIAGNOSIS — B95.2 BACTEREMIA DUE TO ENTEROCOCCUS: Primary | ICD-10-CM

## 2022-01-01 DIAGNOSIS — T82.49XA CLOTTED DIALYSIS ACCESS: Primary | ICD-10-CM

## 2022-01-01 DIAGNOSIS — T82.590A DIALYSIS AV FISTULA MALFUNCTION: ICD-10-CM

## 2022-01-01 DIAGNOSIS — I42.8 NICM (NONISCHEMIC CARDIOMYOPATHY): ICD-10-CM

## 2022-01-01 DIAGNOSIS — R23.8 BULLAE: ICD-10-CM

## 2022-01-01 DIAGNOSIS — T82.898A PROBLEM WITH DIALYSIS ACCESS, INITIAL ENCOUNTER: Primary | ICD-10-CM

## 2022-01-01 DIAGNOSIS — T82.49XA CLOTTED DIALYSIS ACCESS: ICD-10-CM

## 2022-01-01 DIAGNOSIS — Z95.810 PRESENCE OF AUTOMATIC (IMPLANTABLE) CARDIAC DEFIBRILLATOR: ICD-10-CM

## 2022-01-01 DIAGNOSIS — Z79.4 TYPE 2 DIABETES MELLITUS WITH CHRONIC KIDNEY DISEASE ON CHRONIC DIALYSIS, WITH LONG-TERM CURRENT USE OF INSULIN: ICD-10-CM

## 2022-01-01 DIAGNOSIS — T82.49XA CLOTTED DIALYSIS ACCESS, INITIAL ENCOUNTER: Primary | ICD-10-CM

## 2022-01-01 DIAGNOSIS — G89.29 CHRONIC RIGHT SHOULDER PAIN: Primary | ICD-10-CM

## 2022-01-01 DIAGNOSIS — T82.898A PROBLEM WITH DIALYSIS ACCESS: ICD-10-CM

## 2022-01-01 DIAGNOSIS — I70.229 CRITICAL LOWER LIMB ISCHEMIA: ICD-10-CM

## 2022-01-01 DIAGNOSIS — I73.9 PAD (PERIPHERAL ARTERY DISEASE): Primary | ICD-10-CM

## 2022-01-01 DIAGNOSIS — E11.51 TYPE 2 DIABETES MELLITUS WITH DIABETIC PERIPHERAL ANGIOPATHY WITHOUT GANGRENE, UNSPECIFIED WHETHER LONG TERM INSULIN USE: ICD-10-CM

## 2022-01-01 DIAGNOSIS — E87.5 HYPERKALEMIA: ICD-10-CM

## 2022-01-01 DIAGNOSIS — T82.898D PROBLEM WITH DIALYSIS ACCESS, SUBSEQUENT ENCOUNTER: ICD-10-CM

## 2022-01-01 DIAGNOSIS — N18.6 ESRD (END STAGE RENAL DISEASE) ON DIALYSIS: ICD-10-CM

## 2022-01-01 DIAGNOSIS — M19.011 PRIMARY OSTEOARTHRITIS OF RIGHT SHOULDER: ICD-10-CM

## 2022-01-01 DIAGNOSIS — R53.81 DEBILITY: ICD-10-CM

## 2022-01-01 DIAGNOSIS — I96 GANGRENE: Primary | ICD-10-CM

## 2022-01-01 DIAGNOSIS — N17.9 AKI (ACUTE KIDNEY INJURY): ICD-10-CM

## 2022-01-01 DIAGNOSIS — L84 CORN OR CALLUS: ICD-10-CM

## 2022-01-01 DIAGNOSIS — I47.20 VENTRICULAR TACHYCARDIA: ICD-10-CM

## 2022-01-01 DIAGNOSIS — I25.10 CAD (CORONARY ARTERY DISEASE): ICD-10-CM

## 2022-01-01 DIAGNOSIS — T82.898D PROBLEM WITH DIALYSIS ACCESS, SUBSEQUENT ENCOUNTER: Primary | ICD-10-CM

## 2022-01-01 DIAGNOSIS — E78.5 HYPERLIPIDEMIA, UNSPECIFIED HYPERLIPIDEMIA TYPE: ICD-10-CM

## 2022-01-01 DIAGNOSIS — D63.1 ANEMIA, CHRONIC RENAL FAILURE, STAGE 5: ICD-10-CM

## 2022-01-01 DIAGNOSIS — M47.22 OSTEOARTHRITIS OF SPINE WITH RADICULOPATHY, CERVICAL REGION: Primary | ICD-10-CM

## 2022-01-01 DIAGNOSIS — T82.868A THROMBOSIS OF KIDNEY DIALYSIS ARTERIOVENOUS GRAFT, INITIAL ENCOUNTER: ICD-10-CM

## 2022-01-01 DIAGNOSIS — R07.9 CHEST PAIN: ICD-10-CM

## 2022-01-01 DIAGNOSIS — T82.868A CLOTTED RENAL DIALYSIS AV GRAFT: Primary | ICD-10-CM

## 2022-01-01 DIAGNOSIS — L97.502 SKIN ULCER OF TOE WITH FAT LAYER EXPOSED, UNSPECIFIED LATERALITY: Primary | ICD-10-CM

## 2022-01-01 DIAGNOSIS — M25.511 CHRONIC RIGHT SHOULDER PAIN: ICD-10-CM

## 2022-01-01 LAB
ABO + RH BLD: NORMAL
ABO + RH BLD: NORMAL
ALBUMIN SERPL BCP-MCNC: 1.8 G/DL (ref 3.5–5.2)
ALBUMIN SERPL BCP-MCNC: 1.9 G/DL (ref 3.5–5.2)
ALBUMIN SERPL BCP-MCNC: 2.1 G/DL (ref 3.5–5.2)
ALBUMIN SERPL BCP-MCNC: 2.2 G/DL (ref 3.5–5.2)
ALBUMIN SERPL BCP-MCNC: 2.3 G/DL (ref 3.5–5.2)
ALBUMIN SERPL BCP-MCNC: 2.4 G/DL (ref 3.5–5.2)
ALBUMIN SERPL BCP-MCNC: 2.4 G/DL (ref 3.5–5.2)
ALBUMIN SERPL BCP-MCNC: 2.5 G/DL (ref 3.5–5.2)
ALBUMIN SERPL BCP-MCNC: 2.8 G/DL (ref 3.5–5.2)
ALBUMIN SERPL BCP-MCNC: 2.9 G/DL (ref 3.5–5.2)
ALBUMIN SERPL BCP-MCNC: 2.9 G/DL (ref 3.5–5.2)
ALP SERPL-CCNC: 131 U/L (ref 55–135)
ALP SERPL-CCNC: 134 U/L (ref 55–135)
ALP SERPL-CCNC: 136 U/L (ref 55–135)
ALP SERPL-CCNC: 139 U/L (ref 55–135)
ALP SERPL-CCNC: 159 U/L (ref 55–135)
ALP SERPL-CCNC: 96 U/L (ref 55–135)
ALP SERPL-CCNC: 97 U/L (ref 55–135)
ALT SERPL W/O P-5'-P-CCNC: 135 U/L (ref 10–44)
ALT SERPL W/O P-5'-P-CCNC: 6 U/L (ref 10–44)
ALT SERPL W/O P-5'-P-CCNC: 7 U/L (ref 10–44)
ALT SERPL W/O P-5'-P-CCNC: <5 U/L (ref 10–44)
ANION GAP SERPL CALC-SCNC: 14 MMOL/L (ref 8–16)
ANION GAP SERPL CALC-SCNC: 15 MMOL/L (ref 8–16)
ANION GAP SERPL CALC-SCNC: 15 MMOL/L (ref 8–16)
ANION GAP SERPL CALC-SCNC: 16 MMOL/L (ref 8–16)
ANION GAP SERPL CALC-SCNC: 16 MMOL/L (ref 8–16)
ANION GAP SERPL CALC-SCNC: 17 MMOL/L (ref 8–16)
ANION GAP SERPL CALC-SCNC: 18 MMOL/L (ref 8–16)
ANION GAP SERPL CALC-SCNC: 19 MMOL/L (ref 8–16)
ANION GAP SERPL CALC-SCNC: 20 MMOL/L (ref 8–16)
ANION GAP SERPL CALC-SCNC: 21 MMOL/L (ref 8–16)
ANION GAP SERPL CALC-SCNC: 22 MMOL/L (ref 8–16)
ANION GAP SERPL CALC-SCNC: 23 MMOL/L (ref 8–16)
ANION GAP SERPL CALC-SCNC: 23 MMOL/L (ref 8–16)
ANION GAP SERPL CALC-SCNC: 29 MMOL/L (ref 8–16)
ANION GAP SERPL CALC-SCNC: 29 MMOL/L (ref 8–16)
ANISOCYTOSIS BLD QL SMEAR: SLIGHT
ANISOCYTOSIS BLD QL SMEAR: SLIGHT
APTT BLDCRRT: 136.9 SEC (ref 21–32)
APTT BLDCRRT: 34.2 SEC (ref 21–32)
APTT BLDCRRT: 35.1 SEC (ref 21–32)
APTT BLDCRRT: 35.7 SEC (ref 21–32)
APTT BLDCRRT: 37.3 SEC (ref 21–32)
APTT BLDCRRT: 38.3 SEC (ref 21–32)
APTT BLDCRRT: 38.5 SEC (ref 21–32)
APTT BLDCRRT: 39.5 SEC (ref 21–32)
APTT BLDCRRT: 40.2 SEC (ref 21–32)
APTT BLDCRRT: 43.7 SEC (ref 21–32)
APTT BLDCRRT: 46.8 SEC (ref 21–32)
APTT BLDCRRT: 48.3 SEC (ref 21–32)
APTT BLDCRRT: 52.2 SEC (ref 21–32)
APTT BLDCRRT: 52.3 SEC (ref 21–32)
APTT BLDCRRT: 53 SEC (ref 21–32)
APTT BLDCRRT: 54.3 SEC (ref 21–32)
APTT BLDCRRT: 54.4 SEC (ref 21–32)
APTT BLDCRRT: 57.4 SEC (ref 21–32)
APTT BLDCRRT: 57.9 SEC (ref 21–32)
APTT BLDCRRT: 60.2 SEC (ref 21–32)
APTT BLDCRRT: 60.3 SEC (ref 21–32)
APTT BLDCRRT: 60.4 SEC (ref 21–32)
APTT BLDCRRT: 62.1 SEC (ref 21–32)
APTT BLDCRRT: 62.3 SEC (ref 21–32)
APTT BLDCRRT: 69.2 SEC (ref 21–32)
APTT BLDCRRT: 69.5 SEC (ref 21–32)
APTT BLDCRRT: 71.3 SEC (ref 21–32)
APTT BLDCRRT: 79.3 SEC (ref 21–32)
APTT BLDCRRT: 80.8 SEC (ref 21–32)
APTT BLDCRRT: 83.1 SEC (ref 21–32)
APTT BLDCRRT: >150 SEC (ref 21–32)
APTT BLDCRRT: >150 SEC (ref 21–32)
AST SERPL-CCNC: 10 U/L (ref 10–40)
AST SERPL-CCNC: 13 U/L (ref 10–40)
AST SERPL-CCNC: 15 U/L (ref 10–40)
AST SERPL-CCNC: 17 U/L (ref 10–40)
AST SERPL-CCNC: 17 U/L (ref 10–40)
AST SERPL-CCNC: 329 U/L (ref 10–40)
AST SERPL-CCNC: 9 U/L (ref 10–40)
BACTERIA BLD CULT: NORMAL
BACTERIA BLD CULT: NORMAL
BASOPHILS # BLD AUTO: 0.01 K/UL (ref 0–0.2)
BASOPHILS # BLD AUTO: 0.02 K/UL (ref 0–0.2)
BASOPHILS # BLD AUTO: 0.03 K/UL (ref 0–0.2)
BASOPHILS # BLD AUTO: 0.04 K/UL (ref 0–0.2)
BASOPHILS # BLD AUTO: 0.05 K/UL (ref 0–0.2)
BASOPHILS # BLD AUTO: 0.05 K/UL (ref 0–0.2)
BASOPHILS NFR BLD: 0 % (ref 0–1.9)
BASOPHILS NFR BLD: 0.1 % (ref 0–1.9)
BASOPHILS NFR BLD: 0.2 % (ref 0–1.9)
BASOPHILS NFR BLD: 0.3 % (ref 0–1.9)
BASOPHILS NFR BLD: 0.4 % (ref 0–1.9)
BASOPHILS NFR BLD: 0.5 % (ref 0–1.9)
BASOPHILS NFR BLD: 0.7 % (ref 0–1.9)
BILIRUB SERPL-MCNC: 0.2 MG/DL (ref 0.1–1)
BILIRUB SERPL-MCNC: 0.3 MG/DL (ref 0.1–1)
BILIRUB SERPL-MCNC: 0.5 MG/DL (ref 0.1–1)
BILIRUB SERPL-MCNC: 0.6 MG/DL (ref 0.1–1)
BILIRUB SERPL-MCNC: 1.1 MG/DL (ref 0.1–1)
BLD GP AB SCN CELLS X3 SERPL QL: NORMAL
BLD GP AB SCN CELLS X3 SERPL QL: NORMAL
BLD PROD TYP BPU: NORMAL
BLOOD UNIT EXPIRATION DATE: NORMAL
BLOOD UNIT TYPE CODE: 5100
BLOOD UNIT TYPE: NORMAL
BUN SERPL-MCNC: 102 MG/DL (ref 8–23)
BUN SERPL-MCNC: 106 MG/DL (ref 8–23)
BUN SERPL-MCNC: 112 MG/DL (ref 8–23)
BUN SERPL-MCNC: 119 MG/DL (ref 8–23)
BUN SERPL-MCNC: 119 MG/DL (ref 8–23)
BUN SERPL-MCNC: 29 MG/DL (ref 8–23)
BUN SERPL-MCNC: 35 MG/DL (ref 8–23)
BUN SERPL-MCNC: 36 MG/DL (ref 8–23)
BUN SERPL-MCNC: 37 MG/DL (ref 8–23)
BUN SERPL-MCNC: 39 MG/DL (ref 8–23)
BUN SERPL-MCNC: 41 MG/DL (ref 8–23)
BUN SERPL-MCNC: 41 MG/DL (ref 8–23)
BUN SERPL-MCNC: 42 MG/DL (ref 8–23)
BUN SERPL-MCNC: 43 MG/DL (ref 8–23)
BUN SERPL-MCNC: 45 MG/DL (ref 8–23)
BUN SERPL-MCNC: 46 MG/DL (ref 8–23)
BUN SERPL-MCNC: 46 MG/DL (ref 8–23)
BUN SERPL-MCNC: 47 MG/DL (ref 8–23)
BUN SERPL-MCNC: 48 MG/DL (ref 8–23)
BUN SERPL-MCNC: 51 MG/DL (ref 8–23)
BUN SERPL-MCNC: 52 MG/DL (ref 8–23)
BUN SERPL-MCNC: 53 MG/DL (ref 8–23)
BUN SERPL-MCNC: 53 MG/DL (ref 8–23)
BUN SERPL-MCNC: 54 MG/DL (ref 8–23)
BUN SERPL-MCNC: 56 MG/DL (ref 8–23)
BUN SERPL-MCNC: 60 MG/DL (ref 8–23)
BUN SERPL-MCNC: 63 MG/DL (ref 8–23)
BUN SERPL-MCNC: 69 MG/DL (ref 8–23)
BUN SERPL-MCNC: 70 MG/DL (ref 8–23)
BUN SERPL-MCNC: 90 MG/DL (ref 8–23)
BURR CELLS BLD QL SMEAR: ABNORMAL
CALCIUM SERPL-MCNC: 8.1 MG/DL (ref 8.7–10.5)
CALCIUM SERPL-MCNC: 8.1 MG/DL (ref 8.7–10.5)
CALCIUM SERPL-MCNC: 8.2 MG/DL (ref 8.7–10.5)
CALCIUM SERPL-MCNC: 8.3 MG/DL (ref 8.7–10.5)
CALCIUM SERPL-MCNC: 8.3 MG/DL (ref 8.7–10.5)
CALCIUM SERPL-MCNC: 8.4 MG/DL (ref 8.7–10.5)
CALCIUM SERPL-MCNC: 8.5 MG/DL (ref 8.7–10.5)
CALCIUM SERPL-MCNC: 8.6 MG/DL (ref 8.7–10.5)
CALCIUM SERPL-MCNC: 8.6 MG/DL (ref 8.7–10.5)
CALCIUM SERPL-MCNC: 8.7 MG/DL (ref 8.7–10.5)
CALCIUM SERPL-MCNC: 8.8 MG/DL (ref 8.7–10.5)
CALCIUM SERPL-MCNC: 8.9 MG/DL (ref 8.7–10.5)
CALCIUM SERPL-MCNC: 9 MG/DL (ref 8.7–10.5)
CALCIUM SERPL-MCNC: 9.2 MG/DL (ref 8.7–10.5)
CHLORIDE SERPL-SCNC: 100 MMOL/L (ref 95–110)
CHLORIDE SERPL-SCNC: 101 MMOL/L (ref 95–110)
CHLORIDE SERPL-SCNC: 101 MMOL/L (ref 95–110)
CHLORIDE SERPL-SCNC: 102 MMOL/L (ref 95–110)
CHLORIDE SERPL-SCNC: 102 MMOL/L (ref 95–110)
CHLORIDE SERPL-SCNC: 104 MMOL/L (ref 95–110)
CHLORIDE SERPL-SCNC: 106 MMOL/L (ref 95–110)
CHLORIDE SERPL-SCNC: 92 MMOL/L (ref 95–110)
CHLORIDE SERPL-SCNC: 93 MMOL/L (ref 95–110)
CHLORIDE SERPL-SCNC: 94 MMOL/L (ref 95–110)
CHLORIDE SERPL-SCNC: 94 MMOL/L (ref 95–110)
CHLORIDE SERPL-SCNC: 95 MMOL/L (ref 95–110)
CHLORIDE SERPL-SCNC: 96 MMOL/L (ref 95–110)
CHLORIDE SERPL-SCNC: 97 MMOL/L (ref 95–110)
CHLORIDE SERPL-SCNC: 98 MMOL/L (ref 95–110)
CHLORIDE SERPL-SCNC: 99 MMOL/L (ref 95–110)
CHLORIDE SERPL-SCNC: 99 MMOL/L (ref 95–110)
CO2 SERPL-SCNC: 12 MMOL/L (ref 23–29)
CO2 SERPL-SCNC: 12 MMOL/L (ref 23–29)
CO2 SERPL-SCNC: 14 MMOL/L (ref 23–29)
CO2 SERPL-SCNC: 15 MMOL/L (ref 23–29)
CO2 SERPL-SCNC: 16 MMOL/L (ref 23–29)
CO2 SERPL-SCNC: 16 MMOL/L (ref 23–29)
CO2 SERPL-SCNC: 17 MMOL/L (ref 23–29)
CO2 SERPL-SCNC: 18 MMOL/L (ref 23–29)
CO2 SERPL-SCNC: 19 MMOL/L (ref 23–29)
CO2 SERPL-SCNC: 20 MMOL/L (ref 23–29)
CO2 SERPL-SCNC: 21 MMOL/L (ref 23–29)
CO2 SERPL-SCNC: 21 MMOL/L (ref 23–29)
CO2 SERPL-SCNC: 22 MMOL/L (ref 23–29)
CO2 SERPL-SCNC: 23 MMOL/L (ref 23–29)
CO2 SERPL-SCNC: 24 MMOL/L (ref 23–29)
CO2 SERPL-SCNC: 27 MMOL/L (ref 23–29)
CO2 SERPL-SCNC: 27 MMOL/L (ref 23–29)
CODING SYSTEM: NORMAL
CREAT SERPL-MCNC: 10.1 MG/DL (ref 0.5–1.4)
CREAT SERPL-MCNC: 10.4 MG/DL (ref 0.5–1.4)
CREAT SERPL-MCNC: 10.5 MG/DL (ref 0.5–1.4)
CREAT SERPL-MCNC: 10.6 MG/DL (ref 0.5–1.4)
CREAT SERPL-MCNC: 10.8 MG/DL (ref 0.5–1.4)
CREAT SERPL-MCNC: 10.9 MG/DL (ref 0.5–1.4)
CREAT SERPL-MCNC: 11 MG/DL (ref 0.5–1.4)
CREAT SERPL-MCNC: 11 MG/DL (ref 0.5–1.4)
CREAT SERPL-MCNC: 12.4 MG/DL (ref 0.5–1.4)
CREAT SERPL-MCNC: 14.7 MG/DL (ref 0.5–1.4)
CREAT SERPL-MCNC: 15.3 MG/DL (ref 0.5–1.4)
CREAT SERPL-MCNC: 15.4 MG/DL (ref 0.5–1.4)
CREAT SERPL-MCNC: 17.8 MG/DL (ref 0.5–1.4)
CREAT SERPL-MCNC: 19.7 MG/DL (ref 0.5–1.4)
CREAT SERPL-MCNC: 21.4 MG/DL (ref 0.5–1.4)
CREAT SERPL-MCNC: 21.6 MG/DL (ref 0.5–1.4)
CREAT SERPL-MCNC: 22.6 MG/DL (ref 0.5–1.4)
CREAT SERPL-MCNC: 23.2 MG/DL (ref 0.5–1.4)
CREAT SERPL-MCNC: 5.7 MG/DL (ref 0.5–1.4)
CREAT SERPL-MCNC: 5.7 MG/DL (ref 0.5–1.4)
CREAT SERPL-MCNC: 6.8 MG/DL (ref 0.5–1.4)
CREAT SERPL-MCNC: 7.2 MG/DL (ref 0.5–1.4)
CREAT SERPL-MCNC: 7.3 MG/DL (ref 0.5–1.4)
CREAT SERPL-MCNC: 7.5 MG/DL (ref 0.5–1.4)
CREAT SERPL-MCNC: 7.8 MG/DL (ref 0.5–1.4)
CREAT SERPL-MCNC: 8.1 MG/DL (ref 0.5–1.4)
CREAT SERPL-MCNC: 8.8 MG/DL (ref 0.5–1.4)
CREAT SERPL-MCNC: 9.1 MG/DL (ref 0.5–1.4)
CREAT SERPL-MCNC: 9.6 MG/DL (ref 0.5–1.4)
CREAT SERPL-MCNC: 9.7 MG/DL (ref 0.5–1.4)
CTP QC/QA: YES
DACRYOCYTES BLD QL SMEAR: ABNORMAL
DACRYOCYTES BLD QL SMEAR: ABNORMAL
DIFFERENTIAL METHOD: ABNORMAL
DISPENSE STATUS: NORMAL
EOSINOPHIL # BLD AUTO: 0 K/UL (ref 0–0.5)
EOSINOPHIL # BLD AUTO: 0.1 K/UL (ref 0–0.5)
EOSINOPHIL NFR BLD: 0 % (ref 0–8)
EOSINOPHIL NFR BLD: 0.1 % (ref 0–8)
EOSINOPHIL NFR BLD: 0.2 % (ref 0–8)
EOSINOPHIL NFR BLD: 0.3 % (ref 0–8)
EOSINOPHIL NFR BLD: 0.4 % (ref 0–8)
EOSINOPHIL NFR BLD: 0.6 % (ref 0–8)
EOSINOPHIL NFR BLD: 0.7 % (ref 0–8)
EOSINOPHIL NFR BLD: 0.8 % (ref 0–8)
EOSINOPHIL NFR BLD: 1 % (ref 0–8)
EOSINOPHIL NFR BLD: 1.1 % (ref 0–8)
EOSINOPHIL NFR BLD: 1.2 % (ref 0–8)
EOSINOPHIL NFR BLD: 1.3 % (ref 0–8)
EOSINOPHIL NFR BLD: 1.5 % (ref 0–8)
ERYTHROCYTE [DISTWIDTH] IN BLOOD BY AUTOMATED COUNT: 14.6 % (ref 11.5–14.5)
ERYTHROCYTE [DISTWIDTH] IN BLOOD BY AUTOMATED COUNT: 15.3 % (ref 11.5–14.5)
ERYTHROCYTE [DISTWIDTH] IN BLOOD BY AUTOMATED COUNT: 15.5 % (ref 11.5–14.5)
ERYTHROCYTE [DISTWIDTH] IN BLOOD BY AUTOMATED COUNT: 15.7 % (ref 11.5–14.5)
ERYTHROCYTE [DISTWIDTH] IN BLOOD BY AUTOMATED COUNT: 15.8 % (ref 11.5–14.5)
ERYTHROCYTE [DISTWIDTH] IN BLOOD BY AUTOMATED COUNT: 15.9 % (ref 11.5–14.5)
ERYTHROCYTE [DISTWIDTH] IN BLOOD BY AUTOMATED COUNT: 16.2 % (ref 11.5–14.5)
ERYTHROCYTE [DISTWIDTH] IN BLOOD BY AUTOMATED COUNT: 16.4 % (ref 11.5–14.5)
ERYTHROCYTE [DISTWIDTH] IN BLOOD BY AUTOMATED COUNT: 16.5 % (ref 11.5–14.5)
ERYTHROCYTE [DISTWIDTH] IN BLOOD BY AUTOMATED COUNT: 16.7 % (ref 11.5–14.5)
ERYTHROCYTE [DISTWIDTH] IN BLOOD BY AUTOMATED COUNT: 16.8 % (ref 11.5–14.5)
ERYTHROCYTE [DISTWIDTH] IN BLOOD BY AUTOMATED COUNT: 16.9 % (ref 11.5–14.5)
ERYTHROCYTE [DISTWIDTH] IN BLOOD BY AUTOMATED COUNT: 16.9 % (ref 11.5–14.5)
ERYTHROCYTE [DISTWIDTH] IN BLOOD BY AUTOMATED COUNT: 17 % (ref 11.5–14.5)
ERYTHROCYTE [DISTWIDTH] IN BLOOD BY AUTOMATED COUNT: 17 % (ref 11.5–14.5)
ERYTHROCYTE [DISTWIDTH] IN BLOOD BY AUTOMATED COUNT: 17.1 % (ref 11.5–14.5)
ERYTHROCYTE [DISTWIDTH] IN BLOOD BY AUTOMATED COUNT: 17.2 % (ref 11.5–14.5)
ERYTHROCYTE [DISTWIDTH] IN BLOOD BY AUTOMATED COUNT: 17.3 % (ref 11.5–14.5)
ERYTHROCYTE [DISTWIDTH] IN BLOOD BY AUTOMATED COUNT: 17.5 % (ref 11.5–14.5)
ERYTHROCYTE [DISTWIDTH] IN BLOOD BY AUTOMATED COUNT: 17.6 % (ref 11.5–14.5)
ERYTHROCYTE [DISTWIDTH] IN BLOOD BY AUTOMATED COUNT: 17.8 % (ref 11.5–14.5)
ERYTHROCYTE [DISTWIDTH] IN BLOOD BY AUTOMATED COUNT: 17.8 % (ref 11.5–14.5)
EST. GFR  (AFRICAN AMERICAN): 10 ML/MIN/1.73 M^2
EST. GFR  (AFRICAN AMERICAN): 10 ML/MIN/1.73 M^2
EST. GFR  (AFRICAN AMERICAN): 2 ML/MIN/1.73 M^2
EST. GFR  (AFRICAN AMERICAN): 3 ML/MIN/1.73 M^2
EST. GFR  (AFRICAN AMERICAN): 4 ML/MIN/1.73 M^2
EST. GFR  (AFRICAN AMERICAN): 5 ML/MIN/1.73 M^2
EST. GFR  (AFRICAN AMERICAN): 6 ML/MIN/1.73 M^2
EST. GFR  (AFRICAN AMERICAN): 7 ML/MIN/1.73 M^2
EST. GFR  (AFRICAN AMERICAN): 8 ML/MIN/1.73 M^2
EST. GFR  (NON AFRICAN AMERICAN): 2 ML/MIN/1.73 M^2
EST. GFR  (NON AFRICAN AMERICAN): 3 ML/MIN/1.73 M^2
EST. GFR  (NON AFRICAN AMERICAN): 4 ML/MIN/1.73 M^2
EST. GFR  (NON AFRICAN AMERICAN): 5 ML/MIN/1.73 M^2
EST. GFR  (NON AFRICAN AMERICAN): 6 ML/MIN/1.73 M^2
EST. GFR  (NON AFRICAN AMERICAN): 7 ML/MIN/1.73 M^2
EST. GFR  (NON AFRICAN AMERICAN): 9 ML/MIN/1.73 M^2
EST. GFR  (NON AFRICAN AMERICAN): 9 ML/MIN/1.73 M^2
ESTIMATED AVG GLUCOSE: 151 MG/DL (ref 68–131)
GLUCOSE SERPL-MCNC: 106 MG/DL (ref 70–110)
GLUCOSE SERPL-MCNC: 110 MG/DL (ref 70–110)
GLUCOSE SERPL-MCNC: 113 MG/DL (ref 70–110)
GLUCOSE SERPL-MCNC: 116 MG/DL (ref 70–110)
GLUCOSE SERPL-MCNC: 117 MG/DL (ref 70–110)
GLUCOSE SERPL-MCNC: 127 MG/DL (ref 70–110)
GLUCOSE SERPL-MCNC: 131 MG/DL (ref 70–110)
GLUCOSE SERPL-MCNC: 136 MG/DL (ref 70–110)
GLUCOSE SERPL-MCNC: 137 MG/DL (ref 70–110)
GLUCOSE SERPL-MCNC: 145 MG/DL (ref 70–110)
GLUCOSE SERPL-MCNC: 145 MG/DL (ref 70–110)
GLUCOSE SERPL-MCNC: 148 MG/DL (ref 70–110)
GLUCOSE SERPL-MCNC: 149 MG/DL (ref 70–110)
GLUCOSE SERPL-MCNC: 150 MG/DL (ref 70–110)
GLUCOSE SERPL-MCNC: 160 MG/DL (ref 70–110)
GLUCOSE SERPL-MCNC: 171 MG/DL (ref 70–110)
GLUCOSE SERPL-MCNC: 172 MG/DL (ref 70–110)
GLUCOSE SERPL-MCNC: 182 MG/DL (ref 70–110)
GLUCOSE SERPL-MCNC: 185 MG/DL (ref 70–110)
GLUCOSE SERPL-MCNC: 189 MG/DL (ref 70–110)
GLUCOSE SERPL-MCNC: 209 MG/DL (ref 70–110)
GLUCOSE SERPL-MCNC: 239 MG/DL (ref 70–110)
GLUCOSE SERPL-MCNC: 61 MG/DL (ref 70–110)
GLUCOSE SERPL-MCNC: 71 MG/DL (ref 70–110)
GLUCOSE SERPL-MCNC: 77 MG/DL (ref 70–110)
GLUCOSE SERPL-MCNC: 77 MG/DL (ref 70–110)
GLUCOSE SERPL-MCNC: 85 MG/DL (ref 70–110)
GLUCOSE SERPL-MCNC: 86 MG/DL (ref 70–110)
GLUCOSE SERPL-MCNC: 87 MG/DL (ref 70–110)
GLUCOSE SERPL-MCNC: 96 MG/DL (ref 70–110)
HBA1C MFR BLD: 6.9 % (ref 4–5.6)
HBV CORE AB SERPL QL IA: NEGATIVE
HBV SURFACE AB SER QL IA: NEGATIVE
HBV SURFACE AB SERPL IA-ACNC: <3 MIU/ML
HBV SURFACE AG SERPL QL IA: NEGATIVE
HCT VFR BLD AUTO: 22.4 % (ref 40–54)
HCT VFR BLD AUTO: 23.4 % (ref 40–54)
HCT VFR BLD AUTO: 23.6 % (ref 40–54)
HCT VFR BLD AUTO: 24 % (ref 40–54)
HCT VFR BLD AUTO: 24.2 % (ref 40–54)
HCT VFR BLD AUTO: 24.2 % (ref 40–54)
HCT VFR BLD AUTO: 24.4 % (ref 40–54)
HCT VFR BLD AUTO: 25.2 % (ref 40–54)
HCT VFR BLD AUTO: 25.3 % (ref 40–54)
HCT VFR BLD AUTO: 25.4 % (ref 40–54)
HCT VFR BLD AUTO: 25.7 % (ref 40–54)
HCT VFR BLD AUTO: 25.9 % (ref 40–54)
HCT VFR BLD AUTO: 26.4 % (ref 40–54)
HCT VFR BLD AUTO: 26.5 % (ref 40–54)
HCT VFR BLD AUTO: 26.8 % (ref 40–54)
HCT VFR BLD AUTO: 27.5 % (ref 40–54)
HCT VFR BLD AUTO: 28.1 % (ref 40–54)
HCT VFR BLD AUTO: 28.1 % (ref 40–54)
HCT VFR BLD AUTO: 28.4 % (ref 40–54)
HCT VFR BLD AUTO: 28.5 % (ref 40–54)
HCT VFR BLD AUTO: 29.1 % (ref 40–54)
HCT VFR BLD AUTO: 30 % (ref 40–54)
HCT VFR BLD AUTO: 30.4 % (ref 40–54)
HCT VFR BLD AUTO: 30.5 % (ref 40–54)
HCT VFR BLD AUTO: 31.2 % (ref 40–54)
HCT VFR BLD AUTO: 31.4 % (ref 40–54)
HCT VFR BLD AUTO: 31.4 % (ref 40–54)
HCT VFR BLD AUTO: 31.5 % (ref 40–54)
HCT VFR BLD AUTO: 33.3 % (ref 40–54)
HGB BLD-MCNC: 10 G/DL (ref 14–18)
HGB BLD-MCNC: 10.8 G/DL (ref 14–18)
HGB BLD-MCNC: 7 G/DL (ref 14–18)
HGB BLD-MCNC: 7.3 G/DL (ref 14–18)
HGB BLD-MCNC: 7.5 G/DL (ref 14–18)
HGB BLD-MCNC: 7.7 G/DL (ref 14–18)
HGB BLD-MCNC: 7.8 G/DL (ref 14–18)
HGB BLD-MCNC: 7.9 G/DL (ref 14–18)
HGB BLD-MCNC: 8 G/DL (ref 14–18)
HGB BLD-MCNC: 8.1 G/DL (ref 14–18)
HGB BLD-MCNC: 8.2 G/DL (ref 14–18)
HGB BLD-MCNC: 8.3 G/DL (ref 14–18)
HGB BLD-MCNC: 8.4 G/DL (ref 14–18)
HGB BLD-MCNC: 8.4 G/DL (ref 14–18)
HGB BLD-MCNC: 8.5 G/DL (ref 14–18)
HGB BLD-MCNC: 8.6 G/DL (ref 14–18)
HGB BLD-MCNC: 8.9 G/DL (ref 14–18)
HGB BLD-MCNC: 9 G/DL (ref 14–18)
HGB BLD-MCNC: 9.1 G/DL (ref 14–18)
HGB BLD-MCNC: 9.3 G/DL (ref 14–18)
HGB BLD-MCNC: 9.5 G/DL (ref 14–18)
HGB BLD-MCNC: 9.6 G/DL (ref 14–18)
HGB BLD-MCNC: 9.7 G/DL (ref 14–18)
HGB BLD-MCNC: 9.8 G/DL (ref 14–18)
HGB BLD-MCNC: 9.9 G/DL (ref 14–18)
HYPOCHROMIA BLD QL SMEAR: ABNORMAL
HYPOCHROMIA BLD QL SMEAR: ABNORMAL
IMM GRANULOCYTES # BLD AUTO: 0.02 K/UL (ref 0–0.04)
IMM GRANULOCYTES # BLD AUTO: 0.03 K/UL (ref 0–0.04)
IMM GRANULOCYTES # BLD AUTO: 0.04 K/UL (ref 0–0.04)
IMM GRANULOCYTES # BLD AUTO: 0.05 K/UL (ref 0–0.04)
IMM GRANULOCYTES # BLD AUTO: 0.06 K/UL (ref 0–0.04)
IMM GRANULOCYTES # BLD AUTO: 0.07 K/UL (ref 0–0.04)
IMM GRANULOCYTES # BLD AUTO: 0.07 K/UL (ref 0–0.04)
IMM GRANULOCYTES # BLD AUTO: 0.08 K/UL (ref 0–0.04)
IMM GRANULOCYTES # BLD AUTO: 0.09 K/UL (ref 0–0.04)
IMM GRANULOCYTES # BLD AUTO: 0.11 K/UL (ref 0–0.04)
IMM GRANULOCYTES # BLD AUTO: 0.12 K/UL (ref 0–0.04)
IMM GRANULOCYTES # BLD AUTO: 0.13 K/UL (ref 0–0.04)
IMM GRANULOCYTES # BLD AUTO: 0.14 K/UL (ref 0–0.04)
IMM GRANULOCYTES # BLD AUTO: 0.14 K/UL (ref 0–0.04)
IMM GRANULOCYTES # BLD AUTO: 0.15 K/UL (ref 0–0.04)
IMM GRANULOCYTES # BLD AUTO: 0.15 K/UL (ref 0–0.04)
IMM GRANULOCYTES # BLD AUTO: 0.16 K/UL (ref 0–0.04)
IMM GRANULOCYTES # BLD AUTO: 0.16 K/UL (ref 0–0.04)
IMM GRANULOCYTES # BLD AUTO: 0.17 K/UL (ref 0–0.04)
IMM GRANULOCYTES # BLD AUTO: 0.17 K/UL (ref 0–0.04)
IMM GRANULOCYTES # BLD AUTO: 0.18 K/UL (ref 0–0.04)
IMM GRANULOCYTES # BLD AUTO: 0.2 K/UL (ref 0–0.04)
IMM GRANULOCYTES # BLD AUTO: 0.29 K/UL (ref 0–0.04)
IMM GRANULOCYTES NFR BLD AUTO: 0.3 % (ref 0–0.5)
IMM GRANULOCYTES NFR BLD AUTO: 0.4 % (ref 0–0.5)
IMM GRANULOCYTES NFR BLD AUTO: 0.5 % (ref 0–0.5)
IMM GRANULOCYTES NFR BLD AUTO: 0.6 % (ref 0–0.5)
IMM GRANULOCYTES NFR BLD AUTO: 0.7 % (ref 0–0.5)
IMM GRANULOCYTES NFR BLD AUTO: 0.8 % (ref 0–0.5)
IMM GRANULOCYTES NFR BLD AUTO: 0.9 % (ref 0–0.5)
IMM GRANULOCYTES NFR BLD AUTO: 1 % (ref 0–0.5)
IMM GRANULOCYTES NFR BLD AUTO: 1.1 % (ref 0–0.5)
IMM GRANULOCYTES NFR BLD AUTO: 1.3 % (ref 0–0.5)
IMM GRANULOCYTES NFR BLD AUTO: 1.3 % (ref 0–0.5)
IMM GRANULOCYTES NFR BLD AUTO: 1.4 % (ref 0–0.5)
IMM GRANULOCYTES NFR BLD AUTO: 1.4 % (ref 0–0.5)
INR PPP: 1.1 (ref 0.8–1.2)
INR PPP: 1.2 (ref 0.8–1.2)
INR PPP: 1.5 (ref 0.8–1.2)
INR PPP: 1.7 (ref 0.8–1.2)
INR PPP: 1.9 (ref 0.8–1.2)
LACTATE SERPL-SCNC: 2.9 MMOL/L (ref 0.5–2.2)
LACTATE SERPL-SCNC: >12 MMOL/L (ref 0.5–2.2)
LYMPHOCYTES # BLD AUTO: 0.6 K/UL (ref 1–4.8)
LYMPHOCYTES # BLD AUTO: 0.9 K/UL (ref 1–4.8)
LYMPHOCYTES # BLD AUTO: 1.1 K/UL (ref 1–4.8)
LYMPHOCYTES # BLD AUTO: 1.2 K/UL (ref 1–4.8)
LYMPHOCYTES # BLD AUTO: 1.3 K/UL (ref 1–4.8)
LYMPHOCYTES # BLD AUTO: 1.4 K/UL (ref 1–4.8)
LYMPHOCYTES # BLD AUTO: 1.4 K/UL (ref 1–4.8)
LYMPHOCYTES # BLD AUTO: 1.5 K/UL (ref 1–4.8)
LYMPHOCYTES # BLD AUTO: 1.5 K/UL (ref 1–4.8)
LYMPHOCYTES # BLD AUTO: 1.6 K/UL (ref 1–4.8)
LYMPHOCYTES # BLD AUTO: 1.6 K/UL (ref 1–4.8)
LYMPHOCYTES # BLD AUTO: 1.7 K/UL (ref 1–4.8)
LYMPHOCYTES # BLD AUTO: 1.8 K/UL (ref 1–4.8)
LYMPHOCYTES # BLD AUTO: 1.8 K/UL (ref 1–4.8)
LYMPHOCYTES # BLD AUTO: 1.9 K/UL (ref 1–4.8)
LYMPHOCYTES # BLD AUTO: 2 K/UL (ref 1–4.8)
LYMPHOCYTES # BLD AUTO: 2 K/UL (ref 1–4.8)
LYMPHOCYTES NFR BLD: 10.5 % (ref 18–48)
LYMPHOCYTES NFR BLD: 11.3 % (ref 18–48)
LYMPHOCYTES NFR BLD: 12.4 % (ref 18–48)
LYMPHOCYTES NFR BLD: 12.8 % (ref 18–48)
LYMPHOCYTES NFR BLD: 14.9 % (ref 18–48)
LYMPHOCYTES NFR BLD: 15 % (ref 18–48)
LYMPHOCYTES NFR BLD: 16.7 % (ref 18–48)
LYMPHOCYTES NFR BLD: 16.7 % (ref 18–48)
LYMPHOCYTES NFR BLD: 16.8 % (ref 18–48)
LYMPHOCYTES NFR BLD: 17.2 % (ref 18–48)
LYMPHOCYTES NFR BLD: 17.4 % (ref 18–48)
LYMPHOCYTES NFR BLD: 18.7 % (ref 18–48)
LYMPHOCYTES NFR BLD: 19.1 % (ref 18–48)
LYMPHOCYTES NFR BLD: 19.1 % (ref 18–48)
LYMPHOCYTES NFR BLD: 2.7 % (ref 18–48)
LYMPHOCYTES NFR BLD: 20.2 % (ref 18–48)
LYMPHOCYTES NFR BLD: 20.2 % (ref 18–48)
LYMPHOCYTES NFR BLD: 20.9 % (ref 18–48)
LYMPHOCYTES NFR BLD: 21.6 % (ref 18–48)
LYMPHOCYTES NFR BLD: 23 % (ref 18–48)
LYMPHOCYTES NFR BLD: 5.2 % (ref 18–48)
LYMPHOCYTES NFR BLD: 5.3 % (ref 18–48)
LYMPHOCYTES NFR BLD: 6.1 % (ref 18–48)
LYMPHOCYTES NFR BLD: 6.2 % (ref 18–48)
LYMPHOCYTES NFR BLD: 6.4 % (ref 18–48)
LYMPHOCYTES NFR BLD: 6.9 % (ref 18–48)
LYMPHOCYTES NFR BLD: 8.3 % (ref 18–48)
LYMPHOCYTES NFR BLD: 8.5 % (ref 18–48)
LYMPHOCYTES NFR BLD: 8.9 % (ref 18–48)
LYMPHOCYTES NFR BLD: 9.8 % (ref 18–48)
MAGNESIUM SERPL-MCNC: 1.4 MG/DL (ref 1.6–2.6)
MAGNESIUM SERPL-MCNC: 1.5 MG/DL (ref 1.6–2.6)
MAGNESIUM SERPL-MCNC: 1.6 MG/DL (ref 1.6–2.6)
MAGNESIUM SERPL-MCNC: 1.7 MG/DL (ref 1.6–2.6)
MAGNESIUM SERPL-MCNC: 1.7 MG/DL (ref 1.6–2.6)
MAGNESIUM SERPL-MCNC: 1.8 MG/DL (ref 1.6–2.6)
MCH RBC QN AUTO: 29.5 PG (ref 27–31)
MCH RBC QN AUTO: 29.8 PG (ref 27–31)
MCH RBC QN AUTO: 29.8 PG (ref 27–31)
MCH RBC QN AUTO: 29.9 PG (ref 27–31)
MCH RBC QN AUTO: 29.9 PG (ref 27–31)
MCH RBC QN AUTO: 30 PG (ref 27–31)
MCH RBC QN AUTO: 30.1 PG (ref 27–31)
MCH RBC QN AUTO: 30.2 PG (ref 27–31)
MCH RBC QN AUTO: 30.3 PG (ref 27–31)
MCH RBC QN AUTO: 30.3 PG (ref 27–31)
MCH RBC QN AUTO: 30.4 PG (ref 27–31)
MCH RBC QN AUTO: 30.5 PG (ref 27–31)
MCH RBC QN AUTO: 30.6 PG (ref 27–31)
MCH RBC QN AUTO: 30.7 PG (ref 27–31)
MCH RBC QN AUTO: 30.7 PG (ref 27–31)
MCH RBC QN AUTO: 30.8 PG (ref 27–31)
MCH RBC QN AUTO: 30.8 PG (ref 27–31)
MCH RBC QN AUTO: 30.9 PG (ref 27–31)
MCH RBC QN AUTO: 31.1 PG (ref 27–31)
MCH RBC QN AUTO: 31.2 PG (ref 27–31)
MCH RBC QN AUTO: 31.3 PG (ref 27–31)
MCH RBC QN AUTO: 31.3 PG (ref 27–31)
MCH RBC QN AUTO: 31.6 PG (ref 27–31)
MCH RBC QN AUTO: 32 PG (ref 27–31)
MCHC RBC AUTO-ENTMCNC: 30.3 G/DL (ref 32–36)
MCHC RBC AUTO-ENTMCNC: 30.4 G/DL (ref 32–36)
MCHC RBC AUTO-ENTMCNC: 30.4 G/DL (ref 32–36)
MCHC RBC AUTO-ENTMCNC: 30.5 G/DL (ref 32–36)
MCHC RBC AUTO-ENTMCNC: 30.6 G/DL (ref 32–36)
MCHC RBC AUTO-ENTMCNC: 30.7 G/DL (ref 32–36)
MCHC RBC AUTO-ENTMCNC: 30.9 G/DL (ref 32–36)
MCHC RBC AUTO-ENTMCNC: 31.1 G/DL (ref 32–36)
MCHC RBC AUTO-ENTMCNC: 31.1 G/DL (ref 32–36)
MCHC RBC AUTO-ENTMCNC: 31.2 G/DL (ref 32–36)
MCHC RBC AUTO-ENTMCNC: 31.3 G/DL (ref 32–36)
MCHC RBC AUTO-ENTMCNC: 31.5 G/DL (ref 32–36)
MCHC RBC AUTO-ENTMCNC: 31.5 G/DL (ref 32–36)
MCHC RBC AUTO-ENTMCNC: 31.6 G/DL (ref 32–36)
MCHC RBC AUTO-ENTMCNC: 31.7 G/DL (ref 32–36)
MCHC RBC AUTO-ENTMCNC: 31.8 G/DL (ref 32–36)
MCHC RBC AUTO-ENTMCNC: 31.8 G/DL (ref 32–36)
MCHC RBC AUTO-ENTMCNC: 31.9 G/DL (ref 32–36)
MCHC RBC AUTO-ENTMCNC: 32 G/DL (ref 32–36)
MCHC RBC AUTO-ENTMCNC: 32.1 G/DL (ref 32–36)
MCHC RBC AUTO-ENTMCNC: 32.2 G/DL (ref 32–36)
MCHC RBC AUTO-ENTMCNC: 32.2 G/DL (ref 32–36)
MCHC RBC AUTO-ENTMCNC: 32.4 G/DL (ref 32–36)
MCHC RBC AUTO-ENTMCNC: 32.6 G/DL (ref 32–36)
MCV RBC AUTO: 100 FL (ref 82–98)
MCV RBC AUTO: 101 FL (ref 82–98)
MCV RBC AUTO: 101 FL (ref 82–98)
MCV RBC AUTO: 103 FL (ref 82–98)
MCV RBC AUTO: 103 FL (ref 82–98)
MCV RBC AUTO: 93 FL (ref 82–98)
MCV RBC AUTO: 93 FL (ref 82–98)
MCV RBC AUTO: 94 FL (ref 82–98)
MCV RBC AUTO: 95 FL (ref 82–98)
MCV RBC AUTO: 96 FL (ref 82–98)
MCV RBC AUTO: 97 FL (ref 82–98)
MCV RBC AUTO: 98 FL (ref 82–98)
MCV RBC AUTO: 99 FL (ref 82–98)
MCV RBC AUTO: 99 FL (ref 82–98)
MONOCYTES # BLD AUTO: 0.4 K/UL (ref 0.3–1)
MONOCYTES # BLD AUTO: 0.6 K/UL (ref 0.3–1)
MONOCYTES # BLD AUTO: 0.7 K/UL (ref 0.3–1)
MONOCYTES # BLD AUTO: 0.7 K/UL (ref 0.3–1)
MONOCYTES # BLD AUTO: 0.8 K/UL (ref 0.3–1)
MONOCYTES # BLD AUTO: 0.9 K/UL (ref 0.3–1)
MONOCYTES # BLD AUTO: 1 K/UL (ref 0.3–1)
MONOCYTES # BLD AUTO: 1.1 K/UL (ref 0.3–1)
MONOCYTES # BLD AUTO: 1.2 K/UL (ref 0.3–1)
MONOCYTES # BLD AUTO: 1.2 K/UL (ref 0.3–1)
MONOCYTES # BLD AUTO: 1.3 K/UL (ref 0.3–1)
MONOCYTES # BLD AUTO: 1.4 K/UL (ref 0.3–1)
MONOCYTES # BLD AUTO: 1.4 K/UL (ref 0.3–1)
MONOCYTES # BLD AUTO: 1.5 K/UL (ref 0.3–1)
MONOCYTES # BLD AUTO: 1.5 K/UL (ref 0.3–1)
MONOCYTES # BLD AUTO: 1.9 K/UL (ref 0.3–1)
MONOCYTES NFR BLD: 10 % (ref 4–15)
MONOCYTES NFR BLD: 10 % (ref 4–15)
MONOCYTES NFR BLD: 10.3 % (ref 4–15)
MONOCYTES NFR BLD: 10.9 % (ref 4–15)
MONOCYTES NFR BLD: 11.3 % (ref 4–15)
MONOCYTES NFR BLD: 11.5 % (ref 4–15)
MONOCYTES NFR BLD: 12 % (ref 4–15)
MONOCYTES NFR BLD: 12.5 % (ref 4–15)
MONOCYTES NFR BLD: 12.5 % (ref 4–15)
MONOCYTES NFR BLD: 13.3 % (ref 4–15)
MONOCYTES NFR BLD: 13.8 % (ref 4–15)
MONOCYTES NFR BLD: 4.2 % (ref 4–15)
MONOCYTES NFR BLD: 4.7 % (ref 4–15)
MONOCYTES NFR BLD: 5.1 % (ref 4–15)
MONOCYTES NFR BLD: 5.2 % (ref 4–15)
MONOCYTES NFR BLD: 5.2 % (ref 4–15)
MONOCYTES NFR BLD: 5.4 % (ref 4–15)
MONOCYTES NFR BLD: 5.7 % (ref 4–15)
MONOCYTES NFR BLD: 6.3 % (ref 4–15)
MONOCYTES NFR BLD: 6.3 % (ref 4–15)
MONOCYTES NFR BLD: 7.1 % (ref 4–15)
MONOCYTES NFR BLD: 7.1 % (ref 4–15)
MONOCYTES NFR BLD: 8.1 % (ref 4–15)
MONOCYTES NFR BLD: 8.5 % (ref 4–15)
MONOCYTES NFR BLD: 8.8 % (ref 4–15)
MONOCYTES NFR BLD: 8.8 % (ref 4–15)
MONOCYTES NFR BLD: 8.9 % (ref 4–15)
MONOCYTES NFR BLD: 9.9 % (ref 4–15)
NEUTROPHILS # BLD AUTO: 10.7 K/UL (ref 1.8–7.7)
NEUTROPHILS # BLD AUTO: 10.8 K/UL (ref 1.8–7.7)
NEUTROPHILS # BLD AUTO: 14.6 K/UL (ref 1.8–7.7)
NEUTROPHILS # BLD AUTO: 14.9 K/UL (ref 1.8–7.7)
NEUTROPHILS # BLD AUTO: 15 K/UL (ref 1.8–7.7)
NEUTROPHILS # BLD AUTO: 15.2 K/UL (ref 1.8–7.7)
NEUTROPHILS # BLD AUTO: 15.6 K/UL (ref 1.8–7.7)
NEUTROPHILS # BLD AUTO: 16.8 K/UL (ref 1.8–7.7)
NEUTROPHILS # BLD AUTO: 17.6 K/UL (ref 1.8–7.7)
NEUTROPHILS # BLD AUTO: 18.5 K/UL (ref 1.8–7.7)
NEUTROPHILS # BLD AUTO: 18.9 K/UL (ref 1.8–7.7)
NEUTROPHILS # BLD AUTO: 20.4 K/UL (ref 1.8–7.7)
NEUTROPHILS # BLD AUTO: 4.5 K/UL (ref 1.8–7.7)
NEUTROPHILS # BLD AUTO: 4.8 K/UL (ref 1.8–7.7)
NEUTROPHILS # BLD AUTO: 5.1 K/UL (ref 1.8–7.7)
NEUTROPHILS # BLD AUTO: 5.5 K/UL (ref 1.8–7.7)
NEUTROPHILS # BLD AUTO: 5.6 K/UL (ref 1.8–7.7)
NEUTROPHILS # BLD AUTO: 5.8 K/UL (ref 1.8–7.7)
NEUTROPHILS # BLD AUTO: 5.9 K/UL (ref 1.8–7.7)
NEUTROPHILS # BLD AUTO: 6 K/UL (ref 1.8–7.7)
NEUTROPHILS # BLD AUTO: 6 K/UL (ref 1.8–7.7)
NEUTROPHILS # BLD AUTO: 6.1 K/UL (ref 1.8–7.7)
NEUTROPHILS # BLD AUTO: 6.3 K/UL (ref 1.8–7.7)
NEUTROPHILS # BLD AUTO: 6.9 K/UL (ref 1.8–7.7)
NEUTROPHILS # BLD AUTO: 7 K/UL (ref 1.8–7.7)
NEUTROPHILS # BLD AUTO: 7.6 K/UL (ref 1.8–7.7)
NEUTROPHILS # BLD AUTO: 8.3 K/UL (ref 1.8–7.7)
NEUTROPHILS # BLD AUTO: 8.3 K/UL (ref 1.8–7.7)
NEUTROPHILS # BLD AUTO: 9 K/UL (ref 1.8–7.7)
NEUTROPHILS # BLD AUTO: 9.8 K/UL (ref 1.8–7.7)
NEUTROPHILS NFR BLD: 63.4 % (ref 38–73)
NEUTROPHILS NFR BLD: 64.8 % (ref 38–73)
NEUTROPHILS NFR BLD: 64.9 % (ref 38–73)
NEUTROPHILS NFR BLD: 68 % (ref 38–73)
NEUTROPHILS NFR BLD: 68.6 % (ref 38–73)
NEUTROPHILS NFR BLD: 69.2 % (ref 38–73)
NEUTROPHILS NFR BLD: 69.4 % (ref 38–73)
NEUTROPHILS NFR BLD: 69.7 % (ref 38–73)
NEUTROPHILS NFR BLD: 70.3 % (ref 38–73)
NEUTROPHILS NFR BLD: 70.3 % (ref 38–73)
NEUTROPHILS NFR BLD: 70.4 % (ref 38–73)
NEUTROPHILS NFR BLD: 70.4 % (ref 38–73)
NEUTROPHILS NFR BLD: 73.3 % (ref 38–73)
NEUTROPHILS NFR BLD: 73.8 % (ref 38–73)
NEUTROPHILS NFR BLD: 73.8 % (ref 38–73)
NEUTROPHILS NFR BLD: 74.5 % (ref 38–73)
NEUTROPHILS NFR BLD: 75.3 % (ref 38–73)
NEUTROPHILS NFR BLD: 77.6 % (ref 38–73)
NEUTROPHILS NFR BLD: 78.4 % (ref 38–73)
NEUTROPHILS NFR BLD: 79.3 % (ref 38–73)
NEUTROPHILS NFR BLD: 81.5 % (ref 38–73)
NEUTROPHILS NFR BLD: 81.9 % (ref 38–73)
NEUTROPHILS NFR BLD: 85.8 % (ref 38–73)
NEUTROPHILS NFR BLD: 86.5 % (ref 38–73)
NEUTROPHILS NFR BLD: 86.6 % (ref 38–73)
NEUTROPHILS NFR BLD: 86.7 % (ref 38–73)
NEUTROPHILS NFR BLD: 88 % (ref 38–73)
NEUTROPHILS NFR BLD: 88.2 % (ref 38–73)
NEUTROPHILS NFR BLD: 88.7 % (ref 38–73)
NEUTROPHILS NFR BLD: 91.7 % (ref 38–73)
NRBC BLD-RTO: 0 /100 WBC
NRBC BLD-RTO: 1 /100 WBC
NRBC BLD-RTO: 1 /100 WBC
NRBC BLD-RTO: 2 /100 WBC
NUM UNITS TRANS PACKED RBC: NORMAL
OVALOCYTES BLD QL SMEAR: ABNORMAL
OVALOCYTES BLD QL SMEAR: ABNORMAL
PHOSPHATE SERPL-MCNC: 4.6 MG/DL (ref 2.7–4.5)
PHOSPHATE SERPL-MCNC: 4.6 MG/DL (ref 2.7–4.5)
PHOSPHATE SERPL-MCNC: 4.7 MG/DL (ref 2.7–4.5)
PHOSPHATE SERPL-MCNC: 4.9 MG/DL (ref 2.7–4.5)
PHOSPHATE SERPL-MCNC: 5 MG/DL (ref 2.7–4.5)
PHOSPHATE SERPL-MCNC: 5.1 MG/DL (ref 2.7–4.5)
PHOSPHATE SERPL-MCNC: 5.2 MG/DL (ref 2.7–4.5)
PHOSPHATE SERPL-MCNC: 5.2 MG/DL (ref 2.7–4.5)
PHOSPHATE SERPL-MCNC: 5.5 MG/DL (ref 2.7–4.5)
PHOSPHATE SERPL-MCNC: 5.7 MG/DL (ref 2.7–4.5)
PHOSPHATE SERPL-MCNC: 5.7 MG/DL (ref 2.7–4.5)
PHOSPHATE SERPL-MCNC: 5.8 MG/DL (ref 2.7–4.5)
PHOSPHATE SERPL-MCNC: 5.9 MG/DL (ref 2.7–4.5)
PHOSPHATE SERPL-MCNC: 6.2 MG/DL (ref 2.7–4.5)
PHOSPHATE SERPL-MCNC: 6.3 MG/DL (ref 2.7–4.5)
PHOSPHATE SERPL-MCNC: 6.4 MG/DL (ref 2.7–4.5)
PHOSPHATE SERPL-MCNC: 6.5 MG/DL (ref 2.7–4.5)
PHOSPHATE SERPL-MCNC: 6.8 MG/DL (ref 2.7–4.5)
PHOSPHATE SERPL-MCNC: 6.8 MG/DL (ref 2.7–4.5)
PHOSPHATE SERPL-MCNC: 7.4 MG/DL (ref 2.7–4.5)
PHOSPHATE SERPL-MCNC: 9 MG/DL (ref 2.7–4.5)
PHOSPHATE SERPL-MCNC: 9.6 MG/DL (ref 2.7–4.5)
PLATELET # BLD AUTO: 132 K/UL (ref 150–450)
PLATELET # BLD AUTO: 133 K/UL (ref 150–450)
PLATELET # BLD AUTO: 134 K/UL (ref 150–450)
PLATELET # BLD AUTO: 134 K/UL (ref 150–450)
PLATELET # BLD AUTO: 136 K/UL (ref 150–450)
PLATELET # BLD AUTO: 136 K/UL (ref 150–450)
PLATELET # BLD AUTO: 138 K/UL (ref 150–450)
PLATELET # BLD AUTO: 139 K/UL (ref 150–450)
PLATELET # BLD AUTO: 141 K/UL (ref 150–450)
PLATELET # BLD AUTO: 143 K/UL (ref 150–450)
PLATELET # BLD AUTO: 144 K/UL (ref 150–450)
PLATELET # BLD AUTO: 144 K/UL (ref 150–450)
PLATELET # BLD AUTO: 145 K/UL (ref 150–450)
PLATELET # BLD AUTO: 146 K/UL (ref 150–450)
PLATELET # BLD AUTO: 149 K/UL (ref 150–450)
PLATELET # BLD AUTO: 150 K/UL (ref 150–450)
PLATELET # BLD AUTO: 150 K/UL (ref 150–450)
PLATELET # BLD AUTO: 151 K/UL (ref 150–450)
PLATELET # BLD AUTO: 152 K/UL (ref 150–450)
PLATELET # BLD AUTO: 156 K/UL (ref 150–450)
PLATELET # BLD AUTO: 156 K/UL (ref 150–450)
PLATELET # BLD AUTO: 157 K/UL (ref 150–450)
PLATELET # BLD AUTO: 161 K/UL (ref 150–450)
PLATELET # BLD AUTO: 162 K/UL (ref 150–450)
PLATELET # BLD AUTO: 165 K/UL (ref 150–450)
PLATELET # BLD AUTO: 193 K/UL (ref 150–450)
PLATELET # BLD AUTO: 199 K/UL (ref 150–450)
PLATELET # BLD AUTO: 211 K/UL (ref 150–450)
PLATELET # BLD AUTO: 315 K/UL (ref 150–450)
PLATELET BLD QL SMEAR: ABNORMAL
PMV BLD AUTO: 11 FL (ref 9.2–12.9)
PMV BLD AUTO: 11.1 FL (ref 9.2–12.9)
PMV BLD AUTO: 11.6 FL (ref 9.2–12.9)
PMV BLD AUTO: 11.8 FL (ref 9.2–12.9)
PMV BLD AUTO: 11.8 FL (ref 9.2–12.9)
PMV BLD AUTO: 11.9 FL (ref 9.2–12.9)
PMV BLD AUTO: 12.1 FL (ref 9.2–12.9)
PMV BLD AUTO: 12.3 FL (ref 9.2–12.9)
PMV BLD AUTO: 12.3 FL (ref 9.2–12.9)
PMV BLD AUTO: 12.4 FL (ref 9.2–12.9)
PMV BLD AUTO: 12.4 FL (ref 9.2–12.9)
PMV BLD AUTO: 12.5 FL (ref 9.2–12.9)
PMV BLD AUTO: 12.6 FL (ref 9.2–12.9)
PMV BLD AUTO: 12.8 FL (ref 9.2–12.9)
PMV BLD AUTO: 12.9 FL (ref 9.2–12.9)
PMV BLD AUTO: 13 FL (ref 9.2–12.9)
PMV BLD AUTO: 13.3 FL (ref 9.2–12.9)
PMV BLD AUTO: 13.4 FL (ref 9.2–12.9)
PMV BLD AUTO: 13.6 FL (ref 9.2–12.9)
PMV BLD AUTO: 13.7 FL (ref 9.2–12.9)
PMV BLD AUTO: ABNORMAL FL (ref 9.2–12.9)
PMV BLD AUTO: ABNORMAL FL (ref 9.2–12.9)
POC ACTIVATED CLOTTING TIME K: 179 SEC (ref 74–137)
POC ACTIVATED CLOTTING TIME K: 185 SEC (ref 74–137)
POC ACTIVATED CLOTTING TIME K: 185 SEC (ref 74–137)
POC ACTIVATED CLOTTING TIME K: 191 SEC (ref 74–137)
POC ACTIVATED CLOTTING TIME K: 196 SEC (ref 74–137)
POC ACTIVATED CLOTTING TIME K: 208 SEC (ref 74–137)
POC ACTIVATED CLOTTING TIME K: 208 SEC (ref 74–137)
POC ACTIVATED CLOTTING TIME K: 213 SEC (ref 74–137)
POC ACTIVATED CLOTTING TIME K: 214 SEC (ref 74–137)
POC ACTIVATED CLOTTING TIME K: 219 SEC (ref 74–137)
POC ACTIVATED CLOTTING TIME K: 219 SEC (ref 74–137)
POC ACTIVATED CLOTTING TIME K: 225 SEC (ref 74–137)
POC ACTIVATED CLOTTING TIME K: 225 SEC (ref 74–137)
POC ACTIVATED CLOTTING TIME K: 231 SEC (ref 74–137)
POC ACTIVATED CLOTTING TIME K: 237 SEC (ref 74–137)
POC ACTIVATED CLOTTING TIME K: 248 SEC (ref 74–137)
POC ACTIVATED CLOTTING TIME K: 248 SEC (ref 74–137)
POC ACTIVATED CLOTTING TIME K: 277 SEC (ref 74–137)
POCT GLUCOSE: 100 MG/DL (ref 70–110)
POCT GLUCOSE: 101 MG/DL (ref 70–110)
POCT GLUCOSE: 102 MG/DL (ref 70–110)
POCT GLUCOSE: 103 MG/DL (ref 70–110)
POCT GLUCOSE: 104 MG/DL (ref 70–110)
POCT GLUCOSE: 104 MG/DL (ref 70–110)
POCT GLUCOSE: 105 MG/DL (ref 70–110)
POCT GLUCOSE: 107 MG/DL (ref 70–110)
POCT GLUCOSE: 110 MG/DL (ref 70–110)
POCT GLUCOSE: 113 MG/DL (ref 70–110)
POCT GLUCOSE: 114 MG/DL (ref 70–110)
POCT GLUCOSE: 116 MG/DL (ref 70–110)
POCT GLUCOSE: 117 MG/DL (ref 70–110)
POCT GLUCOSE: 118 MG/DL (ref 70–110)
POCT GLUCOSE: 121 MG/DL (ref 70–110)
POCT GLUCOSE: 121 MG/DL (ref 70–110)
POCT GLUCOSE: 123 MG/DL (ref 70–110)
POCT GLUCOSE: 127 MG/DL (ref 70–110)
POCT GLUCOSE: 127 MG/DL (ref 70–110)
POCT GLUCOSE: 129 MG/DL (ref 70–110)
POCT GLUCOSE: 132 MG/DL (ref 70–110)
POCT GLUCOSE: 133 MG/DL (ref 70–110)
POCT GLUCOSE: 136 MG/DL (ref 70–110)
POCT GLUCOSE: 136 MG/DL (ref 70–110)
POCT GLUCOSE: 137 MG/DL (ref 70–110)
POCT GLUCOSE: 139 MG/DL (ref 70–110)
POCT GLUCOSE: 140 MG/DL (ref 70–110)
POCT GLUCOSE: 141 MG/DL (ref 70–110)
POCT GLUCOSE: 142 MG/DL (ref 70–110)
POCT GLUCOSE: 143 MG/DL (ref 70–110)
POCT GLUCOSE: 145 MG/DL (ref 70–110)
POCT GLUCOSE: 150 MG/DL (ref 70–110)
POCT GLUCOSE: 151 MG/DL (ref 70–110)
POCT GLUCOSE: 151 MG/DL (ref 70–110)
POCT GLUCOSE: 153 MG/DL (ref 70–110)
POCT GLUCOSE: 160 MG/DL (ref 70–110)
POCT GLUCOSE: 168 MG/DL (ref 70–110)
POCT GLUCOSE: 171 MG/DL (ref 70–110)
POCT GLUCOSE: 174 MG/DL (ref 70–110)
POCT GLUCOSE: 176 MG/DL (ref 70–110)
POCT GLUCOSE: 176 MG/DL (ref 70–110)
POCT GLUCOSE: 180 MG/DL (ref 70–110)
POCT GLUCOSE: 182 MG/DL (ref 70–110)
POCT GLUCOSE: 183 MG/DL (ref 70–110)
POCT GLUCOSE: 187 MG/DL (ref 70–110)
POCT GLUCOSE: 189 MG/DL (ref 70–110)
POCT GLUCOSE: 191 MG/DL (ref 70–110)
POCT GLUCOSE: 193 MG/DL (ref 70–110)
POCT GLUCOSE: 194 MG/DL (ref 70–110)
POCT GLUCOSE: 196 MG/DL (ref 70–110)
POCT GLUCOSE: 198 MG/DL (ref 70–110)
POCT GLUCOSE: 205 MG/DL (ref 70–110)
POCT GLUCOSE: 206 MG/DL (ref 70–110)
POCT GLUCOSE: 21 MG/DL (ref 70–110)
POCT GLUCOSE: 210 MG/DL (ref 70–110)
POCT GLUCOSE: 241 MG/DL (ref 70–110)
POCT GLUCOSE: 246 MG/DL (ref 70–110)
POCT GLUCOSE: 27 MG/DL (ref 70–110)
POCT GLUCOSE: 31 MG/DL (ref 70–110)
POCT GLUCOSE: 322 MG/DL (ref 70–110)
POCT GLUCOSE: 34 MG/DL (ref 70–110)
POCT GLUCOSE: 34 MG/DL (ref 70–110)
POCT GLUCOSE: 41 MG/DL (ref 70–110)
POCT GLUCOSE: 52 MG/DL (ref 70–110)
POCT GLUCOSE: 54 MG/DL (ref 70–110)
POCT GLUCOSE: 58 MG/DL (ref 70–110)
POCT GLUCOSE: 61 MG/DL (ref 70–110)
POCT GLUCOSE: 64 MG/DL (ref 70–110)
POCT GLUCOSE: 72 MG/DL (ref 70–110)
POCT GLUCOSE: 77 MG/DL (ref 70–110)
POCT GLUCOSE: 78 MG/DL (ref 70–110)
POCT GLUCOSE: 83 MG/DL (ref 70–110)
POCT GLUCOSE: 85 MG/DL (ref 70–110)
POCT GLUCOSE: 87 MG/DL (ref 70–110)
POCT GLUCOSE: 87 MG/DL (ref 70–110)
POCT GLUCOSE: 89 MG/DL (ref 70–110)
POCT GLUCOSE: 90 MG/DL (ref 70–110)
POCT GLUCOSE: 91 MG/DL (ref 70–110)
POCT GLUCOSE: 91 MG/DL (ref 70–110)
POCT GLUCOSE: 92 MG/DL (ref 70–110)
POCT GLUCOSE: 94 MG/DL (ref 70–110)
POCT GLUCOSE: <20 MG/DL (ref 70–110)
POCT GLUCOSE: >500 MG/DL (ref 70–110)
POIKILOCYTOSIS BLD QL SMEAR: SLIGHT
POIKILOCYTOSIS BLD QL SMEAR: SLIGHT
POTASSIUM SERPL-SCNC: 4.2 MMOL/L (ref 3.5–5.1)
POTASSIUM SERPL-SCNC: 4.3 MMOL/L (ref 3.5–5.1)
POTASSIUM SERPL-SCNC: 4.4 MMOL/L (ref 3.5–5.1)
POTASSIUM SERPL-SCNC: 4.4 MMOL/L (ref 3.5–5.1)
POTASSIUM SERPL-SCNC: 4.5 MMOL/L (ref 3.5–5.1)
POTASSIUM SERPL-SCNC: 4.6 MMOL/L (ref 3.5–5.1)
POTASSIUM SERPL-SCNC: 4.7 MMOL/L (ref 3.5–5.1)
POTASSIUM SERPL-SCNC: 4.8 MMOL/L (ref 3.5–5.1)
POTASSIUM SERPL-SCNC: 4.9 MMOL/L (ref 3.5–5.1)
POTASSIUM SERPL-SCNC: 5.2 MMOL/L (ref 3.5–5.1)
POTASSIUM SERPL-SCNC: 5.3 MMOL/L (ref 3.5–5.1)
POTASSIUM SERPL-SCNC: 5.8 MMOL/L (ref 3.5–5.1)
POTASSIUM SERPL-SCNC: 5.8 MMOL/L (ref 3.5–5.1)
POTASSIUM SERPL-SCNC: 6 MMOL/L (ref 3.5–5.1)
POTASSIUM SERPL-SCNC: 6 MMOL/L (ref 3.5–5.1)
POTASSIUM SERPL-SCNC: 6.3 MMOL/L (ref 3.5–5.1)
POTASSIUM SERPL-SCNC: 6.5 MMOL/L (ref 3.5–5.1)
POTASSIUM SERPL-SCNC: 6.8 MMOL/L (ref 3.5–5.1)
PROT SERPL-MCNC: 5.1 G/DL (ref 6–8.4)
PROT SERPL-MCNC: 5.7 G/DL (ref 6–8.4)
PROT SERPL-MCNC: 5.8 G/DL (ref 6–8.4)
PROT SERPL-MCNC: 5.8 G/DL (ref 6–8.4)
PROT SERPL-MCNC: 6 G/DL (ref 6–8.4)
PROT SERPL-MCNC: 6.1 G/DL (ref 6–8.4)
PROT SERPL-MCNC: 6.8 G/DL (ref 6–8.4)
PROTHROMBIN TIME: 11.7 SEC (ref 9–12.5)
PROTHROMBIN TIME: 12.1 SEC (ref 9–12.5)
PROTHROMBIN TIME: 15.2 SEC (ref 9–12.5)
PROTHROMBIN TIME: 17.3 SEC (ref 9–12.5)
PROTHROMBIN TIME: 19.4 SEC (ref 9–12.5)
RBC # BLD AUTO: 2.32 M/UL (ref 4.6–6.2)
RBC # BLD AUTO: 2.36 M/UL (ref 4.6–6.2)
RBC # BLD AUTO: 2.41 M/UL (ref 4.6–6.2)
RBC # BLD AUTO: 2.41 M/UL (ref 4.6–6.2)
RBC # BLD AUTO: 2.49 M/UL (ref 4.6–6.2)
RBC # BLD AUTO: 2.53 M/UL (ref 4.6–6.2)
RBC # BLD AUTO: 2.53 M/UL (ref 4.6–6.2)
RBC # BLD AUTO: 2.56 M/UL (ref 4.6–6.2)
RBC # BLD AUTO: 2.59 M/UL (ref 4.6–6.2)
RBC # BLD AUTO: 2.6 M/UL (ref 4.6–6.2)
RBC # BLD AUTO: 2.65 M/UL (ref 4.6–6.2)
RBC # BLD AUTO: 2.66 M/UL (ref 4.6–6.2)
RBC # BLD AUTO: 2.66 M/UL (ref 4.6–6.2)
RBC # BLD AUTO: 2.69 M/UL (ref 4.6–6.2)
RBC # BLD AUTO: 2.71 M/UL (ref 4.6–6.2)
RBC # BLD AUTO: 2.72 M/UL (ref 4.6–6.2)
RBC # BLD AUTO: 2.74 M/UL (ref 4.6–6.2)
RBC # BLD AUTO: 2.82 M/UL (ref 4.6–6.2)
RBC # BLD AUTO: 2.92 M/UL (ref 4.6–6.2)
RBC # BLD AUTO: 3 M/UL (ref 4.6–6.2)
RBC # BLD AUTO: 3.05 M/UL (ref 4.6–6.2)
RBC # BLD AUTO: 3.05 M/UL (ref 4.6–6.2)
RBC # BLD AUTO: 3.08 M/UL (ref 4.6–6.2)
RBC # BLD AUTO: 3.08 M/UL (ref 4.6–6.2)
RBC # BLD AUTO: 3.1 M/UL (ref 4.6–6.2)
RBC # BLD AUTO: 3.21 M/UL (ref 4.6–6.2)
RBC # BLD AUTO: 3.21 M/UL (ref 4.6–6.2)
RBC # BLD AUTO: 3.27 M/UL (ref 4.6–6.2)
RBC # BLD AUTO: 3.28 M/UL (ref 4.6–6.2)
RBC # BLD AUTO: 3.32 M/UL (ref 4.6–6.2)
RBC # BLD AUTO: 3.52 M/UL (ref 4.6–6.2)
SAMPLE: ABNORMAL
SARS-COV-2 AG RESP QL IA.RAPID: NEGATIVE
SARS-COV-2 RDRP RESP QL NAA+PROBE: NEGATIVE
SODIUM SERPL-SCNC: 130 MMOL/L (ref 136–145)
SODIUM SERPL-SCNC: 132 MMOL/L (ref 136–145)
SODIUM SERPL-SCNC: 133 MMOL/L (ref 136–145)
SODIUM SERPL-SCNC: 134 MMOL/L (ref 136–145)
SODIUM SERPL-SCNC: 135 MMOL/L (ref 136–145)
SODIUM SERPL-SCNC: 135 MMOL/L (ref 136–145)
SODIUM SERPL-SCNC: 136 MMOL/L (ref 136–145)
SODIUM SERPL-SCNC: 136 MMOL/L (ref 136–145)
SODIUM SERPL-SCNC: 137 MMOL/L (ref 136–145)
SODIUM SERPL-SCNC: 138 MMOL/L (ref 136–145)
SODIUM SERPL-SCNC: 139 MMOL/L (ref 136–145)
SODIUM SERPL-SCNC: 140 MMOL/L (ref 136–145)
SODIUM SERPL-SCNC: 140 MMOL/L (ref 136–145)
SODIUM SERPL-SCNC: 141 MMOL/L (ref 136–145)
TARGETS BLD QL SMEAR: ABNORMAL
TARGETS BLD QL SMEAR: ABNORMAL
TRANS ERYTHROCYTES VOL PATIENT: NORMAL ML
TRANS ERYTHROCYTES VOL PATIENT: NORMAL ML
VANCOMYCIN SERPL-MCNC: 15.3 UG/ML
VANCOMYCIN SERPL-MCNC: 16.2 UG/ML
VANCOMYCIN SERPL-MCNC: 18.1 UG/ML
VANCOMYCIN SERPL-MCNC: 19.2 UG/ML
VANCOMYCIN SERPL-MCNC: 25.8 UG/ML
WBC # BLD AUTO: 10.06 K/UL (ref 3.9–12.7)
WBC # BLD AUTO: 11.02 K/UL (ref 3.9–12.7)
WBC # BLD AUTO: 11.82 K/UL (ref 3.9–12.7)
WBC # BLD AUTO: 11.82 K/UL (ref 3.9–12.7)
WBC # BLD AUTO: 12.27 K/UL (ref 3.9–12.7)
WBC # BLD AUTO: 12.66 K/UL (ref 3.9–12.7)
WBC # BLD AUTO: 13.55 K/UL (ref 3.9–12.7)
WBC # BLD AUTO: 13.7 K/UL (ref 3.9–12.7)
WBC # BLD AUTO: 14.81 K/UL (ref 3.9–12.7)
WBC # BLD AUTO: 16.9 K/UL (ref 3.9–12.7)
WBC # BLD AUTO: 17.05 K/UL (ref 3.9–12.7)
WBC # BLD AUTO: 17.52 K/UL (ref 3.9–12.7)
WBC # BLD AUTO: 17.97 K/UL (ref 3.9–12.7)
WBC # BLD AUTO: 18.33 K/UL (ref 3.9–12.7)
WBC # BLD AUTO: 19.09 K/UL (ref 3.9–12.7)
WBC # BLD AUTO: 20.5 K/UL (ref 3.9–12.7)
WBC # BLD AUTO: 21.33 K/UL (ref 3.9–12.7)
WBC # BLD AUTO: 22.21 K/UL (ref 3.9–12.7)
WBC # BLD AUTO: 22.54 K/UL (ref 3.9–12.7)
WBC # BLD AUTO: 6.54 K/UL (ref 3.9–12.7)
WBC # BLD AUTO: 7.31 K/UL (ref 3.9–12.7)
WBC # BLD AUTO: 7.55 K/UL (ref 3.9–12.7)
WBC # BLD AUTO: 8 K/UL (ref 3.9–12.7)
WBC # BLD AUTO: 8.08 K/UL (ref 3.9–12.7)
WBC # BLD AUTO: 8.26 K/UL (ref 3.9–12.7)
WBC # BLD AUTO: 8.45 K/UL (ref 3.9–12.7)
WBC # BLD AUTO: 8.52 K/UL (ref 3.9–12.7)
WBC # BLD AUTO: 8.53 K/UL (ref 3.9–12.7)
WBC # BLD AUTO: 8.66 K/UL (ref 3.9–12.7)
WBC # BLD AUTO: 8.91 K/UL (ref 3.9–12.7)
WBC # BLD AUTO: 9.18 K/UL (ref 3.9–12.7)

## 2022-01-01 PROCEDURE — 85025 COMPLETE CBC W/AUTO DIFF WBC: CPT | Performed by: EMERGENCY MEDICINE

## 2022-01-01 PROCEDURE — 94761 N-INVAS EAR/PLS OXIMETRY MLT: CPT

## 2022-01-01 PROCEDURE — C1769 GUIDE WIRE: HCPCS | Performed by: INTERNAL MEDICINE

## 2022-01-01 PROCEDURE — G0378 HOSPITAL OBSERVATION PER HR: HCPCS

## 2022-01-01 PROCEDURE — 25500020 PHARM REV CODE 255: Performed by: INTERNAL MEDICINE

## 2022-01-01 PROCEDURE — 36556 INSERT NON-TUNNEL CV CATH: CPT | Mod: ,,, | Performed by: INTERNAL MEDICINE

## 2022-01-01 PROCEDURE — 99233 PR SUBSEQUENT HOSPITAL CARE,LEVL III: ICD-10-PCS | Mod: ,,, | Performed by: NURSE PRACTITIONER

## 2022-01-01 PROCEDURE — 36905 THRMBC/NFS DIALYSIS CIRCUIT: CPT | Performed by: INTERNAL MEDICINE

## 2022-01-01 PROCEDURE — 99152 MOD SED SAME PHYS/QHP 5/>YRS: CPT | Mod: ,,, | Performed by: INTERNAL MEDICINE

## 2022-01-01 PROCEDURE — 85347 COAGULATION TIME ACTIVATED: CPT | Performed by: INTERNAL MEDICINE

## 2022-01-01 PROCEDURE — 80053 COMPREHEN METABOLIC PANEL: CPT | Performed by: INTERNAL MEDICINE

## 2022-01-01 PROCEDURE — U0002 COVID-19 LAB TEST NON-CDC: HCPCS | Performed by: EMERGENCY MEDICINE

## 2022-01-01 PROCEDURE — 25000003 PHARM REV CODE 250: Performed by: STUDENT IN AN ORGANIZED HEALTH CARE EDUCATION/TRAINING PROGRAM

## 2022-01-01 PROCEDURE — 85730 THROMBOPLASTIN TIME PARTIAL: CPT | Performed by: SURGERY

## 2022-01-01 PROCEDURE — 37224 HC FEM/POPL REVAS W/TLA: CPT | Mod: LT | Performed by: INTERNAL MEDICINE

## 2022-01-01 PROCEDURE — 63600175 PHARM REV CODE 636 W HCPCS: Performed by: NURSE PRACTITIONER

## 2022-01-01 PROCEDURE — 37248 PR TRANSLML BALLOON ANGIO, OPEN/PERC, INITIAL VEIN: ICD-10-PCS | Mod: 51,RT,, | Performed by: INTERNAL MEDICINE

## 2022-01-01 PROCEDURE — 11000001 HC ACUTE MED/SURG PRIVATE ROOM

## 2022-01-01 PROCEDURE — 25000003 PHARM REV CODE 250: Performed by: SURGERY

## 2022-01-01 PROCEDURE — 75710 ARTERY X-RAYS ARM/LEG: CPT | Mod: 59,LT | Performed by: INTERNAL MEDICINE

## 2022-01-01 PROCEDURE — 25000003 PHARM REV CODE 250: Performed by: INTERNAL MEDICINE

## 2022-01-01 PROCEDURE — 99152 MOD SED SAME PHYS/QHP 5/>YRS: CPT | Performed by: INTERNAL MEDICINE

## 2022-01-01 PROCEDURE — 37228 HC TIB/PER REVASC W/TLA: CPT | Mod: 59,RT | Performed by: INTERNAL MEDICINE

## 2022-01-01 PROCEDURE — 37253 INTRVASC US NONCORONARY ADDL: CPT | Mod: ,,, | Performed by: INTERNAL MEDICINE

## 2022-01-01 PROCEDURE — 80202 ASSAY OF VANCOMYCIN: CPT | Performed by: EMERGENCY MEDICINE

## 2022-01-01 PROCEDURE — 63600175 PHARM REV CODE 636 W HCPCS: Performed by: INTERNAL MEDICINE

## 2022-01-01 PROCEDURE — 36415 COLL VENOUS BLD VENIPUNCTURE: CPT | Performed by: SURGERY

## 2022-01-01 PROCEDURE — 25000003 PHARM REV CODE 250: Performed by: PHYSICIAN ASSISTANT

## 2022-01-01 PROCEDURE — 85025 COMPLETE CBC W/AUTO DIFF WBC: CPT | Performed by: PHYSICIAN ASSISTANT

## 2022-01-01 PROCEDURE — 94760 N-INVAS EAR/PLS OXIMETRY 1: CPT

## 2022-01-01 PROCEDURE — 85730 THROMBOPLASTIN TIME PARTIAL: CPT | Performed by: NURSE PRACTITIONER

## 2022-01-01 PROCEDURE — 73630 X-RAY EXAM OF FOOT: CPT | Mod: 26,RT,, | Performed by: RADIOLOGY

## 2022-01-01 PROCEDURE — 85730 THROMBOPLASTIN TIME PARTIAL: CPT | Mod: 91 | Performed by: NURSE PRACTITIONER

## 2022-01-01 PROCEDURE — 80053 COMPREHEN METABOLIC PANEL: CPT | Performed by: EMERGENCY MEDICINE

## 2022-01-01 PROCEDURE — 63600175 PHARM REV CODE 636 W HCPCS: Performed by: FAMILY MEDICINE

## 2022-01-01 PROCEDURE — 99291 CRITICAL CARE FIRST HOUR: CPT | Mod: 25

## 2022-01-01 PROCEDURE — 36415 COLL VENOUS BLD VENIPUNCTURE: CPT | Performed by: NURSE PRACTITIONER

## 2022-01-01 PROCEDURE — 93010 ELECTROCARDIOGRAM REPORT: CPT | Mod: ,,, | Performed by: INTERNAL MEDICINE

## 2022-01-01 PROCEDURE — 63600175 PHARM REV CODE 636 W HCPCS: Performed by: NURSE ANESTHETIST, CERTIFIED REGISTERED

## 2022-01-01 PROCEDURE — 63600175 PHARM REV CODE 636 W HCPCS: Performed by: SURGERY

## 2022-01-01 PROCEDURE — 99233 PR SUBSEQUENT HOSPITAL CARE,LEVL III: ICD-10-PCS | Mod: ,,, | Performed by: INTERNAL MEDICINE

## 2022-01-01 PROCEDURE — C1752 CATH,HEMODIALYSIS,SHORT-TERM: HCPCS | Performed by: INTERNAL MEDICINE

## 2022-01-01 PROCEDURE — 37000009 HC ANESTHESIA EA ADD 15 MINS: Performed by: SURGERY

## 2022-01-01 PROCEDURE — 77001 CHG FLUOROGUIDE CNTRL VEN ACCESS,PLACE,REPLACE,REMOVE: ICD-10-PCS | Mod: 26,,, | Performed by: INTERNAL MEDICINE

## 2022-01-01 PROCEDURE — C1887 CATHETER, GUIDING: HCPCS | Performed by: INTERNAL MEDICINE

## 2022-01-01 PROCEDURE — 25000003 PHARM REV CODE 250: Performed by: FAMILY MEDICINE

## 2022-01-01 PROCEDURE — 99214 OFFICE O/P EST MOD 30 MIN: CPT | Mod: 25,S$GLB,, | Performed by: STUDENT IN AN ORGANIZED HEALTH CARE EDUCATION/TRAINING PROGRAM

## 2022-01-01 PROCEDURE — 63600175 PHARM REV CODE 636 W HCPCS: Performed by: EMERGENCY MEDICINE

## 2022-01-01 PROCEDURE — 84100 ASSAY OF PHOSPHORUS: CPT | Performed by: INTERNAL MEDICINE

## 2022-01-01 PROCEDURE — 99152 PR MOD CONSCIOUS SEDATION, SAME PHYS, 5+ YRS, FIRST 15 MIN: ICD-10-PCS | Mod: ,,, | Performed by: INTERNAL MEDICINE

## 2022-01-01 PROCEDURE — C1894 INTRO/SHEATH, NON-LASER: HCPCS | Performed by: INTERNAL MEDICINE

## 2022-01-01 PROCEDURE — G0180 PR HOME HEALTH MD CERTIFICATION: ICD-10-PCS | Mod: ,,, | Performed by: STUDENT IN AN ORGANIZED HEALTH CARE EDUCATION/TRAINING PROGRAM

## 2022-01-01 PROCEDURE — 37232 PR REVASCULARIZE TIBIAL/PERON ARTERY,ANGIOPLASTY EA ADD: ICD-10-PCS | Mod: RT,,, | Performed by: INTERNAL MEDICINE

## 2022-01-01 PROCEDURE — 85610 PROTHROMBIN TIME: CPT | Performed by: NURSE PRACTITIONER

## 2022-01-01 PROCEDURE — 37227 PR FEM/POPL REVASC STNT & ATHERECTOMY: ICD-10-PCS | Mod: RT,,, | Performed by: INTERNAL MEDICINE

## 2022-01-01 PROCEDURE — C1760 CLOSURE DEV, VASC: HCPCS | Performed by: INTERNAL MEDICINE

## 2022-01-01 PROCEDURE — 99999 PR PBB SHADOW E&M-EST. PATIENT-LVL III: CPT | Mod: PBBFAC,,, | Performed by: STUDENT IN AN ORGANIZED HEALTH CARE EDUCATION/TRAINING PROGRAM

## 2022-01-01 PROCEDURE — 80100016 HC MAINTENANCE HEMODIALYSIS

## 2022-01-01 PROCEDURE — 85027 COMPLETE CBC AUTOMATED: CPT | Performed by: STUDENT IN AN ORGANIZED HEALTH CARE EDUCATION/TRAINING PROGRAM

## 2022-01-01 PROCEDURE — 99153 MOD SED SAME PHYS/QHP EA: CPT | Performed by: INTERNAL MEDICINE

## 2022-01-01 PROCEDURE — 96375 TX/PRO/DX INJ NEW DRUG ADDON: CPT

## 2022-01-01 PROCEDURE — C1725 CATH, TRANSLUMIN NON-LASER: HCPCS | Performed by: INTERNAL MEDICINE

## 2022-01-01 PROCEDURE — 85025 COMPLETE CBC W/AUTO DIFF WBC: CPT | Performed by: INTERNAL MEDICINE

## 2022-01-01 PROCEDURE — 99900035 HC TECH TIME PER 15 MIN (STAT)

## 2022-01-01 PROCEDURE — 84100 ASSAY OF PHOSPHORUS: CPT | Performed by: NURSE PRACTITIONER

## 2022-01-01 PROCEDURE — 63600175 PHARM REV CODE 636 W HCPCS: Performed by: PHYSICIAN ASSISTANT

## 2022-01-01 PROCEDURE — 83735 ASSAY OF MAGNESIUM: CPT | Performed by: NURSE PRACTITIONER

## 2022-01-01 PROCEDURE — 36000706: Performed by: SURGERY

## 2022-01-01 PROCEDURE — 25000242 PHARM REV CODE 250 ALT 637 W/ HCPCS: Performed by: INTERNAL MEDICINE

## 2022-01-01 PROCEDURE — 25000003 PHARM REV CODE 250: Performed by: NURSE ANESTHETIST, CERTIFIED REGISTERED

## 2022-01-01 PROCEDURE — 85025 COMPLETE CBC W/AUTO DIFF WBC: CPT | Performed by: NURSE PRACTITIONER

## 2022-01-01 PROCEDURE — C1757 CATH, THROMBECTOMY/EMBOLECT: HCPCS | Performed by: SURGERY

## 2022-01-01 PROCEDURE — 85730 THROMBOPLASTIN TIME PARTIAL: CPT | Performed by: INTERNAL MEDICINE

## 2022-01-01 PROCEDURE — 99232 SBSQ HOSP IP/OBS MODERATE 35: CPT | Mod: ,,, | Performed by: INTERNAL MEDICINE

## 2022-01-01 PROCEDURE — 37252 INTRVASC US NONCORONARY 1ST: CPT | Mod: ,,, | Performed by: INTERNAL MEDICINE

## 2022-01-01 PROCEDURE — 25000003 PHARM REV CODE 250: Performed by: NURSE PRACTITIONER

## 2022-01-01 PROCEDURE — 85730 THROMBOPLASTIN TIME PARTIAL: CPT | Mod: 91 | Performed by: SURGERY

## 2022-01-01 PROCEDURE — 93005 ELECTROCARDIOGRAM TRACING: CPT

## 2022-01-01 PROCEDURE — 37249 TRLUML BALO ANGIOP ADDL VEIN: CPT | Mod: RT | Performed by: INTERNAL MEDICINE

## 2022-01-01 PROCEDURE — 37224 PR FEM/POPL REVAS W/TLA: ICD-10-PCS | Mod: 51,LT,, | Performed by: INTERNAL MEDICINE

## 2022-01-01 PROCEDURE — 37252 PR IVUS, NON-CORONARY, 1ST VESSEL: ICD-10-PCS | Mod: ,,, | Performed by: INTERNAL MEDICINE

## 2022-01-01 PROCEDURE — 80048 BASIC METABOLIC PNL TOTAL CA: CPT | Performed by: INTERNAL MEDICINE

## 2022-01-01 PROCEDURE — 80069 RENAL FUNCTION PANEL: CPT | Performed by: PHYSICIAN ASSISTANT

## 2022-01-01 PROCEDURE — 80202 ASSAY OF VANCOMYCIN: CPT | Performed by: INTERNAL MEDICINE

## 2022-01-01 PROCEDURE — 37227 PR FEM/POPL REVASC STNT & ATHERECTOMY: CPT | Mod: RT,,, | Performed by: INTERNAL MEDICINE

## 2022-01-01 PROCEDURE — 63600175 PHARM REV CODE 636 W HCPCS: Mod: JG | Performed by: PHYSICIAN ASSISTANT

## 2022-01-01 PROCEDURE — C2623 CATH, TRANSLUMIN, DRUG-COAT: HCPCS | Performed by: INTERNAL MEDICINE

## 2022-01-01 PROCEDURE — 36556 INSERT NON-TUNNEL CV CATH: CPT | Performed by: INTERNAL MEDICINE

## 2022-01-01 PROCEDURE — 80069 RENAL FUNCTION PANEL: CPT | Performed by: STUDENT IN AN ORGANIZED HEALTH CARE EDUCATION/TRAINING PROGRAM

## 2022-01-01 PROCEDURE — 27201423 OPTIME MED/SURG SUP & DEVICES STERILE SUPPLY: Performed by: SURGERY

## 2022-01-01 PROCEDURE — 37253 PR INTRVASC US NON CORONARY, EACH ADDL VESSEL, S&I, ADD ON CODE: ICD-10-PCS | Mod: ,,, | Performed by: INTERNAL MEDICINE

## 2022-01-01 PROCEDURE — 37229 PR TIB/PER REVASC W/ATHERECTOMY: ICD-10-PCS | Mod: LT,,, | Performed by: INTERNAL MEDICINE

## 2022-01-01 PROCEDURE — 96374 THER/PROPH/DIAG INJ IV PUSH: CPT | Mod: 59 | Performed by: EMERGENCY MEDICINE

## 2022-01-01 PROCEDURE — 99232 PR SUBSEQUENT HOSPITAL CARE,LEVL II: ICD-10-PCS | Mod: ,,, | Performed by: INTERNAL MEDICINE

## 2022-01-01 PROCEDURE — 73630 XR FOOT COMPLETE 3 VIEW RIGHT: ICD-10-PCS | Mod: 26,RT,, | Performed by: RADIOLOGY

## 2022-01-01 PROCEDURE — 97161 PT EVAL LOW COMPLEX 20 MIN: CPT

## 2022-01-01 PROCEDURE — 83735 ASSAY OF MAGNESIUM: CPT | Performed by: INTERNAL MEDICINE

## 2022-01-01 PROCEDURE — 99223 PR INITIAL HOSPITAL CARE,LEVL III: ICD-10-PCS | Mod: ,,, | Performed by: INTERNAL MEDICINE

## 2022-01-01 PROCEDURE — 85730 THROMBOPLASTIN TIME PARTIAL: CPT | Performed by: FAMILY MEDICINE

## 2022-01-01 PROCEDURE — 25000003 PHARM REV CODE 250: Performed by: RADIOLOGY

## 2022-01-01 PROCEDURE — 73630 X-RAY EXAM OF FOOT: CPT | Mod: TC,FY,RT

## 2022-01-01 PROCEDURE — 36903 PR INTRO CATH, DIALYSIS CIRCUIT W/TRANSCATH PLCMNT, STENT: ICD-10-PCS | Mod: 59,51,RT, | Performed by: INTERNAL MEDICINE

## 2022-01-01 PROCEDURE — 96374 THER/PROPH/DIAG INJ IV PUSH: CPT

## 2022-01-01 PROCEDURE — 99999 PR PBB SHADOW E&M-EST. PATIENT-LVL III: CPT | Mod: PBBFAC,,, | Performed by: INTERNAL MEDICINE

## 2022-01-01 PROCEDURE — 99495 TCM SERVICES (MODERATE COMPLEXITY): ICD-10-PCS | Mod: S$GLB,,, | Performed by: NURSE PRACTITIONER

## 2022-01-01 PROCEDURE — 36245 INS CATH ABD/L-EXT ART 1ST: CPT | Mod: RT | Performed by: INTERNAL MEDICINE

## 2022-01-01 PROCEDURE — 99213 OFFICE O/P EST LOW 20 MIN: CPT | Mod: PBBFAC,PN | Performed by: STUDENT IN AN ORGANIZED HEALTH CARE EDUCATION/TRAINING PROGRAM

## 2022-01-01 PROCEDURE — 85025 COMPLETE CBC W/AUTO DIFF WBC: CPT | Mod: 91 | Performed by: INTERNAL MEDICINE

## 2022-01-01 PROCEDURE — 75820 VEIN X-RAY ARM/LEG: CPT | Performed by: INTERNAL MEDICINE

## 2022-01-01 PROCEDURE — 73030 X-RAY EXAM OF SHOULDER: CPT | Mod: 26,RT,, | Performed by: RADIOLOGY

## 2022-01-01 PROCEDURE — 36556 PR INSERT NON-TUNNEL CV CATH 5+ YRS OLD: ICD-10-PCS | Mod: ,,, | Performed by: INTERNAL MEDICINE

## 2022-01-01 PROCEDURE — 37000008 HC ANESTHESIA 1ST 15 MINUTES: Performed by: SURGERY

## 2022-01-01 PROCEDURE — 36902 INTRO CATH DIALYSIS CIRCUIT: CPT | Mod: ,,, | Performed by: INTERNAL MEDICINE

## 2022-01-01 PROCEDURE — 85025 COMPLETE CBC W/AUTO DIFF WBC: CPT | Mod: 91 | Performed by: NURSE PRACTITIONER

## 2022-01-01 PROCEDURE — 25000003 PHARM REV CODE 250: Performed by: EMERGENCY MEDICINE

## 2022-01-01 PROCEDURE — 99999 PR PBB SHADOW E&M-EST. PATIENT-LVL IV: CPT | Mod: PBBFAC,,, | Performed by: ORTHOPAEDIC SURGERY

## 2022-01-01 PROCEDURE — 93010 EKG 12-LEAD: ICD-10-PCS | Mod: 76,,, | Performed by: INTERNAL MEDICINE

## 2022-01-01 PROCEDURE — 80069 RENAL FUNCTION PANEL: CPT | Performed by: NURSE PRACTITIONER

## 2022-01-01 PROCEDURE — 93296 REM INTERROG EVL PM/IDS: CPT | Performed by: INTERNAL MEDICINE

## 2022-01-01 PROCEDURE — 63600175 PHARM REV CODE 636 W HCPCS: Mod: JG | Performed by: INTERNAL MEDICINE

## 2022-01-01 PROCEDURE — P9047 ALBUMIN (HUMAN), 25%, 50ML: HCPCS | Mod: JG | Performed by: INTERNAL MEDICINE

## 2022-01-01 PROCEDURE — 99214 OFFICE O/P EST MOD 30 MIN: CPT | Mod: PBBFAC,PN | Performed by: ORTHOPAEDIC SURGERY

## 2022-01-01 PROCEDURE — 85025 COMPLETE CBC W/AUTO DIFF WBC: CPT | Performed by: HOSPITALIST

## 2022-01-01 PROCEDURE — 80069 RENAL FUNCTION PANEL: CPT | Performed by: HOSPITALIST

## 2022-01-01 PROCEDURE — 80202 ASSAY OF VANCOMYCIN: CPT | Performed by: HOSPITALIST

## 2022-01-01 PROCEDURE — 99215 PR OFFICE/OUTPT VISIT, EST, LEVL V, 40-54 MIN: ICD-10-PCS | Mod: S$GLB,,, | Performed by: INTERNAL MEDICINE

## 2022-01-01 PROCEDURE — 99999 PR PBB SHADOW E&M-EST. PATIENT-LVL IV: ICD-10-PCS | Mod: PBBFAC,,, | Performed by: STUDENT IN AN ORGANIZED HEALTH CARE EDUCATION/TRAINING PROGRAM

## 2022-01-01 PROCEDURE — 27201247 HC HEMODIALYSIS, SET-UP & CANCEL

## 2022-01-01 PROCEDURE — 37232 HC TIB/PER REVASC ADD-ON: CPT | Mod: RT | Performed by: INTERNAL MEDICINE

## 2022-01-01 PROCEDURE — 36247 PR PLACE CATH SUBSUBSELECT ART,ABD/PEL: ICD-10-PCS | Mod: RT,,, | Performed by: INTERNAL MEDICINE

## 2022-01-01 PROCEDURE — 36247 INS CATH ABD/L-EXT ART 3RD: CPT | Mod: RT,,, | Performed by: INTERNAL MEDICINE

## 2022-01-01 PROCEDURE — 37228 PR TIB/PER REVASC W/TLA: CPT | Mod: 51,RT,, | Performed by: INTERNAL MEDICINE

## 2022-01-01 PROCEDURE — 99215 OFFICE O/P EST HI 40 MIN: CPT | Mod: S$PBB,,, | Performed by: INTERNAL MEDICINE

## 2022-01-01 PROCEDURE — 37232 PR REVASCULARIZE TIBIAL/PERON ARTERY,ANGIOPLASTY EA ADD: CPT | Mod: RT,,, | Performed by: INTERNAL MEDICINE

## 2022-01-01 PROCEDURE — 63600175 PHARM REV CODE 636 W HCPCS: Mod: JG | Performed by: SURGERY

## 2022-01-01 PROCEDURE — 36903 INTRO CATH DIALYSIS CIRCUIT: CPT | Mod: 59,RT | Performed by: INTERNAL MEDICINE

## 2022-01-01 PROCEDURE — 11721 ROUTINE FOOT CARE: ICD-10-PCS | Mod: 59,Q8,S$GLB, | Performed by: STUDENT IN AN ORGANIZED HEALTH CARE EDUCATION/TRAINING PROGRAM

## 2022-01-01 PROCEDURE — P9016 RBC LEUKOCYTES REDUCED: HCPCS | Performed by: NURSE PRACTITIONER

## 2022-01-01 PROCEDURE — 37248 TRLUML BALO ANGIOP 1ST VEIN: CPT | Mod: RT | Performed by: INTERNAL MEDICINE

## 2022-01-01 PROCEDURE — 84132 ASSAY OF SERUM POTASSIUM: CPT | Performed by: NURSE PRACTITIONER

## 2022-01-01 PROCEDURE — 93990 DOPPLER FLOW TESTING: CPT | Mod: 26,,, | Performed by: INTERNAL MEDICINE

## 2022-01-01 PROCEDURE — 93010 EKG 12-LEAD: ICD-10-PCS | Mod: ,,, | Performed by: INTERNAL MEDICINE

## 2022-01-01 PROCEDURE — 99215 OFFICE O/P EST HI 40 MIN: CPT | Mod: ,,, | Performed by: NURSE PRACTITIONER

## 2022-01-01 PROCEDURE — 73030 X-RAY EXAM OF SHOULDER: CPT | Mod: TC,FY,RT

## 2022-01-01 PROCEDURE — 90935 HEMODIALYSIS ONE EVALUATION: CPT

## 2022-01-01 PROCEDURE — 93922 ANKLE BRACHIAL INDICES (ABI): ICD-10-PCS | Mod: 26,,, | Performed by: INTERNAL MEDICINE

## 2022-01-01 PROCEDURE — 84132 ASSAY OF SERUM POTASSIUM: CPT | Mod: 91 | Performed by: SURGERY

## 2022-01-01 PROCEDURE — 93922 UPR/L XTREMITY ART 2 LEVELS: CPT | Mod: PO

## 2022-01-01 PROCEDURE — 85730 THROMBOPLASTIN TIME PARTIAL: CPT | Mod: 91 | Performed by: FAMILY MEDICINE

## 2022-01-01 PROCEDURE — 99233 SBSQ HOSP IP/OBS HIGH 50: CPT | Mod: ,,, | Performed by: INTERNAL MEDICINE

## 2022-01-01 PROCEDURE — 36415 COLL VENOUS BLD VENIPUNCTURE: CPT | Performed by: FAMILY MEDICINE

## 2022-01-01 PROCEDURE — 37225 HC FEM/POPL REVAS W/ATHER: CPT | Mod: RT | Performed by: INTERNAL MEDICINE

## 2022-01-01 PROCEDURE — 84100 ASSAY OF PHOSPHORUS: CPT | Performed by: EMERGENCY MEDICINE

## 2022-01-01 PROCEDURE — 93005 ELECTROCARDIOGRAM TRACING: CPT | Mod: 59

## 2022-01-01 PROCEDURE — 99999 PR PBB SHADOW E&M-EST. PATIENT-LVL III: ICD-10-PCS | Mod: PBBFAC,,, | Performed by: STUDENT IN AN ORGANIZED HEALTH CARE EDUCATION/TRAINING PROGRAM

## 2022-01-01 PROCEDURE — 86704 HEP B CORE ANTIBODY TOTAL: CPT | Performed by: INTERNAL MEDICINE

## 2022-01-01 PROCEDURE — 87340 HEPATITIS B SURFACE AG IA: CPT | Performed by: INTERNAL MEDICINE

## 2022-01-01 PROCEDURE — 87040 BLOOD CULTURE FOR BACTERIA: CPT | Mod: 59 | Performed by: NURSE PRACTITIONER

## 2022-01-01 PROCEDURE — 93990 US HEMODIALYSIS ACCESS: ICD-10-PCS | Mod: 26,,, | Performed by: INTERNAL MEDICINE

## 2022-01-01 PROCEDURE — 36000707: Performed by: SURGERY

## 2022-01-01 PROCEDURE — 99233 SBSQ HOSP IP/OBS HIGH 50: CPT | Mod: ,,, | Performed by: NURSE PRACTITIONER

## 2022-01-01 PROCEDURE — 36415 COLL VENOUS BLD VENIPUNCTURE: CPT | Performed by: PHYSICIAN ASSISTANT

## 2022-01-01 PROCEDURE — 25000003 PHARM REV CODE 250: Performed by: HOSPITALIST

## 2022-01-01 PROCEDURE — 99214 OFFICE O/P EST MOD 30 MIN: CPT | Mod: S$PBB,,, | Performed by: STUDENT IN AN ORGANIZED HEALTH CARE EDUCATION/TRAINING PROGRAM

## 2022-01-01 PROCEDURE — 99214 OFFICE O/P EST MOD 30 MIN: CPT | Mod: PBBFAC,PN | Performed by: STUDENT IN AN ORGANIZED HEALTH CARE EDUCATION/TRAINING PROGRAM

## 2022-01-01 PROCEDURE — 71000015 HC POSTOP RECOV 1ST HR: Performed by: SURGERY

## 2022-01-01 PROCEDURE — 71000016 HC POSTOP RECOV ADDL HR: Performed by: SURGERY

## 2022-01-01 PROCEDURE — 99215 OFFICE O/P EST HI 40 MIN: CPT | Mod: S$GLB,,, | Performed by: STUDENT IN AN ORGANIZED HEALTH CARE EDUCATION/TRAINING PROGRAM

## 2022-01-01 PROCEDURE — 37229 PR TIB/PER REVASC W/ATHERECTOMY: ICD-10-PCS | Mod: RT,,, | Performed by: INTERNAL MEDICINE

## 2022-01-01 PROCEDURE — 93922 UPR/L XTREMITY ART 2 LEVELS: CPT | Mod: 26,,, | Performed by: INTERNAL MEDICINE

## 2022-01-01 PROCEDURE — 85730 THROMBOPLASTIN TIME PARTIAL: CPT | Performed by: EMERGENCY MEDICINE

## 2022-01-01 PROCEDURE — 37252 INTRVASC US NONCORONARY 1ST: CPT | Performed by: INTERNAL MEDICINE

## 2022-01-01 PROCEDURE — 99999 PR PBB SHADOW E&M-EST. PATIENT-LVL III: ICD-10-PCS | Mod: PBBFAC,,, | Performed by: INTERNAL MEDICINE

## 2022-01-01 PROCEDURE — 37253 INTRVASC US NONCORONARY ADDL: CPT | Performed by: INTERNAL MEDICINE

## 2022-01-01 PROCEDURE — 27201423 OPTIME MED/SURG SUP & DEVICES STERILE SUPPLY: Performed by: INTERNAL MEDICINE

## 2022-01-01 PROCEDURE — C1874 STENT, COATED/COV W/DEL SYS: HCPCS | Performed by: INTERNAL MEDICINE

## 2022-01-01 PROCEDURE — 37229 PR TIB/PER REVASC W/ATHERECTOMY: CPT | Mod: RT,,, | Performed by: INTERNAL MEDICINE

## 2022-01-01 PROCEDURE — 86850 RBC ANTIBODY SCREEN: CPT | Performed by: NURSE PRACTITIONER

## 2022-01-01 PROCEDURE — 93010 ELECTROCARDIOGRAM REPORT: CPT | Mod: 76,,, | Performed by: INTERNAL MEDICINE

## 2022-01-01 PROCEDURE — 94644 CONT INHLJ TX 1ST HOUR: CPT

## 2022-01-01 PROCEDURE — 63600175 PHARM REV CODE 636 W HCPCS: Performed by: HOSPITALIST

## 2022-01-01 PROCEDURE — G0180 MD CERTIFICATION HHA PATIENT: HCPCS | Mod: ,,, | Performed by: STUDENT IN AN ORGANIZED HEALTH CARE EDUCATION/TRAINING PROGRAM

## 2022-01-01 PROCEDURE — 99222 PR INITIAL HOSPITAL CARE,LEVL II: ICD-10-PCS | Mod: ,,, | Performed by: INTERNAL MEDICINE

## 2022-01-01 PROCEDURE — 36430 TRANSFUSION BLD/BLD COMPNT: CPT

## 2022-01-01 PROCEDURE — 97530 THERAPEUTIC ACTIVITIES: CPT

## 2022-01-01 PROCEDURE — 36415 COLL VENOUS BLD VENIPUNCTURE: CPT | Performed by: HOSPITALIST

## 2022-01-01 PROCEDURE — 99215 OFFICE O/P EST HI 40 MIN: CPT | Mod: S$GLB,,, | Performed by: INTERNAL MEDICINE

## 2022-01-01 PROCEDURE — 36905 THRMBC/NFS DIALYSIS CIRCUIT: CPT | Mod: ,,, | Performed by: INTERNAL MEDICINE

## 2022-01-01 PROCEDURE — 80053 COMPREHEN METABOLIC PANEL: CPT | Performed by: NURSE PRACTITIONER

## 2022-01-01 PROCEDURE — 85610 PROTHROMBIN TIME: CPT | Performed by: EMERGENCY MEDICINE

## 2022-01-01 PROCEDURE — 80053 COMPREHEN METABOLIC PANEL: CPT | Performed by: HOSPITALIST

## 2022-01-01 PROCEDURE — 37229 HC TIB/PER REVASC W/ATHER: CPT | Mod: LT | Performed by: INTERNAL MEDICINE

## 2022-01-01 PROCEDURE — 36415 COLL VENOUS BLD VENIPUNCTURE: CPT | Performed by: INTERNAL MEDICINE

## 2022-01-01 PROCEDURE — 85730 THROMBOPLASTIN TIME PARTIAL: CPT | Mod: 91 | Performed by: PHYSICIAN ASSISTANT

## 2022-01-01 PROCEDURE — 99214 PR OFFICE/OUTPT VISIT, EST, LEVL IV, 30-39 MIN: ICD-10-PCS | Mod: 25,S$GLB,, | Performed by: STUDENT IN AN ORGANIZED HEALTH CARE EDUCATION/TRAINING PROGRAM

## 2022-01-01 PROCEDURE — C1884 EMBOLIZATION PROTECT SYST: HCPCS | Performed by: INTERNAL MEDICINE

## 2022-01-01 PROCEDURE — 80048 BASIC METABOLIC PNL TOTAL CA: CPT | Performed by: NURSE PRACTITIONER

## 2022-01-01 PROCEDURE — 71000033 HC RECOVERY, INTIAL HOUR: Performed by: SURGERY

## 2022-01-01 PROCEDURE — 80069 RENAL FUNCTION PANEL: CPT | Performed by: EMERGENCY MEDICINE

## 2022-01-01 PROCEDURE — C1757 CATH, THROMBECTOMY/EMBOLECT: HCPCS | Performed by: INTERNAL MEDICINE

## 2022-01-01 PROCEDURE — 31500 INSERT EMERGENCY AIRWAY: CPT

## 2022-01-01 PROCEDURE — 99999 PR PBB SHADOW E&M-EST. PATIENT-LVL IV: CPT | Mod: PBBFAC,,, | Performed by: STUDENT IN AN ORGANIZED HEALTH CARE EDUCATION/TRAINING PROGRAM

## 2022-01-01 PROCEDURE — 75716 PR  ANGIO EXTERMITY BILAT: ICD-10-PCS | Mod: 26,,, | Performed by: INTERNAL MEDICINE

## 2022-01-01 PROCEDURE — 99999 PR PBB SHADOW E&M-EST. PATIENT-LVL IV: ICD-10-PCS | Mod: PBBFAC,,, | Performed by: ORTHOPAEDIC SURGERY

## 2022-01-01 PROCEDURE — 27000221 HC OXYGEN, UP TO 24 HOURS

## 2022-01-01 PROCEDURE — C1753 CATH, INTRAVAS ULTRASOUND: HCPCS | Performed by: INTERNAL MEDICINE

## 2022-01-01 PROCEDURE — 99214 PR OFFICE/OUTPT VISIT, EST, LEVL IV, 30-39 MIN: ICD-10-PCS | Mod: S$PBB,,, | Performed by: STUDENT IN AN ORGANIZED HEALTH CARE EDUCATION/TRAINING PROGRAM

## 2022-01-01 PROCEDURE — 80202 ASSAY OF VANCOMYCIN: CPT | Performed by: FAMILY MEDICINE

## 2022-01-01 PROCEDURE — 37224 PR FEM/POPL REVAS W/TLA: CPT | Mod: 51,LT,, | Performed by: INTERNAL MEDICINE

## 2022-01-01 PROCEDURE — 36902 PR INTRO CATH, DIALYSIS CIRCUIT W/TRANSLML BALLOON ANGIO: ICD-10-PCS | Mod: ,,, | Performed by: INTERNAL MEDICINE

## 2022-01-01 PROCEDURE — 99203 OFFICE O/P NEW LOW 30 MIN: CPT | Mod: S$GLB,,, | Performed by: ORTHOPAEDIC SURGERY

## 2022-01-01 PROCEDURE — 85610 PROTHROMBIN TIME: CPT | Performed by: PHYSICIAN ASSISTANT

## 2022-01-01 PROCEDURE — 36902 INTRO CATH DIALYSIS CIRCUIT: CPT | Performed by: INTERNAL MEDICINE

## 2022-01-01 PROCEDURE — 36415 COLL VENOUS BLD VENIPUNCTURE: CPT | Performed by: EMERGENCY MEDICINE

## 2022-01-01 PROCEDURE — 11055 PARING/CUTG B9 HYPRKER LES 1: CPT | Mod: PBBFAC,PN | Performed by: STUDENT IN AN ORGANIZED HEALTH CARE EDUCATION/TRAINING PROGRAM

## 2022-01-01 PROCEDURE — 99285 EMERGENCY DEPT VISIT HI MDM: CPT | Mod: 25

## 2022-01-01 PROCEDURE — 37249 TRLUML BALO ANGIOP ADDL VEIN: CPT | Mod: RT,,, | Performed by: INTERNAL MEDICINE

## 2022-01-01 PROCEDURE — 99213 OFFICE O/P EST LOW 20 MIN: CPT | Mod: PBBFAC,25,PO | Performed by: INTERNAL MEDICINE

## 2022-01-01 PROCEDURE — 93295 CARDIAC DEVICE CHECK - REMOTE: ICD-10-PCS | Mod: ,,, | Performed by: INTERNAL MEDICINE

## 2022-01-01 PROCEDURE — 99495 TRANSJ CARE MGMT MOD F2F 14D: CPT | Mod: S$GLB,,, | Performed by: NURSE PRACTITIONER

## 2022-01-01 PROCEDURE — 75710 ARTERY X-RAYS ARM/LEG: CPT | Mod: 26,59,LT, | Performed by: INTERNAL MEDICINE

## 2022-01-01 PROCEDURE — 92950 HEART/LUNG RESUSCITATION CPR: CPT

## 2022-01-01 PROCEDURE — C1773 RET DEV, INSERTABLE: HCPCS | Performed by: INTERNAL MEDICINE

## 2022-01-01 PROCEDURE — 36903 INTRO CATH DIALYSIS CIRCUIT: CPT | Mod: 59,51,RT, | Performed by: INTERNAL MEDICINE

## 2022-01-01 PROCEDURE — 99499 NO LOS: ICD-10-PCS | Mod: ,,, | Performed by: INTERNAL MEDICINE

## 2022-01-01 PROCEDURE — C1768 GRAFT, VASCULAR: HCPCS | Performed by: SURGERY

## 2022-01-01 PROCEDURE — 99203 PR OFFICE/OUTPT VISIT, NEW, LEVL III, 30-44 MIN: ICD-10-PCS | Mod: S$GLB,,, | Performed by: ORTHOPAEDIC SURGERY

## 2022-01-01 PROCEDURE — 93295 DEV INTERROG REMOTE 1/2/MLT: CPT | Mod: ,,, | Performed by: INTERNAL MEDICINE

## 2022-01-01 PROCEDURE — 86920 COMPATIBILITY TEST SPIN: CPT | Performed by: INTERNAL MEDICINE

## 2022-01-01 PROCEDURE — 80048 BASIC METABOLIC PNL TOTAL CA: CPT | Mod: XB | Performed by: EMERGENCY MEDICINE

## 2022-01-01 PROCEDURE — 75716 ARTERY X-RAYS ARMS/LEGS: CPT | Mod: 26,,, | Performed by: INTERNAL MEDICINE

## 2022-01-01 PROCEDURE — 99223 1ST HOSP IP/OBS HIGH 75: CPT | Mod: ,,, | Performed by: INTERNAL MEDICINE

## 2022-01-01 PROCEDURE — 83735 ASSAY OF MAGNESIUM: CPT | Performed by: EMERGENCY MEDICINE

## 2022-01-01 PROCEDURE — 63600175 PHARM REV CODE 636 W HCPCS: Mod: JG | Performed by: HOSPITALIST

## 2022-01-01 PROCEDURE — C1876 STENT, NON-COA/NON-COV W/DEL: HCPCS | Performed by: INTERNAL MEDICINE

## 2022-01-01 PROCEDURE — 75716 ARTERY X-RAYS ARMS/LEGS: CPT | Performed by: INTERNAL MEDICINE

## 2022-01-01 PROCEDURE — 37249 PR TRANSLML BALLOON ANGIO, OPEN/PERC, EA ADDTL VEIN: ICD-10-PCS | Mod: RT,,, | Performed by: INTERNAL MEDICINE

## 2022-01-01 PROCEDURE — 93990 DOPPLER FLOW TESTING: CPT | Mod: TC

## 2022-01-01 PROCEDURE — 93925 LOWER EXTREMITY STUDY: CPT | Mod: TC,PO

## 2022-01-01 PROCEDURE — 83605 ASSAY OF LACTIC ACID: CPT | Performed by: HOSPITALIST

## 2022-01-01 PROCEDURE — 83735 ASSAY OF MAGNESIUM: CPT | Performed by: PHYSICIAN ASSISTANT

## 2022-01-01 PROCEDURE — 86901 BLOOD TYPING SEROLOGIC RH(D): CPT | Performed by: NURSE PRACTITIONER

## 2022-01-01 PROCEDURE — 36415 COLL VENOUS BLD VENIPUNCTURE: CPT | Performed by: STUDENT IN AN ORGANIZED HEALTH CARE EDUCATION/TRAINING PROGRAM

## 2022-01-01 PROCEDURE — 80069 RENAL FUNCTION PANEL: CPT | Performed by: INTERNAL MEDICINE

## 2022-01-01 PROCEDURE — 99499 UNLISTED E&M SERVICE: CPT | Mod: ,,, | Performed by: INTERNAL MEDICINE

## 2022-01-01 PROCEDURE — P9021 RED BLOOD CELLS UNIT: HCPCS | Performed by: INTERNAL MEDICINE

## 2022-01-01 PROCEDURE — 99215 PR OFFICE/OUTPT VISIT, EST, LEVL V, 40-54 MIN: ICD-10-PCS | Mod: ,,, | Performed by: NURSE PRACTITIONER

## 2022-01-01 PROCEDURE — 11055 ROUTINE FOOT CARE: ICD-10-PCS | Mod: Q8,S$GLB,, | Performed by: STUDENT IN AN ORGANIZED HEALTH CARE EDUCATION/TRAINING PROGRAM

## 2022-01-01 PROCEDURE — 99222 1ST HOSP IP/OBS MODERATE 55: CPT | Mod: ,,, | Performed by: INTERNAL MEDICINE

## 2022-01-01 PROCEDURE — 86706 HEP B SURFACE ANTIBODY: CPT | Performed by: INTERNAL MEDICINE

## 2022-01-01 PROCEDURE — 37248 TRLUML BALO ANGIOP 1ST VEIN: CPT | Mod: 51,RT,, | Performed by: INTERNAL MEDICINE

## 2022-01-01 PROCEDURE — 99222 1ST HOSP IP/OBS MODERATE 55: CPT | Mod: ,,, | Performed by: PODIATRIST

## 2022-01-01 PROCEDURE — 77001 FLUOROGUIDE FOR VEIN DEVICE: CPT | Mod: 26,,, | Performed by: INTERNAL MEDICINE

## 2022-01-01 PROCEDURE — 37229 HC TIB/PER REVASC W/ATHER: CPT | Mod: RT | Performed by: INTERNAL MEDICINE

## 2022-01-01 PROCEDURE — 83036 HEMOGLOBIN GLYCOSYLATED A1C: CPT | Performed by: PHYSICIAN ASSISTANT

## 2022-01-01 PROCEDURE — 85730 THROMBOPLASTIN TIME PARTIAL: CPT | Performed by: PHYSICIAN ASSISTANT

## 2022-01-01 PROCEDURE — 11721 DEBRIDE NAIL 6 OR MORE: CPT | Mod: 59,Q8,S$GLB, | Performed by: STUDENT IN AN ORGANIZED HEALTH CARE EDUCATION/TRAINING PROGRAM

## 2022-01-01 PROCEDURE — 75710 PR  ANGIO EXTREMITY UNILAT: ICD-10-PCS | Mod: 26,59,LT, | Performed by: INTERNAL MEDICINE

## 2022-01-01 PROCEDURE — 73030 XR SHOULDER COMPLETE 2 OR MORE VIEWS RIGHT: ICD-10-PCS | Mod: 26,RT,, | Performed by: RADIOLOGY

## 2022-01-01 PROCEDURE — 99215 PR OFFICE/OUTPT VISIT, EST, LEVL V, 40-54 MIN: ICD-10-PCS | Mod: S$PBB,,, | Performed by: INTERNAL MEDICINE

## 2022-01-01 PROCEDURE — 99213 OFFICE O/P EST LOW 20 MIN: CPT | Mod: PBBFAC,PO | Performed by: INTERNAL MEDICINE

## 2022-01-01 PROCEDURE — C2628 CATHETER, OCCLUSION: HCPCS | Performed by: INTERNAL MEDICINE

## 2022-01-01 PROCEDURE — 83605 ASSAY OF LACTIC ACID: CPT | Performed by: INTERNAL MEDICINE

## 2022-01-01 PROCEDURE — 94640 AIRWAY INHALATION TREATMENT: CPT | Mod: XB

## 2022-01-01 PROCEDURE — 99215 PR OFFICE/OUTPT VISIT, EST, LEVL V, 40-54 MIN: ICD-10-PCS | Mod: S$GLB,,, | Performed by: STUDENT IN AN ORGANIZED HEALTH CARE EDUCATION/TRAINING PROGRAM

## 2022-01-01 PROCEDURE — 36905 PR MECH THROMBECTOMY/INFUS, DIALYSIS CIRCUIT W/ TRANSLML BALLOON ANGIO: ICD-10-PCS | Mod: ,,, | Performed by: INTERNAL MEDICINE

## 2022-01-01 PROCEDURE — 99222 PR INITIAL HOSPITAL CARE,LEVL II: ICD-10-PCS | Mod: ,,, | Performed by: PODIATRIST

## 2022-01-01 PROCEDURE — 94645 CONT INHLJ TX EACH ADDL HOUR: CPT

## 2022-01-01 PROCEDURE — 37228 PR TIB/PER REVASC W/TLA: ICD-10-PCS | Mod: 51,RT,, | Performed by: INTERNAL MEDICINE

## 2022-01-01 PROCEDURE — 87040 BLOOD CULTURE FOR BACTERIA: CPT | Mod: 59 | Performed by: INTERNAL MEDICINE

## 2022-01-01 PROCEDURE — 86920 COMPATIBILITY TEST SPIN: CPT | Performed by: NURSE PRACTITIONER

## 2022-01-01 PROCEDURE — 85730 THROMBOPLASTIN TIME PARTIAL: CPT | Performed by: HOSPITALIST

## 2022-01-01 PROCEDURE — 37229 PR TIB/PER REVASC W/ATHERECTOMY: CPT | Mod: LT,,, | Performed by: INTERNAL MEDICINE

## 2022-01-01 DEVICE — ZILVER PTX, DRUG-ELUTING PERIPHERAL STENT
Type: IMPLANTABLE DEVICE | Site: LEG | Status: FUNCTIONAL
Brand: ZILVER PTX

## 2022-01-01 DEVICE — VIABAHN SX ENDO HEPARIN 18 RO 5MMX25CM 6FR120CM CATH
Type: IMPLANTABLE DEVICE | Site: LEG | Status: FUNCTIONAL
Brand: GORE VIABAHN ENDOPROSTHESIS WITH HEPARIN

## 2022-01-01 DEVICE — GRAFT STRETCH 6MMX10CM THINWAL: Type: IMPLANTABLE DEVICE | Site: ARM | Status: FUNCTIONAL

## 2022-01-01 DEVICE — 13FR TRIALYSIS ST 20CM TRAY
Type: IMPLANTABLE DEVICE | Site: LEG | Status: FUNCTIONAL
Brand: POWER-TRIALYSIS CATHETER

## 2022-01-01 DEVICE — IMPLANTABLE DEVICE: Type: IMPLANTABLE DEVICE | Site: ARM | Status: FUNCTIONAL

## 2022-01-01 RX ORDER — MIDAZOLAM HYDROCHLORIDE 1 MG/ML
INJECTION INTRAMUSCULAR; INTRAVENOUS
Status: DISCONTINUED | OUTPATIENT
Start: 2022-01-01 | End: 2022-01-01 | Stop reason: HOSPADM

## 2022-01-01 RX ORDER — GABAPENTIN 300 MG/1
300 CAPSULE ORAL CONTINUOUS PRN
Qty: 90 CAPSULE | Refills: 3 | Status: SHIPPED | OUTPATIENT
Start: 2022-01-01 | End: 2022-01-01 | Stop reason: CLARIF

## 2022-01-01 RX ORDER — SODIUM CHLORIDE 9 MG/ML
INJECTION, SOLUTION INTRAVENOUS ONCE
Status: COMPLETED | OUTPATIENT
Start: 2022-01-01 | End: 2022-01-01

## 2022-01-01 RX ORDER — MIDODRINE HYDROCHLORIDE 5 MG/1
10 TABLET ORAL
Status: DISCONTINUED | OUTPATIENT
Start: 2022-01-01 | End: 2022-01-01 | Stop reason: HOSPADM

## 2022-01-01 RX ORDER — HYDROCODONE BITARTRATE AND ACETAMINOPHEN 500; 5 MG/1; MG/1
TABLET ORAL
Status: DISCONTINUED | OUTPATIENT
Start: 2022-01-01 | End: 2022-01-01 | Stop reason: HOSPADM

## 2022-01-01 RX ORDER — VERAPAMIL HYDROCHLORIDE 2.5 MG/ML
INJECTION, SOLUTION INTRAVENOUS
Status: DISCONTINUED | OUTPATIENT
Start: 2022-01-01 | End: 2022-01-01 | Stop reason: HOSPADM

## 2022-01-01 RX ORDER — SODIUM CITRATE AND CITRIC ACID MONOHYDRATE 334; 500 MG/5ML; MG/5ML
30 SOLUTION ORAL ONCE
Status: COMPLETED | OUTPATIENT
Start: 2022-01-01 | End: 2022-01-01

## 2022-01-01 RX ORDER — LIDOCAINE HYDROCHLORIDE 10 MG/ML
INJECTION INFILTRATION; PERINEURAL
Status: DISCONTINUED | OUTPATIENT
Start: 2022-01-01 | End: 2022-01-01 | Stop reason: HOSPADM

## 2022-01-01 RX ORDER — NAPROXEN SODIUM 220 MG/1
81 TABLET, FILM COATED ORAL DAILY
Status: DISCONTINUED | OUTPATIENT
Start: 2022-01-01 | End: 2022-01-01

## 2022-01-01 RX ORDER — LANOLIN ALCOHOL/MO/W.PET/CERES
800 CREAM (GRAM) TOPICAL
Status: DISCONTINUED | OUTPATIENT
Start: 2022-01-01 | End: 2022-01-01

## 2022-01-01 RX ORDER — PROPOFOL 10 MG/ML
INJECTION, EMULSION INTRAVENOUS CONTINUOUS PRN
Status: DISCONTINUED | OUTPATIENT
Start: 2022-01-01 | End: 2022-01-01

## 2022-01-01 RX ORDER — PHENYLEPHRINE HYDROCHLORIDE 10 MG/ML
INJECTION INTRAVENOUS
Status: DISCONTINUED | OUTPATIENT
Start: 2022-01-01 | End: 2022-01-01

## 2022-01-01 RX ORDER — SODIUM CHLORIDE 9 MG/ML
INJECTION, SOLUTION INTRAVENOUS CONTINUOUS
Status: CANCELLED | OUTPATIENT
Start: 2022-01-01 | End: 2022-01-01

## 2022-01-01 RX ORDER — ETOMIDATE 2 MG/ML
INJECTION INTRAVENOUS
Status: DISCONTINUED | OUTPATIENT
Start: 2022-01-01 | End: 2022-01-01

## 2022-01-01 RX ORDER — FENTANYL CITRATE 50 UG/ML
INJECTION, SOLUTION INTRAMUSCULAR; INTRAVENOUS
Status: DISCONTINUED | OUTPATIENT
Start: 2022-01-01 | End: 2022-01-01 | Stop reason: HOSPADM

## 2022-01-01 RX ORDER — CLOPIDOGREL BISULFATE 75 MG/1
75 TABLET ORAL DAILY
Status: DISCONTINUED | OUTPATIENT
Start: 2022-01-01 | End: 2022-01-01 | Stop reason: HOSPADM

## 2022-01-01 RX ORDER — ACETAMINOPHEN 500 MG
500 TABLET ORAL EVERY 6 HOURS PRN
COMMUNITY
End: 2022-01-01

## 2022-01-01 RX ORDER — HEPARIN SODIUM,PORCINE/D5W 25000/250
0-40 INTRAVENOUS SOLUTION INTRAVENOUS CONTINUOUS
Status: DISCONTINUED | OUTPATIENT
Start: 2022-01-01 | End: 2022-01-01

## 2022-01-01 RX ORDER — MUPIROCIN 20 MG/G
OINTMENT TOPICAL 2 TIMES DAILY
Status: DISCONTINUED | OUTPATIENT
Start: 2022-01-01 | End: 2022-01-01

## 2022-01-01 RX ORDER — FUROSEMIDE 10 MG/ML
80 INJECTION INTRAMUSCULAR; INTRAVENOUS ONCE
Status: COMPLETED | OUTPATIENT
Start: 2022-01-01 | End: 2022-01-01

## 2022-01-01 RX ORDER — TALC
6 POWDER (GRAM) TOPICAL NIGHTLY PRN
Status: DISCONTINUED | OUTPATIENT
Start: 2022-01-01 | End: 2022-01-01 | Stop reason: HOSPADM

## 2022-01-01 RX ORDER — ENOXAPARIN SODIUM 150 MG/ML
1 INJECTION SUBCUTANEOUS
Status: DISCONTINUED | OUTPATIENT
Start: 2022-01-01 | End: 2022-01-01 | Stop reason: HOSPADM

## 2022-01-01 RX ORDER — SODIUM CHLORIDE 9 MG/ML
INJECTION, SOLUTION INTRAVENOUS ONCE
Status: DISCONTINUED | OUTPATIENT
Start: 2022-01-01 | End: 2022-01-01

## 2022-01-01 RX ORDER — IODIXANOL 320 MG/ML
INJECTION, SOLUTION INTRAVASCULAR
Status: DISCONTINUED | OUTPATIENT
Start: 2022-01-01 | End: 2022-01-01 | Stop reason: HOSPADM

## 2022-01-01 RX ORDER — MAGNESIUM SULFATE HEPTAHYDRATE 40 MG/ML
2 INJECTION, SOLUTION INTRAVENOUS ONCE
Status: DISCONTINUED | OUTPATIENT
Start: 2022-01-01 | End: 2022-01-01 | Stop reason: SDUPTHER

## 2022-01-01 RX ORDER — MUPIROCIN 20 MG/G
OINTMENT TOPICAL
Status: DISCONTINUED | OUTPATIENT
Start: 2022-01-01 | End: 2022-01-01 | Stop reason: HOSPADM

## 2022-01-01 RX ORDER — PROPOFOL 10 MG/ML
VIAL (ML) INTRAVENOUS CONTINUOUS PRN
Status: DISCONTINUED | OUTPATIENT
Start: 2022-01-01 | End: 2022-01-01

## 2022-01-01 RX ORDER — MORPHINE SULFATE 4 MG/ML
4 INJECTION, SOLUTION INTRAMUSCULAR; INTRAVENOUS
Status: COMPLETED | OUTPATIENT
Start: 2022-01-01 | End: 2022-01-01

## 2022-01-01 RX ORDER — GABAPENTIN 300 MG/1
300 CAPSULE ORAL ONCE
Status: COMPLETED | OUTPATIENT
Start: 2022-01-01 | End: 2022-01-01

## 2022-01-01 RX ORDER — HEPARIN SODIUM 200 [USP'U]/100ML
INJECTION, SOLUTION INTRAVENOUS
Status: DISCONTINUED | OUTPATIENT
Start: 2022-01-01 | End: 2022-01-01 | Stop reason: HOSPADM

## 2022-01-01 RX ORDER — SODIUM CHLORIDE 0.9 % (FLUSH) 0.9 %
10 SYRINGE (ML) INJECTION EVERY 12 HOURS PRN
Status: DISCONTINUED | OUTPATIENT
Start: 2022-01-01 | End: 2022-01-01 | Stop reason: HOSPADM

## 2022-01-01 RX ORDER — HEPARIN SODIUM 1000 [USP'U]/ML
INJECTION, SOLUTION INTRAVENOUS; SUBCUTANEOUS
Status: DISCONTINUED | OUTPATIENT
Start: 2022-01-01 | End: 2022-01-01 | Stop reason: HOSPADM

## 2022-01-01 RX ORDER — MIDAZOLAM HYDROCHLORIDE 1 MG/ML
INJECTION, SOLUTION INTRAMUSCULAR; INTRAVENOUS
Status: DISCONTINUED | OUTPATIENT
Start: 2022-01-01 | End: 2022-01-01 | Stop reason: HOSPADM

## 2022-01-01 RX ORDER — CEFAZOLIN SODIUM 1 G/3ML
INJECTION, POWDER, FOR SOLUTION INTRAMUSCULAR; INTRAVENOUS
Status: DISCONTINUED | OUTPATIENT
Start: 2022-01-01 | End: 2022-01-01

## 2022-01-01 RX ORDER — SODIUM CHLORIDE 9 MG/ML
INJECTION, SOLUTION INTRAVENOUS
Status: DISCONTINUED | OUTPATIENT
Start: 2022-01-01 | End: 2022-01-01 | Stop reason: HOSPADM

## 2022-01-01 RX ORDER — SODIUM CITRATE AND CITRIC ACID MONOHYDRATE 334; 500 MG/5ML; MG/5ML
30 SOLUTION ORAL ONCE
Status: DISCONTINUED | OUTPATIENT
Start: 2022-01-01 | End: 2022-01-01 | Stop reason: HOSPADM

## 2022-01-01 RX ORDER — MUPIROCIN 20 MG/G
OINTMENT TOPICAL 2 TIMES DAILY
Status: DISPENSED | OUTPATIENT
Start: 2022-01-01 | End: 2022-01-01

## 2022-01-01 RX ORDER — ALBUMIN HUMAN 250 G/1000ML
12.5 SOLUTION INTRAVENOUS ONCE
Status: COMPLETED | OUTPATIENT
Start: 2022-01-01 | End: 2022-01-01

## 2022-01-01 RX ORDER — DIPHENHYDRAMINE HCL 25 MG
50 CAPSULE ORAL ONCE
Status: CANCELLED | OUTPATIENT
Start: 2022-01-01 | End: 2022-01-01

## 2022-01-01 RX ORDER — LIDOCAINE HCL/PF 100 MG/5ML
SYRINGE (ML) INTRAVENOUS
Status: DISCONTINUED | OUTPATIENT
Start: 2022-01-01 | End: 2022-01-01

## 2022-01-01 RX ORDER — HYDROCODONE BITARTRATE AND ACETAMINOPHEN 5; 325 MG/1; MG/1
1 TABLET ORAL EVERY 6 HOURS PRN
Qty: 28 TABLET | Refills: 0 | Status: SHIPPED | OUTPATIENT
Start: 2022-01-01

## 2022-01-01 RX ORDER — CALCIUM GLUCONATE 20 MG/ML
1 INJECTION, SOLUTION INTRAVENOUS ONCE
Status: COMPLETED | OUTPATIENT
Start: 2022-01-01 | End: 2022-01-01

## 2022-01-01 RX ORDER — DIPHENHYDRAMINE HCL 50 MG
50 CAPSULE ORAL ONCE
Status: DISCONTINUED | OUTPATIENT
Start: 2022-01-01 | End: 2022-01-01 | Stop reason: SDUPTHER

## 2022-01-01 RX ORDER — OXYCODONE HYDROCHLORIDE 5 MG/1
5 TABLET ORAL
Status: DISCONTINUED | OUTPATIENT
Start: 2022-01-01 | End: 2022-01-01 | Stop reason: HOSPADM

## 2022-01-01 RX ORDER — DEXTROSE 50 % IN WATER (D50W) INTRAVENOUS SYRINGE
12.5
Status: DISCONTINUED | OUTPATIENT
Start: 2022-01-01 | End: 2022-01-01

## 2022-01-01 RX ORDER — IBUPROFEN 200 MG
24 TABLET ORAL
Status: DISCONTINUED | OUTPATIENT
Start: 2022-01-01 | End: 2022-01-01 | Stop reason: HOSPADM

## 2022-01-01 RX ORDER — CARVEDILOL 3.12 MG/1
3.12 TABLET ORAL 2 TIMES DAILY
Status: DISCONTINUED | OUTPATIENT
Start: 2022-01-01 | End: 2022-01-01

## 2022-01-01 RX ORDER — SUCCINYLCHOLINE CHLORIDE 20 MG/ML
INJECTION INTRAMUSCULAR; INTRAVENOUS
Status: DISCONTINUED
Start: 2022-01-01 | End: 2022-01-01 | Stop reason: WASHOUT

## 2022-01-01 RX ORDER — ONDANSETRON 4 MG/1
8 TABLET, ORALLY DISINTEGRATING ORAL EVERY 8 HOURS PRN
Status: DISCONTINUED | OUTPATIENT
Start: 2022-01-01 | End: 2022-01-01

## 2022-01-01 RX ORDER — PROPOFOL 10 MG/ML
INJECTION, EMULSION INTRAVENOUS
Status: DISCONTINUED
Start: 2022-01-01 | End: 2022-01-01 | Stop reason: WASHOUT

## 2022-01-01 RX ORDER — FUROSEMIDE 10 MG/ML
80 INJECTION INTRAMUSCULAR; INTRAVENOUS ONCE
Status: DISCONTINUED | OUTPATIENT
Start: 2022-01-01 | End: 2022-01-01

## 2022-01-01 RX ORDER — HYDROCODONE BITARTRATE AND ACETAMINOPHEN 5; 325 MG/1; MG/1
1 TABLET ORAL EVERY 6 HOURS PRN
Qty: 28 TABLET | Refills: 0 | Status: SHIPPED | OUTPATIENT
Start: 2022-01-01 | End: 2022-01-01

## 2022-01-01 RX ORDER — MORPHINE SULFATE 2 MG/ML
1 INJECTION, SOLUTION INTRAMUSCULAR; INTRAVENOUS ONCE
Status: DISCONTINUED | OUTPATIENT
Start: 2022-01-01 | End: 2022-01-01

## 2022-01-01 RX ORDER — ONDANSETRON 2 MG/ML
4 INJECTION INTRAMUSCULAR; INTRAVENOUS DAILY PRN
Status: DISCONTINUED | OUTPATIENT
Start: 2022-01-01 | End: 2022-01-01 | Stop reason: HOSPADM

## 2022-01-01 RX ORDER — LIDOCAINE HYDROCHLORIDE 10 MG/ML
INJECTION, SOLUTION EPIDURAL; INFILTRATION; INTRACAUDAL; PERINEURAL
Status: DISCONTINUED | OUTPATIENT
Start: 2022-01-01 | End: 2022-01-01 | Stop reason: HOSPADM

## 2022-01-01 RX ORDER — ACETAMINOPHEN 325 MG/1
650 TABLET ORAL EVERY 4 HOURS PRN
Status: DISCONTINUED | OUTPATIENT
Start: 2022-01-01 | End: 2022-01-01 | Stop reason: HOSPADM

## 2022-01-01 RX ORDER — HYDROCODONE BITARTRATE AND ACETAMINOPHEN 5; 325 MG/1; MG/1
1 TABLET ORAL EVERY 8 HOURS PRN
Qty: 21 TABLET | Refills: 0 | Status: SHIPPED | OUTPATIENT
Start: 2022-01-01 | End: 2022-01-01 | Stop reason: SDUPTHER

## 2022-01-01 RX ORDER — SODIUM BICARBONATE 650 MG/1
650 TABLET ORAL ONCE
Status: COMPLETED | OUTPATIENT
Start: 2022-01-01 | End: 2022-01-01

## 2022-01-01 RX ORDER — SODIUM CHLORIDE 0.9 % (FLUSH) 0.9 %
10 SYRINGE (ML) INJECTION
Status: DISCONTINUED | OUTPATIENT
Start: 2022-01-01 | End: 2022-01-01 | Stop reason: HOSPADM

## 2022-01-01 RX ORDER — FUROSEMIDE 10 MG/ML
80 INJECTION INTRAMUSCULAR; INTRAVENOUS ONCE
Status: DISCONTINUED | OUTPATIENT
Start: 2022-01-01 | End: 2022-01-01 | Stop reason: HOSPADM

## 2022-01-01 RX ORDER — FUROSEMIDE 10 MG/ML
80 INJECTION INTRAMUSCULAR; INTRAVENOUS ONCE
Status: DISCONTINUED | OUTPATIENT
Start: 2022-01-01 | End: 2022-01-01 | Stop reason: SDUPTHER

## 2022-01-01 RX ORDER — DOCUSATE SODIUM 100 MG/1
100 CAPSULE, LIQUID FILLED ORAL 2 TIMES DAILY
Status: DISCONTINUED | OUTPATIENT
Start: 2022-01-01 | End: 2022-01-01 | Stop reason: HOSPADM

## 2022-01-01 RX ORDER — HYDROCODONE BITARTRATE AND ACETAMINOPHEN 5; 325 MG/1; MG/1
1 TABLET ORAL EVERY 6 HOURS PRN
Qty: 20 TABLET | Refills: 0 | Status: SHIPPED | OUTPATIENT
Start: 2022-01-01 | End: 2022-01-01

## 2022-01-01 RX ORDER — LOSARTAN POTASSIUM 25 MG/1
25 TABLET ORAL DAILY
COMMUNITY
End: 2022-01-01

## 2022-01-01 RX ORDER — ONDANSETRON 2 MG/ML
4 INJECTION INTRAMUSCULAR; INTRAVENOUS EVERY 8 HOURS PRN
Status: DISCONTINUED | OUTPATIENT
Start: 2022-01-01 | End: 2022-01-01 | Stop reason: HOSPADM

## 2022-01-01 RX ORDER — ALBUTEROL SULFATE 2.5 MG/.5ML
10 SOLUTION RESPIRATORY (INHALATION) ONCE
Status: COMPLETED | OUTPATIENT
Start: 2022-01-01 | End: 2022-01-01

## 2022-01-01 RX ORDER — MUPIROCIN 20 MG/G
OINTMENT TOPICAL
Status: CANCELLED | OUTPATIENT
Start: 2022-01-01

## 2022-01-01 RX ORDER — SEVELAMER CARBONATE 800 MG/1
800 TABLET, FILM COATED ORAL ONCE
Status: DISCONTINUED | OUTPATIENT
Start: 2022-01-01 | End: 2022-01-01 | Stop reason: HOSPADM

## 2022-01-01 RX ORDER — HYDROCODONE BITARTRATE AND ACETAMINOPHEN 7.5; 325 MG/1; MG/1
1 TABLET ORAL EVERY 4 HOURS PRN
Status: DISCONTINUED | OUTPATIENT
Start: 2022-01-01 | End: 2022-01-01

## 2022-01-01 RX ORDER — KETOROLAC TROMETHAMINE 30 MG/ML
30 INJECTION, SOLUTION INTRAMUSCULAR; INTRAVENOUS ONCE
Status: COMPLETED | OUTPATIENT
Start: 2022-01-01 | End: 2022-01-01

## 2022-01-01 RX ORDER — MAG HYDROX/ALUMINUM HYD/SIMETH 200-200-20
30 SUSPENSION, ORAL (FINAL DOSE FORM) ORAL 4 TIMES DAILY PRN
Status: DISCONTINUED | OUTPATIENT
Start: 2022-01-01 | End: 2022-01-01 | Stop reason: HOSPADM

## 2022-01-01 RX ORDER — CEFAZOLIN SODIUM 1 G/3ML
INJECTION, POWDER, FOR SOLUTION INTRAMUSCULAR; INTRAVENOUS
Status: DISCONTINUED | OUTPATIENT
Start: 2022-01-01 | End: 2022-01-01 | Stop reason: HOSPADM

## 2022-01-01 RX ORDER — ALBUTEROL SULFATE 2.5 MG/.5ML
5 SOLUTION RESPIRATORY (INHALATION) ONCE
Status: COMPLETED | OUTPATIENT
Start: 2022-01-01 | End: 2022-01-01

## 2022-01-01 RX ORDER — LIDOCAINE 50 MG/G
1 PATCH TOPICAL
Status: DISCONTINUED | OUTPATIENT
Start: 2022-01-01 | End: 2022-01-01 | Stop reason: HOSPADM

## 2022-01-01 RX ORDER — ATORVASTATIN CALCIUM 40 MG/1
80 TABLET, FILM COATED ORAL DAILY
Status: DISCONTINUED | OUTPATIENT
Start: 2022-01-01 | End: 2022-01-01 | Stop reason: HOSPADM

## 2022-01-01 RX ORDER — ACETAMINOPHEN 325 MG/1
650 TABLET ORAL EVERY 4 HOURS PRN
Status: CANCELLED | OUTPATIENT
Start: 2022-01-01

## 2022-01-01 RX ORDER — GLUCAGON 1 MG
1 KIT INJECTION
Status: DISCONTINUED | OUTPATIENT
Start: 2022-01-01 | End: 2022-01-01 | Stop reason: HOSPADM

## 2022-01-01 RX ORDER — HYDROMORPHONE HYDROCHLORIDE 2 MG/ML
0.5 INJECTION, SOLUTION INTRAMUSCULAR; INTRAVENOUS; SUBCUTANEOUS EVERY 5 MIN PRN
Status: DISCONTINUED | OUTPATIENT
Start: 2022-01-01 | End: 2022-01-01 | Stop reason: HOSPADM

## 2022-01-01 RX ORDER — MAGNESIUM SULFATE HEPTAHYDRATE 40 MG/ML
2 INJECTION, SOLUTION INTRAVENOUS ONCE
Status: COMPLETED | OUTPATIENT
Start: 2022-01-01 | End: 2022-01-01

## 2022-01-01 RX ORDER — HEPARIN SODIUM 10000 [USP'U]/ML
INJECTION, SOLUTION INTRAVENOUS; SUBCUTANEOUS
Status: DISCONTINUED | OUTPATIENT
Start: 2022-01-01 | End: 2022-01-01 | Stop reason: HOSPADM

## 2022-01-01 RX ORDER — INSULIN ASPART 100 [IU]/ML
0-5 INJECTION, SOLUTION INTRAVENOUS; SUBCUTANEOUS EVERY 6 HOURS PRN
Status: DISCONTINUED | OUTPATIENT
Start: 2022-01-01 | End: 2022-01-01

## 2022-01-01 RX ORDER — HYDROCODONE BITARTRATE AND ACETAMINOPHEN 5; 325 MG/1; MG/1
1 TABLET ORAL EVERY 4 HOURS PRN
Status: CANCELLED | OUTPATIENT
Start: 2022-01-01

## 2022-01-01 RX ORDER — HEPARIN SODIUM 1000 [USP'U]/ML
2400 INJECTION, SOLUTION INTRAVENOUS; SUBCUTANEOUS ONCE
Status: COMPLETED | OUTPATIENT
Start: 2022-01-01 | End: 2022-01-01

## 2022-01-01 RX ORDER — ACETAMINOPHEN 325 MG/1
325 TABLET ORAL EVERY 6 HOURS PRN
Qty: 10 TABLET | Refills: 0
Start: 2022-01-01

## 2022-01-01 RX ORDER — ADENOSINE 3 MG/ML
INJECTION, SOLUTION INTRAVENOUS
Status: DISCONTINUED | OUTPATIENT
Start: 2022-01-01 | End: 2022-01-01 | Stop reason: HOSPADM

## 2022-01-01 RX ORDER — ROCURONIUM BROMIDE 10 MG/ML
INJECTION, SOLUTION INTRAVENOUS
Status: DISCONTINUED
Start: 2022-01-01 | End: 2022-01-01 | Stop reason: WASHOUT

## 2022-01-01 RX ORDER — HYDROCODONE BITARTRATE AND ACETAMINOPHEN 5; 325 MG/1; MG/1
1 TABLET ORAL ONCE
Status: COMPLETED | OUTPATIENT
Start: 2022-01-01 | End: 2022-01-01

## 2022-01-01 RX ORDER — SIMETHICONE 80 MG
1 TABLET,CHEWABLE ORAL 4 TIMES DAILY PRN
Status: DISCONTINUED | OUTPATIENT
Start: 2022-01-01 | End: 2022-01-01 | Stop reason: HOSPADM

## 2022-01-01 RX ORDER — SEVELAMER CARBONATE 800 MG/1
2400 TABLET, FILM COATED ORAL
Status: DISCONTINUED | OUTPATIENT
Start: 2022-01-01 | End: 2022-01-01 | Stop reason: HOSPADM

## 2022-01-01 RX ORDER — VANCOMYCIN HYDROCHLORIDE 1 G/20ML
INJECTION, POWDER, LYOPHILIZED, FOR SOLUTION INTRAVENOUS
Status: DISCONTINUED | OUTPATIENT
Start: 2022-01-01 | End: 2022-01-01 | Stop reason: HOSPADM

## 2022-01-01 RX ORDER — SODIUM CHLORIDE 9 MG/ML
INJECTION, SOLUTION INTRAVENOUS
Status: DISCONTINUED | OUTPATIENT
Start: 2022-01-01 | End: 2022-01-01

## 2022-01-01 RX ORDER — FOLIC ACID 1 MG/1
1 TABLET ORAL
COMMUNITY
Start: 2022-01-01

## 2022-01-01 RX ORDER — MIDODRINE HYDROCHLORIDE 5 MG/1
5 TABLET ORAL
Status: DISCONTINUED | OUTPATIENT
Start: 2022-01-01 | End: 2022-01-01

## 2022-01-01 RX ORDER — AMOXICILLIN 250 MG
1 CAPSULE ORAL 2 TIMES DAILY
Status: DISCONTINUED | OUTPATIENT
Start: 2022-01-01 | End: 2022-01-01 | Stop reason: HOSPADM

## 2022-01-01 RX ORDER — INSULIN ASPART 100 [IU]/ML
0-5 INJECTION, SOLUTION INTRAVENOUS; SUBCUTANEOUS
Status: DISCONTINUED | OUTPATIENT
Start: 2022-01-01 | End: 2022-01-01 | Stop reason: HOSPADM

## 2022-01-01 RX ORDER — LIDOCAINE HYDROCHLORIDE 10 MG/ML
INJECTION INFILTRATION; PERINEURAL CODE/TRAUMA/SEDATION MEDICATION
Status: COMPLETED | OUTPATIENT
Start: 2022-01-01 | End: 2022-01-01

## 2022-01-01 RX ORDER — VASOPRESSIN 20 [USP'U]/ML
INJECTION, SOLUTION INTRAMUSCULAR; SUBCUTANEOUS
Status: DISCONTINUED | OUTPATIENT
Start: 2022-01-01 | End: 2022-01-01

## 2022-01-01 RX ORDER — HYDROCODONE BITARTRATE AND ACETAMINOPHEN 5; 325 MG/1; MG/1
1 TABLET ORAL EVERY 6 HOURS PRN
Status: DISCONTINUED | OUTPATIENT
Start: 2022-01-01 | End: 2022-01-01 | Stop reason: HOSPADM

## 2022-01-01 RX ORDER — SODIUM CHLORIDE 9 MG/ML
INJECTION, SOLUTION INTRAVENOUS CONTINUOUS
Status: DISCONTINUED | OUTPATIENT
Start: 2022-01-01 | End: 2022-01-01

## 2022-01-01 RX ORDER — BENZONATATE 100 MG/1
100 CAPSULE ORAL 2 TIMES DAILY PRN
Qty: 60 CAPSULE | Refills: 0 | Status: SHIPPED | OUTPATIENT
Start: 2022-01-01 | End: 2022-01-01

## 2022-01-01 RX ORDER — METOPROLOL TARTRATE 25 MG/1
12.5 TABLET ORAL 2 TIMES DAILY
Status: DISCONTINUED | OUTPATIENT
Start: 2022-01-01 | End: 2022-01-01

## 2022-01-01 RX ORDER — HYDROCODONE BITARTRATE AND ACETAMINOPHEN 10; 325 MG/1; MG/1
1 TABLET ORAL EVERY 4 HOURS PRN
Status: DISCONTINUED | OUTPATIENT
Start: 2022-01-01 | End: 2022-01-01 | Stop reason: HOSPADM

## 2022-01-01 RX ORDER — LIDOCAINE HYDROCHLORIDE AND EPINEPHRINE 15; 5 MG/ML; UG/ML
INJECTION, SOLUTION EPIDURAL
Status: DISCONTINUED | OUTPATIENT
Start: 2022-01-01 | End: 2022-01-01

## 2022-01-01 RX ORDER — INSULIN ASPART 100 [IU]/ML
0-5 INJECTION, SOLUTION INTRAVENOUS; SUBCUTANEOUS EVERY 6 HOURS PRN
Status: DISCONTINUED | OUTPATIENT
Start: 2022-01-01 | End: 2022-01-01 | Stop reason: HOSPADM

## 2022-01-01 RX ORDER — DIPHENHYDRAMINE HCL 50 MG
50 CAPSULE ORAL ONCE
Status: CANCELLED | OUTPATIENT
Start: 2022-01-01 | End: 2022-01-01

## 2022-01-01 RX ORDER — DOXYCYCLINE HYCLATE 100 MG
100 TABLET ORAL 2 TIMES DAILY
Qty: 20 TABLET | Refills: 0 | Status: SHIPPED | OUTPATIENT
Start: 2022-01-01 | End: 2022-01-01

## 2022-01-01 RX ORDER — ETOMIDATE 2 MG/ML
INJECTION INTRAVENOUS
Status: DISCONTINUED
Start: 2022-01-01 | End: 2022-01-01 | Stop reason: WASHOUT

## 2022-01-01 RX ORDER — LIDOCAINE HYDROCHLORIDE 20 MG/ML
INJECTION, SOLUTION EPIDURAL; INFILTRATION; INTRACAUDAL; PERINEURAL
Status: DISCONTINUED | OUTPATIENT
Start: 2022-01-01 | End: 2022-01-01 | Stop reason: HOSPADM

## 2022-01-01 RX ORDER — SODIUM,POTASSIUM PHOSPHATES 280-250MG
2 POWDER IN PACKET (EA) ORAL
Status: DISCONTINUED | OUTPATIENT
Start: 2022-01-01 | End: 2022-01-01

## 2022-01-01 RX ORDER — SODIUM CHLORIDE 9 MG/ML
INJECTION, SOLUTION INTRAVENOUS CONTINUOUS
Status: CANCELLED | OUTPATIENT
Start: 2022-01-01

## 2022-01-01 RX ORDER — CEFAZOLIN SODIUM 1 G/50ML
1 SOLUTION INTRAVENOUS
Status: COMPLETED | OUTPATIENT
Start: 2022-01-01 | End: 2022-01-01

## 2022-01-01 RX ORDER — DIPHENHYDRAMINE HCL 25 MG
50 CAPSULE ORAL ONCE
Status: DISCONTINUED | OUTPATIENT
Start: 2022-01-01 | End: 2022-01-01 | Stop reason: HOSPADM

## 2022-01-01 RX ORDER — SODIUM BICARBONATE 650 MG/1
650 TABLET ORAL 2 TIMES DAILY
Status: DISCONTINUED | OUTPATIENT
Start: 2022-01-01 | End: 2022-01-01 | Stop reason: HOSPADM

## 2022-01-01 RX ORDER — IBUPROFEN 200 MG
16 TABLET ORAL
Status: DISCONTINUED | OUTPATIENT
Start: 2022-01-01 | End: 2022-01-01 | Stop reason: HOSPADM

## 2022-01-01 RX ORDER — HYDROCODONE BITARTRATE AND ACETAMINOPHEN 5; 325 MG/1; MG/1
1 TABLET ORAL EVERY 4 HOURS PRN
Status: DISCONTINUED | OUTPATIENT
Start: 2022-01-01 | End: 2022-01-01

## 2022-01-01 RX ORDER — SODIUM CHLORIDE 9 MG/ML
INJECTION, SOLUTION INTRAVENOUS CONTINUOUS
Status: ACTIVE | OUTPATIENT
Start: 2022-01-01 | End: 2022-01-01

## 2022-01-01 RX ORDER — MORPHINE SULFATE 2 MG/ML
1 INJECTION, SOLUTION INTRAMUSCULAR; INTRAVENOUS EVERY 6 HOURS PRN
Status: DISCONTINUED | OUTPATIENT
Start: 2022-01-01 | End: 2022-01-01 | Stop reason: HOSPADM

## 2022-01-01 RX ORDER — CLOPIDOGREL BISULFATE 75 MG/1
TABLET ORAL
Status: DISCONTINUED | OUTPATIENT
Start: 2022-01-01 | End: 2022-01-01 | Stop reason: HOSPADM

## 2022-01-01 RX ORDER — POVIDONE-IODINE 10 %
SOLUTION, NON-ORAL TOPICAL
Qty: 59 ML | Refills: 0 | Status: SHIPPED | OUTPATIENT
Start: 2022-01-01

## 2022-01-01 RX ORDER — MIDAZOLAM HYDROCHLORIDE 1 MG/ML
INJECTION, SOLUTION INTRAMUSCULAR; INTRAVENOUS
Status: DISCONTINUED | OUTPATIENT
Start: 2022-01-01 | End: 2022-01-01

## 2022-01-01 RX ORDER — ONDANSETRON 8 MG/1
8 TABLET, ORALLY DISINTEGRATING ORAL EVERY 8 HOURS PRN
Status: DISCONTINUED | OUTPATIENT
Start: 2022-01-01 | End: 2022-01-01 | Stop reason: HOSPADM

## 2022-01-01 RX ORDER — SEVELAMER CARBONATE 800 MG/1
800 TABLET, FILM COATED ORAL
Status: DISCONTINUED | OUTPATIENT
Start: 2022-01-01 | End: 2022-01-01 | Stop reason: HOSPADM

## 2022-01-01 RX ORDER — NALOXONE HCL 0.4 MG/ML
0.02 VIAL (ML) INJECTION
Status: DISCONTINUED | OUTPATIENT
Start: 2022-01-01 | End: 2022-01-01 | Stop reason: HOSPADM

## 2022-01-01 RX ORDER — HYDROCODONE BITARTRATE AND ACETAMINOPHEN 5; 325 MG/1; MG/1
1 TABLET ORAL EVERY 4 HOURS PRN
Status: DISCONTINUED | OUTPATIENT
Start: 2022-01-01 | End: 2022-01-01 | Stop reason: HOSPADM

## 2022-01-01 RX ORDER — LIDOCAINE HYDROCHLORIDE 20 MG/ML
INJECTION INTRAVENOUS
Status: DISCONTINUED | OUTPATIENT
Start: 2022-01-01 | End: 2022-01-01

## 2022-01-01 RX ORDER — IPRATROPIUM BROMIDE AND ALBUTEROL SULFATE 2.5; .5 MG/3ML; MG/3ML
3 SOLUTION RESPIRATORY (INHALATION) EVERY 6 HOURS PRN
Status: DISCONTINUED | OUTPATIENT
Start: 2022-01-01 | End: 2022-01-01 | Stop reason: HOSPADM

## 2022-01-01 RX ORDER — HEPARIN SODIUM 1000 [USP'U]/ML
INJECTION, SOLUTION INTRAVENOUS; SUBCUTANEOUS
Status: DISCONTINUED | OUTPATIENT
Start: 2022-01-01 | End: 2022-01-01

## 2022-01-01 RX ORDER — ACETAMINOPHEN 500 MG
500 TABLET ORAL ONCE
Status: DISCONTINUED | OUTPATIENT
Start: 2022-01-01 | End: 2022-01-01 | Stop reason: HOSPADM

## 2022-01-01 RX ADMIN — SEVELAMER CARBONATE 2400 MG: 800 TABLET, FILM COATED ORAL at 09:06

## 2022-01-01 RX ADMIN — ATORVASTATIN CALCIUM 80 MG: 40 TABLET, FILM COATED ORAL at 09:06

## 2022-01-01 RX ADMIN — SEVELAMER CARBONATE 2400 MG: 800 TABLET, FILM COATED ORAL at 12:06

## 2022-01-01 RX ADMIN — MIDODRINE HYDROCHLORIDE 10 MG: 5 TABLET ORAL at 05:07

## 2022-01-01 RX ADMIN — DOCUSATE SODIUM 100 MG: 100 CAPSULE, LIQUID FILLED ORAL at 09:06

## 2022-01-01 RX ADMIN — VASOPRESSIN 2 UNITS: 20 INJECTION, SOLUTION INTRAMUSCULAR; SUBCUTANEOUS at 03:06

## 2022-01-01 RX ADMIN — VANCOMYCIN HYDROCHLORIDE 500 MG: 500 INJECTION, POWDER, LYOPHILIZED, FOR SOLUTION INTRAVENOUS at 06:07

## 2022-01-01 RX ADMIN — HYDROCODONE BITARTRATE AND ACETAMINOPHEN 1 TABLET: 5; 325 TABLET ORAL at 09:06

## 2022-01-01 RX ADMIN — CLOPIDOGREL 75 MG: 75 TABLET, FILM COATED ORAL at 09:06

## 2022-01-01 RX ADMIN — MUPIROCIN: 20 OINTMENT TOPICAL at 08:06

## 2022-01-01 RX ADMIN — VASOPRESSIN 2 UNITS: 20 INJECTION, SOLUTION INTRAMUSCULAR; SUBCUTANEOUS at 04:06

## 2022-01-01 RX ADMIN — FUROSEMIDE 80 MG: 10 INJECTION, SOLUTION INTRAMUSCULAR; INTRAVENOUS at 11:06

## 2022-01-01 RX ADMIN — SODIUM CHLORIDE 250 ML: 0.9 INJECTION, SOLUTION INTRAVENOUS at 03:06

## 2022-01-01 RX ADMIN — LIDOCAINE 1 PATCH: 50 PATCH CUTANEOUS at 12:06

## 2022-01-01 RX ADMIN — MIDODRINE HYDROCHLORIDE 10 MG: 5 TABLET ORAL at 08:07

## 2022-01-01 RX ADMIN — CLOPIDOGREL 75 MG: 75 TABLET, FILM COATED ORAL at 08:07

## 2022-01-01 RX ADMIN — HYDROCODONE BITARTRATE AND ACETAMINOPHEN 1 TABLET: 5; 325 TABLET ORAL at 08:06

## 2022-01-01 RX ADMIN — HYDROCODONE BITARTRATE AND ACETAMINOPHEN 1 TABLET: 5; 325 TABLET ORAL at 11:07

## 2022-01-01 RX ADMIN — ETOMIDATE 2 MG: 2 INJECTION INTRAVENOUS at 03:02

## 2022-01-01 RX ADMIN — HYDROCODONE BITARTRATE AND ACETAMINOPHEN 1 TABLET: 7.5; 325 TABLET ORAL at 11:06

## 2022-01-01 RX ADMIN — PHENYLEPHRINE HYDROCHLORIDE 200 MCG: 10 INJECTION INTRAVENOUS at 03:06

## 2022-01-01 RX ADMIN — MUPIROCIN: 20 OINTMENT TOPICAL at 08:07

## 2022-01-01 RX ADMIN — DOCUSATE SODIUM 100 MG: 100 CAPSULE, LIQUID FILLED ORAL at 08:06

## 2022-01-01 RX ADMIN — SEVELAMER CARBONATE 800 MG: 800 TABLET, FILM COATED ORAL at 08:07

## 2022-01-01 RX ADMIN — PIPERACILLIN AND TAZOBACTAM 4.5 G: 4; .5 INJECTION, POWDER, LYOPHILIZED, FOR SOLUTION INTRAVENOUS; PARENTERAL at 12:07

## 2022-01-01 RX ADMIN — MORPHINE SULFATE 4 MG: 4 INJECTION INTRAVENOUS at 07:07

## 2022-01-01 RX ADMIN — SODIUM CHLORIDE: 0.9 INJECTION, SOLUTION INTRAVENOUS at 03:03

## 2022-01-01 RX ADMIN — HYDROCODONE BITARTRATE AND ACETAMINOPHEN 1 TABLET: 7.5; 325 TABLET ORAL at 07:06

## 2022-01-01 RX ADMIN — PIPERACILLIN AND TAZOBACTAM 4.5 G: 4; .5 INJECTION, POWDER, LYOPHILIZED, FOR SOLUTION INTRAVENOUS; PARENTERAL at 09:07

## 2022-01-01 RX ADMIN — HYDROCODONE BITARTRATE AND ACETAMINOPHEN 1 TABLET: 5; 325 TABLET ORAL at 10:06

## 2022-01-01 RX ADMIN — CALCIUM GLUCONATE 1 G: 20 INJECTION, SOLUTION INTRAVENOUS at 12:06

## 2022-01-01 RX ADMIN — MIDODRINE HYDROCHLORIDE 10 MG: 5 TABLET ORAL at 07:07

## 2022-01-01 RX ADMIN — HEPARIN SODIUM 11 UNITS/KG/HR: 5000 INJECTION INTRAVENOUS; SUBCUTANEOUS at 05:06

## 2022-01-01 RX ADMIN — FUROSEMIDE 80 MG: 10 INJECTION, SOLUTION INTRAMUSCULAR; INTRAVENOUS at 12:06

## 2022-01-01 RX ADMIN — HEPARIN SODIUM 10 UNITS/KG/HR: 10000 INJECTION, SOLUTION INTRAVENOUS at 02:07

## 2022-01-01 RX ADMIN — HYDROCODONE BITARTRATE AND ACETAMINOPHEN 1 TABLET: 5; 325 TABLET ORAL at 05:07

## 2022-01-01 RX ADMIN — LIDOCAINE 1 PATCH: 50 PATCH CUTANEOUS at 10:06

## 2022-01-01 RX ADMIN — MIDODRINE HYDROCHLORIDE 10 MG: 5 TABLET ORAL at 06:07

## 2022-01-01 RX ADMIN — HYDROCODONE BITARTRATE AND ACETAMINOPHEN 1 TABLET: 10; 325 TABLET ORAL at 02:06

## 2022-01-01 RX ADMIN — SODIUM CHLORIDE: 0.9 INJECTION, SOLUTION INTRAVENOUS at 02:02

## 2022-01-01 RX ADMIN — SODIUM ZIRCONIUM CYCLOSILICATE 5 G: 5 POWDER, FOR SUSPENSION ORAL at 08:07

## 2022-01-01 RX ADMIN — HEPARIN SODIUM 11 UNITS/KG/HR: 5000 INJECTION INTRAVENOUS; SUBCUTANEOUS at 08:06

## 2022-01-01 RX ADMIN — CALCIUM GLUCONATE 1 G: 20 INJECTION, SOLUTION INTRAVENOUS at 10:06

## 2022-01-01 RX ADMIN — LIDOCAINE HYDROCHLORIDE 40 MG: 20 INJECTION, SOLUTION INTRAVENOUS at 02:02

## 2022-01-01 RX ADMIN — SODIUM BICARBONATE 650 MG TABLET 650 MG: at 08:07

## 2022-01-01 RX ADMIN — SEVELAMER CARBONATE 800 MG: 800 TABLET, FILM COATED ORAL at 04:07

## 2022-01-01 RX ADMIN — HYDROCODONE BITARTRATE AND ACETAMINOPHEN 1 TABLET: 5; 325 TABLET ORAL at 01:07

## 2022-01-01 RX ADMIN — SODIUM CITRATE AND CITRIC ACID MONOHYDRATE 30 ML: 500; 334 SOLUTION ORAL at 01:06

## 2022-01-01 RX ADMIN — ALBUTEROL SULFATE 5 MG: 2.5 SOLUTION RESPIRATORY (INHALATION) at 08:06

## 2022-01-01 RX ADMIN — DOCUSATE SODIUM - SENNOSIDES 1 TABLET: 50; 8.6 TABLET, FILM COATED ORAL at 08:07

## 2022-01-01 RX ADMIN — CARVEDILOL 3.12 MG: 3.12 TABLET, FILM COATED ORAL at 11:07

## 2022-01-01 RX ADMIN — HYDROCODONE BITARTRATE AND ACETAMINOPHEN 1 TABLET: 7.5; 325 TABLET ORAL at 09:06

## 2022-01-01 RX ADMIN — APIXABAN 2.5 MG: 2.5 TABLET, FILM COATED ORAL at 09:06

## 2022-01-01 RX ADMIN — VANCOMYCIN HYDROCHLORIDE 2000 MG: 500 INJECTION, POWDER, LYOPHILIZED, FOR SOLUTION INTRAVENOUS at 12:07

## 2022-01-01 RX ADMIN — HYDROCODONE BITARTRATE AND ACETAMINOPHEN 1 TABLET: 5; 325 TABLET ORAL at 03:07

## 2022-01-01 RX ADMIN — MIDODRINE HYDROCHLORIDE 10 MG: 5 TABLET ORAL at 11:07

## 2022-01-01 RX ADMIN — PHENYLEPHRINE HYDROCHLORIDE 100 MCG: 10 INJECTION INTRAVENOUS at 05:06

## 2022-01-01 RX ADMIN — HYDROCODONE BITARTRATE AND ACETAMINOPHEN 1 TABLET: 5; 325 TABLET ORAL at 07:07

## 2022-01-01 RX ADMIN — ASPIRIN 81 MG CHEWABLE TABLET 81 MG: 81 TABLET CHEWABLE at 08:07

## 2022-01-01 RX ADMIN — ALBUTEROL SULFATE 5 MG: 2.5 SOLUTION RESPIRATORY (INHALATION) at 03:06

## 2022-01-01 RX ADMIN — CLOPIDOGREL 75 MG: 75 TABLET, FILM COATED ORAL at 08:06

## 2022-01-01 RX ADMIN — HYDROCODONE BITARTRATE AND ACETAMINOPHEN 1 TABLET: 5; 325 TABLET ORAL at 09:07

## 2022-01-01 RX ADMIN — MIDODRINE HYDROCHLORIDE 10 MG: 5 TABLET ORAL at 09:07

## 2022-01-01 RX ADMIN — HEPARIN SODIUM 8 UNITS/KG/HR: 10000 INJECTION, SOLUTION INTRAVENOUS at 08:07

## 2022-01-01 RX ADMIN — DOCUSATE SODIUM - SENNOSIDES 1 TABLET: 50; 8.6 TABLET, FILM COATED ORAL at 09:07

## 2022-01-01 RX ADMIN — LIDOCAINE HYDROCHLORIDE,EPINEPHRINE BITARTRATE 30 ML: 15; .005 INJECTION, SOLUTION EPIDURAL; INFILTRATION; INTRACAUDAL; PERINEURAL at 02:02

## 2022-01-01 RX ADMIN — HYDROCODONE BITARTRATE AND ACETAMINOPHEN 1 TABLET: 7.5; 325 TABLET ORAL at 01:06

## 2022-01-01 RX ADMIN — PIPERACILLIN AND TAZOBACTAM 4.5 G: 4; .5 INJECTION, POWDER, LYOPHILIZED, FOR SOLUTION INTRAVENOUS; PARENTERAL at 10:07

## 2022-01-01 RX ADMIN — MIDODRINE HYDROCHLORIDE 5 MG: 5 TABLET ORAL at 05:06

## 2022-01-01 RX ADMIN — MIDODRINE HYDROCHLORIDE 10 MG: 5 TABLET ORAL at 01:07

## 2022-01-01 RX ADMIN — SODIUM CITRATE AND CITRIC ACID MONOHYDRATE 30 ML: 500; 334 SOLUTION ORAL at 09:06

## 2022-01-01 RX ADMIN — VANCOMYCIN HYDROCHLORIDE 500 MG: 500 INJECTION, POWDER, LYOPHILIZED, FOR SOLUTION INTRAVENOUS at 07:07

## 2022-01-01 RX ADMIN — SODIUM CITRATE AND CITRIC ACID MONOHYDRATE 30 ML: 500; 334 SOLUTION ORAL at 12:06

## 2022-01-01 RX ADMIN — GLYCOPYRROLATE 0.2 MG: 0.2 INJECTION, SOLUTION INTRAMUSCULAR; INTRAVITREAL at 03:02

## 2022-01-01 RX ADMIN — ETOMIDATE 10 MG: 2 INJECTION INTRAVENOUS at 03:06

## 2022-01-01 RX ADMIN — HYDROCODONE BITARTRATE AND ACETAMINOPHEN 1 TABLET: 7.5; 325 TABLET ORAL at 08:06

## 2022-01-01 RX ADMIN — ENOXAPARIN SODIUM 120 MG: 150 INJECTION SUBCUTANEOUS at 06:07

## 2022-01-01 RX ADMIN — SODIUM ZIRCONIUM CYCLOSILICATE 10 G: 5 POWDER, FOR SUSPENSION ORAL at 10:06

## 2022-01-01 RX ADMIN — MUPIROCIN 1 G: 20 OINTMENT TOPICAL at 08:06

## 2022-01-01 RX ADMIN — LIDOCAINE 1 PATCH: 50 PATCH CUTANEOUS at 09:06

## 2022-01-01 RX ADMIN — LIDOCAINE HYDROCHLORIDE 60 MG: 20 INJECTION, SOLUTION INTRAVENOUS at 03:06

## 2022-01-01 RX ADMIN — EPOETIN ALFA-EPBX 10000 UNITS: 10000 INJECTION, SOLUTION INTRAVENOUS; SUBCUTANEOUS at 02:06

## 2022-01-01 RX ADMIN — ATORVASTATIN CALCIUM 80 MG: 40 TABLET, FILM COATED ORAL at 08:06

## 2022-01-01 RX ADMIN — HYDROCODONE BITARTRATE AND ACETAMINOPHEN 1 TABLET: 5; 325 TABLET ORAL at 07:06

## 2022-01-01 RX ADMIN — DEXTROSE 125 ML: 10 SOLUTION INTRAVENOUS at 11:06

## 2022-01-01 RX ADMIN — HEPARIN SODIUM 7.99 UNITS/KG/HR: 10000 INJECTION, SOLUTION INTRAVENOUS at 08:07

## 2022-01-01 RX ADMIN — CLOPIDOGREL 75 MG: 75 TABLET, FILM COATED ORAL at 12:06

## 2022-01-01 RX ADMIN — APIXABAN 5 MG: 5 TABLET, FILM COATED ORAL at 08:07

## 2022-01-01 RX ADMIN — LIDOCAINE 1 PATCH: 50 PATCH CUTANEOUS at 08:06

## 2022-01-01 RX ADMIN — LIDOCAINE HYDROCHLORIDE 5 ML: 10 INJECTION, SOLUTION INFILTRATION; PERINEURAL at 02:07

## 2022-01-01 RX ADMIN — HYDROCODONE BITARTRATE AND ACETAMINOPHEN 1 TABLET: 7.5; 325 TABLET ORAL at 03:06

## 2022-01-01 RX ADMIN — Medication 16 G: at 01:07

## 2022-01-01 RX ADMIN — APIXABAN 5 MG: 5 TABLET, FILM COATED ORAL at 11:07

## 2022-01-01 RX ADMIN — ETOMIDATE 4 MG: 2 INJECTION INTRAVENOUS at 04:03

## 2022-01-01 RX ADMIN — SEVELAMER CARBONATE 2400 MG: 800 TABLET, FILM COATED ORAL at 08:06

## 2022-01-01 RX ADMIN — HEPARIN SODIUM 13 UNITS/KG/HR: 5000 INJECTION INTRAVENOUS; SUBCUTANEOUS at 03:06

## 2022-01-01 RX ADMIN — CALCIUM GLUCONATE 1 G: 20 INJECTION, SOLUTION INTRAVENOUS at 08:06

## 2022-01-01 RX ADMIN — ATORVASTATIN CALCIUM 80 MG: 40 TABLET, FILM COATED ORAL at 10:06

## 2022-01-01 RX ADMIN — SEVELAMER CARBONATE 800 MG: 800 TABLET, FILM COATED ORAL at 06:07

## 2022-01-01 RX ADMIN — MIDODRINE HYDROCHLORIDE 10 MG: 5 TABLET ORAL at 04:07

## 2022-01-01 RX ADMIN — PIPERACILLIN AND TAZOBACTAM 4.5 G: 4; .5 INJECTION, POWDER, LYOPHILIZED, FOR SOLUTION INTRAVENOUS; PARENTERAL at 08:07

## 2022-01-01 RX ADMIN — SEVELAMER CARBONATE 2400 MG: 800 TABLET, FILM COATED ORAL at 05:06

## 2022-01-01 RX ADMIN — PHENYLEPHRINE HYDROCHLORIDE 200 MCG: 10 INJECTION INTRAVENOUS at 04:03

## 2022-01-01 RX ADMIN — ALBUMIN HUMAN 12.5 G: 0.25 SOLUTION INTRAVENOUS at 12:07

## 2022-01-01 RX ADMIN — HYDROCODONE BITARTRATE AND ACETAMINOPHEN 1 TABLET: 5; 325 TABLET ORAL at 08:07

## 2022-01-01 RX ADMIN — SEVELAMER CARBONATE 2400 MG: 800 TABLET, FILM COATED ORAL at 04:06

## 2022-01-01 RX ADMIN — CLOPIDOGREL 75 MG: 75 TABLET, FILM COATED ORAL at 09:07

## 2022-01-01 RX ADMIN — PIPERACILLIN AND TAZOBACTAM 4.5 G: 4; .5 INJECTION, POWDER, LYOPHILIZED, FOR SOLUTION INTRAVENOUS; PARENTERAL at 01:07

## 2022-01-01 RX ADMIN — INSULIN ASPART 2 UNITS: 100 INJECTION, SOLUTION INTRAVENOUS; SUBCUTANEOUS at 07:07

## 2022-01-01 RX ADMIN — HYDROCODONE BITARTRATE AND ACETAMINOPHEN 1 TABLET: 10; 325 TABLET ORAL at 08:06

## 2022-01-01 RX ADMIN — DEXTROSE 125 ML: 10 SOLUTION INTRAVENOUS at 01:07

## 2022-01-01 RX ADMIN — ALBUTEROL SULFATE 10 MG: 2.5 SOLUTION RESPIRATORY (INHALATION) at 12:06

## 2022-01-01 RX ADMIN — APIXABAN 2.5 MG: 2.5 TABLET, FILM COATED ORAL at 08:06

## 2022-01-01 RX ADMIN — HYDROCODONE BITARTRATE AND ACETAMINOPHEN 1 TABLET: 10; 325 TABLET ORAL at 09:06

## 2022-01-01 RX ADMIN — SEVELAMER CARBONATE 800 MG: 800 TABLET, FILM COATED ORAL at 09:07

## 2022-01-01 RX ADMIN — SODIUM CHLORIDE: 0.9 INJECTION, SOLUTION INTRAVENOUS at 02:07

## 2022-01-01 RX ADMIN — HYDROCODONE BITARTRATE AND ACETAMINOPHEN 1 TABLET: 10; 325 TABLET ORAL at 07:06

## 2022-01-01 RX ADMIN — SODIUM BICARBONATE 650 MG TABLET 650 MG: at 09:07

## 2022-01-01 RX ADMIN — INSULIN HUMAN 8 UNITS: 100 INJECTION, SOLUTION PARENTERAL at 01:06

## 2022-01-01 RX ADMIN — MIDODRINE HYDROCHLORIDE 5 MG: 5 TABLET ORAL at 04:06

## 2022-01-01 RX ADMIN — PIPERACILLIN AND TAZOBACTAM 4.5 G: 4; .5 INJECTION, POWDER, LYOPHILIZED, FOR SOLUTION INTRAVENOUS; PARENTERAL at 07:07

## 2022-01-01 RX ADMIN — SODIUM BICARBONATE 650 MG TABLET 650 MG: at 11:07

## 2022-01-01 RX ADMIN — HYDROCODONE BITARTRATE AND ACETAMINOPHEN 1 TABLET: 5; 325 TABLET ORAL at 05:06

## 2022-01-01 RX ADMIN — EPOETIN ALFA-EPBX 5000 UNITS: 3000 INJECTION, SOLUTION INTRAVENOUS; SUBCUTANEOUS at 06:06

## 2022-01-01 RX ADMIN — ERYTHROPOIETIN 5000 UNITS: 10000 INJECTION, SOLUTION INTRAVENOUS; SUBCUTANEOUS at 12:06

## 2022-01-01 RX ADMIN — HYDROCODONE BITARTRATE AND ACETAMINOPHEN 1 TABLET: 7.5; 325 TABLET ORAL at 06:06

## 2022-01-01 RX ADMIN — MUPIROCIN: 20 OINTMENT TOPICAL at 09:06

## 2022-01-01 RX ADMIN — SODIUM ZIRCONIUM CYCLOSILICATE 10 G: 5 POWDER, FOR SUSPENSION ORAL at 11:07

## 2022-01-01 RX ADMIN — ALBUTEROL SULFATE 10 MG: 2.5 SOLUTION RESPIRATORY (INHALATION) at 01:06

## 2022-01-01 RX ADMIN — SEVELAMER CARBONATE 800 MG: 800 TABLET, FILM COATED ORAL at 12:07

## 2022-01-01 RX ADMIN — INSULIN ASPART 2 UNITS: 100 INJECTION, SOLUTION INTRAVENOUS; SUBCUTANEOUS at 12:06

## 2022-01-01 RX ADMIN — HEPARIN SODIUM 12 UNITS/KG/HR: 5000 INJECTION INTRAVENOUS; SUBCUTANEOUS at 09:06

## 2022-01-01 RX ADMIN — PIPERACILLIN AND TAZOBACTAM 4.5 G: 4; .5 INJECTION, POWDER, LYOPHILIZED, FOR SOLUTION INTRAVENOUS; PARENTERAL at 11:07

## 2022-01-01 RX ADMIN — ACETAMINOPHEN 650 MG: 325 TABLET ORAL at 02:07

## 2022-01-01 RX ADMIN — EPOETIN ALFA-EPBX 10000 UNITS: 10000 INJECTION, SOLUTION INTRAVENOUS; SUBCUTANEOUS at 01:06

## 2022-01-01 RX ADMIN — HYDROCODONE BITARTRATE AND ACETAMINOPHEN 1 TABLET: 5; 325 TABLET ORAL at 01:06

## 2022-01-01 RX ADMIN — SEVELAMER CARBONATE 2400 MG: 800 TABLET, FILM COATED ORAL at 07:06

## 2022-01-01 RX ADMIN — SODIUM ZIRCONIUM CYCLOSILICATE 10 G: 5 POWDER, FOR SUSPENSION ORAL at 05:02

## 2022-01-01 RX ADMIN — APIXABAN 2.5 MG: 2.5 TABLET, FILM COATED ORAL at 10:06

## 2022-01-01 RX ADMIN — CARVEDILOL 3.12 MG: 3.12 TABLET, FILM COATED ORAL at 08:07

## 2022-01-01 RX ADMIN — VANCOMYCIN HYDROCHLORIDE 500 MG: 500 INJECTION, POWDER, LYOPHILIZED, FOR SOLUTION INTRAVENOUS at 04:07

## 2022-01-01 RX ADMIN — MUPIROCIN: 20 OINTMENT TOPICAL at 09:07

## 2022-01-01 RX ADMIN — ERYTHROPOIETIN 5000 UNITS: 10000 INJECTION, SOLUTION INTRAVENOUS; SUBCUTANEOUS at 06:06

## 2022-01-01 RX ADMIN — PROPOFOL 100 MCG/KG/MIN: 10 INJECTION, EMULSION INTRAVENOUS at 04:03

## 2022-01-01 RX ADMIN — HYDROCODONE BITARTRATE AND ACETAMINOPHEN 1 TABLET: 10; 325 TABLET ORAL at 04:06

## 2022-01-01 RX ADMIN — CALCIUM GLUCONATE 1 G: 20 INJECTION, SOLUTION INTRAVENOUS at 01:06

## 2022-01-01 RX ADMIN — MIDODRINE HYDROCHLORIDE 10 MG: 5 TABLET ORAL at 12:07

## 2022-01-01 RX ADMIN — SODIUM CHLORIDE 500 ML: 0.9 INJECTION, SOLUTION INTRAVENOUS at 07:07

## 2022-01-01 RX ADMIN — ONDANSETRON 4 MG: 2 INJECTION INTRAMUSCULAR; INTRAVENOUS at 03:07

## 2022-01-01 RX ADMIN — MIDODRINE HYDROCHLORIDE 5 MG: 5 TABLET ORAL at 08:06

## 2022-01-01 RX ADMIN — HEPARIN SODIUM 14 UNITS/KG/HR: 10000 INJECTION, SOLUTION INTRAVENOUS at 07:07

## 2022-01-01 RX ADMIN — MIDAZOLAM 1 MG: 1 INJECTION INTRAMUSCULAR; INTRAVENOUS at 02:02

## 2022-01-01 RX ADMIN — SEVELAMER CARBONATE 2400 MG: 800 TABLET, FILM COATED ORAL at 01:06

## 2022-01-01 RX ADMIN — CEFAZOLIN 2 G: 330 INJECTION, POWDER, FOR SOLUTION INTRAMUSCULAR; INTRAVENOUS at 02:02

## 2022-01-01 RX ADMIN — SODIUM BICARBONATE 650 MG TABLET 650 MG: at 05:02

## 2022-01-01 RX ADMIN — HEPARIN SODIUM 16 UNITS/KG/HR: 5000 INJECTION INTRAVENOUS; SUBCUTANEOUS at 11:06

## 2022-01-01 RX ADMIN — SODIUM CHLORIDE: 0.9 INJECTION, SOLUTION INTRAVENOUS at 01:07

## 2022-01-01 RX ADMIN — HEPARIN SODIUM 11 UNITS/KG/HR: 5000 INJECTION INTRAVENOUS; SUBCUTANEOUS at 07:06

## 2022-01-01 RX ADMIN — HEPARIN SODIUM 18 UNITS/KG/HR: 5000 INJECTION INTRAVENOUS; SUBCUTANEOUS at 02:06

## 2022-01-01 RX ADMIN — HEPARIN SODIUM 3000 UNITS: 1000 INJECTION, SOLUTION INTRAVENOUS; SUBCUTANEOUS at 04:03

## 2022-01-01 RX ADMIN — HEPARIN SODIUM 8 UNITS/KG/HR: 10000 INJECTION, SOLUTION INTRAVENOUS at 09:07

## 2022-01-01 RX ADMIN — HYDROCODONE BITARTRATE AND ACETAMINOPHEN 1 TABLET: 7.5; 325 TABLET ORAL at 02:06

## 2022-01-01 RX ADMIN — PROPOFOL 100 MCG/KG/MIN: 10 INJECTION, EMULSION INTRAVENOUS at 03:06

## 2022-01-01 RX ADMIN — SODIUM CHLORIDE, SODIUM LACTATE, POTASSIUM CHLORIDE, AND CALCIUM CHLORIDE 250 ML: .6; .31; .03; .02 INJECTION, SOLUTION INTRAVENOUS at 02:07

## 2022-01-01 RX ADMIN — METOPROLOL TARTRATE 12.5 MG: 25 TABLET, FILM COATED ORAL at 08:06

## 2022-01-01 RX ADMIN — LIDOCAINE HYDROCHLORIDE 40 MG: 20 INJECTION, SOLUTION INTRAVENOUS at 04:03

## 2022-01-01 RX ADMIN — LIDOCAINE 1 PATCH: 50 PATCH CUTANEOUS at 03:06

## 2022-01-01 RX ADMIN — HYDROCODONE BITARTRATE AND ACETAMINOPHEN 1 TABLET: 10; 325 TABLET ORAL at 03:06

## 2022-01-01 RX ADMIN — NITROGLYCERIN 1 INCH: 20 OINTMENT TOPICAL at 09:06

## 2022-01-01 RX ADMIN — LIDOCAINE 1 PATCH: 50 PATCH CUTANEOUS at 02:06

## 2022-01-01 RX ADMIN — APIXABAN 5 MG: 5 TABLET, FILM COATED ORAL at 08:06

## 2022-01-01 RX ADMIN — CEFAZOLIN SODIUM 1 G: 1 SOLUTION INTRAVENOUS at 03:03

## 2022-01-01 RX ADMIN — HYDROCODONE BITARTRATE AND ACETAMINOPHEN 1 TABLET: 5; 325 TABLET ORAL at 06:06

## 2022-01-01 RX ADMIN — HYDROCODONE BITARTRATE AND ACETAMINOPHEN 1 TABLET: 5; 325 TABLET ORAL at 03:06

## 2022-01-01 RX ADMIN — GABAPENTIN 300 MG: 300 CAPSULE ORAL at 01:06

## 2022-01-01 RX ADMIN — HEPARIN SODIUM 12 UNITS/KG/HR: 5000 INJECTION INTRAVENOUS; SUBCUTANEOUS at 08:06

## 2022-01-01 RX ADMIN — HEPARIN SODIUM 2400 UNITS: 1000 INJECTION, SOLUTION INTRAVENOUS; SUBCUTANEOUS at 06:06

## 2022-01-01 RX ADMIN — PHENYLEPHRINE HYDROCHLORIDE 150 MCG: 10 INJECTION INTRAVENOUS at 04:03

## 2022-01-01 RX ADMIN — MIDODRINE HYDROCHLORIDE 5 MG: 5 TABLET ORAL at 09:06

## 2022-01-01 RX ADMIN — PROPOFOL 30 MCG/KG/MIN: 10 INJECTION, EMULSION INTRAVENOUS at 02:02

## 2022-01-01 RX ADMIN — SODIUM CHLORIDE: 0.9 INJECTION, SOLUTION INTRAVENOUS at 08:06

## 2022-01-01 RX ADMIN — DEXTROSE 125 ML: 10 SOLUTION INTRAVENOUS at 08:06

## 2022-01-01 RX ADMIN — SODIUM CHLORIDE, SODIUM LACTATE, POTASSIUM CHLORIDE, AND CALCIUM CHLORIDE 250 ML: .6; .31; .03; .02 INJECTION, SOLUTION INTRAVENOUS at 12:07

## 2022-01-01 RX ADMIN — SODIUM CHLORIDE: 0.9 INJECTION, SOLUTION INTRAVENOUS at 04:06

## 2022-01-01 RX ADMIN — VASOPRESSIN 3 UNITS: 20 INJECTION, SOLUTION INTRAMUSCULAR; SUBCUTANEOUS at 03:06

## 2022-01-01 RX ADMIN — VASOPRESSIN 2 UNITS: 20 INJECTION, SOLUTION INTRAMUSCULAR; SUBCUTANEOUS at 05:06

## 2022-01-01 RX ADMIN — HEPARIN SODIUM 18 UNITS/KG/HR: 5000 INJECTION INTRAVENOUS; SUBCUTANEOUS at 05:06

## 2022-01-01 RX ADMIN — PHENYLEPHRINE HYDROCHLORIDE 250 MCG: 10 INJECTION INTRAVENOUS at 04:03

## 2022-01-01 RX ADMIN — FUROSEMIDE 80 MG: 10 INJECTION, SOLUTION INTRAMUSCULAR; INTRAVENOUS at 08:06

## 2022-01-01 RX ADMIN — CEFAZOLIN 2 G: 330 INJECTION, POWDER, FOR SOLUTION INTRAMUSCULAR; INTRAVENOUS at 03:06

## 2022-01-01 RX ADMIN — SODIUM ZIRCONIUM CYCLOSILICATE 5 G: 5 POWDER, FOR SUSPENSION ORAL at 07:07

## 2022-01-01 RX ADMIN — MIDODRINE HYDROCHLORIDE 10 MG: 5 TABLET ORAL at 03:06

## 2022-01-01 RX ADMIN — SODIUM ZIRCONIUM CYCLOSILICATE 10 G: 5 POWDER, FOR SUSPENSION ORAL at 01:06

## 2022-01-01 RX ADMIN — VASOPRESSIN 1 UNITS: 20 INJECTION, SOLUTION INTRAMUSCULAR; SUBCUTANEOUS at 05:06

## 2022-01-01 RX ADMIN — GLUCAGON HYDROCHLORIDE 1 MG: KIT at 06:06

## 2022-01-01 RX ADMIN — KETOROLAC TROMETHAMINE 30 MG: 30 INJECTION, SOLUTION INTRAMUSCULAR at 12:07

## 2022-01-01 RX ADMIN — PHENYLEPHRINE HYDROCHLORIDE 100 MCG: 10 INJECTION INTRAVENOUS at 03:02

## 2022-01-01 RX ADMIN — MIDODRINE HYDROCHLORIDE 5 MG: 5 TABLET ORAL at 12:06

## 2022-01-01 RX ADMIN — APIXABAN 5 MG: 5 TABLET, FILM COATED ORAL at 09:06

## 2022-01-01 RX ADMIN — EPOETIN ALFA-EPBX 5000 UNITS: 3000 INJECTION, SOLUTION INTRAVENOUS; SUBCUTANEOUS at 04:07

## 2022-01-01 RX ADMIN — MAGNESIUM SULFATE 2 G: 2 INJECTION INTRAVENOUS at 05:06

## 2022-01-01 RX ADMIN — SODIUM CHLORIDE: 0.9 INJECTION, SOLUTION INTRAVENOUS at 03:06

## 2022-01-01 RX ADMIN — DEXTROSE 125 ML: 10 SOLUTION INTRAVENOUS at 01:06

## 2022-01-01 RX ADMIN — HEPARIN SODIUM 18 UNITS/KG/HR: 5000 INJECTION INTRAVENOUS; SUBCUTANEOUS at 12:06

## 2022-01-01 RX ADMIN — METOPROLOL TARTRATE 12.5 MG: 25 TABLET, FILM COATED ORAL at 10:06

## 2022-01-01 RX ADMIN — HYDROCODONE BITARTRATE AND ACETAMINOPHEN 1 TABLET: 10; 325 TABLET ORAL at 12:06

## 2022-01-01 RX ADMIN — MIDODRINE HYDROCHLORIDE 5 MG: 5 TABLET ORAL at 01:06

## 2022-01-01 RX ADMIN — MIDODRINE HYDROCHLORIDE 10 MG: 5 TABLET ORAL at 08:06

## 2022-01-01 RX ADMIN — HYDROCODONE BITARTRATE AND ACETAMINOPHEN 1 TABLET: 5; 325 TABLET ORAL at 02:06

## 2022-01-01 RX ADMIN — SEVELAMER CARBONATE 800 MG: 800 TABLET, FILM COATED ORAL at 01:07

## 2022-01-01 RX ADMIN — FUROSEMIDE 80 MG: 40 INJECTION, SOLUTION INTRAMUSCULAR; INTRAVENOUS at 05:02

## 2022-01-01 RX ADMIN — ALBUTEROL SULFATE 5 MG: 2.5 SOLUTION RESPIRATORY (INHALATION) at 07:06

## 2022-01-01 RX ADMIN — DEXTROSE 125 ML: 10 SOLUTION INTRAVENOUS at 09:07

## 2022-01-01 RX ADMIN — INSULIN HUMAN 8 UNITS: 100 INJECTION, SOLUTION PARENTERAL at 08:06

## 2022-01-01 RX ADMIN — INSULIN ASPART 4 UNITS: 100 INJECTION, SOLUTION INTRAVENOUS; SUBCUTANEOUS at 04:07

## 2022-01-01 RX ADMIN — SEVELAMER CARBONATE 2400 MG: 800 TABLET, FILM COATED ORAL at 03:06

## 2022-01-01 RX ADMIN — HEPARIN SODIUM 14 UNITS/KG/HR: 10000 INJECTION, SOLUTION INTRAVENOUS at 03:07

## 2022-01-01 RX ADMIN — MUPIROCIN: 20 OINTMENT TOPICAL at 10:06

## 2022-01-01 RX ADMIN — ONDANSETRON 4 MG: 2 INJECTION INTRAMUSCULAR; INTRAVENOUS at 11:07

## 2022-01-01 RX ADMIN — SEVELAMER CARBONATE 2400 MG: 800 TABLET, FILM COATED ORAL at 06:06

## 2022-01-01 RX ADMIN — VANCOMYCIN HYDROCHLORIDE 500 MG: 500 INJECTION, POWDER, LYOPHILIZED, FOR SOLUTION INTRAVENOUS at 08:07

## 2022-01-01 RX ADMIN — ACETAMINOPHEN 650 MG: 325 TABLET ORAL at 08:07

## 2022-01-01 RX ADMIN — HEPARIN SODIUM 18 UNITS/KG/HR: 10000 INJECTION, SOLUTION INTRAVENOUS at 09:07

## 2022-01-03 NOTE — PROGRESS NOTES
IP Liaison - Final Visit Note    Patient: Houston Jc  MRN:  [id number]  Date of Service:  1/3/2022  Completed by:  BEKAH Yeh    Reason for Visit   Patient presents with    IP Liaison Chart Review     Patient discharged from hospital before BEKAH was able to complete follow-up visit.        Patient Summary     Discharge Date: 12/29/2021  Discharge telephone number/address: [phone] / [address] [place], [place] [id number]  Follow up provider: PCP office will contact pt / 1/6/2022 @ 8:00am / 1/11/2022 @ 2:00pm  Follow up appointments: Zak Hamilton MD / Della Valdes NP / Dejuan Cody MD  Home Health agency & telephone number: [facility] set up by PHN  DME ordered &  name: n/a  Assigned OPCM RN/SW: n/a  Report sent to follow up team (PCP/OPCM) via in basket message: n/a  Community Resources arranged including agency name & contact info: n/a      BEKAH Yeh

## 2022-01-08 NOTE — PROGRESS NOTES
HPI:   Joellen Patel is a 70year old female who presents for a Medicare Subsequent Annual Wellness visit (Pt already had Initial Annual Wellness).       Her last annual assessment has been over 1 year: Annual Physical due on 09/09/2021         Fall/Risk [facility]  Transition of Care Home Visit    Visit Date: 1/11/2022  Encounter Provider: Owen Carlton [facility]  PCP:  Zak Hamilton MD    PRESENTING HISTORY      Patient ID: Houston Jc is a 76 y.o. male.    Consult Requested By:  No ref. provider found  Reason for Consult:  Hospital Follow up    Houston is being seen at home due to is being seen at home due to physical debility that presents a taxing effort to leave the home, to mitigate high risk of hospital readmission and/or due to the limited availability of reliable or safe options for transportation to the point of access to the provider. Prior to treatment on this visit the chart was reviewed and patient verbal consent was obtained.     Chief Complaint: Transitional Care        History of Present Illness: Admit 12/21 and discharge 12/29  Houston Jc is a 76 y.o. AA male w/ PMH ESRD (TTS), CHF, CAD, HTN, complete heart block s/p ICD, DM, DVT, and PAD. The patient presented to [facility] on 12/21/2021 with a primary complaint of bacteremia. Pt was seen at another outside facility yesterday in [facility] for workup of cough and a provider called this AM to notify pt of positive Gram + cocci in blood cultures.     The patient was in their usual state of health until 4 days ago when he started to have cough with occasional production associated with right chest pain where pt has his old HD access (switched to RUE 1 yr ago). Pt also reported fever and chills from Friday to Sunday (12/17-12/19). Pt denies headaches, left sided chest pain, SOB, N/V, abdominal pain, and dysuria.     ___________________________________________________________________    Today:    HPI:  Patient being see today for hospital follow up. Upon arrival patient is seated in his wheelchair awake alert and oriented x 3 in no acute distress. Wife is present for the visit. Patient reports pain to the upper right shoulder/neck area and has an appointment with an  on 10/11/2021, showing low risk of alcohol abuse.         Patient Care Team: Patient Care Team:  Rosy Pond MD as PCP - General (Internal Medicine)    Patient Active Problem List:     Hypertension, benign     Hypercholesterolemia     Personal history o orthopedist on Monday 1/10.  See HPI for details of hospital stay.     Review of Systems   Constitutional: Negative for chills and fever.   Respiratory: Negative for cough and shortness of breath.    Cardiovascular: Negative for chest pain.        Patient has pacemaker/ICD to left upper chest wall   Gastrointestinal: Negative for constipation.   Genitourinary:        Patient on hemodialysis, has access to RUE   Musculoskeletal: Positive for myalgias.   Skin:        Sutures to RUE AV shunt   Neurological: Negative for dizziness and light-headedness.   Psychiatric/Behavioral: Negative for agitation and confusion.       Assessments:  · Environmental: home was clean and temperature was comfortable  · Functional Status: patient is in wheelchair and requires help with bathing and toileting  · Safety: fall risk patient is currently wheel chair bound  · Nutritional: adequate food in the home  · Home Health/DME/Supplies: wheelchair, walker, bedside commode    PAST HISTORY:     Past Medical History:   Diagnosis Date    Anemia     CHF (congestive heart failure)     CKD (chronic kidney disease) stage 4, GFR 15-29 ml/min     Coronary artery disease     Diabetes mellitus     Hematuria     Hypertension     Osteoarthritis of spine with radiculopathy, cervical region 1/10/2022    Renal cyst, right        Past Surgical History:   Procedure Laterality Date    CARDIAC SURGERY      DECLOTTING OF ARTERIOVENOUS GRAFT Right 12/21/2021    Procedure: [id number];  Surgeon: Jeison Hunt MD;  Location: [facility] OR;  Service: Vascular;  Laterality: Right;    FOOT SURGERY      INSERTION OF BIVENTRICULAR IMPLANTABLE CARDIOVERTER-DEFIBRILLATOR (ICD) Left 7/18/2019    Procedure: INSERTION, ICD, BIVENTRICULAR;  Surgeon: Arturo Bay MD;  Location: [facility] EP LAB;  Service: Cardiology;  Laterality: Left;  CHB, CRTD, SJM, anes, GP, 6078    LEFT HEART CATHETERIZATION N/A 7/16/2019    Procedure: Left heart cath;  Surgeon: Dejuan  High blood pressure, High cholesterol, Osteoarthritis, Other and unspecified hyperlipidemia, Unspecified essential hypertension, and Visual impairment.     She  has a past surgical history that includes other; tonsillectomy; dilation/curettage,diagnostic; o SHAAN Cody MD;  Location: [facility] CATH LAB/EP;  Service: Cardiology;  Laterality: N/A;    PERITONEAL CATHETER REMOVAL Left 4/11/2020    Procedure: REMOVAL, CATHETER, DIALYSIS, PERITONEAL;  Surgeon: Edis Snell Jr., MD;  Location: [facility] OR;  Service: General;  Laterality: Left;       Family History   Problem Relation Age of Onset    Heart attack Father        Social History     Socioeconomic History    Marital status:    Tobacco Use    Smoking status: Former Smoker    Smokeless tobacco: Never Used   Substance and Sexual Activity    Alcohol use: Yes     Comment: social    Drug use: No    Sexual activity: Yes     Social Determinants of Health     Financial Resource Strain: Low Risk     Difficulty of Paying Living Expenses: Not very hard   Food Insecurity: No Food Insecurity    Worried About Running Out of Food in the Last Year: Never true    Ran Out of Food in the Last Year: Never true   Transportation Needs: No Transportation Needs    Lack of Transportation (Medical): No    Lack of Transportation (Non-Medical): No   Housing Stability: Low Risk     Unable to Pay for Housing in the Last Year: No    Number of Places Lived in the Last Year: 1    Unstable Housing in the Last Year: No       MEDICATIONS & ALLERGIES:     Current Outpatient Medications on File Prior to Visit   Medication Sig Dispense Refill    allopurinol (ZYLOPRIM) 300 MG tablet Take 300 mg by mouth once daily.      apixaban (ELIQUIS) 5 mg Tab Take 1 tablet (5 mg total) by mouth 2 (two) times daily. 60 tablet 6    atorvastatin (LIPITOR) 80 MG tablet Take 1 tablet (80 mg total) by mouth every evening. 90 tablet 3    capsicum 0.075% (ZOSTRIX) 0.075 % topical cream Apply topically 2 (two) times daily as needed (shoulder pain). 57 g 0    carvediloL (COREG) 6.25 MG tablet Take 1 tablet (6.25 mg total) by mouth 2 (two) times daily. 60 tablet 11    cholecalciferol, vitamin D3, (VITAMIN D3) 50 mcg (2,000 unit) Cap Take 1 capsule by  as of this encounter: 218 lb (98.9 kg).     Medicare Hearing Assessment  (Required for AWV/SWV)    Hearing Screening    Screening Method: Questionnaire  I have a problem hearing over the telephone: No I have trouble following the conversations when two or m mouth once daily.      clopidogreL (PLAVIX) 75 mg tablet Take 75 mg by mouth once daily.      insulin aspart U-100 (NOVOLOG) 100 unit/mL injection Inject 4-8 Units into the skin 3 (three) times daily before meals. Per sliding scale      insulin detemir U-100 (LEVEMIR FLEXTOUCH) 100 unit/mL (3 mL) SubQ InPn pen Inject 5 Units into the skin every evening. 15 mL 2    losartan (COZAAR) 25 MG tablet Take 1 tablet (25 mg total) by mouth once daily. 90 tablet 3    torsemide (DEMADEX) 20 MG Tab Take 1 tablet (20 mg total) by mouth 2 (two) times daily. Take additional 2 tablets if weight gain of more than 3 pounds in 1 day (Patient taking differently: Take 40 mg by mouth 2 (two) times daily. Take additional 2 tablets if weight gain of more than 3 pounds in 1 day) 60 tablet 11    VENTOLIN HFA 90 mcg/actuation inhaler Inhale 1 puff into the lungs every 4 (four) hours as needed. As NEEDED       No current facility-administered medications on file prior to visit.        Review of patient's allergies indicates:  No Known Allergies    OBJECTIVE:     Vital Signs:  Vitals:    01/07/22 1015   BP: (!) 120/51   Pulse: 78     There is no height or weight on file to calculate BMI.     Physical Exam:  Physical Exam  Constitutional:       Appearance: Normal appearance. He is obese.   HENT:      Head: Normocephalic and atraumatic.      Mouth/Throat:      Mouth: Mucous membranes are moist.   Cardiovascular:      Rate and Rhythm: Normal rate and regular rhythm.   Pulmonary:      Effort: Pulmonary effort is normal.      Breath sounds: Normal breath sounds.   Abdominal:      Palpations: Abdomen is soft.   Musculoskeletal:         General: Tenderness (  right shoulder/neck) present.   Skin:     General: Skin is warm and dry.      Comments: Sutures intact to AV shunt to RUE, no redness, warmth, or discharge noted    Neurological:      Mental Status: He is alert and oriented to person, place, and time.   Psychiatric:         Mood and Affect:  09/28/2012   • Pneumococcal (Prevnar 13) 02/25/2015   • Pneumovax 23 10/13/2017   • TDAP 10/15/2016        ASSESSMENT AND OTHER RELEVANT CHRONIC CONDITIONS:   Darlyn Tomlinson is a 70year old female who presents for a Medicare Assessment.      PLAN SUMMARY: Mood normal.         Behavior: Behavior normal.         Laboratory  Lab Results   Component Value Date    WBC 6.97 12/29/2021    HGB 8.1 (L) 12/29/2021    HCT 25.4 (L) 12/29/2021    MCV 97 12/29/2021     12/29/2021     Lab Results   Component Value Date    INR 1.0 07/18/2019    INR 1.0 07/17/2019    INR 1.0 09/18/2015     Lab Results   Component Value Date    HGBA1C 6.8 (H) 12/27/2021     No results for input(s): POCTGLUCOSE in the last 72 hours.    Diagnostic Results:      TRANSITION OF CARE:     [facility] Contact Note: 12/30/2021    Family and/or Caretaker present at visit? Wife present   Diagnostic tests reviewed/disposition: No diagnosic tests pending after this hospitalization.  Disease/illness education: Bacteremia, monitor for infection post surgery on AV graft   Home health/community services discussion/referrals: Patient has home health established at [facility].   Establishment or re-establishment of referral orders for community resources: No other necessary community resources.   Discussion with other health care providers: No discussion with other health care providers necessary.     Transition of Care Visit:     I have reviewed and updated the history and problem list.  I have reconciled the medication list.  I have discussed the hospitalization and current medical issues, prognosis and plans with the patient/family.  I  spent more than 50% of time discussing the care with the patient/family.  Total Face-to-Face Encounter: 60 minutes.    Medications Reconciliation:   I have reconciled the patient's home medications and discharge medications with the patient/family. I have updated all changes.  Refer to After-Visit Medication List.    ASSESSMENT & PLAN:     HIGH RISK CONDITION(S)   ESRD, CHF, T2DM, Bacteremia    Houston was seen today for transitional care.    Diagnoses and all orders for this visit:    Bacteremia due to Enterococcus    Type 2 diabetes mellitus with chronic kidney  by your physician but may not be covered, or covered at this frequency, by your insurer. Please check with your insurance carrier before scheduling to verify coverage.    PREVENTATIVE SERVICES FREQUENCY &  COVERAGE DETAILS LAST COMPLETION DATE   Diabetes disease on chronic dialysis, with long-term current use of insulin    Chronic combined systolic and diastolic congestive heart failure    ESRD (end stage renal disease) on dialysis    Take medications as prescribed  Follow up with all MD appointments  Activity as tolerated  Call for any worsening or new symptoms  Follow up with vascular surgeon for suture removal     Were controlled substances prescribed?  No    Instructions for the patient:    Scheduled Follow-up :  Future Appointments   Date Time Provider Department Center   1/13/2022 10:00 AM HOME MONITOR DEVICE CHECK, [facility]   1/26/2022 11:20 AM Dejuan Cody MD [facility] CARDIO [place]   1/31/2022 10:00 AM Tara Padilla DPM [facility] POD [place]       After Visit Medication List :     Medication List          Accurate as of January 7, 2022 11:59 PM. If you have any questions, ask your nurse or doctor.            CHANGE how you take these medications    torsemide 20 MG Tab  Commonly known as: DEMADEX  Take 1 tablet (20 mg total) by mouth 2 (two) times daily. Take additional 2 tablets if weight gain of more than 3 pounds in 1 day  What changed: how much to take        CONTINUE taking these medications    allopurinoL 300 MG tablet  Commonly known as: ZYLOPRIM     apixaban 5 mg Tab  Commonly known as: ELIQUIS  Take 1 tablet (5 mg total) by mouth 2 (two) times daily.     atorvastatin 80 MG tablet  Commonly known as: LIPITOR  Take 1 tablet (80 mg total) by mouth every evening.     benzonatate 100 MG capsule  Commonly known as: TESSALON  Take 1 capsule (100 mg total) by mouth 3 (three) times daily as needed for Cough.     capsicum 0.075% 0.075 % topical cream  Commonly known as: ZOSTRIX  Apply topically 2 (two) times daily as needed (shoulder pain).     carvediloL 6.25 MG tablet  Commonly known as: COREG  Take 1 tablet (6.25 mg total) by mouth 2 (two) times daily.     cholecalciferol (vitamin D3) 50 mcg (2,000 unit) Cap  Commonly known as: VITAMIN D3    "  clopidogreL 75 mg tablet  Commonly known as: PLAVIX     insulin aspart U-100 100 unit/mL injection  Commonly known as: NovoLOG     insulin detemir U-100 100 unit/mL (3 mL) Inpn pen  Commonly known as: Levemir FLEXTOUCH  Inject 5 Units into the skin every evening.     losartan 25 MG tablet  Commonly known as: COZAAR  Take 1 tablet (25 mg total) by mouth once daily.     VANCOMYCIN 1 G/250 ML D5W (READY TO MIX SYSTEM)  Inject 125 mLs (500 mg total) into the vein once daily. To be given with HD on TTS, end date 1/7/22 for 10 days     VENTOLIN HFA 90 mcg/actuation inhaler  Generic drug: albuterol     walker Misc  5" wheel rolling walker            Signature:      " Covered every 2 years for women at normal risk;  Annually if at high risk -  No recommendations at this time    Chlamydia Annually if high risk -  No recommendations at this time   Screening Mammogram    Mammogram     Recommend annually for all female patie

## 2022-01-10 PROBLEM — M47.22 OSTEOARTHRITIS OF SPINE WITH RADICULOPATHY, CERVICAL REGION: Status: ACTIVE | Noted: 2022-01-01

## 2022-01-10 PROBLEM — M19.011 PRIMARY OSTEOARTHRITIS OF RIGHT SHOULDER: Status: ACTIVE | Noted: 2022-01-01

## 2022-01-10 NOTE — PROGRESS NOTES
[facility], Orthopedics and Sports Medicine  [facility]    New Patient Shoulder Office Visit  01/10/2022     Subjective:      Houston Jc is a 76 y.o. right handed male referred by Dr. Jose Thomas for evaluation and treatment of a right shoulder problem.  The patient had atraumatic onset of pain at the right shoulder and along the right anterior chest below the clavicle.  He complained of this issue when he presented to the hospitals after positive blood cultures were taken elsewhere due to persistent cough and malaise.  During that approximately 9 day hospital stay the patient had surgery on his right dialysis fistula and was subsequently discharged home (see EMR for records).     The patient now complains of pain along the anterior chest below the clavicle radiating from the right side of his neck.  He does not have posterior or lateral shoulder pain.  He is able to lift his right arm to the horizon but not as high as his left side.  He denies any fevers or chills.  He has no constitutional symptoms.  He has not had therapy or any other treatment for this issue.     Outside reports reviewed: ER records, historical medical records and office notes.    Past Medical History:   Diagnosis Date    Anemia     CHF (congestive heart failure)     CKD (chronic kidney disease) stage 4, GFR 15-29 ml/min     Coronary artery disease     Diabetes mellitus     Hematuria     Hypertension     Osteoarthritis of spine with radiculopathy, cervical region 1/10/2022    Renal cyst, right        Patient Active Problem List   Diagnosis    Swelling of lower extremity    Hypervolemia    Hypertension    Sleep apnea    Morbid obesity    Complete heart block    VT (ventricular tachycardia)    Cardiorenal syndrome    Acute systolic heart failure    Abdominal distension    Debility    Gout    Cardiac resynchronization therapy defibrillator (CRT-D) in place    Third degree heart block     Abnormal transaminases    NICM (nonischemic cardiomyopathy)    SOB (shortness of breath)    COVID-19 virus detected    Fever    Suspected 2019-nCoV infection    CLEMENCIA (acute kidney injury)    Bradycardia    Scrotal pain    ESRD (end stage renal disease) on dialysis    Chronic combined systolic and diastolic congestive heart failure    PAD (peripheral artery disease)    Acute deep vein thrombosis (DVT) of popliteal vein of right lower extremity    Bacteremia due to Enterococcus    Clotted dialysis access    Type 2 diabetes mellitus with chronic kidney disease on chronic dialysis, with long-term current use of insulin    Anemia, chronic renal failure, stage 5    Acute cystitis without hematuria    NSVT (nonsustained ventricular tachycardia)    Osteoarthritis of spine with radiculopathy, cervical region    Primary osteoarthritis of right shoulder       Past Surgical History:   Procedure Laterality Date    CARDIAC SURGERY      DECLOTTING OF ARTERIOVENOUS GRAFT Right 12/21/2021    Procedure: [id number];  Surgeon: Jeison Hunt MD;  Location: [facility] OR;  Service: Vascular;  Laterality: Right;    FOOT SURGERY      INSERTION OF BIVENTRICULAR IMPLANTABLE CARDIOVERTER-DEFIBRILLATOR (ICD) Left 7/18/2019    Procedure: INSERTION, ICD, BIVENTRICULAR;  Surgeon: Arturo Bay MD;  Location: [facility] EP LAB;  Service: Cardiology;  Laterality: Left;  CHB, CRTD, SJM, anes, GP, 6078    LEFT HEART CATHETERIZATION N/A 7/16/2019    Procedure: Left heart cath;  Surgeon: Dejuan Cody MD;  Location: [facility] CATH LAB/EP;  Service: Cardiology;  Laterality: N/A;    PERITONEAL CATHETER REMOVAL Left 4/11/2020    Procedure: REMOVAL, CATHETER, DIALYSIS, PERITONEAL;  Surgeon: Edis Snell Jr., MD;  Location: [facility] OR;  Service: General;  Laterality: Left;        Current Outpatient Medications   Medication Instructions    allopurinoL (ZYLOPRIM) 300 mg, Oral, Daily    apixaban (ELIQUIS) 5 mg, Oral, 2 times  daily    atorvastatin (LIPITOR) 80 mg, Oral, Nightly    capsicum 0.075% (ZOSTRIX) 0.075 % topical cream Topical (Top), 2 times daily PRN    carvediloL (COREG) 6.25 mg, Oral, 2 times daily    cholecalciferol, vitamin D3, (VITAMIN D3) 50 mcg (2,000 unit) Cap 1 capsule, Oral, Daily    clopidogreL (PLAVIX) 75 mg, Oral, Daily    insulin aspart U-100 (NOVOLOG) 4-8 Units, Subcutaneous, 3 times daily before meals, Per sliding scale    insulin detemir U-100 (LEVEMIR FLEXTOUCH) 5 Units, Subcutaneous, Nightly    losartan (COZAAR) 25 mg, Oral, Daily    torsemide (DEMADEX) 20 mg, Oral, 2 times daily, Take additional 2 tablets if weight gain of more than 3 pounds in 1 day    VENTOLIN HFA 90 mcg/actuation inhaler 1 puff, Inhalation, Every 4 hours PRN, As NEEDED        Review of patient's allergies indicates:  No Known Allergies    Social History     Socioeconomic History    Marital status:    Tobacco Use    Smoking status: Former Smoker    Smokeless tobacco: Never Used   Substance and Sexual Activity    Alcohol use: Yes     Comment: social    Drug use: No    Sexual activity: Yes     Social Determinants of Health     Financial Resource Strain: Low Risk     Difficulty of Paying Living Expenses: Not very hard   Food Insecurity: No Food Insecurity    Worried About Running Out of Food in the Last Year: Never true    Ran Out of Food in the Last Year: Never true   Transportation Needs: No Transportation Needs    Lack of Transportation (Medical): No    Lack of Transportation (Non-Medical): No   Housing Stability: Low Risk     Unable to Pay for Housing in the Last Year: No    Number of Places Lived in the Last Year: 1    Unstable Housing in the Last Year: No       Family History   Problem Relation Age of Onset    Heart attack Father          Review of Systems   Constitutional: Negative for chills and fever.   HENT: Negative for hearing loss.    Eyes: Negative for blurred vision.   Cardiovascular: Negative  for chest pain.   Respiratory: Negative for shortness of breath.    Gastrointestinal: Negative for abdominal pain.   Neurological: Negative for light-headedness.        Objective:      General    Nursing note and vitals reviewed.  Constitutional: He is oriented to person, place, and time. He appears well-developed.   HENT:   Head: Normocephalic and atraumatic.   Eyes: Pupils are equal, round, and reactive to light.   Cardiovascular: Normal rate and regular rhythm.    Pulmonary/Chest: Effort normal.   Abdominal: Soft.   Neurological: He is oriented to person, place, and time.   Psychiatric: He has a normal mood and affect. His behavior is normal.     General Musculoskeletal Exam   Gait: wheelchair.     Back (L-Spine & T-Spine) / Neck (C-Spine) Exam     Neck (C-Spine) Range of Motion   Right Lateral Bend: abnormal  Right Rotation: abnormal    Neck (C-Spine) Tests   Spurling's Test   Right: negative  Right Shoulder Exam     Tenderness   The patient is experiencing no tenderness.    Range of Motion   Active abduction: 120   Forward Flexion: 120   External Rotation 0 degrees: 20     Other   Sensation: normal    Muscle Strength   Right Upper Extremity   Shoulder Abduction: 5/5   Shoulder Internal Rotation: 5/5   Shoulder External Rotation: 5/5   Biceps: 5/5     Vascular Exam     Right Pulses      Radial:                    2+          Imaging:  Radiographs of the right shoulder taken 01/10/2022 were personally reviewed from the [facility] EMR.  Multiple views of the shoulder are available today for review, including an AP, scapular Y, axillary view.  The glenohumeral joint demonstrates moderate degenerative changes.  The acromioclavicular joint demonstrates moderate to severe degenerative changes.  The glenohumeral joint is concentrically reduced.  There is no acute fracture or dislocation.      Procedures        Assessment:       Houston Jc is a 76 y.o. male seen in the office today. The primary encounter diagnosis  was Osteoarthritis of spine with radiculopathy, cervical region. A diagnosis of Primary osteoarthritis of right shoulder was also pertinent to this visit.  He has extensive other medical issues noted in his chart.  Non-operative treatment is recommended at this time. The patient is likely experiencing symptoms of osteoarthritis of the neck with radiation of pain into the anterior chest wall.  The patient would benefit from physical therapy for this issue.  If therapy does not work, he may benefit from Pain Management referral to consider injection therapy.  I do not see any evidence of septic shoulder at this time, and his symptoms appear to be entirely extraarticular in nature.  The patient has not yet followed up with general surgery after his recent fistula surgery and I encouraged him to do so.  The natural history and expected course discussed with patient. Various treatment options were discussed, including their risks and benefits. All of the patient's questions were answered.     Plan:      1. Physical therapy and rehabilitation treatment.  2. Tylenol 650mg TID, PRN pain.  3. Follow up in 3 months or as  needed if symptoms worsen.   4. Follow up with general surgeon for further treatment of the right arm fistula s/p surgery         Joshua Martinez IV, MD   of Clinical Orthopedics  Department of Orthopedic Surgery  [facility]  Office: [phone]  Website: [email or web address]      Orders Placed This Encounter    Ambulatory referral/consult to Physical/Occupational Therapy

## 2022-01-26 PROBLEM — T82.898A PROBLEM WITH DIALYSIS ACCESS: Status: ACTIVE | Noted: 2022-01-01

## 2022-01-26 NOTE — PROGRESS NOTES
Subjective:   Patient ID:  Houston Jc is a 76 y.o. male who presents for evaluation of Hyperlipidemia, Hypertension, Peripheral Arterial Disease, Obesity, and Deep Vein Thrombosis      HPI:       Houston Jc 76 y.o. male is here follow up and feeling well without any new complaints.      He is here for follow-up of admission December 2021 for Enterococcus bacteremia. His initial presenting complaint in the emergency room on December 25, 2021 was a cough.  He responded well to antibiotics.  He was discharged with IV antibiotics and completed the course. He had been wheelchair bound since March 2020 after COVID-19 viral illness he continues to improve.  He was able to stand up take a few steps during the visit today January 26, 2022.         He has a history of HTN, CHF, non obstructive CAD, CHB, obesity, ESRD, and DM, who was [place] [facility] [place] with complete heart block and intermittent VT. An echo showed his EF was 30%. Amiodarone was started, a LHC showed luminal irregularities, and a [employer] CRT-D was implanted prior to discharge (RV septal lead in LV port). At his 11/2019 visit, Amiodarone was decreased to 200 mg bid.          He takes eliquis and plavix.       ICD interrogation 5/2021:     96% RV pacing   5 yr battery life       Echo 3/2021     EF 30%    Grade I DD   Severe LAE   Mod ERVIN   Mild AI   PASP 38    Normal RV function         [facility] 7/2019      · LVEDP (Pre): 12  · No LV gram because of CKD  · Non-obstructive CAD (minimal luminal irregularities)           Echo 10/2021     EF 35 + grade III DD   Severe LAE   RV pacing    Mild TR    TIMI 10/2021     Medial calcinosis       TBI 10/2021     R 0.26   L 0.50    US 10/2021     High grade distal R SFA with R PT    High grade distal L SFA disease with L PT     Incidental R POP DVT noted (10/2021)           Echo 12/2021    · The left ventricle is moderately enlarged with mild concentric hypertrophy and  · The quantitatively derived ejection fraction  is 34%.  · Severe left atrial enlargement.  · Normal right ventricular size with normal right ventricular systolic function.  · Normal central venous pressure (3 mmHg).  · The estimated PA systolic pressure is 21 mmHg.  · No vegetations per surface echo.        Patient Active Problem List    Diagnosis Date Noted    Problem with dialysis access 01/26/2022    Osteoarthritis of spine with radiculopathy, cervical region 01/10/2022    Primary osteoarthritis of right shoulder 01/10/2022    Acute cystitis without hematuria     NSVT (nonsustained ventricular tachycardia)     Type 2 diabetes mellitus with chronic kidney disease on chronic dialysis, with long-term current use of insulin 12/22/2021    Anemia, chronic renal failure, stage 5     Bacteremia due to Enterococcus 12/21/2021     Gram positive      Clotted dialysis access 12/21/2021    PAD (peripheral artery disease) 10/13/2021    Acute deep vein thrombosis (DVT) of popliteal vein of right lower extremity 10/13/2021    ESRD (end stage renal disease) on dialysis 07/07/2021     Tu Th Sat        Chronic combined systolic and diastolic congestive heart failure 07/07/2021    Scrotal pain     Bradycardia 04/04/2020    CLEMENCIA (acute kidney injury)     COVID-19 virus detected     Fever     Suspected 2019-nCoV infection     SOB (shortness of breath) 03/28/2020    NICM (nonischemic cardiomyopathy) 11/06/2019    Abnormal transaminases 07/20/2019    Cardiac resynchronization therapy defibrillator (CRT-D) in place 07/19/2019    Third degree heart block 07/19/2019    Gout 07/18/2019    Abdominal distension 07/17/2019    Debility 07/17/2019    VT (ventricular tachycardia) 07/11/2019    Cardiorenal syndrome 07/11/2019    Acute systolic heart failure 07/11/2019    Complete heart block 07/09/2019    Morbid obesity 10/08/2015    Hypertension 09/21/2015    Sleep apnea 09/21/2015    Swelling of lower extremity 09/18/2015    Hypervolemia 09/18/2015            Left Arm BP - Sittin/56  Reason for not completing BP on both arms: AV shunt        LABS    LAST HbA1c  Lab Results   Component Value Date    HGBA1C 6.8 (H) 2021       Lipid panel  Lab Results   Component Value Date    CHOL 82 (L) 2019     Lab Results   Component Value Date    HDL 27 (L) 2019     Lab Results   Component Value Date    LDLCALC 38.0 (L) 2019     Lab Results   Component Value Date    TRIG 85 2019     Lab Results   Component Value Date    CHOLHDL 32.9 2019            Review of Systems   Constitutional: Negative for diaphoresis, night sweats, weight gain and weight loss.   HENT: Negative for congestion.    Eyes: Negative for blurred vision, discharge and double vision.   Cardiovascular: Negative for chest pain, claudication, cyanosis, dyspnea on exertion, irregular heartbeat, leg swelling, near-syncope, orthopnea, palpitations, paroxysmal nocturnal dyspnea and syncope.   Respiratory: Negative for cough, shortness of breath and wheezing.    Endocrine: Negative for cold intolerance, heat intolerance and polyphagia.   Hematologic/Lymphatic: Negative for adenopathy and bleeding problem. Does not bruise/bleed easily.   Skin: Negative for dry skin and nail changes.   Musculoskeletal: Positive for muscle weakness. Negative for arthritis, back pain, falls, joint pain, myalgias and neck pain.   Gastrointestinal: Negative for bloating, abdominal pain, change in bowel habit and constipation.   Genitourinary: Negative for bladder incontinence, dysuria, flank pain, genital sores and missed menses.   Neurological: Negative for aphonia, brief paralysis, difficulty with concentration, dizziness and weakness.   Psychiatric/Behavioral: Negative for altered mental status and memory loss. The patient does not have insomnia.    Allergic/Immunologic: Negative for environmental allergies.       Objective:   Physical Exam  Constitutional:       Appearance: He is well-developed.       Interventions: He is not intubated.     Comments: Sitting in a wheelchair          HENT:      Head: Normocephalic and atraumatic.      Right Ear: External ear normal.      Left Ear: External ear normal.   Eyes:      General: No scleral icterus.        Right eye: No discharge.         Left eye: No discharge.      Conjunctiva/sclera: Conjunctivae normal.      Pupils: Pupils are equal, round, and reactive to light.   Neck:      Thyroid: No thyromegaly.      Vascular: Normal carotid pulses. No carotid bruit, hepatojugular reflux or JVD.      Trachea: No tracheal deviation.   Cardiovascular:      Rate and Rhythm: Normal rate and regular rhythm.  No extrasystoles are present.     Chest Wall: PMI is not displaced.      Pulses: No midsystolic click.           Carotid pulses are 2+ on the right side and 2+ on the left side.       Radial pulses are 2+ on the right side and 2+ on the left side.        Femoral pulses are 2+ on the right side and 2+ on the left side.       Popliteal pulses are 0 on the right side and 0 on the left side.        Dorsalis pedis pulses are 0 on the right side and 0 on the left side.        Posterior tibial pulses are 0 on the right side and 0 on the left side.      Heart sounds: S1 normal and S2 normal. Heart sounds not distant. No murmur heard.  No friction rub. No gallop. No S3 sounds.       Comments:       No doppler L  PT and monophasic L DP doppler signals         Biphasic R DP and PT doppler signals         Doppler but not palpable thrill RUE AVF           Pulmonary:      Effort: Pulmonary effort is normal. No tachypnea, bradypnea, accessory muscle usage or respiratory distress. He is not intubated.      Breath sounds: Normal breath sounds. No stridor. No decreased breath sounds, wheezing or rales.   Chest:      Chest wall: No tenderness.      Comments:         Abdominal:      General: There is no distension or abdominal bruit.      Palpations: There is no mass or pulsatile mass.       Tenderness: There is no abdominal tenderness. There is no guarding or rebound.   Musculoskeletal:         General: No tenderness. Normal range of motion.      Cervical back: Normal range of motion and neck supple.      Comments:     Muscle weakness from deconditioning                Lymphadenopathy:      Cervical: No cervical adenopathy.   Skin:     General: Skin is warm.      Coloration: Skin is not pale.      Findings: No erythema or rash.      Comments:     No ulcers         Neurological:      Mental Status: He is alert and oriented to person, place, and time.      Cranial Nerves: No cranial nerve deficit.      Coordination: Coordination normal.      Deep Tendon Reflexes: Reflexes are normal and symmetric.   Psychiatric:         Behavior: Behavior normal.         Thought Content: Thought content normal.         Judgment: Judgment normal.         Assessment:     1. Problem with dialysis access, subsequent encounter    2. Primary hypertension    3. Cardiac resynchronization therapy defibrillator (CRT-D) in place    4. Chronic combined systolic and diastolic congestive heart failure    5. NICM (nonischemic cardiomyopathy)    6. PAD (peripheral artery disease)    7. ESRD (end stage renal disease) on dialysis    8. Acute deep vein thrombosis (DVT) of popliteal vein of right lower extremity    9. Type 2 diabetes mellitus with chronic kidney disease on chronic dialysis, with long-term current use of insulin    10. Morbid obesity    11. Debility        Plan:         Continue with eliquis for R POP DVT  Repeat US in 6 months  Medical therapy for PAD  Statin therapy for non obstructive CAD  Continue with coreg 12.5 bid and losartan  Proper foot care  Continue care with podiatry  Physical therapy for deconditioning            Continue with current medical plan and lifestyle changes.  Return sooner for concerns or questions. If symptoms persist go to the ED  I have reviewed all pertinent data on this patient       I have  reviewed the patient's medical history in detail and updated the computerized patient record.    Orders Placed This Encounter   Procedures    US Lower Extremity Veins Bilateral     Standing Status:   Future     Standing Expiration Date:   1/26/2023       Follow up as scheduled. Return sooner for concerns or questions            He expressed verbal understanding and agreed with the plan              - Discussed general foot care:  Wear comfortable, proper fitting shoes. Wash feet daily. Dry well. After drying, apply moisturizer to feet (no lotion to webspaces). Inspect feet daily for skin breaks, blisters, swelling, or redness. Wear cotton socks (preferably white)  Change socks every day. Do NOT walk barefoot. Do NOT use heating pads or warm/hot water soaks      -Patient was advised of signs and symptoms of infection including redness, drainage, purulence, odor, streaking, fever, chills and I advised patient to seek medical attention (ER or urgent care) if these symptoms arise.           Patient's Medications   New Prescriptions    No medications on file   Previous Medications    ALLOPURINOL (ZYLOPRIM) 300 MG TABLET    Take 300 mg by mouth once daily.    APIXABAN (ELIQUIS) 5 MG TAB    Take 1 tablet (5 mg total) by mouth 2 (two) times daily.    ATORVASTATIN (LIPITOR) 80 MG TABLET    Take 1 tablet (80 mg total) by mouth every evening.    CAPSICUM 0.075% (ZOSTRIX) 0.075 % TOPICAL CREAM    Apply topically 2 (two) times daily as needed (shoulder pain).    CARVEDILOL (COREG) 6.25 MG TABLET    Take 1 tablet (6.25 mg total) by mouth 2 (two) times daily.    CHOLECALCIFEROL, VITAMIN D3, (VITAMIN D3) 50 MCG (2,000 UNIT) CAP    Take 1 capsule by mouth once daily.    CLOPIDOGREL (PLAVIX) 75 MG TABLET    Take 75 mg by mouth once daily.    INSULIN ASPART U-100 (NOVOLOG) 100 UNIT/ML INJECTION    Inject 4-8 Units into the skin 3 (three) times daily before meals. Per sliding scale    INSULIN DETEMIR U-100 (LEVEMIR FLEXTOUCH) 100  UNIT/ML (3 ML) SUBQ INPN PEN    Inject 5 Units into the skin every evening.    LOSARTAN (COZAAR) 25 MG TABLET    Take 1 tablet (25 mg total) by mouth once daily.    TORSEMIDE (DEMADEX) 20 MG TAB    Take 1 tablet (20 mg total) by mouth 2 (two) times daily. Take additional 2 tablets if weight gain of more than 3 pounds in 1 day    VENTOLIN HFA 90 MCG/ACTUATION INHALER    Inhale 1 puff into the lungs every 4 (four) hours as needed. As NEEDED   Modified Medications    No medications on file   Discontinued Medications    No medications on file

## 2022-01-26 NOTE — PATIENT INSTRUCTIONS
Patient Education       Coronary Artery Disease Discharge Instructions   About this topic   Normally, you have many blood vessels that supply blood to the heart muscle. Coronary artery disease, or CAD, is a condition where these arteries may be blocked a little or all the way. Fatty materials and cholesterol are stuck inside the arteries and block blood flow. When these arteries are blocked, less oxygen gets to the heart. This may cause chest pain. If the blockage is bad enough, it can cause a heart attack.  CAD can cause serious heart problems. CAD may be treated and managed. You may need drugs and lifestyle changes. The doctor may suggest surgery for very bad cases of CAD.     What care is needed at home?   · Ask your doctor what you need to do when you go home. Make sure you ask questions if you do not understand what the doctor says. This way you will know what you need to do.  · Talk to your doctor about what drugs you will need to take. Be sure to take them as ordered.  · Ask for a diet consult before you go home. This will help you monitor your cholesterol level and sugar level. Ask your dietitian and doctor what foods are healthy for you.  · Ask your doctor for ways to control your blood pressure.  · Talk to your doctor about your cholesterol levels. Ask if there are ways you can work to lower your bad cholesterol and raise your good cholesterol.  · Stop smoking. This will help relax the blood vessels in your body.  · Stop or limit drinking beer, wine, and mixed drinks (alcohol).  · Find a way to manage stress. You may practice relaxation methods such as reflection, breathing, and muscle relaxation.  What follow-up care is needed?   · Your doctor may ask you to make visits to the office to check on your progress. Be sure to keep these visits.  · Your doctor may suggest you go to a heart rehab center for more care.  What drugs may be needed?   The doctor may order drugs to:  · Lower blood pressure  · Prevent  blood clots  · Slow your heart rate  · Lower cholesterol  · Relieve heart pain  Will physical activity be limited?   Talk to your doctor about the right amount of activity for you.  What changes to diet are needed?   · Eat fruits and vegetables.  · Eat whole grain products such as whole grain cereals and bread.  · Eat lean meat like poultry and fish.  · Eat food low in fat.  · Drink low-fat milk and low-fat milk products.  · Avoid fatty, fried, or greasy food.  · Avoid foods high in salt and sugar.  What problems could happen?   · Heart attack  · Stroke  · Cardiac arrest  · Weakening of the heart  · Congestive heart failure  · Abnormal heart rhythm  What can be done to prevent this health problem?   · Eat a heart healthy diet.  · Exercise regularly.  · Lose weight or keep a healthy weight.  · Stop smoking. Avoid alcohol.  · Lower stress.  · Control your blood pressure.  · Take drugs for your heart as ordered by your doctor.     When do I need to call the doctor?   Activate the emergency medical system right away if you have signs of a heart attack or stroke. Call 911 in the [place] or [place]. The sooner treatment begins, the better your chances for recovery. Call for emergency help right away if you have:  · Signs of heart attack:  ? Chest pain  ? Trouble breathing  ? Fast heartbeat  ? Feeling dizzy       · Signs of stroke:  ? Sudden numbness or weakness of the face, arm, or leg, especially on one side of the body  ? Sudden confusion, trouble speaking or understanding  ? Sudden trouble seeing in one or both eyes  ? Sudden trouble walking, dizziness, loss of balance or coordination  ? Sudden severe headache with no known cause  Call your doctor if you have:  · You are not feeling better in 2 to 3 days or you are feeling worse  Helpful tips   Join support groups to get to know other people who have coped with the condition. This may help relieve your stress and anxiety.  Teach Back: Helping You Understand   The  Teach Back Method helps you understand the information we are giving you. After you talk with the staff, tell them in your own words what you learned. This helps to make sure the staff has described each thing clearly. It also helps to explain things that may have been confusing. Before going home, make sure you can do these:  · I can tell you about my condition.  · I can tell you how to lower my risk of heart disease.  · I can tell you why it is important to stop smoking.  · I can tell you what I will do if I have signs of a heart attack or stroke.  Where can I learn more?   FamilyDoctor.org  http://familydoctor.org/familydoctor/en/diseases-conditions/coronary-artery-disease.printerview.all.html   [facility] Choices  https://www.nhs.uk/conditions/coronary-heart-disease/   Last Reviewed Date   2021-04-22  Consumer Information Use and Disclaimer   This information is not specific medical advice and does not replace information you receive from your health care provider. This is only a brief summary of general information. It does NOT include all information about conditions, illnesses, injuries, tests, procedures, treatments, therapies, discharge instructions or life-style choices that may apply to you. You must talk with your health care provider for complete information about your health and treatment options. This information should not be used to decide whether or not to accept your health care providers advice, instructions or recommendations. Only your health care provider has the knowledge and training to provide advice that is right for you.  Copyright   Copyright © 2021 [employer] and its affiliates and/or licensors. All rights reserved.

## 2022-02-11 NOTE — BRIEF OP NOTE
S/p R brachiocephalic evaluation       AVF declot with Angiojet catheter   PTV of cephalic arch + AVF with 8 x 40 Draper balloon   99% of cephalic arch stenosis         Plan:       Resume eliquis    Relook venogram in 7 days        Full report to follow

## 2022-02-11 NOTE — Clinical Note
75 ml of contrast were injected throughout the case. 75 mL of contrast was the total wasted during the case. 150 mL was the total amount used during the case.

## 2022-02-11 NOTE — Clinical Note
The right radial and right brachial was prepped. The site was prepped with ChloraPrep. The site was clipped. The patient was draped.

## 2022-02-11 NOTE — DISCHARGE INSTRUCTIONS
Discharge Instructions:    Do not drive a car, operate heavy equipment, care for a young child, etc for the next 12-24 hours.   Avoid drinking alcohol for 24 hours.  Do not make any important decisions for 24 hours.    Drink fluids to keep hydrated. Resume your usual diet as tolerated.     Rest for today then activity as tolerated.   Do not lift anything over 5 pounds for the first 3 days after procedure.    Remove dressing tomorrow then may shower with warm soapy water. Do not scrub site. Pat dry.   May apply bandaid for 2 days.  No tub baths.  Do not submerge wound in water for 3 days.     Call MD for any unrelieved pain, excessive nausea or vomiting, redness around site, bleeding, or pus or foul smelling drainage, or any other questions or concerns.    Go to the ER for any difficulty breathing or chest pain.      If site swells or bleeds, hold direct pressure to area for 10 full minutes.   If site continues to bleed, continue to hold pressure to site and have someone bring you to the ER.      Follow any additional instructions given to you by MD.      Call MD to schedule a follow up appointment, or follow up as instructed.

## 2022-02-12 NOTE — PLAN OF CARE
Patient transfered to cath lab bay 5 via stretcher with side rails up x2. Pt AAOx4 and able to follow commands. Pt is stable when connecting to cardiac monitors. VSS. NADN.    Right upper arm gauze and tegaderm site is CDI. No redness, bruising, or hematoma noted around site. +2 alverto radial pulses palpated. Dopplered alverto pedal pulses. Skin normal in color and warm to touch, <3 sec cap refill.  Fall risk precautions given and patient acknowledges. AIDET completed to pt.     Cath lab RN updated pt's spouse in sx waiting room, spouse to bedside post op. Dr. Cody at bedside to discuss how procedure went with pt and wife. Will continue to monitor patient.

## 2022-02-12 NOTE — PLAN OF CARE
Patient discharged to home as ordered after recovery per Dr. Cody.     All discharge instructions, printed materials given.    Patient instructed on follow-up appointment as ordered.  Patient verbalized understanding and agreement with all discharge instructions given. Dr. Quintero at patient's bedside speaking to pt and spouse.    Pt is AAOx3, VSS, denies any pain.    Right upper arm with gauze and tegaderm, and dstat dressing c.d.i. No bleeding or hematoma noted.  Skin normal in color and warm to touch. Palpated bilateral radial pulses.  Pt got dressed and moving around without difficulty and no distress noted.  Pt in w/c.  Left  AC 20 gauge iv dc'd as ordered with tip intact. shree and koban dressing applied c.d.i.  Pt discharged home via wheelchair to private vehicle by pt's spouse.

## 2022-02-18 NOTE — NURSING
Patient arrived to unit in stable condition. Telemetry monitor on. Patient NPO.  Bed locked and in lowest position. Side rails up X2. Call light and bedside table within reach. Will continue to monitor.

## 2022-02-18 NOTE — TRANSFER OF CARE
Anesthesia Transfer of Care Note    Patient: Houston Jc    Procedure(s) Performed: Procedure(s) (LRB):  [id number] (Right)  REVISION, PROCEDURE INVOLVING ARTERIOVENOUS GRAFT (Right)  INSERTION, GRAFT, ARTERIOVENOUS (Right)    Patient location: [facility]    Anesthesia Type: MAC    Transport from OR: Transported from OR on room air with adequate spontaneous ventilation    Post pain: adequate analgesia    Post assessment: no apparent anesthetic complications and tolerated procedure well    Post vital signs: stable    Level of consciousness: awake, alert and oriented    Nausea/Vomiting: no nausea/vomiting    Complications: none    Transfer of care protocol was followed      Last vitals:   Visit Vitals  /62   Pulse 78   Temp 37 °C (98.6 °F) (Oral)   Resp 16   Ht 6' (1.829 m)   Wt 116 kg (255 lb 11.7 oz)   SpO2 100%   BMI 34.68 kg/m²

## 2022-02-18 NOTE — OP NOTE
[facility] - Telemetry  Operative Note      Date of Procedure: 2/18/2022     Procedure: Procedure(s) (LRB):  [id number] (Right)  REVISION, PROCEDURE INVOLVING ARTERIOVENOUS GRAFT (Right)  INSERTION, GRAFT, ARTERIOVENOUS (Right)     Surgeon(s) and Role:     * Jeison Hunt MD - Primary    Assisting Surgeon: None    Pre-Operative Diagnosis: ESRD (end stage renal disease) [N18.6]  Primary osteoarthritis of right shoulder [M19.011]    Post-Operative Diagnosis: Post-Op Diagnosis Codes:     * ESRD (end stage renal disease) [N18.6]     * Primary osteoarthritis of right shoulder [M19.011]    Anesthesia: Regional    Operative Findings (including complications, if any):  Declotting thrombectomy graft right upper arm followed by insertion of new graft segment right upper arm bypass and the venous anastomosis outflow with insertion of a [place]-Josemanuel graft under regional anesthesia patient tolerated well sent to recovery room in stable condition estimated blood loss 50 cc no specimen .    Description of Technical Procedures:  After satisfactory IV sedation and right of the arm of a regional block right upper arm forearm and was prepped and draped normal sterile manner using ChloraPrep time-out was called site was confirmed 1st area proximal to the venous anastomotic area and the right shoulder area a small incision was made cephalic vein was isolated proximal and distal control obtained over the catheter induced on the arterial side and thrombus was removed from the arterial side forward in [place] the venous side could not be passed beyond the right sub was the cephalic subclavian junction since there was outflow obstruction were decided to bypass that area incision was made on the medial aspect of the right upper arm was taken down to the deep subcu tissue subcutaneous bleeders clamped bovied further dissection deeper down brachial vein was isolated basilic vein proximal and distal control was obtained a tunnel was  created between the 2 incision [place]-Josemanuel graft 6 mm size was placed and of the tunnel was graft to the vein anastomosis was created using running 6 0 Prolene suture then graft to the cephalic vein outflow anastomosis) was performed using running 6 0 Prolene suture the proximal cephalic vein was ligated using running 5 0 Prolene suture patient had excellent bruit after complete and completion of procedure all wounds were thoroughly irrigated antibiotic solution closure of all wounds was performed 0 Vicryl for the fascia 6 skin was closed using running 4 nylon suture Xeroform 4 x 4 Kerlix sterile gauze dressings applied instrument counts sponge count counts correct patellar well no intraop complication estimated blood loss 50 cc patient sent to recovery room in stable condition.    Significant Surgical Tasks Conducted by the Assistant(s), if Applicable: na    Estimated Blood Loss (EBL): 50 cc         Implants:   Implant Name Type Inv. Item Serial No.  Lot No. LRB No. Used Action   GRAFT STR STRETCH TW [id number] - [id number]  GRAFT STR STRETCH TW [id number]  W.LUIS NOGUEIRA [id number] Right 1 Implanted       Specimens:   Specimen (24h ago, onward)            None                  Condition: Good    Disposition: PACU - hemodynamically stable.    Attestation: I performed the procedure.    Discharge Note    OUTCOME: Patient tolerated treatment/procedure well without complication and is now ready for discharge.    DISPOSITION: Home or Self Care    FINAL DIAGNOSIS:  Clotted dialysis access    FOLLOWUP: In clinic 7 days      DISCHARGE INSTRUCTIONS:    Discharge Procedure Orders   Diet general     Keep surgical extremity elevated     Lifting restrictions     Call MD for:  temperature >100.4     Call MD for:  persistent nausea and vomiting     Call MD for:  severe uncontrolled pain     Call MD for:  redness, tenderness, or signs of infection (pain, swelling, redness, odor or green/yellow discharge around incision site)      Leave dressing on - Keep it clean, dry, and intact until clinic visit

## 2022-02-18 NOTE — H&P
Today`s Date: 2/18/2022     Admit Date: 2/18/2022    Admitting Physician: Helder Cote, *    Patient`s Name: [place] Jc , 76 y.o. male    HISTORY AND CHIEF COMPLAINT  Admitted with recurrebnt right upper arm grfat     Patient Active Problem List   Diagnosis    Swelling of lower extremity    Hypervolemia    Hypertension    Sleep apnea    Morbid obesity    Complete heart block    VT (ventricular tachycardia)    Cardiorenal syndrome    Acute systolic heart failure    Abdominal distension    Debility    Gout    Cardiac resynchronization therapy defibrillator (CRT-D) in place    Third degree heart block    Abnormal transaminases    NICM (nonischemic cardiomyopathy)    SOB (shortness of breath)    COVID-19 virus detected    Fever    Suspected 2019-nCoV infection    CLEMENCIA (acute kidney injury)    Bradycardia    Scrotal pain    ESRD (end stage renal disease) on dialysis    Chronic combined systolic and diastolic congestive heart failure    PAD (peripheral artery disease)    Acute deep vein thrombosis (DVT) of popliteal vein of right lower extremity    Bacteremia due to Enterococcus    Clotted dialysis access    Type 2 diabetes mellitus with chronic kidney disease on chronic dialysis, with long-term current use of insulin    Anemia, chronic renal failure, stage 5    Acute cystitis without hematuria    NSVT (nonsustained ventricular tachycardia)    Osteoarthritis of spine with radiculopathy, cervical region    Primary osteoarthritis of right shoulder    Problem with dialysis access       Past Medical History:   Diagnosis Date    Anemia     CHF (congestive heart failure)     CKD (chronic kidney disease) stage 4, GFR 15-29 ml/min     Coronary artery disease     Diabetes mellitus     Hematuria     Hypertension     Osteoarthritis of spine with radiculopathy, cervical region 1/10/2022    Renal cyst, right        Past Surgical History:   Procedure Laterality Date     ANGIOGRAPHY OF ARTERIOVENOUS SHUNT Right 2/11/2022    Procedure: Fistulogram with Possible Intervention;  Surgeon: Dejuan Cody MD;  Location: [facility] CATH LAB/EP;  Service: Cardiology;  Laterality: Right;    CARDIAC SURGERY      DECLOTTING OF ARTERIOVENOUS GRAFT Right 12/21/2021    Procedure: [id number];  Surgeon: Jeison Hunt MD;  Location: [facility] OR;  Service: Vascular;  Laterality: Right;    FISTULOGRAM N/A 2/11/2022    Procedure: Fistulogram;  Surgeon: Dejuan Cody MD;  Location: [facility] CATH LAB/EP;  Service: Cardiology;  Laterality: N/A;    FOOT SURGERY      INSERTION OF BIVENTRICULAR IMPLANTABLE CARDIOVERTER-DEFIBRILLATOR (ICD) Left 7/18/2019    Procedure: INSERTION, ICD, BIVENTRICULAR;  Surgeon: Arturo Bay MD;  Location: [facility] EP LAB;  Service: Cardiology;  Laterality: Left;  CHB, CRTD, SJM, anes, GP, 6078    LEFT HEART CATHETERIZATION N/A 7/16/2019    Procedure: Left heart cath;  Surgeon: Dejuan Cody MD;  Location: [facility] CATH LAB/EP;  Service: Cardiology;  Laterality: N/A;    PERCUTANEOUS TRANSLUMINAL ANGIOPLASTY OF ARTERIOVENOUS FISTULA Right 2/11/2022    Procedure: PTA, AV FISTULA;  Surgeon: Dejuan Cody MD;  Location: [facility] CATH LAB/EP;  Service: Cardiology;  Laterality: Right;    PERITONEAL CATHETER REMOVAL Left 4/11/2020    Procedure: REMOVAL, CATHETER, DIALYSIS, PERITONEAL;  Surgeon: Edis Snell Jr., MD;  Location: [facility] OR;  Service: General;  Laterality: Left;       Prior to Admission medications    Medication Sig Start Date End Date Taking? Authorizing Provider   allopurinol (ZYLOPRIM) 300 MG tablet Take 300 mg by mouth once daily.    Historical Provider   apixaban (ELIQUIS) 5 mg Tab Take 1 tablet (5 mg total) by mouth 2 (two) times daily. 10/19/21   Pawel Palmer MD   atorvastatin (LIPITOR) 80 MG tablet Take 1 tablet (80 mg total) by mouth every evening. 7/7/21   Dejuan Cody MD   capsicum 0.075% (ZOSTRIX) 0.075 % topical cream Apply topically 2  (two) times daily as needed (shoulder pain). 12/28/21   Leslie Bullock MD   carvediloL (COREG) 6.25 MG tablet Take 1 tablet (6.25 mg total) by mouth 2 (two) times daily. 12/28/21 12/28/22  Leslie Bullock MD   cholecalciferol, vitamin D3, (VITAMIN D3) 50 mcg (2,000 unit) Cap Take 1 capsule by mouth once daily.    Historical Provider   clopidogreL (PLAVIX) 75 mg tablet Take 75 mg by mouth once daily.    Historical Provider   insulin aspart U-100 (NOVOLOG) 100 unit/mL injection Inject 4-8 Units into the skin 3 (three) times daily before meals. Per sliding scale    Historical Provider   insulin detemir U-100 (LEVEMIR FLEXTOUCH) 100 unit/mL (3 mL) SubQ InPn pen Inject 5 Units into the skin every evening. 12/28/21 3/28/22  Leslie Bullock MD   losartan (COZAAR) 25 MG tablet Take 1 tablet (25 mg total) by mouth once daily. 7/7/21 7/7/22  Dejuan Cody MD   torsemide (DEMADEX) 20 MG Tab Take 1 tablet (20 mg total) by mouth 2 (two) times daily. Take additional 2 tablets if weight gain of more than 3 pounds in 1 day  Patient taking differently: Take 40 mg by mouth 2 (two) times daily. Take additional 2 tablets if weight gain of more than 3 pounds in 1 day 5/10/21 5/10/22  Aieme Arroyo MD   VENTOLIN HFA 90 mcg/actuation inhaler Inhale 1 puff into the lungs every 4 (four) hours as needed. As NEEDED 6/25/19   Historical Provider     No current facility-administered medications on file prior to encounter.     Current Outpatient Medications on File Prior to Encounter   Medication Sig Dispense Refill    allopurinol (ZYLOPRIM) 300 MG tablet Take 300 mg by mouth once daily.      apixaban (ELIQUIS) 5 mg Tab Take 1 tablet (5 mg total) by mouth 2 (two) times daily. 60 tablet 6    atorvastatin (LIPITOR) 80 MG tablet Take 1 tablet (80 mg total) by mouth every evening. 90 tablet 3    capsicum 0.075% (ZOSTRIX) 0.075 % topical cream Apply topically 2 (two) times daily as needed (shoulder pain). 57 g 0    carvediloL (COREG)  6.25 MG tablet Take 1 tablet (6.25 mg total) by mouth 2 (two) times daily. 60 tablet 11    cholecalciferol, vitamin D3, (VITAMIN D3) 50 mcg (2,000 unit) Cap Take 1 capsule by mouth once daily.      clopidogreL (PLAVIX) 75 mg tablet Take 75 mg by mouth once daily.      insulin aspart U-100 (NOVOLOG) 100 unit/mL injection Inject 4-8 Units into the skin 3 (three) times daily before meals. Per sliding scale      insulin detemir U-100 (LEVEMIR FLEXTOUCH) 100 unit/mL (3 mL) SubQ InPn pen Inject 5 Units into the skin every evening. 15 mL 2    losartan (COZAAR) 25 MG tablet Take 1 tablet (25 mg total) by mouth once daily. 90 tablet 3    torsemide (DEMADEX) 20 MG Tab Take 1 tablet (20 mg total) by mouth 2 (two) times daily. Take additional 2 tablets if weight gain of more than 3 pounds in 1 day (Patient taking differently: Take 40 mg by mouth 2 (two) times daily. Take additional 2 tablets if weight gain of more than 3 pounds in 1 day) 60 tablet 11    VENTOLIN HFA 90 mcg/actuation inhaler Inhale 1 puff into the lungs every 4 (four) hours as needed. As NEEDED          Review of patient's allergies indicates:  No Known Allergies    Social History:   reports that he has quit smoking. He has never used smokeless tobacco. He reports current alcohol use. He reports that he does not use drugs.     Family History   Problem Relation Age of Onset    Heart attack Father        PHYSICAL EXAMINATION  Temp:  [98.6 °F (37 °C)] 98.6 °F (37 °C)  Pulse:  [74] 74  Resp:  [20] 20  SpO2:  [100 %] 100 %  BP: (130)/(59) 130/59    General Condition:   alert x 3    Head & Neck  Anemia: None  Jaundice: None  Neck vein: Not distended  Carotid Bruits: none  Lymph nodes: none palpable  Thyroid: normal    Chest: normal    Heart: normal    Rt. Breast: not examined  Lt. Breast: not examined  Axillary lymph nodes: none    Abdomen: Soft,  None tender with no palpable mass or organ  Hernia: none    Rectal: Defered    Extremities: normal    Vascular:  normal    Specific focus Examination    Imp: recurrent right upper arm clotted graft.crf.dm  Htn, obesity    Plan: possible declotting with revision or possible insertion of perm cath

## 2022-02-18 NOTE — PLAN OF CARE
Problem: Adult Inpatient Plan of Care  Goal: Plan of Care Review  Outcome: Ongoing, Progressing       POC reviewed with patient. No change in condition. Patient centered care maintained. Hourly rounding performed. Pain assessment performed. Patient denies pain at this time. Safety precautions initiated and maintained. Patient NPO. Surgery for dialysis access declotting scheduled for today. Bed locked and in lowest position. Side rails up X 2. Bed alarm on. Call light and bedside table within reach. Will continue to monitor.

## 2022-02-18 NOTE — PHARMACY MED REC
"Admission Medication History     The home medication history was taken by Lorene Garcia CPhT.    Medication history obtained from, Patient Verified    You may go to "Admission" then "Reconcile Home Medications" tabs to review and/or act upon these items.      The home medication list has been updated by the Pharmacy department.    Please read ALL comments highlighted in yellow.    Please address this information as you see fit.     Feel free to contact us if you have any questions or require assistance.      The medications listed below were removed from the home medication list.  Please reorder if appropriate:  Patient reports no longer taking the following medication(s):   Atorvastatin 80 mg   Levemir 100 units/ml   Losartan 25 mg        Lorene Garcia CPhT.  Ext [phone]                .          "

## 2022-02-18 NOTE — PLAN OF CARE
Signed out from pacu per Dr Loco. Report called to Windy Rn/4A. Transported to room 425 via stretcher,sr upx2.

## 2022-02-18 NOTE — CONSULTS
NEPHROLOGY CONSULT NOTE    HPI & INTERVAL HISTORY:    Past Medical History:   Diagnosis Date    Anemia     CHF (congestive heart failure)     CKD (chronic kidney disease) stage 4, GFR 15-29 ml/min     Coronary artery disease     Diabetes mellitus     Hematuria     Hypertension     Osteoarthritis of spine with radiculopathy, cervical region 1/10/2022    Renal cyst, right       Past Surgical History:   Procedure Laterality Date    ANGIOGRAPHY OF ARTERIOVENOUS SHUNT Right 2/11/2022    Procedure: Fistulogram with Possible Intervention;  Surgeon: Dejuan Cody MD;  Location: [facility] CATH LAB/EP;  Service: Cardiology;  Laterality: Right;    CARDIAC SURGERY      DECLOTTING OF ARTERIOVENOUS GRAFT Right 12/21/2021    Procedure: [id number];  Surgeon: Jeison Hunt MD;  Location: [facility] OR;  Service: Vascular;  Laterality: Right;    FISTULOGRAM N/A 2/11/2022    Procedure: Fistulogram;  Surgeon: Dejuan Cody MD;  Location: [facility] CATH LAB/EP;  Service: Cardiology;  Laterality: N/A;    FOOT SURGERY      INSERTION OF BIVENTRICULAR IMPLANTABLE CARDIOVERTER-DEFIBRILLATOR (ICD) Left 7/18/2019    Procedure: INSERTION, ICD, BIVENTRICULAR;  Surgeon: Arturo Bay MD;  Location: [facility] EP LAB;  Service: Cardiology;  Laterality: Left;  CHB, CRTD, SJM, anes, GP, 6078    LEFT HEART CATHETERIZATION N/A 7/16/2019    Procedure: Left heart cath;  Surgeon: Dejuan Cody MD;  Location: [facility] CATH LAB/EP;  Service: Cardiology;  Laterality: N/A;    PERCUTANEOUS TRANSLUMINAL ANGIOPLASTY OF ARTERIOVENOUS FISTULA Right 2/11/2022    Procedure: PTA, AV FISTULA;  Surgeon: Dejuan Cody MD;  Location: [facility] CATH LAB/EP;  Service: Cardiology;  Laterality: Right;    PERITONEAL CATHETER REMOVAL Left 4/11/2020    Procedure: REMOVAL, CATHETER, DIALYSIS, PERITONEAL;  Surgeon: Edis Snell Jr., MD;  Location: [facility] OR;  Service: General;  Laterality: Left;      Review of patient's allergies indicates:  No Known Allergies    Medications Prior to Admission   Medication Sig Dispense Refill Last Dose    acetaminophen (TYLENOL) 500 MG tablet Take 500 mg by mouth every 6 (six) hours as needed for Pain.       allopurinol (ZYLOPRIM) 300 MG tablet Take 300 mg by mouth once daily.       apixaban (ELIQUIS) 5 mg Tab Take 1 tablet (5 mg total) by mouth 2 (two) times daily. 60 tablet 6     capsicum 0.075% (ZOSTRIX) 0.075 % topical cream Apply topically 2 (two) times daily as needed (shoulder pain). 57 g 0     carvediloL (COREG) 6.25 MG tablet Take 1 tablet (6.25 mg total) by mouth 2 (two) times daily. 60 tablet 11     cholecalciferol, vitamin D3, (VITAMIN D3) 50 mcg (2,000 unit) Cap Take 1 capsule by mouth once daily.       clopidogreL (PLAVIX) 75 mg tablet Take 75 mg by mouth once daily.       insulin aspart U-100 (NOVOLOG) 100 unit/mL injection Inject 4-8 Units into the skin 3 (three) times daily before meals. Per sliding scale       torsemide (DEMADEX) 20 MG Tab Take 1 tablet (20 mg total) by mouth 2 (two) times daily. Take additional 2 tablets if weight gain of more than 3 pounds in 1 day (Patient taking differently: Take 40 mg by mouth 2 (two) times daily. Take additional 2 tablets if weight gain of more than 3 pounds in 1 day) 60 tablet 11     VENTOLIN HFA 90 mcg/actuation inhaler Inhale 1 puff into the lungs every 4 (four) hours as needed. As NEEDED          Social History     Socioeconomic History    Marital status:    Tobacco Use    Smoking status: Former Smoker    Smokeless tobacco: Never Used   Substance and Sexual Activity    Alcohol use: Yes     Comment: social    Drug use: No    Sexual activity: Yes     Social Determinants of Health     Financial Resource Strain: Low Risk     Difficulty of Paying Living Expenses: Not very hard   Food Insecurity: No Food Insecurity    Worried About Running Out of Food in the Last Year: Never true    Ran Out of Food in the Last Year: Never true   Transportation Needs: No  Transportation Needs    Lack of Transportation (Medical): No    Lack of Transportation (Non-Medical): No   Housing Stability: Low Risk     Unable to Pay for Housing in the Last Year: No    Number of Places Lived in the Last Year: 1    Unstable Housing in the Last Year: No        MEDS   furosemide (LASIX) injection  80 mg Intravenous Once    sodium bicarbonate  650 mg Oral Once    sodium zirconium cyclosilicate  10 g Oral Once                  CONTINOUS INFUSIONS:    No intake or output data in the 24 hours ending 02/18/22 1724     HEMODYNAMICS:    Temp:  [97.9 °F (36.6 °C)-98.6 °F (37 °C)] 97.9 °F (36.6 °C)  Pulse:  [67-78] 78  Resp:  [16-20] 18  SpO2:  [97 %-100 %] 98 %  BP: (122-134)/(57-62) 130/60   General     NAD  No fever  No chills  No CP  No SOB  No cough  No nausea   No vomiting   No diarrhea   Cardiology : pulse 67  Pulmonary  Diminished breath sounds  Abdomen soft   Extremities edema:   Skin: dry   Dialysis Access:  S/p declot right arm AV graft    LABS   Lab Results   Component Value Date    WBC 8.26 02/18/2022    HGB 8.5 (L) 02/18/2022    HCT 27.5 (L) 02/18/2022     (H) 02/18/2022     02/18/2022        Recent Labs   Lab 02/18/22  0842   *   CALCIUM 8.5*   ALBUMIN 2.9*   PROT 6.1      K 4.5   CO2 19*      BUN 90*   CREATININE 17.8*   ALKPHOS 97   ALT 7*   AST 10   BILITOT 0.3      Lab Results   Component Value Date    .0 (H) 03/04/2020    CALCIUM 8.5 (L) 02/18/2022    PHOS 6.8 (H) 02/18/2022      Lab Results   Component Value Date    IRON 63 12/18/2019    TIBC 263 12/18/2019    FERRITIN 6,078 (H) 03/28/2020        ABG  No results for input(s): PH, PO2, PCO2, HCO3, BE in the last 168 hours.      IMAGING:  CXR    ASSESSMENT / PLAN  ESRD  S/p declot right arm AV graft  Metabolic acidosis  Sodium bicarbonate  Metabolic bone disease  Hyperphosphatemia  Binders  Poor nutrition  Renal ADA diet  Anemia multifactorial  Epogen  Blood pressure 130/80  Lasix 80 mg IV  once.  Sherrie once  Fluid restricition  Dialysis in am

## 2022-02-18 NOTE — NURSING
Patient returned to unit in stable condition. VSS. Graft site positive for bruit and thrill. Dressing to surgical site CDI. Discharge orders acknowledged. Patient drowsy but easily arousable. Wife at bedside. Patient to be discharge when more awake. Will continue to monitor. RN to administer meds ordered by nephrology before discharge.

## 2022-02-18 NOTE — ED PROVIDER NOTES
Encounter Date: 2/18/2022    SCRIBE #1 NOTE: I, Charly Swift , am scribing for, and in the presence of, Helder Cote MD.       History     Chief Complaint   Patient presents with    Vascular Access Problem     Right arm access clotted last diaylsis was Tuesday and unable to have yesterday. Dr. Hunt sent patient into ED      Houston Jc is a 76 y.o. male who  has a past medical history of Anemia, CHF (congestive heart failure), CKD (chronic kidney disease) stage 4, GFR 15-29 ml/min, Coronary artery disease, Diabetes mellitus, Hematuria, Hypertension, Osteoarthritis of spine with radiculopathy, cervical region (1/10/2022), and Renal cyst, right.    The patient presents to the ED due to Vascular Access Problem.   Patient reports going to dialysis in [place] Tuesday 2/15 but missed yesterday 2/17 due to access clotting. Patient denies leg swelling, chest pain, N/V/D, SOB and other associated symptoms.     The history is provided by the patient.     Review of patient's allergies indicates:  No Known Allergies  Past Medical History:   Diagnosis Date    Anemia     CHF (congestive heart failure)     CKD (chronic kidney disease) stage 4, GFR 15-29 ml/min     Coronary artery disease     Diabetes mellitus     Hematuria     Hypertension     Osteoarthritis of spine with radiculopathy, cervical region 1/10/2022    Renal cyst, right      Past Surgical History:   Procedure Laterality Date    ANGIOGRAPHY OF ARTERIOVENOUS SHUNT Right 2/11/2022    Procedure: Fistulogram with Possible Intervention;  Surgeon: Dejuan Cody MD;  Location: [facility] CATH LAB/EP;  Service: Cardiology;  Laterality: Right;    CARDIAC SURGERY      DECLOTTING OF ARTERIOVENOUS GRAFT Right 12/21/2021    Procedure: [id number];  Surgeon: Jeison Hunt MD;  Location: [facility] OR;  Service: Vascular;  Laterality: Right;    FISTULOGRAM N/A 2/11/2022    Procedure: Fistulogram;  Surgeon: Dejuan Cody MD;  Location: [facility] CATH LAB/EP;   Service: Cardiology;  Laterality: N/A;    FOOT SURGERY      INSERTION OF BIVENTRICULAR IMPLANTABLE CARDIOVERTER-DEFIBRILLATOR (ICD) Left 7/18/2019    Procedure: INSERTION, ICD, BIVENTRICULAR;  Surgeon: Arturo Bay MD;  Location: [facility] EP LAB;  Service: Cardiology;  Laterality: Left;  CHB, CRTD, SJM, anes, GP, 6078    LEFT HEART CATHETERIZATION N/A 7/16/2019    Procedure: Left heart cath;  Surgeon: Dejuan Coyd MD;  Location: [facility] CATH LAB/EP;  Service: Cardiology;  Laterality: N/A;    PERCUTANEOUS TRANSLUMINAL ANGIOPLASTY OF ARTERIOVENOUS FISTULA Right 2/11/2022    Procedure: PTA, AV FISTULA;  Surgeon: Dejuan Cody MD;  Location: [facility] CATH LAB/EP;  Service: Cardiology;  Laterality: Right;    PERITONEAL CATHETER REMOVAL Left 4/11/2020    Procedure: REMOVAL, CATHETER, DIALYSIS, PERITONEAL;  Surgeon: Edis Snell Jr., MD;  Location: [facility] OR;  Service: General;  Laterality: Left;     Family History   Problem Relation Age of Onset    Heart attack Father      Social History     Tobacco Use    Smoking status: Former Smoker    Smokeless tobacco: Never Used   Substance Use Topics    Alcohol use: Yes     Comment: social    Drug use: No     Review of Systems   Constitutional: Negative for chills and fever.   HENT: Negative for sore throat.    Eyes: Negative for redness.   Respiratory: Negative for shortness of breath.    Cardiovascular: Negative for chest pain.   Gastrointestinal: Negative for abdominal pain, diarrhea, nausea and vomiting.   Genitourinary: Negative for dysuria and hematuria.   Musculoskeletal: Negative for back pain.   Skin: Negative for rash.        Dialysis access problem   Neurological: Negative for headaches.   All other systems reviewed and are negative.      Physical Exam     Initial Vitals [02/18/22 0821]   BP Pulse Resp Temp SpO2   (!) 130/59 74 20 98.6 °F (37 °C) 100 %      MAP       --         Physical Exam    Nursing note and vitals reviewed.  Constitutional: He appears  well-developed and well-nourished.   HENT:   Head: Normocephalic and atraumatic.   Eyes: EOM are normal. Pupils are equal, round, and reactive to light.   Neck: Neck supple.   Normal range of motion.  Cardiovascular: An irregular rhythm present.    Pulmonary/Chest: Breath sounds normal. No respiratory distress. He has no wheezes. He has no rhonchi. He has no rales.   Abdominal: Abdomen is soft. Bowel sounds are normal. He exhibits no distension. There is no abdominal tenderness. There is no rebound and no guarding.   Musculoskeletal:         General: Normal range of motion.      Cervical back: Normal range of motion and neck supple.      Right lower leg: Edema (Trace) present.      Left lower leg: Edema (Trace) present.      Comments: Dialysis access noted on Right Upper Arm without palpable thrill  (obtained with auscultation)     Neurological: He is alert and oriented to person, place, and time.   Skin: Skin is warm and dry.   Psychiatric: He has a normal mood and affect. His behavior is normal. Judgment and thought content normal.         ED Course   Procedures  Labs Reviewed   CBC W/ AUTO DIFFERENTIAL   COMPREHENSIVE METABOLIC PANEL   MAGNESIUM   PHOSPHORUS   SARS-COV-2 RDRP GENE          Imaging Results          X-Ray Chest 1 View (Final result)  Result time 02/18/22 08:36:40    Final result by Scott Hall MD (02/18/22 08:36:40)                 Impression:      No significant interval changes when compared to earlier exam as noted      Electronically signed by: Scott Hall  Date:    02/18/2022  Time:    08:36             Narrative:    EXAMINATION:  XR CHEST 1 VIEW    CLINICAL HISTORY:  ESRD;    TECHNIQUE:  Single frontal view of the chest was performed.    COMPARISON:  December 21, 2021    FINDINGS:  Stable upper normal to mildly enlarged heart size, arch calcification, and left-sided tri lead pacing device.  No focal infiltrates.  No pleural fluid or pneumothorax.  No acute bony findings.  Partially  visualize of vascular stent medial right upper arm soft tissues                                 Medications - No data to display  Medical Decision Making:   Initial Assessment:   The patient presents to the ED due to Vascular Access Problem.  Patient has end-stage renal disease and was last dialyzed 3 days ago.  He has an access of his right upper arm which is now clotted.  Clinical Tests:   Lab Tests: Ordered and Reviewed  Radiological Study: Ordered and Reviewed  Medical Tests: Ordered and Reviewed  ED Management:  Discussed with Dr. Hunt, who will admit the patient.  I will also consult his nephrologist for dialysis.          Scribe Attestation:   Scribe #1: I performed the above scribed service and the documentation accurately describes the services I performed. I attest to the accuracy of the note.                 Clinical Impression:   Final diagnoses:  [N18.6] ESRD (end stage renal disease)  [T82.49XA] Clotted dialysis access, initial encounter (Primary)       I, Dr. Helder Cote, personally performed the services described in this documentation. All medical record entries made by the scribe were at my direction and in my presence. I have reviewed the chart and agree that the record reflects my personal performance and is accurate and complete. Helder Cote MD.  9:01 AM 02/18/2022     ED Disposition Condition    Observation               Helder Cote MD  02/18/22 0902

## 2022-02-18 NOTE — ED TRIAGE NOTES
Pt admitted to the ED for clotted dialysis access. Pt right arm access has no palpable trill light audile bruit upon ausculation. Pt reports the last time he received dialysis was Tuesday and they were unable to access on Thursday. Pt has no other complaints at this time. Pt denies CP, SOB, or weakness.

## 2022-02-18 NOTE — PLAN OF CARE
Discharge orders noted. Patient is discharged to home. No DME or HH ordered. TN met with patient via Profyle monitor. Spouse at bedside. They confirmed patient goes to [facility] T/TH/SAT. I told them will request post-op follow-up for MWF. TN will message access navigator. They will be called with follow-up information. I confirmed phone number they would like to be contacted with. TN also informed them will fax patient's clinicals to his HD site. All questions answered. No further CM needs.    TN called and spoke with Maribeth at [facility] [phone]. I informed her patient will be discharging today. I confirmed fax and told her I routed patient's clinicals and op note to them. Maribeth thanked TN for the call.      Follow-up With  Details  Why  Contact Info   Jeison Hunt MD    Appointment Requested. You will be called with follow-up information.  [address]  [address]  [place] [address]  [phone]   [facility]     contacted HD facility. Confirmed fax number and sent clinicals/op report.  [address] [place] [address]  [phone]      02/18/22 1743   Final Note   Assessment Type Final Discharge Note   Anticipated Discharge Disposition Home   Hospital Resources/Appts/Education Provided Appointments scheduled by Navigator/Coordinator   Post-Acute Status   Discharge Delays None known at this time     Sarah Riddle RN    [phone]

## 2022-02-18 NOTE — ANESTHESIA POSTPROCEDURE EVALUATION
Anesthesia Post Evaluation    Patient: Houston Jc    Procedure(s) Performed: Procedure(s) (LRB):  [id number] (Right)  REVISION, PROCEDURE INVOLVING ARTERIOVENOUS GRAFT (Right)  INSERTION, GRAFT, ARTERIOVENOUS (Right)    Final Anesthesia Type: MAC      Patient location during evaluation: [facility]  Patient participation: Yes- Able to Participate  Level of consciousness: awake and alert and oriented  Post-procedure vital signs: reviewed and stable  Pain management: adequate  Airway patency: patent    PONV status at discharge: No PONV  Anesthetic complications: no      Cardiovascular status: blood pressure returned to baseline, hemodynamically stable and stable  Respiratory status: unassisted, spontaneous ventilation and room air  Hydration status: euvolemic  Follow-up not needed.          Vitals Value Taken Time   /62 02/18/22 1001   Temp 37 °C (98.6 °F) 02/18/22 0821   Pulse 78 02/18/22 1200   Resp 13 02/18/22 1028   SpO2 100 % 02/18/22 1028   Vitals shown include unvalidated device data.      No case tracking events are documented in the log.      Pain/Nelda Score: No data recorded

## 2022-02-18 NOTE — PROGRESS NOTES
02/18/22 1145   Admission   Initial VN Admission Questions Complete   Communication Issues? None   Shift   Virtual Nurse - Rounding Complete   Pain Management Interventions care clustered;diversional activity provided;pillow support provided;quiet environment facilitated;relaxation techniques promoted   Virtual Nurse - Patient Verbalized Approval Of Camera Use;VN Rounding   Type of Frequent Check   Type Patient Rounds;Other (see comments);Telemetry Monitoring  (new admission)   Safety/Activity   Patient Rounds bed in low position;bed wheels locked;call light in patient/parent reach;clutter free environment maintained;visualized patient;placement of personal items at bedside;ID band on   Safety Promotion/Fall Prevention assistive device/personal item within reach;bed alarm set;Fall Risk reviewed with patient/family;nonskid shoes/socks when out of bed;medications reviewed;room near unit station;side rails raised x 2;instructed to call staff for mobility   Safety Precautions emergency equipment at bedside   Activity Management Rolling - L1   Positioning   Body Position position changed independently   Head of Bed (HOB) Positioning HOB at 30-45 degrees   VN cued into patient's room for introduction with patient's permission.  VN role explained and informed patient that VN would be working with bedside nurse and rest of care team.  Fall risk and bed alarm protocol education provided.  Instructed patient to call for assistance.  Patient aware and agreeable.  Patient's chart, labs and vital signs reviewed.  Allowed time for questions.  No acute distress noted.  Will continue to be available as needed.

## 2022-02-18 NOTE — ANESTHESIA PROCEDURE NOTES
Peripheral Block    Patient location during procedure: OR    Reason for block: primary anesthetic   Diagnosis: right AV fistula clot   Start time: 2/18/2022 2:41 PM  Timeout: 2/18/2022 2:40 PM   End time: 2/18/2022 2:47 PM    Staffing  Authorizing Provider: Joshua Loco MD  Performing Provider: Ren Marshall MD    Preanesthetic Checklist  Completed: patient identified, IV checked, site marked, risks and benefits discussed, surgical consent, monitors and equipment checked, pre-op evaluation and timeout performed  Peripheral Block  Patient position: supine  Prep: ChloraPrep  Patient monitoring: heart rate, cardiac monitor, continuous pulse ox, continuous capnometry and frequent blood pressure checks  Block type: supraclavicular  Laterality: right  Injection technique: single shot  Needle  Needle type: Stimuplex   Needle gauge: 22 G  Needle length: 2 in  Needle localization: anatomical landmarks and ultrasound guidance   -ultrasound image captured on disc.  Assessment  Injection assessment: negative aspiration, negative parasthesia and local visualized surrounding nerve  Paresthesia pain: none  Heart rate change: no  Slow fractionated injection: yes  Pain Tolerance: comfortable throughout block and no complaints      Medications:  Bolus administered: 30 mL of 0.5 ropivacaine  Epinephrine added: 3.75 mcg/mL (1/300,000)  Additional Notes  VSS.  [facility] RN monitoring vitals throughout procedure.  Patient tolerated procedure well.

## 2022-02-18 NOTE — ANESTHESIA PREPROCEDURE EVALUATION
02/18/2022  Houston Jc is a 76 y.o., male with clotted HD access for AVG declot under MAC/GA. AICD, EF 35%    Pre-op Assessment    I have reviewed the Patient Summary Reports.     I have reviewed the Nursing Notes.    I have reviewed the Medications.     Review of Systems  Anesthesia Hx:  No problems with previous Anesthesia    Social:  Former Smoker    Hematology/Oncology:         -- Anemia:   Cardiovascular:   Hypertension CAD  Dysrhythmias  CHF ECG has been reviewed.    Pulmonary:   Sleep Apnea    Renal/:   Chronic Renal Disease, ESRD, Dialysis    Neurological:  Neurology Normal    Endocrine:   Diabetes        Physical Exam  General:  Well nourished, Obesity    Airway/Jaw/Neck:  Airway Findings: Mouth Opening: Normal Tongue: Large  General Airway Assessment: Adult  Mallampati: IV  TM Distance: Normal, at least 6 cm  Jaw/Neck Findings:  Neck ROM: Normal ROM  Neck Findings:  Girth Increased      Dental:  Dental Findings: Lower partial dentures, Upper partial dentures   Chest/Lungs:  Chest/Lungs Clear    Heart/Vascular:  Heart Findings: Normal       Mental Status:  Mental Status Findings:  Cooperative, Alert and Oriented       Lab Results   Component Value Date    WBC 8.26 02/18/2022    HGB 8.5 (L) 02/18/2022    HCT 27.5 (L) 02/18/2022     02/18/2022    CHOL 82 (L) 07/13/2019    TRIG 85 07/13/2019    HDL 27 (L) 07/13/2019    ALT 7 (L) 02/18/2022    AST 10 02/18/2022     02/18/2022    K 4.5 02/18/2022     02/18/2022    CREATININE 17.8 (H) 02/18/2022    BUN 90 (H) 02/18/2022    CO2 19 (L) 02/18/2022    TSH 1.523 11/08/2019    INR 1.0 07/18/2019    HGBA1C 6.8 (H) 12/27/2021     10/2021  · The left ventricle is severely enlarged with eccentric hypertrophy and moderately decreased systolic function.  · The estimated ejection fraction is 35%.  · There is moderate left ventricular global  hypokinesis.  · There is abnormal septal wall motion consistent with right ventricular pacemaker.  · Grade III left ventricular diastolic dysfunction.  · With normal right ventricular systolic function.  · Severe left atrial enlargement.  · Mild tricuspid regurgitation.  · Intermediate central venous pressure (8 mmHg).        Anesthesia Plan  Type of Anesthesia, risks & benefits discussed:  Anesthesia Type:  general, MAC, regional    Patient's Preference:   Plan Factors:          Intra-op Monitoring Plan: standard ASA monitors  Intra-op Monitoring Plan Comments:   Post Op Pain Control Plan: multimodal analgesia  Post Op Pain Control Plan Comments:     Induction:   IV  Beta Blocker:  Patient is on a Beta-Blocker and has received one dose within the past 24 hours (No further documentation required).       Informed Consent: Patient understands risks and agrees with Anesthesia plan.  Questions answered. Anesthesia consent signed with patient.  ASA Score: 4     Day of Surgery Review of History & Physical:            Ready For Surgery From Anesthesia Perspective.

## 2022-02-19 NOTE — PROGRESS NOTES
02/18/22 1829   AVS Confirmation   Discharge instructions and AVS given to and reviewed with patient and/or significant other. Yes   Discharge orders noted. AVS prepared with medication list, importance of medication compliance, follow up appointments, diet, home care instructions, treatment plan, self management, and when to seek medical attention. Detailed clinical reference list attached. AVS printed and handed to patient by bedside nurse. VN reviewed discharge instructions with patient and wife Hue using teachback method.  Allowed time for questions, all questions answered.  Patient verbalized complete understanding of discharge instructions and voices no concerns. Discharge instructions complete. Prescription for Seneca at bedside. Bedside nurse notified.

## 2022-02-28 NOTE — PROGRESS NOTES
Subjective:      Patient ID: Houston Jc is a 76 y.o. male.    Chief Complaint: Diabetes Mellitus (PCP Dr. Hamilton, 11/30/21), Diabetic Foot Exam, and Routine Foot Care    Houston is a 76 y.o. male who presents to the clinic for evaluation and treatment of high risk feet. Houston has a past medical history of Anemia, CHF (congestive heart failure), CKD (chronic kidney disease) stage 4, GFR 15-29 ml/min, Coronary artery disease, Diabetes mellitus, Hematuria, Hypertension, Osteoarthritis of spine with radiculopathy, cervical region (1/10/2022), and Renal cyst, right. The patient's chief complaint is long, thick toenails. This patient has documented high risk feet requiring routine maintenance secondary to peripheral vascular disease.    2/28/22: Pt seen today for routine foot care. Relates to interdigital pain to right foot, states he may have a corn. No other pedal complaints.     PCP: Zak Hamilton MD    Date Last Seen by PCP: 11/30/21    Current shoe gear:  Affected Foot: Casual shoes    Hemoglobin A1C   Date Value Ref Range Status   12/27/2021 6.8 (H) 4.0 - 5.6 % Final     Comment:     ADA Screening Guidelines:  5.7-6.4%  Consistent with prediabetes  >or=6.5%  Consistent with diabetes    High levels of fetal hemoglobin interfere with the HbA1C  assay. Heterozygous hemoglobin variants (HbS, HgC, etc)do  not significantly interfere with this assay.   However, presence of multiple variants may affect accuracy.     03/04/2020 7.9 (H) 4.0 - 5.6 % Final     Comment:     ADA Screening Guidelines:  5.7-6.4%  Consistent with prediabetes  >or=6.5%  Consistent with diabetes  High levels of fetal hemoglobin interfere with the HbA1C  assay. Heterozygous hemoglobin variants (HbS, HgC, etc)do  not significantly interfere with this assay.   However, presence of multiple variants may affect accuracy.     02/10/2020 7.8 (H) 4.0 - 5.6 % Final     Comment:     ADA Screening Guidelines:  5.7-6.4%  Consistent with  prediabetes  >or=6.5%  Consistent with diabetes  High levels of fetal hemoglobin interfere with the HbA1C  assay. Heterozygous hemoglobin variants (HbS, HgC, etc)do  not significantly interfere with this assay.   However, presence of multiple variants may affect accuracy.         Review of Systems   Constitutional: Negative for chills, decreased appetite, diaphoresis and fever.   HENT: Negative for congestion and hearing loss.    Cardiovascular: Negative for chest pain, claudication, leg swelling and syncope.   Respiratory: Negative for cough and shortness of breath.    Skin: Positive for nail changes. Negative for color change, dry skin, flushing, itching, poor wound healing and rash.   Musculoskeletal: Negative for arthritis, back pain, joint pain and joint swelling.   Gastrointestinal: Negative for nausea and vomiting.   Neurological: Negative for focal weakness, numbness, paresthesias and weakness.   Psychiatric/Behavioral: Negative for altered mental status. The patient is not nervous/anxious.            Objective:      Physical Exam  Constitutional:       General: He is not in acute distress.     Appearance: Normal appearance. He is well-developed. He is not diaphoretic.   Cardiovascular:      Comments: Dorsalis pedis and posterior tibial pulses are diminished.Skin temperature is within normal limits. Toes are cool to touch and feet are warm proximally. Hair growth is diminished. Skin is atrophic. No edema noted, bilaterally.   Musculoskeletal:      Comments: Adequate joint range of motion without pain, limitation, nor crepitation to foot and ankle joints. Muscle strength is 5/5 in all groups bilaterally.    Bilateral bunion deformity noted, crossover toe deformity to 2nd and 3rd digit, bilaterally, R>L. Semi reducible hammertoe deformities 2-5, bilaterally.    Feet:      Right foot:      Protective Sensation: 10 sites tested. 10 sites sensed.      Skin integrity: Dry skin present. No ulcer, blister, erythema  or warmth.      Left foot:      Protective Sensation: 10 sites tested. 9 sites sensed.      Skin integrity: Dry skin present. No ulcer, blister, erythema or warmth.   Lymphadenopathy:      Comments: Negative lymphadenopathy bilateral popliteal fossa and tarsal tunnel.    Skin:     General: Skin is warm and dry.      Capillary Refill: Capillary refill takes less than 2 seconds.      Comments: Skin is warm and dry, no acute signs of infection noted bilaterally      Toenails are thickened by 2-4 mm's, dystrophic, and are darkened in coloration with subungual fungal debris.     Otherwise, no open wounds, macerations or hyperkeratotic lesions, bilaterally     Callus to medial 3rd digit       Neurological:      Mental Status: He is alert and oriented to person, place, and time.      Sensory: No sensory deficit.      Motor: No abnormal muscle tone.      Comments: Light touch within normal limits. [place]-Evaristo 5.07 monofilamant testing is within normal limits. Vibratory sensation within normal limits, bilaterally.      Psychiatric:         Mood and Affect: Mood normal.         Behavior: Behavior normal.         Thought Content: Thought content normal.               Assessment:       Encounter Diagnoses   Name Primary?    PAD (peripheral artery disease) Yes    Type 2 diabetes mellitus with diabetic peripheral angiopathy without gangrene, unspecified whether long term insulin use     Disease of nail     Corn or callus     Hammer toe of right foot          Plan:       Houston was seen today for diabetes mellitus, diabetic foot exam and routine foot care.    Diagnoses and all orders for this visit:    PAD (peripheral artery disease)  -     Routine Foot Care    Type 2 diabetes mellitus with diabetic peripheral angiopathy without gangrene, unspecified whether long term insulin use  -     Routine Foot Care    Disease of nail  -     Routine Foot Care    [place] or callus  -     Routine Foot Care    Hammer toe of right foot  -      Routine Foot Care      I counseled the patient on his conditions, their implications and medical management.    RFC per attached note    Toe sleeve dispensed, pt to wear on right 2nd toe to reduce pressure and callus formation to 3rd toe during the day. He may also consider toe spacer or lambs wool interdigitally to reduce pressure. Cast padding applied interdigitally today to reduce pressure, may remove when he gets home.     Recommend [employer] to toenail fungus     Discussed general foot care:  Wear comfortable, proper fitting shoes. Wash feet daily. Dry well. After drying, apply moisturizer to feet (no lotion to webspaces). Inspect feet daily for skin breaks, blisters, swelling, or redness. Wear cotton socks (preferably white)  Change socks every day. Do NOT walk barefoot. Do NOT use heating pads or warm/hot water soaks. I discussed with the  patient  signs and symptoms of infection including redness, drainage, purulence, odor, pain, elevated BS, streaking, fever, chills, etc . Patient is to seek medical attention (ER or urgent care) if these symptoms occur      RTC in 2-3 mo, sooner PRN

## 2022-02-28 NOTE — PROCEDURES
"Routine Foot Care    Date/Time: 2/28/2022 9:45 AM  Performed by: Tara Padilla DPM  Authorized by: Tara Padilla DPM     Time out: Immediately prior to procedure a "time out" was called to verify the correct patient, procedure, equipment, support staff and site/side marked as required.    Consent Done?:  Yes (Verbal)  Hyperkeratotic Skin Lesions?: Yes    Number of trimmed lesions:  1  Location(s):  Right 3rd Toe    Nail Care Type:  Debride  Location(s): All  (Left 1st Toe, Left 3rd Toe, Left 2nd Toe, Left 4th Toe, Left 5th Toe, Right 1st Toe, Right 2nd Toe, Right 3rd Toe, Right 4th Toe and Right 5th Toe)  Patient tolerance:  Patient tolerated the procedure well with no immediate complications      "

## 2022-03-06 NOTE — DISCHARGE SUMMARY
[facility] ([facility])  Discharge Note  Short Stay    Procedure(s) (LRB):  Fistulogram (N/A)  PTA, AV FISTULA (Right)  Fistulogram with Possible Intervention (Right)    OUTCOME: Patient tolerated treatment/procedure well without complication and is now ready for discharge.    DISPOSITION: Home or Self Care    FINAL DIAGNOSIS:  Problem with dialysis access    FOLLOWUP: In clinic    DISCHARGE INSTRUCTIONS:  No discharge procedures on file.      Clinical Reference Documents Added to Patient Instructions       Document    FISTULOGRAM (ENGLISH)            Discharge Medication List as of 2/11/2022  5:34 PM      CONTINUE these medications which have NOT CHANGED    Details   allopurinol (ZYLOPRIM) 300 MG tablet Take 300 mg by mouth once daily., Until Discontinued, Historical Med      apixaban (ELIQUIS) 5 mg Tab Take 1 tablet (5 mg total) by mouth 2 (two) times daily., Starting Tue 10/19/2021, Normal      capsicum 0.075% (ZOSTRIX) 0.075 % topical cream Apply topically 2 (two) times daily as needed (shoulder pain)., Starting Tue 12/28/2021, Normal      carvediloL (COREG) 6.25 MG tablet Take 1 tablet (6.25 mg total) by mouth 2 (two) times daily., Starting Tue 12/28/2021, Until Wed 12/28/2022, Normal      cholecalciferol, vitamin D3, (VITAMIN D3) 50 mcg (2,000 unit) Cap Take 1 capsule by mouth once daily., Historical Med      clopidogreL (PLAVIX) 75 mg tablet Take 75 mg by mouth once daily., Historical Med      insulin aspart U-100 (NOVOLOG) 100 unit/mL injection Inject 4-8 Units into the skin 3 (three) times daily before meals. Per sliding scale, Historical Med      torsemide (DEMADEX) 20 MG Tab Take 1 tablet (20 mg total) by mouth 2 (two) times daily. Take additional 2 tablets if weight gain of more than 3 pounds in 1 day, Starting Mon 5/10/2021, Until Tue 5/10/2022, Normal      VENTOLIN HFA 90 mcg/actuation inhaler Inhale 1 puff into the lungs every 4 (four) hours as needed. As NEEDED, Starting Tue 6/25/2019, Historical Med       atorvastatin (LIPITOR) 80 MG tablet Take 1 tablet (80 mg total) by mouth every evening., Starting Wed 7/7/2021, Normal      insulin detemir U-100 (LEVEMIR FLEXTOUCH) 100 unit/mL (3 mL) SubQ InPn pen Inject 5 Units into the skin every evening., Starting Tue 12/28/2021, Until Mon 3/28/2022, Normal      losartan (COZAAR) 25 MG tablet Take 1 tablet (25 mg total) by mouth once daily., Starting Wed 7/7/2021, Until Thu 7/7/2022, Normal                TIME SPENT ON DISCHARGE: 35 minutes

## 2022-03-06 NOTE — H&P
Patient ID:  Houston Jc is a 76 y.o. male who presents for evaluation of Hyperlipidemia, Hypertension, Peripheral Arterial Disease, Obesity, and Deep Vein Thrombosis        HPI:         Houston Jc 76 y.o. male is here follow up and feeling well without any new complaints.        He is here for follow-up of admission December 2021 for Enterococcus bacteremia. His initial presenting complaint in the emergency room on December 25, 2021 was a cough.  He responded well to antibiotics.  He was discharged with IV antibiotics and completed the course. He had been wheelchair bound since March 2020 after COVID-19 viral illness he continues to improve.  He was able to stand up take a few steps during the visit today January 26, 2022.            He has a history of HTN, CHF, non obstructive CAD, CHB, obesity, ESRD, and DM, who was admitted to [facility] [place] in [place]/2019 with complete heart block and intermittent VT. An echo showed his EF was 30%. Amiodarone was started, a LHC showed luminal irregularities, and a [employer] CRT-D was implanted prior to discharge (RV septal lead in LV port). At his 11/2019 visit, Amiodarone was decreased to 200 mg bid.             He takes eliquis and plavix.         ICD interrogation 5/2021:                 96% RV pacing              5 yr battery life         Echo 3/2021                 EF 30%               Grade I DD              Severe LAE              Mod ERVIN              Mild AI              PASP 38               Normal RV function            [facility] 7/2019        · LVEDP (Pre): 12  · No LV gram because of CKD  · Non-obstructive CAD (minimal luminal irregularities)              Echo 10/2021                 EF 35 + grade III DD              Severe LAE              RV pacing               Mild TR     TIMI 10/2021                 Medial calcinosis         TBI 10/2021                 R 0.26              L 0.50     US 10/2021                 High grade distal R SFA with R PT               High grade distal  L SFA disease with L PT      Incidental R POP DVT noted (10/2021)                         Echo 12/2021     · The left ventricle is moderately enlarged with mild concentric hypertrophy and  · The quantitatively derived ejection fraction is 34%.  · Severe left atrial enlargement.  · Normal right ventricular size with normal right ventricular systolic function.  · Normal central venous pressure (3 mmHg).  · The estimated PA systolic pressure is 21 mmHg.  · No vegetations per surface echo.           Patient Active Problem List     Diagnosis Date Noted    Problem with dialysis access 01/26/2022    Osteoarthritis of spine with radiculopathy, cervical region 01/10/2022    Primary osteoarthritis of right shoulder 01/10/2022    Acute cystitis without hematuria      NSVT (nonsustained ventricular tachycardia)      Type 2 diabetes mellitus with chronic kidney disease on chronic dialysis, with long-term current use of insulin 12/22/2021    Anemia, chronic renal failure, stage 5      Bacteremia due to Enterococcus 12/21/2021       Gram positive       Clotted dialysis access 12/21/2021    PAD (peripheral artery disease) 10/13/2021    Acute deep vein thrombosis (DVT) of popliteal vein of right lower extremity 10/13/2021    ESRD (end stage renal disease) on dialysis 07/07/2021       Tu Th Sat          Chronic combined systolic and diastolic congestive heart failure 07/07/2021    Scrotal pain      Bradycardia 04/04/2020    CLEMENCIA (acute kidney injury)      COVID-19 virus detected      Fever      Suspected 2019-nCoV infection      SOB (shortness of breath) 03/28/2020    NICM (nonischemic cardiomyopathy) 11/06/2019    Abnormal transaminases 07/20/2019    Cardiac resynchronization therapy defibrillator (CRT-D) in place 07/19/2019    Third degree heart block 07/19/2019    Gout 07/18/2019    Abdominal distension 07/17/2019    Debility 07/17/2019    VT (ventricular tachycardia) 07/11/2019    Cardiorenal  syndrome 2019    Acute systolic heart failure 2019    Complete heart block 2019    Morbid obesity 10/08/2015    Hypertension 2015    Sleep apnea 2015    Swelling of lower extremity 2015    Hypervolemia 2015               Left Arm BP - Sittin/56  Reason for not completing BP on both arms: AV shunt           LABS     LAST HbA1c        Lab Results   Component Value Date     HGBA1C 6.8 (H) 2021         Lipid panel        Lab Results   Component Value Date     CHOL 82 (L) 2019            Lab Results   Component Value Date     HDL 27 (L) 2019            Lab Results   Component Value Date     LDLCALC 38.0 (L) 2019            Lab Results   Component Value Date     TRIG 85 2019            Lab Results   Component Value Date     CHOLHDL 32.9 2019               Review of Systems   Constitutional: Negative for diaphoresis, night sweats, weight gain and weight loss.   HENT: Negative for congestion.    Eyes: Negative for blurred vision, discharge and double vision.   Cardiovascular: Negative for chest pain, claudication, cyanosis, dyspnea on exertion, irregular heartbeat, leg swelling, near-syncope, orthopnea, palpitations, paroxysmal nocturnal dyspnea and syncope.   Respiratory: Negative for cough, shortness of breath and wheezing.    Endocrine: Negative for cold intolerance, heat intolerance and polyphagia.   Hematologic/Lymphatic: Negative for adenopathy and bleeding problem. Does not bruise/bleed easily.   Skin: Negative for dry skin and nail changes.   Musculoskeletal: Positive for muscle weakness. Negative for arthritis, back pain, falls, joint pain, myalgias and neck pain.   Gastrointestinal: Negative for bloating, abdominal pain, change in bowel habit and constipation.   Genitourinary: Negative for bladder incontinence, dysuria, flank pain, genital sores and missed menses.   Neurological: Negative for aphonia, brief paralysis,  difficulty with concentration, dizziness and weakness.   Psychiatric/Behavioral: Negative for altered mental status and memory loss. The patient does not have insomnia.    Allergic/Immunologic: Negative for environmental allergies.         Objective:   Physical Exam  Constitutional:       Appearance: He is well-developed.      Interventions: He is not intubated.     Comments: Sitting in a wheelchair          HENT:      Head: Normocephalic and atraumatic.      Right Ear: External ear normal.      Left Ear: External ear normal.   Eyes:      General: No scleral icterus.        Right eye: No discharge.         Left eye: No discharge.      Conjunctiva/sclera: Conjunctivae normal.      Pupils: Pupils are equal, round, and reactive to light.   Neck:      Thyroid: No thyromegaly.      Vascular: Normal carotid pulses. No carotid bruit, hepatojugular reflux or JVD.      Trachea: No tracheal deviation.   Cardiovascular:      Rate and Rhythm: Normal rate and regular rhythm.  No extrasystoles are present.     Chest Wall: PMI is not displaced.      Pulses: No midsystolic click.           Carotid pulses are 2+ on the right side and 2+ on the left side.       Radial pulses are 2+ on the right side and 2+ on the left side.        Femoral pulses are 2+ on the right side and 2+ on the left side.       Popliteal pulses are 0 on the right side and 0 on the left side.        Dorsalis pedis pulses are 0 on the right side and 0 on the left side.        Posterior tibial pulses are 0 on the right side and 0 on the left side.      Heart sounds: S1 normal and S2 normal. Heart sounds not distant. No murmur heard.  No friction rub. No gallop. No S3 sounds.       Comments:       No doppler L  PT and monophasic L DP doppler signals         Biphasic R DP and PT doppler signals         Doppler but not palpable thrill RUE AVF           Pulmonary:      Effort: Pulmonary effort is normal. No tachypnea, bradypnea, accessory muscle usage or respiratory  distress. He is not intubated.      Breath sounds: Normal breath sounds. No stridor. No decreased breath sounds, wheezing or rales.   Chest:      Chest wall: No tenderness.      Comments:         Abdominal:      General: There is no distension or abdominal bruit.      Palpations: There is no mass or pulsatile mass.      Tenderness: There is no abdominal tenderness. There is no guarding or rebound.   Musculoskeletal:         General: No tenderness. Normal range of motion.      Cervical back: Normal range of motion and neck supple.      Comments:     Muscle weakness from deconditioning                Lymphadenopathy:      Cervical: No cervical adenopathy.   Skin:     General: Skin is warm.      Coloration: Skin is not pale.      Findings: No erythema or rash.      Comments:     No ulcers         Neurological:      Mental Status: He is alert and oriented to person, place, and time.      Cranial Nerves: No cranial nerve deficit.      Coordination: Coordination normal.      Deep Tendon Reflexes: Reflexes are normal and symmetric.   Psychiatric:         Behavior: Behavior normal.         Thought Content: Thought content normal.         Judgment: Judgment normal.            Assessment:      1. Problem with dialysis access, subsequent encounter    2. Primary hypertension    3. Cardiac resynchronization therapy defibrillator (CRT-D) in place    4. Chronic combined systolic and diastolic congestive heart failure    5. NICM (nonischemic cardiomyopathy)    6. PAD (peripheral artery disease)    7. ESRD (end stage renal disease) on dialysis    8. Acute deep vein thrombosis (DVT) of popliteal vein of right lower extremity    9. Type 2 diabetes mellitus with chronic kidney disease on chronic dialysis, with long-term current use of insulin    10. Morbid obesity    11. Debility          Plan:            Continue with eliquis for R POP DVT  Repeat US in 6 months  Medical therapy for PAD  Statin therapy for non obstructive  CAD  Continue with coreg 12.5 bid and losartan  Proper foot care  Continue care with podiatry  Physical therapy for deconditioning                 Continue with current medical plan and lifestyle changes.  Return sooner for concerns or questions. If symptoms persist go to the ED  I have reviewed all pertinent data on this patient         I have reviewed the patient's medical history in detail and updated the computerized patient record.           Orders Placed This Encounter   Procedures    US Lower Extremity Veins Bilateral       Standing Status:   Future       Standing Expiration Date:   1/26/2023         Follow up as scheduled. Return sooner for concerns or questions                 He expressed verbal understanding and agreed with the plan                    - Discussed general foot care:  Wear comfortable, proper fitting shoes. Wash feet daily. Dry well. After drying, apply moisturizer to feet (no lotion to webspaces). Inspect feet daily for skin breaks, blisters, swelling, or redness. Wear cotton socks (preferably white)  Change socks every day. Do NOT walk barefoot. Do NOT use heating pads or warm/hot water soaks      -Patient was advised of signs and symptoms of infection including redness, drainage, purulence, odor, streaking, fever, chills and I advised patient to seek medical attention (ER or urgent care) if these symptoms arise.                 Patient's Medications   New Prescriptions     No medications on file   Previous Medications     ALLOPURINOL (ZYLOPRIM) 300 MG TABLET    Take 300 mg by mouth once daily.     APIXABAN (ELIQUIS) 5 MG TAB    Take 1 tablet (5 mg total) by mouth 2 (two) times daily.     ATORVASTATIN (LIPITOR) 80 MG TABLET    Take 1 tablet (80 mg total) by mouth every evening.     CAPSICUM 0.075% (ZOSTRIX) 0.075 % TOPICAL CREAM    Apply topically 2 (two) times daily as needed (shoulder pain).     CARVEDILOL (COREG) 6.25 MG TABLET    Take 1 tablet (6.25 mg total) by mouth 2 (two) times  daily.     CHOLECALCIFEROL, VITAMIN D3, (VITAMIN D3) 50 MCG (2,000 UNIT) CAP    Take 1 capsule by mouth once daily.     CLOPIDOGREL (PLAVIX) 75 MG TABLET    Take 75 mg by mouth once daily.     INSULIN ASPART U-100 (NOVOLOG) 100 UNIT/ML INJECTION    Inject 4-8 Units into the skin 3 (three) times daily before meals. Per sliding scale     INSULIN DETEMIR U-100 (LEVEMIR FLEXTOUCH) 100 UNIT/ML (3 ML) SUBQ INPN PEN    Inject 5 Units into the skin every evening.     LOSARTAN (COZAAR) 25 MG TABLET    Take 1 tablet (25 mg total) by mouth once daily.     TORSEMIDE (DEMADEX) 20 MG TAB    Take 1 tablet (20 mg total) by mouth 2 (two) times daily. Take additional 2 tablets if weight gain of more than 3 pounds in 1 day     VENTOLIN HFA 90 MCG/ACTUATION INHALER    Inhale 1 puff into the lungs every 4 (four) hours as needed. As NEEDED   Modified Medications     No medications on file   Discontinued Medications     No medications on file

## 2022-03-09 NOTE — PHARMACY MED REC
"Admission Medication History     The home medication history was taken by Mahi Brewer CPhT.    [facility]    You may go to "Admission" then "Reconcile Home Medications" tabs to review and/or act upon these items.      The home medication list has been updated by the Pharmacy department.    Please read ALL comments highlighted in yellow.    Please address this information as you see fit.     Feel free to contact us if you have any questions or require assistance.        Mahi Brewer CPhT.  Ext [phone]                  .          "

## 2022-03-09 NOTE — ANESTHESIA POSTPROCEDURE EVALUATION
Anesthesia Post Evaluation    Patient: Houston Jc    Procedure(s) Performed: Procedure(s) (LRB):  [id number] (Right)    Final Anesthesia Type: MAC      Patient location during evaluation: [facility]  Patient participation: Yes- Able to Participate  Level of consciousness: awake and alert  Post-procedure vital signs: reviewed and stable  Pain management: adequate  Airway patency: patent    PONV status at discharge: No PONV  Anesthetic complications: no      Cardiovascular status: hemodynamically stable  Respiratory status: unassisted, spontaneous ventilation and room air  Hydration status: euvolemic  Follow-up not needed.          Vitals Value Taken Time   BP  03/09/22 1658   Temp  03/09/22 1658   Pulse  03/09/22 1658   Resp  03/09/22 1658   SpO2  03/09/22 1658         No case tracking events are documented in the log.      Pain/Nelda Score: No data recorded

## 2022-03-09 NOTE — H&P
Chief Complaint: dialysis access clogged      History of Present Illness:     Houston Jc 76 y.o. with a  has a past medical history of Anemia, CHF (congestive heart failure), CKD (chronic kidney disease) stage 4, GFR 15-29 ml/min, Coronary artery disease, Diabetes mellitus, Hematuria, Hypertension, Osteoarthritis of spine with radiculopathy, cervical region (1/10/2022), and Renal cyst, right. who presents to the emergency department today with a complaint of his dialysis access clogged. Pts last dialysis was on Saturday. He went to dialysis on Tuesday and was told that they could not perform dialysis bc shunt not working. He had his access site declotted 2/18/22. He denies shortness of breath. Has no other complaints at this time.      ROS     Constitutional: No fever, no chills.  Eyes: No discharge. No pain.  HENT: No nasal drainage. No ear ache. No sore throat.  Cardiovascular: No chest pain, no palpitations.  Respiratory: No cough, no shortness of breath.  Gastrointestinal: No abdominal pain, no vomiting. No diarrhea.  Genitourinary: No hematuria, dysuria, urgency.  Musculoskeletal: No back pain.   Skin: No rashes, no lesions.  Neurological: No headache, no focal weakness.     Otherwise remaining ROS negative      The history is provided by the patient        ALLERGIES REVIEWED  MEDICATIONS REVIEWED  PMH/PSH/SOC/FH REVIEWED :  Houston Jc  has a past medical history of Anemia, CHF (congestive heart failure), CKD (chronic kidney disease) stage 4, GFR 15-29 ml/min, Coronary artery disease, Diabetes mellitus, Hematuria, Hypertension, Osteoarthritis of spine with radiculopathy, cervical region (1/10/2022), and Renal cyst, right. and   has a past surgical history that includes Left heart catheterization (N/A, 7/16/2019); Insertion of biventricular implantable cardioverter-defibrillator (ICD) (Left, 7/18/2019); Peritoneal catheter removal (Left, 4/11/2020); Declotting of arteriovenous graft (Right, 12/21/2021);  Cardiac surgery; Foot surgery; Fistulogram (N/A, 2/11/2022); Percutaneous transluminal angioplasty of arteriovenous fistula (Right, 2/11/2022); Angiography of arteriovenous shunt (Right, 2/11/2022); Declotting of arteriovenous graft (Right, 2/18/2022); Revision of procedure involving arteriovenous graft (Right, 2/18/2022); and Placement of arteriovenous graft (Right, 2/18/2022). with  reports that he has quit smoking. He has never used smokeless tobacco. He reports current alcohol use. He reports that he does not use drugs. and a family history includes Heart attack in his father.        Nursing/Ancillary staff note reviewed.  VS reviewed           Physical Exam      BP (!) 119/50 (BP Location: Left arm, Patient Position: Sitting)   Pulse 68   Temp 97.9 °F (36.6 °C) (Oral)   Resp 16   Wt 117 kg (258 lb)   SpO2 100%   BMI 34.99 kg/m²      Physical Exam  Vitals and nursing note reviewed.   Constitutional:       General: He is not in acute distress.     Appearance: He is well-developed.   HENT:      Head: Normocephalic and atraumatic.      Right Ear: External ear normal.      Left Ear: External ear normal.   Eyes:      General:         Right eye: No discharge.         Left eye: No discharge.      Conjunctiva/sclera: Conjunctivae normal.   Cardiovascular:      Rate and Rhythm: Normal rate.   Pulmonary:      Effort: Pulmonary effort is normal. No respiratory distress.   Abdominal:      General: There is no distension.   Musculoskeletal:         General: Normal range of motion.        Arms:       Cervical back: Normal range of motion.   Skin:     General: Skin is warm and dry.   Neurological:      Mental Status: He is alert and oriented to person, place, and time.      Cranial Nerves: No cranial nerve deficit.      Motor: No abnormal muscle tone.   Psychiatric:         Behavior: Behavior normal.            Imp: clotted dialysis access, crf, htn, dm    Plan: declotting today

## 2022-03-09 NOTE — ANESTHESIA PREPROCEDURE EVALUATION
03/09/2022     Houston Jc is a 76 y.o., male here for AV fistula declotting.    Past Medical History:   Diagnosis Date    Anemia     Combined systolic/diastolic HF.   AICD placed 2019     ESRD (on dialysis)     Coronary artery disease     Diabetes mellitus     Hematuria     Hypertension     Osteoarthritis of spine with radiculopathy, cervical region 1/10/2022    Renal cyst, right    Sleep apnea  Obesity (BMI 34)  Complete heart block (s/p AICD-pacemaker placement (2019)  Hx of V-tach  Cardiorenal syndrome  PAD  Clotted dialysis catheter      Past Surgical History:   Procedure Laterality Date    ANGIOGRAPHY OF ARTERIOVENOUS SHUNT Right 2/11/2022    Procedure: Fistulogram with Possible Intervention;  Surgeon: Dejuan Cody MD;  Location: [facility] CATH LAB/EP;  Service: Cardiology;  Laterality: Right;    CARDIAC SURGERY      DECLOTTING OF ARTERIOVENOUS GRAFT Right 12/21/2021    Procedure: [id number];  Surgeon: Jeison Hunt MD;  Location: [facility] OR;  Service: Vascular;  Laterality: Right;    DECLOTTING OF ARTERIOVENOUS GRAFT Right 2/18/2022    Procedure: [id number];  Surgeon: Jeison Hunt MD;  Location: [facility] OR;  Service: Vascular;  Laterality: Right;    FISTULOGRAM N/A 2/11/2022    Procedure: Fistulogram;  Surgeon: Dejuan Cody MD;  Location: [facility] CATH LAB/EP;  Service: Cardiology;  Laterality: N/A;    FOOT SURGERY      INSERTION OF BIVENTRICULAR IMPLANTABLE CARDIOVERTER-DEFIBRILLATOR (ICD) Left 7/18/2019    Procedure: INSERTION, ICD, BIVENTRICULAR;  Surgeon: Arturo Bay MD;  Location: [facility] EP LAB;  Service: Cardiology;  Laterality: Left;  CHB, CRTD, SJM, anes, GP, [id number]    LEFT HEART CATHETERIZATION N/A 7/16/2019    Procedure: Left heart cath;  Surgeon: Dejuan Cody MD;  Location: [facility] CATH LAB/EP;  Service: Cardiology;  Laterality: N/A;    PERCUTANEOUS  TRANSLUMINAL ANGIOPLASTY OF ARTERIOVENOUS FISTULA Right 2/11/2022    Procedure: PTA, AV FISTULA;  Surgeon: Dejuan Cody MD;  Location: [facility] CATH LAB/EP;  Service: Cardiology;  Laterality: Right;    PERITONEAL CATHETER REMOVAL Left 4/11/2020    Procedure: REMOVAL, CATHETER, DIALYSIS, PERITONEAL;  Surgeon: Edis Snell Jr., MD;  Location: [facility] OR;  Service: General;  Laterality: Left;    PLACEMENT OF ARTERIOVENOUS GRAFT Right 2/18/2022    Procedure: INSERTION, GRAFT, ARTERIOVENOUS;  Surgeon: Jeison Hunt MD;  Location: [facility] OR;  Service: Vascular;  Laterality: Right;    REVISION OF PROCEDURE INVOLVING ARTERIOVENOUS GRAFT Right 2/18/2022    Procedure: REVISION, PROCEDURE INVOLVING ARTERIOVENOUS GRAFT;  Surgeon: Jeison Hunt MD;  Location: [facility] OR;  Service: Vascular;  Laterality: Right;           Pre-op Assessment    I have reviewed the Patient Summary Reports.     I have reviewed the Nursing Notes. I have reviewed the NPO Status.   I have reviewed the Medications.     Review of Systems  Anesthesia Hx:  No problems with previous Anesthesia  History of prior surgery of interest to airway management or planning: Denies Family Hx of Anesthesia complications.   Denies Personal Hx of Anesthesia complications.   Hematology/Oncology:     Oncology Normal    -- Anemia:   EENT/Dental:EENT/Dental Normal   Cardiovascular:   Exercise tolerance: poor Hypertension CAD  Dysrhythmias (complete heart block)   Denies Angina. CHF (s/p AICD placement 2019) PVD    Pulmonary:   Denies Shortness of breath. Sleep Apnea    Renal/:   Chronic Renal Disease (ESRD on dialysis)    Musculoskeletal:   Arthritis     Neurological:   Neuromuscular Disease, (Cervical radiculopathy)    Endocrine:   Diabetes, type 2    Psych:  Psychiatric Normal           Physical Exam  General: Well nourished    Airway:  Mallampati: III   Mouth Opening: Normal  TM Distance: Normal  Tongue: Normal  Neck ROM: Normal ROM    Dental:  Partial  Dentures        Anesthesia Plan  Type of Anesthesia, risks & benefits discussed:    Anesthesia Type: MAC  Intra-op Monitoring Plan: Standard ASA Monitors  Post Op Pain Control Plan: multimodal analgesia and IV/PO Opioids PRN  Induction:  IV  Informed Consent: Informed consent signed with the Patient and all parties understand the risks and agree with anesthesia plan.  All questions answered.   ASA Score: 4  Anesthesia Plan Notes: Magnet will suspend Antiarrythmic sensing.  Magnet will not affect pacemaking ability        Ready For Surgery From Anesthesia Perspective.     .

## 2022-03-09 NOTE — ED PROVIDER NOTES
Chief Complaint: dialysis access clogged     History of Present Illness:    Houston Jc 76 y.o. with a  has a past medical history of Anemia, CHF (congestive heart failure), CKD (chronic kidney disease) stage 4, GFR 15-29 ml/min, Coronary artery disease, Diabetes mellitus, Hematuria, Hypertension, Osteoarthritis of spine with radiculopathy, cervical region (1/10/2022), and Renal cyst, right. who presents to the emergency department today with a complaint of his dialysis access clogged. Pts last dialysis was on Saturday. He went to dialysis on Tuesday and was told that they could not perform dialysis bc shunt not working. He had his access site declotted 2/18/22. He denies shortness of breath. Has no other complaints at this time.     ROS    Constitutional: No fever, no chills.  Eyes: No discharge. No pain.  HENT: No nasal drainage. No ear ache. No sore throat.  Cardiovascular: No chest pain, no palpitations.  Respiratory: No cough, no shortness of breath.  Gastrointestinal: No abdominal pain, no vomiting. No diarrhea.  Genitourinary: No hematuria, dysuria, urgency.  Musculoskeletal: No back pain.   Skin: No rashes, no lesions.  Neurological: No headache, no focal weakness.    Otherwise remaining ROS negative     The history is provided by the patient      ALLERGIES REVIEWED  MEDICATIONS REVIEWED  PMH/PSH/SOC/FH REVIEWED :  Houston Jc  has a past medical history of Anemia, CHF (congestive heart failure), CKD (chronic kidney disease) stage 4, GFR 15-29 ml/min, Coronary artery disease, Diabetes mellitus, Hematuria, Hypertension, Osteoarthritis of spine with radiculopathy, cervical region (1/10/2022), and Renal cyst, right. and   has a past surgical history that includes Left heart catheterization (N/A, 7/16/2019); Insertion of biventricular implantable cardioverter-defibrillator (ICD) (Left, 7/18/2019); Peritoneal catheter removal (Left, 4/11/2020); Declotting of arteriovenous graft (Right, 12/21/2021); Cardiac  surgery; Foot surgery; Fistulogram (N/A, 2/11/2022); Percutaneous transluminal angioplasty of arteriovenous fistula (Right, 2/11/2022); Angiography of arteriovenous shunt (Right, 2/11/2022); Declotting of arteriovenous graft (Right, 2/18/2022); Revision of procedure involving arteriovenous graft (Right, 2/18/2022); and Placement of arteriovenous graft (Right, 2/18/2022). with  reports that he has quit smoking. He has never used smokeless tobacco. He reports current alcohol use. He reports that he does not use drugs. and a family history includes Heart attack in his father.      Nursing/Ancillary staff note reviewed.  VS reviewed         Physical Exam     /64 (BP Location: Left arm, Patient Position: Sitting)   Pulse 63   Temp 97.9 °F (36.6 °C) (Oral)   Resp 18   Wt 117 kg (258 lb)   SpO2 98%   BMI 34.99 kg/m²     Physical Exam  Vitals and nursing note reviewed.   Constitutional:       General: He is not in acute distress.     Appearance: He is well-developed.   HENT:      Head: Normocephalic and atraumatic.      Right Ear: External ear normal.      Left Ear: External ear normal.   Eyes:      General:         Right eye: No discharge.         Left eye: No discharge.      Conjunctiva/sclera: Conjunctivae normal.   Cardiovascular:      Rate and Rhythm: Normal rate.   Pulmonary:      Effort: Pulmonary effort is normal. No respiratory distress.   Abdominal:      General: There is no distension.   Musculoskeletal:         General: Normal range of motion.        Arms:       Cervical back: Normal range of motion.   Skin:     General: Skin is warm and dry.   Neurological:      Mental Status: He is alert and oriented to person, place, and time.      Cranial Nerves: No cranial nerve deficit.      Motor: No abnormal muscle tone.   Psychiatric:         Behavior: Behavior normal.         DIFFERENTIAL DIAGNOSIS: After history and physical exam a differential diagnosis was considered, but was not limited to, dialysis  access malfunction            I independently interpreted the lab results and it showed the following: Potassium normal, anemia but baseline        Reviewed by myself, read by radiology.     Imaging Results           US Hemodialysis Access (Final result)  Result time 03/09/22 12:35:46   Procedure changed from US Upper Extremity Arteries Right     Final result by John Key DO (03/09/22 12:35:46)                 Impression:      Occlusion of the AV fistula as above.    This report was flagged in Epic as abnormal.      Electronically signed by: John Key  Date:    03/09/2022  Time:    12:35             Narrative:    EXAMINATION:  US HEMODIALYSIS ACCESS    CLINICAL HISTORY:  Vascular Access Problem;  End stage renal disease    TECHNIQUE:  Grayscale, duplex, and color Doppler evaluation of the right upper extremity AV fistula was performed.    COMPARISON:  Multiple prior ultrasounds, most recent from 02/08/2022.    FINDINGS:  There is a right upper extremity AV fistula.  Velocity measurements as below:    Inflow/brachial artery: 47.3 cm/sec.    Anastomosis: 484 cm/sec.    Proximal AVF: Occluded, then patent in the mid segment.  Nonoccluded portion measures 36.8 cm/sec.    Mid AV fistula: 27.4 cm/sec.  Flow volume 1512 mL/min.    Distal AVF: Occluded.    Cephalic arch: Occluded.    Outflow/subclavian vein: 20.6 cm/sec.    There is a stent within the mid to distal portion of the AV fistula.                                      I independently ordered and interpreted the EKG and it showed: paced rhythm, 65 bpm, no peaked T waves read by myself read by cardiology pending.           ED Course     Medications   sodium chloride 0.9% flush 10 mL (has no administration in time range)   ceFAZolin (ANCEF) 1 gram in dextrose 5 % 50 mL IVPB (premix) (has no administration in time range)         ED Course as of 03/09/22 1249   Wed Mar 09, 2022   0947 I spoke with Dr Hunt re:the pts access - he'd like an US and to keep  NPO at this time.  [JA]   1048 Potassium: 4.3 [JA]   1124 I spoke with Dr Hunt re:the ultrasound - he asks for the pt to be admitted, he'll take to declot later today, asks for ABX to be given.  [JA]      ED Course User Index  [JA] Irasema Clark MD              Medical Decision Making:   History:   I obtained history from:  The patient  Old Medical Records: I decided to obtain old medical records.   Reviewed and summarized the old medical record and it showed pt seen by Dr Hunt 2/18 for declotting of his access site, follow up on 3/3 for suture removal.     Initial management:  Houston Jc 76 y.o. male who presents to the ED today for dialysis access malfunction - could not get dialysis yesterday due to clotted graft. Will obtain labs, US of the graft, monitor and reassess.   The patient was seen and examined, see chart. The history and physical exam was obtained, see chart. The nursing notes and vital signs were reviewed, see chart.        Independently Interpreted Test(s):   I have ordered and independently interpreted EKG Reading(s) - see prior notes  Clinical Tests:   I have ordered and independently interpreted Lab Tests: Ordered and Reviewed  Radiological Study: Ordered and Reviewed  Medical Tests: Ordered and Reviewed        ED Management:  Houston Jc  presents to the emergency Department today with clotted dialysis access - he will be admitted for further management, pt understands and agrees.            Voice recognition software utilized in this note.        Impression      The primary encounter diagnosis was Clotted dialysis access. A diagnosis of ESRD (end stage renal disease) was also pertinent to this visit.           Irasema Clark MD  03/09/22 4109

## 2022-03-09 NOTE — DISCHARGE INSTRUCTIONS
ANESTHESIA  -For the first 24 hours after surgery:  Do not drive, use heavy equipment, make important decisions, or drink alcohol  -It is normal to feel sleepy for several hours.  Rest until you are more awake.  -Have someone stay with you, if needed.  They can watch for problems and help keep you safe.  -Some possible post anesthesia side effects include: nausea and vomiting, sore throat and hoarseness, sleepiness, and dizziness.    PAIN  -If you have pain after surgery, pain medicine will help you feel better.  Take it as directed, before pain becomes severe.  Most pain relievers taken by mouth need at least 20-30 minutes to start working.  -Do not drive or drink alcohol while taking pain medicine.  -Pain medication can upset your stomach.  Taking them with a little food may help.  -Other ways to help control pain: elevation, ice, and relaxation  -Call your surgeon if still having unmanageable pain an hour after taking pain medicine.  -Pain medicine can cause constipation.  Taking an over-the counter stool softener while on prescription pain medicine and drinking plenty of fluids can prevent this side effect.  -Call your surgeon if you have severe side effects like: breathing problems, trouble waking up, dizziness, confusion, or severe constipation.    NAUSEA  -Some people have nausea after surgery.  This is often because of anesthesia, pain, pain medicine, or the stress of surgery.  -Do not push yourself to eat.  Start off with clear liquids and soup.  Slowly move to solid foods.  Don't eat fatty, rich, spicy foods at first.  Eat smaller amounts.  -If you develop persistent nausea and vomiting please notify your surgeon immediately.    BLEEDING  -Different types of surgery require different types of care and dressing changes.  It is important to follow all instructions and advice from your surgeon.  Change dressing as directed.  Call your surgeon for any concerns regarding postop bleeding.    SIGNS OF  INFECTION  -Signs of infection include: fever, swelling, drainage, and redness  -Notify your surgeon if you have a fever of 100.4 F (38.0 C) or higher.  -Notify your surgeon if you notice redness, swelling, increased pain, pus, or a foul smell at the incision site.    Follow up in dialysis tomorrow as scheduled.

## 2022-03-09 NOTE — TRANSFER OF CARE
Anesthesia Transfer of Care Note    Patient: Houston Jc    Procedure(s) Performed: Procedure(s) (LRB):  [id number] (Right)    Patient location: [facility]    Anesthesia Type: MAC    Transport from OR: Transported from OR on room air with adequate spontaneous ventilation    Post pain: adequate analgesia    Post assessment: no apparent anesthetic complications and tolerated procedure well    Post vital signs: stable    Level of consciousness: awake and alert    Nausea/Vomiting: no nausea/vomiting    Complications: none    Transfer of care protocol was followed      Last vitals:   Visit Vitals  /64 (BP Location: Left arm, Patient Position: Sitting)   Pulse 63   Temp 36.6 °C (97.9 °F) (Oral)   Resp 18   Wt 117 kg (258 lb)   SpO2 98%   BMI 34.99 kg/m²

## 2022-03-09 NOTE — OP NOTE
[facility] - [facility] (Hospital)  Operative Note      Date of Procedure: 3/9/2022     Procedure: Procedure(s) (LRB):  [id number] (Right)     Surgeon(s) and Role:     * Jeison Hunt MD - Primary    Assisting Surgeon: None    Pre-Operative Diagnosis: ESRD (end stage renal disease) [N18.6]  Clotted dialysis access [T82.49XA]    Post-Operative Diagnosis: Post-Op Diagnosis Codes:     * ESRD (end stage renal disease) [N18.6]     * Clotted dialysis access [T82.49XA]    Anesthesia: Local MAC    Operative Findings (including complications, if any): Declotting thrombectomy graft right upper arm done under mac anaesthesia , patient tolerated well , no intra op complication good flow .    Description of Technical Procedures:  After satisfactory IV sedation patient in supine position time-out was called patient identity confirmed site was confirmed right upper arm forearm was prepped and draped normal sterile manner using ChloraPrep stockinette was applied area upper medial aspect of the right upper arm was infiltrated using 1% xylocaine solution incision was made in the same area at taken down to the deep subcu tissue subcutaneous bleeders were clamped bovied short segment of graft was isolated proximal and distal control was obtained graft incision was made on opening the incision patient had good backward flow working introduced on the 3 Vicryl subcutaneous tissue skin was closed using running 4 nylon suture sterile dressings applied instrument counts sponge count counts correct patient tolerated well no intraop complication patient sent to recovery room in stable condition.    Significant Surgical Tasks Conducted by the Assistant(s), if Applicable: na      Estimated Blood Loss (EBL): 30 cc         Implants: * No implants in log *    Specimens:   Specimen (24h ago, onward)            None                  Condition: Good    Disposition: PACU - hemodynamically stable.    Attestation: I performed the  procedure.    Discharge Note    OUTCOME: Patient tolerated treatment/procedure well without complication and is now ready for discharge.    DISPOSITION: Home or Self Care    FINAL DIAGNOSIS:  Clotted dialysis access    FOLLOWUP: In clinic 7 days    DISCHARGE INSTRUCTIONS:    Discharge Procedure Orders   Diet general     Keep surgical extremity elevated     Lifting restrictions     Leave dressing on - Keep it clean, dry, and intact until clinic visit     Call MD for:  temperature >100.4     Call MD for:  persistent nausea and vomiting     Call MD for:  severe uncontrolled pain     Call MD for:  redness, tenderness, or signs of infection (pain, swelling, redness, odor or green/yellow discharge around incision site)

## 2022-05-17 NOTE — PROGRESS NOTES
Subjective:      Patient ID: Houston Jc is a 76 y.o. male.    Chief Complaint: Diabetes Mellitus, Foot Ulcer, and Foot Pain    Houston is a 76 y.o. male who presents to the clinic for evaluation and treatment of high risk feet. Houston has a past medical history of Anemia, CHF (congestive heart failure), CKD (chronic kidney disease) stage 4, GFR 15-29 ml/min, Coronary artery disease, Diabetes mellitus, Hematuria, Hypertension, Osteoarthritis of spine with radiculopathy, cervical region (1/10/2022), and Renal cyst, right. The patient's chief complaint is long, thick toenails. This patient has documented high risk feet requiring routine maintenance secondary to peripheral vascular disease.    2/28/22: Pt seen today for routine foot care. Relates to interdigital pain to right foot, states he may have a corn. No other pedal complaints.     5/17/22: Pt seen today. Relates to new toe wounds to right great toe and to right 3rd toe. States his 3rd toe is getting darker. Relates to 10/10 pain to light touch. No other pedal complaints.     PCP: Zak Hamilton MD    Date Last Seen by PCP: 11/30/21    Current shoe gear:  Affected Foot: Casual shoes    Hemoglobin A1C   Date Value Ref Range Status   12/27/2021 6.8 (H) 4.0 - 5.6 % Final     Comment:     ADA Screening Guidelines:  5.7-6.4%  Consistent with prediabetes  >or=6.5%  Consistent with diabetes    High levels of fetal hemoglobin interfere with the HbA1C  assay. Heterozygous hemoglobin variants (HbS, HgC, etc)do  not significantly interfere with this assay.   However, presence of multiple variants may affect accuracy.     03/04/2020 7.9 (H) 4.0 - 5.6 % Final     Comment:     ADA Screening Guidelines:  5.7-6.4%  Consistent with prediabetes  >or=6.5%  Consistent with diabetes  High levels of fetal hemoglobin interfere with the HbA1C  assay. Heterozygous hemoglobin variants (HbS, HgC, etc)do  not significantly interfere with this assay.   However, presence of multiple  variants may affect accuracy.     02/10/2020 7.8 (H) 4.0 - 5.6 % Final     Comment:     ADA Screening Guidelines:  5.7-6.4%  Consistent with prediabetes  >or=6.5%  Consistent with diabetes  High levels of fetal hemoglobin interfere with the HbA1C  assay. Heterozygous hemoglobin variants (HbS, HgC, etc)do  not significantly interfere with this assay.   However, presence of multiple variants may affect accuracy.         Review of Systems   Constitutional: Negative for chills, decreased appetite, diaphoresis and fever.   HENT: Negative for congestion and hearing loss.    Cardiovascular: Negative for chest pain, claudication, leg swelling and syncope.   Respiratory: Negative for cough and shortness of breath.    Skin: Positive for color change, nail changes and poor wound healing. Negative for dry skin, flushing, itching and rash.   Musculoskeletal: Negative for arthritis, back pain, joint pain and joint swelling.   Gastrointestinal: Negative for nausea and vomiting.   Neurological: Negative for focal weakness, numbness, paresthesias and weakness.   Psychiatric/Behavioral: Negative for altered mental status. The patient is not nervous/anxious.            Objective:      Physical Exam  Constitutional:       General: He is not in acute distress.     Appearance: Normal appearance. He is well-developed. He is not diaphoretic.   Cardiovascular:      Pulses:           Dorsalis pedis pulses are 0 on the right side.        Posterior tibial pulses are detected w/ Doppler on the right side.      Comments: Dorsalis pedis and posterior tibial pulses are diminished.Skin temperature is within normal limits. Toes are cool to touch and feet are warm proximally. Hair growth is diminished. Skin is atrophic. No edema noted, bilaterally.   Musculoskeletal:      Comments: Adequate joint range of motion without pain, limitation, nor crepitation to foot and ankle joints. Muscle strength is 5/5 in all groups bilaterally.    Bilateral bunion  deformity noted, crossover toe deformity to 2nd and 3rd digit, bilaterally, R>L. Semi reducible hammertoe deformities 2-5, bilaterally.    Feet:      Right foot:      Protective Sensation: 10 sites tested. 10 sites sensed.      Skin integrity: Dry skin present. No ulcer, blister, erythema or warmth.      Left foot:      Protective Sensation: 10 sites tested. 9 sites sensed.      Skin integrity: Dry skin present. No ulcer, blister, erythema or warmth.   Lymphadenopathy:      Comments: Negative lymphadenopathy bilateral popliteal fossa and tarsal tunnel.    Skin:     General: Skin is warm and dry.      Capillary Refill: Capillary refill takes less than 2 seconds.      Comments: Skin is warm and dry, no acute signs of infection noted bilaterally      Toenails are thickened by 2-4 mm's, dystrophic, and are darkened in coloration with subungual fungal debris.     Otherwise, no open wounds, macerations or hyperkeratotic lesions, bilaterally     Ulcer to medial and lateral aspect of 3rd digit and to plantar right hallux. Ulcers measuring 0.8x0.8x0.1cm each (exam limited secondary to pain). Tenderness on palpation. Periwound maceration. Probes to subcutaneous layer. Swelling noted extending to dorsum of midfoot. Skin is cool to touch. Right 3rd digit with cyanotic appearance.         Neurological:      Mental Status: He is alert and oriented to person, place, and time.      Sensory: No sensory deficit.      Motor: No abnormal muscle tone.      Comments: Light touch within normal limits. Winston-Evaristo 5.07 monofilamant testing is within normal limits. Vibratory sensation within normal limits, bilaterally.      Psychiatric:         Mood and Affect: Mood normal.         Behavior: Behavior normal.         Thought Content: Thought content normal.               Assessment:       Encounter Diagnoses   Name Primary?    Skin ulcer of toe with fat layer exposed, unspecified laterality Yes    PAD (peripheral artery disease)      Type 2 diabetes mellitus with diabetic peripheral angiopathy without gangrene, unspecified whether long term insulin use     Discoloration of skin of toe          Plan:       Houston was seen today for diabetes mellitus, foot ulcer and foot pain.    Diagnoses and all orders for this visit:    Skin ulcer of toe with fat layer exposed, unspecified laterality  -     X-Ray Foot Complete Right; Future  -     Ambulatory referral/consult to Home Health; Future    PAD (peripheral artery disease)  -     Ambulatory referral/consult to Home Health; Future    Type 2 diabetes mellitus with diabetic peripheral angiopathy without gangrene, unspecified whether long term insulin use  -     Ambulatory referral/consult to Home Health; Future    Discoloration of skin of toe    Other orders  -     HYDROcodone-acetaminophen (NORCO) 5-325 mg per tablet; Take 1 tablet by mouth every 8 (eight) hours as needed for Pain.      I counseled the patient on his conditions, their implications and medical management.    Previous Arterial US reviewed. Case discussed with Dr. Adam over phone, he will see patient tomorrow and obtain stat arterial US    Xray ordered, rx Doxycyline.     Football dressing applied with betadine to wounds and cast padding interdigitally, secured with foam, cast padding and light flexinet, in Darco shoe. He is PWB to heel only. Rest, elevate.     Orders for HH placed to change dressing until follow up.     I discussed with the  patient  signs and symptoms of infection including redness, drainage, purulence, odor, pain, elevated BS, streaking, fever, chills, etc . Patient is to seek medical attention (ER or urgent care) if these symptoms occur      RTC in 1-2 weeks, sooner PRN    HH orders updated as follows:   Remove foot dressings.   Cleanse wound with normal saline or wound . Dry well.    Apply gentian violet to polina wound maceration if needed.   Apply betadine to all wound(s).   Place cast padding between toes    Next apply foam pad x 2 to plantar forefoot  Top with football dressing consisting 3 rolls of cast padding. Top with flexinet  Change 3  times per week and PRN

## 2022-05-17 NOTE — TELEPHONE ENCOUNTER
----- Message from Maggie Bagley sent at 5/17/2022  8:36 AM CDT -----  Contact: Paulette(Wife)-[phone]  Type:  Same Day Appointment Request    Caller is requesting a same day appointment.  Caller declined first available appointment listed below.    Name of Caller:Pt's Wife  When is the first available appointment?5/30  Symptoms: pt is in severe pain and thinks his toes on the right foot and not able to move his toes, pt is also having trouble walking on the foot  Best Call Back Number: [phone]

## 2022-05-17 NOTE — TELEPHONE ENCOUNTER
Reached out to arrange appts as requested by Dr Cody    Wife reports he went to lie down, they just got home.    Informed of diagnostic testing and clinic appt per Dr Cody     All test scheduled at the [place] location and will come to the [place] clinic tomorrow once test are completed for Dr Heath to review.    Verbalized understanding

## 2022-05-17 NOTE — TELEPHONE ENCOUNTER
Please order       US + labs + TIMI  Studies can be done in [place]   All stat please       I can see him 5/18/2022      Thanks      ZN

## 2022-05-17 NOTE — TELEPHONE ENCOUNTER
Called pt wife back and offered her appt for today, she accepted and will bring her . Explained to her where we are located today

## 2022-05-18 NOTE — H&P (VIEW-ONLY)
Subjective:   Patient ID:  Houston Jc is a 76 y.o. male who presents for evaluation of Peripheral Arterial Disease, right foot pain, and right 3rd toe ulcer      HPI:       Houston Jc 76 y.o. male is here follow up c/o right foot pain with an ulcer on the right 3rd toe after a fall. He saw podiatry 5/17/2022. He has 10/10 pain. No exudate. No fracture on xray. Both xray and ultrasound confirmed PAD.        He had been wheelchair bound since March 2020 after COVID-19 viral illness he continues to improve.  He was able to stand up take a few steps during the visit today January 26, 2022.         He has a history of HTN, CHF, non obstructive CAD, CHB, obesity, ESRD, and DM, who was admitted to [facility] in 7/2019 with complete heart block and intermittent VT. An echo showed his EF was 30%. Amiodarone was started, a LHC showed luminal irregularities, and a [employer] CRT-D was implanted prior to discharge (RV septal lead in LV port). At his 11/2019 visit, Amiodarone was decreased to 200 mg bid.          He takes eliquis and plavix.       ICD interrogation 5/2021:     96% RV pacing   5 yr battery life       Echo 3/2021     EF 30%    Grade I DD   Severe LAE   Mod ERVIN   Mild AI   PASP 38    Normal RV function         [facility] 7/2019      · LVEDP (Pre): 12  · No LV gram because of CKD  · Non-obstructive CAD (minimal luminal irregularities)           Echo 10/2021     EF 35 + grade III DD   Severe LAE   RV pacing    Mild TR    TIMI 10/2021     Medial calcinosis       TBI 10/2021     R 0.26   L 0.50    US 10/2021     High grade distal R SFA with R PT    High grade distal L SFA disease with L PT     Incidental R POP DVT noted (10/2021)           Echo 12/2021    · The left ventricle is moderately enlarged with mild concentric hypertrophy and  · The quantitatively derived ejection fraction is 34%.  · Severe left atrial enlargement.  · Normal right ventricular size with normal right ventricular systolic function.  · Normal central  venous pressure (3 mmHg).  · The estimated PA systolic pressure is 21 mmHg.  · No vegetations per surface echo.        US 5/2022     Bilateral high grade SFA disase   Severely depressed BTK PSV   R AT + PT        TIMI 5/2022     Non compressible vessels         Labs 5/2022     CRP 1.59   ESR 33    Patient Active Problem List    Diagnosis Date Noted    Critical lower limb ischemia 05/20/2022    Problem with dialysis access 01/26/2022    Osteoarthritis of spine with radiculopathy, cervical region 01/10/2022    Primary osteoarthritis of right shoulder 01/10/2022    Acute cystitis without hematuria     NSVT (nonsustained ventricular tachycardia)     Type 2 diabetes mellitus with chronic kidney disease on chronic dialysis, with long-term current use of insulin 12/22/2021    Anemia, chronic renal failure, stage 5     Bacteremia due to Enterococcus 12/21/2021     Gram positive      Clotted dialysis access 12/21/2021    PAD (peripheral artery disease) 10/13/2021    Acute deep vein thrombosis (DVT) of popliteal vein of right lower extremity 10/13/2021    ESRD (end stage renal disease) 07/07/2021     Tu Th Sat        Chronic combined systolic and diastolic congestive heart failure 07/07/2021    Scrotal pain     Bradycardia 04/04/2020    CLEMENCIA (acute kidney injury)     COVID-19 virus detected     Fever     Suspected 2019-nCoV infection     SOB (shortness of breath) 03/28/2020    NICM (nonischemic cardiomyopathy) 11/06/2019    Abnormal transaminases 07/20/2019    Cardiac resynchronization therapy defibrillator (CRT-D) in place 07/19/2019    Third degree heart block 07/19/2019    Gout 07/18/2019    Abdominal distension 07/17/2019    Debility 07/17/2019    VT (ventricular tachycardia) 07/11/2019    Cardiorenal syndrome 07/11/2019    Acute systolic heart failure 07/11/2019    Complete heart block 07/09/2019    Morbid obesity 10/08/2015    Hypertension 09/21/2015    Sleep apnea 09/21/2015     Swelling of lower extremity 2015    Hypervolemia 2015           Left Arm BP - Sittin/45  Reason for not completing BP on both arms: AV shunt        LABS    LAST HbA1c  Lab Results   Component Value Date    HGBA1C 6.8 (H) 2021       Lipid panel  Lab Results   Component Value Date    CHOL 155 2022    CHOL 82 (L) 2019     Lab Results   Component Value Date    HDL 36 (L) 2022    HDL 27 (L) 2019     Lab Results   Component Value Date    LDLCALC 96.8 2022    LDLCALC 38.0 (L) 2019     Lab Results   Component Value Date    TRIG 111 2022    TRIG 85 2019     Lab Results   Component Value Date    CHOLHDL 23.2 2022    CHOLHDL 32.9 2019            Review of Systems   Constitutional: Negative for diaphoresis, night sweats, weight gain and weight loss.   HENT: Negative for congestion.    Eyes: Negative for blurred vision, discharge and double vision.   Cardiovascular: Negative for chest pain, claudication, cyanosis, dyspnea on exertion, irregular heartbeat, leg swelling, near-syncope, orthopnea, palpitations, paroxysmal nocturnal dyspnea and syncope.   Respiratory: Negative for cough, shortness of breath and wheezing.    Endocrine: Negative for cold intolerance, heat intolerance and polyphagia.   Hematologic/Lymphatic: Negative for adenopathy and bleeding problem. Does not bruise/bleed easily.   Skin: Negative for dry skin and nail changes.   Musculoskeletal: Positive for muscle weakness. Negative for arthritis, back pain, falls, joint pain, myalgias and neck pain.   Gastrointestinal: Negative for bloating, abdominal pain, change in bowel habit and constipation.   Genitourinary: Negative for bladder incontinence, dysuria, flank pain, genital sores and missed menses.   Neurological: Negative for aphonia, brief paralysis, difficulty with concentration, dizziness and weakness.   Psychiatric/Behavioral: Negative for altered mental status and memory  loss. The patient does not have insomnia.    Allergic/Immunologic: Negative for environmental allergies.       Objective:   Physical Exam  Constitutional:       Appearance: He is well-developed.      Interventions: He is not intubated.     Comments: Sitting in a wheelchair          HENT:      Head: Normocephalic and atraumatic.      Right Ear: External ear normal.      Left Ear: External ear normal.   Eyes:      General: No scleral icterus.        Right eye: No discharge.         Left eye: No discharge.      Conjunctiva/sclera: Conjunctivae normal.      Pupils: Pupils are equal, round, and reactive to light.   Neck:      Thyroid: No thyromegaly.      Vascular: Normal carotid pulses. No carotid bruit, hepatojugular reflux or JVD.      Trachea: No tracheal deviation.   Cardiovascular:      Rate and Rhythm: Normal rate and regular rhythm.  No extrasystoles are present.     Chest Wall: PMI is not displaced.      Pulses:           Carotid pulses are 2+ on the right side and 2+ on the left side.       Radial pulses are 2+ on the right side and 2+ on the left side.        Femoral pulses are 2+ on the right side and 2+ on the left side.       Popliteal pulses are 0 on the right side and 0 on the left side.        Dorsalis pedis pulses are 0 on the right side and 0 on the left side.        Posterior tibial pulses are 0 on the right side and 0 on the left side.      Heart sounds: S1 normal and S2 normal. Heart sounds not distant. No midsystolic click. No murmur heard.    No friction rub. No gallop. No S3 sounds.      Comments:       No doppler L  PT and monophasic L DP doppler signals         Biphasic R DP and PT doppler signals         Doppler but not palpable thrill RUE AVF           Pulmonary:      Effort: Pulmonary effort is normal. No tachypnea, bradypnea, accessory muscle usage or respiratory distress. He is not intubated.      Breath sounds: Normal breath sounds. No stridor. No decreased breath sounds, wheezing or  rales.   Chest:      Chest wall: No tenderness.      Comments:         Abdominal:      General: There is no distension or abdominal bruit.      Palpations: There is no mass or pulsatile mass.      Tenderness: There is no abdominal tenderness. There is no guarding or rebound.   Musculoskeletal:         General: No tenderness. Normal range of motion.      Cervical back: Normal range of motion and neck supple.      Comments:     Muscle weakness from deconditioning                Lymphadenopathy:      Cervical: No cervical adenopathy.   Skin:     General: Skin is warm.      Coloration: Skin is not pale.      Findings: No erythema or rash.      Comments:     See images       Ulcers medial and lateral aspect of R third toe      Ulcer on medial aspect of R great toe                      Neurological:      Mental Status: He is alert and oriented to person, place, and time.      Cranial Nerves: No cranial nerve deficit.      Coordination: Coordination normal.      Deep Tendon Reflexes: Reflexes are normal and symmetric.   Psychiatric:         Behavior: Behavior normal.         Thought Content: Thought content normal.         Judgment: Judgment normal.       5/2022                  Assessment:     1. Critical lower limb ischemia    2. Chronic combined systolic and diastolic congestive heart failure    3. PAD (peripheral artery disease)    4. Cardiac resynchronization therapy defibrillator (CRT-D) in place    5. Primary hypertension    6. Morbid obesity    7. Type 2 diabetes mellitus with chronic kidney disease on chronic dialysis, with long-term current use of insulin    8. Sleep apnea, unspecified type        Plan:         Peripheral angiogram for revascularization in the setting of CLTI  Left CFA access with 4 fr sheath      Risks, benefits and alternatives of peripheral catheterization and possible intervention were discussed with the patient. All questions were answered and informed consent obtained.     I discussed the  importance of compliance with dual antiplatelet therapy with the patient to prevent acute or late stent thrombosis with premature discontinuation of the therapy.        Eliquis for R POP DVT  GDMT for PAD  Statin therapy for non obstructive CAD  Continue with coreg 12.5 bid and losartan  Proper foot care  Continue care with podiatry  Physical therapy for deconditioning            Continue with current medical plan and lifestyle changes.  Return sooner for concerns or questions. If symptoms persist go to the ED  I have reviewed all pertinent data on this patient       I have reviewed the patient's medical history in detail and updated the computerized patient record.    Orders Placed This Encounter   Procedures    Case Request-Cath Lab: AORTOGRAM, WITH SERIALOGRAPHY     Standing Status:   Standing     Number of Occurrences:   1     Order Specific Question:   CPT Code:     Answer:   AL  ANGIO AORTOBIFEMORAL W CATH [47021]     Order Specific Question:   Medical Necessity:     Answer:   Medically Urgent [101]     Order Specific Question:   Is an on-site pathologist required for this procedure?     Answer:   N/A       Follow up as scheduled. Return sooner for concerns or questions            He expressed verbal understanding and agreed with the plan              - Discussed general foot care:  Wear comfortable, proper fitting shoes. Wash feet daily. Dry well. After drying, apply moisturizer to feet (no lotion to webspaces). Inspect feet daily for skin breaks, blisters, swelling, or redness. Wear cotton socks (preferably white)  Change socks every day. Do NOT walk barefoot. Do NOT use heating pads or warm/hot water soaks      -Patient was advised of signs and symptoms of infection including redness, drainage, purulence, odor, streaking, fever, chills and I advised patient to seek medical attention (ER or urgent care) if these symptoms arise.           Patient's Medications   New Prescriptions    No medications on file    Previous Medications    ACETAMINOPHEN (TYLENOL) 325 MG TABLET    Take 1 tablet (325 mg total) by mouth every 6 (six) hours as needed for Pain.    ALLOPURINOL (ZYLOPRIM) 300 MG TABLET    Take 300 mg by mouth once daily.    APIXABAN (ELIQUIS) 5 MG TAB    Take 1 tablet (5 mg total) by mouth 2 (two) times daily.    CALCIUM ACETATE,PHOSPHAT BIND, (PHOSLO) 667 MG TABLET    Take 1 tablet (667 mg total) by mouth 3 (three) times daily with meals.    CARVEDILOL (COREG) 6.25 MG TABLET    Take 1 tablet (6.25 mg total) by mouth 2 (two) times daily.    CHOLECALCIFEROL, VITAMIN D3, (VITAMIN D3) 50 MCG (2,000 UNIT) CAP    Take 1 capsule by mouth once daily.    CLOPIDOGREL (PLAVIX) 75 MG TABLET    Take 75 mg by mouth once daily.    HYDROCODONE-ACETAMINOPHEN (NORCO) 5-325 MG PER TABLET    Take 1 tablet by mouth every 8 (eight) hours as needed for Pain.    INSULIN ASPART U-100 (NOVOLOG) 100 UNIT/ML INJECTION    Inject 4-8 Units into the skin 3 (three) times daily before meals. Per sliding scale    LOSARTAN (COZAAR) 25 MG TABLET    Take 25 mg by mouth once daily.    VENTOLIN HFA 90 MCG/ACTUATION INHALER    Inhale 1 puff into the lungs every 4 (four) hours as needed. As NEEDED   Modified Medications    No medications on file   Discontinued Medications

## 2022-05-18 NOTE — PROGRESS NOTES
Subjective:   Patient ID:  Houston Jc is a 76 y.o. male who presents for evaluation of Peripheral Arterial Disease, right foot pain, and right 3rd toe ulcer      HPI:       Houston Jc 76 y.o. male is here follow up c/o right foot pain with an ulcer on the right 3rd toe after a fall. He saw podiatry 5/17/2022. He has 10/10 pain. No exudate. No fracture on xray. Both xray and ultrasound confirmed PAD.        He had been wheelchair bound since March 2020 after COVID-19 viral illness he continues to improve.  He was able to stand up take a few steps during the visit today January 26, 2022.         He has a history of HTN, CHF, non obstructive CAD, CHB, obesity, ESRD, and DM, who was admitted to [facility] [place] in 7/2019 with complete heart block and intermittent VT. An echo showed his EF was 30%. Amiodarone was started, a LHC showed luminal irregularities, and a [employer] CRT-D was implanted prior to discharge (RV septal lead in LV port). At his 11/2019 visit, Amiodarone was decreased to 200 mg bid.          He takes eliquis and plavix.       ICD interrogation 5/2021:     96% RV pacing   5 yr battery life       Echo 3/2021     EF 30%    Grade I DD   Severe LAE   Mod ERVIN   Mild AI   PASP 38    Normal RV function         [facility] 7/2019      · LVEDP (Pre): 12  · No LV gram because of CKD  · Non-obstructive CAD (minimal luminal irregularities)           Echo 10/2021     EF 35 + grade III DD   Severe LAE   RV pacing    Mild TR    TIMI 10/2021     Medial calcinosis       TBI 10/2021     R 0.26   L 0.50    US 10/2021     High grade distal R SFA with R PT    High grade distal L SFA disease with L PT     Incidental R POP DVT noted (10/2021)           Echo 12/2021    · The left ventricle is moderately enlarged with mild concentric hypertrophy and  · The quantitatively derived ejection fraction is 34%.  · Severe left atrial enlargement.  · Normal right ventricular size with normal right ventricular systolic function.  · Normal central  venous pressure (3 mmHg).  · The estimated PA systolic pressure is 21 mmHg.  · No vegetations per surface echo.        US 5/2022     Bilateral high grade SFA disase   Severely depressed BTK PSV   R AT + PT        TIMI 5/2022     Non compressible vessels         Labs 5/2022     CRP 1.59   ESR 33    Patient Active Problem List    Diagnosis Date Noted    Critical lower limb ischemia 05/20/2022    Problem with dialysis access 01/26/2022    Osteoarthritis of spine with radiculopathy, cervical region 01/10/2022    Primary osteoarthritis of right shoulder 01/10/2022    Acute cystitis without hematuria     NSVT (nonsustained ventricular tachycardia)     Type 2 diabetes mellitus with chronic kidney disease on chronic dialysis, with long-term current use of insulin 12/22/2021    Anemia, chronic renal failure, stage 5     Bacteremia due to Enterococcus 12/21/2021     Gram positive      Clotted dialysis access 12/21/2021    PAD (peripheral artery disease) 10/13/2021    Acute deep vein thrombosis (DVT) of popliteal vein of right lower extremity 10/13/2021    ESRD (end stage renal disease) 07/07/2021     Tu Th Sat        Chronic combined systolic and diastolic congestive heart failure 07/07/2021    Scrotal pain     Bradycardia 04/04/2020    CLEMENCIA (acute kidney injury)     COVID-19 virus detected     Fever     Suspected 2019-nCoV infection     SOB (shortness of breath) 03/28/2020    NICM (nonischemic cardiomyopathy) 11/06/2019    Abnormal transaminases 07/20/2019    Cardiac resynchronization therapy defibrillator (CRT-D) in place 07/19/2019    Third degree heart block 07/19/2019    Gout 07/18/2019    Abdominal distension 07/17/2019    Debility 07/17/2019    VT (ventricular tachycardia) 07/11/2019    Cardiorenal syndrome 07/11/2019    Acute systolic heart failure 07/11/2019    Complete heart block 07/09/2019    Morbid obesity 10/08/2015    Hypertension 09/21/2015    Sleep apnea 09/21/2015     Swelling of lower extremity 2015    Hypervolemia 2015           Left Arm BP - Sittin/45  Reason for not completing BP on both arms: AV shunt        LABS    LAST HbA1c  Lab Results   Component Value Date    HGBA1C 6.8 (H) 2021       Lipid panel  Lab Results   Component Value Date    CHOL 155 2022    CHOL 82 (L) 2019     Lab Results   Component Value Date    HDL 36 (L) 2022    HDL 27 (L) 2019     Lab Results   Component Value Date    LDLCALC 96.8 2022    LDLCALC 38.0 (L) 2019     Lab Results   Component Value Date    TRIG 111 2022    TRIG 85 2019     Lab Results   Component Value Date    CHOLHDL 23.2 2022    CHOLHDL 32.9 2019            Review of Systems   Constitutional: Negative for diaphoresis, night sweats, weight gain and weight loss.   HENT: Negative for congestion.    Eyes: Negative for blurred vision, discharge and double vision.   Cardiovascular: Negative for chest pain, claudication, cyanosis, dyspnea on exertion, irregular heartbeat, leg swelling, near-syncope, orthopnea, palpitations, paroxysmal nocturnal dyspnea and syncope.   Respiratory: Negative for cough, shortness of breath and wheezing.    Endocrine: Negative for cold intolerance, heat intolerance and polyphagia.   Hematologic/Lymphatic: Negative for adenopathy and bleeding problem. Does not bruise/bleed easily.   Skin: Negative for dry skin and nail changes.   Musculoskeletal: Positive for muscle weakness. Negative for arthritis, back pain, falls, joint pain, myalgias and neck pain.   Gastrointestinal: Negative for bloating, abdominal pain, change in bowel habit and constipation.   Genitourinary: Negative for bladder incontinence, dysuria, flank pain, genital sores and missed menses.   Neurological: Negative for aphonia, brief paralysis, difficulty with concentration, dizziness and weakness.   Psychiatric/Behavioral: Negative for altered mental status and memory  loss. The patient does not have insomnia.    Allergic/Immunologic: Negative for environmental allergies.       Objective:   Physical Exam  Constitutional:       Appearance: He is well-developed.      Interventions: He is not intubated.     Comments: Sitting in a wheelchair          HENT:      Head: Normocephalic and atraumatic.      Right Ear: External ear normal.      Left Ear: External ear normal.   Eyes:      General: No scleral icterus.        Right eye: No discharge.         Left eye: No discharge.      Conjunctiva/sclera: Conjunctivae normal.      Pupils: Pupils are equal, round, and reactive to light.   Neck:      Thyroid: No thyromegaly.      Vascular: Normal carotid pulses. No carotid bruit, hepatojugular reflux or JVD.      Trachea: No tracheal deviation.   Cardiovascular:      Rate and Rhythm: Normal rate and regular rhythm.  No extrasystoles are present.     Chest Wall: PMI is not displaced.      Pulses:           Carotid pulses are 2+ on the right side and 2+ on the left side.       Radial pulses are 2+ on the right side and 2+ on the left side.        Femoral pulses are 2+ on the right side and 2+ on the left side.       Popliteal pulses are 0 on the right side and 0 on the left side.        Dorsalis pedis pulses are 0 on the right side and 0 on the left side.        Posterior tibial pulses are 0 on the right side and 0 on the left side.      Heart sounds: S1 normal and S2 normal. Heart sounds not distant. No midsystolic click. No murmur heard.    No friction rub. No gallop. No S3 sounds.      Comments:       No doppler L  PT and monophasic L DP doppler signals         Biphasic R DP and PT doppler signals         Doppler but not palpable thrill RUE AVF           Pulmonary:      Effort: Pulmonary effort is normal. No tachypnea, bradypnea, accessory muscle usage or respiratory distress. He is not intubated.      Breath sounds: Normal breath sounds. No stridor. No decreased breath sounds, wheezing or  rales.   Chest:      Chest wall: No tenderness.      Comments:         Abdominal:      General: There is no distension or abdominal bruit.      Palpations: There is no mass or pulsatile mass.      Tenderness: There is no abdominal tenderness. There is no guarding or rebound.   Musculoskeletal:         General: No tenderness. Normal range of motion.      Cervical back: Normal range of motion and neck supple.      Comments:     Muscle weakness from deconditioning                Lymphadenopathy:      Cervical: No cervical adenopathy.   Skin:     General: Skin is warm.      Coloration: Skin is not pale.      Findings: No erythema or rash.      Comments:     See images       Ulcers medial and lateral aspect of R third toe      Ulcer on medial aspect of R great toe                      Neurological:      Mental Status: He is alert and oriented to person, place, and time.      Cranial Nerves: No cranial nerve deficit.      Coordination: Coordination normal.      Deep Tendon Reflexes: Reflexes are normal and symmetric.   Psychiatric:         Behavior: Behavior normal.         Thought Content: Thought content normal.         Judgment: Judgment normal.       5/2022                  Assessment:     1. Critical lower limb ischemia    2. Chronic combined systolic and diastolic congestive heart failure    3. PAD (peripheral artery disease)    4. Cardiac resynchronization therapy defibrillator (CRT-D) in place    5. Primary hypertension    6. Morbid obesity    7. Type 2 diabetes mellitus with chronic kidney disease on chronic dialysis, with long-term current use of insulin    8. Sleep apnea, unspecified type        Plan:         Peripheral angiogram for revascularization in the setting of CLTI  Left CFA access with 4 fr sheath      Risks, benefits and alternatives of peripheral catheterization and possible intervention were discussed with the patient. All questions were answered and informed consent obtained.     I discussed the  importance of compliance with dual antiplatelet therapy with the patient to prevent acute or late stent thrombosis with premature discontinuation of the therapy.        Eliquis for R POP DVT  GDMT for PAD  Statin therapy for non obstructive CAD  Continue with coreg 12.5 bid and losartan  Proper foot care  Continue care with podiatry  Physical therapy for deconditioning            Continue with current medical plan and lifestyle changes.  Return sooner for concerns or questions. If symptoms persist go to the ED  I have reviewed all pertinent data on this patient       I have reviewed the patient's medical history in detail and updated the computerized patient record.    Orders Placed This Encounter   Procedures    Case Request-Cath Lab: AORTOGRAM, WITH SERIALOGRAPHY     Standing Status:   Standing     Number of Occurrences:   1     Order Specific Question:   CPT Code:     Answer:   ND  ANGIO AORTOBIFEMORAL W CATH [25215]     Order Specific Question:   Medical Necessity:     Answer:   Medically Urgent [101]     Order Specific Question:   Is an on-site pathologist required for this procedure?     Answer:   N/A       Follow up as scheduled. Return sooner for concerns or questions            He expressed verbal understanding and agreed with the plan              - Discussed general foot care:  Wear comfortable, proper fitting shoes. Wash feet daily. Dry well. After drying, apply moisturizer to feet (no lotion to webspaces). Inspect feet daily for skin breaks, blisters, swelling, or redness. Wear cotton socks (preferably white)  Change socks every day. Do NOT walk barefoot. Do NOT use heating pads or warm/hot water soaks      -Patient was advised of signs and symptoms of infection including redness, drainage, purulence, odor, streaking, fever, chills and I advised patient to seek medical attention (ER or urgent care) if these symptoms arise.           Patient's Medications   New Prescriptions    No medications on file    Previous Medications    ACETAMINOPHEN (TYLENOL) 325 MG TABLET    Take 1 tablet (325 mg total) by mouth every 6 (six) hours as needed for Pain.    ALLOPURINOL (ZYLOPRIM) 300 MG TABLET    Take 300 mg by mouth once daily.    APIXABAN (ELIQUIS) 5 MG TAB    Take 1 tablet (5 mg total) by mouth 2 (two) times daily.    CALCIUM ACETATE,PHOSPHAT BIND, (PHOSLO) 667 MG TABLET    Take 1 tablet (667 mg total) by mouth 3 (three) times daily with meals.    CARVEDILOL (COREG) 6.25 MG TABLET    Take 1 tablet (6.25 mg total) by mouth 2 (two) times daily.    CHOLECALCIFEROL, VITAMIN D3, (VITAMIN D3) 50 MCG (2,000 UNIT) CAP    Take 1 capsule by mouth once daily.    CLOPIDOGREL (PLAVIX) 75 MG TABLET    Take 75 mg by mouth once daily.    HYDROCODONE-ACETAMINOPHEN (NORCO) 5-325 MG PER TABLET    Take 1 tablet by mouth every 8 (eight) hours as needed for Pain.    INSULIN ASPART U-100 (NOVOLOG) 100 UNIT/ML INJECTION    Inject 4-8 Units into the skin 3 (three) times daily before meals. Per sliding scale    LOSARTAN (COZAAR) 25 MG TABLET    Take 25 mg by mouth once daily.    VENTOLIN HFA 90 MCG/ACTUATION INHALER    Inhale 1 puff into the lungs every 4 (four) hours as needed. As NEEDED   Modified Medications    No medications on file   Discontinued Medications

## 2022-05-20 PROBLEM — I70.229 CRITICAL LOWER LIMB ISCHEMIA: Status: ACTIVE | Noted: 2022-01-01

## 2022-05-20 NOTE — TELEPHONE ENCOUNTER
Called pt wife back and explained to her what happen with HH, [facility] isn't taking his insurance. I had to send the paper work to [facility] and they have to approve it and send HH what is in there network. She understood and will keep appt on 5/30 [place] and I told her if she has any questions to give me a call

## 2022-05-20 NOTE — TELEPHONE ENCOUNTER
Called [facility] back to see if they know somebody who can help me and find a  agency close to where the pat live. They didn't know. Called [facility] and they told me to send orders to [facility] and they will decide who is going to see the patient.

## 2022-05-20 NOTE — TELEPHONE ENCOUNTER
Faxed/routed the HH order and last clinical notes from Dr. Padilla over to [facility]

## 2022-05-20 NOTE — TELEPHONE ENCOUNTER
----- Message from Timothy Pineda sent at 5/20/2022 10:22 AM CDT -----  Contact: pt wife  Type: Requesting to speak with nurse        Who Called: PT wife   Regarding: regarding appt 5/30  Would the patient rather a call back or a response via [facility]? Call back  Best Call Back Number: [phone]  Additional Information:

## 2022-05-20 NOTE — TELEPHONE ENCOUNTER
----- Message from Renita Mccarthy sent at 5/20/2022  9:51 AM CDT -----  Type:  Needs Medical Advice    Who Called: [facility]  Symptoms (please be specific): they don't service eveline and a referral was sent      Would the patient rather a call back or a response via [facility]? call  Best Call Back Number: [phone] option 1  Additional Information:

## 2022-05-26 NOTE — TELEPHONE ENCOUNTER
----- Message from Kari Key sent at 5/26/2022 10:58 AM CDT -----  Regarding: refill  Contact: [phone]  Type:  RX Refill Request    Who Called:  wife   Refill or New Rx: refill   RX Name and Strength: HYDROcodone-acetaminophen (NORCO) 5-325 mg per tablet  How is the patient currently taking it? (ex. 1XDay):  Take 1 tablet by mouth every 8 (eight) hours as needed for Pain. - Oral  Is this a 30 day or 90 day RX: 21 tablets   Preferred Pharm[facility] with phone number: [id number] - [place] [place] - [address]  Local or Mail Order: local   Ordering Provider:   Would the patient rather a call back or a response via [facility]?  Call   Best Call Back Number: [phone]  Additional Information:

## 2022-05-30 NOTE — PROGRESS NOTES
Subjective:      Patient ID: Houston Jc is a 76 y.o. male.    Chief Complaint: Diabetes Mellitus, Foot Ulcer, and Foot Pain    Houston is a 76 y.o. male who presents to the clinic for evaluation and treatment of high risk feet. Houston has a past medical history of Anemia, CHF (congestive heart failure), CKD (chronic kidney disease) stage 4, GFR 15-29 ml/min, Coronary artery disease, Diabetes mellitus, Hematuria, Hypertension, Osteoarthritis of spine with radiculopathy, cervical region (1/10/2022), and Renal cyst, right. The patient's chief complaint is long, thick toenails. This patient has documented high risk feet requiring routine maintenance secondary to peripheral vascular disease.    2/28/22: Pt seen today for routine foot care. Relates to interdigital pain to right foot, states he may have a corn. No other pedal complaints.     5/17/22: Pt seen today. Relates to new toe wounds to right great toe and to right 3rd toe. States his 3rd toe is getting darker. Relates to 10/10 pain to light touch. No other pedal complaints.     5/30/22: Pt seen today, relates to continued severe pain. Seen with family who states he is refusing dressing changes since he can't have anything touch his foot. States he can't sleep at night due to pain. No other pedal complaints.     PCP: Zak Hamilton MD    Date Last Seen by PCP: 11/30/21    Current shoe gear:  Affected Foot: Casual shoes    Hemoglobin A1C   Date Value Ref Range Status   12/27/2021 6.8 (H) 4.0 - 5.6 % Final     Comment:     ADA Screening Guidelines:  5.7-6.4%  Consistent with prediabetes  >or=6.5%  Consistent with diabetes    High levels of fetal hemoglobin interfere with the HbA1C  assay. Heterozygous hemoglobin variants (HbS, HgC, etc)do  not significantly interfere with this assay.   However, presence of multiple variants may affect accuracy.     03/04/2020 7.9 (H) 4.0 - 5.6 % Final     Comment:     ADA Screening Guidelines:  5.7-6.4%  Consistent with  prediabetes  >or=6.5%  Consistent with diabetes  High levels of fetal hemoglobin interfere with the HbA1C  assay. Heterozygous hemoglobin variants (HbS, HgC, etc)do  not significantly interfere with this assay.   However, presence of multiple variants may affect accuracy.     02/10/2020 7.8 (H) 4.0 - 5.6 % Final     Comment:     ADA Screening Guidelines:  5.7-6.4%  Consistent with prediabetes  >or=6.5%  Consistent with diabetes  High levels of fetal hemoglobin interfere with the HbA1C  assay. Heterozygous hemoglobin variants (HbS, HgC, etc)do  not significantly interfere with this assay.   However, presence of multiple variants may affect accuracy.         Review of Systems   Constitutional: Negative for chills, decreased appetite, diaphoresis and fever.   HENT: Negative for congestion and hearing loss.    Cardiovascular: Negative for chest pain, claudication, leg swelling and syncope.   Respiratory: Negative for cough and shortness of breath.    Skin: Positive for color change, nail changes and poor wound healing. Negative for dry skin, flushing, itching and rash.   Musculoskeletal: Negative for arthritis, back pain, joint pain and joint swelling.   Gastrointestinal: Negative for nausea and vomiting.   Neurological: Negative for focal weakness, numbness, paresthesias and weakness.   Psychiatric/Behavioral: Negative for altered mental status. The patient is not nervous/anxious.            Objective:      Physical Exam  Constitutional:       General: He is not in acute distress.     Appearance: Normal appearance. He is well-developed. He is not diaphoretic.   Cardiovascular:      Pulses:           Dorsalis pedis pulses are 0 on the right side.        Posterior tibial pulses are detected w/ Doppler on the right side.      Comments: Dorsalis pedis and posterior tibial pulses are diminished.Skin temperature is within normal limits. Toes are cool to touch and feet are warm proximally. Hair growth is diminished. Skin is  atrophic. No edema noted, bilaterally.   Musculoskeletal:      Comments: Adequate joint range of motion without pain, limitation, nor crepitation to foot and ankle joints. Muscle strength is 5/5 in all groups bilaterally.    Bilateral bunion deformity noted, crossover toe deformity to 2nd and 3rd digit, bilaterally, R>L. Semi reducible hammertoe deformities 2-5, bilaterally.    Feet:      Right foot:      Protective Sensation: 10 sites tested. 10 sites sensed.      Skin integrity: Dry skin present. No ulcer, blister, erythema or warmth.      Left foot:      Protective Sensation: 10 sites tested. 9 sites sensed.      Skin integrity: Dry skin present. No ulcer, blister, erythema or warmth.   Lymphadenopathy:      Comments: Negative lymphadenopathy bilateral popliteal fossa and tarsal tunnel.    Skin:     General: Skin is warm and dry.      Capillary Refill: Capillary refill takes less than 2 seconds.      Comments: Skin is warm and dry, no acute signs of infection noted bilaterally      Toenails are thickened by 2-4 mm's, dystrophic, and are darkened in coloration with subungual fungal debris.     Ulcer to medial and lateral aspect of 3rd digit and to plantar right hallux. Ulcers measuring 0.8x0.8x0.1cm each (exam limited secondary to pain). Tenderness on palpation. Periwound maceration.     Right 3rd digit with cyanotic appearance and distal edema.    Dry eschar noted to right plantar hallux. No drainage or signs of infection         Neurological:      Mental Status: He is alert and oriented to person, place, and time.      Sensory: No sensory deficit.      Motor: No abnormal muscle tone.      Comments: Light touch within normal limits. [place] 5.07 monofilamant testing is within normal limits. Vibratory sensation within normal limits, bilaterally.      Psychiatric:         Mood and Affect: Mood normal.         Behavior: Behavior normal.         Thought Content: Thought content normal.       Previous  Images:                  Assessment:       Encounter Diagnoses   Name Primary?    PAD (peripheral artery disease) Yes    Type 2 diabetes mellitus with diabetic peripheral angiopathy without gangrene, unspecified whether long term insulin use     Discoloration of skin of toe     Skin ulcer of toe with fat layer exposed, unspecified laterality          Plan:       Houston was seen today for foot ulcer, follow-up and foot pain.    Diagnoses and all orders for this visit:    PAD (peripheral artery disease)    Type 2 diabetes mellitus with diabetic peripheral angiopathy without gangrene, unspecified whether long term insulin use    Discoloration of skin of toe    Skin ulcer of toe with fat layer exposed, unspecified laterality    Other orders  -     doxycycline (VIBRA-TABS) 100 MG tablet; Take 1 tablet (100 mg total) by mouth 2 (two) times daily.  -     povidone-iodine (BETADINE) 10 % external solution; Apply topically as needed for Wound Care. Apply to wound and to interdigital maceration 2x per day.  -     HYDROcodone-acetaminophen (NORCO) 5-325 mg per tablet; Take 1 tablet by mouth every 6 (six) hours as needed for Pain.      I counseled the patient on his conditions, their implications and medical management.    Previous Arterial US reviewed. Case discussed with Dr. Adam over phone, states he is planning for angiogram, he will call and update patient.     Xray reviewed, rx Doxycyline.     He refuses football dressing secondary to pain. Recommend painting wounds and interdigital maceration with betadine 2x per day. Will d/c home health.     He is PWB to heel only. Rest, elevate.     I discussed with the  patient  signs and symptoms of infection including redness, drainage, purulence, odor, pain, elevated BS, streaking, fever, chills, etc . Patient is to seek medical attention (ER or urgent care) if these symptoms occur      RTC in 1-2 weeks, sooner PRN

## 2022-06-07 NOTE — DISCHARGE INSTRUCTIONS
Discharge Instructions:     Do not drive a car, operate heavy equipment, care for a young child, etc for the next 12-24 hours.   Avoid drinking alcohol for 24 hours.  Do not make any important decisions for 24 hours.     Drink fluids to keep hydrated. Resume your usual diet as tolerated.      Rest for today then activity as tolerated.   Do not lift anything over 5 pounds for the first 3 days after procedure.     Remove dressing tomorrow then may shower with warm soapy water. Do not scrub site. Pat dry.   May apply bandaid for 2 days.  No tub baths.  Do not submerge wound in water for 3 days.      Call MD for any unrelieved pain, excessive nausea or vomiting, redness around site, bleeding, or pus or foul smelling drainage, or any other questions or concerns.     Go to the ER for any difficulty breathing or chest pain.        If site swells or bleeds, hold direct pressure to area for 10 full minutes.   If site continues to bleed, continue to hold pressure to site and have someone bring you to the ER.       Follow any additional instructions given to you by MD.      Call MD to schedule a follow up appointment, or follow up as instructed.         Last Modified by Iza Connors RN at 6/1/22 9146

## 2022-06-07 NOTE — PLAN OF CARE
To Pre-op bay 3 per WC at this 0711.  Placed in gown and on monitor.  Pre-op teaching completed. Pt acknowledge procedure to be completed today.

## 2022-06-07 NOTE — PLAN OF CARE
To recovery per stretcher to bay 3.  Pt drowsy but arouses with verbal stimuli. Placed on monitor.  Left groin cath site with gauze and tegaderm dressing dry and intact.  No swelling or edema to site.  Pt denies pain at this time.

## 2022-06-07 NOTE — Clinical Note
35 ml of contrast were injected throughout the case. 75 mL of contrast was the total wasted during the case. 110 mL was the total amount used during the case.

## 2022-06-07 NOTE — DISCHARGE SUMMARY
[facility] - Lab (Hospital)  Discharge Note  Short Stay    Procedure(s) (LRB):  AORTOGRAM, WITH SERIALOGRAPHY (Right)  ANGIOGRAM, EXTREMITY, BILATERAL (Bilateral)  Angiogram, Abdominal Aorta    OUTCOME: Patient tolerated treatment/procedure well without complication and is now ready for discharge.    DISPOSITION: Home or Self Care    FINAL DIAGNOSIS:  Critical lower limb ischemia    FOLLOWUP: In clinic    DISCHARGE INSTRUCTIONS:    Discharge Procedure Orders   CBC W/ AUTO DIFFERENTIAL   Standing Status: Future Number of Occurrences: 1 Standing Exp. Date: 07/30/23     BASIC METABOLIC PANEL   Standing Status: Future Number of Occurrences: 1 Standing Exp. Date: 07/30/23          Current Discharge Medication List      START taking these medications    Details   gabapentin (NEURONTIN) 300 MG capsule Take 1 capsule (300 mg total) by mouth continuous prn (take a pill MWF after HD).  Qty: 90 capsule, Refills: 3         CONTINUE these medications which have NOT CHANGED    Details   acetaminophen (TYLENOL) 325 MG tablet Take 1 tablet (325 mg total) by mouth every 6 (six) hours as needed for Pain.  Qty: 10 tablet, Refills: 0      allopurinol (ZYLOPRIM) 300 MG tablet Take 300 mg by mouth once daily.      apixaban (ELIQUIS) 5 mg Tab Take 1 tablet (5 mg total) by mouth 2 (two) times daily.  Qty: 60 tablet, Refills: 6    Associated Diagnoses: Deep vein thrombosis (DVT) of popliteal vein of right lower extremity, unspecified chronicity      calcium acetate,phosphat bind, (PHOSLO) 667 mg tablet Take 1 tablet (667 mg total) by mouth 3 (three) times daily with meals.  Qty: 90 tablet, Refills: 11      carvediloL (COREG) 6.25 MG tablet Take 1 tablet (6.25 mg total) by mouth 2 (two) times daily.  Qty: 60 tablet, Refills: 11    Comments: .      cholecalciferol, vitamin D3, (VITAMIN D3) 50 mcg (2,000 unit) Cap Take 1 capsule by mouth once daily.      clopidogreL (PLAVIX) 75 mg tablet Take 75 mg by mouth once daily.      doxycycline  (VIBRA-TABS) 100 MG tablet Take 1 tablet (100 mg total) by mouth 2 (two) times daily.  Qty: 20 tablet, Refills: 0      HYDROcodone-acetaminophen (NORCO) 5-325 mg per tablet Take 1 tablet by mouth every 6 (six) hours as needed for Pain.  Qty: 28 tablet, Refills: 0    Comments: Quantity prescribed more than 7 day supply? No      insulin aspart U-100 (NOVOLOG) 100 unit/mL injection Inject 4-8 Units into the skin 3 (three) times daily before meals. Per sliding scale      losartan (COZAAR) 25 MG tablet Take 25 mg by mouth once daily.      povidone-iodine (BETADINE) 10 % external solution Apply topically as needed for Wound Care. Apply to wound and to interdigital maceration 2x per day.  Qty: 59 mL, Refills: 0      VENTOLIN HFA 90 mcg/actuation inhaler Inhale 1 puff into the lungs every 4 (four) hours as needed. As NEEDED              TIME SPENT ON DISCHARGE: 35 minutes

## 2022-06-07 NOTE — PLAN OF CARE
Patient discharged to home as ordered after 3 hour recovery per Dr. Cody. Pt is AAOx4, VSS, denies any pain.  All discharge instructions and printed materials reviewed and given to patient.   Patient instructed on follow-up appointment as ordered.   Prescriptions delivered to bedside by pharmacy and reviewed with patient and wife  Patient verbalized understanding and agreement with all discharge instructions given.     Right groin gauze and tegaderm dressing c.d.i. No bleeding or hematoma noted around site. Site and surrounding area is soft.    Palpated +2 alverto radial pulses. Dopplered alverto pedal pulses on left foot.   Pt got dressed and moving around without difficulty and no distress noted.  Pt in w/c. Left AC 20 gauge iv dc'd as ordered with tip intact. Gauze and koban dressing applied c.d.i.  Pt discharged home via wheelchair to private vehicle by adrián

## 2022-06-07 NOTE — BRIEF OP NOTE
S/p aortogram with run off       Patent distal aorta with bilateral iliac arteries   Patent bilateral CFA and DFA        Left:      80% mid and distal SFA   1 vessel run off via PER   Ostial PT    Patent prox and mid AT    Distal AT not visualized due to motion artifact        Right:       90% multiple mid and distal SFA stenosis    Patent POP   prox AT  with diffuse disease   Distal AT  with no outflow to the foot    90% TPT stenosis   80% prox and mid PER stenosis    90% prox PT, mid PT , and distal reconstitution   Distal PT is diffusely disease   Lateral plantar    DP is not visualized due to severe stenosis + motion artifact         Plan:       Review angiogram   R SFA + TPT + PER intervention   Re-evaluate for distal PTa- PTv DVA        Full report to follow

## 2022-06-07 NOTE — Clinical Note
An angiography was performed of the left lower extremity via hand injection with 0 mL of contrast co2 used

## 2022-06-07 NOTE — Clinical Note
An angiography was performed of the right lower extremity via hand injection with 15 mL of contrast And co2 used

## 2022-06-07 NOTE — INTERVAL H&P NOTE
The patient has been examined and the H&P has been reviewed:        Risks, benefits and alternatives of peripheral catheterization and possible intervention were discussed with the patient. All questions were answered and informed consent obtained.     I discussed the importance of compliance with dual antiplatelet therapy with the patient to prevent acute or late stent thrombosis with premature discontinuation of the therapy.    Active Hospital Problems    Diagnosis  POA    *Critical lower limb ischemia [I70.229]  Yes    Problem with dialysis access [T82.898A]  Yes    Type 2 diabetes mellitus with chronic kidney disease on chronic dialysis, with long-term current use of insulin [E11.22, N18.6, Z99.2, Z79.4]  Not Applicable    PAD (peripheral artery disease) [I73.9]  Yes    ESRD (end stage renal disease) [N18.6]  Yes     Tu Th Sat          Chronic combined systolic and diastolic congestive heart failure [I50.42]  Yes    Debility [R53.81]  Yes    Acute systolic heart failure [I50.21]  Yes    Morbid obesity [E66.01]  Yes      Resolved Hospital Problems   No resolved problems to display.

## 2022-06-13 NOTE — Clinical Note
An angiography was performed of the right lower extremity via hand injection with 12 mL of contrast

## 2022-06-13 NOTE — Clinical Note
125 ml of contrast were injected throughout the case. 75 mL of contrast was the total wasted during the case. 200 mL was the total amount used during the case.

## 2022-06-13 NOTE — TRANSFER OF CARE
Anesthesia Transfer of Care Note    Patient: [place] Jc    Procedure(s) Performed: Procedure(s) (LRB):  [id number]; REVISION OF FISTULA W/INSERTION OF NEW AV GRAFT (Right)    Patient location: PACU    Anesthesia Type: MAC    Transport from OR: Transported from OR on 6-10 L/min O2 by face mask with adequate spontaneous ventilation    Post pain: adequate analgesia    Post assessment: no apparent anesthetic complications    Post vital signs: stable    Level of consciousness: awake    Nausea/Vomiting: no nausea/vomiting    Complications: none    Transfer of care protocol was followed      Last vitals:   Visit Vitals  BP (!) 101/51   Pulse 60   Temp 36.4 °C (97.6 °F) (Skin)   Resp (!) 22   Wt 119.3 kg (263 lb)   SpO2 100%   BMI 34.70 kg/m²

## 2022-06-13 NOTE — Clinical Note
The site was marked. The site was prepped with ChloraPrep. The site was clipped. The patient was draped. The patient was positioned supine. Right AV fistula

## 2022-06-13 NOTE — Clinical Note
A percutaneous stick to the PT Vein artery was performed. Ultrasound guidance was used to obtain access.

## 2022-06-13 NOTE — CONSULTS
NEPHROLOGY CONSULT NOTE    HPI & INTERVAL HISTORY:    Past Medical History:   Diagnosis Date    Anemia     CHF (congestive heart failure)     CKD (chronic kidney disease) stage 4, GFR 15-29 ml/min     Coronary artery disease     Diabetes mellitus     Hematuria     Hypertension     Osteoarthritis of spine with radiculopathy, cervical region 1/10/2022    Renal cyst, right       Past Surgical History:   Procedure Laterality Date    ANGIOGRAPHY OF ARTERIOVENOUS SHUNT Right 2/11/2022    Procedure: Fistulogram with Possible Intervention;  Surgeon: Dejuan Cody MD;  Location: [facility] CATH LAB/EP;  Service: Cardiology;  Laterality: Right;    AORTOGRAPHY WITH SERIALOGRAPHY Right 6/7/2022    Procedure: AORTOGRAM, WITH SERIALOGRAPHY;  Surgeon: Dejuan Cody MD;  Location: [facility] CATH LAB/EP;  Service: Cardiology;  Laterality: Right;    CARDIAC SURGERY      DECLOTTING OF ARTERIOVENOUS GRAFT Right 12/21/2021    Procedure: [id number];  Surgeon: Jeison Hunt MD;  Location: [facility] OR;  Service: Vascular;  Laterality: Right;    DECLOTTING OF ARTERIOVENOUS GRAFT Right 2/18/2022    Procedure: [id number];  Surgeon: Jeison Hunt MD;  Location: [facility] OR;  Service: Vascular;  Laterality: Right;    DECLOTTING OF ARTERIOVENOUS GRAFT Right 3/9/2022    Procedure: [id number];  Surgeon: Jeison Hunt MD;  Location: [facility] OR;  Service: Vascular;  Laterality: Right;    FISTULOGRAM N/A 2/11/2022    Procedure: Fistulogram;  Surgeon: Dejuan Cody MD;  Location: [facility] CATH LAB/EP;  Service: Cardiology;  Laterality: N/A;    FOOT SURGERY      INSERTION OF BIVENTRICULAR IMPLANTABLE CARDIOVERTER-DEFIBRILLATOR (ICD) Left 7/18/2019    Procedure: INSERTION, ICD, BIVENTRICULAR;  Surgeon: Arturo Bay MD;  Location: [facility] EP LAB;  Service: Cardiology;  Laterality: Left;  CHB, CRTD, SJM, anes, GP, [id number]    LEFT HEART CATHETERIZATION N/A 7/16/2019    Procedure: Left heart cath;  Surgeon: Dejuan GARDUNO  MD Glo;  Location: [facility] CATH LAB/EP;  Service: Cardiology;  Laterality: N/A;    PERCUTANEOUS TRANSLUMINAL ANGIOPLASTY OF ARTERIOVENOUS FISTULA Right 2/11/2022    Procedure: PTA, AV FISTULA;  Surgeon: Dejuan Cody MD;  Location: [facility] CATH LAB/EP;  Service: Cardiology;  Laterality: Right;    PERITONEAL CATHETER REMOVAL Left 4/11/2020    Procedure: REMOVAL, CATHETER, DIALYSIS, PERITONEAL;  Surgeon: Edis Snell Jr., MD;  Location: [facility] OR;  Service: General;  Laterality: Left;    PLACEMENT OF ARTERIOVENOUS GRAFT Right 2/18/2022    Procedure: INSERTION, GRAFT, ARTERIOVENOUS;  Surgeon: Jeison Hunt MD;  Location: [facility] OR;  Service: Vascular;  Laterality: Right;    REVISION OF PROCEDURE INVOLVING ARTERIOVENOUS GRAFT Right 2/18/2022    Procedure: REVISION, PROCEDURE INVOLVING ARTERIOVENOUS GRAFT;  Surgeon: Jeison Hunt MD;  Location: [facility];  Service: Vascular;  Laterality: Right;      Review of patient's allergies indicates:  No Known Allergies   (Not in a hospital admission)      Social History     Socioeconomic History    Marital status:    Tobacco Use    Smoking status: Former Smoker    Smokeless tobacco: Never Used   Substance and Sexual Activity    Alcohol use: Yes     Comment: social    Drug use: No    Sexual activity: Yes     Social Determinants of Health     Financial Resource Strain: Low Risk     Difficulty of Paying Living Expenses: Not very hard   Food Insecurity: No Food Insecurity    Worried About Running Out of Food in the Last Year: Never true    Ran Out of Food in the Last Year: Never true   Transportation Needs: No Transportation Needs    Lack of Transportation (Medical): No    Lack of Transportation (Non-Medical): No   Housing Stability: Low Risk     Unable to Pay for Housing in the Last Year: No    Number of Places Lived in the Last Year: 1    Unstable Housing in the Last Year: No        MEDS   furosemide (LASIX) injection  80 mg Intravenous  Once    sodium citrate-citric acid 500-334 mg/5 ml  30 mL Oral Once    sodium zirconium cyclosilicate  10 g Oral Once                 CONTINOUS INFUSIONS:    No intake or output data in the 24 hours ending 06/13/22 1403     HEMODYNAMICS:    Temp:  [98 °F (36.7 °C)-98.2 °F (36.8 °C)] 98 °F (36.7 °C)  Pulse:  [68-78] 73  Resp:  [16-20] 20  SpO2:  [98 %-100 %] 99 %  BP: (120-130)/(54-60) 130/60   General :   NAD  No fever  No SOB  No cough  No CP  No nausea  No vomiting  No diarrhea  Cardiology : pulse 68  Pulmonary : diminished breath sounds   Extremities : edema   Skin: dry   Dialysis Access: clotted  Left arm AV fistula  Asterixis negative   LABS   Lab Results   Component Value Date    WBC 8.53 06/13/2022    HGB 9.5 (L) 06/13/2022    HCT 31.2 (L) 06/13/2022     (H) 06/13/2022     06/13/2022        Recent Labs   Lab 06/13/22  1125   *   CALCIUM 8.9   ALBUMIN 2.8*   PROT 6.8      K 6.3*   CO2 19*   CL 99   *   CREATININE 21.4*   ALKPHOS 96   ALT 6*   AST 15   BILITOT 0.2      Lab Results   Component Value Date    .0 (H) 03/04/2020    CALCIUM 8.9 06/13/2022    PHOS 9.0 (HH) 06/13/2022      Lab Results   Component Value Date    IRON 63 12/18/2019    TIBC 263 12/18/2019    FERRITIN 6,078 (H) 03/28/2020        ABG  No results for input(s): PH, PO2, PCO2, HCO3, BE in the last 168 hours.      IMAGING:  CXR    ASSESSMENT / PLAN  ESRD  Last Dialysis on 6/9/22  Clotted left arm AV fistula  Dr Hunt consulted    Hyperkalemia  K 6.3  High K - Treated with medications    Gap Metabolic acidosis  Azotemia    Creatinine 21.4  Metabolic bone disease  Hyperphosphatemia  Binders  Hypomagnesemia  Replace magnesium    Anemia multifactorial  Hb 9.5  Renal cap    Poor nutrition  Albumin 2.8  Blood pressure 130/60    Renal, low potassium diet when able to eat.    Hemodialysis when dialysis access available.

## 2022-06-13 NOTE — Clinical Note
An angiography was performed of the right lower extremity via hand injection with 35 mL of contrast

## 2022-06-13 NOTE — Clinical Note
artery and vein was performed. A percutaneous stick to the right arm was performed. Ultrasound guidance was used to obtain access.

## 2022-06-13 NOTE — Clinical Note
155 ml of contrast were injected throughout the case. 45 mL of contrast was the total wasted during the case. 200 mL was the total amount used during the case.

## 2022-06-13 NOTE — PHARMACY MED REC
"  Admission Medication History     The home medication history was taken by Lorene Garcia CPhT.    Medication history obtained from, Patient Verified    You may go to "Admission" then "Reconcile Home Medications" tabs to review and/or act upon these items.      The home medication list has been updated by the Pharmacy department.    Please read ALL comments highlighted in yellow.    Please address this information as you see fit.     Feel free to contact us if you have any questions or require assistance.      The medications listed below were removed from the home medication list.  Please reorder if appropriate:  Patient reports no longer taking the following medication(s):   Doxycycline 100 mg   Losartan 25 mg        Lorene Garcia CPhT.  Ext [phone]              .          "

## 2022-06-13 NOTE — ED PROVIDER NOTES
Encounter Date: 6/13/2022    SCRIBE #1 NOTE: I, Ginger Thompson, am scribing for, and in the presence of, Dr. Chung.       History     Chief Complaint   Patient presents with    Vascular Access Problem     Pt last dialysis was thurs, unable to complete on sat due to fistula clotted, denies Cp/SOB, cough, fever, n/v      Houston Jc is a 76 y.o. male presenting with vascular access problem. Patient has a past medical history of Anemia, CHF (congestive heart failure), CKD (chronic kidney disease) stage 4, GFR 15-29 ml/min, Coronary artery disease, Diabetes mellitus, Hematuria, Hypertension, Osteoarthritis of spine with radiculopathy, cervical region, and Renal cyst, right. Patient states that he went to dialysis yesterday and was unable to receive it due to his fistula being clotted. Patient has no complaints at this time. Patient last received dialysis on Thursday.      The history is provided by the patient.     Review of patient's allergies indicates:  No Known Allergies  Past Medical History:   Diagnosis Date    Anemia     CHF (congestive heart failure)     CKD (chronic kidney disease) stage 4, GFR 15-29 ml/min     Coronary artery disease     Diabetes mellitus     Hematuria     Hypertension     Osteoarthritis of spine with radiculopathy, cervical region 1/10/2022    Renal cyst, right      Past Surgical History:   Procedure Laterality Date    ANGIOGRAPHY OF ARTERIOVENOUS SHUNT Right 2/11/2022    Procedure: Fistulogram with Possible Intervention;  Surgeon: Dejuan Cody MD;  Location: [facility] CATH LAB/EP;  Service: Cardiology;  Laterality: Right;    AORTOGRAPHY WITH SERIALOGRAPHY Right 6/7/2022    Procedure: AORTOGRAM, WITH SERIALOGRAPHY;  Surgeon: Dejuan Cody MD;  Location: [facility] CATH LAB/EP;  Service: Cardiology;  Laterality: Right;    CARDIAC SURGERY      DECLOTTING OF ARTERIOVENOUS GRAFT Right 12/21/2021    Procedure: [id number];  Surgeon: Jeison Hunt MD;  Location: [facility] OR;   Service: Vascular;  Laterality: Right;    DECLOTTING OF ARTERIOVENOUS GRAFT Right 2/18/2022    Procedure: [id number];  Surgeon: Jeison Hunt MD;  Location: [facility] OR;  Service: Vascular;  Laterality: Right;    DECLOTTING OF ARTERIOVENOUS GRAFT Right 3/9/2022    Procedure: [id number];  Surgeon: Jeison Hunt MD;  Location: [facility] OR;  Service: Vascular;  Laterality: Right;    FISTULOGRAM N/A 2/11/2022    Procedure: Fistulogram;  Surgeon: Dejuan Cody MD;  Location: [facility] CATH LAB/EP;  Service: Cardiology;  Laterality: N/A;    FOOT SURGERY      INSERTION OF BIVENTRICULAR IMPLANTABLE CARDIOVERTER-DEFIBRILLATOR (ICD) Left 7/18/2019    Procedure: INSERTION, ICD, BIVENTRICULAR;  Surgeon: Arturo Bay MD;  Location: [facility] EP LAB;  Service: Cardiology;  Laterality: Left;  CHB, CRTD, SJM, anes, GP, 6078    LEFT HEART CATHETERIZATION N/A 7/16/2019    Procedure: Left heart cath;  Surgeon: Dejuan Cody MD;  Location: [facility] CATH LAB/EP;  Service: Cardiology;  Laterality: N/A;    PERCUTANEOUS TRANSLUMINAL ANGIOPLASTY OF ARTERIOVENOUS FISTULA Right 2/11/2022    Procedure: PTA, AV FISTULA;  Surgeon: Dejuan Cody MD;  Location: [facility] CATH LAB/EP;  Service: Cardiology;  Laterality: Right;    PERITONEAL CATHETER REMOVAL Left 4/11/2020    Procedure: REMOVAL, CATHETER, DIALYSIS, PERITONEAL;  Surgeon: Edis Snell Jr., MD;  Location: [facility] OR;  Service: General;  Laterality: Left;    PLACEMENT OF ARTERIOVENOUS GRAFT Right 2/18/2022    Procedure: INSERTION, GRAFT, ARTERIOVENOUS;  Surgeon: Jeison Hunt MD;  Location: [facility] OR;  Service: Vascular;  Laterality: Right;    REVISION OF PROCEDURE INVOLVING ARTERIOVENOUS GRAFT Right 2/18/2022    Procedure: REVISION, PROCEDURE INVOLVING ARTERIOVENOUS GRAFT;  Surgeon: Jeison Hunt MD;  Location: [facility];  Service: Vascular;  Laterality: Right;     Family History   Problem Relation Age of Onset    Heart attack Father      Social  History     Tobacco Use    Smoking status: Former Smoker    Smokeless tobacco: Never Used   Substance Use Topics    Alcohol use: Yes     Comment: social    Drug use: No     Review of Systems   Constitutional: Negative for fever.   HENT: Negative for sore throat.    Respiratory: Negative for shortness of breath.    Cardiovascular: Negative for chest pain.   Gastrointestinal: Negative for nausea.   Genitourinary: Negative for dysuria.   Musculoskeletal: Negative for back pain.   Skin: Negative for rash.   Neurological: Negative for weakness.   Hematological: Does not bruise/bleed easily.       Physical Exam     Initial Vitals [06/13/22 1014]   BP Pulse Resp Temp SpO2   (!) 120/54 68 20 98.2 °F (36.8 °C) 98 %      MAP       --         Physical Exam    Nursing note and vitals reviewed.  HENT:   Head: Atraumatic.   Eyes: Conjunctivae and EOM are normal.   Neck:   Normal range of motion.  Cardiovascular: Exam reveals no gallop and no friction rub.    No murmur heard.  HD access in right upper extremity without palpable thrill   Pulmonary/Chest: Breath sounds normal. No respiratory distress. He has no wheezes. He has no rales.   Abdominal: Abdomen is soft. Bowel sounds are normal. He exhibits no distension. There is no abdominal tenderness.   Musculoskeletal:         General: No edema. Normal range of motion.      Cervical back: Normal range of motion.     Neurological: He is alert and oriented to person, place, and time.   Skin: Skin is warm.   Psychiatric: He has a normal mood and affect.         ED Course   Critical Care    Date/Time: 6/13/2022 2:51 PM  Performed by: Jesus Chung MD  Authorized by: Jesus Chung MD   Direct patient critical care time: 24 minutes  Ordering / reviewing critical care time: 10 minutes  Documentation critical care time: 10 minutes  Consulting other physicians critical care time: 5 minutes  Total critical care time (exclusive of procedural time) : 49 minutes  Critical care was  necessary to treat or prevent imminent or life-threatening deterioration of the following conditions: renal failure and circulatory failure.  Critical care was time spent personally by me on the following activities: development of treatment plan with patient or surrogate, discussions with consultants, evaluation of patient's response to treatment, examination of patient, ordering and performing treatments and interventions, obtaining history from patient or surrogate, ordering and review of laboratory studies, pulse oximetry, re-evaluation of patient's condition, ordering and review of radiographic studies and review of old charts.        Labs Reviewed   CBC W/ AUTO DIFFERENTIAL - Abnormal; Notable for the following components:       Result Value    RBC 3.10 (*)     Hemoglobin 9.5 (*)     Hematocrit 31.2 (*)      (*)     MCHC 30.4 (*)     RDW 16.2 (*)     All other components within normal limits   COMPREHENSIVE METABOLIC PANEL - Abnormal; Notable for the following components:    Potassium 6.3 (*)     CO2 19 (*)     Glucose 116 (*)      (*)     Creatinine 21.4 (*)     Albumin 2.8 (*)     ALT 6 (*)     Anion Gap 21 (*)     eGFR if  2 (*)     eGFR if non  2 (*)     All other components within normal limits    Narrative:        Potassium critical result(s) called and verbal readback obtained   from Sukhjinder CLEMENTS at 12:05 on 06/13/2022  by Bri 06/13/2022 12:05   MAGNESIUM - Abnormal; Notable for the following components:    Magnesium 1.4 (*)     All other components within normal limits   PHOSPHORUS - Abnormal; Notable for the following components:    Phosphorus 9.0 (*)     All other components within normal limits    Narrative:        Potassium critical result(s) called and verbal readback obtained   from Sukhjinder CLEMENTS at 12:05 on 06/13/2022  by Bri 06/13/2022 12:05   BASIC METABOLIC PANEL   SARS-COV-2 RDRP GENE        ECG Results          EKG 12-lead (In process)  Result time  22 13:38:36    In process by Interface, Lab In [facility] (22 13:38:36)                 Narrative:    Test Reason : N18.6,Z99.2,    Vent. Rate : 066 BPM     Atrial Rate : 066 BPM     P-R Int : 134 ms          QRS Dur : 160 ms      QT Int : 494 ms       P-R-T Axes : 000 180 019 degrees     QTc Int : 517 ms    Electronic atrial pacemaker  Right axis deviation  Nonspecific intraventricular block  Inferior infarct ,age undetermined  Abnormal ECG  When compared with ECG of 2022 07:36,  Electronic atrial pacemaker has replaced Electronic ventricular pacemaker    Referred By: [email or web address]   SELF           Confirmed By:                             Imaging Results           US Upper Extremity Arteries Right (Final result)  Result time 22 14:27:27    Final result by Cheo Oliva MD (22 14:27:27)                 Impression:      1. Moderate amount of nonocclusive thrombus in significant aneurysmal degeneration along the proximal segment of the RUE brachiocephalic AVG and, minimal amount of nonocclusive thrombus along the distal segment associated with abnormal peak systolic velocities in volume flow rates throughout the AVG.  This report was flagged in Epic as abnormal.      Electronically signed by: Cheo Oliva  Date:    2022  Time:    14:27             Narrative:    EXAMINATION:  US UPPER EXTREMITY ARTERIES RIGHT    CLINICAL HISTORY:  Other complication of vascular dialysis catheter, initial encounter    COMPARISON:  None    FINDINGS:  AVF/AVG: RUE brachiocephalic AVG with intraluminal nonocclusive filling defect and significant aneurysmal degeneration along its proximal greater than distal segments but is otherwise patent.  Imaged surrounding soft tissues are grossly unremarkable without evidence of perigraft fluid collection or hematoma.    Inflow artery proximal to the AVF/AV-cm/s with abnormal high-resistance triphasic waveforms.    Inflow artery distal to the AVF/AV-cm/s with  abnormal high-resistance biphasic waveforms.    Arterial anastomosis:  106-cm/s with blunted low-resistance monophasic waveforms with spectral broadening and antegrade diastolic flow. (Normal velocities 200-400 cm/s)    Proximal  AVF/AV-cm/s with blunted low-resistance monophasic waveforms and antegrade diastolic flow.  (Normal velocities 150-400 cm/s)    Mid  AVF/AV-cm/s with blunted low-resistance monophasic waveforms and antegrade diastolic flow.    Distal  AVF/AV-cm/s with blunted low-resistance monophasic waveforms and antegrade diastolic flow.    Volume flow rate: 57-cc/min    Central veins:  116-131-cm/s with normal waveforms with normal respiratory phasicity.                                 Medications   sodium zirconium cyclosilicate packet 10 g (10 g Oral Not Given 22 1326)   sodium citrate-citric acid 500-334 mg/5 ml solution 30 mL (30 mLs Oral Not Given 22 1327)   furosemide injection 80 mg (80 mg Intravenous Not Given 22 1400)   sodium zirconium cyclosilicate packet 10 g (has no administration in time range)   mupirocin 2 % ointment (has no administration in time range)   0.9%  NaCl infusion (has no administration in time range)   0.9%  NaCl infusion (has no administration in time range)   sodium chloride 0.9% bolus 250 mL (has no administration in time range)   magnesium sulfate 2g in water 50mL IVPB (premix) (has no administration in time range)   albuterol sulfate nebulizer solution 10 mg (10 mg Nebulization Given 22 1312)   calcium gluconate 1 g in NS IVPB (premixed) (0 g Intravenous Stopped 22 1341)   insulin regular injection 8 Units 0.08 mL (8 Units Intravenous Given 22 1342)   dextrose 10% bolus 125 mL (0 g Intravenous Stopped 22 1348)     Medical Decision Making:   Initial Assessment:   76-year-old male with history of end-stage renal disease on dialysis presenting with clotted AV fistula.  Patient's last dialysis was Thursday.  Unable to be  dialyzed on Saturday.  On exam his vital signs are unremarkable.  He appears to be in no distress.  No palpable thrill over AV fistula.  Labs show hyperkalemia with potassium of 6.3.   Ultrasound shows partial occlusion of the AV fistula.  Discussed with Nephrology who will set up for dialysis.  Also discussed with General surgery, Dr. Hunt, who will admit patient for declotting.          Scribe Attestation:   Scribe #1: I performed the above scribed service and the documentation accurately describes the services I performed. I attest to the accuracy of the note.                 Clinical Impression:   Final diagnoses:  [N18.6, Z99.2] ESRD (end stage renal disease) on dialysis  [T82.49XA] Clotted dialysis access  [T82.868A] Thrombosis of kidney dialysis arteriovenous graft, initial encounter (Primary)  [E87.5] Hyperkalemia          ED Disposition Condition    Observation               Jesus Chung MD  06/13/22 1898

## 2022-06-13 NOTE — Clinical Note
A percutaneous stick to the Plantar Arch Vein  vein was performed. Ultrasound guidance was used to obtain access.

## 2022-06-13 NOTE — Clinical Note
A percutaneous stick to the right brachial vein was performed. A percutaneous stick to the right arm was performed. Ultrasound guidance was used to obtain access.

## 2022-06-13 NOTE — Clinical Note
An angiography was performed of the right lower extremity via hand injection with 40 mL of contrast

## 2022-06-13 NOTE — Clinical Note
dry, intact, no bleeding and no hematoma. No oozing or hematoma noted to site. Mass like area felt on lateral side of left leg. Dr. Leon aguilar.

## 2022-06-13 NOTE — Clinical Note
An angiography was performed of the distal right anterior tibial artery via hand injection with 15 mL of contrast

## 2022-06-13 NOTE — FIRST PROVIDER EVALUATION
Emergency Department TeleTriage Encounter Note      CHIEF COMPLAINT    Chief Complaint   Patient presents with    Vascular Access Problem     Pt last dialysis was thurs, unable to complete on sat due to fistula clotted, denies Cp/SOB, cough, fever, n/v        VITAL SIGNS   Initial Vitals [06/13/22 1014]   BP Pulse Resp Temp SpO2   (!) 120/54 68 20 98.2 °F (36.8 °C) 98 %      MAP       --            ALLERGIES    Review of patient's allergies indicates:  No Known Allergies    PROVIDER TRIAGE NOTE  This is a teletriage evaluation of a 76 y.o. male presenting to the ED complaining of RUE clotted AV fistula. ESRD on dialysis. Last dialyzed on Saturday. Denies CP and SOB.     Initial orders will be placed and care will be transferred to an alternate provider when patient is roomed for a full evaluation. Any additional orders and the final disposition will be determined by that provider.         ORDERS  Labs Reviewed   CBC W/ AUTO DIFFERENTIAL   COMPREHENSIVE METABOLIC PANEL   MAGNESIUM   PHOSPHORUS       ED Orders (720h ago, onward)    Start Ordered     Status Ordering Provider    06/13/22 1021 06/13/22 1021  CBC auto differential  STAT         Ordered JAVAD ESCALANTE.    06/13/22 1021 06/13/22 1021  Comprehensive metabolic panel  STAT         Ordered JAVAD ESCALANTE    06/13/22 1021 06/13/22 1021  Insert Saline lock IV  Once         Ordered JAVAD ESCALANTE N.    06/13/22 1021 06/13/22 1021  EKG 12-lead  Once         Ordered JAVAD ESCALANTE    06/13/22 1021 06/13/22 1021  Magnesium  STAT         Ordered JAVAD ESCALANTE    06/13/22 1021 06/13/22 1021  Phosphorus  Once         Ordered JAVAD ESCALANTE    06/13/22 1021 06/13/22 1021  US Upper Extremity Arteries Right  1 time imaging         Ordered JAVAD ESCALANTE            Virtual Visit Note: The provider triage portion of this emergency department evaluation and documentation was performed via  [employer], a HIPAA-compliant telemedicine application, in concert with a tele-presenter in the room. A face to face patient evaluation with one of my colleagues will occur once the patient is placed in an emergency department room.      DISCLAIMER: This note was prepared with [employer] voice recognition transcription software. Garbled syntax, mangled pronouns, and other bizarre constructions may be attributed to that software system.

## 2022-06-13 NOTE — Clinical Note
20 ml of Viperslide solution, 25 ml of Nitroglycerin and 5 mg of Verapamil added to 1 l NS for Viperslide solution.

## 2022-06-13 NOTE — ANESTHESIA PREPROCEDURE EVALUATION
06/13/2022     Houston Jc is a 76 y.o., male here for AV fistula declotting.    Past Medical History:   Diagnosis Date    Anemia     Combined systolic/diastolic HF.   AICD placed 2019     ESRD (on dialysis)     Coronary artery disease     Diabetes mellitus     Hematuria     Hypertension     Osteoarthritis of spine with radiculopathy, cervical region 1/10/2022    Renal cyst, right    Sleep apnea  Obesity (BMI 34)  Complete heart block (s/p AICD-pacemaker placement (2019)  Hx of V-tach  Cardiorenal syndrome  PAD  Clotted dialysis catheter      Past Surgical History:   Procedure Laterality Date    ANGIOGRAPHY OF ARTERIOVENOUS SHUNT Right 2/11/2022    Procedure: Fistulogram with Possible Intervention;  Surgeon: Dejuan Cody MD;  Location: [facility] CATH LAB/EP;  Service: Cardiology;  Laterality: Right;    AORTOGRAPHY WITH SERIALOGRAPHY Right 6/7/2022    Procedure: AORTOGRAM, WITH SERIALOGRAPHY;  Surgeon: Dejuan Cody MD;  Location: [facility] CATH LAB/EP;  Service: Cardiology;  Laterality: Right;    CARDIAC SURGERY      DECLOTTING OF ARTERIOVENOUS GRAFT Right 12/21/2021    Procedure: [id number];  Surgeon: Jeison Hunt MD;  Location: [facility] OR;  Service: Vascular;  Laterality: Right;    DECLOTTING OF ARTERIOVENOUS GRAFT Right 2/18/2022    Procedure: [id number];  Surgeon: Jeison Hunt MD;  Location: [facility] OR;  Service: Vascular;  Laterality: Right;    DECLOTTING OF ARTERIOVENOUS GRAFT Right 3/9/2022    Procedure: [id number];  Surgeon: Jeison Hunt MD;  Location: [facility] OR;  Service: Vascular;  Laterality: Right;    FISTULOGRAM N/A 2/11/2022    Procedure: Fistulogram;  Surgeon: Dejuan Cody MD;  Location: [facility] CATH LAB/EP;  Service: Cardiology;  Laterality: N/A;    FOOT SURGERY      INSERTION OF BIVENTRICULAR IMPLANTABLE CARDIOVERTER-DEFIBRILLATOR (ICD)  Left 7/18/2019    Procedure: INSERTION, ICD, BIVENTRICULAR;  Surgeon: Arturo Bay MD;  Location: [facility] EP LAB;  Service: Cardiology;  Laterality: Left;  CHB, CRTD, SJM, anes, GP, 6078    LEFT HEART CATHETERIZATION N/A 7/16/2019    Procedure: Left heart cath;  Surgeon: Dejuan Cody MD;  Location: [facility] CATH LAB/EP;  Service: Cardiology;  Laterality: N/A;    PERCUTANEOUS TRANSLUMINAL ANGIOPLASTY OF ARTERIOVENOUS FISTULA Right 2/11/2022    Procedure: PTA, AV FISTULA;  Surgeon: Dejuan Cody MD;  Location: [facility] CATH LAB/EP;  Service: Cardiology;  Laterality: Right;    PERITONEAL CATHETER REMOVAL Left 4/11/2020    Procedure: REMOVAL, CATHETER, DIALYSIS, PERITONEAL;  Surgeon: Edis Snell Jr., MD;  Location: [facility] OR;  Service: General;  Laterality: Left;    PLACEMENT OF ARTERIOVENOUS GRAFT Right 2/18/2022    Procedure: INSERTION, GRAFT, ARTERIOVENOUS;  Surgeon: Jeison Hunt MD;  Location: [facility] OR;  Service: Vascular;  Laterality: Right;    REVISION OF PROCEDURE INVOLVING ARTERIOVENOUS GRAFT Right 2/18/2022    Procedure: REVISION, PROCEDURE INVOLVING ARTERIOVENOUS GRAFT;  Surgeon: Jeison Hunt MD;  Location: [facility] OR;  Service: Vascular;  Laterality: Right;           Pre-op Assessment    I have reviewed the Patient Summary Reports.     I have reviewed the Nursing Notes. I have reviewed the NPO Status.   I have reviewed the Medications.     Review of Systems  Anesthesia Hx:  No problems with previous Anesthesia  History of prior surgery of interest to airway management or planning: Denies Family Hx of Anesthesia complications.   Denies Personal Hx of Anesthesia complications.   Hematology/Oncology:     Oncology Normal    -- Anemia:   EENT/Dental:EENT/Dental Normal   Cardiovascular:   Exercise tolerance: poor Hypertension CAD  Dysrhythmias (complete heart block)   Denies Angina. CHF (s/p AICD placement 2019) PVD    Pulmonary:   Denies Shortness of breath. Sleep Apnea    Renal/:   Chronic  Renal Disease (ESRD on dialysis)    Musculoskeletal:   Arthritis     Neurological:   Neuromuscular Disease, (Cervical radiculopathy)    Endocrine:   Diabetes, type 2    Psych:  Psychiatric Normal           Physical Exam  General: Well nourished    Airway:  Mallampati: III   Mouth Opening: Normal  TM Distance: Normal  Tongue: Normal  Neck ROM: Normal ROM    Dental:  Partial Dentures        Anesthesia Plan  Type of Anesthesia, risks & benefits discussed:    Anesthesia Type: MAC  Intra-op Monitoring Plan: Standard ASA Monitors  Post Op Pain Control Plan: multimodal analgesia and IV/PO Opioids PRN  Induction:  IV  Informed Consent: Informed consent signed with the Patient and all parties understand the risks and agree with anesthesia plan.  All questions answered.   ASA Score: 4  Anesthesia Plan Notes: Magnet will suspend Antiarrythmic sensing.  Magnet will not affect pacemaking ability        Ready For Surgery From Anesthesia Perspective.     .

## 2022-06-13 NOTE — OP NOTE
[facility] - Surgery (Hospital)  Brief Operative Note    SUMMARY     Surgery Date: 6/13/2022     Surgeon(s) and Role:     * Jeison Hunt MD - Primary    Assisting Surgeon: None    Pre-op Diagnosis:  ESRD (end stage renal disease) on dialysis [N18.6, Z99.2]  Clotted dialysis access [T82.49XA]  CLEMENCIA (acute kidney injury) [N17.9]    Post-op Diagnosis:  Post-Op Diagnosis Codes:     * ESRD (end stage renal disease) on dialysis [N18.6, Z99.2]     * Clotted dialysis access [T82.49XA]     * CLEMENCIA (acute kidney injury) [N17.9]    Procedure(s) (LRB):  [id number]; REVISION OF FISTULA W/INSERTION OF NEW AV GRAFT (Right)    Anesthesia: Local MAC    Operative Findings:  After satisfactory IV sedation patient is from position right upper arm forearm was prepped and draped in a sterile manner using ChloraPrep a time-out was called patient noted to confirm right upper arm was prepped using ChloraPrep struck was applied here year put medial aspect of the arm was infiltrated using 1% xylocaine solution and seen was made in the same area taken down deep subcu tissues of continues blue sclera Bovie short segment of graft was isolated proximally and discontinue removed hand preoperative seen was made over the 1st 2 DUs and venous had excellent problem axial back when fluid was obtained for repair there was no need to do several times to the arterial side there is some flow bed and not adequate flow was obtained patient had a severe stenosis at the arterial side at this point and takeoff area was infiltrated using was not insufficient in seen was made in the same area taken out of the subcu tissue subcutaneous units clamp bovied the use of a nice was of was isolated area could not be dilated are narrowed declotted this point was decided to revise the area distal brachial artery was dissected out proximal and distal control was obtained in the same area graft was then disconnected from do a fair similar insert our segmental graft  was isolated 1st started anastomosis was created to the brachial artery using running 6 0 Prolene suture then graft with AV fistula anastomosis was created on the venous side using a running 6 0 Prolene suture patient had good reoperative complication of a seizure hemostasis was maintained there graft and seen in the ER for her right upper arm was closed using a anastacio 5 opening suture patient was heparinized using continues as in on her heparin hemostasis was maintained and had the hand patient had Doppler pulses in the right arm graft area all wounds were then irrigated antibiotic solution and closed using 3-0 Vicryl subcutaneous tissues skin was closed using a running 4-0 nylon suture sterile gauze dressings applied and stomach on sponge chronic counts correct patient tolerated well no intraop complications and a serum blood loss was 100 cc physician if tolerated well centered occurred on his cervical condition.    Estimated Blood Loss: 100  Estimated Blood Loss has been documented.         Specimens:   Specimen (24h ago, onward)            None          [id number]     + fractures to the distal shafts of both the right radius and ulna

## 2022-06-13 NOTE — CONSULTS
Chief Complaint   Patient presents with    Vascular Access Problem       Pt last dialysis was thurs, unable to complete on sat due to fistula clotted, denies Cp/SOB, cough, fever, n/v       [place] Jc is a 76 y.o. male presenting with vascular access problem. Patient has a past medical history of Anemia, CHF (congestive heart failure), CKD (chronic kidney disease) stage 4, GFR 15-29 ml/min, Coronary artery disease, Diabetes mellitus, Hematuria, Hypertension, Osteoarthritis of spine with radiculopathy, cervical region, and Renal cyst, right. Patient states that he went to dialysis yesterday and was unable to receive it due to his fistula being clotted. Patient has no complaints at this time. Patient last received dialysis on Thursday.        The history is provided by the patient.      Review of patient's allergies indicates:  No Known Allergies       Past Medical History:   Diagnosis Date    Anemia      CHF (congestive heart failure)      CKD (chronic kidney disease) stage 4, GFR 15-29 ml/min      Coronary artery disease      Diabetes mellitus      Hematuria      Hypertension      Osteoarthritis of spine with radiculopathy, cervical region 1/10/2022    Renal cyst, right              Past Surgical History:   Procedure Laterality Date    ANGIOGRAPHY OF ARTERIOVENOUS SHUNT Right 2/11/2022     Procedure: Fistulogram with Possible Intervention;  Surgeon: Dejuan Cody MD;  Location: [facility] CATH LAB/EP;  Service: Cardiology;  Laterality: Right;    AORTOGRAPHY WITH SERIALOGRAPHY Right 6/7/2022     Procedure: AORTOGRAM, WITH SERIALOGRAPHY;  Surgeon: Dejuan Cody MD;  Location: [facility] CATH LAB/EP;  Service: Cardiology;  Laterality: Right;    CARDIAC SURGERY        DECLOTTING OF ARTERIOVENOUS GRAFT Right 12/21/2021     Procedure: [id number];  Surgeon: Jeison Hunt MD;  Location: [facility] OR;  Service: Vascular;  Laterality: Right;    DECLOTTING OF ARTERIOVENOUS GRAFT Right 2/18/2022      Procedure: [id number];  Surgeon: Jeison Hunt MD;  Location: [facility] OR;  Service: Vascular;  Laterality: Right;    DECLOTTING OF ARTERIOVENOUS GRAFT Right 3/9/2022     Procedure: [id number];  Surgeon: Jeison Hunt MD;  Location: [facility] OR;  Service: Vascular;  Laterality: Right;    FISTULOGRAM N/A 2/11/2022     Procedure: Fistulogram;  Surgeon: Dejuan Cody MD;  Location: [facility] CATH LAB/EP;  Service: Cardiology;  Laterality: N/A;    FOOT SURGERY        INSERTION OF BIVENTRICULAR IMPLANTABLE CARDIOVERTER-DEFIBRILLATOR (ICD) Left 7/18/2019     Procedure: INSERTION, ICD, BIVENTRICULAR;  Surgeon: Arturo Bay MD;  Location: [facility] EP LAB;  Service: Cardiology;  Laterality: Left;  CHB, CRTD, SJM, anes, GP, [id number]    LEFT HEART CATHETERIZATION N/A 7/16/2019     Procedure: Left heart cath;  Surgeon: Dejuan Cody MD;  Location: [facility] CATH LAB/EP;  Service: Cardiology;  Laterality: N/A;    PERCUTANEOUS TRANSLUMINAL ANGIOPLASTY OF ARTERIOVENOUS FISTULA Right 2/11/2022     Procedure: PTA, AV FISTULA;  Surgeon: Dejuan Cody MD;  Location: [facility] CATH LAB/EP;  Service: Cardiology;  Laterality: Right;    PERITONEAL CATHETER REMOVAL Left 4/11/2020     Procedure: REMOVAL, CATHETER, DIALYSIS, PERITONEAL;  Surgeon: Edis Snell Jr., MD;  Location: [facility] OR;  Service: General;  Laterality: Left;    PLACEMENT OF ARTERIOVENOUS GRAFT Right 2/18/2022     Procedure: INSERTION, GRAFT, ARTERIOVENOUS;  Surgeon: Jeison Hunt MD;  Location: [facility] OR;  Service: Vascular;  Laterality: Right;    REVISION OF PROCEDURE INVOLVING ARTERIOVENOUS GRAFT Right 2/18/2022     Procedure: REVISION, PROCEDURE INVOLVING ARTERIOVENOUS GRAFT;  Surgeon: Jeison Hunt MD;  Location: [facility];  Service: Vascular;  Laterality: Right;            Family History   Problem Relation Age of Onset    Heart attack Father        Social History            Tobacco Use    Smoking status: Former Smoker    Smokeless  tobacco: Never Used   Substance Use Topics    Alcohol use: Yes       Comment: social    Drug use: No      Review of Systems   Constitutional: Negative for fever.   HENT: Negative for sore throat.    Respiratory: Negative for shortness of breath.    Cardiovascular: Negative for chest pain.   Gastrointestinal: Negative for nausea.   Genitourinary: Negative for dysuria.   Musculoskeletal: Negative for back pain.   Skin: Negative for rash.   Neurological: Negative for weakness.   Hematological: Does not bruise/bleed easily.         Physical Exam             Initial Vitals [06/13/22 1014]   BP Pulse Resp Temp SpO2   (!) 120/54 68 20 98.2 °F (36.8 °C) 98 %       MAP           --              Physical Exam     Nursing note and vitals reviewed.  HENT:   Head: Atraumatic.   Eyes: Conjunctivae and EOM are normal.   Neck:   Normal range of motion.  Cardiovascular: Exam reveals no gallop and no friction rub.    No murmur heard.  HD access in right upper extremity without palpable thrill   Pulmonary/Chest: Breath sounds normal. No respiratory distress. He has no wheezes. He has no rales.   Abdominal: Abdomen is soft. Bowel sounds are normal. He exhibits no distension. There is no abdominal tenderness.   Musculoskeletal:         General: No edema. Normal range of motion.      Cervical back: Normal range of motion.     Neurological: He is alert and oriented to person, place, and time.   Skin: Skin is warm.   Psychiatric: He has a normal mood and affect.      Imp: clotted graft right upper arm, crf 5  htn      Plan: declotting today.

## 2022-06-13 NOTE — Clinical Note
2 units PRBCs scanned and hung intra-procedure Cath Lab. Pt tolerating PRBCs well; no complaints. VSS and in NAD.

## 2022-06-13 NOTE — ED NOTES
Patient remains NPO, report given to surgery nurse. No consent signed yet waiting on surgery. Labs repeated and covid test running.

## 2022-06-13 NOTE — Clinical Note
The site was marked. The groin and right foot was prepped. The site was prepped with ChloraPrep. The site was clipped. The patient was draped. The patient was positioned supine.

## 2022-06-13 NOTE — Clinical Note
Patient is seeing people, talking out of her head, still not able to swallow right?? Refusing to go to ER.   The wire was inserted into the right AV fistula.

## 2022-06-13 NOTE — ED NOTES
Patient last dialysis treatment was Thursday and missed Saturday due to access. Patient came into ED for access declotting. No CP or sob noted during visit. Patient denies pain at present time. Patient changed into gown and waiting for provider. EKG performed in triage. Will continue to monitor.

## 2022-06-14 PROBLEM — E87.5 HYPERKALEMIA: Status: ACTIVE | Noted: 2022-01-01

## 2022-06-14 NOTE — ASSESSMENT & PLAN NOTE
- Stable, at dry weight  - Continue home beta-blocker and ARB  - Will follow with daily standing weight and strict I/Os  - Strict electrolyte management with goal K 4-5 and Mg 2-3 (trending daily)    Results for orders placed during the hospital encounter of 12/21/21    Echo    Interpretation Summary  · The left ventricle is moderately enlarged with mild concentric hypertrophy and  · The quantitatively derived ejection fraction is 34%.  · Severe left atrial enlargement.  · Normal right ventricular size with normal right ventricular systolic function.  · Normal central venous pressure (3 mmHg).  · The estimated PA systolic pressure is 21 mmHg.  · No vegetations per surface echo.    Wt Readings from Last 5 Encounters:   06/14/22 0224 119 kg (262 lb 5.6 oz)   06/13/22 2045 119 kg (262 lb 5.6 oz)   06/13/22 1014 119.3 kg (263 lb)   06/07/22 0726 119.3 kg (263 lb)   06/07/22 0718 117.5 kg (259 lb)   05/18/22 1521 121.1 kg (267 lb)   03/09/22 0900 117 kg (258 lb)   03/03/22 1511 115.7 kg (255 lb)

## 2022-06-14 NOTE — PROGRESS NOTES
[facility]           Pharmacy        Current Drug Shortage     Due to national backorder and [facility] is critically low on inventory of Dextrose 50% (D50) Syringes and Vials, pharmacy has automatically switched from D50% to D10% IVPB at the equivalent dose until resolution of the shortage per P&T approved protocol.               Karin Kellogg, PharmD  [phone]

## 2022-06-14 NOTE — H&P (VIEW-ONLY)
[facility] - Telemetry  Cardiology  Consult Note    Patient Name: Houston Jc  MRN: [id number]  Admission Date: 6/13/2022  Hospital Length of Stay: 0 days  Code Status: Full Code   Attending Provider: Jeison Hunt MD   Consulting Provider: Bala Carvajal NP  Primary Care Physician: Zak Hamilton MD  Principal Problem:Problem with dialysis access    Patient information was obtained from patient, spouse/SO, past medical records and ER records.     Inpatient consult to [facility]  Consult performed by: Bala Carvajal NP  Consult ordered by: Smita Woo MD        Subjective:     Chief Complaint:  AVG clotted      HPI:   Houston Jc is a 76 y.o. male with PAD, ESRD, DM, VT, DVT, HTN, CHF, non obstructive CAD, CHB, obesity, ESRD, and DM. Patient presented to the ED due to vascular access problem. Last HD session was on Thursday. Attempt to declot per Dr Hunt was unsuccessful as was placement of Andi. IR consulted for line placement and Dr Cody consulted for declot. K+ 5.8 this AM- treated. HH 7.8/25. No reports of chest pain, palpitations, ICD firing, diaphoresis, syncope. Patient groggy on exam.       Past Medical History:   Diagnosis Date    Anemia     CHF (congestive heart failure)     CKD (chronic kidney disease) stage 4, GFR 15-29 ml/min     Coronary artery disease     Diabetes mellitus     Hematuria     Hypertension     Osteoarthritis of spine with radiculopathy, cervical region 1/10/2022    Renal cyst, right        Past Surgical History:   Procedure Laterality Date    ANGIOGRAPHY OF ARTERIOVENOUS SHUNT Right 2/11/2022    Procedure: Fistulogram with Possible Intervention;  Surgeon: Dejuan Cody MD;  Location: [facility] CATH LAB/EP;  Service: Cardiology;  Laterality: Right;    AORTOGRAPHY WITH SERIALOGRAPHY Right 6/7/2022    Procedure: AORTOGRAM, WITH SERIALOGRAPHY;  Surgeon: Dejuan Cody MD;  Location: [facility] CATH LAB/EP;  Service: Cardiology;  Laterality: Right;     CARDIAC SURGERY      DECLOTTING OF ARTERIOVENOUS GRAFT Right 12/21/2021    Procedure: [id number];  Surgeon: Jeison Hunt MD;  Location: [facility] OR;  Service: Vascular;  Laterality: Right;    DECLOTTING OF ARTERIOVENOUS GRAFT Right 2/18/2022    Procedure: [id number];  Surgeon: Jeison Hunt MD;  Location: [facility] OR;  Service: Vascular;  Laterality: Right;    DECLOTTING OF ARTERIOVENOUS GRAFT Right 3/9/2022    Procedure: [id number];  Surgeon: Jeison Hunt MD;  Location: [facility] OR;  Service: Vascular;  Laterality: Right;    DECLOTTING OF ARTERIOVENOUS GRAFT Right 6/13/2022    Procedure: [id number]; REVISION OF FISTULA W/INSERTION OF NEW AV GRAFT;  Surgeon: Jeison Hunt MD;  Location: [facility] OR;  Service: Vascular;  Laterality: Right;    FISTULOGRAM N/A 2/11/2022    Procedure: Fistulogram;  Surgeon: Dejuan Cody MD;  Location: [facility] CATH LAB/EP;  Service: Cardiology;  Laterality: N/A;    FOOT SURGERY      INSERTION OF BIVENTRICULAR IMPLANTABLE CARDIOVERTER-DEFIBRILLATOR (ICD) Left 7/18/2019    Procedure: INSERTION, ICD, BIVENTRICULAR;  Surgeon: Arturo Bay MD;  Location: [facility] EP LAB;  Service: Cardiology;  Laterality: Left;  CHB, CRTD, SJM, anes, GP, 6078    LEFT HEART CATHETERIZATION N/A 7/16/2019    Procedure: Left heart cath;  Surgeon: Dejuan Cody MD;  Location: [facility] CATH LAB/EP;  Service: Cardiology;  Laterality: N/A;    PERCUTANEOUS TRANSLUMINAL ANGIOPLASTY OF ARTERIOVENOUS FISTULA Right 2/11/2022    Procedure: PTA, AV FISTULA;  Surgeon: Dejuan Cody MD;  Location: [facility] CATH LAB/EP;  Service: Cardiology;  Laterality: Right;    PERITONEAL CATHETER REMOVAL Left 4/11/2020    Procedure: REMOVAL, CATHETER, DIALYSIS, PERITONEAL;  Surgeon: Edis Snell Jr., MD;  Location: [facility] OR;  Service: General;  Laterality: Left;    PLACEMENT OF ARTERIOVENOUS GRAFT Right 2/18/2022    Procedure: INSERTION, GRAFT, ARTERIOVENOUS;  Surgeon: Jeison  LAMBERTO Hunt MD;  Location: [facility] OR;  Service: Vascular;  Laterality: Right;    REVISION OF PROCEDURE INVOLVING ARTERIOVENOUS GRAFT Right 2/18/2022    Procedure: REVISION, PROCEDURE INVOLVING ARTERIOVENOUS GRAFT;  Surgeon: Jeison Hunt MD;  Location: [facility] OR;  Service: Vascular;  Laterality: Right;       Review of patient's allergies indicates:  No Known Allergies    No current facility-administered medications on file prior to encounter.     Current Outpatient Medications on File Prior to Encounter   Medication Sig    acetaminophen (TYLENOL) 325 MG tablet Take 1 tablet (325 mg total) by mouth every 6 (six) hours as needed for Pain.    allopurinol (ZYLOPRIM) 300 MG tablet Take 300 mg by mouth once daily.    apixaban (ELIQUIS) 5 mg Tab Take 1 tablet (5 mg total) by mouth 2 (two) times daily.    calcium acetate,phosphat bind, (PHOSLO) 667 mg tablet Take 1 tablet (667 mg total) by mouth 3 (three) times daily with meals.    carvediloL (COREG) 6.25 MG tablet Take 1 tablet (6.25 mg total) by mouth 2 (two) times daily.    cholecalciferol, vitamin D3, (VITAMIN D3) 50 mcg (2,000 unit) Cap Take 1 capsule by mouth once daily.    clopidogreL (PLAVIX) 75 mg tablet Take 75 mg by mouth once daily.    folic acid (FOLVITE) 1 MG tablet Take 1 mg by mouth.    gabapentin (NEURONTIN) 300 MG capsule Take 1 capsule (300 mg total) by mouth as directed  (take a pill Monday, Wednesday and Friday  after HD). (Patient taking differently: Take 300 mg by mouth continuous prn (Tuesdays, Thurdays, and Saturdays).)    HYDROcodone-acetaminophen (NORCO) 5-325 mg per tablet Take 1 tablet by mouth every 6 (six) hours as needed for Pain.    insulin aspart U-100 (NOVOLOG) 100 unit/mL injection Inject 4-8 Units into the skin 3 (three) times daily before meals. Per sliding scale    povidone-iodine (BETADINE) 10 % external solution Apply topically as needed for Wound Care. Apply to wound and to interdigital maceration 2x per day.     VENTOLIN HFA 90 mcg/actuation inhaler Inhale 1 puff into the lungs every 4 (four) hours as needed. As NEEDED     Family History       Problem Relation (Age of Onset)    Heart attack Father          Tobacco Use    Smoking status: Former Smoker    Smokeless tobacco: Never Used   Substance and Sexual Activity    Alcohol use: Yes     Comment: social    Drug use: No    Sexual activity: Yes     Review of Systems   Constitutional: Positive for malaise/fatigue. Negative for diaphoresis.   HENT: Negative.     Eyes: Negative.    Cardiovascular:  Negative for chest pain, irregular heartbeat, leg swelling, near-syncope, orthopnea, palpitations, paroxysmal nocturnal dyspnea and syncope.   Respiratory:  Negative for cough and shortness of breath.    Endocrine: Negative.    Hematologic/Lymphatic: Negative.    Skin: Negative.    Musculoskeletal:  Positive for arthritis.   Gastrointestinal:  Negative for melena, nausea and vomiting.   Genitourinary: Negative.    Neurological:  Positive for weakness. Negative for light-headedness.   Psychiatric/Behavioral: Negative.     Allergic/Immunologic: Negative.    Objective:     Vital Signs (Most Recent):  Temp: 97.4 °F (36.3 °C) (06/14/22 1030)  Pulse: 75 (06/14/22 1030)  Resp: 18 (06/14/22 1305)  BP: (!) 104/51 (06/14/22 1030)  SpO2: 95 % (06/14/22 1111)   Vital Signs (24h Range):  Temp:  [97 °F (36.1 °C)-97.7 °F (36.5 °C)] 97.4 °F (36.3 °C)  Pulse:  [60-75] 75  Resp:  [16-23] 18  SpO2:  [93 %-100 %] 95 %  BP: (100-114)/(48-60) 104/51     Weight: 119 kg (262 lb 5.6 oz)  Body mass index is 34.61 kg/m².    SpO2: 95 %  O2 Device (Oxygen Therapy): room air    No intake or output data in the 24 hours ending 06/14/22 1343    Lines/Drains/Airways       Central Venous Catheter Line  Duration                  Hemodialysis AV Graft Right forearm -- days              Peripheral Intravenous Line  Duration                  Peripheral IV - Single Lumen 06/13/22 1129 20 G Left Antecubital 1 day                     Physical Exam  Constitutional:       General: He is not in acute distress.     Appearance: He is obese. He is not diaphoretic.   HENT:      Head: Atraumatic.   Eyes:      General:         Right eye: No discharge.         Left eye: No discharge.   Cardiovascular:      Rate and Rhythm: Normal rate and regular rhythm.      Heart sounds: No murmur heard.    No friction rub. No gallop.   Abdominal:      General: Bowel sounds are normal.      Palpations: Abdomen is soft.   Musculoskeletal:      Right lower leg: No edema.      Left lower leg: No edema.   Skin:     General: Skin is warm and dry.   Neurological:      Mental Status: Mental status is at baseline.       Significant Labs: BMP:   Recent Labs   Lab 06/13/22  1125 06/13/22  1422 06/14/22  1106   * 96 61*    140 139   K 6.3* 5.8* 5.8*   CL 99 100 101   CO2 19* 19* 19*   * 106* 112*   CREATININE 21.4* 21.6* 22.6*   CALCIUM 8.9 8.5* 8.1*   MG 1.4*  --  1.5*   , CMP   Recent Labs   Lab 06/13/22  1125 06/13/22  1422 06/14/22  1106    140 139   K 6.3* 5.8* 5.8*   CL 99 100 101   CO2 19* 19* 19*   * 96 61*   * 106* 112*   CREATININE 21.4* 21.6* 22.6*   CALCIUM 8.9 8.5* 8.1*   PROT 6.8  --   --    ALBUMIN 2.8*  --  2.4*   BILITOT 0.2  --   --    ALKPHOS 96  --   --    AST 15  --   --    ALT 6*  --   --    ANIONGAP 21* 21* 19*   ESTGFRAFRICA 2* 2* 2*   EGFRNONAA 2* 2* 2*   , CBC   Recent Labs   Lab 06/13/22  1125 06/14/22  1007   WBC 8.53 7.55   HGB 9.5* 7.8*   HCT 31.2* 25.7*    156   , INR No results for input(s): INR, PROTIME in the last 48 hours., Lipid Panel No results for input(s): CHOL, HDL, LDLCALC, TRIG, CHOLHDL in the last 48 hours., Troponin No results for input(s): TROPONINI in the last 48 hours., and All pertinent lab results from the last 24 hours have been reviewed.    Significant Imaging: Echocardiogram: Transthoracic echo (TTE) complete ([facility] Only):   Results for orders placed or performed during  the hospital encounter of 12/21/21   Echo   Result Value Ref Range    Ascending aorta 3.30 cm    STJ 3.27 cm    AV mean gradient 4 mmHg    Ao peak son 1.17 m/s    Ao VTI 25.29 cm    IVS 1.61 (A) 0.6 - 1.1 cm    LA size 5.11 cm    Left Atrium Major Axis 7.34 cm    Left Atrium Minor Axis 6.11 cm    LVIDd 6.60 (A) 3.5 - 6.0 cm    LVIDs 5.24 (A) 2.1 - 4.0 cm    LVOT diameter 2.89 cm    LVOT peak VTI 17.22 cm    Posterior Wall 1.66 (A) 0.6 - 1.1 cm    MV Peak A Son 1.03 m/s    E wave deceleration time 128.00 msec    MV Peak E Son 0.75 m/s    RA Major Axis 5.72 cm    RA Width 3.45 cm    RVDD 3.28 cm    TAPSE 1.72 cm    TR Max Son 2.14 m/s    TDI LATERAL 0.04 m/s    LA WIDTH 4.32 cm    Ao root annulus 4.28 cm    PV PEAK VELOCITY 1.04 cm/s    MV stenosis pressure 1/2 time 37.12 ms    LV Diastolic Volume 223.81 mL    LV Systolic Volume 131.64 mL    LVOT peak son 0.79 m/s    LA volume (mod) 85.57 cm3    MV mean gradient 1 mmHg    MV peak gradient 4 mmHg    RV S' 9.54 cm/s    MV VTI 18.13 cm    LV LATERAL E/E' RATIO 18.75 m/s    FS 21 %    LA volume 125.13 cm3    LV mass 561.38 g    Left Ventricle Relative Wall Thickness 0.50 cm    AV valve area 4.46 cm2    AV Velocity Ratio 0.68     AV index (prosthetic) 0.68     MV valve area p 1/2 method 5.93 cm2    MV valve area by continuity eq 6.23 cm2    E/A ratio 0.73     LVOT area 6.6 cm2    LVOT stroke volume 112.90 cm3    AV peak gradient 5 mmHg    LV Systolic Volume Index 53.5 mL/m2    LV Diastolic Volume Index 90.98 mL/m2    LA Volume Index 50.9 mL/m2    LV Mass Index 228 g/m2    Triscuspid Valve Regurgitation Peak Gradient 18 mmHg    LA Volume Index (Mod) 34.8 mL/m2    BSA 2.53 m2    Right Atrial Pressure (from IVC) 3 mmHg    QEF 34 %    TV rest pulmonary artery pressure 21 mmHg    Narrative    · The left ventricle is moderately enlarged with mild concentric   hypertrophy and  · The quantitatively derived ejection fraction is 34%.  · Severe left atrial enlargement.  · Normal  right ventricular size with normal right ventricular systolic   function.  · Normal central venous pressure (3 mmHg).  · The estimated PA systolic pressure is 21 mmHg.  · No vegetations per surface echo.        Assessment and Plan:     * Problem with dialysis access  Unsuccessful OR declot today and Andi placement  Pending IR consult for HD line placement   Will need fistulagram by Dr Cody - timing to be discussed   7.8/25- transfuse 1U PRBCs with next HD session once line is placed   Will update primary team once discussed with Dr Glo BONILLA  06/13/2022  Moderate amount of nonocclusive thrombus in significant aneurysmal degeneration along the proximal segment of the RUE brachiocephalic AVG and, minimal amount of nonocclusive thrombus along the distal segment associated with abnormal peak systolic velocities in volume flow rates throughout the AVG  Hold Eliquis, resume Plavix if OK with surgery  Heparin recommended if ok with surgery     Hyperkalemia  Nephrology on board  K+ 5.8 today, 6.3 on admission   Sherrie noted     Acute deep vein thrombosis (DVT) of popliteal vein of right lower extremity  Eliquis on hold for procedure  Heparin if ok with surgery     PAD (peripheral artery disease)  R DVA planning in progress as outpatient   Resume Plavix if ok with surgery  Initiate statin if no contraindication   Not on asa given risk of bleeding with triple therapy         Chronic combined systolic and diastolic congestive heart failure  12/21/21    Echo    Interpretation Summary  · The left ventricle is moderately enlarged with mild concentric hypertrophy and  · The quantitatively derived ejection fraction is 34%.  · Severe left atrial enlargement.  · Normal right ventricular size with normal right ventricular systolic function.  · Normal central venous pressure (3 mmHg).  · The estimated PA systolic pressure is 21 mmHg.  · No vegetations per surface echo.    Volume status maintained by HD  Given IV Lasix  with no output documented  Last HD session Thursday  Resume home BB       Cardiac resynchronization therapy defibrillator (CRT-D) in place  Nonobstructive CAD  S/p episodes of VT in the past  Resume home BB      Complete heart block  S/p CRT-D    Morbid obesity  Weight loss encouraged     Hypertension  SBP 100s-120s  Resume BB         VTE Risk Mitigation (From admission, onward)         Ordered     IP VTE HIGH RISK PATIENT  Once         06/14/22 0214     Place sequential compression device  Until discontinued         06/14/22 0214     Place TJ hose  Until discontinued         06/14/22 0214                Thank you for your consult. I will follow-up with patient. Please contact us if you have any additional questions.    Bala Carvajal NP  Cardiology   [facility] - Telemetry

## 2022-06-14 NOTE — HPI
Houston Jc is a 76 y.o. male presenting with vascular access problem. Patient has a past medical history of Anemia, CHF (congestive heart failure), CKD (chronic kidney disease) stage 4, GFR 15-29 ml/min, Coronary artery disease, Diabetes mellitus, Hematuria, Hypertension, Osteoarthritis of spine with radiculopathy, cervical region, and Renal cyst, right. Patient states that he went to dialysis yesterday and was unable to receive it due to his fistula being clotted. Patient has no complaints at this time. Patient last received dialysis on Thursday.    Scheduled for declotting today.

## 2022-06-14 NOTE — ANESTHESIA POSTPROCEDURE EVALUATION
Anesthesia Post Evaluation    Patient: Houston Jc    Procedure(s) Performed: Procedure(s) (LRB):  [id number]; REVISION OF FISTULA W/INSERTION OF NEW AV GRAFT (Right)    Final Anesthesia Type: MAC      Patient location during evaluation: PACU  Patient participation: Yes- Able to Participate  Level of consciousness: awake and alert  Post-procedure vital signs: reviewed and stable  Pain management: adequate  Airway patency: patent    PONV status at discharge: No PONV  Anesthetic complications: no      Cardiovascular status: blood pressure returned to baseline  Respiratory status: unassisted  Hydration status: euvolemic            Vitals Value Taken Time   /60 06/14/22 0632   Temp 36.1 °C (97 °F) 06/14/22 0632   Pulse 61 06/14/22 0632   Resp 18 06/14/22 0632   SpO2 94 % 06/14/22 0632         Event Time   Out of Recovery 18:36:04         Pain/Nelda Score: Pain Rating Prior to Med Admin: 0 (6/13/2022  6:10 PM)  Pain Rating Post Med Admin: 0 (6/13/2022  6:10 PM)  Nelda Score: 9 (6/13/2022  6:35 PM)

## 2022-06-14 NOTE — NURSING
Spoke with Cath Lab staff member for Fabian cath placement and AV fistula consultation. States will rely the message to Dr. Cody.

## 2022-06-14 NOTE — HOSPITAL COURSE
06/14/2022 Per HPI   6/20/2022 NPO for LE angiogram today   6/21/2022 LE angiogram yesterday with following results:     successful atherectomy with PTA and stent to RSFA and PTA to RTPT and peroneal with excellent results  Poor flow into the foot and heavily calcified AT/PT.  Heparin/gtt per current treatment regimen  Continue aggressive wound care  Remains high risk for amputation and may consider DVA    Tolerated procedure well and recovered on telemetry floor. Access site stable. Complains of pain to right foot unchanged from admission. HR and BP stable. H&H 7.9/25.2 not far off baseline. Plan for HD today      6/22/2022 Right foot pain persists despite pain medications. HR and Bp stable. H&H unchanged. Vein mapping ordered    06/27/2022 Audible pulses per doppler including plantar arch. No complaints this AM.     06/28/2022 Stable overnight without CV complaint. Midodrine increased- SBP 90s-100s.

## 2022-06-14 NOTE — ASSESSMENT & PLAN NOTE
Patient's FSGs are controlled on current medication regimen.  Last A1c reviewed-repeat HgbA1c ordered  Lab Results   Component Value Date    HGBA1C 6.8 (H) 12/27/2021     Most recent fingerstick glucose reviewed-   Recent Labs   Lab 06/13/22  2247 06/14/22  0441 06/14/22  0634   POCTGLUCOSE 72 116* 102     Current correctional scale  Low  Maintain anti-hyperglycemic dose as follows-   Antihyperglycemics (From admission, onward)            Start     Stop Route Frequency Ordered    06/14/22 1049  insulin aspart U-100 pen 0-5 Units         -- SubQ Every 6 hours PRN 06/14/22 0949        Advance to 2000 kcal ADA Renal Diabetic Diet after surgery

## 2022-06-14 NOTE — ASSESSMENT & PLAN NOTE
12/21/21    Echo    Interpretation Summary  · The left ventricle is moderately enlarged with mild concentric hypertrophy and  · The quantitatively derived ejection fraction is 34%.  · Severe left atrial enlargement.  · Normal right ventricular size with normal right ventricular systolic function.  · Normal central venous pressure (3 mmHg).  · The estimated PA systolic pressure is 21 mmHg.  · No vegetations per surface echo.    Volume status maintained by HD  Given IV Lasix with no output documented  Last HD session Thursday  Resume home BB

## 2022-06-14 NOTE — PROGRESS NOTES
"Surgery note:    /60 (BP Location: Left arm, Patient Position: Lying)   Pulse 66   Temp 97 °F (36.1 °C) (Oral)   Resp 20   Ht 6' 1" (1.854 m)   Wt 119 kg (262 lb 5.6 oz)   SpO2 95%   BMI 34.61 kg/m²   No intake/output data recorded.  No intake/output data recorded.    Attempted to place Andi catheter left IJ  With us , unsuccessful after multiple attempts will consult IR for Andi placement . Graft clotted off again last night.    Will also ask  for possible declotting of the fistula.  "

## 2022-06-14 NOTE — SUBJECTIVE & OBJECTIVE
Past Medical History:   Diagnosis Date    Anemia     CHF (congestive heart failure)     CKD (chronic kidney disease) stage 4, GFR 15-29 ml/min     Coronary artery disease     Diabetes mellitus     Hematuria     Hypertension     Osteoarthritis of spine with radiculopathy, cervical region 1/10/2022    Renal cyst, right        Past Surgical History:   Procedure Laterality Date    ANGIOGRAPHY OF ARTERIOVENOUS SHUNT Right 2/11/2022    Procedure: Fistulogram with Possible Intervention;  Surgeon: Dejuan Cody MD;  Location: [facility] CATH LAB/EP;  Service: Cardiology;  Laterality: Right;    AORTOGRAPHY WITH SERIALOGRAPHY Right 6/7/2022    Procedure: AORTOGRAM, WITH SERIALOGRAPHY;  Surgeon: Dejuan Cody MD;  Location: [facility] CATH LAB/EP;  Service: Cardiology;  Laterality: Right;    CARDIAC SURGERY      DECLOTTING OF ARTERIOVENOUS GRAFT Right 12/21/2021    Procedure: [id number];  Surgeon: Jeison Hunt MD;  Location: [facility] OR;  Service: Vascular;  Laterality: Right;    DECLOTTING OF ARTERIOVENOUS GRAFT Right 2/18/2022    Procedure: [id number];  Surgeon: Jeison Hunt MD;  Location: [facility] OR;  Service: Vascular;  Laterality: Right;    DECLOTTING OF ARTERIOVENOUS GRAFT Right 3/9/2022    Procedure: [id number];  Surgeon: Jeison Hunt MD;  Location: [facility];  Service: Vascular;  Laterality: Right;    DECLOTTING OF ARTERIOVENOUS GRAFT Right 6/13/2022    Procedure: [id number]; REVISION OF FISTULA W/INSERTION OF NEW AV GRAFT;  Surgeon: Jeison Hunt MD;  Location: [facility] OR;  Service: Vascular;  Laterality: Right;    FISTULOGRAM N/A 2/11/2022    Procedure: Fistulogram;  Surgeon: Dejuan Cody MD;  Location: [facility] CATH LAB/EP;  Service: Cardiology;  Laterality: N/A;    FOOT SURGERY      INSERTION OF BIVENTRICULAR IMPLANTABLE CARDIOVERTER-DEFIBRILLATOR (ICD) Left 7/18/2019    Procedure: INSERTION, ICD, BIVENTRICULAR;  Surgeon: Arturo Bay MD;  Location: [facility] EP LAB;   Service: Cardiology;  Laterality: Left;  CHB, CRTD, SJM, anes, GP, 6078    LEFT HEART CATHETERIZATION N/A 7/16/2019    Procedure: Left heart cath;  Surgeon: Dejuan Cody MD;  Location: [facility] CATH LAB/EP;  Service: Cardiology;  Laterality: N/A;    PERCUTANEOUS TRANSLUMINAL ANGIOPLASTY OF ARTERIOVENOUS FISTULA Right 2/11/2022    Procedure: PTA, AV FISTULA;  Surgeon: Dejuan Cody MD;  Location: [facility] CATH LAB/EP;  Service: Cardiology;  Laterality: Right;    PERITONEAL CATHETER REMOVAL Left 4/11/2020    Procedure: REMOVAL, CATHETER, DIALYSIS, PERITONEAL;  Surgeon: Edis Snell Jr., MD;  Location: [facility] OR;  Service: General;  Laterality: Left;    PLACEMENT OF ARTERIOVENOUS GRAFT Right 2/18/2022    Procedure: INSERTION, GRAFT, ARTERIOVENOUS;  Surgeon: Jeison Hunt MD;  Location: [facility] OR;  Service: Vascular;  Laterality: Right;    REVISION OF PROCEDURE INVOLVING ARTERIOVENOUS GRAFT Right 2/18/2022    Procedure: REVISION, PROCEDURE INVOLVING ARTERIOVENOUS GRAFT;  Surgeon: Jeison Hunt MD;  Location: [facility] OR;  Service: Vascular;  Laterality: Right;       Review of patient's allergies indicates:  No Known Allergies    No current facility-administered medications on file prior to encounter.     Current Outpatient Medications on File Prior to Encounter   Medication Sig    acetaminophen (TYLENOL) 325 MG tablet Take 1 tablet (325 mg total) by mouth every 6 (six) hours as needed for Pain.    allopurinol (ZYLOPRIM) 300 MG tablet Take 300 mg by mouth once daily.    apixaban (ELIQUIS) 5 mg Tab Take 1 tablet (5 mg total) by mouth 2 (two) times daily.    calcium acetate,phosphat bind, (PHOSLO) 667 mg tablet Take 1 tablet (667 mg total) by mouth 3 (three) times daily with meals.    carvediloL (COREG) 6.25 MG tablet Take 1 tablet (6.25 mg total) by mouth 2 (two) times daily.    cholecalciferol, vitamin D3, (VITAMIN D3) 50 mcg (2,000 unit) Cap Take 1 capsule by mouth once daily.    clopidogreL (PLAVIX) 75 mg  tablet Take 75 mg by mouth once daily.    folic acid (FOLVITE) 1 MG tablet Take 1 mg by mouth.    gabapentin (NEURONTIN) 300 MG capsule Take 1 capsule (300 mg total) by mouth as directed  (take a pill Monday, Wednesday and Friday  after HD). (Patient taking differently: Take 300 mg by mouth continuous prn (Tuesdays, Thurdays, and Saturdays).)    HYDROcodone-acetaminophen (NORCO) 5-325 mg per tablet Take 1 tablet by mouth every 6 (six) hours as needed for Pain.    insulin aspart U-100 (NOVOLOG) 100 unit/mL injection Inject 4-8 Units into the skin 3 (three) times daily before meals. Per sliding scale    povidone-iodine (BETADINE) 10 % external solution Apply topically as needed for Wound Care. Apply to wound and to interdigital maceration 2x per day.    VENTOLIN HFA 90 mcg/actuation inhaler Inhale 1 puff into the lungs every 4 (four) hours as needed. As NEEDED     Family History       Problem Relation (Age of Onset)    Heart attack Father          Tobacco Use    Smoking status: Former Smoker    Smokeless tobacco: Never Used   Substance and Sexual Activity    Alcohol use: Yes     Comment: social    Drug use: No    Sexual activity: Yes     Review of Systems   Constitutional:  Negative for diaphoresis, fatigue, fever and unexpected weight change.   HENT:  Negative for trouble swallowing and voice change.    Eyes:  Negative for photophobia and visual disturbance.   Respiratory:  Negative for cough and shortness of breath.    Cardiovascular:  Negative for chest pain, palpitations and leg swelling.   Gastrointestinal:  Negative for abdominal pain, constipation, diarrhea, nausea and vomiting.   Genitourinary:  Negative for dysuria, frequency and hematuria.   Musculoskeletal:  Negative for arthralgias, gait problem and myalgias.   Skin:  Negative for rash and wound.   Neurological:  Negative for dizziness, tremors, syncope, speech difficulty, weakness and headaches.   Hematological:  Negative for adenopathy. Does not  bruise/bleed easily.   Psychiatric/Behavioral:  Negative for confusion and dysphoric mood. The patient is not nervous/anxious.    Objective:     Vital Signs (Most Recent):  Temp: 97 °F (36.1 °C) (06/14/22 0632)  Pulse: 66 (06/14/22 0824)  Resp: 20 (06/14/22 0824)  BP: 101/60 (06/14/22 0632)  SpO2: 95 % (06/14/22 0824) Vital Signs (24h Range):  Temp:  [97 °F (36.1 °C)-98.2 °F (36.8 °C)] 97 °F (36.1 °C)  Pulse:  [60-78] 66  Resp:  [16-23] 20  SpO2:  [94 %-100 %] 95 %  BP: (100-130)/(48-60) 101/60     Weight: 119 kg (262 lb 5.6 oz)  Body mass index is 34.61 kg/m².    Physical Exam  Vitals and nursing note reviewed.   Constitutional:       Appearance: He is well-developed.   HENT:      Head: Normocephalic and atraumatic.   Eyes:      Pupils: Pupils are equal, round, and reactive to light.   Neck:      Thyroid: No thyromegaly.      Trachea: No tracheal deviation.   Cardiovascular:      Rate and Rhythm: Normal rate and regular rhythm.      Heart sounds: No murmur heard.  Pulmonary:      Effort: Pulmonary effort is normal.      Breath sounds: Normal breath sounds. No wheezing.   Abdominal:      General: Bowel sounds are normal.      Palpations: Abdomen is soft.      Tenderness: There is no abdominal tenderness.   Musculoskeletal:         General: No tenderness. Normal range of motion.      Cervical back: Normal range of motion and neck supple.   Lymphadenopathy:      Cervical: No cervical adenopathy.   Skin:     General: Skin is warm and dry.      Comments: Graft without thrill   Neurological:      Mental Status: He is alert and oriented to person, place, and time.      Cranial Nerves: No cranial nerve deficit.      Deep Tendon Reflexes: Reflexes are normal and symmetric.   Psychiatric:         Behavior: Behavior normal.       Significant Labs: All pertinent labs within the past 24 hours have been reviewed.    Significant Imaging: I have reviewed all pertinent imaging results/findings within the past 24 hours.

## 2022-06-14 NOTE — H&P
Chief Complaint   Patient presents with    Vascular Access Problem       Pt last dialysis was thurs, unable to complete on sat due to fistula clotted, denies Cp/SOB, cough, fever, n/v       Houston Jc is a 76 y.o. male presenting with vascular access problem. Patient has a past medical history of Anemia, CHF (congestive heart failure), CKD (chronic kidney disease) stage 4, GFR 15-29 ml/min, Coronary artery disease, Diabetes mellitus, Hematuria, Hypertension, Osteoarthritis of spine with radiculopathy, cervical region, and Renal cyst, right. Patient states that he went to dialysis yesterday and was unable to receive it due to his fistula being clotted. Patient has no complaints at this time. Patient last received dialysis on Thursday.        The history is provided by the patient.      Review of patient's allergies indicates:  No Known Allergies          Past Medical History:   Diagnosis Date    Anemia      CHF (congestive heart failure)      CKD (chronic kidney disease) stage 4, GFR 15-29 ml/min      Coronary artery disease      Diabetes mellitus      Hematuria      Hypertension      Osteoarthritis of spine with radiculopathy, cervical region 1/10/2022    Renal cyst, right                  Past Surgical History:   Procedure Laterality Date    ANGIOGRAPHY OF ARTERIOVENOUS SHUNT Right 2/11/2022     Procedure: Fistulogram with Possible Intervention;  Surgeon: Dejuan Cody MD;  Location: [facility] CATH LAB/EP;  Service: Cardiology;  Laterality: Right;    AORTOGRAPHY WITH SERIALOGRAPHY Right 6/7/2022     Procedure: AORTOGRAM, WITH SERIALOGRAPHY;  Surgeon: Dejuan Cody MD;  Location: [facility] CATH LAB/EP;  Service: Cardiology;  Laterality: Right;    CARDIAC SURGERY        DECLOTTING OF ARTERIOVENOUS GRAFT Right 12/21/2021     Procedure: [id number];  Surgeon: Jeison Hunt MD;  Location: [facility] OR;  Service: Vascular;  Laterality: Right;    DECLOTTING OF ARTERIOVENOUS GRAFT Right 2/18/2022      Procedure: [id number];  Surgeon: Jeison Hunt MD;  Location: [facility] OR;  Service: Vascular;  Laterality: Right;    DECLOTTING OF ARTERIOVENOUS GRAFT Right 3/9/2022     Procedure: [id number];  Surgeon: Jeison Hunt MD;  Location: [facility] OR;  Service: Vascular;  Laterality: Right;    FISTULOGRAM N/A 2/11/2022     Procedure: Fistulogram;  Surgeon: Dejuan Cody MD;  Location: [facility] CATH LAB/EP;  Service: Cardiology;  Laterality: N/A;    FOOT SURGERY        INSERTION OF BIVENTRICULAR IMPLANTABLE CARDIOVERTER-DEFIBRILLATOR (ICD) Left 7/18/2019     Procedure: INSERTION, ICD, BIVENTRICULAR;  Surgeon: Arturo Bay MD;  Location: [facility] EP LAB;  Service: Cardiology;  Laterality: Left;  CHB, CRTD, SJM, anes, GP, 6078    LEFT HEART CATHETERIZATION N/A 7/16/2019     Procedure: Left heart cath;  Surgeon: Dejuan Cody MD;  Location: [facility] CATH LAB/EP;  Service: Cardiology;  Laterality: N/A;    PERCUTANEOUS TRANSLUMINAL ANGIOPLASTY OF ARTERIOVENOUS FISTULA Right 2/11/2022     Procedure: PTA, AV FISTULA;  Surgeon: Dejuan Cody MD;  Location: [facility] CATH LAB/EP;  Service: Cardiology;  Laterality: Right;    PERITONEAL CATHETER REMOVAL Left 4/11/2020     Procedure: REMOVAL, CATHETER, DIALYSIS, PERITONEAL;  Surgeon: Edis Snell Jr., MD;  Location: [facility] OR;  Service: General;  Laterality: Left;    PLACEMENT OF ARTERIOVENOUS GRAFT Right 2/18/2022     Procedure: INSERTION, GRAFT, ARTERIOVENOUS;  Surgeon: Jeison Hunt MD;  Location: [facility] OR;  Service: Vascular;  Laterality: Right;    REVISION OF PROCEDURE INVOLVING ARTERIOVENOUS GRAFT Right 2/18/2022     Procedure: REVISION, PROCEDURE INVOLVING ARTERIOVENOUS GRAFT;  Surgeon: Jeison Hunt MD;  Location: [facility];  Service: Vascular;  Laterality: Right;                Family History   Problem Relation Age of Onset    Heart attack Father        Social History                Tobacco Use    Smoking status: Former Smoker     Smokeless tobacco: Never Used   Substance Use Topics    Alcohol use: Yes       Comment: social    Drug use: No      Review of Systems   Constitutional: Negative for fever.   HENT: Negative for sore throat.    Respiratory: Negative for shortness of breath.    Cardiovascular: Negative for chest pain.   Gastrointestinal: Negative for nausea.   Genitourinary: Negative for dysuria.   Musculoskeletal: Negative for back pain.   Skin: Negative for rash.   Neurological: Negative for weakness.   Hematological: Does not bruise/bleed easily.         Physical Exam                  Initial Vitals [06/13/22 1014]   BP Pulse Resp Temp SpO2   (!) 120/54 68 20 98.2 °F (36.8 °C) 98 %       MAP           --              Physical Exam     Nursing note and vitals reviewed.  HENT:   Head: Atraumatic.   Eyes: Conjunctivae and EOM are normal.   Neck:   Normal range of motion.  Cardiovascular: Exam reveals no gallop and no friction rub.    No murmur heard.  HD access in right upper extremity without palpable thrill   Pulmonary/Chest: Breath sounds normal. No respiratory distress. He has no wheezes. He has no rales.   Abdominal: Abdomen is soft. Bowel sounds are normal. He exhibits no distension. There is no abdominal tenderness.   Musculoskeletal:         General: No edema. Normal range of motion.      Cervical back: Normal range of motion.     Neurological: He is alert and oriented to person, place, and time.   Skin: Skin is warm.   Psychiatric: He has a normal mood and affect.      Imp: clotted graft right upper arm, crf 5  htn        Plan: declotting today.

## 2022-06-14 NOTE — ASSESSMENT & PLAN NOTE
Hyperkalemia  HD scheduled TuThSat  Hgb: at baseline  Nephrology consulted, hyperkalemia treated, repeat renal function panel  Advance to Renal Diabetic Diet

## 2022-06-14 NOTE — ASSESSMENT & PLAN NOTE
R DVA planning in progress as outpatient   Resume Plavix if ok with surgery  Initiate statin if no contraindication   Not on asa given risk of bleeding with triple therapy

## 2022-06-14 NOTE — ASSESSMENT & PLAN NOTE
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

## 2022-06-14 NOTE — CONSULTS
[facility] Medicine  Consult Note    Patient Name: Houston Jc  MRN: [id number]  Admission Date: 6/13/2022  Hospital Length of Stay: 0 days  Attending Physician: Dr. Flores  Primary Care Provider: Zak Hamilton MD           Patient information was obtained from patient, past medical records and ER records.     Consults  Subjective:     Principal Problem: Problem with dialysis access    Chief Complaint:   Chief Complaint   Patient presents with    Vascular Access Problem     Pt last dialysis was thurs, unable to complete on sat due to fistula clotted, denies Cp/SOB, cough, fever, n/v         HPI: Houston Jc is a 76 y.o. male presenting with vascular access problem. Patient has a past medical history of Anemia, CHF (congestive heart failure), CKD (chronic kidney disease) stage 4, GFR 15-29 ml/min, Coronary artery disease, Diabetes mellitus, Hematuria, Hypertension, Osteoarthritis of spine with radiculopathy, cervical region, and Renal cyst, right. Patient states that he went to dialysis yesterday and was unable to receive it due to his fistula being clotted. Patient has no complaints at this time. Patient last received dialysis on Thursday.    Scheduled for declotting today.      Past Medical History:   Diagnosis Date    Anemia     CHF (congestive heart failure)     CKD (chronic kidney disease) stage 4, GFR 15-29 ml/min     Coronary artery disease     Diabetes mellitus     Hematuria     Hypertension     Osteoarthritis of spine with radiculopathy, cervical region 1/10/2022    Renal cyst, right        Past Surgical History:   Procedure Laterality Date    ANGIOGRAPHY OF ARTERIOVENOUS SHUNT Right 2/11/2022    Procedure: Fistulogram with Possible Intervention;  Surgeon: Dejuan Cody MD;  Location: [facility] CATH LAB/EP;  Service: Cardiology;  Laterality: Right;    AORTOGRAPHY WITH SERIALOGRAPHY Right 6/7/2022    Procedure: AORTOGRAM, WITH SERIALOGRAPHY;  Surgeon: Dejuan Cody MD;   Location: [facility] CATH LAB/EP;  Service: Cardiology;  Laterality: Right;    CARDIAC SURGERY      DECLOTTING OF ARTERIOVENOUS GRAFT Right 12/21/2021    Procedure: [id number];  Surgeon: Jeison Hunt MD;  Location: [facility] OR;  Service: Vascular;  Laterality: Right;    DECLOTTING OF ARTERIOVENOUS GRAFT Right 2/18/2022    Procedure: [id number]-QM;  Surgeon: Jeison Hunt MD;  Location: [facility] OR;  Service: Vascular;  Laterality: Right;    DECLOTTING OF ARTERIOVENOUS GRAFT Right 3/9/2022    Procedure: [id number];  Surgeon: Jeison Hunt MD;  Location: [facility] OR;  Service: Vascular;  Laterality: Right;    DECLOTTING OF ARTERIOVENOUS GRAFT Right 6/13/2022    Procedure: [id number]; REVISION OF FISTULA W/INSERTION OF NEW AV GRAFT;  Surgeon: Jeison Hunt MD;  Location: [facility] OR;  Service: Vascular;  Laterality: Right;    FISTULOGRAM N/A 2/11/2022    Procedure: Fistulogram;  Surgeon: Dejuan Cody MD;  Location: [facility] CATH LAB/EP;  Service: Cardiology;  Laterality: N/A;    FOOT SURGERY      INSERTION OF BIVENTRICULAR IMPLANTABLE CARDIOVERTER-DEFIBRILLATOR (ICD) Left 7/18/2019    Procedure: INSERTION, ICD, BIVENTRICULAR;  Surgeon: Arturo Bay MD;  Location: [facility] EP LAB;  Service: Cardiology;  Laterality: Left;  CHB, CRTD, SJM, anes, GP, [id number]    LEFT HEART CATHETERIZATION N/A 7/16/2019    Procedure: Left heart cath;  Surgeon: Dejuan Cody MD;  Location: [facility] CATH LAB/EP;  Service: Cardiology;  Laterality: N/A;    PERCUTANEOUS TRANSLUMINAL ANGIOPLASTY OF ARTERIOVENOUS FISTULA Right 2/11/2022    Procedure: PTA, AV FISTULA;  Surgeon: Dejuan Cody MD;  Location: [facility] CATH LAB/EP;  Service: Cardiology;  Laterality: Right;    PERITONEAL CATHETER REMOVAL Left 4/11/2020    Procedure: REMOVAL, CATHETER, DIALYSIS, PERITONEAL;  Surgeon: Edis Snell Jr., MD;  Location: [facility] OR;  Service: General;  Laterality: Left;    PLACEMENT OF ARTERIOVENOUS GRAFT Right  2/18/2022    Procedure: INSERTION, GRAFT, ARTERIOVENOUS;  Surgeon: Jeison Hunt MD;  Location: [facility] OR;  Service: Vascular;  Laterality: Right;    REVISION OF PROCEDURE INVOLVING ARTERIOVENOUS GRAFT Right 2/18/2022    Procedure: REVISION, PROCEDURE INVOLVING ARTERIOVENOUS GRAFT;  Surgeon: Jeison Hunt MD;  Location: [facility] OR;  Service: Vascular;  Laterality: Right;       Review of patient's allergies indicates:  No Known Allergies    No current facility-administered medications on file prior to encounter.     Current Outpatient Medications on File Prior to Encounter   Medication Sig    acetaminophen (TYLENOL) 325 MG tablet Take 1 tablet (325 mg total) by mouth every 6 (six) hours as needed for Pain.    allopurinol (ZYLOPRIM) 300 MG tablet Take 300 mg by mouth once daily.    apixaban (ELIQUIS) 5 mg Tab Take 1 tablet (5 mg total) by mouth 2 (two) times daily.    calcium acetate,phosphat bind, (PHOSLO) 667 mg tablet Take 1 tablet (667 mg total) by mouth 3 (three) times daily with meals.    carvediloL (COREG) 6.25 MG tablet Take 1 tablet (6.25 mg total) by mouth 2 (two) times daily.    cholecalciferol, vitamin D3, (VITAMIN D3) 50 mcg (2,000 unit) Cap Take 1 capsule by mouth once daily.    clopidogreL (PLAVIX) 75 mg tablet Take 75 mg by mouth once daily.    folic acid (FOLVITE) 1 MG tablet Take 1 mg by mouth.    gabapentin (NEURONTIN) 300 MG capsule Take 1 capsule (300 mg total) by mouth as directed  (take a pill Monday, Wednesday and Friday  after HD). (Patient taking differently: Take 300 mg by mouth continuous prn (Tuesdays, Thurdays, and Saturdays).)    HYDROcodone-acetaminophen (NORCO) 5-325 mg per tablet Take 1 tablet by mouth every 6 (six) hours as needed for Pain.    insulin aspart U-100 (NOVOLOG) 100 unit/mL injection Inject 4-8 Units into the skin 3 (three) times daily before meals. Per sliding scale    povidone-iodine (BETADINE) 10 % external solution Apply topically as needed for  Wound Care. Apply to wound and to interdigital maceration 2x per day.    VENTOLIN HFA 90 mcg/actuation inhaler Inhale 1 puff into the lungs every 4 (four) hours as needed. As NEEDED     Family History       Problem Relation (Age of Onset)    Heart attack Father          Tobacco Use    Smoking status: Former Smoker    Smokeless tobacco: Never Used   Substance and Sexual Activity    Alcohol use: Yes     Comment: social    Drug use: No    Sexual activity: Yes     Review of Systems   Constitutional:  Negative for diaphoresis, fatigue, fever and unexpected weight change.   HENT:  Negative for trouble swallowing and voice change.    Eyes:  Negative for photophobia and visual disturbance.   Respiratory:  Negative for cough and shortness of breath.    Cardiovascular:  Negative for chest pain, palpitations and leg swelling.   Gastrointestinal:  Negative for abdominal pain, constipation, diarrhea, nausea and vomiting.   Genitourinary:  Negative for dysuria, frequency and hematuria.   Musculoskeletal:  Negative for arthralgias, gait problem and myalgias.   Skin:  Negative for rash and wound.   Neurological:  Negative for dizziness, tremors, syncope, speech difficulty, weakness and headaches.   Hematological:  Negative for adenopathy. Does not bruise/bleed easily.   Psychiatric/Behavioral:  Negative for confusion and dysphoric mood. The patient is not nervous/anxious.    Objective:     Vital Signs (Most Recent):  Temp: 97 °F (36.1 °C) (06/14/22 0632)  Pulse: 66 (06/14/22 0824)  Resp: 20 (06/14/22 0824)  BP: 101/60 (06/14/22 0632)  SpO2: 95 % (06/14/22 0824) Vital Signs (24h Range):  Temp:  [97 °F (36.1 °C)-98.2 °F (36.8 °C)] 97 °F (36.1 °C)  Pulse:  [60-78] 66  Resp:  [16-23] 20  SpO2:  [94 %-100 %] 95 %  BP: (100-130)/(48-60) 101/60     Weight: 119 kg (262 lb 5.6 oz)  Body mass index is 34.61 kg/m².    Physical Exam  Vitals and nursing note reviewed.   Constitutional:       Appearance: He is well-developed.   HENT:       Head: Normocephalic and atraumatic.   Eyes:      Pupils: Pupils are equal, round, and reactive to light.   Neck:      Thyroid: No thyromegaly.      Trachea: No tracheal deviation.   Cardiovascular:      Rate and Rhythm: Normal rate and regular rhythm.      Heart sounds: No murmur heard.  Pulmonary:      Effort: Pulmonary effort is normal.      Breath sounds: Normal breath sounds. No wheezing.   Abdominal:      General: Bowel sounds are normal.      Palpations: Abdomen is soft.      Tenderness: There is no abdominal tenderness.   Musculoskeletal:         General: No tenderness. Normal range of motion.      Cervical back: Normal range of motion and neck supple.   Lymphadenopathy:      Cervical: No cervical adenopathy.   Skin:     General: Skin is warm and dry.      Comments: Graft without thrill   Neurological:      Mental Status: He is alert and oriented to person, place, and time.      Cranial Nerves: No cranial nerve deficit.      Deep Tendon Reflexes: Reflexes are normal and symmetric.   Psychiatric:         Behavior: Behavior normal.       Significant Labs: All pertinent labs within the past 24 hours have been reviewed.    Significant Imaging: I have reviewed all pertinent imaging results/findings within the past 24 hours.    Assessment/Plan:     * Problem with dialysis access  NPO for declotting today with Dr. Hunt      ESRD (end stage renal disease) on dialysis  Hyperkalemia  HD scheduled TuThSat  Hgb: at baseline  Nephrology consulted, hyperkalemia treated, repeat renal function panel  Advance to Renal Diabetic Diet          Chronic combined systolic and diastolic congestive heart failure  - Stable, at dry weight  - Continue home beta-blocker and ARB  - Will follow with daily standing weight and strict I/Os  - Strict electrolyte management with goal K 4-5 and Mg 2-3 (trending daily)    Results for orders placed during the hospital encounter of 12/21/21    Echo    Interpretation Summary  · The left ventricle  is moderately enlarged with mild concentric hypertrophy and  · The quantitatively derived ejection fraction is 34%.  · Severe left atrial enlargement.  · Normal right ventricular size with normal right ventricular systolic function.  · Normal central venous pressure (3 mmHg).  · The estimated PA systolic pressure is 21 mmHg.  · No vegetations per surface echo.    Wt Readings from Last 5 Encounters:   06/14/22 0224 119 kg (262 lb 5.6 oz)   06/13/22 2045 119 kg (262 lb 5.6 oz)   06/13/22 1014 119.3 kg (263 lb)   06/07/22 0726 119.3 kg (263 lb)   06/07/22 0718 117.5 kg (259 lb)   05/18/22 1521 121.1 kg (267 lb)   03/09/22 0900 117 kg (258 lb)   03/03/22 1511 115.7 kg (255 lb)       Hypertension  Controlled, BP borderline low  Home Meds include losartan 25 mg daily and coreg 6.25 mg bid  Recommend coreg at 3.125 mg bid        PAD (peripheral artery disease)  Resume eliquis and plavix after surgery      Type 2 diabetes mellitus with chronic kidney disease on chronic dialysis, with long-term current use of insulin  Patient's FSGs are controlled on current medication regimen.  Last A1c reviewed-repeat HgbA1c ordered  Lab Results   Component Value Date    HGBA1C 6.8 (H) 12/27/2021     Most recent fingerstick glucose reviewed-   Recent Labs   Lab 06/13/22  2247 06/14/22  0441 06/14/22  0634   POCTGLUCOSE 72 116* 102     Current correctional scale  Low  Maintain anti-hyperglycemic dose as follows-   Antihyperglycemics (From admission, onward)            Start     Stop Route Frequency Ordered    06/14/22 1049  insulin aspart U-100 pen 0-5 Units         -- SubQ Every 6 hours PRN 06/14/22 0949        Advance to 2000 kcal ADA Renal Diabetic Diet after surgery      Cardiac resynchronization therapy defibrillator (CRT[place] place  [facility] [place] heart [place] and intermittent VT. An echo showed his EF was 30%. Amiodarone was started, a LHC showed luminal irregularities, and a [employer] CRT-D was implanted prior to  discharge (RV septal lead in LV port).        VTE Risk Mitigation (From admission, onward)         Ordered     IP VTE HIGH RISK PATIENT  Once         06/14/22 0214     Place sequential compression device  Until discontinued         06/14/22 0214     Place TJ hose  Until discontinued         06/14/22 0214                    Thank you for your consult. I will follow-up with patient. Please contact us if you have any additional questions.    Leonela Weeks PA-C  Department of Hospital Medicine   [facility]

## 2022-06-14 NOTE — SUBJECTIVE & OBJECTIVE
Past Medical History:   Diagnosis Date    Anemia     CHF (congestive heart failure)     CKD (chronic kidney disease) stage 4, GFR 15-29 ml/min     Coronary artery disease     Diabetes mellitus     Hematuria     Hypertension     Osteoarthritis of spine with radiculopathy, cervical region 1/10/2022    Renal cyst, right        Past Surgical History:   Procedure Laterality Date    ANGIOGRAPHY OF ARTERIOVENOUS SHUNT Right 2/11/2022    Procedure: Fistulogram with Possible Intervention;  Surgeon: Dejuan Cody MD;  Location: [facility] CATH LAB/EP;  Service: Cardiology;  Laterality: Right;    AORTOGRAPHY WITH SERIALOGRAPHY Right 6/7/2022    Procedure: AORTOGRAM, WITH SERIALOGRAPHY;  Surgeon: Dejuan Cody MD;  Location: [facility] CATH LAB/EP;  Service: Cardiology;  Laterality: Right;    CARDIAC SURGERY      DECLOTTING OF ARTERIOVENOUS GRAFT Right 12/21/2021    Procedure: [id number];  Surgeon: Jeison Hunt MD;  Location: [facility] OR;  Service: Vascular;  Laterality: Right;    DECLOTTING OF ARTERIOVENOUS GRAFT Right 2/18/2022    Procedure: [id number]-[id number]-NP;  Surgeon: Jeison Hunt MD;  Location: [facility] OR;  Service: Vascular;  Laterality: Right;    DECLOTTING OF ARTERIOVENOUS GRAFT Right 3/9/2022    Procedure: [id number];  Surgeon: Jeison Hunt MD;  Location: [facility];  Service: Vascular;  Laterality: Right;    DECLOTTING OF ARTERIOVENOUS GRAFT Right 6/13/2022    Procedure: [id number]-GZ; REVISION OF FISTULA W/INSERTION OF NEW AV GRAFT;  Surgeon: Jeison Hunt MD;  Location: [facility] OR;  Service: Vascular;  Laterality: Right;    FISTULOGRAM N/A 2/11/2022    Procedure: Fistulogram;  Surgeon: Dejuan Cody MD;  Location: [facility] CATH LAB/EP;  Service: Cardiology;  Laterality: N/A;    FOOT SURGERY      INSERTION OF BIVENTRICULAR IMPLANTABLE CARDIOVERTER-DEFIBRILLATOR (ICD) Left 7/18/2019    Procedure: INSERTION, ICD, BIVENTRICULAR;  Surgeon: Arturo Bay MD;  Location: [facility] EP LAB;   Service: Cardiology;  Laterality: Left;  CHB, CRTD, SJM, anes, GP, 6078    LEFT HEART CATHETERIZATION N/A 7/16/2019    Procedure: Left heart cath;  Surgeon: Dejuan Cody MD;  Location: [facility] CATH LAB/EP;  Service: Cardiology;  Laterality: N/A;    PERCUTANEOUS TRANSLUMINAL ANGIOPLASTY OF ARTERIOVENOUS FISTULA Right 2/11/2022    Procedure: PTA, AV FISTULA;  Surgeon: Dejuan Cody MD;  Location: [facility] CATH LAB/EP;  Service: Cardiology;  Laterality: Right;    PERITONEAL CATHETER REMOVAL Left 4/11/2020    Procedure: REMOVAL, CATHETER, DIALYSIS, PERITONEAL;  Surgeon: Edis Snell Jr., MD;  Location: [facility] OR;  Service: General;  Laterality: Left;    PLACEMENT OF ARTERIOVENOUS GRAFT Right 2/18/2022    Procedure: INSERTION, GRAFT, ARTERIOVENOUS;  Surgeon: Jeison Hunt MD;  Location: [facility] OR;  Service: Vascular;  Laterality: Right;    REVISION OF PROCEDURE INVOLVING ARTERIOVENOUS GRAFT Right 2/18/2022    Procedure: REVISION, PROCEDURE INVOLVING ARTERIOVENOUS GRAFT;  Surgeon: Jeison Hunt MD;  Location: [facility] OR;  Service: Vascular;  Laterality: Right;       Review of patient's allergies indicates:  No Known Allergies    No current facility-administered medications on file prior to encounter.     Current Outpatient Medications on File Prior to Encounter   Medication Sig    acetaminophen (TYLENOL) 325 MG tablet Take 1 tablet (325 mg total) by mouth every 6 (six) hours as needed for Pain.    allopurinol (ZYLOPRIM) 300 MG tablet Take 300 mg by mouth once daily.    apixaban (ELIQUIS) 5 mg Tab Take 1 tablet (5 mg total) by mouth 2 (two) times daily.    calcium acetate,phosphat bind, (PHOSLO) 667 mg tablet Take 1 tablet (667 mg total) by mouth 3 (three) times daily with meals.    carvediloL (COREG) 6.25 MG tablet Take 1 tablet (6.25 mg total) by mouth 2 (two) times daily.    cholecalciferol, vitamin D3, (VITAMIN D3) 50 mcg (2,000 unit) Cap Take 1 capsule by mouth once daily.    clopidogreL (PLAVIX) 75 mg  tablet Take 75 mg by mouth once daily.    folic acid (FOLVITE) 1 MG tablet Take 1 mg by mouth.    gabapentin (NEURONTIN) 300 MG capsule Take 1 capsule (300 mg total) by mouth as directed  (take a pill Monday, Wednesday and Friday  after HD). (Patient taking differently: Take 300 mg by mouth continuous prn (Tuesdays, Thurdays, and Saturdays).)    HYDROcodone-acetaminophen (NORCO) 5-325 mg per tablet Take 1 tablet by mouth every 6 (six) hours as needed for Pain.    insulin aspart U-100 (NOVOLOG) 100 unit/mL injection Inject 4-8 Units into the skin 3 (three) times daily before meals. Per sliding scale    povidone-iodine (BETADINE) 10 % external solution Apply topically as needed for Wound Care. Apply to wound and to interdigital maceration 2x per day.    VENTOLIN HFA 90 mcg/actuation inhaler Inhale 1 puff into the lungs every 4 (four) hours as needed. As NEEDED     Family History       Problem Relation (Age of Onset)    Heart attack Father          Tobacco Use    Smoking status: Former Smoker    Smokeless tobacco: Never Used   Substance and Sexual Activity    Alcohol use: Yes     Comment: social    Drug use: No    Sexual activity: Yes     Review of Systems   Constitutional: Positive for malaise/fatigue. Negative for diaphoresis.   HENT: Negative.     Eyes: Negative.    Cardiovascular:  Negative for chest pain, irregular heartbeat, leg swelling, near-syncope, orthopnea, palpitations, paroxysmal nocturnal dyspnea and syncope.   Respiratory:  Negative for cough and shortness of breath.    Endocrine: Negative.    Hematologic/Lymphatic: Negative.    Skin: Negative.    Musculoskeletal:  Positive for arthritis.   Gastrointestinal:  Negative for melena, nausea and vomiting.   Genitourinary: Negative.    Neurological:  Positive for weakness. Negative for light-headedness.   Psychiatric/Behavioral: Negative.     Allergic/Immunologic: Negative.    Objective:     Vital Signs (Most Recent):  Temp: 97.4 °F (36.3 °C) (06/14/22  1030)  Pulse: 75 (06/14/22 1030)  Resp: 18 (06/14/22 1305)  BP: (!) 104/51 (06/14/22 1030)  SpO2: 95 % (06/14/22 1111)   Vital Signs (24h Range):  Temp:  [97 °F (36.1 °C)-97.7 °F (36.5 °C)] 97.4 °F (36.3 °C)  Pulse:  [60-75] 75  Resp:  [16-23] 18  SpO2:  [93 %-100 %] 95 %  BP: (100-114)/(48-60) 104/51     Weight: 119 kg (262 lb 5.6 oz)  Body mass index is 34.61 kg/m².    SpO2: 95 %  O2 Device (Oxygen Therapy): room air    No intake or output data in the 24 hours ending 06/14/22 1343    Lines/Drains/Airways       Central Venous Catheter Line  Duration                  Hemodialysis AV Graft Right forearm -- days              Peripheral Intravenous Line  Duration                  Peripheral IV - Single Lumen 06/13/22 1129 20 G Left Antecubital 1 day                    Physical Exam  Constitutional:       General: He is not in acute distress.     Appearance: He is obese. He is not diaphoretic.   HENT:      Head: Atraumatic.   Eyes:      General:         Right eye: No discharge.         Left eye: No discharge.   Cardiovascular:      Rate and Rhythm: Normal rate and regular rhythm.      Heart sounds: No murmur heard.    No friction rub. No gallop.   Abdominal:      General: Bowel sounds are normal.      Palpations: Abdomen is soft.   Musculoskeletal:      Right lower leg: No edema.      Left lower leg: No edema.   Skin:     General: Skin is warm and dry.   Neurological:      Mental Status: Mental status is at baseline.       Significant Labs: BMP:   Recent Labs   Lab 06/13/22  1125 06/13/22  1422 06/14/22  1106   * 96 61*    140 139   K 6.3* 5.8* 5.8*   CL 99 100 101   CO2 19* 19* 19*   * 106* 112*   CREATININE 21.4* 21.6* 22.6*   CALCIUM 8.9 8.5* 8.1*   MG 1.4*  --  1.5*   , CMP   Recent Labs   Lab 06/13/22  1125 06/13/22  1422 06/14/22  1106    140 139   K 6.3* 5.8* 5.8*   CL 99 100 101   CO2 19* 19* 19*   * 96 61*   * 106* 112*   CREATININE 21.4* 21.6* 22.6*   CALCIUM 8.9 8.5*  8.1*   PROT 6.8  --   --    ALBUMIN 2.8*  --  2.4*   BILITOT 0.2  --   --    ALKPHOS 96  --   --    AST 15  --   --    ALT 6*  --   --    ANIONGAP 21* 21* 19*   ESTGFRAFRICA 2* 2* 2*   EGFRNONAA 2* 2* 2*   , CBC   Recent Labs   Lab 06/13/22  1125 06/14/22  1007   WBC 8.53 7.55   HGB 9.5* 7.8*   HCT 31.2* 25.7*    156   , INR No results for input(s): INR, PROTIME in the last 48 hours., Lipid Panel No results for input(s): CHOL, HDL, LDLCALC, TRIG, CHOLHDL in the last 48 hours., Troponin No results for input(s): TROPONINI in the last 48 hours., and All pertinent lab results from the last 24 hours have been reviewed.    Significant Imaging: Echocardiogram: Transthoracic echo (TTE) complete (Cupid Only):   Results for orders placed or performed during the hospital encounter of 12/21/21   Echo   Result Value Ref Range    Ascending aorta 3.30 cm    STJ 3.27 cm    AV mean gradient 4 mmHg    Ao peak son 1.17 m/s    Ao VTI 25.29 cm    IVS 1.61 (A) 0.6 - 1.1 cm    LA size 5.11 cm    Left Atrium Major Axis 7.34 cm    Left Atrium Minor Axis 6.11 cm    LVIDd 6.60 (A) 3.5 - 6.0 cm    LVIDs 5.24 (A) 2.1 - 4.0 cm    LVOT diameter 2.89 cm    LVOT peak VTI 17.22 cm    Posterior Wall 1.66 (A) 0.6 - 1.1 cm    MV Peak A Son 1.03 m/s    E wave deceleration time 128.00 msec    MV Peak E Son 0.75 m/s    RA Major Axis 5.72 cm    RA Width 3.45 cm    RVDD 3.28 cm    TAPSE 1.72 cm    TR Max Son 2.14 m/s    TDI LATERAL 0.04 m/s    LA WIDTH 4.32 cm    Ao root annulus 4.28 cm    PV PEAK VELOCITY 1.04 cm/s    MV stenosis pressure 1/2 time 37.12 ms    LV Diastolic Volume 223.81 mL    LV Systolic Volume 131.64 mL    LVOT peak son 0.79 m/s    LA volume (mod) 85.57 cm3    MV mean gradient 1 mmHg    MV peak gradient 4 mmHg    RV S' 9.54 cm/s    MV VTI 18.13 cm    LV LATERAL E/E' RATIO 18.75 m/s    FS 21 %    LA volume 125.13 cm3    LV mass 561.38 g    Left Ventricle Relative Wall Thickness 0.50 cm    AV valve area 4.46 cm2    AV Velocity Ratio  0.68     AV index (prosthetic) 0.68     MV valve area p 1/2 method 5.93 cm2    MV valve area by continuity eq 6.23 cm2    E/A ratio 0.73     LVOT area 6.6 cm2    LVOT stroke volume 112.90 cm3    AV peak gradient 5 mmHg    LV Systolic Volume Index 53.5 mL/m2    LV Diastolic Volume Index 90.98 mL/m2    LA Volume Index 50.9 mL/m2    LV Mass Index 228 g/m2    Triscuspid Valve Regurgitation Peak Gradient 18 mmHg    LA Volume Index (Mod) 34.8 mL/m2    BSA 2.53 m2    Right Atrial Pressure (from IVC) 3 mmHg    QEF 34 %    TV rest pulmonary artery pressure 21 mmHg    Narrative    · The left ventricle is moderately enlarged with mild concentric   hypertrophy and  · The quantitatively derived ejection fraction is 34%.  · Severe left atrial enlargement.  · Normal right ventricular size with normal right ventricular systolic   function.  · Normal central venous pressure (3 mmHg).  · The estimated PA systolic pressure is 21 mmHg.  · No vegetations per surface echo.

## 2022-06-14 NOTE — PLAN OF CARE
CM called and spoke with pt's wife - Hue Benjamin  [phone]   street address:  [address] [place] [address] CASSANDRA Shabazz        pt has a WC, RW and BSC , walk in shower has a built in bench and grab bars- current with home health  - can't recall name - assigned per PHN  - HH agency nurse  dresses his foot wounds      T Th Sa at [facility] CASSANDRA Lopez  9:45 am - wife transports pt to HD  - wife transports pt to all apts.  pt here for access declot - unsuccessful L IJ Andi attempt-- will need IR consult for Andi Plcmt.       Future Appointments   Date Time Provider Department Center   7/12/2022 10:00 AM HOME MONITOR DEVICE CHECK, [facility]     pt sees Dr. Padilla at her office for foot wounds;  Cardiology - Dr. Valencia         06/14/22 5227   Discharge Planning   Assessment Type Discharge Planning Brief Assessment   Resource/Environmental Concerns none   Support Systems Spouse/significant other   Equipment Currently Used at Home walker, rolling;bedside commode;wheelchair  (built in bench in walk in shower)   Current Living Arrangements home/apartment/condo   Patient/Family Anticipates Transition to home;home with family  (HD T Th Sa  [facility] -- 9:45 am)   Patient/Family Anticipated Services at Transition home health care  (current with home health per PHN)   DME Needed Upon Discharge  none   Discharge Plan A Home Health;Home with family;Home

## 2022-06-14 NOTE — HPI
Houston Jc is a 76 y.o. male with PAD, ESRD, DM, VT, DVT, HTN, CHF, non obstructive CAD, CHB, obesity, ESRD, and DM. Patient presented to the ED due to vascular access problem. Last HD session was on Thursday. Attempt to declot per Dr Hunt was unsuccessful as was placement of Andi. IR consulted for line placement and Dr Cody consulted for declot. K+ 5.8 this AM- treated. HH 7.8/25. No reports of chest pain, palpitations, ICD firing, diaphoresis, syncope. Patient groggy on exam.

## 2022-06-14 NOTE — CONSULTS
[facility] - Telemetry  Cardiology  Consult Note    Patient Name: Houston Jc  MRN: [id number]  Admission Date: 6/13/2022  Hospital Length of Stay: 0 days  Code Status: Full Code   Attending Provider: Jeison Hunt MD   Consulting Provider: Bala Carvajal NP  Primary Care Physician: Zak Hamilton MD  Principal Problem:Problem with dialysis access    Patient information was obtained from patient, spouse/SO, past medical records and ER records.     Inpatient consult to Cardiology-[facility]  Consult performed by: Bala Carvajal NP  Consult ordered by: Smita Woo MD        Subjective:     Chief Complaint:  AVG clotted      HPI:   Houston Jc is a 76 y.o. male with PAD, ESRD, DM, VT, DVT, HTN, CHF, non obstructive CAD, CHB, obesity, ESRD, and DM. Patient presented to the ED due to vascular access problem. Last HD session was on Thursday. Attempt to declot per Dr Hunt was unsuccessful as was placement of Andi. IR consulted for line placement and Dr Cody consulted for declot. K+ 5.8 this AM- treated. HH 7.8/25. No reports of chest pain, palpitations, ICD firing, diaphoresis, syncope. Patient groggy on exam.       Past Medical History:   Diagnosis Date    Anemia     CHF (congestive heart failure)     CKD (chronic kidney disease) stage 4, GFR 15-29 ml/min     Coronary artery disease     Diabetes mellitus     Hematuria     Hypertension     Osteoarthritis of spine with radiculopathy, cervical region 1/10/2022    Renal cyst, right        Past Surgical History:   Procedure Laterality Date    ANGIOGRAPHY OF ARTERIOVENOUS SHUNT Right 2/11/2022    Procedure: Fistulogram with Possible Intervention;  Surgeon: Dejuan Cody MD;  Location: [facility] CATH LAB/EP;  Service: Cardiology;  Laterality: Right;    AORTOGRAPHY WITH SERIALOGRAPHY Right 6/7/2022    Procedure: AORTOGRAM, WITH SERIALOGRAPHY;  Surgeon: Dejuan Cody MD;  Location: [facility] CATH LAB/EP;  Service: Cardiology;  Laterality: Right;     CARDIAC SURGERY      DECLOTTING OF ARTERIOVENOUS GRAFT Right 12/21/2021    Procedure: [id number];  Surgeon: Jeison Hunt MD;  Location: [facility] OR;  Service: Vascular;  Laterality: Right;    DECLOTTING OF ARTERIOVENOUS GRAFT Right 2/18/2022    Procedure: [id number];  Surgeon: Jeison Hunt MD;  Location: [facility] OR;  Service: Vascular;  Laterality: Right;    DECLOTTING OF ARTERIOVENOUS GRAFT Right 3/9/2022    Procedure: [id number];  Surgeon: Jeison Hunt MD;  Location: [facility] OR;  Service: Vascular;  Laterality: Right;    DECLOTTING OF ARTERIOVENOUS GRAFT Right 6/13/2022    Procedure: [id number]; REVISION OF FISTULA W/INSERTION OF NEW AV GRAFT;  Surgeon: Jeison Hunt MD;  Location: [facility] OR;  Service: Vascular;  Laterality: Right;    FISTULOGRAM N/A 2/11/2022    Procedure: Fistulogram;  Surgeon: Dejuan Cody MD;  Location: [facility] CATH LAB/EP;  Service: Cardiology;  Laterality: N/A;    FOOT SURGERY      INSERTION OF BIVENTRICULAR IMPLANTABLE CARDIOVERTER-DEFIBRILLATOR (ICD) Left 7/18/2019    Procedure: INSERTION, ICD, BIVENTRICULAR;  Surgeon: Arturo Bay MD;  Location: [facility] EP LAB;  Service: Cardiology;  Laterality: Left;  CHB, CRTD, SJM, anes, GP, 6078    LEFT HEART CATHETERIZATION N/A 7/16/2019    Procedure: Left heart cath;  Surgeon: Dejuan Cody MD;  Location: [facility] CATH LAB/EP;  Service: Cardiology;  Laterality: N/A;    PERCUTANEOUS TRANSLUMINAL ANGIOPLASTY OF ARTERIOVENOUS FISTULA Right 2/11/2022    Procedure: PTA, AV FISTULA;  Surgeon: Dejuan Cody MD;  Location: [facility] CATH LAB/EP;  Service: Cardiology;  Laterality: Right;    PERITONEAL CATHETER REMOVAL Left 4/11/2020    Procedure: REMOVAL, CATHETER, DIALYSIS, PERITONEAL;  Surgeon: Edis Snell Jr., MD;  Location: [facility] OR;  Service: General;  Laterality: Left;    PLACEMENT OF ARTERIOVENOUS GRAFT Right 2/18/2022    Procedure: INSERTION, GRAFT, ARTERIOVENOUS;  Surgeon: Jeison  LAMBERTO Hunt MD;  Location: [facility] OR;  Service: Vascular;  Laterality: Right;    REVISION OF PROCEDURE INVOLVING ARTERIOVENOUS GRAFT Right 2/18/2022    Procedure: REVISION, PROCEDURE INVOLVING ARTERIOVENOUS GRAFT;  Surgeon: Jeison Hunt MD;  Location: [facility] OR;  Service: Vascular;  Laterality: Right;       Review of patient's allergies indicates:  No Known Allergies    No current facility-administered medications on file prior to encounter.     Current Outpatient Medications on File Prior to Encounter   Medication Sig    acetaminophen (TYLENOL) 325 MG tablet Take 1 tablet (325 mg total) by mouth every 6 (six) hours as needed for Pain.    allopurinol (ZYLOPRIM) 300 MG tablet Take 300 mg by mouth once daily.    apixaban (ELIQUIS) 5 mg Tab Take 1 tablet (5 mg total) by mouth 2 (two) times daily.    calcium acetate,phosphat bind, (PHOSLO) 667 mg tablet Take 1 tablet (667 mg total) by mouth 3 (three) times daily with meals.    carvediloL (COREG) 6.25 MG tablet Take 1 tablet (6.25 mg total) by mouth 2 (two) times daily.    cholecalciferol, vitamin D3, (VITAMIN D3) 50 mcg (2,000 unit) Cap Take 1 capsule by mouth once daily.    clopidogreL (PLAVIX) 75 mg tablet Take 75 mg by mouth once daily.    folic acid (FOLVITE) 1 MG tablet Take 1 mg by mouth.    gabapentin (NEURONTIN) 300 MG capsule Take 1 capsule (300 mg total) by mouth as directed  (take a pill Monday, Wednesday and Friday  after HD). (Patient taking differently: Take 300 mg by mouth continuous prn (Tuesdays, Thurdays, and Saturdays).)    HYDROcodone-acetaminophen (NORCO) 5-325 mg per tablet Take 1 tablet by mouth every 6 (six) hours as needed for Pain.    insulin aspart U-100 (NOVOLOG) 100 unit/mL injection Inject 4-8 Units into the skin 3 (three) times daily before meals. Per sliding scale    povidone-iodine (BETADINE) 10 % external solution Apply topically as needed for Wound Care. Apply to wound and to interdigital maceration 2x per day.     VENTOLIN HFA 90 mcg/actuation inhaler Inhale 1 puff into the lungs every 4 (four) hours as needed. As NEEDED     Family History       Problem Relation (Age of Onset)    Heart attack Father          Tobacco Use    Smoking status: Former Smoker    Smokeless tobacco: Never Used   Substance and Sexual Activity    Alcohol use: Yes     Comment: social    Drug use: No    Sexual activity: Yes     Review of Systems   Constitutional: Positive for malaise/fatigue. Negative for diaphoresis.   HENT: Negative.     Eyes: Negative.    Cardiovascular:  Negative for chest pain, irregular heartbeat, leg swelling, near-syncope, orthopnea, palpitations, paroxysmal nocturnal dyspnea and syncope.   Respiratory:  Negative for cough and shortness of breath.    Endocrine: Negative.    Hematologic/Lymphatic: Negative.    Skin: Negative.    Musculoskeletal:  Positive for arthritis.   Gastrointestinal:  Negative for melena, nausea and vomiting.   Genitourinary: Negative.    Neurological:  Positive for weakness. Negative for light-headedness.   Psychiatric/Behavioral: Negative.     Allergic/Immunologic: Negative.    Objective:     Vital Signs (Most Recent):  Temp: 97.4 °F (36.3 °C) (06/14/22 1030)  Pulse: 75 (06/14/22 1030)  Resp: 18 (06/14/22 1305)  BP: (!) 104/51 (06/14/22 1030)  SpO2: 95 % (06/14/22 1111)   Vital Signs (24h Range):  Temp:  [97 °F (36.1 °C)-97.7 °F (36.5 °C)] 97.4 °F (36.3 °C)  Pulse:  [60-75] 75  Resp:  [16-23] 18  SpO2:  [93 %-100 %] 95 %  BP: (100-114)/(48-60) 104/51     Weight: 119 kg (262 lb 5.6 oz)  Body mass index is 34.61 kg/m².    SpO2: 95 %  O2 Device (Oxygen Therapy): room air    No intake or output data in the 24 hours ending 06/14/22 1343    Lines/Drains/Airways       Central Venous Catheter Line  Duration                  Hemodialysis AV Graft Right forearm -- days              Peripheral Intravenous Line  Duration                  Peripheral IV - Single Lumen 06/13/22 1129 20 G Left Antecubital 1 day                     Physical Exam  Constitutional:       General: He is not in acute distress.     Appearance: He is obese. He is not diaphoretic.   HENT:      Head: Atraumatic.   Eyes:      General:         Right eye: No discharge.         Left eye: No discharge.   Cardiovascular:      Rate and Rhythm: Normal rate and regular rhythm.      Heart sounds: No murmur heard.    No friction rub. No gallop.   Abdominal:      General: Bowel sounds are normal.      Palpations: Abdomen is soft.   Musculoskeletal:      Right lower leg: No edema.      Left lower leg: No edema.   Skin:     General: Skin is warm and dry.   Neurological:      Mental Status: Mental status is at baseline.       Significant Labs: BMP:   Recent Labs   Lab 06/13/22  1125 06/13/22  1422 06/14/22  1106   * 96 61*    140 139   K 6.3* 5.8* 5.8*   CL 99 100 101   CO2 19* 19* 19*   * 106* 112*   CREATININE 21.4* 21.6* 22.6*   CALCIUM 8.9 8.5* 8.1*   MG 1.4*  --  1.5*   , CMP   Recent Labs   Lab 06/13/22  1125 06/13/22  1422 06/14/22  1106    140 139   K 6.3* 5.8* 5.8*   CL 99 100 101   CO2 19* 19* 19*   * 96 61*   * 106* 112*   CREATININE 21.4* 21.6* 22.6*   CALCIUM 8.9 8.5* 8.1*   PROT 6.8  --   --    ALBUMIN 2.8*  --  2.4*   BILITOT 0.2  --   --    ALKPHOS 96  --   --    AST 15  --   --    ALT 6*  --   --    ANIONGAP 21* 21* 19*   ESTGFRAFRICA 2* 2* 2*   EGFRNONAA 2* 2* 2*   , CBC   Recent Labs   Lab 06/13/22  1125 06/14/22  1007   WBC 8.53 7.55   HGB 9.5* 7.8*   HCT 31.2* 25.7*    156   , INR No results for input(s): INR, PROTIME in the last 48 hours., Lipid Panel No results for input(s): CHOL, HDL, LDLCALC, TRIG, CHOLHDL in the last 48 hours., Troponin No results for input(s): TROPONINI in the last 48 hours., and All pertinent lab results from the last 24 hours have been reviewed.    Significant Imaging: Echocardiogram: Transthoracic echo (TTE) complete (Cupid Only):   Results for orders placed or performed during  the hospital encounter of 12/21/21   Echo   Result Value Ref Range    Ascending aorta 3.30 cm    STJ 3.27 cm    AV mean gradient 4 mmHg    Ao peak son 1.17 m/s    Ao VTI 25.29 cm    IVS 1.61 (A) 0.6 - 1.1 cm    LA size 5.11 cm    Left Atrium Major Axis 7.34 cm    Left Atrium Minor Axis 6.11 cm    LVIDd 6.60 (A) 3.5 - 6.0 cm    LVIDs 5.24 (A) 2.1 - 4.0 cm    LVOT diameter 2.89 cm    LVOT peak VTI 17.22 cm    Posterior Wall 1.66 (A) 0.6 - 1.1 cm    MV Peak A Son 1.03 m/s    E wave deceleration time 128.00 msec    MV Peak E Son 0.75 m/s    RA Major Axis 5.72 cm    RA Width 3.45 cm    RVDD 3.28 cm    TAPSE 1.72 cm    TR Max Son 2.14 m/s    TDI LATERAL 0.04 m/s    LA WIDTH 4.32 cm    Ao root annulus 4.28 cm    PV PEAK VELOCITY 1.04 cm/s    MV stenosis pressure 1/2 time 37.12 ms    LV Diastolic Volume 223.81 mL    LV Systolic Volume 131.64 mL    LVOT peak son 0.79 m/s    LA volume (mod) 85.57 cm3    MV mean gradient 1 mmHg    MV peak gradient 4 mmHg    RV S' 9.54 cm/s    MV VTI 18.13 cm    LV LATERAL E/E' RATIO 18.75 m/s    FS 21 %    LA volume 125.13 cm3    LV mass 561.38 g    Left Ventricle Relative Wall Thickness 0.50 cm    AV valve area 4.46 cm2    AV Velocity Ratio 0.68     AV index (prosthetic) 0.68     MV valve area p 1/2 method 5.93 cm2    MV valve area by continuity eq 6.23 cm2    E/A ratio 0.73     LVOT area 6.6 cm2    LVOT stroke volume 112.90 cm3    AV peak gradient 5 mmHg    LV Systolic Volume Index 53.5 mL/m2    LV Diastolic Volume Index 90.98 mL/m2    LA Volume Index 50.9 mL/m2    LV Mass Index 228 g/m2    Triscuspid Valve Regurgitation Peak Gradient 18 mmHg    LA Volume Index (Mod) 34.8 mL/m2    BSA 2.53 m2    Right Atrial Pressure (from IVC) 3 mmHg    QEF 34 %    TV rest pulmonary artery pressure 21 mmHg    Narrative    · The left ventricle is moderately enlarged with mild concentric   hypertrophy and  · The quantitatively derived ejection fraction is 34%.  · Severe left atrial enlargement.  · Normal  right ventricular size with normal right ventricular systolic   function.  · Normal central venous pressure (3 mmHg).  · The estimated PA systolic pressure is 21 mmHg.  · No vegetations per surface echo.        Assessment and Plan:     * Problem with dialysis access  Unsuccessful OR declot today and Andi placement  Pending IR consult for HD line placement   Will need fistulagram by Dr Cody - timing to be discussed   7.8/25- transfuse 1U PRBCs with next HD session once line is placed   Will update primary team once discussed with Dr Glo BONILLA  06/13/2022  Moderate amount of nonocclusive thrombus in significant aneurysmal degeneration along the proximal segment of the RUE brachiocephalic AVG and, minimal amount of nonocclusive thrombus along the distal segment associated with abnormal peak systolic velocities in volume flow rates throughout the AVG  Hold Eliquis, resume Plavix if OK with surgery  Heparin recommended if ok with surgery     Hyperkalemia  Nephrology on board  K+ 5.8 today, 6.3 on admission   Sherrie noted     Acute deep vein thrombosis (DVT) of popliteal vein of right lower extremity  Eliquis on hold for procedure  Heparin if ok with surgery     PAD (peripheral artery disease)  R DVA planning in progress as outpatient   Resume Plavix if ok with surgery  Initiate statin if no contraindication   Not on asa given risk of bleeding with triple therapy         Chronic combined systolic and diastolic congestive heart failure  12/21/21    Echo    Interpretation Summary  · The left ventricle is moderately enlarged with mild concentric hypertrophy and  · The quantitatively derived ejection fraction is 34%.  · Severe left atrial enlargement.  · Normal right ventricular size with normal right ventricular systolic function.  · Normal central venous pressure (3 mmHg).  · The estimated PA systolic pressure is 21 mmHg.  · No vegetations per surface echo.    Volume status maintained by HD  Given IV Lasix  with no output documented  Last HD session Thursday  Resume home BB       Cardiac resynchronization therapy defibrillator (CRT-D) in place  Nonobstructive CAD  S/p episodes of VT in the past  Resume home BB      Complete heart block  S/p CRT-D    Morbid obesity  Weight loss encouraged     Hypertension  SBP 100s-120s  Resume BB         VTE Risk Mitigation (From admission, onward)         Ordered     IP VTE HIGH RISK PATIENT  Once         06/14/22 0214     Place sequential compression device  Until discontinued         06/14/22 0214     Place TJ hose  Until discontinued         06/14/22 0214                Thank you for your consult. I will follow-up with patient. Please contact us if you have any additional questions.    Bala Carvajal NP  Cardiology   [facility] - Telemetry

## 2022-06-14 NOTE — ASSESSMENT & PLAN NOTE
Controlled, BP borderline low  Home Meds include losartan 25 mg daily and coreg 6.25 mg bid  Recommend coreg at 3.125 mg bid

## 2022-06-14 NOTE — PLAN OF CARE
VN phoned into room to complete admit assessment. VIP model introduced; VN working alongside bedside treatment team.  Plan of care reviewed with patient. Patient informed of fall risk, fall precautions, call light within reach, side rails x2 elevated. Patient notified to ask staff for assistance. Patient verbalized complete understanding. Time allowed for questions. Will continue to monitor and intervene as needed. Chart review complete.

## 2022-06-14 NOTE — PLAN OF CARE
Pt on RA, no respiratory distress noted. Will continue to monitor.   Small volume nebulizer treatment with albuterol 10mg and normal saline 2 ml.

## 2022-06-14 NOTE — PLAN OF CARE
Problem: Adult Inpatient Plan of Care  Goal: Plan of Care Review  Outcome: Ongoing, Progressing   VN phoned into room to complete admit assessment. VIP model introduced; VN working alongside bedside treatment team.  Plan of care reviewed with patient. Patient informed of fall risk, fall precautions, call light within reach, side rails x2 elevated. Patient notified to ask staff for assistance. Patient verbalized complete understanding. Time allowed for questions. Will continue to monitor and intervene as needed. Chart review complete.

## 2022-06-14 NOTE — ASSESSMENT & PLAN NOTE
[facility] in 7/2019 with complete heart block and intermittent VT. An echo showed his EF was 30%. Amiodarone was started, a LHC showed luminal irregularities, and a [employer] CRT-D was implanted prior to discharge (RV septal lead in LV port).

## 2022-06-14 NOTE — PLAN OF CARE
Patient on room air with documented SpO2 and in no apparent distress. Will continue to monitor. The proper method of use, as well as anticipated side effects, of this aerosol treatment are discussed and demonstrated to the patient.

## 2022-06-14 NOTE — NURSING
Patient arrived to unit via stretcher. Alert and oriented x4. BBS clear. AV graft to right arm dressing has moderate amount of drainage.  Tele applied to chest wall. Continent of bowel. Patient has necrotic tissue on right great toe and third right toe. IV site to left arm saline locked.  No acute distress noted at this time. Bed alarm. Call light in reach.

## 2022-06-15 NOTE — ASSESSMENT & PLAN NOTE
Patient's FSGs are controlled on current medication regimen.  Last A1c reviewed-repeat HgbA1c ordered  Lab Results   Component Value Date    HGBA1C 6.9 (H) 06/14/2022     Most recent fingerstick glucose reviewed-   Recent Labs   Lab 06/14/22  1415 06/14/22  1435 06/14/22  2021 06/15/22  0543   POCTGLUCOSE 104 107 91 94     Current correctional scale  Low  Maintain anti-hyperglycemic dose as follows-   Antihyperglycemics (From admission, onward)            Start     Stop Route Frequency Ordered    06/14/22 1049  insulin aspart U-100 pen 0-5 Units         -- SubQ Every 6 hours PRN 06/14/22 0949        Advance to 2000 kcal ADA Renal Diabetic Diet after surgery

## 2022-06-15 NOTE — PROGRESS NOTES
06/15/22 1845   Vital Signs   Temp 96.9 °F (36.1 °C)   Pulse 80   Resp 19   /70   During Hemodialysis Assessment   Blood Flow Rate (mL/min) 350 mL/min   Dialysate Flow Rate (mL/min) 700 ml/min   Ultrafiltration Rate (mL/Hr) 454 mL/Hr   Arteriovenous Lines Secure Yes   Arterial Pressure (mmHg) -90 mmHg   Venous Pressure (mmHg) 151   Blood Volume Processed (Liters) 1470 L   TMP 15   Venous Line in Air Detector Yes   Transducer Dry Yes   Access Visible Yes   Post-Hemodialysis Assessment   Rinseback Volume (mL) 250 mL   Blood Volume Processed (Liters) 70.6 L   Dialyzer Clearance Lightly streaked   Duration of Treatment 180 minutes   Additional Fluid Intake (mL) 100 mL   Total UF (mL) 1000 mL   Net Fluid Removal 1000   Patient Response to Treatment Pt started to c/o pain with an hour left, able to make it through tx

## 2022-06-15 NOTE — BRIEF OP NOTE
Procedure: Trialysis catheter placement  Access: L-CFV  Indication: Dialysis    Trialysis catheter placed under fluoroscopy. Venogram performed to confirm position.    Sutured with Biopatch in place. Sterile dressing applied.    Updated wife Hue over the phone

## 2022-06-15 NOTE — SUBJECTIVE & OBJECTIVE
Interval History: Seen this am. No distress noted. Pain is mild. Spoke to Dr. Hunt this am--he stated ghe is OK with Heparin gtt. IR was consulted HD line placement.     Review of Systems   Constitutional:  Negative for chills, fatigue and fever.   HENT:  Negative for trouble swallowing.    Respiratory:  Negative for cough and shortness of breath.    Cardiovascular:  Negative for chest pain, palpitations and leg swelling.   Gastrointestinal:  Negative for blood in stool, diarrhea, nausea and vomiting.   Musculoskeletal:  Positive for gait problem.   Skin:         Graft without thrill    Neurological:  Positive for weakness.   Hematological:  Does not bruise/bleed easily.   Psychiatric/Behavioral:  Negative for confusion. The patient is not nervous/anxious.    Objective:     Vital Signs (Most Recent):  Temp: 97.5 °F (36.4 °C) (06/15/22 0544)  Pulse: 75 (06/15/22 0544)  Resp: 18 (06/15/22 0544)  BP: (!) 108/53 (06/15/22 0544)  SpO2: 99 % (06/15/22 0904)   Vital Signs (24h Range):  Temp:  [96.1 °F (35.6 °C)-98.2 °F (36.8 °C)] 97.5 °F (36.4 °C)  Pulse:  [60-80] 75  Resp:  [18-20] 18  SpO2:  [93 %-99 %] 99 %  BP: (102-110)/(47-57) 108/53     Weight: 119.6 kg (263 lb 10.7 oz)  Body mass index is 34.79 kg/m².  No intake or output data in the 24 hours ending 06/15/22 0956   Physical Exam  Vitals and nursing note reviewed.   Constitutional:       Appearance: He is obese. He is not ill-appearing.   HENT:      Head: Normocephalic and atraumatic.      Mouth/Throat:      Mouth: Mucous membranes are moist.   Eyes:      Extraocular Movements: Extraocular movements intact.      Conjunctiva/sclera: Conjunctivae normal.      Pupils: Pupils are equal, round, and reactive to light.   Cardiovascular:      Rate and Rhythm: Normal rate.      Pulses: Normal pulses.      Heart sounds: Normal heart sounds.   Abdominal:      General: There is no distension.      Tenderness: There is no abdominal tenderness. There is no guarding.    Musculoskeletal:         General: No swelling. Normal range of motion.      Cervical back: Normal range of motion and neck supple.      Right lower leg: No edema.      Left lower leg: No edema.   Skin:     General: Skin is warm.      Capillary Refill: Capillary refill takes 2 to 3 seconds.   Neurological:      Mental Status: He is alert and oriented to person, place, and time.   Psychiatric:         Mood and Affect: Mood normal.         Behavior: Behavior normal.       Significant Labs: All pertinent labs within the past 24 hours have been reviewed.    Significant Imaging: I have reviewed all pertinent imaging results/findings within the past 24 hours.

## 2022-06-15 NOTE — HOSPITAL COURSE
Mr. Houston Jc was admitted to the [facility] surgery service for further care.  was consulted for medical management. Dr. Hunt attempted an OR declot on 6/14 and Andi placement which was unsuccessful. Trialysis catheter placed 6/15 followed by HD.  Surgery Ok'd Heparin gtt--initiated. Fistulagram by Dr Cody scheduled 6/16 delayed to 6/17 due to pt eating while NPO.  Intervention for RUE AVF then later revascularization of R leg for CLTI.  Statin added.  Coreg changed to metoprolol due to hypotension.  Cardiology recommended plavix, defer to surgery.

## 2022-06-15 NOTE — PLAN OF CARE
Plan of care reviewed. Patient medicated twice for pain this shift. Will continue current plan of care. Bed low. Call light in reach.

## 2022-06-15 NOTE — PROGRESS NOTES
[facility] Medicine  Progress Note    Patient Name: Houston Jc  MRN: [id number]  Patient Class: IP- Inpatient   Admission Date: 6/13/2022  Length of Stay: 0 days  Attending Physician: Jeison Hunt MD  Primary Care Provider: Zak Hamilton MD        Subjective:     Principal Problem:Problem with dialysis access        HPI:  Houston Jc is a 76 y.o. male presenting with vascular access problem. Patient has a past medical history of Anemia, CHF (congestive heart failure), CKD (chronic kidney disease) stage 4, GFR 15-29 ml/min, Coronary artery disease, Diabetes mellitus, Hematuria, Hypertension, Osteoarthritis of spine with radiculopathy, cervical region, and Renal cyst, right. Patient states that he went to dialysis yesterday and was unable to receive it due to his fistula being clotted. Patient has no complaints at this time. Patient last received dialysis on Thursday.    Scheduled for declotting today.      Overview/Hospital Course:  Mr. Houston Jc was admitted to the [facility] surgery  service for further care. HM was consulted for medical management. Dr. Hunt attempted an OR declot on 6/14 and Andi placement which was unsuccessful. Pending IR consult for HD line placement. Will need fistulagram by Dr Cody - timing to be discussed  Monitor H/H--will need PRBCs once HD line is placed--transfusion with HD. 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 Surgery Ok'd Heparin gtt--will initiate.       Interval History: Seen this am. No distress noted. Pain is mild. Spoke to Dr. Hunt this am--he stated ghe is OK with Heparin gtt. IR was consulted HD line placement.     Review of Systems   Constitutional:  Negative  for chills, fatigue and fever.   HENT:  Negative for trouble swallowing.    Respiratory:  Negative for cough and shortness of breath.    Cardiovascular:  Negative for chest pain, palpitations and leg swelling.   Gastrointestinal:  Negative for blood in stool, diarrhea, nausea and vomiting.   Musculoskeletal:  Positive for gait problem.   Skin:         Graft without thrill    Neurological:  Positive for weakness.   Hematological:  Does not bruise/bleed easily.   Psychiatric/Behavioral:  Negative for confusion. The patient is not nervous/anxious.    Objective:     Vital Signs (Most Recent):  Temp: 97.5 °F (36.4 °C) (06/15/22 0544)  Pulse: 75 (06/15/22 0544)  Resp: 18 (06/15/22 0544)  BP: (!) 108/53 (06/15/22 0544)  SpO2: 99 % (06/15/22 0904)   Vital Signs (24h Range):  Temp:  [96.1 °F (35.6 °C)-98.2 °F (36.8 °C)] 97.5 °F (36.4 °C)  Pulse:  [60-80] 75  Resp:  [18-20] 18  SpO2:  [93 %-99 %] 99 %  BP: (102-110)/(47-57) 108/53     Weight: 119.6 kg (263 lb 10.7 oz)  Body mass index is 34.79 kg/m².  No intake or output data in the 24 hours ending 06/15/22 0956   Physical Exam  Vitals and nursing note reviewed.   Constitutional:       Appearance: He is obese. He is not ill-appearing.   HENT:      Head: Normocephalic and atraumatic.      Mouth/Throat:      Mouth: Mucous membranes are moist.   Eyes:      Extraocular Movements: Extraocular movements intact.      Conjunctiva/sclera: Conjunctivae normal.      Pupils: Pupils are equal, round, and reactive to light.   Cardiovascular:      Rate and Rhythm: Normal rate.      Pulses: Normal pulses.      Heart sounds: Normal heart sounds.   Abdominal:      General: There is no distension.      Tenderness: There is no abdominal tenderness. There is no guarding.   Musculoskeletal:         General: No swelling. Normal range of motion.      Cervical back: Normal range of motion and neck supple.      Right lower leg: No edema.      Left lower leg: No edema.   Skin:     General: Skin is  warm.      Capillary Refill: Capillary refill takes 2 to 3 seconds.   Neurological:      Mental Status: He is alert and oriented to person, place, and time.   Psychiatric:         Mood and Affect: Mood normal.         Behavior: Behavior normal.       Significant Labs: All pertinent labs within the past 24 hours have been reviewed.    Significant Imaging: I have reviewed all pertinent imaging results/findings within the past 24 hours.      Assessment/Plan:      * Problem with dialysis access  UnSuccessful declotting ion yesterday with Dr. Hunt  Consulted IR for HD line placement       Hyperkalemia  Potassium 6.8 this am  Will give lokelma  Recheck Potassium       Type 2 diabetes mellitus with chronic kidney disease on chronic dialysis, with long-term current use of insulin  Patient's FSGs are controlled on current medication regimen.  Last A1c reviewed-repeat HgbA1c ordered  Lab Results   Component Value Date    HGBA1C 6.9 (H) 06/14/2022     Most recent fingerstick glucose reviewed-   Recent Labs   Lab 06/14/22  1415 06/14/22  1435 06/14/22  2021 06/15/22  0543   POCTGLUCOSE 104 107 91 94     Current correctional scale  Low  Maintain anti-hyperglycemic dose as follows-   Antihyperglycemics (From admission, onward)            Start     Stop Route Frequency Ordered    06/14/22 1049  insulin aspart U-100 pen 0-5 Units         -- SubQ Every 6 hours PRN 06/14/22 0949        Advance to 2000 kcal ADA Renal Diabetic Diet after surgery      Acute deep vein thrombosis (DVT) of popliteal vein of right lower extremity  Will start Heparin gtt as Ok'd per Dr. Hunt       PAD (peripheral artery disease)  Resume eliquis and plavix after surgery      Chronic combined systolic and diastolic congestive heart failure  - Stable, at dry weight  - Continue home beta-blocker and ARB  - Will follow with daily standing weight and strict I/Os  - Strict electrolyte management with goal K 4-5 and Mg 2-3 (trending daily)    Results for  orders placed during the hospital encounter of 12/21/21    Echo    Interpretation Summary  · The left ventricle is moderately enlarged with mild concentric hypertrophy and  · The quantitatively derived ejection fraction is 34%.  · Severe left atrial enlargement.  · Normal right ventricular size with normal right ventricular systolic function.  · Normal central venous pressure (3 mmHg).  · The estimated PA systolic pressure is 21 mmHg.  · No vegetations per surface echo.    Wt Readings from Last 5 Encounters:   06/15/22 0544 119.6 kg (263 lb 10.7 oz)   06/14/22 0224 119 kg (262 lb 5.6 oz)   06/13/22 2045 119 kg (262 lb 5.6 oz)   06/13/22 1014 119.3 kg (263 lb)   06/07/22 0726 119.3 kg (263 lb)   06/07/22 0718 117.5 kg (259 lb)   05/18/22 1521 121.1 kg (267 lb)   03/09/22 0900 117 kg (258 lb)   03/03/22 1511 115.7 kg (255 lb)       ESRD (end stage renal disease) on dialysis  Hyperkalemia    HD scheduled TuThSat  Hgb: at baseline  Nephrology consulted, hyperkalemia treated, repeat renal function panel  Advance to Renal Diabetic Diet          Cardiac resynchronization therapy defibrillator (CRT-D) in place  OM in 7/2019 with complete heart block and intermittent VT. An echo showed his EF was 30%. Amiodarone was started, a LHC showed luminal irregularities, and a [employer] CRT-D was implanted prior to discharge (RV septal lead in LV port).      Complete heart block  Chronic problem       Morbid obesity  Will need diet and education management on DC    Hypertension  Controlled, BP borderline low  Home Meds include losartan 25 mg daily and coreg 6.25 mg bid  Recommend coreg at 3.125 mg bid          VTE Risk Mitigation (From admission, onward)         Ordered     IP VTE HIGH RISK PATIENT  Once         06/14/22 0214     Place sequential compression device  Until discontinued         06/14/22 0214     Place TJ hose  Until discontinued         06/14/22 0214                Discharge Planning   ANDERSON: 6/15/2022     Code Status:  Full Code   Is the patient medically ready for discharge?:     Reason for patient still in hospital (select all that apply): Patient trending condition, Laboratory test, Treatment, Imaging, Consult recommendations and PT / OT recommendations  Discharge Plan A: Home Health, Home with family, Home                  Sky Parikh NP  Department of Hospital Medicine   [facility]

## 2022-06-15 NOTE — CONSULTS
IR consulted for possible Andi. Discussed with Dr. Hunt. Cardiology also consulted and plans to attempt Andi, so IR will defer.      Socrates Lunsford MD  [facility] IR  Pager [phone]

## 2022-06-15 NOTE — PLAN OF CARE
Chart reviewed - case discussed in am MD meeting     6/15  Trialysis catheter placed under fluoroscopy.  Acute deep vein thrombosis (DVT) of popliteal vein of right lower extremity - hep gtt      wife - Hue Benjamin     street address:  [address] [place] [address] C[place]A Shabazz         pt has a WC, RW and BSC , walk in shower has a built in bench and grab bars- current with home health  - can't recall name - assigned per N  -  agency nurse  dresses his foot wounds       T Th Sa at [place] [address] Nixon  9:45 am - wife transports pt to HD  - wife transports pt to all apts.         06/15/22 1731   Post-Acute Status   Post-Acute Authorization Other   Diaylsis Status   (Established HD Patient)   Other Status No Post-Acute Service Needs   Discharge Delays (!) Change in Medical Condition   Discharge Plan   Discharge Plan A Home;Home with family

## 2022-06-15 NOTE — ASSESSMENT & PLAN NOTE
- Stable, at dry weight  - Continue home beta-blocker and ARB  - Will follow with daily standing weight and strict I/Os  - Strict electrolyte management with goal K 4-5 and Mg 2-3 (trending daily)    Results for orders placed during the hospital encounter of 12/21/21    Echo    Interpretation Summary  · The left ventricle is moderately enlarged with mild concentric hypertrophy and  · The quantitatively derived ejection fraction is 34%.  · Severe left atrial enlargement.  · Normal right ventricular size with normal right ventricular systolic function.  · Normal central venous pressure (3 mmHg).  · The estimated PA systolic pressure is 21 mmHg.  · No vegetations per surface echo.    Wt Readings from Last 5 Encounters:   06/15/22 0544 119.6 kg (263 lb 10.7 oz)   06/14/22 0224 119 kg (262 lb 5.6 oz)   06/13/22 2045 119 kg (262 lb 5.6 oz)   06/13/22 1014 119.3 kg (263 lb)   06/07/22 0726 119.3 kg (263 lb)   06/07/22 0718 117.5 kg (259 lb)   05/18/22 1521 121.1 kg (267 lb)   03/09/22 0900 117 kg (258 lb)   03/03/22 1511 115.7 kg (255 lb)

## 2022-06-16 NOTE — PLAN OF CARE
Fistula evaluation was planned this am  Although NPO he was offered food and ate   For safety the procedure was cancelled        NPO after midnight  Procedure tomorrow

## 2022-06-16 NOTE — PLAN OF CARE
Plan of care reviewed. No acute distress this shift. Will continue current plan of care. Bed low. Call light in reach.

## 2022-06-16 NOTE — PLAN OF CARE
Chart reviewed - case discused in am MD meeting   Planned  cath lab today - pt did not maintain NPO - will undergo procedure tomorrow.      6/15  Trialysis catheter placed under fluoroscopy.  Acute deep vein thrombosis (DVT) of popliteal vein of right lower extremity - hep gtt       wife - Hue Benjamin  [phone]   street address:  [address] [place] [address] CASSANDRA Shabazz         pt has a WC, RW and BSC , walk in shower has a built in bench and grab bars- current with home health  - can't recall name - assigned per PHN  - HH agency nurse  dresses his foot wounds      HD T Th Sa at [place]AN[place]A Nixon  9:45 am - wife transports pt to HD  - wife transports pt to all apts.      06/16/22 5271   Post-Acute Status   Post-Acute Authorization Home Health  (Secure Chat sent to Surgeon asking that he re-order/resume pt's HH)   Discharge Delays   (pt not medically stable)   Discharge Plan   Discharge Plan A Home;Home with family;Home Health

## 2022-06-16 NOTE — PROGRESS NOTES
06/16/22 1500   Vital Signs   Pulse 77   BP (!) 119/54   Pre-Hemodialysis Assessment   Treatment Status Completed   During Hemodialysis Assessment   Blood Flow Rate (mL/min) 350 mL/min   Dialysate Flow Rate (mL/min) 700 ml/min   Ultrafiltration Rate (mL/Hr) 420 mL/Hr   Arteriovenous Lines Secure Yes   Arterial Pressure (mmHg) -122 mmHg   Venous Pressure (mmHg) 131   Blood Volume Processed (Liters) 74 L   UF Removed (mL) 1100 mL   TMP 25   Venous Line in Air Detector Yes   Intake (mL) 250 mL   Transducer Dry Yes   Access Visible Yes    notified of access issue? N/A   Heparin given? N/A   Intra-Hemodialysis Comments Tx completed, Blood rinsed back to Pt, NADn   Post-Hemodialysis Assessment   Rinseback Volume (mL) 250 mL   Blood Volume Processed (Liters) 74 L   Dialyzer Clearance Lightly streaked   Duration of Treatment 210 minutes   Additional Fluid Intake (mL) 378 mL   Total UF (mL) 1100 mL   Net Fluid Removal 300   Patient Response to Treatment Pt started to cramp unable to tolerate much UF   Post-Hemodialysis Comments Tx completed, NADn

## 2022-06-16 NOTE — ASSESSMENT & PLAN NOTE
UnSuccessful declotting with Dr. Hunt  Trialysis catheter placed, HD complete 6/15  Fistulogram with cardiology

## 2022-06-16 NOTE — PROGRESS NOTES
[facility] Medicine  Progress Note    Patient Name: Houston Jc  MRN: [id number]  Patient Class: IP- Inpatient   Admission Date: 6/13/2022  Length of Stay: 1 days  Attending Physician: Jeison Hunt MD  Primary Care Provider: Zak Hamilton MD        Subjective:     Principal Problem:Problem with dialysis access        HPI:  Houston Jc is a 76 y.o. male presenting with vascular access problem. Patient has a past medical history of Anemia, CHF (congestive heart failure), CKD (chronic kidney disease) stage 4, GFR 15-29 ml/min, Coronary artery disease, Diabetes mellitus, Hematuria, Hypertension, Osteoarthritis of spine with radiculopathy, cervical region, and Renal cyst, right. Patient states that he went to dialysis yesterday and was unable to receive it due to his fistula being clotted. Patient has no complaints at this time. Patient last received dialysis on Thursday.    Scheduled for declotting today.      Overview/Hospital Course:  Mr. Houston Jc was admitted to the [facility] surgery service for further care.  was consulted for medical management. Dr. Hunt attempted an OR declot on 6/14 and Andi placement which was unsuccessful. Trialysis catheter placed 6/15 followed by HD.  Surgery Ok'd Heparin gtt--will initiate. Fistulagram by Dr Cody scheduled 6/16.        Interval History:  no acute events overnight, no new complaints. Fistulogram today with cardiology.    Review of Systems   Constitutional:  Negative for chills, fatigue and fever.   HENT:  Negative for trouble swallowing.    Respiratory:  Negative for cough and shortness of breath.    Cardiovascular:  Negative for chest pain, palpitations and leg swelling.   Gastrointestinal:  Negative for blood in stool, diarrhea, nausea and vomiting.   Musculoskeletal:  Positive for gait problem.   Neurological:  Positive for weakness.   Hematological:  Does not bruise/bleed easily.   Psychiatric/Behavioral:  Negative for confusion.  The patient is not nervous/anxious.    Objective:     Vital Signs (Most Recent):  Temp: 98 °F (36.7 °C) (06/16/22 1120)  Pulse: 87 (Simultaneous filing. User may not have seen previous data.) (06/16/22 1200)  Resp: 18 (06/16/22 1120)  BP: (!) 102/53 (06/16/22 1200)  SpO2: 95 % (06/16/22 1031)   Vital Signs (24h Range):  Temp:  [96.9 °F (36.1 °C)-98.5 °F (36.9 °C)] 98 °F (36.7 °C)  Pulse:  [60-88] 87  Resp:  [14-20] 18  SpO2:  [94 %-100 %] 95 %  BP: ()/() 102/53     Weight: 120 kg (264 lb 8.8 oz)  Body mass index is 34.9 kg/m².    Intake/Output Summary (Last 24 hours) at 6/16/2022 1244  Last data filed at 6/15/2022 1845  Gross per 24 hour   Intake 100 ml   Output 1000 ml   Net -900 ml      Physical Exam  Vitals and nursing note reviewed.   Constitutional:       Appearance: He is obese. He is not ill-appearing.   HENT:      Head: Normocephalic and atraumatic.      Mouth/Throat:      Mouth: Mucous membranes are moist.   Eyes:      Extraocular Movements: Extraocular movements intact.      Conjunctiva/sclera: Conjunctivae normal.      Pupils: Pupils are equal, round, and reactive to light.   Cardiovascular:      Rate and Rhythm: Normal rate.      Pulses: Normal pulses.      Heart sounds: Normal heart sounds.   Abdominal:      General: There is no distension.      Tenderness: There is no abdominal tenderness. There is no guarding.   Musculoskeletal:         General: No swelling. Normal range of motion.      Cervical back: Normal range of motion and neck supple.      Right lower leg: No edema.      Left lower leg: No edema.   Skin:     General: Skin is warm.      Capillary Refill: Capillary refill takes 2 to 3 seconds.   Neurological:      Mental Status: He is alert and oriented to person, place, and time.   Psychiatric:         Mood and Affect: Mood normal.         Behavior: Behavior normal.       Significant Labs: All pertinent labs within the past 24 hours have been reviewed.    Significant Imaging: I have  reviewed all pertinent imaging results/findings within the past 24 hours.      Assessment/Plan:      * Problem with dialysis access  UnSuccessful declotting with Dr. Hunt  Trialysis catheter placed, HD complete 6/15  Fistulogram with cardiology      ESRD (end stage renal disease) on dialysis  Hyperkalemia    HD scheduled TuThSat  Hgb: at baseline  Nephrology consulted, hyperkalemia treated, improved with HD  Advance to Renal Diabetic Diet after fistulogram          Chronic combined systolic and diastolic congestive heart failure  - Stable, at dry weight  - Continue home beta-blocker and ARB  - Will follow with daily standing weight and strict I/Os  - Strict electrolyte management with goal K 4-5 and Mg 2-3 (trending daily)    Results for orders placed during the hospital encounter of 12/21/21    Echo    Interpretation Summary  · The left ventricle is moderately enlarged with mild concentric hypertrophy and  · The quantitatively derived ejection fraction is 34%.  · Severe left atrial enlargement.  · Normal right ventricular size with normal right ventricular systolic function.  · Normal central venous pressure (3 mmHg).  · The estimated PA systolic pressure is 21 mmHg.  · No vegetations per surface echo.    Wt Readings from Last 5 Encounters:   06/16/22 0621 120 kg (264 lb 8.8 oz)   06/15/22 0544 119.6 kg (263 lb 10.7 oz)   06/14/22 0224 119 kg (262 lb 5.6 oz)   06/13/22 2045 119 kg (262 lb 5.6 oz)   06/13/22 1014 119.3 kg (263 lb)   06/07/22 0726 119.3 kg (263 lb)   06/07/22 0718 117.5 kg (259 lb)   05/18/22 1521 121.1 kg (267 lb)   03/09/22 0900 117 kg (258 lb)   03/03/22 1511 115.7 kg (255 lb)       Hypertension  Controlled, BP borderline low  Home Meds include losartan 25 mg daily and coreg 6.25 mg bid  Recommend coreg at 3.125 mg bid        PAD (peripheral artery disease)  Resume eliquis and plavix after surgery      Type 2 diabetes mellitus with chronic kidney disease on chronic dialysis, with long-term  current use of insulin  Patient's FSGs are controlled on current medication regimen.  Last A1c reviewed-repeat HgbA1c 6.9%  Lab Results   Component Value Date    HGBA1C 6.9 (H) 06/14/2022     Most recent fingerstick glucose reviewed-   Recent Labs   Lab 06/15/22  2004 06/16/22  0622 06/16/22  1111   POCTGLUCOSE 87 89 105     Current correctional scale  Low  Maintain anti-hyperglycemic dose as follows-   Antihyperglycemics (From admission, onward)            Start     Stop Route Frequency Ordered    06/14/22 1049  insulin aspart U-100 pen 0-5 Units         -- SubQ Every 6 hours PRN 06/14/22 0949        Advance to 2000 kcal ADA Renal Diabetic Diet after surgery      Cardiac resynchronization therapy defibrillator (CRT-D) in place  [facility] [place] complete heart block and intermittent VT. An echo showed his EF was 30%. Amiodarone was started, a LHC showed luminal irregularities, and a [employer] CRT-D was implanted prior to discharge (RV septal lead in LV port).      Hyperkalemia  Potassium 6.8 this am  Will give lokelma  Recheck Potassium       Acute deep vein thrombosis (DVT) of popliteal vein of right lower extremity  Will start Heparin gtt as Ok'd per Dr. Hunt       Complete heart block  Chronic problem       Morbid obesity  Will need diet and education management on DC      VTE Risk Mitigation (From admission, onward)         Ordered     heparin infusion 1,000 units/500 ml in 0.9% NaCl (pressure line flush)  Intra-op continuous PRN         06/15/22 1326     Place sequential compression device  Until discontinued         06/14/22 0214     Place TJ hose  Until discontinued         06/14/22 0214                Discharge Planning   ANDERSON: 6/15/2022     Code Status: Full Code   Is the patient medically ready for discharge?:     Reason for patient still in hospital (select all that apply): Patient trending condition, Imaging and Consult recommendations  Discharge Plan A: Home, Home with family   Discharge Delays: (!)  Change in Medical Condition              Leonela Weeks PA-C  Department of Hospital Medicine   [place] [facility]

## 2022-06-16 NOTE — ASSESSMENT & PLAN NOTE
Patient's FSGs are controlled on current medication regimen.  Last A1c reviewed-repeat HgbA1c 6.9%  Lab Results   Component Value Date    HGBA1C 6.9 (H) 06/14/2022     Most recent fingerstick glucose reviewed-   Recent Labs   Lab 06/15/22  2004 06/16/22  0622 06/16/22  1111   POCTGLUCOSE 87 89 105     Current correctional scale  Low  Maintain anti-hyperglycemic dose as follows-   Antihyperglycemics (From admission, onward)            Start     Stop Route Frequency Ordered    06/14/22 1049  insulin aspart U-100 pen 0-5 Units         -- SubQ Every 6 hours PRN 06/14/22 0949        Advance to 2000 kcal ADA Renal Diabetic Diet after surgery

## 2022-06-16 NOTE — ASSESSMENT & PLAN NOTE
- Stable, at dry weight  - Continue home beta-blocker and ARB  - Will follow with daily standing weight and strict I/Os  - Strict electrolyte management with goal K 4-5 and Mg 2-3 (trending daily)    Results for orders placed during the hospital encounter of 12/21/21    Echo    Interpretation Summary  · The left ventricle is moderately enlarged with mild concentric hypertrophy and  · The quantitatively derived ejection fraction is 34%.  · Severe left atrial enlargement.  · Normal right ventricular size with normal right ventricular systolic function.  · Normal central venous pressure (3 mmHg).  · The estimated PA systolic pressure is 21 mmHg.  · No vegetations per surface echo.    Wt Readings from Last 5 Encounters:   06/16/22 0621 120 kg (264 lb 8.8 oz)   06/15/22 0544 119.6 kg (263 lb 10.7 oz)   06/14/22 0224 119 kg (262 lb 5.6 oz)   06/13/22 2045 119 kg (262 lb 5.6 oz)   06/13/22 1014 119.3 kg (263 lb)   06/07/22 0726 119.3 kg (263 lb)   06/07/22 0718 117.5 kg (259 lb)   05/18/22 1521 121.1 kg (267 lb)   03/09/22 0900 117 kg (258 lb)   03/03/22 1511 115.7 kg (255 lb)

## 2022-06-16 NOTE — ASSESSMENT & PLAN NOTE
Hyperkalemia    HD scheduled TuThSat  Hgb: at baseline  Nephrology consulted, hyperkalemia treated, improved with HD  Advance to Renal Diabetic Diet after fistulogram

## 2022-06-16 NOTE — SUBJECTIVE & OBJECTIVE
Interval History:  no acute events overnight, no new complaints. Fistulogram today with cardiology.    Review of Systems   Constitutional:  Negative for chills, fatigue and fever.   HENT:  Negative for trouble swallowing.    Respiratory:  Negative for cough and shortness of breath.    Cardiovascular:  Negative for chest pain, palpitations and leg swelling.   Gastrointestinal:  Negative for blood in stool, diarrhea, nausea and vomiting.   Musculoskeletal:  Positive for gait problem.   Neurological:  Positive for weakness.   Hematological:  Does not bruise/bleed easily.   Psychiatric/Behavioral:  Negative for confusion. The patient is not nervous/anxious.    Objective:     Vital Signs (Most Recent):  Temp: 98 °F (36.7 °C) (06/16/22 1120)  Pulse: 87 (Simultaneous filing. User may not have seen previous data.) (06/16/22 1200)  Resp: 18 (06/16/22 1120)  BP: (!) 102/53 (06/16/22 1200)  SpO2: 95 % (06/16/22 1031)   Vital Signs (24h Range):  Temp:  [96.9 °F (36.1 °C)-98.5 °F (36.9 °C)] 98 °F (36.7 °C)  Pulse:  [60-88] 87  Resp:  [14-20] 18  SpO2:  [94 %-100 %] 95 %  BP: ()/() 102/53     Weight: 120 kg (264 lb 8.8 oz)  Body mass index is 34.9 kg/m².    Intake/Output Summary (Last 24 hours) at 6/16/2022 1244  Last data filed at 6/15/2022 1845  Gross per 24 hour   Intake 100 ml   Output 1000 ml   Net -900 ml      Physical Exam  Vitals and nursing note reviewed.   Constitutional:       Appearance: He is obese. He is not ill-appearing.   HENT:      Head: Normocephalic and atraumatic.      Mouth/Throat:      Mouth: Mucous membranes are moist.   Eyes:      Extraocular Movements: Extraocular movements intact.      Conjunctiva/sclera: Conjunctivae normal.      Pupils: Pupils are equal, round, and reactive to light.   Cardiovascular:      Rate and Rhythm: Normal rate.      Pulses: Normal pulses.      Heart sounds: Normal heart sounds.   Abdominal:      General: There is no distension.      Tenderness: There is no  abdominal tenderness. There is no guarding.   Musculoskeletal:         General: No swelling. Normal range of motion.      Cervical back: Normal range of motion and neck supple.      Right lower leg: No edema.      Left lower leg: No edema.   Skin:     General: Skin is warm.      Capillary Refill: Capillary refill takes 2 to 3 seconds.   Neurological:      Mental Status: He is alert and oriented to person, place, and time.   Psychiatric:         Mood and Affect: Mood normal.         Behavior: Behavior normal.       Significant Labs: All pertinent labs within the past 24 hours have been reviewed.    Significant Imaging: I have reviewed all pertinent imaging results/findings within the past 24 hours.

## 2022-06-16 NOTE — PROGRESS NOTES
"Surgery follow up  BP (!) 107/55 (BP Location: Left arm, Patient Position: Lying)   Pulse 85   Temp 98.5 °F (36.9 °C) (Oral)   Resp 18   Ht 6' 1" (1.854 m)   Wt 120 kg (264 lb 8.8 oz)   SpO2 100%   BMI 34.90 kg/m²   I/O last 3 completed shifts:  In: 100 [Other:100]  Out: 1000 [Other:1000]  No intake/output data recorded.  Got dialyzed yesterday, for angiograph in am by DR.N"Dandy  "

## 2022-06-16 NOTE — PROGRESS NOTES
06/16/22 1515   Vital Signs   Temp 98.5 °F (36.9 °C)   Pulse 60   Resp 18   O2 Device (Oxygen Therapy) room air   /62

## 2022-06-17 NOTE — NURSING
Hand off telephone report called to nurse Herzog for pts admit room 477; pt has no belongings; tele box in place; no visitor is present; chart at bedside; pt easily aroused and falls back to sleep easily; no resp distress and no pain reported at this time but does have occ pain to rt great toe and has a wound on rt great toe; was not able to doppler dp pulses in recovery; radial pulses audible w/ doppler bilat; drsgs x 3 cdi to rt fistula and audible bruit noted

## 2022-06-17 NOTE — NURSING
Hand off bedside report to nurse Herzog in pts room 477; pt transferred from stretcher to bed using slide board and 3 staff members w/o incident;pts spouse in room and updated; meal tray to bedside; iv access maintained left forearm; pt awake and alert; drsgs x 3 to right upper arm cdi, no bleeding nor hematoma and sutures in 2 areas also noted and has some discoloration edema and bruising present to fistula; dialysis access in place left groin; tele box on pt; bed low and locked and call bell in reach; no mention of pain at present

## 2022-06-17 NOTE — ASSESSMENT & PLAN NOTE
- Stable, at dry weight  - Holding home beta-blocker and ARB, became hypotensive with initiation of lowest dose of coreg  - Will follow with daily standing weight and strict I/Os  - Strict electrolyte management with goal K 4-5 and Mg 2-3 (trending daily)    Results for orders placed during the hospital encounter of 12/21/21    Echo    Interpretation Summary  · The left ventricle is moderately enlarged with mild concentric hypertrophy and  · The quantitatively derived ejection fraction is 34%.  · Severe left atrial enlargement.  · Normal right ventricular size with normal right ventricular systolic function.  · Normal central venous pressure (3 mmHg).  · The estimated PA systolic pressure is 21 mmHg.  · No vegetations per surface echo.    Wt Readings from Last 5 Encounters:   06/17/22 0606 117.7 kg (259 lb 7.7 oz)   06/16/22 0621 120 kg (264 lb 8.8 oz)   06/15/22 0544 119.6 kg (263 lb 10.7 oz)   06/14/22 0224 119 kg (262 lb 5.6 oz)   06/13/22 2045 119 kg (262 lb 5.6 oz)   06/13/22 1014 119.3 kg (263 lb)   06/07/22 0726 119.3 kg (263 lb)   06/07/22 0718 117.5 kg (259 lb)   05/18/22 1521 121.1 kg (267 lb)   03/09/22 0900 117 kg (258 lb)   03/03/22 1511 115.7 kg (255 lb)

## 2022-06-17 NOTE — PROGRESS NOTES
[facility] Medicine  Progress Note    Patient Name: Houston Jc  MRN: [id number]  Patient Class: IP- Inpatient   Admission Date: 6/13/2022  Length of Stay: 2 days  Attending Physician: Jeison Hunt MD  Primary Care Provider: Zak Hamilton MD        Subjective:     Principal Problem:Problem with dialysis access        HPI:  Houston Jc is a 76 y.o. male presenting with vascular access problem. Patient has a past medical history of Anemia, CHF (congestive heart failure), CKD (chronic kidney disease) stage 4, GFR 15-29 ml/min, Coronary artery disease, Diabetes mellitus, Hematuria, Hypertension, Osteoarthritis of spine with radiculopathy, cervical region, and Renal cyst, right. Patient states that he went to dialysis yesterday and was unable to receive it due to his fistula being clotted. Patient has no complaints at this time. Patient last received dialysis on Thursday.    Scheduled for declotting today.      Overview/Hospital Course:  Mr. Houston Jc was admitted to the [facility] surgery service for further care.  was consulted for medical management. Dr. Hunt attempted an OR declot on 6/14 and Andi placement which was unsuccessful. Trialysis catheter placed 6/15 followed by HD.  Surgery Ok'd Heparin gtt--will initiate. Fistulagram by Dr Cody scheduled 6/16 delayed to 6/17 due to pt eating while NPO.        Interval History: pt ate yesterday while NPO, procedure postponed until today    Review of Systems   Constitutional:  Negative for chills, fatigue and fever.   HENT:  Negative for trouble swallowing.    Respiratory:  Negative for cough and shortness of breath.    Cardiovascular:  Negative for chest pain, palpitations and leg swelling.   Gastrointestinal:  Negative for blood in stool, diarrhea, nausea and vomiting.   Musculoskeletal:  Negative for gait problem.   Neurological:  Positive for weakness. Negative for dizziness, syncope and facial asymmetry.   Hematological:   Does not bruise/bleed easily.   Psychiatric/Behavioral:  Negative for confusion. The patient is not nervous/anxious.    Objective:     Vital Signs (Most Recent):  Temp: 98.7 °F (37.1 °C) (06/17/22 0606)  Pulse: 75 (06/17/22 1020)  Resp: 17 (06/17/22 1020)  BP: (!) 102/46 (06/17/22 0606)  SpO2: 95 % (06/17/22 1020)   Vital Signs (24h Range):  Temp:  [98 °F (36.7 °C)-98.7 °F (37.1 °C)] 98.7 °F (37.1 °C)  Pulse:  [60-92] 75  Resp:  [17-22] 17  SpO2:  [93 %-99 %] 95 %  BP: ()/(43-94) 102/46     Weight: 117.7 kg (259 lb 7.7 oz)  Body mass index is 34.23 kg/m².    Intake/Output Summary (Last 24 hours) at 6/17/2022 1105  Last data filed at 6/17/2022 0400  Gross per 24 hour   Intake 1134 ml   Output 1100 ml   Net 34 ml      Physical Exam  Vitals and nursing note reviewed.   Constitutional:       Appearance: He is obese. He is not ill-appearing.   HENT:      Head: Normocephalic and atraumatic.      Mouth/Throat:      Mouth: Mucous membranes are moist.   Eyes:      Extraocular Movements: Extraocular movements intact.      Conjunctiva/sclera: Conjunctivae normal.      Pupils: Pupils are equal, round, and reactive to light.   Cardiovascular:      Rate and Rhythm: Normal rate.      Pulses: Normal pulses.      Heart sounds: Normal heart sounds.   Abdominal:      General: There is no distension.      Tenderness: There is no abdominal tenderness. There is no guarding.   Musculoskeletal:         General: No swelling. Normal range of motion.      Cervical back: Normal range of motion and neck supple.      Right lower leg: No edema.      Left lower leg: No edema.   Skin:     General: Skin is warm.      Capillary Refill: Capillary refill takes 2 to 3 seconds.   Neurological:      Mental Status: He is alert and oriented to person, place, and time.   Psychiatric:         Mood and Affect: Mood normal.         Behavior: Behavior normal.       Significant Labs: All pertinent labs within the past 24 hours have been  reviewed.    Significant Imaging: I have reviewed all pertinent imaging results/findings within the past 24 hours.      Assessment/Plan:      * Problem with dialysis access  UnSuccessful declotting with Dr. Hunt  Trialysis catheter placed, HD complete 6/15  Fistulogram with cardiology 6/17      ESRD (end stage renal disease) on dialysis  Hyperkalemia    HD scheduled TuThSat  Hgb: at baseline  Nephrology consulted, hyperkalemia treated, improved with HD  Advance to Renal Diabetic Diet after fistulogram          Chronic combined systolic and diastolic congestive heart failure  - Stable, at dry weight  - Holding home beta-blocker and ARB, became hypotensive with initiation of lowest dose of coreg  - Will follow with daily standing weight and strict I/Os  - Strict electrolyte management with goal K 4-5 and Mg 2-3 (trending daily)    Results for orders placed during the hospital encounter of 12/21/21    Echo    Interpretation Summary  · The left ventricle is moderately enlarged with mild concentric hypertrophy and  · The quantitatively derived ejection fraction is 34%.  · Severe left atrial enlargement.  · Normal right ventricular size with normal right ventricular systolic function.  · Normal central venous pressure (3 mmHg).  · The estimated PA systolic pressure is 21 mmHg.  · No vegetations per surface echo.    Wt Readings from Last 5 Encounters:   06/17/22 0606 117.7 kg (259 lb 7.7 oz)   06/16/22 0621 120 kg (264 lb 8.8 oz)   06/15/22 0544 119.6 kg (263 lb 10.7 oz)   06/14/22 0224 119 kg (262 lb 5.6 oz)   06/13/22 2045 119 kg (262 lb 5.6 oz)   06/13/22 1014 119.3 kg (263 lb)   06/07/22 0726 119.3 kg (263 lb)   06/07/22 0718 117.5 kg (259 lb)   05/18/22 1521 121.1 kg (267 lb)   03/09/22 0900 117 kg (258 lb)   03/03/22 1511 115.7 kg (255 lb)       Hypertension  Controlled, BP borderline low  Home Meds include losartan 25 mg daily and coreg 6.25 mg bid  Did not tolerate reinitiation of coreg at 3.125 mg  bid        PAD (peripheral artery disease)  Resume eliquis and plavix after surgery      Type 2 diabetes mellitus with chronic kidney disease on chronic dialysis, with long-term current use of insulin  Patient's FSGs are controlled on current medication regimen.  Last A1c reviewed-repeat HgbA1c 6.9%  Lab Results   Component Value Date    HGBA1C 6.9 (H) 06/14/2022     Most recent fingerstick glucose reviewed-   Recent Labs   Lab 06/16/22  1111 06/16/22  2005 06/17/22  0605 06/17/22  0658   POCTGLUCOSE 105 87 64* 83     Current correctional scale  Low  Maintain anti-hyperglycemic dose as follows-   Antihyperglycemics (From admission, onward)            Start     Stop Route Frequency Ordered    06/14/22 1049  insulin aspart U-100 pen 0-5 Units         -- SubQ Every 6 hours PRN 06/14/22 0949        Advance to 2000 kcal ADA Renal Diabetic Diet after surgery      Cardiac resynchronization therapy defibrillator (CRT-D) in place  [facility] in 7/2019 with complete heart block and intermittent VT. An echo showed his EF was 30%. Amiodarone was started, a LHC showed luminal irregularities, and a [employer] CRT-D was implanted prior to discharge (RV septal lead in LV port).      Hyperkalemia  Potassium 6.8 this am  Will give lokelma  Recheck Potassium       Acute deep vein thrombosis (DVT) of popliteal vein of right lower extremity  Will start Heparin gtt as Ok'd per Dr. Hunt       Complete heart block  Chronic problem       Morbid obesity  Will need diet and education management on DC      VTE Risk Mitigation (From admission, onward)         Ordered     heparin infusion 1,000 units/500 ml in 0.9% NaCl (pressure line flush)  Intra-op continuous PRN         06/17/22 1106     heparin infusion 1,000 units/500 ml in 0.9% NaCl (pressure line flush)  Intra-op continuous PRN         06/15/22 1326     Place sequential compression device  Until discontinued         06/14/22 0214     Place TJ hose  Until discontinued         06/14/22 0214                 Discharge Planning   ANDERSON: 6/15/2022     Code Status: Full Code   Is the patient medically ready for discharge?:     Reason for patient still in hospital (select all that apply): Treatment and Consult recommendations  Discharge Plan A: Home, Home with family, Home Health   Discharge Delays:  (pt not medically stable)              Leonela Weeks PA-C  Department of Hospital Medicine   [facility] ([facility])

## 2022-06-17 NOTE — ASSESSMENT & PLAN NOTE
UnSuccessful declotting with Dr. Hunt  Trialysis catheter placed, HD complete 6/15  Fistulogram with cardiology 6/17

## 2022-06-17 NOTE — INTERVAL H&P NOTE
The patient has been examined and the H&P has been reviewed:        Risks, benefits and alternatives of peripheral catheterization and possible intervention were discussed with the patient. All questions were answered and informed consent obtained.     I discussed the importance of compliance with dual antiplatelet therapy with the patient to prevent acute or late stent thrombosis with premature discontinuation of the therapy.      Active Hospital Problems    Diagnosis  POA    *Problem with dialysis access [T82.898A]  Yes    Hyperkalemia [E87.5]  Yes    Type 2 diabetes mellitus with chronic kidney disease on chronic dialysis, with long-term current use of insulin [E11.22, N18.6, Z99.2, Z79.4]  Not Applicable    PAD (peripheral artery disease) [I73.9]  Yes    Acute deep vein thrombosis (DVT) of popliteal vein of right lower extremity [I82.431]  Yes    ESRD (end stage renal disease) on dialysis [N18.6, Z99.2]  Not Applicable     Tu Th Sat          Chronic combined systolic and diastolic congestive heart failure [I50.42]  Yes    Cardiac resynchronization therapy defibrillator (CRT-D) in place [Z95.810]  Yes    Complete heart block [I44.2]  Yes    Morbid obesity [E66.01]  Yes    Hypertension [I10]  Yes      Resolved Hospital Problems   No resolved problems to display.

## 2022-06-17 NOTE — ASSESSMENT & PLAN NOTE
Patient's FSGs are controlled on current medication regimen.  Last A1c reviewed-repeat HgbA1c 6.9%  Lab Results   Component Value Date    HGBA1C 6.9 (H) 06/14/2022     Most recent fingerstick glucose reviewed-   Recent Labs   Lab 06/16/22  1111 06/16/22  2005 06/17/22  0605 06/17/22  0658   POCTGLUCOSE 105 87 64* 83     Current correctional scale  Low  Maintain anti-hyperglycemic dose as follows-   Antihyperglycemics (From admission, onward)            Start     Stop Route Frequency Ordered    06/14/22 1049  insulin aspart U-100 pen 0-5 Units         -- SubQ Every 6 hours PRN 06/14/22 0949        Advance to 2000 kcal ADA Renal Diabetic Diet after surgery

## 2022-06-17 NOTE — ASSESSMENT & PLAN NOTE
Controlled, BP borderline low  Home Meds include losartan 25 mg daily and coreg 6.25 mg bid  Did not tolerate reinitiation of coreg at 3.125 mg bid

## 2022-06-17 NOTE — PLAN OF CARE
"CM rounded on pt - pt off floor for procedure   TN met with wife Paulette  [phone]    pt to undergo another procedure Monday - no d/c anticipated over the weekend    OR declot attempt  6/14;  Trialysis cath placed 6/15;  Fistulagram planned for 6/16 - pt was not NPO - for this procedure today, 6/17      per Cardiology : "He will have an intervention for RUE AVF then later revascularization of R leg for CLTI. Prior to AVF intervention he will L CFV temporary HD catheter"    street address:  [address] [place] [address] [place] Shabazz         pt has a WC, RW and BSC , walk in shower has a built in bench and grab bars- current with home health  - can't recall name - assigned per PHN  -  agency nurse  dresses his foot wounds      HD T Th Sa at [facility] [place]  9:45 am - wife transports pt to HD  - wife transports pt to all apts.       06/17/22 1640   Post-Acute Status   Post-Acute Authorization Other  (d/c needs to be determined)   Diaylsis Status   (established HD pt)   Discharge Plan   Discharge Plan A Home;Home with family       "

## 2022-06-17 NOTE — PROGRESS NOTES
06/17/22 1808   During Hemodialysis Assessment   Blood Flow Rate (mL/min) 350 mL/min   Dialysate Flow Rate (mL/min) 700 ml/min   Ultrafiltration Rate (mL/Hr) 810 mL/Hr   Arteriovenous Lines Secure Yes   Arterial Pressure (mmHg) -118 mmHg   Venous Pressure (mmHg) 134   UF Removed (mL) 1500 mL   TMP 31   Venous Line in Air Detector Yes   Intake (mL) 0 mL   Transducer Dry Yes   Access Visible Yes    notified of access issue? N/A   Heparin given? N/A   Intra-Hemodialysis Comments Tx completed. Blood returned.

## 2022-06-17 NOTE — PLAN OF CARE
VN note: Labs, notes, orders, care plan review, will be available as needed.   Problem: Adult Inpatient Plan of Care  Goal: Plan of Care Review  Outcome: Ongoing, Progressing

## 2022-06-17 NOTE — NURSING
Pt back to unit. Care resumed. Surgical sites assessed. Dressings c/d/i. No change in AM assessment. Pt wife at bedside. Pt about to eat lunch.

## 2022-06-17 NOTE — NURSING
Plan of care reviewed. Patient alert. BP low this shift. NS bolus given. Medicated x1 for pain. Bed low. Call light in reach.

## 2022-06-17 NOTE — PLAN OF CARE
Problem: Adult Inpatient Plan of Care  Goal: Plan of Care Review  6/17/2022 1022 by Val Reyes, RRT  Outcome: Ongoing, Progressing   Patient on room air with documented SpO2 and in no apparent distress. Will continue to monitor.

## 2022-06-17 NOTE — SUBJECTIVE & OBJECTIVE
Interval History: pt ate yesterday while NPO, procedure postponed until today    Review of Systems   Constitutional:  Negative for chills, fatigue and fever.   HENT:  Negative for trouble swallowing.    Respiratory:  Negative for cough and shortness of breath.    Cardiovascular:  Negative for chest pain, palpitations and leg swelling.   Gastrointestinal:  Negative for blood in stool, diarrhea, nausea and vomiting.   Musculoskeletal:  Negative for gait problem.   Neurological:  Positive for weakness. Negative for dizziness, syncope and facial asymmetry.   Hematological:  Does not bruise/bleed easily.   Psychiatric/Behavioral:  Negative for confusion. The patient is not nervous/anxious.    Objective:     Vital Signs (Most Recent):  Temp: 98.7 °F (37.1 °C) (06/17/22 0606)  Pulse: 75 (06/17/22 1020)  Resp: 17 (06/17/22 1020)  BP: (!) 102/46 (06/17/22 0606)  SpO2: 95 % (06/17/22 1020)   Vital Signs (24h Range):  Temp:  [98 °F (36.7 °C)-98.7 °F (37.1 °C)] 98.7 °F (37.1 °C)  Pulse:  [60-92] 75  Resp:  [17-22] 17  SpO2:  [93 %-99 %] 95 %  BP: ()/(43-94) 102/46     Weight: 117.7 kg (259 lb 7.7 oz)  Body mass index is 34.23 kg/m².    Intake/Output Summary (Last 24 hours) at 6/17/2022 1105  Last data filed at 6/17/2022 0400  Gross per 24 hour   Intake 1134 ml   Output 1100 ml   Net 34 ml      Physical Exam  Vitals and nursing note reviewed.   Constitutional:       Appearance: He is obese. He is not ill-appearing.   HENT:      Head: Normocephalic and atraumatic.      Mouth/Throat:      Mouth: Mucous membranes are moist.   Eyes:      Extraocular Movements: Extraocular movements intact.      Conjunctiva/sclera: Conjunctivae normal.      Pupils: Pupils are equal, round, and reactive to light.   Cardiovascular:      Rate and Rhythm: Normal rate.      Pulses: Normal pulses.      Heart sounds: Normal heart sounds.   Abdominal:      General: There is no distension.      Tenderness: There is no abdominal tenderness. There is no  guarding.   Musculoskeletal:         General: No swelling. Normal range of motion.      Cervical back: Normal range of motion and neck supple.      Right lower leg: No edema.      Left lower leg: No edema.   Skin:     General: Skin is warm.      Capillary Refill: Capillary refill takes 2 to 3 seconds.   Neurological:      Mental Status: He is alert and oriented to person, place, and time.   Psychiatric:         Mood and Affect: Mood normal.         Behavior: Behavior normal.       Significant Labs: All pertinent labs within the past 24 hours have been reviewed.    Significant Imaging: I have reviewed all pertinent imaging results/findings within the past 24 hours.

## 2022-06-17 NOTE — NURSING
Patient to room with nurse on tele box; pt has no complaints; pt has no belongings; chart with pt; drsgs x 3 to right upper arm cdi and no change in assessment

## 2022-06-18 NOTE — ASSESSMENT & PLAN NOTE
UnSuccessful declotting with Dr. Hunt  Trialysis catheter placed, HD complete 6/15, 6/16, 6/17  Fistulogram with cardiology 6/17 complete.  NPO 6/20 for cath lab procedure.  Cardiology recommended heparin and surgery agreeable.

## 2022-06-18 NOTE — ASSESSMENT & PLAN NOTE
CLTI  Resume eliquis after surgery/cath lab  Added statin and plavix per cardiology (surgery agreeable)

## 2022-06-18 NOTE — SUBJECTIVE & OBJECTIVE
Past Medical History:   Diagnosis Date    Anemia     CHF (congestive heart failure)     CKD (chronic kidney disease) stage 4, GFR 15-29 ml/min     Coronary artery disease     Diabetes mellitus     Hematuria     Hypertension     Osteoarthritis of spine with radiculopathy, cervical region 1/10/2022    Renal cyst, right        Past Surgical History:   Procedure Laterality Date    ANGIOGRAPHY OF ARTERIOVENOUS SHUNT Right 2/11/2022    Procedure: Fistulogram with Possible Intervention;  Surgeon: Dejuan Cody MD;  Location: [facility] CATH LAB/EP;  Service: Cardiology;  Laterality: Right;    AORTOGRAPHY WITH SERIALOGRAPHY Right 6/7/2022    Procedure: AORTOGRAM, WITH SERIALOGRAPHY;  Surgeon: Dejuan Cody MD;  Location: [facility] CATH LAB/EP;  Service: Cardiology;  Laterality: Right;    CARDIAC SURGERY      DECLOTTING OF ARTERIOVENOUS GRAFT Right 12/21/2021    Procedure: [id number];  Surgeon: Jeison Hunt MD;  Location: [facility];  Service: Vascular;  Laterality: Right;    DECLOTTING OF ARTERIOVENOUS GRAFT Right 2/18/2022    Procedure: [id number];  Surgeon: Jeison Hunt MD;  Location: [facility] OR;  Service: Vascular;  Laterality: Right;    DECLOTTING OF ARTERIOVENOUS GRAFT Right 3/9/2022    Procedure: [id number];  Surgeon: Jeison Hunt MD;  Location: [facility];  Service: Vascular;  Laterality: Right;    DECLOTTING OF ARTERIOVENOUS GRAFT Right 6/13/2022    Procedure: [id number]; REVISION OF FISTULA W/INSERTION OF NEW AV GRAFT;  Surgeon: Jeison Hunt MD;  Location: [facility] OR;  Service: Vascular;  Laterality: Right;    FISTULOGRAM N/A 2/11/2022    Procedure: Fistulogram;  Surgeon: Dejuan Cody MD;  Location: [facility] CATH LAB/EP;  Service: Cardiology;  Laterality: N/A;    FISTULOGRAM N/A 6/17/2022    Procedure: Fistulogram;  Surgeon: Dejuan Cody MD;  Location: [facility] CATH LAB/EP;  Service: Cardiology;  Laterality: N/A;    FOOT SURGERY      INSERTION OF BIVENTRICULAR  IMPLANTABLE CARDIOVERTER-DEFIBRILLATOR (ICD) Left 7/18/2019    Procedure: INSERTION, ICD, BIVENTRICULAR;  Surgeon: Arturo Bay MD;  Location: [facility] EP LAB;  Service: Cardiology;  Laterality: Left;  CHB, CRTD, SJM, anes, GP, 6078    LEFT HEART CATHETERIZATION N/A 7/16/2019    Procedure: Left heart cath;  Surgeon: Dejuan Cody MD;  Location: [facility] CATH LAB/EP;  Service: Cardiology;  Laterality: N/A;    PERCUTANEOUS TRANSLUMINAL ANGIOPLASTY OF ARTERIOVENOUS FISTULA Right 2/11/2022    Procedure: PTA, AV FISTULA;  Surgeon: Dejuan Cody MD;  Location: [facility] CATH LAB/EP;  Service: Cardiology;  Laterality: Right;    PERCUTANEOUS TRANSLUMINAL ANGIOPLASTY OF ARTERIOVENOUS FISTULA N/A 6/17/2022    Procedure: PTA, AV FISTULA;  Surgeon: Dejuan Cody MD;  Location: [facility] CATH LAB/EP;  Service: Cardiology;  Laterality: N/A;    PERITONEAL CATHETER REMOVAL Left 4/11/2020    Procedure: REMOVAL, CATHETER, DIALYSIS, PERITONEAL;  Surgeon: Edis Snell Jr., MD;  Location: [facility] OR;  Service: General;  Laterality: Left;    PHLEBOGRAPHY  6/15/2022    Procedure: Venogram;  Surgeon: Betsey Mckeon MD;  Location: [facility] CATH LAB/EP;  Service: Cardiology;;    PLACEMENT OF ARTERIOVENOUS GRAFT Right 2/18/2022    Procedure: INSERTION, GRAFT, ARTERIOVENOUS;  Surgeon: Jeison Hunt MD;  Location: [facility] OR;  Service: Vascular;  Laterality: Right;    REVISION OF PROCEDURE INVOLVING ARTERIOVENOUS GRAFT Right 2/18/2022    Procedure: REVISION, PROCEDURE INVOLVING ARTERIOVENOUS GRAFT;  Surgeon: Jeison Hunt MD;  Location: [facility] OR;  Service: Vascular;  Laterality: Right;       Review of patient's allergies indicates:  No Known Allergies    No current facility-administered medications on file prior to encounter.     Current Outpatient Medications on File Prior to Encounter   Medication Sig    acetaminophen (TYLENOL) 325 MG tablet Take 1 tablet (325 mg total) by mouth every 6 (six) hours as needed for Pain.    allopurinol  (ZYLOPRIM) 300 MG tablet Take 300 mg by mouth once daily.    apixaban (ELIQUIS) 5 mg Tab Take 1 tablet (5 mg total) by mouth 2 (two) times daily.    calcium acetate,phosphat bind, (PHOSLO) 667 mg tablet Take 1 tablet (667 mg total) by mouth 3 (three) times daily with meals.    carvediloL (COREG) 6.25 MG tablet Take 1 tablet (6.25 mg total) by mouth 2 (two) times daily.    cholecalciferol, vitamin D3, (VITAMIN D3) 50 mcg (2,000 unit) Cap Take 1 capsule by mouth once daily.    clopidogreL (PLAVIX) 75 mg tablet Take 75 mg by mouth once daily.    folic acid (FOLVITE) 1 MG tablet Take 1 mg by mouth.    gabapentin (NEURONTIN) 300 MG capsule Take 1 capsule (300 mg total) by mouth as directed  (take a pill Monday, Wednesday and Friday  after HD). (Patient taking differently: Take 300 mg by mouth continuous prn (Tuesdays, Thurdays, and Saturdays).)    HYDROcodone-acetaminophen (NORCO) 5-325 mg per tablet Take 1 tablet by mouth every 6 (six) hours as needed for Pain.    insulin aspart U-100 (NOVOLOG) 100 unit/mL injection Inject 4-8 Units into the skin 3 (three) times daily before meals. Per sliding scale    povidone-iodine (BETADINE) 10 % external solution Apply topically as needed for Wound Care. Apply to wound and to interdigital maceration 2x per day.    VENTOLIN HFA 90 mcg/actuation inhaler Inhale 1 puff into the lungs every 4 (four) hours as needed. As NEEDED     Family History       Problem Relation (Age of Onset)    Heart attack Father          Tobacco Use    Smoking status: Former Smoker    Smokeless tobacco: Never Used   Substance and Sexual Activity    Alcohol use: Yes     Comment: social    Drug use: No    Sexual activity: Yes     Review of Systems   Constitutional:  Negative for chills, fatigue and fever.   HENT:  Negative for trouble swallowing.    Respiratory:  Negative for cough and shortness of breath.    Cardiovascular:  Negative for chest pain, palpitations and leg swelling.   Gastrointestinal:  Negative  for blood in stool, diarrhea, nausea and vomiting.   Musculoskeletal:  Negative for gait problem.   Neurological:  Positive for weakness. Negative for dizziness, syncope and facial asymmetry.   Hematological:  Does not bruise/bleed easily.   Psychiatric/Behavioral:  Negative for confusion. The patient is not nervous/anxious.    Objective:     Vital Signs (Most Recent):  Temp: 98 °F (36.7 °C) (06/18/22 0601)  Pulse: 82 (06/18/22 0601)  Resp: 18 (06/18/22 0759)  BP: (!) 95/44 (06/18/22 0601)  SpO2: 97 % (06/18/22 0823)   Vital Signs (24h Range):  Temp:  [97.4 °F (36.3 °C)-98.3 °F (36.8 °C)] 98 °F (36.7 °C)  Pulse:  [60-88] 82  Resp:  [17-24] 18  SpO2:  [95 %-100 %] 97 %  BP: ()/(44-74) 95/44     Weight: 117.7 kg (259 lb 7.7 oz)  Body mass index is 34.23 kg/m².    Physical Exam  Vitals and nursing note reviewed.   Constitutional:       Appearance: He is obese. He is not ill-appearing.   HENT:      Head: Normocephalic and atraumatic.      Mouth/Throat:      Mouth: Mucous membranes are moist.   Eyes:      Extraocular Movements: Extraocular movements intact.      Conjunctiva/sclera: Conjunctivae normal.      Pupils: Pupils are equal, round, and reactive to light.   Cardiovascular:      Rate and Rhythm: Normal rate.      Pulses: Normal pulses.      Heart sounds: Normal heart sounds.   Abdominal:      General: There is no distension.      Tenderness: There is no abdominal tenderness. There is no guarding.   Musculoskeletal:         General: No swelling. Normal range of motion.      Cervical back: Normal range of motion and neck supple.      Right lower leg: No edema.      Left lower leg: No edema.   Skin:     General: Skin is warm.      Capillary Refill: Capillary refill takes 2 to 3 seconds.   Neurological:      Mental Status: He is alert and oriented to person, place, and time.   Psychiatric:         Mood and Affect: Mood normal.         Behavior: Behavior normal.       Significant Labs: All pertinent labs within  the past 24 hours have been reviewed.    Significant Imaging: I have reviewed all pertinent imaging results/findings within the past 24 hours.

## 2022-06-18 NOTE — ASSESSMENT & PLAN NOTE
Controlled, BP borderline low  Home Meds include losartan 25 mg daily and coreg 6.25 mg bid  Did not tolerate reinitiation of coreg at 3.125 mg bid, ordered metoprolol bid

## 2022-06-18 NOTE — ASSESSMENT & PLAN NOTE
Hyperkalemia    HD outpatient scheduled TuThSat  Hgb: at baseline  Nephrology consulted, hyperkalemia treated, improved with HD x 3 sessions  Advanced to Renal Diabetic Diet after fistulogram.  NPO 6/20 for cath lab procedure.

## 2022-06-18 NOTE — ASSESSMENT & PLAN NOTE
[facility] in 7/2019 with complete heart block and intermittent VT. An echo showed his EF was 30%. Amiodarone was started, a LHC showed luminal irregularities, and a [employer] CRT-D was implanted prior to discharge (RV septal lead in LV port).  -Changed coreg to metoprolol due to hypotension

## 2022-06-18 NOTE — ASSESSMENT & PLAN NOTE
Patient's FSGs are controlled on current medication regimen.  Last A1c reviewed-repeat HgbA1c 6.9%  Lab Results   Component Value Date    HGBA1C 6.9 (H) 06/14/2022     Most recent fingerstick glucose reviewed-   Recent Labs   Lab 06/17/22  1434 06/17/22 2028 06/18/22  0600   POCTGLUCOSE 78 114* 100     Current correctional scale  Low  Maintain anti-hyperglycemic dose as follows-   Antihyperglycemics (From admission, onward)            Start     Stop Route Frequency Ordered    06/14/22 1049  insulin aspart U-100 pen 0-5 Units         -- SubQ Every 6 hours PRN 06/14/22 0949        Advanced to 2000 kcal ADA Renal Diabetic Diet

## 2022-06-18 NOTE — CONSULTS
[facility] Medicine  Consult Note    Patient Name: Houston Jc  MRN: [id number]  Admission Date: 6/13/2022  Hospital Length of Stay: 2 days  Attending Physician: Jeison Hunt MD   Primary Care Provider: Zak Hamilton MD           Patient information was obtained from past medical records and ER records.     Consults  Subjective:     Principal Problem: Problem with dialysis access    Chief Complaint:   Chief Complaint   Patient presents with    Vascular Access Problem     Pt last dialysis was thurs, unable to complete on sat due to fistula clotted, denies Cp/SOB, cough, fever, n/v         HPI: Houston Jc is a 76 y.o. male presenting with vascular access problem. Patient has a past medical history of Anemia, CHF (congestive heart failure), CKD (chronic kidney disease) stage 4, GFR 15-29 ml/min, Coronary artery disease, Diabetes mellitus, Hematuria, Hypertension, Osteoarthritis of spine with radiculopathy, cervical region, and Renal cyst, right. Patient states that he went to dialysis yesterday and was unable to receive it due to his fistula being clotted. Patient has no complaints at this time. Patient last received dialysis on Thursday.    Scheduled for declotting today.      Past Medical History:   Diagnosis Date    Anemia     CHF (congestive heart failure)     CKD (chronic kidney disease) stage 4, GFR 15-29 ml/min     Coronary artery disease     Diabetes mellitus     Hematuria     Hypertension     Osteoarthritis of spine with radiculopathy, cervical region 1/10/2022    Renal cyst, right        Past Surgical History:   Procedure Laterality Date    ANGIOGRAPHY OF ARTERIOVENOUS SHUNT Right 2/11/2022    Procedure: Fistulogram with Possible Intervention;  Surgeon: Dejuan Cody MD;  Location: [facility] CATH LAB/EP;  Service: Cardiology;  Laterality: Right;    AORTOGRAPHY WITH SERIALOGRAPHY Right 6/7/2022    Procedure: AORTOGRAM, WITH SERIALOGRAPHY;  Surgeon: Dejuan Cody MD;   Location: [facility] CATH LAB/EP;  Service: Cardiology;  Laterality: Right;    CARDIAC SURGERY      DECLOTTING OF ARTERIOVENOUS GRAFT Right 12/21/2021    Procedure: [id number];  Surgeon: Jeison Hunt MD;  Location: [facility] OR;  Service: Vascular;  Laterality: Right;    DECLOTTING OF ARTERIOVENOUS GRAFT Right 2/18/2022    Procedure: [id number];  Surgeon: Jeison Hunt MD;  Location: [facility] OR;  Service: Vascular;  Laterality: Right;    DECLOTTING OF ARTERIOVENOUS GRAFT Right 3/9/2022    Procedure: [id number];  Surgeon: Jeison Hunt MD;  Location: [facility] OR;  Service: Vascular;  Laterality: Right;    DECLOTTING OF ARTERIOVENOUS GRAFT Right 6/13/2022    Procedure: [id number]; REVISION OF FISTULA W/INSERTION OF NEW AV GRAFT;  Surgeon: Jeisno Hunt MD;  Location: [facility] OR;  Service: Vascular;  Laterality: Right;    FISTULOGRAM N/A 2/11/2022    Procedure: Fistulogram;  Surgeon: Dejuan Cody MD;  Location: [facility] CATH LAB/EP;  Service: Cardiology;  Laterality: N/A;    FISTULOGRAM N/A 6/17/2022    Procedure: Fistulogram;  Surgeon: Dejuan Cody MD;  Location: [facility] CATH LAB/EP;  Service: Cardiology;  Laterality: N/A;    FOOT SURGERY      INSERTION OF BIVENTRICULAR IMPLANTABLE CARDIOVERTER-DEFIBRILLATOR (ICD) Left 7/18/2019    Procedure: INSERTION, ICD, BIVENTRICULAR;  Surgeon: Arturo Bay MD;  Location: [facility] EP LAB;  Service: Cardiology;  Laterality: Left;  CHB, CRTD, SJM, anes, GP, 6078    LEFT HEART CATHETERIZATION N/A 7/16/2019    Procedure: Left heart cath;  Surgeon: Dejuan Cody MD;  Location: [facility] CATH LAB/EP;  Service: Cardiology;  Laterality: N/A;    PERCUTANEOUS TRANSLUMINAL ANGIOPLASTY OF ARTERIOVENOUS FISTULA Right 2/11/2022    Procedure: PTA, AV FISTULA;  Surgeon: Dejuan Cody MD;  Location: [facility] CATH LAB/EP;  Service: Cardiology;  Laterality: Right;    PERCUTANEOUS TRANSLUMINAL ANGIOPLASTY OF ARTERIOVENOUS FISTULA N/A 6/17/2022     Procedure: PTA, AV FISTULA;  Surgeon: Dejuan Cody MD;  Location: [facility] CATH LAB/EP;  Service: Cardiology;  Laterality: N/A;    PERITONEAL CATHETER REMOVAL Left 4/11/2020    Procedure: REMOVAL, CATHETER, DIALYSIS, PERITONEAL;  Surgeon: Edis Snell Jr., MD;  Location: [facility] OR;  Service: General;  Laterality: Left;    PHLEBOGRAPHY  6/15/2022    Procedure: Venogram;  Surgeon: Betsey Mckeon MD;  Location: [facility] CATH LAB/EP;  Service: Cardiology;;    PLACEMENT OF ARTERIOVENOUS GRAFT Right 2/18/2022    Procedure: INSERTION, GRAFT, ARTERIOVENOUS;  Surgeon: Jeison Hunt MD;  Location: [facility] OR;  Service: Vascular;  Laterality: Right;    REVISION OF PROCEDURE INVOLVING ARTERIOVENOUS GRAFT Right 2/18/2022    Procedure: REVISION, PROCEDURE INVOLVING ARTERIOVENOUS GRAFT;  Surgeon: Jeison Hunt MD;  Location: [facility] OR;  Service: Vascular;  Laterality: Right;       Review of patient's allergies indicates:  No Known Allergies    No current facility-administered medications on file prior to encounter.     Current Outpatient Medications on File Prior to Encounter   Medication Sig    acetaminophen (TYLENOL) 325 MG tablet Take 1 tablet (325 mg total) by mouth every 6 (six) hours as needed for Pain.    allopurinol (ZYLOPRIM) 300 MG tablet Take 300 mg by mouth once daily.    apixaban (ELIQUIS) 5 mg Tab Take 1 tablet (5 mg total) by mouth 2 (two) times daily.    calcium acetate,phosphat bind, (PHOSLO) 667 mg tablet Take 1 tablet (667 mg total) by mouth 3 (three) times daily with meals.    carvediloL (COREG) 6.25 MG tablet Take 1 tablet (6.25 mg total) by mouth 2 (two) times daily.    cholecalciferol, vitamin D3, (VITAMIN D3) 50 mcg (2,000 unit) Cap Take 1 capsule by mouth once daily.    clopidogreL (PLAVIX) 75 mg tablet Take 75 mg by mouth once daily.    folic acid (FOLVITE) 1 MG tablet Take 1 mg by mouth.    gabapentin (NEURONTIN) 300 MG capsule Take 1 capsule (300 mg total) by mouth as directed   (take a pill Monday, Wednesday and Friday  after HD). (Patient taking differently: Take 300 mg by mouth continuous prn (Tuesdays, Thurdays, and Saturdays).)    HYDROcodone-acetaminophen (NORCO) 5-325 mg per tablet Take 1 tablet by mouth every 6 (six) hours as needed for Pain.    insulin aspart U-100 (NOVOLOG) 100 unit/mL injection Inject 4-8 Units into the skin 3 (three) times daily before meals. Per sliding scale    povidone-iodine (BETADINE) 10 % external solution Apply topically as needed for Wound Care. Apply to wound and to interdigital maceration 2x per day.    VENTOLIN HFA 90 mcg/actuation inhaler Inhale 1 puff into the lungs every 4 (four) hours as needed. As NEEDED     Family History       Problem Relation (Age of Onset)    Heart attack Father          Tobacco Use    Smoking status: Former Smoker    Smokeless tobacco: Never Used   Substance and Sexual Activity    Alcohol use: Yes     Comment: social    Drug use: No    Sexual activity: Yes     Review of Systems   Constitutional:  Negative for chills, fatigue and fever.   HENT:  Negative for trouble swallowing.    Respiratory:  Negative for cough and shortness of breath.    Cardiovascular:  Negative for chest pain, palpitations and leg swelling.   Gastrointestinal:  Negative for blood in stool, diarrhea, nausea and vomiting.   Musculoskeletal:  Negative for gait problem.   Neurological:  Positive for weakness. Negative for dizziness, syncope and facial asymmetry.   Hematological:  Does not bruise/bleed easily.   Psychiatric/Behavioral:  Negative for confusion. The patient is not nervous/anxious.    Objective:     Vital Signs (Most Recent):  Temp: 98 °F (36.7 °C) (06/18/22 0601)  Pulse: 82 (06/18/22 0601)  Resp: 18 (06/18/22 0759)  BP: (!) 95/44 (06/18/22 0601)  SpO2: 97 % (06/18/22 0823)   Vital Signs (24h Range):  Temp:  [97.4 °F (36.3 °C)-98.3 °F (36.8 °C)] 98 °F (36.7 °C)  Pulse:  [60-88] 82  Resp:  [17-24] 18  SpO2:  [95 %-100 %] 97 %  BP:  ()/(44-74) 95/44     Weight: 117.7 kg (259 lb 7.7 oz)  Body mass index is 34.23 kg/m².    Physical Exam  Vitals and nursing note reviewed.   Constitutional:       Appearance: He is obese. He is not ill-appearing.   HENT:      Head: Normocephalic and atraumatic.      Mouth/Throat:      Mouth: Mucous membranes are moist.   Eyes:      Extraocular Movements: Extraocular movements intact.      Conjunctiva/sclera: Conjunctivae normal.      Pupils: Pupils are equal, round, and reactive to light.   Cardiovascular:      Rate and Rhythm: Normal rate.      Pulses: Normal pulses.      Heart sounds: Normal heart sounds.   Abdominal:      General: There is no distension.      Tenderness: There is no abdominal tenderness. There is no guarding.   Musculoskeletal:         General: No swelling. Normal range of motion.      Cervical back: Normal range of motion and neck supple.      Right lower leg: No edema.      Left lower leg: No edema.   Skin:     General: Skin is warm.      Capillary Refill: Capillary refill takes 2 to 3 seconds.   Neurological:      Mental Status: He is alert and oriented to person, place, and time.   Psychiatric:         Mood and Affect: Mood normal.         Behavior: Behavior normal.       Significant Labs: All pertinent labs within the past 24 hours have been reviewed.    Significant Imaging: I have reviewed all pertinent imaging results/findings within the past 24 hours.    Assessment/Plan:     * Problem with dialysis access  UnSuccessful declotting with Dr. Hunt  Trialysis catheter placed, HD complete 6/15, 6/16, 6/17  Fistulogram with cardiology 6/17 complete.  NPO 6/20 for cath lab procedure.  Cardiology recommended heparin and surgery agreeable.    ESRD (end stage renal disease) on dialysis  Hyperkalemia    HD outpatient scheduled TuThSat  Hgb: at baseline  Nephrology consulted, hyperkalemia treated, improved with HD x 3 sessions  Advanced to Renal Diabetic Diet after fistulogram.  NPO 6/20 for  cath lab procedure.          PAD (peripheral artery disease)  CLTI  Resume eliquis after surgery/cath lab  Added statin and plavix per cardiology (surgery agreeable)      Chronic combined systolic and diastolic congestive heart failure  - Stable, at dry weight  - Holding home beta-blocker and ARB, became hypotensive with initiation of lowest dose of coreg  - Will follow with daily standing weight and strict I/Os  - Strict electrolyte management with goal K 4-5 and Mg 2-3 (trending daily)    Results for orders placed during the hospital encounter of 12/21/21    Echo    Interpretation Summary  · The left ventricle is moderately enlarged with mild concentric hypertrophy and  · The quantitatively derived ejection fraction is 34%.  · Severe left atrial enlargement.  · Normal right ventricular size with normal right ventricular systolic function.  · Normal central venous pressure (3 mmHg).  · The estimated PA systolic pressure is 21 mmHg.  · No vegetations per surface echo.    Wt Readings from Last 5 Encounters:   06/17/22 0606 117.7 kg (259 lb 7.7 oz)   06/16/22 0621 120 kg (264 lb 8.8 oz)   06/15/22 0544 119.6 kg (263 lb 10.7 oz)   06/14/22 0224 119 kg (262 lb 5.6 oz)   06/13/22 2045 119 kg (262 lb 5.6 oz)   06/13/22 1014 119.3 kg (263 lb)   06/07/22 0726 119.3 kg (263 lb)   06/07/22 0718 117.5 kg (259 lb)   05/18/22 1521 121.1 kg (267 lb)   03/09/22 0900 117 kg (258 lb)   03/03/22 1511 115.7 kg (255 lb)       Acute deep vein thrombosis (DVT) of popliteal vein of right lower extremity  Continue Heparin gtt as Ok'd per Dr. Hunt       Hypertension  Controlled, BP borderline low  Home Meds include losartan 25 mg daily and coreg 6.25 mg bid  Did not tolerate reinitiation of coreg at 3.125 mg bid, ordered metoprolol bid        Type 2 diabetes mellitus with chronic kidney disease on chronic dialysis, with long-term current use of insulin  Patient's FSGs are controlled on current medication regimen.  Last A1c  reviewed-repeat HgbA1c 6.9%  Lab Results   Component Value Date    HGBA1C 6.9 (H) 06/14/2022     Most recent fingerstick glucose reviewed-   Recent Labs   Lab 06/17/22  1434 06/17/22 2028 06/18/22  0600   POCTGLUCOSE 78 114* 100     Current correctional scale  Low  Maintain anti-hyperglycemic dose as follows-   Antihyperglycemics (From admission, onward)            Start     Stop Route Frequency Ordered    06/14/22 1049  insulin aspart U-100 pen 0-5 Units         -- SubQ Every 6 hours PRN 06/14/22 0949        Advanced to 2000 kcal ADA Renal Diabetic Diet       Cardiac resynchronization therapy defibrillator (CRT-D) in place  [facility] in 7/2019 with complete heart block and intermittent VT. An echo showed his EF was 30%. Amiodarone was started, a LHC showed luminal irregularities, and a [employer] CRT-D was implanted prior to discharge (RV septal lead in LV port).  -Changed coreg to metoprolol due to hypotension    Hyperkalemia  Potassium 6.8 this am  Will give lokelma  Recheck Potassium       Complete heart block  Chronic problem       Morbid obesity  Will need diet and education management on DC      VTE Risk Mitigation (From admission, onward)         Ordered     heparin infusion 1,000 units/500 ml in 0.9% NaCl (pressure line flush)  Intra-op continuous PRN         06/17/22 1106     heparin infusion 1,000 units/500 ml in 0.9% NaCl (pressure line flush)  Intra-op continuous PRN         06/15/22 1326     Place sequential compression device  Until discontinued         06/14/22 0214     Place TJ hose  Until discontinued         06/14/22 0214                    Thank you for your consult. I will follow-up with patient. Please contact us if you have any additional questions.    Leonela Weeks PA-C  Department of Hospital Medicine   [place] - Telemetry

## 2022-06-18 NOTE — RESPIRATORY THERAPY
ECG ordered for pre-op Cath. unless one had been done within 30days. Unable to locate recent printout of ECG in chart folder, but found in Epic that one had been completed 6/13/2022. I went ahead and attempted to get updated printout for chart folder, but was unable to obtain proper reading due to patient being restless and in pain. RN was notified and stated ECG was not needed.

## 2022-06-19 NOTE — PROGRESS NOTES
"Surgery follow up  BP (!) 87/50 (BP Location: Left arm, Patient Position: Lying)   Pulse 76   Temp 97.5 °F (36.4 °C) (Oral)   Resp 18   Ht 6' 1" (1.854 m)   Wt 117.7 kg (259 lb 7.7 oz)   SpO2 96%   BMI 34.23 kg/m²   I/O last 3 completed shifts:  In: -   Out: 900 [Urine:900]  No intake/output data recorded.      Recent Results (from the past 336 hour(s))   CBC auto differential    Collection Time: 06/19/22  6:34 AM   Result Value Ref Range    WBC 8.66 3.90 - 12.70 K/uL    Hemoglobin 8.4 (L) 14.0 - 18.0 g/dL    Hematocrit 26.8 (L) 40.0 - 54.0 %    Platelets 162 150 - 450 K/uL   CBC Auto Differential    Collection Time: 06/18/22  8:10 AM   Result Value Ref Range    WBC 8.45 3.90 - 12.70 K/uL    Hemoglobin 8.6 (L) 14.0 - 18.0 g/dL    Hematocrit 27.5 (L) 40.0 - 54.0 %    Platelets 150 150 - 450 K/uL   CBC Auto Differential    Collection Time: 06/17/22  4:46 AM   Result Value Ref Range    WBC 8.08 3.90 - 12.70 K/uL    Hemoglobin 8.9 (L) 14.0 - 18.0 g/dL    Hematocrit 28.1 (L) 40.0 - 54.0 %    Platelets 134 (L) 150 - 450 K/uL   graft right upper arm with palpable pulse and bruit , will check us and use graft tomorrow for dialysis.  "

## 2022-06-19 NOTE — PROGRESS NOTES
Progress Note  Nephrology      Consult Requested By: Jeison Hunt MD      SUBJECTIVE:     Overnight events  Patient is a 76 y.o. male     Patient Active Problem List   Diagnosis    Swelling of lower extremity    Hypervolemia    Hypertension    Sleep apnea    Morbid obesity    Complete heart block    VT (ventricular tachycardia)    Cardiorenal syndrome    Acute systolic heart failure    Abdominal distension    Debility    Gout    Cardiac resynchronization therapy defibrillator (CRT-D) in place    Third degree heart block    Abnormal transaminases    NICM (nonischemic cardiomyopathy)    SOB (shortness of breath)    COVID-19 virus detected    Fever    Suspected 2019-nCoV infection    CLEMENCIA (acute kidney injury)    Bradycardia    Scrotal pain    ESRD (end stage renal disease) on dialysis    Chronic combined systolic and diastolic congestive heart failure    PAD (peripheral artery disease)    Acute deep vein thrombosis (DVT) of popliteal vein of right lower extremity    Bacteremia due to Enterococcus    Clotted dialysis access    Type 2 diabetes mellitus with chronic kidney disease on chronic dialysis, with long-term current use of insulin    Anemia, chronic renal failure, stage 5    Acute cystitis without hematuria    NSVT (nonsustained ventricular tachycardia)    Osteoarthritis of spine with radiculopathy, cervical region    Primary osteoarthritis of right shoulder    Problem with dialysis access    Critical lower limb ischemia    Hyperkalemia     Past Medical History:   Diagnosis Date    Anemia     CHF (congestive heart failure)     CKD (chronic kidney disease) stage 4, GFR 15-29 ml/min     Coronary artery disease     Diabetes mellitus     Hematuria     Hypertension     Osteoarthritis of spine with radiculopathy, cervical region 1/10/2022    Renal cyst, right               OBJECTIVE:     Vitals:    06/18/22 1448 06/18/22 1821 06/18/22 1908 06/18/22 1941   BP: (!)  108/56 (!) 105/53     BP Location: Left arm Left arm     Patient Position: Lying Lying     Pulse: 90 83     Resp: 18 18 18    Temp: 97 °F (36.1 °C) 97 °F (36.1 °C)     TempSrc: Oral Oral     SpO2: (!) 91% 97%  97%   Weight:       Height:           Temp: 97 °F (36.1 °C) (06/18/22 1821)  Pulse: 83 (06/18/22 1821)  Resp: 18 (06/18/22 1908)  BP: (!) 105/53 (06/18/22 1821)  SpO2: 97 % (06/18/22 1941)              Medications:   sodium chloride 0.9%   Intravenous Once    atorvastatin  80 mg Oral Daily    clopidogreL  75 mg Oral Daily    diphenhydrAMINE  50 mg Oral Once    metoprolol tartrate  12.5 mg Oral BID    sevelamer carbonate  2,400 mg Oral TID WM    sodium citrate-citric acid 500-334 mg/5 ml  30 mL Oral Once    sodium polystyrene  30 g Oral Once    sodium zirconium cyclosilicate  10 g Oral Once    sodium zirconium cyclosilicate  10 g Oral Once      heparin (porcine) in D5W 18 Units/kg/hr (06/18/22 1253)    heparin (porcine) 500 Units/hr (06/15/22 1326)    heparin (porcine) 1,500 Units/hr (06/17/22 1105)               Physical Exam:  General appearance:NAD  Lungs: RR 18   Pulse oximeter 97 %  Heart: Pulse 83  Abdomen: soft  Extremities: no edema  Skin: dry      Laboratory:  ABG  Labs reviewed  Recent Results (from the past 336 hour(s))   Basic metabolic panel    Collection Time: 06/16/22  7:22 AM   Result Value Ref Range    Sodium 141 136 - 145 mmol/L    Potassium 4.9 3.5 - 5.1 mmol/L    Chloride 98 95 - 110 mmol/L    CO2 23 23 - 29 mmol/L    BUN 70 (H) 8 - 23 mg/dL    Creatinine 15.4 (H) 0.5 - 1.4 mg/dL    Calcium 8.1 (L) 8.7 - 10.5 mg/dL    Anion Gap 20 (H) 8 - 16 mmol/L   Basic metabolic panel    Collection Time: 06/15/22  8:43 AM   Result Value Ref Range    Sodium 140 136 - 145 mmol/L    Potassium 6.8 (HH) 3.5 - 5.1 mmol/L    Chloride 102 95 - 110 mmol/L    CO2 16 (L) 23 - 29 mmol/L     (H) 8 - 23 mg/dL    Creatinine 23.2 (H) 0.5 - 1.4 mg/dL    Calcium 8.7 8.7 - 10.5 mg/dL    Anion Gap 22 (H)  8 - 16 mmol/L   Basic metabolic panel    Collection Time: 06/13/22  2:22 PM   Result Value Ref Range    Sodium 140 136 - 145 mmol/L    Potassium 5.8 (H) 3.5 - 5.1 mmol/L    Chloride 100 95 - 110 mmol/L    CO2 19 (L) 23 - 29 mmol/L     (H) 8 - 23 mg/dL    Creatinine 21.6 (H) 0.5 - 1.4 mg/dL    Calcium 8.5 (L) 8.7 - 10.5 mg/dL    Anion Gap 21 (H) 8 - 16 mmol/L     Recent Results (from the past 336 hour(s))   CBC Auto Differential    Collection Time: 06/18/22  8:10 AM   Result Value Ref Range    WBC 8.45 3.90 - 12.70 K/uL    Hemoglobin 8.6 (L) 14.0 - 18.0 g/dL    Hematocrit 27.5 (L) 40.0 - 54.0 %    Platelets 150 150 - 450 K/uL   CBC Auto Differential    Collection Time: 06/17/22  4:46 AM   Result Value Ref Range    WBC 8.08 3.90 - 12.70 K/uL    Hemoglobin 8.9 (L) 14.0 - 18.0 g/dL    Hematocrit 28.1 (L) 40.0 - 54.0 %    Platelets 134 (L) 150 - 450 K/uL   CBC auto differential    Collection Time: 06/16/22  7:22 AM   Result Value Ref Range    WBC 7.31 3.90 - 12.70 K/uL    Hemoglobin 7.5 (L) 14.0 - 18.0 g/dL    Hematocrit 23.6 (L) 40.0 - 54.0 %    Platelets 157 150 - 450 K/uL     Urinalysis  No results for input(s): COLORU, CLARITYU, SPECGRAV, PHUR, PROTEINUA, GLUCOSEU, BILIRUBINCON, BLOODU, WBCU, RBCU, BACTERIA, MUCUS, NITRITE, LEUKOCYTESUR, UROBILINOGEN, HYALINECASTS in the last 24 hours.    Diagnostic Results:  X-Ray: Reviewed  US: Reviewed  Echo: Reviewed  ACCESS    ASSESSMENT/PLAN:       ESRD   Metabolic acidosis  Bicitra  Metabolic bone disease  Anemia  Multifactorial  Poor nutrition  Albumin 2.2  Renal diet   Supplements  /53

## 2022-06-19 NOTE — PROGRESS NOTES
Progress Note  Nephrology      Consult Requested By: Jeison Hunt MD      SUBJECTIVE:     Overnight events  Patient is a 76 y.o. male     Patient Active Problem List   Diagnosis    Swelling of lower extremity    Hypervolemia    Hypertension    Sleep apnea    Morbid obesity    Complete heart block    VT (ventricular tachycardia)    Cardiorenal syndrome    Acute systolic heart failure    Abdominal distension    Debility    Gout    Cardiac resynchronization therapy defibrillator (CRT-D) in place    Third degree heart block    Abnormal transaminases    NICM (nonischemic cardiomyopathy)    SOB (shortness of breath)    COVID-19 virus detected    Fever    Suspected 2019-nCoV infection    CLEMENCIA (acute kidney injury)    Bradycardia    Scrotal pain    ESRD (end stage renal disease) on dialysis    Chronic combined systolic and diastolic congestive heart failure    PAD (peripheral artery disease)    Acute deep vein thrombosis (DVT) of popliteal vein of right lower extremity    Bacteremia due to Enterococcus    Clotted dialysis access    Type 2 diabetes mellitus with chronic kidney disease on chronic dialysis, with long-term current use of insulin    Anemia, chronic renal failure, stage 5    Acute cystitis without hematuria    NSVT (nonsustained ventricular tachycardia)    Osteoarthritis of spine with radiculopathy, cervical region    Primary osteoarthritis of right shoulder    Problem with dialysis access    Critical lower limb ischemia    Hyperkalemia     Past Medical History:   Diagnosis Date    Anemia     CHF (congestive heart failure)     CKD (chronic kidney disease) stage 4, GFR 15-29 ml/min     Coronary artery disease     Diabetes mellitus     Hematuria     Hypertension     Osteoarthritis of spine with radiculopathy, cervical region 1/10/2022    Renal cyst, right               OBJECTIVE:     Vitals:    06/19/22 0745 06/19/22 1057 06/19/22 1321 06/19/22 1413   BP:  (!)  87/50     BP Location:  Left arm     Patient Position:  Lying     Pulse:  76     Resp: 20 18  20   Temp:  97.5 °F (36.4 °C)     TempSrc:  Oral     SpO2:  96% 95%    Weight:       Height:           Temp: 97.5 °F (36.4 °C) (06/19/22 1057)  Pulse: 76 (06/19/22 1057)  Resp: 20 (06/19/22 1413)  BP: (!) 87/50 (06/19/22 1057)  SpO2: 95 % (06/19/22 1321)    Date 06/19/22 0700 - 06/20/22 0659   Shift 4949-2156 2725-7230 2856-3035 24 Hour Total   INTAKE   Shift Total(mL/kg)       OUTPUT   Urine(mL/kg/hr) 500   500   Shift Total(mL/kg) 500(4.2)   500(4.2)   Weight (kg) 117.7 117.7 117.7 117.7             Medications:   sodium chloride 0.9%   Intravenous Once    atorvastatin  80 mg Oral Daily    clopidogreL  75 mg Oral Daily    diphenhydrAMINE  50 mg Oral Once    furosemide (LASIX) injection  80 mg Intravenous Once    metoprolol tartrate  12.5 mg Oral BID    sevelamer carbonate  2,400 mg Oral TID WM    sodium citrate-citric acid 500-334 mg/5 ml  30 mL Oral Once    sodium polystyrene  30 g Oral Once    sodium zirconium cyclosilicate  10 g Oral Once    sodium zirconium cyclosilicate  10 g Oral Once      heparin (porcine) in D5W 18 Units/kg/hr (06/19/22 1416)    heparin (porcine) 500 Units/hr (06/15/22 1326)    heparin (porcine) 1,500 Units/hr (06/17/22 1105)               Physical Exam:  General appearance:NAD  Lungs: RR 18   Pulse oximeter 95%  Heart: pulse 81  Abdomen: soft  Extremities: edema  Skin: dry    Laboratory:  ABG  Labs reviewed  Recent Results (from the past 336 hour(s))   Basic metabolic panel    Collection Time: 06/16/22  7:22 AM   Result Value Ref Range    Sodium 141 136 - 145 mmol/L    Potassium 4.9 3.5 - 5.1 mmol/L    Chloride 98 95 - 110 mmol/L    CO2 23 23 - 29 mmol/L    BUN 70 (H) 8 - 23 mg/dL    Creatinine 15.4 (H) 0.5 - 1.4 mg/dL    Calcium 8.1 (L) 8.7 - 10.5 mg/dL    Anion Gap 20 (H) 8 - 16 mmol/L   Basic metabolic panel    Collection Time: 06/15/22  8:43 AM   Result Value Ref Range    Sodium  140 136 - 145 mmol/L    Potassium 6.8 (HH) 3.5 - 5.1 mmol/L    Chloride 102 95 - 110 mmol/L    CO2 16 (L) 23 - 29 mmol/L     (H) 8 - 23 mg/dL    Creatinine 23.2 (H) 0.5 - 1.4 mg/dL    Calcium 8.7 8.7 - 10.5 mg/dL    Anion Gap 22 (H) 8 - 16 mmol/L   Basic metabolic panel    Collection Time: 06/13/22  2:22 PM   Result Value Ref Range    Sodium 140 136 - 145 mmol/L    Potassium 5.8 (H) 3.5 - 5.1 mmol/L    Chloride 100 95 - 110 mmol/L    CO2 19 (L) 23 - 29 mmol/L     (H) 8 - 23 mg/dL    Creatinine 21.6 (H) 0.5 - 1.4 mg/dL    Calcium 8.5 (L) 8.7 - 10.5 mg/dL    Anion Gap 21 (H) 8 - 16 mmol/L     Recent Results (from the past 336 hour(s))   CBC auto differential    Collection Time: 06/19/22  6:34 AM   Result Value Ref Range    WBC 8.66 3.90 - 12.70 K/uL    Hemoglobin 8.4 (L) 14.0 - 18.0 g/dL    Hematocrit 26.8 (L) 40.0 - 54.0 %    Platelets 162 150 - 450 K/uL   CBC Auto Differential    Collection Time: 06/18/22  8:10 AM   Result Value Ref Range    WBC 8.45 3.90 - 12.70 K/uL    Hemoglobin 8.6 (L) 14.0 - 18.0 g/dL    Hematocrit 27.5 (L) 40.0 - 54.0 %    Platelets 150 150 - 450 K/uL   CBC Auto Differential    Collection Time: 06/17/22  4:46 AM   Result Value Ref Range    WBC 8.08 3.90 - 12.70 K/uL    Hemoglobin 8.9 (L) 14.0 - 18.0 g/dL    Hematocrit 28.1 (L) 40.0 - 54.0 %    Platelets 134 (L) 150 - 450 K/uL     Urinalysis  No results for input(s): COLORU, CLARITYU, SPECGRAV, PHUR, PROTEINUA, GLUCOSEU, BILIRUBINCON, BLOODU, WBCU, RBCU, BACTERIA, MUCUS, NITRITE, LEUKOCYTESUR, UROBILINOGEN, HYALINECASTS in the last 24 hours.    Diagnostic Results:  X-Ray: Reviewed  US: Reviewed  Echo: Reviewed  ACCESS    ASSESSMENT/PLAN:        ESRD  K 4.2   Metabolic acidosis  Bicitra  Metabolic bone disease  Anemia  Multifactorial  Hb 8.4  Poor nutrition  Albumin 2.2  Renal diet   Supplements  BP 87/50

## 2022-06-20 NOTE — PLAN OF CARE
Plan of care reviewed with patient, understanding verbalized. Pt remains paced on tele. No acte distress noted. Frequent weight  shift  encouraged. Instructed to call for any assistance, understanding verbalized. Bed alarm on, call light in reach, fall precautions in place. Will continue current plan of care.

## 2022-06-20 NOTE — PLAN OF CARE
11:15  Patient transferred to recovery cath lab via stretcher to bay 2 with side rails up x2. AIDET completed to patient.  Pt AAOx3. Pt is stable when connecting to cardiac monitors. VSS. Left groin wall gauze and tegaderm dressing is CDI, no bleeding or shadowing noted, no redness, bruising, or hematoma noted around site. Large area of swelling noted to lateral left upper leg near puncture site, soft to touch, marked for further monitoring. Fall risk precautions given and patient acknowledges.  Will continue to monitor.

## 2022-06-20 NOTE — PROCEDURES
: Umesh Quintero MD  Pre-procedure diagnosis: CLI  Post-procedure diagnosis: PAD    Post Procedure Note: Peripheral PTA/stent    The pt was brought to the cath lab and under sterile technique, LCFA access was obtained without difficulty. Images were obtained in multiple views and successful atherectomy followed by IVUS guided PTA and stent to RSFA and PTA to RTPT and peroneal with excellent results. Poor flow into the foot and heavily calcified AT/PT. Please see full report for details. The pt tolerated the procedure well without complications. Perclose closure device used with successful hemostasis.     Vitals:    06/20/22 1223 06/20/22 1228 06/20/22 1233 06/20/22 1237   BP: (!) 109/51 (!) 105/54 (!) 105/54 (!) 107/53   BP Location: Left arm Left arm Left arm Left arm   Patient Position: Lying Lying Lying Lying   Pulse: 70 73 80 88   Resp: 15 18 18 (!) 35   Temp: 97.2 °F (36.2 °C)      TempSrc: Temporal      SpO2: 96% (!) 94% 95% 96%   Weight:       Height:             Gen: NAD  Ext: 2+ fem pulse, no evidence of hematoma  Estimated blood loss: < 50 cc    Plan:  -Post cath care per protocol  -Heparin/gtt per current treatment regimen  -Continue aggressive wound care  -Remains high risk for amputation and may consider DVA

## 2022-06-20 NOTE — PROGRESS NOTES
Nephrology Progress Note       Consult Requested By: Jeison Hunt MD  Reason for Consult: ESRD on HD     SUBJECTIVE:        ?    Review of Systems   Constitutional: Negative for chills and fever.   HENT: Negative for congestion and sore throat.    Eyes: Negative for blurred vision, double vision and photophobia.   Respiratory: Negative for cough and shortness of breath.    Cardiovascular: Negative for chest pain, palpitations and leg swelling.   Gastrointestinal: Negative for abdominal pain, diarrhea, nausea and vomiting.   Genitourinary: Negative for dysuria and urgency.   Musculoskeletal: Negative for joint pain and myalgias.   Skin: Negative for itching and rash.   Neurological: Negative for dizziness, sensory change, weakness and headaches.   Endo/Heme/Allergies: Negative for polydipsia. Does not bruise/bleed easily.   Psychiatric/Behavioral: Negative for depression.       Past Medical History:   Diagnosis Date    Anemia     CHF (congestive heart failure)     CKD (chronic kidney disease) stage 4, GFR 15-29 ml/min     Coronary artery disease     Diabetes mellitus     Hematuria     Hypertension     Osteoarthritis of spine with radiculopathy, cervical region 1/10/2022    Renal cyst, right      Past Surgical History:   Procedure Laterality Date    ANGIOGRAPHY OF ARTERIOVENOUS SHUNT Right 2/11/2022    Procedure: Fistulogram with Possible Intervention;  Surgeon: Dejuan Cody MD;  Location: [facility] CATH LAB/EP;  Service: Cardiology;  Laterality: Right;    AORTOGRAPHY WITH SERIALOGRAPHY Right 6/7/2022    Procedure: AORTOGRAM, WITH SERIALOGRAPHY;  Surgeon: Dejuan Cody MD;  Location: [facility] CATH LAB/EP;  Service: Cardiology;  Laterality: Right;    CARDIAC SURGERY      DECLOTTING OF ARTERIOVENOUS GRAFT Right 12/21/2021    Procedure: [id number];  Surgeon: Jeison Hunt MD;  Location: [facility] OR;  Service: Vascular;  Laterality: Right;    DECLOTTING OF ARTERIOVENOUS GRAFT Right 2/18/2022     Procedure: [id number];  Surgeon: Jeison Hunt MD;  Location: [facility] OR;  Service: Vascular;  Laterality: Right;    DECLOTTING OF ARTERIOVENOUS GRAFT Right 3/9/2022    Procedure: [id number];  Surgeon: Jeison Hunt MD;  Location: [facility] OR;  Service: Vascular;  Laterality: Right;    DECLOTTING OF ARTERIOVENOUS GRAFT Right 6/13/2022    Procedure: [id number]; REVISION OF FISTULA W/INSERTION OF NEW AV GRAFT;  Surgeon: Jeison Hunt MD;  Location: [facility] OR;  Service: Vascular;  Laterality: Right;    FISTULOGRAM N/A 2/11/2022    Procedure: Fistulogram;  Surgeon: Dejuan Cody MD;  Location: [facility] CATH LAB/EP;  Service: Cardiology;  Laterality: N/A;    FISTULOGRAM N/A 6/17/2022    Procedure: Fistulogram;  Surgeon: Dejuan Cody MD;  Location: [facility] CATH LAB/EP;  Service: Cardiology;  Laterality: N/A;    FOOT SURGERY      INSERTION OF BIVENTRICULAR IMPLANTABLE CARDIOVERTER-DEFIBRILLATOR (ICD) Left 7/18/2019    Procedure: INSERTION, ICD, BIVENTRICULAR;  Surgeon: Arturo Bay MD;  Location: [facility] EP LAB;  Service: Cardiology;  Laterality: Left;  CHB, CRTD, SJM, anes, GP, 6078    LEFT HEART CATHETERIZATION N/A 7/16/2019    Procedure: Left heart cath;  Surgeon: Dejuan Cody MD;  Location: [facility] CATH LAB/EP;  Service: Cardiology;  Laterality: N/A;    PERCUTANEOUS TRANSLUMINAL ANGIOPLASTY OF ARTERIOVENOUS FISTULA Right 2/11/2022    Procedure: PTA, AV FISTULA;  Surgeon: Dejuan Cody MD;  Location: [facility] CATH LAB/EP;  Service: Cardiology;  Laterality: Right;    PERCUTANEOUS TRANSLUMINAL ANGIOPLASTY OF ARTERIOVENOUS FISTULA N/A 6/17/2022    Procedure: PTA, AV FISTULA;  Surgeon: Dejuan Cody MD;  Location: [facility] CATH LAB/EP;  Service: Cardiology;  Laterality: N/A;    PERITONEAL CATHETER REMOVAL Left 4/11/2020    Procedure: REMOVAL, CATHETER, DIALYSIS, PERITONEAL;  Surgeon: Edis Snell Jr., MD;  Location: [facility] OR;  Service: General;  Laterality: Left;     PHLEBOGRAPHY  6/15/2022    Procedure: Venogram;  Surgeon: Betsey Mckeon MD;  Location: [facility] CATH LAB/EP;  Service: Cardiology;;    PLACEMENT OF ARTERIOVENOUS GRAFT Right 2/18/2022    Procedure: INSERTION, GRAFT, ARTERIOVENOUS;  Surgeon: Jeison Hunt MD;  Location: [facility] OR;  Service: Vascular;  Laterality: Right;    REVISION OF PROCEDURE INVOLVING ARTERIOVENOUS GRAFT Right 2/18/2022    Procedure: REVISION, PROCEDURE INVOLVING ARTERIOVENOUS GRAFT;  Surgeon: Jeison Hunt MD;  Location: [facility] OR;  Service: Vascular;  Laterality: Right;     Family History   Problem Relation Age of Onset    Heart attack Father      Social History     Tobacco Use    Smoking status: Former Smoker    Smokeless tobacco: Never Used   Substance Use Topics    Alcohol use: Yes     Comment: social    Drug use: No       Review of patient's allergies indicates:  No Known Allergies         OBJECTIVE:     Vital Signs (Most Recent)  Vitals:    06/20/22 0522 06/20/22 0615 06/20/22 0619 06/20/22 1020   BP:  (!) 122/50     BP Location:  Left arm     Patient Position:       Pulse:  86     Resp:   20 18   Temp:  97.5 °F (36.4 °C)  97.1 °F (36.2 °C)   TempSrc:       SpO2: 96% 96%     Weight:       Height:                     Medications:   sodium chloride 0.9%   Intravenous Once    atorvastatin  80 mg Oral Daily    clopidogreL  75 mg Oral Daily    diphenhydrAMINE  50 mg Oral Once    epoetin hayden (PROCRIT) injection  5,000 Units Intravenous Once    furosemide (LASIX) injection  80 mg Intravenous Once    metoprolol tartrate  12.5 mg Oral BID    sevelamer carbonate  2,400 mg Oral TID WM    sodium citrate-citric acid 500-334 mg/5 ml  30 mL Oral Once    sodium polystyrene  30 g Oral Once    sodium zirconium cyclosilicate  10 g Oral Once    sodium zirconium cyclosilicate  10 g Oral Once           Physical Exam  Vitals and nursing note reviewed.   Constitutional:       General: He is not in acute distress.     Appearance: He is  not diaphoretic.   HENT:      Head: Normocephalic and atraumatic.      Mouth/Throat:      Pharynx: No oropharyngeal exudate.   Eyes:      General: No scleral icterus.     Conjunctiva/sclera: Conjunctivae normal.      Pupils: Pupils are equal, round, and reactive to light.   Cardiovascular:      Rate and Rhythm: Normal rate and regular rhythm.      Heart sounds: Normal heart sounds. No murmur heard.  Pulmonary:      Effort: Pulmonary effort is normal. No respiratory distress.      Breath sounds: Normal breath sounds.   Abdominal:      General: Bowel sounds are normal. There is no distension.      Palpations: Abdomen is soft.      Tenderness: There is no abdominal tenderness.   Musculoskeletal:         General: Normal range of motion.      Cervical back: Normal range of motion and neck supple.      Comments: RUE AVG   thrill    Skin:     General: Skin is warm and dry.      Findings: No erythema.   Neurological:      Mental Status: He is alert and oriented to person, place, and time.      Cranial Nerves: No cranial nerve deficit.   Psychiatric:         Mood and Affect: Affect normal.         Cognition and Memory: Memory normal.         Judgment: Judgment normal.         Laboratory:  Recent Labs   Lab 06/18/22  0810 06/19/22  0634 06/20/22  0307   WBC 8.45 8.66 9.18   HGB 8.6* 8.4* 7.8*   HCT 27.5* 26.8* 25.4*    162 161   MONO 13.3  1.1* 11.5  1.0 10.9  1.0     Recent Labs   Lab 06/18/22  0810 06/19/22  0633 06/20/22  0307    137 138   K 4.7 4.2 4.5    100 100   CO2 17* 21* 20*   BUN 36* 45* 51*   CREATININE 9.7* 11.0* 12.4*   CALCIUM 8.3* 8.2* 8.2*   PHOS 5.2* 5.0* 5.2*       Diagnostic Results:  X-Ray: Reviewed  US: Reviewed  Echo: Reviewed  ASSESSMENT/PLAN:     1. ESRD on HD TTS with Dr Decker  -- Last HD   Saturday    -- clotted AVG s/p declot - pending US if ok to use remove Femoral  CVC     -- Daily Renal Function Panel  -- Avoid Hypotension.  -- Renally dose all meds  -- HD Monday       2.  HTN (I10) -    3. Anemia of chronic kidney disease treated with ROSALVA (N18.9 D63.1)    EPogen  with each HD - hold for   Hb >10   Recent Labs   Lab 06/18/22  0810 06/19/22  0634 06/20/22  0307   HGB 8.6* 8.4* 7.8*   HCT 27.5* 26.8* 25.4*    162 161       Iron   Lab Results   Component Value Date    IRON 63 12/18/2019    TIBC 263 12/18/2019    FERRITIN 6,078 (H) 03/28/2020       4. MBD (E88.9 M90.80) -  Recent Labs   Lab 06/20/22  0307   CALCIUM 8.2*   PHOS 5.2*     Recent Labs   Lab 06/13/22  1125 06/14/22  1106   MG 1.4* 1.5*       Lab Results   Component Value Date    .0 (H) 03/04/2020    CALCIUM 8.2 (L) 06/20/2022    PHOS 5.2 (H) 06/20/2022     No results found for: [id number]    Lab Results   Component Value Date    CO2 20 (L) 06/20/2022       5. Nutrition/Hypoalbuminemia (E88.09) -    Recent Labs   Lab 06/15/22  0851 06/17/22  0446 06/18/22  1044 06/19/22  0633 06/20/22  0307   LABPROT 11.7  --  12.1  --   --    ALBUMIN  --    < >  --  2.2* 2.2*    < > = values in this interval not displayed.     Nepro with meals TID. Renal vitamins daily      With any question please call answering service [phone]  Laura Ortiz MD    [facility]  ANA MARIA Burns MD, FACP,   MAlize Woo MD,   OMD DAMION Fields MD E. V. Harmon, NP    [address]  [place], [id number]

## 2022-06-20 NOTE — PROGRESS NOTES
06/20/22 0834   Nurse Notification   Name of Bedside Nurse Ramses   Nurse Notified Of Orders  (Heparin Drip restarted at 0807 by bedside nurse. Next ptt order placed to be drawn at 1400.)

## 2022-06-20 NOTE — PLAN OF CARE
Dr rosales notified of pt BP 90/52. Ordered to hold scheduled metoprolol. Will continue current plan  Of care.    - - -

## 2022-06-20 NOTE — PLAN OF CARE
chart reviewed - case discussed in am MD meeting   6/20 underwent procedure:  atherectomy followed by IVUS guided PTA and stent to RSFA and PTA to RTPT and peroneal  per Nephrology note -- for HD today      wife Paulette [phone]       OR declot attempt  6/14;  Trialysis cath placed 6/15;  Fistulagram planned for 6/16 - pt was not NPO - for this procedure today, 6/17       street address:  [address] [place] [address] C[place]A Shabazz      pt has a WC, RW and BSC , walk in shower has a built in bench and grab bars- current with home health  - can't recall name - assigned per PHN  - HH agency nurse  dresses his foot wounds      HD T Th Sa at [facility] [place] Nixon  9:45 am - wife transports pt to HD  - wife transports pt to all apts.        06/20/22 1736   Post-Acute Status   Post-Acute Authorization Other   Discharge Delays   (pending medical stability)   Discharge Plan   Discharge Plan A Home;Home with family;Home Health

## 2022-06-20 NOTE — INTERVAL H&P NOTE
The patient has been examined and the H&P has been reviewed:    I concur with the findings and no changes have occurred since H&P was written.    Anesthesia/Surgery risks, benefits and alternative options discussed and understood by patient/family.          Active Hospital Problems    Diagnosis  POA    *Problem with dialysis access [T82.898A]  Yes    Hyperkalemia [E87.5]  Yes    Critical lower limb ischemia [I70.229]  Yes    Type 2 diabetes mellitus with chronic kidney disease on chronic dialysis, with long-term current use of insulin [E11.22, N18.6, Z99.2, Z79.4]  Not Applicable    PAD (peripheral artery disease) [I73.9]  Yes    Acute deep vein thrombosis (DVT) of popliteal vein of right lower extremity [I82.431]  Yes    ESRD (end stage renal disease) on dialysis [N18.6, Z99.2]  Not Applicable     Tu Th Sat          Chronic combined systolic and diastolic congestive heart failure [I50.42]  Yes    Cardiac resynchronization therapy defibrillator (CRT-D) in place [Z95.810]  Yes    Complete heart block [I44.2]  Yes    Morbid obesity [E66.01]  Yes    Hypertension [I10]  Yes      Resolved Hospital Problems   No resolved problems to display.

## 2022-06-20 NOTE — PLAN OF CARE
Patient transferred via bed to Room 477 tele. Placed on assigned cardiac tele monitor. VSS, AAOx4. No c/o pain.   Leftgroin gauze/tegaderm site CDI. No bleeding no hematoma noted. Site and surrounding area soft.  Assessed by [facility] Pearce at bedside. +2 alverto radial pulses. Dopplered alverto pedal pulses except right post tib pulse. All questions answered.  Updated wife on condition and gave stent card to wife.

## 2022-06-21 NOTE — ASSESSMENT & PLAN NOTE
- wound to RLE; angiogram last week with revascularization 6/20 with atherectomy/PTA/stent to RSFA and PTA to right PT  - poor flow to foot remains due to heavily calcified right AT and PT; may need DVA   - on Plavix and IV Heparin; no ASA due to bleeding risk

## 2022-06-21 NOTE — ASSESSMENT & PLAN NOTE
- Eliquis on hold for procedure and remains on hold  - on IV Heparin  - will resume Eliquis closer to discharge

## 2022-06-21 NOTE — PROGRESS NOTES
[facility] - Telemetry  Cardiology  Progress Note    Patient Name: Houston Jc  MRN: [id number]  Admission Date: 6/13/2022  Hospital Length of Stay: 5 days  Code Status: Full Code   Attending Physician: Jeison Hunt MD   Primary Care Physician: Zak Hamilton MD  Expected Discharge Date: 6/15/2022  Principal Problem:Problem with dialysis access    Subjective:     Hospital Course:   06/14/2022 Per HPI   6/20/2022 NPO for LE angiogram today   6/21/2022 LE angiogram yesterday with following results:     successful atherectomy with PTA and stent to RSFA and PTA to RTPT and peroneal with excellent results  Poor flow into the foot and heavily calcified AT/PT.  Heparin/gtt per current treatment regimen  Continue aggressive wound care  Remains high risk for amputation and may consider DVA    Tolerated procedure well and recovered on telemetry floor. Access site stable. Complains of pain to right foot unchanged from admission. HR and BP stable. H&H 7.9/25.2 not far off baseline. Plan for HD today                Review of Systems   Constitutional: Negative for chills, decreased appetite, diaphoresis, fever, malaise/fatigue, weight gain and weight loss.   Cardiovascular:  Negative for chest pain, claudication, dyspnea on exertion, irregular heartbeat, leg swelling, near-syncope, orthopnea, palpitations and paroxysmal nocturnal dyspnea.   Respiratory:  Negative for cough, shortness of breath, snoring, sputum production and wheezing.    Endocrine: Negative for cold intolerance, heat intolerance, polydipsia, polyphagia and polyuria.   Skin:  Positive for poor wound healing. Negative for color change, dry skin, itching and nail changes.   Musculoskeletal:  Negative for back pain, gout, joint pain and joint swelling.   Gastrointestinal:  Negative for bloating, abdominal pain, constipation, diarrhea, hematemesis, hematochezia, melena, nausea and vomiting.   Genitourinary:  Negative for dysuria, hematuria and nocturia.    Neurological:  Negative for dizziness, headaches, light-headedness, numbness, paresthesias and weakness.   Psychiatric/Behavioral:  Negative for altered mental status, depression and memory loss.    Objective:     Vital Signs (Most Recent):  Temp: 98.2 °F (36.8 °C) (06/21/22 0945)  Pulse: 77 (06/21/22 1015)  Resp: 20 (06/21/22 0945)  BP: 98/65 (06/21/22 1015)  SpO2: (!) 94 % (06/21/22 0830)   Vital Signs (24h Range):  Temp:  [96.5 °F (35.8 °C)-99.7 °F (37.6 °C)] 98.2 °F (36.8 °C)  Pulse:  [69-88] 77  Resp:  [15-35] 20  SpO2:  [86 %-100 %] 94 %  BP: ()/(42-72) 98/65     Weight: 118 kg (260 lb 2.3 oz)  Body mass index is 34.32 kg/m².     SpO2: (!) 94 %  O2 Device (Oxygen Therapy): room air      Intake/Output Summary (Last 24 hours) at 6/21/2022 1037  Last data filed at 6/21/2022 0653  Gross per 24 hour   Intake 125 ml   Output --   Net 125 ml       Lines/Drains/Airways       Central Venous Catheter Line  Duration                  Hemodialysis AV Graft Right forearm -- days              Peripheral Intravenous Line  Duration                  Peripheral IV - Single Lumen 06/19/22 0930 20 G Anterior;Left Upper Arm 2 days                    Physical Exam  Constitutional:       General: He is not in acute distress.     Appearance: He is well-developed.   Cardiovascular:      Rate and Rhythm: Normal rate and regular rhythm.      Heart sounds: No murmur heard.    No gallop.   Pulmonary:      Effort: Pulmonary effort is normal. No respiratory distress.      Breath sounds: Normal breath sounds. No wheezing.   Abdominal:      General: Bowel sounds are normal. There is no distension.      Palpations: Abdomen is soft.      Tenderness: There is no abdominal tenderness.   Skin:     General: Skin is warm and dry.   Neurological:      Mental Status: He is alert and oriented to person, place, and time.       Significant Labs: BMP:   Recent Labs   Lab 06/20/22  0307 06/21/22  0123   * 87    135*   K 4.5 4.8     99   CO2 20* 19*   BUN 51* 60*   CREATININE 12.4* 14.7*   CALCIUM 8.2* 8.5*    and CBC   Recent Labs   Lab 06/20/22  0307 06/21/22  0123   WBC 9.18 10.06   HGB 7.8* 7.9*   HCT 25.4* 25.2*    144*       Significant Imaging: Echocardiogram: Transthoracic echo (TTE) complete (Cupid Only):   Results for orders placed or performed during the hospital encounter of 12/21/21   Echo   Result Value Ref Range    Ascending aorta 3.30 cm    STJ 3.27 cm    AV mean gradient 4 mmHg    Ao peak son 1.17 m/s    Ao VTI 25.29 cm    IVS 1.61 (A) 0.6 - 1.1 cm    LA size 5.11 cm    Left Atrium Major Axis 7.34 cm    Left Atrium Minor Axis 6.11 cm    LVIDd 6.60 (A) 3.5 - 6.0 cm    LVIDs 5.24 (A) 2.1 - 4.0 cm    LVOT diameter 2.89 cm    LVOT peak VTI 17.22 cm    Posterior Wall 1.66 (A) 0.6 - 1.1 cm    MV Peak A Son 1.03 m/s    E wave deceleration time 128.00 msec    MV Peak E Son 0.75 m/s    RA Major Axis 5.72 cm    RA Width 3.45 cm    RVDD 3.28 cm    TAPSE 1.72 cm    TR Max Son 2.14 m/s    TDI LATERAL 0.04 m/s    LA WIDTH 4.32 cm    Ao root annulus 4.28 cm    PV PEAK VELOCITY 1.04 cm/s    MV stenosis pressure 1/2 time 37.12 ms    LV Diastolic Volume 223.81 mL    LV Systolic Volume 131.64 mL    LVOT peak son 0.79 m/s    LA volume (mod) 85.57 cm3    MV mean gradient 1 mmHg    MV peak gradient 4 mmHg    RV S' 9.54 cm/s    MV VTI 18.13 cm    LV LATERAL E/E' RATIO 18.75 m/s    FS 21 %    LA volume 125.13 cm3    LV mass 561.38 g    Left Ventricle Relative Wall Thickness 0.50 cm    AV valve area 4.46 cm2    AV Velocity Ratio 0.68     AV index (prosthetic) 0.68     MV valve area p 1/2 method 5.93 cm2    MV valve area by continuity eq 6.23 cm2    E/A ratio 0.73     LVOT area 6.6 cm2    LVOT stroke volume 112.90 cm3    AV peak gradient 5 mmHg    LV Systolic Volume Index 53.5 mL/m2    LV Diastolic Volume Index 90.98 mL/m2    LA Volume Index 50.9 mL/m2    LV Mass Index 228 g/m2    Triscuspid Valve Regurgitation Peak Gradient 18 mmHg    LA Volume  Index (Mod) 34.8 mL/m2    BSA 2.53 m2    Right Atrial Pressure (from IVC) 3 mmHg    QEF 34 %    TV rest pulmonary artery pressure 21 mmHg    Narrative    · The left ventricle is moderately enlarged with mild concentric   hypertrophy and  · The quantitatively derived ejection fraction is 34%.  · Severe left atrial enlargement.  · Normal right ventricular size with normal right ventricular systolic   function.  · Normal central venous pressure (3 mmHg).  · The estimated PA systolic pressure is 21 mmHg.  · No vegetations per surface echo.        Assessment and Plan:     Brief HPI: Seen this morning on AM NP rounds while resting in bed. Complained of right foot pain 8 out of 10 and informed me RN in route with pain medication. Reviewed angiogram findings from yesterday and discussed concern for continued high risk limb loss. Instructed patient on plan for close monitoring and potential need for repeat procedure with DVA-verbalized understanding and agrees with POC     * Problem with dialysis access  - fistulogram last week with successful HD  - plan for HD today         Acute deep vein thrombosis (DVT) of popliteal vein of right lower extremity  - Eliquis on hold for procedure and remains on hold  - on IV Heparin  - will resume Eliquis closer to discharge     PAD (peripheral artery disease)  - wound to RLE; angiogram last week with revascularization 6/20 with atherectomy/PTA/stent to RSFA and PTA to right PT  - poor flow to foot remains due to heavily calcified right AT and PT; may need DVA   - on Plavix and IV Heparin; no ASA due to bleeding risk    Chronic combined systolic and diastolic congestive heart failure  12/21/21    Echo    Interpretation Summary  · The left ventricle is moderately enlarged with mild concentric hypertrophy and  · The quantitatively derived ejection fraction is 34%.  · Severe left atrial enlargement.  · Normal right ventricular size with normal right ventricular systolic function.  · Normal  central venous pressure (3 mmHg).  · The estimated PA systolic pressure is 21 mmHg.  · No vegetations per surface echo.    - volume status maintained by HD; HD for ongoing maintenance of euvolemia     Cardiac resynchronization therapy defibrillator (CRT-D) in place  - nonobstructive CAD  - s/p episodes of VT in the past  - continue to monitor on telemetry  - BB resumed     Complete heart block  -s/p CRT-D    Morbid obesity  -weight loss encouraged     Hypertension  - SBP 90s-100s  - continue BB         VTE Risk Mitigation (From admission, onward)         Ordered     heparin infusion 1,000 units/500 ml in 0.9% NaCl (pressure line flush)  Intra-op continuous PRN         06/20/22 1311     heparin 25,000 units in dextrose 5% (100 units/ml) IV bolus from bag - ADDITIONAL PRN BOLUS - 60 units/kg  As needed (PRN)        Question:  Heparin Infusion Adjustment (DO NOT MODIFY ANSWER)  Answer:  \[email or web address]\epic\Images\Pharmacy\HeparinInfusions\heparin HIGH INTENSITY nomogram for [facility] [id number]    06/18/22 1026     heparin 25,000 units in dextrose 5% (100 units/ml) IV bolus from bag - ADDITIONAL PRN BOLUS - 30 units/kg  As needed (PRN)        Question:  Heparin Infusion Adjustment (DO NOT MODIFY ANSWER)  Answer:  \\[email or web address]\epic\Images\Pharmacy\HeparinInfusions\heparin HIGH INTENSITY nomogram for [facility] [id number]    06/18/22 1026     heparin 25,000 units in dextrose 5% 250 mL (100 units/mL) infusion HIGH INTENSITY nomogram - [facility]  Continuous        Question Answer Comment   Heparin Infusion Adjustment (DO NOT MODIFY ANSWER) \\[email or web address]\epic\Images\Pharmacy\HeparinInfusions\heparin HIGH INTENSITY nomogram for [facility] [id number]    Begin at (in units/kg/hr) 18        06/18/22 1026     heparin infusion 1,000 units/500 ml in 0.9% NaCl (pressure line flush)  Intra-op continuous PRN         06/17/22 1106     heparin infusion 1,000 units/500 ml in 0.9% NaCl (pressure line flush)  Intra-op continuous PRN         06/15/22 1326     Place  sequential compression device  Until discontinued         06/14/22 0214     Place TJ hose  Until discontinued         06/14/22 0214                LOUISA Ferreira, ANP  Cardiology  [facility] - Telemetry

## 2022-06-21 NOTE — ASSESSMENT & PLAN NOTE
12/21/21    Echo    Interpretation Summary  · The left ventricle is moderately enlarged with mild concentric hypertrophy and  · The quantitatively derived ejection fraction is 34%.  · Severe left atrial enlargement.  · Normal right ventricular size with normal right ventricular systolic function.  · Normal central venous pressure (3 mmHg).  · The estimated PA systolic pressure is 21 mmHg.  · No vegetations per surface echo.    - volume status maintained by HD; HD for ongoing maintenance of euvolemia

## 2022-06-21 NOTE — SUBJECTIVE & OBJECTIVE
Review of Systems   Constitutional: Negative for chills, decreased appetite, diaphoresis, fever, malaise/fatigue, weight gain and weight loss.   Cardiovascular:  Negative for chest pain, claudication, dyspnea on exertion, irregular heartbeat, leg swelling, near-syncope, orthopnea, palpitations and paroxysmal nocturnal dyspnea.   Respiratory:  Negative for cough, shortness of breath, snoring, sputum production and wheezing.    Endocrine: Negative for cold intolerance, heat intolerance, polydipsia, polyphagia and polyuria.   Skin:  Positive for poor wound healing. Negative for color change, dry skin, itching and nail changes.   Musculoskeletal:  Negative for back pain, gout, joint pain and joint swelling.   Gastrointestinal:  Negative for bloating, abdominal pain, constipation, diarrhea, hematemesis, hematochezia, melena, nausea and vomiting.   Genitourinary:  Negative for dysuria, hematuria and nocturia.   Neurological:  Negative for dizziness, headaches, light-headedness, numbness, paresthesias and weakness.   Psychiatric/Behavioral:  Negative for altered mental status, depression and memory loss.    Objective:     Vital Signs (Most Recent):  Temp: 98.2 °F (36.8 °C) (06/21/22 0945)  Pulse: 77 (06/21/22 1015)  Resp: 20 (06/21/22 0945)  BP: 98/65 (06/21/22 1015)  SpO2: (!) 94 % (06/21/22 0830)   Vital Signs (24h Range):  Temp:  [96.5 °F (35.8 °C)-99.7 °F (37.6 °C)] 98.2 °F (36.8 °C)  Pulse:  [69-88] 77  Resp:  [15-35] 20  SpO2:  [86 %-100 %] 94 %  BP: ()/(42-72) 98/65     Weight: 118 kg (260 lb 2.3 oz)  Body mass index is 34.32 kg/m².     SpO2: (!) 94 %  O2 Device (Oxygen Therapy): room air      Intake/Output Summary (Last 24 hours) at 6/21/2022 1037  Last data filed at 6/21/2022 0653  Gross per 24 hour   Intake 125 ml   Output --   Net 125 ml       Lines/Drains/Airways       Central Venous Catheter Line  Duration                  Hemodialysis AV Graft Right forearm -- days              Peripheral  Intravenous Line  Duration                  Peripheral IV - Single Lumen 06/19/22 0930 20 G Anterior;Left Upper Arm 2 days                    Physical Exam  Constitutional:       General: He is not in acute distress.     Appearance: He is well-developed.   Cardiovascular:      Rate and Rhythm: Normal rate and regular rhythm.      Heart sounds: No murmur heard.    No gallop.   Pulmonary:      Effort: Pulmonary effort is normal. No respiratory distress.      Breath sounds: Normal breath sounds. No wheezing.   Abdominal:      General: Bowel sounds are normal. There is no distension.      Palpations: Abdomen is soft.      Tenderness: There is no abdominal tenderness.   Skin:     General: Skin is warm and dry.   Neurological:      Mental Status: He is alert and oriented to person, place, and time.       Significant Labs: BMP:   Recent Labs   Lab 06/20/22  0307 06/21/22  0123   * 87    135*   K 4.5 4.8    99   CO2 20* 19*   BUN 51* 60*   CREATININE 12.4* 14.7*   CALCIUM 8.2* 8.5*    and CBC   Recent Labs   Lab 06/20/22  0307 06/21/22  0123   WBC 9.18 10.06   HGB 7.8* 7.9*   HCT 25.4* 25.2*    144*       Significant Imaging: Echocardiogram: Transthoracic echo (TTE) complete ([facility] Only):   Results for orders placed or performed during the hospital encounter of 12/21/21   Echo   Result Value Ref Range    Ascending aorta 3.30 cm    STJ 3.27 cm    AV mean gradient 4 mmHg    Ao peak son 1.17 m/s    Ao VTI 25.29 cm    IVS 1.61 (A) 0.6 - 1.1 cm    LA size 5.11 cm    Left Atrium Major Axis 7.34 cm    Left Atrium Minor Axis 6.11 cm    LVIDd 6.60 (A) 3.5 - 6.0 cm    LVIDs 5.24 (A) 2.1 - 4.0 cm    LVOT diameter 2.89 cm    LVOT peak VTI 17.22 cm    Posterior Wall 1.66 (A) 0.6 - 1.1 cm    MV Peak A Son 1.03 m/s    E wave deceleration time 128.00 msec    MV Peak E Son 0.75 m/s    RA Major Axis 5.72 cm    RA Width 3.45 cm    RVDD 3.28 cm    TAPSE 1.72 cm    TR Max Son 2.14 m/s    TDI LATERAL 0.04 m/s    LA  WIDTH 4.32 cm    Ao root annulus 4.28 cm    PV PEAK VELOCITY 1.04 cm/s    MV stenosis pressure 1/2 time 37.12 ms    LV Diastolic Volume 223.81 mL    LV Systolic Volume 131.64 mL    LVOT peak nghia 0.79 m/s    LA volume (mod) 85.57 cm3    MV mean gradient 1 mmHg    MV peak gradient 4 mmHg    RV S' 9.54 cm/s    MV VTI 18.13 cm    LV LATERAL E/E' RATIO 18.75 m/s    FS 21 %    LA volume 125.13 cm3    LV mass 561.38 g    Left Ventricle Relative Wall Thickness 0.50 cm    AV valve area 4.46 cm2    AV Velocity Ratio 0.68     AV index (prosthetic) 0.68     MV valve area p 1/2 method 5.93 cm2    MV valve area by continuity eq 6.23 cm2    E/A ratio 0.73     LVOT area 6.6 cm2    LVOT stroke volume 112.90 cm3    AV peak gradient 5 mmHg    LV Systolic Volume Index 53.5 mL/m2    LV Diastolic Volume Index 90.98 mL/m2    LA Volume Index 50.9 mL/m2    LV Mass Index 228 g/m2    Triscuspid Valve Regurgitation Peak Gradient 18 mmHg    LA Volume Index (Mod) 34.8 mL/m2    BSA 2.53 m2    Right Atrial Pressure (from IVC) 3 mmHg    QEF 34 %    TV rest pulmonary artery pressure 21 mmHg    Narrative    · The left ventricle is moderately enlarged with mild concentric   hypertrophy and  · The quantitatively derived ejection fraction is 34%.  · Severe left atrial enlargement.  · Normal right ventricular size with normal right ventricular systolic   function.  · Normal central venous pressure (3 mmHg).  · The estimated PA systolic pressure is 21 mmHg.  · No vegetations per surface echo.

## 2022-06-21 NOTE — PLAN OF CARE
chart reviewed - case discussed in am MD meeting -    pt has undergone a series of interventions to facilitate perfusion of lower extremity   6/20 - s/p  LE angiogram - per notes - remains at high risk for amputation;  heparin gtt     wife Paulette         OR declot attempt  6/14;  Trialysis cath placed 6/15;  Fistulagram planned for 6/16 - pt was not NPO - for this procedure today, 6/17       street address:  [address] [place] [address] CASSANDRA Shabazz      pt has a WC, RW and BSC , walk in shower has a built in bench and grab bars- current with home health  - can't recall name - assigned per N  -  agency nurse  dresses his foot wounds      HD T Th Sa at [facility] CASSANDRA Nixon  9:45 am - wife transports pt to HD  - wife transports pt to all apts.           06/21/22 1709   Post-Acute Status   Post-Acute Authorization Other  (TBD  - current with home health)   Discharge Plan   Discharge Plan A Home;Home with family   Discharge Plan B Home;Home with family;Home Health

## 2022-06-21 NOTE — PROCEDURES
"Patient seen and examined on HD tolerating well. No complains. When HD  Successful via AVG  can remove CVC   BP (!) 100/53   Pulse 90   Temp 98.2 °F (36.8 °C)   Resp 20   Ht 6' 1" (1.854 m)   Wt 118 kg (260 lb 2.3 oz)   SpO2 (!) 94%   BMI 34.32 kg/m²     With any question please call answering service [phone]  Laura Ortiz MD    [facility]  ANA MARIA Burns MD, KEREN GAYTAN MD,   MD DAMION Gill MD E. V. Harmon, NP    [address]  [place], [address]  "

## 2022-06-21 NOTE — ASSESSMENT & PLAN NOTE
- nonobstructive CAD  - s/p episodes of VT in the past  - continue to monitor on telemetry  - BB resumed

## 2022-06-21 NOTE — PROGRESS NOTES
06/21/22 0949   Nurse Notification   Bedside Nurse Notified? Yes   Name of Bedside Nurse Randolph   Nurse Notfication Method Telephone   Nurse Notified Of Lab Values  (0730 PTT=35.1  Heparin drip bolus and adjustment required.)

## 2022-06-22 NOTE — PROGRESS NOTES
[place] - Telemetry  Cardiology  Progress Note    Patient Name: Houston Jc  MRN: [id number]  Admission Date: 6/13/2022  Hospital Length of Stay: 6 days  Code Status: Full Code   Attending Physician: Jeison Hunt MD   Primary Care Physician: Zak Hamilton MD  Expected Discharge Date: 6/15/2022  Principal Problem:Problem with dialysis access    Subjective:     Hospital Course:   06/14/2022 Per HPI   6/20/2022 NPO for LE angiogram today   6/21/2022 LE angiogram yesterday with following results:     successful atherectomy with PTA and stent to RSFA and PTA to RTPT and peroneal with excellent results  Poor flow into the foot and heavily calcified AT/PT.  Heparin/gtt per current treatment regimen  Continue aggressive wound care  Remains high risk for amputation and may consider DVA    Tolerated procedure well and recovered on telemetry floor. Access site stable. Complains of pain to right foot unchanged from admission. HR and BP stable. H&H 7.9/25.2 not far off baseline. Plan for HD today      6/22/2022 Right foot pain persists despite pain medications. HR and Bp stable. H&H unchanged. Vein mapping ordered          Review of Systems   Constitutional: Negative for chills, decreased appetite, diaphoresis and fever.   Cardiovascular:  Negative for chest pain, claudication, cyanosis, dyspnea on exertion, irregular heartbeat, leg swelling, near-syncope, orthopnea, palpitations, paroxysmal nocturnal dyspnea and syncope.   Respiratory:  Negative for cough, hemoptysis, shortness of breath and wheezing.    Skin:  Positive for poor wound healing.   Gastrointestinal:  Negative for bloating, abdominal pain, constipation, diarrhea, melena, nausea and vomiting.   Neurological:  Negative for dizziness and weakness.   Objective:     Vital Signs (Most Recent):  Temp: 97.1 °F (36.2 °C) (06/22/22 1005)  Pulse: 74 (06/22/22 1203)  Resp: 18 (06/22/22 1005)  BP: 110/65 (06/22/22 1203)  SpO2: 100 % (06/22/22 0952) Vital Signs (24h  Range):  Temp:  [97.1 °F (36.2 °C)-99.5 °F (37.5 °C)] 97.1 °F (36.2 °C)  Pulse:  [] 74  Resp:  [16-20] 18  SpO2:  [97 %-100 %] 100 %  BP: ()/(51-67) 110/65     Weight: 118.2 kg (260 lb 9.3 oz)  Body mass index is 34.38 kg/m².     SpO2: 100 %  O2 Device (Oxygen Therapy): room air      Intake/Output Summary (Last 24 hours) at 6/22/2022 1211  Last data filed at 6/22/2022 0345  Gross per 24 hour   Intake 250 ml   Output 1500 ml   Net -1250 ml       Lines/Drains/Airways       Central Venous Catheter Line  Duration                  Hemodialysis AV Graft Right forearm -- days              Peripheral Intravenous Line  Duration                  Peripheral IV - Single Lumen 06/19/22 0930 20 G Anterior;Left Upper Arm 3 days                    Physical Exam  Constitutional:       General: He is not in acute distress.     Appearance: He is well-developed.   Cardiovascular:      Rate and Rhythm: Normal rate and regular rhythm.      Heart sounds: No murmur heard.    No gallop.     Dopplerable pulse to right PT and DP/AT- pulses present at or above ankle level; foot warm to touch   Pulmonary:      Effort: Pulmonary effort is normal. No respiratory distress.      Breath sounds: Normal breath sounds. No wheezing.   Abdominal:      General: Bowel sounds are normal. There is no distension.      Palpations: Abdomen is soft.      Tenderness: There is no abdominal tenderness.   Skin:     General: Skin is warm and dry.   Neurological:      Mental Status: He is alert and oriented to person, place, and time.       Significant Labs: BMP:   Recent Labs   Lab 06/21/22  0123 06/22/22  0554   GLU 87 127*   * 137   K 4.8 4.6   CL 99 98   CO2 19* 20*   BUN 60* 43*   CREATININE 14.7* 10.9*   CALCIUM 8.5* 8.2*    and CBC   Recent Labs   Lab 06/21/22  0123 06/22/22  0554   WBC 10.06 12.27   HGB 7.9* 8.0*   HCT 25.2* 25.3*   * 149*       Significant Imaging: Echocardiogram: Transthoracic echo (TTE) complete (Cupid Only):    Results for orders placed or performed during the hospital encounter of 12/21/21   Echo   Result Value Ref Range    Ascending aorta 3.30 cm    STJ 3.27 cm    AV mean gradient 4 mmHg    Ao peak son 1.17 m/s    Ao VTI 25.29 cm    IVS 1.61 (A) 0.6 - 1.1 cm    LA size 5.11 cm    Left Atrium Major Axis 7.34 cm    Left Atrium Minor Axis 6.11 cm    LVIDd 6.60 (A) 3.5 - 6.0 cm    LVIDs 5.24 (A) 2.1 - 4.0 cm    LVOT diameter 2.89 cm    LVOT peak VTI 17.22 cm    Posterior Wall 1.66 (A) 0.6 - 1.1 cm    MV Peak A Son 1.03 m/s    E wave deceleration time 128.00 msec    MV Peak E Son 0.75 m/s    RA Major Axis 5.72 cm    RA Width 3.45 cm    RVDD 3.28 cm    TAPSE 1.72 cm    TR Max Son 2.14 m/s    TDI LATERAL 0.04 m/s    LA WIDTH 4.32 cm    Ao root annulus 4.28 cm    PV PEAK VELOCITY 1.04 cm/s    MV stenosis pressure 1/2 time 37.12 ms    LV Diastolic Volume 223.81 mL    LV Systolic Volume 131.64 mL    LVOT peak son 0.79 m/s    LA volume (mod) 85.57 cm3    MV mean gradient 1 mmHg    MV peak gradient 4 mmHg    RV S' 9.54 cm/s    MV VTI 18.13 cm    LV LATERAL E/E' RATIO 18.75 m/s    FS 21 %    LA volume 125.13 cm3    LV mass 561.38 g    Left Ventricle Relative Wall Thickness 0.50 cm    AV valve area 4.46 cm2    AV Velocity Ratio 0.68     AV index (prosthetic) 0.68     MV valve area p 1/2 method 5.93 cm2    MV valve area by continuity eq 6.23 cm2    E/A ratio 0.73     LVOT area 6.6 cm2    LVOT stroke volume 112.90 cm3    AV peak gradient 5 mmHg    LV Systolic Volume Index 53.5 mL/m2    LV Diastolic Volume Index 90.98 mL/m2    LA Volume Index 50.9 mL/m2    LV Mass Index 228 g/m2    Triscuspid Valve Regurgitation Peak Gradient 18 mmHg    LA Volume Index (Mod) 34.8 mL/m2    BSA 2.53 m2    Right Atrial Pressure (from IVC) 3 mmHg    QEF 34 %    TV rest pulmonary artery pressure 21 mmHg    Narrative    · The left ventricle is moderately enlarged with mild concentric   hypertrophy and  · The quantitatively derived ejection fraction is  34%.  · Severe left atrial enlargement.  · Normal right ventricular size with normal right ventricular systolic   function.  · Normal central venous pressure (3 mmHg).  · The estimated PA systolic pressure is 21 mmHg.  · No vegetations per surface echo.        Assessment and Plan:     Brief HPI: Seen this morning on AM rounds with Dr. Mckeon. Continues to complain of right foot pain despite pain medications.Discussed POC as detailed below-verbalized understanding and agrees with POC     * Problem with dialysis access  - fistulogram last week with successful HD  - routine HD per Nephrology        Acute deep vein thrombosis (DVT) of popliteal vein of right lower extremity  - Eliquis on hold for procedure and remains on hold  - on IV Heparin  - will resume Eliquis closer to discharge     PAD (peripheral artery disease)  - wound to RLE; angiogram last week with revascularization 6/20 with atherectomy/PTA/stent to RSFA and PTA to right PT  - poor flow to foot remains due to heavily calcified right AT and PT; pain persists despite pain medication; will place Lidoderm patch to see if this improves pain control; discussed case with Dr. Quintero and Dr. Quintero and DVA felt to be needed- will plan to proceed on Friday; will order vein mapping today to assess patency of lateral medial plantar vein   - on Plavix and IV Heparin; no ASA due to bleeding risk    Chronic combined systolic and diastolic congestive heart failure  12/21/21    Echo    Interpretation Summary  · The left ventricle is moderately enlarged with mild concentric hypertrophy and  · The quantitatively derived ejection fraction is 34%.  · Severe left atrial enlargement.  · Normal right ventricular size with normal right ventricular systolic function.  · Normal central venous pressure (3 mmHg).  · The estimated PA systolic pressure is 21 mmHg.  · No vegetations per surface echo.    - volume status maintained by HD; HD for ongoing maintenance of euvolemia   -  appears well compensated     Cardiac resynchronization therapy defibrillator (CRT-D) in place  - nonobstructive CAD  - s/p episodes of VT in the past  - continue to monitor on telemetry  - on BB      Complete heart block  -s/p CRT-D    Morbid obesity  -weight loss encouraged     Hypertension  - SBP 80s-100s  - continue BB         VTE Risk Mitigation (From admission, onward)         Ordered     heparin infusion 1,000 units/500 ml in 0.9% NaCl (pressure line flush)  Intra-op continuous PRN         06/20/22 1311     heparin 25,000 units in dextrose 5% (100 units/ml) IV bolus from bag - ADDITIONAL PRN BOLUS - 60 units/kg  As needed (PRN)        Question:  Heparin Infusion Adjustment (DO NOT MODIFY ANSWER)  Answer:  \[email or web address]\Images\Pharmacy\HeparinInfusions\heparin HIGH INTENSITY nomogram for [facility] [id number]    06/18/22 1026     heparin 25,000 units in dextrose 5% (100 units/ml) IV bolus from bag - ADDITIONAL PRN BOLUS - 30 units/kg  As needed (PRN)        Question:  Heparin Infusion Adjustment (DO NOT MODIFY ANSWER)  Answer:  \[email or web address]\epic\Images\Pharmacy\HeparinInfusions\heparin HIGH INTENSITY nomogram for [facility] [id number]    06/18/22 1026     heparin 25,000 units in dextrose 5% 250 mL (100 units/mL) infusion HIGH INTENSITY nomogram - [facility]  Continuous        Question Answer Comment   Heparin Infusion Adjustment (DO NOT MODIFY ANSWER) \\[email or web address]\epic\Images\Pharmacy\HeparinInfusions\heparin HIGH INTENSITY nomogram for [facility] [id number]    Begin at (in units/kg/hr) 18        06/18/22 1026     heparin infusion 1,000 units/500 ml in 0.9% NaCl (pressure line flush)  Intra-op continuous PRN         06/17/22 1106     heparin infusion 1,000 units/500 ml in 0.9% NaCl (pressure line flush)  Intra-op continuous PRN         06/15/22 1326     Place sequential compression device  Until discontinued         06/14/22 0214     Place TJ hose  Until discontinued         06/14/22 0214                Bruna Atkinson, APRN,  ANP  Cardiology  [facility] - Telemetry

## 2022-06-22 NOTE — PLAN OF CARE
chart reviewed - case reviewed in am MD meeting   CM spoke with Surgeon - pt's disposition is contingent upon Cardiology reccs/interventions    Established HD pt - Shiv Rocha  p  ;  f    --   [facility] called and spoke with Tila at unit - advised her  that pt will continue with HD post discharge - updated notes routed to her.     pt has undergone a series of interventions to facilitate perfusion of lower extremity   6/20 - s/p  LE angiogram - per notes - remains at high risk for amputation;  heparin gtt      wife Paulette         OR declot attempt  6/14;  Trialysis cath placed 6/15;  Fistulagram planned for 6/16 - pt was not NPO - for this procedure today, 6/17       street address:  [address] [place] [address] [place]ANDRA Shabazz      pt has a WC, RW and BSC , walk in shower has a built in bench and grab bars- current with home health  - can't recall name - assigned per PHN  - HH agency nurse  dresses his foot wounds      HD T Th Sa at [facility] [place] Nixon  9:45 am - wife transports pt to HD  - wife transports pt to all apts.     06/22/22 1206   Post-Acute Status   Post-Acute Authorization Other   Home Health Status   (Pt had HH prior to admit per PHN)   Hospital Resources/Appts/Education Provided Post-Acute resouces added to AVS   Discharge Delays   (pending medical stability)   Discharge Plan   Discharge Plan A Home;Home with family;Home Health

## 2022-06-22 NOTE — SUBJECTIVE & OBJECTIVE
Review of Systems   Constitutional: Negative for chills, decreased appetite, diaphoresis and fever.   Cardiovascular:  Negative for chest pain, claudication, cyanosis, dyspnea on exertion, irregular heartbeat, leg swelling, near-syncope, orthopnea, palpitations, paroxysmal nocturnal dyspnea and syncope.   Respiratory:  Negative for cough, hemoptysis, shortness of breath and wheezing.    Skin:  Positive for poor wound healing.   Gastrointestinal:  Negative for bloating, abdominal pain, constipation, diarrhea, melena, nausea and vomiting.   Neurological:  Negative for dizziness and weakness.   Objective:     Vital Signs (Most Recent):  Temp: 97.1 °F (36.2 °C) (06/22/22 1005)  Pulse: 74 (06/22/22 1203)  Resp: 18 (06/22/22 1005)  BP: 110/65 (06/22/22 1203)  SpO2: 100 % (06/22/22 0952) Vital Signs (24h Range):  Temp:  [97.1 °F (36.2 °C)-99.5 °F (37.5 °C)] 97.1 °F (36.2 °C)  Pulse:  [] 74  Resp:  [16-20] 18  SpO2:  [97 %-100 %] 100 %  BP: ()/(51-67) 110/65     Weight: 118.2 kg (260 lb 9.3 oz)  Body mass index is 34.38 kg/m².     SpO2: 100 %  O2 Device (Oxygen Therapy): room air      Intake/Output Summary (Last 24 hours) at 6/22/2022 1211  Last data filed at 6/22/2022 0345  Gross per 24 hour   Intake 250 ml   Output 1500 ml   Net -1250 ml       Lines/Drains/Airways       Central Venous Catheter Line  Duration                  Hemodialysis AV Graft Right forearm -- days              Peripheral Intravenous Line  Duration                  Peripheral IV - Single Lumen 06/19/22 0930 20 G Anterior;Left Upper Arm 3 days                    Physical Exam  Constitutional:       General: He is not in acute distress.     Appearance: He is well-developed.   Cardiovascular:      Rate and Rhythm: Normal rate and regular rhythm.      Heart sounds: No murmur heard.    No gallop.   Pulmonary:      Effort: Pulmonary effort is normal. No respiratory distress.      Breath sounds: Normal breath sounds. No wheezing.   Abdominal:       General: Bowel sounds are normal. There is no distension.      Palpations: Abdomen is soft.      Tenderness: There is no abdominal tenderness.   Skin:     General: Skin is warm and dry.   Neurological:      Mental Status: He is alert and oriented to person, place, and time.       Significant Labs: BMP:   Recent Labs   Lab 06/21/22  0123 06/22/22  0554   GLU 87 127*   * 137   K 4.8 4.6   CL 99 98   CO2 19* 20*   BUN 60* 43*   CREATININE 14.7* 10.9*   CALCIUM 8.5* 8.2*    and CBC   Recent Labs   Lab 06/21/22  0123 06/22/22  0554   WBC 10.06 12.27   HGB 7.9* 8.0*   HCT 25.2* 25.3*   * 149*       Significant Imaging: Echocardiogram: Transthoracic echo (TTE) complete (Cupid Only):   Results for orders placed or performed during the hospital encounter of 12/21/21   Echo   Result Value Ref Range    Ascending aorta 3.30 cm    STJ 3.27 cm    AV mean gradient 4 mmHg    Ao peak son 1.17 m/s    Ao VTI 25.29 cm    IVS 1.61 (A) 0.6 - 1.1 cm    LA size 5.11 cm    Left Atrium Major Axis 7.34 cm    Left Atrium Minor Axis 6.11 cm    LVIDd 6.60 (A) 3.5 - 6.0 cm    LVIDs 5.24 (A) 2.1 - 4.0 cm    LVOT diameter 2.89 cm    LVOT peak VTI 17.22 cm    Posterior Wall 1.66 (A) 0.6 - 1.1 cm    MV Peak A Son 1.03 m/s    E wave deceleration time 128.00 msec    MV Peak E Son 0.75 m/s    RA Major Axis 5.72 cm    RA Width 3.45 cm    RVDD 3.28 cm    TAPSE 1.72 cm    TR Max Son 2.14 m/s    TDI LATERAL 0.04 m/s    LA WIDTH 4.32 cm    Ao root annulus 4.28 cm    PV PEAK VELOCITY 1.04 cm/s    MV stenosis pressure 1/2 time 37.12 ms    LV Diastolic Volume 223.81 mL    LV Systolic Volume 131.64 mL    LVOT peak son 0.79 m/s    LA volume (mod) 85.57 cm3    MV mean gradient 1 mmHg    MV peak gradient 4 mmHg    RV S' 9.54 cm/s    MV VTI 18.13 cm    LV LATERAL E/E' RATIO 18.75 m/s    FS 21 %    LA volume 125.13 cm3    LV mass 561.38 g    Left Ventricle Relative Wall Thickness 0.50 cm    AV valve area 4.46 cm2    AV Velocity Ratio 0.68     AV  index (prosthetic) 0.68     MV valve area p 1/2 method 5.93 cm2    MV valve area by continuity eq 6.23 cm2    E/A ratio 0.73     LVOT area 6.6 cm2    LVOT stroke volume 112.90 cm3    AV peak gradient 5 mmHg    LV Systolic Volume Index 53.5 mL/m2    LV Diastolic Volume Index 90.98 mL/m2    LA Volume Index 50.9 mL/m2    LV Mass Index 228 g/m2    Triscuspid Valve Regurgitation Peak Gradient 18 mmHg    LA Volume Index (Mod) 34.8 mL/m2    BSA 2.53 m2    Right Atrial Pressure (from IVC) 3 mmHg    QEF 34 %    TV rest pulmonary artery pressure 21 mmHg    Narrative    · The left ventricle is moderately enlarged with mild concentric   hypertrophy and  · The quantitatively derived ejection fraction is 34%.  · Severe left atrial enlargement.  · Normal right ventricular size with normal right ventricular systolic   function.  · Normal central venous pressure (3 mmHg).  · The estimated PA systolic pressure is 21 mmHg.  · No vegetations per surface echo.

## 2022-06-22 NOTE — PROGRESS NOTES
06/22/22 1408   Vital Signs   Temp 97.2 °F (36.2 °C)   Pulse 80   Resp 20   BP (!) 116/54   Post-Hemodialysis Assessment   Rinseback Volume (mL) 250 mL   Blood Volume Processed (Liters) 62 L   Dialyzer Clearance Lightly streaked   Duration of Treatment 180 minutes   Additional Fluid Intake (mL) 100 mL   Total UF (mL) 1408 mL   Net Fluid Removal 1000   Patient Response to Treatment pt tolerated well, but cramping towards end of tx   Arterial bleeding stop time (min) 5 min   Venous bleeding stop time (min) 7 min

## 2022-06-22 NOTE — PROGRESS NOTES
Nephrology Progress Note       Consult Requested By: Jeison Hunt MD  Reason for Consult: ESRD on HD     SUBJECTIVE:        ?    Review of Systems   Constitutional: Negative for chills and fever.   HENT: Negative for congestion and sore throat.    Eyes: Negative for blurred vision, double vision and photophobia.   Respiratory: Negative for cough and shortness of breath.    Cardiovascular: Negative for chest pain, palpitations and leg swelling.   Gastrointestinal: Negative for abdominal pain, diarrhea, nausea and vomiting.   Genitourinary: Negative for dysuria and urgency.   Musculoskeletal: Negative for joint pain and myalgias.   Skin: Negative for itching and rash.   Neurological: Negative for dizziness, sensory change, weakness and headaches.   Endo/Heme/Allergies: Negative for polydipsia. Does not bruise/bleed easily.   Psychiatric/Behavioral: Negative for depression.       Past Medical History:   Diagnosis Date    Anemia     CHF (congestive heart failure)     CKD (chronic kidney disease) stage 4, GFR 15-29 ml/min     Coronary artery disease     Diabetes mellitus     Hematuria     Hypertension     Osteoarthritis of spine with radiculopathy, cervical region 1/10/2022    Renal cyst, right      Past Surgical History:   Procedure Laterality Date    ANGIOGRAPHY OF ARTERIOVENOUS SHUNT Right 2/11/2022    Procedure: Fistulogram with Possible Intervention;  Surgeon: Dejuan Cody MD;  Location: [facility] CATH LAB/EP;  Service: Cardiology;  Laterality: Right;    ANGIOGRAPHY OF LOWER EXTREMITY Right 6/20/2022    Procedure: Angiogram Extremity Unilateral;  Surgeon: Umesh Quintero MD;  Location: [facility] CATH LAB/EP;  Service: Cardiology;  Laterality: Right;    AORTOGRAPHY WITH SERIALOGRAPHY Right 6/7/2022    Procedure: AORTOGRAM, WITH SERIALOGRAPHY;  Surgeon: Dejuan Cody MD;  Location: [facility] CATH LAB/EP;  Service: Cardiology;  Laterality: Right;    CARDIAC SURGERY      DECLOTTING OF ARTERIOVENOUS GRAFT  Right 12/21/2021    Procedure: [id number];  Surgeon: Jeison Hunt MD;  Location: [facility] OR;  Service: Vascular;  Laterality: Right;    DECLOTTING OF ARTERIOVENOUS GRAFT Right 2/18/2022    Procedure: [id number];  Surgeon: Jeison Hunt MD;  Location: [facility] OR;  Service: Vascular;  Laterality: Right;    DECLOTTING OF ARTERIOVENOUS GRAFT Right 3/9/2022    Procedure: [id number];  Surgeon: Jeison Hunt MD;  Location: [facility] OR;  Service: Vascular;  Laterality: Right;    DECLOTTING OF ARTERIOVENOUS GRAFT Right 6/13/2022    Procedure: [id number]; REVISION OF FISTULA W/INSERTION OF NEW AV GRAFT;  Surgeon: Jeison Hunt MD;  Location: [facility] OR;  Service: Vascular;  Laterality: Right;    FISTULOGRAM N/A 2/11/2022    Procedure: Fistulogram;  Surgeon: Dejuan Cody MD;  Location: [facility] CATH LAB/EP;  Service: Cardiology;  Laterality: N/A;    FISTULOGRAM N/A 6/17/2022    Procedure: Fistulogram;  Surgeon: Dejuan Cody MD;  Location: [facility] CATH LAB/EP;  Service: Cardiology;  Laterality: N/A;    FOOT SURGERY      INSERTION OF BIVENTRICULAR IMPLANTABLE CARDIOVERTER-DEFIBRILLATOR (ICD) Left 7/18/2019    Procedure: INSERTION, ICD, BIVENTRICULAR;  Surgeon: Arturo Bay MD;  Location: [facility] EP LAB;  Service: Cardiology;  Laterality: Left;  CHB, CRTD, SJM, anes, GP, 6078    LEFT HEART CATHETERIZATION N/A 7/16/2019    Procedure: Left heart cath;  Surgeon: eDjuan Cody MD;  Location: [facility] CATH LAB/EP;  Service: Cardiology;  Laterality: N/A;    PERCUTANEOUS TRANSLUMINAL ANGIOPLASTY OF ARTERIOVENOUS FISTULA Right 2/11/2022    Procedure: PTA, AV FISTULA;  Surgeon: Dejuan Cody MD;  Location: [facility] CATH LAB/EP;  Service: Cardiology;  Laterality: Right;    PERCUTANEOUS TRANSLUMINAL ANGIOPLASTY OF ARTERIOVENOUS FISTULA N/A 6/17/2022    Procedure: PTA, AV FISTULA;  Surgeon: Dejuan Cody MD;  Location: [facility] CATH LAB/EP;  Service: Cardiology;  Laterality: N/A;     PERITONEAL CATHETER REMOVAL Left 4/11/2020    Procedure: REMOVAL, CATHETER, DIALYSIS, PERITONEAL;  Surgeon: Edis Snell Jr., MD;  Location: [facility] OR;  Service: General;  Laterality: Left;    PHLEBOGRAPHY  6/15/2022    Procedure: Venogram;  Surgeon: Betsey Mckeon MD;  Location: [facility] CATH LAB/EP;  Service: Cardiology;;    PLACEMENT OF ARTERIOVENOUS GRAFT Right 2/18/2022    Procedure: INSERTION, GRAFT, ARTERIOVENOUS;  Surgeon: Jeison Hunt MD;  Location: [facility] OR;  Service: Vascular;  Laterality: Right;    REVISION OF PROCEDURE INVOLVING ARTERIOVENOUS GRAFT Right 2/18/2022    Procedure: REVISION, PROCEDURE INVOLVING ARTERIOVENOUS GRAFT;  Surgeon: Jeison Hunt MD;  Location: [facility] OR;  Service: Vascular;  Laterality: Right;     Family History   Problem Relation Age of Onset    Heart attack Father      Social History     Tobacco Use    Smoking status: Former Smoker    Smokeless tobacco: Never Used   Substance Use Topics    Alcohol use: Yes     Comment: social    Drug use: No       Review of patient's allergies indicates:  No Known Allergies         OBJECTIVE:     Vital Signs (Most Recent)  Vitals:    06/22/22 1048 06/22/22 1103 06/22/22 1118 06/22/22 1133   BP: (!) 106/56 104/67 114/65 98/65   Patient Position:       Pulse: (!) 52 93 60 68   Resp:       Temp:       TempSrc:       SpO2:       Weight:       Height:                     Medications:   sodium chloride 0.9%   Intravenous Once    atorvastatin  80 mg Oral Daily    clopidogreL  75 mg Oral Daily    furosemide (LASIX) injection  80 mg Intravenous Once    LIDOcaine  1 patch Transdermal Q24H    metoprolol tartrate  12.5 mg Oral BID    sevelamer carbonate  2,400 mg Oral TID WM    sodium citrate-citric acid 500-334 mg/5 ml  30 mL Oral Once    sodium polystyrene  30 g Oral Once    sodium zirconium cyclosilicate  10 g Oral Once    sodium zirconium cyclosilicate  10 g Oral Once           Physical Exam  Vitals and nursing note  reviewed.   Constitutional:       General: He is not in acute distress.     Appearance: He is not diaphoretic.   HENT:      Head: Normocephalic and atraumatic.      Mouth/Throat:      Pharynx: No oropharyngeal exudate.   Eyes:      General: No scleral icterus.     Conjunctiva/sclera: Conjunctivae normal.      Pupils: Pupils are equal, round, and reactive to light.   Cardiovascular:      Rate and Rhythm: Normal rate and regular rhythm.      Heart sounds: Normal heart sounds. No murmur heard.  Pulmonary:      Effort: Pulmonary effort is normal. No respiratory distress.      Breath sounds: Normal breath sounds.   Abdominal:      General: Bowel sounds are normal. There is no distension.      Palpations: Abdomen is soft.      Tenderness: There is no abdominal tenderness.   Musculoskeletal:         General: Normal range of motion.      Cervical back: Normal range of motion and neck supple.      Comments: RUE AVG   thrill    Skin:     General: Skin is warm and dry.      Findings: No erythema.   Neurological:      Mental Status: He is alert and oriented to person, place, and time.      Cranial Nerves: No cranial nerve deficit.   Psychiatric:         Mood and Affect: Affect normal.         Cognition and Memory: Memory normal.         Judgment: Judgment normal.         Laboratory:  Recent Labs   Lab 06/20/22  0307 06/21/22  0123 06/22/22  0554   WBC 9.18 10.06 12.27   HGB 7.8* 7.9* 8.0*   HCT 25.4* 25.2* 25.3*    144* 149*   MONO 10.9  1.0 13.8  1.4* 12.5  1.5*     Recent Labs   Lab 06/20/22  0307 06/21/22  0123 06/22/22  0554    135* 137   K 4.5 4.8 4.6    99 98   CO2 20* 19* 20*   BUN 51* 60* 43*   CREATININE 12.4* 14.7* 10.9*   CALCIUM 8.2* 8.5* 8.2*   PHOS 5.2* 6.3* 5.1*       Diagnostic Results:  X-Ray: Reviewed  US: Reviewed  Echo: Reviewed  ASSESSMENT/PLAN:     1. ESRD on HD TTS with Dr Decker  -- clotted AVG s/p declot - removed Femoral  CVC     -- Daily Renal Function Panel  -- Avoid  Hypotension.  -- Renally dose all meds  -- HD today and tomorrow to place back on schedule       2. HTN (I10) -    3. Anemia of chronic kidney disease treated with ROSALVA (N18.9 D63.1)    EPogen  with each HD - hold for   Hb >10   Recent Labs   Lab 06/20/22  0307 06/21/22  0123 06/22/22  0554   HGB 7.8* 7.9* 8.0*   HCT 25.4* 25.2* 25.3*    144* 149*       Iron   Lab Results   Component Value Date    IRON 63 12/18/2019    TIBC 263 12/18/2019    FERRITIN 6,078 (H) 03/28/2020       4. 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    Lab Results   Component Value Date    .0 (H) 03/04/2020    CALCIUM 8.2 (L) 06/22/2022    PHOS 5.1 (H) 06/22/2022     No results found for: ZLXSBAXC52GM    Lab Results   Component Value Date    CO2 20 (L) 06/22/2022       5. Nutrition/Hypoalbuminemia (E88.09) -    Recent Labs   Lab 06/18/22  1044 06/19/22  0633 06/21/22  0123 06/22/22  0554   LABPROT 12.1  --   --   --    ALBUMIN  --    < > 2.2* 2.2*    < > = values in this interval not displayed.     Nepro with meals TID. Renal vitamins daily      With any question please call answering service [phone]  Laura Ortiz MD    [facility]  ANA MARIA Burns MD, FACP,   MAlize Woo MD,   MD DAMION Gill MD [facility]on, BEBE    [address]  [place], [address]

## 2022-06-22 NOTE — PROGRESS NOTES
Houston Jc #[id number] (CSN: [id number]) (76 y.o. M) (Adm: 06/13/22)  [facility] [id number] A    PCP    CONNOR ESCOBEDO    Date of Birth    1945       Demographics    Address:   [place] [address] [place] [address]    Home Phone:   [phone]    Work Phone:       Mobile Phone:   [phone]              SSN:       Insurance:   [employer]    Marital Status:       Synagogue:   Sikh                  Admission Dx    E87.5 Hyperkalemia   N18.6 ESRD (end stage renal disease)   T82.49XA Clotted dialysis access   I73.9 PAD (peripheral artery disease)   N18.6, Z99.2 ESRD (end stage renal disease) on dialysis   N17.9 CLEMENCIA (acute kidney injury)   T82.898D Problem with dialysis access, subsequent encounter   I82.431 Acute deep vein thrombosis (DVT) of popliteal vein of right lower extremity   E11.22, N18.6, Z99.2, Z79.4 Type 2 diabetes mellitus with chronic kidney disease on chronic dialysis, with long-term current use of insulin   T82.868A Thrombosis of kidney dialysis arteriovenous graft, initial encounter   I70.229 Critical lower limb ischemia       Chief Complaint    Complaint Comment   Vascular Access Problem Pt last dialysis was thurs, unable to complete on sat due to fistula clotted, denies Cp/SOB, cough, fever, n/v        Documents Filed to Patient    Power of  Living Will Clinical Unknown Study Attachment Consent Form ABN Waiver After Visit Summary Lab Result Scan Code Status Patient Portal Status    Not on File  Not on File  Not on File  Not on File  Filed  Not on File  Filed  Not on File  FULL [Updated on 06/13/22 6865]  Active       Admission Information      Current Information    Attending Provider Admitting Provider Admission Type Admission Status   MD Jeison Castaneda MD Emergency Confirmed Admission          Admission Date/Time Discharge Date Hospital Service Auth/Cert Status   06/13/22  11:05 AM  General Surgery Incomplete           Hospital Area Unit Room/Bed    [facility] TELEMETRY UNIT K477/K477 A                Hospital Account    Name Acct ID Class Status Primary Coverage   [place] Jc [id number] IP- Inpatient Open [employer] - [employer]            Guarantor Account (for Hospital Account #[id number])    Name Relation to Pt Service Area Active? Acct Type   [place] Jc Self OHSSA Yes Personal/Family   Address Phone     [address]   [place] [address] [phone](H)              Coverage Information (for Hospital Account #[id number])    F/O Payor/Plan Precert #   [employer] 65    Subscriber Subscriber #   [place] Jc [id number]   Address Phone   [address]   [place] [address] [phone]            Emergency Contact Information    Name: Paulette Jc Relationship: Spouse   Address:     City:  State:  Zip:  Phone: [phone]    Business phone:

## 2022-06-22 NOTE — ASSESSMENT & PLAN NOTE
- nonobstructive CAD  - s/p episodes of VT in the past  - continue to monitor on telemetry  - on BB

## 2022-06-22 NOTE — ASSESSMENT & PLAN NOTE
- wound to RLE; angiogram last week with revascularization 6/20 with atherectomy/PTA/stent to RSFA and PTA to right PT  - poor flow to foot remains due to heavily calcified right AT and PT; pain persists despite pain medication; will place Lidoderm patch to see if this improves pain control; discussed case with Dr. Quintero and Dr. Quintero and DVA felt to be needed- will plan to proceed on Friday; will order vein mapping today to assess patency of lateral medial plantar vein   - on Plavix and IV Heparin; no ASA due to bleeding risk

## 2022-06-22 NOTE — ASSESSMENT & PLAN NOTE
12/21/21    Echo    Interpretation Summary  · The left ventricle is moderately enlarged with mild concentric hypertrophy and  · The quantitatively derived ejection fraction is 34%.  · Severe left atrial enlargement.  · Normal right ventricular size with normal right ventricular systolic function.  · Normal central venous pressure (3 mmHg).  · The estimated PA systolic pressure is 21 mmHg.  · No vegetations per surface echo.    - volume status maintained by HD; HD for ongoing maintenance of euvolemia   - appears well compensated

## 2022-06-23 NOTE — PLAN OF CARE
CM met with pt and wife Paulette ---   advised them that CM is monitoring pt's progress and will assist with all d/c need - per pt - he is due for another procedure tomorrow.       per Cards note:  6/22/2022 Right foot pain persists despite pain medications. HR and BP stable. H&H unchanged. Vein mapping ordered  Still on Heparin gtt        Established HD pt - Shiv Rocha  p  ;  f    --   TN called and spoke with Tila at unit - advised her  that pt will continue with HD post discharge - updated notes routed to her.      pt has undergone a series of interventions to facilitate perfusion of lower extremity   6/20 - s/p  LE angiogram - per notes - remains at high risk for amputation;  heparin gtt      wife Paulette  [phone]       OR declot attempt  6/14;  Trialysis cath placed 6/15;  Fistulagram planned for 6/16 - pt was not NPO - for this procedure today, 6/17       street address:  [address] [place] [address] [place]      pt has a WC, RW and BSC , walk in shower has a built in bench and grab bars- current with home health  - can't recall name - assigned per PHN  - HH agency nurse  dresses his foot wounds      HD T Th Sa at [place]  9:45 am - wife transports pt to HD  - wife transports pt to all apts.        06/23/22 1218   Post-Acute Status   Post-Acute Authorization Home Health   Post-Acute Placement Status   (awaiting medical stability)   Discharge Delays   (awaiting medical stability)

## 2022-06-23 NOTE — PROGRESS NOTES
Nephrology Progress Note       Consult Requested By: Jeison Hunt MD  Reason for Consult: ESRD on HD     SUBJECTIVE:        ?    Review of Systems   Constitutional: Negative for chills and fever.   HENT: Negative for congestion and sore throat.    Eyes: Negative for blurred vision, double vision and photophobia.   Respiratory: Negative for cough and shortness of breath.    Cardiovascular: Negative for chest pain, palpitations and leg swelling.   Gastrointestinal: Negative for abdominal pain, diarrhea, nausea and vomiting.   Genitourinary: Negative for dysuria and urgency.   Musculoskeletal: Negative for joint pain and myalgias.   Skin: Negative for itching and rash.   Neurological: Negative for dizziness, sensory change, weakness and headaches.   Endo/Heme/Allergies: Negative for polydipsia. Does not bruise/bleed easily.   Psychiatric/Behavioral: Negative for depression.       Past Medical History:   Diagnosis Date    Anemia     CHF (congestive heart failure)     CKD (chronic kidney disease) stage 4, GFR 15-29 ml/min     Coronary artery disease     Diabetes mellitus     Hematuria     Hypertension     Osteoarthritis of spine with radiculopathy, cervical region 1/10/2022    Renal cyst, right      Past Surgical History:   Procedure Laterality Date    ANGIOGRAPHY OF ARTERIOVENOUS SHUNT Right 2/11/2022    Procedure: Fistulogram with Possible Intervention;  Surgeon: Dejuan Cody MD;  Location: [facility] CATH LAB/EP;  Service: Cardiology;  Laterality: Right;    ANGIOGRAPHY OF LOWER EXTREMITY Right 6/20/2022    Procedure: Angiogram Extremity Unilateral;  Surgeon: Umesh Quintero MD;  Location: [facility] CATH LAB/EP;  Service: Cardiology;  Laterality: Right;    AORTOGRAPHY WITH SERIALOGRAPHY Right 6/7/2022    Procedure: AORTOGRAM, WITH SERIALOGRAPHY;  Surgeon: Dejuan Cody MD;  Location: [facility] CATH LAB/EP;  Service: Cardiology;  Laterality: Right;    CARDIAC SURGERY      DECLOTTING OF ARTERIOVENOUS GRAFT  Right 12/21/2021    Procedure: [id number];  Surgeon: Jeison Hunt MD;  Location: [facility] OR;  Service: Vascular;  Laterality: Right;    DECLOTTING OF ARTERIOVENOUS GRAFT Right 2/18/2022    Procedure: [id number];  Surgeon: Jeison Hunt MD;  Location: [facility] OR;  Service: Vascular;  Laterality: Right;    DECLOTTING OF ARTERIOVENOUS GRAFT Right 3/9/2022    Procedure: [id number];  Surgeon: Jeison Hunt MD;  Location: [facility] OR;  Service: Vascular;  Laterality: Right;    DECLOTTING OF ARTERIOVENOUS GRAFT Right 6/13/2022    Procedure: [id number]; REVISION OF FISTULA W/INSERTION OF NEW AV GRAFT;  Surgeon: Jeison Hunt MD;  Location: [facility] OR;  Service: Vascular;  Laterality: Right;    FISTULOGRAM N/A 2/11/2022    Procedure: Fistulogram;  Surgeon: Dejuan Cody MD;  Location: [facility] CATH LAB/EP;  Service: Cardiology;  Laterality: N/A;    FISTULOGRAM N/A 6/17/2022    Procedure: Fistulogram;  Surgeon: Dejuan Cody MD;  Location: [facility] CATH LAB/EP;  Service: Cardiology;  Laterality: N/A;    FOOT SURGERY      INSERTION OF BIVENTRICULAR IMPLANTABLE CARDIOVERTER-DEFIBRILLATOR (ICD) Left 7/18/2019    Procedure: INSERTION, ICD, BIVENTRICULAR;  Surgeon: Arturo Bay MD;  Location: [facility] EP LAB;  Service: Cardiology;  Laterality: Left;  CHB, CRTD, SJM, anes, GP, 6078    LEFT HEART CATHETERIZATION N/A 7/16/2019    Procedure: Left heart cath;  Surgeon: Dejuan Cody MD;  Location: [facility] CATH LAB/EP;  Service: Cardiology;  Laterality: N/A;    PERCUTANEOUS TRANSLUMINAL ANGIOPLASTY OF ARTERIOVENOUS FISTULA Right 2/11/2022    Procedure: PTA, AV FISTULA;  Surgeon: Dejuan Cody MD;  Location: [facility] CATH LAB/EP;  Service: Cardiology;  Laterality: Right;    PERCUTANEOUS TRANSLUMINAL ANGIOPLASTY OF ARTERIOVENOUS FISTULA N/A 6/17/2022    Procedure: PTA, AV FISTULA;  Surgeon: Dejuan Cody MD;  Location: [facility] CATH LAB/EP;  Service: Cardiology;  Laterality: N/A;     PERITONEAL CATHETER REMOVAL Left 4/11/2020    Procedure: REMOVAL, CATHETER, DIALYSIS, PERITONEAL;  Surgeon: Edis Snell Jr., MD;  Location: [facility] OR;  Service: General;  Laterality: Left;    PHLEBOGRAPHY  6/15/2022    Procedure: Venogram;  Surgeon: Betsey Mckeon MD;  Location: [facility] CATH LAB/EP;  Service: Cardiology;;    PLACEMENT OF ARTERIOVENOUS GRAFT Right 2/18/2022    Procedure: INSERTION, GRAFT, ARTERIOVENOUS;  Surgeon: Jeison Hunt MD;  Location: [facility] OR;  Service: Vascular;  Laterality: Right;    REVISION OF PROCEDURE INVOLVING ARTERIOVENOUS GRAFT Right 2/18/2022    Procedure: REVISION, PROCEDURE INVOLVING ARTERIOVENOUS GRAFT;  Surgeon: Jeison Hunt MD;  Location: [facility] OR;  Service: Vascular;  Laterality: Right;     Family History   Problem Relation Age of Onset    Heart attack Father      Social History     Tobacco Use    Smoking status: Former Smoker    Smokeless tobacco: Never Used   Substance Use Topics    Alcohol use: Yes     Comment: social    Drug use: No       Review of patient's allergies indicates:  No Known Allergies         OBJECTIVE:     Vital Signs (Most Recent)  Vitals:    06/23/22 0536 06/23/22 0908 06/23/22 0909 06/23/22 1028   BP: (!) 109/49   (!) 113/55   BP Location: Left arm   Left arm   Patient Position: Lying   Lying   Pulse: 71   93   Resp: 16  18 18   Temp: 97.9 °F (36.6 °C)   98.7 °F (37.1 °C)   TempSrc: Oral   Oral   SpO2: 98% 98%  98%   Weight:       Height:                     Medications:   sodium chloride 0.9%   Intravenous Once    atorvastatin  80 mg Oral Daily    clopidogreL  75 mg Oral Daily    docusate sodium  100 mg Oral BID    epoetin hayden-epbx  10,000 Units Subcutaneous Every Tues, Thurs, Sat    furosemide (LASIX) injection  80 mg Intravenous Once    LIDOcaine  1 patch Transdermal Q24H    metoprolol tartrate  12.5 mg Oral BID    sevelamer carbonate  2,400 mg Oral TID WM    sodium citrate-citric acid 500-334 mg/5 ml  30 mL Oral  Once    sodium polystyrene  30 g Oral Once    sodium zirconium cyclosilicate  10 g Oral Once    sodium zirconium cyclosilicate  10 g Oral Once           Physical Exam  Vitals and nursing note reviewed.   Constitutional:       General: He is not in acute distress.     Appearance: He is not diaphoretic.   HENT:      Head: Normocephalic and atraumatic.      Mouth/Throat:      Pharynx: No oropharyngeal exudate.   Eyes:      General: No scleral icterus.     Conjunctiva/sclera: Conjunctivae normal.      Pupils: Pupils are equal, round, and reactive to light.   Cardiovascular:      Rate and Rhythm: Normal rate and regular rhythm.      Heart sounds: Normal heart sounds. No murmur heard.  Pulmonary:      Effort: Pulmonary effort is normal. No respiratory distress.      Breath sounds: Normal breath sounds.   Abdominal:      General: Bowel sounds are normal. There is no distension.      Palpations: Abdomen is soft.      Tenderness: There is no abdominal tenderness.   Musculoskeletal:         General: Normal range of motion.      Cervical back: Normal range of motion and neck supple.      Comments: RUE AVG   thrill    Skin:     General: Skin is warm and dry.      Findings: No erythema.   Neurological:      Mental Status: He is alert and oriented to person, place, and time.      Cranial Nerves: No cranial nerve deficit.   Psychiatric:         Mood and Affect: Affect normal.         Cognition and Memory: Memory normal.         Judgment: Judgment normal.         Laboratory:  Recent Labs   Lab 06/21/22  0123 06/22/22  0554 06/23/22  0305   WBC 10.06 12.27 11.02   HGB 7.9* 8.0* 8.2*   HCT 25.2* 25.3* 26.4*   * 149* 151   MONO 13.8  1.4* 12.5  1.5* 12.5  1.4*     Recent Labs   Lab 06/21/22  0123 06/22/22  0554 06/23/22  0305   * 137 139   K 4.8 4.6 4.5   CL 99 98 98   CO2 19* 20* 23   BUN 60* 43* 29*   CREATININE 14.7* 10.9* 7.8*   CALCIUM 8.5* 8.2* 8.4*   PHOS 6.3* 5.1* 4.6*       Diagnostic Results:  X-Ray:  Reviewed  US: Reviewed  Echo: Reviewed  ASSESSMENT/PLAN:     1. ESRD on HD TTS with Dr Decker  -- clotted AVG s/p declot - removed Femoral  CVC     -- Daily Renal Function Panel  -- Avoid Hypotension.  -- Renally dose all meds  -- HD  Today but patient did yesterday and wishes to hold off till Saturday to be back on his TTS schedule labs acceptable ok to hold.       2. HTN (I10) -    3. Anemia of chronic kidney disease treated with ROSALVA (N18.9 D63.1)    EPogen  with each HD - hold for   Hb >10   Recent Labs   Lab 06/21/22  0123 06/22/22  0554 06/23/22  0305   HGB 7.9* 8.0* 8.2*   HCT 25.2* 25.3* 26.4*   * 149* 151       Iron   Lab Results   Component Value Date    IRON 63 12/18/2019    TIBC 263 12/18/2019    FERRITIN 6,078 (H) 03/28/2020       4. MBD (E88.9 M90.80) -  Recent Labs   Lab 06/23/22  0305   CALCIUM 8.4*   PHOS 4.6*     No results for input(s): MG in the last 168 hours.    Lab Results   Component Value Date    .0 (H) 03/04/2020    CALCIUM 8.4 (L) 06/23/2022    PHOS 4.6 (H) 06/23/2022     No results found for: [id number]    Lab Results   Component Value Date    CO2 23 06/23/2022       5. Nutrition/Hypoalbuminemia (E88.09) -    Recent Labs   Lab 06/18/22  1044 06/19/22  0633 06/22/22  0554 06/23/22  0305   LABPROT 12.1  --   --   --    ALBUMIN  --    < > 2.2* 2.3*    < > = values in this interval not displayed.     Nepro with meals TID. Renal vitamins daily      With any question please call answering service [phone]  Laura Ortiz MD    [facility]  ANA MARIA Burns MD, FACP,   KEREN Woo MD,   OMD DAMION Fields MD E. V. Harmon, NP    [address]  [place], [id number]

## 2022-06-23 NOTE — PROGRESS NOTES
"Surgery follow up  BP (!) 113/52 (BP Location: Left arm, Patient Position: Lying)   Pulse 96   Temp 99.1 °F (37.3 °C) (Oral)   Resp 16   Ht 6' 1" (1.854 m)   Wt 118.2 kg (260 lb 9.3 oz)   SpO2 98%   BMI 34.38 kg/m²   I/O last 3 completed shifts:  In: 350 [P.O.:250; Other:100]  Out: 2908 [Other:2908]  No intake/output data recorded.  Right arm fistula working well  Left femoral catheter removed, pressure bandage applied, continue present treatment.  "

## 2022-06-24 NOTE — PLAN OF CARE
Bedside report in room 477 to Rosenda---nurse; Heparin drip resumed at previous rate per pump on arrival to room in his hospital bed; pts spouse in room and updated and stent card given to pts spouse;iv patent left a/c; bilateral groin drsgs cdi and soft and no bleeding nor hematoma noted; doppler pulses audible and no change to right DP/PT; right leg still heavy from the nerve block per pt; meal tray at bedside; bed locked and low; fall socks in place; belongings bag to wife w/ socks and underwear; call bell in reach; meal try with po fluid to bedside; tele box monitor on pt

## 2022-06-24 NOTE — PLAN OF CARE
Chart reviewed - chart reviewed     Pt undergoing another procedure today:  successful RPTA to PTV DVA performed 6/24    pt has PHN - will resume HH at d/c - pt can't remember name of agency     Established HD pt - Shiv Rocha  p  ;  f    --   TN called and spoke with Tila at unit - advised her  that pt will continue with HD post discharge - updated notes routed to her.      pt has undergone a series of interventions to facilitate perfusion of lower extremity   6/20 - s/p  LE angiogram - per notes - remains at high risk for amputation;  heparin gtt      wife Paulette         OR declot attempt  6/14;  Trialysis cath placed 6/15;  Fistulagram planned for 6/16 - pt was not NPO - for this procedure today, 6/17       street address:  [address] [place] [address] [place]      pt has a WC, RW and BSC , walk in shower has a built in bench and grab bars- current with home health  - can't recall name - assigned per PHN  - HH agency nurse  dresses his foot wounds      HD T Th Sa at [facility] [place]  9:45 am - wife transports pt to HD  - wife transports pt to all apts.    Future Appointments   Date Time Provider Department Center   7/12/2022 10:00 AM HOME MONITOR DEVICE CHECK, [facility]               06/24/22 0326   Post-Acute Status   Post-Acute Authorization Home Health   Post-Acute Placement Status   (HH per PHN)   Home Health Status   (No HH orders - d/c pending)   Diaylsis Status   (established HD pt - Sh[facility] Pitt)   Discharge Delays (!) Other  (pending medical stability)   Discharge Plan   Discharge Plan A Home;Home with family;Home Health

## 2022-06-24 NOTE — NURSING
Pt off the unit to cath lab via bed with pt escort. NADN upon leaving the unit. Heparin gtt stopped. 20g in left upper arm flushed and saline locked.

## 2022-06-24 NOTE — PLAN OF CARE
Patient transfered to cath lab recovery [place] 4 via stretcher with side rails up x2. Pt AAOx4 and able to follow commands. Pt is stable when connecting to cardiac monitors. MALISSA BRINK. Report received at bedside from Sravanthi ORANTES RN cath lab to this RN and Valerie BRUNO RN.     Pt with unit PRBCs infusing per MD orders. Restarted in recovery area. Accucheck = 137.    Right arterial groin site with gauze and tegaderm in place is CDI. No redness, bruising, or hematoma noted around site.     Left venous groin site with gauze and tegaderm / sheath in place is CDI. No redness, bruising, or hematoma noted around site.     Dopplered alverto pedal pulses and +2 alverto radial pulses. Skin is normal in color, warm to touch, < 3 sec cap refill.    Fall risk precautions given and patient acknowledges. AIDET completed to pt. Dr. Quintero to bedside to assess pt post procedure. Will continue to monitor patient for safety and needs until transfer back to 4th floor room 477.

## 2022-06-24 NOTE — NURSING
2nd unit blood completed and ns flush to line in progress; iv maintained left a/c; pt resting quietly; awaiting transport to bring pt in bed to room; tele box at bedside

## 2022-06-24 NOTE — PROGRESS NOTES
"Surgery follow up  BP (!) 106/53   Pulse 63   Temp 98 °F (36.7 °C)   Resp 14   Ht 6' 1" (1.854 m)   Wt 118.2 kg (260 lb 9.3 oz)   SpO2 97%   BMI 34.38 kg/m²   I/O last 3 completed shifts:  In: 250 [P.O.:250]  Out: -   I/O this shift:  In: 277.9 [Blood:277.9]  Out: -         successful peripheral arterial procedure  Agree with treatment plan.  "

## 2022-06-24 NOTE — PROGRESS NOTES
Nephrology Progress Note       Consult Requested By: Jeison Hunt MD  Reason for Consult: ESRD on HD     SUBJECTIVE:        ?    Review of Systems   Constitutional: Negative for chills and fever.   HENT: Negative for congestion and sore throat.    Eyes: Negative for blurred vision, double vision and photophobia.   Respiratory: Negative for cough and shortness of breath.    Cardiovascular: Negative for chest pain, palpitations and leg swelling.   Gastrointestinal: Negative for abdominal pain, diarrhea, nausea and vomiting.   Genitourinary: Negative for dysuria and urgency.   Musculoskeletal: Negative for joint pain and myalgias.   Skin: Negative for itching and rash.   Neurological: Negative for dizziness, sensory change, weakness and headaches.   Endo/Heme/Allergies: Negative for polydipsia. Does not bruise/bleed easily.   Psychiatric/Behavioral: Negative for depression.       Past Medical History:   Diagnosis Date    Anemia     CHF (congestive heart failure)     CKD (chronic kidney disease) stage 4, GFR 15-29 ml/min     Coronary artery disease     Diabetes mellitus     Hematuria     Hypertension     Osteoarthritis of spine with radiculopathy, cervical region 1/10/2022    Renal cyst, right      Past Surgical History:   Procedure Laterality Date    ANGIOGRAPHY OF ARTERIOVENOUS SHUNT Right 2/11/2022    Procedure: Fistulogram with Possible Intervention;  Surgeon: Dejuan Cody MD;  Location: [facility] CATH LAB/EP;  Service: Cardiology;  Laterality: Right;    ANGIOGRAPHY OF LOWER EXTREMITY Right 6/20/2022    Procedure: Angiogram Extremity Unilateral;  Surgeon: Umesh Quintero MD;  Location: [facility] CATH LAB/EP;  Service: Cardiology;  Laterality: Right;    AORTOGRAPHY WITH SERIALOGRAPHY Right 6/7/2022    Procedure: AORTOGRAM, WITH SERIALOGRAPHY;  Surgeon: Dejuan Cody MD;  Location: [facility] CATH LAB/EP;  Service: Cardiology;  Laterality: Right;    CARDIAC SURGERY      DECLOTTING OF ARTERIOVENOUS GRAFT  Right 12/21/2021    Procedure: [id number];  Surgeon: Jeison Hunt MD;  Location: [facility] OR;  Service: Vascular;  Laterality: Right;    DECLOTTING OF ARTERIOVENOUS GRAFT Right 2/18/2022    Procedure: [id number];  Surgeon: Jeison Hunt MD;  Location: [facility] OR;  Service: Vascular;  Laterality: Right;    DECLOTTING OF ARTERIOVENOUS GRAFT Right 3/9/2022    Procedure: [id number];  Surgeon: Jeison Hunt MD;  Location: [facility] OR;  Service: Vascular;  Laterality: Right;    DECLOTTING OF ARTERIOVENOUS GRAFT Right 6/13/2022    Procedure: [id number]; REVISION OF FISTULA W/INSERTION OF NEW AV GRAFT;  Surgeon: Jeison Hunt MD;  Location: [facility] OR;  Service: Vascular;  Laterality: Right;    FISTULOGRAM N/A 2/11/2022    Procedure: Fistulogram;  Surgeon: Dejuan Cody MD;  Location: [facility] CATH LAB/EP;  Service: Cardiology;  Laterality: N/A;    FISTULOGRAM N/A 6/17/2022    Procedure: Fistulogram;  Surgeon: Dejuan Cody MD;  Location: [facility] CATH LAB/EP;  Service: Cardiology;  Laterality: N/A;    FOOT SURGERY      INSERTION OF BIVENTRICULAR IMPLANTABLE CARDIOVERTER-DEFIBRILLATOR (ICD) Left 7/18/2019    Procedure: INSERTION, ICD, BIVENTRICULAR;  Surgeon: Arturo Bay MD;  Location: [facility] EP LAB;  Service: Cardiology;  Laterality: Left;  CHB, CRTD, SJM, anes, GP, 6078    LEFT HEART CATHETERIZATION N/A 7/16/2019    Procedure: Left heart cath;  Surgeon: Dejuan Cody MD;  Location: [facility] CATH LAB/EP;  Service: Cardiology;  Laterality: N/A;    PERCUTANEOUS TRANSLUMINAL ANGIOPLASTY OF ARTERIOVENOUS FISTULA Right 2/11/2022    Procedure: PTA, AV FISTULA;  Surgeon: Dejuan Cody MD;  Location: [facility] CATH LAB/EP;  Service: Cardiology;  Laterality: Right;    PERCUTANEOUS TRANSLUMINAL ANGIOPLASTY OF ARTERIOVENOUS FISTULA N/A 6/17/2022    Procedure: PTA, AV FISTULA;  Surgeon: Dejuan Cody MD;  Location: [facility] CATH LAB/EP;  Service: Cardiology;  Laterality: N/A;     PERITONEAL CATHETER REMOVAL Left 4/11/2020    Procedure: REMOVAL, CATHETER, DIALYSIS, PERITONEAL;  Surgeon: Edis Snell Jr., MD;  Location: [facility] OR;  Service: General;  Laterality: Left;    PHLEBOGRAPHY  6/15/2022    Procedure: Venogram;  Surgeon: Betsey Mckeon MD;  Location: [facility] CATH LAB/EP;  Service: Cardiology;;    PLACEMENT OF ARTERIOVENOUS GRAFT Right 2/18/2022    Procedure: INSERTION, GRAFT, ARTERIOVENOUS;  Surgeon: Jeison Hunt MD;  Location: [facility] OR;  Service: Vascular;  Laterality: Right;    REVISION OF PROCEDURE INVOLVING ARTERIOVENOUS GRAFT Right 2/18/2022    Procedure: REVISION, PROCEDURE INVOLVING ARTERIOVENOUS GRAFT;  Surgeon: Jeison Hunt MD;  Location: [facility] OR;  Service: Vascular;  Laterality: Right;     Family History   Problem Relation Age of Onset    Heart attack Father      Social History     Tobacco Use    Smoking status: Former Smoker    Smokeless tobacco: Never Used   Substance Use Topics    Alcohol use: Yes     Comment: social    Drug use: No       Review of patient's allergies indicates:  No Known Allergies         OBJECTIVE:     Vital Signs (Most Recent)  Vitals:    06/24/22 0915 06/24/22 0920 06/24/22 0925 06/24/22 0930   BP: (!) 103/54 (!) 96/55  (!) 92/53   BP Location: Left arm Left arm  Left arm   Patient Position:       Pulse: 66 66 67 69   Resp: 17 15 13 13   Temp:       TempSrc:       SpO2: 100% 100% 98% 95%   Weight:       Height:                     Medications:   sodium chloride 0.9%   Intravenous Once    atorvastatin  80 mg Oral Daily    clopidogreL  75 mg Oral Daily    docusate sodium  100 mg Oral BID    epoetin hayden-epbx  10,000 Units Subcutaneous Every Tues, Thurs, Sat    furosemide (LASIX) injection  80 mg Intravenous Once    LIDOcaine  1 patch Transdermal Q24H    metoprolol tartrate  12.5 mg Oral BID    nitroGLYCERIN 2% TD oint  1 inch Topical (Top) Once    sevelamer carbonate  2,400 mg Oral TID WM    sodium citrate-citric  acid 500-334 mg/5 ml  30 mL Oral Once    sodium polystyrene  30 g Oral Once    sodium zirconium cyclosilicate  10 g Oral Once    sodium zirconium cyclosilicate  10 g Oral Once           Physical Exam  Vitals and nursing note reviewed.   Constitutional:       General: He is not in acute distress.     Appearance: He is not diaphoretic.   HENT:      Head: Normocephalic and atraumatic.      Mouth/Throat:      Pharynx: No oropharyngeal exudate.   Eyes:      General: No scleral icterus.     Conjunctiva/sclera: Conjunctivae normal.      Pupils: Pupils are equal, round, and reactive to light.   Cardiovascular:      Rate and Rhythm: Normal rate and regular rhythm.      Heart sounds: Normal heart sounds. No murmur heard.  Pulmonary:      Effort: Pulmonary effort is normal. No respiratory distress.      Breath sounds: Normal breath sounds.   Abdominal:      General: Bowel sounds are normal. There is no distension.      Palpations: Abdomen is soft.      Tenderness: There is no abdominal tenderness.   Musculoskeletal:         General: Normal range of motion.      Cervical back: Normal range of motion and neck supple.      Comments: RUE AVG   thrill    Skin:     General: Skin is warm and dry.      Findings: No erythema.   Neurological:      Mental Status: He is alert and oriented to person, place, and time.      Cranial Nerves: No cranial nerve deficit.   Psychiatric:         Mood and Affect: Affect normal.         Cognition and Memory: Memory normal.         Judgment: Judgment normal.         Laboratory:  Recent Labs   Lab 06/22/22  0554 06/23/22  0305 06/24/22  0308   WBC 12.27 11.02 11.82  11.82   HGB 8.0* 8.2* 7.8*  7.8*   HCT 25.3* 26.4* 24.2*  24.2*   * 151 152  152   MONO 12.5  1.5* 12.5  1.4* 10.9  10.9  1.3*  1.3*     Recent Labs   Lab 06/21/22  0123 06/22/22  0554 06/23/22  0305 06/24/22  0308   * 137 139 137   K 4.8 4.6 4.5 4.2   CL 99 98 98 96   CO2 19* 20* 23 27   BUN 60* 43* 29* 39*    CREATININE 14.7* 10.9* 7.8* 9.6*   CALCIUM 8.5* 8.2* 8.4* 8.7   PHOS 6.3* 5.1* 4.6*  --        Diagnostic Results:  X-Ray: Reviewed  US: Reviewed  Echo: Reviewed  ASSESSMENT/PLAN:     1. ESRD on HD TTS with Dr Decker  -- clotted AVG s/p declot - removed Femoral  CVC     -- Daily Renal Function Panel  -- Avoid Hypotension.  -- Renally dose all meds  -- HD  Saturday to be back on his TTS schedule       2. HTN (I10) -    3. Anemia of chronic kidney disease treated with ROSALVA (N18.9 D63.1)    EPogen  with each HD - hold for   Hb >10   Recent Labs   Lab 06/22/22  0554 06/23/22  0305 06/24/22  0308   HGB 8.0* 8.2* 7.8*  7.8*   HCT 25.3* 26.4* 24.2*  24.2*   * 151 152  152       Iron   Lab Results   Component Value Date    IRON 63 12/18/2019    TIBC 263 12/18/2019    FERRITIN 6,078 (H) 03/28/2020       4. MBD (E88.9 M90.80) -  Recent Labs   Lab 06/23/22  0305 06/24/22  0308   CALCIUM 8.4* 8.7   PHOS 4.6*  --      No results for input(s): MG in the last 168 hours.    Lab Results   Component Value Date    .0 (H) 03/04/2020    CALCIUM 8.7 06/24/2022    PHOS 4.6 (H) 06/23/2022     No results found for: [id number]    Lab Results   Component Value Date    CO2 27 06/24/2022       5. Nutrition/Hypoalbuminemia (E88.09) -    Recent Labs   Lab 06/18/22  1044 06/19/22  0633 06/22/22  0554 06/23/22  0305   LABPROT 12.1  --   --   --    ALBUMIN  --    < > 2.2* 2.3*    < > = values in this interval not displayed.     Nepro with meals TID. Renal vitamins daily      With any question please call answering service [phone]  Laura Ortiz MD    [facility]  ANA MARIA Burns MD, FACKEREN BUCKLEY MD,   MD DAMION Gill MD E. V. Harmon, NP    [address]  [place], [address]

## 2022-06-24 NOTE — PLAN OF CARE
Pt prepared for nerve block in cath lab pre/post and right leg was marked per dr ernst krueger and consented; anesthesia at bedside   3

## 2022-06-24 NOTE — NURSING
Pt resting quietly in bed and no change in assessment; sinus/sinus arrhythmia/occ pacer spike/occ pvc on monitor noted w/ a wide complex; Hand off telephone report to nurse Herzog for pt admit bed 477; pt tele box and chart at bedside; pt socks and underwear at bedside; pt has general weakness; radial and pedal pulses audible bilat w/ doppler; iv access patent left a/c area and bruising noted and was present earlier pre procedure; right upper arm dialysis access w/ audible bruit; pt easily aroused and has no complaints; meal at bedside; 2nd unit blood transfusing per pump w/o issue; pt in hospital bed and rails up and bed low and locked; bilat groin access sites soft and drsg cdi and no hematoma nor bleeding noted; no visitor present at this time; will continue to monitor; nurse aware to restart heparin drip post transfusion same rate

## 2022-06-24 NOTE — NURSING
Spoke to Dr Quintero about Heparin drip restart time since second unit blood is transfusing via iv access left a/c--- and order given to restart heparin drip after the blood transfusion at same rate

## 2022-06-24 NOTE — PROCEDURES
: Umesh Quintero MD  Pre-procedure diagnosis: CLI  Post-procedure diagnosis: PAD    Post Procedure Note: Peripheral DVA    The pt was brought to the cath lab and under sterile technique, RSFA antegrade access and retrograde lateral plantar vein access were obtained without difficulty. Images were obtained in multiple views and successful RPTA to PTV DVA performed with excellent results. Please see full report for details. The pt tolerated the procedure well without complications. Perclose closure device used RSFA access and balloon tamponade vein access with successful hemostasis.     Vitals:    06/24/22 1322 06/24/22 1340 06/24/22 1343 06/24/22 1400   BP: (!) 106/56 (!) 110/53 (!) 113/58 (!) 106/53   BP Location:       Patient Position:       Pulse: 71 77 70 63   Resp: 12 12 12 14   Temp:    98 °F (36.7 °C)   TempSrc:       SpO2: 100% 97% 98% 97%   Weight:       Height:           Gen: NAD  Ext: 2+ fem pulse, no evidence of hematoma  Estimated blood loss: < 50 cc    Plan:  -Post cath care per protocol  -Plavix/heparin tonight then switch to Plavix DOAC tomorrow (Eliquis 2.5 mg BID tomorrow AM and D/C heparin)  -Will D/C antihypertensives for now given low BP. Prefer higher BP for DVA and will start Midodrine (Metoprolol discontinued)

## 2022-06-24 NOTE — INTERVAL H&P NOTE
The patient has been examined and the H&P has been reviewed:        Risks, benefits and alternatives of peripheral catheterization and possible intervention were discussed with the patient. All questions were answered and informed consent obtained.     I discussed the importance of compliance with dual antiplatelet therapy with the patient to prevent acute or late stent thrombosis with premature discontinuation of the therapy.        He is here today for revascularization of right foot     He will have a DVA for limb salvage in the setting on no-option CLTI        Active Hospital Problems    Diagnosis  POA    *Problem with dialysis access [T82.898A]  Yes    Hyperkalemia [E87.5]  Yes    Critical lower limb ischemia [I70.229]  Yes    Type 2 diabetes mellitus with chronic kidney disease on chronic dialysis, with long-term current use of insulin [E11.22, N18.6, Z99.2, Z79.4]  Not Applicable    PAD (peripheral artery disease) [I73.9]  Yes    Acute deep vein thrombosis (DVT) of popliteal vein of right lower extremity [I82.431]  Yes    ESRD (end stage renal disease) on dialysis [N18.6, Z99.2]  Not Applicable     Tu Th Sat          Chronic combined systolic and diastolic congestive heart failure [I50.42]  Yes    Cardiac resynchronization therapy defibrillator (CRT-D) in place [Z95.810]  Yes    Complete heart block [I44.2]  Yes    Morbid obesity [E66.01]  Yes    Hypertension [I10]  Yes      Resolved Hospital Problems   No resolved problems to display.

## 2022-06-24 NOTE — NURSING
Pt back to unit. Care resumed. Pt AAOx4, respirations even and unlabored. Pulses palpable to RLE with doppler. Bilateral groin access assessed, free of bruising, bleeding or hematoma. Heparin gtt restarted per MD order.

## 2022-06-25 NOTE — PROCEDURES
"Patient seen and examined on HD tolerating well. No complains.   /67   Pulse 63   Temp 97.4 °F (36.3 °C)   Resp 19   Ht 6' 1" (1.854 m)   Wt 117.9 kg (259 lb 14.8 oz)   SpO2 95%   BMI 34.29 kg/m²     With any question please call answering service [phone]  Laura Ortiz MD    [facility]  ANA MARIA Burns MD, FACKEREN BUCKLEY MD,   MD DAMION Gill MD E. V. Harmon, NP    [address]  [place], [address]  "

## 2022-06-25 NOTE — PROGRESS NOTES
"Surgery follow up  /62   Pulse 60   Temp 97.4 °F (36.3 °C)   Resp 19   Ht 6' 1" (1.854 m)   Wt 117.9 kg (259 lb 14.8 oz)   SpO2 95%   BMI 34.29 kg/m²   I/O last 3 completed shifts:  In: 856.3 [P.O.:300; Blood:556.3]  Out: 0   No intake/output data recorded.      Fistula working well    Doing better.  "

## 2022-06-25 NOTE — PLAN OF CARE
Problem: Adult Inpatient Plan of Care  Goal: Plan of Care Review  Outcome: Ongoing, Progressing  Goal: Patient-Specific Goal (Individualized)  Outcome: Ongoing, Progressing  Goal: Absence of Hospital-Acquired Illness or Injury  Outcome: Ongoing, Progressing  Goal: Optimal Comfort and Wellbeing  Outcome: Ongoing, Progressing     Problem: Renal Function Impairment (Acute Kidney Injury/Impairment)  Goal: Effective Renal Function  Outcome: Ongoing, Progressing     Problem: Fall Injury Risk  Goal: Absence of Fall and Fall-Related Injury  Outcome: Ongoing, Progressing     Problem: Fluid and Electrolyte Imbalance (Surgery Nonspecified)  Goal: Fluid and Electrolyte Balance  Outcome: Ongoing, Progressing     Problem: Pain (Surgery Nonspecified)  Goal: Acceptable Pain Control  Outcome: Ongoing, Progressing

## 2022-06-25 NOTE — ASSESSMENT & PLAN NOTE
- wound to RLE; angiogram last week with revascularization 6/20 with atherectomy/PTA/stent to RSFA and PTA to right PT  - poor flow to foot remains due to heavily calcified right AT and PT; pain persists despite pain medication; will place Lidoderm patch to see if this improves pain control;   - pt s/p DVA of RLE 6/24/2022, allow for permissive HTN (continue midodrine and hold BB)  - on Plavix and d/c heparin gtt and start apixaban 2.5 mg BID

## 2022-06-25 NOTE — PROGRESS NOTES
06/25/22 1400   Vital Signs   Temp 97.6 °F (36.4 °C)   Pulse 66   Resp 18   /67   During Hemodialysis Assessment   Blood Flow Rate (mL/min) 300 mL/min   Dialysate Flow Rate (mL/min) 600 ml/min   Ultrafiltration Rate (mL/Hr) 719 mL/Hr   Arteriovenous Lines Secure Yes   Arterial Pressure (mmHg) -133 mmHg   Venous Pressure (mmHg) 319   UF Removed (mL) 2148 mL   TMP 40   Venous Line in Air Detector Yes   Intake (mL) 0 mL   Transducer Dry Yes   Access Visible Yes   Post-Hemodialysis Assessment   Rinseback Volume (mL) 250 mL   Blood Volume Processed (Liters) 71.9 L   Dialyzer Clearance Lightly streaked   Duration of Treatment 180 minutes   Additional Fluid Intake (mL) 500 mL   Total UF (mL) 2135 mL   Net Fluid Removal 1635   Patient Response to Treatment Pt responded well, he was calm, watched tv   Arterial bleeding stop time (min) 2 min   Venous bleeding stop time (min) 2 min

## 2022-06-25 NOTE — PROGRESS NOTES
[place] - Telemetry  Cardiology  Progress Note    Patient Name: Houston Jc  MRN: [id number]  Admission Date: 6/13/2022  Hospital Length of Stay: 9 days  Code Status: Full Code   Attending Physician: Jeison Hunt MD   Primary Care Physician: Zak Hamilton MD  Expected Discharge Date: 6/15/2022  Principal Problem:Problem with dialysis access    Subjective:     Hospital Course:   06/14/2022 Per HPI   6/20/2022 NPO for LE angiogram today   6/21/2022 LE angiogram yesterday with following results:     successful atherectomy with PTA and stent to RSFA and PTA to RTPT and peroneal with excellent results  Poor flow into the foot and heavily calcified AT/PT.  Heparin/gtt per current treatment regimen  Continue aggressive wound care  Remains high risk for amputation and may consider DVA    Tolerated procedure well and recovered on telemetry floor. Access site stable. Complains of pain to right foot unchanged from admission. HR and BP stable. H&H 7.9/25.2 not far off baseline. Plan for HD today      6/22/2022 Right foot pain persists despite pain medications. HR and Bp stable. H&H unchanged. Vein mapping ordered      Interval History: Pt notes RLE pain but denies L groin pain.  There were no other reports overnight.      Review of Systems   Constitutional: Negative for diaphoresis and fever.   HENT:  Negative for congestion and hearing loss.    Eyes:  Negative for blurred vision and pain.   Cardiovascular:  Negative for chest pain, claudication, dyspnea on exertion, leg swelling, near-syncope, palpitations and syncope.   Respiratory:  Negative for shortness of breath and sleep disturbances due to breathing.    Hematologic/Lymphatic: Negative for bleeding problem. Does not bruise/bleed easily.   Skin:  Positive for poor wound healing. Negative for color change.   Gastrointestinal:  Negative for abdominal pain and nausea.   Genitourinary:  Negative for bladder incontinence and flank pain.   Neurological:  Negative for  focal weakness and light-headedness.   Objective:     Vital Signs (Most Recent):  Temp: 97.8 °F (36.6 °C) (06/25/22 0651)  Pulse: 70 (06/25/22 0651)  Resp: 19 (06/25/22 0857)  BP: (!) 110/58 (06/25/22 0651)  SpO2: 100 % (06/25/22 0651)   Vital Signs (24h Range):  Temp:  [96.3 °F (35.7 °C)-99 °F (37.2 °C)] 97.8 °F (36.6 °C)  Pulse:  [59-78] 70  Resp:  [12-20] 19  SpO2:  [90 %-100 %] 100 %  BP: ()/(47-66) 110/58     Weight: 117.9 kg (259 lb 14.8 oz)  Body mass index is 34.29 kg/m².     SpO2: 100 %  O2 Device (Oxygen Therapy): nasal cannula      Intake/Output Summary (Last 24 hours) at 6/25/2022 1011  Last data filed at 6/25/2022 0700  Gross per 24 hour   Intake 706.25 ml   Output 0 ml   Net 706.25 ml       Lines/Drains/Airways       Central Venous Catheter Line  Duration                  Hemodialysis AV Graft Right forearm -- days    Trialysis (Dialysis) Catheter 06/15/22 left femoral 10 days              Peripheral Intravenous Line  Duration                  Peripheral IV - Single Lumen 06/24/22 2325 18 G Anterior;Left Forearm <1 day                    Physical Exam  Constitutional:       Appearance: He is well-developed. He is obese. He is not diaphoretic.   HENT:      Head: Normocephalic and atraumatic.   Eyes:      General: No scleral icterus.     Pupils: Pupils are equal, round, and reactive to light.   Neck:      Vascular: No JVD.   Cardiovascular:      Rate and Rhythm: Normal rate and regular rhythm.      Pulses: Intact distal pulses.           Femoral pulses are 2+ on the left side.       Dorsalis pedis pulses are 2+ on the right side.        Posterior tibial pulses are 2+ on the right side.      Heart sounds: S1 normal and S2 normal. No murmur heard.    No friction rub. No gallop.   Pulmonary:      Effort: Pulmonary effort is normal. No respiratory distress.      Breath sounds: Normal breath sounds. No wheezing or rales.   Chest:      Chest wall: No tenderness.   Abdominal:      General: Bowel sounds  are normal. There is no distension.      Palpations: Abdomen is soft. There is no mass.      Tenderness: There is no abdominal tenderness. There is no rebound.   Musculoskeletal:         General: Tenderness present. Normal range of motion.      Cervical back: Normal range of motion and neck supple.      Comments: RLE TTP, L groin access site C/D/I w/o hematoma.    Skin:     General: Skin is warm and dry.      Coloration: Skin is not pale.   Neurological:      Mental Status: He is alert and oriented to person, place, and time.      Coordination: Coordination normal.      Deep Tendon Reflexes: Reflexes normal.   Psychiatric:         Behavior: Behavior normal.         Judgment: Judgment normal.       Significant Labs: BMP:   Recent Labs   Lab 06/24/22  0308   *      K 4.2   CL 96   CO2 27   BUN 39*   CREATININE 9.6*   CALCIUM 8.7   , CMP   Recent Labs   Lab 06/24/22  0308      K 4.2   CL 96   CO2 27   *   BUN 39*   CREATININE 9.6*   CALCIUM 8.7   ANIONGAP 14   ESTGFRAFRICA 6*   EGFRNONAA 5*   , CBC   Recent Labs   Lab 06/24/22 0308 06/25/22  0423   WBC 11.82  11.82 12.66   HGB 7.8*  7.8* 10.8*   HCT 24.2*  24.2* 33.3*     152 145*   , INR No results for input(s): INR, PROTIME in the last 48 hours., Lipid Panel No results for input(s): CHOL, HDL, LDLCALC, TRIG, CHOLHDL in the last 48 hours., Troponin No results for input(s): TROPONINI in the last 48 hours., and All pertinent lab results from the last 24 hours have been reviewed.    Significant Imaging: Echocardiogram: 2D echo with color flow doppler:   Results for orders placed or performed during the hospital encounter of 09/18/15   2D echo with color flow doppler   Result Value Ref Range    EF + QEF 50 55 - 65    Diastolic Dysfunction Yes (A)     Est. PA Systolic Pressure 8.57     Mitral Valve Mobility NORMAL     Tricuspid Valve Regurgitation TRIVIAL     Narrative    TEST DESCRIPTION   Technical Quality: This is a technically  adequate study.     Aorta: The aortic root is normal in size, measuring 3.9 cm at sinotubular junction.     Left Atrium: The left atrial volume index is severely enlarged, measuring 49.98 cc/m2.     Left Ventricle: The left ventricle is normal in size, with an end-diastolic diameter of 6.2 cm, and an end-systolic diameter of 4.5 cm. LV wall thickness is normal, with the septum measuring 1.5 cm and the posterior wall measuring 1.7 cm across. Relative   wall thickness was increased at 0.55, and the LV mass index was increased at 229.0 g/m2 consistent with concentric left ventricular hypertrophy. Global left ventricular systolic function appears low normal to mildly depressed. Visually estimated   ejection fraction is 50-55%. The LV Doppler derived stroke volume equals 52.0 ccs.   The E/e'(lat) is 9.  This along with the following abnormalities (ERIC = 49.98 and LVMI = 229.00) suggests significant diastolic dysfunction.     Right Atrium: The right atrium is normal in size, measuring 5.3 cm in length in the apical view.     Right Ventricle: The right ventricle is normal in size measuring 3.0 cm at the base in the apical right ventricle-focused view. Global right ventricular systolic function appears normal. The estimated PA systolic pressure is 9 mmHg.     Aortic Valve:  The aortic valve is normal in structure with normal leaflet mobility. The aortic valve is tri-leaflet in structure.     Mitral Valve:  The mitral valve is normal in structure with normal leaflet mobility.     Tricuspid Valve:  The tricuspid valve is normal in structure with normal leaflet mobility. There is trivial tricuspid regurgitation.     Pulmonary Valve:  The pulmonic valve is not well seen.     IVC: IVC is normal in size and collapses > 50% with a sniff, suggesting normal right atrial pressure of 3 mmHg.     Atrial Septum: The atrial septum is intact.     Intracavitary: There is no evidence of pericardial effusion, intracavity mass, thrombi, or  vegetation.         CONCLUSIONS     1 - Low normal to mildly depressed left ventricular systolic function (EF 50-55%).     2 - Concentric hypertrophy.     3 - Severe left atrial enlargement.     4 - Left ventricular diastolic dysfunction.     5 - Normal right ventricular systolic function .     6 - The estimated PA systolic pressure is 9 mmHg.         This document has been electronically    SIGNED BY: Milagros Gross MD On: 09/18/2015 16:15    and Transthoracic echo (TTE) complete (Cupid Only):   Results for orders placed or performed during the hospital encounter of 12/21/21   Echo   Result Value Ref Range    Ascending aorta 3.30 cm    STJ 3.27 cm    AV mean gradient 4 mmHg    Ao peak son 1.17 m/s    Ao VTI 25.29 cm    IVS 1.61 (A) 0.6 - 1.1 cm    LA size 5.11 cm    Left Atrium Major Axis 7.34 cm    Left Atrium Minor Axis 6.11 cm    LVIDd 6.60 (A) 3.5 - 6.0 cm    LVIDs 5.24 (A) 2.1 - 4.0 cm    LVOT diameter 2.89 cm    LVOT peak VTI 17.22 cm    Posterior Wall 1.66 (A) 0.6 - 1.1 cm    MV Peak A Son 1.03 m/s    E wave deceleration time 128.00 msec    MV Peak E Son 0.75 m/s    RA Major Axis 5.72 cm    RA Width 3.45 cm    RVDD 3.28 cm    TAPSE 1.72 cm    TR Max Son 2.14 m/s    TDI LATERAL 0.04 m/s    LA WIDTH 4.32 cm    Ao root annulus 4.28 cm    PV PEAK VELOCITY 1.04 cm/s    MV stenosis pressure 1/2 time 37.12 ms    LV Diastolic Volume 223.81 mL    LV Systolic Volume 131.64 mL    LVOT peak son 0.79 m/s    LA volume (mod) 85.57 cm3    MV mean gradient 1 mmHg    MV peak gradient 4 mmHg    RV S' 9.54 cm/s    MV VTI 18.13 cm    LV LATERAL E/E' RATIO 18.75 m/s    FS 21 %    LA volume 125.13 cm3    LV mass 561.38 g    Left Ventricle Relative Wall Thickness 0.50 cm    AV valve area 4.46 cm2    AV Velocity Ratio 0.68     AV index (prosthetic) 0.68     MV valve area p 1/2 method 5.93 cm2    MV valve area by continuity eq 6.23 cm2    E/A ratio 0.73     LVOT area 6.6 cm2    LVOT stroke volume 112.90 cm3    AV peak gradient 5 mmHg     LV Systolic Volume Index 53.5 mL/m2    LV Diastolic Volume Index 90.98 mL/m2    LA Volume Index 50.9 mL/m2    LV Mass Index 228 g/m2    Triscuspid Valve Regurgitation Peak Gradient 18 mmHg    LA Volume Index (Mod) 34.8 mL/m2    BSA 2.53 m2    Right Atrial Pressure (from IVC) 3 mmHg    QEF 34 %    TV rest pulmonary artery pressure 21 mmHg    Narrative    · The left ventricle is moderately enlarged with mild concentric   hypertrophy and  · The quantitatively derived ejection fraction is 34%.  · Severe left atrial enlargement.  · Normal right ventricular size with normal right ventricular systolic   function.  · Normal central venous pressure (3 mmHg).  · The estimated PA systolic pressure is 21 mmHg.  · No vegetations per surface echo.      , EKG: reviewed., and X-Ray: CXR: X-Ray Chest 1 View (CXR): No results found for this visit on 06/13/22. and X-Ray Chest PA and Lateral (CXR): No results found for this visit on 06/13/22.    Assessment and Plan:       * Problem with dialysis access  - fistulogram last week with successful HD  - HD per renal    Hyperkalemia  Nephrology on board       Critical lower limb ischemia  S/p DVA 6/24/22    Acute deep vein thrombosis (DVT) of popliteal vein of right lower extremity  - Eliquis resume apixaban 2.5 mg BID     PAD (peripheral artery disease)  - wound to RLE; angiogram last week with revascularization 6/20 with atherectomy/PTA/stent to RSFA and PTA to right PT  - poor flow to foot remains due to heavily calcified right AT and PT; pain persists despite pain medication; will place Lidoderm patch to see if this improves pain control;   - pt s/p DVA of RLE 6/24/2022, allow for permissive HTN (continue midodrine and hold BB)  - on Plavix and d/c heparin gtt and start apixaban 2.5 mg BID    Chronic combined systolic and diastolic congestive heart failure  12/21/21    Echo    Interpretation Summary  · The left ventricle is moderately enlarged with mild concentric hypertrophy and  ·  The quantitatively derived ejection fraction is 34%.  · Severe left atrial enlargement.  · Normal right ventricular size with normal right ventricular systolic function.  · Normal central venous pressure (3 mmHg).  · The estimated PA systolic pressure is 21 mmHg.  · No vegetations per surface echo.    - volume status maintained by HD; HD for ongoing maintenance of euvolemia   - appears well compensated     Cardiac resynchronization therapy defibrillator (CRT-D) in place  - nonobstructive CAD  - s/p episodes of VT in the past  - continue to monitor on telemetry      Complete heart block  -s/p CRT-D    Morbid obesity  -weight loss encouraged     Primary hypertension  - SBP 80s-100s  - hold BB and allow permissive HTN (continue midodrine for now)         VTE Risk Mitigation (From admission, onward)         Ordered     heparin infusion 1,000 units/500 ml in 0.9% NaCl (pressure line flush)  Intra-op continuous PRN         06/20/22 1311     heparin 25,000 units in dextrose 5% (100 units/ml) IV bolus from bag - ADDITIONAL PRN BOLUS - 60 units/kg  As needed (PRN)        Question:  Heparin Infusion Adjustment (DO NOT MODIFY ANSWER)  Answer:  \\[email or web address]\epic\Images\Pharmacy\HeparinInfusions\heparin HIGH INTENSITY nomogram for [facility] [id number]    06/18/22 1026     heparin 25,000 units in dextrose 5% (100 units/ml) IV bolus from bag - ADDITIONAL PRN BOLUS - 30 units/kg  As needed (PRN)        Question:  Heparin Infusion Adjustment (DO NOT MODIFY ANSWER)  Answer:  \\[email or web address]\epic\Images\Pharmacy\HeparinInfusions\heparin HIGH INTENSITY nomogram for [facility] [id number]    06/18/22 1026     heparin 25,000 units in dextrose 5% 250 mL (100 units/mL) infusion HIGH INTENSITY nomogram - [facility]  Continuous        Question Answer Comment   Heparin Infusion Adjustment (DO NOT MODIFY ANSWER) \\[email or web address]\epic\Images\Pharmacy\HeparinInfusions\heparin HIGH INTENSITY nomogram for [facility] [id number]    Begin at (in units/kg/hr) 18        06/18/22 1026      heparin infusion 1,000 units/500 ml in 0.9% NaCl (pressure line flush)  Intra-op continuous PRN         06/17/22 1106     heparin infusion 1,000 units/500 ml in 0.9% NaCl (pressure line flush)  Intra-op continuous PRN         06/15/22 1326     Place sequential compression device  Until discontinued         06/14/22 0214     Place TJ hose  Until discontinued         06/14/22 0214                Stone Ferguson III, MD  Cardiology  [place] - [facility]

## 2022-06-25 NOTE — SUBJECTIVE & OBJECTIVE
Interval History: Pt notes RLE pain but denies L groin pain.  There were no other reports overnight.      Review of Systems   Constitutional: Negative for diaphoresis and fever.   HENT:  Negative for congestion and hearing loss.    Eyes:  Negative for blurred vision and pain.   Cardiovascular:  Negative for chest pain, claudication, dyspnea on exertion, leg swelling, near-syncope, palpitations and syncope.   Respiratory:  Negative for shortness of breath and sleep disturbances due to breathing.    Hematologic/Lymphatic: Negative for bleeding problem. Does not bruise/bleed easily.   Skin:  Positive for poor wound healing. Negative for color change.   Gastrointestinal:  Negative for abdominal pain and nausea.   Genitourinary:  Negative for bladder incontinence and flank pain.   Neurological:  Negative for focal weakness and light-headedness.   Objective:     Vital Signs (Most Recent):  Temp: 97.8 °F (36.6 °C) (06/25/22 0651)  Pulse: 70 (06/25/22 0651)  Resp: 19 (06/25/22 0857)  BP: (!) 110/58 (06/25/22 0651)  SpO2: 100 % (06/25/22 0651)   Vital Signs (24h Range):  Temp:  [96.3 °F (35.7 °C)-99 °F (37.2 °C)] 97.8 °F (36.6 °C)  Pulse:  [59-78] 70  Resp:  [12-20] 19  SpO2:  [90 %-100 %] 100 %  BP: ()/(47-66) 110/58     Weight: 117.9 kg (259 lb 14.8 oz)  Body mass index is 34.29 kg/m².     SpO2: 100 %  O2 Device (Oxygen Therapy): nasal cannula      Intake/Output Summary (Last 24 hours) at 6/25/2022 1011  Last data filed at 6/25/2022 0700  Gross per 24 hour   Intake 706.25 ml   Output 0 ml   Net 706.25 ml       Lines/Drains/Airways       Central Venous Catheter Line  Duration                  Hemodialysis AV Graft Right forearm -- days    Trialysis (Dialysis) Catheter 06/15/22 left femoral 10 days              Peripheral Intravenous Line  Duration                  Peripheral IV - Single Lumen 06/24/22 2325 18 G Anterior;Left Forearm <1 day                    Physical Exam  Constitutional:       Appearance: He is  well-developed. He is obese. He is not diaphoretic.   HENT:      Head: Normocephalic and atraumatic.   Eyes:      General: No scleral icterus.     Pupils: Pupils are equal, round, and reactive to light.   Neck:      Vascular: No JVD.   Cardiovascular:      Rate and Rhythm: Normal rate and regular rhythm.      Pulses: Intact distal pulses.           Femoral pulses are 2+ on the left side.       Dorsalis pedis pulses are 2+ on the right side.        Posterior tibial pulses are 2+ on the right side.      Heart sounds: S1 normal and S2 normal. No murmur heard.    No friction rub. No gallop.   Pulmonary:      Effort: Pulmonary effort is normal. No respiratory distress.      Breath sounds: Normal breath sounds. No wheezing or rales.   Chest:      Chest wall: No tenderness.   Abdominal:      General: Bowel sounds are normal. There is no distension.      Palpations: Abdomen is soft. There is no mass.      Tenderness: There is no abdominal tenderness. There is no rebound.   Musculoskeletal:         General: Tenderness present. Normal range of motion.      Cervical back: Normal range of motion and neck supple.      Comments: RLE TTP, L groin access site C/D/I w/o hematoma.    Skin:     General: Skin is warm and dry.      Coloration: Skin is not pale.   Neurological:      Mental Status: He is alert and oriented to person, place, and time.      Coordination: Coordination normal.      Deep Tendon Reflexes: Reflexes normal.   Psychiatric:         Behavior: Behavior normal.         Judgment: Judgment normal.       Significant Labs: BMP:   Recent Labs   Lab 06/24/22  0308   *      K 4.2   CL 96   CO2 27   BUN 39*   CREATININE 9.6*   CALCIUM 8.7   , CMP   Recent Labs   Lab 06/24/22  0308      K 4.2   CL 96   CO2 27   *   BUN 39*   CREATININE 9.6*   CALCIUM 8.7   ANIONGAP 14   ESTGFRAFRICA 6*   EGFRNONAA 5*   , CBC   Recent Labs   Lab 06/24/22  0308 06/25/22  0423   WBC 11.82  11.82 12.66   HGB 7.8*  7.8*  10.8*   HCT 24.2*  24.2* 33.3*     152 145*   , INR No results for input(s): INR, PROTIME in the last 48 hours., Lipid Panel No results for input(s): CHOL, HDL, LDLCALC, TRIG, CHOLHDL in the last 48 hours., Troponin No results for input(s): TROPONINI in the last 48 hours., and All pertinent lab results from the last 24 hours have been reviewed.    Significant Imaging: Echocardiogram: 2D echo with color flow doppler:   Results for orders placed or performed during the hospital encounter of 09/18/15   2D echo with color flow doppler   Result Value Ref Range    EF + QEF 50 55 - 65    Diastolic Dysfunction Yes (A)     Est. PA Systolic Pressure 8.57     Mitral Valve Mobility NORMAL     Tricuspid Valve Regurgitation TRIVIAL     Narrative    TEST DESCRIPTION   Technical Quality: This is a technically adequate study.     Aorta: The aortic root is normal in size, measuring 3.9 cm at sinotubular junction.     Left Atrium: The left atrial volume index is severely enlarged, measuring 49.98 cc/m2.     Left Ventricle: The left ventricle is normal in size, with an end-diastolic diameter of 6.2 cm, and an end-systolic diameter of 4.5 cm. LV wall thickness is normal, with the septum measuring 1.5 cm and the posterior wall measuring 1.7 cm across. Relative   wall thickness was increased at 0.55, and the LV mass index was increased at 229.0 g/m2 consistent with concentric left ventricular hypertrophy. Global left ventricular systolic function appears low normal to mildly depressed. Visually estimated   ejection fraction is 50-55%. The LV Doppler derived stroke volume equals 52.0 ccs.   The E/e'(lat) is 9.  This along with the following abnormalities (ERIC = 49.98 and LVMI = 229.00) suggests significant diastolic dysfunction.     Right Atrium: The right atrium is normal in size, measuring 5.3 cm in length in the apical view.     Right Ventricle: The right ventricle is normal in size measuring 3.0 cm at the base in the apical  right ventricle-focused view. Global right ventricular systolic function appears normal. The estimated PA systolic pressure is 9 mmHg.     Aortic Valve:  The aortic valve is normal in structure with normal leaflet mobility. The aortic valve is tri-leaflet in structure.     Mitral Valve:  The mitral valve is normal in structure with normal leaflet mobility.     Tricuspid Valve:  The tricuspid valve is normal in structure with normal leaflet mobility. There is trivial tricuspid regurgitation.     Pulmonary Valve:  The pulmonic valve is not well seen.     IVC: IVC is normal in size and collapses > 50% with a sniff, suggesting normal right atrial pressure of 3 mmHg.     Atrial Septum: The atrial septum is intact.     Intracavitary: There is no evidence of pericardial effusion, intracavity mass, thrombi, or vegetation.         CONCLUSIONS     1 - Low normal to mildly depressed left ventricular systolic function (EF 50-55%).     2 - Concentric hypertrophy.     3 - Severe left atrial enlargement.     4 - Left ventricular diastolic dysfunction.     5 - Normal right ventricular systolic function .     6 - The estimated PA systolic pressure is 9 mmHg.         This document has been electronically    SIGNED BY: Milagros Gross MD On: 09/18/2015 16:15    and Transthoracic echo (TTE) complete (Cupid Only):   Results for orders placed or performed during the hospital encounter of 12/21/21   Echo   Result Value Ref Range    Ascending aorta 3.30 cm    STJ 3.27 cm    AV mean gradient 4 mmHg    Ao peak son 1.17 m/s    Ao VTI 25.29 cm    IVS 1.61 (A) 0.6 - 1.1 cm    LA size 5.11 cm    Left Atrium Major Axis 7.34 cm    Left Atrium Minor Axis 6.11 cm    LVIDd 6.60 (A) 3.5 - 6.0 cm    LVIDs 5.24 (A) 2.1 - 4.0 cm    LVOT diameter 2.89 cm    LVOT peak VTI 17.22 cm    Posterior Wall 1.66 (A) 0.6 - 1.1 cm    MV Peak A Son 1.03 m/s    E wave deceleration time 128.00 msec    MV Peak E Son 0.75 m/s    RA Major Axis 5.72 cm    RA Width 3.45 cm     RVDD 3.28 cm    TAPSE 1.72 cm    TR Max Son 2.14 m/s    TDI LATERAL 0.04 m/s    LA WIDTH 4.32 cm    Ao root annulus 4.28 cm    PV PEAK VELOCITY 1.04 cm/s    MV stenosis pressure 1/2 time 37.12 ms    LV Diastolic Volume 223.81 mL    LV Systolic Volume 131.64 mL    LVOT peak son 0.79 m/s    LA volume (mod) 85.57 cm3    MV mean gradient 1 mmHg    MV peak gradient 4 mmHg    RV S' 9.54 cm/s    MV VTI 18.13 cm    LV LATERAL E/E' RATIO 18.75 m/s    FS 21 %    LA volume 125.13 cm3    LV mass 561.38 g    Left Ventricle Relative Wall Thickness 0.50 cm    AV valve area 4.46 cm2    AV Velocity Ratio 0.68     AV index (prosthetic) 0.68     MV valve area p 1/2 method 5.93 cm2    MV valve area by continuity eq 6.23 cm2    E/A ratio 0.73     LVOT area 6.6 cm2    LVOT stroke volume 112.90 cm3    AV peak gradient 5 mmHg    LV Systolic Volume Index 53.5 mL/m2    LV Diastolic Volume Index 90.98 mL/m2    LA Volume Index 50.9 mL/m2    LV Mass Index 228 g/m2    Triscuspid Valve Regurgitation Peak Gradient 18 mmHg    LA Volume Index (Mod) 34.8 mL/m2    BSA 2.53 m2    Right Atrial Pressure (from IVC) 3 mmHg    QEF 34 %    TV rest pulmonary artery pressure 21 mmHg    Narrative    · The left ventricle is moderately enlarged with mild concentric   hypertrophy and  · The quantitatively derived ejection fraction is 34%.  · Severe left atrial enlargement.  · Normal right ventricular size with normal right ventricular systolic   function.  · Normal central venous pressure (3 mmHg).  · The estimated PA systolic pressure is 21 mmHg.  · No vegetations per surface echo.      , EKG: reviewed., and X-Ray: CXR: X-Ray Chest 1 View (CXR): No results found for this visit on 06/13/22. and X-Ray Chest PA and Lateral (CXR): No results found for this visit on 06/13/22.

## 2022-06-26 NOTE — PROGRESS NOTES
[facility] - Telemetry  Cardiology  Progress Note    Patient Name: Houston Jc  MRN: [id number]  Admission Date: 6/13/2022  Hospital Length of Stay: 10 days  Code Status: Full Code   Attending Physician: Jeison Hunt MD   Primary Care Physician: Zak Hamilton MD  Expected Discharge Date: 6/15/2022  Principal Problem:Problem with dialysis access    Subjective:     Hospital Course:   06/14/2022 Per HPI   6/20/2022 NPO for LE angiogram today   6/21/2022 LE angiogram yesterday with following results:     successful atherectomy with PTA and stent to RSFA and PTA to RTPT and peroneal with excellent results  Poor flow into the foot and heavily calcified AT/PT.  Heparin/gtt per current treatment regimen  Continue aggressive wound care  Remains high risk for amputation and may consider DVA    Tolerated procedure well and recovered on telemetry floor. Access site stable. Complains of pain to right foot unchanged from admission. HR and BP stable. H&H 7.9/25.2 not far off baseline. Plan for HD today      6/22/2022 Right foot pain persists despite pain medications. HR and Bp stable. H&H unchanged. Vein mapping ordered      Interval History: Pt states that his pain is improving.  He mentions that he has been having a better day and denies groin pain.  There were no other reports overnight.      Review of Systems   Constitutional: Negative for diaphoresis and fever.   HENT:  Negative for congestion and hearing loss.    Eyes:  Negative for blurred vision and pain.   Cardiovascular:  Negative for chest pain, claudication, dyspnea on exertion, leg swelling, near-syncope, palpitations and syncope.   Respiratory:  Negative for shortness of breath and sleep disturbances due to breathing.    Hematologic/Lymphatic: Negative for bleeding problem. Does not bruise/bleed easily.   Skin:  Positive for poor wound healing. Negative for color change.   Gastrointestinal:  Negative for abdominal pain and nausea.   Genitourinary:  Negative  for bladder incontinence and flank pain.   Neurological:  Negative for focal weakness and light-headedness.   Objective:     Vital Signs (Most Recent):  Temp: 97.3 °F (36.3 °C) (06/26/22 0531)  Pulse: 85 (06/26/22 0854)  Resp: 18 (06/26/22 0914)  BP: (!) 112/56 (06/26/22 0531)  SpO2: 97 % (06/26/22 0854)   Vital Signs (24h Range):  Temp:  [97.1 °F (36.2 °C)-98.6 °F (37 °C)] 97.3 °F (36.3 °C)  Pulse:  [60-96] 85  Resp:  [16-19] 18  SpO2:  [94 %-100 %] 97 %  BP: ()/(52-78) 112/56     Weight: 117.9 kg (259 lb 14.8 oz)  Body mass index is 34.29 kg/m².     SpO2: 97 %  O2 Device (Oxygen Therapy): room air      Intake/Output Summary (Last 24 hours) at 6/26/2022 0923  Last data filed at 6/26/2022 0531  Gross per 24 hour   Intake 500 ml   Output 2185 ml   Net -1685 ml       Lines/Drains/Airways       Central Venous Catheter Line  Duration                  Hemodialysis AV Graft Right forearm -- days    Trialysis (Dialysis) Catheter 06/15/22 left femoral 11 days              Peripheral Intravenous Line  Duration                  Peripheral IV - Single Lumen 06/24/22 2325 18 G Anterior;Left Forearm 1 day                    Physical Exam  Constitutional:       Appearance: He is well-developed. He is obese. He is not diaphoretic.   HENT:      Head: Normocephalic and atraumatic.   Eyes:      General: No scleral icterus.     Pupils: Pupils are equal, round, and reactive to light.   Neck:      Vascular: No JVD.   Cardiovascular:      Rate and Rhythm: Normal rate and regular rhythm.      Pulses: Intact distal pulses.      Heart sounds: S1 normal and S2 normal. No murmur heard.    No friction rub. No gallop.   Pulmonary:      Effort: Pulmonary effort is normal. No respiratory distress.      Breath sounds: Normal breath sounds. No wheezing or rales.   Chest:      Chest wall: No tenderness.   Abdominal:      General: Bowel sounds are normal. There is no distension.      Palpations: Abdomen is soft. There is no mass.       Tenderness: There is no abdominal tenderness. There is no rebound.   Musculoskeletal:         General: Tenderness present. Normal range of motion.      Cervical back: Normal range of motion and neck supple.      Comments: 5/10 TTP of RLE    Skin:     General: Skin is warm and dry.      Coloration: Skin is not pale.   Neurological:      Mental Status: He is alert and oriented to person, place, and time.      Coordination: Coordination normal.      Deep Tendon Reflexes: Reflexes normal.   Psychiatric:         Behavior: Behavior normal.         Judgment: Judgment normal.       Significant Labs: BMP:   Recent Labs   Lab 06/25/22  1101   *      K 4.9   CL 97   CO2 24   BUN 54*   CREATININE 11.0*   CALCIUM 8.9   , CMP   Recent Labs   Lab 06/25/22  1101      K 4.9   CL 97   CO2 24   *   BUN 54*   CREATININE 11.0*   CALCIUM 8.9   ALBUMIN 2.4*   ANIONGAP 15   ESTGFRAFRICA 5*   EGFRNONAA 4*   , CBC   Recent Labs   Lab 06/25/22  0423 06/25/22  1105   WBC 12.66 14.81*   HGB 10.8* 9.3*   HCT 33.3* 28.5*   * 156   , INR No results for input(s): INR, PROTIME in the last 48 hours., Lipid Panel No results for input(s): CHOL, HDL, LDLCALC, TRIG, CHOLHDL in the last 48 hours., Troponin No results for input(s): TROPONINI in the last 48 hours., and All pertinent lab results from the last 24 hours have been reviewed.    Significant Imaging: Echocardiogram: 2D echo with color flow doppler:   Results for orders placed or performed during the hospital encounter of 09/18/15   2D echo with color flow doppler   Result Value Ref Range    EF + QEF 50 55 - 65    Diastolic Dysfunction Yes (A)     Est. PA Systolic Pressure 8.57     Mitral Valve Mobility NORMAL     Tricuspid Valve Regurgitation TRIVIAL     Narrative    TEST DESCRIPTION   Technical Quality: This is a technically adequate study.     Aorta: The aortic root is normal in size, measuring 3.9 cm at sinotubular junction.     Left Atrium: The left atrial  volume index is severely enlarged, measuring 49.98 cc/m2.     Left Ventricle: The left ventricle is normal in size, with an end-diastolic diameter of 6.2 cm, and an end-systolic diameter of 4.5 cm. LV wall thickness is normal, with the septum measuring 1.5 cm and the posterior wall measuring 1.7 cm across. Relative   wall thickness was increased at 0.55, and the LV mass index was increased at 229.0 g/m2 consistent with concentric left ventricular hypertrophy. Global left ventricular systolic function appears low normal to mildly depressed. Visually estimated   ejection fraction is 50-55%. The LV Doppler derived stroke volume equals 52.0 ccs.   The E/e'(lat) is 9.  This along with the following abnormalities (ERIC = 49.98 and LVMI = 229.00) suggests significant diastolic dysfunction.     Right Atrium: The right atrium is normal in size, measuring 5.3 cm in length in the apical view.     Right Ventricle: The right ventricle is normal in size measuring 3.0 cm at the base in the apical right ventricle-focused view. Global right ventricular systolic function appears normal. The estimated PA systolic pressure is 9 mmHg.     Aortic Valve:  The aortic valve is normal in structure with normal leaflet mobility. The aortic valve is tri-leaflet in structure.     Mitral Valve:  The mitral valve is normal in structure with normal leaflet mobility.     Tricuspid Valve:  The tricuspid valve is normal in structure with normal leaflet mobility. There is trivial tricuspid regurgitation.     Pulmonary Valve:  The pulmonic valve is not well seen.     IVC: IVC is normal in size and collapses > 50% with a sniff, suggesting normal right atrial pressure of 3 mmHg.     Atrial Septum: The atrial septum is intact.     Intracavitary: There is no evidence of pericardial effusion, intracavity mass, thrombi, or vegetation.         CONCLUSIONS     1 - Low normal to mildly depressed left ventricular systolic function (EF 50-55%).     2 - Concentric  hypertrophy.     3 - Severe left atrial enlargement.     4 - Left ventricular diastolic dysfunction.     5 - Normal right ventricular systolic function .     6 - The estimated PA systolic pressure is 9 mmHg.         This document has been electronically    SIGNED BY: Milagros Gross MD On: 09/18/2015 16:15    and Transthoracic echo (TTE) complete (Cupid Only):   Results for orders placed or performed during the hospital encounter of 12/21/21   Echo   Result Value Ref Range    Ascending aorta 3.30 cm    STJ 3.27 cm    AV mean gradient 4 mmHg    Ao peak son 1.17 m/s    Ao VTI 25.29 cm    IVS 1.61 (A) 0.6 - 1.1 cm    LA size 5.11 cm    Left Atrium Major Axis 7.34 cm    Left Atrium Minor Axis 6.11 cm    LVIDd 6.60 (A) 3.5 - 6.0 cm    LVIDs 5.24 (A) 2.1 - 4.0 cm    LVOT diameter 2.89 cm    LVOT peak VTI 17.22 cm    Posterior Wall 1.66 (A) 0.6 - 1.1 cm    MV Peak A Son 1.03 m/s    E wave deceleration time 128.00 msec    MV Peak E Son 0.75 m/s    RA Major Axis 5.72 cm    RA Width 3.45 cm    RVDD 3.28 cm    TAPSE 1.72 cm    TR Max Son 2.14 m/s    TDI LATERAL 0.04 m/s    LA WIDTH 4.32 cm    Ao root annulus 4.28 cm    PV PEAK VELOCITY 1.04 cm/s    MV stenosis pressure 1/2 time 37.12 ms    LV Diastolic Volume 223.81 mL    LV Systolic Volume 131.64 mL    LVOT peak son 0.79 m/s    LA volume (mod) 85.57 cm3    MV mean gradient 1 mmHg    MV peak gradient 4 mmHg    RV S' 9.54 cm/s    MV VTI 18.13 cm    LV LATERAL E/E' RATIO 18.75 m/s    FS 21 %    LA volume 125.13 cm3    LV mass 561.38 g    Left Ventricle Relative Wall Thickness 0.50 cm    AV valve area 4.46 cm2    AV Velocity Ratio 0.68     AV index (prosthetic) 0.68     MV valve area p 1/2 method 5.93 cm2    MV valve area by continuity eq 6.23 cm2    E/A ratio 0.73     LVOT area 6.6 cm2    LVOT stroke volume 112.90 cm3    AV peak gradient 5 mmHg    LV Systolic Volume Index 53.5 mL/m2    LV Diastolic Volume Index 90.98 mL/m2    LA Volume Index 50.9 mL/m2    LV Mass Index 228 g/m2     Triscuspid Valve Regurgitation Peak Gradient 18 mmHg    LA Volume Index (Mod) 34.8 mL/m2    BSA 2.53 m2    Right Atrial Pressure (from IVC) 3 mmHg    QEF 34 %    TV rest pulmonary artery pressure 21 mmHg    Narrative    · The left ventricle is moderately enlarged with mild concentric   hypertrophy and  · The quantitatively derived ejection fraction is 34%.  · Severe left atrial enlargement.  · Normal right ventricular size with normal right ventricular systolic   function.  · Normal central venous pressure (3 mmHg).  · The estimated PA systolic pressure is 21 mmHg.  · No vegetations per surface echo.      , EKG: reviewed, and X-Ray: CXR: X-Ray Chest 1 View (CXR): No results found for this visit on 06/13/22. and X-Ray Chest PA and Lateral (CXR): No results found for this visit on 06/13/22.    Assessment and Plan:       * Problem with dialysis access  - fistulogram last week with successful HD  - HD per renal    Hyperkalemia  Nephrology on board       Critical lower limb ischemia  S/p DVA 6/24/22    Acute deep vein thrombosis (DVT) of popliteal vein of right lower extremity  - resumed apixaban 2.5 mg BID     PAD (peripheral artery disease)  - wound to RLE; angiogram last week with revascularization 6/20 with atherectomy/PTA/stent to RSFA and PTA to right PT  - poor flow to foot remains due to heavily calcified right AT and PT; pain persists despite pain medication; will place Lidoderm patch to see if this improves pain control;   - pt s/p DVA of RLE 6/24/2022, allow for permissive HTN (continue midodrine and hold BB)  - on Plavix and continue apixaban 2.5 mg BID    Chronic combined systolic and diastolic congestive heart failure  12/21/21    Echo    Interpretation Summary  · The left ventricle is moderately enlarged with mild concentric hypertrophy and  · The quantitatively derived ejection fraction is 34%.  · Severe left atrial enlargement.  · Normal right ventricular size with normal right ventricular systolic  function.  · Normal central venous pressure (3 mmHg).  · The estimated PA systolic pressure is 21 mmHg.  · No vegetations per surface echo.    - volume status maintained by HD; HD for ongoing maintenance of euvolemia   - appears well compensated     Cardiac resynchronization therapy defibrillator (CRT-D) in place  - nonobstructive CAD  - s/p episodes of VT in the past  - continue to monitor on telemetry      Complete heart block  -s/p CRT-D    Morbid obesity  -weight loss encouraged     Primary hypertension  - SBP 90s-110s  - hold BB and allow permissive HTN (continue midodrine for now)         VTE Risk Mitigation (From admission, onward)         Ordered     apixaban tablet 2.5 mg  2 times daily         06/25/22 1020     heparin infusion 1,000 units/500 ml in 0.9% NaCl (pressure line flush)  Intra-op continuous PRN         06/20/22 1311     heparin infusion 1,000 units/500 ml in 0.9% NaCl (pressure line flush)  Intra-op continuous PRN         06/17/22 1106     heparin infusion 1,000 units/500 ml in 0.9% NaCl (pressure line flush)  Intra-op continuous PRN         06/15/22 1326     Place sequential compression device  Until discontinued         06/14/22 0214     Place TJ hose  Until discontinued         06/14/22 0214                Stone Ferguson III, MD  Cardiology  [facility] - Telemetry

## 2022-06-26 NOTE — PLAN OF CARE
Recommendation:  1. Reinitiate renal diet as medically acceptable and tolerated.   2. Addition of Novasource Renal BID to supplement protein/energy intake.   3. Monitor weight/labs.   4. RD to follow and monitor intake    Goals:   Pt to receive nutrition by RD follow up    Nutrition Goal Status: new  Communication of RD Recs: other (comment) (POC)      Problem: Oral Intake Inadequate (Acute Kidney Injury/Impairment)  Goal: Optimal Nutrition Intake  Outcome: Ongoing, Progressing

## 2022-06-26 NOTE — PROGRESS NOTES
Nephrology Progress Note       Consult Requested By: Jeison Hunt MD  Reason for Consult: ESRD on HD     SUBJECTIVE:        ?    Review of Systems   Constitutional: Negative for chills and fever.   HENT: Negative for congestion and sore throat.    Eyes: Negative for blurred vision, double vision and photophobia.   Respiratory: Negative for cough and shortness of breath.    Cardiovascular: Negative for chest pain, palpitations and leg swelling.   Gastrointestinal: Negative for abdominal pain, diarrhea, nausea and vomiting.   Genitourinary: Negative for dysuria and urgency.   Musculoskeletal: Negative for joint pain and myalgias.   Skin: Negative for itching and rash.   Neurological: Negative for dizziness, sensory change, weakness and headaches.   Endo/Heme/Allergies: Negative for polydipsia. Does not bruise/bleed easily.   Psychiatric/Behavioral: Negative for depression.       Past Medical History:   Diagnosis Date    Anemia     CHF (congestive heart failure)     CKD (chronic kidney disease) stage 4, GFR 15-29 ml/min     Coronary artery disease     Diabetes mellitus     Hematuria     Hypertension     Osteoarthritis of spine with radiculopathy, cervical region 1/10/2022    Renal cyst, right      Past Surgical History:   Procedure Laterality Date    ANGIOGRAPHY OF ARTERIOVENOUS SHUNT Right 2/11/2022    Procedure: Fistulogram with Possible Intervention;  Surgeon: Dejuan Cody MD;  Location: [facility] CATH LAB/EP;  Service: Cardiology;  Laterality: Right;    ANGIOGRAPHY OF LOWER EXTREMITY Right 6/20/2022    Procedure: Angiogram Extremity Unilateral;  Surgeon: Umesh Quintero MD;  Location: [facility] CATH LAB/EP;  Service: Cardiology;  Laterality: Right;    AORTOGRAPHY WITH SERIALOGRAPHY Right 6/7/2022    Procedure: AORTOGRAM, WITH SERIALOGRAPHY;  Surgeon: Dejuan oCdy MD;  Location: [facility] CATH LAB/EP;  Service: Cardiology;  Laterality: Right;    CARDIAC SURGERY      DECLOTTING OF ARTERIOVENOUS GRAFT  Right 12/21/2021    Procedure: [id number];  Surgeon: Jeison Hunt MD;  Location: [facility] OR;  Service: Vascular;  Laterality: Right;    DECLOTTING OF ARTERIOVENOUS GRAFT Right 2/18/2022    Procedure: [id number];  Surgeon: Jeison Hunt MD;  Location: [facility] OR;  Service: Vascular;  Laterality: Right;    DECLOTTING OF ARTERIOVENOUS GRAFT Right 3/9/2022    Procedure: [id number];  Surgeon: Jeison Hunt MD;  Location: [facility] OR;  Service: Vascular;  Laterality: Right;    DECLOTTING OF ARTERIOVENOUS GRAFT Right 6/13/2022    Procedure: [id number]; REVISION OF FISTULA W/INSERTION OF NEW AV GRAFT;  Surgeon: Jeison Hunt MD;  Location: [facility] OR;  Service: Vascular;  Laterality: Right;    FISTULOGRAM N/A 2/11/2022    Procedure: Fistulogram;  Surgeon: Dejuan Cody MD;  Location: [facility] CATH LAB/EP;  Service: Cardiology;  Laterality: N/A;    FISTULOGRAM N/A 6/17/2022    Procedure: Fistulogram;  Surgeon: Dejuan Cody MD;  Location: [facility] CATH LAB/EP;  Service: Cardiology;  Laterality: N/A;    FOOT SURGERY      INSERTION OF BIVENTRICULAR IMPLANTABLE CARDIOVERTER-DEFIBRILLATOR (ICD) Left 7/18/2019    Procedure: INSERTION, ICD, BIVENTRICULAR;  Surgeon: Arturo Bay MD;  Location: [facility] EP LAB;  Service: Cardiology;  Laterality: Left;  CHB, CRTD, SJM, anes, GP, 6078    LEFT HEART CATHETERIZATION N/A 7/16/2019    Procedure: Left heart cath;  Surgeon: Dejuan Cody MD;  Location: [facility] CATH LAB/EP;  Service: Cardiology;  Laterality: N/A;    PERCUTANEOUS TRANSLUMINAL ANGIOPLASTY OF ARTERIOVENOUS FISTULA Right 2/11/2022    Procedure: PTA, AV FISTULA;  Surgeon: Dejuan Cody MD;  Location: [facility] CATH LAB/EP;  Service: Cardiology;  Laterality: Right;    PERCUTANEOUS TRANSLUMINAL ANGIOPLASTY OF ARTERIOVENOUS FISTULA N/A 6/17/2022    Procedure: PTA, AV FISTULA;  Surgeon: Dejuan Cody MD;  Location: [facility] CATH LAB/EP;  Service: Cardiology;  Laterality: N/A;     PERITONEAL CATHETER REMOVAL Left 4/11/2020    Procedure: REMOVAL, CATHETER, DIALYSIS, PERITONEAL;  Surgeon: Edis Snell Jr., MD;  Location: [facility] OR;  Service: General;  Laterality: Left;    PHLEBOGRAPHY  6/15/2022    Procedure: Venogram;  Surgeon: Betsey Mckeon MD;  Location: [facility] CATH LAB/EP;  Service: Cardiology;;    PLACEMENT OF ARTERIOVENOUS GRAFT Right 2/18/2022    Procedure: INSERTION, GRAFT, ARTERIOVENOUS;  Surgeon: Jeison Hunt MD;  Location: [facility] OR;  Service: Vascular;  Laterality: Right;    REVISION OF PROCEDURE INVOLVING ARTERIOVENOUS GRAFT Right 2/18/2022    Procedure: REVISION, PROCEDURE INVOLVING ARTERIOVENOUS GRAFT;  Surgeon: Jeison Hunt MD;  Location: [facility] OR;  Service: Vascular;  Laterality: Right;     Family History   Problem Relation Age of Onset    Heart attack Father      Social History     Tobacco Use    Smoking status: Former Smoker    Smokeless tobacco: Never Used   Substance Use Topics    Alcohol use: Yes     Comment: social    Drug use: No       Review of patient's allergies indicates:  No Known Allergies         OBJECTIVE:     Vital Signs (Most Recent)  Vitals:    06/26/22 0531 06/26/22 0854 06/26/22 0914 06/26/22 0952   BP: (!) 112/56   (!) 94/52   BP Location: Left arm      Patient Position: Lying      Pulse: 80 85  60   Resp: 16 18 18 17   Temp: 97.3 °F (36.3 °C)   97.6 °F (36.4 °C)   TempSrc: Oral      SpO2: 98% 97%  (!) 92%   Weight:       Height:                     Medications:   sodium chloride 0.9%   Intravenous Once    sodium chloride 0.9%   Intravenous Once    apixaban  2.5 mg Oral BID    atorvastatin  80 mg Oral Daily    clopidogreL  75 mg Oral Daily    docusate sodium  100 mg Oral BID    epoetin hayden-epbx  10,000 Units Subcutaneous Every Tues, Thurs, Sat    furosemide (LASIX) injection  80 mg Intravenous Once    LIDOcaine  1 patch Transdermal Q24H    midodrine  5 mg Oral TID WM    sevelamer carbonate  2,400 mg Oral TID WM     sodium citrate-citric acid 500-334 mg/5 ml  30 mL Oral Once    sodium polystyrene  30 g Oral Once    sodium zirconium cyclosilicate  10 g Oral Once    sodium zirconium cyclosilicate  10 g Oral Once           Physical Exam  Vitals and nursing note reviewed.   Constitutional:       General: He is not in acute distress.     Appearance: He is not diaphoretic.   HENT:      Head: Normocephalic and atraumatic.      Mouth/Throat:      Pharynx: No oropharyngeal exudate.   Eyes:      General: No scleral icterus.     Conjunctiva/sclera: Conjunctivae normal.      Pupils: Pupils are equal, round, and reactive to light.   Cardiovascular:      Rate and Rhythm: Normal rate and regular rhythm.      Heart sounds: Normal heart sounds. No murmur heard.  Pulmonary:      Effort: Pulmonary effort is normal. No respiratory distress.      Breath sounds: Normal breath sounds.   Abdominal:      General: Bowel sounds are normal. There is no distension.      Palpations: Abdomen is soft.      Tenderness: There is no abdominal tenderness.   Musculoskeletal:         General: Normal range of motion.      Cervical back: Normal range of motion and neck supple.      Comments: RUE AVG   thrill    Skin:     General: Skin is warm and dry.      Findings: No erythema.   Neurological:      Mental Status: He is alert and oriented to person, place, and time.      Cranial Nerves: No cranial nerve deficit.   Psychiatric:         Mood and Affect: Affect normal.         Cognition and Memory: Memory normal.         Judgment: Judgment normal.         Laboratory:  Recent Labs   Lab 06/24/22  0308 06/25/22  0423 06/25/22  1105   WBC 11.82  11.82 12.66 14.81*   HGB 7.8*  7.8* 10.8* 9.3*   HCT 24.2*  24.2* 33.3* 28.5*     152 145* 156   MONO 10.9  10.9  1.3*  1.3* 12.0  1.5*  --      Recent Labs   Lab 06/22/22  0554 06/23/22  0305 06/24/22  0308 06/25/22  1101    139 137 136   K 4.6 4.5 4.2 4.9   CL 98 98 96 97   CO2 20* 23 27 24   BUN 43* 29*  39* 54*   CREATININE 10.9* 7.8* 9.6* 11.0*   CALCIUM 8.2* 8.4* 8.7 8.9   PHOS 5.1* 4.6*  --  5.9*       Diagnostic Results:  X-Ray: Reviewed  US: Reviewed  Echo: Reviewed  ASSESSMENT/PLAN:     1. ESRD on HD TTS with Dr Decker  -- clotted AVG s/p declot - removed Femoral  CVC   AVG working well   -- Daily Renal Function Panel  -- Avoid Hypotension.  -- Renally dose all meds  -- HD  Saturday  No issues  back on his TTS schedule       2. HTN (I10) -    3. Anemia of chronic kidney disease treated with ROSALVA (N18.9 D63.1)    EPogen  with each HD - hold for   Hb >10   Recent Labs   Lab 06/24/22  0308 06/25/22  0423 06/25/22  1105   HGB 7.8*  7.8* 10.8* 9.3*   HCT 24.2*  24.2* 33.3* 28.5*     152 145* 156       Iron   Lab Results   Component Value Date    IRON 63 12/18/2019    TIBC 263 12/18/2019    FERRITIN 6,078 (H) 03/28/2020       4. MBD (E88.9 M90.80) -  Recent Labs   Lab 06/25/22  1101   CALCIUM 8.9   PHOS 5.9*     No results for input(s): MG in the last 168 hours.    Lab Results   Component Value Date    .0 (H) 03/04/2020    CALCIUM 8.9 06/25/2022    PHOS 5.9 (H) 06/25/2022     No results found for: [id number]    Lab Results   Component Value Date    CO2 24 06/25/2022       5. Nutrition/Hypoalbuminemia (E88.09) -    Recent Labs   Lab 06/23/22  0305 06/25/22  1101   ALBUMIN 2.3* 2.4*     Nepro with meals TID. Renal vitamins daily      With any question please call answering service [phone]  Laura Ortiz MD    [facility]  ANA MARIA Burns MD, FACP,   MAlize Woo MD,   MD DAMION Gill MD E. V. Harmon, NP    [address]  [place], [id number]

## 2022-06-26 NOTE — PROGRESS NOTES
[facility] - Telemetry  Adult Nutrition  Progress Note    SUMMARY       Recommendations    Recommendation:  1. Reinitiate renal diet as medically acceptable and tolerated.   2. Addition of Novasource Renal BID to supplement protein/energy intake.   3. Monitor weight/labs.   4. RD to follow and monitor intake    Goals:   Pt to receive nutrition by RD follow up    Nutrition Goal Status: new  Communication of RD Recs: other (comment) (POC)    Assessment and Plan    ESRD (end stage renal disease) on dialysis  Contributing Nutrition Diagnosis  Increased nutrient needs, energy/protein    Related to (etiology):   ESRD    Signs and Symptoms (as evidenced by):   Pt with ESRD on HD 3x/week, with critical lower ischemia    Interventions:  Collaboration with other providers  [employer]- Novasource Renal BID    Nutrition Diagnosis Status:   New           Reason for Assessment    Reason For Assessment: length of stay (day 8)  Diagnosis: other (see comments) (problem with dialysis access)  Relevant Medical History: CHF, CAD, HTN, DM, CKD stage 4 on HD, renal cyst, anemia, Osteoarthritis of spine with radiculopathy- cervical region, fitulogram, Insertion of ICD  Interdisciplinary Rounds: did not attend  General Information Comments: Pt NPO for cath lab R leg nerve block. HD 3x/week. Pt ELY at HD, spoke with RN. Previously on cardiac diet with 50% intake recorded. PIV, HD AV graft, trialysis. Anand Score: 19 incision right arm, left groin, angiogram punctures NFPE not completed 2/2 pt ELY per chart ~7% weight loss in 1 year  Nutrition Discharge Planning: d/c on renal diet with Novasource Renal BID    Nutrition Risk Screen    Nutrition Risk Screen: no indicators present    Nutrition/Diet History    Food Preferences: TAMMI  Spiritual, Cultural Beliefs, Cheondoism Practices, Values that Affect Care: no  Food Allergies: NKFA  Factors Affecting Nutritional Intake: NPO    Anthropometrics    Temp: 98.6 °F (37 °C)  Height Method:  "Stated  Height: 6' 1" (185.4 cm)  Height (inches): 73 in  Weight Method: Bed Scale  Weight: 117.9 kg (259 lb 14.8 oz)  Weight (lb): 259.92 lb  Ideal Body Weight (IBW), Male: 184 lb  % Ideal Body Weight, Male (lb): 141.26 %  BMI (Calculated): 34.3  BMI Grade: 30 - 34.9- obesity - grade I  Usual Body Weight (UBW), k.6 kg (21)  % Usual Body Weight: 93.32  % Weight Change From Usual Weight: -6.87 %       Lab/Procedures/Meds    Pertinent Labs Reviewed: reviewed  Pertinent Labs Comments: BUN 39, Cr 9.6, eGFR 9, Glu 137  Pertinent Medications Reviewed: reviewed  Pertinent Medications Comments: NaCl, apixaban, statin, clopidogrel, docusate Na, epoetin hayden, furosemide, midodrine, sevelamer carbonate, Na citrate-citric acid, Na polystyrene, Na zirconium      Estimated/Assessed Needs    Weight Used For Calorie Calculations: 117.9 kg (259 lb 14.8 oz)  Energy Calorie Requirements (kcal): 2358  Energy Need Method: Kcal/kg (20 kcal/kg)  Protein Requirements: 118 (1.0 gm/kg)  Weight Used For Protein Calculations: 117.9 kg (259 lb 14.8 oz)     Estimated Fluid Requirement Method: RDA Method (or PER MD)  RDA Method (mL): 2358  CHO Requirement: 265 gm/day      Nutrition Prescription Ordered    Current Diet Order: NPO    Evaluation of Received Nutrient/Fluid Intake    I/O: N/A  Energy Calories Required: not meeting needs  Protein Required: not meeting needs  Fluid Required: not meeting needs  Comments: LBM 6/22  % Intake of Estimated Energy Needs: 0 - 25 %  % Meal Intake: NPO    Nutrition Risk    Level of Risk/Frequency of Follow-up:  (2x/week)     Monitor and Evaluation    Food and Nutrient Intake: energy intake, food and beverage intake  Food and Nutrient Adminstration: diet order  Knowledge/Beliefs/Attitudes: food and nutrition knowledge/skill  Physical Activity and Function: nutrition-related ADLs and IADLs  Anthropometric Measurements: weight change, weight, body mass index  Biochemical Data, Medical Tests and " Procedures: electrolyte and renal panel, gastrointestinal profile, glucose/endocrine profile, inflammatory profile, lipid profile     Nutrition Follow-Up    RD Follow-up?: Yes

## 2022-06-26 NOTE — PROGRESS NOTES
"Surgery follow up  BP (!) 94/52   Pulse 60   Temp 97.6 °F (36.4 °C)   Resp (!) 8   Ht 6' 1" (1.854 m)   Wt 117.9 kg (259 lb 14.8 oz)   SpO2 (!) 92%   BMI 34.29 kg/m²   I/O last 3 completed shifts:  In: 650 [P.O.:150; Other:500]  Out: 2185 [Urine:50; Other:2135]  No intake/output data recorded.  Recent Results (from the past 336 hour(s))   CBC auto differential    Collection Time: 06/25/22  4:23 AM   Result Value Ref Range    WBC 12.66 3.90 - 12.70 K/uL    Hemoglobin 10.8 (L) 14.0 - 18.0 g/dL    Hematocrit 33.3 (L) 40.0 - 54.0 %    Platelets 145 (L) 150 - 450 K/uL   CBC Auto Differential    Collection Time: 06/24/22  3:08 AM   Result Value Ref Range    WBC 11.82 3.90 - 12.70 K/uL    Hemoglobin 7.8 (L) 14.0 - 18.0 g/dL    Hematocrit 24.2 (L) 40.0 - 54.0 %    Platelets 152 150 - 450 K/uL   CBC auto differential    Collection Time: 06/24/22  3:08 AM   Result Value Ref Range    WBC 11.82 3.90 - 12.70 K/uL    Hemoglobin 7.8 (L) 14.0 - 18.0 g/dL    Hematocrit 24.2 (L) 40.0 - 54.0 %    Platelets 152 150 - 450 K/uL   c/o severe pain right foot toes  Gangrenous right 3rd and 4th toe, apply betadine locally daily , needs amputation of the toes prior to d/c.  "

## 2022-06-26 NOTE — ASSESSMENT & PLAN NOTE
Contributing Nutrition Diagnosis  Increased nutrient needs, energy/protein    Related to (etiology):   ESRD    Signs and Symptoms (as evidenced by):   Pt with ESRD on HD 3x/week, with critical lower ischemia    Interventions:  Collaboration with other providers  [employer] Renal TID    Nutrition Diagnosis Status:   Continues, minimal intake of meals

## 2022-06-26 NOTE — ASSESSMENT & PLAN NOTE
- wound to RLE; angiogram last week with revascularization 6/20 with atherectomy/PTA/stent to RSFA and PTA to right PT  - poor flow to foot remains due to heavily calcified right AT and PT; pain persists despite pain medication; will place Lidoderm patch to see if this improves pain control;   - pt s/p DVA of RLE 6/24/2022, allow for permissive HTN (continue midodrine and hold BB)  - on Plavix and continue apixaban 2.5 mg BID

## 2022-06-26 NOTE — SUBJECTIVE & OBJECTIVE
Interval History: Pt states that his pain is improving.  He mentions that he has been having a better day and denies groin pain.  There were no other reports overnight.      Review of Systems   Constitutional: Negative for diaphoresis and fever.   HENT:  Negative for congestion and hearing loss.    Eyes:  Negative for blurred vision and pain.   Cardiovascular:  Negative for chest pain, claudication, dyspnea on exertion, leg swelling, near-syncope, palpitations and syncope.   Respiratory:  Negative for shortness of breath and sleep disturbances due to breathing.    Hematologic/Lymphatic: Negative for bleeding problem. Does not bruise/bleed easily.   Skin:  Positive for poor wound healing. Negative for color change.   Gastrointestinal:  Negative for abdominal pain and nausea.   Genitourinary:  Negative for bladder incontinence and flank pain.   Neurological:  Negative for focal weakness and light-headedness.   Objective:     Vital Signs (Most Recent):  Temp: 97.3 °F (36.3 °C) (06/26/22 0531)  Pulse: 85 (06/26/22 0854)  Resp: 18 (06/26/22 0914)  BP: (!) 112/56 (06/26/22 0531)  SpO2: 97 % (06/26/22 0854)   Vital Signs (24h Range):  Temp:  [97.1 °F (36.2 °C)-98.6 °F (37 °C)] 97.3 °F (36.3 °C)  Pulse:  [60-96] 85  Resp:  [16-19] 18  SpO2:  [94 %-100 %] 97 %  BP: ()/(52-78) 112/56     Weight: 117.9 kg (259 lb 14.8 oz)  Body mass index is 34.29 kg/m².     SpO2: 97 %  O2 Device (Oxygen Therapy): room air      Intake/Output Summary (Last 24 hours) at 6/26/2022 0923  Last data filed at 6/26/2022 0531  Gross per 24 hour   Intake 500 ml   Output 2185 ml   Net -1685 ml       Lines/Drains/Airways       Central Venous Catheter Line  Duration                  Hemodialysis AV Graft Right forearm -- days    Trialysis (Dialysis) Catheter 06/15/22 left femoral 11 days              Peripheral Intravenous Line  Duration                  Peripheral IV - Single Lumen 06/24/22 2325 18 G Anterior;Left Forearm 1 day                     Physical Exam  Constitutional:       Appearance: He is well-developed. He is obese. He is not diaphoretic.   HENT:      Head: Normocephalic and atraumatic.   Eyes:      General: No scleral icterus.     Pupils: Pupils are equal, round, and reactive to light.   Neck:      Vascular: No JVD.   Cardiovascular:      Rate and Rhythm: Normal rate and regular rhythm.      Pulses: Intact distal pulses.      Heart sounds: S1 normal and S2 normal. No murmur heard.    No friction rub. No gallop.   Pulmonary:      Effort: Pulmonary effort is normal. No respiratory distress.      Breath sounds: Normal breath sounds. No wheezing or rales.   Chest:      Chest wall: No tenderness.   Abdominal:      General: Bowel sounds are normal. There is no distension.      Palpations: Abdomen is soft. There is no mass.      Tenderness: There is no abdominal tenderness. There is no rebound.   Musculoskeletal:         General: Tenderness present. Normal range of motion.      Cervical back: Normal range of motion and neck supple.      Comments: 5/10 TTP of RLE    Skin:     General: Skin is warm and dry.      Coloration: Skin is not pale.   Neurological:      Mental Status: He is alert and oriented to person, place, and time.      Coordination: Coordination normal.      Deep Tendon Reflexes: Reflexes normal.   Psychiatric:         Behavior: Behavior normal.         Judgment: Judgment normal.       Significant Labs: BMP:   Recent Labs   Lab 06/25/22  1101   *      K 4.9   CL 97   CO2 24   BUN 54*   CREATININE 11.0*   CALCIUM 8.9   , CMP   Recent Labs   Lab 06/25/22  1101      K 4.9   CL 97   CO2 24   *   BUN 54*   CREATININE 11.0*   CALCIUM 8.9   ALBUMIN 2.4*   ANIONGAP 15   ESTGFRAFRICA 5*   EGFRNONAA 4*   , CBC   Recent Labs   Lab 06/25/22  0423 06/25/22  1105   WBC 12.66 14.81*   HGB 10.8* 9.3*   HCT 33.3* 28.5*   * 156   , INR No results for input(s): INR, PROTIME in the last 48 hours., Lipid Panel No results  for input(s): CHOL, HDL, LDLCALC, TRIG, CHOLHDL in the last 48 hours., Troponin No results for input(s): TROPONINI in the last 48 hours., and All pertinent lab results from the last 24 hours have been reviewed.    Significant Imaging: Echocardiogram: 2D echo with color flow doppler:   Results for orders placed or performed during the hospital encounter of 09/18/15   2D echo with color flow doppler   Result Value Ref Range    EF + QEF 50 55 - 65    Diastolic Dysfunction Yes (A)     Est. PA Systolic Pressure 8.57     Mitral Valve Mobility NORMAL     Tricuspid Valve Regurgitation TRIVIAL     Narrative    TEST DESCRIPTION   Technical Quality: This is a technically adequate study.     Aorta: The aortic root is normal in size, measuring 3.9 cm at sinotubular junction.     Left Atrium: The left atrial volume index is severely enlarged, measuring 49.98 cc/m2.     Left Ventricle: The left ventricle is normal in size, with an end-diastolic diameter of 6.2 cm, and an end-systolic diameter of 4.5 cm. LV wall thickness is normal, with the septum measuring 1.5 cm and the posterior wall measuring 1.7 cm across. Relative   wall thickness was increased at 0.55, and the LV mass index was increased at 229.0 g/m2 consistent with concentric left ventricular hypertrophy. Global left ventricular systolic function appears low normal to mildly depressed. Visually estimated   ejection fraction is 50-55%. The LV Doppler derived stroke volume equals 52.0 ccs.   The E/e'(lat) is 9.  This along with the following abnormalities (ERIC = 49.98 and LVMI = 229.00) suggests significant diastolic dysfunction.     Right Atrium: The right atrium is normal in size, measuring 5.3 cm in length in the apical view.     Right Ventricle: The right ventricle is normal in size measuring 3.0 cm at the base in the apical right ventricle-focused view. Global right ventricular systolic function appears normal. The estimated PA systolic pressure is 9 mmHg.     Aortic  Valve:  The aortic valve is normal in structure with normal leaflet mobility. The aortic valve is tri-leaflet in structure.     Mitral Valve:  The mitral valve is normal in structure with normal leaflet mobility.     Tricuspid Valve:  The tricuspid valve is normal in structure with normal leaflet mobility. There is trivial tricuspid regurgitation.     Pulmonary Valve:  The pulmonic valve is not well seen.     IVC: IVC is normal in size and collapses > 50% with a sniff, suggesting normal right atrial pressure of 3 mmHg.     Atrial Septum: The atrial septum is intact.     Intracavitary: There is no evidence of pericardial effusion, intracavity mass, thrombi, or vegetation.         CONCLUSIONS     1 - Low normal to mildly depressed left ventricular systolic function (EF 50-55%).     2 - Concentric hypertrophy.     3 - Severe left atrial enlargement.     4 - Left ventricular diastolic dysfunction.     5 - Normal right ventricular systolic function .     6 - The estimated PA systolic pressure is 9 mmHg.         This document has been electronically    SIGNED BY: Milagros Gross MD On: 09/18/2015 16:15    and Transthoracic echo (TTE) complete (Cupid Only):   Results for orders placed or performed during the hospital encounter of 12/21/21   Echo   Result Value Ref Range    Ascending aorta 3.30 cm    STJ 3.27 cm    AV mean gradient 4 mmHg    Ao peak son 1.17 m/s    Ao VTI 25.29 cm    IVS 1.61 (A) 0.6 - 1.1 cm    LA size 5.11 cm    Left Atrium Major Axis 7.34 cm    Left Atrium Minor Axis 6.11 cm    LVIDd 6.60 (A) 3.5 - 6.0 cm    LVIDs 5.24 (A) 2.1 - 4.0 cm    LVOT diameter 2.89 cm    LVOT peak VTI 17.22 cm    Posterior Wall 1.66 (A) 0.6 - 1.1 cm    MV Peak A Son 1.03 m/s    E wave deceleration time 128.00 msec    MV Peak E Son 0.75 m/s    RA Major Axis 5.72 cm    RA Width 3.45 cm    RVDD 3.28 cm    TAPSE 1.72 cm    TR Max Son 2.14 m/s    TDI LATERAL 0.04 m/s    LA WIDTH 4.32 cm    Ao root annulus 4.28 cm    PV PEAK VELOCITY  1.04 cm/s    MV stenosis pressure 1/2 time 37.12 ms    LV Diastolic Volume 223.81 mL    LV Systolic Volume 131.64 mL    LVOT peak nghia 0.79 m/s    LA volume (mod) 85.57 cm3    MV mean gradient 1 mmHg    MV peak gradient 4 mmHg    RV S' 9.54 cm/s    MV VTI 18.13 cm    LV LATERAL E/E' RATIO 18.75 m/s    FS 21 %    LA volume 125.13 cm3    LV mass 561.38 g    Left Ventricle Relative Wall Thickness 0.50 cm    AV valve area 4.46 cm2    AV Velocity Ratio 0.68     AV index (prosthetic) 0.68     MV valve area p 1/2 method 5.93 cm2    MV valve area by continuity eq 6.23 cm2    E/A ratio 0.73     LVOT area 6.6 cm2    LVOT stroke volume 112.90 cm3    AV peak gradient 5 mmHg    LV Systolic Volume Index 53.5 mL/m2    LV Diastolic Volume Index 90.98 mL/m2    LA Volume Index 50.9 mL/m2    LV Mass Index 228 g/m2    Triscuspid Valve Regurgitation Peak Gradient 18 mmHg    LA Volume Index (Mod) 34.8 mL/m2    BSA 2.53 m2    Right Atrial Pressure (from IVC) 3 mmHg    QEF 34 %    TV rest pulmonary artery pressure 21 mmHg    Narrative    · The left ventricle is moderately enlarged with mild concentric   hypertrophy and  · The quantitatively derived ejection fraction is 34%.  · Severe left atrial enlargement.  · Normal right ventricular size with normal right ventricular systolic   function.  · Normal central venous pressure (3 mmHg).  · The estimated PA systolic pressure is 21 mmHg.  · No vegetations per surface echo.      , EKG: reviewed, and X-Ray: CXR: X-Ray Chest 1 View (CXR): No results found for this visit on 06/13/22. and X-Ray Chest PA and Lateral (CXR): No results found for this visit on 06/13/22.

## 2022-06-27 NOTE — SUBJECTIVE & OBJECTIVE
Interval History: Pt states that his pain is improving.  He mentions that he has been having a better day and denies groin pain.  There were no other reports overnight.      Review of Systems   Constitutional: Negative for diaphoresis and fever.   HENT:  Negative for congestion and hearing loss.    Eyes:  Negative for blurred vision and pain.   Cardiovascular:  Negative for chest pain, claudication, dyspnea on exertion, leg swelling, near-syncope, palpitations and syncope.   Respiratory:  Negative for shortness of breath and sleep disturbances due to breathing.    Hematologic/Lymphatic: Negative for bleeding problem. Does not bruise/bleed easily.   Skin:  Positive for poor wound healing. Negative for color change.   Gastrointestinal:  Negative for abdominal pain and nausea.   Genitourinary:  Negative for bladder incontinence and flank pain.   Neurological:  Negative for focal weakness and light-headedness.   Objective:     Vital Signs (Most Recent):  Temp: 97.8 °F (36.6 °C) (06/27/22 0534)  Pulse: 70 (06/27/22 0534)  Resp: 16 (06/27/22 0534)  BP: (!) 107/53 (06/27/22 0534)  SpO2: 95 % (06/27/22 0730)   Vital Signs (24h Range):  Temp:  [97.2 °F (36.2 °C)-98.6 °F (37 °C)] 97.8 °F (36.6 °C)  Pulse:  [59-70] 70  Resp:  [8-18] 16  SpO2:  [92 %-96 %] 95 %  BP: ()/(52-56) 107/53     Weight: 117.9 kg (259 lb 14.8 oz)  Body mass index is 34.29 kg/m².     SpO2: 95 %  O2 Device (Oxygen Therapy): room air      Intake/Output Summary (Last 24 hours) at 6/27/2022 1029  Last data filed at 6/27/2022 0534  Gross per 24 hour   Intake 550 ml   Output 0 ml   Net 550 ml         Lines/Drains/Airways       Central Venous Catheter Line  Duration                  Hemodialysis AV Graft Right forearm -- days              Peripheral Intravenous Line  Duration                  Peripheral IV - Single Lumen 06/24/22 2325 18 G Anterior;Left Forearm 2 days                    Physical Exam  Constitutional:       Appearance: He is  well-developed. He is obese. He is not diaphoretic.   HENT:      Head: Normocephalic and atraumatic.   Eyes:      General: No scleral icterus.     Pupils: Pupils are equal, round, and reactive to light.   Neck:      Vascular: No JVD.   Cardiovascular:      Rate and Rhythm: Normal rate and regular rhythm.      Pulses: Intact distal pulses.      Heart sounds: S1 normal and S2 normal. No murmur heard.    No friction rub. No gallop.   Pulmonary:      Effort: Pulmonary effort is normal. No respiratory distress.      Breath sounds: Normal breath sounds. No wheezing or rales.   Chest:      Chest wall: No tenderness.   Abdominal:      General: Bowel sounds are normal. There is no distension.      Palpations: Abdomen is soft. There is no mass.      Tenderness: There is no abdominal tenderness. There is no rebound.   Musculoskeletal:         General: Tenderness present. Normal range of motion.      Cervical back: Normal range of motion and neck supple.      Comments: 5/10 TTP of RLE    Skin:     General: Skin is warm and dry.      Coloration: Skin is not pale.   Neurological:      Mental Status: He is alert and oriented to person, place, and time.      Coordination: Coordination normal.      Deep Tendon Reflexes: Reflexes normal.   Psychiatric:         Behavior: Behavior normal.         Judgment: Judgment normal.       Significant Labs: BMP:   Recent Labs   Lab 06/25/22  1101   *      K 4.9   CL 97   CO2 24   BUN 54*   CREATININE 11.0*   CALCIUM 8.9     , CMP   Recent Labs   Lab 06/25/22  1101      K 4.9   CL 97   CO2 24   *   BUN 54*   CREATININE 11.0*   CALCIUM 8.9   ALBUMIN 2.4*   ANIONGAP 15   ESTGFRAFRICA 5*   EGFRNONAA 4*     , CBC   Recent Labs   Lab 06/25/22  1105   WBC 14.81*   HGB 9.3*   HCT 28.5*        , INR No results for input(s): INR, PROTIME in the last 48 hours., Lipid Panel No results for input(s): CHOL, HDL, LDLCALC, TRIG, CHOLHDL in the last 48 hours., Troponin No results  for input(s): TROPONINI in the last 48 hours., and All pertinent lab results from the last 24 hours have been reviewed.    Significant Imaging: Echocardiogram: 2D echo with color flow doppler:   Results for orders placed or performed during the hospital encounter of 09/18/15   2D echo with color flow doppler   Result Value Ref Range    EF + QEF 50 55 - 65    Diastolic Dysfunction Yes (A)     Est. PA Systolic Pressure 8.57     Mitral Valve Mobility NORMAL     Tricuspid Valve Regurgitation TRIVIAL     Narrative    TEST DESCRIPTION   Technical Quality: This is a technically adequate study.     Aorta: The aortic root is normal in size, measuring 3.9 cm at sinotubular junction.     Left Atrium: The left atrial volume index is severely enlarged, measuring 49.98 cc/m2.     Left Ventricle: The left ventricle is normal in size, with an end-diastolic diameter of 6.2 cm, and an end-systolic diameter of 4.5 cm. LV wall thickness is normal, with the septum measuring 1.5 cm and the posterior wall measuring 1.7 cm across. Relative   wall thickness was increased at 0.55, and the LV mass index was increased at 229.0 g/m2 consistent with concentric left ventricular hypertrophy. Global left ventricular systolic function appears low normal to mildly depressed. Visually estimated   ejection fraction is 50-55%. The LV Doppler derived stroke volume equals 52.0 ccs.   The E/e'(lat) is 9.  This along with the following abnormalities (ERIC = 49.98 and LVMI = 229.00) suggests significant diastolic dysfunction.     Right Atrium: The right atrium is normal in size, measuring 5.3 cm in length in the apical view.     Right Ventricle: The right ventricle is normal in size measuring 3.0 cm at the base in the apical right ventricle-focused view. Global right ventricular systolic function appears normal. The estimated PA systolic pressure is 9 mmHg.     Aortic Valve:  The aortic valve is normal in structure with normal leaflet mobility. The aortic  valve is tri-leaflet in structure.     Mitral Valve:  The mitral valve is normal in structure with normal leaflet mobility.     Tricuspid Valve:  The tricuspid valve is normal in structure with normal leaflet mobility. There is trivial tricuspid regurgitation.     Pulmonary Valve:  The pulmonic valve is not well seen.     IVC: IVC is normal in size and collapses > 50% with a sniff, suggesting normal right atrial pressure of 3 mmHg.     Atrial Septum: The atrial septum is intact.     Intracavitary: There is no evidence of pericardial effusion, intracavity mass, thrombi, or vegetation.         CONCLUSIONS     1 - Low normal to mildly depressed left ventricular systolic function (EF 50-55%).     2 - Concentric hypertrophy.     3 - Severe left atrial enlargement.     4 - Left ventricular diastolic dysfunction.     5 - Normal right ventricular systolic function .     6 - The estimated PA systolic pressure is 9 mmHg.         This document has been electronically    SIGNED BY: Milagros Gross MD On: 09/18/2015 16:15    and Transthoracic echo (TTE) complete (Cupid Only):   Results for orders placed or performed during the hospital encounter of 12/21/21   Echo   Result Value Ref Range    Ascending aorta 3.30 cm    STJ 3.27 cm    AV mean gradient 4 mmHg    Ao peak son 1.17 m/s    Ao VTI 25.29 cm    IVS 1.61 (A) 0.6 - 1.1 cm    LA size 5.11 cm    Left Atrium Major Axis 7.34 cm    Left Atrium Minor Axis 6.11 cm    LVIDd 6.60 (A) 3.5 - 6.0 cm    LVIDs 5.24 (A) 2.1 - 4.0 cm    LVOT diameter 2.89 cm    LVOT peak VTI 17.22 cm    Posterior Wall 1.66 (A) 0.6 - 1.1 cm    MV Peak A Son 1.03 m/s    E wave deceleration time 128.00 msec    MV Peak E Son 0.75 m/s    RA Major Axis 5.72 cm    RA Width 3.45 cm    RVDD 3.28 cm    TAPSE 1.72 cm    TR Max Son 2.14 m/s    TDI LATERAL 0.04 m/s    LA WIDTH 4.32 cm    Ao root annulus 4.28 cm    PV PEAK VELOCITY 1.04 cm/s    MV stenosis pressure 1/2 time 37.12 ms    LV Diastolic Volume 223.81 mL    LV  Systolic Volume 131.64 mL    LVOT peak nghia 0.79 m/s    LA volume (mod) 85.57 cm3    MV mean gradient 1 mmHg    MV peak gradient 4 mmHg    RV S' 9.54 cm/s    MV VTI 18.13 cm    LV LATERAL E/E' RATIO 18.75 m/s    FS 21 %    LA volume 125.13 cm3    LV mass 561.38 g    Left Ventricle Relative Wall Thickness 0.50 cm    AV valve area 4.46 cm2    AV Velocity Ratio 0.68     AV index (prosthetic) 0.68     MV valve area p 1/2 method 5.93 cm2    MV valve area by continuity eq 6.23 cm2    E/A ratio 0.73     LVOT area 6.6 cm2    LVOT stroke volume 112.90 cm3    AV peak gradient 5 mmHg    LV Systolic Volume Index 53.5 mL/m2    LV Diastolic Volume Index 90.98 mL/m2    LA Volume Index 50.9 mL/m2    LV Mass Index 228 g/m2    Triscuspid Valve Regurgitation Peak Gradient 18 mmHg    LA Volume Index (Mod) 34.8 mL/m2    BSA 2.53 m2    Right Atrial Pressure (from IVC) 3 mmHg    QEF 34 %    TV rest pulmonary artery pressure 21 mmHg    Narrative    · The left ventricle is moderately enlarged with mild concentric   hypertrophy and  · The quantitatively derived ejection fraction is 34%.  · Severe left atrial enlargement.  · Normal right ventricular size with normal right ventricular systolic   function.  · Normal central venous pressure (3 mmHg).  · The estimated PA systolic pressure is 21 mmHg.  · No vegetations per surface echo.      , EKG: reviewed, and X-Ray: CXR: X-Ray Chest 1 View (CXR): No results found for this visit on 06/13/22. and X-Ray Chest PA and Lateral (CXR): No results found for this visit on 06/13/22.

## 2022-06-27 NOTE — PLAN OF CARE
[facility] Consultation / Initial Visit      Today's Date: 2021    PCP: Onur Mclean DO  Referring Provider: No ref. provider found    INDICATION FOR CONSULTATION: elevated troponin      HPI:       47 year old female with history of hypertension, morbid obesity, obstructive sleep apnea, anemia, diagnosed with Covid 21 is presenting with a chief complaint of abdominal pain, chest pain, and shortness of breath.  She was noted to be febrile in the ED.  CT abdomen pelvis showed 3.3cm solid mass at the tail of the pancreas.  In the MRI suite patient developed sudden onset of severe substernal chest discomfort associated with shortness of breath that later resolved spontaneously.  Serial EKGs and troponin I were abnormal and cardiac consultation requested.    Patient does have history of previous atypical chest pains.  She has family history of CAD.  Her father  from a heart attack at age of 39.  A stress echo done in 2019 showed no evidence of reversible ischemia.        ROS:     General: No fever or chills, No loss of appetite.    RS: No hemoptysis  GI: No melena or hematochezia  : No urinary disturbance  Derm: No skin disorders   Heme: No blood dyscrasias.  Remainder of 12 point systems reviewed and negative (or as mentioned in HPI)    Relevant Past Medical History:  Past Medical History:   Diagnosis Date   • Anemia    • Apnea    • COVID-19    • Essential (primary) hypertension       Past Surgical History:   Procedure Laterality Date   •  section, low transverse     • Tonsillectomy          Social History:  Social History     Tobacco Use   Smoking Status Never Smoker   Smokeless Tobacco Never Used     Social History     Substance and Sexual Activity   Alcohol Use Not Currently     History   Drug Use Unknown       Family History:   Family History   Problem Relation Age of Onset   • Atrial Fibrilliation Mother    • Diabetes Mother    • Hypertension Mother    • Heart disease Father    •  Pt on RA, no respiratory distress noted. Will continue to monitor.     Hypertension Father    • Myocardial Infarction Father    • Cancer, Colon Maternal Aunt 60       Inpatient Medications  • insulin glargine  16 Units Subcutaneous Nightly   • insulin lispro   Subcutaneous TID WC   • insulin lispro   Subcutaneous Nightly   • sodium chloride (PF)  2 mL Intracatheter 2 times per day   • Phosphorus Standard Replacement Protocol   Does not apply See Admin Instructions   • Magnesium Standard Replacement Protocol   Does not apply See Admin Instructions   • Potassium Standard Replacement Protocol   Does not apply See Admin Instructions   • dexamethasone  6 mg Intravenous Daily   • insulin lispro   Subcutaneous TID WC   • isosorbide mononitrate  30 mg Oral Daily   • atorvastatin  40 mg Oral Daily   • enoxaparin  1 mg/kg Subcutaneous BID   • metoPROLOL tartrate  25 mg Oral 4 times per day      • sodium chloride 0.9% infusion        dextrose, dextrose, glucagon, dextrose, dextrose, sodium chloride, sodium chloride, acetaminophen, HYDROcodone-acetaminophen     Allergy   ALLERGIES:  No Known Allergies         Examination:      Vital Last Value 24 Hour Range   Temperature 97.3 °F (36.3 °C) (11/26/21 0813) Temp  Min: 97.2 °F (36.2 °C)  Max: 99.3 °F (37.4 °C)   Pulse 78 (11/26/21 0813) Pulse  Min: 56  Max: 123   Respiratory 18 (11/26/21 0812) Resp  Min: 16  Max: 25   Non-Invasive  Blood Pressure 111/74 (11/26/21 0812) BP  Min: 91/61  Max: 143/91   Pulse Oximetry 96 % (11/26/21 0813) SpO2  Min: 86 %  Max: 98 %   Arterial   Blood Pressure   No data recorded         No intake or output data in the 24 hours ending 11/26/21 1003     Weight    11/25/21 1514   Weight: 135.6 kg (299 lb)         GENERAL: No apparent distress  HEENT: Normocephalic.  Neck:  Supple neck.   Oral mucosa : Pink and moist.    Endocrine: There is no goiter.  CVS:  Normal first and second heart tones.   Lung fields: Decrease BS to auscultation bilaterally.   GI: Soft. Nontender, nondistended.    Lower extremity: No cyanosis, clubbing  or edema.   Peripheral vascular: Both lower extremities are warm.    Neuro:  Nonfocal examination.  Psych: Appropriate mood and affect  Integumentary: Warm and Dry      Clinical Data:       The following were personally visualized and interpreted by me:    LABS:    CBC  Recent Labs   Lab 11/26/21 0419 11/25/21 0530   WBC 5.4 9.5   HCT 33.6* 36.8   HGB 10.6* 11.9*    340       CMP  Recent Labs   Lab 11/26/21 0419 11/25/21 0530   SODIUM 134* 132*   POTASSIUM 3.3* 3.2*   CHLORIDE 100 97*   CO2 30 27   GLUCOSE 334* 341*   BUN 12 10   CREATININE 0.66 0.67   CALCIUM 8.6 9.3   TOTPROTEIN  --  7.9   ALBUMIN  --  3.3*   BILIRUBIN  --  0.7   AST  --  31   GPT  --  24   ALKPT  --  82       Cardiac Labs  Recent Labs   Lab 11/26/21 0419   TSH 0.413       Lipid Panel  No results found    Coags  No results found    ABG  No results found    IMAGING:    ECG:   Encounter Date: 11/25/21   Electrocardiogram 12-Lead   Result Value    Ventricular Rate EKG/Min (BPM) 81    Atrial Rate (BPM) 81    ME-Interval (MSEC) 170    QRS-Interval (MSEC) 96    QT-Interval (MSEC) 460    QTc 534    P Axis (Degrees) 33    R Axis (Degrees) 17    T Axis (Degrees) 31    REPORT TEXT      Normal sinus rhythm  T wave abnormality, consider anterolateral ischemia  Prolonged QT  Abnormal ECG  When compared with ECG of  25-NOV-2021 18:09,  T wave inversion now evident in  Anterolateral leads  Confirmed by JERRY MANUEL MD ([id number]) on 11/26/2021 9:21:16 AM          Echocardiogram:  No results found for this or any previous visit.       Cath Report:  No results found for this or any previous visit.      Assessment/Plans:     NSTEMI  -high sensitivity troponin 975->2,652->2,090  -EKG reviewed.  Has diffuse anterolateral ST-T changes  -echo in process  -Aspirin, Lovenox, high intensity statin, nitrates and beta-blocker therapy  -In view of Covid pneumonia and acute respiratory failure we will try to optimize medical therapy first for the infection, acute  respiratory failure and pneumonia with plan to proceed with cardiac catheterization next week depending on her clinical course.       INFORMED CONSENT:  Risks and benefits of cardiac catheterization was discussed in detail.  Risks of the procedure include but are not limited to the following:  Death, myocardial infarction, stroke, vascular injury , bleeding, need for emergency surgery, renal injury requiring temporary or permanent hemodialysis.  The patient demonstrates an understanding and agrees to proceed.        Acute hypoxemic respiratory failure   COVID-19 pneumonia  -tested positive 11/4, swab here still positive  -received monoclonal antibody therapy  -febrile, stable on room air  -ID service following    Abdominal pain with pancreatic mass  -pt describes pain similar to previous ovarian cyst rupture  -ID service following    Essential hypertension  -home medications: hydrochlorothiazide 12.5mg daily, lisinopril 10mg daily.  Now on hold and blood pressure well controlled on nitrates and beta-blocker therapy    Morbid Obesity,BMI  ROBE   on CPAP        Thank you for allowing us to participate in this patient's care.  Please do not hesitate to call with any questions or concerns.

## 2022-06-27 NOTE — PROGRESS NOTES
Progress Note  Nephrology      Consult Requested By: Jeison Hunt MD      SUBJECTIVE:     Overnight events  Patient is a 76 y.o. male     Patient Active Problem List   Diagnosis    Swelling of lower extremity    Hypervolemia    Primary hypertension    Sleep apnea    Morbid obesity    Complete heart block    VT (ventricular tachycardia)    Cardiorenal syndrome    Acute systolic heart failure    Abdominal distension    Debility    Gout    Cardiac resynchronization therapy defibrillator (CRT-D) in place    Third degree heart block    Abnormal transaminases    NICM (nonischemic cardiomyopathy)    SOB (shortness of breath)    COVID-19 virus detected    Fever    Suspected 2019-nCoV infection    CLEMENCIA (acute kidney injury)    Bradycardia    Scrotal pain    ESRD (end stage renal disease) on dialysis    Chronic combined systolic and diastolic congestive heart failure    PAD (peripheral artery disease)    Acute deep vein thrombosis (DVT) of popliteal vein of right lower extremity    Bacteremia due to Enterococcus    Clotted dialysis access    Type 2 diabetes mellitus with chronic kidney disease on chronic dialysis, with long-term current use of insulin    Anemia, chronic renal failure, stage 5    Acute cystitis without hematuria    NSVT (nonsustained ventricular tachycardia)    Osteoarthritis of spine with radiculopathy, cervical region    Primary osteoarthritis of right shoulder    Problem with dialysis access    Critical lower limb ischemia    Hyperkalemia     Past Medical History:   Diagnosis Date    Anemia     CHF (congestive heart failure)     CKD (chronic kidney disease) stage 4, GFR 15-29 ml/min     Coronary artery disease     Diabetes mellitus     Hematuria     Hypertension     Osteoarthritis of spine with radiculopathy, cervical region 1/10/2022    Renal cyst, right               OBJECTIVE:     Vitals:    06/27/22 0445 06/27/22 0534 06/27/22 0730 06/27/22 1055    BP:  (!) 107/53  (!) 97/54   BP Location:  Left arm     Patient Position:  Lying     Pulse:  70  60   Resp:  16  15   Temp:  97.8 °F (36.6 °C)  96.8 °F (36 °C)   TempSrc:  Oral     SpO2: 95% 95% 95%    Weight:       Height:           Temp: 96.8 °F (36 °C) (06/27/22 1055)  Pulse: 60 (06/27/22 1055)  Resp: 15 (06/27/22 1055)  BP: (!) 97/54 (06/27/22 1055)  SpO2: 95 % (06/27/22 0730)              Medications:   sodium chloride 0.9%   Intravenous Once    sodium chloride 0.9%   Intravenous Once    apixaban  2.5 mg Oral BID    atorvastatin  80 mg Oral Daily    clopidogreL  75 mg Oral Daily    docusate sodium  100 mg Oral BID    epoetin hayden-epbx  10,000 Units Subcutaneous Every Tues, Thurs, Sat    furosemide (LASIX) injection  80 mg Intravenous Once    LIDOcaine  1 patch Transdermal Q24H    midodrine  5 mg Oral TID WM    sevelamer carbonate  2,400 mg Oral TID WM    sodium citrate-citric acid 500-334 mg/5 ml  30 mL Oral Once    sodium polystyrene  30 g Oral Once    sodium zirconium cyclosilicate  10 g Oral Once    sodium zirconium cyclosilicate  10 g Oral Once      sodium chloride 0.9% 25 mL/hr (06/20/22 1329)    heparin (porcine) 500 Units/hr (06/15/22 1326)    heparin (porcine) 500 Units/hr (06/24/22 1154)    heparin (porcine) 1,500 Units/hr (06/20/22 1310)               Physical Exam:  General appearance: NAD  Feet discomfort   Lungs: diminished breath sounds  Heart: Pulse 60  Abdomen: soft  Extremities: edema  Skin: dry      Laboratory:  ABG  Labs reviewed  Recent Results (from the past 336 hour(s))   Basic metabolic panel    Collection Time: 06/24/22  3:08 AM   Result Value Ref Range    Sodium 137 136 - 145 mmol/L    Potassium 4.2 3.5 - 5.1 mmol/L    Chloride 96 95 - 110 mmol/L    CO2 27 23 - 29 mmol/L    BUN 39 (H) 8 - 23 mg/dL    Creatinine 9.6 (H) 0.5 - 1.4 mg/dL    Calcium 8.7 8.7 - 10.5 mg/dL    Anion Gap 14 8 - 16 mmol/L   Basic metabolic panel    Collection Time: 06/16/22  7:22 AM   Result  Value Ref Range    Sodium 141 136 - 145 mmol/L    Potassium 4.9 3.5 - 5.1 mmol/L    Chloride 98 95 - 110 mmol/L    CO2 23 23 - 29 mmol/L    BUN 70 (H) 8 - 23 mg/dL    Creatinine 15.4 (H) 0.5 - 1.4 mg/dL    Calcium 8.1 (L) 8.7 - 10.5 mg/dL    Anion Gap 20 (H) 8 - 16 mmol/L   Basic metabolic panel    Collection Time: 06/15/22  8:43 AM   Result Value Ref Range    Sodium 140 136 - 145 mmol/L    Potassium 6.8 (HH) 3.5 - 5.1 mmol/L    Chloride 102 95 - 110 mmol/L    CO2 16 (L) 23 - 29 mmol/L     (H) 8 - 23 mg/dL    Creatinine 23.2 (H) 0.5 - 1.4 mg/dL    Calcium 8.7 8.7 - 10.5 mg/dL    Anion Gap 22 (H) 8 - 16 mmol/L     Recent Results (from the past 336 hour(s))   CBC auto differential    Collection Time: 06/25/22  4:23 AM   Result Value Ref Range    WBC 12.66 3.90 - 12.70 K/uL    Hemoglobin 10.8 (L) 14.0 - 18.0 g/dL    Hematocrit 33.3 (L) 40.0 - 54.0 %    Platelets 145 (L) 150 - 450 K/uL   CBC Auto Differential    Collection Time: 06/24/22  3:08 AM   Result Value Ref Range    WBC 11.82 3.90 - 12.70 K/uL    Hemoglobin 7.8 (L) 14.0 - 18.0 g/dL    Hematocrit 24.2 (L) 40.0 - 54.0 %    Platelets 152 150 - 450 K/uL   CBC auto differential    Collection Time: 06/24/22  3:08 AM   Result Value Ref Range    WBC 11.82 3.90 - 12.70 K/uL    Hemoglobin 7.8 (L) 14.0 - 18.0 g/dL    Hematocrit 24.2 (L) 40.0 - 54.0 %    Platelets 152 150 - 450 K/uL     Urinalysis  No results for input(s): COLORU, CLARITYU, SPECGRAV, PHUR, PROTEINUA, GLUCOSEU, BILIRUBINCON, BLOODU, WBCU, RBCU, BACTERIA, MUCUS, NITRITE, LEUKOCYTESUR, UROBILINOGEN, HYALINECASTS in the last 24 hours.    Diagnostic Results:  X-Ray: Reviewed  US: Reviewed  Echo: Reviewed  ACCESS    ASSESSMENT/PLAN:       ESRD  Metabolic bone disease  Anemia multifactorial Hb 9.3  BP 97/54  Renal diet  Supplements  HD in am

## 2022-06-27 NOTE — PLAN OF CARE
"Chart reviewed --   per Surgeon's note 6/26 --" Gangrenous right 3rd and 4th toe, apply betadine locally daily , needs amputation of the toes prior to d/c."  CM will continue to monitor pt status to determine d/c needs       pt has PHN - will resume HH at d/c - pt can't remember name of agency      Established HD pt - [facility]  p  ;  f    --   TN called and spoke with Tila at unit - advised her  that pt will continue with HD post discharge - updated notes routed to her.      pt has undergone a series of interventions to facilitate perfusion of lower extremity   6/20 - s/p  LE angiogram - per notes - remains at high risk for amputation;  heparin gtt      wife Paulette         OR declot attempt  6/14;  Trialysis cath placed 6/15;  Fistulagram planned for 6/16 - pt was not NPO - for this procedure today, 6/17       street address:  [address] [place] [address] [place] Shabazz      pt has a WC, RW and BSC , walk in shower has a built in bench and grab bars- current with home health  - can't recall name - assigned per PHN  - HH agency nurse  dresses his foot wounds      [facility] - wife transports pt to HD  - wife transports pt to all apts.      06/27/22 1657   Post-Acute Status   Post-Acute Authorization Home Health   Post-Acute Placement Status   (per PHN)   Hospital Resources/Appts/Education Provided Appointments scheduled and added to AVS   Discharge Delays (!) Other  (pending medical stability)   Discharge Plan   Discharge Plan A Home;Home with family;Home Health     "

## 2022-06-27 NOTE — PLAN OF CARE
Recommendation:   1. Continue current cardiac diet as tolerated.   2. Addition of Novasource Renal TID to supplement protein/energy intake.   3. Monitor weight/labs.   4. RD to follow and monitor intake    Goals:   Pt intake >/= 50% EEN/EPN by RD follow up    Nutrition Goal Status: new  Communication of RD Recs: other (comment) (POC)      Problem: Oral Intake Inadequate (Acute Kidney Injury/Impairment)  Goal: Optimal Nutrition Intake  Outcome: Ongoing, Progressing

## 2022-06-27 NOTE — PROGRESS NOTES
[facility] - Telemetry  Adult Nutrition  Progress Note    SUMMARY       Recommendations    Recommendation:   1. Continue current cardiac diet as tolerated.   2. Addition of Novasource Renal TID to supplement protein/energy intake.   3. Monitor weight/labs.   4. RD to follow and monitor intake    Goals:   Pt intake >/= 50% EEN/EPN by RD follow up    Nutrition Goal Status: new  Communication of RD Recs: other (comment) (POC)    Assessment and Plan    ESRD (end stage renal disease) on dialysis  Contributing Nutrition Diagnosis  Increased nutrient needs, energy/protein    Related to (etiology):   ESRD    Signs and Symptoms (as evidenced by):   Pt with ESRD on HD 3x/week, with critical lower ischemia    Interventions:  Collaboration with other providers  [employer]- Novasource Renal TID    Nutrition Diagnosis Status:   Continues, minimal intake of meals         Malnutrition Assessment      Orbital Region (Subcutaneous Fat Loss): well nourished  Upper Arm Region (Subcutaneous Fat Loss): well nourished   Scientologist Region (Muscle Loss): well nourished  Clavicle Bone Region (Muscle Loss): well nourished  Clavicle and Acromion Bone Region (Muscle Loss): well nourished  Scapular Bone Region (Muscle Loss): well nourished  Dorsal Hand (Muscle Loss): well nourished       Subcutaneous Fat Loss (Final Summary): well nourished  Muscle Loss Evaluation (Final Summary): well nourished         Reason for Assessment    Reason For Assessment: RD follow-up  Diagnosis: other (see comments) (problem with dialysis access)  Relevant Medical History: CHF, CAD, HTN, DM, CKD stage 4 on HD, renal cyst, anemia, Osteoarthritis of spine with radiculopathy- cervical region, fitulogram, Insertion of ICD  Interdisciplinary Rounds: did not attend  General Information Comments: Pt receiving cardiac diet with minimal intake of meals, willing to drink Novasource Renal TID. Says he has them at home. Denies N/V, LBM 6/22 receiving bowel regimen. Family  "member in the room at time of RD visit. HD 3x/week. PIV, HD AV graft. Anand Score: 19 incision right arm, left groin, angiogram punctures NFPE completed today : pt appears well nourished at this time,  per chart ~7% weight loss in 1 year  Nutrition Discharge Planning: d/c on renal diet with Novasource Renal BID    Nutrition Risk Screen    Nutrition Risk Screen: no indicators present    Nutrition/Diet History    Food Preferences: no cutlural or spiritual food preferences identified  Spiritual, Cultural Beliefs, Zoroastrianism Practices, Values that Affect Care: no  Food Allergies: NKFA  Factors Affecting Nutritional Intake: decreased appetite, constipation    Anthropometrics    Temp: 96.8 °F (36 °C)  Height Method: Stated  Height: 6' 1" (185.4 cm)  Height (inches): 73 in  Weight Method: Bed Scale  Weight: 117.9 kg (259 lb 14.8 oz)  Weight (lb): 259.92 lb  Ideal Body Weight (IBW), Male: 184 lb  % Ideal Body Weight, Male (lb): 141.26 %  BMI (Calculated): 34.3  BMI Grade: 30 - 34.9- obesity - grade I  Usual Body Weight (UBW), k.6 kg (21)  % Usual Body Weight: 93.32  % Weight Change From Usual Weight: -6.87 %       Lab/Procedures/Meds    Pertinent Labs Reviewed: reviewed  Pertinent Labs Comments: BUN 54, Cr 11, eGFR 5, Glu 189, Phos 5.9, Alb 2.4  Pertinent Medications Reviewed: reviewed  Pertinent Medications Comments: NaCl, apixaban, statin, clopidogrel, docusate Na, epoetin hayden, furosemide, midodrine, sevelamer carbonate, Na citrate-citric acid, Na polystyrene, Na zirconium      Estimated/Assessed Needs    Weight Used For Calorie Calculations: 117.9 kg (259 lb 14.8 oz)  Energy Calorie Requirements (kcal): 2358  Energy Need Method: Kcal/kg (20 kcal/kg)  Protein Requirements: 118 (1.0 gm/kg)  Weight Used For Protein Calculations: 117.9 kg (259 lb 14.8 oz)     Estimated Fluid Requirement Method: RDA Method (or PER MD)  RDA Method (mL): 2358  CHO Requirement: 265 gm/day      Nutrition Prescription " Ordered    Current Diet Order: Cardiac    Evaluation of Received Nutrient/Fluid Intake    I/O: +550  Energy Calories Required: not meeting needs  Protein Required: not meeting needs  Fluid Required: not meeting needs  Comments: LBM 6/22  % Intake of Estimated Energy Needs: 0 - 25 %  % Meal Intake: 0 - 25 %    Nutrition Risk    Level of Risk/Frequency of Follow-up:  (2x/week)     Monitor and Evaluation    Food and Nutrient Intake: energy intake, food and beverage intake  Food and Nutrient Adminstration: diet order  Knowledge/Beliefs/Attitudes: food and nutrition knowledge/skill  Physical Activity and Function: nutrition-related ADLs and IADLs  Anthropometric Measurements: weight change, weight, body mass index  Biochemical Data, Medical Tests and Procedures: electrolyte and renal panel, gastrointestinal profile, glucose/endocrine profile, inflammatory profile, lipid profile  Nutrition-Focused Physical Findings: overall appearance     Nutrition Follow-Up    RD Follow-up?: Yes

## 2022-06-27 NOTE — ASSESSMENT & PLAN NOTE
- wound to RLE; angiogram last week with revascularization 6/20 with atherectomy/PTA/stent to RSFA and PTA to right PT  - poor flow to foot  due to heavily calcified right AT and PT; pain persists despite pain medication; will place Lidoderm patch to see if this improves pain control;   - pt s/p DVA of RLE 6/24/2022, allow for permissive HTN (continue midodrine and hold BB)  - on Plavix and continue apixaban 2.5 mg BID

## 2022-06-27 NOTE — PROGRESS NOTES
[facility] - Telemetry  Cardiology  Progress Note    Patient Name: Houston Jc  MRN: [id number]  Admission Date: 6/13/2022  Hospital Length of Stay: 11 days  Code Status: Full Code   Attending Physician: Jeison Hunt MD   Primary Care Physician: Zak Hamilton MD  Expected Discharge Date: 6/15/2022  Principal Problem:Problem with dialysis access    Subjective:     Hospital Course:   06/14/2022 Per HPI   6/20/2022 NPO for LE angiogram today   6/21/2022 LE angiogram yesterday with following results:     successful atherectomy with PTA and stent to RSFA and PTA to RTPT and peroneal with excellent results  Poor flow into the foot and heavily calcified AT/PT.  Heparin/gtt per current treatment regimen  Continue aggressive wound care  Remains high risk for amputation and may consider DVA    Tolerated procedure well and recovered on telemetry floor. Access site stable. Complains of pain to right foot unchanged from admission. HR and BP stable. H&H 7.9/25.2 not far off baseline. Plan for HD today      6/22/2022 Right foot pain persists despite pain medications. HR and Bp stable. H&H unchanged. Vein mapping ordered    06/27/2022 Audible pulses per doppler including plantar arch. No complaints this AM.       Interval History: Pt states that his pain is improving.  He mentions that he has been having a better day and denies groin pain.  There were no other reports overnight.      Review of Systems   Constitutional: Negative for diaphoresis and fever.   HENT:  Negative for congestion and hearing loss.    Eyes:  Negative for blurred vision and pain.   Cardiovascular:  Negative for chest pain, claudication, dyspnea on exertion, leg swelling, near-syncope, palpitations and syncope.   Respiratory:  Negative for shortness of breath and sleep disturbances due to breathing.    Hematologic/Lymphatic: Negative for bleeding problem. Does not bruise/bleed easily.   Skin:  Positive for poor wound healing. Negative for color change.    Gastrointestinal:  Negative for abdominal pain and nausea.   Genitourinary:  Negative for bladder incontinence and flank pain.   Neurological:  Negative for focal weakness and light-headedness.   Objective:     Vital Signs (Most Recent):  Temp: 97.8 °F (36.6 °C) (06/27/22 0534)  Pulse: 70 (06/27/22 0534)  Resp: 16 (06/27/22 0534)  BP: (!) 107/53 (06/27/22 0534)  SpO2: 95 % (06/27/22 0730)   Vital Signs (24h Range):  Temp:  [97.2 °F (36.2 °C)-98.6 °F (37 °C)] 97.8 °F (36.6 °C)  Pulse:  [59-70] 70  Resp:  [8-18] 16  SpO2:  [92 %-96 %] 95 %  BP: ()/(52-56) 107/53     Weight: 117.9 kg (259 lb 14.8 oz)  Body mass index is 34.29 kg/m².     SpO2: 95 %  O2 Device (Oxygen Therapy): room air      Intake/Output Summary (Last 24 hours) at 6/27/2022 1029  Last data filed at 6/27/2022 0534  Gross per 24 hour   Intake 550 ml   Output 0 ml   Net 550 ml         Lines/Drains/Airways       Central Venous Catheter Line  Duration                  Hemodialysis AV Graft Right forearm -- days              Peripheral Intravenous Line  Duration                  Peripheral IV - Single Lumen 06/24/22 2325 18 G Anterior;Left Forearm 2 days                    Physical Exam  Constitutional:       Appearance: He is well-developed. He is obese. He is not diaphoretic.   HENT:      Head: Normocephalic and atraumatic.   Eyes:      General: No scleral icterus.     Pupils: Pupils are equal, round, and reactive to light.   Neck:      Vascular: No JVD.   Cardiovascular:      Rate and Rhythm: Normal rate and regular rhythm.      Pulses: Intact distal pulses.      Heart sounds: S1 normal and S2 normal. No murmur heard.    No friction rub. No gallop.   Pulmonary:      Effort: Pulmonary effort is normal. No respiratory distress.      Breath sounds: Normal breath sounds. No wheezing or rales.   Chest:      Chest wall: No tenderness.   Abdominal:      General: Bowel sounds are normal. There is no distension.      Palpations: Abdomen is soft. There is no  mass.      Tenderness: There is no abdominal tenderness. There is no rebound.   Musculoskeletal:         General: Tenderness present. Normal range of motion.      Cervical back: Normal range of motion and neck supple.      Comments: 5/10 TTP of RLE    Skin:     General: Skin is warm and dry.      Coloration: Skin is not pale.   Neurological:      Mental Status: He is alert and oriented to person, place, and time.      Coordination: Coordination normal.      Deep Tendon Reflexes: Reflexes normal.   Psychiatric:         Behavior: Behavior normal.         Judgment: Judgment normal.       Significant Labs: BMP:   Recent Labs   Lab 06/25/22  1101   *      K 4.9   CL 97   CO2 24   BUN 54*   CREATININE 11.0*   CALCIUM 8.9     , CMP   Recent Labs   Lab 06/25/22  1101      K 4.9   CL 97   CO2 24   *   BUN 54*   CREATININE 11.0*   CALCIUM 8.9   ALBUMIN 2.4*   ANIONGAP 15   ESTGFRAFRICA 5*   EGFRNONAA 4*     , CBC   Recent Labs   Lab 06/25/22  1105   WBC 14.81*   HGB 9.3*   HCT 28.5*        , INR No results for input(s): INR, PROTIME in the last 48 hours., Lipid Panel No results for input(s): CHOL, HDL, LDLCALC, TRIG, CHOLHDL in the last 48 hours., Troponin No results for input(s): TROPONINI in the last 48 hours., and All pertinent lab results from the last 24 hours have been reviewed.    Significant Imaging: Echocardiogram: 2D echo with color flow doppler:   Results for orders placed or performed during the hospital encounter of 09/18/15   2D echo with color flow doppler   Result Value Ref Range    EF + QEF 50 55 - 65    Diastolic Dysfunction Yes (A)     Est. PA Systolic Pressure 8.57     Mitral Valve Mobility NORMAL     Tricuspid Valve Regurgitation TRIVIAL     Narrative    TEST DESCRIPTION   Technical Quality: This is a technically adequate study.     Aorta: The aortic root is normal in size, measuring 3.9 cm at sinotubular junction.     Left Atrium: The left atrial volume index is severely  enlarged, measuring 49.98 cc/m2.     Left Ventricle: The left ventricle is normal in size, with an end-diastolic diameter of 6.2 cm, and an end-systolic diameter of 4.5 cm. LV wall thickness is normal, with the septum measuring 1.5 cm and the posterior wall measuring 1.7 cm across. Relative   wall thickness was increased at 0.55, and the LV mass index was increased at 229.0 g/m2 consistent with concentric left ventricular hypertrophy. Global left ventricular systolic function appears low normal to mildly depressed. Visually estimated   ejection fraction is 50-55%. The LV Doppler derived stroke volume equals 52.0 ccs.   The E/e'(lat) is 9.  This along with the following abnormalities (ERIC = 49.98 and LVMI = 229.00) suggests significant diastolic dysfunction.     Right Atrium: The right atrium is normal in size, measuring 5.3 cm in length in the apical view.     Right Ventricle: The right ventricle is normal in size measuring 3.0 cm at the base in the apical right ventricle-focused view. Global right ventricular systolic function appears normal. The estimated PA systolic pressure is 9 mmHg.     Aortic Valve:  The aortic valve is normal in structure with normal leaflet mobility. The aortic valve is tri-leaflet in structure.     Mitral Valve:  The mitral valve is normal in structure with normal leaflet mobility.     Tricuspid Valve:  The tricuspid valve is normal in structure with normal leaflet mobility. There is trivial tricuspid regurgitation.     Pulmonary Valve:  The pulmonic valve is not well seen.     IVC: IVC is normal in size and collapses > 50% with a sniff, suggesting normal right atrial pressure of 3 mmHg.     Atrial Septum: The atrial septum is intact.     Intracavitary: There is no evidence of pericardial effusion, intracavity mass, thrombi, or vegetation.         CONCLUSIONS     1 - Low normal to mildly depressed left ventricular systolic function (EF 50-55%).     2 - Concentric hypertrophy.     3 -  Severe left atrial enlargement.     4 - Left ventricular diastolic dysfunction.     5 - Normal right ventricular systolic function .     6 - The estimated PA systolic pressure is 9 mmHg.         This document has been electronically    SIGNED BY: Milagros Gross MD On: 09/18/2015 16:15    and Transthoracic echo (TTE) complete (Cupid Only):   Results for orders placed or performed during the hospital encounter of 12/21/21   Echo   Result Value Ref Range    Ascending aorta 3.30 cm    STJ 3.27 cm    AV mean gradient 4 mmHg    Ao peak son 1.17 m/s    Ao VTI 25.29 cm    IVS 1.61 (A) 0.6 - 1.1 cm    LA size 5.11 cm    Left Atrium Major Axis 7.34 cm    Left Atrium Minor Axis 6.11 cm    LVIDd 6.60 (A) 3.5 - 6.0 cm    LVIDs 5.24 (A) 2.1 - 4.0 cm    LVOT diameter 2.89 cm    LVOT peak VTI 17.22 cm    Posterior Wall 1.66 (A) 0.6 - 1.1 cm    MV Peak A Son 1.03 m/s    E wave deceleration time 128.00 msec    MV Peak E Son 0.75 m/s    RA Major Axis 5.72 cm    RA Width 3.45 cm    RVDD 3.28 cm    TAPSE 1.72 cm    TR Max Son 2.14 m/s    TDI LATERAL 0.04 m/s    LA WIDTH 4.32 cm    Ao root annulus 4.28 cm    PV PEAK VELOCITY 1.04 cm/s    MV stenosis pressure 1/2 time 37.12 ms    LV Diastolic Volume 223.81 mL    LV Systolic Volume 131.64 mL    LVOT peak son 0.79 m/s    LA volume (mod) 85.57 cm3    MV mean gradient 1 mmHg    MV peak gradient 4 mmHg    RV S' 9.54 cm/s    MV VTI 18.13 cm    LV LATERAL E/E' RATIO 18.75 m/s    FS 21 %    LA volume 125.13 cm3    LV mass 561.38 g    Left Ventricle Relative Wall Thickness 0.50 cm    AV valve area 4.46 cm2    AV Velocity Ratio 0.68     AV index (prosthetic) 0.68     MV valve area p 1/2 method 5.93 cm2    MV valve area by continuity eq 6.23 cm2    E/A ratio 0.73     LVOT area 6.6 cm2    LVOT stroke volume 112.90 cm3    AV peak gradient 5 mmHg    LV Systolic Volume Index 53.5 mL/m2    LV Diastolic Volume Index 90.98 mL/m2    LA Volume Index 50.9 mL/m2    LV Mass Index 228 g/m2    Triscuspid Valve  Regurgitation Peak Gradient 18 mmHg    LA Volume Index (Mod) 34.8 mL/m2    BSA 2.53 m2    Right Atrial Pressure (from IVC) 3 mmHg    QEF 34 %    TV rest pulmonary artery pressure 21 mmHg    Narrative    · The left ventricle is moderately enlarged with mild concentric   hypertrophy and  · The quantitatively derived ejection fraction is 34%.  · Severe left atrial enlargement.  · Normal right ventricular size with normal right ventricular systolic   function.  · Normal central venous pressure (3 mmHg).  · The estimated PA systolic pressure is 21 mmHg.  · No vegetations per surface echo.      , EKG: reviewed, and X-Ray: CXR: X-Ray Chest 1 View (CXR): No results found for this visit on 06/13/22. and X-Ray Chest PA and Lateral (CXR): No results found for this visit on 06/13/22.    Assessment and Plan:     Brief HPI: Patient seen this morning on rounds without cardiac complaint. POC discussed for LLE revsc as outpatient      * Problem with dialysis access  - fistulogram last week with successful HD  - HD per renal    Hyperkalemia  Nephrology on board       Critical lower limb ischemia  S/p DVA 6/24/22  Pulses audible per doppler     Acute deep vein thrombosis (DVT) of popliteal vein of right lower extremity  - resumed apixaban 2.5 mg BID     PAD (peripheral artery disease)  - wound to RLE; angiogram last week with revascularization 6/20 with atherectomy/PTA/stent to RSFA and PTA to right PT  - poor flow to foot  due to heavily calcified right AT and PT; pain persists despite pain medication; will place Lidoderm patch to see if this improves pain control;   - pt s/p DVA of RLE 6/24/2022, allow for permissive HTN (continue midodrine and hold BB)  - on Plavix and continue apixaban 2.5 mg BID    Chronic combined systolic and diastolic congestive heart failure  12/21/21    Echo    Interpretation Summary  · The left ventricle is moderately enlarged with mild concentric hypertrophy and  · The quantitatively derived ejection  fraction is 34%.  · Severe left atrial enlargement.  · Normal right ventricular size with normal right ventricular systolic function.  · Normal central venous pressure (3 mmHg).  · The estimated PA systolic pressure is 21 mmHg.  · No vegetations per surface echo.    - volume status maintained by HD   - appears well compensated     Cardiac resynchronization therapy defibrillator (CRT-D) in place  - nonobstructive CAD  - s/p episodes of VT in the past  - continue to monitor on telemetry      Complete heart block  -s/p CRT-D    Morbid obesity  -weight loss encouraged     Primary hypertension  - SBP 90s-110s  - hold BB and allow permissive HTN (continue midodrine for now)         VTE Risk Mitigation (From admission, onward)         Ordered     apixaban tablet 2.5 mg  2 times daily         06/25/22 1020     heparin infusion 1,000 units/500 ml in 0.9% NaCl (pressure line flush)  Intra-op continuous PRN         06/20/22 1311     heparin infusion 1,000 units/500 ml in 0.9% NaCl (pressure line flush)  Intra-op continuous PRN         06/17/22 1106     heparin infusion 1,000 units/500 ml in 0.9% NaCl (pressure line flush)  Intra-op continuous PRN         06/15/22 1326     Place sequential compression device  Until discontinued         06/14/22 0214     Place TJ hose  Until discontinued         06/14/22 0214                Bala Carvajal NP  Cardiology  [facility] - Telemetry

## 2022-06-27 NOTE — ASSESSMENT & PLAN NOTE
12/21/21    Echo    Interpretation Summary  · The left ventricle is moderately enlarged with mild concentric hypertrophy and  · The quantitatively derived ejection fraction is 34%.  · Severe left atrial enlargement.  · Normal right ventricular size with normal right ventricular systolic function.  · Normal central venous pressure (3 mmHg).  · The estimated PA systolic pressure is 21 mmHg.  · No vegetations per surface echo.    - volume status maintained by HD   - appears well compensated

## 2022-06-28 NOTE — PLAN OF CARE
CM rounded on pt - reviewed f/u apts:   Discharge rounds on patient. Discussed followup appointments, blue discharge folder, discharge nurse will go over home medications and reasons for medications and final discharge instructions. All patient/caregiver questions answered. Patient verbalized understanding.  pt for d/c to home today -   per Surgeon's note - Dr. Valencia's office will arrange follow up for further interventions   CM spoke with Surgeon - he will f/u with pt in wound care center -- referral placed to assist Access Navigators with booking.        HH orders sent to [facility] - confirmed with Mamta - pt will resume HH with [facility]   CM called [facility] to let them know pt will d/c to home today - will need resumption of care   CM called and spoke with Faye at [facility] - informed her that pt is getting HD today and will resume HD Thursday.        CM called Cardiology - [phone] - Kristen  - informed that pt should have f/u with Dr. Valencia  per Surgeon's notes.     Kristen called [place] back -- pt should keep 7/7 apt with Ms. Louis - f/u with Dr. Valencia will be booked by then.          06/28/22 1435   Final Note   Assessment Type Final Discharge Note   Anticipated Discharge Disposition Home-Health  (home health - [facility]  per [facility].)   What phone number can be called within the next 1-3 days to see how you are doing after discharge? [phone]   Hospital Resources/Appts/Education Provided Appointments scheduled and added to AVS;Post-Acute resouces added to AVS   Post-Acute Status   Post-Acute Authorization Home Health   Post-Acute Placement Status Set-up Complete/Auth obtained   Home Health Status Set-up Complete/Auth obtained   Diaylsis Status   (Shiv Emanuelbend - updated)   Discharge Delays None known at this time

## 2022-06-28 NOTE — PROGRESS NOTES
Future Appointments   Date Time Provider Department Center   7/7/2022 11:30 AM Bettye Louis NP [facility] CARDIO [place]   7/7/2022  1:00 PM MD NATAN Castaneda [facility]   7/12/2022 10:00 AM HOME MONITOR DEVICE CHECK, [facility] [place]

## 2022-06-28 NOTE — PROGRESS NOTES
dialysis   Next Steps: Follow up   Instructions: Aj Aikenjorge luis [address], [place]  [place] [address] [phone] T Th Sa 9:45 am     Zak Hamilton MD PCP - General Endocrinology [phone]   [address] [place] [address]   Next Steps: Follow up on 7/8/2022   Instructions: 10:00 am     [facility] - Wound Care   Wound Care [phone]   [address] [place] [address]   Next Steps: Follow up   Instructions: Dr. Hunt - you will be contacted with a follow up apt. [facility] [phone]   [address] [facility] [place] [address]   Next Steps: Follow up   Instructions: Home Health - per [facility]       Dejuan Cody MD   Interventional Cardiology Cardiology [phone]   [address] [facility] [address] [place] [address]   Next Steps: Follow up   Instructions: You will be contacted by the Cardiology Dept with a follow up apt.       Bettye Louis NP Cardiology [phone]   [address] [place] [address] [place] [address]   Next Steps: Follow up on 7/7/2022   Instructions: 11:30 am

## 2022-06-28 NOTE — PLAN OF CARE
AVS and educational attachments shared with patient and wife via wife's cell phone #[phone]. Discussed thoroughly. Patient and wife verbalized clear understanding using teach back method. Notified bedside nurse of completion of education. At present no distress noted. Patient to be discharged via w/c with escort service and family with all of patient's belonings. Will cont to be available to patient and family and intervene prn.

## 2022-06-28 NOTE — PROGRESS NOTES
He will come for a staged L leg revascularization        Antegrade L CFA access   Femoral nerve block   Atherectomy + PTA         Risks, benefits and alternatives of peripheral catheterization and possible intervention were discussed with the patient. All questions were answered and informed consent obtained.     I discussed the importance of compliance with dual antiplatelet therapy with the patient to prevent acute or late stent thrombosis with premature discontinuation of the therapy.

## 2022-06-28 NOTE — PROGRESS NOTES
Ambulatory referral/consult to Home Health [[id number]] (Order [id number])  Outpatient Referral  Date and Time: 6/28/2022 10:41 AM Department: [facility] Telemetry Unit Rel By/Authorizing: Jeison Hunt MD       Linked Results    Procedure Abnormality Status   Ambulatory referral/consult to Home Health         Dept Order Information    Date Department   6/28/2022 [facility] Telemetry Unit             Order Information    Order Date/Time Release Date/Time Start Date/Time End Date/Time   06/28/22 10:41 AM None 06/28/22 10:33 AM None           Order Details    Frequency Duration Priority Order Class   2-3x/week 12  occurrences Routine Internal Referral       Quantity    Ordering Quantity   1     Quantity    Ordering Quantity Ordering Quantity   1 1         Order Details    Order ID   [id number]       Comments    General Outpatient     HOME HEALTH ORDERS   FACE TO FACE ENCOUNTER     Patient Name: Houston Jc   Date of Birth:  1945     PCP: Zak Hamilton MD   PCP Address: [address] [place] [address]   PCP Phone Number: [phone]   PCP Fax: [phone]     Encounter Date: 06/28/2022     Admit to Home Health     Diagnoses:   Active Hospital Problems     *Problem with dialysis access [T82.898A]      POA: Yes       Hyperkalemia [E87.5]      POA: Yes       Critical lower limb ischemia [I70.229]      POA: Yes       Type 2 diabetes mellitus with chronic kidney disease on chronic dialysis, with long-term current use of insulin [E11.22, N18.6, Z99.2, Z79.4]      POA: Not Applicable       PAD (peripheral artery disease) [I73.9]      POA: Yes       Acute deep vein thrombosis (DVT) of popliteal vein of right lower extremity [I82.431]      POA: Yes       ESRD (end stage renal disease) on dialysis [N18.6, Z99.2]      POA: Not Applicable             Tu Th Sat                    Chronic combined systolic and diastolic congestive heart failure [I50.42]      POA: Yes       Cardiac resynchronization therapy  defibrillator (CRT-D) in place [Z95.810]       POA: Yes       Complete heart block [I44.2]      POA: Yes       Morbid obesity [E66.01]      POA: Yes       Primary hypertension [I10]      POA: Yes       Resolved Hospital Problems   No resolved problems to display.       Future Appointments   7/7/2022   11:30 AM   Bettye Louis NP       [facility]         [place]    7/7/2022   1:00 PM    Jeison Hunt MD MS                [facility] Jeison   7/12/2022  10:00 AM   HOME MONITOR DEVICE CHECK* [facility] CLINTON Petersen   [unfilled]       I have seen and examined this patient face to face today. My clinical findings that support the need for the home health skilled services and home bound status are the following:   Weakness/numbness causing balance and gait disturbance due to Heart Failure and Weakness/Debility making it taxing to leave home.       Allergies:Review of patient's allergies indicates:   No Known Allergies     Medications- Review discharge medications with patient and family and provide education.       Labs: SN to perform labs:  CBC: Biweekly;  4 week(s) and BMP: Biweekly;  4week(s)     Diet: renal diet     Referrals/ Consults   Physical Therapy to evaluate and treat. Evaluate for home safety and equipment needs; Establish/upgrade home exercise program. Perform / instruct on therapeutic exercises, gait training, transfer training, and Range of Motion.   Occupational Therapy to evaluate and treat. Evaluate home environment for safety and equipment needs. Perform/Instruct on transfers, ADL training, ROM, and therapeutic exercises.     Activities: activity as tolerated     Nursing:     Agency to admit patient within 24 hours     SN to complete comprehensive assessment including routine vital signs. Instruct on disease process and s/s of complications to report to MD. Review/verify medication list sent home with the patient at time of discharge and instruct patient/caregiver as needed.  Frequency may be adjusted depending on start of care date.     Skilled nurse to perform up to 3 visits PRN for symptoms related to diagnosis     Notify MD if SBP > 160 or < 90; DBP > 90 or < 50; HR > 120 or < 50; Temp > 101; Other:           When multiple disciplines ordered:     Start of Care occurs on Sunday - Wednesday schedule remaining discipline evaluations as ordered on separate consecutive days following the start of care.     Thursday SOC -schedule subsequent evaluations Friday and Monday the following week.     Friday - Saturday SOC - schedule subsequent discipline evaluations on consecutive days starting Monday of the following week.     Referrals/ Consults:     Physical Therapy to evaluate and treat. Evaluate for home safety and equipment needs; Establish/upgrade home exercise program. Perform / instruct on therapeutic exercises, gait training, transfer training, and Range of Motion.       For all post-discharge communication and subsequent orders please contact patient's primary care physician. If unable to reach primary care physician or do not receive response within 30 minutes, please contact [facility]  for clinical staff order clarification     MISCELLANEOUS CARE:   Diabetic Care:   SN to perform and educate Diabetic management with blood glucose monitoring:   Heart Failure:       SN to instruct on the following:   Instruct on the definition of CHF.   Instruct on the signs/sympoms of CHF to be reported.   Instruct on and monitor daily weights.   Instruct on factors that cause exacerbation.   Instruct on action, dose, schedule, and side effects of medications.   Instruct on diet as prescribed.   Instruct on activity allowed.   Instruct on life-style modifications for life long management of CHF   SN to assess compliance with daily weights, diet, medications, fluid retention,   safety precautions, activities permitted and life-style modifications.   Additional 1-2 SN visits per week as needed  for signs and symptoms   of CHF exacerbation.       For Weight Gain > 2-3 lbs in 1 day or 4-6 lbs over 1 week notify PCP:   CHF DIURETIC SLIDING SCALE       Skilled nurse visits daily to instruct and monitor medication adherence until target weight. Then resume prior order frequency.       If weight gain exceeds 5 lbs over target weight, call MD   If weight gain of 3-4 lbs over target weight, then:       Increase  and frequency of daily until target weight achieved or maximum 3 days.       If already on max oral daily dose, see IV diuretic instructions.     After 3 days of increased oral diuretic dose, get BMP. If patient is on increased oral diuretic dose greater than 5 days, repeat BMP at day 7 of increased dose.       Potassium supplementation   Scr > 1.5 mg/dl Scr  1.5 mg/dl   K < 3.0 - NOTIFY MD and 40 mEq bid 40 mEq tid   K- 3.1-3.3 20 mEq bid 20 mEq tid   K 3.4-3.7 10 mEq bid 10 mEq tid     If target weight not reached after 5 days of increased oral diuretic, proceed to IV diuretic with daily patient contact to include face-to-face visit and telephone encounters.             WOUND CARE ORDERS   yes:  Arterial Wound:  Location:right foot       Consult ET nurse         Apply the following to wound:betadine solution daily   Other:daily  (frequency)     Medications: Please refer to discharge reconciled medications at time of discharge from discharge summary/AVS     I certify that this patient is confined to his home and needs related to  intermittent skilled nursing care. intermittent skilled services as ordered.                Reprint Order Requisition    Ambulatory referral/consult to Home Health (Order #[id number]) on 6/28/22       Standing Order Information    Remaining Occurrences Interval      12/12 2-3x/week                     Associated Diagnoses     ICD-10-CM ICD-9-CM   Critical lower limb ischemia  - Primary     I70.229 459.9   Clotted dialysis access     T82.49XA 996.1   ESRD (end stage renal disease)   "   N18.6 585.6   Problem with dialysis access, subsequent encounter     T82.898D V58.89  996.73   PAD (peripheral artery disease)     I73.9 443.9         Ambulatory referral/consult to Home Health: Patient Communication     Not Released  Not seen         Order Questions                       Process Instructions    Patient should be seen by the "Expected Date".      Collection Information                                  Patient Details  MRN:[id number]  Name Legal Sex:  SSN   Houston Perkins Male 1945          Address Phone Email Aliases   [address]   [place] [address]  Home: [phone]   Work:    Cell: [phone] [email or web address] [place]ST[place] HOLMAN          Inpatient Unit Inpatient Room Inpatient Bed    [facility] TELEMETRY UNIT K477 K477 A           PCP Allergies     Zak Hamilton MD No Known Allergies                Primary Visit Coverage    Payer Plan Sponsor Code Group Number Group Name   [employer] 65  [id number]        Primary Visit Coverage Subscriber    Subscriber ID Subscriber Name Subscriber Address   [id number] HOUSTON PERKINS [address]     [place] [id number]           Additional Information    Associated Reports   Priority and Order Details           ADT-Related Order Information         Encounter    View Encounter               Order Provider Info        Office phone Pager E-mail   Ordering User Jeison Hnut [phone] -- --   Authorizing Provider Jeison Hunt [phone] -- --   Attending Provider Jeison Hunt [phone] -- --     Ambulatory referral/consult to Home Health [[id number]]    Electronically signed by: Jeison Hunt MD on 22 1041 Status: Active   Ordering user: Jeison Hunt MD 22 1041 Ordering provider: Jeison Hunt MD   Authorized by: Jeison Hunt MD Ordering mode: Standard   Frequency: 2-3x/week 22 1033 - 12  occurrences   Diagnoses   Clotted " dialysis access [T82.49XA]   ESRD (end stage renal disease) [N18.6]   Problem with dialysis access, subsequent encounter [T82.898D]   PAD (peripheral artery disease) [I73.9]   Critical lower limb ischemia [I70.229]   Order comments: General Outpatient   HOME HEALTH ORDERS FACE TO FACE ENCOUNTER Patient Name: Houston Jc Date of Birth:  1945 PCP: Zak Hamilton MD PCP Address: [address] [place] [address] / [place] [address] PCP Phone Number: [phone] PCP Fax: [phone] Encounter Date: 06/28/2022 Admit to Home Health Diagnoses: Active Hospital Problems   *Problem with dialysis access [T82.898A]    POA: Yes   Hyperkalemia [E87.5]    POA: Yes   Critical lower limb ischemia [I70.229]    POA: Yes   Type 2 diabetes mellitus with chronic kidney disease on chronic dialysis, with long-term current use of insulin [E11.22, N18.6, Z99.2, Z79.4]    POA: Not Applicable   PAD (peripheral artery disease) [I73.9]    POA: Yes   Acute deep vein thrombosis (DVT) of popliteal vein of right lower extremity [I82.431]    POA: Yes   ESRD (end stage renal disease) on dialysis [N18.6, Z99.2]    POA: Not Applicable           Tu Th Sat              Chronic combined systolic and diastolic congestive heart failure [I50.42]    POA: Yes   Cardiac resynchronization therapy defibrillator (CRT-D) in place [Z95.810]     POA: Yes   Complete heart block [I44.2]    POA: Yes   Morbid obesity [E66.01]    POA: Yes   Primary hypertension [I10]    POA: Yes Resolved Hospital Problems No resolved problems to display. Future Appointments 7/7/2022   11:30 AM   Bettye Louis NP       [facility] CARDIO         [place]  7/7/2022   1:00 PM    MD NATAN Castaneda                [facility] Jeison 7/12/2022  10:00 AM   HOME MONITOR DEVICE CHECK* BRIDGET Petersen [unfilled] I have seen and examined this patient face to face today. My clinical findings that support the need for the home health skilled services and home bound status are  the following: Weakness/numbness causing balance and gait disturbance due to Heart Failure and Weakness/Debility making it taxing to leave home. Allergies:Review of patient's allergies indicates: No Known Allergies Medications- Review discharge medications with patient and family and provide education.   Labs: SN to perform labs:  CBC: Biweekly;  4 week(s) and BMP: Biweekly;  4week(s) Diet: renal diet Referrals/ Consults Physical Therapy to evaluate and treat. Evaluate for home safety and equipment needs; Establish/upgrade home exercise program. Perform / instruct on therapeutic exercises, gait training, transfer training, and Range of Motion. Occupational Therapy to evaluate and treat. Evaluate home environment for safety and equipment needs. Perform/Instruct on transfers, ADL training, ROM, and therapeutic exercises. Activities: activity as tolerated Nursing: Agency to admit patient within 24 hours SN to complete comprehensive assessment including routine vital signs. Instruct on disease process and s/s of complications to report to MD. Review/verify medication list sent home with the patient at time of discharge and instruct patient/caregiver as needed. Frequency may be adjusted depending on start of care date. Skilled nurse to perform up to 3 visits PRN for symptoms related to diagnosis Notify MD if SBP > 160 or < 90; DBP > 90 or < 50; HR > 120 or < 50; Temp > 101; Other:   When multiple disciplines ordered: Start of Care occurs on Sunday - Wednesday schedule remaining discipline evaluations as ordered on separate consecutive days following the start of care. Thursday SOC -schedule subsequent evaluations Friday and Monday the following week. Friday - Saturday SOC - schedule subsequent discipline evaluations on consecutive days starting Monday of the following week. Referrals/ Consults:   Physical Therapy to evaluate and treat. Evaluate for home safety and equipment needs; Establish/upgrade home exercise program.  Perform / instruct on therapeutic exercises, gait training, transfer training, and Range of Motion. For all post-discharge communication and subsequent orders please contact patient's primary care physician. If unable to reach primary care physician or do not receive response within 30 minutes, please contact [facility]  for clinical staff order clarification MISCELLANEOUS CARE: Diabetic Care:   SN to perform and educate Diabetic management with blood glucose monitoring: Heart Failure:   SN to instruct on the following: Instruct on the definition of CHF. Instruct on the signs/sympoms of CHF to be reported. Instruct on and monitor daily weights. Instruct on factors that cause exacerbation. Instruct on action, dose, schedule, and side effects of medications. Instruct on diet as prescribed. Instruct on activity allowed. Instruct on life-style modifications for life long management of CHF SN to assess compliance with daily weights, diet, medications, fluid retention, safety precautions, activities permitted and life-style modifications. Additional 1-2 SN visits per week as needed for signs and symptoms of CHF exacerbation. For Weight Gain > 2-3 lbs in 1 day or 4-6 lbs over 1 week notify PCP: CHF DIURETIC SLIDING SCALE   Skilled nurse visits daily to instruct and monitor medication adherence until target weight. Then resume prior order frequency.   If weight gain exceeds 5 lbs over target weight, call MD If weight gain of 3-4 lbs over target weight, then:   Increase  and frequency of daily until target weight achieved or maximum 3 days.   If already on max oral daily dose, see IV diuretic instructions. After 3 days of increased oral diuretic dose, get BMP. If patient is on increased oral diuretic dose greater than 5 days, repeat BMP at day 7 of increased dose.   Potassium supplementation Scr > 1.5 mg/dl Scr  1.5 mg/dl K < 3.0 - NOTIFY MD and 40 mEq bid 40 mEq tid K- 3.1-3.3 20 mEq bid 20 mEq tid K 3.4-3.7 10 mEq bid  10 mEq tid If target weight not reached after 5 days of increased oral diuretic, proceed to IV diuretic with daily patient contact to include face-to-face visit and telephone encounters.   WOUND CARE ORDERS yes:  Arterial Wound:  Location:right foot Consult ET nurse       Apply the following to wound:betadine solution daily Other:daily  (frequency) Medications: Please refer to discharge reconciled medications at time of discharge from discharge summary/AVS I certify that this patient is confined to his home and needs related to  intermittent skilled nursing care. intermittent skilled services as ordered.       Order Diagnosis and CPT Display    Order Diagnosis: Clotted dialysis access [T82.49XA (ICD-10-CM)]ESRD (end stage renal disease) [N18.6 (ICD-10-CM)]Problem with dialysis access, subsequent encounter [T82.898D (ICD-10-CM)]PAD (peripheral artery disease) [I73.9 (ICD-10-CM)]Critical lower limb ischemia [I70.229 (ICD-10-CM)] CPT:                 Electronically signed by: Jeison Hunt MD Lic # [id number] NPI: [id number]

## 2022-06-28 NOTE — PLAN OF CARE
YUDY note: Secure chat message sent by YUDY to LAMB[facility]O [facility] [employer]igator requesting assistance with rescheduling follow up appointments set for July 7th for Cardiology and with Dr Hunt. Patient goes to dialysis on Tues, Thurs and Sat weekly and cannot have physician appointments scheduled for those days of the week.

## 2022-06-28 NOTE — PROGRESS NOTES
"Surgery follow up  BP (!) 95/50 (BP Location: Left arm, Patient Position: Lying)   Pulse 60   Temp 98.3 °F (36.8 °C) (Oral)   Resp 17   Ht 6' 1" (1.854 m)   Wt 117.9 kg (259 lb 14.8 oz)   SpO2 97%   BMI 34.29 kg/m²   I/O last 3 completed shifts:  In: 550 [P.O.:550]  Out: 0   No intake/output data recorded.  discussed with DR Cody will probably discharge in am , he will arrange for further management of angioplasty and left leg .  "

## 2022-06-28 NOTE — PROGRESS NOTES
06/28/22 1315   Vital Signs   Pulse 90   /64   During Hemodialysis Assessment   Blood Flow Rate (mL/min) 300 mL/min   Dialysate Flow Rate (mL/min) 600 ml/min   Ultrafiltration Rate (mL/Hr) 50 mL/Hr   Arteriovenous Lines Secure Yes   Arterial Pressure (mmHg) -190 mmHg   Venous Pressure (mmHg) 154   UF Removed (mL) 2050 mL   TMP 21   Venous Line in Air Detector Yes   Intake (mL) 200 mL   Transducer Dry Yes   Access Visible Yes    notified of access issue? N/A   Heparin given? N/A   Intra-Hemodialysis Comments tx completed   Post-Hemodialysis Assessment   Rinseback Volume (mL) 250 mL   Blood Volume Processed (Liters) 87.6 L   Dialyzer Clearance Moderately streaked   Duration of Treatment 210 minutes   Additional Fluid Intake (mL) 500 mL   Total UF (mL) 2050 mL   Net Fluid Removal 1550   Arterial bleeding stop time (min) 5 min   Venous bleeding stop time (min) 5 min   Post-Hemodialysis Comments blood rinsed back with 200 ml of ns. lines pulled and secured using aseptic technique. manual pressure held until hemostatis was achieved.

## 2022-06-28 NOTE — NURSING
Report received from Della NORWOOD, care assumed. Pt awake in bed watching TV, respiration unlabored on room air  , patent IV , tele-monitor on and no signs or symptoms of distressed . Pt board has been updated with RN information and plan for today . Pt has no questions or concerns at this time . Fall/safe precaution in place.

## 2022-06-28 NOTE — PROGRESS NOTES
[place] - Telemetry  Cardiology  Progress Note    Patient Name: Houston Jc  MRN: [id number]  Admission Date: 6/13/2022  Hospital Length of Stay: 12 days  Code Status: Full Code   Attending Physician: Jeison Hunt MD   Primary Care Physician: Zak Hamilton MD  Expected Discharge Date: 6/28/2022  Principal Problem:Problem with dialysis access    Subjective:     Hospital Course:   06/14/2022 Per HPI   6/20/2022 NPO for LE angiogram today   6/21/2022 LE angiogram yesterday with following results:     successful atherectomy with PTA and stent to RSFA and PTA to RTPT and peroneal with excellent results  Poor flow into the foot and heavily calcified AT/PT.  Heparin/gtt per current treatment regimen  Continue aggressive wound care  Remains high risk for amputation and may consider DVA    Tolerated procedure well and recovered on telemetry floor. Access site stable. Complains of pain to right foot unchanged from admission. HR and BP stable. H&H 7.9/25.2 not far off baseline. Plan for HD today      6/22/2022 Right foot pain persists despite pain medications. HR and Bp stable. H&H unchanged. Vein mapping ordered    06/27/2022 Audible pulses per doppler including plantar arch. No complaints this AM.     06/28/2022 Stable overnight without CV complaint. Midodrine increased- SBP 90s-100s.       Interval History: Pt states that his pain is improving.  He mentions that he has been having a better day and denies groin pain.  There were no other reports overnight.      Review of Systems   Constitutional: Negative for diaphoresis and fever.   HENT:  Negative for congestion and hearing loss.    Eyes:  Negative for blurred vision and pain.   Cardiovascular:  Negative for chest pain, claudication, dyspnea on exertion, leg swelling, near-syncope, palpitations and syncope.   Respiratory:  Negative for shortness of breath and sleep disturbances due to breathing.    Hematologic/Lymphatic: Negative for bleeding problem. Does not  bruise/bleed easily.   Skin:  Positive for poor wound healing. Negative for color change.   Gastrointestinal:  Negative for abdominal pain and nausea.   Genitourinary:  Negative for bladder incontinence and flank pain.   Neurological:  Negative for focal weakness and light-headedness.   Objective:     Vital Signs (Most Recent):  Temp: 97.6 °F (36.4 °C) (06/28/22 0809)  Pulse: 60 (06/28/22 1100)  Resp: 16 (06/28/22 0908)  BP: 114/66 (06/28/22 1100)  SpO2: 96 % (06/28/22 0810)   Vital Signs (24h Range):  Temp:  [96.8 °F (36 °C)-98.5 °F (36.9 °C)] 97.6 °F (36.4 °C)  Pulse:  [60-61] 60  Resp:  [15-18] 16  SpO2:  [94 %-97 %] 96 %  BP: ()/(50-66) 114/66     Weight: 117.9 kg (259 lb 14.8 oz)  Body mass index is 34.29 kg/m².     SpO2: 96 %  O2 Device (Oxygen Therapy): room air    No intake or output data in the 24 hours ending 06/28/22 1112      Lines/Drains/Airways       Central Venous Catheter Line  Duration                  Hemodialysis AV Graft Right forearm -- days              Peripheral Intravenous Line  Duration                  Peripheral IV - Single Lumen 06/24/22 2325 18 G Anterior;Left Forearm 3 days                    Physical Exam  Constitutional:       Appearance: He is well-developed. He is obese. He is not diaphoretic.   HENT:      Head: Normocephalic and atraumatic.   Eyes:      General: No scleral icterus.     Pupils: Pupils are equal, round, and reactive to light.   Neck:      Vascular: No JVD.   Cardiovascular:      Rate and Rhythm: Normal rate and regular rhythm.      Pulses: Intact distal pulses.      Heart sounds: S1 normal and S2 normal. No murmur heard.    No friction rub. No gallop.   Pulmonary:      Effort: Pulmonary effort is normal. No respiratory distress.      Breath sounds: Normal breath sounds. No wheezing or rales.   Chest:      Chest wall: No tenderness.   Abdominal:      General: Bowel sounds are normal. There is no distension.      Palpations: Abdomen is soft. There is no mass.       Tenderness: There is no abdominal tenderness. There is no rebound.   Musculoskeletal:         General: Tenderness present. Normal range of motion.      Cervical back: Normal range of motion and neck supple.      Comments: 5/10 TTP of RLE    Skin:     General: Skin is warm and dry.      Coloration: Skin is not pale.   Neurological:      Mental Status: He is alert and oriented to person, place, and time.      Coordination: Coordination normal.      Deep Tendon Reflexes: Reflexes normal.   Psychiatric:         Behavior: Behavior normal.         Judgment: Judgment normal.       Significant Labs: BMP:   Recent Labs   Lab 06/28/22  1007   *   *   K 4.2   CL 97   CO2 20*   BUN 52*   CREATININE 10.8*   CALCIUM 8.6*     , CMP   Recent Labs   Lab 06/28/22  1007   *   K 4.2   CL 97   CO2 20*   *   BUN 52*   CREATININE 10.8*   CALCIUM 8.6*   ALBUMIN 2.3*   ANIONGAP 17*   ESTGFRAFRICA 5*   EGFRNONAA 4*     , CBC   Recent Labs   Lab 06/28/22  1007   WBC 13.55*   HGB 9.1*   HCT 28.4*   *     , INR No results for input(s): INR, PROTIME in the last 48 hours., Lipid Panel No results for input(s): CHOL, HDL, LDLCALC, TRIG, CHOLHDL in the last 48 hours., Troponin No results for input(s): TROPONINI in the last 48 hours., and All pertinent lab results from the last 24 hours have been reviewed.    Significant Imaging: Echocardiogram: 2D echo with color flow doppler:   Results for orders placed or performed during the hospital encounter of 09/18/15   2D echo with color flow doppler   Result Value Ref Range    EF + QEF 50 55 - 65    Diastolic Dysfunction Yes (A)     Est. PA Systolic Pressure 8.57     Mitral Valve Mobility NORMAL     Tricuspid Valve Regurgitation TRIVIAL     Narrative    TEST DESCRIPTION   Technical Quality: This is a technically adequate study.     Aorta: The aortic root is normal in size, measuring 3.9 cm at sinotubular junction.     Left Atrium: The left atrial volume index is severely  enlarged, measuring 49.98 cc/m2.     Left Ventricle: The left ventricle is normal in size, with an end-diastolic diameter of 6.2 cm, and an end-systolic diameter of 4.5 cm. LV wall thickness is normal, with the septum measuring 1.5 cm and the posterior wall measuring 1.7 cm across. Relative   wall thickness was increased at 0.55, and the LV mass index was increased at 229.0 g/m2 consistent with concentric left ventricular hypertrophy. Global left ventricular systolic function appears low normal to mildly depressed. Visually estimated   ejection fraction is 50-55%. The LV Doppler derived stroke volume equals 52.0 ccs.   The E/e'(lat) is 9.  This along with the following abnormalities (ERIC = 49.98 and LVMI = 229.00) suggests significant diastolic dysfunction.     Right Atrium: The right atrium is normal in size, measuring 5.3 cm in length in the apical view.     Right Ventricle: The right ventricle is normal in size measuring 3.0 cm at the base in the apical right ventricle-focused view. Global right ventricular systolic function appears normal. The estimated PA systolic pressure is 9 mmHg.     Aortic Valve:  The aortic valve is normal in structure with normal leaflet mobility. The aortic valve is tri-leaflet in structure.     Mitral Valve:  The mitral valve is normal in structure with normal leaflet mobility.     Tricuspid Valve:  The tricuspid valve is normal in structure with normal leaflet mobility. There is trivial tricuspid regurgitation.     Pulmonary Valve:  The pulmonic valve is not well seen.     IVC: IVC is normal in size and collapses > 50% with a sniff, suggesting normal right atrial pressure of 3 mmHg.     Atrial Septum: The atrial septum is intact.     Intracavitary: There is no evidence of pericardial effusion, intracavity mass, thrombi, or vegetation.         CONCLUSIONS     1 - Low normal to mildly depressed left ventricular systolic function (EF 50-55%).     2 - Concentric hypertrophy.     3 -  Severe left atrial enlargement.     4 - Left ventricular diastolic dysfunction.     5 - Normal right ventricular systolic function .     6 - The estimated PA systolic pressure is 9 mmHg.         This document has been electronically    SIGNED BY: Milagros Gross MD On: 09/18/2015 16:15    and Transthoracic echo (TTE) complete (Cupid Only):   Results for orders placed or performed during the hospital encounter of 12/21/21   Echo   Result Value Ref Range    Ascending aorta 3.30 cm    STJ 3.27 cm    AV mean gradient 4 mmHg    Ao peak son 1.17 m/s    Ao VTI 25.29 cm    IVS 1.61 (A) 0.6 - 1.1 cm    LA size 5.11 cm    Left Atrium Major Axis 7.34 cm    Left Atrium Minor Axis 6.11 cm    LVIDd 6.60 (A) 3.5 - 6.0 cm    LVIDs 5.24 (A) 2.1 - 4.0 cm    LVOT diameter 2.89 cm    LVOT peak VTI 17.22 cm    Posterior Wall 1.66 (A) 0.6 - 1.1 cm    MV Peak A Son 1.03 m/s    E wave deceleration time 128.00 msec    MV Peak E Son 0.75 m/s    RA Major Axis 5.72 cm    RA Width 3.45 cm    RVDD 3.28 cm    TAPSE 1.72 cm    TR Max Son 2.14 m/s    TDI LATERAL 0.04 m/s    LA WIDTH 4.32 cm    Ao root annulus 4.28 cm    PV PEAK VELOCITY 1.04 cm/s    MV stenosis pressure 1/2 time 37.12 ms    LV Diastolic Volume 223.81 mL    LV Systolic Volume 131.64 mL    LVOT peak son 0.79 m/s    LA volume (mod) 85.57 cm3    MV mean gradient 1 mmHg    MV peak gradient 4 mmHg    RV S' 9.54 cm/s    MV VTI 18.13 cm    LV LATERAL E/E' RATIO 18.75 m/s    FS 21 %    LA volume 125.13 cm3    LV mass 561.38 g    Left Ventricle Relative Wall Thickness 0.50 cm    AV valve area 4.46 cm2    AV Velocity Ratio 0.68     AV index (prosthetic) 0.68     MV valve area p 1/2 method 5.93 cm2    MV valve area by continuity eq 6.23 cm2    E/A ratio 0.73     LVOT area 6.6 cm2    LVOT stroke volume 112.90 cm3    AV peak gradient 5 mmHg    LV Systolic Volume Index 53.5 mL/m2    LV Diastolic Volume Index 90.98 mL/m2    LA Volume Index 50.9 mL/m2    LV Mass Index 228 g/m2    Triscuspid Valve  Regurgitation Peak Gradient 18 mmHg    LA Volume Index (Mod) 34.8 mL/m2    BSA 2.53 m2    Right Atrial Pressure (from IVC) 3 mmHg    QEF 34 %    TV rest pulmonary artery pressure 21 mmHg    Narrative    · The left ventricle is moderately enlarged with mild concentric   hypertrophy and  · The quantitatively derived ejection fraction is 34%.  · Severe left atrial enlargement.  · Normal right ventricular size with normal right ventricular systolic   function.  · Normal central venous pressure (3 mmHg).  · The estimated PA systolic pressure is 21 mmHg.  · No vegetations per surface echo.      , EKG: reviewed, and X-Ray: CXR: X-Ray Chest 1 View (CXR): No results found for this visit on 06/13/22. and X-Ray Chest PA and Lateral (CXR): No results found for this visit on 06/13/22.    Assessment and Plan:     Brief HPI: Patient seen this morning on rounds. No cardiac complaints reported.     * Problem with dialysis access  - fistulogram last week with successful HD  - HD per renal    Hyperkalemia  Nephrology on board       Critical lower limb ischemia  S/p DVA 6/24/22  Pulses audible per doppler     Acute deep vein thrombosis (DVT) of popliteal vein of right lower extremity  - continue Eliquis     PAD (peripheral artery disease)  - wound to RLE; angiogram last week with revascularization 6/20 with atherectomy/PTA/stent to RSFA and PTA to right PT  - pt s/p DVA of RLE 6/24/2022, allow for permissive HTN (continue midodrine and hold BB)  - on Plavix and Eliquis  - Amputation in 4 weeks to allow for pressurization of DVA   - Appropriate for DC from CV perspective     Chronic combined systolic and diastolic congestive heart failure  12/21/21    Echo    Interpretation Summary  · The left ventricle is moderately enlarged with mild concentric hypertrophy and  · The quantitatively derived ejection fraction is 34%.  · Severe left atrial enlargement.  · Normal right ventricular size with normal right ventricular systolic function.  ·  Normal central venous pressure (3 mmHg).  · The estimated PA systolic pressure is 21 mmHg.  · No vegetations per surface echo.    - volume status maintained by HD   - appears well compensated     Cardiac resynchronization therapy defibrillator (CRT-D) in place  - nonobstructive CAD  - s/p episodes of VT in the past      Complete heart block  -s/p CRT-D    Morbid obesity  -weight loss encouraged     Primary hypertension  - SBP 90s-100s  - hold BB and allow permissive HTN (continue midodrine for now)         VTE Risk Mitigation (From admission, onward)         Ordered     apixaban tablet 5 mg  2 times daily         06/27/22 1407     heparin infusion 1,000 units/500 ml in 0.9% NaCl (pressure line flush)  Intra-op continuous PRN         06/20/22 1311     heparin infusion 1,000 units/500 ml in 0.9% NaCl (pressure line flush)  Intra-op continuous PRN         06/17/22 1106     heparin infusion 1,000 units/500 ml in 0.9% NaCl (pressure line flush)  Intra-op continuous PRN         06/15/22 1326     Place sequential compression device  Until discontinued         06/14/22 0214     Place TJ hose  Until discontinued         06/14/22 0214                Bala Carvajal NP  Cardiology  [place] - Telemetry

## 2022-06-28 NOTE — ASSESSMENT & PLAN NOTE
- wound to RLE; angiogram last week with revascularization 6/20 with atherectomy/PTA/stent to RSFA and PTA to right PT  - pt s/p DVA of RLE 6/24/2022, allow for permissive HTN (continue midodrine and hold BB)  - on Plavix and Eliquis  - Amputation in 4 weeks to allow for pressurization of DVA   - Appropriate for DC from CV perspective

## 2022-06-28 NOTE — PLAN OF CARE
pt for d/c to home today -   per Surgeon's note - Dr. Valencia's office will arrange follow up for further interventions   CM spoke with Surgeon - he will f/u with pt in wound care center     HH orders sent to [facility] - confirmed with Mamta - pt will resume HH with [facility]   CM called [facility] to let them know pt will d/c to home today - will need resumption of care   CM called and spoke with Faye at [facility] - informed her that pt is getting HD today and will resume HD Thursday.       CM called Cardiology - [phone] - Kristen  - informed that pt should have f/u with Dr. Valencia  per Surgeon's notes.     Kristen called [place] back -- pt should keep 7/7 apt with Ms. Louis - f/u with Dr. Valencia will be booked by then.        Future Appointments   Date Time Provider Department Center   7/7/2022 11:30 AM Bettye Louis NP [facility] CARDIO [place]   7/7/2022  1:00 PM MD NATAN Castaneda [facility] Jeison   7/12/2022 10:00 AM HOME MONITOR DEVICE CHECK, [facility] [place]

## 2022-06-28 NOTE — PROGRESS NOTES
Houston Jc #[id number] (CSN: [id number]) (76 y.o. M) (Adm: 06/13/22)  [facility] [id number]    PCP    CONNOR ESCOBEDO    Date of Birth    1945       Demographics    Address:   [place] [address] [place] [address]    Home Phone:   [phone]    Work Phone:       Mobile Phone:   [phone]              SSN:       Insurance:   [employer]    Marital Status:       [place]:   [place]                  Admission Dx    E87.5 Hyperkalemia   N18.6 ESRD (end stage renal disease)   T82.49XA Clotted dialysis access   I73.9 PAD (peripheral artery disease)   N18.6, Z99.2 ESRD (end stage renal disease) on dialysis   N17.9 CLEMENCIA (acute kidney injury)   T82.898D Problem with dialysis access, subsequent encounter   I82.431 Acute deep vein thrombosis (DVT) of popliteal vein of right lower extremity   E11.22, N18.6, Z99.2, Z79.4 Type 2 diabetes mellitus with chronic kidney disease on chronic dialysis, with long-term current use of insulin   T82.868A Thrombosis of kidney dialysis arteriovenous graft, initial encounter   I70.229 Critical lower limb ischemia       Chief Complaint    Complaint Comment   Vascular Access Problem Pt last dialysis was thurs, unable to complete on sat due to fistula clotted, denies Cp/SOB, cough, fever, n/v        Documents Filed to Patient    Power of  Living Will Clinical Unknown Study Attachment Consent Form ABN Waiver After Visit Summary Lab Result Scan Code Status Patient Portal Status    Not on File  Not on File  Not on File  Not on File  Filed  Not on File  Filed  Not on File  FULL [Updated on 06/13/22 3255]  Active       Admission Information      Current Information    Attending Provider Admitting Provider Admission Type Admission Status   MD Jeison Castaneda MD Emergency Confirmed Admission          Admission Date/Time Discharge Date Hospital Service Auth/Cert Status   06/13/22  11:05 AM  General Surgery Incomplete           Hospital Area Unit Room/Bed    [facility] TELEMETRY UNIT K477/K477 A            Discharge Disposition Discharge Destination   Home or Self Care        Hospital Account    Name Acct ID Class Status Primary Coverage   Houston Jc [id number] IP- Inpatient Open [employer] - Good Photo 65            Guarantor Account (for Hospital Account #[id number])    Name Relation to Pt Service Area Active? Acct Type   Houston Jc Self OHSSA Yes Personal/Family   Address Phone     [address]   [place] [address] [phone](H)              Coverage Information (for Hospital Account #[id number])    F/O Payor/Plan Precert #   [employer]/Good Photo 65    Subscriber Subscriber #   Houston Jc [id number]   Address Phone   [address]   [place] [address] [phone]            Emergency Contact Information    Name: Paulette Jc Relationship: Spouse   Address:     City:  State:  Zip:  Phone: [phone]    Business phone:

## 2022-06-28 NOTE — PLAN OF CARE
VN note: VN completed AVS and attachments and notified bedside nurse, Harvey. Will cont to be available and intervene prn.

## 2022-06-29 NOTE — PROGRESS NOTES
C3 nurse spoke with Houston Jc ( wife) for a TCC post hospital discharge follow up call. The patient does not have a scheduled HOSFU appointment with Zak Hamilton MD within 5-7  days post hospital discharge date 06/28/2022. C3 nurse was unable to schedule HOSFU appointment in [facility].    NP referral was sent

## 2022-06-29 NOTE — TELEPHONE ENCOUNTER
----- Message from Angela Berkowitz sent at 6/29/2022 10:53 AM CDT -----  Contact: Quentin NORWOOD  Type:  RX Refill Request    Who Called:  [facility]   Refill or New Rx: refill   RX Name and Strength: HYDROcodone-acetaminophen  mg per tablet 1 tablet    How is the patient currently taking it? (ex. 1XDay):   Is this a 30 day or 90 day RX: 30  [facility] with phone [id number] [address]   Phone: [phone]  Fax:  [phone]    Local or Mail Order: local  Ordering Provider:   Would the patient rather a call back or a response via [facility]?  Call   Best Call Back Number: [phone]  Additional Information:  Hospital Updated to 10mg

## 2022-06-29 NOTE — TELEPHONE ENCOUNTER
Tara Padilla, VALERIE Simmons  Caller: Unspecified (Today, 11:13 AM)  I dont write refills for this     Called the provided number back and the wife answered the phone, relayed Dr. Padilla's message to her and she told me he was in the hospital and had surgery with Dr. Valencia and Dr. Hunt and the increased the medication. Ask the wife, to please get in touch with them to request a refill. She said she will do, told me her  is in a lot of pain after the surgery

## 2022-06-29 NOTE — DISCHARGE SUMMARY
[facility]  General Surgery  Discharge Summary      Patient Name: Houston Jc  MRN: [id number]  Admission Date: 6/13/2022  Hospital Length of Stay: 12 days  Discharge Date and Time: 6/28/2022  4:15 PM  Attending Physician: Sondra att. providers found   Discharging Provider: Jeison Hunt MD  Primary Care Provider: Zak Hamilton MD     HPI:  76-year-old man known diabetic hypertensive will extensive peripheral vascular disease chronic renal failure admitted with a clotted right upper arm av fistula patient done underwent also placement of attempted catheter in the neck area which was not stuck as successful patient had a Andi catheter placed in the left groin area Dr. 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 right foot gangrenous toes 3rd and 4th toe patient underwent extensive vascular reconstruction of the right peripheral angiogram including arthrectomy and stent PTA plantar right 8 year posterior tibial    Procedure(s) (LRB):  ANGIOPLASTY, VESSEL, PERIPHERAL, FOR CHRONIC TOTAL OCCLUSION (N/A)  ULTRASOUND, INTRAVASCULAR (Right)  ATHERECTOMY, PERIPHERAL BLOOD VESSEL (Right)  Stent, Fistula (Right)  Angiogram, Lower Arterial, Unilateral (Right)  PTA, Plantar Arch (Right)  PTA, Posterior Tibial  Placement of Closure Device     Hospital Course:  Patient admitted with extensive medical history of chronic renal failure diabetes hypertensive coronary artery disease CHF was admitted with clotted access right a problem underwent open declotting thrombectomy made which clotted off ladder arm patient underwent revision of the AV fistula with creation of new arterial anastomosis with the with the graft which did not work also patient underwent placement of Andi catheter in the groin area patient had stenosis in the upper chest area patient known to  Dr. med and pako underwent extensor periphery a screw was a shin closing stent placement and distal tibia and a plasty of the tibial and anterior tibial vessels patient graft was working well patient was discharged to be followed as an outpatient for under vascular and for the gangrene of the toe and with me for the AV graft follow-up patient is going to follow with  Dr. Adam.  Consults:   Consults (From admission, onward)        Status Ordering Provider     [facility]  [facility]        Provider:  Dejuan Cody MD    Completed EDSON SMITH     Inpatient consult to Interventional Radiology  Once        Provider:  Socrates Lunsford II, MD    Completed ARTHUR MCKEON     Inpatient consult to General Surgery  Once        Provider:  Arthur Mckeon MD    Completed EDSON SMITH          Significant Diagnostic Studies: Angiography:     Pending Diagnostic Studies:     None        Final Active Diagnoses:    Diagnosis Date Noted POA    PRINCIPAL PROBLEM:  Problem with dialysis access [T82.898A] 01/26/2022 Yes    Hyperkalemia [E87.5] 06/14/2022 Yes    Critical lower limb ischemia [I70.229] 05/20/2022 Yes    Type 2 diabetes mellitus with chronic kidney disease on chronic dialysis, with long-term current use of insulin [E11.22, N18.6, Z99.2, Z79.4] 12/22/2021 Not Applicable    PAD (peripheral artery disease) [I73.9] 10/13/2021 Yes    Acute deep vein thrombosis (DVT) of popliteal vein of right lower extremity [I82.431] 10/13/2021 Yes    ESRD (end stage renal disease) on dialysis [N18.6, Z99.2] 07/07/2021 Not Applicable    Chronic combined systolic and diastolic congestive heart failure [I50.42] 07/07/2021 Yes    Cardiac resynchronization therapy defibrillator (CRT-D) in place [Z95.810] 07/19/2019 Yes    Complete heart block [I44.2] 07/09/2019 Yes    Morbid obesity [E66.01] 10/08/2015 Yes    Primary hypertension [I10] 09/21/2015 Yes      Problems Resolved During this  Admission:      Discharged Condition: good    Disposition: Home or Self Care    Follow Up:   Follow-up Information     dialysis Follow up.    Why: Aj Chaney      [address] , [place]  [place] [address]   [phone]  T Th Sa   9:45 am           Zak Hamilton MD Follow up on 7/8/2022.    Specialty: Endocrinology  Why: 10:00 am  Contact information:  [address]  [address]  [place] [id number] [phone]             [place] [facility] Follow up.    Specialty: Wound Care  Why: Dr. Hunt - you will be contacted with a follow up apt.  Contact information:  [address]  [facility] [address] [phone]  Additional information:  Please park in [address] C or D and use the [address] entrance. Check in at [address].           [facility] Follow up.    Specialty: Home Health Services  Why: Home Health  - per [facility]  Contact information:  [address] [id number] [phone]             Dejuan Cody MD Follow up.    Specialties: Interventional Cardiology, Cardiology  Why: You will be contacted by the Cardiology Dept with a follow up apt.  Contact information:  [address]  [facility] [address] [phone]             Bettye Louis NP Follow up on 7/7/2022.    Specialty: Cardiology  Why: 11:30 am  Contact information:  [address] [place] [id number] [phone]                       Patient Instructions:      Ambulatory referral/consult to Wound Clinic   Standing Status: Future   Referral Priority: Routine Referral Type: Consultation   Referral Reason: Specialty Services Required   Requested Specialty: Wound Care   Number of Visits Requested: 1     Diet renal     Medications:  Reconciled Home Medications:      Medication List      CHANGE how you take these medications    gabapentin 300 MG capsule  Commonly known as: NEURONTIN  Take 1 capsule (300 mg total) by mouth as directed  (take a pill Monday,  Wednesday and Friday  after HD).  What changed: reasons to take this        CONTINUE taking these medications    acetaminophen 325 MG tablet  Commonly known as: TYLENOL  Take 1 tablet (325 mg total) by mouth every 6 (six) hours as needed for Pain.     allopurinoL 300 MG tablet  Commonly known as: ZYLOPRIM  Take 300 mg by mouth once daily.     apixaban 5 mg Tab  Commonly known as: ELIQUIS  Take 1 tablet (5 mg total) by mouth 2 (two) times daily.     calcium acetate(phosphat bind) 667 mg tablet  Commonly known as: PHOSLO  Take 1 tablet (667 mg total) by mouth 3 (three) times daily with meals.     carvediloL 6.25 MG tablet  Commonly known as: COREG  Take 1 tablet (6.25 mg total) by mouth 2 (two) times daily.     cholecalciferol (vitamin D3) 50 mcg (2,000 unit) Cap capsule  Commonly known as: VITAMIN D3  Take 1 capsule by mouth once daily.     clopidogreL 75 mg tablet  Commonly known as: PLAVIX  Take 75 mg by mouth once daily.     folic acid 1 MG tablet  Commonly known as: FOLVITE  Take 1 mg by mouth.     HYDROcodone-acetaminophen 5-325 mg per tablet  Commonly known as: NORCO  Take 1 tablet by mouth every 6 (six) hours as needed for Pain.     insulin aspart U-100 100 unit/mL injection  Commonly known as: NovoLOG  Inject 4-8 Units into the skin 3 (three) times daily before meals. Per sliding scale     povidone-iodine 10 % external solution  Commonly known as: BETADINE  Apply topically as needed for Wound Care. Apply to wound and to interdigital maceration 2x per day.     VENTOLIN HFA 90 mcg/actuation inhaler  Generic drug: albuterol  Inhale 1 puff into the lungs every 4 (four) hours as needed. As NEEDED            Jeison Hunt MD  General Surgery  [place] - Telemetry

## 2022-06-30 NOTE — TELEPHONE ENCOUNTER
----- Message from Arturo Bay MD sent at 6/30/2022  7:12 AM CDT -----  Thanks for the heads up. We'll hold course for now.    ----- Message -----  From: Sirena Tracey  Sent: 6/29/2022  11:26 AM CDT  To: Dejuan Cody MD, Arturo Bay MD    New onset AF on device remote transmission; occurred during hospitalization for peripheral PTA, already on Eliquis (for DVT).    Pt had COVID in 3/2020, which led to him being wheelchair-bound and on chronic HD.    Single AF event on 6/22/22 x 6 hrs 18 mins, v. Rates controlled.    Hx of VT, no sustained events noted.    Please advise.    Thanks,  Sirena

## 2022-06-30 NOTE — PROGRESS NOTES
IP Liaison - Final Visit Note    Patient: Houston Jc  MRN:  [id number]  Date of Service:  6/30/2022  Completed by:  BEKAH Yeh    Reason for Visit   Patient presents with    IP Liaison Chart Review        Patient Summary     Discharge Date: 6/28/2022  Discharge telephone number/address: [phone] / [address] [address]  Follow up provider: Bettye Louis NP / Zak Hamilton MD  Follow up appointments: 7/6/2022 @ 11:00am / 7/8/2022 @ 10:00am  Home Health agency & telephone number: [facility]  DME ordered &  name: n/a  Assigned OPCM RN/SW: n/a  Report sent to follow up team (PCP/OPCM) via in basket message: n/a  Community Resources arranged including agency name & contact info: n/a      BEKAH Yeh

## 2022-07-01 NOTE — TELEPHONE ENCOUNTER
----- Message from Angela Berkowitz sent at 7/1/2022 11:58 AM CDT -----  Contact: Quentin GIPSON nurse  Type:  Needs Medical Advice    Who Called:  [facility]   Symptoms (please be specific):   pt has blister on both lower extremities and they are progressing     Would the patient rather a call back or a response via [facility]?  Call   Best Call Back Number:  [phone]  Additional Information:  Nurse says this is Urgent called to report on This past Wednesday and has not received a call

## 2022-07-01 NOTE — Clinical Note
The wire was removed from the proximal left common femoral artery.  john,   Phone: (   )    -  Fax: (   )    -

## 2022-07-01 NOTE — Clinical Note
Diagnosis: Gangrene [785.4.ICD-9-CM]   Admitting Provider:: ROYCE SMALL [[id number]]   Future Attending Provider: ROYCE SMALL [[id number]]   Reason for IP Medical Treatment  (Clinical interventions that can only be accomplished in the IP setting? ) :: arterial dopplers, podiatry and cards consults   Estimated Length of Stay:: 2 midnights   I certify that Inpatient services for greater than or equal to 2 midnights are medically necessary:: Yes   Plans for Post-Acute care--if anticipated (pick the single best option):: C. Discharge home with home health services   Special Needs:: No Special Needs [1]

## 2022-07-01 NOTE — Clinical Note
The groin and left foot was prepped. The site was prepped with ChloraPrep. The site was clipped. The patient was draped. The patient was positioned supine.

## 2022-07-01 NOTE — ED NOTES
Patient presents to ED from home with c/o blister to the tops of bilateral feet. Patient states he was recently hospitalized a few days ago for his feet and discharged on Tuesday, but states blisters are new. Patient states home health care nurse noticed the blisters on Wednesday. Patient has necrosis to 3rd and 4th right toe which he states he is being treated for at home. Patient denies fever, but presents with low grade temp pf 99.1. Patient rates his foot pain 10/10. Patient's wife @ bedside.

## 2022-07-01 NOTE — Clinical Note
A percutaneous stick to the anterior tibial artery was performed. Ultrasound guidance was used to obtain access.

## 2022-07-02 PROBLEM — Z98.890 CRITICAL LIMB ISCHEMIA WITH HISTORY OF REVASCULARIZATION OF SAME EXTREMITY: Status: ACTIVE | Noted: 2022-01-01

## 2022-07-02 PROBLEM — I96 GANGRENE: Status: ACTIVE | Noted: 2022-01-01

## 2022-07-02 PROBLEM — R23.8 BULLAE: Status: ACTIVE | Noted: 2022-01-01

## 2022-07-02 NOTE — HPI
7/2/2022 He had to right leg lower extremity vascular procedures June 20th and June 24th.  DVA performed June 24th.  A recent stent placed to right SFA with PTA work on posterior tibial artery.  He has been compliant with medications.  He was released less than a week ago.  2D today because of gangrenous left toe that was new as well as changes on the right 1st toe that is new.  Reviewing discharge instructions there are plans for right foot TMA in 4 weeks post DVA.  The patient's leg pains worsened.  Home health nurses call the office to report changes to the toes bilaterally.    He has a history of nonischemic cardiomyopathy ejection fraction 35% with mild coronary disease.  He has history of complete heart block.  He has biventricular ICD for resynchronization.  He does have right popliteal vein DVT recently diagnosed.  There is significant 80% left SFA stenosis with perineal patency, occlusion of mid anterior tibial artery, 100% posterior tibial artery.  There were plans to treat the left leg in 2 weeks.

## 2022-07-02 NOTE — SUBJECTIVE & OBJECTIVE
Past Medical History:   Diagnosis Date    Anemia     CHF (congestive heart failure)     CKD (chronic kidney disease) stage 4, GFR 15-29 ml/min     Coronary artery disease     Diabetes mellitus     Hematuria     Hypertension     Osteoarthritis of spine with radiculopathy, cervical region 1/10/2022    Renal cyst, right        Past Surgical History:   Procedure Laterality Date    ANGIOGRAPHY OF ARTERIOVENOUS SHUNT Right 2/11/2022    Procedure: Fistulogram with Possible Intervention;  Surgeon: Dejuan Cody MD;  Location: [facility] CATH LAB/EP;  Service: Cardiology;  Laterality: Right;    ANGIOGRAPHY OF LOWER EXTREMITY Right 6/20/2022    Procedure: Angiogram Extremity Unilateral;  Surgeon: Umesh Quintero MD;  Location: [facility] CATH LAB/EP;  Service: Cardiology;  Laterality: Right;    ANGIOPLASTY OF PERIPHERAL VESSEL FOR CHRONIC TOTAL OCCLUSION N/A 6/24/2022    Procedure: ANGIOPLASTY, VESSEL, PERIPHERAL, FOR CHRONIC TOTAL OCCLUSION;  Surgeon: Dejuan Cody MD;  Location: [facility] CATH LAB/EP;  Service: Cardiology;  Laterality: N/A;    AORTOGRAPHY WITH SERIALOGRAPHY Right 6/7/2022    Procedure: AORTOGRAM, WITH SERIALOGRAPHY;  Surgeon: Dejuan Cody MD;  Location: [facility] CATH LAB/EP;  Service: Cardiology;  Laterality: Right;    CARDIAC SURGERY      DECLOTTING OF ARTERIOVENOUS GRAFT Right 12/21/2021    Procedure: [id number];  Surgeon: Jeison Hunt MD;  Location: [facility] OR;  Service: Vascular;  Laterality: Right;    DECLOTTING OF ARTERIOVENOUS GRAFT Right 2/18/2022    Procedure: [id number];  Surgeon: Jeison Hunt MD;  Location: [facility] OR;  Service: Vascular;  Laterality: Right;    DECLOTTING OF ARTERIOVENOUS GRAFT Right 3/9/2022    Procedure: [id number];  Surgeon: Jeison Hunt MD;  Location: [facility] OR;  Service: Vascular;  Laterality: Right;    DECLOTTING OF ARTERIOVENOUS GRAFT Right 6/13/2022    Procedure: [id number]-VWVWV-VE; REVISION OF FISTULA W/INSERTION OF NEW AV GRAFT;  Surgeon:  Jeison Hunt MD;  Location: [facility] OR;  Service: Vascular;  Laterality: Right;    FISTULOGRAM N/A 2/11/2022    Procedure: Fistulogram;  Surgeon: Dejuan Cody MD;  Location: [facility] CATH LAB/EP;  Service: Cardiology;  Laterality: N/A;    FISTULOGRAM N/A 6/17/2022    Procedure: Fistulogram;  Surgeon: Dejuan Cody MD;  Location: [facility] CATH LAB/EP;  Service: Cardiology;  Laterality: N/A;    FOOT SURGERY      INSERTION OF BIVENTRICULAR IMPLANTABLE CARDIOVERTER-DEFIBRILLATOR (ICD) Left 7/18/2019    Procedure: INSERTION, ICD, BIVENTRICULAR;  Surgeon: Arturo Bay MD;  Location: [facility] EP LAB;  Service: Cardiology;  Laterality: Left;  CHB, CRTD, SJM, anes, GP, 6078    LEFT HEART CATHETERIZATION N/A 7/16/2019    Procedure: Left heart cath;  Surgeon: Dejuan Cody MD;  Location: [facility] CATH LAB/EP;  Service: Cardiology;  Laterality: N/A;    PERCUTANEOUS TRANSLUMINAL ANGIOPLASTY OF ARTERIOVENOUS FISTULA Right 2/11/2022    Procedure: PTA, AV FISTULA;  Surgeon: Dejuan Cody MD;  Location: [facility] CATH LAB/EP;  Service: Cardiology;  Laterality: Right;    PERCUTANEOUS TRANSLUMINAL ANGIOPLASTY OF ARTERIOVENOUS FISTULA N/A 6/17/2022    Procedure: PTA, AV FISTULA;  Surgeon: Dejuan Cody MD;  Location: [facility] CATH LAB/EP;  Service: Cardiology;  Laterality: N/A;    PERITONEAL CATHETER REMOVAL Left 4/11/2020    Procedure: REMOVAL, CATHETER, DIALYSIS, PERITONEAL;  Surgeon: Edis Snell Jr., MD;  Location: [facility] OR;  Service: General;  Laterality: Left;    PHLEBOGRAPHY  6/15/2022    Procedure: Venogram;  Surgeon: Betsey Mckeon MD;  Location: [facility] CATH LAB/EP;  Service: Cardiology;;    PLACEMENT OF ARTERIOVENOUS GRAFT Right 2/18/2022    Procedure: INSERTION, GRAFT, ARTERIOVENOUS;  Surgeon: Jeison Hunt MD;  Location: [facility] OR;  Service: Vascular;  Laterality: Right;    REVISION OF PROCEDURE INVOLVING ARTERIOVENOUS GRAFT Right 2/18/2022    Procedure: REVISION, PROCEDURE INVOLVING ARTERIOVENOUS GRAFT;  Surgeon:  Jeison Hunt MD;  Location: [facility] OR;  Service: Vascular;  Laterality: Right;       Review of patient's allergies indicates:  No Known Allergies    Current Facility-Administered Medications on File Prior to Encounter   Medication    midazolam injection     Current Outpatient Medications on File Prior to Encounter   Medication Sig    acetaminophen (TYLENOL) 325 MG tablet Take 1 tablet (325 mg total) by mouth every 6 (six) hours as needed for Pain.    allopurinol (ZYLOPRIM) 300 MG tablet Take 300 mg by mouth once daily.    apixaban (ELIQUIS) 5 mg Tab Take 1 tablet (5 mg total) by mouth 2 (two) times daily.    calcium acetate,phosphat bind, (PHOSLO) 667 mg tablet Take 1 tablet (667 mg total) by mouth 3 (three) times daily with meals.    carvediloL (COREG) 6.25 MG tablet Take 1 tablet (6.25 mg total) by mouth 2 (two) times daily.    cholecalciferol, vitamin D3, (VITAMIN D3) 50 mcg (2,000 unit) Cap Take 1 capsule by mouth once daily.    clopidogreL (PLAVIX) 75 mg tablet Take 75 mg by mouth once daily.    folic acid (FOLVITE) 1 MG tablet Take 1 mg by mouth.    HYDROcodone-acetaminophen (NORCO) 5-325 mg per tablet Take 1 tablet by mouth every 6 (six) hours as needed for Pain.    insulin aspart U-100 (NOVOLOG) 100 unit/mL injection Inject 4-8 Units into the skin 3 (three) times daily before meals. Per sliding scale    povidone-iodine (BETADINE) 10 % external solution Apply topically as needed for Wound Care. Apply to wound and to interdigital maceration 2x per day.    VENTOLIN HFA 90 mcg/actuation inhaler Inhale 1 puff into the lungs every 4 (four) hours as needed. As NEEDED     Family History       Problem Relation (Age of Onset)    Heart attack Father          Tobacco Use    Smoking status: Former Smoker    Smokeless tobacco: Never Used   Substance and Sexual Activity    Alcohol use: Yes     Comment: social    Drug use: No    Sexual activity: Yes     Review of Systems   Constitutional: Negative.    HENT: Negative.      Eyes: Negative.    Respiratory: Negative.     Cardiovascular: Negative.    Gastrointestinal: Negative.    Genitourinary: Negative.    Musculoskeletal: Negative.    Skin:  Positive for color change and wound.   Neurological: Negative.    Psychiatric/Behavioral: Negative.     Objective:     Vital Signs (Most Recent):  Temp: 97.9 °F (36.6 °C) (07/01/22 2337)  Pulse: 65 (07/01/22 2337)  Resp: 20 (07/01/22 2337)  BP: (!) 93/55 (07/01/22 2337)  SpO2: 100 % (07/01/22 2337)   Vital Signs (24h Range):  Temp:  [97.9 °F (36.6 °C)-99.1 °F (37.3 °C)] 97.9 °F (36.6 °C)  Pulse:  [62-76] 65  Resp:  [18-20] 20  SpO2:  [99 %-100 %] 100 %  BP: (87-95)/(49-55) 93/55     Weight: 124.5 kg (274 lb 7.6 oz)  Body mass index is 36.21 kg/m².    Physical Exam  Vitals and nursing note reviewed.   Constitutional:       General: He is not in acute distress.     Appearance: Normal appearance. He is obese. He is not ill-appearing, toxic-appearing or diaphoretic.   HENT:      Head: Normocephalic and atraumatic.      Mouth/Throat:      Mouth: Mucous membranes are dry.   Eyes:      Extraocular Movements: Extraocular movements intact.      Pupils: Pupils are equal, round, and reactive to light.   Cardiovascular:      Rate and Rhythm: Normal rate and regular rhythm.      Heart sounds: No murmur heard.     Comments: Unable to palpate pedal pulses  Pulmonary:      Effort: No respiratory distress.      Breath sounds: Normal breath sounds.   Abdominal:      General: Bowel sounds are normal.      Palpations: Abdomen is soft.   Musculoskeletal:         General: Normal range of motion.      Cervical back: Normal range of motion.      Right lower leg: Edema present.      Left lower leg: Edema present.   Skin:     Comments: See photos   Neurological:      General: No focal deficit present.      Mental Status: He is alert and oriented to person, place, and time. Mental status is at baseline.   Psychiatric:         Mood and Affect: Mood normal.          Behavior: Behavior normal.         Thought Content: Thought content normal.         Judgment: Judgment normal.         CRANIAL NERVES     CN III, IV, VI   Pupils are equal, round, and reactive to light.     Significant Labs: All pertinent labs within the past 24 hours have been reviewed.    Significant Imaging: I have reviewed all pertinent imaging results/findings within the past 24 hours.

## 2022-07-02 NOTE — ASSESSMENT & PLAN NOTE
Bullae  -Presented with blisters to right foot x1 week with 2 new blisters on left foot, right foot with multiple blisters with 2 open: see photos above  -WBC 22, lactate 2.9  -on vanc and zosyn  -blood cultures pending  -wound care consulted

## 2022-07-02 NOTE — ASSESSMENT & PLAN NOTE
Hemoglobin A1C   Date Value Ref Range Status   06/14/2022 6.9 (H) 4.0 - 5.6 % Final   -CBG is borderline low, low dose SSI, diabetic renal diet when resumed, accuchecks Q6 while NPO then ACHS

## 2022-07-02 NOTE — NURSING
APTT 136.9, Heparin drip on hold per protocol, provider made aware. No s/s bleeding noted, patient denies any discomfort, stable at this time. Pending wound care evaluation for feet blisters, open wounds.

## 2022-07-02 NOTE — PROGRESS NOTES
Pharmacokinetic Initial Assessment: IV Vancomycin    Assessment/Plan:    Initiate intravenous vancomycin with loading dose of 2000 mg once (20 mg/kg loading dose due to HD - dose capped @ 2G) with subsequent doses when random concentrations are less than 25 mcg/mL  Desired empiric serum trough concentration is 10 to 15 mcg/mL  Draw vancomycin random level on 7/2 at 0400 (pre-HD level).  Pharmacy will continue to follow and monitor vancomycin.      Please contact pharmacy at extension [phone] with any questions regarding this assessment.     Thank you for the consult,   Erika Malone       Patient brief summary:  Houston Jc is a 76 y.o. male initiated on antimicrobial therapy with IV Vancomycin for treatment of suspected skin & soft tissue infection    Drug Allergies:   Review of patient's allergies indicates:  No Known Allergies    Actual Body Weight:   117.9 kg    Renal Function:   Estimated Creatinine Clearance: 8.1 mL/min (A) (based on SCr of 10.8 mg/dL (H)).,     Dialysis Method (if applicable):  intermittent HD TuThSa Schedule outpatient      CBC (last 72 hours):  Recent Labs   Lab Result Units 07/01/22  1951   WBC K/uL 22.54*   Hemoglobin g/dL 9.8*   Hematocrit % 31.5*   Platelets K/uL 155   Gran % % 81.9*   Lymph % % 8.5*   Mono % % 8.5   Eosinophil % % 0.1   Basophil % % 0.2   Differential Method  Automated       Metabolic Panel (last 72 hours):  No results for input(s): SODIUM, POTASSIUM, CHLORIDE, CO2, GLUCOSE, BUN BLD, CREATININE, ALBUMIN, BILIRUBIN TOTAL, ALK PHOS, AST, ALT, MAGNESIUM, PHOSPHORUS in the last 72 hours.    Drug levels (last 3 results):  No results for input(s): VANCOMYCINRA, VANCORANDOM, VANCOMYCINPE, VANCOPEAK, VANCOMYCINTR, VANCOTROUGH in the last 72 hours.    Microbiologic Results:  Microbiology Results (last 7 days)       Procedure Component Value Units Date/Time    Blood culture (site 2) [[id number]]     Order Status: Sent Specimen: Blood     Blood culture (site 1) [[id number]]      Order Status: Sent Specimen: Blood

## 2022-07-02 NOTE — ASSESSMENT & PLAN NOTE
-[facility] in 7/2019 with complete heart block and intermittent VT. An echo showed his EF was 30%. Amiodarone was started, a LHC showed luminal irregularities, and a [employer] CRT-D was implanted prior to discharge (RV septal lead in LV port).  -Telemetry

## 2022-07-02 NOTE — NURSING
Received from emergency department via stretcher on stable conditions. Care plan reviewed with patient, CHANTELLOx4, spouse at bedside, no respiratory distress noted, on room air. Paced per cardiac monitor, no red alarm noted. Denies any pain of discomfort, education provided on medication effect and side effect, voices understanding. Call light within his reach, no apparent distress noted, bed in low position, bed alarm on, educated on the importance of calling as needed, voices understanding, stable at this time.

## 2022-07-02 NOTE — ASSESSMENT & PLAN NOTE
-Continue Lipitor  -hold BB with soft BP  -resume eliquis if patient does not need surgical procedure

## 2022-07-02 NOTE — H&P
[facility] Medicine  History & Physical    Patient Name: Houston Jc  MRN: [id number]  Patient Class: IP- Inpatient  Admission Date: 7/1/2022  Attending Physician: Jose Flores MD   Primary Care Provider: Zak Hamilton MD         Patient information was obtained from patient, spouse/SO, past medical records and ER records.     Subjective:     Principal Problem:Gangrene    Chief Complaint:   Chief Complaint   Patient presents with    Blister     Reports blisters to right foot x 1 week with 2 new blister on left foot. 2 bllisters noted to left foot. Unable to palpate pedal pulse to bilateral feet. Left 5th and 3rd toe black in color. Right foot with multiple blister with 2 open.right foot 3rd and 4th toe black in color. BLE + 4 pitting edema. Reports last HD on Tuesday. HD days are Tues, Thurs, Sat.  No distress noted        HPI: Houston Jc is a 76-year-old male 76-year-old male with history of end-stage renal disease on hemodialysis Tuesday Thursday Saturday, coronary disease, diabetes, CHF, hypertension, and peripheral vascular disease who was referred by home health and cardiology for worsening status of lower extremity vascular disease. Patient has worsening gangrene of several toes and blister formation on bilateral feet.  He reports pain to both feet. History of multiple angioplasty. No stents. He reports blisters to the right foot for 1 week with 2 new blisters on the left foot and 2 open blisters to the right foot. Reports compliance with dialysis with last dialysis session on Tuesday. Of note, patient was recently discharged on 6/28/22 for clotted vascular access and was to follow up outpatient under vascular and for the gangrene of the toe with Dr. Adam.    In the ED: WBC 22, Hgb 9, lactate 2.9. Given 1500 ml IVF bolus. Started on vanc and zosyn, and heparin gtt. Cardiology consulted with recommendations for arterial dopplers in a.m. Admitted to [facility] for  further evaluation.      Past Medical History:   Diagnosis Date    Anemia     CHF (congestive heart failure)     CKD (chronic kidney disease) stage 4, GFR 15-29 ml/min     Coronary artery disease     Diabetes mellitus     Hematuria     Hypertension     Osteoarthritis of spine with radiculopathy, cervical region 1/10/2022    Renal cyst, right        Past Surgical History:   Procedure Laterality Date    ANGIOGRAPHY OF ARTERIOVENOUS SHUNT Right 2/11/2022    Procedure: Fistulogram with Possible Intervention;  Surgeon: Dejuan Cody MD;  Location: [facility] CATH LAB/EP;  Service: Cardiology;  Laterality: Right;    ANGIOGRAPHY OF LOWER EXTREMITY Right 6/20/2022    Procedure: Angiogram Extremity Unilateral;  Surgeon: Umesh Quintero MD;  Location: [facility] CATH LAB/EP;  Service: Cardiology;  Laterality: Right;    ANGIOPLASTY OF PERIPHERAL VESSEL FOR CHRONIC TOTAL OCCLUSION N/A 6/24/2022    Procedure: ANGIOPLASTY, VESSEL, PERIPHERAL, FOR CHRONIC TOTAL OCCLUSION;  Surgeon: Dejuan Cody MD;  Location: [facility] CATH LAB/EP;  Service: Cardiology;  Laterality: N/A;    AORTOGRAPHY WITH SERIALOGRAPHY Right 6/7/2022    Procedure: AORTOGRAM, WITH SERIALOGRAPHY;  Surgeon: Dejuan Cody MD;  Location: [facility] CATH LAB/EP;  Service: Cardiology;  Laterality: Right;    CARDIAC SURGERY      DECLOTTING OF ARTERIOVENOUS GRAFT Right 12/21/2021    Procedure: [id number];  Surgeon: Jeison Hunt MD;  Location: [facility] OR;  Service: Vascular;  Laterality: Right;    DECLOTTING OF ARTERIOVENOUS GRAFT Right 2/18/2022    Procedure: [id number];  Surgeon: Jeison Hunt MD;  Location: [facility] OR;  Service: Vascular;  Laterality: Right;    DECLOTTING OF ARTERIOVENOUS GRAFT Right 3/9/2022    Procedure: [id number];  Surgeon: Jeison Hunt MD;  Location: [facility] OR;  Service: Vascular;  Laterality: Right;    DECLOTTING OF ARTERIOVENOUS GRAFT Right 6/13/2022    Procedure: [id number]; REVISION OF  FISTULA W/INSERTION OF NEW AV GRAFT;  Surgeon: Jeison Hunt MD;  Location: [facility] OR;  Service: Vascular;  Laterality: Right;    FISTULOGRAM N/A 2/11/2022    Procedure: Fistulogram;  Surgeon: Dejuan Cody MD;  Location: [facility] CATH LAB/EP;  Service: Cardiology;  Laterality: N/A;    FISTULOGRAM N/A 6/17/2022    Procedure: Fistulogram;  Surgeon: Dejuan Cody MD;  Location: [facility] CATH LAB/EP;  Service: Cardiology;  Laterality: N/A;    FOOT SURGERY      INSERTION OF BIVENTRICULAR IMPLANTABLE CARDIOVERTER-DEFIBRILLATOR (ICD) Left 7/18/2019    Procedure: INSERTION, ICD, BIVENTRICULAR;  Surgeon: Arturo Bay MD;  Location: [facility] EP LAB;  Service: Cardiology;  Laterality: Left;  CHB, CRTD, SJM, anes, GP, [id number]    LEFT HEART CATHETERIZATION N/A 7/16/2019    Procedure: Left heart cath;  Surgeon: Dejuan Cody MD;  Location: [facility] CATH LAB/EP;  Service: Cardiology;  Laterality: N/A;    PERCUTANEOUS TRANSLUMINAL ANGIOPLASTY OF ARTERIOVENOUS FISTULA Right 2/11/2022    Procedure: PTA, AV FISTULA;  Surgeon: Dejuan Cody MD;  Location: [facility] CATH LAB/EP;  Service: Cardiology;  Laterality: Right;    PERCUTANEOUS TRANSLUMINAL ANGIOPLASTY OF ARTERIOVENOUS FISTULA N/A 6/17/2022    Procedure: PTA, AV FISTULA;  Surgeon: Dejuan Cody MD;  Location: [facility] CATH LAB/EP;  Service: Cardiology;  Laterality: N/A;    PERITONEAL CATHETER REMOVAL Left 4/11/2020    Procedure: REMOVAL, CATHETER, DIALYSIS, PERITONEAL;  Surgeon: Edis Snell Jr., MD;  Location: [facility] OR;  Service: General;  Laterality: Left;    PHLEBOGRAPHY  6/15/2022    Procedure: Venogram;  Surgeon: Betsey Mckeon MD;  Location: [facility] CATH LAB/EP;  Service: Cardiology;;    PLACEMENT OF ARTERIOVENOUS GRAFT Right 2/18/2022    Procedure: INSERTION, GRAFT, ARTERIOVENOUS;  Surgeon: Jeison Hunt MD;  Location: [facility] OR;  Service: Vascular;  Laterality: Right;    REVISION OF PROCEDURE INVOLVING ARTERIOVENOUS GRAFT Right 2/18/2022    Procedure:  REVISION, PROCEDURE INVOLVING ARTERIOVENOUS GRAFT;  Surgeon: Jeison Hunt MD;  Location: [facility];  Service: Vascular;  Laterality: Right;       Review of patient's allergies indicates:  No Known Allergies    Current Facility-Administered Medications on File Prior to Encounter   Medication    midazolam injection     Current Outpatient Medications on File Prior to Encounter   Medication Sig    acetaminophen (TYLENOL) 325 MG tablet Take 1 tablet (325 mg total) by mouth every 6 (six) hours as needed for Pain.    allopurinol (ZYLOPRIM) 300 MG tablet Take 300 mg by mouth once daily.    apixaban (ELIQUIS) 5 mg Tab Take 1 tablet (5 mg total) by mouth 2 (two) times daily.    calcium acetate,phosphat bind, (PHOSLO) 667 mg tablet Take 1 tablet (667 mg total) by mouth 3 (three) times daily with meals.    carvediloL (COREG) 6.25 MG tablet Take 1 tablet (6.25 mg total) by mouth 2 (two) times daily.    cholecalciferol, vitamin D3, (VITAMIN D3) 50 mcg (2,000 unit) Cap Take 1 capsule by mouth once daily.    clopidogreL (PLAVIX) 75 mg tablet Take 75 mg by mouth once daily.    folic acid (FOLVITE) 1 MG tablet Take 1 mg by mouth.    HYDROcodone-acetaminophen (NORCO) 5-325 mg per tablet Take 1 tablet by mouth every 6 (six) hours as needed for Pain.    insulin aspart U-100 (NOVOLOG) 100 unit/mL injection Inject 4-8 Units into the skin 3 (three) times daily before meals. Per sliding scale    povidone-iodine (BETADINE) 10 % external solution Apply topically as needed for Wound Care. Apply to wound and to interdigital maceration 2x per day.    VENTOLIN HFA 90 mcg/actuation inhaler Inhale 1 puff into the lungs every 4 (four) hours as needed. As NEEDED     Family History       Problem Relation (Age of Onset)    Heart attack Father          Tobacco Use    Smoking status: Former Smoker    Smokeless tobacco: Never Used   Substance and Sexual Activity    Alcohol use: Yes     Comment: social    Drug use: No    Sexual  activity: Yes     Review of Systems   Constitutional: Negative.    HENT: Negative.     Eyes: Negative.    Respiratory: Negative.     Cardiovascular: Negative.    Gastrointestinal: Negative.    Genitourinary: Negative.    Musculoskeletal: Negative.    Skin:  Positive for color change and wound.   Neurological: Negative.    Psychiatric/Behavioral: Negative.     Objective:     Vital Signs (Most Recent):  Temp: 97.9 °F (36.6 °C) (07/01/22 2337)  Pulse: 65 (07/01/22 2337)  Resp: 20 (07/01/22 2337)  BP: (!) 93/55 (07/01/22 2337)  SpO2: 100 % (07/01/22 2337)   Vital Signs (24h Range):  Temp:  [97.9 °F (36.6 °C)-99.1 °F (37.3 °C)] 97.9 °F (36.6 °C)  Pulse:  [62-76] 65  Resp:  [18-20] 20  SpO2:  [99 %-100 %] 100 %  BP: (87-95)/(49-55) 93/55     Weight: 124.5 kg (274 lb 7.6 oz)  Body mass index is 36.21 kg/m².    Physical Exam  Vitals and nursing note reviewed.   Constitutional:       General: He is not in acute distress.     Appearance: Normal appearance. He is obese. He is not ill-appearing, toxic-appearing or diaphoretic.   HENT:      Head: Normocephalic and atraumatic.      Mouth/Throat:      Mouth: Mucous membranes are dry.   Eyes:      Extraocular Movements: Extraocular movements intact.      Pupils: Pupils are equal, round, and reactive to light.   Cardiovascular:      Rate and Rhythm: Normal rate and regular rhythm.      Heart sounds: No murmur heard.     Comments: Unable to palpate pedal pulses  Pulmonary:      Effort: No respiratory distress.      Breath sounds: Normal breath sounds.   Abdominal:      General: Bowel sounds are normal.      Palpations: Abdomen is soft.   Musculoskeletal:         General: Normal range of motion.      Cervical back: Normal range of motion.      Right lower leg: Edema present.      Left lower leg: Edema present.   Skin:     Comments: See photos   Neurological:      General: No focal deficit present.      Mental Status: He is alert and oriented to person, place, and time. Mental status  is at baseline.   Psychiatric:         Mood and Affect: Mood normal.         Behavior: Behavior normal.         Thought Content: Thought content normal.         Judgment: Judgment normal.                       CRANIAL NERVES     CN III, IV, VI   Pupils are equal, round, and reactive to light.     Significant Labs: All pertinent labs within the past 24 hours have been reviewed.    Significant Imaging: I have reviewed all pertinent imaging results/findings within the past 24 hours.    Assessment/Plan:     * Gangrene  Bullae  -Presented with blisters to right foot x1 week with 2 new blisters on left foot, right foot with multiple blisters with 2 open: see photos above  -WBC 22, lactate 2.9  -on vanc and zosyn  -blood cultures pending  -wound care consulted    Critical limb ischemia with history of revascularization of same extremity  -unlikely acute limb ischemia given history.  -Patient was recently discharged on 6/28/22 for clotted vascular access and was to follow up outpatient under vascular and for the gangrene of the toe with Dr. Adam  -Left 5th and 3rd toes black in color see photos above  -Unable to palpate pedal pulses bilateral   -Bilateral pitting edema +4  -continue vanc and zosyn  -continue heparin gtt  -Cardiology consulted; appreciate Recommendations for  arterial dopplers tomorrow    Anemia, chronic renal failure, stage 5  -Hgb 9  -monitor      Type 2 diabetes mellitus with chronic kidney disease on chronic dialysis, with long-term current use of insulin  Hemoglobin A1C   Date Value Ref Range Status   06/14/2022 6.9 (H) 4.0 - 5.6 % Final   -CBG is borderline low, low dose SSI, diabetic renal diet when resumed, accuchecks Q6 while NPO then ACHS      Acute deep vein thrombosis (DVT) of popliteal vein of right lower extremity  -takes eliquis at home  -hold for now for possible surgical procedure to feet      PAD (peripheral artery disease)  -Continue Lipitor  -hold BB with soft BP  -resume eliquis if  patient does not need surgical procedure      Chronic combined systolic and diastolic congestive heart failure  -stable, no fluid overloading s/s on exam  -Strict electrolyte management with goal K 4-5 and Mg 2-3 (trending daily)  Patient is identified as having Combined Systolic and Diastolic heart failure that is Chronic. CHF is currently controlled. Latest ECHO performed and demonstrates- Results for orders placed during the hospital encounter of 12/21/21    Echo    Interpretation Summary  · The left ventricle is moderately enlarged with mild concentric hypertrophy and  · The quantitatively derived ejection fraction is 34%.  · Severe left atrial enlargement.  · Normal right ventricular size with normal right ventricular systolic function.  · Normal central venous pressure (3 mmHg).  · The estimated PA systolic pressure is 21 mmHg.  · No vegetations per surface echo.  -hold Beta Blocker ACE/ARB with soft BP and monitor clinical status closely. Monitor on telemetry. Patient is off CHF pathway.  Monitor strict Is&Os and daily weights.  Place on fluid restriction of 1.5 L. Continue to stress to patient importance of self efficacy and  on diet for CHF. Last BNP reviewed- and noted below No results for input(s): BNP, BNPTRIAGEBLO in the last 168 hours..    ESRD (end stage renal disease) on dialysis  -HD outpatient scheduled [facility]  -NICOLETTE BOLAÑOS noted  -Consult nephrology         Cardiac resynchronization therapy defibrillator (CRT-D) in place  [place] [place] heart [place] and intermittent VT. An echo showed his EF was 30%. Amiodarone was started, a LHC showed luminal irregularities, and a [employer] CRT-D was implanted prior to discharge (RV septal lead in LV port).  -Telemetry      Complete heart block  -chronic with CRT-D in place  -telemetry      Morbid obesity  -encourage lifestyle modifications (helathy diet, exercise)         Sleep apnea  -CPAP QHS      Primary hypertension  -Controlled, BP  borderline low  -Home Meds include losartan 25 mg daily and coreg 6.25 mg bid  -hold for now  -monitor pressure      VTE Risk Mitigation (From admission, onward)         Ordered     heparin 25,000 units in dextrose 5% (100 units/ml) IV bolus from bag - ADDITIONAL PRN BOLUS - 60 units/kg  As needed (PRN)        Question:  Heparin Infusion Adjustment (DO NOT MODIFY ANSWER)  Answer:  \\[email or web address]\epic\Images\Pharmacy\HeparinInfusions\heparin HIGH INTENSITY nomogram for [facility] [id number]    07/01/22 1953     heparin 25,000 units in dextrose 5% (100 units/ml) IV bolus from bag - ADDITIONAL PRN BOLUS - 30 units/kg  As needed (PRN)        Question:  Heparin Infusion Adjustment (DO NOT MODIFY ANSWER)  Answer:  \\[email or web address]\epic\Images\Pharmacy\HeparinInfusions\heparin HIGH INTENSITY nomogram for [facility] [id number]    07/01/22 1953     heparin 25,000 units in dextrose 5% 250 mL (100 units/mL) infusion HIGH INTENSITY nomogram - [facility]  Continuous        Question Answer Comment   Heparin Infusion Adjustment (DO NOT MODIFY ANSWER) \\[email or web address]\epic\Images\Pharmacy\HeparinInfusions\heparin HIGH INTENSITY nomogram for [facility] [id number]    Begin at (in units/kg/hr) 18        07/01/22 1953     IP VTE HIGH RISK PATIENT  Once         07/01/22 2014     Place sequential compression device  Until discontinued         07/01/22 2014     Reason for No Pharmacological VTE Prophylaxis  Once        Question:  Reasons:  Answer:  Already adequately anticoagulated on oral Anticoagulants    07/01/22 2014                 Mercy Mtz DNP, [facility]  Hospitalist   Department of Hospital Medicine   [facility]   Office #: [phone]

## 2022-07-02 NOTE — SUBJECTIVE & OBJECTIVE
Past Medical History:   Diagnosis Date    Anemia     CHF (congestive heart failure)     CKD (chronic kidney disease) stage 4, GFR 15-29 ml/min     Coronary artery disease     Diabetes mellitus     Hematuria     Hypertension     Osteoarthritis of spine with radiculopathy, cervical region 1/10/2022    Renal cyst, right        Past Surgical History:   Procedure Laterality Date    ANGIOGRAPHY OF ARTERIOVENOUS SHUNT Right 2/11/2022    Procedure: Fistulogram with Possible Intervention;  Surgeon: Dejuan Cody MD;  Location: [facility] CATH LAB/EP;  Service: Cardiology;  Laterality: Right;    ANGIOGRAPHY OF LOWER EXTREMITY Right 6/20/2022    Procedure: Angiogram Extremity Unilateral;  Surgeon: Umesh Quintero MD;  Location: [facility] CATH LAB/EP;  Service: Cardiology;  Laterality: Right;    ANGIOPLASTY OF PERIPHERAL VESSEL FOR CHRONIC TOTAL OCCLUSION N/A 6/24/2022    Procedure: ANGIOPLASTY, VESSEL, PERIPHERAL, FOR CHRONIC TOTAL OCCLUSION;  Surgeon: Dejuan Cody MD;  Location: [facility] CATH LAB/EP;  Service: Cardiology;  Laterality: N/A;    AORTOGRAPHY WITH SERIALOGRAPHY Right 6/7/2022    Procedure: AORTOGRAM, WITH SERIALOGRAPHY;  Surgeon: Dejuan Cody MD;  Location: [facility] CATH LAB/EP;  Service: Cardiology;  Laterality: Right;    CARDIAC SURGERY      DECLOTTING OF ARTERIOVENOUS GRAFT Right 12/21/2021    Procedure: [id number];  Surgeon: Jeison Hunt MD;  Location: [facility] OR;  Service: Vascular;  Laterality: Right;    DECLOTTING OF ARTERIOVENOUS GRAFT Right 2/18/2022    Procedure: [id number];  Surgeon: Jeison Hunt MD;  Location: [facility] OR;  Service: Vascular;  Laterality: Right;    DECLOTTING OF ARTERIOVENOUS GRAFT Right 3/9/2022    Procedure: [id number];  Surgeon: Jeison Hunt MD;  Location: [facility] OR;  Service: Vascular;  Laterality: Right;    DECLOTTING OF ARTERIOVENOUS GRAFT Right 6/13/2022    Procedure: [id number]; REVISION OF FISTULA W/INSERTION OF NEW AV GRAFT;  Surgeon:  Jeison Hunt MD;  Location: [facility] OR;  Service: Vascular;  Laterality: Right;    FISTULOGRAM N/A 2/11/2022    Procedure: Fistulogram;  Surgeon: Dejuan Cody MD;  Location: [facility] CATH LAB/EP;  Service: Cardiology;  Laterality: N/A;    FISTULOGRAM N/A 6/17/2022    Procedure: Fistulogram;  Surgeon: Dejuan Cody MD;  Location: [facility] CATH LAB/EP;  Service: Cardiology;  Laterality: N/A;    FOOT SURGERY      INSERTION OF BIVENTRICULAR IMPLANTABLE CARDIOVERTER-DEFIBRILLATOR (ICD) Left 7/18/2019    Procedure: INSERTION, ICD, BIVENTRICULAR;  Surgeon: Arturo Bay MD;  Location: [facility] EP LAB;  Service: Cardiology;  Laterality: Left;  CHB, CRTD, SJM, anes, GP, [id number]    LEFT HEART CATHETERIZATION N/A 7/16/2019    Procedure: Left heart cath;  Surgeon: Dejuan Cody MD;  Location: [facility] CATH LAB/EP;  Service: Cardiology;  Laterality: N/A;    PERCUTANEOUS TRANSLUMINAL ANGIOPLASTY OF ARTERIOVENOUS FISTULA Right 2/11/2022    Procedure: PTA, AV FISTULA;  Surgeon: Dejuan Cody MD;  Location: [facility] CATH LAB/EP;  Service: Cardiology;  Laterality: Right;    PERCUTANEOUS TRANSLUMINAL ANGIOPLASTY OF ARTERIOVENOUS FISTULA N/A 6/17/2022    Procedure: PTA, AV FISTULA;  Surgeon: Dejuan Cody MD;  Location: [facility] CATH LAB/EP;  Service: Cardiology;  Laterality: N/A;    PERITONEAL CATHETER REMOVAL Left 4/11/2020    Procedure: REMOVAL, CATHETER, DIALYSIS, PERITONEAL;  Surgeon: Edis Snell Jr., MD;  Location: [facility] OR;  Service: General;  Laterality: Left;    PHLEBOGRAPHY  6/15/2022    Procedure: Venogram;  Surgeon: Betesy Mckeon MD;  Location: [facility] CATH LAB/EP;  Service: Cardiology;;    PLACEMENT OF ARTERIOVENOUS GRAFT Right 2/18/2022    Procedure: INSERTION, GRAFT, ARTERIOVENOUS;  Surgeon: Jeison Hunt MD;  Location: [facility] OR;  Service: Vascular;  Laterality: Right;    REVISION OF PROCEDURE INVOLVING ARTERIOVENOUS GRAFT Right 2/18/2022    Procedure: REVISION, PROCEDURE INVOLVING ARTERIOVENOUS GRAFT;  Surgeon:  Jeison Hunt MD;  Location: [facility] OR;  Service: Vascular;  Laterality: Right;       Review of patient's allergies indicates:  No Known Allergies    Current Facility-Administered Medications on File Prior to Encounter   Medication    midazolam injection     Current Outpatient Medications on File Prior to Encounter   Medication Sig    acetaminophen (TYLENOL) 325 MG tablet Take 1 tablet (325 mg total) by mouth every 6 (six) hours as needed for Pain.    allopurinol (ZYLOPRIM) 300 MG tablet Take 300 mg by mouth once daily.    apixaban (ELIQUIS) 5 mg Tab Take 1 tablet (5 mg total) by mouth 2 (two) times daily.    calcium acetate,phosphat bind, (PHOSLO) 667 mg tablet Take 1 tablet (667 mg total) by mouth 3 (three) times daily with meals.    carvediloL (COREG) 6.25 MG tablet Take 1 tablet (6.25 mg total) by mouth 2 (two) times daily.    cholecalciferol, vitamin D3, (VITAMIN D3) 50 mcg (2,000 unit) Cap Take 1 capsule by mouth once daily.    clopidogreL (PLAVIX) 75 mg tablet Take 75 mg by mouth once daily.    folic acid (FOLVITE) 1 MG tablet Take 1 mg by mouth.    HYDROcodone-acetaminophen (NORCO) 5-325 mg per tablet Take 1 tablet by mouth every 6 (six) hours as needed for Pain.    insulin aspart U-100 (NOVOLOG) 100 unit/mL injection Inject 4-8 Units into the skin 3 (three) times daily before meals. Per sliding scale    povidone-iodine (BETADINE) 10 % external solution Apply topically as needed for Wound Care. Apply to wound and to interdigital maceration 2x per day.    VENTOLIN HFA 90 mcg/actuation inhaler Inhale 1 puff into the lungs every 4 (four) hours as needed. As NEEDED     Family History       Problem Relation (Age of Onset)    Heart attack Father          Tobacco Use    Smoking status: Former Smoker    Smokeless tobacco: Never Used   Substance and Sexual Activity    Alcohol use: Yes     Comment: social    Drug use: No    Sexual activity: Yes     Review of Systems   Constitutional: Negative for chills,  decreased appetite, diaphoresis, fever, malaise/fatigue, night sweats, weight gain and weight loss.   HENT:  Negative for congestion, ear discharge, ear pain, hearing loss, hoarse voice, nosebleeds, odynophagia, sore throat, stridor and tinnitus.    Eyes:  Negative for blurred vision, discharge, double vision, pain, photophobia, redness, vision loss in left eye, vision loss in right eye, visual disturbance and visual halos.   Cardiovascular:  Negative for chest pain, claudication, cyanosis, dyspnea on exertion, irregular heartbeat, leg swelling, near-syncope, orthopnea, palpitations, paroxysmal nocturnal dyspnea and syncope.   Respiratory:  Negative for cough, hemoptysis, shortness of breath, sleep disturbances due to breathing, snoring, sputum production and wheezing.    Endocrine: Negative for cold intolerance, heat intolerance, polydipsia, polyphagia and polyuria.   Hematologic/Lymphatic: Negative for adenopathy and bleeding problem. Does not bruise/bleed easily.   Skin:  Positive for color change and poor wound healing. Negative for dry skin, flushing, itching, nail changes, rash, skin cancer, suspicious lesions and unusual hair distribution.   Musculoskeletal:  Negative for arthritis, back pain, falls, gout, joint pain, joint swelling, muscle cramps, muscle weakness, myalgias, neck pain and stiffness.   Gastrointestinal:  Negative for bloating, abdominal pain, anorexia, change in bowel habit, bowel incontinence, constipation, diarrhea, dysphagia, excessive appetite, flatus, heartburn, hematemesis, hematochezia, hemorrhoids, jaundice, melena, nausea and vomiting.   Genitourinary:  Negative for bladder incontinence, decreased libido, dysuria, flank pain, frequency, genital sores, hematuria, hesitancy, incomplete emptying, nocturia and urgency.   Neurological:  Positive for weakness. Negative for aphonia, brief paralysis, difficulty with concentration, disturbances in coordination, excessive daytime sleepiness,  dizziness, focal weakness, headaches, light-headedness, loss of balance, numbness, paresthesias, seizures, sensory change, tremors and vertigo.   Psychiatric/Behavioral:  Negative for altered mental status, depression, hallucinations, memory loss, substance abuse, suicidal ideas and thoughts of violence. The patient does not have insomnia and is not nervous/anxious.    Allergic/Immunologic: Negative for hives and persistent infections.   Objective:     Vital Signs (Most Recent):  Temp: 96.9 °F (36.1 °C) (07/02/22 0401)  Pulse: 60 (07/02/22 0753)  Resp: 17 (07/02/22 0753)  BP: (!) 92/51 (07/02/22 0401)  SpO2: 99 % (07/02/22 0753)   Vital Signs (24h Range):  Temp:  [96.9 °F (36.1 °C)-99.1 °F (37.3 °C)] 96.9 °F (36.1 °C)  Pulse:  [60-76] 60  Resp:  [17-20] 17  SpO2:  [99 %-100 %] 99 %  BP: (87-95)/(49-55) 92/51     Weight: 124.5 kg (274 lb 7.6 oz)  Body mass index is 36.21 kg/m².    SpO2: 99 %  O2 Device (Oxygen Therapy): room air    No intake or output data in the 24 hours ending 07/02/22 0916    Lines/Drains/Airways       Central Venous Catheter Line  Duration                  Hemodialysis AV Graft Right forearm -- days              Peripheral Intravenous Line  Duration                  Peripheral IV - Single Lumen 07/1945 20 G Left Antecubital <1 day                    Physical Exam  Constitutional:       General: He is not in acute distress.     Appearance: He is well-developed. He is not diaphoretic.   HENT:      Head: Normocephalic and atraumatic.      Nose: Nose normal.   Eyes:      General: No scleral icterus.        Right eye: No discharge.      Conjunctiva/sclera: Conjunctivae normal.      Pupils: Pupils are equal, round, and reactive to light.   Neck:      Thyroid: No thyromegaly.      Vascular: No JVD.      Trachea: No tracheal deviation.   Cardiovascular:      Rate and Rhythm: Normal rate and regular rhythm.      Pulses:           Carotid pulses are 2+ on the right side and 2+ on the left side.        Radial pulses are 2+ on the right side.        Dorsalis pedis pulses are 0 on the right side and 0 on the left side.        Posterior tibial pulses are 0 on the right side and 0 on the left side.      Heart sounds: Normal heart sounds. No murmur heard.    No friction rub. No gallop.      Comments: Left foot Doppler audible signals dp, pt area.  Pulmonary:      Effort: Pulmonary effort is normal. No respiratory distress.      Breath sounds: Normal breath sounds. No stridor. No wheezing or rales.   Chest:      Chest wall: No tenderness.   Abdominal:      General: Bowel sounds are normal. There is no distension.      Palpations: Abdomen is soft. There is no mass.      Tenderness: There is no abdominal tenderness. There is no guarding or rebound.   Musculoskeletal:         General: No tenderness. Normal range of motion.      Cervical back: Normal range of motion and neck supple.   Lymphadenopathy:      Cervical: No cervical adenopathy.   Skin:     General: Skin is warm and dry.      Coloration: Skin is not pale.      Findings: No erythema or rash.   Neurological:      Mental Status: He is alert and oriented to person, place, and time.      Cranial Nerves: No cranial nerve deficit.      Coordination: Coordination normal.   Psychiatric:         Behavior: Behavior normal.         Thought Content: Thought content normal.         Judgment: Judgment normal.       Significant Labs: CMP   Recent Labs   Lab 07/01/22 1951 07/02/22  0332   * 132*   K 4.7 4.9   CL 95 95   CO2 22* 18*   GLU 77 131*   BUN 53* 53*   CREATININE 10.5* 10.4*   CALCIUM 8.4* 8.4*   PROT 5.7* 5.8*   ALBUMIN 2.5* 2.2*   BILITOT 0.6 0.5   ALKPHOS 159* 131   AST 17 17   ALT <5* <5*   ANIONGAP 18* 19*   ESTGFRAFRICA 5* 5*   EGFRNONAA 4* 4*   , CBC   Recent Labs   Lab 07/01/22 1951 07/02/22  0332   WBC 22.54* 18.33*   HGB 9.8* 9.7*   HCT 31.5* 30.4*    139*   , INR   Recent Labs   Lab 07/01/22  2019   INR 1.5*   , Lipid Panel No results for  input(s): CHOL, HDL, LDLCALC, TRIG, CHOLHDL in the last 48 hours., Troponin No results for input(s): TROPONINI in the last 48 hours., and All pertinent lab results from the last 24 hours have been reviewed.    Significant Imaging:

## 2022-07-02 NOTE — HPI
Houston Jc is a 76-year-old male 76-year-old male with history of end-stage renal disease on hemodialysis Tuesday Thursday Saturday, coronary disease, diabetes, CHF, hypertension, and peripheral vascular disease who was referred by home health and cardiology for worsening status of lower extremity vascular disease. Patient has worsening gangrene of several toes and blister formation on bilateral feet.  He reports pain to both feet. History of multiple angioplasty. No stents. He reports blisters to the right foot for 1 week with 2 new blisters on the left foot and 2 open blisters to the right foot. Reports compliance with dialysis with last dialysis session on Tuesday. Of note, patient was recently discharged on 6/28/22 for clotted vascular access and was to follow up outpatient under vascular and for the gangrene of the toe with Dr. Adam.    In the ED: WBC 22, Hgb 9, lactate 2.9. Given 1500 ml IVF bolus. Started on vanc and zosyn, and heparin gtt. Cardiology consulted with recommendations for arterial dopplers in a.m. Admitted to [facility] for further evaluation.

## 2022-07-02 NOTE — ASSESSMENT & PLAN NOTE
Bullae  -Presented with blisters to right foot x1 week with 2 new blisters on left foot, right foot with multiple blisters with 2 open: see photos above  -on vanc and zosyn  -blood cultures pending  -wound care consulted

## 2022-07-02 NOTE — PROGRESS NOTES
Pharmacokinetic Assessment Follow Up: IV Vancomycin    Vancomycin serum concentration assessment(s):    The random level was drawn correctly and can be used to guide therapy at this time. The measurement is above the desired definitive target range of 10 to 15 mcg/mL.    Vancomycin Regimen Plan:    Re-dose when the pre-HD random level is less than 20 mcg/mL, next level to be drawn at 0400 on 7/3    Drug levels (last 3 results):  Recent Labs   Lab Result Units 07/02/22  0332   Vancomycin, Random ug/mL 25.8       Pharmacy will continue to follow and monitor vancomycin.    Please contact pharmacy at extension [phone] for questions regarding this assessment.    Thank you for the consult,   Erika Malone       Patient brief summary:  Houston Jc is a 76 y.o. male initiated on antimicrobial therapy with IV Vancomycin for treatment of skin & soft tissue infection    The patient's current regimen is pulse dosing based on pre-HD level    Drug Allergies:   Review of patient's allergies indicates:  No Known Allergies    Actual Body Weight:   124.5 kg    Renal Function:   Estimated Creatinine Clearance: 8.6 mL/min (A) (based on SCr of 10.4 mg/dL (H)).,     Dialysis Method (if applicable):  intermittent HD    CBC (last 72 hours):  Recent Labs   Lab Result Units 07/01/22 1951 07/02/22  0332   WBC K/uL 22.54* 18.33*   Hemoglobin g/dL 9.8* 9.7*   Hematocrit % 31.5* 30.4*   Platelets K/uL 150 139*   Gran % % 81.9* 81.5*   Lymph % % 8.5* 9.8*   Mono % % 8.5 7.1   Eosinophil % % 0.1 0.2   Basophil % % 0.2 0.3   Differential Method  Automated Automated       Metabolic Panel (last 72 hours):  Recent Labs   Lab Result Units 07/01/22 1951 07/02/22  0332   Sodium mmol/L 135* 132*   Potassium mmol/L 4.7 4.9   Chloride mmol/L 95 95   CO2 mmol/L 22* 18*   Glucose mg/dL 77 131*   BUN mg/dL 53* 53*   Creatinine mg/dL 10.5* 10.4*   Albumin g/dL 2.5* 2.2*   Total Bilirubin mg/dL 0.6 0.5   Alkaline Phosphatase U/L 159* 131   AST U/L 17 17    ALT U/L <5* <5*   Magnesium mg/dL  --  1.7   Phosphorus mg/dL  --  4.7*       Vancomycin Administrations:  vancomycin given in the last 96 hours                     vancomycin (VANCOCIN) 2,000 mg in dextrose 5 % 500 mL IVPB (mg) 2,000 mg New Bag 07/02/22 0017                    Microbiologic Results:  Microbiology Results (last 7 days)       Procedure Component Value Units Date/Time    Blood culture (site 2) [[id number]] Collected: 07/01/22 2239    Order Status: Sent Specimen: Blood Updated: 07/01/22 2239    Narrative:      Collection has been rescheduled by [email or web address] at 07/01/2022 21:42 Reason:   Unable to collect tried several times Nurse spencer was notified   Collection has been rescheduled by [email or web address] at 07/01/2022 21:42 Reason:   Unable to collect tried several times Nurse spencer was notified     Blood culture (site 1) [[id number]] Collected: 07/01/22 2238    Order Status: Sent Specimen: Blood Updated: 07/01/22 2238    Narrative:      Collection has been rescheduled by [email or web address] at 07/01/2022 21:42 Reason:   Unable to collect tried several times Nurse spencer was notified   Collection has been rescheduled by TD5 at 07/01/2022 21:42 Reason:   Unable to collect tried several times Nurse spencer was notified

## 2022-07-02 NOTE — SUBJECTIVE & OBJECTIVE
Past Medical History:   Diagnosis Date    Anemia     CHF (congestive heart failure)     CKD (chronic kidney disease) stage 4, GFR 15-29 ml/min     Coronary artery disease     Diabetes mellitus     Hematuria     Hypertension     Osteoarthritis of spine with radiculopathy, cervical region 1/10/2022    Renal cyst, right        Past Surgical History:   Procedure Laterality Date    ANGIOGRAPHY OF ARTERIOVENOUS SHUNT Right 2/11/2022    Procedure: Fistulogram with Possible Intervention;  Surgeon: Dejuan Cody MD;  Location: [facility] CATH LAB/EP;  Service: Cardiology;  Laterality: Right;    ANGIOGRAPHY OF LOWER EXTREMITY Right 6/20/2022    Procedure: Angiogram Extremity Unilateral;  Surgeon: Umesh Quintero MD;  Location: [facility] CATH LAB/EP;  Service: Cardiology;  Laterality: Right;    ANGIOPLASTY OF PERIPHERAL VESSEL FOR CHRONIC TOTAL OCCLUSION N/A 6/24/2022    Procedure: ANGIOPLASTY, VESSEL, PERIPHERAL, FOR CHRONIC TOTAL OCCLUSION;  Surgeon: Dejuan Cody MD;  Location: [facility] CATH LAB/EP;  Service: Cardiology;  Laterality: N/A;    AORTOGRAPHY WITH SERIALOGRAPHY Right 6/7/2022    Procedure: AORTOGRAM, WITH SERIALOGRAPHY;  Surgeon: Dejuan Cody MD;  Location: [facility] CATH LAB/EP;  Service: Cardiology;  Laterality: Right;    CARDIAC SURGERY      DECLOTTING OF ARTERIOVENOUS GRAFT Right 12/21/2021    Procedure: [id number];  Surgeon: Jeison Hunt MD;  Location: [facility] OR;  Service: Vascular;  Laterality: Right;    DECLOTTING OF ARTERIOVENOUS GRAFT Right 2/18/2022    Procedure: [id number];  Surgeon: Jeison Hunt MD;  Location: [facility] OR;  Service: Vascular;  Laterality: Right;    DECLOTTING OF ARTERIOVENOUS GRAFT Right 3/9/2022    Procedure: [id number];  Surgeon: Jeison Hunt MD;  Location: [facility] OR;  Service: Vascular;  Laterality: Right;    DECLOTTING OF ARTERIOVENOUS GRAFT Right 6/13/2022    Procedure: [id number]; REVISION OF FISTULA W/INSERTION OF NEW AV GRAFT;  Surgeon:  Jeison Hunt MD;  Location: [facility] OR;  Service: Vascular;  Laterality: Right;    FISTULOGRAM N/A 2/11/2022    Procedure: Fistulogram;  Surgeon: Dejuan Cody MD;  Location: [facility] CATH LAB/EP;  Service: Cardiology;  Laterality: N/A;    FISTULOGRAM N/A 6/17/2022    Procedure: Fistulogram;  Surgeon: Dejuan Cody MD;  Location: [facility] CATH LAB/EP;  Service: Cardiology;  Laterality: N/A;    FOOT SURGERY      INSERTION OF BIVENTRICULAR IMPLANTABLE CARDIOVERTER-DEFIBRILLATOR (ICD) Left 7/18/2019    Procedure: INSERTION, ICD, BIVENTRICULAR;  Surgeon: Arturo Bay MD;  Location: [facility] EP LAB;  Service: Cardiology;  Laterality: Left;  CHB, CRTD, SJM, anes, GP, 6078    LEFT HEART CATHETERIZATION N/A 7/16/2019    Procedure: Left heart cath;  Surgeon: Dejuan Cody MD;  Location: [facility] CATH LAB/EP;  Service: Cardiology;  Laterality: N/A;    PERCUTANEOUS TRANSLUMINAL ANGIOPLASTY OF ARTERIOVENOUS FISTULA Right 2/11/2022    Procedure: PTA, AV FISTULA;  Surgeon: Dejuan Cody MD;  Location: [facility] CATH LAB/EP;  Service: Cardiology;  Laterality: Right;    PERCUTANEOUS TRANSLUMINAL ANGIOPLASTY OF ARTERIOVENOUS FISTULA N/A 6/17/2022    Procedure: PTA, AV FISTULA;  Surgeon: Dejuan Cody MD;  Location: [facility] CATH LAB/EP;  Service: Cardiology;  Laterality: N/A;    PERITONEAL CATHETER REMOVAL Left 4/11/2020    Procedure: REMOVAL, CATHETER, DIALYSIS, PERITONEAL;  Surgeon: Edis Snell Jr., MD;  Location: [facility] OR;  Service: General;  Laterality: Left;    PHLEBOGRAPHY  6/15/2022    Procedure: Venogram;  Surgeon: Betsey Mckeon MD;  Location: [facility] CATH LAB/EP;  Service: Cardiology;;    PLACEMENT OF ARTERIOVENOUS GRAFT Right 2/18/2022    Procedure: INSERTION, GRAFT, ARTERIOVENOUS;  Surgeon: Jeison Hunt MD;  Location: [facility] OR;  Service: Vascular;  Laterality: Right;    REVISION OF PROCEDURE INVOLVING ARTERIOVENOUS GRAFT Right 2/18/2022    Procedure: REVISION, PROCEDURE INVOLVING ARTERIOVENOUS GRAFT;  Surgeon:  Jeison Hunt MD;  Location[facility] OR;  Service: Vascular;  Laterality: Right;       Review of patient's allergies indicates:  No Known Allergies    Current Facility-Administered Medications on File Prior to Encounter   Medication    midazolam injection     Current Outpatient Medications on File Prior to Encounter   Medication Sig    acetaminophen (TYLENOL) 325 MG tablet Take 1 tablet (325 mg total) by mouth every 6 (six) hours as needed for Pain.    allopurinol (ZYLOPRIM) 300 MG tablet Take 300 mg by mouth once daily.    apixaban (ELIQUIS) 5 mg Tab Take 1 tablet (5 mg total) by mouth 2 (two) times daily.    calcium acetate,phosphat bind, (PHOSLO) 667 mg tablet Take 1 tablet (667 mg total) by mouth 3 (three) times daily with meals.    carvediloL (COREG) 6.25 MG tablet Take 1 tablet (6.25 mg total) by mouth 2 (two) times daily.    cholecalciferol, vitamin D3, (VITAMIN D3) 50 mcg (2,000 unit) Cap Take 1 capsule by mouth once daily.    clopidogreL (PLAVIX) 75 mg tablet Take 75 mg by mouth once daily.    folic acid (FOLVITE) 1 MG tablet Take 1 mg by mouth.    HYDROcodone-acetaminophen (NORCO) 5-325 mg per tablet Take 1 tablet by mouth every 6 (six) hours as needed for Pain.    insulin aspart U-100 (NOVOLOG) 100 unit/mL injection Inject 4-8 Units into the skin 3 (three) times daily before meals. Per sliding scale    povidone-iodine (BETADINE) 10 % external solution Apply topically as needed for Wound Care. Apply to wound and to interdigital maceration 2x per day.    VENTOLIN HFA 90 mcg/actuation inhaler Inhale 1 puff into the lungs every 4 (four) hours as needed. As NEEDED     Family History       Problem Relation (Age of Onset)    Heart attack Father          Tobacco Use    Smoking status: Former Smoker    Smokeless tobacco: Never Used   Substance and Sexual Activity    Alcohol use: Yes     Comment: social    Drug use: No    Sexual activity: Yes     Review of Systems   Constitutional: Negative.    HENT: Negative.      Eyes: Negative.    Respiratory: Negative.     Cardiovascular: Negative.    Gastrointestinal: Negative.    Genitourinary: Negative.    Musculoskeletal: Negative.    Skin:  Positive for color change and wound.   Neurological: Negative.    Psychiatric/Behavioral: Negative.     Objective:     Vital Signs (Most Recent):  Temp: 97.9 °F (36.6 °C) (07/01/22 2337)  Pulse: 65 (07/01/22 2337)  Resp: 20 (07/01/22 2337)  BP: (!) 93/55 (07/01/22 2337)  SpO2: 100 % (07/01/22 2337)   Vital Signs (24h Range):  Temp:  [97.9 °F (36.6 °C)-99.1 °F (37.3 °C)] 97.9 °F (36.6 °C)  Pulse:  [62-76] 65  Resp:  [18-20] 20  SpO2:  [99 %-100 %] 100 %  BP: (87-95)/(49-55) 93/55     Weight: 124.5 kg (274 lb 7.6 oz)  Body mass index is 36.21 kg/m².    Physical Exam  Vitals and nursing note reviewed.   Constitutional:       General: He is not in acute distress.     Appearance: Normal appearance. He is obese. He is not ill-appearing, toxic-appearing or diaphoretic.   HENT:      Head: Normocephalic and atraumatic.      Mouth/Throat:      Mouth: Mucous membranes are dry.   Eyes:      Extraocular Movements: Extraocular movements intact.      Pupils: Pupils are equal, round, and reactive to light.   Cardiovascular:      Rate and Rhythm: Normal rate and regular rhythm.      Heart sounds: No murmur heard.     Comments: Unable to palpate pedal pulses  Pulmonary:      Effort: No respiratory distress.      Breath sounds: Normal breath sounds.   Abdominal:      General: Bowel sounds are normal.      Palpations: Abdomen is soft.   Musculoskeletal:         General: Normal range of motion.      Cervical back: Normal range of motion.      Right lower leg: Edema present.      Left lower leg: Edema present.   Skin:     Comments: See photos   Neurological:      General: No focal deficit present.      Mental Status: He is alert and oriented to person, place, and time. Mental status is at baseline.   Psychiatric:         Mood and Affect: Mood normal.          Behavior: Behavior normal.         Thought Content: Thought content normal.         Judgment: Judgment normal.                       CRANIAL NERVES     CN III, IV, VI   Pupils are equal, round, and reactive to light.     Significant Labs: All pertinent labs within the past 24 hours have been reviewed.    Significant Imaging: I have reviewed all pertinent imaging results/findings within the past 24 hours.

## 2022-07-02 NOTE — PLAN OF CARE
VN note: Patient chart, labs, and vitals reviewed. VN to continue to be available as needed.     Problem: Adult Inpatient Plan of Care  Goal: Plan of Care Review  Outcome: Ongoing, Progressing

## 2022-07-02 NOTE — H&P (VIEW-ONLY)
[facility] - Telemetry  Cardiology  Consult Note    Patient Name: Houston Jc  MRN: [id number]  Admission Date: 7/1/2022  Hospital Length of Stay: 1 days  Code Status: Full Code   Attending Provider: Alysha Anderson*   Consulting Provider: Joey Ledbetter MD  Primary Care Physician: Zak Hamilton MD  Principal Problem:Gangrene    Patient information was obtained from patient, relative(s), past medical records, ER records and primary team.     Consults  Subjective:     Chief Complaint:  Worsening ischemic changes to both feet.  Recent hospital discharge.      HPI:   7/2/2022 He had to right leg lower extremity vascular procedures June 20th of June 24th.  D [facility] performed June 24th.  A recent stent placed to right SFA with PTA work on posterior tibial artery.  He has been compliant with medications.  He was released less than a week ago.  2D today because of gangrenous left toe that was new as well as changes on the right 1st toe that is new.  Reviewing discharge instructions there are plans for right foot TMA in 4 weeks post DVA.  The patient's leg pains worsened.  Home health nurses call the office to report changes to the toes bilaterally.    He has a history of nonischemic cardiomyopathy ejection fraction 35% with mild coronary disease.  He has history of complete heart block.  He has biventricular ICD for resynchronization.  He does have right popliteal vein DVT recently diagnosed.  There is significant 80% left SFA stenosis with perineal patency, occlusion of mid anterior tibial artery, 100% posterior tibial artery.  There were plans to treat the left leg in 2 weeks.      Past Medical History:   Diagnosis Date    Anemia     CHF (congestive heart failure)     CKD (chronic kidney disease) stage 4, GFR 15-29 ml/min     Coronary artery disease     Diabetes mellitus     Hematuria     Hypertension     Osteoarthritis of spine with radiculopathy, cervical region 1/10/2022    Renal cyst, right        Past  Surgical History:   Procedure Laterality Date    ANGIOGRAPHY OF ARTERIOVENOUS SHUNT Right 2/11/2022    Procedure: Fistulogram with Possible Intervention;  Surgeon: Dejuan Cody MD;  Location: [facility] CATH LAB/EP;  Service: Cardiology;  Laterality: Right;    ANGIOGRAPHY OF LOWER EXTREMITY Right 6/20/2022    Procedure: Angiogram Extremity Unilateral;  Surgeon: Umesh Quintero MD;  Location: [facility] CATH LAB/EP;  Service: Cardiology;  Laterality: Right;    ANGIOPLASTY OF PERIPHERAL VESSEL FOR CHRONIC TOTAL OCCLUSION N/A 6/24/2022    Procedure: ANGIOPLASTY, VESSEL, PERIPHERAL, FOR CHRONIC TOTAL OCCLUSION;  Surgeon: Dejuan Cody MD;  Location: [facility] CATH LAB/EP;  Service: Cardiology;  Laterality: N/A;    AORTOGRAPHY WITH SERIALOGRAPHY Right 6/7/2022    Procedure: AORTOGRAM, WITH SERIALOGRAPHY;  Surgeon: Dejuan Cody MD;  Location: [facility] CATH LAB/EP;  Service: Cardiology;  Laterality: Right;    CARDIAC SURGERY      DECLOTTING OF ARTERIOVENOUS GRAFT Right 12/21/2021    Procedure: [id number];  Surgeon: Jeison Hunt MD;  Location: [facility] OR;  Service: Vascular;  Laterality: Right;    DECLOTTING OF ARTERIOVENOUS GRAFT Right 2/18/2022    Procedure: [id number];  Surgeon: Jesion Hunt MD;  Location: [facility];  Service: Vascular;  Laterality: Right;    DECLOTTING OF ARTERIOVENOUS GRAFT Right 3/9/2022    Procedure: [id number];  Surgeon: Jeison Hunt MD;  Location: [facility] OR;  Service: Vascular;  Laterality: Right;    DECLOTTING OF ARTERIOVENOUS GRAFT Right 6/13/2022    Procedure: [id number]; REVISION OF FISTULA W/INSERTION OF NEW AV GRAFT;  Surgeon: Jeison Hunt MD;  Location: [facility] OR;  Service: Vascular;  Laterality: Right;    FISTULOGRAM N/A 2/11/2022    Procedure: Fistulogram;  Surgeon: Dejuan Cody MD;  Location: [facility] CATH LAB/EP;  Service: Cardiology;  Laterality: N/A;    FISTULOGRAM N/A 6/17/2022    Procedure: Fistulogram;  Surgeon: Dejuan GARDUNO  MD Glo;  Location: [facility] CATH LAB/EP;  Service: Cardiology;  Laterality: N/A;    FOOT SURGERY      INSERTION OF BIVENTRICULAR IMPLANTABLE CARDIOVERTER-DEFIBRILLATOR (ICD) Left 7/18/2019    Procedure: INSERTION, ICD, BIVENTRICULAR;  Surgeon: Arturo Bay MD;  Location: [facility] EP LAB;  Service: Cardiology;  Laterality: Left;  CHB, CRTD, SJM, anes, GP, [id number]    LEFT HEART CATHETERIZATION N/A 7/16/2019    Procedure: Left heart cath;  Surgeon: Dejuan Cody MD;  Location: [facility] CATH LAB/EP;  Service: Cardiology;  Laterality: N/A;    PERCUTANEOUS TRANSLUMINAL ANGIOPLASTY OF ARTERIOVENOUS FISTULA Right 2/11/2022    Procedure: PTA, AV FISTULA;  Surgeon: Dejuan Cody MD;  Location: [facility] CATH LAB/EP;  Service: Cardiology;  Laterality: Right;    PERCUTANEOUS TRANSLUMINAL ANGIOPLASTY OF ARTERIOVENOUS FISTULA N/A 6/17/2022    Procedure: PTA, AV FISTULA;  Surgeon: Dejuan Cody MD;  Location: [facility] CATH LAB/EP;  Service: Cardiology;  Laterality: N/A;    PERITONEAL CATHETER REMOVAL Left 4/11/2020    Procedure: REMOVAL, CATHETER, DIALYSIS, PERITONEAL;  Surgeon: Edis Snell Jr., MD;  Location: [facility] OR;  Service: General;  Laterality: Left;    PHLEBOGRAPHY  6/15/2022    Procedure: Venogram;  Surgeon: Betsey Mckeon MD;  Location: [facility] CATH LAB/EP;  Service: Cardiology;;    PLACEMENT OF ARTERIOVENOUS GRAFT Right 2/18/2022    Procedure: INSERTION, GRAFT, ARTERIOVENOUS;  Surgeon: Jeison Hunt MD;  Location: [facility] OR;  Service: Vascular;  Laterality: Right;    REVISION OF PROCEDURE INVOLVING ARTERIOVENOUS GRAFT Right 2/18/2022    Procedure: REVISION, PROCEDURE INVOLVING ARTERIOVENOUS GRAFT;  Surgeon: Jeison Hunt MD;  Location: [facility] OR;  Service: Vascular;  Laterality: Right;       Review of patient's allergies indicates:  No Known Allergies    Current Facility-Administered Medications on File Prior to Encounter   Medication    midazolam injection     Current Outpatient Medications on File  Prior to Encounter   Medication Sig    acetaminophen (TYLENOL) 325 MG tablet Take 1 tablet (325 mg total) by mouth every 6 (six) hours as needed for Pain.    allopurinol (ZYLOPRIM) 300 MG tablet Take 300 mg by mouth once daily.    apixaban (ELIQUIS) 5 mg Tab Take 1 tablet (5 mg total) by mouth 2 (two) times daily.    calcium acetate,phosphat bind, (PHOSLO) 667 mg tablet Take 1 tablet (667 mg total) by mouth 3 (three) times daily with meals.    carvediloL (COREG) 6.25 MG tablet Take 1 tablet (6.25 mg total) by mouth 2 (two) times daily.    cholecalciferol, vitamin D3, (VITAMIN D3) 50 mcg (2,000 unit) Cap Take 1 capsule by mouth once daily.    clopidogreL (PLAVIX) 75 mg tablet Take 75 mg by mouth once daily.    folic acid (FOLVITE) 1 MG tablet Take 1 mg by mouth.    HYDROcodone-acetaminophen (NORCO) 5-325 mg per tablet Take 1 tablet by mouth every 6 (six) hours as needed for Pain.    insulin aspart U-100 (NOVOLOG) 100 unit/mL injection Inject 4-8 Units into the skin 3 (three) times daily before meals. Per sliding scale    povidone-iodine (BETADINE) 10 % external solution Apply topically as needed for Wound Care. Apply to wound and to interdigital maceration 2x per day.    VENTOLIN HFA 90 mcg/actuation inhaler Inhale 1 puff into the lungs every 4 (four) hours as needed. As NEEDED     Family History       Problem Relation (Age of Onset)    Heart attack Father          Tobacco Use    Smoking status: Former Smoker    Smokeless tobacco: Never Used   Substance and Sexual Activity    Alcohol use: Yes     Comment: social    Drug use: No    Sexual activity: Yes     Review of Systems   Constitutional: Negative for chills, decreased appetite, diaphoresis, fever, malaise/fatigue, night sweats, weight gain and weight loss.   HENT:  Negative for congestion, ear discharge, ear pain, hearing loss, hoarse voice, nosebleeds, odynophagia, sore throat, stridor and tinnitus.    Eyes:  Negative for blurred vision,  discharge, double vision, pain, photophobia, redness, vision loss in left eye, vision loss in right eye, visual disturbance and visual halos.   Cardiovascular:  Negative for chest pain, claudication, cyanosis, dyspnea on exertion, irregular heartbeat, leg swelling, near-syncope, orthopnea, palpitations, paroxysmal nocturnal dyspnea and syncope.   Respiratory:  Negative for cough, hemoptysis, shortness of breath, sleep disturbances due to breathing, snoring, sputum production and wheezing.    Endocrine: Negative for cold intolerance, heat intolerance, polydipsia, polyphagia and polyuria.   Hematologic/Lymphatic: Negative for adenopathy and bleeding problem. Does not bruise/bleed easily.   Skin:  Positive for color change and poor wound healing. Negative for dry skin, flushing, itching, nail changes, rash, skin cancer, suspicious lesions and unusual hair distribution.   Musculoskeletal:  Negative for arthritis, back pain, falls, gout, joint pain, joint swelling, muscle cramps, muscle weakness, myalgias, neck pain and stiffness.   Gastrointestinal:  Negative for bloating, abdominal pain, anorexia, change in bowel habit, bowel incontinence, constipation, diarrhea, dysphagia, excessive appetite, flatus, heartburn, hematemesis, hematochezia, hemorrhoids, jaundice, melena, nausea and vomiting.   Genitourinary:  Negative for bladder incontinence, decreased libido, dysuria, flank pain, frequency, genital sores, hematuria, hesitancy, incomplete emptying, nocturia and urgency.   Neurological:  Positive for weakness. Negative for aphonia, brief paralysis, difficulty with concentration, disturbances in coordination, excessive daytime sleepiness, dizziness, focal weakness, headaches, light-headedness, loss of balance, numbness, paresthesias, seizures, sensory change, tremors and vertigo.   Psychiatric/Behavioral:  Negative for altered mental status, depression, hallucinations, memory loss, substance abuse, suicidal ideas and  thoughts of violence. The patient does not have insomnia and is not nervous/anxious.    Allergic/Immunologic: Negative for hives and persistent infections.   Objective:     Vital Signs (Most Recent):  Temp: 96.9 °F (36.1 °C) (07/02/22 0401)  Pulse: 60 (07/02/22 0753)  Resp: 17 (07/02/22 0753)  BP: (!) 92/51 (07/02/22 0401)  SpO2: 99 % (07/02/22 0753)   Vital Signs (24h Range):  Temp:  [96.9 °F (36.1 °C)-99.1 °F (37.3 °C)] 96.9 °F (36.1 °C)  Pulse:  [60-76] 60  Resp:  [17-20] 17  SpO2:  [99 %-100 %] 99 %  BP: (87-95)/(49-55) 92/51     Weight: 124.5 kg (274 lb 7.6 oz)  Body mass index is 36.21 kg/m².    SpO2: 99 %  O2 Device (Oxygen Therapy): room air    No intake or output data in the 24 hours ending 07/02/22 0916    Lines/Drains/Airways       Central Venous Catheter Line  Duration                  Hemodialysis AV Graft Right forearm -- days              Peripheral Intravenous Line  Duration                  Peripheral IV - Single Lumen 07/1945 20 G Left Antecubital <1 day                    Physical Exam  Constitutional:       General: He is not in acute distress.     Appearance: He is well-developed. He is not diaphoretic.   HENT:      Head: Normocephalic and atraumatic.      Nose: Nose normal.   Eyes:      General: No scleral icterus.        Right eye: No discharge.      Conjunctiva/sclera: Conjunctivae normal.      Pupils: Pupils are equal, round, and reactive to light.   Neck:      Thyroid: No thyromegaly.      Vascular: No JVD.      Trachea: No tracheal deviation.   Cardiovascular:      Rate and Rhythm: Normal rate and regular rhythm.      Pulses:           Carotid pulses are 2+ on the right side and 2+ on the left side.       Radial pulses are 2+ on the right side.        Dorsalis pedis pulses are 0 on the right side and 0 on the left side.        Posterior tibial pulses are 0 on the right side and 0 on the left side.      Heart sounds: Normal heart sounds. No murmur heard.    No friction rub. No gallop.       Comments: Left foot Doppler audible signals dp, pt area.  Pulmonary:      Effort: Pulmonary effort is normal. No respiratory distress.      Breath sounds: Normal breath sounds. No stridor. No wheezing or rales.   Chest:      Chest wall: No tenderness.   Abdominal:      General: Bowel sounds are normal. There is no distension.      Palpations: Abdomen is soft. There is no mass.      Tenderness: There is no abdominal tenderness. There is no guarding or rebound.   Musculoskeletal:         General: No tenderness. Normal range of motion.      Cervical back: Normal range of motion and neck supple.   Lymphadenopathy:      Cervical: No cervical adenopathy.   Skin:     General: Skin is warm and dry.      Coloration: Skin is not pale.      Findings: No erythema or rash.   Neurological:      Mental Status: He is alert and oriented to person, place, and time.      Cranial Nerves: No cranial nerve deficit.      Coordination: Coordination normal.   Psychiatric:         Behavior: Behavior normal.         Thought Content: Thought content normal.         Judgment: Judgment normal.       Significant Labs: CMP   Recent Labs   Lab 07/01/22 1951 07/02/22  0332   * 132*   K 4.7 4.9   CL 95 95   CO2 22* 18*   GLU 77 131*   BUN 53* 53*   CREATININE 10.5* 10.4*   CALCIUM 8.4* 8.4*   PROT 5.7* 5.8*   ALBUMIN 2.5* 2.2*   BILITOT 0.6 0.5   ALKPHOS 159* 131   AST 17 17   ALT <5* <5*   ANIONGAP 18* 19*   ESTGFRAFRICA 5* 5*   EGFRNONAA 4* 4*   , CBC   Recent Labs   Lab 07/01/22 1951 07/02/22  0332   WBC 22.54* 18.33*   HGB 9.8* 9.7*   HCT 31.5* 30.4*    139*   , INR   Recent Labs   Lab 07/01/22  2019   INR 1.5*   , Lipid Panel No results for input(s): CHOL, HDL, LDLCALC, TRIG, CHOLHDL in the last 48 hours., Troponin No results for input(s): TROPONINI in the last 48 hours., and All pertinent lab results from the last 24 hours have been reviewed.    Significant Imaging:     Assessment and Plan:     Critical limb ischemia with  history of revascularization of same extremity  Repeat angiography planned for next week.  Heparin will be continued for now.    Type 2 diabetes mellitus with chronic kidney disease on chronic dialysis, with long-term current use of insulin  Condition stable.    Acute deep vein thrombosis (DVT) of popliteal vein of right lower extremity  Eliquis to continue.  Right popliteal vein DVT.  There is some edema to the right leg.  Water blebs possibly related to venous stasis.  The foot is warm.    PAD (peripheral artery disease)  Progression of ischemic toes bilaterally.  The most concerning is the right 1st toe.  To toes were for planned amputation and they clearly are nonsalvageable.  His left 5th toe has developed gangrene which is new.  It looks nonsalvageable.    Cardiac resynchronization therapy defibrillator (CRT-D) in place  No ICD shocks.  Rhythm regular.  Condition stable.    Morbid obesity  Weight loss encouraged.    Primary hypertension  He is on carvedilol.  Readings are on the low side.  He does require midodrine.        VTE Risk Mitigation (From admission, onward)         Ordered     heparin 25,000 units in dextrose 5% (100 units/ml) IV bolus from bag - ADDITIONAL PRN BOLUS - 60 units/kg  As needed (PRN)        Question:  Heparin Infusion Adjustment (DO NOT MODIFY ANSWER)  Answer:  \\[email or web address]\Images\Pharmacy\HeparinInfusions\heparin HIGH INTENSITY nomogram for [facility] [id number]    07/01/22 1953     heparin 25,000 units in dextrose 5% (100 units/ml) IV bolus from bag - ADDITIONAL PRN BOLUS - 30 units/kg  As needed (PRN)        Question:  Heparin Infusion Adjustment (DO NOT MODIFY ANSWER)  Answer:  \[email or web address]\Images\Pharmacy\HeparinInfusions\heparin HIGH INTENSITY nomogram for [facility] [id number]    07/01/22 1953     heparin 25,000 units in dextrose 5% 250 mL (100 units/mL) infusion HIGH INTENSITY nomogram - [facility]  Continuous        Question Answer Comment   Heparin Infusion Adjustment (DO NOT MODIFY  ANSWER) [email or web address]ic\Images\Pharmacy\HeparinInfusions\heparin HIGH INTENSITY nomogram for [facility] [id number].pdf    Begin at (in units/kg/hr) 18        07/01/22 1953     IP VTE HIGH RISK PATIENT  Once         07/01/22 2014     Place sequential compression device  Until discontinued         07/01/22 2014     Reason for No Pharmacological VTE Prophylaxis  Once        Question:  Reasons:  Answer:  Already adequately anticoagulated on oral Anticoagulants    07/01/22 2014                Thank you for your consult. I will follow-up with patient. Please contact us if you have any additional questions.    Joey Ledbetter MD  Cardiology   [facility] - Telemetry

## 2022-07-02 NOTE — ASSESSMENT & PLAN NOTE
Eliquis to continue.  Right popliteal vein DVT.  There is some edema to the right leg.  Water blebs possibly related to venous stasis.  The foot is warm.

## 2022-07-02 NOTE — PHARMACY MED REC
"[facility]           Pharmacy  Admission Medication Reconciliation     The home medication history was taken by Deedee oRdriguez PharmD.      Medication history obtained from Medications listed below were obtained from: Patient/family    Based on information gathered for medication list, you may go to "Admission" then "Reconcile Home Medications" tabs to review and/or act upon those items.      The home medication list has been updated by the Pharmacy department.    Please read ALL comments highlighted in yellow.    Please address this information as you see fit.     Feel free to contact us if you have any questions or require assistance.    The current inpatient medication list has been compared to the home medication list and the following discrepancies were noted:     Patient reports he/she IS TAKING the following which was not ordered upon admit  o Allopurinol 300mg daily  o Eliquis 5mg twice daily  o Calcium acetate 667mg 3 times daily  o Carvedilol 6.25mg twice daily  o VitD3 2000U daily  o Folic acid 1mg daily      Current Facility-Administered Medications on File Prior to Encounter   Medication Dose Route Frequency Provider Last Rate Last Admin    midazolam injection   Intravenous PRN Drew Martínez MD   1 mg at 06/24/22 0910     Current Outpatient Medications on File Prior to Encounter   Medication Sig Dispense Refill    acetaminophen (TYLENOL) 325 MG tablet Take 1 tablet (325 mg total) by mouth every 6 (six) hours as needed for Pain. 10 tablet 0    allopurinol (ZYLOPRIM) 300 MG tablet Take 300 mg by mouth once daily.      apixaban (ELIQUIS) 5 mg Tab Take 1 tablet (5 mg total) by mouth 2 (two) times daily. 60 tablet 6    calcium acetate,phosphat bind, (PHOSLO) 667 mg tablet Take 1 tablet (667 mg total) by mouth 3 (three) times daily with meals. 90 tablet 11    carvediloL (COREG) 6.25 MG tablet Take 1 tablet (6.25 mg total) by mouth 2 (two) times daily. 60 tablet 11 "    cholecalciferol, vitamin D3, (VITAMIN D3) 50 mcg (2,000 unit) Cap Take 1 capsule by mouth once daily.      clopidogreL (PLAVIX) 75 mg tablet Take 75 mg by mouth once daily.      folic acid (FOLVITE) 1 MG tablet Take 1 mg by mouth.      HYDROcodone-acetaminophen (NORCO) 5-325 mg per tablet Take 1 tablet by mouth every 6 (six) hours as needed for Pain. 28 tablet 0    insulin aspart U-100 (NOVOLOG) 100 unit/mL injection Inject 4-8 Units into the skin 3 (three) times daily before meals. Per sliding scale      povidone-iodine (BETADINE) 10 % external solution Apply topically as needed for Wound Care. Apply to wound and to interdigital maceration 2x per day. 59 mL 0    VENTOLIN HFA 90 mcg/actuation inhaler Inhale 1 puff into the lungs every 4 (four) hours as needed. As NEEDED         Please address this information as you see fit.  Feel free to contact us if you have any questions or require assistance.    Deedee Rodriguez, PharmD  [phone]                  .

## 2022-07-02 NOTE — ASSESSMENT & PLAN NOTE
-stable, no fluid overloading s/s on exam  -Strict electrolyte management with goal K 4-5 and Mg 2-3 (trending daily)  Patient is identified as having Combined Systolic and Diastolic heart failure that is Chronic. CHF is currently controlled. Latest ECHO performed and demonstrates- Results for orders placed during the hospital encounter of 12/21/21    Echo    Interpretation Summary  · The left ventricle is moderately enlarged with mild concentric hypertrophy and  · The quantitatively derived ejection fraction is 34%.  · Severe left atrial enlargement.  · Normal right ventricular size with normal right ventricular systolic function.  · Normal central venous pressure (3 mmHg).  · The estimated PA systolic pressure is 21 mmHg.  · No vegetations per surface echo.  -hold Beta Blocker ACE/ARB with soft BP and monitor clinical status closely. Monitor on telemetry. Patient is off CHF pathway.  Monitor strict Is&Os and daily weights.  Place on fluid restriction of 1.5 L. Continue to stress to patient importance of self efficacy and  on diet for CHF. Last BNP reviewed- and noted below No results for input(s): BNP, BNPTRIAGEBLO in the last 168 hours..

## 2022-07-02 NOTE — PLAN OF CARE
Problem: Adult Inpatient Plan of Care  Goal: Plan of Care Review  Outcome: Ongoing, Progressing     Problem: Diabetes Comorbidity  Goal: Blood Glucose Level Within Targeted Range  Outcome: Ongoing, Progressing     Problem: Fluid and Electrolyte Imbalance (Acute Kidney Injury/Impairment)  Goal: Fluid and Electrolyte Balance  Outcome: Ongoing, Progressing

## 2022-07-02 NOTE — CONSULTS
Nephrology Consult  H&P      Consult Requested By: Alysha Anderson*  Reason for Consult: ESRD on HD     SUBJECTIVE:     History of Present Illness:  Houston Jc is a 76 y.o.   male who  has a past medical history of Anemia, CHF (congestive heart failure), ESRD on HD TTS with Dr Decker , Coronary artery disease, Diabetes mellitus, Hematuria, Hypertension, Osteoarthritis of spine with radiculopathy, cervical region (1/10/2022), and Renal cyst, right.. The [facility]  For pain in LE and blisters  evaluation. Missed Thursday HD due to pain   ?    Review of Systems   Constitutional: Negative for chills and fever.   HENT: Negative for congestion and sore throat.    Eyes: Negative for blurred vision, double vision and photophobia.   Respiratory: Negative for cough.    Cardiovascular: Positive for leg swelling. Negative for chest pain and palpitations. Claudication: blisters    Gastrointestinal: Negative for abdominal pain, diarrhea, nausea and vomiting.   Genitourinary: Negative for dysuria and urgency.   Musculoskeletal: Positive for myalgias. Negative for joint pain.   Skin: Negative for itching and rash.   Neurological: Negative for dizziness, sensory change, weakness and headaches.   Endo/Heme/Allergies: Negative for polydipsia. Does not bruise/bleed easily.   Psychiatric/Behavioral: Negative for depression.       Past Medical History:   Diagnosis Date    Anemia     CHF (congestive heart failure)     CKD (chronic kidney disease) stage 4, GFR 15-29 ml/min     Coronary artery disease     Diabetes mellitus     Hematuria     Hypertension     Osteoarthritis of spine with radiculopathy, cervical region 1/10/2022    Renal cyst, right      Past Surgical History:   Procedure Laterality Date    ANGIOGRAPHY OF ARTERIOVENOUS SHUNT Right 2/11/2022    Procedure: Fistulogram with Possible Intervention;  Surgeon: Dejuan Cody MD;  Location: [facility] CATH LAB/EP;  Service:  Cardiology;  Laterality: Right;    ANGIOGRAPHY OF LOWER EXTREMITY Right 6/20/2022    Procedure: Angiogram Extremity Unilateral;  Surgeon: Umesh Quintero MD;  Location: [facility] CATH LAB/EP;  Service: Cardiology;  Laterality: Right;    ANGIOPLASTY OF PERIPHERAL VESSEL FOR CHRONIC TOTAL OCCLUSION N/A 6/24/2022    Procedure: ANGIOPLASTY, VESSEL, PERIPHERAL, FOR CHRONIC TOTAL OCCLUSION;  Surgeon: Dejuan Cody MD;  Location: [facility] CATH LAB/EP;  Service: Cardiology;  Laterality: N/A;    AORTOGRAPHY WITH SERIALOGRAPHY Right 6/7/2022    Procedure: AORTOGRAM, WITH SERIALOGRAPHY;  Surgeon: Dejuan Cody MD;  Location: [facility] CATH LAB/EP;  Service: Cardiology;  Laterality: Right;    CARDIAC SURGERY      DECLOTTING OF ARTERIOVENOUS GRAFT Right 12/21/2021    Procedure: [id number];  Surgeon: Jeison Hunt MD;  Location: [facility];  Service: Vascular;  Laterality: Right;    DECLOTTING OF ARTERIOVENOUS GRAFT Right 2/18/2022    Procedure: [id number];  Surgeon: Jeison Hunt MD;  Location: [facility];  Service: Vascular;  Laterality: Right;    DECLOTTING OF ARTERIOVENOUS GRAFT Right 3/9/2022    Procedure: [id number];  Surgeon: Jeison Hunt MD;  Location: [facility];  Service: Vascular;  Laterality: Right;    DECLOTTING OF ARTERIOVENOUS GRAFT Right 6/13/2022    Procedure: [id number]; REVISION OF FISTULA W/INSERTION OF NEW AV GRAFT;  Surgeon: Jeison Hunt MD;  Location: [facility];  Service: Vascular;  Laterality: Right;    FISTULOGRAM N/A 2/11/2022    Procedure: Fistulogram;  Surgeon: Dejuan Cody MD;  Location: [facility] CATH LAB/EP;  Service: Cardiology;  Laterality: N/A;    FISTULOGRAM N/A 6/17/2022    Procedure: Fistulogram;  Surgeon: Dejuan Cody MD;  Location: [facility] CATH LAB/EP;  Service: Cardiology;  Laterality: N/A;    FOOT SURGERY      INSERTION OF BIVENTRICULAR IMPLANTABLE CARDIOVERTER-DEFIBRILLATOR (ICD) Left 7/18/2019    Procedure: INSERTION, ICD,  BIVENTRICULAR;  Surgeon: Arturo Bay MD;  Location: [facility] EP LAB;  Service: Cardiology;  Laterality: Left;  CHB, CRTD, SJM, anes, GP, 6078    LEFT HEART CATHETERIZATION N/A 7/16/2019    Procedure: Left heart cath;  Surgeon: Dejuan Cody MD;  Location: [facility] CATH LAB/EP;  Service: Cardiology;  Laterality: N/A;    PERCUTANEOUS TRANSLUMINAL ANGIOPLASTY OF ARTERIOVENOUS FISTULA Right 2/11/2022    Procedure: PTA, AV FISTULA;  Surgeon: Dejuan Cody MD;  Location: [facility] CATH LAB/EP;  Service: Cardiology;  Laterality: Right;    PERCUTANEOUS TRANSLUMINAL ANGIOPLASTY OF ARTERIOVENOUS FISTULA N/A 6/17/2022    Procedure: PTA, AV FISTULA;  Surgeon: Dejuan Cody MD;  Location: [facility] CATH LAB/EP;  Service: Cardiology;  Laterality: N/A;    PERITONEAL CATHETER REMOVAL Left 4/11/2020    Procedure: REMOVAL, CATHETER, DIALYSIS, PERITONEAL;  Surgeon: Edis Snell Jr., MD;  Location: [facility] OR;  Service: General;  Laterality: Left;    PHLEBOGRAPHY  6/15/2022    Procedure: Venogram;  Surgeon: Betsey Mckeon MD;  Location: [facility] CATH LAB/EP;  Service: Cardiology;;    PLACEMENT OF ARTERIOVENOUS GRAFT Right 2/18/2022    Procedure: INSERTION, GRAFT, ARTERIOVENOUS;  Surgeon: Jeison Hunt MD;  Location: [facility];  Service: Vascular;  Laterality: Right;    REVISION OF PROCEDURE INVOLVING ARTERIOVENOUS GRAFT Right 2/18/2022    Procedure: REVISION, PROCEDURE INVOLVING ARTERIOVENOUS GRAFT;  Surgeon: Jeison Hunt MD;  Location: [facility];  Service: Vascular;  Laterality: Right;     Family History   Problem Relation Age of Onset    Heart attack Father      Social History     Tobacco Use    Smoking status: Former Smoker    Smokeless tobacco: Never Used   Substance Use Topics    Alcohol use: Yes     Comment: social    Drug use: No       Review of patient's allergies indicates:  No Known Allergies         OBJECTIVE:     Vital Signs (Most Recent)  Vitals:    07/02/22 0000 07/02/22 0401 07/02/22 0753 07/02/22 0928    BP:  (!) 92/51  (!) 89/50   BP Location:    Left arm   Patient Position:  Lying  Lying   Pulse: 70 60 60 62   Resp:   17 20   Temp:  96.9 °F (36.1 °C)  97.8 °F (36.6 °C)   TempSrc:  Axillary  Oral   SpO2:  100% 99% 100%   Weight:       Height:                     Medications:   sodium chloride 0.9%   Intravenous Once    clopidogreL  75 mg Oral Daily    piperacillin-tazobactam (ZOSYN) IVPB  4.5 g Intravenous Q12H    senna-docusate 8.6-50 mg  1 tablet Oral BID           Physical Exam  Vitals and nursing note reviewed.   Constitutional:       General: He is not in acute distress.     Appearance: He is not diaphoretic.   HENT:      Head: Normocephalic and atraumatic.      Mouth/Throat:      Pharynx: No oropharyngeal exudate.   Eyes:      General: No scleral icterus.     Conjunctiva/sclera: Conjunctivae normal.      Pupils: Pupils are equal, round, and reactive to light.   Cardiovascular:      Rate and Rhythm: Normal rate and regular rhythm.      Heart sounds: Normal heart sounds. No murmur heard.  Pulmonary:      Effort: Pulmonary effort is normal. No respiratory distress.      Breath sounds: Normal breath sounds.   Abdominal:      General: Bowel sounds are normal. There is no distension.      Palpations: Abdomen is soft.      Tenderness: There is no abdominal tenderness.   Musculoskeletal:         General: Swelling (LE blisters wounds ) present. Normal range of motion.      Cervical back: Normal range of motion and neck supple.      Right lower leg: Edema present.      Left lower leg: Edema present.      Comments: RUE AVG   thrill    Skin:     General: Skin is warm and dry.      Findings: No erythema.   Neurological:      Mental Status: He is alert and oriented to person, place, and time.      Cranial Nerves: No cranial nerve deficit.   Psychiatric:         Mood and Affect: Affect normal.         Cognition and Memory: Memory normal.         Judgment: Judgment normal.         Laboratory:  Recent Labs   Lab  06/28/22 1007 07/01/22 1951 07/02/22  0332   WBC 13.55* 22.54* 18.33*   HGB 9.1* 9.8* 9.7*   HCT 28.4* 31.5* 30.4*   * 150 139*   MONO 8.9  1.2* 8.5  1.9* 7.1  1.3*     Recent Labs   Lab 06/25/22  1101 06/28/22 1007 07/01/22 1951 07/02/22  0332    134* 135* 132*   K 4.9 4.2 4.7 4.9   CL 97 97 95 95   CO2 24 20* 22* 18*   BUN 54* 52* 53* 53*   CREATININE 11.0* 10.8* 10.5* 10.4*   CALCIUM 8.9 8.6* 8.4* 8.4*   PHOS 5.9* 4.9*  --  4.7*       Diagnostic Results:  X-Ray: Reviewed  US: Reviewed  Echo: Reviewed  ASSESSMENT/PLAN:     1. ESRD on HD TTS with Dr Decker  -- Last HD Tuesday  missed  Thursday didn't feel well   -- Now IV abx for LE wounds HD today with No UF hypotensive   -- Daily Renal Function Panel  -- Avoid Hypotension.  -- Renally dose all meds    2. HTN (I10) -    3. Anemia of chronic kidney disease treated with ROSALVA (N18.9 D63.1)    EPogen  with each HD - hold for now Hb >10   Recent Labs   Lab 06/28/22 1007 07/01/22 1951 07/02/22  0332   HGB 9.1* 9.8* 9.7*   HCT 28.4* 31.5* 30.4*   * 150 139*       Iron   Lab Results   Component Value Date    IRON 63 12/18/2019    TIBC 263 12/18/2019    FERRITIN 6,078 (H) 03/28/2020       4. MBD (E88.9 M90.80) -  Recent Labs   Lab 07/02/22  0332   CALCIUM 8.4*   PHOS 4.7*     Recent Labs   Lab 07/02/22  0332   MG 1.7       Lab Results   Component Value Date    .0 (H) 03/04/2020    CALCIUM 8.4 (L) 07/02/2022    PHOS 4.7 (H) 07/02/2022     No results found for: [id number]    Lab Results   Component Value Date    CO2 18 (L) 07/02/2022       5. Nutrition/Hypoalbuminemia (E88.09) -    Recent Labs   Lab 07/01/22 1951 07/01/22 2019 07/02/22  0332   LABPROT  --  15.2*  --    ALBUMIN 2.5*  --  2.2*     Nepro with meals TID. Renal vitamins daily      With any question please call answering service [phone]  Laura Ortiz MD    [facility]  ANA MARIA Burns MD, FACP,   KEREN Woo MD,   MD DAMION Gill MD  E. V.  Luis, NP    [address]  [place], [address]

## 2022-07-02 NOTE — ED PROVIDER NOTES
Encounter Date: 7/1/2022       History     Chief Complaint   Patient presents with    Blister     Reports blisters to right foot x 1 week with 2 new blister on left foot. 2 bllisters noted to left foot. Unable to palpate pedal pulse to bilateral feet. Left 5th and 3rd toe black in color. Right foot with multiple blister with 2 open.right foot 3rd and 4th toe black in color. BLE + 4 pitting edema. Reports last HD on Tuesday. HD days are Tues, Thurs, Sat.  No distress noted     76-year-old male with history of end-stage renal disease on hemodialysis Tuesday Thursday Saturday, compliant with this, coronary disease, diabetes, CHF, hypertension peripheral vascular disease referred by home health and cardiology for worsening status of lower extremity vascular disease.  Patient has worsening gangrene of several toes and blister formation on bilateral feet.  Reports pain to both feet.  History of multiple angioplasty.  No stents.        Review of patient's allergies indicates:  No Known Allergies  Past Medical History:   Diagnosis Date    Anemia     CHF (congestive heart failure)     CKD (chronic kidney disease) stage 4, GFR 15-29 ml/min     Coronary artery disease     Diabetes mellitus     Hematuria     Hypertension     Osteoarthritis of spine with radiculopathy, cervical region 1/10/2022    Renal cyst, right      Past Surgical History:   Procedure Laterality Date    ANGIOGRAPHY OF ARTERIOVENOUS SHUNT Right 2/11/2022    Procedure: Fistulogram with Possible Intervention;  Surgeon: Dejuan Cody MD;  Location: [facility] CATH LAB/EP;  Service: Cardiology;  Laterality: Right;    ANGIOGRAPHY OF LOWER EXTREMITY Right 6/20/2022    Procedure: Angiogram Extremity Unilateral;  Surgeon: Umesh Quintero MD;  Location: [facility] CATH LAB/EP;  Service: Cardiology;  Laterality: Right;    ANGIOPLASTY OF PERIPHERAL VESSEL FOR CHRONIC TOTAL OCCLUSION N/A 6/24/2022    Procedure: ANGIOPLASTY, VESSEL, PERIPHERAL, FOR CHRONIC TOTAL  OCCLUSION;  Surgeon: Dejuan Cody MD;  Location: [facility] CATH LAB/EP;  Service: Cardiology;  Laterality: N/A;    AORTOGRAPHY WITH SERIALOGRAPHY Right 6/7/2022    Procedure: AORTOGRAM, WITH SERIALOGRAPHY;  Surgeon: Dejuan Cody MD;  Location: [facility] CATH LAB/EP;  Service: Cardiology;  Laterality: Right;    CARDIAC SURGERY      DECLOTTING OF ARTERIOVENOUS GRAFT Right 12/21/2021    Procedure: [id number];  Surgeon: Jeison Hunt MD;  Location: [facility] OR;  Service: Vascular;  Laterality: Right;    DECLOTTING OF ARTERIOVENOUS GRAFT Right 2/18/2022    Procedure: [id number];  Surgeon: Jeison Hunt MD;  Location: [facility] OR;  Service: Vascular;  Laterality: Right;    DECLOTTING OF ARTERIOVENOUS GRAFT Right 3/9/2022    Procedure: [id number];  Surgeon: Jeison Hunt MD;  Location: [facility] OR;  Service: Vascular;  Laterality: Right;    DECLOTTING OF ARTERIOVENOUS GRAFT Right 6/13/2022    Procedure: [id number]; REVISION OF FISTULA W/INSERTION OF NEW AV GRAFT;  Surgeon: Jeison Hunt MD;  Location: [facility] OR;  Service: Vascular;  Laterality: Right;    FISTULOGRAM N/A 2/11/2022    Procedure: Fistulogram;  Surgeon: Dejuan Cody MD;  Location: [facility] CATH LAB/EP;  Service: Cardiology;  Laterality: N/A;    FISTULOGRAM N/A 6/17/2022    Procedure: Fistulogram;  Surgeon: Dejuan Cody MD;  Location: [facility] CATH LAB/EP;  Service: Cardiology;  Laterality: N/A;    FOOT SURGERY      INSERTION OF BIVENTRICULAR IMPLANTABLE CARDIOVERTER-DEFIBRILLATOR (ICD) Left 7/18/2019    Procedure: INSERTION, ICD, BIVENTRICULAR;  Surgeon: Arturo Bay MD;  Location: [facility] EP LAB;  Service: Cardiology;  Laterality: Left;  CHB, CRTD, SJM, anes, GP, 6078    LEFT HEART CATHETERIZATION N/A 7/16/2019    Procedure: Left heart cath;  Surgeon: Dejuan Cody MD;  Location: [facility] CATH LAB/EP;  Service: Cardiology;  Laterality: N/A;    PERCUTANEOUS TRANSLUMINAL ANGIOPLASTY OF ARTERIOVENOUS FISTULA  Right 2/11/2022    Procedure: PTA, AV FISTULA;  Surgeon: Dejuan Cody MD;  Location: [facility] CATH LAB/EP;  Service: Cardiology;  Laterality: Right;    PERCUTANEOUS TRANSLUMINAL ANGIOPLASTY OF ARTERIOVENOUS FISTULA N/A 6/17/2022    Procedure: PTA, AV FISTULA;  Surgeon: Dejuan Cody MD;  Location: [facility] CATH LAB/EP;  Service: Cardiology;  Laterality: N/A;    PERITONEAL CATHETER REMOVAL Left 4/11/2020    Procedure: REMOVAL, CATHETER, DIALYSIS, PERITONEAL;  Surgeon: Edis Snell Jr., MD;  Location: [facility] OR;  Service: General;  Laterality: Left;    PHLEBOGRAPHY  6/15/2022    Procedure: Venogram;  Surgeon: Betsey Mckeon MD;  Location: [facility] CATH LAB/EP;  Service: Cardiology;;    PLACEMENT OF ARTERIOVENOUS GRAFT Right 2/18/2022    Procedure: INSERTION, GRAFT, ARTERIOVENOUS;  Surgeon: Jeison Hunt MD;  Location: [facility] OR;  Service: Vascular;  Laterality: Right;    REVISION OF PROCEDURE INVOLVING ARTERIOVENOUS GRAFT Right 2/18/2022    Procedure: REVISION, PROCEDURE INVOLVING ARTERIOVENOUS GRAFT;  Surgeon: Jeison Hunt MD;  Location: [facility] OR;  Service: Vascular;  Laterality: Right;     Family History   Problem Relation Age of Onset    Heart attack Father      Social History     Tobacco Use    Smoking status: Former Smoker    Smokeless tobacco: Never Used   Substance Use Topics    Alcohol use: Yes     Comment: social    Drug use: No     Review of Systems   Constitutional: Negative for chills and fever.   HENT: Negative for congestion, rhinorrhea and sore throat.    Eyes: Negative for visual disturbance.   Respiratory: Negative for cough and shortness of breath.    Cardiovascular: Negative for chest pain.   Gastrointestinal: Negative for abdominal pain, diarrhea, nausea and vomiting.   Genitourinary: Negative for dysuria and hematuria.   Musculoskeletal: Negative for back pain and myalgias.   Skin: Positive for color change and wound. Negative for pallor and rash.   Neurological: Negative  for dizziness and weakness.   All other systems reviewed and are negative.      Physical Exam     Initial Vitals [07/01/22 1652]   BP Pulse Resp Temp SpO2   (!) 87/49 75 20 99.1 °F (37.3 °C) 100 %      MAP       --         Physical Exam    Nursing note and vitals reviewed.  Constitutional: He appears well-developed and well-nourished. No distress.   HENT:   Head: Normocephalic and atraumatic.   Mouth/Throat: Oropharynx is clear and moist.   Eyes: Conjunctivae and EOM are normal. Pupils are equal, round, and reactive to light.   Neck: Neck supple.   Normal range of motion.  Cardiovascular: Normal rate and regular rhythm.   Unable to palpate or identify pulses with doppler    Pulmonary/Chest: No stridor. No respiratory distress.   Musculoskeletal:         General: Normal range of motion.      Cervical back: Normal range of motion and neck supple.      Comments: Moving all extremities with grossly normal strength     Neurological: He is alert and oriented to person, place, and time.   CN 2-12 appear grossly intact.  Sensation intact bilateral feet   Skin: Skin is warm and dry.   Multiple darkened discolored toes with gangrene present, foul odor, ulceration at 1st MTP on right, several bullae forming bilaterally.   Psychiatric: He has a normal mood and affect.         ED Course   Procedures  Labs Reviewed   CBC W/ AUTO DIFFERENTIAL - Abnormal; Notable for the following components:       Result Value    WBC 22.54 (*)     RBC 3.28 (*)     Hemoglobin 9.8 (*)     Hematocrit 31.5 (*)     MCHC 31.1 (*)     RDW 17.2 (*)     MPV 13.4 (*)     Immature Granulocytes 0.8 (*)     Gran # (ANC) 18.5 (*)     Immature Grans (Abs) 0.18 (*)     Mono # 1.9 (*)     Gran % 81.9 (*)     Lymph % 8.5 (*)     All other components within normal limits   COMPREHENSIVE METABOLIC PANEL - Abnormal; Notable for the following components:    Sodium 135 (*)     CO2 22 (*)     BUN 53 (*)     Creatinine 10.5 (*)     Calcium 8.4 (*)     Total Protein 5.7  (*)     Albumin 2.5 (*)     Alkaline Phosphatase 159 (*)     ALT <5 (*)     Anion Gap 18 (*)     eGFR if  5 (*)     eGFR if non  4 (*)     All other components within normal limits   SARS-COV-2 RDRP GENE          Imaging Results    None          Medications   heparin 25,000 units in dextrose 5% 250 mL (100 units/mL) infusion HIGH INTENSITY nomogram - [facility] (18 Units/kg/hr × 95.1 kg (Adjusted) Intravenous New Bag 7/1/22 2121)   heparin 25,000 units in dextrose 5% (100 units/ml) IV bolus from bag - ADDITIONAL PRN BOLUS - 60 units/kg (has no administration in time range)   heparin 25,000 units in dextrose 5% (100 units/ml) IV bolus from bag - ADDITIONAL PRN BOLUS - 30 units/kg (has no administration in time range)   sodium chloride 0.9% flush 10 mL (has no administration in time range)   albuterol-ipratropium 2.5 mg-0.5 mg/3 mL nebulizer solution 3 mL (has no administration in time range)   melatonin tablet 6 mg (has no administration in time range)   ondansetron injection 4 mg (has no administration in time range)   senna-docusate 8.6-50 mg per tablet 1 tablet (1 tablet Oral Not Given 7/1/22 2100)   simethicone chewable tablet 80 mg (has no administration in time range)   aluminum-magnesium hydroxide-simethicone 200-200-20 mg/5 mL suspension 30 mL (has no administration in time range)   acetaminophen tablet 650 mg (has no administration in time range)   naloxone 0.4 mg/mL injection 0.02 mg (has no administration in time range)   potassium bicarbonate disintegrating tablet 50 mEq (has no administration in time range)   potassium bicarbonate disintegrating tablet 35 mEq (has no administration in time range)   potassium bicarbonate disintegrating tablet 60 mEq (has no administration in time range)   magnesium oxide tablet 800 mg (has no administration in time range)   magnesium oxide tablet 800 mg (has no administration in time range)   potassium, sodium phosphates 280-160-250 mg packet 2  packet (has no administration in time range)   potassium, sodium phosphates 280-160-250 mg packet 2 packet (has no administration in time range)   glucose chewable tablet 16 g (has no administration in time range)   glucose chewable tablet 24 g (has no administration in time range)   glucagon (human recombinant) injection 1 mg (has no administration in time range)   dextrose 10% bolus 125 mL (0 mLs Intravenous Stopped 7/1/22 2120)   dextrose 10% bolus 250 mL (has no administration in time range)   vancomycin - pharmacy to dose (has no administration in time range)   piperacillin-tazobactam 4.5 g in dextrose 5 % 100 mL IVPB (ready to mix system) (has no administration in time range)   vancomycin (VANCOCIN) 2,000 mg in dextrose 5 % 500 mL IVPB (has no administration in time range)   insulin aspart U-100 pen 0-5 Units (has no administration in time range)   piperacillin-tazobactam 4.5 g in dextrose 5 % 100 mL IVPB (ready to mix system) (0 g Intravenous Stopped 7/1/22 2022)   sodium chloride 0.9% bolus 500 mL (0 mLs Intravenous Stopped 7/1/22 2052)   morphine injection 4 mg (4 mg Intravenous Given 7/1/22 1951)   heparin 25,000 units in dextrose 5% (100 units/ml) IV bolus from bag INITIAL BOLUS (7,608 Units Intravenous Bolus from Bag 7/1/22 2129)     Medical Decision Making:   History:   Old Medical Records: I decided to obtain old medical records.  Initial Assessment:   Chronically ill appearing, borderline BP  Differential Diagnosis:   Severe PAD/PVD with gangrene, unlikely acute limb ischemia given history.  ED Management:  Antibiotics, fluids, and analgesia ordered.  Cardiology consulted, Dr. Ledbetter, covering for Dr. Valencia.  Recommends arterial dopplers tomorrow.  Case discussed with [facility] for admission to Dr. La's service.  Given critical limb ischemia diagnosis patient was be admitted the hospitalist with .  Case discussed with Dr. Flores.        Pt. diagnosed with:  Critical limb ischemia  Critical care time spent: 45  minutes.  Due to a high probability of clinically significant, life threatening deterioration, the patient required my highest level of preparedness to intervene emergently and I personally spent this critical care time directly and personally managing the patient. This critical care time included obtaining a history; examining the patient; pulse oximetry; ordering and review of studies; arranging urgent treatment with development of a management plan; evaluation of patient's response to treatment; frequent reassessment; and, discussions with other providers.  This critical care time was performed to assess and manage the high probability of imminent, life-threatening deterioration that could result in multi-organ failure. It was exclusive of separately billable procedures and treating other patients and teaching time.          .                      Clinical Impression:   Final diagnoses:  [I73.9] Peripheral vascular disease  [I96] Gangrene (Primary)  [R23.8] Bullae  [I70.229, Z98.890] Critical limb ischemia with history of revascularization of same extremity          ED Disposition Condition    Admit               Isma Marie,   07/01/22 1928       Isma Marie, DO  07/02/22 0012

## 2022-07-02 NOTE — CONSULTS
[facility] - Telemetry  Cardiology  Consult Note    Patient Name: Houston Jc  MRN: [id number]  Admission Date: 7/1/2022  Hospital Length of Stay: 1 days  Code Status: Full Code   Attending Provider: Alysha Anderson*   Consulting Provider: Joey Ledbetter MD  Primary Care Physician: Zak Hamilton MD  Principal Problem:Gangrene    Patient information was obtained from patient, relative(s), past medical records, ER records and primary team.     Consults  Subjective:     Chief Complaint:  Worsening ischemic changes to both feet.  Recent hospital discharge.      HPI:   7/2/2022 He had to right leg lower extremity vascular procedures June 20th of June 24th.  D [facility] performed June 24th.  A recent stent placed to right SFA with PTA work on posterior tibial artery.  He has been compliant with medications.  He was released less than a week ago.  2D today because of gangrenous left toe that was new as well as changes on the right 1st toe that is new.  Reviewing discharge instructions there are plans for right foot TMA in 4 weeks post DVA.  The patient's leg pains worsened.  Home health nurses call the office to report changes to the toes bilaterally.    He has a history of nonischemic cardiomyopathy ejection fraction 35% with mild coronary disease.  He has history of complete heart block.  He has biventricular ICD for resynchronization.  He does have right popliteal vein DVT recently diagnosed.  There is significant 80% left SFA stenosis with perineal patency, occlusion of mid anterior tibial artery, 100% posterior tibial artery.  There were plans to treat the left leg in 2 weeks.      Past Medical History:   Diagnosis Date    Anemia     CHF (congestive heart failure)     CKD (chronic kidney disease) stage 4, GFR 15-29 ml/min     Coronary artery disease     Diabetes mellitus     Hematuria     Hypertension     Osteoarthritis of spine with radiculopathy, cervical region 1/10/2022    Renal cyst, right        Past  Surgical History:   Procedure Laterality Date    ANGIOGRAPHY OF ARTERIOVENOUS SHUNT Right 2/11/2022    Procedure: Fistulogram with Possible Intervention;  Surgeon: Dejuan Cody MD;  Location: [facility] CATH LAB/EP;  Service: Cardiology;  Laterality: Right;    ANGIOGRAPHY OF LOWER EXTREMITY Right 6/20/2022    Procedure: Angiogram Extremity Unilateral;  Surgeon: Umesh Quintero MD;  Location: [facility] CATH LAB/EP;  Service: Cardiology;  Laterality: Right;    ANGIOPLASTY OF PERIPHERAL VESSEL FOR CHRONIC TOTAL OCCLUSION N/A 6/24/2022    Procedure: ANGIOPLASTY, VESSEL, PERIPHERAL, FOR CHRONIC TOTAL OCCLUSION;  Surgeon: Dejuan Cody MD;  Location: [facility] CATH LAB/EP;  Service: Cardiology;  Laterality: N/A;    AORTOGRAPHY WITH SERIALOGRAPHY Right 6/7/2022    Procedure: AORTOGRAM, WITH SERIALOGRAPHY;  Surgeon: Dejuan Cody MD;  Location: [facility] CATH LAB/EP;  Service: Cardiology;  Laterality: Right;    CARDIAC SURGERY      DECLOTTING OF ARTERIOVENOUS GRAFT Right 12/21/2021    Procedure: [id number];  Surgeon: Jeison Hunt MD;  Location: [facility] OR;  Service: Vascular;  Laterality: Right;    DECLOTTING OF ARTERIOVENOUS GRAFT Right 2/18/2022    Procedure: [id number];  Surgeon: Jeison Hunt MD;  Location: [facility];  Service: Vascular;  Laterality: Right;    DECLOTTING OF ARTERIOVENOUS GRAFT Right 3/9/2022    Procedure: [id number];  Surgeon: Jeison Hunt MD;  Location: [facility] OR;  Service: Vascular;  Laterality: Right;    DECLOTTING OF ARTERIOVENOUS GRAFT Right 6/13/2022    Procedure: [id number]; REVISION OF FISTULA W/INSERTION OF NEW AV GRAFT;  Surgeon: Jeison Hunt MD;  Location: [facility] OR;  Service: Vascular;  Laterality: Right;    FISTULOGRAM N/A 2/11/2022    Procedure: Fistulogram;  Surgeon: Dejuan Cody MD;  Location: [facility] CATH LAB/EP;  Service: Cardiology;  Laterality: N/A;    FISTULOGRAM N/A 6/17/2022    Procedure: Fistulogram;  Surgeon: Dejuan GARDUNO  MD Glo;  Location: [facility] CATH LAB/EP;  Service: Cardiology;  Laterality: N/A;    FOOT SURGERY      INSERTION OF BIVENTRICULAR IMPLANTABLE CARDIOVERTER-DEFIBRILLATOR (ICD) Left 7/18/2019    Procedure: INSERTION, ICD, BIVENTRICULAR;  Surgeon: Arturo Bay MD;  Location: [facility] EP LAB;  Service: Cardiology;  Laterality: Left;  CHB, CRTD, SJM, anes, GP, 6078    LEFT HEART CATHETERIZATION N/A 7/16/2019    Procedure: Left heart cath;  Surgeon: Dejuan Cody MD;  Location: [facility] CATH LAB/EP;  Service: Cardiology;  Laterality: N/A;    PERCUTANEOUS TRANSLUMINAL ANGIOPLASTY OF ARTERIOVENOUS FISTULA Right 2/11/2022    Procedure: PTA, AV FISTULA;  Surgeon: Dejuan Cody MD;  Location: [facility] CATH LAB/EP;  Service: Cardiology;  Laterality: Right;    PERCUTANEOUS TRANSLUMINAL ANGIOPLASTY OF ARTERIOVENOUS FISTULA N/A 6/17/2022    Procedure: PTA, AV FISTULA;  Surgeon: Dejuan Cody MD;  Location: [facility] CATH LAB/EP;  Service: Cardiology;  Laterality: N/A;    PERITONEAL CATHETER REMOVAL Left 4/11/2020    Procedure: REMOVAL, CATHETER, DIALYSIS, PERITONEAL;  Surgeon: Edis Snell Jr., MD;  Location: [facility] OR;  Service: General;  Laterality: Left;    PHLEBOGRAPHY  6/15/2022    Procedure: Venogram;  Surgeon: Betsey Mckeon MD;  Location: [facility] CATH LAB/EP;  Service: Cardiology;;    PLACEMENT OF ARTERIOVENOUS GRAFT Right 2/18/2022    Procedure: INSERTION, GRAFT, ARTERIOVENOUS;  Surgeon: Jeison Hunt MD;  Location: [facility] OR;  Service: Vascular;  Laterality: Right;    REVISION OF PROCEDURE INVOLVING ARTERIOVENOUS GRAFT Right 2/18/2022    Procedure: REVISION, PROCEDURE INVOLVING ARTERIOVENOUS GRAFT;  Surgeon: Jeison Hunt MD;  Location: [facility] OR;  Service: Vascular;  Laterality: Right;       Review of patient's allergies indicates:  No Known Allergies    Current Facility-Administered Medications on File Prior to Encounter   Medication    midazolam injection     Current Outpatient Medications on File  Prior to Encounter   Medication Sig    acetaminophen (TYLENOL) 325 MG tablet Take 1 tablet (325 mg total) by mouth every 6 (six) hours as needed for Pain.    allopurinol (ZYLOPRIM) 300 MG tablet Take 300 mg by mouth once daily.    apixaban (ELIQUIS) 5 mg Tab Take 1 tablet (5 mg total) by mouth 2 (two) times daily.    calcium acetate,phosphat bind, (PHOSLO) 667 mg tablet Take 1 tablet (667 mg total) by mouth 3 (three) times daily with meals.    carvediloL (COREG) 6.25 MG tablet Take 1 tablet (6.25 mg total) by mouth 2 (two) times daily.    cholecalciferol, vitamin D3, (VITAMIN D3) 50 mcg (2,000 unit) Cap Take 1 capsule by mouth once daily.    clopidogreL (PLAVIX) 75 mg tablet Take 75 mg by mouth once daily.    folic acid (FOLVITE) 1 MG tablet Take 1 mg by mouth.    HYDROcodone-acetaminophen (NORCO) 5-325 mg per tablet Take 1 tablet by mouth every 6 (six) hours as needed for Pain.    insulin aspart U-100 (NOVOLOG) 100 unit/mL injection Inject 4-8 Units into the skin 3 (three) times daily before meals. Per sliding scale    povidone-iodine (BETADINE) 10 % external solution Apply topically as needed for Wound Care. Apply to wound and to interdigital maceration 2x per day.    VENTOLIN HFA 90 mcg/actuation inhaler Inhale 1 puff into the lungs every 4 (four) hours as needed. As NEEDED     Family History       Problem Relation (Age of Onset)    Heart attack Father          Tobacco Use    Smoking status: Former Smoker    Smokeless tobacco: Never Used   Substance and Sexual Activity    Alcohol use: Yes     Comment: social    Drug use: No    Sexual activity: Yes     Review of Systems   Constitutional: Negative for chills, decreased appetite, diaphoresis, fever, malaise/fatigue, night sweats, weight gain and weight loss.   HENT:  Negative for congestion, ear discharge, ear pain, hearing loss, hoarse voice, nosebleeds, odynophagia, sore throat, stridor and tinnitus.    Eyes:  Negative for blurred vision,  discharge, double vision, pain, photophobia, redness, vision loss in left eye, vision loss in right eye, visual disturbance and visual halos.   Cardiovascular:  Negative for chest pain, claudication, cyanosis, dyspnea on exertion, irregular heartbeat, leg swelling, near-syncope, orthopnea, palpitations, paroxysmal nocturnal dyspnea and syncope.   Respiratory:  Negative for cough, hemoptysis, shortness of breath, sleep disturbances due to breathing, snoring, sputum production and wheezing.    Endocrine: Negative for cold intolerance, heat intolerance, polydipsia, polyphagia and polyuria.   Hematologic/Lymphatic: Negative for adenopathy and bleeding problem. Does not bruise/bleed easily.   Skin:  Positive for color change and poor wound healing. Negative for dry skin, flushing, itching, nail changes, rash, skin cancer, suspicious lesions and unusual hair distribution.   Musculoskeletal:  Negative for arthritis, back pain, falls, gout, joint pain, joint swelling, muscle cramps, muscle weakness, myalgias, neck pain and stiffness.   Gastrointestinal:  Negative for bloating, abdominal pain, anorexia, change in bowel habit, bowel incontinence, constipation, diarrhea, dysphagia, excessive appetite, flatus, heartburn, hematemesis, hematochezia, hemorrhoids, jaundice, melena, nausea and vomiting.   Genitourinary:  Negative for bladder incontinence, decreased libido, dysuria, flank pain, frequency, genital sores, hematuria, hesitancy, incomplete emptying, nocturia and urgency.   Neurological:  Positive for weakness. Negative for aphonia, brief paralysis, difficulty with concentration, disturbances in coordination, excessive daytime sleepiness, dizziness, focal weakness, headaches, light-headedness, loss of balance, numbness, paresthesias, seizures, sensory change, tremors and vertigo.   Psychiatric/Behavioral:  Negative for altered mental status, depression, hallucinations, memory loss, substance abuse, suicidal ideas and  thoughts of violence. The patient does not have insomnia and is not nervous/anxious.    Allergic/Immunologic: Negative for hives and persistent infections.   Objective:     Vital Signs (Most Recent):  Temp: 96.9 °F (36.1 °C) (07/02/22 0401)  Pulse: 60 (07/02/22 0753)  Resp: 17 (07/02/22 0753)  BP: (!) 92/51 (07/02/22 0401)  SpO2: 99 % (07/02/22 0753)   Vital Signs (24h Range):  Temp:  [96.9 °F (36.1 °C)-99.1 °F (37.3 °C)] 96.9 °F (36.1 °C)  Pulse:  [60-76] 60  Resp:  [17-20] 17  SpO2:  [99 %-100 %] 99 %  BP: (87-95)/(49-55) 92/51     Weight: 124.5 kg (274 lb 7.6 oz)  Body mass index is 36.21 kg/m².    SpO2: 99 %  O2 Device (Oxygen Therapy): room air    No intake or output data in the 24 hours ending 07/02/22 0916    Lines/Drains/Airways       Central Venous Catheter Line  Duration                  Hemodialysis AV Graft Right forearm -- days              Peripheral Intravenous Line  Duration                  Peripheral IV - Single Lumen 07/1945 20 G Left Antecubital <1 day                    Physical Exam  Constitutional:       General: He is not in acute distress.     Appearance: He is well-developed. He is not diaphoretic.   HENT:      Head: Normocephalic and atraumatic.      Nose: Nose normal.   Eyes:      General: No scleral icterus.        Right eye: No discharge.      Conjunctiva/sclera: Conjunctivae normal.      Pupils: Pupils are equal, round, and reactive to light.   Neck:      Thyroid: No thyromegaly.      Vascular: No JVD.      Trachea: No tracheal deviation.   Cardiovascular:      Rate and Rhythm: Normal rate and regular rhythm.      Pulses:           Carotid pulses are 2+ on the right side and 2+ on the left side.       Radial pulses are 2+ on the right side.        Dorsalis pedis pulses are 0 on the right side and 0 on the left side.        Posterior tibial pulses are 0 on the right side and 0 on the left side.      Heart sounds: Normal heart sounds. No murmur heard.    No friction rub. No gallop.       Comments: Left foot Doppler audible signals dp, pt area.  Pulmonary:      Effort: Pulmonary effort is normal. No respiratory distress.      Breath sounds: Normal breath sounds. No stridor. No wheezing or rales.   Chest:      Chest wall: No tenderness.   Abdominal:      General: Bowel sounds are normal. There is no distension.      Palpations: Abdomen is soft. There is no mass.      Tenderness: There is no abdominal tenderness. There is no guarding or rebound.   Musculoskeletal:         General: No tenderness. Normal range of motion.      Cervical back: Normal range of motion and neck supple.   Lymphadenopathy:      Cervical: No cervical adenopathy.   Skin:     General: Skin is warm and dry.      Coloration: Skin is not pale.      Findings: No erythema or rash.   Neurological:      Mental Status: He is alert and oriented to person, place, and time.      Cranial Nerves: No cranial nerve deficit.      Coordination: Coordination normal.   Psychiatric:         Behavior: Behavior normal.         Thought Content: Thought content normal.         Judgment: Judgment normal.       Significant Labs: CMP   Recent Labs   Lab 07/01/22 1951 07/02/22  0332   * 132*   K 4.7 4.9   CL 95 95   CO2 22* 18*   GLU 77 131*   BUN 53* 53*   CREATININE 10.5* 10.4*   CALCIUM 8.4* 8.4*   PROT 5.7* 5.8*   ALBUMIN 2.5* 2.2*   BILITOT 0.6 0.5   ALKPHOS 159* 131   AST 17 17   ALT <5* <5*   ANIONGAP 18* 19*   ESTGFRAFRICA 5* 5*   EGFRNONAA 4* 4*   , CBC   Recent Labs   Lab 07/01/22 1951 07/02/22  0332   WBC 22.54* 18.33*   HGB 9.8* 9.7*   HCT 31.5* 30.4*    139*   , INR   Recent Labs   Lab 07/01/22  2019   INR 1.5*   , Lipid Panel No results for input(s): CHOL, HDL, LDLCALC, TRIG, CHOLHDL in the last 48 hours., Troponin No results for input(s): TROPONINI in the last 48 hours., and All pertinent lab results from the last 24 hours have been reviewed.    Significant Imaging:     Assessment and Plan:     Critical limb ischemia with  history of revascularization of same extremity  Repeat angiography planned for next week.  Heparin will be continued for now.    Type 2 diabetes mellitus with chronic kidney disease on chronic dialysis, with long-term current use of insulin  Condition stable.    Acute deep vein thrombosis (DVT) of popliteal vein of right lower extremity  Eliquis to continue.  Right popliteal vein DVT.  There is some edema to the right leg.  Water blebs possibly related to venous stasis.  The foot is warm.    PAD (peripheral artery disease)  Progression of ischemic toes bilaterally.  The most concerning is the right 1st toe.  To toes were for planned amputation and they clearly are nonsalvageable.  His left 5th toe has developed gangrene which is new.  It looks nonsalvageable.    Cardiac resynchronization therapy defibrillator (CRT-D) in place  No ICD shocks.  Rhythm regular.  Condition stable.    Morbid obesity  Weight loss encouraged.    Primary hypertension  He is on carvedilol.  Readings are on the low side.  He does require midodrine.        VTE Risk Mitigation (From admission, onward)         Ordered     heparin 25,000 units in dextrose 5% (100 units/ml) IV bolus from bag - ADDITIONAL PRN BOLUS - 60 units/kg  As needed (PRN)        Question:  Heparin Infusion Adjustment (DO NOT MODIFY ANSWER)  Answer:  [email or web address]\Images\Pharmacy\HeparinInfusions\heparin HIGH INTENSITY nomogram for [facility] [id number]    07/01/22 1953     heparin 25,000 units in dextrose 5% (100 units/ml) IV bolus from bag - ADDITIONAL PRN BOLUS - 30 units/kg  As needed (PRN)        Question:  Heparin Infusion Adjustment (DO NOT MODIFY ANSWER)  Answer:  \[email or web address]\Images\Pharmacy\HeparinInfusions\heparin HIGH INTENSITY nomogram for [facility] [id number]    07/01/22 1953     heparin 25,000 units in dextrose 5% 250 mL (100 units/mL) infusion HIGH INTENSITY nomogram - [facility]  Continuous        Question Answer Comment   Heparin Infusion Adjustment (DO NOT MODIFY  ANSWER) [email or web address]\Images\Pharmacy\HeparinInfusions\heparin HIGH INTENSITY nomogram for [facility] [id number]    Begin at (in units/kg/hr) 18        07/01/22 1953     IP VTE HIGH RISK PATIENT  Once         07/01/22 2014     Place sequential compression device  Until discontinued         07/01/22 2014     Reason for No Pharmacological VTE Prophylaxis  Once        Question:  Reasons:  Answer:  Already adequately anticoagulated on oral Anticoagulants    07/01/22 2014                Thank you for your consult. I will follow-up with patient. Please contact us if you have any additional questions.    Joey Ledbetter MD  Cardiology   [place] - Telemetry

## 2022-07-02 NOTE — ASSESSMENT & PLAN NOTE
-unlikely acute limb ischemia given history.  -Patient was recently discharged on 6/28/22 for clotted vascular access and was to follow up outpatient under vascular and for the gangrene of the toe with Dr. Adam  -Left 5th and 3rd toes black in color see photos above  -Unable to palpate pedal pulses bilateral   -Bilateral pitting edema +4  -continue vanc and zosyn  -continue heparin gtt  -Cardiology consulted; appreciate Recommendations for  arterial dopplers tomorrow

## 2022-07-02 NOTE — ASSESSMENT & PLAN NOTE
-Controlled, BP borderline low  -Home Meds include losartan 25 mg daily and coreg 6.25 mg bid  -hold for now  -monitor pressure

## 2022-07-02 NOTE — PLAN OF CARE
Problem: Adult Inpatient Plan of Care  Goal: Plan of Care Review  Outcome: Ongoing, Progressing    VN cued into room to complete admit assessment. VIP model introduced; VN working alongside bedside treatment team.  Plan of care reviewed with patient. Patient informed of fall risk, fall precautions, call light within reach, side rails x2 elevated. Patient notified to ask staff for assistance. Patient verbalized complete understanding. Time allowed for questions. Will continue to monitor and intervene as needed. Chart review complete.

## 2022-07-02 NOTE — PROGRESS NOTES
07/01/22 2227   Admission   Initial VN Admission Questions Complete   Communication Issues? None   Shift   Virtual Nurse - Patient Verbalized Approval Of Camera Use;VN Rounding   Safety/Activity   Patient Rounds bed in low position;visualized patient   Safety Promotion/Fall Prevention side rails raised x 2

## 2022-07-02 NOTE — ASSESSMENT & PLAN NOTE
Progression of ischemic toes bilaterally.  The most concerning is the right 1st toe.  To toes were for planned amputation and they clearly are nonsalvageable.  His left 5th toe has developed gangrene which is new.  It looks nonsalvageable.

## 2022-07-03 NOTE — PROGRESS NOTES
[facility] Medicine  Progress Note    Patient Name: Houston Jc  MRN: [id number]  Patient Class: IP- Inpatient   Admission Date: 7/1/2022  Length of Stay: 2 days  Attending Physician: Alysha Anderson*  Primary Care Provider: Zak Hamilton MD        Subjective:     Principal Problem:Gangrene        HPI:  Houston Jc is a 76-year-old male 76-year-old male with history of end-stage renal disease on hemodialysis Tuesday Thursday Saturday, coronary disease, diabetes, CHF, hypertension, and peripheral vascular disease who was referred by home health and cardiology for worsening status of lower extremity vascular disease. Patient has worsening gangrene of several toes and blister formation on bilateral feet.  He reports pain to both feet. History of multiple angioplasty. No stents. He reports blisters to the right foot for 1 week with 2 new blisters on the left foot and 2 open blisters to the right foot. Reports compliance with dialysis with last dialysis session on Tuesday. Of note, patient was recently discharged on 6/28/22 for clotted vascular access and was to follow up outpatient under vascular and for the gangrene of the toe with Dr. Adam.    In the ED: WBC 22, Hgb 9, lactate 2.9. Given 1500 ml IVF bolus. Started on vanc and zosyn, and heparin gtt. Cardiology consulted with recommendations for arterial dopplers in a.m. Admitted to [facility] for further evaluation.      Overview/Hospital Course:  No notes on file    Past Medical History:   Diagnosis Date    Anemia     CHF (congestive heart failure)     CKD (chronic kidney disease) stage 4, GFR 15-29 ml/min     Coronary artery disease     Diabetes mellitus     Hematuria     Hypertension     Osteoarthritis of spine with radiculopathy, cervical region 1/10/2022    Renal cyst, right        Past Surgical History:   Procedure Laterality Date    ANGIOGRAPHY OF ARTERIOVENOUS SHUNT Right 2/11/2022    Procedure:  Fistulogram with Possible Intervention;  Surgeon: Dejuan Cody MD;  Location: [facility] CATH LAB/EP;  Service: Cardiology;  Laterality: Right;    ANGIOGRAPHY OF LOWER EXTREMITY Right 6/20/2022    Procedure: Angiogram Extremity Unilateral;  Surgeon: Umesh Quintero MD;  Location: [facility] CATH LAB/EP;  Service: Cardiology;  Laterality: Right;    ANGIOPLASTY OF PERIPHERAL VESSEL FOR CHRONIC TOTAL OCCLUSION N/A 6/24/2022    Procedure: ANGIOPLASTY, VESSEL, PERIPHERAL, FOR CHRONIC TOTAL OCCLUSION;  Surgeon: Dejuan Cody MD;  Location: [facility] CATH LAB/EP;  Service: Cardiology;  Laterality: N/A;    AORTOGRAPHY WITH SERIALOGRAPHY Right 6/7/2022    Procedure: AORTOGRAM, WITH SERIALOGRAPHY;  Surgeon: Dejuan Cody MD;  Location: [facility] CATH LAB/EP;  Service: Cardiology;  Laterality: Right;    CARDIAC SURGERY      DECLOTTING OF ARTERIOVENOUS GRAFT Right 12/21/2021    Procedure: [id number];  Surgeon: Jeison Hunt MD;  Location: [facility] OR;  Service: Vascular;  Laterality: Right;    DECLOTTING OF ARTERIOVENOUS GRAFT Right 2/18/2022    Procedure: [id number];  Surgeon: Jeison Hunt MD;  Location: [facility] OR;  Service: Vascular;  Laterality: Right;    DECLOTTING OF ARTERIOVENOUS GRAFT Right 3/9/2022    Procedure: [id number];  Surgeon: Jeison Hunt MD;  Location: [facility] OR;  Service: Vascular;  Laterality: Right;    DECLOTTING OF ARTERIOVENOUS GRAFT Right 6/13/2022    Procedure: [id number]; REVISION OF FISTULA W/INSERTION OF NEW AV GRAFT;  Surgeon: Jeison Hunt MD;  Location: [facility] OR;  Service: Vascular;  Laterality: Right;    FISTULOGRAM N/A 2/11/2022    Procedure: Fistulogram;  Surgeon: Dejuan Cody MD;  Location: [facility] CATH LAB/EP;  Service: Cardiology;  Laterality: N/A;    FISTULOGRAM N/A 6/17/2022    Procedure: Fistulogram;  Surgeon: Dejuan Cody MD;  Location: [facility] CATH LAB/EP;  Service: Cardiology;  Laterality: N/A;    FOOT SURGERY      INSERTION OF  BIVENTRICULAR IMPLANTABLE CARDIOVERTER-DEFIBRILLATOR (ICD) Left 7/18/2019    Procedure: INSERTION, ICD, BIVENTRICULAR;  Surgeon: Arturo Bay MD;  Location: [facility] EP LAB;  Service: Cardiology;  Laterality: Left;  CHB, CRTD, SJM, anes, GP, 6078    LEFT HEART CATHETERIZATION N/A 7/16/2019    Procedure: Left heart cath;  Surgeon: Dejuan Cody MD;  Location: [facility] CATH LAB/EP;  Service: Cardiology;  Laterality: N/A;    PERCUTANEOUS TRANSLUMINAL ANGIOPLASTY OF ARTERIOVENOUS FISTULA Right 2/11/2022    Procedure: PTA, AV FISTULA;  Surgeon: Dejuan Cody MD;  Location: [facility] CATH LAB/EP;  Service: Cardiology;  Laterality: Right;    PERCUTANEOUS TRANSLUMINAL ANGIOPLASTY OF ARTERIOVENOUS FISTULA N/A 6/17/2022    Procedure: PTA, AV FISTULA;  Surgeon: Dejuan Cody MD;  Location: [facility] CATH LAB/EP;  Service: Cardiology;  Laterality: N/A;    PERITONEAL CATHETER REMOVAL Left 4/11/2020    Procedure: REMOVAL, CATHETER, DIALYSIS, PERITONEAL;  Surgeon: Edis Snell Jr., MD;  Location: [facility] OR;  Service: General;  Laterality: Left;    PHLEBOGRAPHY  6/15/2022    Procedure: Venogram;  Surgeon: Betsey Mckeon MD;  Location: [facility] CATH LAB/EP;  Service: Cardiology;;    PLACEMENT OF ARTERIOVENOUS GRAFT Right 2/18/2022    Procedure: INSERTION, GRAFT, ARTERIOVENOUS;  Surgeon: Jeison Hunt MD;  Location: [facility] OR;  Service: Vascular;  Laterality: Right;    REVISION OF PROCEDURE INVOLVING ARTERIOVENOUS GRAFT Right 2/18/2022    Procedure: REVISION, PROCEDURE INVOLVING ARTERIOVENOUS GRAFT;  Surgeon: Jeison Hunt MD;  Location: [facility] OR;  Service: Vascular;  Laterality: Right;       Review of patient's allergies indicates:  No Known Allergies    Current Facility-Administered Medications on File Prior to Encounter   Medication    midazolam injection     Current Outpatient Medications on File Prior to Encounter   Medication Sig    acetaminophen (TYLENOL) 325 MG tablet Take 1 tablet (325 mg total) by mouth every  6 (six) hours as needed for Pain.    allopurinol (ZYLOPRIM) 300 MG tablet Take 300 mg by mouth once daily.    apixaban (ELIQUIS) 5 mg Tab Take 1 tablet (5 mg total) by mouth 2 (two) times daily.    calcium acetate,phosphat bind, (PHOSLO) 667 mg tablet Take 1 tablet (667 mg total) by mouth 3 (three) times daily with meals.    carvediloL (COREG) 6.25 MG tablet Take 1 tablet (6.25 mg total) by mouth 2 (two) times daily.    cholecalciferol, vitamin D3, (VITAMIN D3) 50 mcg (2,000 unit) Cap Take 1 capsule by mouth once daily.    clopidogreL (PLAVIX) 75 mg tablet Take 75 mg by mouth once daily.    folic acid (FOLVITE) 1 MG tablet Take 1 mg by mouth.    HYDROcodone-acetaminophen (NORCO) 5-325 mg per tablet Take 1 tablet by mouth every 6 (six) hours as needed for Pain.    insulin aspart U-100 (NOVOLOG) 100 unit/mL injection Inject 4-8 Units into the skin 3 (three) times daily before meals. Per sliding scale    povidone-iodine (BETADINE) 10 % external solution Apply topically as needed for Wound Care. Apply to wound and to interdigital maceration 2x per day.    VENTOLIN HFA 90 mcg/actuation inhaler Inhale 1 puff into the lungs every 4 (four) hours as needed. As NEEDED     Family History       Problem Relation (Age of Onset)    Heart attack Father          Tobacco Use    Smoking status: Former Smoker    Smokeless tobacco: Never Used   Substance and Sexual Activity    Alcohol use: Yes     Comment: social    Drug use: No    Sexual activity: Yes     Review of Systems   Constitutional: Negative.    HENT: Negative.     Eyes: Negative.    Respiratory: Negative.     Cardiovascular: Negative.    Gastrointestinal: Negative.    Genitourinary: Negative.    Musculoskeletal: Negative.    Skin:  Positive for color change and wound.   Neurological: Negative.    Psychiatric/Behavioral: Negative.     Objective:     Vital Signs (Most Recent):  Temp: 97.9 °F (36.6 °C) (07/01/22 2337)  Pulse: 65 (07/01/22 2337)  Resp: 20 (07/01/22  2337)  BP: (!) 93/55 (07/01/22 2337)  SpO2: 100 % (07/01/22 2337)   Vital Signs (24h Range):  Temp:  [97.9 °F (36.6 °C)-99.1 °F (37.3 °C)] 97.9 °F (36.6 °C)  Pulse:  [62-76] 65  Resp:  [18-20] 20  SpO2:  [99 %-100 %] 100 %  BP: (87-95)/(49-55) 93/55     Weight: 124.5 kg (274 lb 7.6 oz)  Body mass index is 36.21 kg/m².    Physical Exam  Vitals and nursing note reviewed.   Constitutional:       General: He is not in acute distress.     Appearance: Normal appearance. He is obese. He is not ill-appearing, toxic-appearing or diaphoretic.   HENT:      Head: Normocephalic and atraumatic.      Mouth/Throat:      Mouth: Mucous membranes are dry.   Eyes:      Extraocular Movements: Extraocular movements intact.      Pupils: Pupils are equal, round, and reactive to light.   Cardiovascular:      Rate and Rhythm: Normal rate and regular rhythm.      Heart sounds: No murmur heard.     Comments: Unable to palpate pedal pulses  Pulmonary:      Effort: No respiratory distress.      Breath sounds: Normal breath sounds.   Abdominal:      General: Bowel sounds are normal.      Palpations: Abdomen is soft.   Musculoskeletal:         General: Normal range of motion.      Cervical back: Normal range of motion.      Right lower leg: Edema present.      Left lower leg: Edema present.   Skin:     Comments: See photos   Neurological:      General: No focal deficit present.      Mental Status: He is alert and oriented to person, place, and time. Mental status is at baseline.   Psychiatric:         Mood and Affect: Mood normal.         Behavior: Behavior normal.         Thought Content: Thought content normal.         Judgment: Judgment normal.                       CRANIAL NERVES     CN III, IV, VI   Pupils are equal, round, and reactive to light.     Significant Labs: All pertinent labs within the past 24 hours have been reviewed.    Significant Imaging: I have reviewed all pertinent imaging results/findings within the past 24  hours.      Assessment/Plan:      * Gangrene  Bullae  -Presented with blisters to right foot x1 week with 2 new blisters on left foot, right foot with multiple blisters with 2 open: see photos above  -WBC 22, lactate 2.9  -on vanc and zosyn  -blood cultures pending  -wound care consulted    Critical limb ischemia with history of revascularization of same extremity  -unlikely acute limb ischemia given history.  -Patient was recently discharged on 6/28/22 for clotted vascular access and was to follow up outpatient under vascular and for the gangrene of the toe with Dr. Adam  -Left 5th and 3rd toes black in color see photos above  -Unable to palpate pedal pulses bilateral   -Bilateral pitting edema +4  -continue vanc and zosyn  -continue heparin gtt  -Cardiology consulted; appreciate Recommendations for  arterial dopplers tomorrow    Anemia, chronic renal failure, stage 5  -Hgb 9  -monitor      Type 2 diabetes mellitus with chronic kidney disease on chronic dialysis, with long-term current use of insulin  Hemoglobin A1C   Date Value Ref Range Status   06/14/2022 6.9 (H) 4.0 - 5.6 % Final   -CBG is borderline low, low dose SSI, diabetic renal diet when resumed, accuchecks Q6 while NPO then ACHS      Acute deep vein thrombosis (DVT) of popliteal vein of right lower extremity  -takes eliquis at home  -hold for now for possible surgical procedure to feet      PAD (peripheral artery disease)  -Continue Lipitor  -hold BB with soft BP  -resume eliquis if patient does not need surgical procedure      Chronic combined systolic and diastolic congestive heart failure  -stable, no fluid overloading s/s on exam  -Strict electrolyte management with goal K 4-5 and Mg 2-3 (trending daily)  Patient is identified as having Combined Systolic and Diastolic heart failure that is Chronic. CHF is currently controlled. Latest ECHO performed and demonstrates- Results for orders placed during the hospital encounter of  12/21/21    Echo    Interpretation Summary  · The left ventricle is moderately enlarged with mild concentric hypertrophy and  · The quantitatively derived ejection fraction is 34%.  · Severe left atrial enlargement.  · Normal right ventricular size with normal right ventricular systolic function.  · Normal central venous pressure (3 mmHg).  · The estimated PA systolic pressure is 21 mmHg.  · No vegetations per surface echo.  -hold Beta Blocker ACE/ARB with soft BP and monitor clinical status closely. Monitor on telemetry. Patient is off CHF pathway.  Monitor strict Is&Os and daily weights.  Place on fluid restriction of 1.5 L. Continue to stress to patient importance of self efficacy and  on diet for CHF. Last BNP reviewed- and noted below No results for input(s): BNP, BNPTRIAGEBLO in the last 168 hours..    ESRD (end stage renal disease) on dialysis  -HD outpatient scheduled [facility]  -NICOLETTE BOLAÑOS noted  -Consult nephrology         Cardiac resynchronization therapy defibrillator (CRT-D) in place  -[facility] in 7/2019 with complete heart block and intermittent VT. An echo showed his EF was 30%. Amiodarone was started, a LHC showed luminal irregularities, and a [employer] CRT-D was implanted prior to discharge (RV septal lead in LV port).  -Telemetry      Complete heart block  -chronic with CRT-D in place  -telemetry      Morbid obesity  -encourage lifestyle modifications (helathy diet, exercise)         Sleep apnea  -CPAP QHS      Primary hypertension  -Controlled, BP borderline low  -Home Meds include losartan 25 mg daily and coreg 6.25 mg bid  -hold for now  -monitor pressure        VTE Risk Mitigation (From admission, onward)         Ordered     heparin 25,000 units in dextrose 5% (100 units/ml) IV bolus from bag - ADDITIONAL PRN BOLUS - 60 units/kg  As needed (PRN)        Question:  Heparin Infusion Adjustment (DO NOT MODIFY ANSWER)  Answer:  [email or web address]\epic\Images\Pharmacy\HeparinInfusions\heparin HIGH INTENSITY  nomogram for [facility] [id number].pdf    07/01/22 1953     heparin 25,000 units in dextrose 5% (100 units/ml) IV bolus from bag - ADDITIONAL PRN BOLUS - 30 units/kg  As needed (PRN)        Question:  Heparin Infusion Adjustment (DO NOT MODIFY ANSWER)  Answer:  [email or web address]\epic\Images\Pharmacy\HeparinInfusions\heparin HIGH INTENSITY nomogram for [facility] [id number].pdf    07/01/22 1953     heparin 25,000 units in dextrose 5% 250 mL (100 units/mL) infusion HIGH INTENSITY nomogram - [facility]  Continuous        Question Answer Comment   Heparin Infusion Adjustment (DO NOT MODIFY ANSWER) [email or web address]\epic\Images\Pharmacy\HeparinInfusions\heparin HIGH INTENSITY nomogram for [facility] [id number].pdf    Begin at (in units/kg/hr) 18        07/01/22 1953     IP VTE HIGH RISK PATIENT  Once         07/01/22 2014     Place sequential compression device  Until discontinued         07/01/22 2014     Reason for No Pharmacological VTE Prophylaxis  Once        Question:  Reasons:  Answer:  Already adequately anticoagulated on oral Anticoagulants    07/01/22 2014                Discharge Planning   ANDERSON:      Code Status: Full Code   Is the patient medically ready for discharge?:     Reason for patient still in hospital (select all that apply): Patient trending condition                     Alysha Anderson MD  Department of Hospital Medicine   [facility]

## 2022-07-03 NOTE — SUBJECTIVE & OBJECTIVE
Interval History:  He is a dialysis dependent patient.  He takes midodrine.  His blood pressures are stable off all BP medications.  No bleeding problems noted.  Angiogram being discussed for Tuesday.    Review of Systems   Constitutional: Negative for chills, decreased appetite, diaphoresis, fever, malaise/fatigue, night sweats, weight gain and weight loss.   HENT:  Negative for congestion, ear discharge, ear pain, hearing loss, hoarse voice, nosebleeds, odynophagia, sore throat, stridor and tinnitus.    Eyes:  Negative for blurred vision, discharge, double vision, pain, photophobia, redness, vision loss in left eye, vision loss in right eye, visual disturbance and visual halos.   Cardiovascular:  Negative for chest pain, claudication, cyanosis, dyspnea on exertion, irregular heartbeat, leg swelling, near-syncope, orthopnea, palpitations, paroxysmal nocturnal dyspnea and syncope.   Respiratory:  Negative for cough, hemoptysis, shortness of breath, sleep disturbances due to breathing, snoring, sputum production and wheezing.    Endocrine: Negative for cold intolerance, heat intolerance, polydipsia, polyphagia and polyuria.   Hematologic/Lymphatic: Negative for adenopathy and bleeding problem. Does not bruise/bleed easily.   Skin:  Positive for color change and poor wound healing. Negative for dry skin, flushing, itching, nail changes, rash, skin cancer, suspicious lesions and unusual hair distribution.   Musculoskeletal:  Negative for arthritis, back pain, falls, gout, joint pain, joint swelling, muscle cramps, muscle weakness, myalgias, neck pain and stiffness.   Gastrointestinal:  Negative for bloating, abdominal pain, anorexia, change in bowel habit, bowel incontinence, constipation, diarrhea, dysphagia, excessive appetite, flatus, heartburn, hematemesis, hematochezia, hemorrhoids, jaundice, melena, nausea and vomiting.   Genitourinary:  Negative for bladder incontinence, decreased libido, dysuria, flank pain,  frequency, genital sores, hematuria, hesitancy, incomplete emptying, nocturia and urgency.   Neurological:  Positive for weakness. Negative for aphonia, brief paralysis, difficulty with concentration, disturbances in coordination, excessive daytime sleepiness, dizziness, focal weakness, headaches, light-headedness, loss of balance, numbness, paresthesias, seizures, sensory change, tremors and vertigo.   Psychiatric/Behavioral:  Negative for altered mental status, depression, hallucinations, memory loss, substance abuse, suicidal ideas and thoughts of violence. The patient does not have insomnia and is not nervous/anxious.    Allergic/Immunologic: Negative for hives and persistent infections.   Objective:     Vital Signs (Most Recent):  Temp: 97.8 °F (36.6 °C) (07/03/22 0740)  Pulse: 62 (07/03/22 0825)  Resp: 17 (07/03/22 0825)  BP: (!) 95/54 (07/03/22 0740)  SpO2: 98 % (07/03/22 0825)   Vital Signs (24h Range):  Temp:  [96 °F (35.6 °C)-98.6 °F (37 °C)] 97.8 °F (36.6 °C)  Pulse:  [59-73] 62  Resp:  [16-20] 17  SpO2:  [97 %-100 %] 98 %  BP: ()/(49-62) 95/54     Weight: 124.5 kg (274 lb 7.6 oz)  Body mass index is 36.21 kg/m².     SpO2: 98 %  O2 Device (Oxygen Therapy): room air      Intake/Output Summary (Last 24 hours) at 7/3/2022 0914  Last data filed at 7/2/2022 1920  Gross per 24 hour   Intake 120 ml   Output 500 ml   Net -380 ml       Lines/Drains/Airways       Central Venous Catheter Line  Duration                  Hemodialysis AV Graft Right forearm -- days              Peripheral Intravenous Line  Duration                  Peripheral IV - Single Lumen 07/1945 20 G Left Antecubital 1 day                    Physical Exam  Constitutional:       General: He is not in acute distress.     Appearance: He is well-developed. He is not diaphoretic.   HENT:      Head: Normocephalic and atraumatic.      Nose: Nose normal.   Eyes:      General: No scleral icterus.        Right eye: No discharge.       Conjunctiva/sclera: Conjunctivae normal.      Pupils: Pupils are equal, round, and reactive to light.   Neck:      Thyroid: No thyromegaly.      Vascular: No JVD.      Trachea: No tracheal deviation.   Cardiovascular:      Rate and Rhythm: Normal rate and regular rhythm.      Pulses:           Carotid pulses are 2+ on the right side and 2+ on the left side.       Radial pulses are 2+ on the right side.        Dorsalis pedis pulses are 0 on the right side and 0 on the left side.        Posterior tibial pulses are 0 on the right side and 0 on the left side.      Heart sounds: Normal heart sounds. No murmur heard.    No friction rub. No gallop.      Comments: Left foot Doppler audible signals dp, pt area.  Pulmonary:      Effort: Pulmonary effort is normal. No respiratory distress.      Breath sounds: Normal breath sounds. No stridor. No wheezing or rales.   Chest:      Chest wall: No tenderness.   Abdominal:      General: Bowel sounds are normal. There is no distension.      Palpations: Abdomen is soft. There is no mass.      Tenderness: There is no abdominal tenderness. There is no guarding or rebound.   Musculoskeletal:         General: No tenderness. Normal range of motion.      Cervical back: Normal range of motion and neck supple.   Lymphadenopathy:      Cervical: No cervical adenopathy.   Skin:     General: Skin is warm and dry.      Coloration: Skin is not pale.      Findings: No erythema or rash.   Neurological:      Mental Status: He is alert and oriented to person, place, and time.      Cranial Nerves: No cranial nerve deficit.      Coordination: Coordination normal.   Psychiatric:         Behavior: Behavior normal.         Thought Content: Thought content normal.         Judgment: Judgment normal.       Significant Labs: BMP:   Recent Labs   Lab 07/01/22 1951 07/02/22  0332   GLU 77 131*   * 132*   K 4.7 4.9   CL 95 95   CO2 22* 18*   BUN 53* 53*   CREATININE 10.5* 10.4*   CALCIUM 8.4* 8.4*   MG  --   1.7   , CBC   Recent Labs   Lab 07/01/22  1951 07/02/22  0332 07/03/22  0356   WBC 22.54* 18.33* 13.70*   HGB 9.8* 9.7* 9.1*   HCT 31.5* 30.4* 29.1*    139* 138*   , and All pertinent lab results from the last 24 hours have been reviewed.    Significant Imaging:

## 2022-07-03 NOTE — ASSESSMENT & PLAN NOTE
Eliquis on hold. heparin to continue.  Recent right popliteal vein DVT documented.  There is some edema to the right leg.  Water blebs possibly related to venous stasis.  The foot is warm.

## 2022-07-03 NOTE — HOSPITAL COURSE
7/3/2022 He is lying supine.  He follows commands and response to questions.  His right foot pain is about the same.  He has minimal left foot pain compared to the right.  Plans are being made for angiography left leg possibly Tuesday.  He is in no distress.  He is on antibiotics.  Aspirin was added, he is on clopidogrel and heparin.  Doppler signals right foot have been confirmed (recent DVA).  7/4/2022  He has dialysis Tuesdays Thursdays and Saturdays.  He has no fevers.  His foot pain is about the same.  He is on heparin.  No bleeding problems noted.  He is awake and alert.  He has a defibrillator.  His ejection fraction is 35%.  There is history of ventricular tachycardia.  Telemetry does show multiple runs of ventricular tachycardia unsustained without defibrillator shocks delivered.  7/5/2022 NPO for LE angiogram  7/6/2022 LE angiogram yesterday with following results:         PTA of prox and mid AT with 3.0 x 150 mm balloon  Sub-optimal PTA of distal AT with 2.0 x 100 and 2.0 x 40 mm balloons  Residual distal AT 99% stenosis  Plan:  Resume plavix and heparin gtt  Resume eliquis in am   Review angiogram and evaluate for DVA  Evaluate for HBO    Tolerated procedure well. Pain unchanged to left foot post revascularization. SubQ ooze noted to left DP site with no hematoma or ecchymosis- DStat applied. HR and BP stable. On IV heparin drip with plans to transition to Eliquis today

## 2022-07-03 NOTE — ASSESSMENT & PLAN NOTE
Bullae  -Presented with blisters to right foot x1 week with 2 new blisters on left foot, right foot with multiple blisters with 2 open: see photos above  -WBC 22, lactate 2.9  -on vanc and zosyn  -blood cultures with NGTD continue Vanc / Zosyn  -wound care consulted  Consult cardiology-  rec's continue heparin and plan repeat angiogram on Tuesday.

## 2022-07-03 NOTE — SUBJECTIVE & OBJECTIVE
Interval History: awake and alert, report foot pain- continue pain meds  Appreciates cardiology rec's- continue heparin and plan repeat angiogram on Tuesday.   Wbc elevated  and blood cx with NGTD continue Vanc / Zosyn    Review of Systems   Constitutional:  Negative for activity change and appetite change.   Skin:  Positive for color change and wound.   Neurological:  Negative for dizziness.   Objective:     Vital Signs (Most Recent):  Temp: 97.6 °F (36.4 °C) (07/03/22 1112)  Pulse: 60 (07/03/22 1112)  Resp: 18 (07/03/22 1112)  BP: (!) 102/55 (07/03/22 1112)  SpO2: 100 % (07/03/22 1112)   Vital Signs (24h Range):  Temp:  [96 °F (35.6 °C)-98.6 °F (37 °C)] 97.6 °F (36.4 °C)  Pulse:  [59-63] 60  Resp:  [16-20] 18  SpO2:  [97 %-100 %] 100 %  BP: ()/(49-62) 102/55     Weight: 124.5 kg (274 lb 7.6 oz)  Body mass index is 36.21 kg/m².    Intake/Output Summary (Last 24 hours) at 7/3/2022 1309  Last data filed at 7/2/2022 1920  Gross per 24 hour   Intake 120 ml   Output 500 ml   Net -380 ml      Physical Exam  Vitals and nursing note reviewed.   Constitutional:       General: He is not in acute distress.     Appearance: Normal appearance. He is obese. He is not ill-appearing, toxic-appearing or diaphoretic.   HENT:      Head: Normocephalic and atraumatic.      Mouth/Throat:      Mouth: Mucous membranes are dry.   Cardiovascular:      Rate and Rhythm: Normal rate and regular rhythm.      Heart sounds: No murmur heard.     Comments: Unable to palpate pedal pulses  Pulmonary:      Effort: No respiratory distress.      Breath sounds: Normal breath sounds.   Abdominal:      General: Bowel sounds are normal.      Palpations: Abdomen is soft.   Musculoskeletal:         General: Normal range of motion.      Cervical back: Normal range of motion.      Right lower leg: Edema present.      Left lower leg: Edema present.   Skin:     Comments: See photos   Neurological:      General: No focal deficit present.      Mental Status:  He is alert and oriented to person, place, and time. Mental status is at baseline.   Psychiatric:         Mood and Affect: Mood normal.         Behavior: Behavior normal.         Thought Content: Thought content normal.         Judgment: Judgment normal.       Significant Labs: A1C:   Recent Labs   Lab 06/14/22  1007   HGBA1C 6.9*     ABGs: No results for input(s): PH, PCO2, HCO3, POCSATURATED, BE, TOTALHB, COHB, METHB, O2HB, POCFIO2, PO2 in the last 48 hours.  Bilirubin:   Recent Labs   Lab 06/13/22  1125 07/01/22 1951 07/02/22 0332 07/03/22  0355   BILITOT 0.2 0.6 0.5 0.6     CBC:   Recent Labs   Lab 07/01/22 1951 07/02/22 0332 07/03/22  0356   WBC 22.54* 18.33* 13.70*   HGB 9.8* 9.7* 9.1*   HCT 31.5* 30.4* 29.1*    139* 138*     CMP:   Recent Labs   Lab 07/01/22 1951 07/02/22 0332 07/03/22  0355   * 132* 134*   K 4.7 4.9 4.4   CL 95 95 96   CO2 22* 18* 23   GLU 77 131* 113*   BUN 53* 53* 37*   CREATININE 10.5* 10.4* 8.1*   CALCIUM 8.4* 8.4* 8.5*   PROT 5.7* 5.8* 5.8*   ALBUMIN 2.5* 2.2* 2.1*   BILITOT 0.6 0.5 0.6   ALKPHOS 159* 131 134   AST 17 17 13   ALT <5* <5* <5*   ANIONGAP 18* 19* 15   EGFRNONAA 4* 4* 6*     Lactic Acid:   Recent Labs   Lab 07/01/22  2239   LACTATE 2.9*     Lipase: No results for input(s): LIPASE in the last 48 hours.  Lipid Panel: No results for input(s): CHOL, HDL, LDLCALC, TRIG, CHOLHDL in the last 48 hours.  Magnesium:   Recent Labs   Lab 07/02/22 0332 07/03/22  0355   MG 1.7 1.6     Troponin: No results for input(s): TROPONINI in the last 48 hours.  TSH: No results for input(s): TSH in the last 4320 hours.  Urine Culture: No results for input(s): LABURIN in the last 48 hours.  Urine Studies: No results for input(s): COLORU, APPEARANCEUA, PHUR, SPECGRAV, PROTEINUA, GLUCUA, KETONESU, BILIRUBINUA, OCCULTUA, NITRITE, UROBILINOGEN, LEUKOCYTESUR, RBCUA, WBCUA, BACTERIA, SQUAMEPITHEL, HYALINECASTS in the last 48 hours.    Invalid input(s): WRIGHTSUR    Significant Imaging:  I have reviewed all pertinent imaging results/findings within the past 24 hours.

## 2022-07-03 NOTE — ASSESSMENT & PLAN NOTE
Ejection fraction 35%.  He has previous resynchronization therapy.  Nonischemic cardiomyopathy.

## 2022-07-03 NOTE — SUBJECTIVE & OBJECTIVE
Past Medical History:   Diagnosis Date    Anemia     CHF (congestive heart failure)     CKD (chronic kidney disease) stage 4, GFR 15-29 ml/min     Coronary artery disease     Diabetes mellitus     Hematuria     Hypertension     Osteoarthritis of spine with radiculopathy, cervical region 1/10/2022    Renal cyst, right        Past Surgical History:   Procedure Laterality Date    ANGIOGRAPHY OF ARTERIOVENOUS SHUNT Right 2/11/2022    Procedure: Fistulogram with Possible Intervention;  Surgeon: Dejuan Cody MD;  Location: [facility] CATH LAB/EP;  Service: Cardiology;  Laterality: Right;    ANGIOGRAPHY OF LOWER EXTREMITY Right 6/20/2022    Procedure: Angiogram Extremity Unilateral;  Surgeon: Umesh Quintero MD;  Location: [facility] CATH LAB/EP;  Service: Cardiology;  Laterality: Right;    ANGIOPLASTY OF PERIPHERAL VESSEL FOR CHRONIC TOTAL OCCLUSION N/A 6/24/2022    Procedure: ANGIOPLASTY, VESSEL, PERIPHERAL, FOR CHRONIC TOTAL OCCLUSION;  Surgeon: Dejuan Cody MD;  Location: [facility] CATH LAB/EP;  Service: Cardiology;  Laterality: N/A;    AORTOGRAPHY WITH SERIALOGRAPHY Right 6/7/2022    Procedure: AORTOGRAM, WITH SERIALOGRAPHY;  Surgeon: Dejuan Cody MD;  Location: [facility] CATH LAB/EP;  Service: Cardiology;  Laterality: Right;    CARDIAC SURGERY      DECLOTTING OF ARTERIOVENOUS GRAFT Right 12/21/2021    Procedure: [id number];  Surgeon: Jeison Hunt MD;  Location: [facility] OR;  Service: Vascular;  Laterality: Right;    DECLOTTING OF ARTERIOVENOUS GRAFT Right 2/18/2022    Procedure: [id number];  Surgeon: Jeison Hunt MD;  Location: [facility] OR;  Service: Vascular;  Laterality: Right;    DECLOTTING OF ARTERIOVENOUS GRAFT Right 3/9/2022    Procedure: [id number];  Surgeon: Jeison Hunt MD;  Location: [facility] OR;  Service: Vascular;  Laterality: Right;    DECLOTTING OF ARTERIOVENOUS GRAFT Right 6/13/2022    Procedure: [id number]; REVISION OF FISTULA W/INSERTION OF NEW AV GRAFT;  Surgeon:  Jeison Hunt MD;  Location: [facility] OR;  Service: Vascular;  Laterality: Right;    FISTULOGRAM N/A 2/11/2022    Procedure: Fistulogram;  Surgeon: Dejuan Cody MD;  Location: [facility] CATH LAB/EP;  Service: Cardiology;  Laterality: N/A;    FISTULOGRAM N/A 6/17/2022    Procedure: Fistulogram;  Surgeon: eDjuan Cody MD;  Location: [facility] CATH LAB/EP;  Service: Cardiology;  Laterality: N/A;    FOOT SURGERY      INSERTION OF BIVENTRICULAR IMPLANTABLE CARDIOVERTER-DEFIBRILLATOR (ICD) Left 7/18/2019    Procedure: INSERTION, ICD, BIVENTRICULAR;  Surgeon: Arturo Bay MD;  Location: [facility] EP LAB;  Service: Cardiology;  Laterality: Left;  CHB, CRTD, SJM, anes, GP, 6078    LEFT HEART CATHETERIZATION N/A 7/16/2019    Procedure: Left heart cath;  Surgeon: Dejuan Cody MD;  Location: [facility] CATH LAB/EP;  Service: Cardiology;  Laterality: N/A;    PERCUTANEOUS TRANSLUMINAL ANGIOPLASTY OF ARTERIOVENOUS FISTULA Right 2/11/2022    Procedure: PTA, AV FISTULA;  Surgeon: Dejuan Cody MD;  Location: [facility] CATH LAB/EP;  Service: Cardiology;  Laterality: Right;    PERCUTANEOUS TRANSLUMINAL ANGIOPLASTY OF ARTERIOVENOUS FISTULA N/A 6/17/2022    Procedure: PTA, AV FISTULA;  Surgeon: Dejuan Cody MD;  Location: [facility] CATH LAB/EP;  Service: Cardiology;  Laterality: N/A;    PERITONEAL CATHETER REMOVAL Left 4/11/2020    Procedure: REMOVAL, CATHETER, DIALYSIS, PERITONEAL;  Surgeon: Edis Snell Jr., MD;  Location: [facility] OR;  Service: General;  Laterality: Left;    PHLEBOGRAPHY  6/15/2022    Procedure: Venogram;  Surgeon: Betsey Mckeon MD;  Location: [facility] CATH LAB/EP;  Service: Cardiology;;    PLACEMENT OF ARTERIOVENOUS GRAFT Right 2/18/2022    Procedure: INSERTION, GRAFT, ARTERIOVENOUS;  Surgeon: Jeison Hunt MD;  Location: [facility] OR;  Service: Vascular;  Laterality: Right;    REVISION OF PROCEDURE INVOLVING ARTERIOVENOUS GRAFT Right 2/18/2022    Procedure: REVISION, PROCEDURE INVOLVING ARTERIOVENOUS GRAFT;  Surgeon:  Jeison Hunt MD;  Location: [facility] OR;  Service: Vascular;  Laterality: Right;       Review of patient's allergies indicates:  No Known Allergies    Medications:  Medications Prior to Admission   Medication Sig    acetaminophen (TYLENOL) 325 MG tablet Take 1 tablet (325 mg total) by mouth every 6 (six) hours as needed for Pain.    allopurinol (ZYLOPRIM) 300 MG tablet Take 300 mg by mouth once daily.    apixaban (ELIQUIS) 5 mg Tab Take 1 tablet (5 mg total) by mouth 2 (two) times daily.    calcium acetate,phosphat bind, (PHOSLO) 667 mg tablet Take 1 tablet (667 mg total) by mouth 3 (three) times daily with meals.    carvediloL (COREG) 6.25 MG tablet Take 1 tablet (6.25 mg total) by mouth 2 (two) times daily.    cholecalciferol, vitamin D3, (VITAMIN D3) 50 mcg (2,000 unit) Cap Take 1 capsule by mouth once daily.    clopidogreL (PLAVIX) 75 mg tablet Take 75 mg by mouth once daily.    folic acid (FOLVITE) 1 MG tablet Take 1 mg by mouth.    HYDROcodone-acetaminophen (NORCO) 5-325 mg per tablet Take 1 tablet by mouth every 6 (six) hours as needed for Pain.    insulin aspart U-100 (NOVOLOG) 100 unit/mL injection Inject 4-8 Units into the skin 3 (three) times daily before meals. Per sliding scale    povidone-iodine (BETADINE) 10 % external solution Apply topically as needed for Wound Care. Apply to wound and to interdigital maceration 2x per day.    VENTOLIN HFA 90 mcg/actuation inhaler Inhale 1 puff into the lungs every 4 (four) hours as needed. As NEEDED     Antibiotics (From admission, onward)                Start     Stop Route Frequency Ordered    07/03/22 1700  vancomycin 500 mg in dextrose 5 % 100 mL IVPB (ready to mix system)         -- IV Once 07/03/22 0622    07/03/22 0900  mupirocin 2 % ointment         07/08 0859 Nasl 2 times daily 07/03/22 0623    07/02/22 0800  piperacillin-tazobactam 4.5 g in dextrose 5 % 100 mL IVPB (ready to mix system)         -- IV Every 12 hours (non-standard times) 07/01/22 2014  "   07/01/22 2113  vancomycin - pharmacy to dose  (vancomycin IVPB)        "And" Linked Group Details    -- IV pharmacy to manage frequency 07/01/22 2014          Antifungals (From admission, onward)                None          Antivirals (From admission, onward)      None             Immunization History   Administered Date(s) Administered    Influenza - High Dose - PF (65 years and older) 03/10/2020       Family History       Problem Relation (Age of Onset)    Heart attack Father          Social History     Socioeconomic History    Marital status:    Tobacco Use    Smoking status: Former Smoker    Smokeless tobacco: Never Used   Substance and Sexual Activity    Alcohol use: Yes     Comment: social    Drug use: No    Sexual activity: Yes     Social Determinants of Health     Financial Resource Strain: Low Risk     Difficulty of Paying Living Expenses: Not very hard   Food Insecurity: No Food Insecurity    Worried About Running Out of Food in the Last Year: Never true    Ran Out of Food in the Last Year: Never true   Transportation Needs: No Transportation Needs    Lack of Transportation (Medical): No    Lack of Transportation (Non-Medical): No   Stress: No Stress Concern Present    Feeling of Stress : Not at all   Social Connections: Unknown    Marital Status:    Housing Stability: Low Risk     Unable to Pay for Housing in the Last Year: No    Number of Places Lived in the Last Year: 1    Unstable Housing in the Last Year: No     Review of Systems   Constitutional:  Negative for activity change and appetite change.   Skin:  Positive for color change and wound.   Neurological:  Negative for dizziness.   Objective:     Vital Signs (Most Recent):  Temp: 97.6 °F (36.4 °C) (07/03/22 1112)  Pulse: 60 (07/03/22 1112)  Resp: 18 (07/03/22 1112)  BP: (!) 102/55 (07/03/22 1112)  SpO2: 100 % (07/03/22 1112)   Vital Signs (24h Range):  Temp:  [96 °F (35.6 °C)-98.6 °F (37 °C)] 97.6 °F (36.4 °C)  Pulse:  [59-63] " 60  Resp:  [16-20] 18  SpO2:  [97 %-100 %] 100 %  BP: ()/(49-62) 102/55     Weight: 124.5 kg (274 lb 7.6 oz)  Body mass index is 36.21 kg/m².    Estimated Creatinine Clearance: 11.1 mL/min (A) (based on SCr of 8.1 mg/dL (H)).    Physical Exam  Vitals and nursing note reviewed.   Constitutional:       General: He is not in acute distress.     Appearance: Normal appearance. He is obese. He is not ill-appearing, toxic-appearing or diaphoretic.   HENT:      Head: Normocephalic and atraumatic.      Mouth/Throat:      Mouth: Mucous membranes are dry.   Cardiovascular:      Rate and Rhythm: Normal rate and regular rhythm.      Heart sounds: No murmur heard.     Comments: Unable to palpate pedal pulses  Pulmonary:      Effort: No respiratory distress.      Breath sounds: Normal breath sounds.   Abdominal:      General: Bowel sounds are normal.      Palpations: Abdomen is soft.   Musculoskeletal:         General: Normal range of motion.      Cervical back: Normal range of motion.      Right lower leg: Edema present.      Left lower leg: Edema present.   Skin:     Comments: See photos   Neurological:      General: No focal deficit present.      Mental Status: He is alert and oriented to person, place, and time. Mental status is at baseline.   Psychiatric:         Mood and Affect: Mood normal.         Behavior: Behavior normal.         Thought Content: Thought content normal.         Judgment: Judgment normal.       Significant Labs: All pertinent labs within the past 24 hours have been reviewed.    Significant Imaging: I have reviewed all pertinent imaging results/findings within the past 24 hours.

## 2022-07-03 NOTE — NURSING
Report given by Toña NORWOOD , care assumed. Pt awake in bed respiration unlabored on room air , tele-monitor on ,patent IV with heparin drip @ 9.5ml/hr ,and no signs or symptoms of distress . Pt board has been updated with RN information and plan for today . Pt has no questions or concerns at this time. Fall/safety precautions in place.

## 2022-07-03 NOTE — ASSESSMENT & PLAN NOTE
Repeat angiography planned for next week.  Heparin will be continued for now.  I do not see any significant change in leg findings today.

## 2022-07-03 NOTE — CONSULTS
[facility]  Infectious Disease  Consult Note    Patient Name: Houston Jc  MRN: [id number]  Admission Date: 7/1/2022  Hospital Length of Stay: 2 days  Attending Physician: Alysha Anderson*  Primary Care Provider: Zak Hamilton MD     Isolation Status: No active isolations    Patient information was obtained from patient, spouse/SO and past medical records.      Inpatient consult to Infectious Diseases  Consult performed by: Wallace Bradshaw MD  Consult ordered by: Alysha Anderson MD  Reason for consult: gangrene         Assessment/Plan:     * Gangrene  76-year-old male 76-year-old male with history of end-stage renal disease on hemodialysis Tuesday Thursday Saturday, coronary disease, diabetes, CHF, hypertension, and peripheral vascular disease who was referred by home health and cardiology for worsening status of lower extremity vascular disease. Patient has worsening gangrene of several toes and blister formation on bilateral feet.     -await podiatry plan for worsening toe gangrene  -was planned for TMA  -given elevated WBC would continue vanco and zosyn for now but would be able to stop depending on surgical plan        Thank you for your consult. I will follow-up with patient. Please contact us if you have any additional questions.    Wallace Bradshaw MD  Infectious Disease  [place] [facility]    Subjective:     Principal Problem: Gangrene    HPI: 76-year-old male with history of end-stage renal disease on hemodialysis Tuesday Thursday Saturday, coronary disease, diabetes, CHF, hypertension, and peripheral vascular disease who was referred by home health and cardiology for worsening status of lower extremity vascular disease. Patient has worsening gangrene of several toes and blister formation on bilateral feet.  He reports pain to both feet. History of multiple angioplasty. No stents. He reports blisters to the right foot for 1 week with 2 new blisters on the left foot and 2 open  blisters to the right foot. Reports compliance with dialysis with last dialysis session on Tuesday.          Past Medical History:   Diagnosis Date    Anemia     CHF (congestive heart failure)     CKD (chronic kidney disease) stage 4, GFR 15-29 ml/min     Coronary artery disease     Diabetes mellitus     Hematuria     Hypertension     Osteoarthritis of spine with radiculopathy, cervical region 1/10/2022    Renal cyst, right        Past Surgical History:   Procedure Laterality Date    ANGIOGRAPHY OF ARTERIOVENOUS SHUNT Right 2/11/2022    Procedure: Fistulogram with Possible Intervention;  Surgeon: Dejuan Cody MD;  Location: [facility] CATH LAB/EP;  Service: Cardiology;  Laterality: Right;    ANGIOGRAPHY OF LOWER EXTREMITY Right 6/20/2022    Procedure: Angiogram Extremity Unilateral;  Surgeon: Umesh Quintero MD;  Location: [facility] CATH LAB/EP;  Service: Cardiology;  Laterality: Right;    ANGIOPLASTY OF PERIPHERAL VESSEL FOR CHRONIC TOTAL OCCLUSION N/A 6/24/2022    Procedure: ANGIOPLASTY, VESSEL, PERIPHERAL, FOR CHRONIC TOTAL OCCLUSION;  Surgeon: Dejuan Cody MD;  Location: [facility] CATH LAB/EP;  Service: Cardiology;  Laterality: N/A;    AORTOGRAPHY WITH SERIALOGRAPHY Right 6/7/2022    Procedure: AORTOGRAM, WITH SERIALOGRAPHY;  Surgeon: Dejuan Cody MD;  Location: [facility] CATH LAB/EP;  Service: Cardiology;  Laterality: Right;    CARDIAC SURGERY      DECLOTTING OF ARTERIOVENOUS GRAFT Right 12/21/2021    Procedure: [id number];  Surgeon: Jeison Hunt MD;  Location: [facility] OR;  Service: Vascular;  Laterality: Right;    DECLOTTING OF ARTERIOVENOUS GRAFT Right 2/18/2022    Procedure: [id number];  Surgeon: Jeison Hunt MD;  Location: [facility] OR;  Service: Vascular;  Laterality: Right;    DECLOTTING OF ARTERIOVENOUS GRAFT Right 3/9/2022    Procedure: [id number];  Surgeon: Jeison Hunt MD;  Location: [facility] OR;  Service: Vascular;  Laterality: Right;    DECLOTTING OF  ARTERIOVENOUS GRAFT Right 6/13/2022    Procedure: [id number]; REVISION OF FISTULA W/INSERTION OF NEW AV GRAFT;  Surgeon: Jeison Hunt MD;  Location: [facility] OR;  Service: Vascular;  Laterality: Right;    FISTULOGRAM N/A 2/11/2022    Procedure: Fistulogram;  Surgeon: Dejuan Cody MD;  Location: [facility] CATH LAB/EP;  Service: Cardiology;  Laterality: N/A;    FISTULOGRAM N/A 6/17/2022    Procedure: Fistulogram;  Surgeon: Dejuan Cody MD;  Location: [facility] CATH LAB/EP;  Service: Cardiology;  Laterality: N/A;    FOOT SURGERY      INSERTION OF BIVENTRICULAR IMPLANTABLE CARDIOVERTER-DEFIBRILLATOR (ICD) Left 7/18/2019    Procedure: INSERTION, ICD, BIVENTRICULAR;  Surgeon: Arturo Bay MD;  Location: [facility] EP LAB;  Service: Cardiology;  Laterality: Left;  CHB, CRTD, SJM, anes, GP, 6078    LEFT HEART CATHETERIZATION N/A 7/16/2019    Procedure: Left heart cath;  Surgeon: Dejuan Cody MD;  Location: [facility] CATH LAB/EP;  Service: Cardiology;  Laterality: N/A;    PERCUTANEOUS TRANSLUMINAL ANGIOPLASTY OF ARTERIOVENOUS FISTULA Right 2/11/2022    Procedure: PTA, AV FISTULA;  Surgeon: Dejuan Cody MD;  Location: [facility] CATH LAB/EP;  Service: Cardiology;  Laterality: Right;    PERCUTANEOUS TRANSLUMINAL ANGIOPLASTY OF ARTERIOVENOUS FISTULA N/A 6/17/2022    Procedure: PTA, AV FISTULA;  Surgeon: Dejuan Cody MD;  Location: [facility] CATH LAB/EP;  Service: Cardiology;  Laterality: N/A;    PERITONEAL CATHETER REMOVAL Left 4/11/2020    Procedure: REMOVAL, CATHETER, DIALYSIS, PERITONEAL;  Surgeon: Edis Snell Jr., MD;  Location: [facility] OR;  Service: General;  Laterality: Left;    PHLEBOGRAPHY  6/15/2022    Procedure: Venogram;  Surgeon: Betsey Mckeon MD;  Location: [facility] CATH LAB/EP;  Service: Cardiology;;    PLACEMENT OF ARTERIOVENOUS GRAFT Right 2/18/2022    Procedure: INSERTION, GRAFT, ARTERIOVENOUS;  Surgeon: Jeison Hunt MD;  Location: [facility] OR;  Service: Vascular;  Laterality: Right;     REVISION OF PROCEDURE INVOLVING ARTERIOVENOUS GRAFT Right 2/18/2022    Procedure: REVISION, PROCEDURE INVOLVING ARTERIOVENOUS GRAFT;  Surgeon: Jeison Hunt MD;  Location: [facility];  Service: Vascular;  Laterality: Right;       Review of patient's allergies indicates:  No Known Allergies    Medications:  Medications Prior to Admission   Medication Sig    acetaminophen (TYLENOL) 325 MG tablet Take 1 tablet (325 mg total) by mouth every 6 (six) hours as needed for Pain.    allopurinol (ZYLOPRIM) 300 MG tablet Take 300 mg by mouth once daily.    apixaban (ELIQUIS) 5 mg Tab Take 1 tablet (5 mg total) by mouth 2 (two) times daily.    calcium acetate,phosphat bind, (PHOSLO) 667 mg tablet Take 1 tablet (667 mg total) by mouth 3 (three) times daily with meals.    carvediloL (COREG) 6.25 MG tablet Take 1 tablet (6.25 mg total) by mouth 2 (two) times daily.    cholecalciferol, vitamin D3, (VITAMIN D3) 50 mcg (2,000 unit) Cap Take 1 capsule by mouth once daily.    clopidogreL (PLAVIX) 75 mg tablet Take 75 mg by mouth once daily.    folic acid (FOLVITE) 1 MG tablet Take 1 mg by mouth.    HYDROcodone-acetaminophen (NORCO) 5-325 mg per tablet Take 1 tablet by mouth every 6 (six) hours as needed for Pain.    insulin aspart U-100 (NOVOLOG) 100 unit/mL injection Inject 4-8 Units into the skin 3 (three) times daily before meals. Per sliding scale    povidone-iodine (BETADINE) 10 % external solution Apply topically as needed for Wound Care. Apply to wound and to interdigital maceration 2x per day.    VENTOLIN HFA 90 mcg/actuation inhaler Inhale 1 puff into the lungs every 4 (four) hours as needed. As NEEDED     Antibiotics (From admission, onward)                Start     Stop Route Frequency Ordered    07/03/22 1700  vancomycin 500 mg in dextrose 5 % 100 mL IVPB (ready to mix system)         -- IV Once 07/03/22 0622 07/03/22 0900  mupirocin 2 % ointment         07/08 0859 Nasl 2 times daily 07/03/22 0623     "07/02/22 0800  piperacillin-tazobactam 4.5 g in dextrose 5 % 100 mL IVPB (ready to mix system)         -- IV Every 12 hours (non-standard times) 07/01/22 2014 07/01/22 2113  vancomycin - pharmacy to dose  (vancomycin IVPB)        "And" Linked Group Details    -- IV pharmacy to manage frequency 07/01/22 2014          Antifungals (From admission, onward)                None          Antivirals (From admission, onward)      None             Immunization History   Administered Date(s) Administered    Influenza - High Dose - PF (65 years and older) 03/10/2020       Family History       Problem Relation (Age of Onset)    Heart attack Father          Social History     Socioeconomic History    Marital status:    Tobacco Use    Smoking status: Former Smoker    Smokeless tobacco: Never Used   Substance and Sexual Activity    Alcohol use: Yes     Comment: social    Drug use: No    Sexual activity: Yes     Social Determinants of Health     Financial Resource Strain: Low Risk     Difficulty of Paying Living Expenses: Not very hard   Food Insecurity: No Food Insecurity    Worried About Running Out of Food in the Last Year: Never true    Ran Out of Food in the Last Year: Never true   Transportation Needs: No Transportation Needs    Lack of Transportation (Medical): No    Lack of Transportation (Non-Medical): No   Stress: No Stress Concern Present    Feeling of Stress : Not at all   Social Connections: Unknown    Marital Status:    Housing Stability: Low Risk     Unable to Pay for Housing in the Last Year: No    Number of Places Lived in the Last Year: 1    Unstable Housing in the Last Year: No     Review of Systems   Constitutional:  Negative for activity change and appetite change.   Skin:  Positive for color change and wound.   Neurological:  Negative for dizziness.   Objective:     Vital Signs (Most Recent):  Temp: 97.6 °F (36.4 °C) (07/03/22 1112)  Pulse: 60 (07/03/22 1112)  Resp: 18 " (07/03/22 1112)  BP: (!) 102/55 (07/03/22 1112)  SpO2: 100 % (07/03/22 1112)   Vital Signs (24h Range):  Temp:  [96 °F (35.6 °C)-98.6 °F (37 °C)] 97.6 °F (36.4 °C)  Pulse:  [59-63] 60  Resp:  [16-20] 18  SpO2:  [97 %-100 %] 100 %  BP: ()/(49-62) 102/55     Weight: 124.5 kg (274 lb 7.6 oz)  Body mass index is 36.21 kg/m².    Estimated Creatinine Clearance: 11.1 mL/min (A) (based on SCr of 8.1 mg/dL (H)).    Physical Exam  Vitals and nursing note reviewed.   Constitutional:       General: He is not in acute distress.     Appearance: Normal appearance. He is obese. He is not ill-appearing, toxic-appearing or diaphoretic.   HENT:      Head: Normocephalic and atraumatic.      Mouth/Throat:      Mouth: Mucous membranes are dry.   Cardiovascular:      Rate and Rhythm: Normal rate and regular rhythm.      Heart sounds: No murmur heard.     Comments: Unable to palpate pedal pulses  Pulmonary:      Effort: No respiratory distress.      Breath sounds: Normal breath sounds.   Abdominal:      General: Bowel sounds are normal.      Palpations: Abdomen is soft.   Musculoskeletal:         General: Normal range of motion.      Cervical back: Normal range of motion.      Right lower leg: Edema present.      Left lower leg: Edema present.   Skin:     Comments: See photos   Neurological:      General: No focal deficit present.      Mental Status: He is alert and oriented to person, place, and time. Mental status is at baseline.   Psychiatric:         Mood and Affect: Mood normal.         Behavior: Behavior normal.         Thought Content: Thought content normal.         Judgment: Judgment normal.       Significant Labs: All pertinent labs within the past 24 hours have been reviewed.    Significant Imaging: I have reviewed all pertinent imaging results/findings within the past 24 hours.

## 2022-07-03 NOTE — PROGRESS NOTES
[facility]  Cardiology  Progress Note    Patient Name: Houston Jc  MRN: [id number]  Admission Date: 7/1/2022  Hospital Length of Stay: 2 days  Code Status: Full Code   Attending Physician: Alysha Anderson*   Primary Care Physician: Zak Hamilton MD  Expected Discharge Date:   Principal Problem:Gangrene    Subjective:     Hospital Course:   7/3/2022 He is lying supine.  He follows commands and response to questions.  His right foot pain is about the same.  He has minimal left foot pain compared to the right.  Plans are being made for angiography left leg possibly Tuesday.  He is in no distress.  He is on antibiotics.  Aspirin was added, he is on clopidogrel and heparin.  Doppler signals right foot have been confirmed (recent DVA).      Interval History:  He is a dialysis dependent patient.  He takes midodrine.  His blood pressures are stable off all BP medications.  No bleeding problems noted.  Angiogram being discussed for Tuesday.    Review of Systems   Constitutional: Negative for chills, decreased appetite, diaphoresis, fever, malaise/fatigue, night sweats, weight gain and weight loss.   HENT:  Negative for congestion, ear discharge, ear pain, hearing loss, hoarse voice, nosebleeds, odynophagia, sore throat, stridor and tinnitus.    Eyes:  Negative for blurred vision, discharge, double vision, pain, photophobia, redness, vision loss in left eye, vision loss in right eye, visual disturbance and visual halos.   Cardiovascular:  Negative for chest pain, claudication, cyanosis, dyspnea on exertion, irregular heartbeat, leg swelling, near-syncope, orthopnea, palpitations, paroxysmal nocturnal dyspnea and syncope.   Respiratory:  Negative for cough, hemoptysis, shortness of breath, sleep disturbances due to breathing, snoring, sputum production and wheezing.    Endocrine: Negative for cold intolerance, heat intolerance, polydipsia, polyphagia and polyuria.   Hematologic/Lymphatic: Negative for  adenopathy and bleeding problem. Does not bruise/bleed easily.   Skin:  Positive for color change and poor wound healing. Negative for dry skin, flushing, itching, nail changes, rash, skin cancer, suspicious lesions and unusual hair distribution.   Musculoskeletal:  Negative for arthritis, back pain, falls, gout, joint pain, joint swelling, muscle cramps, muscle weakness, myalgias, neck pain and stiffness.   Gastrointestinal:  Negative for bloating, abdominal pain, anorexia, change in bowel habit, bowel incontinence, constipation, diarrhea, dysphagia, excessive appetite, flatus, heartburn, hematemesis, hematochezia, hemorrhoids, jaundice, melena, nausea and vomiting.   Genitourinary:  Negative for bladder incontinence, decreased libido, dysuria, flank pain, frequency, genital sores, hematuria, hesitancy, incomplete emptying, nocturia and urgency.   Neurological:  Positive for weakness. Negative for aphonia, brief paralysis, difficulty with concentration, disturbances in coordination, excessive daytime sleepiness, dizziness, focal weakness, headaches, light-headedness, loss of balance, numbness, paresthesias, seizures, sensory change, tremors and vertigo.   Psychiatric/Behavioral:  Negative for altered mental status, depression, hallucinations, memory loss, substance abuse, suicidal ideas and thoughts of violence. The patient does not have insomnia and is not nervous/anxious.    Allergic/Immunologic: Negative for hives and persistent infections.   Objective:     Vital Signs (Most Recent):  Temp: 97.8 °F (36.6 °C) (07/03/22 0740)  Pulse: 62 (07/03/22 0825)  Resp: 17 (07/03/22 0825)  BP: (!) 95/54 (07/03/22 0740)  SpO2: 98 % (07/03/22 0825)   Vital Signs (24h Range):  Temp:  [96 °F (35.6 °C)-98.6 °F (37 °C)] 97.8 °F (36.6 °C)  Pulse:  [59-73] 62  Resp:  [16-20] 17  SpO2:  [97 %-100 %] 98 %  BP: ()/(49-62) 95/54     Weight: 124.5 kg (274 lb 7.6 oz)  Body mass index is 36.21 kg/m².     SpO2: 98 %  O2 Device (Oxygen  Therapy): room air      Intake/Output Summary (Last 24 hours) at 7/3/2022 0914  Last data filed at 7/2/2022 1920  Gross per 24 hour   Intake 120 ml   Output 500 ml   Net -380 ml       Lines/Drains/Airways       Central Venous Catheter Line  Duration                  Hemodialysis AV Graft Right forearm -- days              Peripheral Intravenous Line  Duration                  Peripheral IV - Single Lumen 07/1945 20 G Left Antecubital 1 day                    Physical Exam  Constitutional:       General: He is not in acute distress.     Appearance: He is well-developed. He is not diaphoretic.   HENT:      Head: Normocephalic and atraumatic.      Nose: Nose normal.   Eyes:      General: No scleral icterus.        Right eye: No discharge.      Conjunctiva/sclera: Conjunctivae normal.      Pupils: Pupils are equal, round, and reactive to light.   Neck:      Thyroid: No thyromegaly.      Vascular: No JVD.      Trachea: No tracheal deviation.   Cardiovascular:      Rate and Rhythm: Normal rate and regular rhythm.      Pulses:           Carotid pulses are 2+ on the right side and 2+ on the left side.       Radial pulses are 2+ on the right side.        Dorsalis pedis pulses are 0 on the right side and 0 on the left side.        Posterior tibial pulses are 0 on the right side and 0 on the left side.      Heart sounds: Normal heart sounds. No murmur heard.    No friction rub. No gallop.      Comments: Left foot Doppler audible signals dp, pt area.  Pulmonary:      Effort: Pulmonary effort is normal. No respiratory distress.      Breath sounds: Normal breath sounds. No stridor. No wheezing or rales.   Chest:      Chest wall: No tenderness.   Abdominal:      General: Bowel sounds are normal. There is no distension.      Palpations: Abdomen is soft. There is no mass.      Tenderness: There is no abdominal tenderness. There is no guarding or rebound.   Musculoskeletal:         General: No tenderness. Normal range of  motion.      Cervical back: Normal range of motion and neck supple.   Lymphadenopathy:      Cervical: No cervical adenopathy.   Skin:     General: Skin is warm and dry.      Coloration: Skin is not pale.      Findings: No erythema or rash.   Neurological:      Mental Status: He is alert and oriented to person, place, and time.      Cranial Nerves: No cranial nerve deficit.      Coordination: Coordination normal.   Psychiatric:         Behavior: Behavior normal.         Thought Content: Thought content normal.         Judgment: Judgment normal.       Significant Labs: BMP:   Recent Labs   Lab 07/01/22 1951 07/02/22 0332   GLU 77 131*   * 132*   K 4.7 4.9   CL 95 95   CO2 22* 18*   BUN 53* 53*   CREATININE 10.5* 10.4*   CALCIUM 8.4* 8.4*   MG  --  1.7   , CBC   Recent Labs   Lab 07/01/22 1951 07/02/22 0332 07/03/22  0356   WBC 22.54* 18.33* 13.70*   HGB 9.8* 9.7* 9.1*   HCT 31.5* 30.4* 29.1*    139* 138*   , and All pertinent lab results from the last 24 hours have been reviewed.    Significant Imaging:     Assessment and Plan:     Brief HPI:  Recent right leg DVA 06/28/2022.  Right foot Doppler signals stable.  Progression of disease left foot with new gangrenous toe.  The right foot also has some changes of the right 1st toe.  He is on heparin, antibiotics.  Angiogram Tuesday.    Critical limb ischemia with history of revascularization of same extremity  Repeat angiography planned for next week.  Heparin will be continued for now.  I do not see any significant change in leg findings today.    Anemia, chronic renal failure, stage 5  Hemoglobin currently 9.1.    Type 2 diabetes mellitus with chronic kidney disease on chronic dialysis, with long-term current use of insulin  Condition stable.    Acute deep vein thrombosis (DVT) of popliteal vein of right lower extremity  Eliquis on hold. heparin to continue.  Recent right popliteal vein DVT documented.  There is some edema to the right leg.  Water blebs  possibly related to venous stasis.  The foot is warm.    PAD (peripheral artery disease)  Progression of ischemic toes bilaterally.  The most concerning is the right 1st toe.  To toes were for planned amputation and they clearly are nonsalvageable.  His left 5th toe has developed gangrene which is new.  It looks nonsalvageable.    Chronic combined systolic and diastolic congestive heart failure  Ejection fraction 35%.  He has previous resynchronization therapy.  Nonischemic cardiomyopathy.        Cardiac resynchronization therapy defibrillator (CRT-D) in place  No ICD shocks.  Rhythm regular.  Condition stable.    Morbid obesity  Weight loss encouraged.    Primary hypertension  He is on carvedilol.  Readings are on the low side.  He does require midodrine.        VTE Risk Mitigation (From admission, onward)         Ordered     heparin 25,000 units in dextrose 5% (100 units/ml) IV bolus from bag - ADDITIONAL PRN BOLUS - 60 units/kg  As needed (PRN)        Question:  Heparin Infusion Adjustment (DO NOT MODIFY ANSWER)  Answer:  [email or web address]\Images\Pharmacy\HeparinInfusions\heparin HIGH INTENSITY nomogram for [facility] [id number].pdf    07/01/22 1953     heparin 25,000 units in dextrose 5% (100 units/ml) IV bolus from bag - ADDITIONAL PRN BOLUS - 30 units/kg  As needed (PRN)        Question:  Heparin Infusion Adjustment (DO NOT MODIFY ANSWER)  Answer:  \\[email or web address]\epic\Images\Pharmacy\HeparinInfusions\heparin HIGH INTENSITY nomogram for [facility] [id number]    07/01/22 1953     heparin 25,000 units in dextrose 5% 250 mL (100 units/mL) infusion HIGH INTENSITY nomogram - [facility]  Continuous        Question Answer Comment   Heparin Infusion Adjustment (DO NOT MODIFY ANSWER) [email or web address]\epic\Images\Pharmacy\HeparinInfusions\heparin HIGH INTENSITY nomogram for [facility] [id number]    Begin at (in units/kg/hr) 18        07/01/22 1953     IP VTE HIGH RISK PATIENT  Once         07/01/22 2014     Place sequential compression device  Until  discontinued         07/01/22 2014     Reason for No Pharmacological VTE Prophylaxis  Once        Question:  Reasons:  Answer:  Already adequately anticoagulated on oral Anticoagulants    07/01/22 2014                Joey Ledbetter MD  Cardiology  [facility]

## 2022-07-03 NOTE — PROGRESS NOTES
Nephrology Consult  H&P      Consult Requested By: Alysha Anderson*  Reason for Consult: ESRD on HD     SUBJECTIVE:     History of Present Illness:  Houston Jc is a 76 y.o.   male who  has a past medical history of Anemia, CHF (congestive heart failure), ESRD on HD TTS with Dr Decker , Coronary artery disease, Diabetes mellitus, Hematuria, Hypertension, Osteoarthritis of spine with radiculopathy, cervical region (1/10/2022), and Renal cyst, right.. The patient presented to the [facility] on 7/1/2022  For pain in LE and blisters  evaluation. Missed Thursday HD due to pain   ?    Review of Systems   Constitutional: Negative for chills and fever.   HENT: Negative for congestion and sore throat.    Eyes: Negative for blurred vision, double vision and photophobia.   Respiratory: Negative for cough.    Cardiovascular: Positive for leg swelling. Negative for chest pain and palpitations. Claudication: blisters    Gastrointestinal: Negative for abdominal pain, diarrhea, nausea and vomiting.   Genitourinary: Negative for dysuria and urgency.   Musculoskeletal: Positive for myalgias. Negative for joint pain.   Skin: Negative for itching and rash.   Neurological: Negative for dizziness, sensory change, weakness and headaches.   Endo/Heme/Allergies: Negative for polydipsia. Does not bruise/bleed easily.   Psychiatric/Behavioral: Negative for depression.       Past Medical History:   Diagnosis Date    Anemia     CHF (congestive heart failure)     CKD (chronic kidney disease) stage 4, GFR 15-29 ml/min     Coronary artery disease     Diabetes mellitus     Hematuria     Hypertension     Osteoarthritis of spine with radiculopathy, cervical region 1/10/2022    Renal cyst, right      Past Surgical History:   Procedure Laterality Date    ANGIOGRAPHY OF ARTERIOVENOUS SHUNT Right 2/11/2022    Procedure: Fistulogram with Possible Intervention;  Surgeon: Dejuan Cody MD;  Location: [facility] CATH LAB/EP;  Service:  Cardiology;  Laterality: Right;    ANGIOGRAPHY OF LOWER EXTREMITY Right 6/20/2022    Procedure: Angiogram Extremity Unilateral;  Surgeon: Umesh Quintero MD;  Location: [facility] CATH LAB/EP;  Service: Cardiology;  Laterality: Right;    ANGIOPLASTY OF PERIPHERAL VESSEL FOR CHRONIC TOTAL OCCLUSION N/A 6/24/2022    Procedure: ANGIOPLASTY, VESSEL, PERIPHERAL, FOR CHRONIC TOTAL OCCLUSION;  Surgeon: Dejuan Cody MD;  Location: [facility] CATH LAB/EP;  Service: Cardiology;  Laterality: N/A;    AORTOGRAPHY WITH SERIALOGRAPHY Right 6/7/2022    Procedure: AORTOGRAM, WITH SERIALOGRAPHY;  Surgeon: Dejuan Cody MD;  Location: [facility] CATH LAB/EP;  Service: Cardiology;  Laterality: Right;    CARDIAC SURGERY      DECLOTTING OF ARTERIOVENOUS GRAFT Right 12/21/2021    Procedure: [id number];  Surgeon: Jeison Hunt MD;  Location: [facility];  Service: Vascular;  Laterality: Right;    DECLOTTING OF ARTERIOVENOUS GRAFT Right 2/18/2022    Procedure: [id number];  Surgeon: Jeison Hunt MD;  Location: [facility];  Service: Vascular;  Laterality: Right;    DECLOTTING OF ARTERIOVENOUS GRAFT Right 3/9/2022    Procedure: [id number]-[id number];  Surgeon: Jeison Hunt MD;  Location: [facility];  Service: Vascular;  Laterality: Right;    DECLOTTING OF ARTERIOVENOUS GRAFT Right 6/13/2022    Procedure: [id number]-[id number]; REVISION OF FISTULA W/INSERTION OF NEW AV GRAFT;  Surgeon: Jeison Hunt MD;  Location: [facility];  Service: Vascular;  Laterality: Right;    FISTULOGRAM N/A 2/11/2022    Procedure: Fistulogram;  Surgeon: Dejuan Cody MD;  Location: [facility] CATH LAB/EP;  Service: Cardiology;  Laterality: N/A;    FISTULOGRAM N/A 6/17/2022    Procedure: Fistulogram;  Surgeon: Dejuan Cody MD;  Location: [facility] CATH LAB/EP;  Service: Cardiology;  Laterality: N/A;    FOOT SURGERY      INSERTION OF BIVENTRICULAR IMPLANTABLE CARDIOVERTER-DEFIBRILLATOR (ICD) Left 7/18/2019    Procedure: INSERTION, ICD,  BIVENTRICULAR;  Surgeon: Arturo Bay MD;  Location: [facility] EP LAB;  Service: Cardiology;  Laterality: Left;  CHB, CRTD, SJM, anes, GP, 6078    LEFT HEART CATHETERIZATION N/A 7/16/2019    Procedure: Left heart cath;  Surgeon: Dejuan Coyd MD;  Location: [facility] CATH LAB/EP;  Service: Cardiology;  Laterality: N/A;    PERCUTANEOUS TRANSLUMINAL ANGIOPLASTY OF ARTERIOVENOUS FISTULA Right 2/11/2022    Procedure: PTA, AV FISTULA;  Surgeon: Dejuan Cody MD;  Location: [facility] CATH LAB/EP;  Service: Cardiology;  Laterality: Right;    PERCUTANEOUS TRANSLUMINAL ANGIOPLASTY OF ARTERIOVENOUS FISTULA N/A 6/17/2022    Procedure: PTA, AV FISTULA;  Surgeon: Dejuan Cody MD;  Location: [facility] CATH LAB/EP;  Service: Cardiology;  Laterality: N/A;    PERITONEAL CATHETER REMOVAL Left 4/11/2020    Procedure: REMOVAL, CATHETER, DIALYSIS, PERITONEAL;  Surgeon: Edis Snell Jr., MD;  Location: [facility] OR;  Service: General;  Laterality: Left;    PHLEBOGRAPHY  6/15/2022    Procedure: Venogram;  Surgeon: Betsey Mckeon MD;  Location: [facility] CATH LAB/EP;  Service: Cardiology;;    PLACEMENT OF ARTERIOVENOUS GRAFT Right 2/18/2022    Procedure: INSERTION, GRAFT, ARTERIOVENOUS;  Surgeon: Jeison Hunt MD;  Location: [facility];  Service: Vascular;  Laterality: Right;    REVISION OF PROCEDURE INVOLVING ARTERIOVENOUS GRAFT Right 2/18/2022    Procedure: REVISION, PROCEDURE INVOLVING ARTERIOVENOUS GRAFT;  Surgeon: Jeison Hunt MD;  Location: [facility];  Service: Vascular;  Laterality: Right;     Family History   Problem Relation Age of Onset    Heart attack Father      Social History     Tobacco Use    Smoking status: Former Smoker    Smokeless tobacco: Never Used   Substance Use Topics    Alcohol use: Yes     Comment: social    Drug use: No       Review of patient's allergies indicates:  No Known Allergies         OBJECTIVE:     Vital Signs (Most Recent)  Vitals:    07/03/22 0454 07/03/22 0740 07/03/22 0825 07/03/22 1112    BP: (!) 126/56 (!) 95/54  (!) 102/55   BP Location:  Left arm  Left arm   Patient Position:  Lying  Lying   Pulse: 61 60 62 60   Resp: 16 18 17 18   Temp: 96 °F (35.6 °C) 97.8 °F (36.6 °C)  97.6 °F (36.4 °C)   TempSrc:  Oral  Oral   SpO2: 100% 100% 98% 100%   Weight:       Height:                     Medications:   sodium chloride 0.9%   Intravenous Once    aspirin  81 mg Oral Daily    clopidogreL  75 mg Oral Daily    midodrine  10 mg Oral TID WM    mupirocin   Nasal BID    piperacillin-tazobactam (ZOSYN) IVPB  4.5 g Intravenous Q12H    senna-docusate 8.6-50 mg  1 tablet Oral BID    vancomycin (VANCOCIN) IVPB  500 mg Intravenous Once           Physical Exam  Vitals and nursing note reviewed.   Constitutional:       General: He is not in acute distress.     Appearance: He is not diaphoretic.   HENT:      Head: Normocephalic and atraumatic.      Mouth/Throat:      Pharynx: No oropharyngeal exudate.   Eyes:      General: No scleral icterus.     Conjunctiva/sclera: Conjunctivae normal.      Pupils: Pupils are equal, round, and reactive to light.   Cardiovascular:      Rate and Rhythm: Normal rate and regular rhythm.      Heart sounds: Normal heart sounds. No murmur heard.  Pulmonary:      Effort: Pulmonary effort is normal. No respiratory distress.      Breath sounds: Normal breath sounds.   Abdominal:      General: Bowel sounds are normal. There is no distension.      Palpations: Abdomen is soft.      Tenderness: There is no abdominal tenderness.   Musculoskeletal:         General: Swelling (LE blisters wounds ) present. Normal range of motion.      Cervical back: Normal range of motion and neck supple.      Right lower leg: Edema present.      Left lower leg: Edema present.      Comments: RUE AVG   thrill    Skin:     General: Skin is warm and dry.      Findings: No erythema.   Neurological:      Mental Status: He is alert and oriented to person, place, and time.      Cranial Nerves: No cranial nerve  deficit.   Psychiatric:         Mood and Affect: Affect normal.         Cognition and Memory: Memory normal.         Judgment: Judgment normal.         Laboratory:  Recent Labs   Lab 07/01/22 1951 07/02/22 0332 07/03/22  0356   WBC 22.54* 18.33* 13.70*   HGB 9.8* 9.7* 9.1*   HCT 31.5* 30.4* 29.1*    139* 138*   MONO 8.5  1.9* 7.1  1.3* 8.8  1.2*     Recent Labs   Lab 06/28/22  1007 07/01/22 1951 07/02/22 0332 07/03/22  0355   * 135* 132* 134*   K 4.2 4.7 4.9 4.4   CL 97 95 95 96   CO2 20* 22* 18* 23   BUN 52* 53* 53* 37*   CREATININE 10.8* 10.5* 10.4* 8.1*   CALCIUM 8.6* 8.4* 8.4* 8.5*   PHOS 4.9*  --  4.7* 4.6*       Diagnostic Results:  X-Ray: Reviewed  US: Reviewed  Echo: Reviewed  ASSESSMENT/PLAN:     1. ESRD on HD TTS with Dr Decker  -- Now IV abx for LE wounds HD Saturday  with No UF hypotensive   -- Keep TTS   -- Daily Renal Function Panel  -- Avoid Hypotension.  -- Renally dose all meds    2. HTN (I10) -    3. Anemia of chronic kidney disease treated with ROSALVA (N18.9 D63.1)    EPogen  with each HD - hold for now  Active infection   Recent Labs   Lab 07/01/22 1951 07/02/22 0332 07/03/22  0356   HGB 9.8* 9.7* 9.1*   HCT 31.5* 30.4* 29.1*    139* 138*       Iron   Lab Results   Component Value Date    IRON 63 12/18/2019    TIBC 263 12/18/2019    FERRITIN 6,078 (H) 03/28/2020       4. MBD (E88.9 M90.80) -  Recent Labs   Lab 07/03/22  0355   CALCIUM 8.5*   PHOS 4.6*     Recent Labs   Lab 07/02/22 0332 07/03/22  0355   MG 1.7 1.6       Lab Results   Component Value Date    .0 (H) 03/04/2020    CALCIUM 8.5 (L) 07/03/2022    PHOS 4.6 (H) 07/03/2022     No results found for: KFRZEACI88YZ    Lab Results   Component Value Date    CO2 23 07/03/2022       5. Nutrition/Hypoalbuminemia (E88.09) -    Recent Labs   Lab 07/01/22 2019 07/02/22  0332 07/03/22  0355   LABPROT 15.2*  --   --    ALBUMIN  --  2.2* 2.1*     Nepro with meals TID. Renal vitamins daily      With any question please  call answering service [phone]  Laura Ortiz MD    [facility]  ANA MARIA Burns MD, FACMARCIO,   KEREN Woo MD,   MD DAMION Gill MD E. V. Harmon, NP    [address]  [place], [id number]

## 2022-07-03 NOTE — HPI
76-year-old male with history of end-stage renal disease on hemodialysis Tuesday Thursday Saturday, coronary disease, diabetes, CHF, hypertension, and peripheral vascular disease who was referred by home health and cardiology for worsening status of lower extremity vascular disease. Patient has worsening gangrene of several toes and blister formation on bilateral feet.  He reports pain to both feet. History of multiple angioplasty. No stents. He reports blisters to the right foot for 1 week with 2 new blisters on the left foot and 2 open blisters to the right foot. Reports compliance with dialysis with last dialysis session on Tuesday.

## 2022-07-03 NOTE — ASSESSMENT & PLAN NOTE
76-year-old male 76-year-old male with history of end-stage renal disease on hemodialysis Tuesday Thursday Saturday, coronary disease, diabetes, CHF, hypertension, and peripheral vascular disease who was referred by home health and cardiology for worsening status of lower extremity vascular disease. Patient has worsening gangrene of several toes and blister formation on bilateral feet.     -await podiatry plan for worsening toe gangrene  -was planned for TMA  -given elevated WBC would continue vanco and zosyn for now but would be able to stop depending on surgical plan

## 2022-07-03 NOTE — PROGRESS NOTES
[facility] Medicine  Progress Note    Patient Name: Houston Jc  MRN: [id number]  Patient Class: IP- Inpatient   Admission Date: 7/1/2022  Length of Stay: 2 days  Attending Physician: Alysha Anderson*  Primary Care Provider: Zak Hamilton MD        Subjective:     Principal Problem:Gangrene        HPI:  Houston Jc is a 76-year-old male 76-year-old male with history of end-stage renal disease on hemodialysis Tuesday Thursday Saturday, coronary disease, diabetes, CHF, hypertension, and peripheral vascular disease who was referred by home health and cardiology for worsening status of lower extremity vascular disease. Patient has worsening gangrene of several toes and blister formation on bilateral feet.  He reports pain to both feet. History of multiple angioplasty. No stents. He reports blisters to the right foot for 1 week with 2 new blisters on the left foot and 2 open blisters to the right foot. Reports compliance with dialysis with last dialysis session on Tuesday. Of note, patient was recently discharged on 6/28/22 for clotted vascular access and was to follow up outpatient under vascular and for the gangrene of the toe with Dr. Adam.    In the ED: WBC 22, Hgb 9, lactate 2.9. Given 1500 ml IVF bolus. Started on vanc and zosyn, and heparin gtt. Cardiology consulted with recommendations for arterial dopplers in a.m. Admitted to [facility] Medicine for further evaluation.      Overview/Hospital Course:  No notes on file    Interval History: awake and alert, report foot pain- continue pain meds  Appreciates cardiology rec's- continue heparin and plan repeat angiogram on Tuesday.   Wbc elevated  and blood cx with NGTD continue Vanc / Zosyn    Review of Systems   Constitutional:  Negative for activity change and appetite change.   Skin:  Positive for color change and wound.   Neurological:  Negative for dizziness.   Objective:     Vital Signs (Most Recent):  Temp: 97.6 °F (36.4  °C) (07/03/22 1112)  Pulse: 60 (07/03/22 1112)  Resp: 18 (07/03/22 1112)  BP: (!) 102/55 (07/03/22 1112)  SpO2: 100 % (07/03/22 1112)   Vital Signs (24h Range):  Temp:  [96 °F (35.6 °C)-98.6 °F (37 °C)] 97.6 °F (36.4 °C)  Pulse:  [59-63] 60  Resp:  [16-20] 18  SpO2:  [97 %-100 %] 100 %  BP: ()/(49-62) 102/55     Weight: 124.5 kg (274 lb 7.6 oz)  Body mass index is 36.21 kg/m².    Intake/Output Summary (Last 24 hours) at 7/3/2022 1309  Last data filed at 7/2/2022 1920  Gross per 24 hour   Intake 120 ml   Output 500 ml   Net -380 ml      Physical Exam  Vitals and nursing note reviewed.   Constitutional:       General: He is not in acute distress.     Appearance: Normal appearance. He is obese. He is not ill-appearing, toxic-appearing or diaphoretic.   HENT:      Head: Normocephalic and atraumatic.      Mouth/Throat:      Mouth: Mucous membranes are dry.   Cardiovascular:      Rate and Rhythm: Normal rate and regular rhythm.      Heart sounds: No murmur heard.     Comments: Unable to palpate pedal pulses  Pulmonary:      Effort: No respiratory distress.      Breath sounds: Normal breath sounds.   Abdominal:      General: Bowel sounds are normal.      Palpations: Abdomen is soft.   Musculoskeletal:         General: Normal range of motion.      Cervical back: Normal range of motion.      Right lower leg: Edema present.      Left lower leg: Edema present.   Skin:     Comments: See photos   Neurological:      General: No focal deficit present.      Mental Status: He is alert and oriented to person, place, and time. Mental status is at baseline.   Psychiatric:         Mood and Affect: Mood normal.         Behavior: Behavior normal.         Thought Content: Thought content normal.         Judgment: Judgment normal.       Significant Labs: A1C:   Recent Labs   Lab 06/14/22  1007   HGBA1C 6.9*     ABGs: No results for input(s): PH, PCO2, HCO3, POCSATURATED, BE, TOTALHB, COHB, METHB, O2HB, POCFIO2, PO2 in the last 48  hours.  Bilirubin:   Recent Labs   Lab 06/13/22  1125 07/01/22 1951 07/02/22 0332 07/03/22  0355   BILITOT 0.2 0.6 0.5 0.6     CBC:   Recent Labs   Lab 07/01/22 1951 07/02/22 0332 07/03/22  0356   WBC 22.54* 18.33* 13.70*   HGB 9.8* 9.7* 9.1*   HCT 31.5* 30.4* 29.1*    139* 138*     CMP:   Recent Labs   Lab 07/01/22 1951 07/02/22 0332 07/03/22  0355   * 132* 134*   K 4.7 4.9 4.4   CL 95 95 96   CO2 22* 18* 23   GLU 77 131* 113*   BUN 53* 53* 37*   CREATININE 10.5* 10.4* 8.1*   CALCIUM 8.4* 8.4* 8.5*   PROT 5.7* 5.8* 5.8*   ALBUMIN 2.5* 2.2* 2.1*   BILITOT 0.6 0.5 0.6   ALKPHOS 159* 131 134   AST 17 17 13   ALT <5* <5* <5*   ANIONGAP 18* 19* 15   EGFRNONAA 4* 4* 6*     Lactic Acid:   Recent Labs   Lab 07/01/22  2239   LACTATE 2.9*     Lipase: No results for input(s): LIPASE in the last 48 hours.  Lipid Panel: No results for input(s): CHOL, HDL, LDLCALC, TRIG, CHOLHDL in the last 48 hours.  Magnesium:   Recent Labs   Lab 07/02/22 0332 07/03/22  0355   MG 1.7 1.6     Troponin: No results for input(s): TROPONINI in the last 48 hours.  TSH: No results for input(s): TSH in the last 4320 hours.  Urine Culture: No results for input(s): LABURIN in the last 48 hours.  Urine Studies: No results for input(s): COLORU, APPEARANCEUA, PHUR, SPECGRAV, PROTEINUA, GLUCUA, KETONESU, BILIRUBINUA, OCCULTUA, NITRITE, UROBILINOGEN, LEUKOCYTESUR, RBCUA, WBCUA, BACTERIA, SQUAMEPITHEL, HYALINECASTS in the last 48 hours.    Invalid input(s): CHADD    Significant Imaging: I have reviewed all pertinent imaging results/findings within the past 24 hours.      Assessment/Plan:      * Gangrene  Bullae  -Presented with blisters to right foot x1 week with 2 new blisters on left foot, right foot with multiple blisters with 2 open: see photos above  -WBC 22, lactate 2.9  -on vanc and zosyn  -blood cultures with NGTD continue Vanc / Zosyn  -wound care consulted  Consult cardiology-  rec's continue heparin and plan repeat angiogram  on Tuesday.     Critical limb ischemia with history of revascularization of same extremity  -unlikely acute limb ischemia given history.  -Patient was recently discharged on 6/28/22 for clotted vascular access and was to follow up outpatient under vascular and for the gangrene of the toe with Dr. Adam  -Left 5th and 3rd toes black in color see photos above  -Unable to palpate pedal pulses bilateral   -Bilateral pitting edema +4  -continue vanc and zosyn  -continue heparin gtt  -Cardiology consulted; appreciate Recommendations for  arterial dopplers tomorrow    Anemia, chronic renal failure, stage 5  -Hgb 9  -monitor      Type 2 diabetes mellitus with chronic kidney disease on chronic dialysis, with long-term current use of insulin  Hemoglobin A1C   Date Value Ref Range Status   06/14/2022 6.9 (H) 4.0 - 5.6 % Final   -CBG is borderline low, low dose SSI, diabetic renal diet when resumed, accuchecks Q6 while NPO then ACHS      Acute deep vein thrombosis (DVT) of popliteal vein of right lower extremity  -takes eliquis at home  -hold for now for possible surgical procedure to feet      PAD (peripheral artery disease)  -Continue Lipitor  -hold BB with soft BP  -resume eliquis if patient does not need surgical procedure      Chronic combined systolic and diastolic congestive heart failure  -stable, no fluid overloading s/s on exam  -Strict electrolyte management with goal K 4-5 and Mg 2-3 (trending daily)  Patient is identified as having Combined Systolic and Diastolic heart failure that is Chronic. CHF is currently controlled. Latest ECHO performed and demonstrates- Results for orders placed during the hospital encounter of 12/21/21    Echo    Interpretation Summary  · The left ventricle is moderately enlarged with mild concentric hypertrophy and  · The quantitatively derived ejection fraction is 34%.  · Severe left atrial enlargement.  · Normal right ventricular size with normal right ventricular systolic function.  ·  Normal central venous pressure (3 mmHg).  · The estimated PA systolic pressure is 21 mmHg.  · No vegetations per surface echo.  -hold Beta Blocker ACE/ARB with soft BP and monitor clinical status closely. Monitor on telemetry. Patient is off CHF pathway.  Monitor strict Is&Os and daily weights.  Place on fluid restriction of 1.5 L. Continue to stress to patient importance of self efficacy and  on diet for CHF. Last BNP reviewed- and noted below No results for input(s): BNP, BNPTRIAGEBLO in the last 168 hours..    ESRD (end stage renal disease) on dialysis  -HD outpatient scheduled [facility]  -NICOLETTE AVF noted  -Consult nephrology         Cardiac resynchronization therapy defibrillator (CRT-D) in place  -[place] in 7/2019 with complete heart block and intermittent VT. An echo showed his EF was 30%. Amiodarone was started, a LHC showed luminal irregularities, and a [employer] [place] CRT-D was implanted prior to discharge (RV septal lead in LV port).  -Telemetry      Complete heart block  -chronic with CRT-D in place  -telemetry      Morbid obesity  -encourage lifestyle modifications (helathy diet, exercise)         Sleep apnea  -CPAP QHS      Primary hypertension  -Controlled, BP borderline low  -Home Meds include losartan 25 mg daily and coreg 6.25 mg bid  -hold for now  -monitor pressure        VTE Risk Mitigation (From admission, onward)         Ordered     heparin 25,000 units in dextrose 5% (100 units/ml) IV bolus from bag - ADDITIONAL PRN BOLUS - 60 units/kg  As needed (PRN)        Question:  Heparin Infusion Adjustment (DO NOT MODIFY ANSWER)  Answer:  [email or web address]\epic\Images\Pharmacy\HeparinInfusions\heparin HIGH INTENSITY nomogram for [facility] [id number].pdf    07/01/22 1953     heparin 25,000 units in dextrose 5% (100 units/ml) IV bolus from bag - ADDITIONAL PRN BOLUS - 30 units/kg  As needed (PRN)        Question:  Heparin Infusion Adjustment (DO NOT MODIFY ANSWER)  Answer:   [email or web address]\epic\Images\Pharmacy\HeparinInfusions\heparin HIGH INTENSITY nomogram for [facility] [id number]    07/01/22 1953     heparin 25,000 units in dextrose 5% 250 mL (100 units/mL) infusion HIGH INTENSITY nomogram - [facility]  Continuous        Question Answer Comment   Heparin Infusion Adjustment (DO NOT MODIFY ANSWER) [email or web address]\[email or web address]\Images\Pharmacy\HeparinInfusions\heparin HIGH INTENSITY nomogram for [facility] [id number]    Begin at (in units/kg/hr) 18        07/01/22 1953     IP VTE HIGH RISK PATIENT  Once         07/01/22 2014     Place sequential compression device  Until discontinued         07/01/22 2014     Reason for No Pharmacological VTE Prophylaxis  Once        Question:  Reasons:  Answer:  Already adequately anticoagulated on oral Anticoagulants    07/01/22 2014                Discharge Planning   ANDERSON:      Code Status: Full Code   Is the patient medically ready for discharge?:     Reason for patient still in hospital (select all that apply): Patient trending condition                     Alysha Anderson MD  Department of Hospital Medicine   [facility]

## 2022-07-03 NOTE — PLAN OF CARE
Problem: Adult Inpatient Plan of Care  Goal: Plan of Care Review  Outcome: Ongoing, Progressing     Problem: Diabetes Comorbidity  Goal: Blood Glucose Level Within Targeted Range  Outcome: Ongoing, Progressing     Problem: Fluid and Electrolyte Imbalance (Acute Kidney Injury/Impairment)  Goal: Fluid and Electrolyte Balance  Outcome: Ongoing, Progressing     APTT above 150 per lab, Heparin drip held per protocol, provide made aware. Patient stable, no s/s bleeding noted, patient denies any pain or discomfort,; continue to monitor.

## 2022-07-04 NOTE — PROGRESS NOTES
[facility] Medicine  Progress Note    Patient Name: Houston Jc  MRN: [id number]  Patient Class: IP- Inpatient   Admission Date: 7/1/2022  Length of Stay: 3 days  Attending Physician: Alysha Anderson*  Primary Care Provider: Zak Hamilton MD        Subjective:     Principal Problem:Gangrene        HPI:  Houston Jc is a 76-year-old male 76-year-old male with history of end-stage renal disease on hemodialysis Tuesday Thursday Saturday, coronary disease, diabetes, CHF, hypertension, and peripheral vascular disease who was referred by home health and cardiology for worsening status of lower extremity vascular disease. Patient has worsening gangrene of several toes and blister formation on bilateral feet.  He reports pain to both feet. History of multiple angioplasty. No stents. He reports blisters to the right foot for 1 week with 2 new blisters on the left foot and 2 open blisters to the right foot. Reports compliance with dialysis with last dialysis session on Tuesday. Of note, patient was recently discharged on 6/28/22 for clotted vascular access and was to follow up outpatient under vascular and for the gangrene of the toe with Dr. Adam.    In the ED: WBC 22, Hgb 9, lactate 2.9. Given 1500 ml IVF bolus. Started on vanc and zosyn, and heparin gtt. Cardiology consulted with recommendations for arterial dopplers in a.m. Admitted to [facility] for further evaluation.      Overview/Hospital Course:  No notes on file    Interval History:   Patient complains of R foot pain. He is also concerned about moving forward with his treatment.   He denies further complaints.         Review of Systems   Constitutional:  Negative for activity change and appetite change.   HENT:  Negative for trouble swallowing and voice change.    Eyes:  Negative for photophobia and visual disturbance.   Respiratory:  Negative for cough, choking, chest tightness and shortness of breath.     Cardiovascular:  Negative for chest pain, palpitations and leg swelling.   Gastrointestinal:  Negative for abdominal pain, constipation, diarrhea, nausea and vomiting.   Endocrine: Negative.    Genitourinary:  Negative for decreased urine volume and urgency.   Musculoskeletal:  Positive for arthralgias.   Skin:  Positive for color change and wound.   Neurological:  Negative for dizziness, light-headedness and headaches.   Hematological: Negative.    Objective:     Vital Signs (Most Recent):  Temp: 97.1 °F (36.2 °C) (07/04/22 0735)  Pulse: 61 (07/04/22 0735)  Resp: 17 (07/04/22 0920)  BP: (!) 112/53 (07/04/22 0735)  SpO2: 95 % (07/04/22 0808)   Vital Signs (24h Range):  Temp:  [97 °F (36.1 °C)-97.7 °F (36.5 °C)] 97.1 °F (36.2 °C)  Pulse:  [60-64] 61  Resp:  [17-18] 17  SpO2:  [95 %-100 %] 95 %  BP: ()/(50-55) 112/53     Weight: 124.5 kg (274 lb 7.6 oz)  Body mass index is 36.21 kg/m².  No intake or output data in the 24 hours ending 07/04/22 0956     Physical Exam  Vitals and nursing note reviewed.   Constitutional:       General: He is not in acute distress.     Appearance: Normal appearance. He is obese. He is not ill-appearing, toxic-appearing or diaphoretic.   HENT:      Head: Normocephalic and atraumatic.      Mouth/Throat:      Pharynx: Oropharynx is clear.   Eyes:      Extraocular Movements: Extraocular movements intact.      Conjunctiva/sclera: Conjunctivae normal.   Cardiovascular:      Rate and Rhythm: Normal rate and regular rhythm.      Heart sounds: Normal heart sounds. No murmur heard.     Comments: Unable to palpate pedal pulses  Pulmonary:      Effort: No respiratory distress.      Breath sounds: Normal breath sounds.   Abdominal:      General: Bowel sounds are normal.      Palpations: Abdomen is soft.   Musculoskeletal:         General: Swelling present. Normal range of motion.      Cervical back: Normal range of motion and neck supple.      Right lower leg: Edema present.      Left lower  leg: Edema present.   Skin:     Comments: See photos   Neurological:      General: No focal deficit present.      Mental Status: He is alert and oriented to person, place, and time. Mental status is at baseline.   Psychiatric:         Mood and Affect: Mood normal.         Behavior: Behavior normal.         Thought Content: Thought content normal.         Judgment: Judgment normal.           BNP  No results for input(s): BNP, BNPTRIAGEBLO in the last 168 hours.    Significant Labs: A1C:   Recent Labs   Lab 06/14/22  1007   HGBA1C 6.9*     ABGs: No results for input(s): PH, PCO2, HCO3, POCSATURATED, BE, TOTALHB, COHB, METHB, O2HB, POCFIO2, PO2 in the last 48 hours.  Bilirubin:   Recent Labs   Lab 06/13/22  1125 07/01/22 1951 07/02/22  0332 07/03/22 0355 07/04/22  0321   BILITOT 0.2 0.6 0.5 0.6 0.6     CBC:   Recent Labs   Lab 07/03/22  0356 07/04/22  0321 07/04/22  0438   WBC 13.70* 17.52* 17.05*   HGB 9.1* 10.0* 9.9*   HCT 29.1* 31.4* 31.4*   * 136* 133*     CMP:   Recent Labs   Lab 07/03/22 0355 07/04/22  0321   * 134*   K 4.4 5.2*   CL 96 96   CO2 23 20*   * 185*   BUN 37* 47*   CREATININE 8.1* 9.1*   CALCIUM 8.5* 8.6*   PROT 5.8* 6.0   ALBUMIN 2.1* 2.1*   BILITOT 0.6 0.6   ALKPHOS 134 139*   AST 13 9*   ALT <5* <5*   ANIONGAP 15 18*   EGFRNONAA 6* 5*     Lactic Acid:   No results for input(s): LACTATE in the last 48 hours.    Lipase: No results for input(s): LIPASE in the last 48 hours.  Lipid Panel: No results for input(s): CHOL, HDL, LDLCALC, TRIG, CHOLHDL in the last 48 hours.  Magnesium:   Recent Labs   Lab 07/03/22 0355 07/04/22  0321   MG 1.6 1.7     Troponin: No results for input(s): TROPONINI in the last 48 hours.  TSH: No results for input(s): TSH in the last 4320 hours.  Urine Culture: No results for input(s): LABURIN in the last 48 hours.  Urine Studies: No results for input(s): COLORU, APPEARANCEUA, PHUR, SPECGRAV, PROTEINUA, GLUCUA, KETONESU, BILIRUBINUA, OCCULTUA, NITRITE,  UROBILINOGEN, LEUKOCYTESUR, RBCUA, WBCUA, BACTERIA, SQUAMEPITHEL, HYALINECASTS in the last 48 hours.    Invalid input(s): [facility]      Microbiology Results (last 7 days)       Procedure Component Value Units Date/Time    Blood culture (site 1) [[id number]] Collected: 07/01/22 2238    Order Status: Completed Specimen: Blood Updated: 07/03/22 1212     Blood Culture, Routine No Growth to date      No Growth to date    Narrative:      Site # 1, aerobic and anaerobic  Collection has been rescheduled by TD5 at 07/01/2022 21:42 Reason:   Unable to collect tried several times Nurse spencer was notified   Collection has been rescheduled by TD5 at 07/01/2022 21:42 Reason:   Unable to collect tried several times Nurse spencer was notified     Blood culture (site 2) [[id number]] Collected: 07/01/22 2239    Order Status: Completed Specimen: Blood Updated: 07/03/22 1212     Blood Culture, Routine No Growth to date      No Growth to date    Narrative:      Site # 2, aerobic only  Collection has been rescheduled by TD5 at 07/01/2022 21:42 Reason:   Unable to collect tried several times Nurse spencer was notified   Collection has been rescheduled by TD5 at 07/01/2022 21:42 Reason:   Unable to collect tried several times Nurse spencer was notified             Significant Imaging: I have reviewed all pertinent imaging results/findings within the past 24 hours.      Assessment/Plan:      * Gangrene  Bullae  -Presented with blisters to right foot x1 week with 2 new blisters on left foot, right foot with multiple blisters with 2 open: see photos above  -WBC 22---> 17 , lactate 2.9  -on vanc and zosyn  -blood cultures with NGTD continue Vanc / Zosyn  -wound care consulted  Consult cardiology-  rec's continue heparin and plan repeat angiogram on Tuesday.   -NPO after midnight   - encourage pain regime for pain control, as patient last took a dose of medication yesterday     Bullae        Critical limb ischemia with history of revascularization of  same extremity  -unlikely acute limb ischemia given history.  -Patient was recently discharged on 6/28/22 for clotted vascular access and was to follow up outpatient under vascular and for the gangrene of the toe with Dr. Adam  -Left 5th and 3rd toes black in color see photos above  -Unable to palpate pedal pulses bilateral   -Bilateral pitting edema +4  -continue vanc and zosyn  -continue heparin gtt  -Cardiology consulted; appreciate Recommendations for  arterial dopplers     Anemia, chronic renal failure, stage 5  -Hgb 9  -monitor      Type 2 diabetes mellitus with chronic kidney disease on chronic dialysis, with long-term current use of insulin  Hemoglobin A1C   Date Value Ref Range Status   06/14/2022 6.9 (H) 4.0 - 5.6 % Final   -CBG is borderline low, low dose SSI, diabetic renal diet when resumed, accuchecks Q6 while NPO then ACHS      Acute deep vein thrombosis (DVT) of popliteal vein of right lower extremity  -takes eliquis at home  -hold for now for possible surgical procedure to feet      PAD (peripheral artery disease)  -Continue Lipitor  -hold BB with soft BP  -resume eliquis if patient does not need surgical procedure      Chronic combined systolic and diastolic congestive heart failure  -stable, no fluid overloading s/s on exam  -Strict electrolyte management with goal K 4-5 and Mg 2-3 (trending daily)  Patient is identified as having Combined Systolic and Diastolic heart failure that is Chronic. CHF is currently controlled. Latest ECHO performed and demonstrates- Results for orders placed during the hospital encounter of 12/21/21     Echo    Interpretation Summary  · The left ventricle is moderately enlarged with mild concentric hypertrophy and  · The quantitatively derived ejection fraction is 34%.  · Severe left atrial enlargement.  · Normal right ventricular size with normal right ventricular systolic function.  · Normal central venous pressure (3 mmHg).  · The estimated PA systolic pressure is  21 mmHg.  · No vegetations per surface echo.  -hold Beta Blocker ACE/ARB with soft BP and monitor clinical status closely. Monitor on telemetry. Patient is off CHF pathway.  Monitor strict Is&Os and daily weights.  Place on fluid restriction of 1.5 L. Continue to stress to patient importance of self efficacy and  on diet for CHF. Last BNP reviewed- and noted below No results for input(s): BNP, BNPTRIAGEBLO in the last 168 hours..    ESRD (end stage renal disease) on dialysis  -HD outpatient scheduled Asif[facility] BILLSF noted  -Consult nephrology         Cardiac resynchronization therapy defibrillator (CRT-D) in place  -[facility] in 7/2019 with complete heart block and intermittent VT. An echo showed his EF was 30%. Amiodarone was started, a LHC showed luminal irregularities, and a [employer] CRT-D was implanted prior to discharge (RV septal lead in LV port).  -Telemetry      Complete heart block  -chronic with CRT-D in place  -telemetry      Morbid obesity  -encourage lifestyle modifications (healthy diet, exercise)         Sleep apnea  -CPAP QHS      Primary hypertension  -Controlled, BP borderline low  -Home Meds include losartan 25 mg daily and coreg 6.25 mg bid  -hold for now  -monitor pressure        VTE Risk Mitigation (From admission, onward)         Ordered     heparin 25,000 units in dextrose 5% (100 units/ml) IV bolus from bag - ADDITIONAL PRN BOLUS - 60 units/kg  As needed (PRN)        Question:  Heparin Infusion Adjustment (DO NOT MODIFY ANSWER)  Answer:  [email or web address]\[employer]\Images\Pharmacy\HeparinInfusions\heparin HIGH INTENSITY nomogram for [facility] [id number].pdf    07/01/22 1953     heparin 25,000 units in dextrose 5% (100 units/ml) IV bolus from bag - ADDITIONAL PRN BOLUS - 30 units/kg  As needed (PRN)        Question:  Heparin Infusion Adjustment (DO NOT MODIFY ANSWER)  Answer:  \\[email or web address]\Images\Pharmacy\HeparinInfusions\heparin HIGH INTENSITY nomogram for [facility] [id number].pdf    07/01/22 1953     heparin  25,000 units in dextrose 5% 250 mL (100 units/mL) infusion HIGH INTENSITY nomogram - [facility]  Continuous        Question Answer Comment   Heparin Infusion Adjustment (DO NOT MODIFY ANSWER) \\[email or web address]\[employer]\Images\Pharmacy\HeparinInfusions\heparin HIGH INTENSITY nomogram for [facility] [id number].pdf    Begin at (in units/kg/hr) 18        07/01/22 1953     IP VTE HIGH RISK PATIENT  Once         07/01/22 2014     Place sequential compression device  Until discontinued         07/01/22 2014     Reason for No Pharmacological VTE Prophylaxis  Once        Question:  Reasons:  Answer:  Already adequately anticoagulated on oral Anticoagulants    07/01/22 2014                Discharge Planning   ANDERSON:      Code Status: Full Code   Is the patient medically ready for discharge?:     Reason for patient still in hospital (select all that apply): Patient trending condition                     Margaret Hahn NP  Department of [facility]   [facility]

## 2022-07-04 NOTE — PROGRESS NOTES
Nephrology Progress Note       Consult Requested By: Alysha Anderson*  Reason for Consult: ESRD on HD     SUBJECTIVE:        ?    Review of Systems   Constitutional: Negative for chills and fever.   HENT: Negative for congestion and sore throat.    Eyes: Negative for blurred vision, double vision and photophobia.   Respiratory: Negative for cough.    Cardiovascular: Positive for leg swelling. Negative for chest pain and palpitations. Claudication: blisters    Gastrointestinal: Negative for abdominal pain, diarrhea, nausea and vomiting.   Genitourinary: Negative for dysuria and urgency.   Musculoskeletal: Positive for myalgias. Negative for joint pain.   Skin: Negative for itching and rash.   Neurological: Negative for dizziness, sensory change, weakness and headaches.   Endo/Heme/Allergies: Negative for polydipsia. Does not bruise/bleed easily.   Psychiatric/Behavioral: Negative for depression.       Past Medical History:   Diagnosis Date    Anemia     CHF (congestive heart failure)     CKD (chronic kidney disease) stage 4, GFR 15-29 ml/min     Coronary artery disease     Diabetes mellitus     Hematuria     Hypertension     Osteoarthritis of spine with radiculopathy, cervical region 1/10/2022    Renal cyst, right      Past Surgical History:   Procedure Laterality Date    ANGIOGRAPHY OF ARTERIOVENOUS SHUNT Right 2/11/2022    Procedure: Fistulogram with Possible Intervention;  Surgeon: Dejuan Cody MD;  Location: [facility] CATH LAB/EP;  Service: Cardiology;  Laterality: Right;    ANGIOGRAPHY OF LOWER EXTREMITY Right 6/20/2022    Procedure: Angiogram Extremity Unilateral;  Surgeon: Umesh Quintero MD;  Location: [facility] CATH LAB/EP;  Service: Cardiology;  Laterality: Right;    ANGIOPLASTY OF PERIPHERAL VESSEL FOR CHRONIC TOTAL OCCLUSION N/A 6/24/2022    Procedure: ANGIOPLASTY, VESSEL, PERIPHERAL, FOR CHRONIC TOTAL OCCLUSION;  Surgeon: Dejuan Cody MD;  Location: [facility] CATH LAB/EP;  Service:  Cardiology;  Laterality: N/A;    AORTOGRAPHY WITH SERIALOGRAPHY Right 6/7/2022    Procedure: AORTOGRAM, WITH SERIALOGRAPHY;  Surgeon: Dejuan Cody MD;  Location: [facility] CATH LAB/EP;  Service: Cardiology;  Laterality: Right;    CARDIAC SURGERY      DECLOTTING OF ARTERIOVENOUS GRAFT Right 12/21/2021    Procedure: [id number];  Surgeon: Jeison Hunt MD;  Location: [facility] OR;  Service: Vascular;  Laterality: Right;    DECLOTTING OF ARTERIOVENOUS GRAFT Right 2/18/2022    Procedure: [id number];  Surgeon: Jeison Hunt MD;  Location: [facility] OR;  Service: Vascular;  Laterality: Right;    DECLOTTING OF ARTERIOVENOUS GRAFT Right 3/9/2022    Procedure: [id number];  Surgeon: Jeison Hunt MD;  Location: [facility] OR;  Service: Vascular;  Laterality: Right;    DECLOTTING OF ARTERIOVENOUS GRAFT Right 6/13/2022    Procedure: [id number]; REVISION OF FISTULA W/INSERTION OF NEW AV GRAFT;  Surgeon: Jeison Hunt MD;  Location: [facility] OR;  Service: Vascular;  Laterality: Right;    FISTULOGRAM N/A 2/11/2022    Procedure: Fistulogram;  Surgeon: Dejuan Cody MD;  Location: [facility] CATH LAB/EP;  Service: Cardiology;  Laterality: N/A;    FISTULOGRAM N/A 6/17/2022    Procedure: Fistulogram;  Surgeon: Dejuan Cody MD;  Location: [facility] CATH LAB/EP;  Service: Cardiology;  Laterality: N/A;    FOOT SURGERY      INSERTION OF BIVENTRICULAR IMPLANTABLE CARDIOVERTER-DEFIBRILLATOR (ICD) Left 7/18/2019    Procedure: INSERTION, ICD, BIVENTRICULAR;  Surgeon: Arturo Bay MD;  Location: [facility] EP LAB;  Service: Cardiology;  Laterality: Left;  CHB, CRTD, SJM, anes, GP, 6078    LEFT HEART CATHETERIZATION N/A 7/16/2019    Procedure: Left heart cath;  Surgeon: Dejuan Cody MD;  Location: [facility] CATH LAB/EP;  Service: Cardiology;  Laterality: N/A;    PERCUTANEOUS TRANSLUMINAL ANGIOPLASTY OF ARTERIOVENOUS FISTULA Right 2/11/2022    Procedure: PTA, AV FISTULA;  Surgeon: Dejuan Cody MD;   Location: [facility] CATH LAB/EP;  Service: Cardiology;  Laterality: Right;    PERCUTANEOUS TRANSLUMINAL ANGIOPLASTY OF ARTERIOVENOUS FISTULA N/A 6/17/2022    Procedure: PTA, AV FISTULA;  Surgeon: Dejuan Cody MD;  Location: [facility] CATH LAB/EP;  Service: Cardiology;  Laterality: N/A;    PERITONEAL CATHETER REMOVAL Left 4/11/2020    Procedure: REMOVAL, CATHETER, DIALYSIS, PERITONEAL;  Surgeon: Edis Snell Jr., MD;  Location: [facility] OR;  Service: General;  Laterality: Left;    PHLEBOGRAPHY  6/15/2022    Procedure: Venogram;  Surgeon: Betsey Mckeon MD;  Location: [facility] CATH LAB/EP;  Service: Cardiology;;    PLACEMENT OF ARTERIOVENOUS GRAFT Right 2/18/2022    Procedure: INSERTION, GRAFT, ARTERIOVENOUS;  Surgeon: Jeison Hunt MD;  Location: [facility] OR;  Service: Vascular;  Laterality: Right;    REVISION OF PROCEDURE INVOLVING ARTERIOVENOUS GRAFT Right 2/18/2022    Procedure: REVISION, PROCEDURE INVOLVING ARTERIOVENOUS GRAFT;  Surgeon: Jeison Hunt MD;  [facility];  Service: Vascular;  Laterality: Right;     Family History   Problem Relation Age of Onset    Heart attack Father      Social History     Tobacco Use    Smoking status: Former Smoker    Smokeless tobacco: Never Used   Substance Use Topics    Alcohol use: Yes     Comment: social    Drug use: No       Review of patient's allergies indicates:  No Known Allergies         OBJECTIVE:     Vital Signs (Most Recent)  Vitals:    07/04/22 0735 07/04/22 0745 07/04/22 0808 07/04/22 0920   BP: (!) 112/53      BP Location: Left arm      Patient Position: Sitting      Pulse: 61      Resp: 18   17   Temp: 97.1 °F (36.2 °C)      TempSrc: Oral      SpO2: 100% 95% 95%    Weight:       Height:                     Medications:   sodium chloride 0.9%   Intravenous Once    aspirin  81 mg Oral Daily    carvediloL  3.125 mg Oral BID    clopidogreL  75 mg Oral Daily    midodrine  10 mg Oral TID WM    mupirocin   Nasal BID     piperacillin-tazobactam (ZOSYN) IVPB  4.5 g Intravenous Q12H    senna-docusate 8.6-50 mg  1 tablet Oral BID    sodium bicarbonate  650 mg Oral BID    sodium zirconium cyclosilicate  5 g Oral Daily           Physical Exam  Vitals and nursing note reviewed.   Constitutional:       General: He is not in acute distress.     Appearance: He is not diaphoretic.   HENT:      Head: Normocephalic and atraumatic.      Mouth/Throat:      Pharynx: No oropharyngeal exudate.   Eyes:      General: No scleral icterus.     Conjunctiva/sclera: Conjunctivae normal.      Pupils: Pupils are equal, round, and reactive to light.   Cardiovascular:      Rate and Rhythm: Normal rate and regular rhythm.      Heart sounds: Normal heart sounds. No murmur heard.  Pulmonary:      Effort: Pulmonary effort is normal. No respiratory distress.      Breath sounds: Normal breath sounds.   Abdominal:      General: Bowel sounds are normal. There is no distension.      Palpations: Abdomen is soft.      Tenderness: There is no abdominal tenderness.   Musculoskeletal:         General: Swelling (LE blisters wounds ) present. Normal range of motion.      Cervical back: Normal range of motion and neck supple.      Right lower leg: Edema present.      Left lower leg: Edema present.      Comments: RUE AVG   thrill    Skin:     General: Skin is warm and dry.      Findings: No erythema.   Neurological:      Mental Status: He is alert and oriented to person, place, and time.      Cranial Nerves: No cranial nerve deficit.   Psychiatric:         Mood and Affect: Affect normal.         Cognition and Memory: Memory normal.         Judgment: Judgment normal.         Laboratory:  Recent Labs   Lab 07/03/22  0356 07/04/22  0321 07/04/22  0438   WBC 13.70* 17.52* 17.05*   HGB 9.1* 10.0* 9.9*   HCT 29.1* 31.4* 31.4*   * 136* 133*   MONO 8.8  1.2* 6.3  1.1* 5.4  0.9     Recent Labs   Lab 07/02/22  0332 07/03/22  0355 07/04/22  0321   * 134* 134*   K 4.9 4.4  5.2*   CL 95 96 96   CO2 18* 23 20*   BUN 53* 37* 47*   CREATININE 10.4* 8.1* 9.1*   CALCIUM 8.4* 8.5* 8.6*   PHOS 4.7* 4.6* 5.7*       Diagnostic Results:  X-Ray: Reviewed  US: Reviewed  Echo: Reviewed  ASSESSMENT/PLAN:     1. ESRD on HD TTS with Dr Decker  -- Keep TTS HD tomorrow after surgery (angio)   -- Daily Renal Function Panel  -- Avoid Hypotension.  -- Renally dose all meds    2. HTN (I10) -    3. Anemia of chronic kidney disease treated with ROSALVA (N18.9 D63.1)    EPogen  with each HD - hold for now  Active infection   Recent Labs   Lab 07/03/22 0356 07/04/22 0321 07/04/22  0438   HGB 9.1* 10.0* 9.9*   HCT 29.1* 31.4* 31.4*   * 136* 133*       Iron   Lab Results   Component Value Date    IRON 63 12/18/2019    TIBC 263 12/18/2019    FERRITIN 6,078 (H) 03/28/2020       4. MBD (E88.9 M90.80) -  Recent Labs   Lab 07/04/22 0321   CALCIUM 8.6*   PHOS 5.7*     Recent Labs   Lab 07/02/22 0332 07/03/22 0355 07/04/22  0321   MG 1.7 1.6 1.7       Lab Results   Component Value Date    .0 (H) 03/04/2020    CALCIUM 8.6 (L) 07/04/2022    PHOS 5.7 (H) 07/04/2022     No results found for: [id number]    Lab Results   Component Value Date    CO2 20 (L) 07/04/2022       5. Nutrition/Hypoalbuminemia (E88.09) -    Recent Labs   Lab 07/01/22 2019 07/02/22 0332 07/03/22 0355 07/04/22  0321   LABPROT 15.2*  --   --   --    ALBUMIN  --    < > 2.1* 2.1*    < > = values in this interval not displayed.     Nepro with meals TID. Renal vitamins daily      With any question please call answering service [phone]  Laura Ortiz MD    [facility]  ANA MARIA Burns MD, FACMARCIO,   KEREN Woo MD,   MD DAMION Gill MD E. V. Harmon, NP    [address]  [place], [id number]

## 2022-07-04 NOTE — ASSESSMENT & PLAN NOTE
76-year-old male 76-year-old male with history of end-stage renal disease on hemodialysis Tuesday Thursday Saturday, coronary disease, diabetes, CHF, hypertension, and peripheral vascular disease who was referred by home health and cardiology for worsening status of lower extremity vascular disease. Patient has worsening gangrene of several toes and blister formation on bilateral feet.     -await angio  -await podiatry plan for worsening toe gangrene  -was planned for TMA  -given elevated WBC would continue vanco and zosyn for now

## 2022-07-04 NOTE — PLAN OF CARE
Recommendation:  1. Add ADA and Renal restrictions to diet.   2. Add Novasource Renal BID.   3. Encourage intake at meals as tolerated.   4. RD to follow to monitor po intake    Goals:   Pt will tolerate diet with at least 75% intake at meals by RD follow up  Nutrition Goal Status: new

## 2022-07-04 NOTE — ASSESSMENT & PLAN NOTE
-unlikely acute limb ischemia given history.  -Patient was recently discharged on 6/28/22 for clotted vascular access and was to follow up outpatient under vascular and for the gangrene of the toe with Dr. Adam  -Left 5th and 3rd toes black in color see photos above  -Unable to palpate pedal pulses bilateral   -Bilateral pitting edema +4  -continue vanc and zosyn  -continue heparin gtt  -Cardiology consulted; appreciate Recommendations for  arterial dopplers

## 2022-07-04 NOTE — ASSESSMENT & PLAN NOTE
Contributing Nutrition Diagnosis  Increased energy/protein needs    Related to (etiology):   Wound healing    Signs and Symptoms (as evidenced by):   Gangrene to toes, plan for surgery    Interventions:  Collaboration with other providers  [employer] beverage    Nutrition Diagnosis Status:   Continues

## 2022-07-04 NOTE — SUBJECTIVE & OBJECTIVE
Interval History:   Patient complains of R foot pain. He is also concerned about moving forward with his treatment.   He denies further complaints.         Review of Systems   Constitutional:  Negative for activity change and appetite change.   HENT:  Negative for trouble swallowing and voice change.    Eyes:  Negative for photophobia and visual disturbance.   Respiratory:  Negative for cough, choking, chest tightness and shortness of breath.    Cardiovascular:  Negative for chest pain, palpitations and leg swelling.   Gastrointestinal:  Negative for abdominal pain, constipation, diarrhea, nausea and vomiting.   Endocrine: Negative.    Genitourinary:  Negative for decreased urine volume and urgency.   Musculoskeletal:  Positive for arthralgias.   Skin:  Positive for color change and wound.   Neurological:  Negative for dizziness, light-headedness and headaches.   Hematological: Negative.    Objective:     Vital Signs (Most Recent):  Temp: 97.1 °F (36.2 °C) (07/04/22 0735)  Pulse: 61 (07/04/22 0735)  Resp: 17 (07/04/22 0920)  BP: (!) 112/53 (07/04/22 0735)  SpO2: 95 % (07/04/22 0808)   Vital Signs (24h Range):  Temp:  [97 °F (36.1 °C)-97.7 °F (36.5 °C)] 97.1 °F (36.2 °C)  Pulse:  [60-64] 61  Resp:  [17-18] 17  SpO2:  [95 %-100 %] 95 %  BP: ()/(50-55) 112/53     Weight: 124.5 kg (274 lb 7.6 oz)  Body mass index is 36.21 kg/m².  No intake or output data in the 24 hours ending 07/04/22 0956     Physical Exam  Vitals and nursing note reviewed.   Constitutional:       General: He is not in acute distress.     Appearance: Normal appearance. He is obese. He is not ill-appearing, toxic-appearing or diaphoretic.   HENT:      Head: Normocephalic and atraumatic.      Mouth/Throat:      Pharynx: Oropharynx is clear.   Eyes:      Extraocular Movements: Extraocular movements intact.      Conjunctiva/sclera: Conjunctivae normal.   Cardiovascular:      Rate and Rhythm: Normal rate and regular rhythm.      Heart sounds: Normal  heart sounds. No murmur heard.     Comments: Unable to palpate pedal pulses  Pulmonary:      Effort: No respiratory distress.      Breath sounds: Normal breath sounds.   Abdominal:      General: Bowel sounds are normal.      Palpations: Abdomen is soft.   Musculoskeletal:         General: Swelling present. Normal range of motion.      Cervical back: Normal range of motion and neck supple.      Right lower leg: Edema present.      Left lower leg: Edema present.   Skin:     Comments: See photos   Neurological:      General: No focal deficit present.      Mental Status: He is alert and oriented to person, place, and time. Mental status is at baseline.   Psychiatric:         Mood and Affect: Mood normal.         Behavior: Behavior normal.         Thought Content: Thought content normal.         Judgment: Judgment normal.           BNP  No results for input(s): BNP, BNPTRIAGEBLO in the last 168 hours.    Significant Labs: A1C:   Recent Labs   Lab 06/14/22  1007   HGBA1C 6.9*     ABGs: No results for input(s): PH, PCO2, HCO3, POCSATURATED, BE, TOTALHB, COHB, METHB, O2HB, POCFIO2, PO2 in the last 48 hours.  Bilirubin:   Recent Labs   Lab 06/13/22  1125 07/01/22  1951 07/02/22  0332 07/03/22  0355 07/04/22  0321   BILITOT 0.2 0.6 0.5 0.6 0.6     CBC:   Recent Labs   Lab 07/03/22  0356 07/04/22  0321 07/04/22  0438   WBC 13.70* 17.52* 17.05*   HGB 9.1* 10.0* 9.9*   HCT 29.1* 31.4* 31.4*   * 136* 133*     CMP:   Recent Labs   Lab 07/03/22  0355 07/04/22  0321   * 134*   K 4.4 5.2*   CL 96 96   CO2 23 20*   * 185*   BUN 37* 47*   CREATININE 8.1* 9.1*   CALCIUM 8.5* 8.6*   PROT 5.8* 6.0   ALBUMIN 2.1* 2.1*   BILITOT 0.6 0.6   ALKPHOS 134 139*   AST 13 9*   ALT <5* <5*   ANIONGAP 15 18*   EGFRNONAA 6* 5*     Lactic Acid:   No results for input(s): LACTATE in the last 48 hours.    Lipase: No results for input(s): LIPASE in the last 48 hours.  Lipid Panel: No results for input(s): CHOL, HDL, LDLCALC, TRIG,  CHOLHDL in the last 48 hours.  Magnesium:   Recent Labs   Lab 07/03/22  0355 07/04/22  0321   MG 1.6 1.7     Troponin: No results for input(s): TROPONINI in the last 48 hours.  TSH: No results for input(s): TSH in the last 4320 hours.  Urine Culture: No results for input(s): LABURIN in the last 48 hours.  Urine Studies: No results for input(s): COLORU, APPEARANCEUA, PHUR, SPECGRAV, PROTEINUA, GLUCUA, KETONESU, BILIRUBINUA, OCCULTUA, NITRITE, UROBILINOGEN, LEUKOCYTESUR, RBCUA, WBCUA, BACTERIA, SQUAMEPITHEL, HYALINECASTS in the last 48 hours.    Invalid input(s): [facility]      Microbiology Results (last 7 days)       Procedure Component Value Units Date/Time    Blood culture (site 1) [[id number]] Collected: 07/01/22 2238    Order Status: Completed Specimen: Blood Updated: 07/03/22 1212     Blood Culture, Routine No Growth to date      No Growth to date    Narrative:      Site # 1, aerobic and anaerobic  Collection has been rescheduled by [email or web address] at 07/01/2022 21:42 Reason:   Unable to collect tried several times Nurse spencer was notified   Collection has been rescheduled by [email or web address] at 07/01/2022 21:42 Reason:   Unable to collect tried several times  spencer was notified     Blood culture (site 2) [[id number]] Collected: 07/01/22 2239    Order Status: Completed Specimen: Blood Updated: 07/03/22 1212     Blood Culture, Routine No Growth to date      No Growth to date    Narrative:      Site # 2, aerobic only  Collection has been rescheduled by [email or web address] at 07/01/2022 21:42 Reason:   Unable to collect tried several times Nurse spencer was notified   Collection has been rescheduled by [email or web address] at 07/01/2022 21:42 Reason:   Unable to collect tried several times Nurse palmer was notified             Significant Imaging: I have reviewed all pertinent imaging results/findings within the past 24 hours.

## 2022-07-04 NOTE — PLAN OF CARE
Pt AAO4. No complaints of pain or signs of distress. IV heparin therapeutic infusing at 8units/7.6mL/hr. Pt also received 500mg IV vancomycin x1 and zosyn. NICOLETTE fistula +T/B. Bilateral foot wound LAKSHMI. Blood glucose checks q6hr. Fall precautions maintained. Bed low, call light within reach. Will continue to monitor.

## 2022-07-04 NOTE — SUBJECTIVE & OBJECTIVE
Interval History: Awaiting angiogram    Review of Systems   Constitutional:  Negative for activity change and appetite change.   Skin:  Positive for color change and wound.   Neurological:  Negative for dizziness.   Objective:     Vital Signs (Most Recent):  Temp: 97.1 °F (36.2 °C) (07/04/22 0735)  Pulse: 61 (07/04/22 0735)  Resp: 17 (07/04/22 0920)  BP: (!) 112/53 (07/04/22 0735)  SpO2: 95 % (07/04/22 0808)   Vital Signs (24h Range):  Temp:  [97 °F (36.1 °C)-97.7 °F (36.5 °C)] 97.1 °F (36.2 °C)  Pulse:  [60-64] 61  Resp:  [17-18] 17  SpO2:  [95 %-100 %] 95 %  BP: ()/(50-53) 112/53     Weight: 124.5 kg (274 lb 7.6 oz)  Body mass index is 36.21 kg/m².    Estimated Creatinine Clearance: 9.8 mL/min (A) (based on SCr of 9.1 mg/dL (H)).    Physical Exam  Vitals and nursing note reviewed.   Constitutional:       General: He is not in acute distress.     Appearance: Normal appearance. He is obese. He is not ill-appearing, toxic-appearing or diaphoretic.   HENT:      Head: Normocephalic and atraumatic.      Mouth/Throat:      Mouth: Mucous membranes are dry.   Cardiovascular:      Rate and Rhythm: Normal rate and regular rhythm.      Heart sounds: No murmur heard.     Comments: Unable to palpate pedal pulses  Pulmonary:      Effort: No respiratory distress.      Breath sounds: Normal breath sounds.   Abdominal:      General: Bowel sounds are normal.      Palpations: Abdomen is soft.   Musculoskeletal:         General: Normal range of motion.      Cervical back: Normal range of motion.      Right lower leg: Edema present.      Left lower leg: Edema present.   Skin:     Comments: See photos   Neurological:      General: No focal deficit present.      Mental Status: He is alert and oriented to person, place, and time. Mental status is at baseline.   Psychiatric:         Mood and Affect: Mood normal.         Behavior: Behavior normal.         Thought Content: Thought content normal.         Judgment: Judgment normal.        Significant Labs: All pertinent labs within the past 24 hours have been reviewed.    Significant Imaging: I have reviewed all pertinent imaging results/findings within the past 24 hours.

## 2022-07-04 NOTE — ASSESSMENT & PLAN NOTE
No ICD shocks.  Rhythm regular.  Condition stable.  Most recent interrogation March 2022 without any ventricular tachycardia reported.  There was Bi V pacing confirmed.    Given frequency of wide complex tachycardia/V-tach on monitor, low-dose carvedilol resumed today.  I need to investigate why his amiodarone was withdrawn.

## 2022-07-04 NOTE — PROGRESS NOTES
[facility]  Cardiology  Progress Note    Patient Name: Houston Jc  MRN: [id number]  Admission Date: 7/1/2022  Hospital Length of Stay: 3 days  Code Status: Full Code   Attending Physician: Alysha Anderson*   Primary Care Physician: Zak Hamilton MD  Expected Discharge Date:   Principal Problem:Gangrene    Subjective:     Hospital Course:   7/3/2022 He is lying supine.  He follows commands and response to questions.  His right foot pain is about the same.  He has minimal left foot pain compared to the right.  Plans are being made for angiography left leg possibly Tuesday.  He is in no distress.  He is on antibiotics.  Aspirin was added, he is on clopidogrel and heparin.  Doppler signals right foot have been confirmed (recent DVA).  7/4/2022  He has dialysis Tuesdays Thursdays and Saturdays.  He has no fevers.  His foot pain is about the same.  He is on heparin.  No bleeding problems noted.  He is awake and alert.  He has a defibrillator.  His ejection fraction is 35%.  There is history of ventricular tachycardia.  Telemetry does show multiple runs of ventricular tachycardia unsustained without defibrillator shocks delivered.      Interval History:  For angiography tomorrow.  Progression of tissue ischemia left 5th toe noted.  There is also progression of ischemia right 1st toe.  Both feet are warm to touch.  Doppler signals confirmed on previous evaluation.  No changes clinically speaking reported by the patient.    Review of Systems   Constitutional: Negative for chills, decreased appetite, diaphoresis, fever, malaise/fatigue, night sweats, weight gain and weight loss.   HENT:  Negative for congestion, ear discharge, ear pain, hearing loss, hoarse voice, nosebleeds, odynophagia, sore throat, stridor and tinnitus.    Eyes:  Negative for blurred vision, discharge, double vision, pain, photophobia, redness, vision loss in left eye, vision loss in right eye, visual disturbance and visual halos.    Cardiovascular:  Negative for chest pain, claudication, cyanosis, dyspnea on exertion, irregular heartbeat, leg swelling, near-syncope, orthopnea, palpitations, paroxysmal nocturnal dyspnea and syncope.   Respiratory:  Negative for cough, hemoptysis, shortness of breath, sleep disturbances due to breathing, snoring, sputum production and wheezing.    Endocrine: Negative for cold intolerance, heat intolerance, polydipsia, polyphagia and polyuria.   Hematologic/Lymphatic: Negative for adenopathy and bleeding problem. Does not bruise/bleed easily.   Skin:  Positive for color change and poor wound healing. Negative for dry skin, flushing, itching, nail changes, rash, skin cancer, suspicious lesions and unusual hair distribution.   Musculoskeletal:  Negative for arthritis, back pain, falls, gout, joint pain, joint swelling, muscle cramps, muscle weakness, myalgias, neck pain and stiffness.   Gastrointestinal:  Negative for bloating, abdominal pain, anorexia, change in bowel habit, bowel incontinence, constipation, diarrhea, dysphagia, excessive appetite, flatus, heartburn, hematemesis, hematochezia, hemorrhoids, jaundice, melena, nausea and vomiting.   Genitourinary:  Negative for bladder incontinence, decreased libido, dysuria, flank pain, frequency, genital sores, hematuria, hesitancy, incomplete emptying, nocturia and urgency.   Neurological:  Positive for weakness. Negative for aphonia, brief paralysis, difficulty with concentration, disturbances in coordination, excessive daytime sleepiness, dizziness, focal weakness, headaches, light-headedness, loss of balance, numbness, paresthesias, seizures, sensory change, tremors and vertigo.   Psychiatric/Behavioral:  Negative for altered mental status, depression, hallucinations, memory loss, substance abuse, suicidal ideas and thoughts of violence. The patient does not have insomnia and is not nervous/anxious.    Allergic/Immunologic: Negative for hives and persistent  infections.   Objective:     Vital Signs (Most Recent):  Temp: 97.1 °F (36.2 °C) (07/04/22 0735)  Pulse: 61 (07/04/22 0735)  Resp: 17 (07/04/22 0920)  BP: (!) 112/53 (07/04/22 0735)  SpO2: 95 % (07/04/22 0808)   Vital Signs (24h Range):  Temp:  [97 °F (36.1 °C)-97.7 °F (36.5 °C)] 97.1 °F (36.2 °C)  Pulse:  [60-64] 61  Resp:  [17-18] 17  SpO2:  [95 %-100 %] 95 %  BP: ()/(50-55) 112/53     Weight: 124.5 kg (274 lb 7.6 oz)  Body mass index is 36.21 kg/m².     SpO2: 95 %  O2 Device (Oxygen Therapy): room air    No intake or output data in the 24 hours ending 07/04/22 1002    Lines/Drains/Airways       Central Venous Catheter Line  Duration                  Hemodialysis AV Graft Right forearm -- days              Peripheral Intravenous Line  Duration                  Peripheral IV - Single Lumen 07/1947 20 G Left Antecubital 2 days         Peripheral IV - Single Lumen 07/03/22 1935 22 G Anterior;Left Hand <1 day                    Physical Exam  Constitutional:       General: He is not in acute distress.     Appearance: He is well-developed. He is not diaphoretic.   HENT:      Head: Normocephalic and atraumatic.      Nose: Nose normal.   Eyes:      General: No scleral icterus.        Right eye: No discharge.      Conjunctiva/sclera: Conjunctivae normal.      Pupils: Pupils are equal, round, and reactive to light.   Neck:      Thyroid: No thyromegaly.      Vascular: No JVD.      Trachea: No tracheal deviation.   Cardiovascular:      Rate and Rhythm: Normal rate and regular rhythm.      Pulses:           Carotid pulses are 2+ on the right side and 2+ on the left side.       Radial pulses are 2+ on the right side.        Dorsalis pedis pulses are 0 on the right side and 0 on the left side.        Posterior tibial pulses are 0 on the right side and 0 on the left side.      Heart sounds: Normal heart sounds. No murmur heard.    No friction rub. No gallop.      Comments: Left foot Doppler audible signals dp, pt  area.  Pulmonary:      Effort: Pulmonary effort is normal. No respiratory distress.      Breath sounds: Normal breath sounds. No stridor. No wheezing or rales.   Chest:      Chest wall: No tenderness.   Abdominal:      General: Bowel sounds are normal. There is no distension.      Palpations: Abdomen is soft. There is no mass.      Tenderness: There is no abdominal tenderness. There is no guarding or rebound.   Musculoskeletal:         General: No tenderness. Normal range of motion.      Cervical back: Normal range of motion and neck supple.   Lymphadenopathy:      Cervical: No cervical adenopathy.   Skin:     General: Skin is warm and dry.      Coloration: Skin is not pale.      Findings: No erythema or rash.   Neurological:      Mental Status: He is alert and oriented to person, place, and time.      Cranial Nerves: No cranial nerve deficit.      Coordination: Coordination normal.   Psychiatric:         Behavior: Behavior normal.         Thought Content: Thought content normal.         Judgment: Judgment normal.       Significant Labs: BMP:   Recent Labs   Lab 07/03/22  0355 07/04/22  0321   * 185*   * 134*   K 4.4 5.2*   CL 96 96   CO2 23 20*   BUN 37* 47*   CREATININE 8.1* 9.1*   CALCIUM 8.5* 8.6*   MG 1.6 1.7   , CBC   Recent Labs   Lab 07/03/22  0356 07/04/22  0321 07/04/22  0438   WBC 13.70* 17.52* 17.05*   HGB 9.1* 10.0* 9.9*   HCT 29.1* 31.4* 31.4*   * 136* 133*   , and All pertinent lab results from the last 24 hours have been reviewed.    Significant Imaging:     Assessment and Plan:     Brief HPI:  Recent hospital discharge, readmission for progression of ischemic toes bilaterally.  Angiogram planned for tomorrow.    Critical limb ischemia with history of revascularization of same extremity  Repeat angiography planned for next week.  Heparin will be continued for now.  I do not see any significant change in leg findings today.    Anemia, chronic renal failure, stage 5  Hemoglobin  currently 9.1.    Type 2 diabetes mellitus with chronic kidney disease on chronic dialysis, with long-term current use of insulin  Condition stable.    Acute deep vein thrombosis (DVT) of popliteal vein of right lower extremity  Eliquis on hold. heparin to continue.  Recent right popliteal vein DVT documented.  There is some edema to the right leg.  Water blebs possibly related to venous stasis.  The foot is warm.    PAD (peripheral artery disease)  Progression of ischemic toes bilaterally.  The most concerning is the right 1st toe.  To toes were for planned amputation and they clearly are nonsalvageable.  His left 5th toe has developed gangrene which is new.  It looks nonsalvageable.    Chronic combined systolic and diastolic congestive heart failure  Ejection fraction 35%.  He has previous resynchronization therapy.  Nonischemic cardiomyopathy.        Cardiac resynchronization therapy defibrillator (CRT-D) in place  No ICD shocks.  Rhythm regular.  Condition stable.  Most recent interrogation March 2022 without any ventricular tachycardia reported.  There was Bi V pacing confirmed.    Given frequency of wide complex tachycardia/V-tach on monitor, low-dose carvedilol resumed today.  I need to investigate why his amiodarone was withdrawn.    Morbid obesity  Weight loss encouraged.    Primary hypertension  He is on carvedilol.  Readings are on the low side.  He does require midodrine.        VTE Risk Mitigation (From admission, onward)         Ordered     heparin 25,000 units in dextrose 5% (100 units/ml) IV bolus from bag - ADDITIONAL PRN BOLUS - 60 units/kg  As needed (PRN)        Question:  Heparin Infusion Adjustment (DO NOT MODIFY ANSWER)  Answer:  [email or web address]\epic\Images\Pharmacy\HeparinInfusions\heparin HIGH INTENSITY nomogram for [facility] [id number]    07/01/22 1953     heparin 25,000 units in dextrose 5% (100 units/ml) IV bolus from bag - ADDITIONAL PRN BOLUS - 30 units/kg  As needed (PRN)        Question:   Heparin Infusion Adjustment (DO NOT MODIFY ANSWER)  Answer:  [email or web address]\epic\Images\Pharmacy\HeparinInfusions\heparin HIGH INTENSITY nomogram for [facility] [id number]    07/01/22 1953     heparin 25,000 units in dextrose 5% 250 mL (100 units/mL) infusion HIGH INTENSITY nomogram - [facility]  Continuous        Question Answer Comment   Heparin Infusion Adjustment (DO NOT MODIFY ANSWER) [email or web address]\epic\Images\Pharmacy\HeparinInfusions\heparin HIGH INTENSITY nomogram for [facility] [id number]    Begin at (in units/kg/hr) 18        07/01/22 1953     IP VTE HIGH RISK PATIENT  Once         07/01/22 2014     Place sequential compression device  Until discontinued         07/01/22 2014     Reason for No Pharmacological VTE Prophylaxis  Once        Question:  Reasons:  Answer:  Already adequately anticoagulated on oral Anticoagulants    07/01/22 2014            He appears comfortable.  Coreg added for prevention of ventricular tachycardia documented on previous admissions.  He is on midodrine for chronic low blood pressure.  NPO for angiogram tomorrow.    Joey Ledbetter MD  Cardiology  [place] - Telemetry

## 2022-07-04 NOTE — SUBJECTIVE & OBJECTIVE
Interval History:  For angiography tomorrow.  Progression of tissue ischemia left 5th toe noted.  There is also progression of ischemia right 1st toe.  Both feet are warm to touch.  Doppler signals confirmed on previous evaluation.  No changes clinically speaking reported by the patient.    Review of Systems   Constitutional: Negative for chills, decreased appetite, diaphoresis, fever, malaise/fatigue, night sweats, weight gain and weight loss.   HENT:  Negative for congestion, ear discharge, ear pain, hearing loss, hoarse voice, nosebleeds, odynophagia, sore throat, stridor and tinnitus.    Eyes:  Negative for blurred vision, discharge, double vision, pain, photophobia, redness, vision loss in left eye, vision loss in right eye, visual disturbance and visual halos.   Cardiovascular:  Negative for chest pain, claudication, cyanosis, dyspnea on exertion, irregular heartbeat, leg swelling, near-syncope, orthopnea, palpitations, paroxysmal nocturnal dyspnea and syncope.   Respiratory:  Negative for cough, hemoptysis, shortness of breath, sleep disturbances due to breathing, snoring, sputum production and wheezing.    Endocrine: Negative for cold intolerance, heat intolerance, polydipsia, polyphagia and polyuria.   Hematologic/Lymphatic: Negative for adenopathy and bleeding problem. Does not bruise/bleed easily.   Skin:  Positive for color change and poor wound healing. Negative for dry skin, flushing, itching, nail changes, rash, skin cancer, suspicious lesions and unusual hair distribution.   Musculoskeletal:  Negative for arthritis, back pain, falls, gout, joint pain, joint swelling, muscle cramps, muscle weakness, myalgias, neck pain and stiffness.   Gastrointestinal:  Negative for bloating, abdominal pain, anorexia, change in bowel habit, bowel incontinence, constipation, diarrhea, dysphagia, excessive appetite, flatus, heartburn, hematemesis, hematochezia, hemorrhoids, jaundice, melena, nausea and vomiting.    Genitourinary:  Negative for bladder incontinence, decreased libido, dysuria, flank pain, frequency, genital sores, hematuria, hesitancy, incomplete emptying, nocturia and urgency.   Neurological:  Positive for weakness. Negative for aphonia, brief paralysis, difficulty with concentration, disturbances in coordination, excessive daytime sleepiness, dizziness, focal weakness, headaches, light-headedness, loss of balance, numbness, paresthesias, seizures, sensory change, tremors and vertigo.   Psychiatric/Behavioral:  Negative for altered mental status, depression, hallucinations, memory loss, substance abuse, suicidal ideas and thoughts of violence. The patient does not have insomnia and is not nervous/anxious.    Allergic/Immunologic: Negative for hives and persistent infections.   Objective:     Vital Signs (Most Recent):  Temp: 97.1 °F (36.2 °C) (07/04/22 0735)  Pulse: 61 (07/04/22 0735)  Resp: 17 (07/04/22 0920)  BP: (!) 112/53 (07/04/22 0735)  SpO2: 95 % (07/04/22 0808)   Vital Signs (24h Range):  Temp:  [97 °F (36.1 °C)-97.7 °F (36.5 °C)] 97.1 °F (36.2 °C)  Pulse:  [60-64] 61  Resp:  [17-18] 17  SpO2:  [95 %-100 %] 95 %  BP: ()/(50-55) 112/53     Weight: 124.5 kg (274 lb 7.6 oz)  Body mass index is 36.21 kg/m².     SpO2: 95 %  O2 Device (Oxygen Therapy): room air    No intake or output data in the 24 hours ending 07/04/22 1002    Lines/Drains/Airways       Central Venous Catheter Line  Duration                  Hemodialysis AV Graft Right forearm -- days              Peripheral Intravenous Line  Duration                  Peripheral IV - Single Lumen 07/1947 20 G Left Antecubital 2 days         Peripheral IV - Single Lumen 07/03/22 1935 22 G Anterior;Left Hand <1 day                    Physical Exam  Constitutional:       General: He is not in acute distress.     Appearance: He is well-developed. He is not diaphoretic.   HENT:      Head: Normocephalic and atraumatic.      Nose: Nose normal.   Eyes:       General: No scleral icterus.        Right eye: No discharge.      Conjunctiva/sclera: Conjunctivae normal.      Pupils: Pupils are equal, round, and reactive to light.   Neck:      Thyroid: No thyromegaly.      Vascular: No JVD.      Trachea: No tracheal deviation.   Cardiovascular:      Rate and Rhythm: Normal rate and regular rhythm.      Pulses:           Carotid pulses are 2+ on the right side and 2+ on the left side.       Radial pulses are 2+ on the right side.        Dorsalis pedis pulses are 0 on the right side and 0 on the left side.        Posterior tibial pulses are 0 on the right side and 0 on the left side.      Heart sounds: Normal heart sounds. No murmur heard.    No friction rub. No gallop.      Comments: Left foot Doppler audible signals dp, pt area.  Pulmonary:      Effort: Pulmonary effort is normal. No respiratory distress.      Breath sounds: Normal breath sounds. No stridor. No wheezing or rales.   Chest:      Chest wall: No tenderness.   Abdominal:      General: Bowel sounds are normal. There is no distension.      Palpations: Abdomen is soft. There is no mass.      Tenderness: There is no abdominal tenderness. There is no guarding or rebound.   Musculoskeletal:         General: No tenderness. Normal range of motion.      Cervical back: Normal range of motion and neck supple.   Lymphadenopathy:      Cervical: No cervical adenopathy.   Skin:     General: Skin is warm and dry.      Coloration: Skin is not pale.      Findings: No erythema or rash.   Neurological:      Mental Status: He is alert and oriented to person, place, and time.      Cranial Nerves: No cranial nerve deficit.      Coordination: Coordination normal.   Psychiatric:         Behavior: Behavior normal.         Thought Content: Thought content normal.         Judgment: Judgment normal.       Significant Labs: BMP:   Recent Labs   Lab 07/03/22  0355 07/04/22  0321   * 185*   * 134*   K 4.4 5.2*   CL 96 96   CO2 23  20*   BUN 37* 47*   CREATININE 8.1* 9.1*   CALCIUM 8.5* 8.6*   MG 1.6 1.7   , CBC   Recent Labs   Lab 07/03/22  0356 07/04/22  0321 07/04/22  0438   WBC 13.70* 17.52* 17.05*   HGB 9.1* 10.0* 9.9*   HCT 29.1* 31.4* 31.4*   * 136* 133*   , and All pertinent lab results from the last 24 hours have been reviewed.    Significant Imaging:

## 2022-07-04 NOTE — PROGRESS NOTES
[facility]  Infectious Disease  Progress Note    Patient Name: Houston Jc  MRN: [id number]  Admission Date: 7/1/2022  Length of Stay: 3 days  Attending Physician: Alysha Anderson*  Primary Care Provider: Zak Hamilton MD    Isolation Status: No active isolations  Assessment/Plan:      * Gangrene  76-year-old male 76-year-old male with history of end-stage renal disease on hemodialysis Tuesday Thursday Saturday, coronary disease, diabetes, CHF, hypertension, and peripheral vascular disease who was referred by home health and cardiology for worsening status of lower extremity vascular disease. Patient has worsening gangrene of several toes and blister formation on bilateral feet.     -await angio  -await podiatry plan for worsening toe gangrene  -was planned for TMA  -given elevated WBC would continue vanco and zosyn for now        Thank you for your consult. I will follow-up with patient. Please contact us if you have any additional questions.    Wallace Bradshaw MD  Infectious Disease  [facility]    Subjective:     Principal Problem:Gangrene    HPI: 76-year-old male with history of end-stage renal disease on hemodialysis Tuesday Thursday Saturday, coronary disease, diabetes, CHF, hypertension, and peripheral vascular disease who was referred by home health and cardiology for worsening status of lower extremity vascular disease. Patient has worsening gangrene of several toes and blister formation on bilateral feet.  He reports pain to both feet. History of multiple angioplasty. No stents. He reports blisters to the right foot for 1 week with 2 new blisters on the left foot and 2 open blisters to the right foot. Reports compliance with dialysis with last dialysis session on Tuesday.        Interval History: Awaiting angiogram    Review of Systems   Constitutional:  Negative for activity change and appetite change.   Skin:  Positive for color change and wound.   Neurological:  Negative for  dizziness.   Objective:     Vital Signs (Most Recent):  Temp: 97.1 °F (36.2 °C) (07/04/22 0735)  Pulse: 61 (07/04/22 0735)  Resp: 17 (07/04/22 0920)  BP: (!) 112/53 (07/04/22 0735)  SpO2: 95 % (07/04/22 0808)   Vital Signs (24h Range):  Temp:  [97 °F (36.1 °C)-97.7 °F (36.5 °C)] 97.1 °F (36.2 °C)  Pulse:  [60-64] 61  Resp:  [17-18] 17  SpO2:  [95 %-100 %] 95 %  BP: ()/(50-53) 112/53     Weight: 124.5 kg (274 lb 7.6 oz)  Body mass index is 36.21 kg/m².    Estimated Creatinine Clearance: 9.8 mL/min (A) (based on SCr of 9.1 mg/dL (H)).    Physical Exam  Vitals and nursing note reviewed.   Constitutional:       General: He is not in acute distress.     Appearance: Normal appearance. He is obese. He is not ill-appearing, toxic-appearing or diaphoretic.   HENT:      Head: Normocephalic and atraumatic.      Mouth/Throat:      Mouth: Mucous membranes are dry.   Cardiovascular:      Rate and Rhythm: Normal rate and regular rhythm.      Heart sounds: No murmur heard.     Comments: Unable to palpate pedal pulses  Pulmonary:      Effort: No respiratory distress.      Breath sounds: Normal breath sounds.   Abdominal:      General: Bowel sounds are normal.      Palpations: Abdomen is soft.   Musculoskeletal:         General: Normal range of motion.      Cervical back: Normal range of motion.      Right lower leg: Edema present.      Left lower leg: Edema present.   Skin:     Comments: See photos   Neurological:      General: No focal deficit present.      Mental Status: He is alert and oriented to person, place, and time. Mental status is at baseline.   Psychiatric:         Mood and Affect: Mood normal.         Behavior: Behavior normal.         Thought Content: Thought content normal.         Judgment: Judgment normal.       Significant Labs: All pertinent labs within the past 24 hours have been reviewed.    Significant Imaging: I have reviewed all pertinent imaging results/findings within the past 24 hours.

## 2022-07-04 NOTE — PROGRESS NOTES
"[place] - Telemetry  Adult Nutrition  Progress Note    SUMMARY       Recommendations    Recommendation:  1. Add ADA and Renal restrictions to diet.   2. Add Novasource Renal BID.   3. Encourage intake at meals as tolerated.   4. RD to follow to monitor po intake    Goals:   Pt will tolerate diet with at least 75% intake at meals by RD follow up  Nutrition Goal Status: new  Communication of RD Recs: reviewed with RN    Assessment and Plan  Critical limb ischemia with history of revascularization of same extremity  Contributing Nutrition Diagnosis  Increased energy/protein needs    Related to (etiology):   Wound healing    Signs and Symptoms (as evidenced by):   Gangrene to toes, plan for surgery    Interventions:  Collaboration with other providers  Rapt    Nutrition Diagnosis Status:   New      Malnutrition Assessment  Subcutaneous Fat Loss (Final Summary): well nourished  Muscle Loss Evaluation (Final Summary): well nourished       Reason for Assessment  Reason For Assessment: identified at risk by screening criteria (MST)  Diagnosis:  (gangrene)  Relevant Medical History: CHF, CAD, HTN, DM, CKD, renal cyst, anemia, osteoarthritis of spine, L heart cath, defibrillator  General Information Comments: Pt on Cardiac diet with fair intake at meals. Pt reports decreased appetite and some nausea this morning. Anand 18- foot wound. Noted plan for surgery tomorrow. Encouraged pt to consume protein to promote wound healing. No recent weight loss noted. NFPE completed today 7/4-nourished.  Nutrition Discharge Planning: pt to d/c on Cardiac ADA diet    Nutrition Risk Screen  Nutrition Risk Screen: no indicators present    Nutrition/Diet History  Food Preferences: no Latter day or cultural food prefs identified  Factors Affecting Nutritional Intake: decreased appetite    Anthropometrics  Temp: 97.3 °F (36.3 °C)  Height Method: Stated  Height: 6' 1" (185.4 cm)  Height (inches): 73 in  Weight Method: Bed " Scale  Weight: 124.5 kg (274 lb 7.6 oz)  Weight (lb): 274.48 lb  Ideal Body Weight (IBW), Male: 184 lb  % Ideal Body Weight, Male (lb): 149.17 %  BMI (Calculated): 36.2  BMI Grade: 35 - 39.9 - obesity - grade II  Usual Body Weight (UBW), k.5 kg (1/10)  % Usual Body Weight: 106.18  % Weight Change From Usual Weight: 5.96 %     Lab/Procedures/Meds  Pertinent Labs Comments: Na 134L, K 5.2H, BUN 47H, Crea 9.1H, Glu 185H, Ca 8.6L, Phos 5.7H, Alb 2.1L  Pertinent Medications Comments: aspirin, senna, heparin    Estimated/Assessed Needs  Weight Used For Calorie Calculations: 124.5 kg (274 lb 7.6 oz)  Energy Calorie Requirements (kcal): 2490 (20 kcal/kg)  Energy Need Method: Kcal/kg  Protein Requirements: 99-124g (0.8-1.0g/kg)  Weight Used For Protein Calculations: 124.5 kg (274 lb 7.6 oz)  Estimated Fluid Requirement Method: RDA Method  RDA Method (mL): 2490  CHO Requirement: 270g    Nutrition Prescription Ordered  Current Diet Order: Cardiac    Evaluation of Received Nutrient/Fluid Intake  I/O: 120/500  Energy Calories Required: not meeting needs  Protein Required: not meeting needs  Fluid Required: not meeting needs  Comments: LBM 7/2  % Intake of Estimated Energy Needs: 25 - 50 %  % Meal Intake: 25 - 50 %    Nutrition Risk  Level of Risk/Frequency of Follow-up:  (2xweekly)     Monitor and Evaluation  Food and Nutrient Intake: food and beverage intake  Food and Nutrient Adminstration: diet order  Physical Activity and Function: nutrition-related ADLs and IADLs  Anthropometric Measurements: weight  Biochemical Data, Medical Tests and Procedures: electrolyte and renal panel  Nutrition-Focused Physical Findings: overall appearance     Nutrition Follow-Up    RD Follow-up?: Yes

## 2022-07-04 NOTE — ASSESSMENT & PLAN NOTE
Bullae  -Presented with blisters to right foot x1 week with 2 new blisters on left foot, right foot with multiple blisters with 2 open: see photos above  -WBC 22---> 17 , lactate 2.9  -on vanc and zosyn  -blood cultures with NGTD continue Vanc / Zosyn  -wound care consulted  Consult cardiology-  rec's continue heparin and plan repeat angiogram on Tuesday.   -NPO after midnight   - encourage pain regime for pain control, as patient last took a dose of medication yesterday

## 2022-07-05 NOTE — PROGRESS NOTES
Pharmacokinetic Assessment Follow Up: IV Vancomycin    Vancomycin serum concentration assessment(s):    The random level was drawn correctly and can be used to guide therapy at this time. The measurement is above the desired definitive target range of 10 to 15 mcg/mL.    Vancomycin Regimen Plan:    Give 500 mg IV Vancomycin x 1 dose after HD. Re-dose when the random level is less than 20 mcg/mL, next level to be drawn at 0400 on 7/7    Drug levels (last 3 results):  Recent Labs   Lab Result Units 07/03/22  0356 07/05/22  0405   Vancomycin, Random ug/mL 15.3 18.1       Pharmacy will continue to follow and monitor vancomycin.    Please contact pharmacy at extension [phone] for questions regarding this assessment.    Thank you for the consult,   Erika Malone       Patient brief summary:  Houston Jc is a 76 y.o. male initiated on antimicrobial therapy with IV Vancomycin for treatment of skin & soft tissue infection    The patient's current regimen is pulse dosing based on pre-HD levels.     Drug Allergies:   Review of patient's allergies indicates:  No Known Allergies    Actual Body Weight:   124.5 kg    Renal Function:   Estimated Creatinine Clearance: 9.8 mL/min (A) (based on SCr of 9.1 mg/dL (H)).,     Dialysis Method (if applicable):  intermittent HD    CBC (last 72 hours):  Recent Labs   Lab Result Units 07/03/22  0356 07/04/22  0321 07/04/22  0438 07/05/22  0404   WBC K/uL 13.70* 17.52* 17.05* 21.33*   Hemoglobin g/dL 9.1* 10.0* 9.9* 9.0*   Hematocrit % 29.1* 31.4* 31.4* 28.1*   Platelets K/uL 138* 136* 133* 146*   Gran % % 78.4* 86.7* 88.2* 88.7*   Lymph % % 11.3* 6.1* 5.2* 5.3*   Mono % % 8.8 6.3 5.4 5.2   Eosinophil % % 0.4 0.0 0.0 0.0   Basophil % % 0.3 0.2 0.2 0.1   Differential Method  Automated Automated Automated Automated       Metabolic Panel (last 72 hours):  Recent Labs   Lab Result Units 07/03/22  0355 07/04/22  0321   Sodium mmol/L 134* 134*   Potassium mmol/L 4.4 5.2*   Chloride mmol/L 96 96    CO2 mmol/L 23 20*   Glucose mg/dL 113* 185*   BUN mg/dL 37* 47*   Creatinine mg/dL 8.1* 9.1*   Albumin g/dL 2.1* 2.1*   Total Bilirubin mg/dL 0.6 0.6   Alkaline Phosphatase U/L 134 139*   AST U/L 13 9*   ALT U/L <5* <5*   Magnesium mg/dL 1.6 1.7   Phosphorus mg/dL 4.6* 5.7*       Vancomycin Administrations:  vancomycin given in the last 96 hours                     vancomycin 500 mg in dextrose 5 % 100 mL IVPB (ready to mix system) (mg) 500 mg New Bag 07/03/22 1934    vancomycin (VANCOCIN) 2,000 mg in dextrose 5 % 500 mL IVPB (mg) 2,000 mg New Bag 07/02/22 0017                    Microbiologic Results:  Microbiology Results (last 7 days)       Procedure Component Value Units Date/Time    Blood culture (site 1) [[id number]] Collected: 07/01/22 2238    Order Status: Completed Specimen: Blood Updated: 07/04/22 1212     Blood Culture, Routine No Growth to date      No Growth to date      No Growth to date    Narrative:      Site # 1, aerobic and anaerobic  Collection has been rescheduled by [email or web address] at 07/01/2022 21:42 Reason:   Unable to collect tried several times Nurse spencer was notified   Collection has been rescheduled by TD5 at 07/01/2022 21:42 Reason:   Unable to collect tried several times Nurse spencer was notified     Blood culture (site 2) [[id number]] Collected: 07/01/22 2239    Order Status: Completed Specimen: Blood Updated: 07/04/22 1212     Blood Culture, Routine No Growth to date      No Growth to date      No Growth to date    Narrative:      Site # 2, aerobic only  Collection has been rescheduled by TD5 at 07/01/2022 21:42 Reason:   Unable to collect tried several times Nurse spencer was notified   Collection has been rescheduled by TD5 at 07/01/2022 21:42 Reason:   Unable to collect tried several times Nurse spencer was notified

## 2022-07-05 NOTE — PLAN OF CARE
07/05/22 1400   Readmission   Why were you hospitalized in the last 30 days? clotted HD access & lower limb ischemia   Why were you readmitted? Related to previous admission   When you left the hospital where did you go? Home with Home Health   Did patient/caregiver refused recommended DC plan? No   Tell me about what happened between when you left the hospital and the day you returned. blisters to right foot & black toes   Did you call anyone? Yes   Who did you call? Home Health Nurse   Did you have  a follow-up appointment on discharge? Yes   Did you go? No  (readmitted prior to appt)   Was this a planned readmission? No

## 2022-07-05 NOTE — PROGRESS NOTES
LLE Fem-pop nerve block  Being done at bedside, Dr Benton and Dr. Thornton performing. Time out performed. Will cont to monitor as per protocol.

## 2022-07-05 NOTE — SUBJECTIVE & OBJECTIVE
Interval History:   Patient states foot pain is controlled today after taking pain medication. He is NPO since midnight and awaiting peripheral angiogram today.         Review of Systems   Constitutional:  Negative for activity change and appetite change.   HENT:  Negative for trouble swallowing and voice change.    Eyes:  Negative for photophobia and visual disturbance.   Respiratory:  Negative for cough, choking, chest tightness and shortness of breath.    Cardiovascular:  Negative for chest pain, palpitations and leg swelling.   Gastrointestinal:  Negative for abdominal pain, constipation, diarrhea, nausea and vomiting.   Endocrine: Negative.    Genitourinary:  Negative for decreased urine volume and urgency.   Musculoskeletal:  Positive for arthralgias.   Skin:  Positive for color change and wound.   Neurological:  Negative for dizziness, light-headedness and headaches.   Hematological: Negative.    Objective:     Vital Signs (Most Recent):  Temp: 96.8 °F (36 °C) (07/05/22 0456)  Pulse: 60 (07/05/22 0800)  Resp: 20 (07/05/22 0456)  BP: (!) 107/52 (07/05/22 0456)  SpO2: 98 % (07/05/22 0456)   Vital Signs (24h Range):  Temp:  [96.8 °F (36 °C)-97.5 °F (36.4 °C)] 96.8 °F (36 °C)  Pulse:  [59-66] 60  Resp:  [16-20] 20  SpO2:  [96 %-100 %] 98 %  BP: (104-122)/(51-68) 107/52     Weight: 124.5 kg (274 lb 7.6 oz)  Body mass index is 36.21 kg/m².    Intake/Output Summary (Last 24 hours) at 7/5/2022 0823  Last data filed at 7/5/2022 0600  Gross per 24 hour   Intake 125 ml   Output 25 ml   Net 100 ml        Physical Exam  Vitals and nursing note reviewed.   Constitutional:       General: He is not in acute distress.     Appearance: Normal appearance. He is obese. He is not ill-appearing, toxic-appearing or diaphoretic.   HENT:      Head: Normocephalic and atraumatic.      Mouth/Throat:      Pharynx: Oropharynx is clear.   Eyes:      Extraocular Movements: Extraocular movements intact.      Conjunctiva/sclera: Conjunctivae  normal.   Cardiovascular:      Rate and Rhythm: Normal rate and regular rhythm.      Heart sounds: Normal heart sounds. No murmur heard.     Comments: Unable to palpate pedal pulses  Pulmonary:      Effort: No respiratory distress.      Breath sounds: Normal breath sounds.   Abdominal:      General: Bowel sounds are normal.      Palpations: Abdomen is soft.   Musculoskeletal:         General: Swelling present. Normal range of motion.      Cervical back: Normal range of motion and neck supple.      Right lower leg: Edema present.      Left lower leg: Edema present.   Skin:     Comments: See photos   Neurological:      General: No focal deficit present.      Mental Status: He is alert and oriented to person, place, and time. Mental status is at baseline.   Psychiatric:         Mood and Affect: Mood normal.         Behavior: Behavior normal.         Thought Content: Thought content normal.         Judgment: Judgment normal.           BNP  No results for input(s): BNP, BNPTRIAGEBLO in the last 168 hours.    Significant Labs: A1C:   Recent Labs   Lab 06/14/22  1007   HGBA1C 6.9*       ABGs: No results for input(s): PH, PCO2, HCO3, POCSATURATED, BE, TOTALHB, COHB, METHB, O2HB, POCFIO2, PO2 in the last 48 hours.  Bilirubin:   Recent Labs   Lab 06/13/22  1125 07/01/22  1951 07/02/22  0332 07/03/22  0355 07/04/22  0321   BILITOT 0.2 0.6 0.5 0.6 0.6       CBC:   Recent Labs   Lab 07/04/22  0321 07/04/22  0438 07/05/22  0404   WBC 17.52* 17.05* 21.33*   HGB 10.0* 9.9* 9.0*   HCT 31.4* 31.4* 28.1*   * 133* 146*       CMP:   Recent Labs   Lab 07/04/22  0321   *   K 5.2*   CL 96   CO2 20*   *   BUN 47*   CREATININE 9.1*   CALCIUM 8.6*   PROT 6.0   ALBUMIN 2.1*   BILITOT 0.6   ALKPHOS 139*   AST 9*   ALT <5*   ANIONGAP 18*   EGFRNONAA 5*       Lactic Acid:   No results for input(s): LACTATE in the last 48 hours.    Lipase: No results for input(s): LIPASE in the last 48 hours.  Lipid Panel: No results for  input(s): CHOL, HDL, LDLCALC, TRIG, CHOLHDL in the last 48 hours.  Magnesium:   Recent Labs   Lab 07/04/22  0321   MG 1.7       Troponin: No results for input(s): TROPONINI in the last 48 hours.  TSH: No results for input(s): TSH in the last 4320 hours.  Urine Culture: No results for input(s): LABURIN in the last 48 hours.  Urine Studies: No results for input(s): COLORU, APPEARANCEUA, PHUR, SPECGRAV, PROTEINUA, GLUCUA, KETONESU, BILIRUBINUA, OCCULTUA, NITRITE, UROBILINOGEN, LEUKOCYTESUR, RBCUA, WBCUA, BACTERIA, SQUAMEPITHEL, HYALINECASTS in the last 48 hours.    Invalid input(s): [facility]      Microbiology Results (last 7 days)       Procedure Component Value Units Date/Time    Blood culture (site 1) [[id number]] Collected: 07/01/22 2238    Order Status: Completed Specimen: Blood Updated: 07/04/22 1212     Blood Culture, Routine No Growth to date      No Growth to date      No Growth to date    Narrative:      Site # 1, aerobic and anaerobic  Collection has been rescheduled by TD5 at 07/01/2022 21:42 Reason:   Unable to collect tried several times Nurse spencer was notified   Collection has been rescheduled by TD5 at 07/01/2022 21:42 Reason:   Unable to collect tried several times Nurse spencer was notified     Blood culture (site 2) [[id number]] Collected: 07/01/22 2239    Order Status: Completed Specimen: Blood Updated: 07/04/22 1212     Blood Culture, Routine No Growth to date      No Growth to date      No Growth to date    Narrative:      Site # 2, aerobic only  Collection has been rescheduled by [email or web address] at 07/01/2022 21:42 Reason:   Unable to collect tried several times Nurse spencer was notified   Collection has been rescheduled by TD5 at 07/01/2022 21:42 Reason:   Unable to collect tried several times Nurse spencer was notified             Significant Imaging: I have reviewed all pertinent imaging results/findings within the past 24 hours.

## 2022-07-05 NOTE — PROGRESS NOTES
Nephrology Progress Note       Consult Requested By: Emilia Gaston MD  Reason for Consult: ESRD on HD     SUBJECTIVE:        ?    Review of Systems   Constitutional: Negative for chills and fever.   HENT: Negative for congestion and sore throat.    Eyes: Negative for blurred vision, double vision and photophobia.   Respiratory: Negative for cough.    Cardiovascular: Positive for leg swelling. Negative for chest pain and palpitations. Claudication: blisters    Gastrointestinal: Negative for abdominal pain, diarrhea, nausea and vomiting.   Genitourinary: Negative for dysuria and urgency.   Musculoskeletal: Positive for myalgias. Negative for joint pain.   Skin: Negative for itching and rash.   Neurological: Negative for dizziness, sensory change, weakness and headaches.   Endo/Heme/Allergies: Negative for polydipsia. Does not bruise/bleed easily.   Psychiatric/Behavioral: Negative for depression.       Past Medical History:   Diagnosis Date    Anemia     CHF (congestive heart failure)     CKD (chronic kidney disease) stage 4, GFR 15-29 ml/min     Coronary artery disease     Diabetes mellitus     Hematuria     Hypertension     Osteoarthritis of spine with radiculopathy, cervical region 1/10/2022    Renal cyst, right      Past Surgical History:   Procedure Laterality Date    ANGIOGRAPHY OF ARTERIOVENOUS SHUNT Right 2/11/2022    Procedure: Fistulogram with Possible Intervention;  Surgeon: Dejuan Cody MD;  Location: [facility] CATH LAB/EP;  Service: Cardiology;  Laterality: Right;    ANGIOGRAPHY OF LOWER EXTREMITY Right 6/20/2022    Procedure: Angiogram Extremity Unilateral;  Surgeon: Umesh Quintero MD;  Location: [facility] CATH LAB/EP;  Service: Cardiology;  Laterality: Right;    ANGIOPLASTY OF PERIPHERAL VESSEL FOR CHRONIC TOTAL OCCLUSION N/A 6/24/2022    Procedure: ANGIOPLASTY, VESSEL, PERIPHERAL, FOR CHRONIC TOTAL OCCLUSION;  Surgeon: Dejuan Cody MD;  Location: [facility] CATH LAB/EP;  Service: Cardiology;   Laterality: N/A;    AORTOGRAPHY WITH SERIALOGRAPHY Right 6/7/2022    Procedure: AORTOGRAM, WITH SERIALOGRAPHY;  Surgeon: Dejuan Cody MD;  Location: [facility] CATH LAB/EP;  Service: Cardiology;  Laterality: Right;    CARDIAC SURGERY      DECLOTTING OF ARTERIOVENOUS GRAFT Right 12/21/2021    Procedure: [id number];  Surgeon: Jeison Hunt MD;  Location: [facility] OR;  Service: Vascular;  Laterality: Right;    DECLOTTING OF ARTERIOVENOUS GRAFT Right 2/18/2022    Procedure: [id number];  Surgeon: Jeison Hunt MD;  Location: [facility] OR;  Service: Vascular;  Laterality: Right;    DECLOTTING OF ARTERIOVENOUS GRAFT Right 3/9/2022    Procedure: [id number];  Surgeon: Jeison Hunt MD;  Location: [facility] OR;  Service: Vascular;  Laterality: Right;    DECLOTTING OF ARTERIOVENOUS GRAFT Right 6/13/2022    Procedure: [id number]; REVISION OF FISTULA W/INSERTION OF NEW AV GRAFT;  Surgeon: Jeison Hunt MD;  Location: [facility] OR;  Service: Vascular;  Laterality: Right;    FISTULOGRAM N/A 2/11/2022    Procedure: Fistulogram;  Surgeon: Dejuan Cody MD;  Location: [facility] CATH LAB/EP;  Service: Cardiology;  Laterality: N/A;    FISTULOGRAM N/A 6/17/2022    Procedure: Fistulogram;  Surgeon: Dejuan Cody MD;  Location: [facility] CATH LAB/EP;  Service: Cardiology;  Laterality: N/A;    FOOT SURGERY      INSERTION OF BIVENTRICULAR IMPLANTABLE CARDIOVERTER-DEFIBRILLATOR (ICD) Left 7/18/2019    Procedure: INSERTION, ICD, BIVENTRICULAR;  Surgeon: Arturo Bay MD;  Location: [facility] EP LAB;  Service: Cardiology;  Laterality: Left;  CHB, CRTD, SJM, anes, GP, 6078    LEFT HEART CATHETERIZATION N/A 7/16/2019    Procedure: Left heart cath;  Surgeon: Dejuan Cody MD;  Location: [facility] CATH LAB/EP;  Service: Cardiology;  Laterality: N/A;    PERCUTANEOUS TRANSLUMINAL ANGIOPLASTY OF ARTERIOVENOUS FISTULA Right 2/11/2022    Procedure: PTA, AV FISTULA;  Surgeon: Dejuan Cody MD;  Location: [facility]  CATH LAB/EP;  Service: Cardiology;  Laterality: Right;    PERCUTANEOUS TRANSLUMINAL ANGIOPLASTY OF ARTERIOVENOUS FISTULA N/A 6/17/2022    Procedure: PTA, AV FISTULA;  Surgeon: Dejuan Cody MD;  Location: [facility] CATH LAB/EP;  Service: Cardiology;  Laterality: N/A;    PERITONEAL CATHETER REMOVAL Left 4/11/2020    Procedure: REMOVAL, CATHETER, DIALYSIS, PERITONEAL;  Surgeon: Edis Snell Jr., MD;  Location: [facility] OR;  Service: General;  Laterality: Left;    PHLEBOGRAPHY  6/15/2022    Procedure: Venogram;  Surgeon: Betsey Mckeon MD;  Location: [facility] CATH LAB/EP;  Service: Cardiology;;    PLACEMENT OF ARTERIOVENOUS GRAFT Right 2/18/2022    Procedure: INSERTION, GRAFT, ARTERIOVENOUS;  Surgeon: Jeison Hunt MD;  Location: [facility] OR;  Service: Vascular;  Laterality: Right;    REVISION OF PROCEDURE INVOLVING ARTERIOVENOUS GRAFT Right 2/18/2022    Procedure: REVISION, PROCEDURE INVOLVING ARTERIOVENOUS GRAFT;  Surgeon: Jeison Hunt MD;  Location: [facility] OR;  Service: Vascular;  Laterality: Right;     Family History   Problem Relation Age of Onset    Heart attack Father      Social History     Tobacco Use    Smoking status: Former Smoker    Smokeless tobacco: Never Used   Substance Use Topics    Alcohol use: Yes     Comment: social    Drug use: No       Review of patient's allergies indicates:  No Known Allergies         OBJECTIVE:     Vital Signs (Most Recent)  Vitals:    07/05/22 0800 07/05/22 0847 07/05/22 0910 07/05/22 0931   BP:  (!) 103/54     BP Location:  Left arm     Patient Position:  Lying     Pulse: 60 60     Resp:  20 16    Temp:  97.2 °F (36.2 °C)     TempSrc:       SpO2:  97%  97%   Weight:       Height:                     Medications:   sodium chloride 0.9%   Intravenous Once    aspirin  81 mg Oral Daily    carvediloL  3.125 mg Oral BID    clopidogreL  75 mg Oral Daily    midodrine  10 mg Oral TID WM    mupirocin   Nasal BID    piperacillin-tazobactam (ZOSYN) IVPB  4.5 g  Intravenous Q12H    senna-docusate 8.6-50 mg  1 tablet Oral BID    sodium bicarbonate  650 mg Oral BID    sodium zirconium cyclosilicate  5 g Oral Daily    vancomycin (VANCOCIN) IVPB  500 mg Intravenous Once           Physical Exam  Vitals and nursing note reviewed.   Constitutional:       General: He is not in acute distress.     Appearance: He is not diaphoretic.   HENT:      Head: Normocephalic and atraumatic.      Mouth/Throat:      Pharynx: No oropharyngeal exudate.   Eyes:      General: No scleral icterus.     Conjunctiva/sclera: Conjunctivae normal.      Pupils: Pupils are equal, round, and reactive to light.   Cardiovascular:      Rate and Rhythm: Normal rate and regular rhythm.      Heart sounds: Normal heart sounds. No murmur heard.  Pulmonary:      Effort: Pulmonary effort is normal. No respiratory distress.      Breath sounds: Normal breath sounds.   Abdominal:      General: Bowel sounds are normal. There is no distension.      Palpations: Abdomen is soft.      Tenderness: There is no abdominal tenderness.   Musculoskeletal:         General: Swelling (LE blisters wounds ) present. Normal range of motion.      Cervical back: Normal range of motion and neck supple.      Right lower leg: Edema present.      Left lower leg: Edema present.      Comments: RUE AVG   thrill    Skin:     General: Skin is warm and dry.      Findings: No erythema.   Neurological:      Mental Status: He is alert and oriented to person, place, and time.      Cranial Nerves: No cranial nerve deficit.   Psychiatric:         Mood and Affect: Affect normal.         Cognition and Memory: Memory normal.         Judgment: Judgment normal.         Laboratory:  Recent Labs   Lab 07/04/22  0321 07/04/22  0438 07/05/22  0404   WBC 17.52* 17.05* 21.33*   HGB 10.0* 9.9* 9.0*   HCT 31.4* 31.4* 28.1*   * 133* 146*   MONO 6.3  1.1* 5.4  0.9 5.2  1.1*     Recent Labs   Lab 07/03/22  0355 07/04/22  0321 07/05/22  0909   * 134* 130*    K 4.4 5.2* 5.3*   CL 96 96 94*   CO2 23 20* 16*   BUN 37* 47* 56*   CREATININE 8.1* 9.1* 10.1*   CALCIUM 8.5* 8.6* 8.7   PHOS 4.6* 5.7* 6.8*       Diagnostic Results:  X-Ray: Reviewed  US: Reviewed  Echo: Reviewed  ASSESSMENT/PLAN:     1. ESRD on HD TTS with Dr Decker  -- Keep TTS HD today after r surgery (angio)   -- Daily Renal Function Panel  -- Avoid Hypotension.  -- Renally dose all meds    2. HTN (I10) -    3. Anemia of chronic kidney disease treated with ROSALVA (N18.9 D63.1)    EPogen  with each HD - hold for now  Active infection   Recent Labs   Lab 07/04/22  0321 07/04/22  0438 07/05/22  0404   HGB 10.0* 9.9* 9.0*   HCT 31.4* 31.4* 28.1*   * 133* 146*       Iron   Lab Results   Component Value Date    IRON 63 12/18/2019    TIBC 263 12/18/2019    FERRITIN 6,078 (H) 03/28/2020       4. MBD (E88.9 M90.80) -  Recent Labs   Lab 07/05/22  0909   CALCIUM 8.7   PHOS 6.8*   Sevelamer TIDWM   Recent Labs   Lab 07/02/22 0332 07/03/22  0355 07/04/22  0321   MG 1.7 1.6 1.7       Lab Results   Component Value Date    .0 (H) 03/04/2020    CALCIUM 8.7 07/05/2022    PHOS 6.8 (H) 07/05/2022     No results found for: [id number]    Lab Results   Component Value Date    CO2 16 (L) 07/05/2022       5. 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     Nepro with meals TID. Renal vitamins daily      With any question please call answering service [phone]  Laura Ortiz MD    [facility]  ANA MARIA Burns MD, FACP,   MAlize Woo MD,   MD DAMION Gill MD E. V. Harmon, NP    [address]  [place], [id number]

## 2022-07-05 NOTE — PROGRESS NOTES
[facility] Progress Note    Assessment/Plan:     76-year-old male 76-year-old male with history of end-stage renal disease on hemodialysis , coronary disease, diabetes, CHF, hypertension, and peripheral vascular disease who was referred by home health and cardiology for worsening status of lower extremity vascular disease. Patient has worsening gangrene of several toes and blister formation on bilateral feet. Currently on Vanc/Zosyn due to wet-appearing gangrene and leukocytosis.     Recommendations:    #Bilateral Toe Gangrene, multiple digits affected  - patient to go to peripheral angio today   - await podiatry plan for worsening toe gangrene  - was planned for TMA  - given elevated WBC would continue vanco and zosyn for now    Pushpa Thomas MD  [facility] PGY-3      Thank you for allowing us to participate in the care of this patient. We will continue to follow along. Case has been discussed with consult staff, who is in agreement with assessment and plan. ID will continue to follow.     HPI:   76-year-old male with history of end-stage renal disease on hemodialysis , coronary disease, diabetes, CHF, hypertension, and peripheral vascular disease who was referred by home health and cardiology for worsening status of lower extremity vascular disease. Patient has worsening gangrene of several toes and blister formation on bilateral feet.  He reports pain to both feet. History of multiple angioplasty. No stents. He reports blisters to the right foot for 1 week with 2 new blisters on the left foot and 2 open blisters to the right foot. Reports compliance with dialysis with last dialysis session on Tuesday.     Subjective:      No acute events overnight. Denies any fevers, chills, N/V.     Objective:   Last 24 Hour Vital Signs:  BP  Min: 103/54  Max: 122/68  Temp  Av.2 °F (36.2 °C)  Min: 96.8 °F (36 °C)  Max: 97.5 °F (36.4 °C)  Pulse  Av.4  Min:  "59  Max: 66  Resp  Av.1  Min: 16  Max: 20  SpO2  Av.3 %  Min: 96 %  Max: 100 %  Height  Av' 1" (185.4 cm)  Min: 6' 1" (185.4 cm)  Max: 6' 1" (185.4 cm)  Weight  Av.5 kg (274 lb 7.6 oz)  Min: 124.5 kg (274 lb 7.6 oz)  Max: 124.5 kg (274 lb 7.6 oz)  I/O last 3 completed shifts:  In: 125 [P.O.:125]  Out: 25 [Urine:25]    Physical Exam  General: Awake, alert, NAD. Obese.  HEENT: NCAT. EOMI intact. MM.   Neck: Trachea midline. No JVD.  Lungs: CTAB. No wheezing/rales/rhonchi.   CV: RRR. Normal S1/S2. No murmurs/rubs/gallops. Unable to palpate pedal pulses.    Extremities: BLE edema.   Skin: See photos under media tab.  Neuro: A&O x3. CN II-XII grossly intact.      Laboratory:  Laboratory Data Reviewed: yes  Pertinent Findings:  Recent Labs   Lab 22  0332 22  0355 22  0356 22  0321 22  0438 22  0404   WBC 18.33*  --    < > 17.52* 17.05* 21.33*   HGB 9.7*  --    < > 10.0* 9.9* 9.0*   HCT 30.4*  --    < > 31.4* 31.4* 28.1*   *  --    < > 136* 133* 146*   MCV 95  --    < > 95 96 94   RDW 17.1*  --    < > 16.9* 16.9* 16.8*   * 134*  --  134*  --   --    K 4.9 4.4  --  5.2*  --   --    CL 95 96  --  96  --   --    CO2 18* 23  --  20*  --   --    BUN 53* 37*  --  47*  --   --    CREATININE 10.4* 8.1*  --  9.1*  --   --    * 113*  --  185*  --   --    PROT 5.8* 5.8*  --  6.0  --   --    ALBUMIN 2.2* 2.1*  --  2.1*  --   --    BILITOT 0.5 0.6  --  0.6  --   --    AST 17 13  --  9*  --   --    ALKPHOS 131 134  --  139*  --   --    ALT <5* <5*  --  <5*  --   --     < > = values in this interval not displayed.         Microbiology Data:   blood cx NGTD x2    Antimicrobials:  Zosyn 4.5g IV q 12H (- )  Vancomycin IV (- )      Other Results:  Radiology Results:  US Lower Extremity Arteries Bilateral    Result Date: 2022  EXAMINATION: US LOWER EXTREMITY ARTERIES BILATERAL CLINICAL HISTORY: pad; TECHNIQUE: Bilateral spectral, color and grayscale " images of the large arteries of both lower extremities were performed. COMPARISON: 2022 FINDINGS: Right lower extremity. CFA: 122 cm/s, monophasic DFA: 33 cm/s, monophasic Prox SFA: 158 cm/s, monophasic Mid SFA: 125 cm/s, monophasic Dist SFA: 121 cm/s, monophasic Pop A: 86 cm/s, monophasic PTA: 80 cm/s, monophasic DIYA: 54 cm/s, monophasic Left lower extremity CFA: Not well visualized DFA: 26 cm/s, monophasic Prox SFA: 72 cm/s, monophasic Mid SFA: 260 cm/s, monophasic *focal high-grade stenosis Dist SFA: 42 cm/s, monophasic Pop A: 28 cm/s, monophasic PTA: 16 cm/s, monophasic DIYA: 22 cm/s, monophasic     Findings in keeping with advanced peripheral arterial disease with blunted monophasic waveforms throughout the lower extremity arterial system bilaterally as above. Left common femoral artery not well visualized, may be occluded. CT or catheter based angiogram may be helpful further characterization if warranted clinically. This report was flagged in Epic as abnormal. Electronically signed by: Socrates Medina MD Date:    2022 Time:    10:29    US Upper Extremity Arteries Right    Result Date: 2022  EXAMINATION: US UPPER EXTREMITY ARTERIES RIGHT CLINICAL HISTORY: Other complication of vascular dialysis catheter, initial encounter COMPARISON: None FINDINGS: AVF/AVG: RUE brachiocephalic AVG with intraluminal nonocclusive filling defect and significant aneurysmal degeneration along its proximal greater than distal segments but is otherwise patent.  Imaged surrounding soft tissues are grossly unremarkable without evidence of perigraft fluid collection or hematoma. Inflow artery proximal to the AVF/AV-cm/s with abnormal high-resistance triphasic waveforms. Inflow artery distal to the AVF/AV-cm/s with abnormal high-resistance biphasic waveforms. Arterial anastomosis:  106-cm/s with blunted low-resistance monophasic waveforms with spectral broadening and antegrade diastolic flow. (Normal velocities  "200-400 cm/s) Proximal  AVF/AV-cm/s with blunted low-resistance monophasic waveforms and antegrade diastolic flow.  (Normal velocities 150-400 cm/s) Mid  AVF/AV-cm/s with blunted low-resistance monophasic waveforms and antegrade diastolic flow. Distal  AVF/AV-cm/s with blunted low-resistance monophasic waveforms and antegrade diastolic flow. Volume flow rate: 57-cc/min Central veins:  116-131-cm/s with normal waveforms with normal respiratory phasicity.     1. Moderate amount of nonocclusive thrombus in significant aneurysmal degeneration along the proximal segment of the RUE brachiocephalic AVG and, minimal amount of nonocclusive thrombus along the distal segment associated with abnormal peak systolic velocities in volume flow rates throughout the AVG. This report was flagged in Epic as abnormal. Electronically signed by: Cheo Oliva Date:    2022 Time:    14:27    Cardiac catheterization    Result Date: 2022  S/p percutaneous deep venous arterialization for "no option" CLTI  Antegrade right CFA access with 5fr Destination sheath  Lateral plantar vein access with 5/6 slender sheath  Findings:  Patent right SFA and POP with luminal irregularities  Chronically occluded proximal right AT  99% prox PT stenosis and mid PT   Distal PT  with multiple small islands  Patent PER with luminal irregularities  Although PER reaches the ankle there are no collaterals to the foot  Desert foot without any named vessels  Heavily calcified vessels  Intervention:  PTA of PT with 2.0 x 40 mm balloon to create a channel  Atherectomy of PT with 1.5 Classic CSI catheter  Serial PTA of PT with 2.5, 3.0, and 4.0 mm balloons  Venoplasty of PTV with 4.0 and 5.0 mm balloons  Arterial fistula creation with gun site technique using (2) 10 mm snares  PTA of fistula with 4.0 mm balloon  Valvulotomy with 5.0 x 40 mm Scoring balloon  Venoplasty of lateral plantar and dorsal vein with 3.0 and 4.0 mm balloons  5.0 x " 250 and 5.0 x 150 mm Viabahn stents placed from mid PT artery to lateral plantar vein  Covered stents post dilated with 5.0 x 100 mm balloon  Final PTA of PT with 4.0 x 150 mm Lutonix DCB    Plan  DAPT with aspirin and plavix  Eliquis 2.5 mg po bid  Intense statin therapy  Wound care and tension free surgery  Delay surgical interventions for at least 4 weeks  The procedure log was documented by Documenter: RT Carmina; Julien Aguayo RDCS and verified by Dejuan Cody MD. Date: 6/29/2022  Time: 3:55 PM    Cardiac catheterization    Result Date: 6/21/2022  S/p right brachial artery-cephalic vein-axillary vein access evaluation  Findings:  Occluded access  Patent right subclavian vein  Patent Innominate vein  Patent SVC  Patent brachial artery  Intervention  Balloon angioplasty of access with 6 x 100 and 6 x 120 mm balloon  Palpable thrill post procedure  Plan:  Resume HD per protocol  Surveillance US in 2 weeks  The procedure log was documented by Documenter: Julien Aguayo RDCS and verified by Dejuan Cody MD. Date: 6/21/2022  Time: 2:53 PM    Cardiac catheterization    Result Date: 6/16/2022  Procedure: Trialysis catheter placement Access: L-CFV Indication: Dialysis Trialysis catheter placed under fluoroscopy. Venogram performed to confirm position. Sutured with Biopatch in place. Sterile dressing applied. The procedure log was documented by Documenter: RT Cole and verified by Betsey Mckeon MD. Date: 6/15/2022  Time: 2:03 PM    Cardiac catheterization    Result Date: 6/15/2022    S/p aortogram with run off                 Patent distal aorta with bilateral iliac arteries             Patent bilateral CFA and DFA                           Left:               80% mid and distal SFA             1 vessel run off via PER             Ostial PT              Patent prox and mid AT             Distal AT not visualized due to motion artifact                           Right:                 90%  multiple mid and distal SFA stenosis             Patent POP             prox AT  with diffuse disease             Distal AT  with no outflow to the foot             90% TPT stenosis             80% prox and mid PER stenosis             90% prox PT, mid PT , and distal reconstitution             Distal PT is diffusely disease             Lateral plantar              DP is not visualized due to severe stenosis + motion artifact       Plan:                 Review angiogram             Right SFA + TPT + PER intervention             Re-evaluate for distal PTa- PTv DVA     The procedure log was documented by Documenter: RT Carmina and verified by Dejuan Cody MD. Date: 6/15/2022  Time: 4:33 PM    US Vein mapping, Other, Unilateral    Result Date: 6/23/2022  EXAMINATION: US VEIN MAPPING, OTHER, UNILATERAL CLINICAL HISTORY: pre DVA; assess patency lateral medial plantar vein TECHNIQUE: LIMITED GRAYSCALE, DOPPLER, AND SPECTRAL EVALUATION OF THE RIGHT CALF VESSELS WAS PERFORMED. COMPARISON: None FINDINGS: The right posterior tibial veins are patent. The right lateral plantar vein is patent and compressible.  It measures 2.5 mm in diameter.     As above. Electronically signed by: Edson Correia Date:    06/23/2022 Time:    18:36      Current Medications:     Infusions:   heparin (porcine) in D5W 8 Units/kg/hr (07/03/22 2113)        Scheduled:   sodium chloride 0.9%   Intravenous Once    aspirin  81 mg Oral Daily    carvediloL  3.125 mg Oral BID    clopidogreL  75 mg Oral Daily    midodrine  10 mg Oral TID WM    mupirocin   Nasal BID    piperacillin-tazobactam (ZOSYN) IVPB  4.5 g Intravenous Q12H    senna-docusate 8.6-50 mg  1 tablet Oral BID    sodium bicarbonate  650 mg Oral BID    sodium zirconium cyclosilicate  5 g Oral Daily    vancomycin (VANCOCIN) IVPB  500 mg Intravenous Once        PRN:  sodium chloride 0.9%, acetaminophen, albuterol-ipratropium, aluminum-magnesium hydroxide-simethicone, dextrose  10%, dextrose 10%, glucagon (human recombinant), glucose, glucose, heparin (PORCINE), heparin (PORCINE), HYDROcodone-acetaminophen, insulin aspart U-100, melatonin, naloxone, ondansetron, simethicone, sodium chloride 0.9%, sodium chloride 0.9%, Pharmacy to dose Vancomycin consult **AND** vancomycin - pharmacy to dose

## 2022-07-05 NOTE — ASSESSMENT & PLAN NOTE
-unlikely acute limb ischemia given history.  -Patient was recently discharged on 6/28/22 for clotted vascular access and was to follow up outpatient under vascular and for the gangrene of the toe with Dr. Adam  -Left 5th and 3rd toes black in color see photos above  -Unable to palpate pedal pulses bilateral   -Bilateral pitting edema +4  -continue vanc and zosyn  -continue heparin gtt  -Cardiology consulted- patient to have peripheral angiogram today. Anticipate general surgery consult going forward to consider R TMA.

## 2022-07-05 NOTE — INTERVAL H&P NOTE
The patient has been examined and the H&P has been reviewed:      Risks, benefits and alternatives of peripheral catheterization and possible intervention were discussed with the patient. All questions were answered and informed consent obtained.     I discussed the importance of compliance with dual antiplatelet therapy with the patient to prevent acute or late stent thrombosis with premature discontinuation of the therapy.          Active Hospital Problems    Diagnosis  POA    *Gangrene [I96]  Yes    Bullae [R23.8]  Yes    Critical limb ischemia with history of revascularization of same extremity [I70.229, Z98.890]  Not Applicable    Type 2 diabetes mellitus with chronic kidney disease on chronic dialysis, with long-term current use of insulin [E11.22, N18.6, Z99.2, Z79.4]  Not Applicable    Anemia, chronic renal failure, stage 5 [N18.5, D63.1]  Yes    PAD (peripheral artery disease) [I73.9]  Yes    Acute deep vein thrombosis (DVT) of popliteal vein of right lower extremity [I82.431]  Yes    Chronic combined systolic and diastolic congestive heart failure [I50.42]  Yes    ESRD (end stage renal disease) on dialysis [N18.6, Z99.2]  Not Applicable     Tu Th Sat          Cardiac resynchronization therapy defibrillator (CRT-D) in place [Z95.810]  Yes    Complete heart block [I44.2]  Yes    Morbid obesity [E66.01]  Yes    Sleep apnea [G47.30]  Yes    Primary hypertension [I10]  Yes      Resolved Hospital Problems   No resolved problems to display.

## 2022-07-05 NOTE — PLAN OF CARE
Patient transfered to cath lab recovery [facility] 4 via stretcher with side rails up x2. Pt AAOx4 and able to follow commands. Pt is stable when connecting to cardiac monitors. VSS. NADN. 1.5 hour recovery starting 1730 per Dr. Cody.     DStat + gauze tegaderm to L forearm/AC IV site which was removed during procedure per Gus PACKER RN cath lab. Site is CDI.    Left groin site with gauze and tegaderm in place is CDI. No redness, bruising, or hematoma noted around site.    Left pedla site with gauze and tegaderm in place is CDI. No redness, bruising, or hematoma noted around site.    Dopplered alverto pedal pulses. +2 alverto radial pulses. Skin is normal in color, warm to touch, < 3 sec cap refill.    Fall risk precautions given and patient acknowledges. AIDET completed to pt. Dr. Cody updated pt's spouse in 4th floor room. Will continue to monitor patient for safety and needs.

## 2022-07-05 NOTE — PROGRESS NOTES
[facility] Medicine  Progress Note    Patient Name: Houston Jc  MRN: [id number]  Patient Class: IP- Inpatient   Admission Date: 7/1/2022  Length of Stay: 4 days  Attending Physician: Emilia Gaston MD  Primary Care Provider: Zak Hamilton MD        Subjective:     Principal Problem:Gangrene        HPI:  Houston Jc is a 76-year-old male 76-year-old male with history of end-stage renal disease on hemodialysis Tuesday Thursday Saturday, coronary disease, diabetes, CHF, hypertension, and peripheral vascular disease who was referred by home health and cardiology for worsening status of lower extremity vascular disease. Patient has worsening gangrene of several toes and blister formation on bilateral feet.  He reports pain to both feet. History of multiple angioplasty. No stents. He reports blisters to the right foot for 1 week with 2 new blisters on the left foot and 2 open blisters to the right foot. Reports compliance with dialysis with last dialysis session on Tuesday. Of note, patient was recently discharged on 6/28/22 for clotted vascular access and was to follow up outpatient under vascular and for the gangrene of the toe with Dr. Adam.    In the ED: WBC 22, Hgb 9, lactate 2.9. Given 1500 ml IVF bolus. Started on vanc and zosyn, and heparin gtt. Cardiology consulted with recommendations for arterial dopplers in a.m. Admitted to [facility] for further evaluation.      Overview/Hospital Course:  No notes on file    Interval History:   Patient states foot pain is controlled today after taking pain medication. He is NPO since midnight and awaiting peripheral angiogram today.         Review of Systems   Constitutional:  Negative for activity change and appetite change.   HENT:  Negative for trouble swallowing and voice change.    Eyes:  Negative for photophobia and visual disturbance.   Respiratory:  Negative for cough, choking, chest tightness and shortness of breath.     Cardiovascular:  Negative for chest pain, palpitations and leg swelling.   Gastrointestinal:  Negative for abdominal pain, constipation, diarrhea, nausea and vomiting.   Endocrine: Negative.    Genitourinary:  Negative for decreased urine volume and urgency.   Musculoskeletal:  Positive for arthralgias.   Skin:  Positive for color change and wound.   Neurological:  Negative for dizziness, light-headedness and headaches.   Hematological: Negative.    Objective:     Vital Signs (Most Recent):  Temp: 96.8 °F (36 °C) (07/05/22 0456)  Pulse: 60 (07/05/22 0800)  Resp: 20 (07/05/22 0456)  BP: (!) 107/52 (07/05/22 0456)  SpO2: 98 % (07/05/22 0456)   Vital Signs (24h Range):  Temp:  [96.8 °F (36 °C)-97.5 °F (36.4 °C)] 96.8 °F (36 °C)  Pulse:  [59-66] 60  Resp:  [16-20] 20  SpO2:  [96 %-100 %] 98 %  BP: (104-122)/(51-68) 107/52     Weight: 124.5 kg (274 lb 7.6 oz)  Body mass index is 36.21 kg/m².    Intake/Output Summary (Last 24 hours) at 7/5/2022 0823  Last data filed at 7/5/2022 0600  Gross per 24 hour   Intake 125 ml   Output 25 ml   Net 100 ml        Physical Exam  Vitals and nursing note reviewed.   Constitutional:       General: He is not in acute distress.     Appearance: Normal appearance. He is obese. He is not ill-appearing, toxic-appearing or diaphoretic.   HENT:      Head: Normocephalic and atraumatic.      Mouth/Throat:      Pharynx: Oropharynx is clear.   Eyes:      Extraocular Movements: Extraocular movements intact.      Conjunctiva/sclera: Conjunctivae normal.   Cardiovascular:      Rate and Rhythm: Normal rate and regular rhythm.      Heart sounds: Normal heart sounds. No murmur heard.     Comments: Unable to palpate pedal pulses  Pulmonary:      Effort: No respiratory distress.      Breath sounds: Normal breath sounds.   Abdominal:      General: Bowel sounds are normal.      Palpations: Abdomen is soft.   Musculoskeletal:         General: Swelling present. Normal range of motion.      Cervical back:  Normal range of motion and neck supple.      Right lower leg: Edema present.      Left lower leg: Edema present.   Skin:     Comments: See photos   Neurological:      General: No focal deficit present.      Mental Status: He is alert and oriented to person, place, and time. Mental status is at baseline.   Psychiatric:         Mood and Affect: Mood normal.         Behavior: Behavior normal.         Thought Content: Thought content normal.         Judgment: Judgment normal.           BNP  No results for input(s): BNP, BNPTRIAGEBLO in the last 168 hours.    Significant Labs: A1C:   Recent Labs   Lab 06/14/22  1007   HGBA1C 6.9*       ABGs: No results for input(s): PH, PCO2, HCO3, POCSATURATED, BE, TOTALHB, COHB, METHB, O2HB, POCFIO2, PO2 in the last 48 hours.  Bilirubin:   Recent Labs   Lab 06/13/22  1125 07/01/22  1951 07/02/22  0332 07/03/22  0355 07/04/22  0321   BILITOT 0.2 0.6 0.5 0.6 0.6       CBC:   Recent Labs   Lab 07/04/22  0321 07/04/22  0438 07/05/22  0404   WBC 17.52* 17.05* 21.33*   HGB 10.0* 9.9* 9.0*   HCT 31.4* 31.4* 28.1*   * 133* 146*       CMP:   Recent Labs   Lab 07/04/22  0321   *   K 5.2*   CL 96   CO2 20*   *   BUN 47*   CREATININE 9.1*   CALCIUM 8.6*   PROT 6.0   ALBUMIN 2.1*   BILITOT 0.6   ALKPHOS 139*   AST 9*   ALT <5*   ANIONGAP 18*   EGFRNONAA 5*       Lactic Acid:   No results for input(s): LACTATE in the last 48 hours.    Lipase: No results for input(s): LIPASE in the last 48 hours.  Lipid Panel: No results for input(s): CHOL, HDL, LDLCALC, TRIG, CHOLHDL in the last 48 hours.  Magnesium:   Recent Labs   Lab 07/04/22  0321   MG 1.7       Troponin: No results for input(s): TROPONINI in the last 48 hours.  TSH: No results for input(s): TSH in the last 4320 hours.  Urine Culture: No results for input(s): LABURIN in the last 48 hours.  Urine Studies: No results for input(s): COLORU, APPEARANCEUA, PHUR, SPECGRAV, PROTEINUA, GLUCUA, KETONESU, BILIRUBINUA, OCCULTUA, NITRITE,  UROBILINOGEN, LEUKOCYTESUR, RBCUA, WBCUA, BACTERIA, SQUAMEPITHEL, HYALINECASTS in the last 48 hours.    Invalid input(s): [facility]      Microbiology Results (last 7 days)       Procedure Component Value Units Date/Time    Blood culture (site 1) [[id number]] Collected: 07/01/22 2238    Order Status: Completed Specimen: Blood Updated: 07/04/22 1212     Blood Culture, Routine No Growth to date      No Growth to date      No Growth to date    Narrative:      Site # 1, aerobic and anaerobic  Collection has been rescheduled by TD5 at 07/01/2022 21:42 Reason:   Unable to collect tried several times Nurse spencer was notified   Collection has been rescheduled by TD5 at 07/01/2022 21:42 Reason:   Unable to collect tried several times Nurse spencer was notified     Blood culture (site 2) [[id number]] Collected: 07/01/22 2239    Order Status: Completed Specimen: Blood Updated: 07/04/22 1212     Blood Culture, Routine No Growth to date      No Growth to date      No Growth to date    Narrative:      Site # 2, aerobic only  Collection has been rescheduled by TD5 at 07/01/2022 21:42 Reason:   Unable to collect tried several times Nurse spencer was notified   Collection has been rescheduled by TD5 at 07/01/2022 21:42 Reason:   Unable to collect tried several times Nurse spencer was notified             Significant Imaging: I have reviewed all pertinent imaging results/findings within the past 24 hours.      Assessment/Plan:      * Gangrene  Bullae  -Presented with blisters to right foot x1 week with 2 new blisters on left foot, right foot with multiple blisters with 2 open: see photos above  -WBC 22---> 17 --> 21, lactate 2.9  -on vanc and zosyn  -blood cultures with NGTD continue Vanc / Zosyn  -wound care consulted  Consult cardiology-  rec's continue heparin and plan repeat angiogram today.   -NPO since   - encourage pain regime for pain control  -ID on board     Bullae        Critical limb ischemia with history of revascularization of  same extremity  -unlikely acute limb ischemia given history.  -Patient was recently discharged on 6/28/22 for clotted vascular access and was to follow up outpatient under vascular and for the gangrene of the toe with Dr. Adam  -Left 5th and 3rd toes black in color see photos above  -Unable to palpate pedal pulses bilateral   -Bilateral pitting edema +4  -continue vanc and zosyn  -continue heparin gtt  -Cardiology consulted- patient to have peripheral angiogram today. Anticipate general surgery consult going forward to consider R TMA.     Anemia, chronic renal failure, stage 5  -Hgb 9  -monitor      Type 2 diabetes mellitus with chronic kidney disease on chronic dialysis, with long-term current use of insulin  Hemoglobin A1C   Date Value Ref Range Status   06/14/2022 6.9 (H) 4.0 - 5.6 % Final   -CBG is borderline low, low dose SSI, diabetic renal diet when resumed, accuchecks Q6 while NPO then ACHS      Acute deep vein thrombosis (DVT) of popliteal vein of right lower extremity  -takes eliquis at home  -hold for now for possible surgical procedure to feet      PAD (peripheral artery disease)  -Continue Lipitor  -hold BB with soft BP  -resume eliquis if patient does not need surgical procedure      Chronic combined systolic and diastolic congestive heart failure  -stable, no fluid overloading s/s on exam  -Strict electrolyte management with goal K 4-5 and Mg 2-3 (trending daily)  Patient is identified as having Combined Systolic and Diastolic heart failure that is Chronic. CHF is currently controlled. Latest ECHO performed and demonstrates- Results for orders placed during the hospital encounter of 12/21/21     Echo    Interpretation Summary  · The left ventricle is moderately enlarged with mild concentric hypertrophy and  · The quantitatively derived ejection fraction is 34%.  · Severe left atrial enlargement.  · Normal right ventricular size with normal right ventricular systolic function.  · Normal central  venous pressure (3 mmHg).  · The estimated PA systolic pressure is 21 mmHg.  · No vegetations per surface echo.  -hold Beta Blocker ACE/ARB with soft BP and monitor clinical status closely. Monitor on telemetry. Patient is off CHF pathway.  Monitor strict Is&Os and daily weights.  Place on fluid restriction of 1.5 L. Continue to stress to patient importance of self efficacy and  on diet for CHF. Last BNP reviewed- and noted below No results for input(s): BNP, BNPTRIAGEBLO in the last 168 hours..    ESRD (end stage renal disease) on dialysis  -HD outpatient [facility]  -NICOLETTE AVF noted  -Consult nephrology   - HD today after angiogram         Cardiac resynchronization therapy defibrillator (CRT-D) in place  -[facility] in 7/2019 with complete heart block and intermittent VT. An echo showed his EF was 30%. Amiodarone was started, a LHC showed luminal irregularities, and a [employer] CRT-D was implanted prior to discharge (RV septal lead in LV port).  -Telemetry      Complete heart block  -chronic with CRT-D in place  -telemetry      Morbid obesity  -encourage lifestyle modifications (healthy diet, exercise)         Sleep apnea  -CPAP QHS      Primary hypertension  -Controlled, BP borderline low  -Home Meds include losartan 25 mg daily and coreg 6.25 mg bid  -hold for now  -monitor pressure        VTE Risk Mitigation (From admission, onward)         Ordered     heparin 25,000 units in dextrose 5% (100 units/ml) IV bolus from bag - ADDITIONAL PRN BOLUS - 60 units/kg  As needed (PRN)        Question:  Heparin Infusion Adjustment (DO NOT MODIFY ANSWER)  Answer:  \\[email or web address]\epic\Images\Pharmacy\HeparinInfusions\heparin HIGH INTENSITY nomogram for [facility] [id number].pdf    07/01/22 1953     heparin 25,000 units in dextrose 5% (100 units/ml) IV bolus from bag - ADDITIONAL PRN BOLUS - 30 units/kg  As needed (PRN)        Question:  Heparin Infusion Adjustment (DO NOT MODIFY ANSWER)  Answer:   \\[email or web address]\epic\Images\Pharmacy\HeparinInfusions\heparin HIGH INTENSITY nomogram for [facility] [id number].pdf    07/01/22 1953     heparin 25,000 units in dextrose 5% 250 mL (100 units/mL) infusion HIGH INTENSITY nomogram - [facility]  Continuous        Question Answer Comment   Heparin Infusion Adjustment (DO NOT MODIFY ANSWER) \\[email or web address]\epic\Images\Pharmacy\HeparinInfusions\heparin HIGH INTENSITY nomogram for [facility] [id number].pdf    Begin at (in units/kg/hr) 18        07/01/22 1953     IP VTE HIGH RISK PATIENT  Once         07/01/22 2014     Place sequential compression device  Until discontinued         07/01/22 2014     Reason for No Pharmacological VTE Prophylaxis  Once        Question:  Reasons:  Answer:  Already adequately anticoagulated on oral Anticoagulants    07/01/22 2014                Discharge Planning   ANDERSON:      Code Status: Full Code   Is the patient medically ready for discharge?:     Reason for patient still in hospital (select all that apply): Patient trending condition                     Margaret Hahn NP  Department of [facility] Medicine   [facility]

## 2022-07-05 NOTE — ASSESSMENT & PLAN NOTE
Bullae  -Presented with blisters to right foot x1 week with 2 new blisters on left foot, right foot with multiple blisters with 2 open: see photos above  -WBC 22---> 17 --> 21, lactate 2.9  -on vanc and zosyn  -blood cultures with NGTD continue Vanc / Zosyn  -wound care consulted  Consult cardiology-  rec's continue heparin and plan repeat angiogram today.   -NPO since   - encourage pain regime for pain control  -ID on board

## 2022-07-05 NOTE — PLAN OF CARE
[facility] - Lab (Hospital)  Initial Discharge Assessment       Primary Care Provider: Zak Hamilton MD    Admission Diagnosis: Gangrene [I96]  Peripheral vascular disease [I73.9]  Bullae [R23.8]  Chest pain [R07.9]  Critical limb ischemia with history of revascularization of same extremity [I70.229, Z98.890]    Admission Date: 7/1/2022  Expected Discharge Date: 7/9/2022    Consult: card, neph, ID, nut, & wound care    Payor: [employer] MEDICARE / Plan: [employer] / Product Type: Medicare Advantage /     Extended Emergency Contact Information  Primary Emergency Contact: Paulette Jc   [facility]  Home Phone: [phone]  Mobile Phone: [phone]  Relation: Spouse    Discharge Plan A: (P) Home Health (current /[facility] in [place])  Discharge Plan B: (P) Home with family      [id number] - [place], [place] - [address]  [address]  [place] [address]  Phone: [phone] Fax: [phone]      Initial Assessment (most recent)       Adult Discharge Assessment - 07/05/22 1400          Discharge Assessment    Assessment Type Discharge Planning Assessment (P)      Confirmed/corrected address, phone number and insurance Yes (P)      Confirmed Demographics Correct on Facesheet (P)      Source of Information family (P)    spouse, Paulette Jc ([phone])    Communicated ANDERSON with patient/caregiver Date not available/Unable to determine (P)      Lives With spouse (P)      Do you expect to return to your current living situation? Yes (P)      Do you have help at home or someone to help you manage your care at home? Yes (P)      Prior to hospitilization cognitive status: Alert/Oriented (P)      Current cognitive status: Alert/Oriented (P)      Walking or Climbing Stairs Difficulty ambulation difficulty, requires equipment;ambulation difficulty, assistance 1 person (P)      Dressing/Bathing Difficulty bathing difficulty, requires equipment;bathing  difficulty, assistance 1 person (P)      Equipment Currently Used at Home bedside commode;walker, rolling;wheelchair;grab bar;glucometer;other (see comments) (P)    BP machine    Readmission within 30 days? Yes (P)      Patient currently being followed by outpatient case management? No (P)      Do you currently have service(s) that help you manage your care at home? No (P)      Do you take prescription medications? Yes (P)      Do you have prescription coverage? Yes (P)      Do you have any problems affording any of your prescribed medications? No (P)      Is the patient taking medications as prescribed? yes (P)      How do you get to doctors appointments? family or friend will provide (P)      Are you on dialysis? Yes (P)      Dialysis Name and Scheduled days [facility] (TTS 0930) RUE AV (P)      Do you take coumadin? No (P)      Discharge Plan A Home Health (P)    current w/[facility] in [place]    Discharge Plan B Home with family (P)      DME Needed Upon Discharge  other (see comments) (P)    tbd    Discharge Plan discussed with: Spouse/sig other (P)                    1400  Patient off the unit receiving his HD treatment when CM rounded. All discharge planning assessment information was obtained from the patient's spouse, Paulette Jc, at the bedside. Patient was admitted with gangrene & is being followed by card, neph, ID, nut, & wound care. Patient scheduled to have a PTA done today.     Patient lives with Paulette, has equipment to assist with ADLs, is currently receiving HH services from [facility] in [place], & denied the need for assistance with transportation at time of discharge.     CM updated patient's whiteboard with CM name & contact information.       Will continue to follow.

## 2022-07-05 NOTE — PROGRESS NOTES
07/05/22 1156 07/05/22 1205   Vital Signs   Pulse 82 80   /64 106/65   During Hemodialysis Assessment   Blood Flow Rate (mL/min) 30 mL/min  --    Dialysate Flow Rate (mL/min) 500 ml/min  --    Ultrafiltration Rate (mL/Hr) 670 mL/Hr  --    Arteriovenous Lines Secure Yes  --    Arterial Pressure (mmHg) -68 mmHg  --    Venous Pressure (mmHg) 195  --    UF Removed (mL) 1142 mL  --    TMP 30  --    Venous Line in Air Detector Yes  --    Transducer Dry Yes  --    Access Visible Yes  --    Post-Hemodialysis Assessment   Rinseback Volume (mL)  --  250 mL   Blood Volume Processed (Liters)  --  41.8 L   Dialyzer Clearance  --  Clear   Duration of Treatment  --  120 minutes   Total UF (mL)  --  1178 mL   Net Fluid Removal  --  678   Patient Response to Treatment  --  Pt responded well.  Pt was taken off early due to other procedure   Arterial bleeding stop time (min)  --  3 min   Venous bleeding stop time (min)  --  4 min   Post-Hemodialysis Comments  --  Pt was deaccessed per P&P, all lines were secured and clamped.  Pt denied any distress or discomfort.  Pressure was applied to sites until bleeding stopped.  fresh bandages applied and no further bleeding noted.

## 2022-07-05 NOTE — CONSULTS
Wound care consult received for bilateral foot wounds. Spoke with Margaret Hahn NP who reports podiatry will be managing pt foot wounds.  Wound care nurse to sign off.   Please reconsult for any further needs.

## 2022-07-05 NOTE — ASSESSMENT & PLAN NOTE
-HD outpatient scheduled John BOLAÑOS noted  -Consult nephrology   - HD today after angiogram

## 2022-07-05 NOTE — BRIEF OP NOTE
S/p left lag angiogram        Antegrade L SFA access closed with a perclose        Findings:       80% mid and distal SFA              1 vessel run off via AT   Mid PER    Ostial PT               Patent ostial AT, 75% prox and 99% mid AT, and 99% distal AT    Poor blush suggestive of severe microvascular disease       Intervention       PTA of prox and mid AT with 3.0 x 150 mm balloon   Sub-optimal PTA of distal AT with 2.0 x 100 and 2.0 x 40 mm balloons   Residual distal AT 99% stenosis        Plan:       Resume plavix and heparin gtt   Resume eliquis in am    Consider distal AT PTA with OSCAR approach   Review angiogram and evaluate for DVA   Evaluate for HBO

## 2022-07-06 NOTE — PLAN OF CARE
Problem: Adult Inpatient Plan of Care  Goal: Plan of Care Review  Outcome: Ongoing, Progressing     Problem: Diabetes Comorbidity  Goal: Blood Glucose Level Within Targeted Range  Outcome: Ongoing, Progressing     Problem: Oral Intake Inadequate (Acute Kidney Injury/Impairment)  Goal: Optimal Nutrition Intake  Outcome: Ongoing, Progressing     Problem: Hemodynamic Instability (Hemodialysis)  Goal: Effective Tissue Perfusion  Outcome: Ongoing, Progressing     Problem: Tissue Perfusion Altered (Revascularization)  Goal: Effective Tissue Perfusion  Outcome: Ongoing, Progressing     Problem: Fall Injury Risk  Goal: Absence of Fall and Fall-Related Injury  Outcome: Ongoing, Progressing     Problem: Skin Injury Risk Increased  Goal: Skin Health and Integrity  Outcome: Ongoing, Progressing

## 2022-07-06 NOTE — PLAN OF CARE
Problem: Adult Inpatient Plan of Care  Goal: Plan of Care Review  Outcome: Ongoing, Progressing     Plan of care reviewed with patient. Pt verbalized understanding. Pt is AAO x 4, on room air, denies SOB, c/o pain overnight, no distress noted. Pt is Paced on Tele, no true red alarms. All medications administered as prescribed. Loss of IV access overnight, charge nurse successfully placed one and anesthesia consult placed for the second. Left groin and left foot cath site dressings were saturated, held pressure and redressed with gauze and transparent film. Pt is currently resting, bed in lowest position, HOB lowered, side rails up x 2, bed alarm activated, call light and bedside table within reach. Pt instructed to call if assistance is needed.

## 2022-07-06 NOTE — PROGRESS NOTES
Nephrology Progress Note       Consult Requested By: Emilia Gaston MD  Reason for Consult: ESRD on HD     SUBJECTIVE:        ?    Review of Systems   Constitutional: Negative for chills and fever.   HENT: Negative for congestion and sore throat.    Eyes: Negative for blurred vision, double vision and photophobia.   Respiratory: Negative for cough.    Cardiovascular: Positive for leg swelling. Negative for chest pain and palpitations. Claudication: blisters    Gastrointestinal: Negative for abdominal pain, diarrhea, nausea and vomiting.   Genitourinary: Negative for dysuria and urgency.   Musculoskeletal: Positive for myalgias. Negative for joint pain.   Skin: Negative for itching and rash.   Neurological: Negative for dizziness, sensory change, weakness and headaches.   Endo/Heme/Allergies: Negative for polydipsia. Does not bruise/bleed easily.   Psychiatric/Behavioral: Negative for depression.       Past Medical History:   Diagnosis Date    Anemia     CHF (congestive heart failure)     CKD (chronic kidney disease) stage 4, GFR 15-29 ml/min     Coronary artery disease     Diabetes mellitus     Hematuria     Hypertension     Osteoarthritis of spine with radiculopathy, cervical region 1/10/2022    Renal cyst, right      Past Surgical History:   Procedure Laterality Date    ANGIOGRAPHY OF ARTERIOVENOUS SHUNT Right 2/11/2022    Procedure: Fistulogram with Possible Intervention;  Surgeon: Dejuan Cody MD;  Location: [facility] CATH LAB/EP;  Service: Cardiology;  Laterality: Right;    ANGIOGRAPHY OF LOWER EXTREMITY Right 6/20/2022    Procedure: Angiogram Extremity Unilateral;  Surgeon: Umesh Quintero MD;  Location: [facility] CATH LAB/EP;  Service: Cardiology;  Laterality: Right;    ANGIOPLASTY OF PERIPHERAL VESSEL FOR CHRONIC TOTAL OCCLUSION N/A 6/24/2022    Procedure: ANGIOPLASTY, VESSEL, PERIPHERAL, FOR CHRONIC TOTAL OCCLUSION;  Surgeon: Dejuan Cody MD;  Location: [facility] CATH LAB/EP;  Service: Cardiology;   Laterality: N/A;    AORTOGRAPHY WITH SERIALOGRAPHY Right 6/7/2022    Procedure: AORTOGRAM, WITH SERIALOGRAPHY;  Surgeon: Dejuan Cody MD;  Location: [facility] CATH LAB/EP;  Service: Cardiology;  Laterality: Right;    CARDIAC SURGERY      DECLOTTING OF ARTERIOVENOUS GRAFT Right 12/21/2021    Procedure: [id number];  Surgeon: Jeison Hunt MD;  Location: [facility] OR;  Service: Vascular;  Laterality: Right;    DECLOTTING OF ARTERIOVENOUS GRAFT Right 2/18/2022    Procedure: [id number];  Surgeon: Jeison Hunt MD;  Location: [facility] OR;  Service: Vascular;  Laterality: Right;    DECLOTTING OF ARTERIOVENOUS GRAFT Right 3/9/2022    Procedure: [id number];  Surgeon: Jeison Hunt MD;  Location: [facility] OR;  Service: Vascular;  Laterality: Right;    DECLOTTING OF ARTERIOVENOUS GRAFT Right 6/13/2022    Procedure: [id number]; REVISION OF FISTULA W/INSERTION OF NEW AV GRAFT;  Surgeon: Jeison Hunt MD;  Location: [facility] OR;  Service: Vascular;  Laterality: Right;    FISTULOGRAM N/A 2/11/2022    Procedure: Fistulogram;  Surgeon: Dejuan Cody MD;  Location: [facility] CATH LAB/EP;  Service: Cardiology;  Laterality: N/A;    FISTULOGRAM N/A 6/17/2022    Procedure: Fistulogram;  Surgeon: Dejuan Cody MD;  Location: [facility] CATH LAB/EP;  Service: Cardiology;  Laterality: N/A;    FOOT SURGERY      INSERTION OF BIVENTRICULAR IMPLANTABLE CARDIOVERTER-DEFIBRILLATOR (ICD) Left 7/18/2019    Procedure: INSERTION, ICD, BIVENTRICULAR;  Surgeon: Arturo Bay MD;  Location: [facility] EP LAB;  Service: Cardiology;  Laterality: Left;  CHB, CRTD, SJM, anes, GP, 6078    LEFT HEART CATHETERIZATION N/A 7/16/2019    Procedure: Left heart cath;  Surgeon: Dejuan Cody MD;  Location: [facility] CATH LAB/EP;  Service: Cardiology;  Laterality: N/A;    PERCUTANEOUS TRANSLUMINAL ANGIOPLASTY OF ARTERIOVENOUS FISTULA Right 2/11/2022    Procedure: PTA, AV FISTULA;  Surgeon: Dejuan Cody MD;  Location: [facility]  CATH LAB/EP;  Service: Cardiology;  Laterality: Right;    PERCUTANEOUS TRANSLUMINAL ANGIOPLASTY OF ARTERIOVENOUS FISTULA N/A 6/17/2022    Procedure: PTA, AV FISTULA;  Surgeon: Dejuan Cody MD;  Location: [facility] CATH LAB/EP;  Service: Cardiology;  Laterality: N/A;    PERITONEAL CATHETER REMOVAL Left 4/11/2020    Procedure: REMOVAL, CATHETER, DIALYSIS, PERITONEAL;  Surgeon: Edis Snell Jr., MD;  Location: [facility] OR;  Service: General;  Laterality: Left;    PHLEBOGRAPHY  6/15/2022    Procedure: Venogram;  Surgeon: Betsey Mckeon MD;  Location: [facility] CATH LAB/EP;  Service: Cardiology;;    PLACEMENT OF ARTERIOVENOUS GRAFT Right 2/18/2022    Procedure: INSERTION, GRAFT, ARTERIOVENOUS;  Surgeon: Jeison Hunt MD;  Location: [facility] OR;  Service: Vascular;  Laterality: Right;    REVISION OF PROCEDURE INVOLVING ARTERIOVENOUS GRAFT Right 2/18/2022    Procedure: REVISION, PROCEDURE INVOLVING ARTERIOVENOUS GRAFT;  Surgeon: Jeison Hunt MD;  Location: [facility] OR;  Service: Vascular;  Laterality: Right;     Family History   Problem Relation Age of Onset    Heart attack Father      Social History     Tobacco Use    Smoking status: Former Smoker    Smokeless tobacco: Never Used   Substance Use Topics    Alcohol use: Yes     Comment: social    Drug use: No       Review of patient's allergies indicates:  No Known Allergies         OBJECTIVE:     Vital Signs (Most Recent)  Vitals:    07/06/22 0400 07/06/22 0508 07/06/22 0844 07/06/22 0914   BP:  (!) 110/56 (!) 89/43 (!) 96/58   BP Location:    Left arm   Patient Position:  Lying Lying Lying   Pulse: 80 80 80    Resp:  20 15    Temp:  96.5 °F (35.8 °C) 98.8 °F (37.1 °C)    TempSrc:  Oral Axillary    SpO2:  98% 100%    Weight:       Height:             Date 07/06/22 0700 - 07/07/22 0659   Shift 0203-7647 5338-9580 3065-3980 24 Hour Total   INTAKE   P.O. 0   0   Shift Total(mL/kg) 0(0)   0(0)   OUTPUT   Urine(mL/kg/hr) 0   0   Shift Total(mL/kg) 0(0)   0(0)    Weight (kg) 124.5 124.5 124.5 124.5           Medications:   sodium chloride 0.9%   Intravenous Once    sodium chloride 0.9%   Intravenous Once    apixaban  5 mg Oral BID    aspirin  81 mg Oral Daily    clopidogreL  75 mg Oral Daily    midodrine  10 mg Oral TID WM    morphine  1 mg Intravenous Once    mupirocin   Nasal BID    piperacillin-tazobactam (ZOSYN) IVPB  4.5 g Intravenous Q12H    senna-docusate 8.6-50 mg  1 tablet Oral BID    sevelamer carbonate  800 mg Oral TID WM    sodium bicarbonate  650 mg Oral BID    sodium zirconium cyclosilicate  5 g Oral Daily           Physical Exam  Vitals and nursing note reviewed.   Constitutional:       General: He is not in acute distress.     Appearance: He is not diaphoretic.   HENT:      Head: Normocephalic and atraumatic.      Mouth/Throat:      Pharynx: No oropharyngeal exudate.   Eyes:      General: No scleral icterus.     Conjunctiva/sclera: Conjunctivae normal.      Pupils: Pupils are equal, round, and reactive to light.   Cardiovascular:      Rate and Rhythm: Normal rate and regular rhythm.      Heart sounds: Normal heart sounds. No murmur heard.  Pulmonary:      Effort: Pulmonary effort is normal. No respiratory distress.      Breath sounds: Normal breath sounds.   Abdominal:      General: Bowel sounds are normal. There is no distension.      Palpations: Abdomen is soft.      Tenderness: There is no abdominal tenderness.   Musculoskeletal:         General: Swelling (LE blisters wounds ) present. Normal range of motion.      Cervical back: Normal range of motion and neck supple.      Right lower leg: Edema present.      Left lower leg: Edema present.      Comments: RUE AVG   thrill    Skin:     General: Skin is warm and dry.      Findings: No erythema.   Neurological:      Mental Status: He is alert and oriented to person, place, and time.      Cranial Nerves: No cranial nerve deficit.   Psychiatric:         Mood and Affect: Affect normal.          Cognition and Memory: Memory normal.         Judgment: Judgment normal.         Laboratory:  Recent Labs   Lab 07/04/22 0438 07/05/22  0404 07/06/22  0251   WBC 17.05* 21.33* 17.97*   HGB 9.9* 9.0* 9.6*   HCT 31.4* 28.1* 30.5*   * 146* 143*   MONO 5.4  0.9 5.2  1.1* 6.3  1.1*     Recent Labs   Lab 07/04/22  0321 07/05/22  0909 07/06/22  0829   * 130* 134*   K 5.2* 5.3* 6.0*   CL 96 94* 97   CO2 20* 16* 14*   BUN 47* 56* 48*   CREATININE 9.1* 10.1* 8.8*   CALCIUM 8.6* 8.7 8.7   PHOS 5.7* 6.8* 6.4*       Diagnostic Results:  X-Ray: Reviewed  US: Reviewed  Echo: Reviewed  ASSESSMENT/PLAN:     1. ESRD on HD TTS with Dr Decker  -- Hyperkalemia 6.0 2hr HD today after resume TTS   -- Daily Renal Function Panel  -- Avoid Hypotension.  -- Renally dose all meds    2. HTN (I10) -    3. Anemia of chronic kidney disease treated with ROSALVA (N18.9 D63.1)    EPogen  with each HD - hold for now  Active infection   Recent Labs   Lab 07/04/22 0438 07/05/22  0404 07/06/22  0251   HGB 9.9* 9.0* 9.6*   HCT 31.4* 28.1* 30.5*   * 146* 143*       Iron   Lab Results   Component Value Date    IRON 63 12/18/2019    TIBC 263 12/18/2019    FERRITIN 6,078 (H) 03/28/2020       4. MBD (E88.9 M90.80) -  Recent Labs   Lab 07/06/22  0829   CALCIUM 8.7   PHOS 6.4*   Sevelamer TIDWM   Recent Labs   Lab 07/02/22  0332 07/03/22  0355 07/04/22  0321   MG 1.7 1.6 1.7       Lab Results   Component Value Date    .0 (H) 03/04/2020    CALCIUM 8.7 07/06/2022    PHOS 6.4 (H) 07/06/2022     No results found for: [id number]    Lab Results   Component Value Date    CO2 14 (L) 07/06/2022       5. Nutrition/Hypoalbuminemia (E88.09) -    Recent Labs   Lab 07/01/22 2019 07/02/22  0332 07/05/22  0909 07/06/22  0829   LABPROT 15.2*  --   --   --    ALBUMIN  --    < > 1.9* 1.9*    < > = values in this interval not displayed.     Nepro with meals TID. Renal vitamins daily      With any question please call answering service [phone]  [phone]  Laura Ortiz MD    [facility]  ANA MARIA Burns MD, FACP,   MAliez Woo MD,   MD DAMION Gill MD E. V. Harmon, NP    [address]  [place], [id number]

## 2022-07-06 NOTE — SUBJECTIVE & OBJECTIVE
Interval History:   Patient states he had a long day yesterday and is very tired. He also has numbness and tingling to BLE.     Review of Systems   Constitutional:  Positive for fatigue. Negative for activity change and appetite change.   HENT:  Negative for trouble swallowing and voice change.    Eyes:  Negative for photophobia and visual disturbance.   Respiratory:  Negative for cough, choking, chest tightness and shortness of breath.    Cardiovascular:  Negative for chest pain, palpitations and leg swelling.   Gastrointestinal:  Negative for abdominal pain, constipation, diarrhea, nausea and vomiting.   Endocrine: Negative.    Genitourinary:  Negative for decreased urine volume and urgency.   Musculoskeletal:  Positive for arthralgias.   Skin:  Positive for color change and wound.   Neurological:  Positive for weakness and numbness. Negative for dizziness, light-headedness and headaches.   Hematological: Negative.    Objective:     Vital Signs (Most Recent):  Temp: 98.8 °F (37.1 °C) (07/06/22 0844)  Pulse: 80 (07/06/22 0844)  Resp: 15 (07/06/22 0844)  BP: (!) 89/43 (07/06/22 0844)  SpO2: 100 % (07/06/22 0844)   Vital Signs (24h Range):  Temp:  [96.5 °F (35.8 °C)-98.8 °F (37.1 °C)] 98.8 °F (37.1 °C)  Pulse:  [60-94] 80  Resp:  [12-20] 15  SpO2:  [96 %-100 %] 100 %  BP: ()/(43-65) 89/43     Weight: 124.5 kg (274 lb 7.6 oz)  Body mass index is 36.21 kg/m².    Intake/Output Summary (Last 24 hours) at 7/6/2022 0859  Last data filed at 7/6/2022 0700  Gross per 24 hour   Intake 805.46 ml   Output 1178 ml   Net -372.54 ml        Physical Exam  Vitals reviewed.   Constitutional:       General: He is not in acute distress.     Appearance: Normal appearance. He is not ill-appearing, toxic-appearing or diaphoretic.   HENT:      Head: Normocephalic and atraumatic.      Mouth/Throat:      Pharynx: Oropharynx is clear.   Eyes:      Extraocular Movements: Extraocular movements intact.      Conjunctiva/sclera: Conjunctivae  normal.   Cardiovascular:      Rate and Rhythm: Normal rate and regular rhythm.      Heart sounds: Normal heart sounds. No murmur heard.     Comments: Unable to palpate pedal pulses  Pulmonary:      Effort: Pulmonary effort is normal. No respiratory distress.      Breath sounds: Normal breath sounds.   Abdominal:      General: Bowel sounds are normal.      Palpations: Abdomen is soft.   Musculoskeletal:         General: Swelling present. Normal range of motion.      Cervical back: Normal range of motion and neck supple.      Right lower leg: Edema present.      Left lower leg: Edema present.   Skin:     Comments: See photos   Neurological:      General: No focal deficit present.      Mental Status: He is alert and oriented to person, place, and time. Mental status is at baseline.      Sensory: Sensory deficit present.           BNP  No results for input(s): BNP, BNPTRIAGEBLO in the last 168 hours.    Significant Labs: A1C:   Recent Labs   Lab 06/14/22  1007   HGBA1C 6.9*     ABGs: No results for input(s): PH, PCO2, HCO3, POCSATURATED, BE, TOTALHB, COHB, METHB, O2HB, POCFIO2, PO2 in the last 48 hours.  Bilirubin:   Recent Labs   Lab 06/13/22  1125 07/01/22  1951 07/02/22  0332 07/03/22  0355 07/04/22  0321   BILITOT 0.2 0.6 0.5 0.6 0.6     CBC:   Recent Labs   Lab 07/05/22  0404 07/06/22  0251   WBC 21.33* 17.97*   HGB 9.0* 9.6*   HCT 28.1* 30.5*   * 143*     CMP:   Recent Labs   Lab 07/05/22  0909   *   K 5.3*   CL 94*   CO2 16*   *   BUN 56*   CREATININE 10.1*   CALCIUM 8.7   ALBUMIN 1.9*   ANIONGAP 20*   EGFRNONAA 5*     Lactic Acid:   No results for input(s): LACTATE in the last 48 hours.    Lipase: No results for input(s): LIPASE in the last 48 hours.  Lipid Panel: No results for input(s): CHOL, HDL, LDLCALC, TRIG, CHOLHDL in the last 48 hours.  Magnesium:   No results for input(s): MG in the last 48 hours.    Troponin: No results for input(s): TROPONINI in the last 48 hours.  TSH: No results  for input(s): TSH in the last 4320 hours.  Urine Culture: No results for input(s): LABURIN in the last 48 hours.  Urine Studies: No results for input(s): COLORU, APPEARANCEUA, PHUR, SPECGRAV, PROTEINUA, GLUCUA, KETONESU, BILIRUBINUA, OCCULTUA, NITRITE, UROBILINOGEN, LEUKOCYTESUR, RBCUA, WBCUA, BACTERIA, SQUAMEPITHEL, HYALINECASTS in the last 48 hours.    Invalid input(s): [facility]      Microbiology Results (last 7 days)       Procedure Component Value Units Date/Time    Blood culture (site 1) [[id number]] Collected: 07/01/22 2238    Order Status: Completed Specimen: Blood Updated: 07/05/22 1212     Blood Culture, Routine No Growth to date      No Growth to date      No Growth to date      No Growth to date    Narrative:      Site # 1, aerobic and anaerobic  Collection has been rescheduled by [email or web address] at 07/01/2022 21:42 Reason:   Unable to collect tried several times Nurse spencer was notified   Collection has been rescheduled by TD5 at 07/01/2022 21:42 Reason:   Unable to collect tried several times Nurse spencer was notified     Blood culture (site 2) [[id number]] Collected: 07/01/22 2239    Order Status: Completed Specimen: Blood Updated: 07/05/22 1212     Blood Culture, Routine No Growth to date      No Growth to date      No Growth to date      No Growth to date    Narrative:      Site # 2, aerobic only  Collection has been rescheduled by TD5 at 07/01/2022 21:42 Reason:   Unable to collect tried several times Nurse spencer was notified   Collection has been rescheduled by TD5 at 07/01/2022 21:42 Reason:   Unable to collect tried several times Nurse spencer was notified             Significant Imaging: I have reviewed all pertinent imaging results/findings within the past 24 hours.

## 2022-07-06 NOTE — PROGRESS NOTES
[facility] Progress Note    Assessment/Plan:     76-year-old male 76-year-old male with history of end-stage renal disease on hemodialysis Tuesday Thursday Saturday, coronary disease, diabetes, CHF, hypertension, and peripheral vascular disease who was referred by home health and cardiology for worsening status of lower extremity vascular disease. Patient has worsening gangrene of several toes and blister formation on bilateral feet. Currently on Day 5 of Vanc/Zosyn due to wet-appearing gangrene and leukocytosis, but remains afebrile, blood cx NGTD and VSS with fluctuating leukocytosis despite abx. S/p PTA yesterday with Cards with balloon to proximal/mid AT as well as distal AT with residual distal AT 99% stenosis. Still awaiting podiatry input.    Recommendations:    #Bilateral Toe Gangrene, multiple digits affected  - await podiatry plan for worsening toe gangrene  - was planned for TMA  - given elevated WBC would continue vanco and zosyn for now    Pushpa Thomas MD  [employer] [facility] PGY-3      Thank you for allowing us to participate in the care of this patient. We will continue to follow along. Case has been discussed with consult staff, who is in agreement with assessment and plan. ID will continue to follow.     HPI:   76-year-old male with history of end-stage renal disease on hemodialysis Tuesday Thursday Saturday, coronary disease, diabetes, CHF, hypertension, and peripheral vascular disease who was referred by home health and cardiology for worsening status of lower extremity vascular disease. Patient has worsening gangrene of several toes and blister formation on bilateral feet.  He reports pain to both feet. History of multiple angioplasty. No stents. He reports blisters to the right foot for 1 week with 2 new blisters on the left foot and 2 open blisters to the right foot. Reports compliance with dialysis with last dialysis session on Tuesday.     Subjective:      PTA  Yesterday. No acute  events ON. Denies subjective fever/chills, N/V.     Objective:   Last 24 Hour Vital Signs:  BP  Min: 89/43  Max: 110/56  Temp  Av.5 °F (36.4 °C)  Min: 96.5 °F (35.8 °C)  Max: 98.8 °F (37.1 °C)  Pulse  Av.3  Min: 60  Max: 94  Resp  Av.1  Min: 12  Max: 20  SpO2  Av.5 %  Min: 96 %  Max: 100 %  I/O last 3 completed shifts:  In: 930.5 [P.O.:125; IV Piggyback:805.5]  Out: 1203 [Urine:25; Other:1178]    Physical Exam  General: Awake, alert, NAD. Obese.  HEENT: NCAT. EOMI intact. MM.   Neck: Trachea midline. No JVD.  Lungs: CTAB. No wheezing/rales/rhonchi.   CV: RRR. Normal S1/S2. No murmurs/rubs/gallops. Unable to palpate pedal pulses.    Extremities: BLE edema.   Skin: See photos under media tab.  Neuro: A&O x3. CN II-XII grossly intact.      Laboratory:  Laboratory Data Reviewed: yes  Pertinent Findings:  Recent Labs   Lab 22  0332 22  0355 22  0356 22  0321 22  0438 22  0404 22  0909 22  0251   WBC 18.33*  --    < > 17.52* 17.05* 21.33*  --  17.97*   HGB 9.7*  --    < > 10.0* 9.9* 9.0*  --  9.6*   HCT 30.4*  --    < > 31.4* 31.4* 28.1*  --  30.5*   *  --    < > 136* 133* 146*  --  143*   MCV 95  --    < > 95 96 94  --  95   RDW 17.1*  --    < > 16.9* 16.9* 16.8*  --  17.0*   * 134*  --  134*  --   --  130*  --    K 4.9 4.4  --  5.2*  --   --  5.3*  --    CL 95 96  --  96  --   --  94*  --    CO2 18* 23  --  20*  --   --  16*  --    BUN 53* 37*  --  47*  --   --  56*  --    CREATININE 10.4* 8.1*  --  9.1*  --   --  10.1*  --    * 113*  --  185*  --   --  209*  --    PROT 5.8* 5.8*  --  6.0  --   --   --   --    ALBUMIN 2.2* 2.1*  --  2.1*  --   --  1.9*  --    BILITOT 0.5 0.6  --  0.6  --   --   --   --    AST 17 13  --  9*  --   --   --   --    ALKPHOS 131 134  --  139*  --   --   --   --    ALT <5* <5*  --  <5*  --   --   --   --     < > = values in this interval not displayed.         Microbiology Data:   blood cx NGTD  x2    Antimicrobials:  Zosyn 4.5g IV q 12H (7/1- )  Vancomycin IV (7/2- )      Other Results:  Radiology Results:  US Lower Extremity Arteries Bilateral    Result Date: 7/4/2022  EXAMINATION: US LOWER EXTREMITY ARTERIES BILATERAL CLINICAL HISTORY: pad; TECHNIQUE: Bilateral spectral, color and grayscale images of the large arteries of both lower extremities were performed. COMPARISON: 05/18/2022 FINDINGS: Right lower extremity. CFA: 122 cm/s, monophasic DFA: 33 cm/s, monophasic Prox SFA: 158 cm/s, monophasic Mid SFA: 125 cm/s, monophasic Dist SFA: 121 cm/s, monophasic Pop A: 86 cm/s, monophasic PTA: 80 cm/s, monophasic DIYA: 54 cm/s, monophasic Left lower extremity CFA: Not well visualized DFA: 26 cm/s, monophasic Prox SFA: 72 cm/s, monophasic Mid SFA: 260 cm/s, monophasic *focal high-grade stenosis Dist SFA: 42 cm/s, monophasic Pop A: 28 cm/s, monophasic PTA: 16 cm/s, monophasic DIYA: 22 cm/s, monophasic     Findings in keeping with advanced peripheral arterial disease with blunted monophasic waveforms throughout the lower extremity arterial system bilaterally as above. Left common femoral artery not well visualized, may be occluded. CT or catheter based angiogram may be helpful further characterization if warranted clinically. This report was flagged in Epic as abnormal. Electronically signed by: Socrates Medina MD Date:    07/04/2022 Time:    10:29    US Upper Extremity Arteries Right    Result Date: 6/13/2022  EXAMINATION: US UPPER EXTREMITY ARTERIES RIGHT CLINICAL HISTORY: Other complication of vascular dialysis catheter, initial encounter COMPARISON: None FINDINGS: AVF/AVG: RUE brachiocephalic AVG with intraluminal nonocclusive filling defect and significant aneurysmal degeneration along its proximal greater than distal segments but is otherwise patent.  Imaged surrounding soft tissues are grossly unremarkable without evidence of perigraft fluid collection or hematoma. Inflow artery proximal to the AVF/AVG:  "68-cm/s with abnormal high-resistance triphasic waveforms. Inflow artery distal to the AVF/AV-cm/s with abnormal high-resistance biphasic waveforms. Arterial anastomosis:  106-cm/s with blunted low-resistance monophasic waveforms with spectral broadening and antegrade diastolic flow. (Normal velocities 200-400 cm/s) Proximal  AVF/AV-cm/s with blunted low-resistance monophasic waveforms and antegrade diastolic flow.  (Normal velocities 150-400 cm/s) Mid  AVF/AV-cm/s with blunted low-resistance monophasic waveforms and antegrade diastolic flow. Distal  AVF/AV-cm/s with blunted low-resistance monophasic waveforms and antegrade diastolic flow. Volume flow rate: 57-cc/min Central veins:  116-131-cm/s with normal waveforms with normal respiratory phasicity.     1. Moderate amount of nonocclusive thrombus in significant aneurysmal degeneration along the proximal segment of the RUE brachiocephalic AVG and, minimal amount of nonocclusive thrombus along the distal segment associated with abnormal peak systolic velocities in volume flow rates throughout the AVG. This report was flagged in Epic as abnormal. Electronically signed by: Cheo Oliva Date:    2022 Time:    14:27    Cardiac catheterization    Result Date: 2022  S/p percutaneous deep venous arterialization for "no option" CLTI  Antegrade right CFA access with 5fr Destination sheath  Lateral plantar vein access with 5/6 slender sheath  Findings:  Patent right SFA and POP with luminal irregularities  Chronically occluded proximal right AT  99% prox PT stenosis and mid PT   Distal PT  with multiple small islands  Patent PER with luminal irregularities  Although PER reaches the ankle there are no collaterals to the foot  Desert foot without any named vessels  Heavily calcified vessels  Intervention:  PTA of PT with 2.0 x 40 mm balloon to create a channel  Atherectomy of PT with 1.5 Classic CSI catheter  Serial PTA of PT with 2.5, " 3.0, and 4.0 mm balloons  Venoplasty of PTV with 4.0 and 5.0 mm balloons  Arterial fistula creation with gun site technique using (2) 10 mm snares  PTA of fistula with 4.0 mm balloon  Valvulotomy with 5.0 x 40 mm Scoring balloon  Venoplasty of lateral plantar and dorsal vein with 3.0 and 4.0 mm balloons  5.0 x 250 and 5.0 x 150 mm Viabahn stents placed from mid PT artery to lateral plantar vein  Covered stents post dilated with 5.0 x 100 mm balloon  Final PTA of PT with 4.0 x 150 mm Lutonix DCB    Plan  DAPT with aspirin and plavix  Eliquis 2.5 mg po bid  Intense statin therapy  Wound care and tension free surgery  Delay surgical interventions for at least 4 weeks  The procedure log was documented by Documenter: RT Carmina; Julien Aguayo RDCS and verified by Dejuan Cody MD. Date: 6/29/2022  Time: 3:55 PM    Cardiac catheterization    Result Date: 6/21/2022  S/p right brachial artery-cephalic vein-axillary vein access evaluation  Findings:  Occluded access  Patent right subclavian vein  Patent Innominate vein  Patent SVC  Patent brachial artery  Intervention  Balloon angioplasty of access with 6 x 100 and 6 x 120 mm balloon  Palpable thrill post procedure  Plan:  Resume HD per protocol  Surveillance US in 2 weeks  The procedure log was documented by Documenter: Julien Aguayo RDCS and verified by Dejuan Cody MD. Date: 6/21/2022  Time: 2:53 PM    Cardiac catheterization    Result Date: 6/16/2022  Procedure: Trialysis catheter placement Access: L-CFV Indication: Dialysis Trialysis catheter placed under fluoroscopy. Venogram performed to confirm position. Sutured with Biopatch in place. Sterile dressing applied. The procedure log was documented by Documenter: RT Cole and verified by Betsey Mckeon MD. Date: 6/15/2022  Time: 2:03 PM    Cardiac catheterization    Result Date: 6/15/2022    S/p aortogram with run off                 Patent distal aorta with bilateral iliac arteries              Patent bilateral CFA and DFA                           Left:               80% mid and distal SFA             1 vessel run off via PER             Ostial PT              Patent prox and mid AT             Distal AT not visualized due to motion artifact                           Right:                 90% multiple mid and distal SFA stenosis             Patent POP             prox AT  with diffuse disease             Distal AT  with no outflow to the foot             90% TPT stenosis             80% prox and mid PER stenosis             90% prox PT, mid PT , and distal reconstitution             Distal PT is diffusely disease             Lateral plantar              DP is not visualized due to severe stenosis + motion artifact       Plan:                 Review angiogram             Right SFA + TPT + PER intervention             Re-evaluate for distal PTa- PTv DVA     The procedure log was documented by Documenter: RT Carmina and verified by Dejuan Cody MD. Date: 6/15/2022  Time: 4:33 PM    US Vein mapping, Other, Unilateral    Result Date: 6/23/2022  EXAMINATION: US VEIN MAPPING, OTHER, UNILATERAL CLINICAL HISTORY: pre DVA; assess patency lateral medial plantar vein TECHNIQUE: LIMITED GRAYSCALE, DOPPLER, AND SPECTRAL EVALUATION OF THE RIGHT CALF VESSELS WAS PERFORMED. COMPARISON: None FINDINGS: The right posterior tibial veins are patent. The right lateral plantar vein is patent and compressible.  It measures 2.5 mm in diameter.     As above. Electronically signed by: Edson Correia Date:    06/23/2022 Time:    18:36      Current Medications:     Infusions:   heparin (porcine) in D5W 8 Units/kg/hr (07/05/22 2029)    heparin (porcine) 2,000 Units/hr (07/05/22 1254)        Scheduled:   sodium chloride 0.9%   Intravenous Once    aspirin  81 mg Oral Daily    clopidogreL  75 mg Oral Daily    midodrine  10 mg Oral TID WM    morphine  1 mg Intravenous Once    mupirocin   Nasal BID     piperacillin-tazobactam (ZOSYN) IVPB  4.5 g Intravenous Q12H    senna-docusate 8.6-50 mg  1 tablet Oral BID    sevelamer carbonate  800 mg Oral TID WM    sodium bicarbonate  650 mg Oral BID    sodium zirconium cyclosilicate  5 g Oral Daily        PRN:  sodium chloride 0.9%, acetaminophen, acetaminophen, albuterol-ipratropium, aluminum-magnesium hydroxide-simethicone, dextrose 10%, dextrose 10%, glucagon (human recombinant), glucose, glucose, heparin (PORCINE), heparin (PORCINE), heparin (porcine), HYDROcodone-acetaminophen, insulin aspart U-100, melatonin, naloxone, ondansetron, ondansetron, simethicone, sodium chloride 0.9%, sodium chloride 0.9%, Pharmacy to dose Vancomycin consult **AND** vancomycin - pharmacy to dose

## 2022-07-06 NOTE — PROGRESS NOTES
07/06/22 1425   Vital Signs   Temp 98 °F (36.7 °C)   Temp src Tympanic   Pulse 80   Heart Rate Source Left;Brachial;NIBP   Resp 18   O2 Device (Oxygen Therapy) room air   BP 99/60   BP Location Left arm   BP Method Automatic   Patient Position Lying   Post-Hemodialysis Assessment   Rinseback Volume (mL) 250 mL   Blood Volume Processed (Liters) 45 L   Dialyzer Clearance Clear   Duration of Treatment 120 minutes   Additional Fluid Intake (mL) 500 mL   Total UF (mL) 500 mL   Net Fluid Removal 0   Patient Response to Treatment well   Arterial bleeding stop time (min) 5 min   Venous bleeding stop time (min) 5 min   Post-Hemodialysis Comments pt a+ox3, cardiac rrr, bbs clear, abd soft with + bowel sounds

## 2022-07-06 NOTE — ASSESSMENT & PLAN NOTE
Bullae  -Presented with blisters to right foot x1 week with 2 new blisters on left foot, right foot with multiple blisters with 2 open: see photos above  -WBC 22---> 17 --> 21--> 17, lactate 2.9  -on vanc and zosyn  -blood cultures with NGTD continue Vanc / Zosyn  -podiatry consulted   -cardiology on board   - encourage pain regime for pain control  -ID on board

## 2022-07-06 NOTE — SUBJECTIVE & OBJECTIVE
Review of Systems   Constitutional: Negative for chills, decreased appetite, diaphoresis and fever.   Cardiovascular:  Negative for chest pain, claudication, cyanosis, dyspnea on exertion, irregular heartbeat, leg swelling, near-syncope, orthopnea, palpitations, paroxysmal nocturnal dyspnea and syncope.   Respiratory:  Negative for cough, hemoptysis, shortness of breath and wheezing.    Skin:  Positive for poor wound healing.   Gastrointestinal:  Negative for bloating, abdominal pain, constipation, diarrhea, melena, nausea and vomiting.   Neurological:  Negative for dizziness and weakness.   Objective:     Vital Signs (Most Recent):  Temp: 98.8 °F (37.1 °C) (07/06/22 0844)  Pulse: 80 (07/06/22 0844)  Resp: 15 (07/06/22 0844)  BP: (!) 96/58 (07/06/22 0914)  SpO2: 100 % (07/06/22 0844)   Vital Signs (24h Range):  Temp:  [96.5 °F (35.8 °C)-98.8 °F (37.1 °C)] 98.8 °F (37.1 °C)  Pulse:  [78-84] 80  Resp:  [12-20] 15  SpO2:  [96 %-100 %] 100 %  BP: ()/(43-65) 96/58     Weight: 124.5 kg (274 lb 7.6 oz)  Body mass index is 36.21 kg/m².     SpO2: 100 %  O2 Device (Oxygen Therapy): room air      Intake/Output Summary (Last 24 hours) at 7/6/2022 1059  Last data filed at 7/6/2022 0700  Gross per 24 hour   Intake 805.46 ml   Output 1178 ml   Net -372.54 ml       Lines/Drains/Airways       Central Venous Catheter Line  Duration                  Hemodialysis AV Graft Right forearm -- days              Peripheral Intravenous Line  Duration                  Peripheral IV - Single Lumen 07/05/22 2000 20 G Anterior;Left Upper Arm <1 day         Peripheral IV - Single Lumen 07/05/22 2315 22 G Anterior;Left Forearm <1 day                    Physical Exam  Constitutional:       General: He is not in acute distress.     Appearance: He is well-developed.   Cardiovascular:      Rate and Rhythm: Normal rate and regular rhythm.      Heart sounds: No murmur heard.    No gallop.      Comments: Left DP biphasic dopplerable pulse  present; monophasic dopplerable left PT pulse present   Pulmonary:      Effort: Pulmonary effort is normal. No respiratory distress.      Breath sounds: Normal breath sounds. No wheezing.   Abdominal:      General: Bowel sounds are normal. There is no distension.      Palpations: Abdomen is soft.      Tenderness: There is no abdominal tenderness.   Skin:     General: Skin is warm and dry.   Neurological:      Mental Status: He is alert and oriented to person, place, and time.       Significant Labs: BMP:   Recent Labs   Lab 07/05/22  0909 07/06/22  0829   * 149*   * 134*   K 5.3* 6.0*   CL 94* 97   CO2 16* 14*   BUN 56* 48*   CREATININE 10.1* 8.8*   CALCIUM 8.7 8.7    and CBC   Recent Labs   Lab 07/05/22  0404 07/06/22  0251   WBC 21.33* 17.97*   HGB 9.0* 9.6*   HCT 28.1* 30.5*   * 143*       Significant Imaging: Echocardiogram: Transthoracic echo (TTE) complete (Cupid Only):   Results for orders placed or performed during the hospital encounter of 12/21/21   Echo   Result Value Ref Range    Ascending aorta 3.30 cm    STJ 3.27 cm    AV mean gradient 4 mmHg    Ao peak son 1.17 m/s    Ao VTI 25.29 cm    IVS 1.61 (A) 0.6 - 1.1 cm    LA size 5.11 cm    Left Atrium Major Axis 7.34 cm    Left Atrium Minor Axis 6.11 cm    LVIDd 6.60 (A) 3.5 - 6.0 cm    LVIDs 5.24 (A) 2.1 - 4.0 cm    LVOT diameter 2.89 cm    LVOT peak VTI 17.22 cm    Posterior Wall 1.66 (A) 0.6 - 1.1 cm    MV Peak A Son 1.03 m/s    E wave deceleration time 128.00 msec    MV Peak E Son 0.75 m/s    RA Major Axis 5.72 cm    RA Width 3.45 cm    RVDD 3.28 cm    TAPSE 1.72 cm    TR Max Son 2.14 m/s    TDI LATERAL 0.04 m/s    LA WIDTH 4.32 cm    Ao root annulus 4.28 cm    PV PEAK VELOCITY 1.04 cm/s    MV stenosis pressure 1/2 time 37.12 ms    LV Diastolic Volume 223.81 mL    LV Systolic Volume 131.64 mL    LVOT peak son 0.79 m/s    LA volume (mod) 85.57 cm3    MV mean gradient 1 mmHg    MV peak gradient 4 mmHg    RV S' 9.54 cm/s    MV VTI 18.13  cm    LV LATERAL E/E' RATIO 18.75 m/s    FS 21 %    LA volume 125.13 cm3    LV mass 561.38 g    Left Ventricle Relative Wall Thickness 0.50 cm    AV valve area 4.46 cm2    AV Velocity Ratio 0.68     AV index (prosthetic) 0.68     MV valve area p 1/2 method 5.93 cm2    MV valve area by continuity eq 6.23 cm2    E/A ratio 0.73     LVOT area 6.6 cm2    LVOT stroke volume 112.90 cm3    AV peak gradient 5 mmHg    LV Systolic Volume Index 53.5 mL/m2    LV Diastolic Volume Index 90.98 mL/m2    LA Volume Index 50.9 mL/m2    LV Mass Index 228 g/m2    Triscuspid Valve Regurgitation Peak Gradient 18 mmHg    LA Volume Index (Mod) 34.8 mL/m2    BSA 2.53 m2    Right Atrial Pressure (from IVC) 3 mmHg    QEF 34 %    TV rest pulmonary artery pressure 21 mmHg    Narrative    · The left ventricle is moderately enlarged with mild concentric   hypertrophy and  · The quantitatively derived ejection fraction is 34%.  · Severe left atrial enlargement.  · Normal right ventricular size with normal right ventricular systolic   function.  · Normal central venous pressure (3 mmHg).  · The estimated PA systolic pressure is 21 mmHg.  · No vegetations per surface echo.

## 2022-07-06 NOTE — ASSESSMENT & PLAN NOTE
-HD outpatient scheduled John  -NICOLETTE BOLAÑOS noted  -Consult nephrology   - HD 7/5 after angiogram

## 2022-07-06 NOTE — ASSESSMENT & PLAN NOTE
- SBP 90s-110s overnight  - on Midodrine 10mg TID; on carvedilol as an outpatient-currently on hold and will continue to hold while on Midodrine  - if MAP consistently >65mmHg then can decrease Midodrine to 10mg BID

## 2022-07-06 NOTE — PROGRESS NOTES
[facility] Medicine  Progress Note    Patient Name: Houston Jc  MRN: [id number]  Patient Class: IP- Inpatient   Admission Date: 7/1/2022  Length of Stay: 5 days  Attending Physician: Emilia Gaston MD  Primary Care Provider: Zak Hamilton MD        Subjective:     Principal Problem:Gangrene        HPI:  Houston Jc is a 76-year-old male 76-year-old male with history of end-stage renal disease on hemodialysis Tuesday Thursday Saturday, coronary disease, diabetes, CHF, hypertension, and peripheral vascular disease who was referred by home health and cardiology for worsening status of lower extremity vascular disease. Patient has worsening gangrene of several toes and blister formation on bilateral feet.  He reports pain to both feet. History of multiple angioplasty. No stents. He reports blisters to the right foot for 1 week with 2 new blisters on the left foot and 2 open blisters to the right foot. Reports compliance with dialysis with last dialysis session on Tuesday. Of note, patient was recently discharged on 6/28/22 for clotted vascular access and was to follow up outpatient under vascular and for the gangrene of the toe with Dr. Adam.    In the ED: WBC 22, Hgb 9, lactate 2.9. Given 1500 ml IVF bolus. Started on vanc and zosyn, and heparin gtt. Cardiology consulted with recommendations for arterial dopplers in a.m. Admitted to [facility] for further evaluation.      Overview/Hospital Course:  No notes on file    Interval History:   Patient states he had a long day yesterday and is very tired. He also has numbness and tingling to BLE.     Review of Systems   Constitutional:  Positive for fatigue. Negative for activity change and appetite change.   HENT:  Negative for trouble swallowing and voice change.    Eyes:  Negative for photophobia and visual disturbance.   Respiratory:  Negative for cough, choking, chest tightness and shortness of breath.    Cardiovascular:  Negative  for chest pain, palpitations and leg swelling.   Gastrointestinal:  Negative for abdominal pain, constipation, diarrhea, nausea and vomiting.   Endocrine: Negative.    Genitourinary:  Negative for decreased urine volume and urgency.   Musculoskeletal:  Positive for arthralgias.   Skin:  Positive for color change and wound.   Neurological:  Positive for weakness and numbness. Negative for dizziness, light-headedness and headaches.   Hematological: Negative.    Objective:     Vital Signs (Most Recent):  Temp: 98.8 °F (37.1 °C) (07/06/22 0844)  Pulse: 80 (07/06/22 0844)  Resp: 15 (07/06/22 0844)  BP: (!) 89/43 (07/06/22 0844)  SpO2: 100 % (07/06/22 0844)   Vital Signs (24h Range):  Temp:  [96.5 °F (35.8 °C)-98.8 °F (37.1 °C)] 98.8 °F (37.1 °C)  Pulse:  [60-94] 80  Resp:  [12-20] 15  SpO2:  [96 %-100 %] 100 %  BP: ()/(43-65) 89/43     Weight: 124.5 kg (274 lb 7.6 oz)  Body mass index is 36.21 kg/m².    Intake/Output Summary (Last 24 hours) at 7/6/2022 0859  Last data filed at 7/6/2022 0700  Gross per 24 hour   Intake 805.46 ml   Output 1178 ml   Net -372.54 ml        Physical Exam  Vitals reviewed.   Constitutional:       General: He is not in acute distress.     Appearance: Normal appearance. He is not ill-appearing, toxic-appearing or diaphoretic.   HENT:      Head: Normocephalic and atraumatic.      Mouth/Throat:      Pharynx: Oropharynx is clear.   Eyes:      Extraocular Movements: Extraocular movements intact.      Conjunctiva/sclera: Conjunctivae normal.   Cardiovascular:      Rate and Rhythm: Normal rate and regular rhythm.      Heart sounds: Normal heart sounds. No murmur heard.     Comments: Unable to palpate pedal pulses  Pulmonary:      Effort: Pulmonary effort is normal. No respiratory distress.      Breath sounds: Normal breath sounds.   Abdominal:      General: Bowel sounds are normal.      Palpations: Abdomen is soft.   Musculoskeletal:         General: Swelling present. Normal range of motion.       Cervical back: Normal range of motion and neck supple.      Right lower leg: Edema present.      Left lower leg: Edema present.   Skin:     Comments: See photos   L anterior ankle with postprocedural dressing moderate amount of bloody drainage.   Neurological:      General: No focal deficit present.      Mental Status: He is alert and oriented to person, place, and time. Mental status is at baseline.      Sensory: Sensory deficit present.           BNP  No results for input(s): BNP, BNPTRIAGEBLO in the last 168 hours.    Significant Labs: A1C:   Recent Labs   Lab 06/14/22  1007   HGBA1C 6.9*     ABGs: No results for input(s): PH, PCO2, HCO3, POCSATURATED, BE, TOTALHB, COHB, METHB, O2HB, POCFIO2, PO2 in the last 48 hours.  Bilirubin:   Recent Labs   Lab 06/13/22  1125 07/01/22  1951 07/02/22  0332 07/03/22  0355 07/04/22  0321   BILITOT 0.2 0.6 0.5 0.6 0.6     CBC:   Recent Labs   Lab 07/05/22  0404 07/06/22  0251   WBC 21.33* 17.97*   HGB 9.0* 9.6*   HCT 28.1* 30.5*   * 143*     CMP:   Recent Labs   Lab 07/05/22  0909   *   K 5.3*   CL 94*   CO2 16*   *   BUN 56*   CREATININE 10.1*   CALCIUM 8.7   ALBUMIN 1.9*   ANIONGAP 20*   EGFRNONAA 5*     Lactic Acid:   No results for input(s): LACTATE in the last 48 hours.    Lipase: No results for input(s): LIPASE in the last 48 hours.  Lipid Panel: No results for input(s): CHOL, HDL, LDLCALC, TRIG, CHOLHDL in the last 48 hours.  Magnesium:   No results for input(s): MG in the last 48 hours.    Troponin: No results for input(s): TROPONINI in the last 48 hours.  TSH: No results for input(s): TSH in the last 4320 hours.  Urine Culture: No results for input(s): LABURIN in the last 48 hours.  Urine Studies: No results for input(s): COLORU, APPEARANCEUA, PHUR, SPECGRAV, PROTEINUA, GLUCUA, KETONESU, BILIRUBINUA, OCCULTUA, NITRITE, UROBILINOGEN, LEUKOCYTESUR, RBCUA, WBCUA, BACTERIA, SQUAMEPITHEL, HYALINECASTS in the last 48 hours.    Invalid input(s):  [facility]      Microbiology Results (last 7 days)       Procedure Component Value Units Date/Time    Blood culture (site 1) [[id number]] Collected: 07/01/22 2238    Order Status: Completed Specimen: Blood Updated: 07/05/22 1212     Blood Culture, Routine No Growth to date      No Growth to date      No Growth to date      No Growth to date    Narrative:      Site # 1, aerobic and anaerobic  Collection has been rescheduled by [email or web address] at 07/01/2022 21:42 Reason:   Unable to collect tried several times Nurse spencer was notified   Collection has been rescheduled by [email or web address] at 07/01/2022 21:42 Reason:   Unable to collect tried several times Nurse spencer was notified     Blood culture (site 2) [[id number]] Collected: 07/01/22 2239    Order Status: Completed Specimen: Blood Updated: 07/05/22 1212     Blood Culture, Routine No Growth to date      No Growth to date      No Growth to date      No Growth to date    Narrative:      Site # 2, aerobic only  Collection has been rescheduled by [email or web address] at 07/01/2022 21:42 Reason:   Unable to collect tried several times Nurse spencer was notified   Collection has been rescheduled by [email or web address] at 07/01/2022 21:42 Reason:   Unable to collect tried several times Nurse spencer was notified             Significant Imaging: I have reviewed all pertinent imaging results/findings within the past 24 hours.      Assessment/Plan:      * Gangrene  Bullae  -Presented with blisters to right foot x1 week with 2 new blisters on left foot, right foot with multiple blisters with 2 open: see photos above  -WBC 22---> 17 --> 21--> 17, lactate 2.9  -on vanc and zosyn  -blood cultures with NGTD continue Vanc / Zosyn  -podiatry consulted   -cardiology on board   - encourage pain regime for pain control  -ID on board     Bullae        Critical limb ischemia with history of revascularization of same extremity  -unlikely acute limb ischemia given history.  -Patient was recently discharged on 6/28/22 for clotted vascular access and was  to follow up outpatient under vascular and for the gangrene of the toe with Dr. Adam  -Left 5th and 3rd toes black in color see photos above  -Unable to palpate pedal pulses bilateral   -Bilateral pitting edema +4  -continue vanc and zosyn  -continue heparin gtt  -Cardiology consulted- s/p peripheral angiogram 7/5: with PTA of prox and mid AT with 3.0 x 150 mm balloon              Sub-optimal PTA of distal AT with 2.0 x 100 and 2.0 x 40 mm balloons              Residual distal AT 99% stenosis   -Resume plavix and heparin gtt   -Resume eliquis today after podiatry consult    -cards Considering distal AT PTA with OSCAR approach   and evaluation for DVA and HBO                  Anemia, chronic renal failure, stage 5  -Hgb 9  -monitor      Type 2 diabetes mellitus with chronic kidney disease on chronic dialysis, with long-term current use of insulin  Hemoglobin A1C   Date Value Ref Range Status   06/14/2022 6.9 (H) 4.0 - 5.6 % Final   -CBG is borderline low, low dose SSI, diabetic renal diet when resumed, accuchecks Q6 while NPO then ACHS      Acute deep vein thrombosis (DVT) of popliteal vein of right lower extremity  -takes eliquis at home  -hold for now for possible surgical procedure to feet      PAD (peripheral artery disease)  -Continue Lipitor  -hold BB with soft BP  -resume eliquis if patient does not need surgical procedure      Chronic combined systolic and diastolic congestive heart failure  -stable, no fluid overloading s/s on exam  -Strict electrolyte management with goal K 4-5 and Mg 2-3 (trending daily)  Patient is identified as having Combined Systolic and Diastolic heart failure that is Chronic. CHF is currently controlled. Latest ECHO performed and demonstrates- Results for orders placed during the hospital encounter of 12/21/21     Echo    Interpretation Summary  · The left ventricle is moderately enlarged with mild concentric hypertrophy and  · The quantitatively derived ejection fraction is 34%.  ·  Severe left atrial enlargement.  · Normal right ventricular size with normal right ventricular systolic function.  · Normal central venous pressure (3 mmHg).  · The estimated PA systolic pressure is 21 mmHg.  · No vegetations per surface echo.  -hold Beta Blocker ACE/ARB with soft BP and monitor clinical status closely. Monitor on telemetry. Patient is off CHF pathway.  Monitor strict Is&Os and daily weights.  Place on fluid restriction of 1.5 L. Continue to stress to patient importance of self efficacy and  on diet for CHF. Last BNP reviewed- and noted below No results for input(s): BNP, BNPTRIAGEBLO in the last 168 hours..    ESRD (end stage renal disease) on dialysis  -HD outpatient scheduled TuThSat  -NICOLETTE AVF noted  -Consult nephrology   - HD 7/5 after angiogram         Cardiac resynchronization therapy defibrillator (CRT-D) in place  -[facility] in 7/2019 with complete heart block and intermittent VT. An echo showed his EF was 30%. Amiodarone was started, a LHC showed luminal irregularities, and a [employer] CRT-D was implanted prior to discharge (RV septal lead in LV port).  -Telemetry      Complete heart block  -chronic with CRT-D in place  -telemetry      Morbid obesity  -encourage lifestyle modifications (healthy diet, exercise)         Sleep apnea  -CPAP QHS      Primary hypertension  -Controlled, BP borderline low  -Home Meds include losartan 25 mg daily and coreg 6.25 mg bid  -hold for now  -monitor pressure        VTE Risk Mitigation (From admission, onward)         Ordered     heparin infusion 1,000 units/500 ml in 0.9% NaCl (pressure line flush)  Intra-op continuous PRN         07/05/22 1254     heparin 25,000 units in dextrose 5% (100 units/ml) IV bolus from bag - ADDITIONAL PRN BOLUS - 60 units/kg  As needed (PRN)        Question:  Heparin Infusion Adjustment (DO NOT MODIFY ANSWER)  Answer:  [email or web address]\epic\Images\Pharmacy\HeparinInfusions\heparin HIGH INTENSITY nomogram for [facility] [id number]     07/01/22 1953     heparin 25,000 units in dextrose 5% (100 units/ml) IV bolus from bag - ADDITIONAL PRN BOLUS - 30 units/kg  As needed (PRN)        Question:  Heparin Infusion Adjustment (DO NOT MODIFY ANSWER)  Answer:  [email or web address]\epic\Images\Pharmacy\HeparinInfusions\heparin HIGH INTENSITY nomogram for [facility] [id number]    07/01/22 1953     heparin 25,000 units in dextrose 5% 250 mL (100 units/mL) infusion HIGH INTENSITY nomogram - [facility]  Continuous        Question Answer Comment   Heparin Infusion Adjustment (DO NOT MODIFY ANSWER) [email or web address]\epic\Images\Pharmacy\HeparinInfusions\heparin HIGH INTENSITY nomogram for [facility] [id number]    Begin at (in units/kg/hr) 18        07/01/22 1953     IP VTE HIGH RISK PATIENT  Once         07/01/22 2014     Place sequential compression device  Until discontinued         07/01/22 2014     Reason for No Pharmacological VTE Prophylaxis  Once        Question:  Reasons:  Answer:  Already adequately anticoagulated on oral Anticoagulants    07/01/22 2014                Discharge Planning   ANDERSON:      Code Status: Full Code   Is the patient medically ready for discharge?:     Reason for patient still in hospital (select all that apply): Patient trending condition  Discharge Plan A: Home Health (current /[facility] in [place])                  Margaret Hahn NP  Department of [facility] Medicine   [facility]

## 2022-07-06 NOTE — ASSESSMENT & PLAN NOTE
-unlikely acute limb ischemia given history.  -Patient was recently discharged on 6/28/22 for clotted vascular access and was to follow up outpatient under vascular and for the gangrene of the toe with Dr. Adam  -Left 5th and 3rd toes black in color see photos above  -Unable to palpate pedal pulses bilateral   -Bilateral pitting edema +4  -continue vanc and zosyn  -continue heparin gtt  -Cardiology consulted- s/p peripheral angiogram 7/5: with PTA of prox and mid AT with 3.0 x 150 mm balloon              Sub-optimal PTA of distal AT with 2.0 x 100 and 2.0 x 40 mm balloons              Residual distal AT 99% stenosis   -Resume plavix and heparin gtt   -Resume eliquis today after podiatry consult    -cards Considering distal AT PTA with OSCAR approach   and evaluation for DVA and HBO

## 2022-07-06 NOTE — PLAN OF CARE
1205  Per patient's nurse Sara, patient off the unit receiving his HD treatment when CM rounded. No family present at the bedside.    1400  Orders noted for podiatry consult & alverto feet x-rays.       Will continue to follow.

## 2022-07-06 NOTE — ASSESSMENT & PLAN NOTE
- repeat angio yesterday with following results:      PTA of prox and mid AT with 3.0 x 150 mm balloon   Sub-optimal PTA of distal AT with 2.0 x 100 and 2.0 x 40 mm balloons   Residual distal AT 99% stenosis   Review angiogram and evaluate for DVA   Evaluate for HBO  - DVA and HBO evaluation as an outpatient  - continue ASA, statin, Plavix and Eliquis  - needs close follow up with Wound Care/Podiatry and Dr. Cody as an outpatient  - suitable for discharge from our standpoint once full recovery from femoral block and able to ambulate

## 2022-07-06 NOTE — PROGRESS NOTES
[facility] - Telemetry  Cardiology  Progress Note    Patient Name: Houston Jc  MRN: [id number]  Admission Date: 7/1/2022  Hospital Length of Stay: 5 days  Code Status: Full Code   Attending Physician: Emilia Gaston MD   Primary Care Physician: Zak Hamilton MD  Expected Discharge Date:   Principal Problem:Gangrene    Subjective:     Hospital Course:   7/3/2022 He is lying supine.  He follows commands and response to questions.  His right foot pain is about the same.  He has minimal left foot pain compared to the right.  Plans are being made for angiography left leg possibly Tuesday.  He is in no distress.  He is on antibiotics.  Aspirin was added, he is on clopidogrel and heparin.  Doppler signals right foot have been confirmed (recent DVA).  7/4/2022  He has dialysis Tuesdays Thursdays and Saturdays.  He has no fevers.  His foot pain is about the same.  He is on heparin.  No bleeding problems noted.  He is awake and alert.  He has a defibrillator.  His ejection fraction is 35%.  There is history of ventricular tachycardia.  Telemetry does show multiple runs of ventricular tachycardia unsustained without defibrillator shocks delivered.  7/5/2022 NPO for LE angiogram  7/6/2022 LE angiogram yesterday with following results:         PTA of prox and mid AT with 3.0 x 150 mm balloon  Sub-optimal PTA of distal AT with 2.0 x 100 and 2.0 x 40 mm balloons  Residual distal AT 99% stenosis  Plan:  Resume plavix and heparin gtt  Resume eliquis in am   Review angiogram and evaluate for DVA  Evaluate for HBO    Tolerated procedure well. Pain unchanged to left foot post revascularization. SubQ ooze noted to left DP site with no hematoma or ecchymosis- DStat applied. HR and BP stable. On IV heparin drip with plans to transition to Eliquis today           Review of Systems   Constitutional: Negative for chills, decreased appetite, diaphoresis and fever.   Cardiovascular:  Negative for chest pain, claudication, cyanosis, dyspnea on  exertion, irregular heartbeat, leg swelling, near-syncope, orthopnea, palpitations, paroxysmal nocturnal dyspnea and syncope.   Respiratory:  Negative for cough, hemoptysis, shortness of breath and wheezing.    Skin:  Positive for poor wound healing.   Gastrointestinal:  Negative for bloating, abdominal pain, constipation, diarrhea, melena, nausea and vomiting.   Neurological:  Negative for dizziness and weakness.   Objective:     Vital Signs (Most Recent):  Temp: 98.8 °F (37.1 °C) (07/06/22 0844)  Pulse: 80 (07/06/22 0844)  Resp: 15 (07/06/22 0844)  BP: (!) 96/58 (07/06/22 0914)  SpO2: 100 % (07/06/22 0844)   Vital Signs (24h Range):  Temp:  [96.5 °F (35.8 °C)-98.8 °F (37.1 °C)] 98.8 °F (37.1 °C)  Pulse:  [78-84] 80  Resp:  [12-20] 15  SpO2:  [96 %-100 %] 100 %  BP: ()/(43-65) 96/58     Weight: 124.5 kg (274 lb 7.6 oz)  Body mass index is 36.21 kg/m².     SpO2: 100 %  O2 Device (Oxygen Therapy): room air      Intake/Output Summary (Last 24 hours) at 7/6/2022 1059  Last data filed at 7/6/2022 0700  Gross per 24 hour   Intake 805.46 ml   Output 1178 ml   Net -372.54 ml       Lines/Drains/Airways       Central Venous Catheter Line  Duration                  Hemodialysis AV Graft Right forearm -- days              Peripheral Intravenous Line  Duration                  Peripheral IV - Single Lumen 07/05/22 2000 20 G Anterior;Left Upper Arm <1 day         Peripheral IV - Single Lumen 07/05/22 2315 22 G Anterior;Left Forearm <1 day                    Physical Exam  Constitutional:       General: He is not in acute distress.     Appearance: He is well-developed.   Cardiovascular:      Rate and Rhythm: Normal rate and regular rhythm.      Heart sounds: No murmur heard.    No gallop.      Comments: Left DP biphasic dopplerable pulse present; monophasic dopplerable left PT pulse present   Pulmonary:      Effort: Pulmonary effort is normal. No respiratory distress.      Breath sounds: Normal breath sounds. No wheezing.    Abdominal:      General: Bowel sounds are normal. There is no distension.      Palpations: Abdomen is soft.      Tenderness: There is no abdominal tenderness.   Skin:     General: Skin is warm and dry.   Neurological:      Mental Status: He is alert and oriented to person, place, and time.       Significant Labs: BMP:   Recent Labs   Lab 07/05/22  0909 07/06/22  0829   * 149*   * 134*   K 5.3* 6.0*   CL 94* 97   CO2 16* 14*   BUN 56* 48*   CREATININE 10.1* 8.8*   CALCIUM 8.7 8.7    and CBC   Recent Labs   Lab 07/05/22  0404 07/06/22  0251   WBC 21.33* 17.97*   HGB 9.0* 9.6*   HCT 28.1* 30.5*   * 143*       Significant Imaging: Echocardiogram: Transthoracic echo (TTE) complete ([facility] Only):   Results for orders placed or performed during the hospital encounter of 12/21/21   Echo   Result Value Ref Range    Ascending aorta 3.30 cm    STJ 3.27 cm    AV mean gradient 4 mmHg    Ao peak son 1.17 m/s    Ao VTI 25.29 cm    IVS 1.61 (A) 0.6 - 1.1 cm    LA size 5.11 cm    Left Atrium Major Axis 7.34 cm    Left Atrium Minor Axis 6.11 cm    LVIDd 6.60 (A) 3.5 - 6.0 cm    LVIDs 5.24 (A) 2.1 - 4.0 cm    LVOT diameter 2.89 cm    LVOT peak VTI 17.22 cm    Posterior Wall 1.66 (A) 0.6 - 1.1 cm    MV Peak A Son 1.03 m/s    E wave deceleration time 128.00 msec    MV Peak E Son 0.75 m/s    RA Major Axis 5.72 cm    RA Width 3.45 cm    RVDD 3.28 cm    TAPSE 1.72 cm    TR Max Son 2.14 m/s    TDI LATERAL 0.04 m/s    LA WIDTH 4.32 cm    Ao root annulus 4.28 cm    PV PEAK VELOCITY 1.04 cm/s    MV stenosis pressure 1/2 time 37.12 ms    LV Diastolic Volume 223.81 mL    LV Systolic Volume 131.64 mL    LVOT peak son 0.79 m/s    LA volume (mod) 85.57 cm3    MV mean gradient 1 mmHg    MV peak gradient 4 mmHg    RV S' 9.54 cm/s    MV VTI 18.13 cm    LV LATERAL E/E' RATIO 18.75 m/s    FS 21 %    LA volume 125.13 cm3    LV mass 561.38 g    Left Ventricle Relative Wall Thickness 0.50 cm    AV valve area 4.46 cm2    AV Velocity  Ratio 0.68     AV index (prosthetic) 0.68     MV valve area p 1/2 method 5.93 cm2    MV valve area by continuity eq 6.23 cm2    E/A ratio 0.73     LVOT area 6.6 cm2    LVOT stroke volume 112.90 cm3    AV peak gradient 5 mmHg    LV Systolic Volume Index 53.5 mL/m2    LV Diastolic Volume Index 90.98 mL/m2    LA Volume Index 50.9 mL/m2    LV Mass Index 228 g/m2    Triscuspid Valve Regurgitation Peak Gradient 18 mmHg    LA Volume Index (Mod) 34.8 mL/m2    BSA 2.53 m2    Right Atrial Pressure (from IVC) 3 mmHg    QEF 34 %    TV rest pulmonary artery pressure 21 mmHg    Narrative    · The left ventricle is moderately enlarged with mild concentric   hypertrophy and  · The quantitatively derived ejection fraction is 34%.  · Severe left atrial enlargement.  · Normal right ventricular size with normal right ventricular systolic   function.  · Normal central venous pressure (3 mmHg).  · The estimated PA systolic pressure is 21 mmHg.  · No vegetations per surface echo.        Assessment and Plan:     Brief HPI: Seen on AM rounds with Dr. Ledbetter while resting in bed. Complains of pain to left foot unchanged from yesterday and controlled with pain medications. Discussed POC as detailed below-verbalized understanding and agrees with POC     Critical limb ischemia with history of revascularization of same extremity  - repeat angio yesterday with following results:      PTA of prox and mid AT with 3.0 x 150 mm balloon   Sub-optimal PTA of distal AT with 2.0 x 100 and 2.0 x 40 mm balloons   Residual distal AT 99% stenosis   Review angiogram and evaluate for DVA   Evaluate for HBO  - DVA and HBO evaluation as an outpatient  - continue ASA, statin, Plavix and Eliquis  - needs close follow up with Wound Care/Podiatry and Dr. Cody as an outpatient  - suitable for discharge from our standpoint once full recovery from femoral block and able to ambulate         Acute deep vein thrombosis (DVT) of popliteal vein of right lower  extremity  - on Eliquis as an outpatient- held initially due to plans for angiogram  - r ecent right popliteal vein DVT documented- Eliquis resumed today   - water blebs possibly related to venous stasis to left foot; foot warm with dopplerable pulses and unchanged pain     PAD (peripheral artery disease)  - as detailed under CLI     Chronic combined systolic and diastolic congestive heart failure  Ejection fraction 35%.  He has previous resynchronization therapy.  Nonischemic cardiomyopathy.      Complete heart block  - CRT D device in place     Morbid obesity  Weight loss encouraged.    Primary hypertension  - SBP 90s-110s overnight  - on Midodrine 10mg TID; on carvedilol as an outpatient-currently on hold and will continue to hold while on Midodrine  - if MAP consistently >65mmHg then can decrease Midodrine to 10mg BID         VTE Risk Mitigation (From admission, onward)         Ordered     apixaban tablet 5 mg  2 times daily         07/06/22 1027     heparin infusion 1,000 units/500 ml in 0.9% NaCl (pressure line flush)  Intra-op continuous PRN         07/05/22 1254     IP VTE HIGH RISK PATIENT  Once         07/01/22 2014     Place sequential compression device  Until discontinued         07/01/22 2014     Reason for No Pharmacological VTE Prophylaxis  Once        Question:  Reasons:  Answer:  Already adequately anticoagulated on oral Anticoagulants    07/01/22 2014                LOUISA Ferreira, ANP  Cardiology  [facility] - Telemetry

## 2022-07-06 NOTE — PROGRESS NOTES
Pharmacokinetic Assessment Follow Up: IV Vancomycin    Vancomycin serum concentration assessment(s):    The random level was drawn correctly and can be used to guide therapy at this time. The measurement is within the desired definitive target range of 15 to 20 mcg/mL.    Vancomycin Regimen Plan:  Vancomycin 500mg IV x1 post HD  Re-dose when the random level is less than 20 mcg/mL, next level to be drawn at 0400 on 7/7    Drug levels (last 3 results):  Recent Labs   Lab Result Units 07/05/22  0405   Vancomycin, Random ug/mL 18.1       Pharmacy will continue to follow and monitor vancomycin.    Please contact pharmacy at extension [phone] for questions regarding this assessment.    Thank you for the consult,   Delvin Olmos       Patient brief summary:  Houston Jc is a 76 y.o. male initiated on antimicrobial therapy with IV Vancomycin for treatment of skin & soft tissue infection    The patient's current regimen is dose by level    Drug Allergies:   Review of patient's allergies indicates:  No Known Allergies    Actual Body Weight:   124.5kg    Renal Function:   Estimated Creatinine Clearance: 10.2 mL/min (A) (based on SCr of 8.8 mg/dL (H)).,     Dialysis Method (if applicable):  intermittent HD    CBC (last 72 hours):  Recent Labs   Lab Result Units 07/04/22  0321 07/04/22  0438 07/05/22  0404 07/06/22  0251   WBC K/uL 17.52* 17.05* 21.33* 17.97*   Hemoglobin g/dL 10.0* 9.9* 9.0* 9.6*   Hematocrit % 31.4* 31.4* 28.1* 30.5*   Platelets K/uL 136* 133* 146* 143*   Gran % % 86.7* 88.2* 88.7* 86.6*   Lymph % % 6.1* 5.2* 5.3* 6.2*   Mono % % 6.3 5.4 5.2 6.3   Eosinophil % % 0.0 0.0 0.0 0.1   Basophil % % 0.2 0.2 0.1 0.1   Differential Method  Automated Automated Automated Automated       Metabolic Panel (last 72 hours):  Recent Labs   Lab Result Units 07/04/22  0321 07/05/22  0909 07/06/22  0829   Sodium mmol/L 134* 130* 134*   Potassium mmol/L 5.2* 5.3* 6.0*   Chloride mmol/L 96 94* 97   CO2 mmol/L 20* 16* 14*   Glucose  mg/dL 185* 209* 149*   BUN mg/dL 47* 56* 48*   Creatinine mg/dL 9.1* 10.1* 8.8*   Albumin g/dL 2.1* 1.9* 1.9*   Total Bilirubin mg/dL 0.6  --   --    Alkaline Phosphatase U/L 139*  --   --    AST U/L 9*  --   --    ALT U/L <5*  --   --    Magnesium mg/dL 1.7  --   --    Phosphorus mg/dL 5.7* 6.8* 6.4*       Vancomycin Administrations:  vancomycin given in the last 96 hours                     vancomycin 500 mg in dextrose 5 % 100 mL IVPB (ready to mix system) ()  Restarted 07/05/22 2113     500 mg New Bag  2032    vancomycin 500 mg in dextrose 5 % 100 mL IVPB (ready to mix system) (mg) 500 mg New Bag 07/03/22 1934                    Microbiologic Results:  Microbiology Results (last 7 days)       Procedure Component Value Units Date/Time    Blood culture (site 1) [[id number]] Collected: 07/01/22 2238    Order Status: Completed Specimen: Blood Updated: 07/06/22 1212     Blood Culture, Routine No Growth to date      No Growth to date      No Growth to date      No Growth to date      No Growth to date    Narrative:      Site # 1, aerobic and anaerobic  Collection has been rescheduled by [email or web address] at 07/01/2022 21:42 Reason:   Unable to collect tried several times Nurse spencer was notified   Collection has been rescheduled by TD5 at 07/01/2022 21:42 Reason:   Unable to collect tried several times Nurse spencer was notified     Blood culture (site 2) [[id number]] Collected: 07/01/22 2239    Order Status: Completed Specimen: Blood Updated: 07/06/22 1212     Blood Culture, Routine No Growth to date      No Growth to date      No Growth to date      No Growth to date      No Growth to date    Narrative:      Site # 2, aerobic only  Collection has been rescheduled by TD5 at 07/01/2022 21:42 Reason:   Unable to collect tried several times Nurse spencer was notified   Collection has been rescheduled by TD5 at 07/01/2022 21:42 Reason:   Unable to collect tried several times Nurse spencer was notified

## 2022-07-06 NOTE — ASSESSMENT & PLAN NOTE
-unlikely acute limb ischemia given history.  -Patient was recently discharged on 6/28/22 for clotted vascular access and was to follow up outpatient under vascular and for the gangrene of the toe with Dr. Adam  -Left 5th and 3rd toes black in color see photos above  -Unable to palpate pedal pulses bilateral   -Bilateral pitting edema +4  -continue vanc and zosyn  -continue heparin gtt  -Cardiology consulted- s/p peripheral angiogram 7/5: with PTA of prox and mid AT with 3.0 x 150 mm balloon              Sub-optimal PTA of distal AT with 2.0 x 100 and 2.0 x 40 mm balloons              Residual distal AT 99% stenosis   -Resume plavix    -Resume eliquis    -cards Considering distal AT PTA with OSCAR approach   and evaluation for DVA and HBO   -PT to eval and treat due to prolonged immobility

## 2022-07-06 NOTE — ASSESSMENT & PLAN NOTE
- on Eliquis as an outpatient- held initially due to plans for angiogram  - r ecent right popliteal vein DVT documented- Eliquis resumed today   - water blebs possibly related to venous stasis to left foot; foot warm with dopplerable pulses and unchanged pain

## 2022-07-06 NOTE — PLAN OF CARE
Patient transferred via bed to Room 456 4th floor tele on assigned cardiac tele monitor. VSS, AAOx4. NADN.    Left groin gauze/tegaderm site CDI. No bleeding no hematoma noted. Site and surrounding area soft.    Left pedal gauze/tegaderm site with shadowing to gauze. No hematoma or swelling noted. Site and surrounding area soft. Redressed site with dstat, gauze, and tegaderm with wife at bedside.    Assessed by [facility] Koenig at bedside. All questions answered. Heparin gtt to restart at 2000, no bolus.

## 2022-07-07 PROBLEM — E11.52 TYPE 2 DIABETES MELLITUS WITH DIABETIC PERIPHERAL ANGIOPATHY WITH GANGRENE: Status: ACTIVE | Noted: 2022-01-01

## 2022-07-07 NOTE — PROGRESS NOTES
[facility] Progress Note    Assessment/Plan:     76-year-old male 76-year-old male with history of end-stage renal disease on hemodialysis Tuesday Thursday Saturday, coronary disease, diabetes, CHF, hypertension, and peripheral vascular disease who was referred by home health and cardiology for worsening status of lower extremity vascular disease. Patient has worsening gangrene of several toes and blister formation on bilateral feet. Currently on Day 5 of Vanc/Zosyn due to wet-appearing gangrene and leukocytosis, but remains afebrile, blood cx NGTD and VSS with fluctuating leukocytosis despite abx. S/p PTA yesterday with Cards with balloon to proximal/mid AT as well as distal AT with residual distal AT 99% stenosis. Podiatry plans for TMA after this admission once patient completely revascularized.    Recommendations:    #Bilateral Toe Gangrene, multiple digits affected  - TMA planned after revascularization procedure  - would continue Vanc/Zosyn IV for a total of 4 weeks (stop date 7/30/22)  - weekly CBC/CMP/ESR/CRP/Vanc trough (please fax results to [phone])    Pushpa Thomas MD  [facility] PGY-3      Thank you for allowing us to participate in the care of this patient. Case has been discussed with consult staff, who is in agreement with assessment and plan. ID will sign off.     HPI:   76-year-old male with history of end-stage renal disease on hemodialysis Tuesday Thursday Saturday, coronary disease, diabetes, CHF, hypertension, and peripheral vascular disease who was referred by home health and cardiology for worsening status of lower extremity vascular disease. Patient has worsening gangrene of several toes and blister formation on bilateral feet.  He reports pain to both feet. History of multiple angioplasty. No stents. He reports blisters to the right foot for 1 week with 2 new blisters on the left foot and 2 open blisters to the right foot. Reports compliance with dialysis with last  dialysis session on Tuesday.     Subjective:      No acute events ON. Denies subjective fever/chills, N/V.     Objective:   Last 24 Hour Vital Signs:  BP  Min: 86/48  Max: 99/60  Temp  Av.7 °F (36.5 °C)  Min: 97 °F (36.1 °C)  Max: 98.3 °F (36.8 °C)  Pulse  Av.1  Min: 76  Max: 101  Resp  Av.8  Min: 15  Max: 21  SpO2  Av.6 %  Min: 92 %  Max: 100 %  I/O last 3 completed shifts:  In: 1785.5 [P.O.:480; Other:500; IV Piggyback:805.5]  Out: 500 [Other:500]    Physical Exam  General: Awake, alert, NAD. Obese.  HEENT: NCAT. EOMI intact. MM.   Neck: Trachea midline. No JVD.  Lungs: CTAB. No wheezing/rales/rhonchi.   CV: RRR. Normal S1/S2. No murmurs/rubs/gallops. Unable to palpate pedal pulses.    Extremities: BLE edema.   Skin: See photos under media tab.  Neuro: A&O x3. CN II-XII grossly intact.      Laboratory:  Laboratory Data Reviewed: yes  Pertinent Findings:  Recent Labs   Lab 22  0332 22  0355 22  0356 22  0321 22  0438 22  0404 22  0909 22  0251 22  0829 22  1853 22  0354 22  0818   WBC 18.33*  --    < > 17.52* 17.05* 21.33*  --  17.97*  --   --   --  16.90*   HGB 9.7*  --    < > 10.0* 9.9* 9.0*  --  9.6*  --   --   --  9.1*   HCT 30.4*  --    < > 31.4* 31.4* 28.1*  --  30.5*  --   --   --  30.0*   *  --    < > 136* 133* 146*  --  143*  --   --   --   --    MCV 95  --    < > 95 96 94  --  95  --   --   --  97   RDW 17.1*  --    < > 16.9* 16.9* 16.8*  --  17.0*  --   --   --  17.2*   * 134*  --  134*  --   --    < >  --  134* 133* 137 138   K 4.9 4.4  --  5.2*  --   --    < >  --  6.0* 4.9 4.6 4.6   CL 95 96  --  96  --   --    < >  --  97 94* 93* 92*   CO2 18* 23  --  20*  --   --    < >  --  14* 19* 27 23   BUN 53* 37*  --  47*  --   --    < >  --  48* 35* 41* 42*   CREATININE 10.4* 8.1*  --  9.1*  --   --    < >  --  8.8* 6.8* 7.2* 7.3*   * 113*  --  185*  --   --    < >  --  149* 145* 160* 150*   PROT  5.8* 5.8*  --  6.0  --   --   --   --   --   --   --   --    ALBUMIN 2.2* 2.1*  --  2.1*  --   --    < >  --  1.9*  --  1.9* 2.1*   BILITOT 0.5 0.6  --  0.6  --   --   --   --   --   --   --   --    AST 17 13  --  9*  --   --   --   --   --   --   --   --    ALKPHOS 131 134  --  139*  --   --   --   --   --   --   --   --    ALT <5* <5*  --  <5*  --   --   --   --   --   --   --   --     < > = values in this interval not displayed.         Microbiology Data:  7/1 blood cx NGTD x2    Antimicrobials:  Zosyn 4.5g IV q 12H (7/1- )  Vancomycin IV (7/2- )      Other Results:  Radiology Results:  US Lower Extremity Arteries Bilateral    Result Date: 7/4/2022  EXAMINATION: US LOWER EXTREMITY ARTERIES BILATERAL CLINICAL HISTORY: pad; TECHNIQUE: Bilateral spectral, color and grayscale images of the large arteries of both lower extremities were performed. COMPARISON: 05/18/2022 FINDINGS: Right lower extremity. CFA: 122 cm/s, monophasic DFA: 33 cm/s, monophasic Prox SFA: 158 cm/s, monophasic Mid SFA: 125 cm/s, monophasic Dist SFA: 121 cm/s, monophasic Pop A: 86 cm/s, monophasic PTA: 80 cm/s, monophasic DIYA: 54 cm/s, monophasic Left lower extremity CFA: Not well visualized DFA: 26 cm/s, monophasic Prox SFA: 72 cm/s, monophasic Mid SFA: 260 cm/s, monophasic *focal high-grade stenosis Dist SFA: 42 cm/s, monophasic Pop A: 28 cm/s, monophasic PTA: 16 cm/s, monophasic DIYA: 22 cm/s, monophasic     Findings in keeping with advanced peripheral arterial disease with blunted monophasic waveforms throughout the lower extremity arterial system bilaterally as above. Left common femoral artery not well visualized, may be occluded. CT or catheter based angiogram may be helpful further characterization if warranted clinically. This report was flagged in Epic as abnormal. Electronically signed by: Socrates Medina MD Date:    07/04/2022 Time:    10:29    US Upper Extremity Arteries Right    Result Date: 6/13/2022  EXAMINATION: US UPPER EXTREMITY  "ARTERIES RIGHT CLINICAL HISTORY: Other complication of vascular dialysis catheter, initial encounter COMPARISON: None FINDINGS: AVF/AVG: RUE brachiocephalic AVG with intraluminal nonocclusive filling defect and significant aneurysmal degeneration along its proximal greater than distal segments but is otherwise patent.  Imaged surrounding soft tissues are grossly unremarkable without evidence of perigraft fluid collection or hematoma. Inflow artery proximal to the AVF/AV-cm/s with abnormal high-resistance triphasic waveforms. Inflow artery distal to the AVF/AV-cm/s with abnormal high-resistance biphasic waveforms. Arterial anastomosis:  106-cm/s with blunted low-resistance monophasic waveforms with spectral broadening and antegrade diastolic flow. (Normal velocities 200-400 cm/s) Proximal  AVF/AV-cm/s with blunted low-resistance monophasic waveforms and antegrade diastolic flow.  (Normal velocities 150-400 cm/s) Mid  AVF/AV-cm/s with blunted low-resistance monophasic waveforms and antegrade diastolic flow. Distal  AVF/AV-cm/s with blunted low-resistance monophasic waveforms and antegrade diastolic flow. Volume flow rate: 57-cc/min Central veins:  116-131-cm/s with normal waveforms with normal respiratory phasicity.     1. Moderate amount of nonocclusive thrombus in significant aneurysmal degeneration along the proximal segment of the RUE brachiocephalic AVG and, minimal amount of nonocclusive thrombus along the distal segment associated with abnormal peak systolic velocities in volume flow rates throughout the AVG. This report was flagged in Epic as abnormal. Electronically signed by: Cheo Oliva Date:    2022 Time:    14:27    Cardiac catheterization    Result Date: 2022  S/p percutaneous deep venous arterialization for "no option" CLTI  Antegrade right CFA access with 5fr Destination sheath  Lateral plantar vein access with 5/6 slender sheath  Findings:  Patent right SFA and " POP with luminal irregularities  Chronically occluded proximal right AT  99% prox PT stenosis and mid PT   Distal PT  with multiple small islands  Patent PER with luminal irregularities  Although PER reaches the ankle there are no collaterals to the foot  Desert foot without any named vessels  Heavily calcified vessels  Intervention:  PTA of PT with 2.0 x 40 mm balloon to create a channel  Atherectomy of PT with 1.5 Classic CSI catheter  Serial PTA of PT with 2.5, 3.0, and 4.0 mm balloons  Venoplasty of PTV with 4.0 and 5.0 mm balloons  Arterial fistula creation with gun site technique using (2) 10 mm snares  PTA of fistula with 4.0 mm balloon  Valvulotomy with 5.0 x 40 mm Scoring balloon  Venoplasty of lateral plantar and dorsal vein with 3.0 and 4.0 mm balloons  5.0 x 250 and 5.0 x 150 mm Viabahn stents placed from mid PT artery to lateral plantar vein  Covered stents post dilated with 5.0 x 100 mm balloon  Final PTA of PT with 4.0 x 150 mm Lutonix DCB    Plan  DAPT with aspirin and plavix  Eliquis 2.5 mg po bid  Intense statin therapy  Wound care and tension free surgery  Delay surgical interventions for at least 4 weeks  The procedure log was documented by Documenter: Mary Iqbal RT; Julien Aguayo RDCS and verified by Dejuan Cody MD. Date: 6/29/2022  Time: 3:55 PM    Cardiac catheterization    Result Date: 6/21/2022  S/p right brachial artery-cephalic vein-axillary vein access evaluation  Findings:  Occluded access  Patent right subclavian vein  Patent Innominate vein  Patent SVC  Patent brachial artery  Intervention  Balloon angioplasty of access with 6 x 100 and 6 x 120 mm balloon  Palpable thrill post procedure  Plan:  Resume HD per protocol  Surveillance US in 2 weeks  The procedure log was documented by Documenter: Julien Aguayo RDCS and verified by Dejuan Cody MD. Date: 6/21/2022  Time: 2:53 PM    Cardiac catheterization    Result Date: 6/16/2022  Procedure: Trialysis catheter  placement Access: L-CFV Indication: Dialysis Trialysis catheter placed under fluoroscopy. Venogram performed to confirm position. Sutured with Biopatch in place. Sterile dressing applied. The procedure log was documented by Documenter: RT Cole and verified by Betsey Mckeon MD. Date: 6/15/2022  Time: 2:03 PM    Cardiac catheterization    Result Date: 6/15/2022    S/p aortogram with run off                 Patent distal aorta with bilateral iliac arteries             Patent bilateral CFA and DFA                           Left:               80% mid and distal SFA             1 vessel run off via PER             Ostial PT              Patent prox and mid AT             Distal AT not visualized due to motion artifact                           Right:                 90% multiple mid and distal SFA stenosis             Patent POP             prox AT  with diffuse disease             Distal AT  with no outflow to the foot             90% TPT stenosis             80% prox and mid PER stenosis             90% prox PT, mid PT , and distal reconstitution             Distal PT is diffusely disease             Lateral plantar              DP is not visualized due to severe stenosis + motion artifact       Plan:                 Review angiogram             Right SFA + TPT + PER intervention             Re-evaluate for distal PTa- PTv DVA     The procedure log was documented by Documenter: RT Carmina and verified by Deujan Cody MD. Date: 6/15/2022  Time: 4:33 PM    US Vein mapping, Other, Unilateral    Result Date: 6/23/2022  EXAMINATION: US VEIN MAPPING, OTHER, UNILATERAL CLINICAL HISTORY: pre DVA; assess patency lateral medial plantar vein TECHNIQUE: LIMITED GRAYSCALE, DOPPLER, AND SPECTRAL EVALUATION OF THE RIGHT CALF VESSELS WAS PERFORMED. COMPARISON: None FINDINGS: The right posterior tibial veins are patent. The right lateral plantar vein is patent and compressible.  It measures 2.5  mm in diameter.     As above. Electronically signed by: Edson Correia Date:    06/23/2022 Time:    18:36      Current Medications:     Infusions:   heparin (porcine) 2,000 Units/hr (07/05/22 1254)        Scheduled:   sodium chloride 0.9%   Intravenous Once    apixaban  5 mg Oral BID    aspirin  81 mg Oral Daily    clopidogreL  75 mg Oral Daily    midodrine  10 mg Oral TID WM    morphine  1 mg Intravenous Once    mupirocin   Nasal BID    piperacillin-tazobactam (ZOSYN) IVPB  4.5 g Intravenous Q12H    senna-docusate 8.6-50 mg  1 tablet Oral BID    sevelamer carbonate  800 mg Oral TID WM    sodium bicarbonate  650 mg Oral BID    vancomycin (VANCOCIN) IVPB  500 mg Intravenous Once        PRN:  sodium chloride 0.9%, sodium chloride 0.9%, acetaminophen, acetaminophen, albuterol-ipratropium, aluminum-magnesium hydroxide-simethicone, dextrose 10%, dextrose 10%, glucagon (human recombinant), glucose, glucose, heparin (porcine), HYDROcodone-acetaminophen, insulin aspart U-100, melatonin, naloxone, ondansetron, ondansetron, simethicone, sodium chloride 0.9%, sodium chloride 0.9%, sodium chloride 0.9%, Pharmacy to dose Vancomycin consult **AND** vancomycin - pharmacy to dose

## 2022-07-07 NOTE — SUBJECTIVE & OBJECTIVE
Scheduled Meds:   sodium chloride 0.9%   Intravenous Once    apixaban  5 mg Oral BID    aspirin  81 mg Oral Daily    clopidogreL  75 mg Oral Daily    midodrine  10 mg Oral TID WM    morphine  1 mg Intravenous Once    mupirocin   Nasal BID    piperacillin-tazobactam (ZOSYN) IVPB  4.5 g Intravenous Q12H    senna-docusate 8.6-50 mg  1 tablet Oral BID    sevelamer carbonate  800 mg Oral TID WM    sodium bicarbonate  650 mg Oral BID     Continuous Infusions:   heparin (porcine) 2,000 Units/hr (07/05/22 1254)     PRN Meds:sodium chloride 0.9%, sodium chloride 0.9%, acetaminophen, acetaminophen, albuterol-ipratropium, aluminum-magnesium hydroxide-simethicone, dextrose 10%, dextrose 10%, glucagon (human recombinant), glucose, glucose, heparin (porcine), HYDROcodone-acetaminophen, insulin aspart U-100, melatonin, naloxone, ondansetron, ondansetron, simethicone, sodium chloride 0.9%, sodium chloride 0.9%, sodium chloride 0.9%, Pharmacy to dose Vancomycin consult **AND** vancomycin - pharmacy to dose    Review of patient's allergies indicates:  No Known Allergies     Past Medical History:   Diagnosis Date    Anemia     CHF (congestive heart failure)     CKD (chronic kidney disease) stage 4, GFR 15-29 ml/min     Coronary artery disease     Diabetes mellitus     Hematuria     Hypertension     Osteoarthritis of spine with radiculopathy, cervical region 1/10/2022    Renal cyst, right      Past Surgical History:   Procedure Laterality Date    ANGIOGRAPHY OF ARTERIOVENOUS SHUNT Right 2/11/2022    Procedure: Fistulogram with Possible Intervention;  Surgeon: Dejuan Cody MD;  Location: [facility] CATH LAB/EP;  Service: Cardiology;  Laterality: Right;    ANGIOGRAPHY OF LOWER EXTREMITY Right 6/20/2022    Procedure: Angiogram Extremity Unilateral;  Surgeon: Umesh Quintero MD;  Location: [facility] CATH LAB/EP;  Service: Cardiology;  Laterality: Right;    ANGIOPLASTY OF PERIPHERAL VESSEL FOR CHRONIC TOTAL OCCLUSION N/A 6/24/2022    Procedure:  ANGIOPLASTY, VESSEL, PERIPHERAL, FOR CHRONIC TOTAL OCCLUSION;  Surgeon: Dejuan Cody MD;  Location: [facility] CATH LAB/EP;  Service: Cardiology;  Laterality: N/A;    AORTOGRAPHY WITH SERIALOGRAPHY Right 6/7/2022    Procedure: AORTOGRAM, WITH SERIALOGRAPHY;  Surgeon: Dejuan Cody MD;  Location: [facility] CATH LAB/EP;  Service: Cardiology;  Laterality: Right;    CARDIAC SURGERY      DECLOTTING OF ARTERIOVENOUS GRAFT Right 12/21/2021    Procedure: [id number];  Surgeon: Jeison Hunt MD;  Location: [facility] OR;  Service: Vascular;  Laterality: Right;    DECLOTTING OF ARTERIOVENOUS GRAFT Right 2/18/2022    Procedure: [id number];  Surgeon: Jeison Hunt MD;  Location: [facility] OR;  Service: Vascular;  Laterality: Right;    DECLOTTING OF ARTERIOVENOUS GRAFT Right 3/9/2022    Procedure: [id number];  Surgeon: Jeison Hunt MD;  Location: [facility] OR;  Service: Vascular;  Laterality: Right;    DECLOTTING OF ARTERIOVENOUS GRAFT Right 6/13/2022    Procedure: [id number]; REVISION OF FISTULA W/INSERTION OF NEW AV GRAFT;  Surgeon: Jeison Hunt MD;  Location: [facility] OR;  Service: Vascular;  Laterality: Right;    FISTULOGRAM N/A 2/11/2022    Procedure: Fistulogram;  Surgeon: Dejuan Cody MD;  Location: [facility] CATH LAB/EP;  Service: Cardiology;  Laterality: N/A;    FISTULOGRAM N/A 6/17/2022    Procedure: Fistulogram;  Surgeon: Dejuan Cody MD;  Location: [facility] CATH LAB/EP;  Service: Cardiology;  Laterality: N/A;    FOOT SURGERY      INSERTION OF BIVENTRICULAR IMPLANTABLE CARDIOVERTER-DEFIBRILLATOR (ICD) Left 7/18/2019    Procedure: INSERTION, ICD, BIVENTRICULAR;  Surgeon: Arturo Bay MD;  Location: [facility] EP LAB;  Service: Cardiology;  Laterality: Left;  CHB, CRTD, SJM, anes, GP, 6078    LEFT HEART CATHETERIZATION N/A 7/16/2019    Procedure: Left heart cath;  Surgeon: Dejuan Cody MD;  Location: [facility] CATH LAB/EP;  Service: Cardiology;  Laterality: N/A;    PERCUTANEOUS  TRANSLUMINAL ANGIOPLASTY (PTA) OF PERIPHERAL VESSEL N/A 7/5/2022    Procedure: PTA, PERIPHERAL VESSEL;  Surgeon: Dejuan Cody MD;  Location: [facility] CATH LAB/EP;  Service: Cardiology;  Laterality: N/A;    PERCUTANEOUS TRANSLUMINAL ANGIOPLASTY OF ARTERIOVENOUS FISTULA Right 2/11/2022    Procedure: PTA, AV FISTULA;  Surgeon: Dejuan Cody MD;  Location: [facility] CATH LAB/EP;  Service: Cardiology;  Laterality: Right;    PERCUTANEOUS TRANSLUMINAL ANGIOPLASTY OF ARTERIOVENOUS FISTULA N/A 6/17/2022    Procedure: PTA, AV FISTULA;  Surgeon: Dejuan Cody MD;  Location: [facility] CATH LAB/EP;  Service: Cardiology;  Laterality: N/A;    PERITONEAL CATHETER REMOVAL Left 4/11/2020    Procedure: REMOVAL, CATHETER, DIALYSIS, PERITONEAL;  Surgeon: Edis Snell Jr., MD;  Location: [facility] OR;  Service: General;  Laterality: Left;    PHLEBOGRAPHY  6/15/2022    Procedure: Venogram;  Surgeon: Betsey Mckeon MD;  Location: [facility] CATH LAB/EP;  Service: Cardiology;;    PLACEMENT OF ARTERIOVENOUS GRAFT Right 2/18/2022    Procedure: INSERTION, GRAFT, ARTERIOVENOUS;  Surgeon: Jeison Hunt MD;  Location: [facility] OR;  Service: Vascular;  Laterality: Right;    REVISION OF PROCEDURE INVOLVING ARTERIOVENOUS GRAFT Right 2/18/2022    Procedure: REVISION, PROCEDURE INVOLVING ARTERIOVENOUS GRAFT;  Surgeon: Jeison Hunt MD;  Location: [facility] OR;  Service: Vascular;  Laterality: Right;       Family History       Problem Relation (Age of Onset)    Heart attack Father          Tobacco Use    Smoking status: Former Smoker    Smokeless tobacco: Never Used   Substance and Sexual Activity    Alcohol use: Yes     Comment: social    Drug use: No    Sexual activity: Yes     Review of Systems   Constitutional:  Negative for chills and fever.   HENT:  Negative for congestion and hearing loss.    Respiratory:  Negative for cough and shortness of breath.    Cardiovascular:  Negative for chest pain.   Gastrointestinal:  Negative for nausea and vomiting.    Skin:  Positive for color change and wound.   Neurological:  Negative for dizziness.   Psychiatric/Behavioral:  Negative for agitation and confusion.    Objective:     Vital Signs (Most Recent):  Temp: 97.2 °F (36.2 °C) (07/07/22 0630)  Pulse: 81 (07/07/22 0630)  Resp: 20 (07/07/22 0630)  BP: (!) 90/55 (07/07/22 0630)  SpO2: 100 % (07/07/22 0630)   Vital Signs (24h Range):  Temp:  [97.2 °F (36.2 °C)-98.8 °F (37.1 °C)] 97.2 °F (36.2 °C)  Pulse:  [76-89] 81  Resp:  [15-21] 20  SpO2:  [92 %-100 %] 100 %  BP: (89-99)/(43-69) 90/55     Weight: 124.5 kg (274 lb 7.6 oz)  Body mass index is 36.21 kg/m².    Foot Exam    General  General Appearance: appears stated age and healthy   Orientation: alert and oriented to person, place, and time   Affect: appropriate       Right Foot/Ankle     Neurovascular  Dorsalis pedis: absent  Posterior tibial: absent      Left Foot/Ankle      Neurovascular  Dorsalis pedis: absent  Posterior tibial: absent        Laboratory:  A1C:   Recent Labs   Lab 06/14/22  1007   HGBA1C 6.9*     BMP:   Recent Labs   Lab 07/04/22 0321 07/05/22 0909 07/07/22  0354   *   < > 160*   *   < > 137   K 5.2*   < > 4.6   CL 96   < > 93*   CO2 20*   < > 27   BUN 47*   < > 41*   CREATININE 9.1*   < > 7.2*   CALCIUM 8.6*   < > 8.7   MG 1.7  --   --     < > = values in this interval not displayed.     CBC:   Recent Labs   Lab 07/06/22  0251   WBC 17.97*   RBC 3.21*   HGB 9.6*   HCT 30.5*   *   MCV 95   MCH 29.9   MCHC 31.5*     CMP:   Recent Labs   Lab 07/04/22 0321 07/05/22  0909 07/07/22  0354   *   < > 160*   CALCIUM 8.6*   < > 8.7   ALBUMIN 2.1*   < > 1.9*   PROT 6.0  --   --    *   < > 137   K 5.2*   < > 4.6   CO2 20*   < > 27   CL 96   < > 93*   BUN 47*   < > 41*   CREATININE 9.1*   < > 7.2*   ALKPHOS 139*  --   --    ALT <5*  --   --    AST 9*  --   --    BILITOT 0.6  --   --     < > = values in this interval not displayed.       Diagnostic Results:  I have reviewed all  pertinent imaging results/findings within the past 24 hours.    Clinical Findings:  There was was dry eschar/necrosis of toes 1,3,4,5 on right foot with wound containing 100% eschar along dorsal foot. There is localized edema with hyperpigmentation of the skin along the dorsal foot. No erythema or signs of infection noted. No palpable fluctuance or crepitance.    Dry gangrene/necrosis of toes 1,3,5 on left foot with blister formation along dorsal foot proximal to the MTP level. No erythema or signs of infection noted. No palpable fluctuance or crepitance.

## 2022-07-07 NOTE — PROGRESS NOTES
"Patient seen and examined on HD tolerating well. No complains.   /64 (BP Location: Left arm, Patient Position: Lying)   Pulse 80   Temp 97.1 °F (36.2 °C) (Oral)   Resp 18   Ht 6' 1" (1.854 m)   Wt 124.5 kg (274 lb 7.6 oz)   SpO2 (!) 93%   BMI 36.21 kg/m²     With any question please call answering service [phone]  Laura Ortiz MD    [facility]  ANA MARIA Burns MD, FACP,   KEREN Woo MD,   MD DAMION Gill MD E. V. Harmon, NP    [address]  [place], [id number]  "

## 2022-07-07 NOTE — PROGRESS NOTES
07/07/22 1200   Vital Signs   Pulse 80   Heart Rate Source Monitor   BP (!) 95/58   BP Location Left arm   BP Method Automatic   Patient Position Lying   During Hemodialysis Assessment   Blood Flow Rate (mL/min) 350 mL/min   Dialysate Flow Rate (mL/min) 700 ml/min   Ultrafiltration Rate (mL/Hr) 503 mL/Hr   Arteriovenous Lines Secure Yes   Arterial Pressure (mmHg) -118 mmHg   Venous Pressure (mmHg) 154   UF Removed (mL) 1500 mL   TMP 21   Venous Line in Air Detector Yes   Intake (mL) 250 mL   Transducer Dry Yes   Access Visible Yes    notified of access issue? N/A   Heparin given? N/A   Intra-Hemodialysis Comments tx completed   Post-Hemodialysis Assessment   Rinseback Volume (mL) 200 mL   Blood Volume Processed (Liters) 62.4 L   Dialyzer Clearance Lightly streaked   Duration of Treatment 180 minutes   Additional Fluid Intake (mL) 400 mL   Total UF (mL) 1500 mL   Net Fluid Removal 1000   Patient Response to Treatment well   Arterial bleeding stop time (min) 5 min   Venous bleeding stop time (min) 5 min   Post-Hemodialysis Comments blood rinsed back with 200 ml of ns. lines pulled and secured using aseptic technique. manual pressure held until hemostatis was achieved.

## 2022-07-07 NOTE — ASSESSMENT & PLAN NOTE
Dry gangrene of toes on both feet post endovascular intervention per Dr. Cody on 07/05/22. Applied betadine to the dry gangrene of the toes followed by dry, sterile, bulky gauze dressing secured lightly with kerlix and tape. Avoid excessive gauze between the toes which could cause further necrosis and pain. Patient is weightbearing to both feet using darco shoes for transfers and short distance ambulation up to 10 feet to prevent to much irritation and pressure on the toes. Recommend use of walker and maximum assistance from PT. Patient will need HH to monitor and change dressings 3x/wk with home PT/OT. Will need bilateral foot TMA once revascularized and stable staged apart.     Receiving IV Zosyn. Recommend continued soft tissue coverage x 2 weeks if ID agreeable. Will order baseline x-ray. Do not suspect infection at this time.

## 2022-07-07 NOTE — ASSESSMENT & PLAN NOTE
Bullae  -Presented with blisters to right foot x1 week with 2 new blisters on left foot, right foot with multiple blisters with 2 open: see photos above  -WBC 22---> 17 --> 21--> 17, lactate 2.9  -on vanc and zosyn  -blood cultures with NGTD continue Vanc / Zosyn  -podiatry consulted - Will need bilateral foot TMA once revascularized and stable staged apart, recommending 2 weeks of soft tissue abx coverage with ID recs  -cardiology on board   - encourage pain regime for pain control   -ID on board - awaiting final recs   -home health ordered for wound care, PT/OT

## 2022-07-07 NOTE — PROGRESS NOTES
[facility]  Progress Note    Patient Name: Houston Jc  MRN: [id number]  Patient Class: IP- Inpatient   Admission Date: 7/1/2022  Length of Stay: 6 days  Attending Physician: Emilia Gaston MD  Primary Care Provider: Zak Hamilton MD        Subjective:     Principal Problem:Gangrene        HPI:  Houston Jc is a 76-year-old male 76-year-old male with history of end-stage renal disease on hemodialysis Tuesday Thursday Saturday, coronary disease, diabetes, CHF, hypertension, and peripheral vascular disease who was referred by home health and cardiology for worsening status of lower extremity vascular disease. Patient has worsening gangrene of several toes and blister formation on bilateral feet.  He reports pain to both feet. History of multiple angioplasty. No stents. He reports blisters to the right foot for 1 week with 2 new blisters on the left foot and 2 open blisters to the right foot. Reports compliance with dialysis with last dialysis session on Tuesday. Of note, patient was recently discharged on 6/28/22 for clotted vascular access and was to follow up outpatient under vascular and for the gangrene of the toe with Dr. Adam.    In the ED: WBC 22, Hgb 9, lactate 2.9. Given 1500 ml IVF bolus. Started on vanc and zosyn, and heparin gtt. Cardiology consulted with recommendations for arterial dopplers in a.m. Admitted to [facility] for further evaluation.      Overview/Hospital Course:  No notes on file    Interval History:   Patient states he was able to get some rest overnight. His BLE are still weak, but the numbness is improving.     Review of Systems   Constitutional:  Positive for fatigue. Negative for activity change and appetite change.   HENT:  Negative for trouble swallowing and voice change.    Eyes:  Negative for photophobia and visual disturbance.   Respiratory:  Negative for cough, choking, chest tightness and shortness of breath.    Cardiovascular:   Negative for chest pain, palpitations and leg swelling.   Gastrointestinal:  Negative for abdominal pain, constipation, diarrhea, nausea and vomiting.   Endocrine: Negative.    Genitourinary:  Negative for decreased urine volume and urgency.   Musculoskeletal:  Positive for arthralgias.   Skin:  Positive for color change and wound.   Neurological:  Positive for weakness. Negative for dizziness, light-headedness, numbness and headaches.   Hematological: Negative.    Objective:     Vital Signs (Most Recent):  Temp: 97.1 °F (36.2 °C) (07/07/22 0900)  Pulse: 80 (07/07/22 1000)  Resp: 18 (07/07/22 0900)  BP: 108/71 (07/07/22 1000)  SpO2: (!) 93 % (07/07/22 0835)   Vital Signs (24h Range):  Temp:  [97 °F (36.1 °C)-98.3 °F (36.8 °C)] 97.1 °F (36.2 °C)  Pulse:  [] 80  Resp:  [15-21] 18  SpO2:  [92 %-100 %] 93 %  BP: ()/(48-71) 108/71     Weight: 124.5 kg (274 lb 7.6 oz)  Body mass index is 36.21 kg/m².    Intake/Output Summary (Last 24 hours) at 7/7/2022 1039  Last data filed at 7/6/2022 1700  Gross per 24 hour   Intake 980 ml   Output 500 ml   Net 480 ml        Physical Exam  Vitals reviewed.   Constitutional:       General: He is not in acute distress.     Appearance: Normal appearance. He is not ill-appearing, toxic-appearing or diaphoretic.   HENT:      Head: Normocephalic and atraumatic.      Mouth/Throat:      Pharynx: Oropharynx is clear.   Eyes:      Extraocular Movements: Extraocular movements intact.      Conjunctiva/sclera: Conjunctivae normal.   Cardiovascular:      Rate and Rhythm: Normal rate and regular rhythm.      Heart sounds: Normal heart sounds. No murmur heard.     Comments: Unable to palpate pedal pulses  Pulmonary:      Effort: Pulmonary effort is normal. No respiratory distress.      Breath sounds: Normal breath sounds.   Abdominal:      General: Bowel sounds are normal.      Palpations: Abdomen is soft.   Musculoskeletal:         General: Swelling present. Normal range of motion.       Cervical back: Normal range of motion and neck supple.      Right lower leg: Edema present.      Left lower leg: Edema present.   Skin:     Comments: See photos    Bilateral feet wrapped per podiatry    Neurological:      General: No focal deficit present.      Mental Status: He is alert and oriented to person, place, and time. Mental status is at baseline.      Sensory: Sensory deficit present.           BNP  No results for input(s): BNP, BNPTRIAGEBLO in the last 168 hours.    Significant Labs: A1C:   Recent Labs   Lab 06/14/22  1007   HGBA1C 6.9*     ABGs: No results for input(s): PH, PCO2, HCO3, POCSATURATED, BE, TOTALHB, COHB, METHB, O2HB, POCFIO2, PO2 in the last 48 hours.  Bilirubin:   Recent Labs   Lab 06/13/22  1125 07/01/22  1951 07/02/22  0332 07/03/22  0355 07/04/22  0321   BILITOT 0.2 0.6 0.5 0.6 0.6     CBC:   Recent Labs   Lab 07/06/22  0251 07/07/22  0818   WBC 17.97* 16.90*   HGB 9.6* 9.1*   HCT 30.5* 30.0*   *  --      CMP:   Recent Labs   Lab 07/06/22  0829 07/06/22  1853 07/07/22  0354 07/07/22  0818   * 133* 137 138   K 6.0* 4.9 4.6 4.6   CL 97 94* 93* 92*   CO2 14* 19* 27 23   * 145* 160* 150*   BUN 48* 35* 41* 42*   CREATININE 8.8* 6.8* 7.2* 7.3*   CALCIUM 8.7 8.5* 8.7 8.3*   ALBUMIN 1.9*  --  1.9* 2.1*   ANIONGAP 23* 20* 17* 23*   EGFRNONAA 5* 7* 7* 7*     Lactic Acid:   No results for input(s): LACTATE in the last 48 hours.    Lipase: No results for input(s): LIPASE in the last 48 hours.  Lipid Panel: No results for input(s): CHOL, HDL, LDLCALC, TRIG, CHOLHDL in the last 48 hours.  Magnesium:   No results for input(s): MG in the last 48 hours.    Troponin: No results for input(s): TROPONINI in the last 48 hours.  TSH: No results for input(s): TSH in the last 4320 hours.  Urine Culture: No results for input(s): LABURIN in the last 48 hours.  Urine Studies: No results for input(s): COLORU, APPEARANCEUA, PHUR, SPECGRAV, PROTEINUA, GLUCUA, KETONESU, BILIRUBINUA, OCCULTUA,  NITRITE, UROBILINOGEN, LEUKOCYTESUR, RBCUA, WBCUA, BACTERIA, SQUAMEPITHEL, HYALINECASTS in the last 48 hours.    Invalid input(s): [facility]      Microbiology Results (last 7 days)       Procedure Component Value Units Date/Time    Blood culture (site 1) [[id number]] Collected: 07/01/22 2238    Order Status: Completed Specimen: Blood Updated: 07/06/22 1212     Blood Culture, Routine No Growth to date      No Growth to date      No Growth to date      No Growth to date      No Growth to date    Narrative:      Site # 1, aerobic and anaerobic  Collection has been rescheduled by TD5 at 07/01/2022 21:42 Reason:   Unable to collect tried several times Nurse spencer was notified   Collection has been rescheduled by TD5 at 07/01/2022 21:42 Reason:   Unable to collect tried several times Nurse spencer was notified     Blood culture (site 2) [[id number]] Collected: 07/01/22 2239    Order Status: Completed Specimen: Blood Updated: 07/06/22 1212     Blood Culture, Routine No Growth to date      No Growth to date      No Growth to date      No Growth to date      No Growth to date    Narrative:      Site # 2, aerobic only  Collection has been rescheduled by TD5 at 07/01/2022 21:42 Reason:   Unable to collect tried several times Nurse spencer was notified   Collection has been rescheduled by TD5 at 07/01/2022 21:42 Reason:   Unable to collect tried several times Nurse spencer was notified             Significant Imaging: I have reviewed all pertinent imaging results/findings within the past 24 hours.      Assessment/Plan:      * Gangrene  Bullae  -Presented with blisters to right foot x1 week with 2 new blisters on left foot, right foot with multiple blisters with 2 open: see photos above  -WBC 22---> 17 --> 21--> 17, lactate 2.9  -on vanc and zosyn  -blood cultures with NGTD continue Vanc / Zosyn  -podiatry consulted - Will need bilateral foot TMA once revascularized and stable staged apart, recommending 2 weeks of soft tissue abx  coverage with ID recs  -cardiology on board   - encourage pain regime for pain control   -ID on board - awaiting final recs   -home health ordered for wound care, PT/OT      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  -Patient was recently discharged on 6/28/22 for clotted vascular access and was to follow up outpatient under vascular and for the gangrene of the toe with Dr. Adam  -Left 5th and 3rd toes black in color see photos above  -Unable to palpate pedal pulses bilateral   -Bilateral pitting edema +4  -continue vanc and zosyn  -continue heparin gtt  -Cardiology consulted- s/p peripheral angiogram 7/5: with PTA of prox and mid AT with 3.0 x 150 mm balloon              Sub-optimal PTA of distal AT with 2.0 x 100 and 2.0 x 40 mm balloons              Residual distal AT 99% stenosis   -Resume plavix    -Resume eliquis    -cards Considering distal AT PTA with OSCAR approach   and evaluation for DVA and HBO   -PT to eval and treat due to prolonged immobility     Anemia, chronic renal failure, stage 5  -Hgb 9  -monitor      Type 2 diabetes mellitus with chronic kidney disease on chronic dialysis, with long-term current use of insulin  Hemoglobin A1C   Date Value Ref Range Status   06/14/2022 6.9 (H) 4.0 - 5.6 % Final   -CBG within acceptable limits, low dose SSI, diabetic renal diet when resumed, accuchecks ACHS      Acute deep vein thrombosis (DVT) of popliteal vein of right lower extremity  -on eliquis         PAD (peripheral artery disease)  -Continue Lipitor  -hold BB with soft BP  -eliquis resumed 7/6      Chronic combined systolic and diastolic congestive heart failure  -stable, no fluid overloading s/s on exam  -Strict electrolyte management with goal K 4-5 and Mg 2-3 (trending daily)  Patient is identified as having Combined Systolic and  Diastolic heart failure that is Chronic. CHF is currently controlled. Latest ECHO performed and demonstrates- Results for orders placed during the hospital encounter of 12/21/21     Echo    Interpretation Summary  · The left ventricle is moderately enlarged with mild concentric hypertrophy and  · The quantitatively derived ejection fraction is 34%.  · Severe left atrial enlargement.  · Normal right ventricular size with normal right ventricular systolic function.  · Normal central venous pressure (3 mmHg).  · The estimated PA systolic pressure is 21 mmHg.  · No vegetations per surface echo.  -hold Beta Blocker ACE/ARB with soft BP and monitor clinical status closely. Monitor on telemetry. Patient is off CHF pathway.  Monitor strict Is&Os and daily weights.  Place on fluid restriction of 1.5 L. Continue to stress to patient importance of self efficacy and  on diet for CHF. Last BNP reviewed- and noted below No results for input(s): BNP, BNPTRIAGEBLO in the last 168 hours..    ESRD (end stage renal disease) on dialysis  -HD outpatient scheduled [facility]  -NICOLETTE AVF noted  -Consult nephrology   - HD 7/5 after angiogram         Cardiac resynchronization therapy defibrillator (CRT-D) in place  -[facility] in 7/2019 with complete heart block and intermittent VT. An echo showed his EF was 30%. Amiodarone was started, a LHC showed luminal irregularities, and a [employer] CRT-D was implanted prior to discharge (RV septal lead in LV port).  -Telemetry      Complete heart block  -chronic with CRT-D in place  -telemetry      Morbid obesity  -encourage lifestyle modifications (healthy diet, exercise)         Sleep apnea  -CPAP QHS      Primary hypertension  -Controlled, BP borderline low  -Home Meds include losartan 25 mg daily and coreg 6.25 mg bid  -hold for now  -monitor pressure        VTE Risk Mitigation (From admission, onward)         Ordered     apixaban tablet 5 mg  2 times daily         07/06/22 1027     heparin infusion  1,000 units/500 ml in 0.9% NaCl (pressure line flush)  Intra-op continuous PRN         07/05/22 1254     IP VTE HIGH RISK PATIENT  Once         07/01/22 2014     Place sequential compression device  Until discontinued         07/01/22 2014     Reason for No Pharmacological VTE Prophylaxis  Once        Question:  Reasons:  Answer:  Already adequately anticoagulated on oral Anticoagulants    07/01/22 2014                Discharge Planning   ANDERSON: 7/8/2022     Code Status: Full Code   Is the patient medically ready for discharge?:     Reason for patient still in hospital (select all that apply): Patient trending condition  Discharge Plan A: Home Health (current /[facility] in [place])                  Margaret Hahn NP  Department of Hospital Medicine   [facility]

## 2022-07-07 NOTE — HPI
Houston Jc is a 76-year-old male 76-year-old male with history of end-stage renal disease on hemodialysis Tuesday Thursday Saturday, coronary disease, diabetes, CHF, hypertension, and peripheral vascular disease who was referred by home health and cardiology for worsening status of lower extremity vascular disease. Post endovascular intervention left lower extremity per Dr. Cody on 07/05/22 with further pending intervention on 07/13/22.

## 2022-07-07 NOTE — PLAN OF CARE
[facility] - Telemetry      HOME HEALTH ORDERS  FACE TO FACE ENCOUNTER    Patient Name: Houston Jc  YOB: 1945    PCP: Zak Hamilton MD   PCP Address: [address] [place] [address] / ALAN [id number]  PCP Phone Number: [phone]  PCP Fax: [phone]    Encounter Date: 7/1/22    Admit to Home Health    Diagnoses:  Active Hospital Problems    Diagnosis  POA    *Gangrene [I96]  Yes    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 [E11.52]  Yes    Bullae [R23.8]  Yes    Critical limb ischemia with history of revascularization of same extremity [I70.229, Z98.890]  Not Applicable    Type 2 diabetes mellitus with chronic kidney disease on chronic dialysis, with long-term current use of insulin [E11.22, N18.6, Z99.2, Z79.4]  Not Applicable    Anemia, chronic renal failure, stage 5 [N18.5, D63.1]  Yes    PAD (peripheral artery disease) [I73.9]  Yes    Acute deep vein thrombosis (DVT) of popliteal vein of right lower extremity [I82.431]  Yes    Chronic combined systolic and diastolic congestive heart failure [I50.42]  Yes    ESRD (end stage renal disease) on dialysis [N18.6, Z99.2]  Not Applicable     Tu Th Sat        Cardiac resynchronization therapy defibrillator (CRT-D) in place [Z95.810]  Yes    Complete heart block [I44.2]  Yes    Morbid obesity [E66.01]  Yes    Sleep apnea [G47.30]  Yes    Primary hypertension [I10]  Yes      Resolved Hospital Problems   No resolved problems to display.       Follow Up Appointments:  Future Appointments   Date Time Provider Department Center   7/12/2022 10:00 AM HOME MONITOR DEVICE CHECK, [facility] BRIDGET Petersen   7/13/2022  2:00 PM Jeison Hunt MD [facility]       Allergies:Review of patient's allergies indicates:  No Known Allergies    Medications: Review discharge medications with patient and family and provide education.    Current Facility-Administered Medications   Medication Dose Route Frequency  Provider Last Rate Last Admin    0.9%  NaCl infusion   Intravenous PRN Laura Ortiz MD        0.9%  NaCl infusion   Intravenous Once Laura Ortiz MD        0.9%  NaCl infusion   Intravenous PRN Laura Ortiz MD        acetaminophen tablet 650 mg  650 mg Oral Q4H PRN Jose Flores MD   650 mg at 07/06/22 2023    acetaminophen tablet 650 mg  650 mg Oral Q4H PRN Dejuan Cody MD        albuterol-ipratropium 2.5 mg-0.5 mg/3 mL nebulizer solution 3 mL  3 mL Nebulization Q6H PRN Jose Flores MD        aluminum-magnesium hydroxide-simethicone 200-200-20 mg/5 mL suspension 30 mL  30 mL Oral QID PRN Jose Flores MD        apixaban tablet 5 mg  5 mg Oral BID Margaret Hahn NP   5 mg at 07/07/22 0836    aspirin chewable tablet 81 mg  81 mg Oral Daily Joey Ledbetter MD   81 mg at 07/07/22 0836    clopidogreL tablet 75 mg  75 mg Oral Daily Joey Ledbetter MD   75 mg at 07/07/22 0836    dextrose 10% bolus 125 mL  12.5 g Intravenous PRN Margaret Hahn NP        dextrose 10% bolus 250 mL  25 g Intravenous PRN Margaret Hahn NP        glucagon (human recombinant) injection 1 mg  1 mg Intramuscular PRN Jose Flores MD        glucose chewable tablet 16 g  16 g Oral PRN Jose Flores MD        glucose chewable tablet 24 g  24 g Oral PRN Jose Flores MD        heparin infusion 1,000 units/500 ml in 0.9% NaCl (pressure line flush)    Continuous PRN Dejuan Cody  mL/hr at 07/05/22 1254 2,000 Units/hr at 07/05/22 1254    HYDROcodone-acetaminophen 5-325 mg per tablet 1 tablet  1 tablet Oral Q6H PRN Mercy Mtz NP   1 tablet at 07/07/22 0836    insulin aspart U-100 pen 0-5 Units  0-5 Units Subcutaneous QID (AC + HS) PRN Margaret Hahn NP        melatonin tablet 6 mg  6 mg Oral Nightly PRN Jose Flores MD        midodrine tablet 10 mg  10 mg Oral TID  Alysha Anderson MD   10 mg at 07/07/22 0836    morphine injection 1 mg  1 mg  Intravenous Once LOUISA Bowers, ANP        mupirocin 2 % ointment   Nasal BID Alysha Anderson MD   Given at 07/07/22 0836    naloxone 0.4 mg/mL injection 0.02 mg  0.02 mg Intravenous PRN Jose Flores MD        ondansetron disintegrating tablet 8 mg  8 mg Oral Q8H PRN Dejuan Cody MD        ondansetron injection 4 mg  4 mg Intravenous Q8H PRN Jose Flores MD   4 mg at 07/04/22 1137    piperacillin-tazobactam 4.5 g in dextrose 5 % 100 mL IVPB (ready to mix system)  4.5 g Intravenous Q12H Jose Flores MD   Stopped at 07/07/22 0229    senna-docusate 8.6-50 mg per tablet 1 tablet  1 tablet Oral BID Jose Flores MD   1 tablet at 07/05/22 2033    sevelamer carbonate tablet 800 mg  800 mg Oral TID WM Laura Ortiz MD   800 mg at 07/07/22 0836    simethicone chewable tablet 80 mg  1 tablet Oral QID PRN Jose Flores MD        sodium bicarbonate tablet 650 mg  650 mg Oral BID Laura Ortiz MD   650 mg at 07/07/22 0836    sodium chloride 0.9% bolus 250 mL  250 mL Intravenous PRN Laura Ortiz MD        sodium chloride 0.9% bolus 250 mL  250 mL Intravenous PRN Laura Ortiz MD        sodium chloride 0.9% flush 10 mL  10 mL Intravenous Q12H PRN Jose Flores MD        vancomycin - pharmacy to dose   Intravenous pharmacy to manage frequency Jose Flores MD        vancomycin 500 mg in dextrose 5 % 100 mL IVPB (ready to mix system)  500 mg Intravenous Once Emilia Gaston MD         Facility-Administered Medications Ordered in Other Encounters   Medication Dose Route Frequency Provider Last Rate Last Admin    midazolam injection   Intravenous PRN Drew Martínez MD   1 mg at 06/24/22 0910     Current Discharge Medication List      CONTINUE these medications which have NOT CHANGED    Details   acetaminophen (TYLENOL) 325 MG tablet Take 1 tablet (325 mg total) by mouth every 6 (six) hours as needed for Pain.  Qty: 10 tablet, Refills: 0      allopurinol  (ZYLOPRIM) 300 MG tablet Take 300 mg by mouth once daily.      apixaban (ELIQUIS) 5 mg Tab Take 1 tablet (5 mg total) by mouth 2 (two) times daily.  Qty: 60 tablet, Refills: 6    Associated Diagnoses: Deep vein thrombosis (DVT) of popliteal vein of right lower extremity, unspecified chronicity      calcium acetate,phosphat bind, (PHOSLO) 667 mg tablet Take 1 tablet (667 mg total) by mouth 3 (three) times daily with meals.  Qty: 90 tablet, Refills: 11      carvediloL (COREG) 6.25 MG tablet Take 1 tablet (6.25 mg total) by mouth 2 (two) times daily.  Qty: 60 tablet, Refills: 11    Comments: .      cholecalciferol, vitamin D3, (VITAMIN D3) 50 mcg (2,000 unit) Cap Take 1 capsule by mouth once daily.      clopidogreL (PLAVIX) 75 mg tablet Take 75 mg by mouth once daily.      folic acid (FOLVITE) 1 MG tablet Take 1 mg by mouth.      HYDROcodone-acetaminophen (NORCO) 5-325 mg per tablet Take 1 tablet by mouth every 6 (six) hours as needed for Pain.  Qty: 28 tablet, Refills: 0    Comments: Quantity prescribed more than 7 day supply? No      insulin aspart U-100 (NOVOLOG) 100 unit/mL injection Inject 4-8 Units into the skin 3 (three) times daily before meals. Per sliding scale      povidone-iodine (BETADINE) 10 % external solution Apply topically as needed for Wound Care. Apply to wound and to interdigital maceration 2x per day.  Qty: 59 mL, Refills: 0      VENTOLIN HFA 90 mcg/actuation inhaler Inhale 1 puff into the lungs every 4 (four) hours as needed. As NEEDED               I have seen and examined this patient within the last 30 days. My clinical findings that support the need for the home health skilled services and home bound status are the following:no   Weakness/numbness causing balance and gait disturbance due to Weakness/Debility making it taxing to leave home.     Diet:   diabetic diet 2000 calorie and renal diet    Labs:  - weekly CBC/CMP/ESR/CRP/Vanc trough (please fax results to [phone])       Referrals/  Consults  Physical Therapy to evaluate and treat. Evaluate for home safety and equipment needs; Establish/upgrade home exercise program. Perform / instruct on therapeutic exercises, gait training, transfer training, and Range of Motion.  Occupational Therapy to evaluate and treat. Evaluate home environment for safety and equipment needs. Perform/Instruct on transfers, ADL training, ROM, and therapeutic exercises.    Activities:   Patient is weightbearing to both feet using darco shoes for transfers and short distance ambulation up to 10 feet to prevent to much irritation and pressure on the toes. Recommend use of walker and maximum assistance from PT    Nursing:   Agency to admit patient within 24 hours of hospital discharge unless specified on physician order or at patient request    SN to complete comprehensive assessment including routine vital signs. Instruct on disease process and s/s of complications to report to MD. Review/verify medication list sent home with the patient at time of discharge  and instruct patient/caregiver as needed. Frequency may be adjusted depending on start of care date.     Skilled nurse to perform up to 3 visits PRN for symptoms related to diagnosis    Notify MD if SBP > 160 or < 90; DBP > 90 or < 50; HR > 120 or < 50; Temp > 101; O2 < 88%; Other:       Ok to schedule additional visits based on staff availability and patient request on consecutive days within the home health episode.    When multiple disciplines ordered:    Start of Care occurs on Sunday - Wednesday schedule remaining discipline evaluations as ordered on separate consecutive days following the start of care.    Thursday SOC -schedule subsequent evaluations Friday and Monday the following week.     Friday - Saturday SOC - schedule subsequent discipline evaluations on consecutive days starting Monday of the following week.    For all post-discharge communication and subsequent orders please contact patient's primary care  physician. 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 Cody on 07/05/22. Applied betadine to the dry gangrene of the toes followed by dry, sterile, bulky gauze dressing secured lightly with kerlix and tape. Avoid excessive gauze between the toes which could cause further necrosis and pain. Patient will need HH to monitor and change dressings 3x/wk with home      IV Abx:   continue Vancomycin 500 mg IV on T, TH, Sat  HD Days  Zosyn IV 4.5 grams every 12 hours   for a total of 4 weeks (stop date 7/30/22)      I certify that this patient is confined to his home and needs intermittent skilled nursing care, physical therapy and occupational therapy.

## 2022-07-07 NOTE — SUBJECTIVE & OBJECTIVE
Interval History:   Patient states he was able to get some rest overnight. His BLE are still weak, but the numbness is improving.     Review of Systems   Constitutional:  Positive for fatigue. Negative for activity change and appetite change.   HENT:  Negative for trouble swallowing and voice change.    Eyes:  Negative for photophobia and visual disturbance.   Respiratory:  Negative for cough, choking, chest tightness and shortness of breath.    Cardiovascular:  Negative for chest pain, palpitations and leg swelling.   Gastrointestinal:  Negative for abdominal pain, constipation, diarrhea, nausea and vomiting.   Endocrine: Negative.    Genitourinary:  Negative for decreased urine volume and urgency.   Musculoskeletal:  Positive for arthralgias.   Skin:  Positive for color change and wound.   Neurological:  Positive for weakness. Negative for dizziness, light-headedness, numbness and headaches.   Hematological: Negative.    Objective:     Vital Signs (Most Recent):  Temp: 97.1 °F (36.2 °C) (07/07/22 0900)  Pulse: 80 (07/07/22 1000)  Resp: 18 (07/07/22 0900)  BP: 108/71 (07/07/22 1000)  SpO2: (!) 93 % (07/07/22 0835)   Vital Signs (24h Range):  Temp:  [97 °F (36.1 °C)-98.3 °F (36.8 °C)] 97.1 °F (36.2 °C)  Pulse:  [] 80  Resp:  [15-21] 18  SpO2:  [92 %-100 %] 93 %  BP: ()/(48-71) 108/71     Weight: 124.5 kg (274 lb 7.6 oz)  Body mass index is 36.21 kg/m².    Intake/Output Summary (Last 24 hours) at 7/7/2022 1039  Last data filed at 7/6/2022 1700  Gross per 24 hour   Intake 980 ml   Output 500 ml   Net 480 ml        Physical Exam  Vitals reviewed.   Constitutional:       General: He is not in acute distress.     Appearance: Normal appearance. He is not ill-appearing, toxic-appearing or diaphoretic.   HENT:      Head: Normocephalic and atraumatic.      Mouth/Throat:      Pharynx: Oropharynx is clear.   Eyes:      Extraocular Movements: Extraocular movements intact.      Conjunctiva/sclera: Conjunctivae  normal.   Cardiovascular:      Rate and Rhythm: Normal rate and regular rhythm.      Heart sounds: Normal heart sounds. No murmur heard.     Comments: Unable to palpate pedal pulses  Pulmonary:      Effort: Pulmonary effort is normal. No respiratory distress.      Breath sounds: Normal breath sounds.   Abdominal:      General: Bowel sounds are normal.      Palpations: Abdomen is soft.   Musculoskeletal:         General: Swelling present. Normal range of motion.      Cervical back: Normal range of motion and neck supple.      Right lower leg: Edema present.      Left lower leg: Edema present.   Skin:     Comments: See photos    Bilateral feet wrapped per podiatry    Neurological:      General: No focal deficit present.      Mental Status: He is alert and oriented to person, place, and time. Mental status is at baseline.      Sensory: Sensory deficit present.           BNP  No results for input(s): BNP, BNPTRIAGEBLO in the last 168 hours.    Significant Labs: A1C:   Recent Labs   Lab 06/14/22  1007   HGBA1C 6.9*     ABGs: No results for input(s): PH, PCO2, HCO3, POCSATURATED, BE, TOTALHB, COHB, METHB, O2HB, POCFIO2, PO2 in the last 48 hours.  Bilirubin:   Recent Labs   Lab 06/13/22  1125 07/01/22  1951 07/02/22  0332 07/03/22  0355 07/04/22  0321   BILITOT 0.2 0.6 0.5 0.6 0.6     CBC:   Recent Labs   Lab 07/06/22  0251 07/07/22  0818   WBC 17.97* 16.90*   HGB 9.6* 9.1*   HCT 30.5* 30.0*   *  --      CMP:   Recent Labs   Lab 07/06/22  0829 07/06/22  1853 07/07/22  0354 07/07/22  0818   * 133* 137 138   K 6.0* 4.9 4.6 4.6   CL 97 94* 93* 92*   CO2 14* 19* 27 23   * 145* 160* 150*   BUN 48* 35* 41* 42*   CREATININE 8.8* 6.8* 7.2* 7.3*   CALCIUM 8.7 8.5* 8.7 8.3*   ALBUMIN 1.9*  --  1.9* 2.1*   ANIONGAP 23* 20* 17* 23*   EGFRNONAA 5* 7* 7* 7*     Lactic Acid:   No results for input(s): LACTATE in the last 48 hours.    Lipase: No results for input(s): LIPASE in the last 48 hours.  Lipid Panel: No  results for input(s): CHOL, HDL, LDLCALC, TRIG, CHOLHDL in the last 48 hours.  Magnesium:   No results for input(s): MG in the last 48 hours.    Troponin: No results for input(s): TROPONINI in the last 48 hours.  TSH: No results for input(s): TSH in the last 4320 hours.  Urine Culture: No results for input(s): LABURIN in the last 48 hours.  Urine Studies: No results for input(s): COLORU, APPEARANCEUA, PHUR, SPECGRAV, PROTEINUA, GLUCUA, KETONESU, BILIRUBINUA, OCCULTUA, NITRITE, UROBILINOGEN, LEUKOCYTESUR, RBCUA, WBCUA, BACTERIA, SQUAMEPITHEL, HYALINECASTS in the last 48 hours.    Invalid input(s): [facility]      Microbiology Results (last 7 days)       Procedure Component Value Units Date/Time    Blood culture (site 1) [[id number]] Collected: 07/01/22 2238    Order Status: Completed Specimen: Blood Updated: 07/06/22 1212     Blood Culture, Routine No Growth to date      No Growth to date      No Growth to date      No Growth to date      No Growth to date    Narrative:      Site # 1, aerobic and anaerobic  Collection has been rescheduled by [email or web address] at 07/01/2022 21:42 Reason:   Unable to collect tried several times Nurse spencer was notified   Collection has been rescheduled by TD5 at 07/01/2022 21:42 Reason:   Unable to collect tried several times Nurse spencer was notified     Blood culture (site 2) [[id number]] Collected: 07/01/22 2239    Order Status: Completed Specimen: Blood Updated: 07/06/22 1212     Blood Culture, Routine No Growth to date      No Growth to date      No Growth to date      No Growth to date      No Growth to date    Narrative:      Site # 2, aerobic only  Collection has been rescheduled by [email or web address] at 07/01/2022 21:42 Reason:   Unable to collect tried several times Nurse spencer was notified   Collection has been rescheduled by TD5 at 07/01/2022 21:42 Reason:   Unable to collect tried several times Nurse spencer was notified             Significant Imaging: I have reviewed all pertinent imaging results/findings  within the past 24 hours.

## 2022-07-07 NOTE — CONSULTS
[facility] - Telemetry  Podiatry  Consult Note    Patient Name: Houston Jc  MRN: [id number]  Admission Date: 7/1/2022  Hospital Length of Stay: 6 days  Attending Physician: Emilia Gaston MD  Primary Care Provider: Zak Hamilton MD     Inpatient consult to Podiatry  Consult performed by: Cirilo Abraham DPM  Consult ordered by: Margaret Hahn NP        Subjective:     History of Present Illness:  Houston Jc is a 76-year-old male 76-year-old male with history of end-stage renal disease on hemodialysis Tuesday Thursday Saturday, coronary disease, diabetes, CHF, hypertension, and peripheral vascular disease who was referred by home health and cardiology for worsening status of lower extremity vascular disease. Post endovascular intervention left lower extremity per Dr. Cody on 07/05/22 with further pending intervention on 07/13/22.      Scheduled Meds:   sodium chloride 0.9%   Intravenous Once    apixaban  5 mg Oral BID    aspirin  81 mg Oral Daily    clopidogreL  75 mg Oral Daily    midodrine  10 mg Oral TID WM    morphine  1 mg Intravenous Once    mupirocin   Nasal BID    piperacillin-tazobactam (ZOSYN) IVPB  4.5 g Intravenous Q12H    senna-docusate 8.6-50 mg  1 tablet Oral BID    sevelamer carbonate  800 mg Oral TID WM    sodium bicarbonate  650 mg Oral BID     Continuous Infusions:   heparin (porcine) 2,000 Units/hr (07/05/22 1254)     PRN Meds:sodium chloride 0.9%, sodium chloride 0.9%, acetaminophen, acetaminophen, albuterol-ipratropium, aluminum-magnesium hydroxide-simethicone, dextrose 10%, dextrose 10%, glucagon (human recombinant), glucose, glucose, heparin (porcine), HYDROcodone-acetaminophen, insulin aspart U-100, melatonin, naloxone, ondansetron, ondansetron, simethicone, sodium chloride 0.9%, sodium chloride 0.9%, sodium chloride 0.9%, Pharmacy to dose Vancomycin consult **AND** vancomycin - pharmacy to dose    Review of patient's allergies indicates:  No Known Allergies     Past  Medical History:   Diagnosis Date    Anemia     CHF (congestive heart failure)     CKD (chronic kidney disease) stage 4, GFR 15-29 ml/min     Coronary artery disease     Diabetes mellitus     Hematuria     Hypertension     Osteoarthritis of spine with radiculopathy, cervical region 1/10/2022    Renal cyst, right      Past Surgical History:   Procedure Laterality Date    ANGIOGRAPHY OF ARTERIOVENOUS SHUNT Right 2/11/2022    Procedure: Fistulogram with Possible Intervention;  Surgeon: Dejuan Cody MD;  Location: [facility] CATH LAB/EP;  Service: Cardiology;  Laterality: Right;    ANGIOGRAPHY OF LOWER EXTREMITY Right 6/20/2022    Procedure: Angiogram Extremity Unilateral;  Surgeon: Umesh Quintero MD;  Location: [facility] CATH LAB/EP;  Service: Cardiology;  Laterality: Right;    ANGIOPLASTY OF PERIPHERAL VESSEL FOR CHRONIC TOTAL OCCLUSION N/A 6/24/2022    Procedure: ANGIOPLASTY, VESSEL, PERIPHERAL, FOR CHRONIC TOTAL OCCLUSION;  Surgeon: Dejuan Cody MD;  Location: [facility] CATH LAB/EP;  Service: Cardiology;  Laterality: N/A;    AORTOGRAPHY WITH SERIALOGRAPHY Right 6/7/2022    Procedure: AORTOGRAM, WITH SERIALOGRAPHY;  Surgeon: Dejuan Cody MD;  Location: [facility] CATH LAB/EP;  Service: Cardiology;  Laterality: Right;    CARDIAC SURGERY      DECLOTTING OF ARTERIOVENOUS GRAFT Right 12/21/2021    Procedure: [id number];  Surgeon: Jeison Hunt MD;  Location: [facility] OR;  Service: Vascular;  Laterality: Right;    DECLOTTING OF ARTERIOVENOUS GRAFT Right 2/18/2022    Procedure: [id number];  Surgeon: Jeison Hunt MD;  Location: [facility] OR;  Service: Vascular;  Laterality: Right;    DECLOTTING OF ARTERIOVENOUS GRAFT Right 3/9/2022    Procedure: [id number];  Surgeon: Jeison Hunt MD;  Location: [facility] OR;  Service: Vascular;  Laterality: Right;    DECLOTTING OF ARTERIOVENOUS GRAFT Right 6/13/2022    Procedure: [id number]; REVISION OF FISTULA W/INSERTION OF NEW AV  GRAFT;  Surgeon: Jeison Hunt MD;  Location: [facility] OR;  Service: Vascular;  Laterality: Right;    FISTULOGRAM N/A 2/11/2022    Procedure: Fistulogram;  Surgeon: Dejuan Cody MD;  Location: [facility] CATH LAB/EP;  Service: Cardiology;  Laterality: N/A;    FISTULOGRAM N/A 6/17/2022    Procedure: Fistulogram;  Surgeon: Dejuan Cody MD;  Location: [facility] CATH LAB/EP;  Service: Cardiology;  Laterality: N/A;    FOOT SURGERY      INSERTION OF BIVENTRICULAR IMPLANTABLE CARDIOVERTER-DEFIBRILLATOR (ICD) Left 7/18/2019    Procedure: INSERTION, ICD, BIVENTRICULAR;  Surgeon: Arturo Bay MD;  Location: [facility] EP LAB;  Service: Cardiology;  Laterality: Left;  CHB, CRTD, SJM, anes, GP, 6078    LEFT HEART CATHETERIZATION N/A 7/16/2019    Procedure: Left heart cath;  Surgeon: Dejuan Cody MD;  Location: [facility] CATH LAB/EP;  Service: Cardiology;  Laterality: N/A;    PERCUTANEOUS TRANSLUMINAL ANGIOPLASTY (PTA) OF PERIPHERAL VESSEL N/A 7/5/2022    Procedure: PTA, PERIPHERAL VESSEL;  Surgeon: Dejuan Cody MD;  Location: [facility] CATH LAB/EP;  Service: Cardiology;  Laterality: N/A;    PERCUTANEOUS TRANSLUMINAL ANGIOPLASTY OF ARTERIOVENOUS FISTULA Right 2/11/2022    Procedure: PTA, AV FISTULA;  Surgeon: Dejuan Cody MD;  Location: [facility] CATH LAB/EP;  Service: Cardiology;  Laterality: Right;    PERCUTANEOUS TRANSLUMINAL ANGIOPLASTY OF ARTERIOVENOUS FISTULA N/A 6/17/2022    Procedure: PTA, AV FISTULA;  Surgeon: Dejuan Cody MD;  Location: [facility] CATH LAB/EP;  Service: Cardiology;  Laterality: N/A;    PERITONEAL CATHETER REMOVAL Left 4/11/2020    Procedure: REMOVAL, CATHETER, DIALYSIS, PERITONEAL;  Surgeon: Edis Snell Jr., MD;  Location: [facility] OR;  Service: General;  Laterality: Left;    PHLEBOGRAPHY  6/15/2022    Procedure: Venogram;  Surgeon: Betsey Mckeon MD;  Location: [facility] CATH LAB/EP;  Service: Cardiology;;    PLACEMENT OF ARTERIOVENOUS GRAFT Right 2/18/2022    Procedure: INSERTION, GRAFT,  ARTERIOVENOUS;  Surgeon: Jeison Hunt MD;  Location: [facility] OR;  Service: Vascular;  Laterality: Right;    REVISION OF PROCEDURE INVOLVING ARTERIOVENOUS GRAFT Right 2/18/2022    Procedure: REVISION, PROCEDURE INVOLVING ARTERIOVENOUS GRAFT;  Surgeon: Jeison Hunt MD;  Location: [facility] OR;  Service: Vascular;  Laterality: Right;       Family History       Problem Relation (Age of Onset)    Heart attack Father          Tobacco Use    Smoking status: Former Smoker    Smokeless tobacco: Never Used   Substance and Sexual Activity    Alcohol use: Yes     Comment: social    Drug use: No    Sexual activity: Yes     Review of Systems   Constitutional:  Negative for chills and fever.   HENT:  Negative for congestion and hearing loss.    Respiratory:  Negative for cough and shortness of breath.    Cardiovascular:  Negative for chest pain.   Gastrointestinal:  Negative for nausea and vomiting.   Skin:  Positive for color change and wound.   Neurological:  Negative for dizziness.   Psychiatric/Behavioral:  Negative for agitation and confusion.    Objective:     Vital Signs (Most Recent):  Temp: 97.2 °F (36.2 °C) (07/07/22 0630)  Pulse: 81 (07/07/22 0630)  Resp: 20 (07/07/22 0630)  BP: (!) 90/55 (07/07/22 0630)  SpO2: 100 % (07/07/22 0630)   Vital Signs (24h Range):  Temp:  [97.2 °F (36.2 °C)-98.8 °F (37.1 °C)] 97.2 °F (36.2 °C)  Pulse:  [76-89] 81  Resp:  [15-21] 20  SpO2:  [92 %-100 %] 100 %  BP: (89-99)/(43-69) 90/55     Weight: 124.5 kg (274 lb 7.6 oz)  Body mass index is 36.21 kg/m².    Foot Exam    General  General Appearance: appears stated age and healthy   Orientation: alert and oriented to person, place, and time   Affect: appropriate       Right Foot/Ankle     Neurovascular  Dorsalis pedis: absent  Posterior tibial: absent      Left Foot/Ankle      Neurovascular  Dorsalis pedis: absent  Posterior tibial: absent        Laboratory:  A1C:   Recent Labs   Lab 06/14/22  1007   HGBA1C 6.9*     BMP:   Recent  Labs   Lab 07/04/22 0321 07/05/22  0909 07/07/22  0354   *   < > 160*   *   < > 137   K 5.2*   < > 4.6   CL 96   < > 93*   CO2 20*   < > 27   BUN 47*   < > 41*   CREATININE 9.1*   < > 7.2*   CALCIUM 8.6*   < > 8.7   MG 1.7  --   --     < > = values in this interval not displayed.     CBC:   Recent Labs   Lab 07/06/22  0251   WBC 17.97*   RBC 3.21*   HGB 9.6*   HCT 30.5*   *   MCV 95   MCH 29.9   MCHC 31.5*     CMP:   Recent Labs   Lab 07/04/22 0321 07/05/22  0909 07/07/22  0354   *   < > 160*   CALCIUM 8.6*   < > 8.7   ALBUMIN 2.1*   < > 1.9*   PROT 6.0  --   --    *   < > 137   K 5.2*   < > 4.6   CO2 20*   < > 27   CL 96   < > 93*   BUN 47*   < > 41*   CREATININE 9.1*   < > 7.2*   ALKPHOS 139*  --   --    ALT <5*  --   --    AST 9*  --   --    BILITOT 0.6  --   --     < > = values in this interval not displayed.       Diagnostic Results:  I have reviewed all pertinent imaging results/findings within the past 24 hours.    Clinical Findings:  There was was dry eschar/necrosis of toes 1,3,4,5 on right foot with wound containing 100% eschar along dorsal foot. There is localized edema with hyperpigmentation of the skin along the dorsal foot. No erythema or signs of infection noted. No palpable fluctuance or crepitance.    Dry gangrene/necrosis of toes 1,3,5 on left foot with blister formation along dorsal foot proximal to the MTP level. No erythema or signs of infection noted. No palpable fluctuance or crepitance.                      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 Cody on 07/05/22. Applied betadine to the dry gangrene of the toes followed by dry, sterile, bulky gauze dressing secured lightly with kerlix and tape. Avoid excessive gauze between the toes which could cause further necrosis and pain. Patient is weightbearing to both feet using darco  shoes for transfers and short distance ambulation up to 10 feet to prevent to much irritation and pressure on the toes. Recommend use of walker and maximum assistance from PT. Patient will need HH to monitor and change dressings 3x/wk with home PT/OT. Will need bilateral foot TMA once revascularized and stable staged apart.     Receiving IV Zosyn. Recommend continued soft tissue coverage x 2 weeks if ID agreeable. Will order baseline x-ray. Do not suspect infection at this time.     Anemia, chronic renal failure, stage 5  Managed per Hospital Medicine.    Acute deep vein thrombosis (DVT) of popliteal vein of right lower extremity  Managed per Cardiology.    PAD (peripheral artery disease)  Managed per Cardiology.    Angiogram findings per :  S/p left lag angiogram                     Antegrade L SFA access closed with a perclose                               Findings:                    80% mid and distal SFA              1 vessel run off via AT              Mid PER               Ostial PT               Patent ostial AT, 75% prox and 99% mid AT, and 99% distal AT               Poor blush suggestive of severe microvascular disease                    Intervention                    PTA of prox and mid AT with 3.0 x 150 mm balloon              Sub-optimal PTA of distal AT with 2.0 x 100 and 2.0 x 40 mm balloons              Residual distal AT 99% stenosis    ESRD (end stage renal disease) on dialysis  Managed per Nephrology and Hospital Medicine.        Thank you for your consult. I will follow-up with patient. Please contact us if you have any additional questions.    Cirilo Abraham DPM  Podiatry  [place] - Telemetry

## 2022-07-07 NOTE — PLAN OF CARE
"1145  Patient off the unit receiving his HD treatment when CM rounded. No family present. Patient continues to be followed by neph, pod, ID, cards, & wound care. Per pod note, "Will need bilateral foot TMA once revascularized and stable staged apart. Receiving IV Zosyn. Recommend continued soft tissue coverage x 2 weeks if ID agreeable". Awaiting final ID recs.     1555  CM was informed by Prisca ([phone]) w/PHN that the patient was receiving HH services from [facility] prior to his hospitalization. CM informed Kiarra ([phone]) w/[facility] of the patient's hospitalization.     1605  CM was informed by BEBE Hahn that the patient will be medically stable to discharge home with HH & IV abx (vanco w/HD & zosyn IV q12) tomorrow after a costello is placed. Referral sent to [facility] via [facility].     1630  Patient resting quietly in bed with spouse at the bedside when CM rounded. CM informed both of the discharge plan & status. Both verbalized understanding and agreement but did not have a IV infusion company preference. Referral sent to [facility] via [facility].     Printed vancomycin prescription obtained from the patient's chart. Patient's face sheet, H&P, & vancomycin prescription manually faxed to Alem Vyas (f) [phone].    Patient's face sheet, H&P, &  consult faxed to Prisca Julio (f) [phone] w/PHN informing of above discharge needs.       Will continue to follow   "

## 2022-07-07 NOTE — PLAN OF CARE
Recommendation:  1. Add ADA restrictions to diet.   2. Continue to encourage intake at meals as tolerated.   3 Monitor weight/labs.   4. RD to continue to follow to monitor po intake    Goals:   Pt will tolerate diet with at least 75% intake at meals by RD follow up  Nutrition Goal Status: progressing towards goal

## 2022-07-07 NOTE — NURSING
Report received from Liberty MONSON, care assumed . Pt awake in bed watching TV , AOX4  , respiration unlabored on room air , patent IV, tele-monitor on , and no signs or symptoms of distress . Pt board has been updated with RN information and plan for today . Pt has no questions or concerns at this time . Fall/safety precautions in place.

## 2022-07-07 NOTE — PROGRESS NOTES
"[facility] - Telemetry  Adult Nutrition  Progress Note    SUMMARY       Recommendations    Recommendation:  1. Add ADA restrictions to diet.   2. Continue to encourage intake at meals as tolerated.   3 Monitor weight/labs.   4. RD to continue to follow to monitor po intake    Goals:   Pt will tolerate diet with at least 75% intake at meals by RD follow up  Nutrition Goal Status: progressing towards goal  Communication of RD Recs: reviewed with RN    Assessment and Plan  Critical limb ischemia with history of revascularization of same extremity  Contributing Nutrition Diagnosis  Increased energy/protein needs    Related to (etiology):   Wound healing    Signs and Symptoms (as evidenced by):   Gangrene to toes, plan for surgery    Interventions:  Collaboration with other providers  Photocollect    Nutrition Diagnosis Status:   Continues      Malnutrition Assessment  Subcutaneous Fat Loss (Final Summary): well nourished  Muscle Loss Evaluation (Final Summary): well nourished       Reason for Assessment  Reason For Assessment: RD follow-up  Diagnosis:  (gangrene)  Relevant Medical History: CHF, CAD, HTN, DM, CKD, renal cyst, anemia, osteoarthritis of spine, L heart cath, defibrillator  General Information Comments: Pt on Renal diet with [employer] Noted poor intake at meals. Pt just returned from HD. States he is cold so provided him with blanket. Anand 18- L groin puncture. s/p angiogram, atherectomy, and femoral artery stent 7/5. No recent weight loss noted. NFPE completed 7/4-nourished.  Nutrition Discharge Planning: pt to d/c on Cardiac ADA diet    Nutrition Risk Screen  Nutrition Risk Screen: no indicators present    Nutrition/Diet History  Food Preferences: no Yazdanism or cultural food prefs identified  Factors Affecting Nutritional Intake: decreased appetite    Anthropometrics  Temp: 97.1 °F (36.2 °C)  Height Method: Stated  Height: 6' 1" (185.4 cm)  Height (inches): 73 in  Weight Method: Bed " Scale  Weight: 124.5 kg (274 lb 7.6 oz)  Weight (lb): 274.48 lb  Ideal Body Weight (IBW), Male: 184 lb  % Ideal Body Weight, Male (lb): 149.17 %  BMI (Calculated): 36.2  BMI Grade: 35 - 39.9 - obesity - grade II  Usual Body Weight (UBW), k.5 kg (1/10)  % Usual Body Weight: 106.18  % Weight Change From Usual Weight: 5.96 %     Lab/Procedures/Meds  Pertinent Labs Comments: BUN 42H, Crea 7.3H, Glu 150H, Ca 8.3L, Phos 5.8H, Alb 2.1L  Pertinent Medications Comments: aspirin, senna, vancomycin    Estimated/Assessed Needs  Weight Used For Calorie Calculations: 124.5 kg (274 lb 7.6 oz)  Energy Calorie Requirements (kcal): 2490 (20 kcal/kg)  Energy Need Method: Kcal/kg  Protein Requirements: 99-124g (0.8-1.0g/kg)  Weight Used For Protein Calculations: 124.5 kg (274 lb 7.6 oz)  Estimated Fluid Requirement Method: RDA Method  RDA Method (mL): 2490  CHO Requirement: 270g    Nutrition Prescription Ordered  Current Diet Order: Renal  Oral Nutrition Supplement: Novasource Renal    Evaluation of Received Nutrient/Fluid Intake  I/O: 400/1500  Energy Calories Required: not meeting needs  Protein Required: not meeting needs  Fluid Required: not meeting needs  Comments: LBM 7/2  % Intake of Estimated Energy Needs: 0 - 25 %  % Meal Intake: 0 - 25 %    Nutrition Risk  Level of Risk/Frequency of Follow-up:  (2xweekly)     Monitor and Evaluation  Food and Nutrient Intake: food and beverage intake  Food and Nutrient Adminstration: diet order  Physical Activity and Function: nutrition-related ADLs and IADLs  Anthropometric Measurements: weight  Biochemical Data, Medical Tests and Procedures: electrolyte and renal panel  Nutrition-Focused Physical Findings: overall appearance     Nutrition Follow-Up    RD Follow-up?: Yes

## 2022-07-07 NOTE — PROGRESS NOTES
Pharmacokinetic Assessment Follow Up: IV Vancomycin    Vancomycin serum concentration assessment(s):    The random level was drawn correctly and can be used to guide therapy at this time. The measurement is within the desired definitive target range of 15 to 20 mcg/mL.    Vancomycin Regimen Plan:  Vancomycin 500mg IV x1 post HD  Re-dose when the random level is less than 20 mcg/mL, next level to be drawn at 0400 on 7/9    Drug levels (last 3 results):  Recent Labs   Lab Result Units 07/05/22  0405 07/07/22  0354   Vancomycin, Random ug/mL 18.1 19.2       Pharmacy will continue to follow and monitor vancomycin.    Please contact pharmacy at extension [phone] for questions regarding this assessment.    Thank you for the consult,   Delvin Olmos       Patient brief summary:  Houston Jc is a 76 y.o. male initiated on antimicrobial therapy with IV Vancomycin for treatment of skin & soft tissue infection    The patient's current regimen is dose by level    Drug Allergies:   Review of patient's allergies indicates:  No Known Allergies    Actual Body Weight:   124.5kg    Renal Function:   Estimated Creatinine Clearance: 12.3 mL/min (A) (based on SCr of 7.3 mg/dL (H)).,     Dialysis Method (if applicable):  intermittent HD    CBC (last 72 hours):  Recent Labs   Lab Result Units 07/05/22  0404 07/06/22  0251 07/07/22  0818   WBC K/uL 21.33* 17.97* 16.90*   Hemoglobin g/dL 9.0* 9.6* 9.1*   Hematocrit % 28.1* 30.5* 30.0*   Platelets K/uL 146* 143*  --    Gran % % 88.7* 86.6*  --    Lymph % % 5.3* 6.2*  --    Mono % % 5.2 6.3  --    Eosinophil % % 0.0 0.1  --    Basophil % % 0.1 0.1  --    Differential Method  Automated Automated  --        Metabolic Panel (last 72 hours):  Recent Labs   Lab Result Units 07/05/22  0909 07/06/22  0829 07/06/22  1853 07/07/22  0354 07/07/22  0818   Sodium mmol/L 130* 134* 133* 137 138   Potassium mmol/L 5.3* 6.0* 4.9 4.6 4.6   Chloride mmol/L 94* 97 94* 93* 92*   CO2 mmol/L 16* 14* 19* 27 23    Glucose mg/dL 209* 149* 145* 160* 150*   BUN mg/dL 56* 48* 35* 41* 42*   Creatinine mg/dL 10.1* 8.8* 6.8* 7.2* 7.3*   Albumin g/dL 1.9* 1.9*  --  1.9* 2.1*   Phosphorus mg/dL 6.8* 6.4*  --  5.7* 5.8*       Vancomycin Administrations:  vancomycin given in the last 96 hours                     vancomycin 500 mg in dextrose 5 % 100 mL IVPB (ready to mix system) (mg) 500 mg New Bag 07/06/22 1656    vancomycin 500 mg in dextrose 5 % 100 mL IVPB (ready to mix system) ()  Restarted 07/05/22 2113     500 mg New Bag  2032    vancomycin 500 mg in dextrose 5 % 100 mL IVPB (ready to mix system) (mg) 500 mg New Bag 07/03/22 1934                    Microbiologic Results:  Microbiology Results (last 7 days)       Procedure Component Value Units Date/Time    Blood culture (site 1) [[id number]] Collected: 07/01/22 2238    Order Status: Completed Specimen: Blood Updated: 07/06/22 1212     Blood Culture, Routine No Growth to date      No Growth to date      No Growth to date      No Growth to date      No Growth to date    Narrative:      Site # 1, aerobic and anaerobic  Collection has been rescheduled by TD5 at 07/01/2022 21:42 Reason:   Unable to collect tried several times Nurse spencer was notified   Collection has been rescheduled by TD5 at 07/01/2022 21:42 Reason:   Unable to collect tried several times Nurse spencer was notified     Blood culture (site 2) [[id number]] Collected: 07/01/22 2239    Order Status: Completed Specimen: Blood Updated: 07/06/22 1212     Blood Culture, Routine No Growth to date      No Growth to date      No Growth to date      No Growth to date      No Growth to date    Narrative:      Site # 2, aerobic only  Collection has been rescheduled by TD5 at 07/01/2022 21:42 Reason:   Unable to collect tried several times Nurse spencer was notified   Collection has been rescheduled by TD5 at 07/01/2022 21:42 Reason:   Unable to collect tried several times Nurse spencer was notified

## 2022-07-07 NOTE — ASSESSMENT & PLAN NOTE
Managed per Cardiology.    Angiogram findings per :  S/p left lag angiogram                     Antegrade L SFA access closed with a perclose                               Findings:                    80% mid and distal SFA              1 vessel run off via AT              Mid PER               Ostial PT               Patent ostial AT, 75% prox and 99% mid AT, and 99% distal AT               Poor blush suggestive of severe microvascular disease                    Intervention                    PTA of prox and mid AT with 3.0 x 150 mm balloon              Sub-optimal PTA of distal AT with 2.0 x 100 and 2.0 x 40 mm balloons              Residual distal AT 99% stenosis

## 2022-07-07 NOTE — ASSESSMENT & PLAN NOTE
Hemoglobin A1C   Date Value Ref Range Status   06/14/2022 6.9 (H) 4.0 - 5.6 % Final   -CBG within acceptable limits, low dose SSI, diabetic renal diet when resumed, accuchecks REDD

## 2022-07-07 NOTE — PLAN OF CARE
Problem: Adult Inpatient Plan of Care  Goal: Plan of Care Review  Outcome: Ongoing, Progressing       Plan of care reviewed with patient. Pt verbalized understanding. Pt is AAO x 4, on room air, denies SOB, c/o pain overnight, no distress noted. Pt is Paced on Tele, no true red alarms. All medications administered as prescribed. Lt cath site gauze has dried blood on it and left foot site is still bleeding.  Pt is currently resting, bed in lowest position, HOB lowered, side rails up x 2, bed alarm activated, call light and bedside table within reach. Pt instructed to call if assistance is needed.     Pt's left foot dressing completely saturated; cleaned, pressure applied, redressed.

## 2022-07-07 NOTE — PT/OT/SLP PROGRESS
Physical Therapy Evaluation Attempt      Patient Name:  Houston Jc   MRN:  [id number]    Patient not seen today secondary to Dialysis. Will follow-up as able.    7/7/2022

## 2022-07-08 NOTE — ASSESSMENT & PLAN NOTE
Bullae  -Presented with blisters to right foot x1 week with 2 new blisters on left foot, right foot with multiple blisters with 2 open: see photos above  -WBC 22---> 17 --> 21--> 17-->19, lactate 2.9 on admission   -on vanc and zosyn  -blood cultures with NGTD continue Vanc / Zosyn  -podiatry consulted - Will need bilateral foot TMA once revascularized and stable staged apart  -cardiology on board   - encourage pain regime for pain control   -ID on board - 4 weeks of IV vancomycin and zosyn   -home health ordered for wound care, PT/OT, IV vancomycin/zosyn - vanc to be given on HD days   -costello line to be placed today

## 2022-07-08 NOTE — TELEPHONE ENCOUNTER
Appointment   DENI PERKINS MRN: [id number]    Date: 7/14/2022     Appt Time: 1:30 PM     Visit Type: ESTABLISHED PATIENT [[id number]]     Provider: BEBE Bear Staff  Caller: Unspecified (Today, 10:54 AM)      Patient is being discharged from [facility] and is requiring a hospital follow up with Dr. Cody within 2 weeks.  I am unable to schedule an appointment within that time frame.       Please schedule a sooner appointment and message me back to advise patient prior to discharge.       DX:Chronic combined systolic and diastolic congestive heart failure           Vashti Guardado   [facility]/Discharge

## 2022-07-08 NOTE — BRIEF OP NOTE
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 US and fluoroscopic-guided placement of a LIJV-approach 30-cm TCVC    Procedure performed by: Cheo Oliva MD    Written Informed Consent Obtained: Yes  Specimen Removed: NO  Estimated Blood Loss: Minimal    Findings:   Successful US and fluoroscopic-guided placement of a LIJV-approach 30-cm TCVC with local anesthetic only. Patient tolerated the procedure well. No immediate post-procedural complications noted.     TCVC is ready for use immediately.    Patient transferred back to floor for 2 hours post-op monitoring and bed rest with HOB elevated at least 30 degrees during bed rest.    Thank you for considering IR for the care of your patient.     Cheo Oliva MD  Interventional Radiology

## 2022-07-08 NOTE — PROGRESS NOTES
Nephrology Progress Note       Consult Requested By: Emilia Gaston MD  Reason for Consult: ESRD on HD     SUBJECTIVE:        ?    Review of Systems   Constitutional: Negative for chills and fever.   HENT: Negative for congestion and sore throat.    Eyes: Negative for blurred vision, double vision and photophobia.   Respiratory: Negative for cough.    Cardiovascular: Positive for leg swelling. Negative for chest pain and palpitations. Claudication: blisters    Gastrointestinal: Negative for abdominal pain, diarrhea, nausea and vomiting.   Genitourinary: Negative for dysuria and urgency.   Musculoskeletal: Positive for myalgias. Negative for joint pain.   Skin: Negative for itching and rash.   Neurological: Negative for dizziness, sensory change, weakness and headaches.   Endo/Heme/Allergies: Negative for polydipsia. Does not bruise/bleed easily.   Psychiatric/Behavioral: Negative for depression.       Past Medical History:   Diagnosis Date    Anemia     CHF (congestive heart failure)     CKD (chronic kidney disease) stage 4, GFR 15-29 ml/min     Coronary artery disease     Diabetes mellitus     Hematuria     Hypertension     Osteoarthritis of spine with radiculopathy, cervical region 1/10/2022    Renal cyst, right      Past Surgical History:   Procedure Laterality Date    ANGIOGRAPHY OF ARTERIOVENOUS SHUNT Right 2/11/2022    Procedure: Fistulogram with Possible Intervention;  Surgeon: Dejuan Cody MD;  Location: [facility] CATH LAB/EP;  Service: Cardiology;  Laterality: Right;    ANGIOGRAPHY OF LOWER EXTREMITY Right 6/20/2022    Procedure: Angiogram Extremity Unilateral;  Surgeon: Umesh Quintero MD;  Location: [facility] CATH LAB/EP;  Service: Cardiology;  Laterality: Right;    ANGIOPLASTY OF PERIPHERAL VESSEL FOR CHRONIC TOTAL OCCLUSION N/A 6/24/2022    Procedure: ANGIOPLASTY, VESSEL, PERIPHERAL, FOR CHRONIC TOTAL OCCLUSION;  Surgeon: Dejuan Cody MD;  Location: [facility] CATH LAB/EP;  Service: Cardiology;   Laterality: N/A;    AORTOGRAPHY WITH SERIALOGRAPHY Right 6/7/2022    Procedure: AORTOGRAM, WITH SERIALOGRAPHY;  Surgeon: Dejuan Cody MD;  Location: [facility] CATH LAB/EP;  Service: Cardiology;  Laterality: Right;    CARDIAC SURGERY      DECLOTTING OF ARTERIOVENOUS GRAFT Right 12/21/2021    Procedure: [id number];  Surgeon: Jeison Hunt MD;  Location: [facility] OR;  Service: Vascular;  Laterality: Right;    DECLOTTING OF ARTERIOVENOUS GRAFT Right 2/18/2022    Procedure: [id number];  Surgeon: Jeison Hunt MD;  Location: [facility] OR;  Service: Vascular;  Laterality: Right;    DECLOTTING OF ARTERIOVENOUS GRAFT Right 3/9/2022    Procedure: [id number];  Surgeon: Jeison Hunt MD;  Location: [facility] OR;  Service: Vascular;  Laterality: Right;    DECLOTTING OF ARTERIOVENOUS GRAFT Right 6/13/2022    Procedure: [id number]; REVISION OF FISTULA W/INSERTION OF NEW AV GRAFT;  Surgeon: Jeison Hunt MD;  Location: [facility] OR;  Service: Vascular;  Laterality: Right;    FISTULOGRAM N/A 2/11/2022    Procedure: Fistulogram;  Surgeon: Dejuan Cody MD;  Location: [facility] CATH LAB/EP;  Service: Cardiology;  Laterality: N/A;    FISTULOGRAM N/A 6/17/2022    Procedure: Fistulogram;  Surgeon: Dejuan Cody MD;  Location: [facility] CATH LAB/EP;  Service: Cardiology;  Laterality: N/A;    FOOT SURGERY      INSERTION OF BIVENTRICULAR IMPLANTABLE CARDIOVERTER-DEFIBRILLATOR (ICD) Left 7/18/2019    Procedure: INSERTION, ICD, BIVENTRICULAR;  Surgeon: Arturo Bay MD;  Location: [facility] EP LAB;  Service: Cardiology;  Laterality: Left;  CHB, CRTD, SJM, anes, GP, 6078    LEFT HEART CATHETERIZATION N/A 7/16/2019    Procedure: Left heart cath;  Surgeon: Dejuan Cody MD;  Location: [facility] CATH LAB/EP;  Service: Cardiology;  Laterality: N/A;    PERCUTANEOUS TRANSLUMINAL ANGIOPLASTY (PTA) OF PERIPHERAL VESSEL N/A 7/5/2022    Procedure: PTA, PERIPHERAL VESSEL;  Surgeon: Dejuan Cody MD;  Location:  [facility] CATH LAB/EP;  Service: Cardiology;  Laterality: N/A;    PERCUTANEOUS TRANSLUMINAL ANGIOPLASTY OF ARTERIOVENOUS FISTULA Right 2/11/2022    Procedure: PTA, AV FISTULA;  Surgeon: Dejuan Cody MD;  Location: [facility] CATH LAB/EP;  Service: Cardiology;  Laterality: Right;    PERCUTANEOUS TRANSLUMINAL ANGIOPLASTY OF ARTERIOVENOUS FISTULA N/A 6/17/2022    Procedure: PTA, AV FISTULA;  Surgeon: Dejuan Cody MD;  Location: [facility] CATH LAB/EP;  Service: Cardiology;  Laterality: N/A;    PERITONEAL CATHETER REMOVAL Left 4/11/2020    Procedure: REMOVAL, CATHETER, DIALYSIS, PERITONEAL;  Surgeon: Edis Snell Jr., MD;  Location: [facility] OR;  Service: General;  Laterality: Left;    PHLEBOGRAPHY  6/15/2022    Procedure: Venogram;  Surgeon: Betsey Mckeon MD;  Location: [facility] CATH LAB/EP;  Service: Cardiology;;    PLACEMENT OF ARTERIOVENOUS GRAFT Right 2/18/2022    Procedure: INSERTION, GRAFT, ARTERIOVENOUS;  Surgeon: Jeison Hunt MD;  Location: [facility] OR;  Service: Vascular;  Laterality: Right;    REVISION OF PROCEDURE INVOLVING ARTERIOVENOUS GRAFT Right 2/18/2022    Procedure: REVISION, PROCEDURE INVOLVING ARTERIOVENOUS GRAFT;  Surgeon: Jeison Hunt MD;  Location: [facility] OR;  Service: Vascular;  Laterality: Right;     Family History   Problem Relation Age of Onset    Heart attack Father      Social History     Tobacco Use    Smoking status: Former Smoker    Smokeless tobacco: Never Used   Substance Use Topics    Alcohol use: Yes     Comment: social    Drug use: No       Review of patient's allergies indicates:  No Known Allergies         OBJECTIVE:     Vital Signs (Most Recent)  Vitals:    07/08/22 0734 07/08/22 0822 07/08/22 0823 07/08/22 0912   BP: (!) 94/41      BP Location:       Patient Position:       Pulse: 81  84    Resp: 18 16     Temp: 96.7 °F (35.9 °C)      TempSrc:       SpO2: 97%   96%   Weight:       Height:             Date 07/08/22 0700 - 07/09/22 0659   Shift [id number] 2803-6751  5834-9326 24 Hour Total   INTAKE   P.O. 0   0   Shift Total(mL/kg) 0(0)   0(0)   OUTPUT   Shift Total(mL/kg)       Weight (kg) 124.5 124.5 124.5 124.5           Medications:   sodium chloride 0.9%   Intravenous Once    aspirin  81 mg Oral Daily    clopidogreL  75 mg Oral Daily    midodrine  10 mg Oral TID WM    morphine  1 mg Intravenous Once    piperacillin-tazobactam (ZOSYN) IVPB  4.5 g Intravenous Q12H    senna-docusate 8.6-50 mg  1 tablet Oral BID    sevelamer carbonate  800 mg Oral TID WM    sodium bicarbonate  650 mg Oral BID           Physical Exam  Vitals and nursing note reviewed.   Constitutional:       General: He is not in acute distress.     Appearance: He is not diaphoretic.   HENT:      Head: Normocephalic and atraumatic.      Mouth/Throat:      Pharynx: No oropharyngeal exudate.   Eyes:      General: No scleral icterus.     Conjunctiva/sclera: Conjunctivae normal.      Pupils: Pupils are equal, round, and reactive to light.   Cardiovascular:      Rate and Rhythm: Normal rate and regular rhythm.      Heart sounds: Normal heart sounds. No murmur heard.  Pulmonary:      Effort: Pulmonary effort is normal. No respiratory distress.      Breath sounds: Normal breath sounds.   Abdominal:      General: Bowel sounds are normal. There is no distension.      Palpations: Abdomen is soft.      Tenderness: There is no abdominal tenderness.   Musculoskeletal:         General: Swelling (LE blisters wounds ) present. Normal range of motion.      Cervical back: Normal range of motion and neck supple.      Right lower leg: Edema present.      Left lower leg: Edema present.      Comments: RUE AVG   thrill    Skin:     General: Skin is warm and dry.      Findings: No erythema.   Neurological:      Mental Status: He is alert and oriented to person, place, and time.      Cranial Nerves: No cranial nerve deficit.   Psychiatric:         Mood and Affect: Affect normal.         Cognition and Memory: Memory normal.          Judgment: Judgment normal.         Laboratory:  Recent Labs   Lab 07/06/22  0251 07/07/22  0818 07/08/22  0554   WBC 17.97* 16.90* 19.09*   HGB 9.6* 9.1* 8.1*   HCT 30.5* 30.0* 26.5*   * 132* 134*   MONO 6.3  1.1* 5.7  1.0 4.7  0.9     Recent Labs   Lab 07/06/22  0829 07/06/22  1853 07/07/22  0354 07/07/22  0818   * 133* 137 138   K 6.0* 4.9 4.6 4.6   CL 97 94* 93* 92*   CO2 14* 19* 27 23   BUN 48* 35* 41* 42*   CREATININE 8.8* 6.8* 7.2* 7.3*   CALCIUM 8.7 8.5* 8.7 8.3*   PHOS 6.4*  --  5.7* 5.8*       Diagnostic Results:  X-Ray: Reviewed  US: Reviewed  Echo: Reviewed  ASSESSMENT/PLAN:     1. ESRD on HD TTS with Dr Decker  -- Hyperkalemia better after  HD  resume TTS   -- Daily Renal Function Panel  -- Avoid Hypotension.  -- Renally dose all meds    Bilateral Toe Gangrene, multiple digits   - TMA planned after revascularization procedure  -Per ID  Vanc/Zosyn IV for a total of 4 weeks (stop date 7/30/22)  - Need Christine no PICC/MID lines     2. HTN (I10) -    3. Anemia of chronic kidney disease treated with ROSALVA (N18.9 D63.1)    EPogen  with each HD - hold for now  Active infection   Recent Labs   Lab 07/06/22  0251 07/07/22  0818 07/08/22  0554   HGB 9.6* 9.1* 8.1*   HCT 30.5* 30.0* 26.5*   * 132* 134*       Iron   Lab Results   Component Value Date    IRON 63 12/18/2019    TIBC 263 12/18/2019    FERRITIN 6,078 (H) 03/28/2020       4. MBD (E88.9 M90.80) -  Recent Labs   Lab 07/07/22 0818   CALCIUM 8.3*   PHOS 5.8*   Sevelamer TIDWM   Recent Labs   Lab 07/02/22  0332 07/03/22  0355 07/04/22  0321   MG 1.7 1.6 1.7       Lab Results   Component Value Date    .0 (H) 03/04/2020    CALCIUM 8.3 (L) 07/07/2022    PHOS 5.8 (H) 07/07/2022     No results found for: [id number]    Lab Results   Component Value Date    CO2 23 07/07/2022       5. Nutrition/Hypoalbuminemia (E88.09) -    Recent Labs   Lab 07/01/22 2019 07/02/22  0332 07/07/22  0354 07/07/22  0818 07/07/22  1714   LABPROT 15.2*   --   --   --  17.3*   ALBUMIN  --    < > 1.9* 2.1*  --     < > = values in this interval not displayed.     Nepro with meals TID. Renal vitamins daily      With any question please call answering service [phone]  Laura Ortiz MD    [facility]  ANA MARIA Burns MD, KEREN GAYTAN MD,   MD DAMION Gill MD E. V. Harmon, NP    [address]  [place], [address]

## 2022-07-08 NOTE — ASSESSMENT & PLAN NOTE
-unlikely acute limb ischemia given history.  -Patient was recently discharged on 6/28/22 for clotted vascular access and was to follow up outpatient under vascular and for the gangrene of the toe with Dr. Adam  -Left 5th and 3rd toes black in color see photos above  -Unable to palpate pedal pulses bilateral   -Bilateral pitting edema +4  -continue vanc and zosyn   -continue heparin gtt  -Cardiology consulted- s/p peripheral angiogram 7/5: with PTA of prox and mid AT with 3.0 x 150 mm balloon              Sub-optimal PTA of distal AT with 2.0 x 100 and 2.0 x 40 mm balloons              Residual distal AT 99% stenosis   -Resume plavix    -Resume eliquis (held only for costello line then will resume)    -cards Considering distal AT PTA with OSCAR approach   and evaluation for DVA and HBO   -PT to eval and treat due to prolonged immobility

## 2022-07-08 NOTE — SEDATION DOCUMENTATION
Tunneled central line placed, dual lumen placed to left IJ.  Line sutured in place.  Biopatch placed, covered with tegaderm. Patient tolerated procedure well.

## 2022-07-08 NOTE — PLAN OF CARE
0845  CM was informed by [facility] that the patient is not established at their facility. CM was informed by Dr. Decker's (neph) office that the patient is established at the [facility] in [place]. Patient's face sheet, vancomycin prescription, & HH orders manually faxed to [facility] (f) [phone]. Patient to have costello placed today. Patient has been accepted by [facility]. Awaiting response from referral previously sent to [facility].     CM was informed by Dr. Margaret Hahn that the patient is scheduled to have a costello placed in IR today. Message sent to the schedulers requesting hospfu appts with Dr. Abraham (pod), Dr. Cody (cards), ID, & Dr. Zak Hamilton (PCP). Awaiting response.     1055  CM was informed by Naomi ([phone]) w/[facility] that the vancomycin prescription was received & that the center will provide the IV abx with the pt's HD treatments.      1105  CM was informed by deepthi Kingston of previously scheduled appointment with Dr. Hunt (vasc surg) on 7/13/2022 at 1400 & IB message sent to Dr. Cody's . Awaiting response. Information added to the pt's discharge paperwork.     1110  CM was informed by Stephanie ([phone]) w/[facility] that the patient has been accepted & that she will provide teaching at the bedside today. Information added to the patient's discharge paperwork.      1120  CM was informed by Kiarra ([phone]) w/[facility]  that they will be able to provide services following discharge. Patient's face sheet, HH orders, and H&P manually faxed (f) [phone] as requested.     1138  CM was informed by deepthi Kingston of hospfu appts scheduled with BEBE Louis (cards) on 7/14/2022 at 1330 & Dr. Zak Hamilton (PCP) on 7/15/2022 at 0945. Information added to the patient's discharge paperwork.    1230  Patient resting quietly in bed when CM rounded. No family present. Patient awaiting to have costello  placed.     1500  CM was informed by nurse Harvey that the patient just returned from having his costelol placement & that his spouse is at the bedside. CM informed Stephanie w/[facility] of above.      1540  Patient resting quietly in bed with spouse, Stephanie w/[facility], & NP Margaret Hahn at the bedside when CM rounded. Discharge cancelled due to bleeding at costello insertion site & additional PT/OT sessions needed prior to discharge. Darco shoe x2 delivered to the bedside previously.    CM was informed by Prisca w/[facility] that services with [facility] & [facility] have been approved. CM informed Brianna ([phone]) w/[facility] that the patient will not discharge today.       Will continue to follow.

## 2022-07-08 NOTE — PT/OT/SLP EVAL
Physical Therapy Evaluation and Treatment    Patient Name:  Houston Jc   MRN:  [id number]    Recommendations:     Discharge Recommendations:  home health PT (24/7 assist from family)   Discharge Equipment Recommendations: none   Barriers to discharge: impaired functional mobility    Assessment:     Houston Jc is a 76 y.o. male admitted with a medical diagnosis of Gangrene.  He presents with the following impairments/functional limitations:  weakness, impaired endurance, impaired self care skills, impaired functional mobilty, decreased safety awareness, decreased lower extremity function, decreased ROM, impaired skin, pain, edema, orthopedic precautions, gait instability, impaired joint extensibility, impaired muscle length .Pt able to transfer supine <> sit with Radha. Deferred further transfers this date 2/2 no darco shoes present. Pt educated on orders for WBAT BLE with darco shoe for transfers and short distance ambulation up to 10 feet. Recommending  PT and 24/7 assist from family. DME needs: B Darco shoes.     Rehab Prognosis: Good; patient would benefit from acute skilled PT services to address these deficits and reach maximum level of function.    Recent Surgery: Procedure(s) (LRB):  PTA, PERIPHERAL VESSEL (N/A)  ULTRASOUND, INTRAVASCULAR (N/A)  ATHERECTOMY, PERIPHERAL BLOOD VESSEL (Left)  PTA, Anterior Tibial (Left)  PTA, Superficial Femoral Artery 3 Days Post-Op    Plan:     During this hospitalization, patient to be seen 3 x/week to address the identified rehab impairments via wheelchair management/training, neuromuscular re-education, therapeutic exercises, therapeutic activities, gait training and progress toward the following goals:    · Plan of Care Expires:  08/08/22    Subjective     Chief Complaint: Awaiting upcoming procedure   Patient/Family Comments/goals: Return home  Pain/Comfort:  · Pain Rating 1:  (pain in B LE but did not rate)  · Location - Side 1: Bilateral  · Location - Orientation 1:  lower  · Location 1: leg  · Pain Addressed 1: Reposition, Distraction, Cessation of Activity  · Pain Rating Post-Intervention 1:  (did not rate)    Patients cultural, spiritual, Nondenominational conflicts given the current situation:      Living Environment:  Pt lives with his wife in a SSH, 0 [address], with WIS and T/S and TTB  Prior to admission, patients level of function was needing assistance for all ADL's and transfers to [facility] and WC from wife. Pt reports he has fallen during transfers in the past.  Pt reports he has not ambulated in a long time and spends a lot of time in his WC. Pt uses B UE to propel WC. Pt sponge bathes. Equipment used at home: bedside commode, walker, rolling, wheelchair, bath bench, grab bar, glucometer.    Upon discharge, patient will have assistance from spouse.    Objective:     Communicated with nsg prior to session.  Patient found HOB elevated with pressure relief boots, peripheral IV  upon PT entry to room.    General Precautions: Standard, fall   Orthopedic Precautions: (WBAT B LE with darco shoe for transfers and short distance ambulation up to 10 feet. Monitor feet closely to ensure no rubbing or skin break down)   Braces:  (needs B darco shoes)  Respiratory Status: Room air    Exams:  · Cognitive Exam:  Patient is oriented to Person, Place and Situation  · Postural Exam:  Patient presented with the following abnormalities:    · -       Rounded shoulders  · -       Forward head  · Sensation:    · -       Impaired  light/touch BLE pt reporting chronic numbness to B LE ~25-50% of full sensation  · Skin Integrity/Edema:      · -       Skin integrity: Wound B feet, wrapped in gauze  · -       Edema: Moderate BLE  · Generalized weakness B UE and LE especially B hip flexion pt with limited AROM; MMT grossly 3- to 3+/5    Functional Mobility:  · Bed Mobility:     · Rolling Left:  minimum assistance with [place] for assisting with hand placement on bed rail  · Scooting: stand by assistance for seated  scooting and scooting laterally in supine  · Supine to Sit: minimum assistance for trunk, HOB elevated, use of bed rail  · Sit to Supine: minimum assistance for B LE    Therapeutic Activities and Exercises:   Pt educated on role of PT/POC, orders for WBAT BLE with darco shoe for transfers and short distance ambulation up to 10 feet, importance of mobility, turning schedule / offloading for preventing pressure ulcers, and overall safety.  Pt agreeable to sit EOB.  See above for details on mobility.  Z flex boots donned on pt throughout session for protection.  Deferred further transfers this date  no darco shoes present.  Secure chatted Dr. Abraham regarding pt's need for Darco shoes and he reporting pt will need regular Darco shoes.  Ordered Darco shoes through brace line and obtained and delivered to pt's room.      AM-PAC 6 CLICK MOBILITY  Total Score:10     Patient left HOB elevated with all lines intact, call button in reach, bed alarm on and nsg notified.    GOALS:   Multidisciplinary Problems     Physical Therapy Goals        Problem: Physical Therapy    Goal Priority Disciplines Outcome Goal Variances Interventions   Physical Therapy Goal     PT, PT/OT Ongoing, Progressing     Description: Goals to be met by: 22     Patient will increase functional independence with mobility by performin. Supine to sit with Stand-by Assistance  2. Sit to stand transfer with Stand-by Assistance  3. Bed to WC transfer with Stand-by Assistance via squat pivot or scoot pivot transfer with or without a slideboard.   4. Gait  x 5 feet with Moderate Assistance using Rolling Walker.                      History:     Past Medical History:   Diagnosis Date    Anemia     CHF (congestive heart failure)     CKD (chronic kidney disease) stage 4, GFR 15-29 ml/min     Coronary artery disease     Diabetes mellitus     Hematuria     Hypertension     Osteoarthritis of spine with radiculopathy, cervical region 1/10/2022     Renal cyst, right        Past Surgical History:   Procedure Laterality Date    ANGIOGRAPHY OF ARTERIOVENOUS SHUNT Right 2/11/2022    Procedure: Fistulogram with Possible Intervention;  Surgeon: Dejuan Cody MD;  Location: [facility] CATH LAB/EP;  Service: Cardiology;  Laterality: Right;    ANGIOGRAPHY OF LOWER EXTREMITY Right 6/20/2022    Procedure: Angiogram Extremity Unilateral;  Surgeon: Umesh Quintero MD;  Location: [facility] CATH LAB/EP;  Service: Cardiology;  Laterality: Right;    ANGIOPLASTY OF PERIPHERAL VESSEL FOR CHRONIC TOTAL OCCLUSION N/A 6/24/2022    Procedure: ANGIOPLASTY, VESSEL, PERIPHERAL, FOR CHRONIC TOTAL OCCLUSION;  Surgeon: Dejuan Cody MD;  Location: [facility] CATH LAB/EP;  Service: Cardiology;  Laterality: N/A;    AORTOGRAPHY WITH SERIALOGRAPHY Right 6/7/2022    Procedure: AORTOGRAM, WITH SERIALOGRAPHY;  Surgeon: Dejuan Cody MD;  Location: [facility] CATH LAB/EP;  Service: Cardiology;  Laterality: Right;    CARDIAC SURGERY      DECLOTTING OF ARTERIOVENOUS GRAFT Right 12/21/2021    Procedure: [id number];  Surgeon: Jeison Hunt MD;  Location: [facility] OR;  Service: Vascular;  Laterality: Right;    DECLOTTING OF ARTERIOVENOUS GRAFT Right 2/18/2022    Procedure: [id number];  Surgeon: Jeison Hunt MD;  Location: [facility] OR;  Service: Vascular;  Laterality: Right;    DECLOTTING OF ARTERIOVENOUS GRAFT Right 3/9/2022    Procedure: [id number];  Surgeon: Jeison Hunt MD;  Location: [facility] OR;  Service: Vascular;  Laterality: Right;    DECLOTTING OF ARTERIOVENOUS GRAFT Right 6/13/2022    Procedure: [id number]; REVISION OF FISTULA W/INSERTION OF NEW AV GRAFT;  Surgeon: Jeison Hunt MD;  Location: [facility] OR;  Service: Vascular;  Laterality: Right;    FISTULOGRAM N/A 2/11/2022    Procedure: Fistulogram;  Surgeon: Dejuan Cody MD;  Location: [facility] CATH LAB/EP;  Service: Cardiology;  Laterality: N/A;    FISTULOGRAM N/A 6/17/2022    Procedure:  Fistulogram;  Surgeon: Dejuan Cody MD;  Location: [facility] CATH LAB/EP;  Service: Cardiology;  Laterality: N/A;    FOOT SURGERY      INSERTION OF BIVENTRICULAR IMPLANTABLE CARDIOVERTER-DEFIBRILLATOR (ICD) Left 7/18/2019    Procedure: INSERTION, ICD, BIVENTRICULAR;  Surgeon: Arturo Bay MD;  Location: [facility] EP LAB;  Service: Cardiology;  Laterality: Left;  CHB, CRTD, SJM, anes, GP, 6078    LEFT HEART CATHETERIZATION N/A 7/16/2019    Procedure: Left heart cath;  Surgeon: Dejuan Cody MD;  Location: [facility] CATH LAB/EP;  Service: Cardiology;  Laterality: N/A;    PERCUTANEOUS TRANSLUMINAL ANGIOPLASTY (PTA) OF PERIPHERAL VESSEL N/A 7/5/2022    Procedure: PTA, PERIPHERAL VESSEL;  Surgeon: Dejuan Cdoy MD;  Location: [facility] CATH LAB/EP;  Service: Cardiology;  Laterality: N/A;    PERCUTANEOUS TRANSLUMINAL ANGIOPLASTY OF ARTERIOVENOUS FISTULA Right 2/11/2022    Procedure: PTA, AV FISTULA;  Surgeon: Dejuan Cody MD;  Location: [facility] CATH LAB/EP;  Service: Cardiology;  Laterality: Right;    PERCUTANEOUS TRANSLUMINAL ANGIOPLASTY OF ARTERIOVENOUS FISTULA N/A 6/17/2022    Procedure: PTA, AV FISTULA;  Surgeon: Dejuan Cody MD;  Location: [facility] CATH LAB/EP;  Service: Cardiology;  Laterality: N/A;    PERITONEAL CATHETER REMOVAL Left 4/11/2020    Procedure: REMOVAL, CATHETER, DIALYSIS, PERITONEAL;  Surgeon: Edis Snell Jr., MD;  Location: [facility];  Service: General;  Laterality: Left;    PHLEBOGRAPHY  6/15/2022    Procedure: Venogram;  Surgeon: Betsey Mckeon MD;  Location: [facility] CATH LAB/EP;  Service: Cardiology;;    PLACEMENT OF ARTERIOVENOUS GRAFT Right 2/18/2022    Procedure: INSERTION, GRAFT, ARTERIOVENOUS;  Surgeon: Jeison Hunt MD;  Location: [facility];  Service: Vascular;  Laterality: Right;    REVISION OF PROCEDURE INVOLVING ARTERIOVENOUS GRAFT Right 2/18/2022    Procedure: REVISION, PROCEDURE INVOLVING ARTERIOVENOUS GRAFT;  Surgeon: Jeison Hunt MD;  Location: [facility];  Service:  Vascular;  Laterality: Right;       Time Tracking:     PT Received On: 07/08/22  PT Start Time: 1152     PT Stop Time: 1215  PT Total Time (min): 23 min     Billable Minutes: Evaluation 10 and Therapeutic Activity 13      07/08/2022

## 2022-07-08 NOTE — NURSING
Report received from Toña NORWOOD, care assumed . Pt awake in bed watching TV , AOX4  , respiration unlabored on room air , patent IV, tele-monitor on , and no signs or symptoms of distress . Pt board has been updated with RN information and plan for today . Pt has no questions or concerns at this time . Fall/safety precautions in place.

## 2022-07-08 NOTE — ASSESSMENT & PLAN NOTE
-HD outpatient scheduled John  -NICOLETTE BOLAÑOS noted  -Consulted nephrology   - HD 7/5 after angiogram

## 2022-07-08 NOTE — CONSULTS
Inpatient Radiology Pre-procedure Note    History of Present Illness:  Houston Jc is a 76 y.o. male with pertinent PMHx of CAD, CHF, HTN, ESRD on HD via RUE AVF and PAD with critical limb ischemia complicated by bullous gangrene of the BLE requiring long-term central venous access for planned long-term IV Abx therapy.    A new inpatient IR consult received for US and fluoroscopic-guided placement of a LIJV-approach TCVC.     Admission H&P reviewed.  Past Medical History:   Diagnosis Date    Anemia     CHF (congestive heart failure)     CKD (chronic kidney disease) stage 4, GFR 15-29 ml/min     Coronary artery disease     Diabetes mellitus     Hematuria     Hypertension     Osteoarthritis of spine with radiculopathy, cervical region 1/10/2022    Renal cyst, right      Past Surgical History:   Procedure Laterality Date    ANGIOGRAPHY OF ARTERIOVENOUS SHUNT Right 2/11/2022    Procedure: Fistulogram with Possible Intervention;  Surgeon: Dejuan Cody MD;  Location: [facility] CATH LAB/EP;  Service: Cardiology;  Laterality: Right;    ANGIOGRAPHY OF LOWER EXTREMITY Right 6/20/2022    Procedure: Angiogram Extremity Unilateral;  Surgeon: Umesh Quintero MD;  Location: [facility] CATH LAB/EP;  Service: Cardiology;  Laterality: Right;    ANGIOPLASTY OF PERIPHERAL VESSEL FOR CHRONIC TOTAL OCCLUSION N/A 6/24/2022    Procedure: ANGIOPLASTY, VESSEL, PERIPHERAL, FOR CHRONIC TOTAL OCCLUSION;  Surgeon: Dejuan Cody MD;  Location: [facility] CATH LAB/EP;  Service: Cardiology;  Laterality: N/A;    AORTOGRAPHY WITH SERIALOGRAPHY Right 6/7/2022    Procedure: AORTOGRAM, WITH SERIALOGRAPHY;  Surgeon: Dejuan Cody MD;  Location: [facility] CATH LAB/EP;  Service: Cardiology;  Laterality: Right;    CARDIAC SURGERY      DECLOTTING OF ARTERIOVENOUS GRAFT Right 12/21/2021    Procedure: [id number];  Surgeon: Jeison Hunt MD;  Location: [facility] OR;  Service: Vascular;  Laterality: Right;    DECLOTTING OF ARTERIOVENOUS GRAFT  Right 2/18/2022    Procedure: [id number];  Surgeon: Jeison Hunt MD;  Location: [facility] OR;  Service: Vascular;  Laterality: Right;    DECLOTTING OF ARTERIOVENOUS GRAFT Right 3/9/2022    Procedure: [id number];  Surgeon: Jeison Hunt MD;  Location: [facility] OR;  Service: Vascular;  Laterality: Right;    DECLOTTING OF ARTERIOVENOUS GRAFT Right 6/13/2022    Procedure: [id number]; REVISION OF FISTULA W/INSERTION OF NEW AV GRAFT;  Surgeon: Jeison Hunt MD;  Location: [facility] OR;  Service: Vascular;  Laterality: Right;    FISTULOGRAM N/A 2/11/2022    Procedure: Fistulogram;  Surgeon: Dejuan Cody MD;  Location: [facility] CATH LAB/EP;  Service: Cardiology;  Laterality: N/A;    FISTULOGRAM N/A 6/17/2022    Procedure: Fistulogram;  Surgeon: Dejuan Cody MD;  Location: [facility] CATH LAB/EP;  Service: Cardiology;  Laterality: N/A;    FOOT SURGERY      INSERTION OF BIVENTRICULAR IMPLANTABLE CARDIOVERTER-DEFIBRILLATOR (ICD) Left 7/18/2019    Procedure: INSERTION, ICD, BIVENTRICULAR;  Surgeon: Arturo Bay MD;  Location: [facility] EP LAB;  Service: Cardiology;  Laterality: Left;  CHB, CRTD, SJM, anes, GP, 6078    LEFT HEART CATHETERIZATION N/A 7/16/2019    Procedure: Left heart cath;  Surgeon: Dejuan Cody MD;  Location: [facility] CATH LAB/EP;  Service: Cardiology;  Laterality: N/A;    PERCUTANEOUS TRANSLUMINAL ANGIOPLASTY (PTA) OF PERIPHERAL VESSEL N/A 7/5/2022    Procedure: PTA, PERIPHERAL VESSEL;  Surgeon: Dejuan Cody MD;  Location: [facility] CATH LAB/EP;  Service: Cardiology;  Laterality: N/A;    PERCUTANEOUS TRANSLUMINAL ANGIOPLASTY OF ARTERIOVENOUS FISTULA Right 2/11/2022    Procedure: PTA, AV FISTULA;  Surgeon: Dejuan Cody MD;  Location: [facility] CATH LAB/EP;  Service: Cardiology;  Laterality: Right;    PERCUTANEOUS TRANSLUMINAL ANGIOPLASTY OF ARTERIOVENOUS FISTULA N/A 6/17/2022    Procedure: PTA, AV FISTULA;  Surgeon: Dejuan Cody MD;  Location: [facility] CATH LAB/EP;  Service:  Cardiology;  Laterality: N/A;    PERITONEAL CATHETER REMOVAL Left 4/11/2020    Procedure: REMOVAL, CATHETER, DIALYSIS, PERITONEAL;  Surgeon: Edis Snell Jr., MD;  Location: [facility] OR;  Service: General;  Laterality: Left;    PHLEBOGRAPHY  6/15/2022    Procedure: Venogram;  Surgeon: Betsey Mckeon MD;  Location: [facility] CATH LAB/EP;  Service: Cardiology;;    PLACEMENT OF ARTERIOVENOUS GRAFT Right 2/18/2022    Procedure: INSERTION, GRAFT, ARTERIOVENOUS;  Surgeon: Jeison Hunt MD;  Location: [facility] OR;  Service: Vascular;  Laterality: Right;    REVISION OF PROCEDURE INVOLVING ARTERIOVENOUS GRAFT Right 2/18/2022    Procedure: REVISION, PROCEDURE INVOLVING ARTERIOVENOUS GRAFT;  Surgeon: Jeison Hunt MD;  Location: [facility] OR;  Service: Vascular;  Laterality: Right;     Review of Systems:   As documented in primary team H&P    Home Meds:   Prior to Admission medications    Medication Sig Start Date End Date Taking? Authorizing Provider   acetaminophen (TYLENOL) 325 MG tablet Take 1 tablet (325 mg total) by mouth every 6 (six) hours as needed for Pain. 3/9/22  Yes Jeison Hunt MD   allopurinol (ZYLOPRIM) 300 MG tablet Take 300 mg by mouth once daily.   Yes Historical Provider   apixaban (ELIQUIS) 5 mg Tab Take 1 tablet (5 mg total) by mouth 2 (two) times daily. 10/19/21  Yes Pawel Palmer MD   calcium acetate,phosphat bind, (PHOSLO) 667 mg tablet Take 1 tablet (667 mg total) by mouth 3 (three) times daily with meals. 3/10/22 3/10/23 Yes Angelina Teran NP   carvediloL (COREG) 6.25 MG tablet Take 1 tablet (6.25 mg total) by mouth 2 (two) times daily. 12/28/21 12/28/22 Yes Leslie Bullock MD   cholecalciferol, vitamin D3, (VITAMIN D3) 50 mcg (2,000 unit) Cap Take 1 capsule by mouth once daily.   Yes Historical Provider   clopidogreL (PLAVIX) 75 mg tablet Take 75 mg by mouth once daily.   Yes Historical Provider   folic acid (FOLVITE) 1 MG tablet Take 1 mg by mouth. 5/16/22  Yes Historical  Provider   HYDROcodone-acetaminophen (NORCO) 5-325 mg per tablet Take 1 tablet by mouth every 6 (six) hours as needed for Pain. 5/30/22  Yes Tara Padilla DPM   insulin aspart U-100 (NOVOLOG) 100 unit/mL injection Inject 4-8 Units into the skin 3 (three) times daily before meals. Per sliding scale   Yes Historical Provider   povidone-iodine (BETADINE) 10 % external solution Apply topically as needed for Wound Care. Apply to wound and to interdigital maceration 2x per day. 5/30/22  Yes Tara Padilla DPM   VENTOLIN HFA 90 mcg/actuation inhaler Inhale 1 puff into the lungs every 4 (four) hours as needed. As NEEDED 6/25/19  Yes Historical Provider   vancomycin HCl (VANCOMYCIN 500 MG/100 ML D5W, READY TO MIX SYSTEM,) Inject 100 mLs (500 mg total) into the vein 3 (three) times a week. Tuesday, Thursday, Saturday on Dialysis days for 22 days 7/8/22 7/30/22  Margaret Hahn NP     Scheduled Meds:    sodium chloride 0.9%   Intravenous Once    aspirin  81 mg Oral Daily    clopidogreL  75 mg Oral Daily    midodrine  10 mg Oral TID WM    morphine  1 mg Intravenous Once    piperacillin-tazobactam (ZOSYN) IVPB  4.5 g Intravenous Q12H    senna-docusate 8.6-50 mg  1 tablet Oral BID    sevelamer carbonate  800 mg Oral TID WM    sodium bicarbonate  650 mg Oral BID     Continuous Infusions:    heparin (porcine) in D5W Stopped (07/07/22 2100)    heparin (porcine) 2,000 Units/hr (07/05/22 1254)     PRN Meds:sodium chloride 0.9%, sodium chloride 0.9%, acetaminophen, acetaminophen, albuterol-ipratropium, aluminum-magnesium hydroxide-simethicone, dextrose 10%, dextrose 10%, glucagon (human recombinant), glucose, glucose, heparin (PORCINE), heparin (PORCINE), heparin (porcine), HYDROcodone-acetaminophen, insulin aspart U-100, melatonin, naloxone, ondansetron, ondansetron, simethicone, sodium chloride 0.9%, sodium chloride 0.9%, sodium chloride 0.9%, Pharmacy to dose Vancomycin consult **AND** vancomycin - pharmacy to  dose     Anticoagulants/Antiplatelets: aspirin, Plavix and Eliquis    Allergies: Review of patient's allergies indicates:  No Known Allergies     Sedation Hx: have not been any systemic reactions    Labs:  Recent Labs   Lab 07/07/22  1714   INR 1.7*       Recent Labs   Lab 07/08/22  0554   WBC 19.09*   HGB 8.1*   HCT 26.5*   MCV 99*   *      Recent Labs   Lab 07/04/22  0321 07/05/22  0909 07/07/22  0818   *   < > 150*   *   < > 138   K 5.2*   < > 4.6   CL 96   < > 92*   CO2 20*   < > 23   BUN 47*   < > 42*   CREATININE 9.1*   < > 7.3*   CALCIUM 8.6*   < > 8.3*   MG 1.7  --   --    ALT <5*  --   --    AST 9*  --   --    ALBUMIN 2.1*   < > 2.1*   BILITOT 0.6  --   --     < > = values in this interval not displayed.     Physical Exam:  ASA: III  Mallampati: II    General: no acute distress  Mental Status: alert and oriented to person, place and time  HEENT: normocephalic, atraumatic  Chest: unlabored breathing  Heart: regular heart rate  Abdomen: nondistended  Extremity: moves all extremities    Vitals:  Temp: 96.7 °F (35.9 °C) (07/08/22 0734)  Pulse: 84 (07/08/22 0823)  Resp: 16 (07/08/22 0822)  BP: (!) 94/41 (07/08/22 0734)  SpO2: 96 % (07/08/22 0912)     A/P:  76 y.o. male with pertinent PMHx of CAD, CHF, HTN, ESRD on HD via RUE AVF and PAD with critical limb ischemia complicated by bullous gangrene of the BLE requiring long-term central venous access for planned long-term IV Abx therapy.    1. BLE bullous gangrene requiring long-term IV Abx tx - Will attempt US and fluoroscopic-guided placement of a LIJV-approach TCVC with local anesthetic and moderate conscious sedation.    Please continue to hold all non-essential anti-platelets, anti-coagulants and keep pt NPO.    Risks (including, but not limited to, pain, bleeding, infection, damage to nearby structures, failure to obtain sufficient material for a diagnosis, the need for additional procedures, and death), benefits, and alternatives were  discussed with the patient. All questions were answered to the best of my abilities. The patient wishes to proceed with the procedure. Written informed consent was obtained.    Thank you for considering IR for the care of your patient.     Cheo Oliva MD  Interventional Radiology

## 2022-07-08 NOTE — PLAN OF CARE
Problem: Physical Therapy  Goal: Physical Therapy Goal  Description: Goals to be met by: 22     Patient will increase functional independence with mobility by performin. Supine to sit with Stand-by Assistance  2. Sit to stand transfer with Stand-by Assistance  3. Bed to WC transfer with Stand-by Assistance via squat pivot or scoot pivot transfer with or without a slideboard.   4. Gait  x 5 feet with Moderate Assistance using Rolling Walker.     Outcome: Ongoing, Progressing     PT Eval completed, note to follow. Pt able to transfer supine <> sit with Radha. Deferred further transfers this date  no darco shoes present. Pt educated on orders for WBAT BLE with darco shoe for transfers and short distance ambulation up to 10 feet. Recommending  PT and  assist from family. DME needs: B Darco shoes.

## 2022-07-08 NOTE — PROGRESS NOTES
[facility]  Progress Note    Patient Name: Houston Jc  MRN: [id number]  Patient Class: IP- Inpatient   Admission Date: 7/1/2022  Length of Stay: 7 days  Attending Physician: Emilia Gaston MD  Primary Care Provider: Zak Hamilton MD        Subjective:     Principal Problem:Gangrene        HPI:  Houston Jc is a 76-year-old male 76-year-old male with history of end-stage renal disease on hemodialysis Tuesday Thursday Saturday, coronary disease, diabetes, CHF, hypertension, and peripheral vascular disease who was referred by home health and cardiology for worsening status of lower extremity vascular disease. Patient has worsening gangrene of several toes and blister formation on bilateral feet.  He reports pain to both feet. History of multiple angioplasty. No stents. He reports blisters to the right foot for 1 week with 2 new blisters on the left foot and 2 open blisters to the right foot. Reports compliance with dialysis with last dialysis session on Tuesday. Of note, patient was recently discharged on 6/28/22 for clotted vascular access and was to follow up outpatient under vascular and for the gangrene of the toe with Dr. Adam.    In the ED: WBC 22, Hgb 9, lactate 2.9. Given 1500 ml IVF bolus. Started on vanc and zosyn, and heparin gtt. Cardiology consulted with recommendations for arterial dopplers in a.m. Admitted to [facility] for further evaluation.      Overview/Hospital Course:  No notes on file    Interval History:   Patient states he is feeling better today. He denies major complaints. He is awaiting costello line placement today.       Review of Systems   Constitutional:  Positive for fatigue. Negative for activity change and appetite change.   HENT:  Negative for trouble swallowing and voice change.    Eyes:  Negative for photophobia and visual disturbance.   Respiratory:  Negative for cough, choking, chest tightness and shortness of breath.     Cardiovascular:  Negative for chest pain, palpitations and leg swelling.   Gastrointestinal:  Negative for abdominal pain, constipation, diarrhea, nausea and vomiting.   Endocrine: Negative.    Genitourinary:  Negative for decreased urine volume and urgency.   Musculoskeletal:  Positive for arthralgias.   Skin:  Positive for color change and wound.   Neurological:  Positive for weakness. Negative for dizziness, light-headedness, numbness and headaches.   Hematological: Negative.    Objective:     Vital Signs (Most Recent):  Temp: 96.7 °F (35.9 °C) (07/08/22 0734)  Pulse: 84 (07/08/22 0823)  Resp: 16 (07/08/22 0822)  BP: (!) 94/41 (07/08/22 0734)  SpO2: 97 % (07/08/22 0734)   Vital Signs (24h Range):  Temp:  [96 °F (35.6 °C)-97.1 °F (36.2 °C)] 96.7 °F (35.9 °C)  Pulse:  [65-84] 84  Resp:  [15-18] 16  SpO2:  [90 %-100 %] 97 %  BP: ()/(41-71) 94/41     Weight: 124.5 kg (274 lb 7.6 oz)  Body mass index is 36.21 kg/m².    Intake/Output Summary (Last 24 hours) at 7/8/2022 0911  Last data filed at 7/7/2022 2149  Gross per 24 hour   Intake 520 ml   Output 1500 ml   Net -980 ml          Physical Exam  Vitals reviewed.   Constitutional:       General: He is not in acute distress.     Appearance: Normal appearance. He is not ill-appearing, toxic-appearing or diaphoretic.   HENT:      Head: Normocephalic and atraumatic.      Mouth/Throat:      Pharynx: Oropharynx is clear.   Eyes:      Extraocular Movements: Extraocular movements intact.      Conjunctiva/sclera: Conjunctivae normal.   Cardiovascular:      Rate and Rhythm: Normal rate and regular rhythm.      Heart sounds: Normal heart sounds. No murmur heard.     Comments: Unable to palpate pedal pulses  Pulmonary:      Effort: Pulmonary effort is normal. No respiratory distress.      Breath sounds: Normal breath sounds.   Abdominal:      General: Bowel sounds are normal.      Palpations: Abdomen is soft.   Musculoskeletal:         General: Swelling present. Normal  range of motion.      Cervical back: Normal range of motion and neck supple.      Right lower leg: Edema present.      Left lower leg: Edema present.   Skin:     Comments: See photos    Bilateral feet wrapped per podiatry    Neurological:      General: No focal deficit present.      Mental Status: He is alert and oriented to person, place, and time. Mental status is at baseline.      Sensory: No sensory deficit.           BNP  No results for input(s): BNP, BNPTRIAGEBLO in the last 168 hours.    Significant Labs: A1C:   Recent Labs   Lab 06/14/22  1007   HGBA1C 6.9*       ABGs: No results for input(s): PH, PCO2, HCO3, POCSATURATED, BE, TOTALHB, COHB, METHB, O2HB, POCFIO2, PO2 in the last 48 hours.  Bilirubin:   Recent Labs   Lab 06/13/22  1125 07/01/22  1951 07/02/22  0332 07/03/22  0355 07/04/22  0321   BILITOT 0.2 0.6 0.5 0.6 0.6       CBC:   Recent Labs   Lab 07/07/22  0818 07/08/22  0554   WBC 16.90* 19.09*   HGB 9.1* 8.1*   HCT 30.0* 26.5*   * 134*       CMP:   Recent Labs   Lab 07/06/22  1853 07/07/22  0354 07/07/22  0818   * 137 138   K 4.9 4.6 4.6   CL 94* 93* 92*   CO2 19* 27 23   * 160* 150*   BUN 35* 41* 42*   CREATININE 6.8* 7.2* 7.3*   CALCIUM 8.5* 8.7 8.3*   ALBUMIN  --  1.9* 2.1*   ANIONGAP 20* 17* 23*   EGFRNONAA 7* 7* 7*       Lactic Acid:   No results for input(s): LACTATE in the last 48 hours.    Lipase: No results for input(s): LIPASE in the last 48 hours.  Lipid Panel: No results for input(s): CHOL, HDL, LDLCALC, TRIG, CHOLHDL in the last 48 hours.  Magnesium:   No results for input(s): MG in the last 48 hours.    Troponin: No results for input(s): TROPONINI in the last 48 hours.  TSH: No results for input(s): TSH in the last 4320 hours.  Urine Culture: No results for input(s): LABURIN in the last 48 hours.  Urine Studies: No results for input(s): COLORU, APPEARANCEUA, PHUR, SPECGRAV, PROTEINUA, GLUCUA, KETONESU, BILIRUBINUA, OCCULTUA, NITRITE, UROBILINOGEN, LEUKOCYTESUR,  RBCUA, WBCUA, BACTERIA, SQUAMEPITHEL, HYALINECASTS in the last 48 hours.    Invalid input(s): [facility]      Microbiology Results (last 7 days)       Procedure Component Value Units Date/Time    Blood culture (site 1) [[id number]] Collected: 07/01/22 2238    Order Status: Completed Specimen: Blood Updated: 07/07/22 1212     Blood Culture, Routine No growth after 5 days.    Narrative:      Site # 1, aerobic and anaerobic  Collection has been rescheduled by [email or web address] at 07/01/2022 21:42 Reason:   Unable to collect tried several times Nurse spencer was notified   Collection has been rescheduled by [email or web address] at 07/01/2022 21:42 Reason:   Unable to collect tried several times Nurse spencer was notified     Blood culture (site 2) [[id number]] Collected: 07/01/22 2239    Order Status: Completed Specimen: Blood Updated: 07/07/22 1212     Blood Culture, Routine No growth after 5 days.    Narrative:      Site # 2, aerobic only  Collection has been rescheduled by [email or web address] at 07/01/2022 21:42 Reason:   Unable to collect tried several times Nurse spencer was notified   Collection has been rescheduled by [email or web address] at 07/01/2022 21:42 Reason:   Unable to collect tried several times Nurse spencer was notified             Significant Imaging: I have reviewed all pertinent imaging results/findings within the past 24 hours.      Assessment/Plan:      * Gangrene  Bullae  -Presented with blisters to right foot x1 week with 2 new blisters on left foot, right foot with multiple blisters with 2 open: see photos above  -WBC 22---> 17 --> 21--> 17-->19, lactate 2.9 on admission   -on vanc and zosyn  -blood cultures with NGTD continue Vanc / Zosyn  -podiatry consulted - Will need bilateral foot TMA once revascularized and stable staged apart  -cardiology on board   - encourage pain regime for pain control   -ID on board - 4 weeks of IV vancomycin and zosyn   -home health ordered for wound care, PT/OT, IV vancomycin/zosyn - vanc to be given on HD days   -costello line to be  placed today       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  -Patient was recently discharged on 6/28/22 for clotted vascular access and was to follow up outpatient under vascular and for the gangrene of the toe with Dr. Adam  -Left 5th and 3rd toes black in color see photos above  -Unable to palpate pedal pulses bilateral   -Bilateral pitting edema +4  -continue vanc and zosyn   -continue heparin gtt  -Cardiology consulted- s/p peripheral angiogram 7/5: with PTA of prox and mid AT with 3.0 x 150 mm balloon              Sub-optimal PTA of distal AT with 2.0 x 100 and 2.0 x 40 mm balloons              Residual distal AT 99% stenosis   -Resume plavix    -Resume eliquis (held only for costello line then will resume)    -cards Considering distal AT PTA with OSCAR approach   and evaluation for DVA and HBO   -PT to eval and treat due to prolonged immobility     Anemia, chronic renal failure, stage 5  -Hgb 8.1  -monitor      Type 2 diabetes mellitus with chronic kidney disease on chronic dialysis, with long-term current use of insulin  Hemoglobin A1C   Date Value Ref Range Status   06/14/2022 6.9 (H) 4.0 - 5.6 % Final   -CBG within acceptable limits, low dose SSI, diabetic renal diet when resumed, accuchecks ACHS      Acute deep vein thrombosis (DVT) of popliteal vein of right lower extremity  -on eliquis. patient has intermittent bleeding from multiple finger sticks. Bleeding controlled.         PAD (peripheral artery disease)  -Continue Lipitor  -hold BB with soft BP  -eliquis resumed 7/6 (held for costello line placement 7/8)       Chronic combined systolic and diastolic congestive heart failure  -stable, no fluid overloading s/s on exam  -Strict electrolyte management with goal K 4-5 and Mg 2-3 (trending daily)  Patient is identified as having  Combined Systolic and Diastolic heart failure that is Chronic. CHF is currently controlled. Latest ECHO performed and demonstrates- Results for orders placed during the hospital encounter of 12/21/21     Echo    Interpretation Summary  · The left ventricle is moderately enlarged with mild concentric hypertrophy and  · The quantitatively derived ejection fraction is 34%.  · Severe left atrial enlargement.  · Normal right ventricular size with normal right ventricular systolic function.  · Normal central venous pressure (3 mmHg).  · The estimated PA systolic pressure is 21 mmHg.  · No vegetations per surface echo.  -hold Beta Blocker ACE/ARB with soft BP and monitor clinical status closely. Monitor on telemetry. Patient is off CHF pathway.  Monitor strict Is&Os and daily weights.  Place on fluid restriction of 1.5 L. Continue to stress to patient importance of self efficacy and  on diet for CHF. Last BNP reviewed- and noted below No results for input(s): BNP, BNPTRIAGEBLO in the last 168 hours..    ESRD (end stage renal disease) on dialysis  -HD outpatient scheduled [facility]  -NICOLETTE BILLSF noted  -Consulted nephrology   - HD 7/5 after angiogram         Cardiac resynchronization therapy defibrillator (CRT-D) in place  -[facility] in 7/2019 with complete heart block and intermittent VT. An echo showed his EF was 30%. Amiodarone was started, a LHC showed luminal irregularities, and a [employer] CRT-D was implanted prior to discharge (RV septal lead in LV port).  -Telemetry      Complete heart block  -chronic with CRT-D in place  -telemetry      Morbid obesity  -encourage lifestyle modifications (healthy diet, exercise)         Sleep apnea  -CPAP QHS      Primary hypertension  -Controlled, BP borderline low  -Home Meds include losartan 25 mg daily and coreg 6.25 mg bid  -hold for now  -monitor pressure        VTE Risk Mitigation (From admission, onward)         Ordered     heparin 25,000 units in dextrose 5% (100 units/ml) IV  bolus from bag - ADDITIONAL PRN BOLUS - 30 units/kg  As needed (PRN)        Question:  Heparin Infusion Adjustment (DO NOT MODIFY ANSWER)  Answer:  [email or web address]\epic\Images\Pharmacy\HeparinInfusions\heparin HIGH INTENSITY nomogram for [facility] [id number]    07/07/22 1516     heparin 25,000 units in dextrose 5% (100 units/ml) IV bolus from bag - ADDITIONAL PRN BOLUS - 60 units/kg  As needed (PRN)        Question:  Heparin Infusion Adjustment (DO NOT MODIFY ANSWER)  Answer:  [email or web address]\epic\Images\Pharmacy\HeparinInfusions\heparin HIGH INTENSITY nomogram for [facility] [id number]    07/07/22 1746     heparin 25,000 units in dextrose 5% 250 mL (100 units/mL) infusion HIGH INTENSITY nomogram - [facility]  Continuous        Question Answer Comment   Heparin Infusion Adjustment (DO NOT MODIFY ANSWER) [email or web address]\epic\Images\Pharmacy\HeparinInfusions\heparin HIGH INTENSITY nomogram for [facility] [id number].pdf    Begin at (in units/kg/hr) 18        07/07/22 1749     heparin infusion 1,000 units/500 ml in 0.9% NaCl (pressure line flush)  Intra-op continuous PRN         07/05/22 1254     IP VTE HIGH RISK PATIENT  Once         07/01/22 2014     Place sequential compression device  Until discontinued         07/01/22 2014     Reason for No Pharmacological VTE Prophylaxis  Once        Question:  Reasons:  Answer:  Already adequately anticoagulated on oral Anticoagulants    07/01/22 2014                Discharge Planning   ANDERSON: 7/8/2022     Code Status: Full Code   Is the patient medically ready for discharge?:     Reason for patient still in hospital (select all that apply): Patient trending condition  Discharge Plan A: Home Health (current /[facility] in [place])                  Margaret Hahn NP  Department of Hospital Medicine   [facility]

## 2022-07-08 NOTE — ASSESSMENT & PLAN NOTE
-on eliquis. patient has intermittent bleeding from multiple finger sticks. Bleeding controlled.

## 2022-07-08 NOTE — PLAN OF CARE
Problem: Adult Inpatient Plan of Care  Goal: Plan of Care Review  Outcome: Ongoing, Progressing   Chart review complete.

## 2022-07-08 NOTE — SUBJECTIVE & OBJECTIVE
Interval History:   Patient states he is feeling better today. He denies major complaints. He is awaiting costello line placement today.       Review of Systems   Constitutional:  Positive for fatigue. Negative for activity change and appetite change.   HENT:  Negative for trouble swallowing and voice change.    Eyes:  Negative for photophobia and visual disturbance.   Respiratory:  Negative for cough, choking, chest tightness and shortness of breath.    Cardiovascular:  Negative for chest pain, palpitations and leg swelling.   Gastrointestinal:  Negative for abdominal pain, constipation, diarrhea, nausea and vomiting.   Endocrine: Negative.    Genitourinary:  Negative for decreased urine volume and urgency.   Musculoskeletal:  Positive for arthralgias.   Skin:  Positive for color change and wound.   Neurological:  Positive for weakness. Negative for dizziness, light-headedness, numbness and headaches.   Hematological: Negative.    Objective:     Vital Signs (Most Recent):  Temp: 96.7 °F (35.9 °C) (07/08/22 0734)  Pulse: 84 (07/08/22 0823)  Resp: 16 (07/08/22 0822)  BP: (!) 94/41 (07/08/22 0734)  SpO2: 97 % (07/08/22 0734)   Vital Signs (24h Range):  Temp:  [96 °F (35.6 °C)-97.1 °F (36.2 °C)] 96.7 °F (35.9 °C)  Pulse:  [65-84] 84  Resp:  [15-18] 16  SpO2:  [90 %-100 %] 97 %  BP: ()/(41-71) 94/41     Weight: 124.5 kg (274 lb 7.6 oz)  Body mass index is 36.21 kg/m².    Intake/Output Summary (Last 24 hours) at 7/8/2022 0911  Last data filed at 7/7/2022 2149  Gross per 24 hour   Intake 520 ml   Output 1500 ml   Net -980 ml          Physical Exam  Vitals reviewed.   Constitutional:       General: He is not in acute distress.     Appearance: Normal appearance. He is not ill-appearing, toxic-appearing or diaphoretic.   HENT:      Head: Normocephalic and atraumatic.      Mouth/Throat:      Pharynx: Oropharynx is clear.   Eyes:      Extraocular Movements: Extraocular movements intact.      Conjunctiva/sclera:  Conjunctivae normal.   Cardiovascular:      Rate and Rhythm: Normal rate and regular rhythm.      Heart sounds: Normal heart sounds. No murmur heard.     Comments: Unable to palpate pedal pulses  Pulmonary:      Effort: Pulmonary effort is normal. No respiratory distress.      Breath sounds: Normal breath sounds.   Abdominal:      General: Bowel sounds are normal.      Palpations: Abdomen is soft.   Musculoskeletal:         General: Swelling present. Normal range of motion.      Cervical back: Normal range of motion and neck supple.      Right lower leg: Edema present.      Left lower leg: Edema present.   Skin:     Comments: See photos    Bilateral feet wrapped per podiatry    Neurological:      General: No focal deficit present.      Mental Status: He is alert and oriented to person, place, and time. Mental status is at baseline.      Sensory: No sensory deficit.           BNP  No results for input(s): BNP, BNPTRIAGEBLO in the last 168 hours.    Significant Labs: A1C:   Recent Labs   Lab 06/14/22  1007   HGBA1C 6.9*       ABGs: No results for input(s): PH, PCO2, HCO3, POCSATURATED, BE, TOTALHB, COHB, METHB, O2HB, POCFIO2, PO2 in the last 48 hours.  Bilirubin:   Recent Labs   Lab 06/13/22  1125 07/01/22  1951 07/02/22  0332 07/03/22  0355 07/04/22  0321   BILITOT 0.2 0.6 0.5 0.6 0.6       CBC:   Recent Labs   Lab 07/07/22  0818 07/08/22  0554   WBC 16.90* 19.09*   HGB 9.1* 8.1*   HCT 30.0* 26.5*   * 134*       CMP:   Recent Labs   Lab 07/06/22  1853 07/07/22  0354 07/07/22  0818   * 137 138   K 4.9 4.6 4.6   CL 94* 93* 92*   CO2 19* 27 23   * 160* 150*   BUN 35* 41* 42*   CREATININE 6.8* 7.2* 7.3*   CALCIUM 8.5* 8.7 8.3*   ALBUMIN  --  1.9* 2.1*   ANIONGAP 20* 17* 23*   EGFRNONAA 7* 7* 7*       Lactic Acid:   No results for input(s): LACTATE in the last 48 hours.    Lipase: No results for input(s): LIPASE in the last 48 hours.  Lipid Panel: No results for input(s): CHOL, HDL, LDLCALC, TRIG,  CHOLHDL in the last 48 hours.  Magnesium:   No results for input(s): MG in the last 48 hours.    Troponin: No results for input(s): TROPONINI in the last 48 hours.  TSH: No results for input(s): TSH in the last 4320 hours.  Urine Culture: No results for input(s): LABURIN in the last 48 hours.  Urine Studies: No results for input(s): COLORU, APPEARANCEUA, PHUR, SPECGRAV, PROTEINUA, GLUCUA, KETONESU, BILIRUBINUA, OCCULTUA, NITRITE, UROBILINOGEN, LEUKOCYTESUR, RBCUA, WBCUA, BACTERIA, SQUAMEPITHEL, HYALINECASTS in the last 48 hours.    Invalid input(s): WRIGHTR      Microbiology Results (last 7 days)       Procedure Component Value Units Date/Time    Blood culture (site 1) [[id number]] Collected: 07/01/22 2238    Order Status: Completed Specimen: Blood Updated: 07/07/22 1212     Blood Culture, Routine No growth after 5 days.    Narrative:      Site # 1, aerobic and anaerobic  Collection has been rescheduled by [email or web address] at 07/01/2022 21:42 Reason:   Unable to collect tried several times Nurse spencer was notified   Collection has been rescheduled by [email or web address] at 07/01/2022 21:42 Reason:   Unable to collect tried several times  spencer was notified     Blood culture (site 2) [[id number]] Collected: 07/01/22 2239    Order Status: Completed Specimen: Blood Updated: 07/07/22 1212     Blood Culture, Routine No growth after 5 days.    Narrative:      Site # 2, aerobic only  Collection has been rescheduled by [email or web address] at 07/01/2022 21:42 Reason:   Unable to collect tried several times Nurse spencer was notified   Collection has been rescheduled by [email or web address] at 07/01/2022 21:42 Reason:   Unable to collect tried several times Nurse spencer was notified             Significant Imaging: I have reviewed all pertinent imaging results/findings within the past 24 hours.

## 2022-07-08 NOTE — CARE UPDATE
Patient with right DVA 2 weeks ago and left SFA and AT intervention earlier this week. He was having oozing from foot wounds overnight therefore IV Heparin held. Seen this morning with resolution per patient. NPO for Christine placement given need of IV abx x 4 weeks. Will remain off Heparin for now. Continue ASA and Plavix for now. He needs Eliquis but can hold in preparation for line placement. Once it can be restarted please do and stop ASA- want to avoid triple therapy. He is ok without AC/DOAC for a short period but given his recent DVA want to avoid prolonged absence of OAC/AC. Of note, he also needs a DVA on the left- will determine timing inpatient next week vs outpatient later this month

## 2022-07-08 NOTE — ASSESSMENT & PLAN NOTE
-Continue Lipitor  -hold BB with soft BP  -eliquis resumed 7/6 (held for costello line placement 7/8)

## 2022-07-08 NOTE — NURSING
Patient bleeding from left arm (blood drawn site) and both BLE wounds, dressing reinforced, per provide KEVIN Bowers, hold heparin drip for tonight. Patient stable, denies any pain or discomfort at this time.

## 2022-07-08 NOTE — SEDATION DOCUMENTATION
Call placed to patient's RN.  Inform of patient's low SBP of 70.  Per RN, patient has hx of hypotension and did receive his ordered/scheduled midodrine at 1230

## 2022-07-09 NOTE — ASSESSMENT & PLAN NOTE
Bullae  -Presented with blisters to right foot x1 week with 2 new blisters on left foot, right foot with multiple blisters with 2 open: see photos above  -WBC 22---> 17 --> 21--> 17-->19, lactate 2.9 on admission   -on vanc and zosyn  -blood cultures with NGTD continue Vanc / Zosyn  -podiatry consulted - Will need bilateral foot TMA once revascularized and stable staged apart  -cardiology on board   - encourage pain regime for pain control   -ID on board - 4 weeks of IV vancomycin and zosyn   -home health ordered for wound care, PT/OT, IV vancomycin/zosyn - vanc to be given on HD days   -costello line placed 7/8

## 2022-07-09 NOTE — CARE UPDATE
0145 -- seen at bedside for report of bleeding to Christine catheter site. Pressure dressing applied with improvement. Patient is alert.

## 2022-07-09 NOTE — NURSING
Pt dressing to tunneled cath saturated , primary nurse changed for the second time .  NP Margaret Hahn informed. Applied a pressure dressing. Ordered bleeding times per BEBE Hahn.

## 2022-07-09 NOTE — PROGRESS NOTES
[facility] Medicine  Progress Note    Patient Name: Houston Jc  MRN: [id number]  Patient Class: IP- Inpatient   Admission Date: 7/1/2022  Length of Stay: 8 days  Attending Physician: Emilia Gaston MD  Primary Care Provider: Zak Hamilton MD        Subjective:     Principal Problem:Gangrene        HPI:  Houston Jc is a 76-year-old male 76-year-old male with history of end-stage renal disease on hemodialysis Tuesday Thursday Saturday, coronary disease, diabetes, CHF, hypertension, and peripheral vascular disease who was referred by home health and cardiology for worsening status of lower extremity vascular disease. Patient has worsening gangrene of several toes and blister formation on bilateral feet.  He reports pain to both feet. History of multiple angioplasty. No stents. He reports blisters to the right foot for 1 week with 2 new blisters on the left foot and 2 open blisters to the right foot. Reports compliance with dialysis with last dialysis session on Tuesday. Of note, patient was recently discharged on 6/28/22 for clotted vascular access and was to follow up outpatient under vascular and for the gangrene of the toe with Dr. Adam.    In the ED: WBC 22, Hgb 9, lactate 2.9. Given 1500 ml IVF bolus. Started on vanc and zosyn, and heparin gtt. Cardiology consulted with recommendations for arterial dopplers in a.m. Admitted to [facility] Medicine for further evaluation.      Overview/Hospital Course:  No notes on file    Interval History:   Christine line placement yesterday 7/7. Bleeding at the site over night which improved after pressure dressing applied. Hgb 7.8 today. Will try Lovenox for AC. If not bleeding, will resume Eliquis.      Review of Systems   Constitutional:  Positive for fatigue. Negative for activity change and appetite change.   Respiratory:  Negative for cough, choking, chest tightness and shortness of breath.    Cardiovascular:  Negative for chest pain,  palpitations and leg swelling.   Gastrointestinal:  Negative for abdominal pain, constipation, diarrhea, nausea and vomiting.   Musculoskeletal:  Positive for arthralgias.   Skin:  Positive for color change and wound.   Neurological:  Positive for weakness. Negative for dizziness and headaches.   Hematological:  Bruises/bleeds easily.   Objective:     Vital Signs (Most Recent):  Temp: 97.5 °F (36.4 °C) (07/09/22 1145)  Pulse: 77 (07/09/22 1400)  Resp: 18 (07/09/22 1145)  BP: (!) 97/54 (07/09/22 1400)  SpO2: 100 % (07/09/22 0801)   Vital Signs (24h Range):  Temp:  [96.7 °F (35.9 °C)-97.6 °F (36.4 °C)] 97.5 °F (36.4 °C)  Pulse:  [77-98] 77  Resp:  [18] 18  SpO2:  [96 %-100 %] 100 %  BP: ()/(42-62) 97/54     Weight: 124.5 kg (274 lb 7.6 oz)  Body mass index is 36.21 kg/m².    Intake/Output Summary (Last 24 hours) at 7/9/2022 1500  Last data filed at 7/8/2022 1700  Gross per 24 hour   Intake 240 ml   Output --   Net 240 ml        Physical Exam  Vitals reviewed.   Constitutional:       General: He is not in acute distress.     Appearance: He is not diaphoretic.   HENT:      Head: Normocephalic and atraumatic.      Mouth/Throat:      Pharynx: Oropharynx is clear.   Eyes:      Extraocular Movements: Extraocular movements intact.      Conjunctiva/sclera: Conjunctivae normal.   Cardiovascular:      Rate and Rhythm: Normal rate.      Heart sounds: Normal heart sounds. No murmur heard.     Comments: Unable to palpate pedal pulses  Pulmonary:      Effort: Pulmonary effort is normal. No respiratory distress.      Breath sounds: Normal breath sounds. No wheezing or rales.   Abdominal:      General: Bowel sounds are normal.      Palpations: Abdomen is soft.      Tenderness: There is no abdominal tenderness.   Musculoskeletal:      Cervical back: Neck supple.      Right lower leg: Edema present.      Left lower leg: Edema present.   Skin:     General: Skin is warm.      Comments: Bilateral feet with gangrene and multiple  blisters. See photos    Bilateral feet wrapped per podiatry    Neurological:      Mental Status: He is alert and oriented to person, place, and time.      Sensory: No sensory deficit.      Motor: Weakness present.   Psychiatric:         Mood and Affect: Mood normal.         Thought Content: Thought content normal.           BNP  No results for input(s): BNP, BNPTRIAGEBLO in the last 168 hours.    Significant Labs: A1C:   Recent Labs   Lab 06/14/22  1007   HGBA1C 6.9*     ABGs: No results for input(s): PH, PCO2, HCO3, POCSATURATED, BE, TOTALHB, COHB, METHB, O2HB, POCFIO2, PO2 in the last 48 hours.  Bilirubin:   Recent Labs   Lab 06/13/22  1125 07/01/22  1951 07/02/22  0332 07/03/22  0355 07/04/22  0321   BILITOT 0.2 0.6 0.5 0.6 0.6     CBC:   Recent Labs   Lab 07/08/22  0554 07/09/22  0411   WBC 19.09* 20.50*   HGB 8.1* 7.8*   HCT 26.5* 24.4*   * 152     CMP:   Recent Labs   Lab 07/09/22  1024   *   K 4.7   CL 96   CO2 17*   *   BUN 63*   CREATININE 7.5*   CALCIUM 8.9   ALBUMIN 1.8*   ANIONGAP 21*   EGFRNONAA 6*     Lactic Acid:   No results for input(s): LACTATE in the last 48 hours.    Lipase: No results for input(s): LIPASE in the last 48 hours.  Lipid Panel: No results for input(s): CHOL, HDL, LDLCALC, TRIG, CHOLHDL in the last 48 hours.  Magnesium:   No results for input(s): MG in the last 48 hours.    Troponin: No results for input(s): TROPONINI in the last 48 hours.  TSH: No results for input(s): TSH in the last 4320 hours.  Urine Culture: No results for input(s): LABURIN in the last 48 hours.  Urine Studies: No results for input(s): COLORU, APPEARANCEUA, PHUR, SPECGRAV, PROTEINUA, GLUCUA, KETONESU, BILIRUBINUA, OCCULTUA, NITRITE, UROBILINOGEN, LEUKOCYTESUR, RBCUA, WBCUA, BACTERIA, SQUAMEPITHEL, HYALINECASTS in the last 48 hours.    Invalid input(s): [employer]      Microbiology Results (last 7 days)       Procedure Component Value Units Date/Time    Blood culture (site 1) [[id number]]  Collected: 07/01/22 2238    Order Status: Completed Specimen: Blood Updated: 07/07/22 1212     Blood Culture, Routine No growth after 5 days.    Narrative:      Site # 1, aerobic and anaerobic  Collection has been rescheduled by [email or web address] at 07/01/2022 21:42 Reason:   Unable to collect tried several times Nurse spencer was notified   Collection has been rescheduled by TD5 at 07/01/2022 21:42 Reason:   Unable to collect tried several times Nurse spencer was notified     Blood culture (site 2) [[id number]] Collected: 07/01/22 2239    Order Status: Completed Specimen: Blood Updated: 07/07/22 1212     Blood Culture, Routine No growth after 5 days.    Narrative:      Site # 2, aerobic only  Collection has been rescheduled by TD5 at 07/01/2022 21:42 Reason:   Unable to collect tried several times Nurse spencer was notified   Collection has been rescheduled by TD5 at 07/01/2022 21:42 Reason:   Unable to collect tried several times Nurse spencer was notified             Significant Imaging: I have reviewed all pertinent imaging results/findings within the past 24 hours.      Assessment/Plan:      * Gangrene  Bullae  -Presented with blisters to right foot x1 week with 2 new blisters on left foot, right foot with multiple blisters with 2 open: see photos above  -WBC 22---> 17 --> 21--> 17-->19, lactate 2.9 on admission   -on vanc and zosyn  -blood cultures with NGTD continue Vanc / Zosyn  -podiatry consulted - Will need bilateral foot TMA once revascularized and stable staged apart  -cardiology on board   - encourage pain regime for pain control   -ID on board - 4 weeks of IV vancomycin and zosyn   -home health ordered for wound care, PT/OT, IV vancomycin/zosyn - vanc to be given on HD days   -costello line placed 7/8      PAD (peripheral artery disease)  -Continue Lipitor  -hold BB with soft BP  -eliquis resumed 7/6 (held for costello line placement 7/8)       Acute deep vein thrombosis (DVT) of popliteal vein of right lower extremity  -on  eliquis. patient has intermittent bleeding from multiple finger sticks. Bleeding controlled.   - On lovenox for now d/t active bleeding from Costello site  - Will resume Eliquis when tolerate        Critical limb ischemia with history of revascularization of same extremity  -unlikely acute limb ischemia given history.  -Patient was recently discharged on 6/28/22 for clotted vascular access and was to follow up outpatient under vascular and for the gangrene of the toe with Dr. Adam  -Left 5th and 3rd toes black in color see photos above  -Unable to palpate pedal pulses bilateral   -Bilateral pitting edema +4  -continue vanc and zosyn   -continue heparin gtt  -Cardiology consulted- s/p peripheral angiogram 7/5: with PTA of prox and mid AT with 3.0 x 150 mm balloon              Sub-optimal PTA of distal AT with 2.0 x 100 and 2.0 x 40 mm balloons              Residual distal AT 99% stenosis   -Resume plavix    -Resume eliquis (held only for costello line then will resume)    -cards Considering distal AT PTA with OSCAR approach   and evaluation for DVA and HBO   -PT to eval and treat due to prolonged immobility     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        Bullae        Anemia, chronic renal failure, stage 5  Hgb 9-10 on admit    - Hgb 7.8 today with active bleeding from Costello site  - Epo with HD today per Renal  - Continue to monitor    Type 2 diabetes mellitus with chronic kidney disease on chronic dialysis, with long-term current use of insulin  Hemoglobin A1C   Date Value Ref Range Status   06/14/2022 6.9 (H) 4.0 - 5.6 % Final   -CBG within acceptable limits, low dose SSI, diabetic renal diet when resumed, accuchecks ACHS      Chronic combined systolic and diastolic congestive heart failure  -stable, no fluid overloading s/s on exam  -Strict electrolyte management with goal K 4-5 and Mg 2-3 (trending daily)  Patient is identified as having Combined Systolic and Diastolic heart  failure that is Chronic. CHF is currently controlled. Latest ECHO performed and demonstrates- Results for orders placed during the hospital encounter of 12/21/21     Echo    Interpretation Summary  · The left ventricle is moderately enlarged with mild concentric hypertrophy and  · The quantitatively derived ejection fraction is 34%.  · Severe left atrial enlargement.  · Normal right ventricular size with normal right ventricular systolic function.  · Normal central venous pressure (3 mmHg).  · The estimated PA systolic pressure is 21 mmHg.  · No vegetations per surface echo.  -hold Beta Blocker ACE/ARB with soft BP and monitor clinical status closely. Monitor on telemetry. Patient is off CHF pathway.  Monitor strict Is&Os and daily weights.  Place on fluid restriction of 1.5 L. Continue to stress to patient importance of self efficacy and  on diet for CHF. Last BNP reviewed- and noted below No results for input(s): BNP, BNPTRIAGEBLO in the last 168 hours..    ESRD (end stage renal disease) on dialysis  -HD outpatient scheduled TuThSat  -NICOLETTE AVF noted  -Consulted nephrology   - HD 7/5 after angiogram         Cardiac resynchronization therapy defibrillator (CRT-D) in place  -[facility] in 7/2019 with complete heart block and intermittent VT. An echo showed his EF was 30%. Amiodarone was started, a LHC showed luminal irregularities, and a [employer] CRT-D was implanted prior to discharge (RV septal lead in LV port).  -Telemetry      Complete heart block  -chronic with CRT-D in place  -telemetry      Morbid obesity  -encourage lifestyle modifications (healthy diet, exercise)         Sleep apnea  -CPAP QHS      Primary hypertension  -Controlled, BP borderline low  -Home Meds include losartan 25 mg daily and coreg 6.25 mg bid  -hold for now  -monitor pressure  - On Midodrine d/t low BP        VTE Risk Mitigation (From admission, onward)         Ordered     enoxaparin injection 120 mg  Every 24 hours (non-standard times)          07/09/22 1021     heparin infusion 1,000 units/500 ml in 0.9% NaCl (pressure line flush)  Intra-op continuous PRN         07/05/22 1254     IP VTE HIGH RISK PATIENT  Once         07/01/22 2014     Place sequential compression device  Until discontinued         07/01/22 2014     Reason for No Pharmacological VTE Prophylaxis  Once        Question:  Reasons:  Answer:  Already adequately anticoagulated on oral Anticoagulants    07/01/22 2014                Discharge Planning   ANDERSON: 7/9/2022     Code Status: Full Code   Is the patient medically ready for discharge?:     Reason for patient still in hospital (select all that apply): Patient trending condition  Discharge Plan A: Home Health (current /[facility] in [place])                  Batsheva Olmos NP  Department of Hospital Medicine   [facility]

## 2022-07-09 NOTE — PROGRESS NOTES
Pharmacokinetic Assessment Follow Up: IV Vancomycin    Vancomycin serum concentration assessment(s):    The random level was drawn correctly and can be used to guide therapy at this time. The measurement is within the desired definitive target range of 10 to 20 mcg/mL.    Vancomycin Regimen Plan:    Re-dose when the random level is less than 20 mcg/mL, next level to be drawn at 7/12 on 0400    Drug levels (last 3 results):  Recent Labs   Lab Result Units 07/07/22  0354 07/09/22  0411   Vancomycin, Random ug/mL 19.2 16.2       Pharmacy will continue to follow and monitor vancomycin.    Please contact pharmacy at extension [phone] for questions regarding this assessment.    Thank you for the consult,   Remy Mcdonough       Patient brief summary:  Houston Jc is a 76 y.o. male initiated on antimicrobial therapy with IV Vancomycin for treatment of skin & soft tissue infection    The patient's current regimen is pulse dose after dialysis  Give vanco 500mg    Drug Allergies:   Review of patient's allergies indicates:  No Known Allergies    Actual Body Weight:   124kg    Renal Function:   Estimated Creatinine Clearance: 12.3 mL/min (A) (based on SCr of 7.3 mg/dL (H)).,     Dialysis Method (if applicable):  intermittent HD    CBC (last 72 hours):  Recent Labs   Lab Result Units 07/07/22  0818 07/08/22  0554 07/09/22  0411   WBC K/uL 16.90* 19.09* 20.50*   Hemoglobin g/dL 9.1* 8.1* 7.8*   Hematocrit % 30.0* 26.5* 24.4*   Platelets K/uL 132* 134* 152   Gran % % 86.5* 88.0* 85.8*   Lymph % % 6.9* 6.4* 8.3*   Mono % % 5.7 4.7 5.2   Eosinophil % % 0.0 0.0 0.0   Basophil % % 0.1 0.1 0.0   Differential Method  Automated Automated Automated       Metabolic Panel (last 72 hours):  Recent Labs   Lab Result Units 07/06/22  0829 07/06/22  1853 07/07/22  0354 07/07/22  0818   Sodium mmol/L 134* 133* 137 138   Potassium mmol/L 6.0* 4.9 4.6 4.6   Chloride mmol/L 97 94* 93* 92*   CO2 mmol/L 14* 19* 27 23   Glucose mg/dL 149* 145* 160* 150*    BUN mg/dL 48* 35* 41* 42*   Creatinine mg/dL 8.8* 6.8* 7.2* 7.3*   Albumin g/dL 1.9*  --  1.9* 2.1*   Phosphorus mg/dL 6.4*  --  5.7* 5.8*       Vancomycin Administrations:  vancomycin given in the last 96 hours                     vancomycin 500 mg in dextrose 5 % 100 mL IVPB (ready to mix system) (mg) 500 mg New Bag 07/07/22 1805    vancomycin 500 mg in dextrose 5 % 100 mL IVPB (ready to mix system) (mg) 500 mg New Bag 07/06/22 1656    vancomycin 500 mg in dextrose 5 % 100 mL IVPB (ready to mix system) ()  Restarted 07/05/22 2113     500 mg New Bag  2032                    Microbiologic Results:  Microbiology Results (last 7 days)       Procedure Component Value Units Date/Time    Blood culture (site 1) [[id number]] Collected: 07/01/22 2238    Order Status: Completed Specimen: Blood Updated: 07/07/22 1212     Blood Culture, Routine No growth after 5 days.    Narrative:      Site # 1, aerobic and anaerobic  Collection has been rescheduled by TD5 at 07/01/2022 21:42 Reason:   Unable to collect tried several times Nurse spencer was notified   Collection has been rescheduled by TD5 at 07/01/2022 21:42 Reason:   Unable to collect tried several times Nurse spencer was notified     Blood culture (site 2) [[id number]] Collected: 07/01/22 2239    Order Status: Completed Specimen: Blood Updated: 07/07/22 1212     Blood Culture, Routine No growth after 5 days.    Narrative:      Site # 2, aerobic only  Collection has been rescheduled by TD5 at 07/01/2022 21:42 Reason:   Unable to collect tried several times Nurse spencer was notified   Collection has been rescheduled by TD5 at 07/01/2022 21:42 Reason:   Unable to collect tried several times Nurse spencer was notified

## 2022-07-09 NOTE — ASSESSMENT & PLAN NOTE
-Controlled, BP borderline low  -Home Meds include losartan 25 mg daily and coreg 6.25 mg bid  -hold for now  -monitor pressure  - On Midodrine d/t low BP

## 2022-07-09 NOTE — SUBJECTIVE & OBJECTIVE
Interval History:   Christine line placement yesterday 7/7. Bleeding at the site over night which improved after pressure dressing applied. Hgb 7.8 today. Will try Lovenox for AC. If not bleeding, will resume Eliquis.      Review of Systems   Constitutional:  Positive for fatigue. Negative for activity change and appetite change.   Respiratory:  Negative for cough, choking, chest tightness and shortness of breath.    Cardiovascular:  Negative for chest pain, palpitations and leg swelling.   Gastrointestinal:  Negative for abdominal pain, constipation, diarrhea, nausea and vomiting.   Musculoskeletal:  Positive for arthralgias.   Skin:  Positive for color change and wound.   Neurological:  Positive for weakness. Negative for dizziness and headaches.   Hematological:  Bruises/bleeds easily.   Objective:     Vital Signs (Most Recent):  Temp: 97.5 °F (36.4 °C) (07/09/22 1145)  Pulse: 77 (07/09/22 1400)  Resp: 18 (07/09/22 1145)  BP: (!) 97/54 (07/09/22 1400)  SpO2: 100 % (07/09/22 0801)   Vital Signs (24h Range):  Temp:  [96.7 °F (35.9 °C)-97.6 °F (36.4 °C)] 97.5 °F (36.4 °C)  Pulse:  [77-98] 77  Resp:  [18] 18  SpO2:  [96 %-100 %] 100 %  BP: ()/(42-62) 97/54     Weight: 124.5 kg (274 lb 7.6 oz)  Body mass index is 36.21 kg/m².    Intake/Output Summary (Last 24 hours) at 7/9/2022 1500  Last data filed at 7/8/2022 1700  Gross per 24 hour   Intake 240 ml   Output --   Net 240 ml        Physical Exam  Vitals reviewed.   Constitutional:       General: He is not in acute distress.     Appearance: He is not diaphoretic.   HENT:      Head: Normocephalic and atraumatic.      Mouth/Throat:      Pharynx: Oropharynx is clear.   Eyes:      Extraocular Movements: Extraocular movements intact.      Conjunctiva/sclera: Conjunctivae normal.   Cardiovascular:      Rate and Rhythm: Normal rate.      Heart sounds: Normal heart sounds. No murmur heard.     Comments: Unable to palpate pedal pulses  Pulmonary:      Effort: Pulmonary  effort is normal. No respiratory distress.      Breath sounds: Normal breath sounds. No wheezing or rales.   Abdominal:      General: Bowel sounds are normal.      Palpations: Abdomen is soft.      Tenderness: There is no abdominal tenderness.   Musculoskeletal:      Cervical back: Neck supple.      Right lower leg: Edema present.      Left lower leg: Edema present.   Skin:     General: Skin is warm.      Comments: Bilateral feet with gangrene and multiple blisters. See photos    Bilateral feet wrapped per podiatry    Neurological:      Mental Status: He is alert and oriented to person, place, and time.      Sensory: No sensory deficit.      Motor: Weakness present.   Psychiatric:         Mood and Affect: Mood normal.         Thought Content: Thought content normal.           BNP  No results for input(s): BNP, BNPTRIAGEBLO in the last 168 hours.    Significant Labs: A1C:   Recent Labs   Lab 06/14/22  1007   HGBA1C 6.9*     ABGs: No results for input(s): PH, PCO2, HCO3, POCSATURATED, BE, TOTALHB, COHB, METHB, O2HB, POCFIO2, PO2 in the last 48 hours.  Bilirubin:   Recent Labs   Lab 06/13/22  1125 07/01/22  1951 07/02/22  0332 07/03/22  0355 07/04/22  0321   BILITOT 0.2 0.6 0.5 0.6 0.6     CBC:   Recent Labs   Lab 07/08/22  0554 07/09/22  0411   WBC 19.09* 20.50*   HGB 8.1* 7.8*   HCT 26.5* 24.4*   * 152     CMP:   Recent Labs   Lab 07/09/22  1024   *   K 4.7   CL 96   CO2 17*   *   BUN 63*   CREATININE 7.5*   CALCIUM 8.9   ALBUMIN 1.8*   ANIONGAP 21*   EGFRNONAA 6*     Lactic Acid:   No results for input(s): LACTATE in the last 48 hours.    Lipase: No results for input(s): LIPASE in the last 48 hours.  Lipid Panel: No results for input(s): CHOL, HDL, LDLCALC, TRIG, CHOLHDL in the last 48 hours.  Magnesium:   No results for input(s): MG in the last 48 hours.    Troponin: No results for input(s): TROPONINI in the last 48 hours.  TSH: No results for input(s): TSH in the last 4320 hours.  Urine  Culture: No results for input(s): LABURIN in the last 48 hours.  Urine Studies: No results for input(s): COLORU, APPEARANCEUA, PHUR, SPECGRAV, PROTEINUA, GLUCUA, KETONESU, BILIRUBINUA, OCCULTUA, NITRITE, UROBILINOGEN, LEUKOCYTESUR, RBCUA, WBCUA, BACTERIA, SQUAMEPITHEL, HYALINECASTS in the last 48 hours.    Invalid input(s): WRIGHTR      Microbiology Results (last 7 days)       Procedure Component Value Units Date/Time    Blood culture (site 1) [[id number]] Collected: 07/01/22 2238    Order Status: Completed Specimen: Blood Updated: 07/07/22 1212     Blood Culture, Routine No growth after 5 days.    Narrative:      Site # 1, aerobic and anaerobic  Collection has been rescheduled by TD5 at 07/01/2022 21:42 Reason:   Unable to collect tried several times Nurse spencer was notified   Collection has been rescheduled by TD5 at 07/01/2022 21:42 Reason:   Unable to collect tried several times Nurse spencer was notified     Blood culture (site 2) [[id number]] Collected: 07/01/22 2239    Order Status: Completed Specimen: Blood Updated: 07/07/22 1212     Blood Culture, Routine No growth after 5 days.    Narrative:      Site # 2, aerobic only  Collection has been rescheduled by TD5 at 07/01/2022 21:42 Reason:   Unable to collect tried several times Nurse spencer was notified   Collection has been rescheduled by TD5 at 07/01/2022 21:42 Reason:   Unable to collect tried several times Nurse spencer was notified             Significant Imaging: I have reviewed all pertinent imaging results/findings within the past 24 hours.

## 2022-07-09 NOTE — ASSESSMENT & PLAN NOTE
-on eliquis. patient has intermittent bleeding from multiple finger sticks. Bleeding controlled.   - On lovenox for now d/t active bleeding from Christine site  - Will resume Eliquis when tolerate

## 2022-07-09 NOTE — ASSESSMENT & PLAN NOTE
Hgb 9-10 on admit    - Hgb 7.8 today with active bleeding from Christine site  - Epo with HD today per Renal  - Continue to monitor

## 2022-07-09 NOTE — PROGRESS NOTES
07/09/22 1510   Vital Signs   Temp 97.3 °F (36.3 °C)   Temp src Tympanic   Pulse 80   Heart Rate Source Left;Brachial;NIBP   Resp 18   O2 Device (Oxygen Therapy) room air   BP (!) 97/53   BP Location Left arm   BP Method Automatic   Patient Position Lying   Post-Hemodialysis Assessment   Rinseback Volume (mL) 250 mL   Blood Volume Processed (Liters) 71.5 L   Dialyzer Clearance Clear   Duration of Treatment 180 minutes   Additional Fluid Intake (mL) 600 mL   Total UF (mL) 1100 mL   Net Fluid Removal 500   Patient Response to Treatment fair   Arterial bleeding stop time (min) 5 min   Venous bleeding stop time (min) 5 min   Post-Hemodialysis Comments pt a+ox3, cardic rrr, bbs clear, abd soft with + bowel sounds

## 2022-07-10 NOTE — PLAN OF CARE
Problem: Adult Inpatient Plan of Care  Goal: Plan of Care Review  Outcome: Met     Problem: Diabetes Comorbidity  Goal: Blood Glucose Level Within Targeted Range  Outcome: Met     Problem: Electrolyte Imbalance (Chronic Kidney Disease)  Goal: Electrolyte Balance  Outcome: Met

## 2022-07-10 NOTE — CODE DOCUMENTATION
Code blue was called to this patient's room 2:47 a.m.    76-year-old man with end-stage renal disease, AICD in place.  On arrival this gentleman was asystolic, receiving chest compressions, receiving bag-valve breaths.  Blood glucose measured 27 on arrival.  Administered amp of D50, administered epinephrine, administered calcium gluconate given his end-stage renal disease and concern for potential hyperkalemia.  D10 infusion was initiated.  Patient subsequently had what appeared to be coarse VFib on monitor followed by multiple jerks while receiving compressions which appeared to be defibrillations with a conversion to a narrow complex rhythm, he had some agonal respirations, no purposeful movement.  An amp of bicarb was administered for concern of potential acidosis.  Epinephrine was administered per ACLS protocol.  He had another episode of VFib followed by a 7 beat run of V-tach.  His defibrillator discharged and he converted to a PE a rhythm.  One hundred fifty of amiodarone was administered.  Repeat glucose was measured at greater than 500.  No further respiratory effort was observed, without anesthesia this gentleman was intubated with a glide scope  .  7-1/2 tube was utilized, no cessation of compressions required  CO2 detector utilized with a positive color change.  Compressions were continued, PE a continued with episodic VFib and self defibrillation at which time this gentleman continued to demonstrate no appreciable respiratory effort.  No appreciable pulse.  Resuscitative efforts were ceased, time of death was called 3:08 a.m..    Critical care time 35 minutes

## 2022-07-10 NOTE — AI DETERIORATION ALERT
Artificial Intelligence Notification  Liz      Admit Date: 2022  LOS: 9  Code Status: Full Code   Date of Consult: 07/10/2022  : 1945  Age: 76 y.o.  Weight:   Wt Readings from Last 1 Encounters:   22 124.5 kg (274 lb 7.6 oz)     Sex: male  [facility]   MRN: [id number]  Attending Physician: Emilia Gaston MD  Primary Service: Networked reference to record PCT   Time AI Alert Received: ***  Time at Bedside: ***           ***      Vital Signs (Most Recent):  Temp: 97.1 °F (36.2 °C) (07/10/22 012)  Pulse: 80 (07/10/22 0218)  Resp: 20 (07/10/22 0218)  BP: (!) 82/52 (07/10/22 0218)  SpO2: 99 % (07/10/22 0818) Vital Signs (24h Range):  Temp:  [96.4 °F (35.8 °C)-97.7 °F (36.5 °C)] 97.1 °F (36.2 °C)  Pulse:  [77-98] 80  Resp:  [18-22] 20  SpO2:  [96 %-100 %] 99 %  BP: ()/(44-62) 82/52         This encounter was triggered by an Artificial Intelligence Notification.     Artificial Intelligence alert discussed with Primary team:  Name ***      Evaluation: ***    Disposition: ***

## 2022-07-10 NOTE — PROGRESS NOTES
07/10/22 0050   Vital Signs   Temp 97.2 °F (36.2 °C)   Temp src Oral   Pulse 80   Heart Rate Source Monitor   Resp 20   SpO2 98 %   Pulse Oximetry Type Intermittent   O2 Device (Oxygen Therapy) room air   BP (!) 83/53   BP Location Left arm   BP Method Automatic   Patient Position Lying   Assessments (Pre/Post)   Level of Consciousness (AVPU) alert     Patient c/p light head, weak, low blood pressure reading, provider made aware, placed patient on trendelenburg position, lactated ringers infusing as ordered, closed monitor maintains. Patient denies any chest pain.

## 2022-07-10 NOTE — SIGNIFICANT EVENT
Called to bedside for hypotension. Patient with SBP 80s. He states he is feeling weak. He attempted to get up and felt dizzy prior to my arrival. He had dialysis today. Will give 250cc bolus of LR and reassess. Labs ordered including blood cultures and lactic acid on patient with recent invasive line placement and gangrenous foot. He is on vanc and zosyn.    30 minutes of critical care time was spent personally by me on the following activities: development of treatment plan with patient or surrogate and bedside caregivers, discussions with consultants, evaluation of patient's response to treatment, examination of patient, ordering and performing treatments and interventions, ordering and review of laboratory studies, ordering and review of radiographic studies, pulse oximetry, re-evaluation of patient's condition. This critical care time did not overlap with that of any other provider or involve time for any procedures.

## 2022-07-10 NOTE — NURSING
Nurse in room, patient started gasping, no pulse, CPR started, code blue called, continue protocol; Code team in with patient. Nurse practitioner notified patient's wife of patient's status. Post mortem care done by staff.

## 2022-07-10 NOTE — PLAN OF CARE
07/10/22 0136   Nurse Notification   Charge Nurse Notified? Yes   Name of Charge Nurse Jamia funes   Bedside Nurse Notified? Yes   Name of Bedside Nurse Toña Hawkins Notfication Method Secure Chat   Nurse Notified Of Other  (AI Alert)   Provider Notification   Provider Notified? Yes   Name of Provider Lizzy Hahn NP   Provider Notification Method Secure Chat   Provider Notified Of AI Deterioration Alert

## 2022-07-10 NOTE — CODE/ RAPID DOCUMENTATION
KELLY GOMES RAPID RESPONSE NURSE NOTE       Admit Date: 2022  LOS: 9  Code Status: Full Code   Date of Consult: 07/10/2022  : 1945  Age: 76 y.o.  Weight:   Wt Readings from Last 1 Encounters:   22 124.5 kg (274 lb 7.6 oz)     Sex: male  Race: Black or    [facility]: [facility] A:   MRN: [id number]  Time Rapid Response Team page Received: 0244  Time Rapid Response Team at Bedside: 0246  Time Rapid Response Team left Bedside: 0320  Was the patient discharged from an ICU this admission? No   Was the patient discharged from a PACU within last 24 hours? No   Did the patient receive conscious sedation/general anesthesia in last 24 hours? No   Was the patient in the ED within the past 24 hours? No   Was the patient on NIPPV within the past 24 hours? No   Did this progress into an ARC or CPA?: Cardio Pulmonary Arrest  Attending Physician: Emilia Gaston MD  Primary Service: Hospitalist     SITUATION    Notified by overhead page.  Reason for alert: Code Blue  Called to evaluate the patient for Circulatory    Why is the patient in the hospital?: Gangrene    Patient has a past medical history of Anemia, CHF (congestive heart failure), CKD (chronic kidney disease) stage 4, GFR 15-29 ml/min, Coronary artery disease, Diabetes mellitus, Hematuria, Hypertension, Osteoarthritis of spine with radiculopathy, cervical region, and Renal cyst, right.    Last Vitals:  Temp: 97.1 °F (36.2 °C) (07/10 0127)  Pulse: 80 (07/10 0218)  Resp: 20 (07/10 0218)  BP: 82/52 (07/10 0218)  SpO2: 100 % (07/10 0218)    24 Hours Vitals Range:  Temp:  [96.4 °F (35.8 °C)-97.7 °F (36.5 °C)]   Pulse:  [77-98]   Resp:  [18-22]   BP: ()/(44-62)   SpO2:  [96 %-100 %]     Labs:  Recent Labs     22  0554 22  0411 07/10/22  0217   WBC 19.09* 20.50* 22.21*   HGB 8.1* 7.8* 7.0*   HCT 26.5* 24.4* 22.4*   * 152 141*       Recent Labs     22  0818 22  1024 07/10/22  0217 07/10/22  0218    134* 137 136   K  4.6 4.7 5.2* 5.2*   CL 92* 96 96 95   CO2 23 17* 12* 12*   CREATININE 7.3* 7.5* 5.7* 5.7*   * 239* 171* 172*   PHOS 5.8* 6.2* 7.4*  --         No results for input(s): PH, PCO2, PO2, HCO3, POCSATURATED, BE in the last 72 hours.     ASSESSMENT/INTERVENTIONS    On arrival, Dr Altamirano, and EARL Hahn @bedside. Compressions being done by floor nurse. Pads placed, and board as well.   Code Blue called at: 0244  Time of first Epinephrine dose: 0251   ETCO2 utilized to guide CPR: Yes  Time of CPR initiation: 0246, per Derrick Cobb, RN     0247: ICU charge, and Joe ICU RN arrived at bedside/Pads and backboard placed    0247: Adria, RT and Bethany RT at bedside bagging pt    0248: Compressions stopped, Rhythm analyzed, Zoll recommending shock, Dr Bonner arriving to bedside, and verbalizing to not administer shock, MD palpating for pulse, no pulse noted per Dr Kailey MONTE verbalizing to resume CPR, Compressor switch and CPR resumed by HUGH Guevara, CUCO    0249: Lenora (ED Charge) and Zakiya (ED nurse) arrived at bedside. Lenora, assuming code cart role from [facility] at this time.     0251: Epi admin    0251: calcium gluconate admin    0251: D50 admin    0252: compressions stopped, Pulse check, no pulse noted, Zoll showing rhythm going in and out of vFib and Vtach, no shock advised per MD d/t defibrillator in place. CPR resumed, compressor switch, CPR resumed by Zakiya, ED nurse    0253: 1 amp bicarb admin    0254: CPR stopped, pulse check-no pulse, CPR resumed, compressor switch, CPR resumed by Dr Wilder    0256: Epi admin    0257: D10 250cc bolus admin     0258: RSI kit ordered per Dr Bonner    0258: CPR stopped, pulse check, ROSC    0259: ET tube inserted per Dr Bonner, color change noted    0303: Pulse lost, CPR resumed per HUGH Guevara, CUCO    0305: Epi admin    0305: Bicarb admin    0306: CPR stopped, pulse check, no pulse noted.     0307: Code Ended per Dr Bonner    0308: TOD:  0308      OUTCOME    Patient .      CODE TEAM MEMBERS    : Dr. Bonner    Resident/CONSTANCE: Dr. Wilder    RRN: Betsy Cui, ICU Charge    BEDSIDE RN: Paulina QUINTANA RN: Kristi     RRT: Adria RT and Bethany RT    Additional Staff:   -Fuad Guevara, RN  -Joe Saldana, RN  -Zakiya Wilkins, RN  -Lenora Lao, RN  -Teresita Enrique, RN  -Derrick Cobb RN

## 2022-07-10 NOTE — DISCHARGE SUMMARY
[facility] Medicine  Discharge Summary      Patient Name: Houston Jc  MRN: [id number]  Patient Class: IP- Inpatient  Admission Date: 7/1/2022  Hospital Length of Stay: 9 days  Discharge Date and Time: 7/10/2022     Attending Physician: Dr. Emilia Gaston   Discharging Provider: Batsheva Olmos NP  Primary Care Provider: Zak Hamilton MD      HPI:   Houston Jc is a 76-year-old male 76-year-old male with history of end-stage renal disease on hemodialysis Tuesday Thursday Saturday, coronary disease, diabetes, CHF, hypertension, and peripheral vascular disease who was referred by home health and cardiology for worsening status of lower extremity vascular disease. Patient has worsening gangrene of several toes and blister formation on bilateral feet.  He reports pain to both feet. History of multiple angioplasty. No stents. He reports blisters to the right foot for 1 week with 2 new blisters on the left foot and 2 open blisters to the right foot. Reports compliance with dialysis with last dialysis session on Tuesday. Of note, patient was recently discharged on 6/28/22 for clotted vascular access and was to follow up outpatient under vascular and for the gangrene of the toe with Dr. Adam.    In the ED: WBC 22, Hgb 9, lactate 2.9. Given 1500 ml IVF bolus. Started on vanc and zosyn, and heparin gtt. Cardiology consulted with recommendations for arterial dopplers in a.m. Admitted to [facility] for further evaluation.      Procedure(s) (LRB):  PTA, PERIPHERAL VESSEL (N/A)  ULTRASOUND, INTRAVASCULAR (N/A)  ATHERECTOMY, PERIPHERAL BLOOD VESSEL (Left)  PTA, Anterior Tibial (Left)  PTA, Superficial Femoral Artery      Hospital Course:   Patient presented to the hospital for worsening black gangrene and blisters on bilateral lower extremities. Patient was admitted for CLI and placed on Heparin gtt and broad spectrum antibiotics with Zosyn and Vancomycin. Cardiology was consulted and angiogram LLE  "was done with post endovascular intervention left lower extremity per Dr. Cody on 07/05/22. Podiatry was on board for bilateral leg wounds with a plan for TMA once revascularized and stable staged apart. ID was also consulted and agreed to continue with Vancomycin/Zosyn. Due to patient's numbness was improving so plan was made to treat with 4 weeks of IV Vancomycin and Zosyn and repeat outpatient angiogram to evaluate for left DVA and HBO. Due to ESRD status, a Christine line was placed on 7/8 for outpatient IV antibiotic infusion. Patient developed some bleeding over Christine site over night so Lovenox was started with a plan to transition to Eliquis upon discharge if bleeding resolved. Unfortunately, patient was coded on early morning 7/10. ACLS protocol was intiated with coarse Vfib on the monitor. Despite resuscitative efforts for more than 20 minutes, there was no appreciable pulse. Time of death was called 3:08 a.m on 7/10/2022. Patient's wife, Paulette, was contacted and presented at the bedside. Updates was given to patient's wife and his family at the bedside. American flag was brought into room and "Red, White and Blue" salute was performed to honor patient's  status.       Goals of Care Treatment Preferences:  Code Status: Full Code      Consults:   Consults (From admission, onward)        Status Ordering Provider     Inpatient consult to Social Work  Once        Provider:  (Not yet assigned)    Completed KANDI MEIER     Inpatient consult to Interventional Radiology  Once        Provider:  (Not yet assigned)    Completed KARSON WOLFE     Inpatient consult to Social Work  Once        Provider:  (Not yet assigned)    Completed KARSON WOLFE     Inpatient consult to Social Work  Once        Provider:  (Not yet assigned)    Completed KARSON WOLFE     Inpatient consult to Podiatry  Once        Provider:  Cirilo Abraham DPM    Completed KARSON WOLFE     Inpatient " consult to Infectious Diseases  Once        Provider:  (Not yet assigned)    Completed INNOCENT-CHOLO CAMARA     Inpatient consult to Cardiology-[facility]  Once        Provider:  (Not yet assigned)    Completed ANJELICA LEBRON            Final Active Diagnoses:    Diagnosis Date Noted POA    PRINCIPAL PROBLEM:  Gangrene [I96] 2022 Yes    PAD (peripheral artery disease) [I73.9] 10/13/2021 Yes    Acute deep vein thrombosis (DVT) of popliteal vein of right lower extremity [I82.431] 10/13/2021 Yes    Critical limb ischemia with history of revascularization of same extremity [I70.229, Z98.890] 2022 Not Applicable    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 [E11.52] 2022 Yes    Bullae [R23.8] 2022 Yes    Type 2 diabetes mellitus with chronic kidney disease on chronic dialysis, with long-term current use of insulin [E11.22, N18.6, Z99.2, Z79.4] 2021 Not Applicable    Anemia, chronic renal failure, stage 5 [N18.5, D63.1]  Yes    Chronic combined systolic and diastolic congestive heart failure [I50.42] 2021 Yes    ESRD (end stage renal disease) on dialysis [N18.6, Z99.2] 2021 Not Applicable    Cardiac resynchronization therapy defibrillator (CRT-D) in place [Z95.810] 2019 Yes    Complete heart block [I44.2] 2019 Yes    Morbid obesity [E66.01] 10/08/2015 Yes    Sleep apnea [G47.30] 2015 Yes    Primary hypertension [I10] 2015 Yes             Discharged Condition:     Disposition:       Time spent on the discharge of patient: 15 minutes         Batsheva Olmos NP  Department of Hospital Medicine  [facility]

## 2022-07-10 NOTE — AI DETERIORATION ALERT
Artificial Intelligence Notification  Liz      Admit Date: 2022  LOS: 9  Code Status: Full Code   Date of Consult: 07/10/2022  : 1945  Age: 76 y.o.  Weight:   Wt Readings from Last 1 Encounters:   22 124.5 kg (274 lb 7.6 oz)     Sex: male  Bed: [facility] A:   MRN: [id number]  Attending Physician: Emilia Gaston MD  Primary Service: Networked reference to record PCT   Time AI Alert Received: ***  Time at Bedside: ***           ***      Vital Signs (Most Recent):  Temp: 97.1 °F (36.2 °C) (07/10/22 0127)  Pulse: 80 (07/10/22 0127)  Resp: (!) 22 (07/10/22 0127)  BP: (!) 83/48 (07/10/22 0127)  SpO2: 100 % (07/10/22 0127) Vital Signs (24h Range):  Temp:  [96.4 °F (35.8 °C)-97.7 °F (36.5 °C)] 97.1 °F (36.2 °C)  Pulse:  [77-98] 80  Resp:  [18-22] 22  SpO2:  [96 %-100 %] 100 %  BP: ()/(42-62) 83/48         This encounter was triggered by an Artificial Intelligence Notification.     Artificial Intelligence alert discussed with Primary team:  Name ***      Evaluation: ***    Disposition: ***

## 2022-07-10 NOTE — SIGNIFICANT EVENT
Pt intubated with 7.5 ETT 25@ teeth. Placement confirmed by colorimeter device and auscultation. Pt intubated by Matt MONTE with glidescope.

## 2022-07-10 NOTE — PLAN OF CARE
07/10/22 0248   Nurse Notification   Name of Charge Nurse Kristi Key RN   Name of Bedside Nurse Toña NORWOOD   Nurse Notfication Method Telephone   Nurse Notified Of Other  (Unable to document code blue deferred to floor nurses)   Admission   Communication Issues? Technical Issue  (Monitor offline during code blue)

## 2022-07-10 NOTE — HOSPITAL COURSE
"Patient presented to the hospital for worsening black gangrene and blisters on bilateral lower extremities. Patient was admitted for CLI and placed on Heparin gtt and broad spectrum antibiotics with Zosyn and Vancomycin. Cardiology was consulted and angiogram LLE was done with post endovascular intervention left lower extremity per Dr. Cody on 07/05/22. Podiatry was on board for bilateral leg wounds with a plan for TMA once revascularized and stable staged apart. ID was also consulted and agreed to continue with Vancomycin/Zosyn. Due to patient's numbness was improving so plan was made to treat with 4 weeks of IV Vancomycin and Zosyn and repeat outpatient angiogram to evaluate for left DVA and HBO. Due to ESRD status, a Christine line was placed on 7/8 for outpatient IV antibiotic infusion. Patient developed some bleeding over Christine site over night so Lovenox was started with a plan to transition to Eliquis upon discharge if bleeding resolved. Unfortunately, patient was coded on early morning 7/10. ACLS protocol was intiated with coarse Vfib on the monitor. Despite resuscitative efforts for more than 20 minutes, there was no appreciable pulse. Time of death was called 3:08 a.m on 7/10/2022. Patient's wife, Paulette, was contacted and presented at the bedside. Updates was given to patient's wife and his family at the bedside. American flag was brought into room and "Red, White and Blue" salute was performed to honor patient's  status.  "

## 2022-07-10 NOTE — NURSING
Glucose 31, PRN medicine administered as ordered, glucose increased to 133. Lab collected by ICU nurse and sent to lab including blood cultures, results pending, provider by patient's side, continue to monitor. Dressing changed using sterile techniques to left upper chest Christine line, dressing clean, dry and intact, no bleeding noted.

## 2022-07-11 NOTE — PLAN OF CARE
[place] - Telemetry  Discharge Final Note    Primary Care Provider: Zak Hamilton MD    Expected Discharge Date: 2022    Final Discharge Note (most recent)       Final Note - 22 0745          Final Note    Assessment Type Final Discharge Note (P)      Anticipated Discharge Disposition  (P)                      Important Message from Medicare             Contact Info       Jeison Hunt MD   Specialty: General Surgery, Surgery    [address]  [address]  [place] [address]   Phone: [phone]       Next Steps: Follow up on 2022    Instructions: at 2:00pm; [facility] surg hospital follow up appointment    Bettye Louis NP   Specialty: Cardiology    [address] [facility] [place] [address]   Phone: [phone]       Next Steps: Follow up on 2022    Instructions: at 1:30pm; cardiology hospital follow up appointment    [facility]   Specialty: Infectious Diseases    [address], [address]  [place] [address]   Phone: [phone]       Next Steps: Follow up in 3 week(s)    Instructions: Infectious Disease hospital follow up appointment    Zak Hamilton MD   Specialty: Endocrinology   Relationship: PCP - General    [address] [place] [address] [address]  [place] [address]   Phone: [phone]       Next Steps: Follow up on 7/15/2022    Instructions: at 9:45 AM; PCP hospital follow up appointment           Patient  on 7/10/2022.

## 2022-07-12 ENCOUNTER — CLINICAL SUPPORT (OUTPATIENT)
Dept: CARDIOLOGY | Facility: HOSPITAL | Age: 77
End: 2022-07-12
Payer: OTHER GOVERNMENT

## 2022-07-12 DIAGNOSIS — R00.1 BRADYCARDIA, UNSPECIFIED: ICD-10-CM

## 2022-07-12 DIAGNOSIS — I47.20 VENTRICULAR TACHYCARDIA: ICD-10-CM

## 2022-07-12 DIAGNOSIS — I48.91 UNSPECIFIED ATRIAL FIBRILLATION: ICD-10-CM

## 2022-07-12 DIAGNOSIS — Z95.810 PRESENCE OF AUTOMATIC (IMPLANTABLE) CARDIAC DEFIBRILLATOR: ICD-10-CM

## 2022-07-12 DIAGNOSIS — I42.9 CARDIOMYOPATHY, UNSPECIFIED: ICD-10-CM

## 2022-07-12 PROCEDURE — 93296 REM INTERROG EVL PM/IDS: CPT | Performed by: INTERNAL MEDICINE

## 2022-07-15 LAB
BACTERIA BLD CULT: NORMAL
BACTERIA BLD CULT: NORMAL

## 2022-07-28 ENCOUNTER — DOCUMENT SCAN (OUTPATIENT)
Dept: HOME HEALTH SERVICES | Facility: HOSPITAL | Age: 77
End: 2022-07-28
Payer: OTHER GOVERNMENT

## 2022-08-03 NOTE — ASSESSMENT & PLAN NOTE
Managed per Nephrology and Hospital Medicine.   Bactrim Counseling:  I discussed with the patient the risks of sulfa antibiotics including but not limited to GI upset, allergic reaction, drug rash, diarrhea, dizziness, photosensitivity, and yeast infections.  Rarely, more serious reactions can occur including but not limited to aplastic anemia, agranulocytosis, methemoglobinemia, blood dyscrasias, liver or kidney failure, lung infiltrates or desquamative/blistering drug rashes.

## 2022-08-12 ENCOUNTER — DOCUMENT SCAN (OUTPATIENT)
Dept: HOME HEALTH SERVICES | Facility: HOSPITAL | Age: 77
End: 2022-08-12
Payer: OTHER GOVERNMENT

## 2023-08-07 NOTE — ED NOTES
Discharge Planning Assessment  [facility]     Patient Name: Maite Pacheco  MRN: [id number]  Today's Date: 8/7/2023    Admit Date: 8/2/2023    Plan: Home with [facility]   Discharge Needs Assessment    No documentation.                  Discharge Plan       Row Name 08/07/23 1217       Plan    Plan Comments Spoke with the patient and she would like to go to [place] bed. Called [facility] and she will call me back and let me know if they can accept for tomorrow. Will need to set up transportation. RAPHAEL Breaux RN, CCP.                  Continued Care and Services - Admitted Since 8/2/2023       Destination       Service Provider Request Status Selected Services Address Phone Fax Patient Preferred    [facility] Pending - Request Sent N/A [address] [facility] [id number] [phone] --              Durable Medical Equipment       Service Provider Request Status Selected Services Address Phone Fax Patient Preferred    [facility] Pending - Request Sent N/A [address], [place] [id number] [phone] --              Home Medical Care       Service Provider Request Status Selected Services Address Phone Fax Patient Preferred    [facility] [place] Accepted N/A [address] [address] [phone] --                  Expected Discharge Date and Time       Expected Discharge Date Expected Discharge Time    Aug 10, 2023            Demographic Summary    No documentation.                  Functional Status    No documentation.                  Psychosocial    No documentation.                  Abuse/Neglect    No documentation.                  Legal    No documentation.                  Substance Abuse    No documentation.                  Patient Forms    No documentation.                     Latisha Breaux RN     Xray at bedside

## 2024-04-08 NOTE — Clinical Note
The sheath was exchanged in the right arm vein. R AV FISTULA  Writer left voicemail for pt to contact office back regarding results.

## 2024-04-09 NOTE — ASSESSMENT & PLAN NOTE
[facility]  INFECTIOUS DISEASES at [place] / [place] / [place]  =======================================================                               Serg Almanza MD#   Rony Guillory MD*                             Lisy Sandhu MD*   Laila Bell MD*                              Professor Emeritus:  Dr Josr Chatman MD^            Diplomates American Board of Internal Medicine & Infectious Diseases                # [place] Office - Appt - Tel  [phone] Fax [phone]                * [place] Office - Appt - Tel [phone] Fax [phone]                      ^[place] Office - Tel  [phone] Fax [phone]                                  Hospital Consult line:  [phone]  =======================================================    ZAINA MILLER [id number]    Follow up: Fever    No fever last 24 hours      Allergies:  No Known Allergies      REVIEW OF SYSTEMS:  CONSTITUTIONAL:  improving Fever improving chills  HEENT:  No diplopia or blurred vision.  No earache, sore throat or runny nose.  CARDIOVASCULAR:  No chest pain  RESPIRATORY:  No cough, shortness of breath  GASTROINTESTINAL:  No nausea, vomiting or diarrhea.  GENITOURINARY:  No dysuria, frequency or urgency. No Blood in urine  MUSCULOSKELETAL:  no joint aches, no muscle pain  SKIN:  No change in skin, hair or nails.  NEUROLOGIC:  No Headaches      Physical Exam:  GEN: NAD  HEENT: normocephalic and atraumatic. EOMI. PERRL.    NECK: Supple.   LUNGS: CTA B/L.  HEART: RRR  ABDOMEN: Soft, NT, ND.  +BS.    : No CVA tenderness  EXTREMITIES: Without  edema.  MSK: No joint swelling  NEUROLOGIC: No Focal Deficits   SKIN: No rash      Vitals:  T(F): 99.6 (09 Apr 2024 04:05), Max: 99.8 (09 Apr 2024 00:05)  HR: 104 (09 Apr 2024 04:05)  BP: 137/73 (09 Apr 2024 04:05)  RR: 18 (09 Apr 2024 04:05)  SpO2: 93% (09 Apr 2024 04:05) (92% - 100%)  temp max in last 48H T(F): , Max: 100.5 (04-07-24 @ 17:54)    Current Antibiotics:    Other medications:  amLODIPine   Tablet 10 milliGRAM(s) Oral daily  dextrose 5%. 1000 milliLiter(s) IV Continuous <Continuous>  dextrose 5%. 1000 milliLiter(s) IV Continuous <Continuous>  dextrose 50% Injectable 12.5 Gram(s) IV Push once  dextrose 50% Injectable 25 Gram(s) IV Push once  dextrose 50% Injectable 25 Gram(s) IV Push once  glucagon  Injectable 1 milliGRAM(s) IntraMuscular once  heparin   Injectable 5000 Unit(s) SubCutaneous every 12 hours  insulin lispro (ADMELOG) corrective regimen sliding scale   SubCutaneous Before meals and at bedtime  lactated ringers. 2000 milliLiter(s) IV Continuous <Continuous>  pantoprazole    Tablet 40 milliGRAM(s) Oral before breakfast                            9.8    10.27 )-----------( 525      ( 09 Apr 2024 04:00 )             30.1     04-09    139  |  104  |  3.5<L>  ----------------------------<  90  3.7   |  22.0  |  0.97    Ca    7.9<L>      09 Apr 2024 04:00    TPro  6.2<L>  /  Alb  2.5<L>  /  TBili  0.3<L>  /  DBili  0.1  /  AST  134<H>  /  ALT  113<H>  /  AlkPhos  105  04-08    RECENT CULTURES:  04-06 @ 05:25 .Blood Blood     No growth at 48 Hours    04-06 @ 05:19 .Blood Blood     No growth at 48 Hours    04-04 @ 13:21    [facility]    04-03 @ 21:30 .Blood Blood-Peripheral     No growth at 5 days    04-03 @ 21:20 .Blood Blood-Peripheral     No growth at 5 days    04-03 @ 06:26 .Blood Blood     No growth at 5 days    04-03 @ 06:20 .Blood Blood     No growth at 5 days    03-31 @ 22:32    [facility]  [facility]    03-31 @ 22:21 .Blood Blood-Peripheral     No growth at 5 days    03-31 @ 22:18 .Blood Blood-Peripheral     No growth at 5 days    03-29 @ 20:22 Clean Catch Clean Catch (Midstream) Enterococcus faecalis    10,000 - 49,000 CFU/mL Enterococcus faecalis      WBC Count: 10.27 K/uL (04-09-24 @ 04:00)  WBC Count: 9.37 K/uL (04-08-24 @ 06:12)  WBC Count: 8.56 K/uL (04-07-24 @ 05:00)  WBC Count: 8.74 K/uL (04-06-24 @ 05:25)  WBC Count: 7.37 K/uL (04-05-24 @ 06:57)    Creatinine: 0.97 mg/dL (04-09-24 @ 04:00)  Creatinine: 1.00 mg/dL (04-08-24 @ 06:12)  Creatinine: 0.93 mg/dL (04-07-24 @ 05:00)  Creatinine: 1.04 mg/dL (04-06-24 @ 05:25)  Creatinine: 1.08 mg/dL (04-05-24 @ 06:57)    C-Reactive Protein, Serum: 162 mg/L (04-09-24 @ 04:00)  C-Reactive Protein, Serum: 163 mg/L (04-06-24 @ 05:25)  C-Reactive Protein, Serum: 141 mg/L (04-05-24 @ 08:34)    Procalcitonin, Serum: 0.25 ng/mL (04-09-24 @ 04:00)  Procalcitonin, Serum: 0.63 ng/mL (04-06-24 @ 05:25)  Procalcitonin, Serum: 0.63 ng/mL (04-05-24 @ 06:57)  Procalcitonin, Serum: 0.64 ng/mL (04-05-24 @ 06:56)  Procalcitonin, Serum: 0.39 ng/mL (04-02-24 @ 04:52)  Procalcitonin, Serum: 0.17 ng/mL (03-31-24 @ 22:18)     SARS-CoV-2: NotDete (04-04-24 @ 13:21)  COVID-19 PCR: NotDetec (03-31-24 @ 22:32)  SARS-CoV-2: NotDetec (03-31-24 @ 22:32)  SARS-CoV-2: NotDetec (03-13-24 @ 15:45)        Rapid HIV-1/2 Antibody (04.05.24 @ 08:34)    Rapid HIV-1/2 Antibody: Nonreact    Legionella pneumophila Antigen, Urine (04.02.24 @ 15:51)    Legionella Antigen, Urine: Negative    Streptococcus pneumoniae Ag, Ur (04.02.24 @ 15:51)    Streptococcus pneumoniae Ag, Ur Result: Negative    Lipase (04.04.24 @ 05:05)    Lipase: 53 U/L    Acute Hepatitis Panel (04.06.24 @ 05:25)    Hepatitis C Virus Interpretation: Nonreact   Hepatitis C Virus S/CO Ratio: 0.13 S/CO   Hepatitis B Core IgM Antibody: Nonreact   Hepatitis B Surface Antigen: Nonreact   Hepatitis A IgM Antibody: Nonreact    Rheumatoid Factor Quant, Serum or Plasma in AM (04.06.24 @ 05:25)    Rheumatoid Factor Quant, Serum or Plasma: 11 IU/mL         Holding home BB, ACEI, Aldactone given renal function'  Hydralazine PRN SBP >160  BP 100s-120s

## 2024-05-08 NOTE — PROGRESS NOTES
POPPYW met with patient and pt spouse Paulette to discuss discharge and additional patient barriers to care. Pt identified no additional social barriers to care. Per pt and Paulette, pt is not in need of resources at this time.    The following were addressed during this visit:  -Complete follow-up with patient    BEKAH Yeh     Yes

## 2025-01-13 NOTE — PLAN OF CARE
PULMONARY INPATIENT PROGRESS NOTE    Kylie Mauricio is a 89 year old female at Hospital Day ( LOS: 7 days )    Subjective:     On 2 L nasal cannula nasal cannula  Alert, no distress  Daughter by the bedside.  Patient underwent video swallow today--Aspiration with thin liquid consistency.  Speech following.    Current Facility-Administered Medications   Medication Dose Route Frequency Provider Last Rate Last Admin    torsemide (DEMADEX) tablet 20 mg  20 mg Oral BID Adrianna Morris CNP   20 mg at 01/13/25 0845    heparin (porcine) injection 5,000 Units  5,000 Units Subcutaneous Nightly Zenobia Liao MD   5,000 Units at 01/12/25 2200    midodrine (PROAMATINE) tablet 5 mg  5 mg Oral TID WC Charlene Flaherty MD   5 mg at 01/13/25 1347    polyethylene glycol (MIRALAX) packet 17 g  17 g Oral Daily PRN Alexis Henry MD        Potassium Standard Replacement Protocol (Levels 3.5 and lower)   Does not apply See Admin Instructions Edmar Zelaya MD        Magnesium Standard Replacement Protocol   Does not apply See Admin Instructions Edmar Zelaya MD        metoPROLOL succinate (TOPROL-XL) ER tablet 25 mg  25 mg Oral Daily Alda Cortes APNP   25 mg at 01/13/25 0845    acetaminophen (TYLENOL) tablet 650 mg  650 mg Oral Q4H PRN Anahi Martinez MD        ondansetron (ZOFRAN) injection 4 mg  4 mg Intravenous Q6H PRN nAahi Martinez MD        calcium carbonate (TUMS) chewable tablet 500 mg  500 mg Oral Q4H PRN Anahi Martinez MD        melatonin tablet 3 mg  3 mg Oral Nightly PRN Anahi Martinez MD        sodium chloride 0.9 % injection 10 mL  10 mL Intravenous PRN Anahi Martinez MD   10 mL at 01/13/25 0846    Magnesium Standard Replacement Protocol   Does not apply See Admin Instructions Anahi Martinez MD        Potassium Standard Replacement Protocol (Levels 3.5 and lower)   Does not apply See Admin Instructions Anahi Martinez MD        Potassium Replacement (Levels 3.6 - 4)   VN cued in, pt off floor    Does not apply See Admin Instructions Anahi Martinez MD        AMIODarone (PACERONE) tablet 200 mg  200 mg Oral Daily Anahi Martinez MD   200 mg at 01/13/25 0845    pantoprazole (PROTONIX) EC tablet 40 mg  40 mg Oral BID AC Anahi Martinez MD   40 mg at 01/13/25 0541    docusate sodium-sennosides (SENOKOT S) 50-8.6 MG 1 tablet  1 tablet Oral Nightly PRN Anahi Martinez MD   1 tablet at 01/05/25 2151     Code Status:  Selective Treatment/DNR     Review of systems:  14 point of system review was done and unremarkable except for was mentioned in the history of present illness.    Objective:   VITALS: Patient Vitals for the past 24 hrs:   BP Temp Temp src Pulse Resp SpO2 Weight   01/13/25 1140 95/50 -- -- -- -- -- --   01/13/25 1127 130/53 97.5 °F (36.4 °C) Oral 62 -- 100 % --   01/13/25 0835 (!) 143/74 97.7 °F (36.5 °C) Oral 62 -- 100 % --   01/13/25 0544 -- -- -- -- -- -- 48.4 kg (106 lb 11.2 oz)   01/13/25 0339 137/62 97.3 °F (36.3 °C) Oral 61 18 98 % --   01/12/25 2324 125/61 98.1 °F (36.7 °C) Oral 69 18 100 % --   01/12/25 1931 123/62 98.1 °F (36.7 °C) Oral 71 18 99 % --   01/12/25 1617 132/71 98.4 °F (36.9 °C) Oral 72 16 100 % --     I&O:   Intake/Output Summary (Last 24 hours) at 1/13/2025 1508  Last data filed at 1/13/2025 0537  Gross per 24 hour   Intake 370 ml   Output 1050 ml   Net -680 ml     Head and neck: Anicteric.    Ears and nose: Unremarkable.  Oropharyngeal exam: Mouth and throat no lesions.   Chest: Bilateral decreased air entry in the bases  Cardiovascular exam: Regular rate and rhythm, normal S1 and S2, no S3, no murmur.  Abdomen: Bowel sounds are positive soft nondistended nontender no guarding and no rebound.  Extremities: No edema.  Skin: No rash.  Neurologic examination:  no focal deficit    Data Review  CBC:   Lab Results   Component Value Date    WBC 6.5 01/13/2025    WBC 10.14 10/25/2024    RBC 3.05 (L) 01/13/2025    RBC 2.70 (L) 10/25/2024    HEMOGLOBIN 11.9  01/29/2019     BMP:   Lab Results   Component Value Date    GLUCOSE 112 (H) 01/13/2025    GLUCOSE 95 02/12/2020    SODIUM 141 01/13/2025    SODIUM 138 02/12/2020    POTASSIUM 3.8 01/13/2025    POTASSIUM 3.9 02/12/2020    CHLORIDE 102 01/13/2025    CHLORIDE 103 02/12/2020    CO2 36 (H) 01/13/2025    CO2 30 02/12/2020    BUN 53 (H) 01/13/2025    BUN 38 (H) 02/12/2020    CREATININE 1.66 (H) 01/13/2025    CREATININE 1.13 (H) 02/12/2020    CALCIUM 9.2 01/13/2025    CALCIUM 9.9 02/12/2020     Coagulation:   Lab Results   Component Value Date    PT 11.8 10/03/2024    INR 1.1 10/03/2024     Cardiac markers: No results found for: \"CKMB\", \"MYOGLOBIN\"  ABGs: No results found for: \"PH\", \"PO2\", \"PCO2\"    IMAGING OF THE CHEST:  Encounter Date: 01/05/25   Electrocardiogram 12-Lead   Result Value    Ventricular Rate EKG/Min (BPM) 76    Atrial Rate (BPM) 76    FL-Interval (MSEC) 204    QRS-Interval (MSEC) 102    QT-Interval (MSEC) 398    QTc 447    P Axis (Degrees) 73    R Axis (Degrees) -42    T Axis (Degrees) 56    REPORT TEXT      Sinus rhythm  with  premature supraventricular complexes  Left axis deviation  Incomplete right bundle branch block  Moderate voltage criteria for LVH, may be normal variant  (  R in aVL  ,  [place] product  )  Nonspecific T wave abnormality  Abnormal ECG  When compared with ECG of  02-OCT-2024 03:22,  Previous ECG has undetermined rhythm, needs review  QRS axis  shifted left  QT has shortened  Confirmed by TORO NÚÑEZ DO ([id number]) on 1/6/2025 3:41:53 PM         XR CHEST AP OR PA 01/08/2025    Narrative  EXAM: XR CHEST AP OR PA    CLINICAL INDICATION: Pleural effusions.    COMPARISON: January 5, 2025.    FINDINGS:    Overlying EKG wires. The cardiac silhouette remains partially obscured.  Calcific plaque of the thoracic aorta. Heart valve prosthesis is again  seen. Dense calcifications of the mitral valve annulus. Moderate to large  left and mild to moderate right pleural effusion with  adjacent  atelectasis/lung consolidation. This appears relatively unchanged on the  left and overall increased on the right. There is opacification projected  over the visualized lungs which may be due to atelectasis, edema, and/or  infiltrate. No pneumothorax.    Impression  As above.    Electronically Signed by: SITA LATIF M.D.  Signed on: 1/8/2025 3:11 PM  Workstation ID: [id number]      XR CHEST AP OR PA 01/05/2025    Narrative  EXAM: XR CHEST AP OR PA    CLINICAL INDICATION: Syncope    COMPARISON: 10/8/2024    FINDINGS:    EKG leads overlie chest wall.    Moderate cardiomegaly with aortic atherosclerosis. Dense mitral valvular  calcifications. Prior TAVR.    Increased interstitial lung markings bilaterally with large bilateral  pleural effusions greater on the left, and passive  atelectasis/consolidation. Surgical clips injecting over the left  hemithorax. No pneumothorax.    Osteopenia with degenerative changes of the bilateral shoulders.    Impression  1. Findings consistent with CHF with large pleural effusions greater on the  left.  2. Underlying passive atelectasis/consolidation greater on the left.  3. Prior TAVR. Mitral annular calcification.  4. No pneumothorax.        Electronically Signed by: JUAN DANIEL MARTINEZ MD  Signed on: 1/5/2025 2:32 PM  Workstation ID: [id number]         I personally reviewed imaging studies of the chest.     Assesment:     Acute exacerbation heart failure with preserved EF-Baseline on 2 L nasal cannula.  BNP on admission 43134.  Echo noted from 9/18/2024-EF normal.  Moderate mitral stenosis.  Moderate to severe mitral regurgitation.  Atrium is mildly to moderately dilated.  Chest x-ray noted from today-bilateral pleural effusions noted left more than right-at least moderate on the left.  Surgical clips noted at the left chest wall.  Bilateral pleural effusions  Acute on chronic kidney disease  Paroxysmal A-fib not on anticoagulation due to history of GI bleed  Elevated  troponin  History of TAVR  Acute hypoxic respiratory failure secondary to CHF     Plan:     Discussed about possible thoracentesis versus just monitor pleural fluid..  Patient is 89-year-old, risk discussed as well.  Daughter would like me to come back and discussed with son who is the POA.  Discussed with nursing team to call me once son is there.  Palliative measures discussed as well.    Diuresis  Oxygen supplementation  Incentive spirometry and up in chair  Discussed with nursing staff  Discussed with family at bedside      Addendum discussed with son by the bedside.  He is in agreement to hold thoracentesis at this time but he may consider if any change of respiratory status is there.            Pulmonary/Critical Care    1/13/2025, 3:08 PM

## 2025-01-25 NOTE — TELEPHONE ENCOUNTER
Faxed HH orders, facesheet, last clinical notes from Dr. Padilla over [facility] to see if they can take care of the patient   No

## 2025-04-21 NOTE — Clinical Note
Problem: Adult Inpatient Plan of Care  Goal: Plan of Care Review  Outcome: Progressing  Goal: Patient-Specific Goal (Individualized)  Outcome: Progressing  Goal: Absence of Hospital-Acquired Illness or Injury  Outcome: Progressing  Goal: Optimal Comfort and Wellbeing  Outcome: Progressing  Goal: Readiness for Transition of Care  Outcome: Progressing      The closure device was inserted into the right femoral artery.

## 2025-06-05 NOTE — PLAN OF CARE
Report called to 4th floor RN for room 456. All questions addressed. Pt will transfer back to room at 1900 per Dr. Cody.   rolling walker

## 2025-07-08 NOTE — Clinical Note
A pre-sedation assessment was completed by the physician immediately prior to sedation start.    What Is The Reason For Today's Visit?: Preventative Skin Check

## 2025-07-22 NOTE — Clinical Note
A percutaneous stick to the right femoral vein was performed. Ultrasound guidance was used to obtain access.  [facility]  [address]  [place] [address] [phone]    Patient Name: Berna Brumfield  Encounter Date: 07/30/2025  YOB: 1967  Patient Number: [id number]    REASON FOR VISIT: Berna Pierce EXPANDER PLACEMENT;  Surgeon: Kalin Regalado 

## (undated) DEVICE — WIRE MANDRIL 98/60CM

## (undated) DEVICE — SEE MEDLINE ITEM 157131

## (undated) DEVICE — COVER PROBE US 5.5X58L NON LTX

## (undated) DEVICE — BOOT SUTURE AID

## (undated) DEVICE — KIT LEFT HEART MANIFOLD CUSTOM

## (undated) DEVICE — SET MICRO PUNCT 4FR/MPIS-401

## (undated) DEVICE — Device

## (undated) DEVICE — COVER BAND BAG 40 X 40

## (undated) DEVICE — CATH 5FR MP 125CM 5/BX

## (undated) DEVICE — SUT 0 VICRYL / UR6 (J603)

## (undated) DEVICE — CATH ULTRAVERSE 018 5X150X150

## (undated) DEVICE — SUT MCRYL PLUS 4-0 PS2 27IN

## (undated) DEVICE — GLIDESHEATH SLENDER SS 5FR10CM

## (undated) DEVICE — JELLY LUBRICANT STERILE 4 OZ

## (undated) DEVICE — KIT GLIDESHEATH SLEND 6FR 10CM

## (undated) DEVICE — PAD PREP 50/CA

## (undated) DEVICE — CATH DECAPOLAR 6FRX92CM

## (undated) DEVICE — GUIDEWIRE FIELDER XT.014X300CM

## (undated) DEVICE — KIT INTRODUCER W/GUIDEWIRE

## (undated) DEVICE — SHEATH BRITE TIP 6FR 5.5CM

## (undated) DEVICE — STOCKINET TUBULAR 1 PLY 6X60IN

## (undated) DEVICE — CATH ULTRVRS 018 2.5X150X150

## (undated) DEVICE — SHEATH DESTINATION 6FR 45CM

## (undated) DEVICE — SPONGE DERMACEA GAUZE 4X4

## (undated) DEVICE — SHEATH SAFE ULTRA 9FR

## (undated) DEVICE — CATH ULTRAVERSE 014 2X220X150

## (undated) DEVICE — GAUZE SPONGE 4X4 12PLY

## (undated) DEVICE — INSTRUMENT SUCTION FRAZIER 12F

## (undated) DEVICE — SLING SWATHE UNIVERSAL FOAM

## (undated) DEVICE — CATH ULTRAVERSE 035 8X100X75

## (undated) DEVICE — SYR B-D DISP CONTROL 10CC100/C

## (undated) DEVICE — COVER OVERHEAD SURG LT BLUE

## (undated) DEVICE — SET DECANTER MEDICHOICE

## (undated) DEVICE — SEE MEDLINE ITEM 156955

## (undated) DEVICE — GUIDEWIRE COMMAND .014X300CM

## (undated) DEVICE — CATH EMERGE MR 30 X 4.00

## (undated) DEVICE — APPLICATOR CHLORAPREP ORN 26ML

## (undated) DEVICE — CATH THROMB ANGIOJET AVX 6FX50

## (undated) DEVICE — SUT 4-0 ETHILON 18 PS-2

## (undated) DEVICE — BLLN DORADO 8 X 4 X 135

## (undated) DEVICE — ADHESIVE DERMABOND ADVANCED

## (undated) DEVICE — INFLATOR ENCORE 26 BLLN INFL

## (undated) DEVICE — GUIDEWIRE ADVNTG 035X260CM ANG

## (undated) DEVICE — DEVICE PICC SECURE SORBA VIEW

## (undated) DEVICE — PADS RADI PERIPHERAL SHIELD

## (undated) DEVICE — CATH ULTRAVERSE 018 2X60X150

## (undated) DEVICE — TUBING HPCIL ROT M/F ADPT 48IN

## (undated) DEVICE — DRESSING XEROFORM FOIL PK 1X8

## (undated) DEVICE — ELECTRODE REM PLYHSV RETURN 9

## (undated) DEVICE — DRAPE INCISE IOBAN 2 23X17IN

## (undated) DEVICE — CATH ULTRAVERSE 018 3X150X150

## (undated) DEVICE — SNARE 10MM DEVICE

## (undated) DEVICE — CATH ULTRAVERSE 018 4X40X150

## (undated) DEVICE — TIE VAS SIL RUBBER MAXI BLU ST

## (undated) DEVICE — BLADE SURG CARBON STEEL SZ11

## (undated) DEVICE — CATH NAVICROSS .035X135 ANGLE

## (undated) DEVICE — DEVICE PERCLOSE SUT CLSR 6FR

## (undated) DEVICE — DRESSING TEGADERM 2 3/8 X 2.75

## (undated) DEVICE — CATH EAGLE EYE ST .014X20X150

## (undated) DEVICE — TOWEL OR DISP STRL BLUE 4/PK

## (undated) DEVICE — SUT PROLENE 5-0 36IN C-1

## (undated) DEVICE — SUT VICRYL 3-0 27 SH

## (undated) DEVICE — SEE MEDLINE ITEM 157173

## (undated) DEVICE — SEE MEDLINE ITEM 157187

## (undated) DEVICE — CATH ULTRAVERSE 018 5X220X150

## (undated) DEVICE — CATH LUTONIX DCB 018X130X4X150

## (undated) DEVICE — SEE MEDLINE ITEM 157116

## (undated) DEVICE — GUIDE CATH CPS DIRECT 54CM 135

## (undated) DEVICE — SPONGE DERMA 8PLY 2X2

## (undated) DEVICE — PAD DEFIB CADENCE ADULT R2

## (undated) DEVICE — PACK PACER PERMANENT

## (undated) DEVICE — SEE MEDLINE ITEM 157117

## (undated) DEVICE — DRESSING AQUACEL FOAM 5 X 5

## (undated) DEVICE — SYR LUER LOCK 1CC

## (undated) DEVICE — TUBING SET ANGIASSIST MED GAS

## (undated) DEVICE — CATH ULTRVRS 018 2.5X220X150

## (undated) DEVICE — NDL 22GA X1 1/2 REG BEVEL

## (undated) DEVICE — SUT PROLENE 6-0 24 BV-1

## (undated) DEVICE — SHEATH MICROPUNCTURE PEDAL 4FR

## (undated) DEVICE — GUIDEWIRE EMERALD .035IN 260CM

## (undated) DEVICE — SHEATH INTRODUCER 6FR 11CM

## (undated) DEVICE — CATH ULTRAVERSE 018 3X100X150

## (undated) DEVICE — CLOSURE SKIN STERI STRIP 1/2X4

## (undated) DEVICE — CONTRAST VISIPAQUE 150ML

## (undated) DEVICE — ADAPTER CATH SYRINGE

## (undated) DEVICE — SYR 30CC LUER LOCK

## (undated) DEVICE — COVER PROBE 6X48

## (undated) DEVICE — CATH CXI STR 2.6F .018IN 90CM

## (undated) DEVICE — SUT PDSII 4-0 PS-2 CLEAR MO

## (undated) DEVICE — PACK BASIC

## (undated) DEVICE — TROCAR ENDOPATH XCEL 5MM 7.5CM

## (undated) DEVICE — GUIDEWIRE STD .035X180CM ANG

## (undated) DEVICE — MANIFOLD 4 PORT

## (undated) DEVICE — DRESSING XEROFORM 1X8IN

## (undated) DEVICE — SYR 10CC LUER LOCK

## (undated) DEVICE — CATH BLLN COR SINUS VENOGRAPHY

## (undated) DEVICE — SHEATH INTRODUCER 4FR 11CM

## (undated) DEVICE — SUT SILK 0 STRANDS 30IN BLK

## (undated) DEVICE — BANDAGE KERLIX AMD

## (undated) DEVICE — GLOVE BIOGEL ECLIPSE SZ 7.5

## (undated) DEVICE — GUIDEWIRE VIPERWIRE 18 335 145

## (undated) DEVICE — SHEATH INTRODUCER 5FR 10CM

## (undated) DEVICE — CATH ULTRAVERSE 018 4X150X150

## (undated) DEVICE — CATH DMNDBK SYS CLASS 2.0X145

## (undated) DEVICE — SHEATH INTRODUCER 8FR 11CM

## (undated) DEVICE — CATH ULTRAVERSE 035 4X40X75

## (undated) DEVICE — GUIDEWIRE COUGAR XT 300 ST HY

## (undated) DEVICE — DRESSING SORVAVIEW SHIELD

## (undated) DEVICE — CATH FOGARTY EMB 4FR 40CM

## (undated) DEVICE — GLOVE SURGICAL LATEX SZ 7

## (undated) DEVICE — HEMOSTAT SURGICEL 4X8IN

## (undated) DEVICE — CATH ULTRVRS 018 2.5X100X150

## (undated) DEVICE — SHEET THYROID W/ISO-BAC

## (undated) DEVICE — CATH ULTRAVERSE 018 4X100X150

## (undated) DEVICE — SUT PROLENE 6-0 C-1 30IN BL

## (undated) DEVICE — CATH JACKY RADIAL 3.5 110CM

## (undated) DEVICE — SUT 2-0 VICRYL / FS-1

## (undated) DEVICE — CATH BLLN WOLVERINE 10X3MM

## (undated) DEVICE — CATH JACKY RADIAL 5FR 100CM

## (undated) DEVICE — SUT ETHILON 3-0 PS2 18 BLK

## (undated) DEVICE — VISIPAQUE 320 200ML +PK

## (undated) DEVICE — INTRODUCER VAS DESTINATION 5FR

## (undated) DEVICE — DRAPE INCISE IOBAN 2 23X23IN

## (undated) DEVICE — GUIDEWIRE V-18 CONTROL .18

## (undated) DEVICE — GUIDEWIRE COUGAR XT 190 ST HY

## (undated) DEVICE — CATH CXI ANG 2.6F .018IN 150CM

## (undated) DEVICE — GUIDWIRE HI-TORQUE .018X300CM

## (undated) DEVICE — BANDAGE D-STAT DRY HEMOSTATIC

## (undated) DEVICE — CATH SWAN NECK 2CUFF 112.8CM

## (undated) DEVICE — SAFESHEATH II 8FR 13CM

## (undated) DEVICE — CATH ULTRAVERSE 018 2X80X150

## (undated) DEVICE — SPONGE KERLIX SUPER 6X6.75IN

## (undated) DEVICE — CATH TURNPIKE 150CM 2.9FR

## (undated) DEVICE — DRESSING ANTIMICROBIAL 1 INCH

## (undated) DEVICE — CATH EMBOSHIELD NAV6 7.2X190CM

## (undated) DEVICE — LEFT HEART LEAD
Type: IMPLANTABLE DEVICE | Site: HEART | Status: NON-FUNCTIONAL
Brand: QUARTET™
Removed: 2019-07-18

## (undated) DEVICE — SOL IRR NACL .9% 1000CC

## (undated) DEVICE — CATH LUTONIX DCB 018X130X5X100

## (undated) DEVICE — GUIDEWIRE ALLSTAR .014 X 300

## (undated) DEVICE — SET CYSTO IRRIGATION UNIV SPIK

## (undated) DEVICE — COVERA™ VASCULAR COVERED STENT  8 MM X 60 MM (80 CM DELIVERY CATHETER) (FLARED)
Type: IMPLANTABLE DEVICE | Site: ARM | Status: NON-FUNCTIONAL
Brand: COVERA

## (undated) DEVICE — GUIDEWIRE STD .035X260CM ANG

## (undated) DEVICE — CATH SOFT VU BRAIDED 4F X 80CM

## (undated) DEVICE — CATH ULTRAVERSE 018 2X100X150

## (undated) DEVICE — CATH DMNDBK SYS CLASS 1.50X145

## (undated) DEVICE — CATH ULTRAVERSE 018 3X80X150

## (undated) DEVICE — SEE MEDLINE ITEM 156952

## (undated) DEVICE — GUIDEWIRE J 3MM .035IN 150

## (undated) DEVICE — TRANSDUCER TRU WAVE

## (undated) DEVICE — CATH EMERGE MR 30 X 2.00

## (undated) DEVICE — SYS CLSR WND ENDOSCP XL

## (undated) DEVICE — CATH EMERGE MR 30 X 3.00

## (undated) DEVICE — WIRE GUIDE WHOLEY MOD J .035

## (undated) DEVICE — APPLIER LIGACLIP SM 9.38IN

## (undated) DEVICE — CATH LUTONIX DCB 018X130X5X220

## (undated) DEVICE — GUIDEWIRE EMERALD 150CM PTFE

## (undated) DEVICE — CATH IMPULSE 5FR PIGTAIL 125CM

## (undated) DEVICE — DRAPE C-ARM/MOBILE XRAY 44X80

## (undated) DEVICE — WIRE WHISPER MS 014 X 190

## (undated) DEVICE — CATH ULTRAVERSE 035 6X100X75

## (undated) DEVICE — CATH ULTRAVERSE 018 4X22X150

## (undated) DEVICE — PAD RADI FEMORAL

## (undated) DEVICE — NDL HYPO REG 25G X 1 1/2

## (undated) DEVICE — CATH NAVICROSS .035X90 STR

## (undated) DEVICE — CONTRAST VISIPAQUE 50ML

## (undated) DEVICE — CATH ULTRAVERSE 018 4X80X150

## (undated) DEVICE — SYR MED RAD 150ML

## (undated) DEVICE — SHEATH BRITE TIP 7FR 5.5CM

## (undated) DEVICE — CATH IMA INFINITI 4FRX100CM

## (undated) DEVICE — VISE RADIFOCUS MULTI TORQUE

## (undated) DEVICE — CATH GLIDE ANGLED 5FR 65CM

## (undated) DEVICE — ELECTRODE BLADE INSULATED 1 IN

## (undated) DEVICE — HEMOSTAT VASC BAND LONG 27CM

## (undated) DEVICE — CABLE PACER

## (undated) DEVICE — GUIDEWIRE VIPERWIRE 14 335 145